# Patient Record
Sex: MALE | Race: BLACK OR AFRICAN AMERICAN | NOT HISPANIC OR LATINO | Employment: FULL TIME | URBAN - METROPOLITAN AREA
[De-identification: names, ages, dates, MRNs, and addresses within clinical notes are randomized per-mention and may not be internally consistent; named-entity substitution may affect disease eponyms.]

---

## 2017-01-12 ENCOUNTER — TELEPHONE (OUTPATIENT)
Dept: NEUROSURGERY | Facility: CLINIC | Age: 57
End: 2017-01-12

## 2017-01-12 ENCOUNTER — TELEPHONE (OUTPATIENT)
Dept: INTERNAL MEDICINE | Facility: CLINIC | Age: 57
End: 2017-01-12

## 2017-01-12 NOTE — TELEPHONE ENCOUNTER
Called and spoke with the patient and he states he was not healing very well,  He has not been able to eat and the flap is touching the metal. Advised he needs to call that office and schedule an appointment for a post-op/ I transferred the call to the   so he can speak with that doctor. Termed the call

## 2017-01-12 NOTE — TELEPHONE ENCOUNTER
----- Message from Cinthia Ratliff sent at 1/12/2017  2:18 PM CST -----  Contact: Patient  Patient called and said he is having a lot of trouble with his swallowing issues. He said he is losing weight and things like that. He does not know what to do, he said it's not getting better.    He is requesting a call today.    Please call him about this at [phone] or [phone]

## 2017-01-12 NOTE — TELEPHONE ENCOUNTER
Spoke with pt. Scheduled appointment with Dr. Wise on 1/23/2017. Appointment slip in the mail. Pt verbalized understanding.

## 2017-01-12 NOTE — TELEPHONE ENCOUNTER
----- Message from Mera Leonard sent at 1/12/2017  1:12 PM CST -----  Contact: pt [phone]  Pt will like a call from the Dr today in regards to the neck infusion surgery he had in  pt said it has not healed well and he is having trouble with eating he is continuing to lose weight and he think he need to have the surgery again,pt will like to know do he need to make an appointment with the Dr to discuss this,please advise pt

## 2017-01-19 ENCOUNTER — OFFICE VISIT (OUTPATIENT)
Dept: NEUROSURGERY | Facility: CLINIC | Age: 57
End: 2017-01-19
Attending: NEUROLOGICAL SURGERY
Payer: COMMERCIAL

## 2017-01-19 VITALS
SYSTOLIC BLOOD PRESSURE: 126 MMHG | HEIGHT: 76 IN | TEMPERATURE: 98 F | BODY MASS INDEX: 23.42 KG/M2 | WEIGHT: 192.31 LBS | HEART RATE: 76 BPM | DIASTOLIC BLOOD PRESSURE: 76 MMHG

## 2017-01-19 DIAGNOSIS — M43.22 CERVICAL VERTEBRAL FUSION: Primary | ICD-10-CM

## 2017-01-19 PROCEDURE — 3074F SYST BP LT 130 MM HG: CPT | Mod: S$GLB,,, | Performed by: NEUROLOGICAL SURGERY

## 2017-01-19 PROCEDURE — 3078F DIAST BP <80 MM HG: CPT | Mod: S$GLB,,, | Performed by: NEUROLOGICAL SURGERY

## 2017-01-19 PROCEDURE — 99213 OFFICE O/P EST LOW 20 MIN: CPT | Mod: S$GLB,,, | Performed by: NEUROLOGICAL SURGERY

## 2017-01-19 PROCEDURE — 1159F MED LIST DOCD IN RCRD: CPT | Mod: S$GLB,,, | Performed by: NEUROLOGICAL SURGERY

## 2017-01-19 PROCEDURE — 99999 PR PBB SHADOW E&M-EST. PATIENT-LVL III: CPT | Mod: PBBFAC,,, | Performed by: NEUROLOGICAL SURGERY

## 2017-01-19 NOTE — PROGRESS NOTES
CHIEF COMPLAINT:  Follow up    I, Ana Paula Regan, am scribing for, and in the presence of, Dr. Wise.    HPI:  Hi Rubio is a 57 y.o.  male with hypertension, carotid artery occlusion who presents today for a follow up. Pt is s/p C3/4-C4/5 ACDF on 12/20/2015. Pt still experiences difficulty swallowing. Pt describes his throat as extremely dry. Pt experiences trouble swallowing solid food as well as liquids. Pt has put back on some weight since he has started drinking Boost shakes.    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 -     Hyperlipidemia     Hypertension     Impaired fasting glucose 10/28/2013    Memory loss     Traumatic iritis - Left Eye 2/27/2013     Past Surgical History   Procedure Laterality Date    Hernia repair      Cervical fusion  12/30/2015     Family History   Problem Relation Age of Onset    Heart disease Mother     Hypertension Mother     Hyperlipidemia Mother     Heart disease Father     Hyperlipidemia Brother     Hypertension Brother     Hypertension Brother     Hyperlipidemia Brother     Benign prostatic hyperplasia Brother     Hypertension Brother     Hypertension Brother     Hepatitis Brother     Amblyopia Neg Hx     Blindness Neg Hx     Cancer Neg Hx     Cataracts Neg Hx     Diabetes Neg Hx     Glaucoma Neg Hx     Macular degeneration Neg Hx     Retinal detachment Neg Hx     Strabismus Neg Hx     Stroke Neg Hx     Thyroid disease Neg Hx      Social History   Substance Use Topics    Smoking status: Former Smoker     Packs/day: 1.00     Years: 30.00     Types: Cigarettes      Quit date: 12/13/2011    Smokeless tobacco: Never Used    Alcohol use 0.0 oz/week     15 - 16 Cans of beer per week      Comment: 2 beers per day        Review of Systems   Constitutional: Negative.    HENT: Positive for trouble swallowing.    Eyes: Negative.    Respiratory: Negative.    Cardiovascular: Negative.    Gastrointestinal: Negative.    Endocrine: Negative.    Genitourinary: Negative.    Musculoskeletal: Negative for back pain, gait problem and neck pain.   Skin: Negative.    Allergic/Immunologic: Negative.    Neurological: Negative for weakness, light-headedness, numbness and headaches.   Hematological: Negative.    Psychiatric/Behavioral: Negative.        OBJECTIVE:   Vital Signs:  Temp: 98.3 °F (36.8 °C) (01/19/17 0844)  Pulse: 76 (01/19/17 0844)  BP: 126/76 (01/19/17 0844)    Physical Exam:    Vital signs: All nursing notes and vital signs reviewed -- afebrile, vital signs stable.  Constitutional: Patient sitting comfortably in chair. Appears well developed and well nourished.  Skin: Exposed areas are intact without abnormal markings, rashes or other lesions.  HEENT: Normocephalic. Normal conjunctivae.  Cardiovascular: Normal rate and regular rhythm.  Respiratory: Chest wall rises and falls symmetrically, without signs of respiratory distress.  Abdomen: Soft and non-tender.  Extremities: Warm and without edema. Calves supple, non-tender.  Psych/Behavior: Normal affect.    Neurological:    Mental status: Alert and oriented. Conversational and appropriate.       Cranial Nerves: Grossly intact.    Motor:    Upper:  Deltoids Triceps Biceps WE WF     R 5/5 5/5 5/5 5/5 5/5 5/5    L 5/5 5/5 5/5 5/5 5/5 5/5      Lower:  HF KE KF DF PF EHL    R 5/5 5/5 5/5 5/5 5/5 5/5    L 5/5 5/5 5/5 5/5 5/5 5/5     Sensory: Intact sensation to light touch in all extremities. Romberg negative.    Reflexes:          DTR: 2+ symmetrically throughout.     Garsia's: Negative.     Babinski's: Negative.     Clonus:  Negative.    Cerebellar: Finger-to-nose and rapid alternating movements normal. Gait stable, fluid.    Spine:    Posture: Head well aligned over pelvis in front and side views.  No focal or global spinal deformity visible on inspection. Shoulders and hips even. No obvious leg length discrepancy. No scapula winging.    Bending: Full ROM with forward, back and lateral bending. No rib prominence with forward bend.    Cervical:      ROM: Full with flexion, extension, lateral rotation and ear-to-shoulder bend.      Midline TTP: Negative.     Spurling's test: Negative.     Lhermitte's: Negative.    Thoracic:     Midline TTP: Negative    Lumbar:     Midline TTP: Negative     Straight Leg Test: Negative     Crossed Straight Leg Test: Negative     Sciatic notch tenderness: Negative.    Other:     SI joint TTP: Negative.     Greater trochanter TTP: Negative.     Tenderness with external/internal hip rotation: Negative.    Diagnostic Results:  All imaging was independently reviewed by me.    No new imaging for my review.    Flex/Ex X-ray C-spine, dated 11/3/2016:  1. Good hardware position and spinal alignment  2. Some evidence of bony fusion C3 to C5  3. No instability    ASSESSMENT/PLAN:     Hi Rubio is 13 months s/p two level ACD. He continues to have significant dysphasia, which is related to physical obstruction from the plate. I recommend a CT C-spine to assess for bony fusion and plate removal if it is fused. If unfused, we will wait for fusion or replace his disc spacer with integrated spacer-plate. He will follow up when the CT is complete.    The patient understands and agrees with the plan of care. All questions were answered.     1. CT C-spine  2. RTC pending #1    I, Dr. Wise, personally performed the services described in this documentation as scribed by Ana Paula Regan in my presence, and it is both accurate and complete.      Hernando Wise M.D.  Department of Neurosurgery  [facility]

## 2017-01-20 ENCOUNTER — OFFICE VISIT (OUTPATIENT)
Dept: CARDIOLOGY | Facility: CLINIC | Age: 57
End: 2017-01-20
Payer: COMMERCIAL

## 2017-01-20 VITALS
HEART RATE: 76 BPM | SYSTOLIC BLOOD PRESSURE: 148 MMHG | DIASTOLIC BLOOD PRESSURE: 88 MMHG | BODY MASS INDEX: 23.31 KG/M2 | WEIGHT: 191.38 LBS | HEIGHT: 76 IN

## 2017-01-20 DIAGNOSIS — F41.9 ANXIETY DISORDER, UNSPECIFIED TYPE: ICD-10-CM

## 2017-01-20 DIAGNOSIS — R00.2 PALPITATIONS: ICD-10-CM

## 2017-01-20 DIAGNOSIS — I25.84 CORONARY ARTERY DISEASE DUE TO CALCIFIED CORONARY LESION: ICD-10-CM

## 2017-01-20 DIAGNOSIS — E78.2 MIXED HYPERLIPIDEMIA: ICD-10-CM

## 2017-01-20 DIAGNOSIS — I25.10 CORONARY ARTERY DISEASE DUE TO CALCIFIED CORONARY LESION: ICD-10-CM

## 2017-01-20 DIAGNOSIS — R07.89 CHEST DISCOMFORT: Primary | ICD-10-CM

## 2017-01-20 DIAGNOSIS — I10 ESSENTIAL HYPERTENSION: ICD-10-CM

## 2017-01-20 PROCEDURE — 1159F MED LIST DOCD IN RCRD: CPT | Mod: S$GLB,,, | Performed by: INTERNAL MEDICINE

## 2017-01-20 PROCEDURE — 3077F SYST BP >= 140 MM HG: CPT | Mod: S$GLB,,, | Performed by: INTERNAL MEDICINE

## 2017-01-20 PROCEDURE — 93000 ELECTROCARDIOGRAM COMPLETE: CPT | Mod: S$GLB,,, | Performed by: INTERNAL MEDICINE

## 2017-01-20 PROCEDURE — 99215 OFFICE O/P EST HI 40 MIN: CPT | Mod: S$GLB,,, | Performed by: INTERNAL MEDICINE

## 2017-01-20 PROCEDURE — 3079F DIAST BP 80-89 MM HG: CPT | Mod: S$GLB,,, | Performed by: INTERNAL MEDICINE

## 2017-01-20 PROCEDURE — 99999 PR PBB SHADOW E&M-EST. PATIENT-LVL III: CPT | Mod: PBBFAC,,, | Performed by: INTERNAL MEDICINE

## 2017-01-20 RX ORDER — CLONIDINE HYDROCHLORIDE 0.1 MG/1
0.1 TABLET ORAL
Qty: 30 TABLET | Refills: 3 | Status: ON HOLD | OUTPATIENT
Start: 2017-01-20 | End: 2017-01-23

## 2017-01-20 NOTE — Clinical Note
Josefina, I saw . Hi Rubio in clinic. I think he should either take an anxiolytic or preferably a SSRI. [place]

## 2017-01-20 NOTE — ASSESSMENT & PLAN NOTE
Clonidine PRN for bouts of elevated BP.  If clonidine does not work well, he can try extra metoprolol PRN. He will require the short acting version.

## 2017-01-20 NOTE — PROGRESS NOTES
Subjective:   Patient ID:  Hi Rubio is a 57 y.o. male who presents for follow-up of Hypertension and family history of CAD      Problem List:  HTN  Hyperlipidemia  PVC  Family h/o CAD  Coronary calcification    HPI:   Hi Rubio reports discomfort in the precordial region. The discomfort is described as pressure. He also had a pressure across the chest x 2 in the past 2 weeks. He was at work both times. One time it started in the morning and gradually increased until lunch time. He became short of breath and felt his heart racing. BP was 180/>100 mm Hg. He also reports palpitations. The palpitations are described as a rapid heart beat and at other times an extra beat.  A CT of the chest revealed calcification within the LAD.   He tells me that he used to suffer from anxiety many years ago. He wonders if they are coming back.    On 80 mg of pravastatin his lipids have improved. HDL/total chol has improved from 15% to >20%.     He has dysphagia since undergoing cervical spine surgery. He will be undergoing surgery again and requires preop clearance.      Review of Systems   Constitution: Positive for malaise/fatigue and weight loss. Negative for weakness and weight gain.   Cardiovascular: Positive for chest pain, dyspnea on exertion, irregular heartbeat and palpitations. Negative for claudication, leg swelling, orthopnea, paroxysmal nocturnal dyspnea and syncope.   Respiratory: Negative for cough, sputum production and wheezing.    Musculoskeletal: Positive for muscle cramps. Negative for falls, joint pain, muscle weakness and myalgias.   Gastrointestinal: Negative for abdominal pain, heartburn and melena.   Genitourinary: Positive for nocturia. Negative for frequency and hematuria.   Neurological: Positive for dizziness and numbness. Negative for light-headedness, loss of balance and paresthesias.   Psychiatric/Behavioral: Negative for depression.       Current Outpatient Prescriptions   Medication  "Sig    amlodipine (NORVASC) 5 MG tablet TAKE ONE TABLET BY MOUTH ONE TIME DAILY    CONTOUR NEXT STRIPS Strp 1 strip by Misc.(Non-Drug; Combo Route) route once daily. Test blood glucose once daily as instructed.    gabapentin (NEURONTIN) 300 MG capsule Take 1 capsule (300 mg total) by mouth 2 (two) times daily. (Patient taking differently: Take 300 mg by mouth as needed. )    hydrochlorothiazide (HYDRODIURIL) 25 MG tablet Take 1 tablet (25 mg total) by mouth once daily.    methocarbamol (ROBAXIN) 750 MG Tab Take 1 tablet (750 mg total) by mouth every 8 (eight) hours. (Patient taking differently: Take 750 mg by mouth every 8 (eight) hours as needed. )    metoprolol succinate (TOPROL-XL) 100 MG 24 hr tablet Take 1 tablet (100 mg total) by mouth once daily.    MICROLET LANCET Misc 1 lancet by Misc.(Non-Drug; Combo Route) route once daily. Test blood glucose once daily as instructed.    multivitamin (ONE DAILY MULTIVITAMIN) per tablet Take 1 tablet by mouth once daily.    omeprazole (PRILOSEC) 20 MG capsule TAKE ONE CAPSULE BY MOUTH ONE TIME DAILY    pravastatin (PRAVACHOL) 80 MG tablet Take 1 tablet (80 mg total) by mouth every evening.    sildenafil (VIAGRA) 50 MG tablet Take 1 tablet (50 mg total) by mouth daily as needed for Erectile Dysfunction.         Social History   Substance Use Topics    Smoking status: Former Smoker     Packs/day: 1.00     Years: 30.00     Types: Cigarettes     Quit date: 12/13/2011    Smokeless tobacco: Never Used    Alcohol use 0.0 oz/week     15 - 16 Cans of beer per week      Comment: 2 beers per day         Objective:     Physical Exam   Constitutional: He is oriented to person, place, and time. He appears well-developed and well-nourished.   BP (!) 148/88  Pulse 76  Ht 6' 4" (1.93 m)  Wt 86.8 kg (191 lb 5.8 oz)  BMI 23.29 kg/m2     HENT:   Head: Normocephalic and atraumatic.   Neck: No JVD present. Carotid bruit is not present.   Cardiovascular: Normal rate, regular " rhythm, S1 normal and S2 normal.  Exam reveals no gallop.    No murmur heard.  Pulses:       Radial pulses are 2+ on the right side, and 2+ on the left side.        Posterior tibial pulses are 2+ on the right side, and 2+ on the left side.   Pulmonary/Chest: Effort normal. He has no wheezes. He has no rales. Chest wall is not dull to percussion.   Abdominal: Soft. There is no splenomegaly or hepatomegaly. There is no tenderness.   Musculoskeletal:        Right lower leg: He exhibits no edema.        Left lower leg: He exhibits no edema.   Neurological: He is alert and oriented to person, place, and time. Gait normal.   Skin: Skin is warm and dry. No bruising noted. No cyanosis. Nails show no clubbing.   Psychiatric: He has a normal mood and affect. His speech is normal and behavior is normal. Judgment and thought content normal. Cognition and memory are normal.           Lab Results   Component Value Date    CHOL 199 06/09/2016    HDL 45 06/09/2016    LDLCALC 109.2 06/09/2016    TRIG 224 (H) 06/09/2016    CHOLHDL 22.6 06/09/2016     Lab Results   Component Value Date     (H) 12/22/2016    CREATININE 1.1 12/22/2016    BUN 12 12/22/2016     12/22/2016    K 3.6 12/22/2016     12/22/2016    CO2 23 12/22/2016     Lab Results   Component Value Date    ALT 26 06/09/2016    AST 22 06/09/2016    ALKPHOS 65 10/13/2015    BILITOT 0.6 10/13/2015       ECG today reviewed by me. It reveals sinus rhythm with no ST or T abnormality.      Assessment:     1. Chest discomfort    2. Essential hypertension    3. Mixed hyperlipidemia    4. Coronary artery disease due to calcified coronary lesion    5. Anxiety disorder, unspecified type    6. Palpitations          Plan:     Essential hypertension  Clonidine PRN for bouts of elevated BP.  If clonidine does not work well, he can try extra metoprolol PRN. He will require the short acting version.    Chest discomfort  He has 2 types of chest discomfort. The one that is  across the chest is more concerning.   Stress echo.    Mixed hyperlipidemia  Continue pravastatin.  Nutritional counseling.    Will clear for surgery after reviewing stress test.  RTC 8/17.

## 2017-01-20 NOTE — PATIENT INSTRUCTIONS
Note your heart rate when you check your BP.   Talk to Dr. Kendall about starting you on a medication for anxiety. Sometimes a low dose of an antidepressant works better.    =============   ================   ==================   =================    LDL - bad type - improves with diet and medications: typically statins; most other medications can lower LDL but have not been proven to prevent heart attacks.             May not improve significantly with exercise alone.             Ideally less than 70     HDL - good type - improves with exercise             Ideally greater than 50    TGs (triglycerides) - also bad - can change very quickly and considerably with food - improves with diet and exercise            In some cases a low carbohydrate diet will lower TGs better than a low fat diet.            Ideal range       Sugar, fat and cholesterol in food:     Current dietary guidelines recommend drastically reducing our intake of sugar. There is less emphasis on fat. And cholesterol in our food is no longer a significant concern. However please do not confuse this with the role of cholesterol in our blood and arteries. It is still cholesterol that clogs up our arteries whether it comes from our food or is manufactured by our bodies.       Most foods that are high in cholesterol are also high in saturated fat. But there is way more saturated fat than cholesterol in these foods. On the other hand there are a handful of foods that are high in cholesterol but do not contain much saturated fat: eggs, shrimp, crab legs and crawfish; these are OK to eat.       Saturated fat is the bad fat - you should limit your intake of this. Deep fried foods, meats and other animal fats are high in saturated fat. Cookies, donuts and most dessert and cakes are usually high in both saturated fat and sugar.       Unsaturated fat is the good fat. It contains the same number of calories as saturated fat but does not get deposited in our  arteries. The Mediterranean style diet encourages the intake of unsaturated fat - olive oil, avocado and unsalted nuts.      You should eat a few servings of vegetables (and fruit as long as you are not diabetic) everyday. Substitute some plant proteins in place of meat: soy, beans, lentils, quinoa and oatmeal.     Do not use stick butter or stick margarine. Butter that comes in a tub is soft butter. It consists of 1/2 butter and 1/2 canola or another type of vegetable oil. It is fine to use that.       Trans fats should also be avoided. Most foods that are labelled as containing 0 gms of trans fat can still contain several hundred milligrams of trans fat: creamer, margarine, refrigerator dough, deep fried foods, ready made frosting, potato, corn and torilla chips, cakes, cookies, pie crusts and crackers containing shortening made with hydrogenated vegetable oil.

## 2017-01-20 NOTE — MR AVS SNAPSHOT
[place] [facility]   [facility]  [place] [address]  Phone: [phone]                  Hi Rubio   2017 8:00 AM   Office Visit    Description:  Male : 1960   Provider:  No Palacios MD   Department:  [place] [facility]           Reason for Visit     Hypertension     family history of CAD           Diagnoses this Visit        Comments    Chest discomfort    -  Primary     Essential hypertension         Mixed hyperlipidemia         Coronary artery disease due to calcified coronary lesion         Anxiety disorder, unspecified type         Palpitations                To Do List           Future Appointments        Provider Department Dept Phone    2017 8:00 AM JENY CARROLLFABIENNEADRYAN [place] [facility] [phone]    2017 3:00 PM [facility] CT OP LIMIT [address] [facility] [phone]    2017 4:00 PM Hernando Wise MD [facility] [phone]      Goals (5 Years of Data)     None      [facility]     [facility] -  Assistance  Registered nurses in the [facility] provide clinical advisement, health education, appointment booking, and other advisory services.  Call for this free service at [phone].             Medications           Message regarding Medications     Verify the changes and/or additions to your medication regime listed below are the same as discussed with your clinician today.  If any of these changes or additions are incorrect, please notify your healthcare provider.             Verify that the below list of medications is an accurate representation of the medications you are currently taking.  If none reported, the list may be blank. If incorrect, please contact your healthcare provider. Carry this list with you in case of emergency.           Current Medications     amlodipine (NORVASC) 5 MG tablet TAKE ONE TABLET BY MOUTH ONE TIME DAILY    CONTOUR NEXT STRIPS Strp  "1 strip by Misc.(Non-Drug; Combo Route) route once daily. Test blood glucose once daily as instructed.    gabapentin (NEURONTIN) 300 MG capsule Take 1 capsule (300 mg total) by mouth 2 (two) times daily.    hydrochlorothiazide (HYDRODIURIL) 25 MG tablet Take 1 tablet (25 mg total) by mouth once daily.    methocarbamol (ROBAXIN) 750 MG Tab Take 1 tablet (750 mg total) by mouth every 8 (eight) hours.    metoprolol succinate (TOPROL-XL) 100 MG 24 hr tablet Take 1 tablet (100 mg total) by mouth once daily.    MICROLET LANCET Misc 1 lancet by Misc.(Non-Drug; Combo Route) route once daily. Test blood glucose once daily as instructed.    multivitamin (ONE DAILY MULTIVITAMIN) per tablet Take 1 tablet by mouth once daily.    omeprazole (PRILOSEC) 20 MG capsule TAKE ONE CAPSULE BY MOUTH ONE TIME DAILY    pravastatin (PRAVACHOL) 80 MG tablet Take 1 tablet (80 mg total) by mouth every evening.    sildenafil (VIAGRA) 50 MG tablet Take 1 tablet (50 mg total) by mouth daily as needed for Erectile Dysfunction.           Clinical Reference Information           Vital Signs - Last Recorded  Most recent update: 1/20/2017  7:55 AM by Jessica Grant LPN    BP Pulse Ht Wt BMI    (!) 148/88 76 6' 4" (1.93 m) 86.8 kg (191 lb 5.8 oz) 23.29 kg/m2      Blood Pressure          Most Recent Value    BP  (!)  148/88      Allergies as of 1/20/2017     Lisinopril    Lipitor [Atorvastatin]      Immunizations Administered on Date of Encounter - 1/20/2017     None      Orders Placed During Today's Visit      Normal Orders This Visit    IN OFFICE EKG 12-LEAD (to [employer])     Future Labs/Procedures Expected by Expires    Exercise stress echo  As directed 2/19/2017    IN OFFICE EKG 12-LEAD (to [facility])  As directed 1/20/2018      Instructions    Note your heart rate when you check your BP.   Talk to Dr. Kendall about starting you on a medication for anxiety. Sometimes a low dose of an antidepressant works better.    =============   ================   " ==================   =================    LDL - bad type - improves with diet and medications: typically statins; most other medications can lower LDL but have not been proven to prevent heart attacks.             May not improve significantly with exercise alone.             Ideally less than 70     HDL - good type - improves with exercise             Ideally greater than 50    TGs (triglycerides) - also bad - can change very quickly and considerably with food - improves with diet and exercise            In some cases a low carbohydrate diet will lower TGs better than a low fat diet.            Ideal range       Sugar, fat and cholesterol in food:     Current dietary guidelines recommend drastically reducing our intake of sugar. There is less emphasis on fat. And cholesterol in our food is no longer a significant concern. However please do not confuse this with the role of cholesterol in our blood and arteries. It is still cholesterol that clogs up our arteries whether it comes from our food or is manufactured by our bodies.       Most foods that are high in cholesterol are also high in saturated fat. But there is way more saturated fat than cholesterol in these foods. On the other hand there are a handful of foods that are high in cholesterol but do not contain much saturated fat: eggs, shrimp, crab legs and crawfish; these are OK to eat.       Saturated fat is the bad fat - you should limit your intake of this. Deep fried foods, meats and other animal fats are high in saturated fat. Cookies, donuts and most dessert and cakes are usually high in both saturated fat and sugar.       Unsaturated fat is the good fat. It contains the same number of calories as saturated fat but does not get deposited in our arteries. The Mediterranean style diet encourages the intake of unsaturated fat - olive oil, avocado and unsalted nuts.      You should eat a few servings of vegetables (and fruit as long as you are not  diabetic) everyday. Substitute some plant proteins in place of meat: soy, beans, lentils, quinoa and oatmeal.     Do not use stick butter or stick margarine. Butter that comes in a tub is soft butter. It consists of 1/2 butter and 1/2 canola or another type of vegetable oil. It is fine to use that.       Trans fats should also be avoided. Most foods that are labelled as containing 0 gms of trans fat can still contain several hundred milligrams of trans fat: creamer, margarine, refrigerator dough, deep fried foods, ready made frosting, potato, corn and torilla chips, cakes, cookies, pie crusts and crackers containing shortening made with hydrogenated vegetable oil.

## 2017-01-20 NOTE — ASSESSMENT & PLAN NOTE
He has 2 types of chest discomfort. The one that is across the chest is more concerning.   Stress echo.

## 2017-01-21 ENCOUNTER — HOSPITAL ENCOUNTER (OUTPATIENT)
Facility: HOSPITAL | Age: 57
LOS: 2 days | Discharge: HOME | End: 2017-01-23
Attending: EMERGENCY MEDICINE | Admitting: INTERNAL MEDICINE
Payer: COMMERCIAL

## 2017-01-21 DIAGNOSIS — I20.0 UNSTABLE ANGINA: Primary | ICD-10-CM

## 2017-01-21 DIAGNOSIS — R07.9 CHEST PAIN, UNSPECIFIED TYPE: ICD-10-CM

## 2017-01-21 LAB
ALBUMIN SERPL BCP-MCNC: 4.2 G/DL
ALP SERPL-CCNC: 69 U/L
ALT SERPL W/O P-5'-P-CCNC: 35 U/L
AMPHET+METHAMPHET UR QL: NEGATIVE
ANION GAP SERPL CALC-SCNC: 11 MMOL/L
AST SERPL-CCNC: 29 U/L
BARBITURATES UR QL SCN>200 NG/ML: NEGATIVE
BASOPHILS # BLD AUTO: 0.04 K/UL
BASOPHILS NFR BLD: 0.5 %
BENZODIAZ UR QL SCN>200 NG/ML: ABNORMAL
BILIRUB SERPL-MCNC: 0.5 MG/DL
BNP SERPL-MCNC: <10 PG/ML
BUN SERPL-MCNC: 17 MG/DL
BZE UR QL SCN: NEGATIVE
CALCIUM SERPL-MCNC: 9.8 MG/DL
CANNABINOIDS UR QL SCN: NEGATIVE
CHLORIDE SERPL-SCNC: 104 MMOL/L
CK SERPL-CCNC: 166 U/L
CO2 SERPL-SCNC: 23 MMOL/L
CREAT SERPL-MCNC: 1 MG/DL
CREAT UR-MCNC: 157 MG/DL
DIFFERENTIAL METHOD: NORMAL
EOSINOPHIL # BLD AUTO: 0.3 K/UL
EOSINOPHIL NFR BLD: 4.5 %
ERYTHROCYTE [DISTWIDTH] IN BLOOD BY AUTOMATED COUNT: 13.5 %
EST. GFR  (AFRICAN AMERICAN): >60 ML/MIN/1.73 M^2
EST. GFR  (NON AFRICAN AMERICAN): >60 ML/MIN/1.73 M^2
ETHANOL UR-MCNC: 10 MG/DL
GLUCOSE SERPL-MCNC: 109 MG/DL
HCT VFR BLD AUTO: 46 %
HGB BLD-MCNC: 16 G/DL
INR PPP: 1.1
LYMPHOCYTES # BLD AUTO: 1.7 K/UL
LYMPHOCYTES NFR BLD: 23 %
MAGNESIUM SERPL-MCNC: 2.4 MG/DL
MCH RBC QN AUTO: 29.9 PG
MCHC RBC AUTO-ENTMCNC: 34.8 %
MCV RBC AUTO: 86 FL
METHADONE UR QL SCN>300 NG/ML: NEGATIVE
MONOCYTES # BLD AUTO: 0.8 K/UL
MONOCYTES NFR BLD: 10.8 %
NEUTROPHILS # BLD AUTO: 4.5 K/UL
NEUTROPHILS NFR BLD: 60.9 %
OPIATES UR QL SCN: NEGATIVE
PCP UR QL SCN>25 NG/ML: NEGATIVE
PLATELET # BLD AUTO: 272 K/UL
PMV BLD AUTO: 10.3 FL
POCT GLUCOSE: 112 MG/DL (ref 70–110)
POTASSIUM SERPL-SCNC: 3.7 MMOL/L
PROT SERPL-MCNC: 7.7 G/DL
PROTHROMBIN TIME: 11.5 SEC
RBC # BLD AUTO: 5.35 M/UL
SODIUM SERPL-SCNC: 138 MMOL/L
TOXICOLOGY INFORMATION: ABNORMAL
TROPONIN I SERPL DL<=0.01 NG/ML-MCNC: <0.006 NG/ML
WBC # BLD AUTO: 7.32 K/UL

## 2017-01-21 PROCEDURE — 85610 PROTHROMBIN TIME: CPT

## 2017-01-21 PROCEDURE — G0378 HOSPITAL OBSERVATION PER HR: HCPCS

## 2017-01-21 PROCEDURE — 93005 ELECTROCARDIOGRAM TRACING: CPT

## 2017-01-21 PROCEDURE — 82962 GLUCOSE BLOOD TEST: CPT

## 2017-01-21 PROCEDURE — 85025 COMPLETE CBC W/AUTO DIFF WBC: CPT

## 2017-01-21 PROCEDURE — 25000003 PHARM REV CODE 250: Performed by: EMERGENCY MEDICINE

## 2017-01-21 PROCEDURE — 84484 ASSAY OF TROPONIN QUANT: CPT

## 2017-01-21 PROCEDURE — 80053 COMPREHEN METABOLIC PANEL: CPT

## 2017-01-21 PROCEDURE — 99285 EMERGENCY DEPT VISIT HI MDM: CPT | Mod: ,,, | Performed by: EMERGENCY MEDICINE

## 2017-01-21 PROCEDURE — 11000001 HC ACUTE MED/SURG PRIVATE ROOM

## 2017-01-21 PROCEDURE — 82550 ASSAY OF CK (CPK): CPT

## 2017-01-21 PROCEDURE — 25000003 PHARM REV CODE 250: Performed by: STUDENT IN AN ORGANIZED HEALTH CARE EDUCATION/TRAINING PROGRAM

## 2017-01-21 PROCEDURE — 25000003 PHARM REV CODE 250: Performed by: INTERNAL MEDICINE

## 2017-01-21 PROCEDURE — 83880 ASSAY OF NATRIURETIC PEPTIDE: CPT

## 2017-01-21 PROCEDURE — 83735 ASSAY OF MAGNESIUM: CPT

## 2017-01-21 PROCEDURE — 82570 ASSAY OF URINE CREATININE: CPT

## 2017-01-21 PROCEDURE — 99223 1ST HOSP IP/OBS HIGH 75: CPT | Mod: 25,,, | Performed by: INTERNAL MEDICINE

## 2017-01-21 PROCEDURE — 12000002 HC ACUTE/MED SURGE SEMI-PRIVATE ROOM

## 2017-01-21 PROCEDURE — 80307 DRUG TEST PRSMV CHEM ANLYZR: CPT

## 2017-01-21 PROCEDURE — 99285 EMERGENCY DEPT VISIT HI MDM: CPT | Mod: 25

## 2017-01-21 PROCEDURE — 93010 ELECTROCARDIOGRAM REPORT: CPT | Mod: ,,, | Performed by: INTERNAL MEDICINE

## 2017-01-21 RX ORDER — INSULIN ASPART 100 [IU]/ML
1-10 INJECTION, SOLUTION INTRAVENOUS; SUBCUTANEOUS
Status: DISCONTINUED | OUTPATIENT
Start: 2017-01-21 | End: 2017-01-23 | Stop reason: HOSPADM

## 2017-01-21 RX ORDER — ASPIRIN 325 MG
325 TABLET ORAL
Status: COMPLETED | OUTPATIENT
Start: 2017-01-21 | End: 2017-01-21

## 2017-01-21 RX ORDER — METOPROLOL SUCCINATE 100 MG/1
100 TABLET, EXTENDED RELEASE ORAL DAILY
Status: DISCONTINUED | OUTPATIENT
Start: 2017-01-22 | End: 2017-01-23 | Stop reason: HOSPADM

## 2017-01-21 RX ORDER — IBUPROFEN 200 MG
16 TABLET ORAL
Status: DISCONTINUED | OUTPATIENT
Start: 2017-01-21 | End: 2017-01-23 | Stop reason: HOSPADM

## 2017-01-21 RX ORDER — NITROGLYCERIN 0.4 MG/1
0.4 TABLET SUBLINGUAL EVERY 5 MIN PRN
Status: COMPLETED | OUTPATIENT
Start: 2017-01-21 | End: 2017-01-22

## 2017-01-21 RX ORDER — GLUCAGON 1 MG
1 KIT INJECTION
Status: DISCONTINUED | OUTPATIENT
Start: 2017-01-21 | End: 2017-01-23 | Stop reason: HOSPADM

## 2017-01-21 RX ORDER — MORPHINE SULFATE 2 MG/ML
1 INJECTION, SOLUTION INTRAMUSCULAR; INTRAVENOUS EVERY 10 MIN PRN
Status: DISCONTINUED | OUTPATIENT
Start: 2017-01-21 | End: 2017-01-23 | Stop reason: HOSPADM

## 2017-01-21 RX ORDER — AMLODIPINE BESYLATE 10 MG/1
10 TABLET ORAL DAILY
Status: DISCONTINUED | OUTPATIENT
Start: 2017-01-22 | End: 2017-01-23 | Stop reason: HOSPADM

## 2017-01-21 RX ORDER — IBUPROFEN 200 MG
24 TABLET ORAL
Status: DISCONTINUED | OUTPATIENT
Start: 2017-01-21 | End: 2017-01-23 | Stop reason: HOSPADM

## 2017-01-21 RX ORDER — PRAVASTATIN SODIUM 40 MG/1
80 TABLET ORAL NIGHTLY
Status: DISCONTINUED | OUTPATIENT
Start: 2017-01-21 | End: 2017-01-23 | Stop reason: HOSPADM

## 2017-01-21 RX ORDER — NITROGLYCERIN 0.4 MG/1
0.4 TABLET SUBLINGUAL
Status: ACTIVE | OUTPATIENT
Start: 2017-01-21 | End: 2017-01-22

## 2017-01-21 RX ORDER — NITROGLYCERIN 0.4 MG/1
0.4 TABLET SUBLINGUAL EVERY 5 MIN PRN
Status: COMPLETED | OUTPATIENT
Start: 2017-01-21 | End: 2017-01-21

## 2017-01-21 RX ORDER — NAPROXEN SODIUM 220 MG/1
81 TABLET, FILM COATED ORAL DAILY
Status: DISCONTINUED | OUTPATIENT
Start: 2017-01-22 | End: 2017-01-23 | Stop reason: HOSPADM

## 2017-01-21 RX ORDER — CLOPIDOGREL 300 MG/1
600 TABLET, FILM COATED ORAL ONCE
Status: COMPLETED | OUTPATIENT
Start: 2017-01-21 | End: 2017-01-21

## 2017-01-21 RX ORDER — PANTOPRAZOLE SODIUM 40 MG/1
40 TABLET, DELAYED RELEASE ORAL DAILY
Status: DISCONTINUED | OUTPATIENT
Start: 2017-01-22 | End: 2017-01-23 | Stop reason: HOSPADM

## 2017-01-21 RX ORDER — CLOPIDOGREL BISULFATE 75 MG/1
75 TABLET ORAL DAILY
Status: DISCONTINUED | OUTPATIENT
Start: 2017-01-22 | End: 2017-01-23 | Stop reason: HOSPADM

## 2017-01-21 RX ADMIN — CLOPIDOGREL BISULFATE 600 MG: 300 TABLET, FILM COATED ORAL at 09:01

## 2017-01-21 RX ADMIN — NITROGLYCERIN 0.4 MG: 0.4 TABLET SUBLINGUAL at 07:01

## 2017-01-21 RX ADMIN — ASPIRIN 325 MG ORAL TABLET 325 MG: 325 PILL ORAL at 05:01

## 2017-01-21 RX ADMIN — NITROGLYCERIN 0.4 MG: 0.4 TABLET SUBLINGUAL at 05:01

## 2017-01-21 RX ADMIN — NITROGLYCERIN 0.4 MG: 0.4 TABLET SUBLINGUAL at 09:01

## 2017-01-21 RX ADMIN — NITROGLYCERIN 1 INCH: 20 OINTMENT TOPICAL at 07:01

## 2017-01-21 RX ADMIN — PRAVASTATIN SODIUM 80 MG: 80 TABLET ORAL at 09:01

## 2017-01-21 NOTE — ED PROVIDER NOTES
Encounter Date: 1/21/2017    SCRIBE #1 NOTE: I, Tammy Hines, am scribing for, and in the presence of,  Dr. Maddox. I have scribed the following portions of the note - the Resident attestation and the EKG reading.       History     Chief Complaint   Patient presents with    Chest Pain     felling weak, feel like heart is racing, denies cardiac hx     Review of patient's allergies indicates:   Allergen Reactions    Lisinopril Anaphylaxis and Nausea And Vomiting     General bad feeling    Lipitor [atorvastatin] Other (See Comments)     Muscle aches     HPI Comments: 58 y/o male w/ hx of HTN, HLD, CAD presents for evaluation of intermittent CP since this morning. Pt is currently having CP since 3PM substernal, w/ radiation to left neck, and associated SOB. Additionally reports leg weakness to the point he can't walk and his son had to carry him. Spouse reports him to be lethargic which is not normal. No N/V. Pt has similar sx in the past. Pt saw his cardiologist yesterday and was told to take nitro.       The history is provided by the spouse and the patient.     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 -     Hyperlipidemia     Hypertension     Impaired fasting glucose 10/28/2013    Memory loss     Traumatic iritis - Left Eye 2/27/2013     Past Medical History Pertinent Negatives   Diagnosis Date Noted    Amblyopia 2/22/2013    Asthma 7/24/2013    Cataract 2/22/2013    CHF (congestive heart failure) 7/24/2013    Diabetes mellitus 2/22/2013    Diabetic retinopathy 2/22/2013    Glaucoma 2/22/2013    Macular degeneration 2/22/2013    Retinal detachment 2/22/2013    Strabismus 2/22/2013    Stroke 7/24/2013    Syncope and collapse 7/24/2013    Thyroid disease 7/24/2013     Past Surgical History   Procedure Laterality Date     Hernia repair      Cervical fusion  12/30/2015     Family History   Problem Relation Age of Onset    Heart disease Mother     Hypertension Mother     Hyperlipidemia Mother     Heart disease Father     Hyperlipidemia Brother     Hypertension Brother     Hypertension Brother     Hyperlipidemia Brother     Benign prostatic hyperplasia Brother     Hypertension Brother     Hypertension Brother     Hepatitis Brother     Amblyopia Neg Hx     Blindness Neg Hx     Cancer Neg Hx     Cataracts Neg Hx     Diabetes Neg Hx     Glaucoma Neg Hx     Macular degeneration Neg Hx     Retinal detachment Neg Hx     Strabismus Neg Hx     Stroke Neg Hx     Thyroid disease Neg Hx      Social History   Substance Use Topics    Smoking status: Former Smoker     Packs/day: 1.00     Years: 30.00     Types: Cigarettes     Quit date: 12/13/2011    Smokeless tobacco: Never Used    Alcohol use 0.0 oz/week     15 - 16 Cans of beer per week      Comment: 2 beers per day     Review of Systems   Constitutional: Negative for chills and fever.   HENT: Positive for postnasal drip. Negative for drooling.    Eyes: Negative for pain and visual disturbance.   Respiratory: Positive for shortness of breath. Negative for cough.    Cardiovascular: Positive for chest pain and palpitations.   Gastrointestinal: Positive for abdominal pain. Negative for nausea and vomiting.   Genitourinary: Negative for dysuria and flank pain.   Musculoskeletal: Positive for gait problem. Negative for back pain.   Skin: Negative for pallor and wound.   Neurological: Positive for weakness. Negative for seizures.       Physical Exam   Initial Vitals   BP Pulse Resp Temp SpO2   01/21/17 1530 01/21/17 1530 01/21/17 1530 01/21/17 1530 01/21/17 1530   161/82 108 18 98.5 °F (36.9 °C) 98 %     Physical Exam    Nursing note and vitals reviewed.  Constitutional: He appears well-developed and well-nourished.   Pt lethargic but responsive.    HENT:   Head: Normocephalic  and atraumatic.   Mouth/Throat: Oropharynx is clear and moist.   Eyes: EOM are normal. Pupils are equal, round, and reactive to light.   Neck: Normal range of motion.   Cardiovascular: Normal rate, regular rhythm, normal heart sounds and intact distal pulses. Exam reveals no gallop and no friction rub.    No murmur heard.  Pulses intact in all four extremities.    Pulmonary/Chest: Breath sounds normal. No respiratory distress. He has no wheezes. He has no rhonchi. He has no rales.   Musculoskeletal: Normal range of motion. He exhibits no edema.   Neurological: He is alert and oriented to person, place, and time.   CN III-XII intact  Sensory grossly intact.   5/5 strength in upper extremities  4/5 strength in lower extremities.            ED Course   Procedures  Labs Reviewed   TOXICOLOGY SCREEN, URINE, RANDOM (COMPLIANCE) - Abnormal; Notable for the following:        Result Value    Alcohol, Urine 10 (*)     All other components within normal limits   POCT GLUCOSE - Abnormal; Notable for the following:     POCT Glucose 112 (*)     All other components within normal limits   CBC W/ AUTO DIFFERENTIAL   COMPREHENSIVE METABOLIC PANEL   PROTIME-INR   B-TYPE NATRIURETIC PEPTIDE   TROPONIN I   CK   MAGNESIUM   CK    Narrative:     ADD ON PER DR TONA MD  TEST ORDER# [id number]   MG/CPK @ 2054   MAGNESIUM    Narrative:     ADD ON PER DR TONA MD  TEST ORDER# [id number]   MG/CPK @ 2054   TYPE & SCREEN     EKG Readings: (Independently Interpreted)   HR 96. NSR with PVCs. No STEMI.           Medical Decision Making:   History:   Old Medical Records: I decided to obtain old medical records.  Clinical Tests:   Lab Tests: Reviewed and Ordered  Radiological Study: Ordered and Reviewed  Medical Tests: Ordered and Reviewed       APC / Resident Notes:   56 y/o male w/ hx of HTN, HLD, CAD presents w/ chest pain starting this morning. Pt reports sub sternal pounding CP radiation to left neck. No exertional  component. No relieving factors. VSS. NAD. L CTAB. Abd soft nontender. Decreased strength of the lower extremities. Tremulous. Ddx: ACS, GERD, pneumonia, alcohol withdrawal, and musckuloskeltel. Evaluate w/ CXR, EKG, troponin, CBC, and BNP. Will treat w/ ASA and nitro.   Tristian Lay MD  U EM PGY1  01/21/2017 7:02 PM    Update Pt's lab have resulted. Normal WBC. Negative BNP. Negative initial troponin. CXR does not demonstrate any acute process. Pt's pain went from 8/10 to 4/10 following nitro administration. Discuss case w/ Medicine who will admit for possible stress test in the morning.   Tristian Lay MD  [facility] EM PGY1  01/21/2017       Scribe Attestation:   Scribe #1: I performed the above scribed service and the documentation accurately describes the services I performed. I attest to the accuracy of the note.    Attending Attestation:   Physician Attestation Statement for Resident:  As the supervising MD   Physician Attestation Statement: I have personally seen and examined this patient.   I agree with the above history. -: This is a 57 year old male with history of HTN, HLD, CAD with common intermittent CP with constant chest pain for the last 3 hours that radiates to his neck. Pt states no exacerbating or alievating components. Usually, pt states his CP lasts for minutes to hours. However, since his chest pain lasted for 3 hours today, pt stated he decided to come to the ED to treat his pain. Furthermore, pt states he has bilaterally leg weakness that prevents him from walking. He described that his son had to help him move from the car. Additionally, pt stated he visited his cardiologist yesterday with a recommendation for a stress test and an Echo test in the future. Patient endorses smoking .5 pack/day for many years but stopped 6 years ago. Additionally, pt endorses drinking 2-3 beers per day. However, his wife stated increased stress in the last year has caused him to drink hard liquor daily.  Patient denies back pain.    As the supervising MD I agree with the above PE.   -: Lungs clear.    As the supervising MD I agree with the above treatment, course, plan, and disposition.   -: Labs appear normal with normal troponin. With nitro, his pain went from a 8 out of 10 to a 4 out of 10. However, based on history and risk factors, which are significant, will call cardiology.           Physician Attestation for Scribe:  Physician Attestation Statement for Scribe #1: I, Dr. Maddox, reviewed documentation, as scribed by Tammy Hines in my presence, and it is both accurate and complete.         Attending ED Notes:   8:02 PM  Pt evaluated for several reasons including chest discomfort that has lasted 4 days with today's pain lasting hours. Pain responded to nitroglycerin. Also had complaints of onset of tremors and weakness of lower extremities today. We at this time do not know cause of this because we feel stress and anxiety could be playing large factor in these symptoms. However, we discussed with cardiology. Will likely admit to [facility] service for further evaluation.           ED Course     Clinical Impression:   Chest pain unspecificed  Disposition:   Disposition: Admitted  Condition: Serious       Tristian Lay MD  Resident  01/26/17 5180       Kian Maddox MD  01/31/17 7802

## 2017-01-21 NOTE — ED NOTES
Pt placed on cardiac monitor, continuous pulse ox, cycling blood pressures. Side rails up x2, call bell in reach, bed in low position with brake engaged. Family at bedside

## 2017-01-21 NOTE — IP AVS SNAPSHOT
[facility]  [address]  [facility] [address]  Phone: [phone]           Patient Discharge Instructions     Our goal is to set you up for success. This packet includes information on your condition, medications, and your home care. It will help you to care for yourself so you don't get sicker and need to go back to the hospital.     Please ask your nurse if you have any questions.        There are many details to remember when preparing to leave the hospital. Here is what you will need to do:    1. Take your medicine. If you are prescribed medications, review your Medication List in the following pages. You may have new medications to  at the pharmacy and others that you'll need to stop taking. Review the instructions for how and when to take your medications. Talk with your doctor or nurses if you are unsure of what to do.     2. Go to your follow-up appointments. Specific follow-up information is listed in the following pages. Your may be contacted by a transition nurse or clinical provider about future appointments. Be sure we have all of the phone numbers to reach you, if needed. Please contact your provider's office if you are unable to make an appointment.     3. Watch for warning signs. Your doctor or nurse will give you detailed warning signs to watch for and when to call for assistance. These instructions may also include educational information about your condition. If you experience any of warning signs to your health, call your doctor.               [facility]  Unless otherwise directed by your provider, please contact [facility], our nurse care line that is available for 24/7 assistance.     [phone] (toll-free)    Registered nurses in the [facility] provide clinical advisement, health education, appointment booking, and other advisory services.                    ** Verify the list of medication(s) below is accurate and up  to date. Carry this with you in case of emergency. If your medications have changed, please notify your healthcare provider.             Medication List      START taking these medications        Additional Info    Begin Date AM Noon PM Bedtime    aspirin 81 MG Chew   Refills:  0   Dose:  81 mg    Last time this was given:  81 mg on 1/23/2017  8:07 AM   Instructions:  Take 1 tablet (81 mg total) by mouth once daily.            01/24/2017                   nitroGLYCERIN 0.3 MG SL tablet   Commonly known as:  NITROSTAT   Quantity:  15 tablet   Refills:  0   Dose:  0.3 mg    Last time this was given:  1/23/2017 12:15 PM   Instructions:  Place 1 tablet (0.3 mg total) under the tongue every 5 (five) minutes as needed for Chest pain.                            pantoprazole 40 MG tablet   Commonly known as:  PROTONIX   Quantity:  30 tablet   Refills:  1   Dose:  40 mg   Replaces:  omeprazole 20 MG capsule    Last time this was given:  40 mg on 1/23/2017  8:07 AM   Instructions:  Take 1 tablet (40 mg total) by mouth once daily.            01/24/2017                     CONTINUE taking these medications        Additional Info    Begin Date AM Noon PM Bedtime    amlodipine 5 MG tablet   Commonly known as:  NORVASC   Quantity:  90 tablet   Refills:  3    Last time this was given:  10 mg on 1/23/2017  8:07 AM   Instructions:  TAKE ONE TABLET BY MOUTH ONE TIME DAILY            01/24/2017                   CONTOUR NEXT STRIPS Strp   Quantity:  25 strip   Refills:  11   Dose:  1 strip   Generic drug:  blood sugar diagnostic    Instructions:  1 strip by Misc.(Non-Drug; Combo Route) route once daily. Test blood glucose once daily as instructed.                            hydrochlorothiazide 25 MG tablet   Commonly known as:  HYDRODIURIL   Quantity:  90 tablet   Refills:  4   Dose:  25 mg    Instructions:  Take 1 tablet (25 mg total) by mouth once daily.            01/24/2017                   KRILL OIL ORAL   Refills:  0     Instructions:  Take by mouth once daily.            01/24/2017                   metoprolol succinate 100 MG 24 hr tablet   Commonly known as:  TOPROL-XL   Quantity:  90 tablet   Refills:  4   Dose:  100 mg    Last time this was given:  100 mg on 1/22/2017  8:41 AM   Instructions:  Take 1 tablet (100 mg total) by mouth once daily.            01/24/2017                   MICROLET LANCET Misc   Quantity:  25 each   Refills:  11   Dose:  1 lancet   Generic drug:  lancets    Instructions:  1 lancet by Misc.(Non-Drug; Combo Route) route once daily. Test blood glucose once daily as instructed.                            ONE DAILY MULTIVITAMIN per tablet   Refills:  0   Dose:  1 tablet   Generic drug:  multivitamin    Instructions:  Take 1 tablet by mouth once daily.                            pravastatin 80 MG tablet   Commonly known as:  PRAVACHOL   Quantity:  90 tablet   Refills:  3   Dose:  80 mg    Last time this was given:  80 mg on 1/22/2017  9:36 PM   Instructions:  Take 1 tablet (80 mg total) by mouth every evening.                        01/23/2017         STOP taking these medications     omeprazole 20 MG capsule   Commonly known as:  PRILOSEC   Replaced by:  pantoprazole 40 MG tablet       sildenafil 50 MG tablet   Commonly known as:  VIAGRA            Where to Get Your Medications      These medications were sent to [facility]/pharmacy #[id number] - [place] - [address]  [address], [place] [address]     Phone:  [phone]     nitroGLYCERIN 0.3 MG SL tablet    pantoprazole 40 MG tablet         You can get these medications from any pharmacy     You don't need a prescription for these medications     aspirin 81 MG Chew                  Please bring to all follow up appointments:    1. A copy of your discharge instructions.  2. All medicines you are currently taking in their original bottles.  3. Identification and insurance card.    Please arrive 15 minutes ahead of scheduled appointment  time.    Please call 24 hours in advance if you must reschedule your appointment and/or time.        Your Scheduled Appointments     Feb 06, 2017 10:40 AM [facility]   Hospital Follow Up with Josefina Kendall MD   [facility]    [address] [facility] [address]   [phone]            Feb 06, 2017  3:00 PM CST   Ct Non Contrast with [facility] CT OP LIMIT 450 LBS   [facility]    [address]  [place] [address]   [phone]            Feb 06, 2017  4:00 PM CST   Back & Spine Established Patient with Hernando Wise MD   [facility] ([facility])    [address]  [address]  [facility] [address]   [phone]              Follow-up Information     Follow up with Josefina Kendall MD On 2/6/2017.    Specialty:  Internal Medicine    Why:  10:40 Hospital follow up appointment    Contact information:    [address] [facility] [address]  [phone]          Schedule an appointment as soon as possible for a visit with No Palacios MD.    Specialty:  Cardiology    Why:  Dr Palacios's office will call you to schedule appointment    Contact information:    [address] [facility]  8th Floor - Cardiology  [facility] [address]  [phone]          Discharge Instructions     Future Orders    Activity as tolerated     Call MD for:  increased confusion or weakness     Call MD for:  persistent dizziness, light-headedness, or visual disturbances     Diet Cardiac         Primary Diagnosis     Your primary diagnosis was:  Unstable Chest Pain Due To Insufficient Blood Supply To Heart      Admission Information     Date & Time Provider Department CSN    1/21/2017  4:56 PM Mojgan Silva MD [facility] [id number]      Care Providers     Provider Role Specialty Primary office phone    Mojgan Silva MD Attending Provider Hospitalist [phone]    Jori Atwood MD Team Attending  Hospitalist  "[phone]    Mojgan Silva MD Team Attending  Hospitalist [phone]      Your Vitals Were     BP Pulse Temp Resp Height Weight    116/83 (BP Location: Right arm, Patient Position: Sitting, BP Method: Automatic) 86 98.1 °F (36.7 °C) (Oral) 17 6' 3" (1.905 m) 86 kg (189 lb 9.6 oz)    SpO2 BMI             96% 23.7 kg/m2         Recent Lab Values        11/5/2012 6/28/2013 2/20/2014 6/16/2014 7/29/2014 9/19/2016 12/22/2016        10:49 AM  7:06 AM  7:45 AM  9:52 AM  7:16 AM  4:20 PM  8:15 AM     A1C 6.1 6.2 6.2 6.2 6.2 6.8 (H) 6.5 (H)     Comment for A1C at  4:20 PM on 9/19/2016:  According to ADA guidelines, hemoglobin A1C <7.0% represents  optimal control in non-pregnant diabetic patients.  Different  metrics may apply to specific populations.   Standards of Medical Care in Diabetes - 2016.  For the purpose of screening for the presence of diabetes:  <5.7%     Consistent with the absence of diabetes  5.7-6.4%  Consistent with increasing risk for diabetes   (prediabetes)  >or=6.5%  Consistent with diabetes  Currently no consensus exists for use of hemoglobin A1C  for diagnosis of diabetes for children.      Comment for A1C at  8:15 AM on 12/22/2016:  According to ADA guidelines, hemoglobin A1C <7.0% represents  optimal control in non-pregnant diabetic patients.  Different  metrics may apply to specific populations.   Standards of Medical Care in Diabetes - 2016.  For the purpose of screening for the presence of diabetes:  <5.7%     Consistent with the absence of diabetes  5.7-6.4%  Consistent with increasing risk for diabetes   (prediabetes)  >or=6.5%  Consistent with diabetes  Currently no consensus exists for use of hemoglobin A1C  for diagnosis of diabetes for children.        Pending Labs     Order Current Status    Type and Screen Collected (01/21/17 1719)      Allergies as of 1/23/2017        Reactions    Lisinopril Anaphylaxis, Nausea And Vomiting    General bad feeling    Lipitor [Atorvastatin] Other " (See Comments)    Muscle aches      Advance Directives     An advance directive is a document which, in the event you are no longer able to make decisions for yourself, tells your healthcare team what kind of treatment you do or do not want to receive, or who you would like to make those decisions for you.  If you do not currently have an advance directive, [facility] encourages you to create one.  For more information call:  [phone]), [phone] ([phone]),  or log on to [email or web address].        Smoking Cessation     If you would like to quit smoking:   You may be eligible for free services if you are a [place] resident and started smoking cigarettes before September 1, 1988.  Call the Smoking Cessation Trust (SCT) toll free at (118) 970-0365 or (995) 714-1558.   Call 6-621-QUIT-NOW if you do not meet the above criteria.            Language Assistance Services     ATTENTION: Language assistance services are available, free of charge. Please call [phone].      ATENCIÓN: Si habla español, tiene a stevens disposición servicios gratuitos de asistencia lingüística. Llame al [phone].     CHÚ Ý: N?u b?n nói Ti?ng Vi?t, có các d?ch v? h? tr? ngôn ng? mi?n phí dành cho b?n. G?i s? [phone].         [facility] complies with applicable Federal civil rights laws and does not discriminate on the basis of race, color, national origin, age, disability, or sex.

## 2017-01-21 NOTE — ED TRIAGE NOTES
C/O chest pain  that radiates to upper back and neck. Saw neurosurgery and cards last week. Denies SOB. Family states pt was diaphoretic and  confused. Pt states he has been having tachycardia all day.

## 2017-01-21 NOTE — ED NOTES
LOC: The patient is awake and alert; oriented x 3 and speaking appropriately.Affect is depressed and anxious.   APPEARANCE: Patient resting comfortably, patient is clean and well groomed, pt very anxious.  SKIN: warm and dry, normal skin turgor & moist mucus membranes, skin intact, no breakdown noted.  MUSCULOSKELETAL: Patient moving all extremities well, no obvious swelling or deformities note.d. Pain in  upper back and base of neck and anterior chest  RESPIRATORY: Airway is open and patent, breath sounds clear throughout all lung fields; respirations are spontaneous, normal effort and rate  CARDIAC: Patient has a normal rate ( 84), no peripheral edema noted, capillary refill < 3 seconds;  complaints of chest pain in left lower chest   ABDOMEN: Soft and non tender to palpation, no distention noted. Bowel sounds present x 4

## 2017-01-22 LAB
ALBUMIN SERPL BCP-MCNC: 3.8 G/DL
ALP SERPL-CCNC: 61 U/L
ALT SERPL W/O P-5'-P-CCNC: 30 U/L
ANION GAP SERPL CALC-SCNC: 11 MMOL/L
AST SERPL-CCNC: 22 U/L
BILIRUB SERPL-MCNC: 0.9 MG/DL
BUN SERPL-MCNC: 17 MG/DL
CALCIUM SERPL-MCNC: 9.1 MG/DL
CHLORIDE SERPL-SCNC: 104 MMOL/L
CHOLEST/HDLC SERPL: 4.5 {RATIO}
CO2 SERPL-SCNC: 24 MMOL/L
CREAT SERPL-MCNC: 0.9 MG/DL
EST. GFR  (AFRICAN AMERICAN): >60 ML/MIN/1.73 M^2
EST. GFR  (NON AFRICAN AMERICAN): >60 ML/MIN/1.73 M^2
GLUCOSE SERPL-MCNC: 116 MG/DL
HDL/CHOLESTEROL RATIO: 22 %
HDLC SERPL-MCNC: 200 MG/DL
HDLC SERPL-MCNC: 44 MG/DL
LDLC SERPL CALC-MCNC: 126.8 MG/DL
MAGNESIUM SERPL-MCNC: 2.5 MG/DL
NONHDLC SERPL-MCNC: 156 MG/DL
POCT GLUCOSE: 121 MG/DL (ref 70–110)
POCT GLUCOSE: 79 MG/DL (ref 70–110)
POCT GLUCOSE: 86 MG/DL (ref 70–110)
POCT GLUCOSE: 92 MG/DL (ref 70–110)
POTASSIUM SERPL-SCNC: 3.7 MMOL/L
PROT SERPL-MCNC: 7 G/DL
SODIUM SERPL-SCNC: 139 MMOL/L
TRIGL SERPL-MCNC: 146 MG/DL
TROPONIN I SERPL DL<=0.01 NG/ML-MCNC: 0.02 NG/ML

## 2017-01-22 PROCEDURE — 93010 ELECTROCARDIOGRAM REPORT: CPT | Mod: ,,, | Performed by: INTERNAL MEDICINE

## 2017-01-22 PROCEDURE — 93005 ELECTROCARDIOGRAM TRACING: CPT

## 2017-01-22 PROCEDURE — 36415 COLL VENOUS BLD VENIPUNCTURE: CPT

## 2017-01-22 PROCEDURE — 83735 ASSAY OF MAGNESIUM: CPT

## 2017-01-22 PROCEDURE — 80061 LIPID PANEL: CPT

## 2017-01-22 PROCEDURE — 80053 COMPREHEN METABOLIC PANEL: CPT

## 2017-01-22 PROCEDURE — 84484 ASSAY OF TROPONIN QUANT: CPT | Mod: 91

## 2017-01-22 PROCEDURE — G0378 HOSPITAL OBSERVATION PER HR: HCPCS

## 2017-01-22 PROCEDURE — 25000003 PHARM REV CODE 250: Performed by: INTERNAL MEDICINE

## 2017-01-22 PROCEDURE — 11000001 HC ACUTE MED/SURG PRIVATE ROOM

## 2017-01-22 RX ORDER — CETIRIZINE HYDROCHLORIDE 10 MG/1
10 TABLET ORAL ONCE
Status: COMPLETED | OUTPATIENT
Start: 2017-01-22 | End: 2017-01-22

## 2017-01-22 RX ORDER — HYDRALAZINE HYDROCHLORIDE 20 MG/ML
15 INJECTION INTRAMUSCULAR; INTRAVENOUS ONCE
Status: DISCONTINUED | OUTPATIENT
Start: 2017-01-22 | End: 2017-01-22

## 2017-01-22 RX ADMIN — PANTOPRAZOLE SODIUM 40 MG: 40 TABLET, DELAYED RELEASE ORAL at 08:01

## 2017-01-22 RX ADMIN — NITROGLYCERIN 0.4 MG: 0.4 TABLET SUBLINGUAL at 04:01

## 2017-01-22 RX ADMIN — NITROGLYCERIN 0.4 MG: 0.4 TABLET SUBLINGUAL at 08:01

## 2017-01-22 RX ADMIN — CETIRIZINE HYDROCHLORIDE 10 MG: 10 TABLET, FILM COATED ORAL at 11:01

## 2017-01-22 RX ADMIN — PRAVASTATIN SODIUM 80 MG: 80 TABLET ORAL at 09:01

## 2017-01-22 RX ADMIN — METOPROLOL SUCCINATE 100 MG: 100 TABLET, FILM COATED, EXTENDED RELEASE ORAL at 08:01

## 2017-01-22 RX ADMIN — ASPIRIN 81 MG CHEWABLE TABLET 81 MG: 81 TABLET CHEWABLE at 08:01

## 2017-01-22 RX ADMIN — THERA TABS 1 TABLET: TAB at 08:01

## 2017-01-22 RX ADMIN — AMLODIPINE BESYLATE 10 MG: 10 TABLET ORAL at 08:01

## 2017-01-22 RX ADMIN — CLOPIDOGREL 75 MG: 75 TABLET, FILM COATED ORAL at 08:01

## 2017-01-22 NOTE — H&P
History & Physical  Cardiology      SUBJECTIVE:     History of Present Illness:  Patient is a 57 y.o. male presents with left sided and precordial chest pressure across his chest.  8/10, pressure, onset at 3 am and persistent x 12-13 hours since then (normally lasts anywhere from 5 mins to 1 hour), responsive to nitro sl and nitropatch to chest, exacerbated by nothing, worsening over the last three weeks of which today was worst, associated with profound weakness and slightly altered mentation which may also be associated with poor sleeping and overall poor po intake, similar episodes worsening over the past 3 weeks, associated with palpitations and profound sweating today.    Of note, he was seen by Dr Palacios in office on 1/20 for pre-op clearance for cervical spinal surgery and reported precordial chest pressure in the setting of uncontrolled htn >180/100 associated with palpitations.  A recent ct of chest showed LAD calcification.  He was ordered ot have an CORBIN prior to his surgery.    PMH is sig for htn, hld (allergic to lipitor), cervical spinal stenosis s/p fusion surgery, calcificantion of LAD without delineation of degree of obstruction, DM2.    AUGIE is 1, CRISTI is 75.  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 -     Hyperlipidemia     Hypertension     Impaired fasting glucose 10/28/2013    Memory loss     Traumatic iritis - Left Eye 2/27/2013     Past Surgical History   Procedure Laterality Date    Hernia repair      Cervical fusion  12/30/2015     Family History   Problem Relation Age of Onset    Heart disease Mother      Hypertension Mother     Hyperlipidemia Mother     Heart disease Father     Hyperlipidemia Brother     Hypertension Brother     Hypertension Brother     Hyperlipidemia Brother     Benign prostatic hyperplasia Brother     Hypertension Brother     Hypertension Brother     Hepatitis Brother     Amblyopia Neg Hx     Blindness Neg Hx     Cancer Neg Hx     Cataracts Neg Hx     Diabetes Neg Hx     Glaucoma Neg Hx     Macular degeneration Neg Hx     Retinal detachment Neg Hx     Strabismus Neg Hx     Stroke Neg Hx     Thyroid disease Neg Hx      Social History   Substance Use Topics    Smoking status: Former Smoker     Packs/day: 1.00     Years: 30.00     Types: Cigarettes     Quit date: 12/13/2011    Smokeless tobacco: Never Used    Alcohol use 0.0 oz/week     15 - 16 Cans of beer per week      Comment: 2 beers per day        Review of Systems:  GENERAL: WNL  HENT: WNL  NEURO: WNL  CARDIAC: per hpi  PULMONARY: per hpi  GI: WNL  : WNL  MSK: WNL  INTEGUMENTARY: WNL  HEMATOLOGIC: WNL    OBJECTIVE:     Vital Signs (Most Recent)  Temp: 98.5 °F (36.9 °C) (01/21/17 1530)  Pulse: 91 (01/21/17 1932)  Resp: (!) 26 (01/21/17 1932)  BP: 111/75 (01/21/17 1932)  SpO2: 97 % (01/21/17 1932)    Vital Signs Range (Last 24H):  Temp:  [98.5 °F (36.9 °C)]   Pulse:  []   Resp:  [10-26]   BP: (111-168)/()   SpO2:  [97 %-98 %]     Physical Exam:  General: resting comfortably, generally weak  HEENT: perrl, eomi  Neck: no jvd  Heart: nl s1/2, no m/h/t, obi, reg rhythm  Lungs: clear to auscultation bilaterally  Abdomen: s/nt/nd, no organomegaly, no fluid wave  Ext: no edema, good cap refill, moves all  Pulses: 2+ pulses b/l ue  Skin: no tears, wounds, bleeding, color changes  Neuro: sensations/motor intact b/l    Laboratory:    Recent Labs  Lab 01/21/17  1734   WBC 7.32   RBC 5.35   HGB 16.0   HCT 46.0      MCV 86   MCH 29.9   MCHC 34.8           Ref Range & Units 5:34 PM   1mo ago      Sodium 136 - 145  mmol/L 138 140    Potassium 3.5 - 5.1 mmol/L 3.7 3.6    Chloride 95 - 110 mmol/L 104 103    CO2 23 - 29 mmol/L 23 23    Glucose 70 - 110 mg/dL 109 139 (H)    BUN, Bld 6 - 20 mg/dL 17 12    Creatinine 0.5 - 1.4 mg/dL 1.0 1.1    Calcium 8.7 - 10.5 mg/dL 9.8 9.6    Total Protein 6.0 - 8.4 g/dL 7.7     Albumin 3.5 - 5.2 g/dL 4.2     Total Bilirubin 0.1 - 1.0 mg/dL 0.5    Comments: For infants and newborns, interpretation of results should be based   on gestational age, weight and in agreement with clinical   observations.   Premature Infant recommended reference ranges:   Up to 24 hours.............<8.0 mg/dL   Up to 48 hours............<12.0 mg/dL   3-5 days..................<15.0 mg/dL   6-29 days.................<15.0 mg/dL       Alkaline Phosphatase 55 - 135 U/L 69     AST 10 - 40 U/L 29     ALT 10 - 44 U/L 35     Anion Gap 8 - 16 mmol/L 11 14    eGFR if African American >60 mL/min/1.73 m^2 >60.0 >60.0    eGFR if non African American >60 mL/min/1.73 m^2 >60.0 >60.0CM       Recent Labs  Lab 01/21/17  1734   TROPONINI <0.006   BNP<10    Diagnostic Results:  Echo 2014  CONCLUSIONS     1 - Upper limit of normal left ventricular enlargement.     2 - Normal left ventricular systolic function (EF 55-60%).     3 - Normal left ventricular diastolic function.     4 - Normal right ventricular systolic function .     5 - Trivial mitral regurgitation.     6 - Trivial to mild tricuspid regurgitation.     7 - Intermediate central venous pressure.     CT neck 7/16: calcification of LAD  ASSESSMENT/PLAN:   57 male with PMH sig for htn/dld, family hx of cad presents with unstable anginal symptoms.  Patient Active Problem List    Diagnosis Date Noted    Chest pain 01/21/2017    Mixed hyperlipidemia 01/20/2017    Coronary artery disease due to calcified coronary lesion 01/20/2017    Chest discomfort 01/20/2017    Anxiety disorder 01/20/2017    Cervical stenosis of spine 12/30/2015    Preoperative clearance 12/21/2015    Pain  09/28/2015    Lateral epicondylitis (tennis elbow) 07/20/2015    Cervical radiculopathy 07/20/2015    Cervical spinal stenosis 07/10/2015    DJD (degenerative joint disease), cervical 07/10/2015    Muscle ache 01/28/2015    Rapid palpitations 11/17/2014    Trigger finger of left hand 09/11/2014    Impaired fasting glucose 10/28/2013    Peripheral vascular disease 10/08/2013    Subclavian steal syndrome 10/08/2013    GERD (gastroesophageal reflux disease) 07/24/2013    Family history of premature CAD 07/24/2013    Memory loss 06/21/2013    Unspecified iridocyclitis - Left Eye 04/17/2013    Chronic anterior uveitis 03/26/2013    Traumatic iritis - Left Eye 02/27/2013    Chest pain 11/01/2012    Essential hypertension 07/16/2012         Plan:   1) Unstable Angina  His presentation is not straightforward  LAD calcification without delineation of degree of obstruction  His rhiannon and jenelle are low risk  Will load asa/plavix and continue daily  Continue toprol/norvasc/pravastatin  Trend trops  Currently on nitropatch and cp free.  Will dc nitropatch and provide Sl nitro prn -- if patient requires nitro drip and upgrade to icu, management approach changes, i will start therapeutic lovenox, call interventional consult  If trops uptrend, i will start therapeutic lovenox and call interventional  If responsive to sl nitro and cp free, remaind trop negative, then would order dobutamine stress echo for am as patient is weak and would be unable to tolerate exercise  Repeat ekg stat    2) HTN, currently controlled on nitropatch  Continue norvasc and toprol, increase norvasc to 10  Follow clinically/vitals    3) DM2  a1c 6.5  iss  fs ac hs  Metformin on dc    4) HLD  Continue prava  Check profile    5) dvt/gi prophy    Kar Quesada MD  [id number]

## 2017-01-22 NOTE — PLAN OF CARE
Problem: Patient Care Overview  Goal: Plan of Care Review  Outcome: Ongoing (interventions implemented as appropriate)  Plan of care discussed with patient. Patient is free of fall/trauma/injury. Complained of chest pain this morning. 1 Nitro given and chest pain has since resolved. Trending troponins and may do stress test and echo. All questions addressed. Will continue to monitor

## 2017-01-22 NOTE — PLAN OF CARE
Problem: Patient Care Overview  Goal: Plan of Care Review  Outcome: Ongoing (interventions implemented as appropriate)  Pt remained stable throughout the night. No acute distress noted. Pt remained free from injury. VSS. Pt understands plan of care. Will continue to monitor.

## 2017-01-22 NOTE — PROGRESS NOTES
04:49 complaining of 4/10 midsternal pressure across chest, non radiating. /93. Nitro x1 given. Stat EKG ordered.  04:55 chest pain now 2/10. /89.   EKG normal.   Notified Dr. Quesada.

## 2017-01-22 NOTE — NURSING
Pt arrived on unit via stretcher, admitted from ED. Pt in no evident distress. Placed pt on tele. VSS. Plan of care initiated. CSU orders implemented. Pt has family at bedside. Bed locked in lowest position, siderails up x2. Call bell in reach. Will continue to monitor pt.

## 2017-01-22 NOTE — ED NOTES
Rounding on the patient has been done. The patient has been updated on the plan of care and current status. Pain was assessed and is currently a 0/10. Comfort positioning and restroom needs were addressed. Necessary items were placed with in reach and was advised when a reassessment would take place. The call bell remains at the bedside for any additional patient needs. The patient is resting comfortably on the stretcher, respirations are even and unlabored, skin warm and dry. Will continue to monitor.Family remains at bedside.

## 2017-01-23 VITALS
SYSTOLIC BLOOD PRESSURE: 132 MMHG | BODY MASS INDEX: 23.58 KG/M2 | TEMPERATURE: 98 F | HEIGHT: 75 IN | RESPIRATION RATE: 26 BRPM | OXYGEN SATURATION: 96 % | WEIGHT: 189.63 LBS | HEART RATE: 89 BPM | DIASTOLIC BLOOD PRESSURE: 87 MMHG

## 2017-01-23 LAB
ALBUMIN SERPL BCP-MCNC: 3.7 G/DL
ALP SERPL-CCNC: 67 U/L
ALT SERPL W/O P-5'-P-CCNC: 26 U/L
ANION GAP SERPL CALC-SCNC: 7 MMOL/L
AST SERPL-CCNC: 19 U/L
BILIRUB SERPL-MCNC: 0.7 MG/DL
BUN SERPL-MCNC: 19 MG/DL
CALCIUM SERPL-MCNC: 9.7 MG/DL
CHLORIDE SERPL-SCNC: 105 MMOL/L
CO2 SERPL-SCNC: 29 MMOL/L
CREAT SERPL-MCNC: 1.1 MG/DL
DIASTOLIC DYSFUNCTION: NO
EST. GFR  (AFRICAN AMERICAN): >60 ML/MIN/1.73 M^2
EST. GFR  (NON AFRICAN AMERICAN): >60 ML/MIN/1.73 M^2
GLUCOSE SERPL-MCNC: 107 MG/DL
MAGNESIUM SERPL-MCNC: 2.4 MG/DL
MITRAL VALVE MOBILITY: NORMAL
POTASSIUM SERPL-SCNC: 3.8 MMOL/L
PROT SERPL-MCNC: 6.9 G/DL
RETIRED EF AND QEF - SEE NOTES: 60 (ref 55–65)
SODIUM SERPL-SCNC: 141 MMOL/L

## 2017-01-23 PROCEDURE — 93010 ELECTROCARDIOGRAM REPORT: CPT | Mod: ,,, | Performed by: INTERNAL MEDICINE

## 2017-01-23 PROCEDURE — 36415 COLL VENOUS BLD VENIPUNCTURE: CPT

## 2017-01-23 PROCEDURE — 25000003 PHARM REV CODE 250: Performed by: INTERNAL MEDICINE

## 2017-01-23 PROCEDURE — 99217 PR OBSERVATION CARE DISCHARGE: CPT | Mod: ,,, | Performed by: HOSPITALIST

## 2017-01-23 PROCEDURE — G0378 HOSPITAL OBSERVATION PER HR: HCPCS

## 2017-01-23 PROCEDURE — 93005 ELECTROCARDIOGRAM TRACING: CPT

## 2017-01-23 PROCEDURE — 83735 ASSAY OF MAGNESIUM: CPT

## 2017-01-23 PROCEDURE — 25000003 PHARM REV CODE 250

## 2017-01-23 PROCEDURE — 80053 COMPREHEN METABOLIC PANEL: CPT

## 2017-01-23 RX ORDER — NITROGLYCERIN 0.4 MG/1
TABLET SUBLINGUAL
Status: COMPLETED
Start: 2017-01-23 | End: 2017-01-23

## 2017-01-23 RX ORDER — NAPROXEN SODIUM 220 MG/1
81 TABLET, FILM COATED ORAL DAILY
Refills: 0 | Status: ON HOLD | COMMUNITY
Start: 2017-01-23 | End: 2017-10-12 | Stop reason: HOSPADM

## 2017-01-23 RX ORDER — NITROGLYCERIN 0.3 MG/1
0.3 TABLET SUBLINGUAL EVERY 5 MIN PRN
Qty: 15 TABLET | Refills: 0 | Status: SHIPPED | OUTPATIENT
Start: 2017-01-23 | End: 2018-03-09 | Stop reason: SDUPTHER

## 2017-01-23 RX ORDER — PANTOPRAZOLE SODIUM 40 MG/1
40 TABLET, DELAYED RELEASE ORAL DAILY
Qty: 30 TABLET | Refills: 1 | Status: SHIPPED | OUTPATIENT
Start: 2017-01-23 | End: 2017-03-28 | Stop reason: SDUPTHER

## 2017-01-23 RX ADMIN — CLOPIDOGREL 75 MG: 75 TABLET, FILM COATED ORAL at 08:01

## 2017-01-23 RX ADMIN — PANTOPRAZOLE SODIUM 40 MG: 40 TABLET, DELAYED RELEASE ORAL at 08:01

## 2017-01-23 RX ADMIN — ASPIRIN 81 MG CHEWABLE TABLET 81 MG: 81 TABLET CHEWABLE at 08:01

## 2017-01-23 RX ADMIN — NITROGLYCERIN: 0.4 TABLET SUBLINGUAL at 12:01

## 2017-01-23 RX ADMIN — THERA TABS 1 TABLET: TAB at 08:01

## 2017-01-23 RX ADMIN — AMLODIPINE BESYLATE 10 MG: 10 TABLET ORAL at 08:01

## 2017-01-23 NOTE — PLAN OF CARE
Problem: Fall Risk (Adult)  Goal: Absence of Falls  Patient will demonstrate the desired outcomes by discharge/transition of care.   Outcome: Ongoing (interventions implemented as appropriate)  Patient has unsteady gait, standby assistance    Problem: Patient Care Overview  Goal: Plan of Care Review  Outcome: Ongoing (interventions implemented as appropriate)  No acute events throughout night, Pt NPO past midnight for Stress Test in AM.  VS and assessment per flow sheet, patient progressing towards goals as tolerated, plan of care reviewed with Hi Rubio and family, all concerns addressed, will continue to monitor.

## 2017-01-23 NOTE — PLAN OF CARE
01/23/17 1153   Discharge Assessment   Assessment Type Discharge Planning Assessment   Confirmed/corrected address and phone number on facesheet? Yes   Assessment information obtained from? Caregiver;Medical Record   Expected Length of Stay (days) 2   Communicated expected length of stay with patient/caregiver yes   Prior to hospitilization cognitive status: Alert/Oriented   Prior to hospitalization functional status: Independent   Current cognitive status: Alert/Oriented   Current Functional Status: Independent   Arrived From home or self-care   Lives With spouse   Able to Return to Prior Arrangements yes   Is patient able to care for self after discharge? Yes   How many people do you have in your home that can help with your care after discharge? 1   Who are your caregiver(s) and their phone number(s)? Wife, Jennifer  [phone]   Patient's perception of discharge disposition home or selfcare   Readmission Within The Last 30 Days no previous admission in last 30 days   Patient currently being followed by outpatient case management? No   Patient currently receives home health services? No   Does the patient currently use HME? No   Patient currently receives private duty nursing? No   Patient currently receives any other outside agency services? No   Equipment Currently Used at Home walker, rolling   Do you have any problems affording any of your prescribed medications? No   Is the patient taking medications as prescribed? yes   Do you have any financial concerns preventing you from receiving the healthcare you need? No   Does the patient have transportation to healthcare appointments? Yes   Transportation Available family or friend will provide   On Dialysis? No   Does the patient receive services at the Coumadin Clinic? No   Are there any open cases? No   Discharge Plan A Home with family   Patient/Family In Agreement With Plan yes   Placed in [facility]. Lives with wife and is independent in his ADLs. Plan is to DC  home. No DC needs identified.

## 2017-01-23 NOTE — PROGRESS NOTES
Pt seen and examined, history confirmed with pt.  One further episode this morning of chest pain relieved by SL Nitro; doing well this afternoon.  S/p ASA, Plavix, statin, beta-blocker.  Trop remained negative today.  Discussed case with Cardiology, will plan for stress test tomorrow to further evaluate; NPO past midnight.    Mojgan Silva MD  Hospital Medicine Staff

## 2017-01-23 NOTE — PLAN OF CARE
01/23/2017      Hi Rubio  [address] [place] [address]          Hospital Medicine Dept.  [facility] [address] [place] [address] [phone] after hours  [phone] fax Hi Rubio has been hospitalized at the [facility] since 1/21/2017.  Please excuse the patient from duties.  Patient may return on 1/27/17.  No restrictions.     Please contact me if you have any questions.                  __________________________  Mojgan Silva MD  01/23/2017

## 2017-01-23 NOTE — PROGRESS NOTES
Pt discharged per MD orders.  Tele and IV discontinued.  Catheter tip intact x1.  Medication list and prescriptions reviewed; prescriptions sent to pt preferred pharmacy and printed prescriptions provided.  Pt verbalizes understanding of all written and verbal discharge instructions.  MIS performed and documented in chart. Pt awaiting escort arrival.  Will continue to monitor.

## 2017-01-23 NOTE — PROGRESS NOTES
MD Ricardo notified pt. Complains of CP 4/10 upon returning from stress echo. /88 P 98. Pt. Complains of SOB and feeling flushed. CP relieved with nitro X1. /83 P 105

## 2017-01-23 NOTE — PROGRESS NOTES
Progress Note  Hospital Medicine    Primary Team: [facility]OS[facility] MED C  Admit Date: 1/21/2017   Length of Stay:  LOS: 2 days   SUBJECTIVE:   Reason for Admission:  Unstable angina    HPI:  Patient is a 57 y.o. AAM with PMH of HTN, HLD who presents with left sided and precordial chest pressure. 8/10, pressure, onset at 3 am and persistent x 12-13 hours since then (normally lasts anywhere from 5 mins to 1 hour), responsive to nitro sl and nitropatch to chest, exacerbated by nothing, worsening over the last three weeks of which today was worst, associated with profound weakness and slightly altered mentation which may also be associated with poor sleeping and overall poor po intake, similar episodes worsening over the past 3 weeks, associated with palpitations and profound sweating today.     Of note, he was seen by Dr Palacios in office on 1/20 for pre-op clearance for cervical spinal surgery and reported precordial chest pressure in the setting of uncontrolled htn >180/100 associated with palpitations. A recent ct of chest showed LAD calcification. He was ordered to have an CORBIN prior to his surgery.    Interval history:    No acute events overnight. Trop remained negative overnight and pt feels stronger today, but very anxious about stress test.      Pt and wife report report fatigue and LE weakness have occurred episodically, with first episode after pt's mother passed months ago.  Other than stress, no obvious inciting events and resolve on their own.  Has not been evaluated for these episodes.  No loss of B/B function.    Review of Systems:  Constitutional: no fever or chills, positive for fatigue  Respiratory: no cough or shortness of breath  Cardiovascular: no chest pain or palpitations  Gastrointestinal: no nausea or vomiting, no abdominal pain or change in bowel habits  Musculoskeletal: no arthralgias or myalgias     OBJECTIVE:     Temp:  [97.5 °F (36.4 °C)-98.2 °F (36.8 °C)]   Pulse:  [68-96]   Resp:  [18-25]   BP:  (121-138)/(80-91)   SpO2:  [95 %-97 %]  Body mass index is 23.7 kg/(m^2).  Intake/[place]:  This Shift:       Net I/O past 24h:     Intake/Output Summary (Last 24 hours) at 01/23/17 1044  Last data filed at 01/23/17 0600   Gross per 24 hour   Intake              747 ml   Output             1550 ml   Net             -803 ml             Physical Exam:  Gen- well-developed, well-nourished, NAD  CVS- S1 and S2 present, RRR, no murmurs  Resp- CTA b/l, no work of breathing  Abd- BS+, soft, NT, ND  Ext- no clubbing, cyanosis or edema.  5/5 strength of LE    Laboratory:  CBC/Anemia Labs: Coags:      Recent Labs  Lab 01/21/17  1734   WBC 7.32   HGB 16.0   HCT 46.0      MCV 86   RDW 13.5      Recent Labs  Lab 01/21/17  1734   INR 1.1        Chemistries:     Recent Labs  Lab 01/21/17  1734 01/22/17  0117 01/23/17  0629    139 141   K 3.7 3.7 3.8    104 105   CO2 23 24 29   BUN 17 17 19   CREATININE 1.0 0.9 1.1   CALCIUM 9.8 9.1 9.7   PROT 7.7 7.0 6.9   BILITOT 0.5 0.9 0.7   ALKPHOS 69 61 67   ALT 35 30 26   AST 29 22 19   MG 2.4 2.5 2.4          Cardiac Enzymes: Ejection Fractions:    Recent Labs      01/21/17   1734  01/22/17   0117  01/22/17   0620   CPK  166   --    --    TROPONINI  <0.006  0.017  0.022  0.022    EF   Date Value Ref Range Status   11/20/2014 58 55 - 65    08/13/2014 60          POCT Glucose: HbA1c:      Recent Labs  Lab 01/21/17  2214 01/22/17  0857 01/22/17  1147 01/22/17  1753 01/22/17  2156   POCTGLUCOSE 112* 92 79 86 121*    Hemoglobin A1C   Date Value Ref Range Status   12/22/2016 6.5 (H) 4.5 - 6.2 % Final     Comment:     According to ADA guidelines, hemoglobin A1C <7.0% represents  optimal control in non-pregnant diabetic patients.  Different  metrics may apply to specific populations.   Standards of Medical Care in Diabetes - 2016.  For the purpose of screening for the presence of diabetes:  <5.7%     Consistent with the absence of diabetes  5.7-6.4%  Consistent with increasing  risk for diabetes   (prediabetes)  >or=6.5%  Consistent with diabetes  Currently no consensus exists for use of hemoglobin A1C  for diagnosis of diabetes for children.     09/19/2016 6.8 (H) 4.5 - 6.2 % Final     Comment:     According to ADA guidelines, hemoglobin A1C <7.0% represents  optimal control in non-pregnant diabetic patients.  Different  metrics may apply to specific populations.   Standards of Medical Care in Diabetes - 2016.  For the purpose of screening for the presence of diabetes:  <5.7%     Consistent with the absence of diabetes  5.7-6.4%  Consistent with increasing risk for diabetes   (prediabetes)  >or=6.5%  Consistent with diabetes  Currently no consensus exists for use of hemoglobin A1C  for diagnosis of diabetes for children.     07/29/2014 6.2 4.5 - 6.2 % Final         Medications:  Scheduled Meds:   amlodipine  10 mg Oral Daily    aspirin  81 mg Oral Daily    clopidogrel  75 mg Oral Daily    metoprolol succinate  100 mg Oral Daily    multivitamin  1 tablet Oral Daily    pantoprazole  40 mg Oral Daily    pravastatin  80 mg Oral QHS                             Continuous Infusions:   PRN Meds:.dextrose 50%, dextrose 50%, glucagon (human recombinant), glucose, glucose, insulin aspart, morphine     ASSESSMENT/PLAN:     Unstable Angina, Chest Pain  -Initial presentation not straightforward, involved episodic chest pain with associated fatigue and LE weakness; most recently symptoms last 12-13h prior to admission.  Relieved with NTG  -Given LAD calcification on prior CT, admitted for trending of Trop and EKG (negative)  -Was given ASA and Plavix load, continued BB, Pravastatin, and bp control  -Stress test today negative for myocardial ischemia  -Counseled pt on results and that symptoms may be related to hypoglycemia, stress, or other etiology that should be evaluated further as an outpatient; he verbalized agreement     HTN  Continue norvasc at 10mg, Toprol XL 100mg daily; resume HCTZ on  discharge  Follow clinically/vitals     DM2  a1c 6.5  Minimal use of SSI while inpatient     HLD  Continue pravastatin (statin which is tolerable by pt)    DVT ppx- MARK  CODE Status- FULL     Dispo- stable for d/c home today with PCP f/u    Mojgan Silva MD  Hospital Medicine Staff

## 2017-01-23 NOTE — PLAN OF CARE
Problem: Patient Care Overview  Goal: Plan of Care Review  Outcome: Ongoing (interventions implemented as appropriate)  Pt free of falls or injuries throughout the shift. Had an episode of chest pain in which BP was elevated; gave 1 NTG SL tablet; CP resolved and BP lowered. Pt went for cardiac stress test today to determine new plan of care. Results pending. Pt tolerating plan of care.

## 2017-01-24 NOTE — DISCHARGE SUMMARY
DISCHARGE SUMMARY  Hospital Medicine    Team: [facility] HOSP MED C    Patient Name: Hi Rubio  YOB: 1960    Admit Date: 1/21/2017    Discharge Date: 1/23/2017    Discharge Attending Physician: Mojgan Silva MD     Principal Diagnoses:  Active Hospital Problems    Diagnosis  POA    *Unstable angina [I20.0]  Yes    Chest pain [R07.9]  Yes    Coronary artery disease due to calcified coronary lesion [I25.10, I25.84]  Yes     Arterial calcification noted in LAD on CT      Peripheral vascular disease [I73.9]  Yes    GERD (gastroesophageal reflux disease) [K21.9]  Yes    Family history of premature CAD [Z82.49]  Not Applicable    Essential hypertension [I10]  Yes      Resolved Hospital Problems    Diagnosis Date Resolved POA   No resolved problems to display.       Discharged Condition: stable    HOSPITAL COURSE:      Initial Presentation:    Patient is a 57 y.o. male presents with left sided and precordial chest pressure across his chest. 8/10, pressure, onset at 3 am and persistent x 12-13 hours since then (normally lasts anywhere from 5 mins to 1 hour), responsive to nitro sl and nitropatch to chest, exacerbated by nothing, worsening over the last three weeks of which today was worst, associated with profound weakness and slightly altered mentation which may also be associated with poor sleeping and overall poor po intake, similar episodes worsening over the past 3 weeks, associated with palpitations and profound sweating today.     Of note, he was seen by Dr Palacios in office on 1/20 for pre-op clearance for cervical spinal surgery and reported precordial chest pressure in the setting of uncontrolled htn >180/100 associated with palpitations. A recent ct of chest showed LAD calcification. He was ordered ot have an CORBIN prior to his surgery.     PMH is sig for htn, hld (allergic to lipitor), cervical spinal stenosis s/p fusion surgery, calcificantion of LAD without delineation of degree of  obstruction, DM2.    Course of Principle Problem for Admission:    Unstable Angina, Chest Pain  -Initial presentation not straightforward, involved episodic chest pain with associated fatigue and LE weakness; most recently symptoms last 12-13h prior to admission. Relieved with NTG  -Given LAD calcification on prior CT, admitted for trending of Trop and EKG (negative)  -Was given ASA and Plavix load, continued BB, Pravastatin, and bp control  -Stress test today negative for myocardial ischemia  -Counseled pt on results and that symptoms may be related to hypoglycemia, stress, or other etiology that should be evaluated further as an outpatient; he verbalized agreement    Other Medical Problems Addressed in the Hospital:    HTN  Continue norvasc at 10mg, Toprol XL 100mg daily; resume HCTZ on discharge  Follow clinically/vitals       HLD  Continue pravastatin (statin which is tolerable by pt)      Consults: None    Last CBC/BMP:    CBC/Anemia Labs: Coags:      Recent Labs  Lab 01/21/17  1734   WBC 7.32   HGB 16.0   HCT 46.0      MCV 86   RDW 13.5      Recent Labs  Lab 01/21/17  1734   INR 1.1        Chemistries:     Recent Labs  Lab 01/21/17  1734 01/22/17  0117 01/23/17  0629    139 141   K 3.7 3.7 3.8    104 105   CO2 23 24 29   BUN 17 17 19   CREATININE 1.0 0.9 1.1   CALCIUM 9.8 9.1 9.7   PROT 7.7 7.0 6.9   BILITOT 0.5 0.9 0.7   ALKPHOS 69 61 67   ALT 35 30 26   AST 29 22 19   MG 2.4 2.5 2.4          Significant Diagnostic Studies:   Dobutamine stress test 1/23/17:  CONCLUSIONS     1 - Normal left ventricular systolic function (EF 60-65%).     2 - Normal left ventricular diastolic function.     3 - Normal right ventricular systolic function .     No evidence of stress induced myocardial ischemia.     Special Treatments/Procedures:   * No surgery found *     Disposition: Home or Self Care      Future Scheduled Appointments:  Future Appointments  Date Time Provider Department Center   2/6/2017 10:40  AM Josefina Kendall MD [facility] IM [place]   2/6/2017 3:00 PM [facility] CT OP LIMIT 450 LBS [facility]   2/6/2017 4:00 PM Hernando Wise [facility]     Discharge Medication List:       Hi Rubio   Home Medication Instructions CONNIE:[id number]    Printed on:01/24/17 3914   Medication Information                      amlodipine (NORVASC) 5 MG tablet  TAKE ONE TABLET BY MOUTH ONE TIME DAILY             aspirin 81 MG Chew  Take 1 tablet (81 mg total) by mouth once daily.             CONTOUR NEXT STRIPS Strp  1 strip by Misc.(Non-Drug; Combo Route) route once daily. Test blood glucose once daily as instructed.             hydrochlorothiazide (HYDRODIURIL) 25 MG tablet  Take 1 tablet (25 mg total) by mouth once daily.             KRILL OIL ORAL  Take by mouth once daily.             metoprolol succinate (TOPROL-XL) 100 MG 24 hr tablet  Take 1 tablet (100 mg total) by mouth once daily.             MICROLET LANCET Misc  1 lancet by Misc.(Non-Drug; Combo Route) route once daily. Test blood glucose once daily as instructed.             multivitamin (ONE DAILY MULTIVITAMIN) per tablet  Take 1 tablet by mouth once daily.             nitroGLYCERIN (NITROSTAT) 0.3 MG SL tablet  Place 1 tablet (0.3 mg total) under the tongue every 5 (five) minutes as needed for Chest pain.             pantoprazole (PROTONIX) 40 MG tablet  Take 1 tablet (40 mg total) by mouth once daily.             pravastatin (PRAVACHOL) 80 MG tablet  Take 1 tablet (80 mg total) by mouth every evening.                 Patient Instructions:    Discharge Procedure Orders  Diet Cardiac     Activity as tolerated     Call MD for:  persistent dizziness, light-headedness, or visual disturbances     Call MD for:  increased confusion or weakness         At the time of discharge patient was told to take all medications as prescribed, to keep all followup appointments, and to call their primary care physician or return to the emergency room  if they have any worsening or concerning symptoms.    Signing Physician:  Mojgan Silva MD

## 2017-01-24 NOTE — PLAN OF CARE
01/24/17 0714   Final Note   Assessment Type Final Discharge Note   Discharge Disposition Home   Hospital Follow Up  Appt(s) scheduled? Yes   PCP FU 02/03/17. Dr Palacios's office to contact pt for FU appt.

## 2017-02-06 ENCOUNTER — HOSPITAL ENCOUNTER (OUTPATIENT)
Dept: RADIOLOGY | Facility: OTHER | Age: 57
Discharge: HOME | End: 2017-02-06
Attending: NEUROLOGICAL SURGERY
Payer: COMMERCIAL

## 2017-02-06 ENCOUNTER — OFFICE VISIT (OUTPATIENT)
Dept: SPINE | Facility: CLINIC | Age: 57
End: 2017-02-06
Attending: NEUROLOGICAL SURGERY
Payer: COMMERCIAL

## 2017-02-06 ENCOUNTER — OFFICE VISIT (OUTPATIENT)
Dept: INTERNAL MEDICINE | Facility: CLINIC | Age: 57
End: 2017-02-06
Payer: COMMERCIAL

## 2017-02-06 VITALS
BODY MASS INDEX: 22.89 KG/M2 | DIASTOLIC BLOOD PRESSURE: 74 MMHG | WEIGHT: 188 LBS | SYSTOLIC BLOOD PRESSURE: 119 MMHG | HEIGHT: 76 IN | HEART RATE: 92 BPM

## 2017-02-06 VITALS
SYSTOLIC BLOOD PRESSURE: 118 MMHG | TEMPERATURE: 98 F | WEIGHT: 188.69 LBS | HEART RATE: 76 BPM | DIASTOLIC BLOOD PRESSURE: 65 MMHG | HEIGHT: 76 IN | BODY MASS INDEX: 22.98 KG/M2 | RESPIRATION RATE: 18 BRPM

## 2017-02-06 DIAGNOSIS — R35.0 URINARY FREQUENCY: ICD-10-CM

## 2017-02-06 DIAGNOSIS — M43.22 CERVICAL VERTEBRAL FUSION: ICD-10-CM

## 2017-02-06 DIAGNOSIS — I10 ESSENTIAL HYPERTENSION: ICD-10-CM

## 2017-02-06 DIAGNOSIS — R13.12 OROPHARYNGEAL DYSPHAGIA: Primary | ICD-10-CM

## 2017-02-06 DIAGNOSIS — R07.9 CHEST PAIN, UNSPECIFIED TYPE: Primary | ICD-10-CM

## 2017-02-06 PROCEDURE — 3078F DIAST BP <80 MM HG: CPT | Mod: S$GLB,,, | Performed by: NEUROLOGICAL SURGERY

## 2017-02-06 PROCEDURE — 3074F SYST BP LT 130 MM HG: CPT | Mod: S$GLB,,, | Performed by: NEUROLOGICAL SURGERY

## 2017-02-06 PROCEDURE — 99999 PR PBB SHADOW E&M-EST. PATIENT-LVL II: CPT | Mod: PBBFAC,,, | Performed by: NEUROLOGICAL SURGERY

## 2017-02-06 PROCEDURE — 3078F DIAST BP <80 MM HG: CPT | Mod: S$GLB,,, | Performed by: INTERNAL MEDICINE

## 2017-02-06 PROCEDURE — 99213 OFFICE O/P EST LOW 20 MIN: CPT | Mod: S$GLB,,, | Performed by: NEUROLOGICAL SURGERY

## 2017-02-06 PROCEDURE — 99214 OFFICE O/P EST MOD 30 MIN: CPT | Mod: S$GLB,,, | Performed by: INTERNAL MEDICINE

## 2017-02-06 PROCEDURE — 99999 PR PBB SHADOW E&M-EST. PATIENT-LVL IV: CPT | Mod: PBBFAC,,, | Performed by: INTERNAL MEDICINE

## 2017-02-06 PROCEDURE — 3074F SYST BP LT 130 MM HG: CPT | Mod: S$GLB,,, | Performed by: INTERNAL MEDICINE

## 2017-02-06 PROCEDURE — 72125 CT NECK SPINE W/O DYE: CPT | Mod: 26,,, | Performed by: RADIOLOGY

## 2017-02-06 PROCEDURE — 72125 CT NECK SPINE W/O DYE: CPT | Mod: TC

## 2017-02-06 RX ORDER — HYDROXYZINE PAMOATE 25 MG/1
25 CAPSULE ORAL NIGHTLY
Qty: 30 CAPSULE | Refills: 6 | Status: SHIPPED | OUTPATIENT
Start: 2017-02-06 | End: 2017-08-26 | Stop reason: SDUPTHER

## 2017-02-06 NOTE — MR AVS SNAPSHOT
[place] - Internal Medicine   [facility]  [place] [address]  Phone: [phone]  Fax: [phone]                  Hi Rubio   2017 10:40 AM   Office Visit    Description:  Male : 1960   Provider:  Josefina Kendall MD   Department:  [place] - Internal Medicine           Reason for Visit     Hospital Follow Up           Diagnoses this Visit        Comments    Chest pain, unspecified type    -  Primary     Essential hypertension         Urinary frequency                To Do List           Future Appointments        Provider Department Dept Phone    2017 11:45 AM LAB, [facility] - [facility] [phone]    2017 3:00 PM [facility] CT OP LIMIT 450 LBS [facility] [phone]    2017 4:00 PM Hernando Wise MD [facility] [phone]      Goals (5 Years of Data)     None      Follow-Up and Disposition     Return in about 3 months (around 2017).       These Medications        Disp Refills Start End    hydrOXYzine pamoate (VISTARIL) 25 MG Cap 30 capsule 6 2017     Take 1 capsule (25 mg total) by mouth every evening. - Oral    Pharmacy: [facility] [id number] [place] [address] Ph #: [phone]         [facility]     [facility] Care [facility] -  Assistance  Registered nurses in the [facility] provide clinical advisement, health education, appointment booking, and other advisory services.  Call for this free service at [phone].             Medications           Message regarding Medications     Verify the changes and/or additions to your medication regime listed below are the same as discussed with your clinician today.  If any of these changes or additions are incorrect, please notify your healthcare provider.        START taking these NEW medications        Refills    hydrOXYzine pamoate (VISTARIL) 25 MG Cap 6    Sig: Take 1 capsule (25 mg total) by mouth  "every evening.    Class: Normal    Route: Oral           Verify that the below list of medications is an accurate representation of the medications you are currently taking.  If none reported, the list may be blank. If incorrect, please contact your healthcare provider. Carry this list with you in case of emergency.           Current Medications     amlodipine (NORVASC) 5 MG tablet TAKE ONE TABLET BY MOUTH ONE TIME DAILY    aspirin 81 MG Chew Take 1 tablet (81 mg total) by mouth once daily.    CONTOUR NEXT STRIPS Strp 1 strip by Misc.(Non-Drug; Combo Route) route once daily. Test blood glucose once daily as instructed.    hydrochlorothiazide (HYDRODIURIL) 25 MG tablet Take 1 tablet (25 mg total) by mouth once daily.    hydrOXYzine pamoate (VISTARIL) 25 MG Cap Take 1 capsule (25 mg total) by mouth every evening.    KRILL OIL ORAL Take by mouth once daily.    metoprolol succinate (TOPROL-XL) 100 MG 24 hr tablet Take 1 tablet (100 mg total) by mouth once daily.    MICROLET LANCET Misc 1 lancet by Misc.(Non-Drug; Combo Route) route once daily. Test blood glucose once daily as instructed.    multivitamin (ONE DAILY MULTIVITAMIN) per tablet Take 1 tablet by mouth once daily.    nitroGLYCERIN (NITROSTAT) 0.3 MG SL tablet Place 1 tablet (0.3 mg total) under the tongue every 5 (five) minutes as needed for Chest pain.    pantoprazole (PROTONIX) 40 MG tablet Take 1 tablet (40 mg total) by mouth once daily.    pravastatin (PRAVACHOL) 80 MG tablet Take 1 tablet (80 mg total) by mouth every evening.           Clinical Reference Information           Your Vitals Were     BP Pulse Temp Resp Height Weight    118/65 76 97.5 °F (36.4 °C) 18 6' 4" (1.93 m) 85.6 kg (188 lb 11.4 oz)    BMI                22.97 kg/m2          Blood Pressure          Most Recent Value    BP  118/65      Allergies as of 2/6/2017     Lisinopril    Lipitor [Atorvastatin]      Immunizations Administered on Date of Encounter - 2/6/2017     None      Orders " Placed During Today's Visit      Normal Orders This Visit    Ambulatory Referral to Urology     Future Labs/Procedures Expected by Expires    PSA, Screening  2/6/2017 2/6/2018      Language Assistance Services     ATTENTION: Language assistance services are available, free of charge. Please call [phone].      ATENCIÓN: Si habla radha, tiene a stevens disposición servicios gratuitos de asistencia lingüística. Llame al [phone].     CHÚ Ý: N?u b?n nói Ti?ng Vi?t, có các d?ch v? h? tr? ngôn ng? mi?n phí dành cho b?n. G?i s? [phone].         [facility] complies with applicable Federal civil rights laws and does not discriminate on the basis of race, color, national origin, age, disability, or sex.

## 2017-02-06 NOTE — PROGRESS NOTES
CHIEF COMPLAINT:  Follow up    I, Ana Paula Regan, am scribing for, and in the presence of, Dr. Wise.    HPI:  Hi Rubio is a 57 y.o.  male with hypertension and carotid artery occlusion, who presents today for a follow up. Pt is s/p C3/4-C4/5 ACDF on 12/20/2015. Pt reports no change in symptoms since his last visit. Pt continues to note difficulty swallowing. Pt has put on 6 pounds since he has started drinking Boost shakes.    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 -     Hyperlipidemia     Hypertension     Impaired fasting glucose 10/28/2013    Memory loss     Traumatic iritis - Left Eye 2/27/2013     Past Surgical History   Procedure Laterality Date    Hernia repair      Cervical fusion  12/30/2015     Family History   Problem Relation Age of Onset    Heart disease Mother     Hypertension Mother     Hyperlipidemia Mother     Heart disease Father     Hyperlipidemia Brother     Hypertension Brother     Hypertension Brother     Hyperlipidemia Brother     Benign prostatic hyperplasia Brother     Hypertension Brother     Hypertension Brother     Hepatitis Brother     Amblyopia Neg Hx     Blindness Neg Hx     Cancer Neg Hx     Cataracts Neg Hx     Diabetes Neg Hx     Glaucoma Neg Hx     Macular degeneration Neg Hx     Retinal detachment Neg Hx     Strabismus Neg Hx     Stroke Neg Hx     Thyroid disease Neg Hx      Social History   Substance Use Topics    Smoking status: Former Smoker     Packs/day: 1.00     Years: 30.00     Types: Cigarettes     Quit date: 12/13/2011    Smokeless tobacco: Never Used     Alcohol use 0.0 oz/week     15 - 16 Cans of beer per week      Comment: 2 beers per day        Review of Systems   Constitutional: Negative.    HENT: Positive for trouble swallowing.    Eyes: Negative.    Respiratory: Negative.    Cardiovascular: Negative.    Gastrointestinal: Negative.    Endocrine: Negative.    Genitourinary: Negative.    Musculoskeletal: Negative for back pain, gait problem and neck pain.   Skin: Negative.    Allergic/Immunologic: Negative.    Neurological: Negative for weakness, light-headedness, numbness and headaches.   Hematological: Negative.    Psychiatric/Behavioral: Negative.        OBJECTIVE:   Vital Signs:  Pulse: 92 (02/06/17 1457)  BP: 119/74 (02/06/17 1457)    Physical Exam:    Vital signs: All nursing notes and vital signs reviewed -- afebrile, vital signs stable.  Constitutional: Patient sitting comfortably in chair. Appears well developed and well nourished.  Skin: Exposed areas are intact without abnormal markings, rashes or other lesions.  HEENT: Normocephalic. Normal conjunctivae.  Cardiovascular: Normal rate and regular rhythm.  Respiratory: Chest wall rises and falls symmetrically, without signs of respiratory distress.  Abdomen: Soft and non-tender.  Extremities: Warm and without edema. Calves supple, non-tender.  Psych/Behavior: Normal affect.    Neurological:    Mental status: Alert and oriented. Conversational and appropriate.       Cranial Nerves: Grossly intact.    Motor:    Upper:  Deltoids Triceps Biceps WE WF     R 5/5 5/5 5/5 5/5 5/5 5/5    L 5/5 5/5 5/5 5/5 5/5 5/5      Lower:  HF KE KF DF PF EHL    R 5/5 5/5 5/5 5/5 5/5 5/5    L 5/5 5/5 5/5 5/5 5/5 5/5     Sensory: Intact sensation to light touch in all extremities. Romberg negative.    Reflexes:          DTR: 2+ symmetrically throughout.     Garsia's: Negative.     Babinski's: Negative.     Clonus: Negative.    Cerebellar: Finger-to-nose and rapid alternating movements normal. Gait stable,  fluid.    Spine:    Posture: Head well aligned over pelvis in front and side views.  No focal or global spinal deformity visible on inspection. Shoulders and hips even. No obvious leg length discrepancy. No scapula winging.    Bending: Full ROM with forward, back and lateral bending. No rib prominence with forward bend.    Cervical:      ROM: Full with flexion, extension, lateral rotation and ear-to-shoulder bend.      Midline TTP: Negative.     Spurling's test: Negative.     Lhermitte's: Negative.    Thoracic:     Midline TTP: Negative    Lumbar:     Midline TTP: Negative     Straight Leg Test: Negative     Crossed Straight Leg Test: Negative     Sciatic notch tenderness: Negative.    Other:     SI joint TTP: Negative.     Greater trochanter TTP: Negative.     Tenderness with external/internal hip rotation: Negative.    Diagnostic Results:  All imaging was independently reviewed by me.    CT C-spine, dated 2/6/2017:  1. Not available for my review    ASSESSMENT/PLAN:     Hi Rubio presented for evaluation of his CT to see if there was bony fusion at his ACD site. Epic was down due to technical reasons which directly affected my patient care. We will plan for surgery to include either plate removal or placement of interbody spacers at C3/4 and C4/5, depending on the results of this CT. I have discussed the R/B/I/M/A of the procedure with the pt at length. No guarantees about the results of the procedure were made. The pt wishes to proceed with surgery.      The patient understands and agrees with the plan of care. All questions were answered.     1. Plate removal or placement of integrated interbody spacers C3/4, C4/5    I, Dr. Wise, personally performed the services described in this documentation as scribed by Ana Paula Regan in my presence, and it is both accurate and complete.      Hernando Wise M.D.  Department of Neurosurgery  [facility]

## 2017-02-06 NOTE — PROGRESS NOTES
Transitional Care Note  Subjective:       Patient ID: Hi Rubio is a 57 y.o. male.  Chief Complaint: Hospital Follow Up    Family and/or Caretaker present at visit?  No.  Diagnostic tests reviewed/disposition: No diagnosic tests pending after this hospitalization.  Disease/illness education: Chest pain  Home health/community services discussion/referrals: Patient does not have home health established from hospital visit.  They do not need home health.  If needed, we will set up home health for the patient.   Establishment or re-establishment of referral orders for community resources: No other necessary community resources.   Discussion with other health care providers: No discussion with other health care providers necessary.   HPI Comments: CC: followup of hospitalization for chest pain  HPI:  The patient is a 57 y.o. year old male who presents to the office for followup of hospitalization for chest pain.  He presented to the hospital on January 21 complaining of left-sided and precordial chest pressure.  The pain was 8 out of 10 and started around 3 AM.  The pain persisted for 12-13 hours, but usually last anywhere from 5 minutes to 1 hour.  He reports positive response to sublingual nitroglycerin, but denies any known exacerbating factors.  His pain has worsened over the previous 3 weeks, and was at its worse upon presentation.  He reported associated weakness and slightly altered mentation.  The patient had not been sleeping well and had poor oral intake.  He also reports palpitations and diaphoresis.  He was admitted and given aspirin and Plavix.  Beta blocker and pravastatin were continued.  Stress test was negative for myocardial ischemia.      PAST MEDICAL HISTORY:  Past Medical History:    Carotid artery occlusion                                      DJD (degenerative joint disease), cervical      7/10/2015     History of iritis                               2013            Comment:hx traumatic  iritis - mary gras beads to the                eye -     Hyperlipidemia                                                Hypertension                                                  Impaired fasting glucose                        10/28/2013    Memory loss                                                   Traumatic iritis - Left Eye                     2/27/2013     SURGICAL HISTORY:  Past Surgical History:    HERNIA REPAIR                                                  CERVICAL FUSION                                  12/30/2015    MEDS:  Medcard reviewed and updated    ALLERGIES: Allergy Card reviewed and updated    SOCIAL HISTORY:   The patient is a nonsmoker.        Review of Systems   Constitutional: Positive for appetite change and fatigue.   HENT: Positive for postnasal drip, rhinorrhea and sinus pressure. Negative for sneezing.    Respiratory: Negative for cough, shortness of breath and wheezing.    Cardiovascular: Positive for chest pain. Negative for palpitations.   Gastrointestinal: Negative for abdominal pain, nausea and vomiting.   Genitourinary: Positive for frequency.   Psychiatric/Behavioral: Positive for sleep disturbance.       Objective:      Physical Exam   Constitutional: He is oriented to person, place, and time. He appears well-developed and well-nourished.   Cardiovascular: Normal rate, regular rhythm and normal heart sounds.    No murmur heard.  Pulmonary/Chest: Effort normal and breath sounds normal.   Abdominal: Soft. Bowel sounds are normal.   Neurological: He is alert and oriented to person, place, and time.   Skin: Skin is warm and dry.   Psychiatric: He has a normal mood and affect. His behavior is normal. Judgment and thought content normal.       Assessment:       1. Chest pain, unspecified type    2. Essential hypertension    3. Urinary frequency        Plan:     Hi was seen today for hospital follow up.    Diagnoses and all orders for this visit:    Chest pain,  unspecified type  -     Stable, patient has not required use of nitroglycerin    Essential hypertension  -     Blood pressure is well controlled    Urinary frequency  -     PSA, Screening; Future  -     Ambulatory Referral to Urology    Other orders  -     hydrOXYzine pamoate (VISTARIL) 25 MG Cap; Take 1 capsule (25 mg total) by mouth every evening.

## 2017-02-07 ENCOUNTER — LAB VISIT (OUTPATIENT)
Dept: LAB | Facility: HOSPITAL | Age: 57
End: 2017-02-07
Attending: INTERNAL MEDICINE
Payer: COMMERCIAL

## 2017-02-07 ENCOUNTER — TELEPHONE (OUTPATIENT)
Dept: NEUROSURGERY | Facility: CLINIC | Age: 57
End: 2017-02-07

## 2017-02-07 ENCOUNTER — TELEPHONE (OUTPATIENT)
Dept: INTERNAL MEDICINE | Facility: CLINIC | Age: 57
End: 2017-02-07

## 2017-02-07 DIAGNOSIS — R13.12 OROPHARYNGEAL DYSPHAGIA: Primary | ICD-10-CM

## 2017-02-07 DIAGNOSIS — R35.89 POLYURIA: ICD-10-CM

## 2017-02-07 DIAGNOSIS — R35.89 POLYURIA: Primary | ICD-10-CM

## 2017-02-07 LAB
BACTERIA #/AREA URNS AUTO: ABNORMAL /HPF
BILIRUB UR QL STRIP: NEGATIVE
CLARITY UR REFRACT.AUTO: CLEAR
COLOR UR AUTO: YELLOW
GLUCOSE UR QL STRIP: ABNORMAL
HGB UR QL STRIP: NEGATIVE
KETONES UR QL STRIP: NEGATIVE
LEUKOCYTE ESTERASE UR QL STRIP: ABNORMAL
MICROSCOPIC COMMENT: ABNORMAL
NITRITE UR QL STRIP: NEGATIVE
PH UR STRIP: 7 [PH] (ref 5–8)
PROT UR QL STRIP: NEGATIVE
RBC #/AREA URNS AUTO: 1 /HPF (ref 0–4)
SP GR UR STRIP: 1.01 (ref 1–1.03)
SQUAMOUS #/AREA URNS AUTO: 1 /HPF
URN SPEC COLLECT METH UR: ABNORMAL
UROBILINOGEN UR STRIP-ACNC: NEGATIVE EU/DL
WBC #/AREA URNS AUTO: 20 /HPF (ref 0–5)

## 2017-02-07 PROCEDURE — 81001 URINALYSIS AUTO W/SCOPE: CPT

## 2017-02-07 PROCEDURE — 87147 CULTURE TYPE IMMUNOLOGIC: CPT

## 2017-02-07 PROCEDURE — 87088 URINE BACTERIA CULTURE: CPT

## 2017-02-07 PROCEDURE — 87086 URINE CULTURE/COLONY COUNT: CPT

## 2017-02-09 ENCOUNTER — PATIENT MESSAGE (OUTPATIENT)
Dept: INTERNAL MEDICINE | Facility: CLINIC | Age: 57
End: 2017-02-09

## 2017-02-09 ENCOUNTER — TELEPHONE (OUTPATIENT)
Dept: INTERNAL MEDICINE | Facility: CLINIC | Age: 57
End: 2017-02-09

## 2017-02-09 LAB — BACTERIA UR CULT: NORMAL

## 2017-02-09 RX ORDER — AMOXICILLIN 875 MG/1
875 TABLET, FILM COATED ORAL 2 TIMES DAILY
Qty: 20 TABLET | Refills: 0 | Status: SHIPPED | OUTPATIENT
Start: 2017-02-09 | End: 2017-02-19

## 2017-02-09 NOTE — TELEPHONE ENCOUNTER
Please inform patient prescription for amoxicillin has been sent to his pharmacy electronically.  Urine culture is positive for group B strep.

## 2017-02-09 NOTE — TELEPHONE ENCOUNTER
----- Message from She Bach sent at 2/9/2017 10:37 AM CST -----  Contact: Pt at [phone]  Patient would like to get test results.  Name of test (lab, mammo, etc.):  urinalysis  Date of test:  2/7/17  Ordering provider: Enoch  Where was the test performed:  [facility]  Comments:

## 2017-02-10 ENCOUNTER — OFFICE VISIT (OUTPATIENT)
Dept: UROLOGY | Facility: CLINIC | Age: 57
End: 2017-02-10
Attending: UROLOGY
Payer: COMMERCIAL

## 2017-02-10 VITALS
DIASTOLIC BLOOD PRESSURE: 89 MMHG | HEART RATE: 73 BPM | WEIGHT: 188 LBS | BODY MASS INDEX: 22.89 KG/M2 | SYSTOLIC BLOOD PRESSURE: 131 MMHG | HEIGHT: 76 IN

## 2017-02-10 DIAGNOSIS — R97.20 ELEVATED PSA: ICD-10-CM

## 2017-02-10 DIAGNOSIS — N40.1 BENIGN NON-NODULAR PROSTATIC HYPERPLASIA WITH LOWER URINARY TRACT SYMPTOMS: Primary | ICD-10-CM

## 2017-02-10 DIAGNOSIS — N30.00 ACUTE CYSTITIS WITHOUT HEMATURIA: ICD-10-CM

## 2017-02-10 PROCEDURE — 99244 OFF/OP CNSLTJ NEW/EST MOD 40: CPT | Mod: 25,S$GLB,, | Performed by: UROLOGY

## 2017-02-10 PROCEDURE — 81002 URINALYSIS NONAUTO W/O SCOPE: CPT | Mod: S$GLB,,, | Performed by: UROLOGY

## 2017-02-10 PROCEDURE — 51798 US URINE CAPACITY MEASURE: CPT | Mod: S$GLB,,, | Performed by: UROLOGY

## 2017-02-10 PROCEDURE — 99999 PR PBB SHADOW E&M-EST. PATIENT-LVL III: CPT | Mod: PBBFAC,,, | Performed by: UROLOGY

## 2017-02-10 RX ORDER — TAMSULOSIN HYDROCHLORIDE 0.4 MG/1
0.4 CAPSULE ORAL DAILY
Qty: 30 CAPSULE | Refills: 11 | Status: SHIPPED | OUTPATIENT
Start: 2017-02-10 | End: 2017-11-16

## 2017-02-10 NOTE — LETTER
February 10, 2017      Josefina Kendall MD  [address] [place] [address]           [place] - Urology  [address] [facility] [address]  Phone: [phone]          Patient: Hi Rubio   MR Number: [id number]   YOB: 1960   Date of Visit: 2/10/2017       Dear Dr. Josefina Kendall:    Thank you for referring Hi Rubio to me for evaluation. Attached you will find relevant portions of my assessment and plan of care.    If you have questions, please do not hesitate to call me. I look forward to following Hi Rubio along with you.    Sincerely,    Yg King MD    Enclosure  CC:  No Recipients    If you would like to receive this communication electronically, please contact [email or web address] or [phone] to request more information on [facility] access.    For providers and/or their staff who would like to refer a patient to [facility], please contact us through our one-stop-shop provider referral line, [facility], at [phone].    If you feel you have received this communication in error or would no longer like to receive these types of communications, please e-mail [email or web address]

## 2017-02-10 NOTE — PROGRESS NOTES
"Subjective:      Hi Rubio is a 57 y.o. male who was referred by Josefina Kendall MD for evaluation of BPH/LUTS.      Benign Prostatic Hypertrophy  Patient complains of lower urinary tract symptoms. He reports frequency and dysuria. He denies weak stream. Patient states symptoms are of moderate severity. Onset of symptoms was 5 days ago and was sudden in onset. His AUA Symptom Score is 7. He has no family history of prostate cancer. He reports a history of no complicating symptoms.     Elevated PSA  Patient is here with an elevated PSA. He has no family history of prostate cancer. His AUA Symptom Score is 7.  Previous PSA values are :  Lab Results   Component Value Date    PSA 21.2 (H) 02/06/2017    PSA 1.2 06/22/2006      He saw PCP on Tuesday and now has positive urine culture for Strep. He started amoxil yesterday and symptoms are already improving.    The following portions of the patient's history were reviewed and updated as appropriate: allergies, current medications, past family history, past medical history, past social history, past surgical history and problem list.    Review of Systems  Constitutional: no fever or chills  ENT: no nasal congestion or sore throat  Respiratory: no cough or shortness of breath  Cardiovascular: no chest pain or palpitations  Gastrointestinal: no nausea or vomiting, tolerating diet  Genitourinary: as per HPI  Hematologic/Lymphatic: no easy bruising or lymphadenopathy  Musculoskeletal: no arthralgias or myalgias  Neurological: no seizures or tremors  Behavioral/Psych: no auditory or visual hallucinations     Objective:   Vitals:   Visit Vitals    /89 (BP Location: Left arm, Patient Position: Sitting, BP Method: Automatic)    Pulse 73    Ht 6' 4" (1.93 m)    Wt 85.3 kg (188 lb)    BMI 22.88 kg/m2       Physical Exam   Physical Exam   Constitutional: He is oriented to person, place, and time. He appears well-developed and well-nourished. No distress.   HENT: "   Head: Normocephalic and atraumatic.   Right Ear: External ear normal.   Left Ear: External ear normal.   Nose: Nose normal.   Mouth/Throat: Oropharynx is clear and moist.   Eyes: Conjunctivae and EOM are normal. Right eye exhibits no discharge. Left eye exhibits no discharge. No scleral icterus.   Neck: Neck supple. No tracheal deviation present. No thyromegaly present.   Cardiovascular: Normal rate and regular rhythm.  PMI is not displaced.    Pulmonary/Chest: Effort normal. No respiratory distress. He exhibits no tenderness.   Abdominal: Soft. He exhibits no distension. There is no tenderness. There is no CVA tenderness. No hernia.   Genitourinary:   Genitourinary Comments: Deferred - will examine next visit due to active infection   Musculoskeletal: He exhibits no edema.   Neurological: He is alert and oriented to person, place, and time.   Skin: Skin is warm and dry. No rash noted. No erythema.   Psychiatric: He has a normal mood and affect. His speech is normal and behavior is normal. Judgment and thought content normal. His mood appears not anxious. Cognition and memory are normal.      Bladder Scan PVR: 4cc      Lab Review   Urinalysis demonstrates negative for all components  Lab Results   Component Value Date    WBC 7.32 01/21/2017    HGB 16.0 01/21/2017    HCT 46.0 01/21/2017    MCV 86 01/21/2017     01/21/2017     Lab Results   Component Value Date    CREATININE 1.1 01/23/2017    BUN 19 01/23/2017     Lab Results   Component Value Date    PSA 21.2 (H) 02/06/2017       Assessment:     1. Benign non-nodular prostatic hyperplasia with lower urinary tract symptoms    2. Acute cystitis without hematuria        Plan:   1. UTI likely 2/2 underlying UTI  2. Recommend starting flomax  3. Finish abx  4. Will recheck PSA in 3-6 months - current elevation likely 2/2 UTI  5. FU 1 month for med check

## 2017-02-10 NOTE — TELEPHONE ENCOUNTER
Called mobile # to advise the patient of the labs results there was no answer lmom for a return call.... Called the home # for the patient and there was no answer lmom for a return call

## 2017-02-21 RX ORDER — HYDROCHLOROTHIAZIDE 25 MG/1
TABLET ORAL
Qty: 90 TABLET | Refills: 1 | Status: SHIPPED | OUTPATIENT
Start: 2017-02-21 | End: 2017-08-16 | Stop reason: SDUPTHER

## 2017-02-28 DIAGNOSIS — Z82.49 FAMILY HISTORY OF PREMATURE CAD: ICD-10-CM

## 2017-02-28 DIAGNOSIS — I73.9 PERIPHERAL VASCULAR DISEASE: ICD-10-CM

## 2017-02-28 DIAGNOSIS — E78.5 HYPERLIPIDEMIA: ICD-10-CM

## 2017-03-01 RX ORDER — PRAVASTATIN SODIUM 80 MG/1
TABLET ORAL
Qty: 90 TABLET | Refills: 1 | Status: SHIPPED | OUTPATIENT
Start: 2017-03-01 | End: 2017-08-25 | Stop reason: ALTCHOICE

## 2017-03-10 ENCOUNTER — OFFICE VISIT (OUTPATIENT)
Dept: UROLOGY | Facility: CLINIC | Age: 57
End: 2017-03-10
Payer: COMMERCIAL

## 2017-03-10 VITALS
BODY MASS INDEX: 23.57 KG/M2 | HEIGHT: 76 IN | SYSTOLIC BLOOD PRESSURE: 123 MMHG | HEART RATE: 73 BPM | WEIGHT: 193.56 LBS | DIASTOLIC BLOOD PRESSURE: 81 MMHG

## 2017-03-10 DIAGNOSIS — R97.20 ELEVATED PSA: ICD-10-CM

## 2017-03-10 DIAGNOSIS — N40.1 BENIGN NON-NODULAR PROSTATIC HYPERPLASIA WITH LOWER URINARY TRACT SYMPTOMS: Primary | ICD-10-CM

## 2017-03-10 PROCEDURE — 99999 PR PBB SHADOW E&M-EST. PATIENT-LVL III: CPT | Mod: PBBFAC,,, | Performed by: UROLOGY

## 2017-03-10 PROCEDURE — 1160F RVW MEDS BY RX/DR IN RCRD: CPT | Mod: S$GLB,,, | Performed by: UROLOGY

## 2017-03-10 PROCEDURE — 3074F SYST BP LT 130 MM HG: CPT | Mod: S$GLB,,, | Performed by: UROLOGY

## 2017-03-10 PROCEDURE — 99214 OFFICE O/P EST MOD 30 MIN: CPT | Mod: S$GLB,,, | Performed by: UROLOGY

## 2017-03-10 PROCEDURE — 3079F DIAST BP 80-89 MM HG: CPT | Mod: S$GLB,,, | Performed by: UROLOGY

## 2017-03-10 NOTE — PROGRESS NOTES
"Subjective:      Hi Rubio is a 57 y.o. male who returns today regarding his BPH and recent UTI.    Started flomax 1 month ago; had UTI at that time that has now been treated.    His AUASS today is 8. He notes significant improvement with decreased intermittency and strong FOS. He denies any SEs.    The following portions of the patient's history were reviewed and updated as appropriate: allergies, current medications, past family history, past medical history, past social history, past surgical history and problem list.    Review of Systems  A comprehensive multipoint review of systems was negative except as otherwise stated in the HPI.     Objective:   Vitals: /81 (BP Location: Left arm, Patient Position: Sitting, BP Method: Automatic)  Pulse 73  Ht 6' 4" (1.93 m)  Wt 87.8 kg (193 lb 9 oz)  BMI 23.56 kg/m2    Physical Exam   General: alert and oriented, no acute distress  Respiratory: Symmetric expansion, non-labored breathing  Neuro: no gross deficits  Psych: normal judgment and insight, normal mood/affect and non-anxious    Lab Review   Urinalysis demonstrates negative for all components  Lab Results   Component Value Date    WBC 7.32 01/21/2017    HGB 16.0 01/21/2017    HCT 46.0 01/21/2017    MCV 86 01/21/2017     01/21/2017     Lab Results   Component Value Date    CREATININE 1.1 01/23/2017    BUN 19 01/23/2017     Lab Results   Component Value Date    PSA 21.2 (H) 02/06/2017       Assessment and Plan:   1. Benign non-nodular prostatic hyperplasia with lower urinary tract symptoms  -- Continue flomax  -- Discussed surgery such as Urolift. Explained we need to r/o PCa w/ FU PSA before considering.    2. Elevated PSA  -- Likely 2/2 recent UTI  -- FU 1 month w/ PSA    "

## 2017-03-17 RX ORDER — CHLORHEXIDINE GLUCONATE 40 MG/ML
SOLUTION TOPICAL DAILY PRN
Qty: 118 ML | Refills: 0 | Status: SHIPPED | OUTPATIENT
Start: 2017-03-17 | End: 2017-04-03

## 2017-03-20 ENCOUNTER — TELEPHONE (OUTPATIENT)
Dept: NEUROSURGERY | Facility: CLINIC | Age: 57
End: 2017-03-20

## 2017-03-20 NOTE — TELEPHONE ENCOUNTER
Spoke with pt. Pt stated that he would like to reschedule his surgery from 3/24/2017 to 4/21/2017. Surgery has been rescheduled. Pt verbalized understanding.

## 2017-03-20 NOTE — TELEPHONE ENCOUNTER
----- Message from Francisco Delacruz sent at 3/17/2017  4:44 PM CDT -----  Contact: Self [phone]  Patient is calling to cancel the 3-24 surgery.

## 2017-03-23 DIAGNOSIS — I49.3 SYMPTOMATIC PVCS: ICD-10-CM

## 2017-03-23 RX ORDER — METOPROLOL SUCCINATE 100 MG/1
TABLET, EXTENDED RELEASE ORAL
Qty: 90 TABLET | Refills: 2 | Status: SHIPPED | OUTPATIENT
Start: 2017-03-23 | End: 2018-02-22 | Stop reason: SDUPTHER

## 2017-03-29 ENCOUNTER — OFFICE VISIT (OUTPATIENT)
Dept: INTERNAL MEDICINE | Facility: CLINIC | Age: 57
End: 2017-03-29
Payer: COMMERCIAL

## 2017-03-29 VITALS
TEMPERATURE: 98 F | WEIGHT: 193.56 LBS | BODY MASS INDEX: 23.57 KG/M2 | HEART RATE: 76 BPM | DIASTOLIC BLOOD PRESSURE: 80 MMHG | SYSTOLIC BLOOD PRESSURE: 120 MMHG | RESPIRATION RATE: 16 BRPM | HEIGHT: 76 IN

## 2017-03-29 DIAGNOSIS — K62.5 RECTAL BLEEDING: Primary | ICD-10-CM

## 2017-03-29 PROCEDURE — 99999 PR PBB SHADOW E&M-EST. PATIENT-LVL IV: CPT | Mod: PBBFAC,,, | Performed by: INTERNAL MEDICINE

## 2017-03-29 PROCEDURE — 99213 OFFICE O/P EST LOW 20 MIN: CPT | Mod: S$GLB,,, | Performed by: INTERNAL MEDICINE

## 2017-03-29 PROCEDURE — 3079F DIAST BP 80-89 MM HG: CPT | Mod: S$GLB,,, | Performed by: INTERNAL MEDICINE

## 2017-03-29 PROCEDURE — 1160F RVW MEDS BY RX/DR IN RCRD: CPT | Mod: S$GLB,,, | Performed by: INTERNAL MEDICINE

## 2017-03-29 PROCEDURE — 3074F SYST BP LT 130 MM HG: CPT | Mod: S$GLB,,, | Performed by: INTERNAL MEDICINE

## 2017-03-29 RX ORDER — PANTOPRAZOLE SODIUM 40 MG/1
TABLET, DELAYED RELEASE ORAL
Qty: 30 TABLET | Refills: 1 | Status: SHIPPED | OUTPATIENT
Start: 2017-03-29 | End: 2017-06-01 | Stop reason: SDUPTHER
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
## 2017-03-29 NOTE — MR AVS SNAPSHOT
[facility]   [facility]  [place] [address]  Phone: [phone]  Fax: [phone]                  Hi Rubio   3/29/2017 9:20 AM   Office Visit    Description:  Male : 1960   Provider:  Maxwell Greenwood MD   Department:  [facility]           Reason for Visit     Rectal Bleeding           Diagnoses this Visit        Comments    Rectal bleeding    -  Primary            To Do List           Future Appointments        Provider Department Dept Phone    4/3/2017 7:45 AM LAB, [facility] [phone]    2017 8:15 AM Yg King MD [facility] [phone]      Your Future Surgeries/Procedures     2017   Surgery with Hernando Wise MD   [facility])    [address] [place] [address] [phone]              Goals (5 Years of Data)     None      Follow-Up and Disposition     Return if symptoms worsen or fail to improve.      [facility] On Call     [facility] On [facility] Nurse Care Line -  Assistance  Registered nurses in the [facility] Center provide clinical advisement, health education, appointment booking, and other advisory services.  Call for this free service at [phone].             Medications           Message regarding Medications     Verify the changes and/or additions to your medication regime listed below are the same as discussed with your clinician today.  If any of these changes or additions are incorrect, please notify your healthcare provider.             Verify that the below list of medications is an accurate representation of the medications you are currently taking.  If none reported, the list may be blank. If incorrect, please contact your healthcare provider. Carry this list with you in case of emergency.           Current Medications     amlodipine (NORVASC) 5 MG tablet TAKE ONE TABLET BY MOUTH ONE TIME DAILY     "aspirin 81 MG Chew Take 1 tablet (81 mg total) by mouth once daily.    chlorhexidine (HIBICLENS) 4 % external liquid Apply topically daily as needed. Wash surgical site with this soap 24 hours before surgery and the morning of surgery    CONTOUR NEXT STRIPS Strp 1 strip by Misc.(Non-Drug; Combo Route) route once daily. Test blood glucose once daily as instructed.    hydrochlorothiazide (HYDRODIURIL) 25 MG tablet TAKE ONE TABLET BY MOUTH ONE TIME DAILY    hydrOXYzine pamoate (VISTARIL) 25 MG Cap Take 1 capsule (25 mg total) by mouth every evening.    KRILL OIL ORAL Take by mouth once daily.    metoprolol succinate (TOPROL-XL) 100 MG 24 hr tablet TAKE ONE TABLET BY MOUTH ONE TIME DAILY    MICROLET LANCET Misc 1 lancet by Misc.(Non-Drug; Combo Route) route once daily. Test blood glucose once daily as instructed.    multivitamin (ONE DAILY MULTIVITAMIN) per tablet Take 1 tablet by mouth once daily.    mupirocin calcium 2% nasl oint (BACTROBAN) 2 % Oint by Nasal route 2 (two) times daily. Apply to inside of both nostrils 2x a day starting 48 hrs before surgery and for 5 days after surgery    nitroGLYCERIN (NITROSTAT) 0.3 MG SL tablet Place 1 tablet (0.3 mg total) under the tongue every 5 (five) minutes as needed for Chest pain.    pantoprazole (PROTONIX) 40 MG tablet Take 1 tablet (40 mg total) by mouth once daily.    pravastatin (PRAVACHOL) 80 MG tablet TAKE ONE TABLET BY MOUTH NIGHTLY AT BEDTIME    tamsulosin (FLOMAX) 0.4 mg Cp24 Take 1 capsule (0.4 mg total) by mouth once daily.           Clinical Reference Information           Your Vitals Were     BP Pulse Temp Resp Height Weight    120/80 (BP Location: Right arm, Patient Position: Sitting, BP Method: Manual) 76 98.2 °F (36.8 °C) (Oral) 16 6' 4" (1.93 m) 87.8 kg (193 lb 9 oz)    BMI                23.56 kg/m2          Blood Pressure          Most Recent Value    BP  120/80      Allergies as of 3/29/2017     Lisinopril    Lipitor [Atorvastatin]      Immunizations " Administered on Date of Encounter - 3/29/2017     None      Orders Placed During Today's Visit      Normal Orders This Visit    Ambulatory consult to Colorectal Surgery       Instructions    1. Referral to Colorectal Department.  2. Continue all regular meds.       Language Assistance Services     ATTENTION: Language assistance services are available, free of charge. Please call [phone].      ATENCIÓN: Si habla radha, tiene a stevens disposición servicios gratuitos de asistencia lingüística. Llame al [phone].     CHÚ Ý: N?u b?n nói Ti?ng Vi?t, có các d?ch v? h? tr? ngôn ng? mi?n phí dành cho b?n. G?i s? [phone].         [facility] complies with applicable Federal civil rights laws and does not discriminate on the basis of race, color, national origin, age, disability, or sex.

## 2017-04-03 ENCOUNTER — LAB VISIT (OUTPATIENT)
Dept: LAB | Facility: HOSPITAL | Age: 57
End: 2017-04-03
Attending: INTERNAL MEDICINE
Payer: COMMERCIAL

## 2017-04-03 ENCOUNTER — TELEPHONE (OUTPATIENT)
Dept: ENDOSCOPY | Facility: HOSPITAL | Age: 57
End: 2017-04-03

## 2017-04-03 ENCOUNTER — OFFICE VISIT (OUTPATIENT)
Dept: SURGERY | Facility: CLINIC | Age: 57
End: 2017-04-03
Payer: COMMERCIAL

## 2017-04-03 VITALS
SYSTOLIC BLOOD PRESSURE: 131 MMHG | HEART RATE: 67 BPM | BODY MASS INDEX: 24.1 KG/M2 | DIASTOLIC BLOOD PRESSURE: 85 MMHG | WEIGHT: 198 LBS

## 2017-04-03 DIAGNOSIS — K62.5 RECTAL BLEEDING: Primary | ICD-10-CM

## 2017-04-03 DIAGNOSIS — Z12.11 SPECIAL SCREENING FOR MALIGNANT NEOPLASMS, COLON: Primary | ICD-10-CM

## 2017-04-03 DIAGNOSIS — N40.1 BENIGN NON-NODULAR PROSTATIC HYPERPLASIA WITH LOWER URINARY TRACT SYMPTOMS: ICD-10-CM

## 2017-04-03 DIAGNOSIS — R97.20 ELEVATED PSA: ICD-10-CM

## 2017-04-03 LAB
PROSTATE SPECIFIC ANTIGEN, TOTAL: 3.4 NG/ML
PSA FREE MFR SERPL: 13.24 %
PSA FREE SERPL-MCNC: 0.45 NG/ML

## 2017-04-03 PROCEDURE — 3075F SYST BP GE 130 - 139MM HG: CPT | Mod: S$GLB,,, | Performed by: COLON & RECTAL SURGERY

## 2017-04-03 PROCEDURE — 3079F DIAST BP 80-89 MM HG: CPT | Mod: S$GLB,,, | Performed by: COLON & RECTAL SURGERY

## 2017-04-03 PROCEDURE — 99999 PR PBB SHADOW E&M-EST. PATIENT-LVL III: CPT | Mod: PBBFAC,,, | Performed by: COLON & RECTAL SURGERY

## 2017-04-03 PROCEDURE — 36415 COLL VENOUS BLD VENIPUNCTURE: CPT

## 2017-04-03 PROCEDURE — 99203 OFFICE O/P NEW LOW 30 MIN: CPT | Mod: S$GLB,,, | Performed by: COLON & RECTAL SURGERY

## 2017-04-03 PROCEDURE — 84153 ASSAY OF PSA TOTAL: CPT

## 2017-04-03 PROCEDURE — 1160F RVW MEDS BY RX/DR IN RCRD: CPT | Mod: S$GLB,,, | Performed by: COLON & RECTAL SURGERY

## 2017-04-03 RX ORDER — SODIUM, POTASSIUM,MAG SULFATES 17.5-3.13G
1 SOLUTION, RECONSTITUTED, ORAL ORAL ONCE
Qty: 1 BOTTLE | Refills: 0 | Status: SHIPPED | OUTPATIENT
Start: 2017-04-03 | End: 2017-04-03

## 2017-04-03 NOTE — LETTER
April 3, 2017        Josefina Kendall MD  [address]  [place] [address]             [facility]  [address]  [facility] [address]  Phone: [phone]   Patient: Hi Rubio   MR Number: [id number]   YOB: 1960   Date of Visit: 4/3/2017       Dear Dr. Kendall:    Thank you for referring Hi Rubio to me for evaluation. Attached you will find relevant portions of my assessment and plan of care.    If you have questions, please do not hesitate to call me. I look forward to following Hi Rubio along with you.    Sincerely,      Handy Frederick MD            CC  No Recipients    Enclosure
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
## 2017-04-03 NOTE — Clinical Note
April 3, 2017      Maxwell Greenwood MD  [address]  [place] [address]           [facility]  [address]  [facility] [address]  Phone: [phone]          Patient: Hi Rubio   MR Number: [id number]   YOB: 1960   Date of Visit: 4/3/2017       Dear Dr. Maxwell Greenwood:    Thank you for referring Hi Rubio to me for evaluation. Attached you will find relevant portions of my assessment and plan of care.    If you have questions, please do not hesitate to call me. I look forward to following Hi Rubio along with you.    Sincerely,    Handy Frederick MD    Enclosure  CC:  No Recipients    If you would like to receive this communication electronically, please contact [email or web address] or [phone] to request more information on [employer] Link access.    For providers and/or their staff who would like to refer a patient to [facility], please contact us through our one-stop-shop provider referral line, [facility], at [phone].    If you feel you have received this communication in error or would no longer like to receive these types of communications, please e-mail [email or web address]

## 2017-04-04 ENCOUNTER — TELEPHONE (OUTPATIENT)
Dept: UROLOGY | Facility: CLINIC | Age: 57
End: 2017-04-04

## 2017-04-04 NOTE — TELEPHONE ENCOUNTER
----- Message from Yani Pratt sent at 4/4/2017  2:08 PM CDT -----  x_  1st Request  _  2nd Request  _  3rd Request        Who: LEIDY MOORE [[id number]]    Why: Pt called he had to reschedule his appointment, but he would like for someone to call him with the results from his labs.     What Number to Call Back:[phone]    When to Expect a call back: (Before the end of the day)   -- if the call is after 12:00, the call back will be tomorrow.

## 2017-04-04 NOTE — TELEPHONE ENCOUNTER
Attempt to call pt no answer. A voice message was left informing pt that his message was received. Pt is asking for his PSA results. Please advise.

## 2017-04-07 ENCOUNTER — ANESTHESIA EVENT (OUTPATIENT)
Dept: ENDOSCOPY | Facility: HOSPITAL | Age: 57
End: 2017-04-07
Payer: COMMERCIAL

## 2017-04-07 ENCOUNTER — SURGERY (OUTPATIENT)
Age: 57
End: 2017-04-07

## 2017-04-07 ENCOUNTER — HOSPITAL ENCOUNTER (OUTPATIENT)
Facility: HOSPITAL | Age: 57
Discharge: HOME | End: 2017-04-07
Attending: COLON & RECTAL SURGERY | Admitting: COLON & RECTAL SURGERY
Payer: COMMERCIAL

## 2017-04-07 ENCOUNTER — ANESTHESIA (OUTPATIENT)
Dept: ENDOSCOPY | Facility: HOSPITAL | Age: 57
End: 2017-04-07
Payer: COMMERCIAL

## 2017-04-07 VITALS
WEIGHT: 193 LBS | HEART RATE: 77 BPM | TEMPERATURE: 98 F | SYSTOLIC BLOOD PRESSURE: 119 MMHG | DIASTOLIC BLOOD PRESSURE: 70 MMHG | RESPIRATION RATE: 33 BRPM | HEIGHT: 76 IN | BODY MASS INDEX: 23.5 KG/M2 | RESPIRATION RATE: 18 BRPM | OXYGEN SATURATION: 96 %

## 2017-04-07 DIAGNOSIS — K62.5 RECTAL BLEEDING: Primary | ICD-10-CM

## 2017-04-07 PROCEDURE — 25000003 PHARM REV CODE 250: Performed by: NURSE ANESTHETIST, CERTIFIED REGISTERED

## 2017-04-07 PROCEDURE — 63600175 PHARM REV CODE 636 W HCPCS: Performed by: NURSE ANESTHETIST, CERTIFIED REGISTERED

## 2017-04-07 PROCEDURE — D9220A PRA ANESTHESIA: Mod: ANES,,, | Performed by: ANESTHESIOLOGY

## 2017-04-07 PROCEDURE — 45385 COLONOSCOPY W/LESION REMOVAL: CPT | Performed by: COLON & RECTAL SURGERY

## 2017-04-07 PROCEDURE — 27201089 HC SNARE, DISP (ANY): Performed by: COLON & RECTAL SURGERY

## 2017-04-07 PROCEDURE — 45385 COLONOSCOPY W/LESION REMOVAL: CPT | Mod: ,,, | Performed by: COLON & RECTAL SURGERY

## 2017-04-07 PROCEDURE — 25000003 PHARM REV CODE 250: Performed by: COLON & RECTAL SURGERY

## 2017-04-07 PROCEDURE — 88305 TISSUE EXAM BY PATHOLOGIST: CPT | Performed by: PATHOLOGY

## 2017-04-07 PROCEDURE — 88305 TISSUE EXAM BY PATHOLOGIST: CPT | Mod: 26,,, | Performed by: PATHOLOGY

## 2017-04-07 PROCEDURE — 37000008 HC ANESTHESIA 1ST 15 MINUTES: Performed by: COLON & RECTAL SURGERY

## 2017-04-07 PROCEDURE — 37000009 HC ANESTHESIA EA ADD 15 MINS: Performed by: COLON & RECTAL SURGERY

## 2017-04-07 PROCEDURE — D9220A PRA ANESTHESIA: Mod: CRNA,,, | Performed by: NURSE ANESTHETIST, CERTIFIED REGISTERED

## 2017-04-07 RX ORDER — SODIUM CHLORIDE 9 MG/ML
INJECTION, SOLUTION INTRAVENOUS CONTINUOUS
Status: DISCONTINUED | OUTPATIENT
Start: 2017-04-07 | End: 2017-04-07 | Stop reason: HOSPADM

## 2017-04-07 RX ORDER — LIDOCAINE HCL/PF 100 MG/5ML
SYRINGE (ML) INTRAVENOUS
Status: DISCONTINUED | OUTPATIENT
Start: 2017-04-07 | End: 2017-04-07

## 2017-04-07 RX ORDER — PROPOFOL 10 MG/ML
VIAL (ML) INTRAVENOUS CONTINUOUS PRN
Status: DISCONTINUED | OUTPATIENT
Start: 2017-04-07 | End: 2017-04-07

## 2017-04-07 RX ORDER — PROPOFOL 10 MG/ML
VIAL (ML) INTRAVENOUS
Status: DISCONTINUED | OUTPATIENT
Start: 2017-04-07 | End: 2017-04-07

## 2017-04-07 RX ADMIN — SODIUM CHLORIDE: 0.9 INJECTION, SOLUTION INTRAVENOUS at 11:04

## 2017-04-07 RX ADMIN — PROPOFOL 150 MCG/KG/MIN: 10 INJECTION, EMULSION INTRAVENOUS at 12:04

## 2017-04-07 RX ADMIN — PROPOFOL 50 MG: 10 INJECTION, EMULSION INTRAVENOUS at 12:04

## 2017-04-07 RX ADMIN — LIDOCAINE HYDROCHLORIDE 50 MG: 20 INJECTION, SOLUTION INTRAVENOUS at 12:04

## 2017-04-07 NOTE — ANESTHESIA PREPROCEDURE EVALUATION
04/07/2017  Hi Rubio is a 57 y.o., male.    [facility] Anesthesia Evaluation    I have reviewed the Patient Summary Reports.    I have reviewed the Nursing Notes.   I have reviewed the Medications.     Review of Systems  Anesthesia Hx:  Hx of difficult intubation (easy with glidescope)       Physical Exam  General:  Well nourished    Airway/Jaw/Neck:  Airway Findings: Mouth Opening: Normal Tongue: Normal  General Airway Assessment: Adult  Mallampati: II  TM Distance: Normal, at least 6 cm       Chest/Lungs:  Chest/Lungs Findings: Clear to auscultation, Normal Respiratory Rate     Heart/Vascular:  Heart Findings: Rate: Normal  Rhythm: Regular Rhythm             Anesthesia Plan  Type of Anesthesia, risks & benefits discussed:  Anesthesia Type:  general, MAC  Patient's Preference:   Intra-op Monitoring Plan:   Intra-op Monitoring Plan Comments:   Post Op Pain Control Plan:   Post Op Pain Control Plan Comments:   Induction:   IV  Beta Blocker:  Patient is not currently on a Beta-Blocker (No further documentation required).       Informed Consent: Patient understands risks and agrees with Anesthesia plan.  Questions answered. Anesthesia consent signed with patient.  ASA Score: 2     Day of Surgery Review of History & Physical:            Ready For Surgery From Anesthesia Perspective.      Patient Active Problem List   Diagnosis    Essential hypertension    Chest pain    Traumatic iritis - Left Eye    Chronic anterior uveitis    Unspecified iridocyclitis - Left Eye    Memory loss    GERD (gastroesophageal reflux disease)    Family history of premature CAD    Peripheral vascular disease    Subclavian steal syndrome    Impaired fasting glucose    Trigger finger of left hand    Rapid palpitations    Muscle ache    Cervical spinal stenosis    DJD (degenerative joint disease), cervical    Lateral  epicondylitis (tennis elbow)    Cervical radiculopathy    Pain    Preoperative clearance    Cervical stenosis of spine    Mixed hyperlipidemia    Coronary artery disease due to calcified coronary lesion    Chest discomfort    Anxiety disorder    Chest pain    Unstable angina

## 2017-04-07 NOTE — IP AVS SNAPSHOT
[facility]  [address]  [facility] [address]  Phone: [phone]           Patient Discharge Instructions   Our goal is to set you up for success. This packet includes information on your condition, medications, and your home care.  It will help you care for yourself to prevent having to return to the hospital.     Please ask your nurse if you have any questions.      There are many details to remember when preparing to leave the hospital. Here is what you will need to do:    1. Take your medicine. If you are prescribed medications, review your Medication List on the following pages. You may have new medications to  at the pharmacy and others that you'll need to stop taking. Review the instructions for how and when to take your medications. Talk with your doctor or nurses if you are unsure of what to do.     2. Go to your follow-up appointments. Specific follow-up information is listed in the following pages. Your may be contacted by a nurse or clinical provider about future appointments. Be sure we have all of the phone numbers to reach you. Please contact your provider's office if you are unable to make an appointment.     3. Watch for warning signs. Your doctor or nurse will give you detailed warning signs to watch for and when to call for assistance. These instructions may also include educational information about your condition. If you experience any of warning signs to your health, call your doctor.           [facility]  Unless otherwise directed by your provider, please   contact [facility], our nurse care line   that is available for 24/7 assistance.     [phone] (toll-free)     Registered nurses in the [facility]   provide: appointment scheduling, clinical advisement, health education, and other advisory services.                  ** Verify the list of medication(s) below is accurate and up to date. Carry this with you in case of  emergency. If your medications have changed, please notify your healthcare provider.             Medication List      CONTINUE taking these medications        Additional Info                      amlodipine 5 MG tablet   Commonly known as:  NORVASC   Quantity:  90 tablet   Refills:  3    Instructions:  TAKE ONE TABLET BY MOUTH ONE TIME DAILY     Begin Date    AM    Noon    PM    Bedtime       aspirin 81 MG Chew   Refills:  0   Dose:  81 mg    Instructions:  Take 1 tablet (81 mg total) by mouth once daily.     Begin Date    AM    Noon    PM    Bedtime       CONTOUR NEXT STRIPS Strp   Quantity:  25 strip   Refills:  11   Dose:  1 strip   Generic drug:  blood sugar diagnostic    Instructions:  1 strip by Misc.(Non-Drug; Combo Route) route once daily. Test blood glucose once daily as instructed.     Begin Date    AM    Noon    PM    Bedtime       hydrochlorothiazide 25 MG tablet   Commonly known as:  HYDRODIURIL   Quantity:  90 tablet   Refills:  1    Instructions:  TAKE ONE TABLET BY MOUTH ONE TIME DAILY     Begin Date    AM    Noon    PM    Bedtime       hydrOXYzine pamoate 25 MG Cap   Commonly known as:  VISTARIL   Quantity:  30 capsule   Refills:  6   Dose:  25 mg    Instructions:  Take 1 capsule (25 mg total) by mouth every evening.     Begin Date    AM    Noon    PM    Bedtime       KRILL OIL ORAL   Refills:  0    Instructions:  Take by mouth once daily.     Begin Date    AM    Noon    PM    Bedtime       metoprolol succinate 100 MG 24 hr tablet   Commonly known as:  TOPROL-XL   Quantity:  90 tablet   Refills:  2    Instructions:  TAKE ONE TABLET BY MOUTH ONE TIME DAILY     Begin Date    AM    Noon    PM    Bedtime       MICROLET LANCET Misc   Quantity:  25 each   Refills:  11   Dose:  1 lancet   Generic drug:  lancets    Instructions:  1 lancet by Misc.(Non-Drug; Combo Route) route once daily. Test blood glucose once daily as instructed.     Begin Date    AM    Noon    PM    Bedtime       mupirocin calcium 2%  nasl oint 2 % Oint   Commonly known as:  BACTROBAN   Quantity:  1 Tube   Refills:  0    Instructions:  by Nasal route 2 (two) times daily. Apply to inside of both nostrils 2x a day starting 48 hrs before surgery and for 5 days after surgery     Begin Date    AM    Noon    PM    Bedtime       nitroGLYCERIN 0.3 MG SL tablet   Commonly known as:  NITROSTAT   Quantity:  15 tablet   Refills:  0   Dose:  0.3 mg    Instructions:  Place 1 tablet (0.3 mg total) under the tongue every 5 (five) minutes as needed for Chest pain.     Begin Date    AM    Noon    PM    Bedtime       ONE DAILY MULTIVITAMIN per tablet   Refills:  0   Dose:  1 tablet   Generic drug:  multivitamin    Instructions:  Take 1 tablet by mouth once daily.     Begin Date    AM    Noon    PM    Bedtime       pantoprazole 40 MG tablet   Commonly known as:  PROTONIX   Quantity:  30 tablet   Refills:  1   Comments:  90 DAY SUPPLY    Instructions:  TAKE 1 TABLET (40 MG TOTAL) BY MOUTH ONCE DAILY.     Begin Date    AM    Noon    PM    Bedtime       pravastatin 80 MG tablet   Commonly known as:  PRAVACHOL   Quantity:  90 tablet   Refills:  1    Instructions:  TAKE ONE TABLET BY MOUTH NIGHTLY AT BEDTIME     Begin Date    AM    Noon    PM    Bedtime       tamsulosin 0.4 mg Cp24   Commonly known as:  FLOMAX   Quantity:  30 capsule   Refills:  11   Dose:  0.4 mg    Instructions:  Take 1 capsule (0.4 mg total) by mouth once daily.     Begin Date    AM    Noon    PM    Bedtime                  Please bring to all follow up appointments:    1. A copy of your discharge instructions.  2. All medicines you are currently taking in their original bottles.  3. Identification and insurance card.    Please arrive 15 minutes ahead of scheduled appointment time.    Please call 24 hours in advance if you must reschedule your appointment and/or time.        Your Scheduled Appointments     Apr 21, 2017  2:15 PM CDT   Established Patient Visit with Yg King MD   [facility]  Urology ([facility] [place])    [address] [facility]  [place] [id number] [phone]              Your Future Surgeries/Procedures     Apr 21, 2017   Surgery with Hernando Wise MD   [facility] ([facility])    [address] [facility] [address] [facility] [address] [phone]                Discharge Instructions     Future Orders    Activity as tolerated     Diet general     Questions:    Total calories:      Fat restriction, if any:      Protein restriction, if any:      Na restriction, if any:      Fluid restriction:      Additional restrictions:          Discharge Instructions         Colonoscopy     A camera attached to a flexible tube with a viewing lens is used to take video pictures.     Colonoscopy is a test to view the inside of your lower digestive tract (colon and rectum). Sometimes it can show the last part of the small intestine (ileum). During the test, small pieces of tissue may be removed for testing. This is called a biopsy. Small growths, such as polyps, may also be removed.   Why is colonoscopy done?  The test is done to help look for colon cancer. And it can help find the source of abdominal pain, bleeding, and changes in bowel habits. It may be needed once a year, depending on factors such as your:  · Age  · Health history  · Family health history  · Symptoms  · Results from any prior colonoscopy  Risks and possible complications  These include:  · Bleeding               · A puncture or tear in the colon   · Risks of anesthesia  · A cancer lesion not being seen  Getting ready   To prepare for the test:  · Talk with your healthcare provider about the risks of the test (see below). Also ask your healthcare provider about alternatives to the test.  · Tell your healthcare provider about any medicines you take. Also tell him or her about any health conditions you may have.  · Make sure your rectum and colon are empty for the test. Follow the diet and  bowel prep instructions exactly. If you dont, the test may need to be rescheduled.  · Plan for a friend or family member to drive you home after the test.     Colonoscopy provides an inside view of the entire colon.     You may discuss the results with your doctor right away or at a future visit.  During the test   The test is usually done in the hospital on an outpatient basis. This means you go home the same day. The procedure takes about 30 minutes. During that time:  · You are given relaxing (sedating) medicine through an IV line. You may be drowsy, or fully asleep.  · The healthcare provider will first give you a physical exam to check for anal and rectal problems.  · Then the anus is lubricated and the scope inserted.  · If you are awake, you may have a feeling similar to needing to have a bowel movement. You may also feel pressure as air is pumped into the colon. Its OK to pass gas during the procedure.  · Biopsy, polyp removal, or other treatments may be done during the test.  After the test   You may have gas right after the test. It can help to try to pass it to help prevent later bloating. Your healthcare provider may discuss the results with you right away. Or you may need to schedule a follow-up visit to talk about the results. After the test, you can go back to your normal eating and other activities. You may be tired from the sedation and need to rest for a few hours.  Date Last Reviewed: 11/1/2016  © 8311-9166 [employer]. [address] [place] [address]. All rights reserved. This information is not intended as a substitute for professional medical care. Always follow your healthcare professional's instructions.            Admission Information     Date & Time Provider Department CSN    4/7/2017 10:05 AM Handy Frederick MD [facility] [id number]      Care Providers     Provider Role Specialty Primary office phone    Handy Frederick MD Attending Provider Colon  "and Rectal Surgery [phone]    Handy Frederick MD Surgeon  Colon and Rectal Surgery [phone]      Your Vitals Were     BP Pulse Temp Resp Height Weight    109/64 (BP Location: Left arm, Patient Position: Lying, BP Method: Automatic) 81 98.1 °F (36.7 °C) (Oral) 16 6' 4" (1.93 m) 87.5 kg (193 lb)    SpO2 BMI             98% 23.49 kg/m2         Recent Lab Values        11/5/2012 6/28/2013 2/20/2014 6/16/2014 7/29/2014 9/19/2016 12/22/2016        10:49 AM  7:06 AM  7:45 AM  9:52 AM  7:16 AM  4:20 PM  8:15 AM     A1C 6.1 6.2 6.2 6.2 6.2 6.8 (H) 6.5 (H)     Comment for A1C at  4:20 PM on 9/19/2016:  According to ADA guidelines, hemoglobin A1C <7.0% represents  optimal control in non-pregnant diabetic patients.  Different  metrics may apply to specific populations.   Standards of Medical Care in Diabetes - 2016.  For the purpose of screening for the presence of diabetes:  <5.7%     Consistent with the absence of diabetes  5.7-6.4%  Consistent with increasing risk for diabetes   (prediabetes)  >or=6.5%  Consistent with diabetes  Currently no consensus exists for use of hemoglobin A1C  for diagnosis of diabetes for children.      Comment for A1C at  8:15 AM on 12/22/2016:  According to ADA guidelines, hemoglobin A1C <7.0% represents  optimal control in non-pregnant diabetic patients.  Different  metrics may apply to specific populations.   Standards of Medical Care in Diabetes - 2016.  For the purpose of screening for the presence of diabetes:  <5.7%     Consistent with the absence of diabetes  5.7-6.4%  Consistent with increasing risk for diabetes   (prediabetes)  >or=6.5%  Consistent with diabetes  Currently no consensus exists for use of hemoglobin A1C  for diagnosis of diabetes for children.        Pending Labs     Order Current Status    Specimen to Pathology - Surgery Collected (04/07/17 1221)      Allergies as of 4/7/2017        Reactions    Lisinopril Anaphylaxis, Nausea And Vomiting    General bad feeling    " Lipitor [Atorvastatin] Other (See Comments)    Muscle aches      Advance Directives     An advance directive is a document which, in the event you are no longer able to make decisions for yourself, tells your healthcare team what kind of treatment you do or do not want to receive, or who you would like to make those decisions for you.  If you do not currently have an advance directive, [facility] encourages you to create one.  For more information call:  [phone]), [phone] ([phone]),  or log on to [email or web address].        Smoking Cessation     If you would like to quit smoking:   You may be eligible for free services if you are a [place] resident and started smoking cigarettes before September 1, 1988.  Call the Smoking Cessation Trust (SCT) toll free at (896) 124-7190 or (559) 789-3296.   Call 9-311-QUIT-NOW if you do not meet the above criteria.   Contact us via email: [email or web address]   View our website for more information: [email or web address]        Language Assistance Services     ATTENTION: Language assistance services are available, free of charge. Please call [phone].      ATENCIÓN: Si eliecerla radha, tiene a stevens disposición servicios gratuitos de asistencia lingüística. Llame al [phone].     CHÚ Ý: N?u b?n nói Ti?ng Vi?t, có các d?ch v? h? tr? ngôn ng? mi?n phí dành cho b?n. G?i s? [phone].         [facility] complies with applicable Federal civil rights laws and does not discriminate on the basis of race, color, national origin, age, disability, or sex.

## 2017-04-07 NOTE — ANESTHESIA RELEASE NOTE
Anesthesia Release from PACU Note    Patient: Hi Rubio    Procedure(s) Performed: Procedure(s) (LRB):  COLONOSCOPY (N/A)    Anesthesia type: general    Post pain: Adequate analgesia    Post assessment: no apparent anesthetic complications and tolerated procedure well    Last Vitals:   Vitals:    04/07/17 1237   BP: 106/60   Pulse: 69   Resp: 18   Temp:          Post vital signs: stable    Level of consciousness: awake and alert     Nausea/Vomiting: no nausea/no vomiting    Complications: none    Airway Patency: patent    Respiratory: unassisted    Cardiovascular: stable and blood pressure at baseline    Hydration: euvolemic

## 2017-04-07 NOTE — PLAN OF CARE
Discharge instructions given to pt and wife. Verbalized understanding. Tolerating po fluids. No c/o of pain or nausea at this time.

## 2017-04-07 NOTE — TRANSFER OF CARE
"Anesthesia Transfer of Care Note    Patient: Hi Rubio    Procedure(s) Performed: Procedure(s) (LRB):  COLONOSCOPY (N/A)    Patient location: PACU    Anesthesia Type: general    Transport from OR: Transported from OR on 2-3 L/min O2 by NC with adequate spontaneous ventilation    Post pain: adequate analgesia    Post assessment: no apparent anesthetic complications    Post vital signs: stable    Level of consciousness: awake, alert and oriented    Nausea/Vomiting: no nausea/vomiting    Complications: none          Last vitals:   Visit Vitals    /64 (BP Location: Left arm, Patient Position: Lying, BP Method: Automatic)    Pulse 81    Temp 36.7 °C (98.1 °F) (Oral)    Resp 16    Ht 6' 4" (1.93 m)    Wt 87.5 kg (193 lb)    SpO2 98%    BMI 23.49 kg/m2     "

## 2017-04-07 NOTE — DISCHARGE INSTRUCTIONS
Colonoscopy     A camera attached to a flexible tube with a viewing lens is used to take video pictures.     Colonoscopy is a test to view the inside of your lower digestive tract (colon and rectum). Sometimes it can show the last part of the small intestine (ileum). During the test, small pieces of tissue may be removed for testing. This is called a biopsy. Small growths, such as polyps, may also be removed.   Why is colonoscopy done?  The test is done to help look for colon cancer. And it can help find the source of abdominal pain, bleeding, and changes in bowel habits. It may be needed once a year, depending on factors such as your:  · Age  · Health history  · Family health history  · Symptoms  · Results from any prior colonoscopy  Risks and possible complications  These include:  · Bleeding               · A puncture or tear in the colon   · Risks of anesthesia  · A cancer lesion not being seen  Getting ready   To prepare for the test:  · Talk with your healthcare provider about the risks of the test (see below). Also ask your healthcare provider about alternatives to the test.  · Tell your healthcare provider about any medicines you take. Also tell him or her about any health conditions you may have.  · Make sure your rectum and colon are empty for the test. Follow the diet and bowel prep instructions exactly. If you dont, the test may need to be rescheduled.  · Plan for a friend or family member to drive you home after the test.     Colonoscopy provides an inside view of the entire colon.     You may discuss the results with your doctor right away or at a future visit.  During the test   The test is usually done in the hospital on an outpatient basis. This means you go home the same day. The procedure takes about 30 minutes. During that time:  · You are given relaxing (sedating) medicine through an IV line. You may be drowsy, or fully asleep.  · The healthcare provider will first give you a physical exam to  check for anal and rectal problems.  · Then the anus is lubricated and the scope inserted.  · If you are awake, you may have a feeling similar to needing to have a bowel movement. You may also feel pressure as air is pumped into the colon. Its OK to pass gas during the procedure.  · Biopsy, polyp removal, or other treatments may be done during the test.  After the test   You may have gas right after the test. It can help to try to pass it to help prevent later bloating. Your healthcare provider may discuss the results with you right away. Or you may need to schedule a follow-up visit to talk about the results. After the test, you can go back to your normal eating and other activities. You may be tired from the sedation and need to rest for a few hours.  Date Last Reviewed: 11/1/2016  © 0729-3359 [employer]. [address] [place] [address], [place] [address]. All rights reserved. This information is not intended as a substitute for professional medical care. Always follow your healthcare professional's instructions.

## 2017-04-07 NOTE — ANESTHESIA POSTPROCEDURE EVALUATION
"Anesthesia Post Evaluation    Patient: Hi Rubio    Procedure(s) Performed: Procedure(s) (LRB):  COLONOSCOPY (N/A)    Final Anesthesia Type: general  Patient location during evaluation: PACU  Patient participation: Yes- Able to Participate  Level of consciousness: awake and alert  Post-procedure vital signs: reviewed and stable  Pain management: adequate  Airway patency: patent  PONV status at discharge: No PONV  Anesthetic complications: no      Cardiovascular status: blood pressure returned to baseline  Respiratory status: unassisted, spontaneous ventilation and room air  Hydration status: euvolemic  Follow-up not needed.        Visit Vitals    /60 (BP Location: Left arm, Patient Position: Lying, BP Method: Automatic)    Pulse 69    Temp 36.7 °C (98.1 °F) (Oral)    Resp 18    Ht 6' 4" (1.93 m)    Wt 87.5 kg (193 lb)    SpO2 99%    BMI 23.49 kg/m2       Pain/Wai Score: Pain Assessment Performed: Yes (4/7/2017 12:29 PM)  Presence of Pain: non-verbal indicators absent (4/7/2017 12:37 PM)      "

## 2017-04-07 NOTE — H&P
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 Authorizing Provider   amlodipine (NORVASC) 5 MG tablet TAKE ONE TABLET BY MOUTH ONE TIME DAILY 11/28/16  Yes Josefina Kendall MD   aspirin 81 MG Chew Take 1 tablet (81 mg total) by mouth once daily. 1/23/17 1/23/18 Yes Mojgan Silva MD   CONTOUR NEXT STRIPS Strp 1 strip by Misc.(Non-Drug; Combo Route) route once daily. Test blood glucose once daily as instructed. 10/3/16  Yes Josefina Kendall MD   hydrochlorothiazide (HYDRODIURIL) 25 MG tablet TAKE ONE TABLET BY MOUTH ONE TIME DAILY 2/21/17  Yes Josefina Kendall MD   hydrOXYzine pamoate (VISTARIL) 25 MG Cap Take 1 capsule (25 mg total) by mouth every evening. 2/6/17  Yes Josefina Kendall MD   KRILL OIL ORAL Take by mouth once daily.   Yes Historical Provider, MD   metoprolol succinate (TOPROL-XL) 100 MG 24 hr tablet TAKE ONE TABLET BY MOUTH ONE TIME DAILY 3/23/17  Yes Josefina Kendall MD   multivitamin (ONE DAILY MULTIVITAMIN) per tablet Take 1 tablet by mouth once daily.   Yes Historical Provider, MD   mupirocin calcium 2% nasl oint (BACTROBAN) 2 % Oint by Nasal route 2 (two) times daily. Apply to inside of both nostrils 2x a day starting 48 hrs before surgery and for 5 days after surgery 3/17/17  Yes Tamir Lyn PA-C   pantoprazole (PROTONIX) 40 MG  tablet TAKE 1 TABLET (40 MG TOTAL) BY MOUTH ONCE DAILY. 3/29/17  Yes Josefina Kendall MD   pravastatin (PRAVACHOL) 80 MG tablet TAKE ONE TABLET BY MOUTH NIGHTLY AT BEDTIME 3/1/17  Yes Josefina Kendall MD   tamsulosin (FLOMAX) 0.4 mg Cp24 Take 1 capsule (0.4 mg total) by mouth once daily. 2/10/17 2/10/18 Yes Yg King MD   MICROLET LANCET Misc 1 lancet by Misc.(Non-Drug; Combo Route) route once daily. Test blood glucose once daily as instructed. 10/3/16   Josefina Kendall MD   nitroGLYCERIN (NITROSTAT) 0.3 MG SL tablet Place 1 tablet (0.3 mg total) under the tongue every 5 (five) minutes as needed for Chest pain. 1/23/17 1/23/18  Mojgan Silva MD       Sedation Problems: NO    Family History   Problem Relation Age of Onset    Heart disease Mother     Hypertension Mother     Hyperlipidemia Mother     Heart disease Father     Hyperlipidemia Brother     Hypertension Brother     Hypertension Brother     Hyperlipidemia Brother     Benign prostatic hyperplasia Brother     Hypertension Brother     Hypertension Brother     Hepatitis Brother     Amblyopia Neg Hx     Blindness Neg Hx     Cancer Neg Hx     Cataracts Neg Hx     Diabetes Neg Hx     Glaucoma Neg Hx     Macular degeneration Neg Hx     Retinal detachment Neg Hx     Strabismus Neg Hx     Stroke Neg Hx     Thyroid disease Neg Hx        Fam Hx of Sedation Problems: NO    Social History     Social History    Marital status:      Spouse name: N/A    Number of children: N/A    Years of education: N/A     Occupational History    Not on file.     Social History Main Topics    Smoking status: Former Smoker     Packs/day: 1.00     Years: 30.00     Types: Cigarettes     Quit date: 12/13/2011    Smokeless tobacco: Never Used    Alcohol use 0.0 oz/week     15 - 16 Cans of beer per week      Comment: 2 beers per day    Drug use: No    Sexual activity: Yes     Partners: Female     Other Topics Concern    Not on file      Social History Narrative       Review of Systems -     Respiratory ROS: no cough, shortness of breath, or wheezing  Cardiovascular ROS: no chest pain or dyspnea on exertion  Gastrointestinal ROS: positive for - blood in stools  Musculoskeletal ROS: negative  Neurological ROS: no TIA or stroke symptoms        Physical Exam:  General: no distress  Head: normocephalic  Airway:  normal oropharynx, airway normal  Neck: supple, symmetrical, trachea midline  Lungs:  normal respiratory effort  Heart: regular rate and rhythm  Abdomen: soft, non-tender non-distented; bowel sounds normal; no masses,  no organomegaly  Extremities: no cyanosis or edema, or clubbing       Deep Sedation: Mallampati Score per anesthesia     SedationPlan :Moderate     ASA : II    Patient is medically cleared for anesthesia.    Anesthesia/Surgery risks, benefits and alternative options discussed and understood by patient/family.

## 2017-04-13 ENCOUNTER — TELEPHONE (OUTPATIENT)
Dept: UROLOGY | Facility: CLINIC | Age: 57
End: 2017-04-13

## 2017-04-13 NOTE — TELEPHONE ENCOUNTER
----- Message from Zoila Garcia sent at 4/13/2017  2:19 PM CDT -----  Contact: pt [phone]  Pt states he is calling to speak with the nurse. Pt states he has a UTI and would like medication called into his pharmacy. Please call pt    [id number]   [address]   [phone] (Phone)  [phone] (Fax)

## 2017-04-13 NOTE — TELEPHONE ENCOUNTER
Attempt to call pt no answer. A voice message was left asking pt to give a call back regarding his symptoms. A call back number was left.

## 2017-04-17 ENCOUNTER — LAB VISIT (OUTPATIENT)
Dept: LAB | Facility: HOSPITAL | Age: 57
End: 2017-04-17
Attending: INTERNAL MEDICINE
Payer: COMMERCIAL

## 2017-04-17 ENCOUNTER — OFFICE VISIT (OUTPATIENT)
Dept: INTERNAL MEDICINE | Facility: CLINIC | Age: 57
End: 2017-04-17
Payer: COMMERCIAL

## 2017-04-17 VITALS
RESPIRATION RATE: 14 BRPM | TEMPERATURE: 98 F | SYSTOLIC BLOOD PRESSURE: 130 MMHG | WEIGHT: 192.44 LBS | DIASTOLIC BLOOD PRESSURE: 78 MMHG | HEART RATE: 76 BPM | BODY MASS INDEX: 23.43 KG/M2 | HEIGHT: 76 IN

## 2017-04-17 DIAGNOSIS — M54.9 BACK PAIN, UNSPECIFIED BACK LOCATION, UNSPECIFIED BACK PAIN LATERALITY, UNSPECIFIED CHRONICITY: Primary | ICD-10-CM

## 2017-04-17 DIAGNOSIS — E11.9 TYPE 2 DIABETES MELLITUS WITHOUT COMPLICATION, WITHOUT LONG-TERM CURRENT USE OF INSULIN: ICD-10-CM

## 2017-04-17 DIAGNOSIS — M54.9 BACK PAIN, UNSPECIFIED BACK LOCATION, UNSPECIFIED BACK PAIN LATERALITY, UNSPECIFIED CHRONICITY: ICD-10-CM

## 2017-04-17 LAB
ANION GAP SERPL CALC-SCNC: 14 MMOL/L
BASOPHILS # BLD AUTO: 0.04 K/UL
BASOPHILS NFR BLD: 0.5 %
BUN SERPL-MCNC: 16 MG/DL
CALCIUM SERPL-MCNC: 9.8 MG/DL
CHLORIDE SERPL-SCNC: 101 MMOL/L
CO2 SERPL-SCNC: 24 MMOL/L
CREAT SERPL-MCNC: 1 MG/DL
DIFFERENTIAL METHOD: ABNORMAL
EOSINOPHIL # BLD AUTO: 0.4 K/UL
EOSINOPHIL NFR BLD: 5 %
ERYTHROCYTE [DISTWIDTH] IN BLOOD BY AUTOMATED COUNT: 13.4 %
EST. GFR  (AFRICAN AMERICAN): >60 ML/MIN/1.73 M^2
EST. GFR  (NON AFRICAN AMERICAN): >60 ML/MIN/1.73 M^2
GLUCOSE SERPL-MCNC: 102 MG/DL
HCT VFR BLD AUTO: 38.8 %
HGB BLD-MCNC: 13.3 G/DL
LYMPHOCYTES # BLD AUTO: 1.7 K/UL
LYMPHOCYTES NFR BLD: 22.6 %
MCH RBC QN AUTO: 28.7 PG
MCHC RBC AUTO-ENTMCNC: 34.3 %
MCV RBC AUTO: 84 FL
MONOCYTES # BLD AUTO: 0.9 K/UL
MONOCYTES NFR BLD: 12.3 %
NEUTROPHILS # BLD AUTO: 4.5 K/UL
NEUTROPHILS NFR BLD: 59.2 %
PLATELET # BLD AUTO: 304 K/UL
PMV BLD AUTO: 9.9 FL
POTASSIUM SERPL-SCNC: 3.5 MMOL/L
RBC # BLD AUTO: 4.64 M/UL
SODIUM SERPL-SCNC: 139 MMOL/L
WBC # BLD AUTO: 7.57 K/UL

## 2017-04-17 PROCEDURE — 1160F RVW MEDS BY RX/DR IN RCRD: CPT | Mod: S$GLB,,, | Performed by: INTERNAL MEDICINE

## 2017-04-17 PROCEDURE — 85025 COMPLETE CBC W/AUTO DIFF WBC: CPT

## 2017-04-17 PROCEDURE — 36415 COLL VENOUS BLD VENIPUNCTURE: CPT | Mod: PO

## 2017-04-17 PROCEDURE — 80048 BASIC METABOLIC PNL TOTAL CA: CPT

## 2017-04-17 PROCEDURE — 99214 OFFICE O/P EST MOD 30 MIN: CPT | Mod: S$GLB,,, | Performed by: INTERNAL MEDICINE

## 2017-04-17 PROCEDURE — 3075F SYST BP GE 130 - 139MM HG: CPT | Mod: S$GLB,,, | Performed by: INTERNAL MEDICINE

## 2017-04-17 PROCEDURE — 3044F HG A1C LEVEL LT 7.0%: CPT | Mod: S$GLB,,, | Performed by: INTERNAL MEDICINE

## 2017-04-17 PROCEDURE — 3078F DIAST BP <80 MM HG: CPT | Mod: S$GLB,,, | Performed by: INTERNAL MEDICINE

## 2017-04-17 PROCEDURE — 99999 PR PBB SHADOW E&M-EST. PATIENT-LVL III: CPT | Mod: PBBFAC,,, | Performed by: INTERNAL MEDICINE

## 2017-04-17 PROCEDURE — 3060F POS MICROALBUMINURIA REV: CPT | Mod: 8P,S$GLB,, | Performed by: INTERNAL MEDICINE

## 2017-04-17 PROCEDURE — 83036 HEMOGLOBIN GLYCOSYLATED A1C: CPT

## 2017-04-17 RX ORDER — OXYCODONE AND ACETAMINOPHEN 5; 325 MG/1; MG/1
1 TABLET ORAL EVERY 6 HOURS PRN
Qty: 40 TABLET | Refills: 0 | Status: SHIPPED | OUTPATIENT
Start: 2017-04-17 | End: 2017-05-01 | Stop reason: ALTCHOICE

## 2017-04-17 RX ORDER — AMOXICILLIN AND CLAVULANATE POTASSIUM 875; 125 MG/1; MG/1
1 TABLET, FILM COATED ORAL EVERY 12 HOURS
Qty: 14 TABLET | Refills: 0 | Status: SHIPPED | OUTPATIENT
Start: 2017-04-17 | End: 2017-04-25

## 2017-04-17 NOTE — MR AVS SNAPSHOT
[facility]   [facility] [place] [address]  Phone: [phone]  Fax: [phone]                  Hi Rubio   2017 11:40 AM   Office Visit    Description:  Male : 1960   Provider:  Josefina Kendall MD   Department:  [facility]           Reason for Visit     Low-back Pain           Diagnoses this Visit        Comments    Back pain, unspecified back location, unspecified back pain laterality, unspecified chronicity    -  Primary     Type 2 diabetes mellitus without complication, without long-term current use of insulin                To Do List           Future Appointments        Provider Department Dept Phone    2017 12:30 PM LAB, [facility] Laboratory [phone]    2017 12[facility]45 [facility] [phone]    2017 2:15 PM Yg King MD [facility] Urology [phone]      Your Future Surgeries/Procedures     2017   Surgery with Hernando Wise MD   [facility])    [address] [address] [phone]              Goals (5 Years of Data)     None      Follow-Up and Disposition     Return in about 3 months (around 2017).       These Medications        Disp Refills Start End    amoxicillin-clavulanate 875-125mg (AUGMENTIN) 875-125 mg per tablet 14 tablet 0 2017     Take 1 tablet by mouth every 12 (twelve) hours. - Oral    Pharmacy: [facility] #[id number] - [place], [place] - [address] Ph #: [phone]       oxycodone-acetaminophen (PERCOCET) 5-325 mg per tablet 40 tablet 0 2017     Take 1 tablet by mouth every 6 (six) hours as needed (pain). - Oral    Pharmacy: [facility] #[id number] [place] - [address] Ph #: [phone]         [facility] -  Assistance  Unless otherwise directed by your provider,  please contact [facility], our nurse care line that is available for 24/7 assistance.     Registered nurses in the [facility] provide: appointment scheduling, clinical advisement, health education, and other advisory services.  Call: [phone] (toll free)               Medications           Message regarding Medications     Verify the changes and/or additions to your medication regime listed below are the same as discussed with your clinician today.  If any of these changes or additions are incorrect, please notify your healthcare provider.        START taking these NEW medications        Refills    amoxicillin-clavulanate 875-125mg (AUGMENTIN) 875-125 mg per tablet 0    Sig: Take 1 tablet by mouth every 12 (twelve) hours.    Class: Normal    Route: Oral      CHANGE how you are taking these medications     Start Taking Instead of    oxycodone-acetaminophen (PERCOCET) 5-325 mg per tablet oxycodone-acetaminophen (PERCOCET) 5-325 mg per tablet  (Previously Discontinued)    Dosage:  Take 1 tablet by mouth every 6 (six) hours as needed (pain). Dosage:  Take 1 tablet by mouth every 4 (four) hours as needed (pain).    Reason for Change:  Patient no longer taking            Verify that the below list of medications is an accurate representation of the medications you are currently taking.  If none reported, the list may be blank. If incorrect, please contact your healthcare provider. Carry this list with you in case of emergency.           Current Medications     amlodipine (NORVASC) 5 MG tablet TAKE ONE TABLET BY MOUTH ONE TIME DAILY    aspirin 81 MG Chew Take 1 tablet (81 mg total) by mouth once daily.    CONTOUR NEXT STRIPS Strp 1 strip by Misc.(Non-Drug; Combo Route) route once daily. Test blood glucose once daily as instructed.    hydrochlorothiazide (HYDRODIURIL) 25 MG tablet TAKE ONE TABLET BY MOUTH ONE TIME DAILY    hydrOXYzine pamoate (VISTARIL) 25 MG Cap Take 1 capsule (25 mg total) by mouth  "every evening.    KRILL OIL ORAL Take by mouth once daily.    metoprolol succinate (TOPROL-XL) 100 MG 24 hr tablet TAKE ONE TABLET BY MOUTH ONE TIME DAILY    MICROLET LANCET Misc 1 lancet by Misc.(Non-Drug; Combo Route) route once daily. Test blood glucose once daily as instructed.    multivitamin (ONE DAILY MULTIVITAMIN) per tablet Take 1 tablet by mouth once daily.    mupirocin calcium 2% nasl oint (BACTROBAN) 2 % Oint by Nasal route 2 (two) times daily. Apply to inside of both nostrils 2x a day starting 48 hrs before surgery and for 5 days after surgery    nitroGLYCERIN (NITROSTAT) 0.3 MG SL tablet Place 1 tablet (0.3 mg total) under the tongue every 5 (five) minutes as needed for Chest pain.    pantoprazole (PROTONIX) 40 MG tablet TAKE 1 TABLET (40 MG TOTAL) BY MOUTH ONCE DAILY.    pravastatin (PRAVACHOL) 80 MG tablet TAKE ONE TABLET BY MOUTH NIGHTLY AT BEDTIME    tamsulosin (FLOMAX) 0.4 mg Cp24 Take 1 capsule (0.4 mg total) by mouth once daily.    amoxicillin-clavulanate 875-125mg (AUGMENTIN) 875-125 mg per tablet Take 1 tablet by mouth every 12 (twelve) hours.    oxycodone-acetaminophen (PERCOCET) 5-325 mg per tablet Take 1 tablet by mouth every 6 (six) hours as needed (pain).           Clinical Reference Information           Your Vitals Were     BP Pulse Temp Resp Height Weight    130/78 (BP Location: Left arm, Patient Position: Sitting, BP Method: Manual) 76 98.3 °F (36.8 °C) (Oral) 14 6' 4" (1.93 m) 87.3 kg (192 lb 7.4 oz)    BMI                23.43 kg/m2          Blood Pressure          Most Recent Value    BP  130/78      Allergies as of 4/17/2017     Lisinopril    Lipitor [Atorvastatin]      Immunizations Administered on Date of Encounter - 4/17/2017     None      Orders Placed During Today's Visit     Future Labs/Procedures Expected by Expires    Basic metabolic panel  4/17/2017 4/17/2018    CBC auto differential  4/17/2017 6/16/2018    Hemoglobin A1c  4/17/2017 4/17/2018    Urinalysis  4/17/2017 " 4/17/2018    Urine culture  4/17/2017 4/17/2018      Language Assistance Services     ATTENTION: Language assistance services are available, free of charge. Please call [phone].      ATENCIÓN: Si harry garcia, tiene a stevens disposición servicios gratuitos de asistencia lingüística. Llame al [phone].     CHÚ Ý: N?u b?n nói Ti?ng Vi?t, có các d?ch v? h? tr? ngôn ng? mi?n phí dành cho b?n. G?i s? [phone].         [facility] complies with applicable Federal civil rights laws and does not discriminate on the basis of race, color, national origin, age, disability, or sex.

## 2017-04-17 NOTE — PROGRESS NOTES
CC: Low back pain  HPI:  The patient is a 57 y.o. year old male who presents to the office for low back pain.  His symptoms started Thursday.  The pain is a constant aching sensation with intermittent throbbing sensation.  He also complains of dysuria and occasional abdominal pain as well.  Recent colonoscopy showed a 13 mm polyp.    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 -     Hyperlipidemia     Hypertension     Impaired fasting glucose 10/28/2013    Memory loss     Traumatic iritis - Left Eye 2/27/2013       SURGICAL HISTORY:  Past Surgical History:   Procedure Laterality Date    CERVICAL FUSION  12/30/2015    COLONOSCOPY N/A 4/7/2017    Procedure: COLONOSCOPY;  Surgeon: Handy Frederick MD;  Location: [facility] ([address] [place] [address]);  Service: Endoscopy;  Laterality: N/A;    HERNIA REPAIR         MEDS:  Medcard reviewed and updated    ALLERGIES: Allergy Card reviewed and updated    SOCIAL HISTORY:   The patient is a nonsmoker.    PE:   APPEARANCE: Well nourished, well developed, in no acute distress.    CHEST: Lungs clear to auscultation with unlabored respirations.  CARDIOVASCULAR: Normal S1, S2. No murmurs. No carotid bruits. No pedal edema.  ABDOMEN: Bowel sounds normal. Not distended. Soft. Mild tenderness to palpation of left lower quadrant.  MUSCULOSKELETAL:  Abnormal gait, no tenderness to palpation to lower back.  PSYCHIATRIC: The patient is oriented to person, place, and time and has a pleasant affect.        ASSESSMENT/PLAN:  Hi was seen today for low-back pain.    Diagnoses and all orders for this visit:    Back pain, unspecified back location, unspecified back pain laterality, unspecified chronicity  -     Urinalysis; Future  -     Urine culture; Future  -     CBC auto differential; Future  -     Prescribe  Augmentin    Type 2 diabetes mellitus without complication, without long-term current use of insulin  -     Basic metabolic panel; Future  -     Hemoglobin A1c; Future    Other orders  -     Cancel: Basic metabolic panel; Future  -     Cancel: Hemoglobin A1c; Future  -     amoxicillin-clavulanate 875-125mg (AUGMENTIN) 875-125 mg per tablet; Take 1 tablet by mouth every 12 (twelve) hours.  -     oxycodone-acetaminophen (PERCOCET) 5-325 mg per tablet; Take 1 tablet by mouth every 6 (six) hours as needed (pain).

## 2017-04-18 LAB
ESTIMATED AVG GLUCOSE: 143 MG/DL
HBA1C MFR BLD HPLC: 6.6 %

## 2017-04-19 ENCOUNTER — TELEPHONE (OUTPATIENT)
Dept: NEUROSURGERY | Facility: CLINIC | Age: 57
End: 2017-04-19

## 2017-04-19 NOTE — TELEPHONE ENCOUNTER
Spoke with pt. Pt stated that he would like to put his surgery on hold for now. He will call back to be reschedule.

## 2017-04-20 ENCOUNTER — TELEPHONE (OUTPATIENT)
Dept: INTERNAL MEDICINE | Facility: CLINIC | Age: 57
End: 2017-04-20

## 2017-04-21 ENCOUNTER — OFFICE VISIT (OUTPATIENT)
Dept: UROLOGY | Facility: CLINIC | Age: 57
End: 2017-04-21
Payer: COMMERCIAL

## 2017-04-21 VITALS
BODY MASS INDEX: 22.01 KG/M2 | WEIGHT: 180.75 LBS | HEART RATE: 76 BPM | DIASTOLIC BLOOD PRESSURE: 85 MMHG | RESPIRATION RATE: 18 BRPM | HEIGHT: 76 IN | SYSTOLIC BLOOD PRESSURE: 138 MMHG

## 2017-04-21 DIAGNOSIS — N30.00 ACUTE CYSTITIS WITHOUT HEMATURIA: Primary | ICD-10-CM

## 2017-04-21 DIAGNOSIS — E11.9 TYPE 2 DIABETES MELLITUS WITHOUT COMPLICATION: ICD-10-CM

## 2017-04-21 LAB
BILIRUB SERPL-MCNC: NORMAL MG/DL
BLOOD URINE, POC: 50
COLOR, POC UA: YELLOW
GLUCOSE UR QL STRIP: 250
KETONES UR QL STRIP: NORMAL
LEUKOCYTE ESTERASE URINE, POC: NORMAL
NITRITE, POC UA: NORMAL
PH, POC UA: 5
PROTEIN, POC: NORMAL
SPECIFIC GRAVITY, POC UA: 1.02
UROBILINOGEN, POC UA: NORMAL

## 2017-04-21 PROCEDURE — 1160F RVW MEDS BY RX/DR IN RCRD: CPT | Mod: S$GLB,,, | Performed by: UROLOGY

## 2017-04-21 PROCEDURE — 99214 OFFICE O/P EST MOD 30 MIN: CPT | Mod: 25,S$GLB,, | Performed by: UROLOGY

## 2017-04-21 PROCEDURE — 87086 URINE CULTURE/COLONY COUNT: CPT

## 2017-04-21 PROCEDURE — 81002 URINALYSIS NONAUTO W/O SCOPE: CPT | Mod: S$GLB,,, | Performed by: UROLOGY

## 2017-04-21 PROCEDURE — 99999 PR PBB SHADOW E&M-EST. PATIENT-LVL III: CPT | Mod: PBBFAC,,, | Performed by: UROLOGY

## 2017-04-21 PROCEDURE — 3079F DIAST BP 80-89 MM HG: CPT | Mod: S$GLB,,, | Performed by: UROLOGY

## 2017-04-21 PROCEDURE — 3075F SYST BP GE 130 - 139MM HG: CPT | Mod: S$GLB,,, | Performed by: UROLOGY

## 2017-04-21 NOTE — PROGRESS NOTES
"Subjective:      Hi Rubio is a 57 y.o. male who returns today regarding his BPH and recent UTI.    Started flomax 2 months ago w/ moderate symptom relief.    Diagnosed w/ UTI per PCP on Monday - culture positive. 2nd recent UTI. Also has FU PSA.     His AUASS today is 13/6.    The following portions of the patient's history were reviewed and updated as appropriate: allergies, current medications, past family history, past medical history, past social history, past surgical history and problem list.    Review of Systems  A comprehensive multipoint review of systems was negative except as otherwise stated in the HPI.     Objective:   Vitals: /85 (BP Location: Left arm, Patient Position: Sitting, BP Method: Automatic)  Pulse 76  Resp 18  Ht 6' 4" (1.93 m)  Wt 82 kg (180 lb 12.4 oz)  BMI 22 kg/m2    Physical Exam   General: alert and oriented, no acute distress  Respiratory: Symmetric expansion, non-labored breathing  Neuro: no gross deficits  Psych: normal judgment and insight, normal mood/affect and non-anxious    Lab Review   Urinalysis demonstrates negative for all components  Lab Results   Component Value Date    WBC 7.57 04/17/2017    HGB 13.3 (L) 04/17/2017    HCT 38.8 (L) 04/17/2017    MCV 84 04/17/2017     04/17/2017     Lab Results   Component Value Date    CREATININE 1.0 04/17/2017    BUN 16 04/17/2017     Component      Latest Ref Rng & Units 4/3/2017   PSA Total      0.00 - 4.00 ng/mL 3.4   PSA, Free      0.01 - 1.50 ng/mL 0.45   PSA, Free Pct      Not established % 13.24       Assessment and Plan:   1. Benign non-nodular prostatic hyperplasia with lower urinary tract symptoms  -- Continue flomax  -- Again discussed surgery such as Urolift. He wishes to proceed w/ cysto for further workup.    2. Elevated PSA  -- Likely 2/2 recent UTI  -- Resolved    3. Recurrent UTI  -- Cysto as per above  -- Renal US  "

## 2017-04-22 LAB — BACTERIA UR CULT: NO GROWTH

## 2017-04-25 ENCOUNTER — PROCEDURE VISIT (OUTPATIENT)
Dept: UROLOGY | Facility: CLINIC | Age: 57
End: 2017-04-25
Attending: UROLOGY
Payer: COMMERCIAL

## 2017-04-25 VITALS
HEART RATE: 72 BPM | SYSTOLIC BLOOD PRESSURE: 122 MMHG | WEIGHT: 180.75 LBS | DIASTOLIC BLOOD PRESSURE: 81 MMHG | BODY MASS INDEX: 22.01 KG/M2 | HEIGHT: 76 IN

## 2017-04-25 DIAGNOSIS — N40.1 BENIGN NON-NODULAR PROSTATIC HYPERPLASIA WITH LOWER URINARY TRACT SYMPTOMS: Primary | ICD-10-CM

## 2017-04-25 DIAGNOSIS — N30.00 ACUTE CYSTITIS WITHOUT HEMATURIA: ICD-10-CM

## 2017-04-25 PROCEDURE — 52000 CYSTOURETHROSCOPY: CPT | Mod: S$GLB,,, | Performed by: UROLOGY

## 2017-04-25 RX ORDER — SULFAMETHOXAZOLE AND TRIMETHOPRIM 800; 160 MG/1; MG/1
1 TABLET ORAL
Status: COMPLETED | OUTPATIENT
Start: 2017-04-25 | End: 2017-04-25

## 2017-04-25 RX ORDER — LIDOCAINE HYDROCHLORIDE 20 MG/ML
JELLY TOPICAL
Status: COMPLETED | OUTPATIENT
Start: 2017-04-25 | End: 2017-04-25

## 2017-04-25 RX ORDER — FINASTERIDE 5 MG/1
5 TABLET, FILM COATED ORAL DAILY
Qty: 30 TABLET | Refills: 11 | Status: ON HOLD | OUTPATIENT
Start: 2017-04-25 | End: 2017-10-12 | Stop reason: HOSPADM

## 2017-04-25 RX ADMIN — LIDOCAINE HYDROCHLORIDE 5 ML: 20 JELLY TOPICAL at 10:04

## 2017-04-25 RX ADMIN — SULFAMETHOXAZOLE AND TRIMETHOPRIM 1 TABLET: 800; 160 TABLET ORAL at 10:04

## 2017-04-25 NOTE — PROCEDURES
Cystoscopy  Date/Time: 4/25/2017 11:12 AM  Performed by: SHAYY HEWITT  Authorized by: SHAYY HEWITT     Consent Done?:  Yes (Written)  Indications: recurrent UTIs and BPH    Position:  Supine  Anesthesia:  Lidocaine jelly  Preparation: Patient was prepped and draped in usual sterile fashion      Scope type:  Flexible cystoscope  External exam normal: Yes    Urethra normal: Yes    Prostate normal: No          Hyperplasia (Intravesical median lobe) (Trilobar)(Length (cm):  4)Bladder neck normal: Bladder neck normal   Bladder normal: No (Mild trabeculations. No tumors, lesions, or stones noted.  Normal bilateral UOs.)      Patient tolerance:  Patient tolerated the procedure well with no immediate complications    Impression:   Recurrent UTIs  SOLORZANO 2/2 BPH w/ intravesical median lobe    Plan:  -- Discussed options - TURP vs addition of finasteride. Reviewed pros/cons of each.  -- Will trial finasteride for now  -- Consider ppx abx if UTI recurs in near term  -- FU 3 mos

## 2017-04-26 ENCOUNTER — HOSPITAL ENCOUNTER (OUTPATIENT)
Dept: RADIOLOGY | Facility: OTHER | Age: 57
Discharge: HOME | End: 2017-04-26
Attending: UROLOGY
Payer: COMMERCIAL

## 2017-04-26 DIAGNOSIS — N30.00 ACUTE CYSTITIS WITHOUT HEMATURIA: ICD-10-CM

## 2017-04-26 PROCEDURE — 76770 US EXAM ABDO BACK WALL COMP: CPT | Mod: TC

## 2017-04-26 PROCEDURE — 76770 US EXAM ABDO BACK WALL COMP: CPT | Mod: 26,,, | Performed by: RADIOLOGY

## 2017-04-27 ENCOUNTER — TELEPHONE (OUTPATIENT)
Dept: INTERNAL MEDICINE | Facility: CLINIC | Age: 57
End: 2017-04-27

## 2017-04-27 NOTE — TELEPHONE ENCOUNTER
----- Message from Emma Santa sent at 4/27/2017  8:05 AM CDT -----  Contact: Patient [phone]  You discussed possible problem of pre-diabetes with pt and said that he should watch and call her if his toenails fall off or if he has elevated blood glucose readings. His blood sugar is running 168. He would like to discuss this with .

## 2017-05-01 ENCOUNTER — OFFICE VISIT (OUTPATIENT)
Dept: INTERNAL MEDICINE | Facility: CLINIC | Age: 57
End: 2017-05-01
Payer: COMMERCIAL

## 2017-05-01 ENCOUNTER — LAB VISIT (OUTPATIENT)
Dept: LAB | Facility: HOSPITAL | Age: 57
End: 2017-05-01
Attending: INTERNAL MEDICINE
Payer: COMMERCIAL

## 2017-05-01 VITALS
TEMPERATURE: 98 F | SYSTOLIC BLOOD PRESSURE: 124 MMHG | HEIGHT: 75 IN | WEIGHT: 187.38 LBS | DIASTOLIC BLOOD PRESSURE: 76 MMHG | HEART RATE: 78 BPM | BODY MASS INDEX: 23.3 KG/M2

## 2017-05-01 DIAGNOSIS — R19.7 DIARRHEA, UNSPECIFIED TYPE: ICD-10-CM

## 2017-05-01 DIAGNOSIS — I10 ESSENTIAL HYPERTENSION: Primary | ICD-10-CM

## 2017-05-01 DIAGNOSIS — I10 ESSENTIAL HYPERTENSION: ICD-10-CM

## 2017-05-01 DIAGNOSIS — E11.9 TYPE 2 DIABETES MELLITUS WITHOUT COMPLICATION, WITHOUT LONG-TERM CURRENT USE OF INSULIN: ICD-10-CM

## 2017-05-01 LAB
BASOPHILS # BLD AUTO: 0.03 K/UL
BASOPHILS NFR BLD: 0.6 %
DIFFERENTIAL METHOD: ABNORMAL
EOSINOPHIL # BLD AUTO: 0.2 K/UL
EOSINOPHIL NFR BLD: 5 %
ERYTHROCYTE [DISTWIDTH] IN BLOOD BY AUTOMATED COUNT: 13.3 %
HCT VFR BLD AUTO: 43.4 %
HGB BLD-MCNC: 14.7 G/DL
LYMPHOCYTES # BLD AUTO: 1.9 K/UL
LYMPHOCYTES NFR BLD: 39.1 %
MCH RBC QN AUTO: 28.9 PG
MCHC RBC AUTO-ENTMCNC: 33.9 %
MCV RBC AUTO: 85 FL
MONOCYTES # BLD AUTO: 0.7 K/UL
MONOCYTES NFR BLD: 13.9 %
NEUTROPHILS # BLD AUTO: 2 K/UL
NEUTROPHILS NFR BLD: 41.4 %
PLATELET # BLD AUTO: 376 K/UL
PMV BLD AUTO: 9.4 FL
RBC # BLD AUTO: 5.08 M/UL
TSH SERPL DL<=0.005 MIU/L-ACNC: 3.15 UIU/ML
WBC # BLD AUTO: 4.83 K/UL

## 2017-05-01 PROCEDURE — 3078F DIAST BP <80 MM HG: CPT | Mod: S$GLB,,, | Performed by: INTERNAL MEDICINE

## 2017-05-01 PROCEDURE — 3060F POS MICROALBUMINURIA REV: CPT | Mod: 8P,S$GLB,, | Performed by: INTERNAL MEDICINE

## 2017-05-01 PROCEDURE — 85025 COMPLETE CBC W/AUTO DIFF WBC: CPT

## 2017-05-01 PROCEDURE — 99214 OFFICE O/P EST MOD 30 MIN: CPT | Mod: S$GLB,,, | Performed by: INTERNAL MEDICINE

## 2017-05-01 PROCEDURE — 1160F RVW MEDS BY RX/DR IN RCRD: CPT | Mod: S$GLB,,, | Performed by: INTERNAL MEDICINE

## 2017-05-01 PROCEDURE — 99999 PR PBB SHADOW E&M-EST. PATIENT-LVL III: CPT | Mod: PBBFAC,,, | Performed by: INTERNAL MEDICINE

## 2017-05-01 PROCEDURE — 36415 COLL VENOUS BLD VENIPUNCTURE: CPT | Mod: PO

## 2017-05-01 PROCEDURE — 3044F HG A1C LEVEL LT 7.0%: CPT | Mod: S$GLB,,, | Performed by: INTERNAL MEDICINE

## 2017-05-01 PROCEDURE — 84443 ASSAY THYROID STIM HORMONE: CPT

## 2017-05-01 PROCEDURE — 3074F SYST BP LT 130 MM HG: CPT | Mod: S$GLB,,, | Performed by: INTERNAL MEDICINE

## 2017-05-01 NOTE — PROGRESS NOTES
CC: followup of hypertension and diabetes  HPI:  The patient is a 57 y.o. year old male who presents to the office for followup of hypertension and diabetes.  The patient denies any chest pain, shortness of breath, headache or blurred vision, but complains of fatigue, nausea and vomiting. After completing last two rounds of antibiotics, he reports diarrhea and abdominal pain, bodyaches and fever.  Review of labs reveals good glycemic control.  He has been having difficulty sleeping at night, dry mouth, and perspiration at night.    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    PE:   APPEARANCE: Well nourished, well developed, in no acute distress.    CHEST: Lungs clear to auscultation with unlabored respirations.  CARDIOVASCULAR: Normal S1, S2. No murmurs. No carotid bruits. No pedal edema.  ABDOMEN: Bowel sounds normal. Not distended. Soft. No tenderness or masses.   PSYCHIATRIC: The patient is oriented to person, place, and time and has a pleasant affect.        ASSESSMENT/PLAN:  Hi was seen today for diabetes.    Diagnoses and all orders for this visit:    Essential hypertension  -     CBC auto differential; Future  -     TSH; Future  -      Blood pressure is  controlled    Type 2 diabetes mellitus without complication, without long-term current use of insulin  -     Well controlled  -     Podiatry referral    Diarrhea, unspecified type  -     Clostridium difficile EIA

## 2017-05-01 NOTE — MR AVS SNAPSHOT
[facility]   [facility]  [place] [address]  Phone: [phone]  Fax: [phone]                  Hi Rubio   2017 9:40 AM   Office Visit    Description:  Male : 1960   Provider:  Josefina Kendall MD   Department:  [facility]           Reason for Visit     Diabetes           Diagnoses this Visit        Comments    Essential hypertension    -  Primary     Type 2 diabetes mellitus without complication, without long-term current use of insulin         Diarrhea, unspecified type                To Do List           Future Appointments        Provider Department Dept Phone    2017 10:45 AM LAB, [facility] [phone]    2017 9:15 AM Yg King MD [facility] [phone]    2017 8:40 AM Josefina Kendall MD [facility] [phone]      Goals (5 Years of Data)     None      Follow-Up and Disposition     Return in about 3 months (around 2017).      [facility]     [facility] -  Assistance  Unless otherwise directed by your provider, please contact [facility], our nurse care line that is available for  assistance.     Registered nurses in the [facility] provide: appointment scheduling, clinical advisement, health education, and other advisory services.  Call: [phone] (toll free)               Medications           Message regarding Medications     Verify the changes and/or additions to your medication regime listed below are the same as discussed with your clinician today.  If any of these changes or additions are incorrect, please notify your healthcare provider.        STOP taking these medications     multivitamin (ONE DAILY MULTIVITAMIN) per tablet Take 1 tablet by mouth once daily.    mupirocin calcium 2% nasl oint (BACTROBAN) 2 % Oint by Nasal route 2 (two) times daily. Apply to inside of both nostrils 2x a  "day starting 48 hrs before surgery and for 5 days after surgery    oxycodone-acetaminophen (PERCOCET) 5-325 mg per tablet Take 1 tablet by mouth every 6 (six) hours as needed (pain).           Verify that the below list of medications is an accurate representation of the medications you are currently taking.  If none reported, the list may be blank. If incorrect, please contact your healthcare provider. Carry this list with you in case of emergency.           Current Medications     amlodipine (NORVASC) 5 MG tablet TAKE ONE TABLET BY MOUTH ONE TIME DAILY    aspirin 81 MG Chew Take 1 tablet (81 mg total) by mouth once daily.    CONTOUR NEXT STRIPS Strp 1 strip by Misc.(Non-Drug; Combo Route) route once daily. Test blood glucose once daily as instructed.    finasteride (PROSCAR) 5 mg tablet Take 1 tablet (5 mg total) by mouth once daily.    hydrochlorothiazide (HYDRODIURIL) 25 MG tablet TAKE ONE TABLET BY MOUTH ONE TIME DAILY    hydrOXYzine pamoate (VISTARIL) 25 MG Cap Take 1 capsule (25 mg total) by mouth every evening.    KRILL OIL ORAL Take by mouth once daily.    metoprolol succinate (TOPROL-XL) 100 MG 24 hr tablet TAKE ONE TABLET BY MOUTH ONE TIME DAILY    MICROLET LANCET Misc 1 lancet by Misc.(Non-Drug; Combo Route) route once daily. Test blood glucose once daily as instructed.    pantoprazole (PROTONIX) 40 MG tablet TAKE 1 TABLET (40 MG TOTAL) BY MOUTH ONCE DAILY.    pravastatin (PRAVACHOL) 80 MG tablet TAKE ONE TABLET BY MOUTH NIGHTLY AT BEDTIME    tamsulosin (FLOMAX) 0.4 mg Cp24 Take 1 capsule (0.4 mg total) by mouth once daily.    nitroGLYCERIN (NITROSTAT) 0.3 MG SL tablet Place 1 tablet (0.3 mg total) under the tongue every 5 (five) minutes as needed for Chest pain.           Clinical Reference Information           Your Vitals Were     BP Pulse Temp Height Weight BMI    124/76 78 98 °F (36.7 °C) 6' 3" (1.905 m) 85 kg (187 lb 6.3 oz) 23.42 kg/m2      Blood Pressure          Most Recent Value    BP  124/76 "      Allergies as of 5/1/2017     Lisinopril    Lipitor [Atorvastatin]      Immunizations Administered on Date of Encounter - 5/1/2017     None      Orders Placed During Today's Visit      Normal Orders This Visit    Ambulatory Referral to Podiatry     Clostridium difficile EIA     Future Labs/Procedures Expected by Expires    CBC auto differential  5/1/2017 6/30/2018    TSH  5/1/2017 6/30/2018      Language Assistance Services     ATTENTION: Language assistance services are available, free of charge. Please call [phone].      ATENCIÓN: Si harry garcia, tiene a stevens disposición servicios gratuitos de asistencia lingüística. Llame al [phone].     SAMEER Ý: N?u b?n nói Ti?ng Vi?t, có các d?ch v? h? tr? ngôn ng? mi?n phí dành cho b?n. G?i s? [phone].         [facility] complies with applicable Federal civil rights laws and does not discriminate on the basis of race, color, national origin, age, disability, or sex.

## 2017-05-02 ENCOUNTER — TELEPHONE (OUTPATIENT)
Dept: INTERNAL MEDICINE | Facility: CLINIC | Age: 57
End: 2017-05-02

## 2017-05-02 DIAGNOSIS — R19.7 DIARRHEA, UNSPECIFIED TYPE: ICD-10-CM

## 2017-05-02 DIAGNOSIS — R19.7 DIARRHEA, UNSPECIFIED TYPE: Primary | ICD-10-CM

## 2017-05-02 DIAGNOSIS — N39.0 URINARY TRACT INFECTION WITHOUT HEMATURIA, SITE UNSPECIFIED: Primary | ICD-10-CM

## 2017-05-02 NOTE — TELEPHONE ENCOUNTER
----- Message from Roshan Garcia sent at 5/2/2017  1:31 PM CDT -----  Contact: Self [phone]  The above pt is requesting a Rx for a bladder Infection he's having for the third time, please send to the [facility] on file, advice    Thanks

## 2017-05-02 NOTE — TELEPHONE ENCOUNTER
Please advised patient that I do not recommend starting another antibiotic before confirming if he has a UTI or not.  Please schedule urinalysis and urine culture.

## 2017-05-03 NOTE — TELEPHONE ENCOUNTER
Please reschedule C. difficile.  Inform patient that test can only be performed on diarrhea.  It cannot be performed on formed stool.

## 2017-05-03 NOTE — TELEPHONE ENCOUNTER
----- Message from Aakash Cihang sent at 5/3/2017 11:07 AM CDT -----  Contact: Lab Client Services  Hi,           my name is Aakash we received a specimen for Clostridium Difficile test. The test was unable to be performed due to the stool sample was formed stool. If you have any questions regarding this please contact client services at [phone]. Thank You.

## 2017-05-04 ENCOUNTER — TELEPHONE (OUTPATIENT)
Dept: INTERNAL MEDICINE | Facility: CLINIC | Age: 57
End: 2017-05-04

## 2017-05-04 NOTE — TELEPHONE ENCOUNTER
----- Message from Laura Esposito sent at 5/2/2017  2:16 PM CDT -----  Contact: jessy [phone]  Jessy is calling from microbiology lab and needs a call back regarding canceling an appointment for pt

## 2017-05-04 NOTE — TELEPHONE ENCOUNTER
Called ernesto and she was gone for the day. Spoke w/ another nurse and she advised there where no notes from Ernesto as to why she call the office. Besides the patient Cx the appoint

## 2017-05-05 ENCOUNTER — TELEPHONE (OUTPATIENT)
Dept: INTERNAL MEDICINE | Facility: CLINIC | Age: 57
End: 2017-05-05

## 2017-05-05 NOTE — TELEPHONE ENCOUNTER
----- Message from Emma Santa sent at 5/4/2017  3:44 PM CDT -----  Contact: patient [phone]  Patient would like to get test results.  Name of test  Stool culture  Date of test:  05/01/17  Ordering provider:   Where was the test performed:  [place]  Comments:  Pt is aware that  is not in clinic today.

## 2017-05-12 ENCOUNTER — OFFICE VISIT (OUTPATIENT)
Dept: PODIATRY | Facility: CLINIC | Age: 57
End: 2017-05-12
Payer: COMMERCIAL

## 2017-05-12 ENCOUNTER — HOSPITAL ENCOUNTER (OUTPATIENT)
Dept: RADIOLOGY | Facility: HOSPITAL | Age: 57
Discharge: HOME | End: 2017-05-12
Attending: PODIATRIST
Payer: COMMERCIAL

## 2017-05-12 VITALS — HEART RATE: 71 BPM | SYSTOLIC BLOOD PRESSURE: 121 MMHG | HEIGHT: 76 IN | DIASTOLIC BLOOD PRESSURE: 77 MMHG

## 2017-05-12 DIAGNOSIS — M79.672 FOOT PAIN, BILATERAL: ICD-10-CM

## 2017-05-12 DIAGNOSIS — E11.9 COMPREHENSIVE DIABETIC FOOT EXAMINATION, TYPE 2 DM, ENCOUNTER FOR: ICD-10-CM

## 2017-05-12 DIAGNOSIS — M79.671 FOOT PAIN, BILATERAL: ICD-10-CM

## 2017-05-12 DIAGNOSIS — E11.9 TYPE 2 DIABETES MELLITUS WITHOUT COMPLICATION, UNSPECIFIED LONG TERM INSULIN USE STATUS: Primary | ICD-10-CM

## 2017-05-12 DIAGNOSIS — E11.9 TYPE 2 DIABETES MELLITUS WITHOUT COMPLICATION: ICD-10-CM

## 2017-05-12 PROCEDURE — 73630 X-RAY EXAM OF FOOT: CPT | Mod: 50,TC,PO

## 2017-05-12 PROCEDURE — 1160F RVW MEDS BY RX/DR IN RCRD: CPT | Mod: S$GLB,,, | Performed by: PODIATRIST

## 2017-05-12 PROCEDURE — 99203 OFFICE O/P NEW LOW 30 MIN: CPT | Mod: S$GLB,,, | Performed by: PODIATRIST

## 2017-05-12 PROCEDURE — 73630 X-RAY EXAM OF FOOT: CPT | Mod: 26,50,, | Performed by: RADIOLOGY

## 2017-05-12 PROCEDURE — 99999 PR PBB SHADOW E&M-EST. PATIENT-LVL III: CPT | Mod: PBBFAC,,, | Performed by: PODIATRIST

## 2017-05-12 PROCEDURE — 3078F DIAST BP <80 MM HG: CPT | Mod: S$GLB,,, | Performed by: PODIATRIST

## 2017-05-12 PROCEDURE — 3074F SYST BP LT 130 MM HG: CPT | Mod: S$GLB,,, | Performed by: PODIATRIST

## 2017-05-12 PROCEDURE — 3044F HG A1C LEVEL LT 7.0%: CPT | Mod: S$GLB,,, | Performed by: PODIATRIST

## 2017-05-12 PROCEDURE — 3060F POS MICROALBUMINURIA REV: CPT | Mod: 8P,S$GLB,, | Performed by: PODIATRIST

## 2017-05-12 NOTE — MR AVS SNAPSHOT
[place] [facility]   [facility]  [place] [address]  Phone: [phone]                  Hi Rubio   2017 8:30 AM   Office Visit    Description:  Male : 1960   Provider:  Jonas Conway Jr., DPM   Department:  [facility] [facility]           Reason for Visit     Diabetic Foot Exam           Diagnoses this Visit        Comments    Type 2 diabetes mellitus without complication, unspecified long term insulin use status    -  Primary     Comprehensive diabetic foot examination, type 2 DM, encounter for         Foot pain, bilateral                To Do List           Future Appointments        Provider Department Dept Phone    2017 8:15 AM Jonas Conway Jr., DPM [facility] [phone]    2017 9:15 AM Yg King MD [facility] [phone]    2017 8:40 AM Josefina Kendall MD [facility] [phone]      Goals (5 Years of Data)     None      Follow-Up and Disposition     Return in about 6 weeks (around 2017) for heel pain L>R.      [facility]     [facility] -  Assistance  Unless otherwise directed by your provider, please contact [facility], our nurse care line that is available for  assistance.     Registered nurses in the [facility] provide: appointment scheduling, clinical advisement, health education, and other advisory services.  Call: [phone] (toll free)               Medications           Message regarding Medications     Verify the changes and/or additions to your medication regime listed below are the same as discussed with your clinician today.  If any of these changes or additions are incorrect, please notify your healthcare provider.             Verify that the below list of medications is an accurate representation of the medications you are currently taking.  If none reported, the list may be blank. If incorrect, please contact your  "healthcare provider. Carry this list with you in case of emergency.           Current Medications     amlodipine (NORVASC) 5 MG tablet TAKE ONE TABLET BY MOUTH ONE TIME DAILY    aspirin 81 MG Chew Take 1 tablet (81 mg total) by mouth once daily.    CONTOUR NEXT STRIPS Strp 1 strip by Misc.(Non-Drug; Combo Route) route once daily. Test blood glucose once daily as instructed.    finasteride (PROSCAR) 5 mg tablet Take 1 tablet (5 mg total) by mouth once daily.    hydrochlorothiazide (HYDRODIURIL) 25 MG tablet TAKE ONE TABLET BY MOUTH ONE TIME DAILY    hydrOXYzine pamoate (VISTARIL) 25 MG Cap Take 1 capsule (25 mg total) by mouth every evening.    KRILL OIL ORAL Take by mouth once daily.    metoprolol succinate (TOPROL-XL) 100 MG 24 hr tablet TAKE ONE TABLET BY MOUTH ONE TIME DAILY    MICROLET LANCET Misc 1 lancet by Misc.(Non-Drug; Combo Route) route once daily. Test blood glucose once daily as instructed.    nitroGLYCERIN (NITROSTAT) 0.3 MG SL tablet Place 1 tablet (0.3 mg total) under the tongue every 5 (five) minutes as needed for Chest pain.    pantoprazole (PROTONIX) 40 MG tablet TAKE 1 TABLET (40 MG TOTAL) BY MOUTH ONCE DAILY.    pravastatin (PRAVACHOL) 80 MG tablet TAKE ONE TABLET BY MOUTH NIGHTLY AT BEDTIME    tamsulosin (FLOMAX) 0.4 mg Cp24 Take 1 capsule (0.4 mg total) by mouth once daily.           Clinical Reference Information           Your Vitals Were     BP Pulse Height             121/77 (BP Location: Left arm, Patient Position: Sitting) 71 6' 4" (1.93 m)         Blood Pressure          Most Recent Value    BP  121/77      Allergies as of 5/12/2017     Lisinopril    Lipitor [Atorvastatin]      Immunizations Administered on Date of Encounter - 5/12/2017     None      Orders Placed During Today's Visit     Future Labs/Procedures Expected by Expires    X-Ray Foot Complete Bilateral  5/12/2017 5/12/2018      Instructions      Your Diabetes Foot Care Program    Every day you depend on your feet to keep you " moving. But when you have diabetes, your feet need special care. Even a small foot problem can become very serious. So dont take your feet for granted. By working with your diabetes healthcare team, you can learn how to protect your feet and keep them healthy.  Evaluating your feet  An evaluation helps your healthcare provider check the condition of your feet. The evaluation includes a review of your diabetes history and overall health. It may also include a foot exam, X-rays, or other tests. These can help show problems beneath the skin that you cant see or feel.  Medical history  You will be asked about your overall health and any history of foot problems. Youll also discuss your diabetes history, such as whether your blood sugar level has changed over time. It also includes questions about sensations of pain, tingling, pins and needles, or numbness. Your healthcare provider will also want to know if you have high blood pressure and heart disease, or if you smoke. Be sure to mention any medicines (including over-the-counter), supplements, or herbal remedies you take.  Foot exam  A foot exam checks the condition of different parts of your foot. First, your skin and nails are examined for any signs of infection. Blood flow is checked by feeling for the pulses in each foot. You may also have tests to study the nerves in the foot. These include using a small filament (wire) to see how sensitive your feet are. In certain cases, you will be asked to walk a short distance to check for bone, joint, and muscle problems.  Diagnostic tests  If needed, your healthcare provider will suggest certain tests to learn more about your feet. These include:  · Doppler tests to measure blood flow in the feet and lower leg.  · X-rays, which can show bone or joint problems.  · Other imaging tests, such as an MRI (magnetic resonance imaging), bone scan, and CT (computed tomography) scan. These can help show bone infections.  · Other  tests, such as vascular tests, which study the blood flow in your feet and legs. You may also have nerve studies to learn how sensitive your feet are.  Creating a foot care program  Based on the evaluation, your healthcare provider will create a foot care program for you. Your program may be as simple as starting a daily self-care routine and changing the types of shoes your wear. It may also involve treating minor foot problems, such as a corn or blister. In some cases, surgery will be needed to treat an infection or mechanical problems, such as hammer toes.  Preventing problems  When you have diabetes, its easier to prevent problems than to treat them later on. So see your healthcare team for regular checkups and foot care. Your healthcare team can also help you learn more about caring for your feet at home. For example, you may be told to avoid walking barefoot. Or you may be told that special footwear is needed to protect your feet.  Have regular checkups  Foot problems can develop quickly. So be sure to follow your healthcare teams schedule for regular checkups. During office visits, take off your shoes and socks as soon as you get in the exam room. Ask your healthcare provider to examine your feet for problems. This will make it easier to find and treat small skin irritations before they get worse. Regular checkups can also help keep track of the blood flow and feeling in your feet. If you have neuropathy (lack of feeling in your feet), you will need to have checkups more often.  Learn about self-care  The more you know about diabetes and your feet, the easier it will be to prevent problems. Members of your healthcare team can teach you how to inspect your feet and teach you to look for warning signs. They can also give you other foot care tips. During office visits, be sure to ask any questions you have.  Date Last Reviewed: 7/1/2016  © 2059-6486 [employer]. [address] [place] [address], [place] [address]. All rights reserved. This information is not intended as a substitute for professional medical care. Always follow your healthcare professional's instructions.      Diabetes: Inspecting Your Feet    Diabetes increases your chances of developing foot problems. So inspect your feet every day. This helps you find small skin irritations before they become serious ulcers or infections. If you have trouble seeing the bottoms of your feet, use a mirror or ask a family member or friend to help.  How to check your feet  Below are tips to help you look for foot problems. Try to check your feet at the same time each day, such as when you get out of bed in the morning:  · Check the top of each foot. The tops of toes, back of the heel, and outer edge of the foot can get a lot of rubbing from poor-fitting shoes.  · Check the bottom of each foot. Daily wear and tear often leads to problems at pressure spots.  · Check the toes and nails. Fungal infections often occur between toes. Toenail problems can also be a sign of fungal infections or lead to breaks in the skin.  · Check your shoes, too. Loose objects inside a shoe can injure the foot. Use your hand to feel inside your shoes for things like milady, loose stitching, or rough areas that could irritate your skin.  Warning signs  Look for any color changes in the foot. Redness with streaks can signal a severe infection, which needs immediate medical attention. Tell your healthcare provider right away if you have any of these problems:  · Swelling, sometimes with color changes, may be a sign of poor blood flow or infection. Symptoms include tenderness and an increase in the size of your foot.  · Warm or hot areas on your feet may be signs of infection. A foot that is cold may not be getting enough blood.  · Sensations such as burning, tingling, or pins and needles can be signs of a problem. Also check for areas that may be numb.  · Hot spots are caused by friction or  pressure. Look for hot spots in areas that get a lot of rubbing. Hot spots can turn into blisters, calluses, or sores.  · Cracks and sores are caused by dry or irritated skin. They are a sign that the skin is breaking down, which can lead to infection.  · Toenail problems to watch for include nails growing into the skin (ingrown toenail) and causing redness or pain. Thick, yellow, or discolored nails can signal a fungal infection.  · Drainage and odor can develop from untreated sores and ulcers. Call your healthcare provider right away if you notice white or yellow drainage, bleeding, or unpleasant odor.   Date Last Reviewed: 6/1/2016  © 2938-6954 [employer]. [address] [place] [address], [place] [address]. All rights reserved. This information is not intended as a substitute for professional medical care. Always follow your healthcare professional's instructions.        Long-Term Complications of Diabetes    Diabetes can cause health problems over time. These are called complications. They are more likely to happen if your blood sugar is often too high. Over time, high blood sugar can damage blood vessels in your body. It is important to keep your blood sugar in your target range. This can help prevent or delay complications from diabetes.  Possible complications  Complications of diabetes include:  · Eye problems, including damage to the blood vessels in the eyes (retinopathy), pressure in the eye (glaucoma), and clouding of the eyes lens (a cataract). Eye problems can eventually lead to irreversible blindness.   · Tooth and gum problems (periodontal disease), causing loss of teeth and bone  · Blood vessel (vascular) disease leading to circulation problems, heart attack or stroke, or a need for amputation of a limb   · Problems with sexual function leading to erectile dysfunction in men and sexual discomfort in women   · Kidney disease (nephropathy) can eventually lead to kidney failure, which may  require dialysis or kidney transplant   · Nerve problems (neuropathy), causing pain or loss of feeling in your feet and other parts of your body, potentially leading to an amputation of a limb   · High blood pressure (hypertension), putting strain on your heart and blood vessels  · Serious infections, possibly leading to loss of toes, feet, or limbs  How to avoid complications  The serious consequences of these complications may be avoidable for most people with diabetes by managing your blood glucose, blood pressure, and cholesterol levels. This can help you feel better and stay healthy. You can manage diabetes by tracking your blood sugar. You can also eat healthy and exercise to avoid gaining weight. And you should take medicine if directed by your healthcare provider.  Date Last Reviewed: 5/1/2016 © 2000-2016 [employer]. [address] [place] [address], [place] [address]. All rights reserved. This information is not intended as a substitute for professional medical care. Always follow your healthcare professional's instructions.             Language Assistance Services     ATTENTION: Language assistance services are available, free of charge. Please call [phone].      ATENCIÓN: Si harry garcia, tiene a stevens disposición servicios gratuitos de asistencia lingüística. Llame al [phone].     SAMEER Ý: N?u b?n nói Ti?ng Vi?t, có các d?ch v? h? tr? ngôn ng? mi?n phí dành cho b?n. G?i s? [phone].         [facility] complies with applicable Federal civil rights laws and does not discriminate on the basis of race, color, national origin, age, disability, or sex.

## 2017-05-12 NOTE — PATIENT INSTRUCTIONS
Your Diabetes Foot Care Program    Every day you depend on your feet to keep you moving. But when you have diabetes, your feet need special care. Even a small foot problem can become very serious. So dont take your feet for granted. By working with your diabetes healthcare team, you can learn how to protect your feet and keep them healthy.  Evaluating your feet  An evaluation helps your healthcare provider check the condition of your feet. The evaluation includes a review of your diabetes history and overall health. It may also include a foot exam, X-rays, or other tests. These can help show problems beneath the skin that you cant see or feel.  Medical history  You will be asked about your overall health and any history of foot problems. Youll also discuss your diabetes history, such as whether your blood sugar level has changed over time. It also includes questions about sensations of pain, tingling, pins and needles, or numbness. Your healthcare provider will also want to know if you have high blood pressure and heart disease, or if you smoke. Be sure to mention any medicines (including over-the-counter), supplements, or herbal remedies you take.  Foot exam  A foot exam checks the condition of different parts of your foot. First, your skin and nails are examined for any signs of infection. Blood flow is checked by feeling for the pulses in each foot. You may also have tests to study the nerves in the foot. These include using a small filament (wire) to see how sensitive your feet are. In certain cases, you will be asked to walk a short distance to check for bone, joint, and muscle problems.  Diagnostic tests  If needed, your healthcare provider will suggest certain tests to learn more about your feet. These include:  · Doppler tests to measure blood flow in the feet and lower leg.  · X-rays, which can show bone or joint problems.  · Other imaging tests, such as an MRI (magnetic resonance imaging), bone scan,  and CT (computed tomography) scan. These can help show bone infections.  · Other tests, such as vascular tests, which study the blood flow in your feet and legs. You may also have nerve studies to learn how sensitive your feet are.  Creating a foot care program  Based on the evaluation, your healthcare provider will create a foot care program for you. Your program may be as simple as starting a daily self-care routine and changing the types of shoes your wear. It may also involve treating minor foot problems, such as a corn or blister. In some cases, surgery will be needed to treat an infection or mechanical problems, such as hammer toes.  Preventing problems  When you have diabetes, its easier to prevent problems than to treat them later on. So see your healthcare team for regular checkups and foot care. Your healthcare team can also help you learn more about caring for your feet at home. For example, you may be told to avoid walking barefoot. Or you may be told that special footwear is needed to protect your feet.  Have regular checkups  Foot problems can develop quickly. So be sure to follow your healthcare teams schedule for regular checkups. During office visits, take off your shoes and socks as soon as you get in the exam room. Ask your healthcare provider to examine your feet for problems. This will make it easier to find and treat small skin irritations before they get worse. Regular checkups can also help keep track of the blood flow and feeling in your feet. If you have neuropathy (lack of feeling in your feet), you will need to have checkups more often.  Learn about self-care  The more you know about diabetes and your feet, the easier it will be to prevent problems. Members of your healthcare team can teach you how to inspect your feet and teach you to look for warning signs. They can also give you other foot care tips. During office visits, be sure to ask any questions you have.  Date Last Reviewed:  7/1/2016 © 2000-2016 [employer]. [address] [place] [address], [place] [address]. All rights reserved. This information is not intended as a substitute for professional medical care. Always follow your healthcare professional's instructions.      Diabetes: Inspecting Your Feet    Diabetes increases your chances of developing foot problems. So inspect your feet every day. This helps you find small skin irritations before they become serious ulcers or infections. If you have trouble seeing the bottoms of your feet, use a mirror or ask a family member or friend to help.  How to check your feet  Below are tips to help you look for foot problems. Try to check your feet at the same time each day, such as when you get out of bed in the morning:  · Check the top of each foot. The tops of toes, back of the heel, and outer edge of the foot can get a lot of rubbing from poor-fitting shoes.  · Check the bottom of each foot. Daily wear and tear often leads to problems at pressure spots.  · Check the toes and nails. Fungal infections often occur between toes. Toenail problems can also be a sign of fungal infections or lead to breaks in the skin.  · Check your shoes, too. Loose objects inside a shoe can injure the foot. Use your hand to feel inside your shoes for things like milady, loose stitching, or rough areas that could irritate your skin.  Warning signs  Look for any color changes in the foot. Redness with streaks can signal a severe infection, which needs immediate medical attention. Tell your healthcare provider right away if you have any of these problems:  · Swelling, sometimes with color changes, may be a sign of poor blood flow or infection. Symptoms include tenderness and an increase in the size of your foot.  · Warm or hot areas on your feet may be signs of infection. A foot that is cold may not be getting enough blood.  · Sensations such as burning, tingling, or pins and needles can be signs of a  problem. Also check for areas that may be numb.  · Hot spots are caused by friction or pressure. Look for hot spots in areas that get a lot of rubbing. Hot spots can turn into blisters, calluses, or sores.  · Cracks and sores are caused by dry or irritated skin. They are a sign that the skin is breaking down, which can lead to infection.  · Toenail problems to watch for include nails growing into the skin (ingrown toenail) and causing redness or pain. Thick, yellow, or discolored nails can signal a fungal infection.  · Drainage and odor can develop from untreated sores and ulcers. Call your healthcare provider right away if you notice white or yellow drainage, bleeding, or unpleasant odor.   Date Last Reviewed: 6/1/2016 © 2000-2016 [employer]. [address] [place] [address]. All rights reserved. This information is not intended as a substitute for professional medical care. Always follow your healthcare professional's instructions.        Long-Term Complications of Diabetes    Diabetes can cause health problems over time. These are called complications. They are more likely to happen if your blood sugar is often too high. Over time, high blood sugar can damage blood vessels in your body. It is important to keep your blood sugar in your target range. This can help prevent or delay complications from diabetes.  Possible complications  Complications of diabetes include:  · Eye problems, including damage to the blood vessels in the eyes (retinopathy), pressure in the eye (glaucoma), and clouding of the eyes lens (a cataract). Eye problems can eventually lead to irreversible blindness.   · Tooth and gum problems (periodontal disease), causing loss of teeth and bone  · Blood vessel (vascular) disease leading to circulation problems, heart attack or stroke, or a need for amputation of a limb   · Problems with sexual function leading to erectile dysfunction in men and sexual discomfort in  women   · Kidney disease (nephropathy) can eventually lead to kidney failure, which may require dialysis or kidney transplant   · Nerve problems (neuropathy), causing pain or loss of feeling in your feet and other parts of your body, potentially leading to an amputation of a limb   · High blood pressure (hypertension), putting strain on your heart and blood vessels  · Serious infections, possibly leading to loss of toes, feet, or limbs  How to avoid complications  The serious consequences of these complications may be avoidable for most people with diabetes by managing your blood glucose, blood pressure, and cholesterol levels. This can help you feel better and stay healthy. You can manage diabetes by tracking your blood sugar. You can also eat healthy and exercise to avoid gaining weight. And you should take medicine if directed by your healthcare provider.  Date Last Reviewed: 5/1/2016 © 2000-2016 [employer]. [address] [place] [address], [place] [address]. All rights reserved. This information is not intended as a substitute for professional medical care. Always follow your healthcare professional's instructions.

## 2017-05-12 NOTE — LETTER
May 12, 2017      Josefina Kendall MD  [address] [facility] [address]           [place] - Podiatry  [address] [facility] [address]  Phone: [phone]          Patient: Hi Rubio   MR Number: [id number]   YOB: 1960   Date of Visit: 5/12/2017       Dear Dr. Josefina Kendall:    Thank you for referring Hi Rubio to me for evaluation. Attached you will find relevant portions of my assessment and plan of care.    If you have questions, please do not hesitate to call me. I look forward to following Hi Rubio along with you.    Sincerely,    Jonas Conway Jr., DPM    Enclosure  CC:  No Recipients    If you would like to receive this communication electronically, please contact [email or web address] or [phone] to request more information on [employer] access.    For providers and/or their staff who would like to refer a patient to [facility], please contact us through our one-stop-shop provider referral line, [facility], at [phone].    If you feel you have received this communication in error or would no longer like to receive these types of communications, please e-mail [email or web address]

## 2017-05-12 NOTE — PROGRESS NOTES
Subjective:    Patient ID: Hi Rubio is a 57 y.o. male.    Chief Complaint: Diabetic Foot Exam      HPI:   RACHEL Do is a 57 y.o. male who presents to the clinic upon referral from Dr. Kendall  for evaluation and treatment of diabetic feet. Hi has a past medical history of Carotid artery occlusion; Diabetes mellitus, type 2; DJD (degenerative joint disease), cervical (7/10/2015); History of iritis (2013); Hyperlipidemia; Hypertension; Memory loss; and Traumatic iritis - Left Eye (2/27/2013). Patient relates no major problem with feet. Only complaints today consist of needing a DM foot exam.    PCP: Josefina Kendall MD    Date Last Seen by PCP: moriah    Current shoe gear: Tennis shoes    Last Podiatry Enc: Visit date not found  Last Enc w/ Me: Visit date not found  Hemoglobin A1C   Date Value Ref Range Status   04/17/2017 6.6 (H) 4.5 - 6.2 % Final     Comment:     According to ADA guidelines, hemoglobin A1C <7.0% represents  optimal control in non-pregnant diabetic patients.  Different  metrics may apply to specific populations.   Standards of Medical Care in Diabetes - 2016.  For the purpose of screening for the presence of diabetes:  <5.7%     Consistent with the absence of diabetes  5.7-6.4%  Consistent with increasing risk for diabetes   (prediabetes)  >or=6.5%  Consistent with diabetes  Currently no consensus exists for use of hemoglobin A1C  for diagnosis of diabetes for children.     12/22/2016 6.5 (H) 4.5 - 6.2 % Final     Comment:     According to ADA guidelines, hemoglobin A1C <7.0% represents  optimal control in non-pregnant diabetic patients.  Different  metrics may apply to specific populations.   Standards of Medical Care in Diabetes - 2016.  For the purpose of screening for the presence of diabetes:  <5.7%     Consistent with the absence of diabetes  5.7-6.4%  Consistent with increasing risk for diabetes   (prediabetes)  >or=6.5%  Consistent with diabetes  Currently no consensus exists  for use of hemoglobin A1C  for diagnosis of diabetes for children.     09/19/2016 6.8 (H) 4.5 - 6.2 % Final     Comment:     According to ADA guidelines, hemoglobin A1C <7.0% represents  optimal control in non-pregnant diabetic patients.  Different  metrics may apply to specific populations.   Standards of Medical Care in Diabetes - 2016.  For the purpose of screening for the presence of diabetes:  <5.7%     Consistent with the absence of diabetes  5.7-6.4%  Consistent with increasing risk for diabetes   (prediabetes)  >or=6.5%  Consistent with diabetes  Currently no consensus exists for use of hemoglobin A1C  for diagnosis of diabetes for children.           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     Social History Main Topics    Smoking status: Former Smoker     Packs/day: 1.00     Years: 30.00     Types: Cigarettes     Quit date: 12/13/2011    Smokeless tobacco: Never Used    Alcohol use 0.0 oz/week     15 - 16 Cans of beer per week      Comment: 2 beers per day    Drug use: No    Sexual activity: Yes     Partners: Female     Other Topics Concern    Not on file     Social History Narrative         Current Outpatient Prescriptions:     amlodipine (NORVASC) 5 MG  tablet, TAKE ONE TABLET BY MOUTH ONE TIME DAILY, Disp: 90 tablet, Rfl: 3    aspirin 81 MG Chew, Take 1 tablet (81 mg total) by mouth once daily., Disp: , Rfl: 0    CONTOUR NEXT STRIPS Strp, 1 strip by Misc.(Non-Drug; Combo Route) route once daily. Test blood glucose once daily as instructed., Disp: 25 strip, Rfl: 11    finasteride (PROSCAR) 5 mg tablet, Take 1 tablet (5 mg total) by mouth once daily., Disp: 30 tablet, Rfl: 11    hydrochlorothiazide (HYDRODIURIL) 25 MG tablet, TAKE ONE TABLET BY MOUTH ONE TIME DAILY, Disp: 90 tablet, Rfl: 1    hydrOXYzine pamoate (VISTARIL) 25 MG Cap, Take 1 capsule (25 mg total) by mouth every evening., Disp: 30 capsule, Rfl: 6    KRILL OIL ORAL, Take by mouth once daily., Disp: , Rfl:     metoprolol succinate (TOPROL-XL) 100 MG 24 hr tablet, TAKE ONE TABLET BY MOUTH ONE TIME DAILY, Disp: 90 tablet, Rfl: 2    MICROLET LANCET Misc, 1 lancet by Misc.(Non-Drug; Combo Route) route once daily. Test blood glucose once daily as instructed., Disp: 25 each, Rfl: 11    nitroGLYCERIN (NITROSTAT) 0.3 MG SL tablet, Place 1 tablet (0.3 mg total) under the tongue every 5 (five) minutes as needed for Chest pain., Disp: 15 tablet, Rfl: 0    pantoprazole (PROTONIX) 40 MG tablet, TAKE 1 TABLET (40 MG TOTAL) BY MOUTH ONCE DAILY., Disp: 30 tablet, Rfl: 1    pravastatin (PRAVACHOL) 80 MG tablet, TAKE ONE TABLET BY MOUTH NIGHTLY AT BEDTIME, Disp: 90 tablet, Rfl: 1    tamsulosin (FLOMAX) 0.4 mg Cp24, Take 1 capsule (0.4 mg total) by mouth once daily., Disp: 30 capsule, Rfl: 11  Review of patient's allergies indicates:   Allergen Reactions    Lisinopril Anaphylaxis and Nausea And Vomiting     General bad feeling    Lipitor [atorvastatin] Other (See Comments)     Muscle aches       ROS  ROS Constitutional:  General Appearance: well nourished  Vascular: negative for cramps, edema and bruising  Musculoskeletal: negative for joint paint and joint edema  Skin: negative for rashes and  "lesions  Neurological: negative for burning, tingling and numbness  Gastrointestinal: negative for stomach pain, nausea and vomiting        Objective:        /77 (BP Location: Left arm, Patient Position: Sitting)  Pulse 71  Ht 6' 4" (1.93 m)    General    Nursing note and vitals reviewed.  Constitutional: He is oriented to person, place, and time. He appears well-developed.   Neurological: He is alert and oriented to person, place, and time. He has normal reflexes.   Psychiatric: He has a normal mood and affect. His behavior is normal.         Right Ankle/Foot Exam     Inspection   Deformity: absent    Left Ankle/Foot Exam     Inspection  Deformity: absent      Vascular Exam     Right Pulses  Dorsalis Pedis:      2+  Posterior Tibial:      2+        Left Pulses  Dorsalis Pedis:      2+  Posterior Tibial:      2+          Physical Exam   Constitutional: He is oriented to person, place, and time. He appears well-developed.   Cardiovascular:   Pulses:       Dorsalis pedis pulses are 2+ on the right side, and 2+ on the left side.        Posterior tibial pulses are 2+ on the right side, and 2+ on the left side.   Musculoskeletal:        Right foot: There is normal range of motion and no deformity.        Left foot: There is normal range of motion and no deformity.   Feet:   Right Foot:   Protective Sensation: 10 sites tested. 10 sites sensed.   Skin Integrity: Negative for callus.   Left Foot:   Protective Sensation: 10 sites tested. 10 sites sensed.   Skin Integrity: Negative for callus.   Neurological: He is alert and oriented to person, place, and time. He has normal reflexes.   Psychiatric: He has a normal mood and affect. His behavior is normal.   Nursing note and vitals reviewed.    LE exam con't:  V: DP 2/4, PT 2/4, CRT< 3s to all digits tested.     N: SILT in SP/DP/T/Katie/Saph distributions.  [place] Neuropathy Screening:   Left Right   Normal Appearance Yes-0 Yes-0   Ulceration no-0 no-0   Achilles " Reflex normal-0 normal-0   Vibratory Sensation normal-0 normal-0   10 Gram MF normal-0 normal-0   Total 0/5 0/5     0/10     Derm: Skin intact. No erythema, edema or ecchymosis. nails dystrophic, mycotic x b/l hallux    Ortho:  +Motor EHL/FHL/TA/GA   +TTP b/l medial calc tubercle   Compartments soft/compressible. No pain on passive stretch of big toe. No calf  pain.        Assessment:     Imaging / Labs:          1. Type 2 diabetes mellitus without complication, unspecified long term insulin use status    2. Comprehensive diabetic foot examination, type 2 DM, encounter for    3. Foot pain, bilateral          Plan:       Orders Placed This Encounter    X-Ray Foot Complete Bilateral     Procedures  .   Hi was seen today for diabetic foot exam.    Diagnoses and all orders for this visit:    Type 2 diabetes mellitus without complication, unspecified long term insulin use status    Comprehensive diabetic foot examination, type 2 DM, encounter for    Foot pain, bilateral  -     X-Ray Foot Complete Bilateral; Future        Hi Rubio is a 57 y.o. male presenting w/   1. Type 2 diabetes mellitus without complication, unspecified long term insulin use status    2. Comprehensive diabetic foot examination, type 2 DM, encounter for    3. Foot pain, bilateral      ADA Risk Classification: No LOPS,No PAD, No deformity - 0: rtc 12 months    -pt seen, evaluated, and managed  -dx discussed in detail. All questions/concerns addressed  -all tx options discussed. All alternatives, risks, benefits of all txs discussed  -The patient was educated regarding the above diagnosis. We discussed conservative care options regarding shoe wear and/or padding.  -rxs dispensed: none  -XR on way out--> will review at nxt visit  -rec'd shoegear changes and OTC orthotics    Return in about 6 weeks (around 6/23/2017) for heel pain L>R.

## 2017-05-15 ENCOUNTER — OFFICE VISIT (OUTPATIENT)
Dept: INTERNAL MEDICINE | Facility: CLINIC | Age: 57
End: 2017-05-15
Payer: COMMERCIAL

## 2017-05-15 VITALS
TEMPERATURE: 99 F | HEIGHT: 76 IN | SYSTOLIC BLOOD PRESSURE: 118 MMHG | HEART RATE: 79 BPM | WEIGHT: 184.94 LBS | BODY MASS INDEX: 22.52 KG/M2 | RESPIRATION RATE: 16 BRPM | DIASTOLIC BLOOD PRESSURE: 76 MMHG

## 2017-05-15 DIAGNOSIS — N39.0 URINARY TRACT INFECTION WITHOUT HEMATURIA, SITE UNSPECIFIED: Primary | ICD-10-CM

## 2017-05-15 LAB
BACTERIA #/AREA URNS AUTO: ABNORMAL /HPF
BILIRUB SERPL-MCNC: NORMAL MG/DL
BILIRUB UR QL STRIP: NEGATIVE
BLOOD URINE, POC: NORMAL
CLARITY UR REFRACT.AUTO: ABNORMAL
COLOR UR AUTO: YELLOW
COLOR, POC UA: NORMAL
GLUCOSE UR QL STRIP: 100
GLUCOSE UR QL STRIP: NEGATIVE
HGB UR QL STRIP: ABNORMAL
HYALINE CASTS UR QL AUTO: 0 /LPF
KETONES UR QL STRIP: NEGATIVE
KETONES UR QL STRIP: NORMAL
LEUKOCYTE ESTERASE UR QL STRIP: ABNORMAL
LEUKOCYTE ESTERASE URINE, POC: NORMAL
MICROSCOPIC COMMENT: ABNORMAL
NITRITE UR QL STRIP: NEGATIVE
NITRITE, POC UA: NORMAL
PH UR STRIP: 5 [PH] (ref 5–8)
PH, POC UA: 5
PROT UR QL STRIP: ABNORMAL
PROTEIN, POC: 30
RBC #/AREA URNS AUTO: 46 /HPF (ref 0–4)
SP GR UR STRIP: 1.01 (ref 1–1.03)
SPECIFIC GRAVITY, POC UA: 1.01
URN SPEC COLLECT METH UR: ABNORMAL
UROBILINOGEN UR STRIP-ACNC: NEGATIVE EU/DL
UROBILINOGEN, POC UA: NORMAL
WBC #/AREA URNS AUTO: >100 /HPF (ref 0–5)
WBC CLUMPS UR QL AUTO: ABNORMAL

## 2017-05-15 PROCEDURE — 87186 SC STD MICRODIL/AGAR DIL: CPT

## 2017-05-15 PROCEDURE — 1160F RVW MEDS BY RX/DR IN RCRD: CPT | Mod: S$GLB,,, | Performed by: INTERNAL MEDICINE

## 2017-05-15 PROCEDURE — 87088 URINE BACTERIA CULTURE: CPT

## 2017-05-15 PROCEDURE — 87086 URINE CULTURE/COLONY COUNT: CPT

## 2017-05-15 PROCEDURE — 99999 PR PBB SHADOW E&M-EST. PATIENT-LVL V: CPT | Mod: PBBFAC,,, | Performed by: INTERNAL MEDICINE

## 2017-05-15 PROCEDURE — 3074F SYST BP LT 130 MM HG: CPT | Mod: S$GLB,,, | Performed by: INTERNAL MEDICINE

## 2017-05-15 PROCEDURE — 87077 CULTURE AEROBIC IDENTIFY: CPT

## 2017-05-15 PROCEDURE — 99214 OFFICE O/P EST MOD 30 MIN: CPT | Mod: 25,S$GLB,, | Performed by: INTERNAL MEDICINE

## 2017-05-15 PROCEDURE — 3078F DIAST BP <80 MM HG: CPT | Mod: S$GLB,,, | Performed by: INTERNAL MEDICINE

## 2017-05-15 PROCEDURE — 81001 URINALYSIS AUTO W/SCOPE: CPT

## 2017-05-15 PROCEDURE — 81002 URINALYSIS NONAUTO W/O SCOPE: CPT | Mod: S$GLB,,, | Performed by: INTERNAL MEDICINE

## 2017-05-15 RX ORDER — CIPROFLOXACIN 500 MG/1
500 TABLET ORAL EVERY 12 HOURS
Qty: 20 TABLET | Refills: 0 | Status: SHIPPED | OUTPATIENT
Start: 2017-05-15 | End: 2017-05-25

## 2017-05-15 RX ORDER — HYDROCODONE BITARTRATE AND ACETAMINOPHEN 5; 325 MG/1; MG/1
1 TABLET ORAL EVERY 6 HOURS PRN
Qty: 20 TABLET | Refills: 0 | Status: SHIPPED | OUTPATIENT
Start: 2017-05-15 | End: 2017-09-28

## 2017-05-15 NOTE — PROGRESS NOTES
Patient seen and examined with resident and agree with assessment and plan.    1.  UTI - UA and culture.  POCT urine.  Cipro 500 mg bid x 10 days.

## 2017-05-15 NOTE — PROGRESS NOTES
Subjective:       Patient ID: Hi Rubio is a 57 y.o. male.    Chief Complaint: Flank Pain (left side x 2 days ) and Groin Pain (left side 2 days )    HPI     Mr. Rubio is a 56 yo male with history of BPH, recurrent UTIs, bilateral inguinal hernias with mesh repair in 2013 who presents to clinic today 2/2 left sided flank, groin and testicular pain. He states the pain started yesterday afternoon in his left flank and began to radiate to his groin and testicle overnight. He states pain in 10/10 and constant. Nothing relieves the pain. He states he has burning with urination yesterday with dark urine. Also reports some chills overnight but denies any fevers. He denies nausea, vomiting or diarrhea. He states he has never had pain like this with his past UTIs.     He has a history of BPH and has had three UTIs over the last 3 months. Last UTI was 4 weeks ago and urine culture grow E. Coli pan sensitive. He was treated with course of Augmentin for 7 days. He was seen by Dr. King with Urology and had cystoscopy done on 4/21 which showed BPH with bladder outlet obstruction. Dr. King discussed to option of TURP to improve symptoms however patient wished to continue medical management with Proscar for now secondary to side effects from surgery.     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  Negative for fever and unexpected weight change.   HENT: Negative for ear discharge and sinus pressure.    Eyes: Negative for visual disturbance.   Respiratory: Negative for cough and shortness of breath.    Cardiovascular: Negative for chest pain, palpitations and leg swelling.   Gastrointestinal: Positive for abdominal pain. Negative for abdominal distention, constipation, diarrhea, nausea and vomiting.   Endocrine: Negative for polydipsia and polyuria.   Genitourinary: Positive for dysuria, flank pain and testicular pain.   Musculoskeletal: Positive for back pain.   Skin: Negative for pallor and rash.   Allergic/Immunologic: Negative for immunocompromised state.   Neurological: Negative for dizziness, weakness and headaches.   Hematological: Negative for adenopathy.       Objective:      Physical Exam   Constitutional: He is oriented to person, place, and time. He appears well-developed and well-nourished.   HENT:   Head: Normocephalic and atraumatic.   Eyes: EOM are normal. Pupils are equal, round, and reactive to light.   Neck: Normal range of motion. No JVD present. No thyromegaly present.   Cardiovascular: Normal rate and regular rhythm.    No murmur heard.  Pulmonary/Chest: Effort normal and breath sounds normal. He has no wheezes.   Abdominal: Soft. There is tenderness in the suprapubic area and left lower quadrant. There is CVA tenderness. There is no rebound and no guarding. No hernia.   He is tender to palpation in left lower quadrant.    Genitourinary: Left testis shows tenderness. Left testis shows no mass and no swelling.   Genitourinary Comments: Tenderness in left groin.    Musculoskeletal: Normal range of motion. He exhibits no edema.   Neurological: He is alert and oriented to person, place, and time.   Skin: Skin is warm and dry. No erythema.   Psychiatric: He has a normal mood and affect.       Assessment:       1. Urinary tract infection without hematuria, site unspecified        Plan:          Complicated UTI:   -Urine dipstick in clinic is positive for nitrites, leukocytes and WBCs.   -Will order Cipro 500 mg BID for 10 days for recurrent complicated UTI.   -Norco 5-325 mg for pain.   -Follow up with Urology in July.     Discussed with Dr. Okeefe.     Tete Carlos MD PGY-2

## 2017-05-15 NOTE — MR AVS SNAPSHOT
[facility]   [facility] [address]  Phone: [phone]  Fax: [phone]                  Hi Rubio   5/15/2017 9:40 AM   Office Visit    Description:  Male : 1960   Provider:  Sim Okeefe MD   Department:  [facility]           Reason for Visit     Flank Pain     Groin Pain           Diagnoses this Visit        Comments    Urinary tract infection without hematuria, site unspecified    -  Primary            To Do List           Future Appointments        Provider Department Dept Phone    2017 8:15 AM Jonas Conway Jr., AJAY [facility] Podiatry [phone]    2017 9:15 AM Yg King MD [facility] Urology [phone]    2017 8:40 AM Josefina Kendall MD [facility] Internal Medicine [phone]      Goals (5 Years of Data)     None       These Medications        Disp Refills Start End    ciprofloxacin HCl (CIPRO) 500 MG tablet 20 tablet 0 5/15/2017 2017    Take 1 tablet (500 mg total) by mouth every 12 (twelve) hours. - Oral    Ph[id number] [address] Ph #: [phone]       hydrocodone-acetaminophen 5-325mg (NORCO) 5-325 mg per tablet 20 tablet 0 5/15/2017     Take 1 tablet by mouth every 6 (six) hours as needed for Pain. - Oral    Pharmacy: [id number] [address] Ph #: [phone]         [facility]     [facility] -  Assistance  Unless otherwise directed by your provider, please contact [facility], our nurse care line that is available for  assistance.     Registered nurses in the [facility] provide: appointment scheduling, clinical advisement, health education, and other advisory services.  Call: [phone] (toll free)               Medications           Message regarding Medications     Verify the changes and/or additions to your medication regime listed  below are the same as discussed with your clinician today.  If any of these changes or additions are incorrect, please notify your healthcare provider.        START taking these NEW medications        Refills    ciprofloxacin HCl (CIPRO) 500 MG tablet 0    Sig: Take 1 tablet (500 mg total) by mouth every 12 (twelve) hours.    Class: Normal    Route: Oral    hydrocodone-acetaminophen 5-325mg (NORCO) 5-325 mg per tablet 0    Sig: Take 1 tablet by mouth every 6 (six) hours as needed for Pain.    Class: Print    Route: Oral           Verify that the below list of medications is an accurate representation of the medications you are currently taking.  If none reported, the list may be blank. If incorrect, please contact your healthcare provider. Carry this list with you in case of emergency.           Current Medications     amlodipine (NORVASC) 5 MG tablet TAKE ONE TABLET BY MOUTH ONE TIME DAILY    aspirin 81 MG Chew Take 1 tablet (81 mg total) by mouth once daily.    CONTOUR NEXT STRIPS Strp 1 strip by Misc.(Non-Drug; Combo Route) route once daily. Test blood glucose once daily as instructed.    finasteride (PROSCAR) 5 mg tablet Take 1 tablet (5 mg total) by mouth once daily.    hydrochlorothiazide (HYDRODIURIL) 25 MG tablet TAKE ONE TABLET BY MOUTH ONE TIME DAILY    hydrOXYzine pamoate (VISTARIL) 25 MG Cap Take 1 capsule (25 mg total) by mouth every evening.    KRILL OIL ORAL Take by mouth once daily.    metoprolol succinate (TOPROL-XL) 100 MG 24 hr tablet TAKE ONE TABLET BY MOUTH ONE TIME DAILY    MICROLET LANCET Misc 1 lancet by Misc.(Non-Drug; Combo Route) route once daily. Test blood glucose once daily as instructed.    nitroGLYCERIN (NITROSTAT) 0.3 MG SL tablet Place 1 tablet (0.3 mg total) under the tongue every 5 (five) minutes as needed for Chest pain.    pantoprazole (PROTONIX) 40 MG tablet TAKE 1 TABLET (40 MG TOTAL) BY MOUTH ONCE DAILY.    pravastatin (PRAVACHOL) 80 MG tablet TAKE ONE TABLET BY MOUTH NIGHTLY  "AT BEDTIME    tamsulosin (FLOMAX) 0.4 mg Cp24 Take 1 capsule (0.4 mg total) by mouth once daily.    ciprofloxacin HCl (CIPRO) 500 MG tablet Take 1 tablet (500 mg total) by mouth every 12 (twelve) hours.    hydrocodone-acetaminophen 5-325mg (NORCO) 5-325 mg per tablet Take 1 tablet by mouth every 6 (six) hours as needed for Pain.           Clinical Reference Information           Your Vitals Were     BP Pulse Temp Resp Height Weight    118/76 (BP Location: Left arm, Patient Position: Sitting, BP Method: Manual) 79 99 °F (37.2 °C) (Oral) 16 6' 4" (1.93 m) 83.9 kg (184 lb 15.5 oz)    BMI                22.51 kg/m2          Blood Pressure          Most Recent Value    BP  118/76      Allergies as of 5/15/2017     Lisinopril    Lipitor [Atorvastatin]      Immunizations Administered on Date of Encounter - 5/15/2017     None      Orders Placed During Today's Visit      Normal Orders This Visit    POCT URINE DIPSTICK WITHOUT MICROSCOPE     URINALYSIS     Urine culture       Language Assistance Services     ATTENTION: Language assistance services are available, free of charge. Please call [phone].      ATENCIÓN: Si habla español, tiene a stevens disposición servicios gratuitos de asistencia lingüística. Llame al [phone].     SAMEER Ý: N?u b?n nói Ti?ng Vi?t, có các d?ch v? h? tr? ngôn ng? mi?n phí dành cho b?n. G?i s? [phone].         [facility] complies with applicable Federal civil rights laws and does not discriminate on the basis of race, color, national origin, age, disability, or sex.        "

## 2017-05-17 LAB — BACTERIA UR CULT: NORMAL

## 2017-06-01 RX ORDER — PANTOPRAZOLE SODIUM 40 MG/1
TABLET, DELAYED RELEASE ORAL
Qty: 30 TABLET | Refills: 1 | Status: SHIPPED | OUTPATIENT
Start: 2017-06-01 | End: 2017-07-27 | Stop reason: SDUPTHER

## 2017-06-23 ENCOUNTER — OFFICE VISIT (OUTPATIENT)
Dept: SURGERY | Facility: CLINIC | Age: 57
End: 2017-06-23
Payer: COMMERCIAL

## 2017-06-23 VITALS
DIASTOLIC BLOOD PRESSURE: 89 MMHG | WEIGHT: 193 LBS | HEIGHT: 76 IN | HEART RATE: 74 BPM | TEMPERATURE: 99 F | SYSTOLIC BLOOD PRESSURE: 136 MMHG | BODY MASS INDEX: 23.5 KG/M2

## 2017-06-23 DIAGNOSIS — R10.30 GROIN PAIN, UNSPECIFIED LATERALITY: Primary | ICD-10-CM

## 2017-06-23 DIAGNOSIS — N49.2 SCROTAL NODULE: ICD-10-CM

## 2017-06-23 PROCEDURE — 99203 OFFICE O/P NEW LOW 30 MIN: CPT | Mod: S$GLB,,, | Performed by: SURGERY

## 2017-06-23 PROCEDURE — 99999 PR PBB SHADOW E&M-EST. PATIENT-LVL III: CPT | Mod: PBBFAC,,, | Performed by: SURGERY

## 2017-06-23 NOTE — MEDICAL/APP STUDENT
Subjective:       Patient ID: Hi Rubio is a 57 y.o. male.    Chief Complaint: Consult ([facility])    HPI Mr. Rubio is S/P BIH repair 11/7/13 with Dr. Elena. Right indirect and direct repair and left direct repair with mesh. Patient says he is experiencing the same pain as before intermittently with the same onset as in 2013 that has been going on for about a month. Denies any strenuous activity prior to onset of the pain. Pain is worse when standing and walking. Denies bulge. Pain is worse on the left side. Denies diarrhea or constipation. Patient recently had 3 urinary tract infections that were treated by Dr. King. These infections exacerbated the problem. He states the left side of his scrotum was very inflamed and tender to touch and still is this way. He experienced some nausea and vomiting associated with this problem. Denies decrease in appetite.     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 -     Hyperlipidemia     Hypertension     Memory loss     Traumatic iritis - Left Eye 2/27/2013     Past Surgical History:   Procedure Laterality Date    CERVICAL FUSION  12/30/2015    COLONOSCOPY N/A 4/7/2017    Procedure: COLONOSCOPY;  Surgeon: Handy Frederick MD;  Location: [facility] ([address] [place] [address]);  Service: Endoscopy;  Laterality: N/A;    HERNIA REPAIR       Family History   Problem Relation Age of Onset    Heart disease Mother     Hypertension Mother     Hyperlipidemia Mother     Heart disease Father     Hyperlipidemia Brother      Hypertension Brother     Hypertension Brother     Hyperlipidemia Brother     Benign prostatic hyperplasia Brother     Hypertension Brother     Hypertension Brother     Hepatitis Brother     Amblyopia Neg Hx     Blindness Neg Hx     Cancer Neg Hx     Cataracts Neg Hx     Diabetes Neg Hx     Glaucoma Neg Hx     Macular degeneration Neg Hx     Retinal detachment Neg Hx     Strabismus Neg Hx     Stroke Neg Hx     Thyroid disease Neg Hx      Social History     Social History    Marital status:      Spouse name: N/A    Number of children: N/A    Years of education: N/A     Occupational History    Not on file.     Social History Main Topics    Smoking status: Former Smoker     Packs/day: 1.00     Years: 30.00     Types: Cigarettes     Quit date: 12/13/2011    Smokeless tobacco: Never Used    Alcohol use 0.0 oz/week     15 - 16 Cans of beer per week      Comment: 2 beers per day    Drug use: No    Sexual activity: Yes     Partners: Female     Other Topics Concern    Not on file     Social History Narrative    No narrative on file       Current Outpatient Prescriptions on File Prior to Visit   Medication Sig Dispense Refill    amlodipine (NORVASC) 5 MG tablet TAKE ONE TABLET BY MOUTH ONE TIME DAILY 90 tablet 3    aspirin 81 MG Chew Take 1 tablet (81 mg total) by mouth once daily.  0    CONTOUR NEXT STRIPS Strp 1 strip by Misc.(Non-Drug; Combo Route) route once daily. Test blood glucose once daily as instructed. 25 strip 11    finasteride (PROSCAR) 5 mg tablet Take 1 tablet (5 mg total) by mouth once daily. 30 tablet 11    hydrochlorothiazide (HYDRODIURIL) 25 MG tablet TAKE ONE TABLET BY MOUTH ONE TIME DAILY 90 tablet 1    hydrocodone-acetaminophen 5-325mg (NORCO) 5-325 mg per tablet Take 1 tablet by mouth every 6 (six) hours as needed for Pain. 20 tablet 0    hydrOXYzine pamoate (VISTARIL) 25 MG Cap Take 1 capsule (25 mg total) by mouth every evening. 30 capsule 6    KRILL OIL ORAL  Take by mouth once daily.      metoprolol succinate (TOPROL-XL) 100 MG 24 hr tablet TAKE ONE TABLET BY MOUTH ONE TIME DAILY 90 tablet 2    MICROLET LANCET Misc 1 lancet by Misc.(Non-Drug; Combo Route) route once daily. Test blood glucose once daily as instructed. 25 each 11    nitroGLYCERIN (NITROSTAT) 0.3 MG SL tablet Place 1 tablet (0.3 mg total) under the tongue every 5 (five) minutes as needed for Chest pain. 15 tablet 0    pantoprazole (PROTONIX) 40 MG tablet TAKE 1 TABLET (40 MG TOTAL) BY MOUTH ONCE DAILY. 30 tablet 1    pravastatin (PRAVACHOL) 80 MG tablet TAKE ONE TABLET BY MOUTH NIGHTLY AT BEDTIME 90 tablet 1    tamsulosin (FLOMAX) 0.4 mg Cp24 Take 1 capsule (0.4 mg total) by mouth once daily. 30 capsule 11     No current facility-administered medications on file prior to visit.        Review of Systems   Constitutional: Positive for appetite change and unexpected weight change. Negative for activity change and fever.        Lost 15 lbs over the past couple of months, but is gaining it back. This is attributed to difficulty swallowing and disc surgery. Appetite is increasing.    HENT: Positive for congestion.    Respiratory: Negative for cough and shortness of breath.    Cardiovascular: Negative for chest pain.   Gastrointestinal: Positive for nausea and vomiting. Negative for abdominal pain, constipation and diarrhea.   Genitourinary: Negative for difficulty urinating, dysuria and frequency.   Neurological: Negative for seizures and headaches.   Hematological: Does not bruise/bleed easily.       Objective:      Physical Exam   Constitutional: He is oriented to person, place, and time. He appears well-developed and well-nourished.   Cardiovascular: Normal rate, regular rhythm and normal heart sounds.    Pulmonary/Chest: Effort normal and breath sounds normal.   Abdominal: Soft. Bowel sounds are normal.   Genitourinary: Left testis shows mass and tenderness.   Genitourinary Comments: Tender at the  external ring bilaterally.    Neurological: He is alert and oriented to person, place, and time.   Skin: Skin is warm and dry.   Nursing note and vitals reviewed.      Assessment:       Bilateral inguinal pain. Tender at the external ring.      Plan:   Bilateral provacative ultrasound and scrotal and testicular ultrasound. Will call with results.

## 2017-06-23 NOTE — PROGRESS NOTES
Subjective:       Patient ID: Hi Rubio is a 57 y.o. male.     Chief Complaint: Consult ([facility])     HPI Mr. Rubio is S/P BIH repair 11/7/13 with Dr. Elena. Right indirect and direct repair and left direct repair with mesh. Patient says he is experiencing the same pain as before intermittently with the same onset as in 2013 that has been going on for about a month. Denies any strenuous activity prior to onset of the pain. Pain is worse when standing and walking. Denies bulge. Pain is worse on the left side. Denies diarrhea or constipation. Patient recently had 3 urinary tract infections that were treated by Dr. King. These infections exacerbated the problem. He states the left side of his scrotum was very inflamed and tender to touch and still is this way. He experienced some nausea and vomiting associated with this problem. Denies decrease in appetite.           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 -     Hyperlipidemia      Hypertension      Memory loss      Traumatic iritis - Left Eye 2/27/2013            Past Surgical History:   Procedure Laterality Date    CERVICAL FUSION   12/30/2015    COLONOSCOPY N/A 4/7/2017     Procedure: COLONOSCOPY;  Surgeon: Handy Frederick MD;  Location: [address]);  Service: Endoscopy;  Laterality: N/A;    HERNIA REPAIR                Family History   Problem Relation Age of Onset    Heart disease Mother      Hypertension Mother      Hyperlipidemia Mother       Heart disease Father      Hyperlipidemia Brother      Hypertension Brother      Hypertension Brother      Hyperlipidemia Brother      Benign prostatic hyperplasia Brother      Hypertension Brother      Hypertension Brother      Hepatitis Brother      Amblyopia Neg Hx      Blindness Neg Hx      Cancer Neg Hx      Cataracts Neg Hx      Diabetes Neg Hx      Glaucoma Neg Hx      Macular degeneration Neg Hx      Retinal detachment Neg Hx      Strabismus Neg Hx      Stroke Neg Hx      Thyroid disease Neg Hx         Social History    Social History            Social History    Marital status:        Spouse name: N/A    Number of children: N/A    Years of education: N/A          Occupational History    Not on file.             Social History Main Topics    Smoking status: Former Smoker       Packs/day: 1.00       Years: 30.00       Types: Cigarettes       Quit date: 12/13/2011    Smokeless tobacco: Never Used    Alcohol use 0.0 oz/week       15 - 16 Cans of beer per week         Comment: 2 beers per day    Drug use: No    Sexual activity: Yes       Partners: Female           Other Topics Concern    Not on file      Social History Narrative    No narrative on file                   Current Outpatient Prescriptions on File Prior to Visit   Medication Sig Dispense Refill    amlodipine (NORVASC) 5 MG tablet TAKE ONE TABLET BY MOUTH ONE TIME DAILY 90 tablet 3    aspirin 81 MG Chew Take 1 tablet (81 mg total) by mouth once daily.   0    CONTOUR NEXT STRIPS Strp 1 strip by Misc.(Non-Drug; Combo Route) route once daily. Test blood glucose once daily as instructed. 25 strip 11    finasteride (PROSCAR) 5 mg tablet Take 1 tablet (5 mg total) by mouth once daily. 30 tablet 11    hydrochlorothiazide (HYDRODIURIL) 25 MG tablet TAKE ONE TABLET BY MOUTH ONE TIME DAILY 90 tablet 1    hydrocodone-acetaminophen 5-325mg (NORCO) 5-325 mg per tablet Take 1 tablet by mouth every 6 (six) hours as needed  for Pain. 20 tablet 0    hydrOXYzine pamoate (VISTARIL) 25 MG Cap Take 1 capsule (25 mg total) by mouth every evening. 30 capsule 6    KRILL OIL ORAL Take by mouth once daily.        metoprolol succinate (TOPROL-XL) 100 MG 24 hr tablet TAKE ONE TABLET BY MOUTH ONE TIME DAILY 90 tablet 2    MICROLET LANCET Misc 1 lancet by Misc.(Non-Drug; Combo Route) route once daily. Test blood glucose once daily as instructed. 25 each 11    nitroGLYCERIN (NITROSTAT) 0.3 MG SL tablet Place 1 tablet (0.3 mg total) under the tongue every 5 (five) minutes as needed for Chest pain. 15 tablet 0    pantoprazole (PROTONIX) 40 MG tablet TAKE 1 TABLET (40 MG TOTAL) BY MOUTH ONCE DAILY. 30 tablet 1    pravastatin (PRAVACHOL) 80 MG tablet TAKE ONE TABLET BY MOUTH NIGHTLY AT BEDTIME 90 tablet 1    tamsulosin (FLOMAX) 0.4 mg Cp24 Take 1 capsule (0.4 mg total) by mouth once daily. 30 capsule 11      No current facility-administered medications on file prior to visit.          Review of Systems   Constitutional: Positive for appetite change and unexpected weight change. Negative for activity change and fever.        Lost 15 lbs over the past couple of months, but is gaining it back. This is attributed to difficulty swallowing and disc surgery. Appetite is increasing.    HENT: Positive for congestion.    Respiratory: Negative for cough and shortness of breath.    Cardiovascular: Negative for chest pain.   Gastrointestinal: Positive for nausea and vomiting. Negative for abdominal pain, constipation and diarrhea.   Genitourinary: Negative for difficulty urinating, dysuria and frequency.   Neurological: Negative for seizures and headaches.   Hematological: Does not bruise/bleed easily.       Objective:      Physical Exam   Constitutional: He is oriented to person, place, and time. He appears well-developed and well-nourished.   Cardiovascular: Normal rate, regular rhythm and normal heart sounds.    Pulmonary/Chest: Effort normal and breath  sounds normal.   Abdominal: Soft. Bowel sounds are normal.   Genitourinary: Left testis shows mass and tenderness.   Genitourinary Comments: Tender at the external ring bilaterally.    Neurological: He is alert and oriented to person, place, and time.   Skin: Skin is warm and dry.   Nursing note and vitals reviewed.      Assessment:       Bilateral inguinal pain. Tender at the external ring.       Plan:   Bilateral provacative ultrasound and scrotal and testicular ultrasound. Will call with results.

## 2017-06-26 ENCOUNTER — HOSPITAL ENCOUNTER (OUTPATIENT)
Dept: RADIOLOGY | Facility: HOSPITAL | Age: 57
Discharge: HOME | End: 2017-06-26
Attending: SURGERY
Payer: COMMERCIAL

## 2017-06-26 DIAGNOSIS — N49.2 SCROTAL NODULE: ICD-10-CM

## 2017-06-26 DIAGNOSIS — R10.30 GROIN PAIN, UNSPECIFIED LATERALITY: ICD-10-CM

## 2017-06-26 PROCEDURE — 76870 US EXAM SCROTUM: CPT | Mod: 26,,, | Performed by: RADIOLOGY

## 2017-06-26 PROCEDURE — 76705 ECHO EXAM OF ABDOMEN: CPT | Mod: TC

## 2017-06-26 PROCEDURE — 76705 ECHO EXAM OF ABDOMEN: CPT | Mod: 26,,, | Performed by: RADIOLOGY

## 2017-06-26 PROCEDURE — 76870 US EXAM SCROTUM: CPT | Mod: TC

## 2017-06-29 ENCOUNTER — TELEPHONE (OUTPATIENT)
Dept: SURGERY | Facility: CLINIC | Age: 57
End: 2017-06-29

## 2017-07-05 ENCOUNTER — OFFICE VISIT (OUTPATIENT)
Dept: SURGERY | Facility: CLINIC | Age: 57
End: 2017-07-05
Payer: COMMERCIAL

## 2017-07-05 VITALS
HEIGHT: 76 IN | DIASTOLIC BLOOD PRESSURE: 84 MMHG | HEART RATE: 72 BPM | TEMPERATURE: 99 F | WEIGHT: 193 LBS | SYSTOLIC BLOOD PRESSURE: 161 MMHG | BODY MASS INDEX: 23.5 KG/M2

## 2017-07-05 DIAGNOSIS — Z01.818 PREOP EXAMINATION: ICD-10-CM

## 2017-07-05 DIAGNOSIS — K40.90 LEFT INGUINAL HERNIA: Primary | ICD-10-CM

## 2017-07-05 PROCEDURE — 99213 OFFICE O/P EST LOW 20 MIN: CPT | Mod: S$GLB,,, | Performed by: SURGERY

## 2017-07-05 PROCEDURE — 99999 PR PBB SHADOW E&M-EST. PATIENT-LVL III: CPT | Mod: PBBFAC,,, | Performed by: SURGERY

## 2017-07-05 RX ORDER — LIDOCAINE HYDROCHLORIDE 10 MG/ML
1 INJECTION, SOLUTION EPIDURAL; INFILTRATION; INTRACAUDAL; PERINEURAL ONCE
Status: CANCELLED | OUTPATIENT
Start: 2017-07-05 | End: 2017-07-05

## 2017-07-05 NOTE — PROGRESS NOTES
Subjective:       Patient ID: Hi Rubio is a 57 y.o. male.     Chief Complaint: Consult ([facility])     HPI Mr. Rubio is S/P BIH repair 11/7/13 with Dr. Elena. Right indirect and direct repair and left direct repair with mesh. Patient says he is experiencing the same pain as before intermittently with the same onset as in 2013 that has been going on for about a month. Denies any strenuous activity prior to onset of the pain. Pain is worse when standing and walking. Denies bulge. Pain is worse on the left side. Denies diarrhea or constipation. Patient recently had 3 urinary tract infections that were treated by Dr. King. These infections exacerbated the problem. He states the left side of his scrotum was very inflamed and tender to touch and still is this way. He experienced some nausea and vomiting associated with this problem. Denies decrease in appetite.           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 -     Hyperlipidemia      Hypertension      Memory loss      Traumatic iritis - Left Eye 2/27/2013            Past Surgical History:   Procedure Laterality Date    CERVICAL FUSION   12/30/2015    COLONOSCOPY N/A 4/7/2017     Procedure: COLONOSCOPY;  Surgeon: Handy Frederick MD;  Location: [address]);  Service: Endoscopy;  Laterality: N/A;    HERNIA REPAIR                Family History   Problem Relation Age of Onset    Heart disease Mother      Hypertension Mother      Hyperlipidemia Mother       Heart disease Father      Hyperlipidemia Brother      Hypertension Brother      Hypertension Brother      Hyperlipidemia Brother      Benign prostatic hyperplasia Brother      Hypertension Brother      Hypertension Brother      Hepatitis Brother      Amblyopia Neg Hx      Blindness Neg Hx      Cancer Neg Hx      Cataracts Neg Hx      Diabetes Neg Hx      Glaucoma Neg Hx      Macular degeneration Neg Hx      Retinal detachment Neg Hx      Strabismus Neg Hx      Stroke Neg Hx      Thyroid disease Neg Hx         Social History    Social History            Social History    Marital status:        Spouse name: N/A    Number of children: N/A    Years of education: N/A          Occupational History    Not on file.             Social History Main Topics    Smoking status: Former Smoker       Packs/day: 1.00       Years: 30.00       Types: Cigarettes       Quit date: 12/13/2011    Smokeless tobacco: Never Used    Alcohol use 0.0 oz/week       15 - 16 Cans of beer per week         Comment: 2 beers per day    Drug use: No    Sexual activity: Yes       Partners: Female           Other Topics Concern    Not on file          Social History Narrative    No narrative on file                   Current Outpatient Prescriptions on File Prior to Visit   Medication Sig Dispense Refill    amlodipine (NORVASC) 5 MG tablet TAKE ONE TABLET BY MOUTH ONE TIME DAILY 90 tablet 3    aspirin 81 MG Chew Take 1 tablet (81 mg total) by mouth once daily.   0    CONTOUR NEXT STRIPS Strp 1 strip by Misc.(Non-Drug; Combo Route) route once daily. Test blood glucose once daily as instructed. 25 strip 11    finasteride (PROSCAR) 5 mg tablet Take 1 tablet (5 mg total) by mouth once daily. 30 tablet 11    hydrochlorothiazide (HYDRODIURIL) 25 MG tablet TAKE ONE TABLET BY MOUTH ONE TIME DAILY 90 tablet 1    hydrocodone-acetaminophen 5-325mg (NORCO) 5-325 mg per tablet Take 1 tablet by mouth every 6 (six) hours as  needed for Pain. 20 tablet 0    hydrOXYzine pamoate (VISTARIL) 25 MG Cap Take 1 capsule (25 mg total) by mouth every evening. 30 capsule 6    KRILL OIL ORAL Take by mouth once daily.        metoprolol succinate (TOPROL-XL) 100 MG 24 hr tablet TAKE ONE TABLET BY MOUTH ONE TIME DAILY 90 tablet 2    MICROLET LANCET Misc 1 lancet by Misc.(Non-Drug; Combo Route) route once daily. Test blood glucose once daily as instructed. 25 each 11    nitroGLYCERIN (NITROSTAT) 0.3 MG SL tablet Place 1 tablet (0.3 mg total) under the tongue every 5 (five) minutes as needed for Chest pain. 15 tablet 0    pantoprazole (PROTONIX) 40 MG tablet TAKE 1 TABLET (40 MG TOTAL) BY MOUTH ONCE DAILY. 30 tablet 1    pravastatin (PRAVACHOL) 80 MG tablet TAKE ONE TABLET BY MOUTH NIGHTLY AT BEDTIME 90 tablet 1    tamsulosin (FLOMAX) 0.4 mg Cp24 Take 1 capsule (0.4 mg total) by mouth once daily. 30 capsule 11      No current facility-administered medications on file prior to visit.          Review of Systems   Constitutional: Positive for appetite change and unexpected weight change. Negative for activity change and fever.        Lost 15 lbs over the past couple of months, but is gaining it back. This is attributed to difficulty swallowing and disc surgery. Appetite is increasing.    HENT: Positive for congestion.    Respiratory: Negative for cough and shortness of breath.    Cardiovascular: Negative for chest pain.   Gastrointestinal: Positive for nausea and vomiting. Negative for abdominal pain, constipation and diarrhea.   Genitourinary: Negative for difficulty urinating, dysuria and frequency.   Neurological: Negative for seizures and headaches.   Hematological: Does not bruise/bleed easily.       Objective:      Physical Exam   Constitutional: He is oriented to person, place, and time. He appears well-developed and well-nourished.   Cardiovascular: Normal rate, regular rhythm and normal heart sounds.    Pulmonary/Chest: Effort normal and  breath sounds normal.   Abdominal: Soft. Bowel sounds are normal.   Genitourinary: Left testis shows mass and tenderness.   Genitourinary Comments: Tender at the external ring bilaterally.    Neurological: He is alert and oriented to person, place, and time.   Skin: Skin is warm and dry.   Nursing note and vitals reviewed.      Assessment:       Left inguinal pain. Tender at the external ring.       Plan:   Provacative ultrasound did reveal small left sided inguinal hernia    Offered left inguinal hernia repair, open  He is interested in having this done before the pain gets significantly worse.   If it improves he will call and cancel surgery.    I have personally taken the history and examined this patient and agree with the resident's note as stated above.        Hernando Elena MD

## 2017-07-10 ENCOUNTER — LAB VISIT (OUTPATIENT)
Dept: LAB | Facility: HOSPITAL | Age: 57
End: 2017-07-10
Attending: INTERNAL MEDICINE
Payer: COMMERCIAL

## 2017-07-10 DIAGNOSIS — N39.0 URINARY TRACT INFECTION WITHOUT HEMATURIA, SITE UNSPECIFIED: ICD-10-CM

## 2017-07-10 LAB
BACTERIA #/AREA URNS AUTO: NORMAL /HPF
BILIRUB UR QL STRIP: NEGATIVE
CLARITY UR REFRACT.AUTO: CLEAR
COLOR UR AUTO: YELLOW
GLUCOSE UR QL STRIP: ABNORMAL
HGB UR QL STRIP: ABNORMAL
KETONES UR QL STRIP: NEGATIVE
LEUKOCYTE ESTERASE UR QL STRIP: ABNORMAL
MICROSCOPIC COMMENT: NORMAL
NITRITE UR QL STRIP: NEGATIVE
PH UR STRIP: 6 [PH] (ref 5–8)
PROT UR QL STRIP: NEGATIVE
RBC #/AREA URNS AUTO: 0 /HPF (ref 0–4)
SP GR UR STRIP: 1.01 (ref 1–1.03)
URN SPEC COLLECT METH UR: ABNORMAL
UROBILINOGEN UR STRIP-ACNC: NEGATIVE EU/DL
WBC #/AREA URNS AUTO: 0 /HPF (ref 0–5)

## 2017-07-10 PROCEDURE — 81001 URINALYSIS AUTO W/SCOPE: CPT

## 2017-07-25 ENCOUNTER — TELEPHONE (OUTPATIENT)
Dept: SURGERY | Facility: CLINIC | Age: 57
End: 2017-07-25

## 2017-07-25 NOTE — TELEPHONE ENCOUNTER
----- Message from Evelina Kim sent at 7/25/2017 12:46 PM CDT -----  Contact: self  Pt states he needs to speak with nurse in ref to canceling surgery.Please call Hi rodriguez @ [phone].Thank you.

## 2017-07-27 RX ORDER — PANTOPRAZOLE SODIUM 40 MG/1
TABLET, DELAYED RELEASE ORAL
Qty: 30 TABLET | Refills: 3 | Status: SHIPPED | OUTPATIENT
Start: 2017-07-27 | End: 2017-11-14 | Stop reason: SDUPTHER

## 2017-08-01 ENCOUNTER — OFFICE VISIT (OUTPATIENT)
Dept: INTERNAL MEDICINE | Facility: CLINIC | Age: 57
End: 2017-08-01
Payer: COMMERCIAL

## 2017-08-01 VITALS
HEIGHT: 76 IN | SYSTOLIC BLOOD PRESSURE: 140 MMHG | DIASTOLIC BLOOD PRESSURE: 80 MMHG | TEMPERATURE: 98 F | BODY MASS INDEX: 23.97 KG/M2 | RESPIRATION RATE: 16 BRPM | HEART RATE: 62 BPM | WEIGHT: 196.88 LBS

## 2017-08-01 DIAGNOSIS — I10 ESSENTIAL HYPERTENSION: ICD-10-CM

## 2017-08-01 DIAGNOSIS — E11.9 TYPE 2 DIABETES MELLITUS WITHOUT COMPLICATION, WITHOUT LONG-TERM CURRENT USE OF INSULIN: Primary | ICD-10-CM

## 2017-08-01 PROCEDURE — 3008F BODY MASS INDEX DOCD: CPT | Mod: S$GLB,,, | Performed by: INTERNAL MEDICINE

## 2017-08-01 PROCEDURE — 3044F HG A1C LEVEL LT 7.0%: CPT | Mod: S$GLB,,, | Performed by: INTERNAL MEDICINE

## 2017-08-01 PROCEDURE — 3079F DIAST BP 80-89 MM HG: CPT | Mod: S$GLB,,, | Performed by: INTERNAL MEDICINE

## 2017-08-01 PROCEDURE — 99213 OFFICE O/P EST LOW 20 MIN: CPT | Mod: S$GLB,,, | Performed by: INTERNAL MEDICINE

## 2017-08-01 PROCEDURE — 3077F SYST BP >= 140 MM HG: CPT | Mod: S$GLB,,, | Performed by: INTERNAL MEDICINE

## 2017-08-01 PROCEDURE — 99999 PR PBB SHADOW E&M-EST. PATIENT-LVL IV: CPT | Mod: PBBFAC,,, | Performed by: INTERNAL MEDICINE

## 2017-08-01 NOTE — PROGRESS NOTES
CC: followup of hypertension  HPI:  The patient is a 57 y.o. year old male who presents to the office for followup of hypertension.  The patient denies any chest pain, shortness of breath, blurred vision, excessive fatigue, nausea or vomiting, but complains of headaches.  He reports feeling fatigued in the middle of the day.  Ambulatory blood pressures and blood sugars have been okay.    PAST MEDICAL HISTORY:  Past Medical History:   Diagnosis Date    Carotid artery occlusion     Diabetes mellitus, type 2     DJD (degenerative joint disease), cervical 7/10/2015    History of iritis 2013    hx traumatic iritis - mayr gras beads to the eye -     Hyperlipidemia     Hypertension     Memory loss     Traumatic iritis - Left Eye 2/27/2013       SURGICAL HISTORY:  Past Surgical History:   Procedure Laterality Date    CERVICAL FUSION  12/30/2015    COLONOSCOPY N/A 4/7/2017    Procedure: COLONOSCOPY;  Surgeon: Handy Frederick MD;  Location: [facility] ([address] [place] [address]);  Service: Endoscopy;  Laterality: N/A;    HERNIA REPAIR         MEDS:  Medcard reviewed and updated    ALLERGIES: Allergy Card reviewed and updated    SOCIAL HISTORY:   The patient is a nonsmoker.    PE:   APPEARANCE: Well nourished, well developed, in no acute distress.    CHEST: Lungs clear to auscultation with unlabored respirations.  CARDIOVASCULAR: Normal S1, S2. No murmurs. No carotid bruits. No pedal edema.  ABDOMEN: Bowel sounds normal. Not distended. Soft. No tenderness or masses.   PSYCHIATRIC: The patient is oriented to person, place, and time and has a pleasant affect.        ASSESSMENT/PLAN:  Hi was seen today for follow-up.    Diagnoses and all orders for this visit:    Type 2 diabetes mellitus without complication, without long-term current use of insulin  -     Hemoglobin A1c; Future    Essential hypertension  -     Blood pressure is mildly elevated  -     Continue current medication and monitor  -     Keep upcoming appointment  with cardioology    Answers for HPI/ROS submitted by the patient on 7/31/2017   activity change: Yes  eye discharge: Yes  wheezing: No  chest pain: No  palpitations: No  constipation: No  vomiting: No  diarrhea: No  difficulty urinating: No  hematuria: No  headaches: Yes  dysphoric mood: No

## 2017-08-07 ENCOUNTER — NURSE TRIAGE (OUTPATIENT)
Dept: ADMINISTRATIVE | Facility: CLINIC | Age: 57
End: 2017-08-07

## 2017-08-07 NOTE — TELEPHONE ENCOUNTER
"Pt reports that he feels like his heart is racing. Has not checked his pulse. He states that he just recently changed his medicine and feels it may be related to that. Denies difficulty breathing or chest pain. "I feel like my heart is beating hard and steady".     Please contact patient to advise    Reason for Disposition   Nursing judgment    Protocols used: ST NO PROTOCOL CALL: INFORMATION ONLY-A-[place]      "

## 2017-08-08 ENCOUNTER — TELEPHONE (OUTPATIENT)
Dept: INTERNAL MEDICINE | Facility: CLINIC | Age: 57
End: 2017-08-08

## 2017-08-08 ENCOUNTER — PATIENT MESSAGE (OUTPATIENT)
Dept: INTERNAL MEDICINE | Facility: CLINIC | Age: 57
End: 2017-08-08

## 2017-08-08 NOTE — TELEPHONE ENCOUNTER
----- Message from Ines Villagomez sent at 8/7/2017 10:47 AM CDT -----  Contact: Pt [phone]  Pt spoke to the on call nurse this morning about his racing heart and they told him when he got his pulse to call back and speak with the nurse or Dr Kendall. HE states the last time he took his pulse it was 101 and blood pressure 170/101. Pt would like a call back to know if he needs to change his medication or where to go from here.

## 2017-08-08 NOTE — TELEPHONE ENCOUNTER
Called the patient again to discuss his concerns and there was no answer. The information has been forwarded to PCP for review  LMOM

## 2017-08-16 RX ORDER — HYDROCHLOROTHIAZIDE 25 MG/1
TABLET ORAL
Qty: 90 TABLET | Refills: 1 | Status: SHIPPED | OUTPATIENT
Start: 2017-08-16 | End: 2018-02-06 | Stop reason: SDUPTHER

## 2017-08-18 ENCOUNTER — LAB VISIT (OUTPATIENT)
Dept: LAB | Facility: HOSPITAL | Age: 57
End: 2017-08-18
Attending: INTERNAL MEDICINE
Payer: COMMERCIAL

## 2017-08-18 DIAGNOSIS — I10 ESSENTIAL HYPERTENSION: ICD-10-CM

## 2017-08-18 DIAGNOSIS — E11.9 TYPE 2 DIABETES MELLITUS WITHOUT COMPLICATION, WITHOUT LONG-TERM CURRENT USE OF INSULIN: ICD-10-CM

## 2017-08-18 DIAGNOSIS — E78.2 MIXED HYPERLIPIDEMIA: ICD-10-CM

## 2017-08-18 LAB
ALT SERPL W/O P-5'-P-CCNC: 24 U/L
ANION GAP SERPL CALC-SCNC: 9 MMOL/L
AST SERPL-CCNC: 20 U/L
BUN SERPL-MCNC: 12 MG/DL
CALCIUM SERPL-MCNC: 9.8 MG/DL
CHLORIDE SERPL-SCNC: 104 MMOL/L
CHOLEST/HDLC SERPL: 4.7 {RATIO}
CO2 SERPL-SCNC: 27 MMOL/L
CREAT SERPL-MCNC: 1 MG/DL
EST. GFR  (AFRICAN AMERICAN): >60 ML/MIN/1.73 M^2
EST. GFR  (NON AFRICAN AMERICAN): >60 ML/MIN/1.73 M^2
ESTIMATED AVG GLUCOSE: 128 MG/DL
GLUCOSE SERPL-MCNC: 129 MG/DL
HBA1C MFR BLD HPLC: 6.1 %
HDL/CHOLESTEROL RATIO: 21.1 %
HDLC SERPL-MCNC: 199 MG/DL
HDLC SERPL-MCNC: 42 MG/DL
LDLC SERPL CALC-MCNC: 113 MG/DL
NONHDLC SERPL-MCNC: 157 MG/DL
POTASSIUM SERPL-SCNC: 3.8 MMOL/L
SODIUM SERPL-SCNC: 140 MMOL/L
TRIGL SERPL-MCNC: 220 MG/DL

## 2017-08-18 PROCEDURE — 80061 LIPID PANEL: CPT

## 2017-08-18 PROCEDURE — 83036 HEMOGLOBIN GLYCOSYLATED A1C: CPT

## 2017-08-18 PROCEDURE — 80048 BASIC METABOLIC PNL TOTAL CA: CPT

## 2017-08-18 PROCEDURE — 84460 ALANINE AMINO (ALT) (SGPT): CPT

## 2017-08-18 PROCEDURE — 84450 TRANSFERASE (AST) (SGOT): CPT

## 2017-08-18 PROCEDURE — 36415 COLL VENOUS BLD VENIPUNCTURE: CPT | Mod: PO

## 2017-08-24 NOTE — PROGRESS NOTES
"Mr. Rubio is a patient of Dr. Palacios and was last seen in [facility] 1/20/2017.      Subjective:   Patient ID:  Hi Rubio is a 57 y.o. male who presents for follow-up of Hypertension  .   Problem List:  HTN  Hyperlipidemia  PVC  Family h/o CAD  Coronary calcification    HPI:     Mr. Rubio is a 58yo male with HTN, HLD, PVCs, and coronary calcium within the LAD (per chest CT), possible subclavian steal syndrome here for follow up.  He deferred cervical spinal fusion surgery for now. He says that he has been experiencing daily headaches for the past month and some muscular pain around his neck. He is fatigued. He also says that his BP has been labile throughout the day, increasing as high as the 180s systolic. He has not had chest pain since January. He says that he will occasionally experience an extra beat. He has not experienced "heart racing" since January. Mr. Ruibo denies SOB, HE, dizziness, syncope, leg edema, claudication, PND, or orthopnea. He was lifting weights and running 4 miles a day on the treadmill but stopped on 8/1/2017 once his headaches started. He has one [id number]. He is currently taking amlodipine 5mg, ASA 81mg, HCTZ 25mg, metoprolol 100mg, and pravastatin 80mg.  on 8/18/2017. Creatinine is 1. Hepatic transaminases are within normal limits.     Recent Cardiac Tests:    DSE (1/23/2017):  CONCLUSIONS     1 - Normal left ventricular systolic function (EF 60-65%).     2 - Normal left ventricular diastolic function.     3 - Normal right ventricular systolic function .   No evidence of stress induced myocardial ischemia.     Current Outpatient Prescriptions   Medication Sig    amlodipine (NORVASC) 5 MG tablet TAKE ONE TABLET BY MOUTH ONE TIME DAILY    aspirin 81 MG Chew Take 1 tablet (81 mg total) by mouth once daily.    CONTOUR NEXT STRIPS Strp 1 strip by Misc.(Non-Drug; Combo Route) route once daily. Test blood glucose once daily as instructed.    finasteride " (PROSCAR) 5 mg tablet Take 1 tablet (5 mg total) by mouth once daily.    hydrochlorothiazide (HYDRODIURIL) 25 MG tablet TAKE ONE TABLET BY MOUTH ONE TIME DAILY    hydrocodone-acetaminophen 5-325mg (NORCO) 5-325 mg per tablet Take 1 tablet by mouth every 6 (six) hours as needed for Pain.    hydrOXYzine pamoate (VISTARIL) 25 MG Cap Take 1 capsule (25 mg total) by mouth every evening.    KRILL OIL ORAL Take by mouth once daily.    metoprolol succinate (TOPROL-XL) 100 MG 24 hr tablet TAKE ONE TABLET BY MOUTH ONE TIME DAILY    MICROLET LANCET Misc 1 lancet by Misc.(Non-Drug; Combo Route) route once daily. Test blood glucose once daily as instructed.    nitroGLYCERIN (NITROSTAT) 0.3 MG SL tablet Place 1 tablet (0.3 mg total) under the tongue every 5 (five) minutes as needed for Chest pain.    pantoprazole (PROTONIX) 40 MG tablet TAKE 1 TABLET (40 MG TOTAL) BY MOUTH ONCE DAILY.    pravastatin (PRAVACHOL) 80 MG tablet TAKE ONE TABLET BY MOUTH NIGHTLY AT BEDTIME    tamsulosin (FLOMAX) 0.4 mg Cp24 Take 1 capsule (0.4 mg total) by mouth once daily.     No current facility-administered medications for this visit.        Review of Systems   Constitution: Positive for malaise/fatigue.   HENT: Positive for headaches.    Eyes: Negative for blurred vision.   Cardiovascular: Positive for irregular heartbeat and palpitations. Negative for chest pain, claudication, dyspnea on exertion, leg swelling, orthopnea, paroxysmal nocturnal dyspnea and syncope.   Respiratory: Negative for shortness of breath.    Hematologic/Lymphatic: Negative for bleeding problem.   Skin: Negative for rash.   Musculoskeletal: Positive for neck pain. Negative for myalgias.   Gastrointestinal: Negative for abdominal pain, constipation, diarrhea and nausea.   Genitourinary: Positive for frequency and hematuria.   Neurological: Negative for loss of balance and numbness.   Psychiatric/Behavioral: Negative for altered mental status.   Allergic/Immunologic:  "Negative for persistent infections.     Objective:          /72   Pulse 76   Ht 6' 3" (1.905 m)   Wt 90.6 kg (199 lb 13.6 oz)   BMI 24.98 kg/m²     Physical Exam   Constitutional: He is oriented to person, place, and time. He appears well-developed and well-nourished.   HENT:   Head: Normocephalic.   Nose: Nose normal.   Eyes: Pupils are equal, round, and reactive to light.   Neck: No JVD present. Carotid bruit is not present.   Cardiovascular: Normal rate, regular rhythm, S1 normal and S2 normal.  Exam reveals no gallop.    No murmur heard.  Pulses:       Carotid pulses are 2+ on the right side, and 2+ on the left side.       Radial pulses are 2+ on the right side, and 2+ on the left side.        Dorsalis pedis pulses are 2+ on the right side, and 2+ on the left side.   Pulmonary/Chest: Breath sounds normal. No respiratory distress.   Abdominal: Soft. Bowel sounds are normal. He exhibits no distension. There is no tenderness.   Musculoskeletal: Normal range of motion. He exhibits no edema.   Neurological: He is alert and oriented to person, place, and time.   Skin: Skin is warm and dry. No erythema.   Psychiatric: He has a normal mood and affect. His speech is normal and behavior is normal.   Nursing note and vitals reviewed.    Lab Results   Component Value Date     08/18/2017    K 3.8 08/18/2017    MG 2.4 01/23/2017     08/18/2017    CO2 27 08/18/2017    BUN 12 08/18/2017    CREATININE 1.0 08/18/2017     (H) 08/18/2017    HGBA1C 6.1 (H) 08/18/2017    AST 20 08/18/2017    ALT 24 08/18/2017    ALBUMIN 3.7 01/23/2017    PROT 6.9 01/23/2017    BILITOT 0.7 01/23/2017    WBC 4.83 05/01/2017    HGB 14.7 05/01/2017    HCT 43.4 05/01/2017    MCV 85 05/01/2017     (H) 05/01/2017    TSH 3.152 05/01/2017    CHOL 199 08/18/2017    HDL 42 08/18/2017    LDLCALC 113.0 08/18/2017    TRIG 220 (H) 08/18/2017         Recent Labs  Lab 12/22/15  1521 12/30/15  0546 01/21/17  1734   INR 1.0 1.0 1.1 "        Test(s) Reviewed  I have reviewed the following in detail:  [] Stress test   [] Angiography   [] Echocardiogram   [x] Labs   [x] Other:  EKG     Assessment:         1. Coronary artery disease due to calcified coronary lesion. On ASA and statin. Patient has no angina, SOB or other signs of ischemia. No unstable arrhythmia. EKG shows no significant changes. No symptoms of heart failure. Discussed importance of regular exercise and heart healthy diet.      2. Essential hypertension. Labile per reports of home measures. Will increase amlodipine to 10mg daily and add PRN clonidine to regimen.     3. Mixed hyperlipidemia.  on pravastatin 80. Will change to rosuvastatin 20mg for more optimal LDL control. Patient had muscle aches on atorvastatin.      4. Peripheral vascular disease. On ASA and statin. Denies claudication.     Plan:     Hi was seen today for hypertension.    Diagnoses and all orders for this visit:    Coronary artery disease due to calcified coronary lesion  -     rosuvastatin (CRESTOR) 20 MG tablet; Take 1 tablet (20 mg total) by mouth once daily.  -     Lipid panel; Future; Expected date: 11/23/2017  -     AST (SGOT); Future; Expected date: 11/25/2017  -     ALT (SGPT); Future; Expected date: 11/25/2017    Essential hypertension  -     EKG 12-lead  -     amlodipine (NORVASC) 10 MG tablet; Take 1 tablet (10 mg total) by mouth once daily.  -     cloNIDine (CATAPRES) 0.1 MG tablet; Take 1 tablet (0.1 mg total) by mouth once as needed (For blood pressures above 170 systolic - call clinic if pressure not resolved after 30 minutes).    Mixed hyperlipidemia  -     rosuvastatin (CRESTOR) 20 MG tablet; Take 1 tablet (20 mg total) by mouth once daily.  -     Lipid panel; Future; Expected date: 11/23/2017  -     AST (SGOT); Future; Expected date: 11/25/2017  -     ALT (SGPT); Future; Expected date: 11/25/2017    Peripheral vascular disease      Increase amlodipine from 5mg to 10mg daily.  Can take  one clonidine 0.1mg tablet for blood pressure above 170 - if blood pressure still high after 30 minutes then call clinic.  Stop pravastatin.  Start rosuvastatin (crestor) 20mg daily.  Blood test in 3 months.  Return to clinic in one year.    Return in about 1 year (around 8/25/2018).    ------------------------------------------------------------------    VIOLA Perera, NP-C  Consult Cardiology

## 2017-08-25 ENCOUNTER — OFFICE VISIT (OUTPATIENT)
Dept: CARDIOLOGY | Facility: CLINIC | Age: 57
End: 2017-08-25
Payer: COMMERCIAL

## 2017-08-25 VITALS
DIASTOLIC BLOOD PRESSURE: 72 MMHG | WEIGHT: 199.88 LBS | HEART RATE: 76 BPM | HEIGHT: 75 IN | BODY MASS INDEX: 24.85 KG/M2 | SYSTOLIC BLOOD PRESSURE: 130 MMHG

## 2017-08-25 DIAGNOSIS — I25.84 CORONARY ARTERY DISEASE DUE TO CALCIFIED CORONARY LESION: Primary | ICD-10-CM

## 2017-08-25 DIAGNOSIS — I10 ESSENTIAL HYPERTENSION: ICD-10-CM

## 2017-08-25 DIAGNOSIS — I73.9 PERIPHERAL VASCULAR DISEASE: ICD-10-CM

## 2017-08-25 DIAGNOSIS — E78.2 MIXED HYPERLIPIDEMIA: ICD-10-CM

## 2017-08-25 DIAGNOSIS — E78.5 HYPERLIPIDEMIA: ICD-10-CM

## 2017-08-25 DIAGNOSIS — I25.10 CORONARY ARTERY DISEASE DUE TO CALCIFIED CORONARY LESION: Primary | ICD-10-CM

## 2017-08-25 DIAGNOSIS — Z82.49 FAMILY HISTORY OF PREMATURE CAD: ICD-10-CM

## 2017-08-25 PROCEDURE — 3075F SYST BP GE 130 - 139MM HG: CPT | Mod: S$GLB,,, | Performed by: NURSE PRACTITIONER

## 2017-08-25 PROCEDURE — 3008F BODY MASS INDEX DOCD: CPT | Mod: S$GLB,,, | Performed by: NURSE PRACTITIONER

## 2017-08-25 PROCEDURE — 99214 OFFICE O/P EST MOD 30 MIN: CPT | Mod: S$GLB,,, | Performed by: NURSE PRACTITIONER

## 2017-08-25 PROCEDURE — 93000 ELECTROCARDIOGRAM COMPLETE: CPT | Mod: S$GLB,,, | Performed by: INTERNAL MEDICINE

## 2017-08-25 PROCEDURE — 3078F DIAST BP <80 MM HG: CPT | Mod: S$GLB,,, | Performed by: NURSE PRACTITIONER

## 2017-08-25 PROCEDURE — 99999 PR PBB SHADOW E&M-EST. PATIENT-LVL III: CPT | Mod: PBBFAC,,, | Performed by: NURSE PRACTITIONER

## 2017-08-25 RX ORDER — ROSUVASTATIN CALCIUM 20 MG/1
20 TABLET, COATED ORAL DAILY
Qty: 90 TABLET | Refills: 3 | Status: SHIPPED | OUTPATIENT
Start: 2017-08-25 | End: 2017-09-05

## 2017-08-25 RX ORDER — CLONIDINE HYDROCHLORIDE 0.1 MG/1
0.1 TABLET ORAL ONCE AS NEEDED
Qty: 60 TABLET | Refills: 11 | Status: SHIPPED | OUTPATIENT
Start: 2017-08-25 | End: 2018-03-16

## 2017-08-25 RX ORDER — AMLODIPINE BESYLATE 10 MG/1
10 TABLET ORAL DAILY
Qty: 30 TABLET | Refills: 11 | Status: SHIPPED | OUTPATIENT
Start: 2017-08-25 | End: 2017-09-05

## 2017-08-25 RX ORDER — PRAVASTATIN SODIUM 80 MG/1
TABLET ORAL
Qty: 90 TABLET | Refills: 1 | Status: SHIPPED | OUTPATIENT
Start: 2017-08-25 | End: 2018-06-08 | Stop reason: SDUPTHER

## 2017-08-25 NOTE — PATIENT INSTRUCTIONS
Increase amlodipine from 5mg to 10mg daily.  Can take one clonidine 0.1mg tablet for blood pressure above 170 - if blood pressure still high after 30 minutes then call clinic.  Stop pravastatin.  Start rosuvastatin (crestor) 20mg daily.  Blood test in 3 months.  Return to clinic in one year.

## 2017-08-25 NOTE — LETTER
August 25, 2017      No Palacios MD  [address] [facility]  [address]th Floor - Cardiology  [place] [address]           [place] - Cardiology  [address] [facility]  [place] [address]  Phone: [phone]          Patient: Hi Rubio   MR Number: [id number]   YOB: 1960   Date of Visit: 8/25/2017       Dear Dr. No Palacios:    Thank you for referring Hi Rubio to me for evaluation. Attached you will find relevant portions of my assessment and plan of care.    If you have questions, please do not hesitate to call me. I look forward to following Hi Rubio along with you.    Sincerely,    Isha Azar, NP    Enclosure  CC:  No Recipients    If you would like to receive this communication electronically, please contact [email or web address] or [phone] to request more information on [employer] Link access.    For providers and/or their staff who would like to refer a patient to [facility], please contact us through our one-stop-shop provider referral line, [facility], at [phone].    If you feel you have received this communication in error or would no longer like to receive these types of communications, please e-mail [email or web address]

## 2017-08-28 RX ORDER — HYDROXYZINE PAMOATE 25 MG/1
25 CAPSULE ORAL NIGHTLY
Qty: 30 CAPSULE | Refills: 6 | Status: SHIPPED | OUTPATIENT
Start: 2017-08-28 | End: 2018-01-02

## 2017-09-05 ENCOUNTER — TELEPHONE (OUTPATIENT)
Dept: CARDIOLOGY | Facility: CLINIC | Age: 57
End: 2017-09-05

## 2017-09-05 ENCOUNTER — DOCUMENTATION ONLY (OUTPATIENT)
Dept: CARDIOLOGY | Facility: CLINIC | Age: 57
End: 2017-09-05

## 2017-09-05 DIAGNOSIS — R22.0 LIP SWELLING: ICD-10-CM

## 2017-09-05 NOTE — TELEPHONE ENCOUNTER
Tell him to stop both meds. Refer to Allergy. Patient can see the doctor or NP at [facility] or at [facility]. Take a pic and bring it. Also make a list of foods that he ate the day before and the day of the event.

## 2017-09-05 NOTE — TELEPHONE ENCOUNTER
Pt called stating that his top lip swelled on Saturday after taking Amlodipine and Crestor. Pt stated that he stopped taking the medications.  Pt stated that the swelling is a little better today and is painful. Pt stated he took baenadaryl this morning and he thinks it is helping.  Pt would like to know what he should do. Please advise.

## 2017-09-20 ENCOUNTER — PATIENT MESSAGE (OUTPATIENT)
Dept: INTERNAL MEDICINE | Facility: CLINIC | Age: 57
End: 2017-09-20

## 2017-09-20 RX ORDER — ALPRAZOLAM 0.25 MG/1
0.25 TABLET ORAL 3 TIMES DAILY
Qty: 90 TABLET | Refills: 0 | Status: SHIPPED | OUTPATIENT
Start: 2017-09-20 | End: 2017-12-18 | Stop reason: SDUPTHER

## 2017-09-20 NOTE — TELEPHONE ENCOUNTER
Please inform patient that urine culture is positive for UTI and is sensitive to the Augmentin prescribed.   Vaccine Information Sheet (VIS) provided-VIS date: 8/07/15

## 2017-09-28 ENCOUNTER — OFFICE VISIT (OUTPATIENT)
Dept: UROLOGY | Facility: CLINIC | Age: 57
End: 2017-09-28
Payer: COMMERCIAL

## 2017-09-28 VITALS
SYSTOLIC BLOOD PRESSURE: 150 MMHG | HEIGHT: 75 IN | HEART RATE: 69 BPM | BODY MASS INDEX: 24.74 KG/M2 | DIASTOLIC BLOOD PRESSURE: 83 MMHG | WEIGHT: 199 LBS

## 2017-09-28 DIAGNOSIS — N13.8 BPH WITH OBSTRUCTION/LOWER URINARY TRACT SYMPTOMS: ICD-10-CM

## 2017-09-28 DIAGNOSIS — N40.1 BPH WITH OBSTRUCTION/LOWER URINARY TRACT SYMPTOMS: ICD-10-CM

## 2017-09-28 DIAGNOSIS — N39.0 RECURRENT UTI: ICD-10-CM

## 2017-09-28 LAB — POC RESIDUAL URINE VOLUME: 26 ML (ref 0–100)

## 2017-09-28 PROCEDURE — 87088 URINE BACTERIA CULTURE: CPT

## 2017-09-28 PROCEDURE — 87086 URINE CULTURE/COLONY COUNT: CPT

## 2017-09-28 PROCEDURE — 3079F DIAST BP 80-89 MM HG: CPT | Mod: S$GLB,,, | Performed by: NURSE PRACTITIONER

## 2017-09-28 PROCEDURE — 3077F SYST BP >= 140 MM HG: CPT | Mod: S$GLB,,, | Performed by: NURSE PRACTITIONER

## 2017-09-28 PROCEDURE — 51798 US URINE CAPACITY MEASURE: CPT | Mod: S$GLB,,, | Performed by: NURSE PRACTITIONER

## 2017-09-28 PROCEDURE — 87147 CULTURE TYPE IMMUNOLOGIC: CPT

## 2017-09-28 PROCEDURE — 99213 OFFICE O/P EST LOW 20 MIN: CPT | Mod: 25,S$GLB,, | Performed by: NURSE PRACTITIONER

## 2017-09-28 PROCEDURE — 3008F BODY MASS INDEX DOCD: CPT | Mod: S$GLB,,, | Performed by: NURSE PRACTITIONER

## 2017-09-28 RX ORDER — NITROFURANTOIN MACROCRYSTALS 50 MG/1
50 CAPSULE ORAL NIGHTLY
Qty: 30 CAPSULE | Refills: 5 | Status: SHIPPED | OUTPATIENT
Start: 2017-09-28 | End: 2017-10-20 | Stop reason: ALTCHOICE

## 2017-09-28 RX ORDER — CIPROFLOXACIN 500 MG/1
500 TABLET ORAL EVERY 12 HOURS
Qty: 14 TABLET | Refills: 0 | Status: SHIPPED | OUTPATIENT
Start: 2017-09-28 | End: 2017-10-02 | Stop reason: ALTCHOICE

## 2017-09-28 RX ORDER — AMLODIPINE BESYLATE 5 MG/1
5 TABLET ORAL DAILY
COMMUNITY
End: 2017-11-01 | Stop reason: SDUPTHER

## 2017-09-28 NOTE — PROGRESS NOTES
Subjective:      Hi Rubio is a 57 y.o. male who returns today regarding his BPH and recurrent UTIs. The patient is an established patient of Dr. King and is me to me today.     The patient has been on Flomax since April for his obstructive urinary symptoms. He discontinued Proscar after approximately 1 week due to adverse SE. Cystoscopy revealed a large intra vesicular median lobe. Dr. King discussed treatment options including TURP and prophylactic antibiotics if infections persisted.      The patient reports that yesterday he experienced a slow urinary stream and inability to void. He reports that he was able to void after taking Azo.  Additionally reports suprapubic pressure, cramping, and cloudy urine. The patient has not missed any doses of Flomax and denies taking cold/flu medications. Denies constipation. Denies fever, hematuria, chills, and flank pain.      The following portions of the patient's history were reviewed and updated as appropriate: allergies, current medications, past family history, past medical history, past social history, past surgical history and problem list.    Review of Systems  A comprehensive multipoint review of systems was negative except as otherwise stated in the HPI.     Objective:   Vitals:   Vitals:    09/28/17 1311   BP: (!) 150/83   Pulse: 69     Physical Exam   General: alert and oriented, no acute distress  Respiratory: Symmetric expansion, non-labored breathing  Neuro: no gross deficits  Psych: normal judgment and insight, normal mood/affect and non-anxious  No CVA tenderness    Lab Review   Urinalysis demonstrates : unable to examine sample due to AZO  PVR: 26 ml  Lab Results   Component Value Date    WBC 4.83 05/01/2017    HGB 14.7 05/01/2017    HCT 43.4 05/01/2017    MCV 85 05/01/2017     (H) 05/01/2017     Lab Results   Component Value Date    CREATININE 1.0 08/18/2017    BUN 12 08/18/2017     Component Urine Culture, Routine   Latest Ref Rng &  Units    5/15/2017 ESCHERICHIA COLI . . .   4/21/2017 No growth   4/17/2017 ESCHERICHIA COLI . . .   2/7/2017 STREPTOCOCCUS AGALACTIAE (GROUP B) . . .       Assessment and Plan:   Hi was seen today for other.    Diagnoses and all orders for this visit:    Recurrent UTI  -     POCT URINE DIPSTICK WITHOUT MICROSCOPE  -     POCT Bladder Scan  -     ciprofloxacin HCl (CIPRO) 500 MG tablet; Take 1 tablet (500 mg total) by mouth every 12 (twelve) hours.  -     nitrofurantoin (MACRODANTIN) 50 MG capsule; Take 1 capsule (50 mg total) by mouth every evening.  -     Urine culture    BPH with obstruction/lower urinary tract symptoms    Plan:  --Continue flomax  --Urine culture today  --Start cipro today, may adjust based on the culture  --Again discussed TURP; however the patient was resistant due to retrograde ejaculation  --Prophylactic antibiotics x 6 months  --Instructed the patient to notify the clinic or report to the ED if unable to void   --Follow up with Dr. King in 3 months to discuss TURP

## 2017-09-29 ENCOUNTER — OFFICE VISIT (OUTPATIENT)
Dept: UROLOGY | Facility: CLINIC | Age: 57
End: 2017-09-29
Payer: COMMERCIAL

## 2017-09-29 VITALS
WEIGHT: 199.06 LBS | HEART RATE: 70 BPM | DIASTOLIC BLOOD PRESSURE: 80 MMHG | BODY MASS INDEX: 24.75 KG/M2 | HEIGHT: 75 IN | SYSTOLIC BLOOD PRESSURE: 131 MMHG

## 2017-09-29 DIAGNOSIS — N13.8 BPH WITH OBSTRUCTION/LOWER URINARY TRACT SYMPTOMS: Primary | ICD-10-CM

## 2017-09-29 DIAGNOSIS — N39.0 RECURRENT UTI: ICD-10-CM

## 2017-09-29 DIAGNOSIS — N40.1 BPH WITH OBSTRUCTION/LOWER URINARY TRACT SYMPTOMS: Primary | ICD-10-CM

## 2017-09-29 PROCEDURE — 99214 OFFICE O/P EST MOD 30 MIN: CPT | Mod: S$GLB,,, | Performed by: UROLOGY

## 2017-09-29 PROCEDURE — 3075F SYST BP GE 130 - 139MM HG: CPT | Mod: S$GLB,,, | Performed by: UROLOGY

## 2017-09-29 PROCEDURE — 99999 PR PBB SHADOW E&M-EST. PATIENT-LVL III: CPT | Mod: PBBFAC,,, | Performed by: UROLOGY

## 2017-09-29 PROCEDURE — 3079F DIAST BP 80-89 MM HG: CPT | Mod: S$GLB,,, | Performed by: UROLOGY

## 2017-09-29 PROCEDURE — 3008F BODY MASS INDEX DOCD: CPT | Mod: S$GLB,,, | Performed by: UROLOGY

## 2017-09-29 NOTE — PROGRESS NOTES
"Subjective:      Hi Rubio is a 57 y.o. male who returns today regarding his BPH and recurrent UTIs.    He saw NP Gladis yesterday again with UTI symptoms; culture pending.    He has had 2 prior culture proven UTIs this year.     He is still taking flomax. He stopped finasteride d/t sexual side effects.    He had cysto earlier this year with large intravesical median lobe.    The following portions of the patient's history were reviewed and updated as appropriate: allergies, current medications, past family history, past medical history, past social history, past surgical history and problem list.    Review of Systems  A comprehensive multipoint review of systems was negative except as otherwise stated in the HPI.     Objective:   Vitals: /80 (BP Location: Left arm, Patient Position: Sitting, BP Method: X-Large (Automatic))   Pulse 70   Ht 6' 3" (1.905 m)   Wt 90.3 kg (199 lb 1.2 oz)   BMI 24.88 kg/m²     Physical Exam   General: alert and oriented, no acute distress  Respiratory: Symmetric expansion, non-labored breathing  Neuro: no gross deficits  Psych: normal judgment and insight, normal mood/affect and non-anxious    Lab Review   Urinalysis demonstrates negative for all components  Lab Results   Component Value Date    WBC 4.83 05/01/2017    HGB 14.7 05/01/2017    HCT 43.4 05/01/2017    MCV 85 05/01/2017     (H) 05/01/2017     Lab Results   Component Value Date    CREATININE 1.0 08/18/2017    BUN 12 08/18/2017     Component      Latest Ref Rng & Units 4/3/2017   PSA Total      0.00 - 4.00 ng/mL 3.4   PSA, Free      0.01 - 1.50 ng/mL 0.45   PSA, Free Pct      Not established % 13.24       Assessment and Plan:   1. Benign non-nodular prostatic hyperplasia with lower urinary tract symptoms  2. Recurrent UTIs  We again had a long discussion regarding his options with recommendation for TURP based on cysto results. We discussed the details of the procedure, typical recovery, and expected " outcomes. He has now decided he does wish to proceed; he will call to schedule after he discusses w/ his wife.    I spent over 25 minutes with the patient. Over 50% of the visit was spent in counseling and coordination of care.

## 2017-10-01 LAB — BACTERIA UR CULT: NORMAL

## 2017-10-02 ENCOUNTER — TELEPHONE (OUTPATIENT)
Dept: UROLOGY | Facility: CLINIC | Age: 57
End: 2017-10-02

## 2017-10-02 DIAGNOSIS — N13.8 BPH WITH OBSTRUCTION/LOWER URINARY TRACT SYMPTOMS: Primary | ICD-10-CM

## 2017-10-02 DIAGNOSIS — N40.1 BPH WITH OBSTRUCTION/LOWER URINARY TRACT SYMPTOMS: Primary | ICD-10-CM

## 2017-10-02 RX ORDER — CIPROFLOXACIN 2 MG/ML
400 INJECTION, SOLUTION INTRAVENOUS
Status: CANCELLED | OUTPATIENT
Start: 2017-10-02

## 2017-10-02 RX ORDER — CEPHALEXIN 500 MG/1
500 CAPSULE ORAL EVERY 12 HOURS
Qty: 14 CAPSULE | Refills: 0 | Status: SHIPPED | OUTPATIENT
Start: 2017-10-02 | End: 2017-10-09

## 2017-10-02 NOTE — TELEPHONE ENCOUNTER
Spoke to patient,.  Patient accepted 10/12 procedure sate.  Preop appt made for patient.  Reviewed surgery prep and mail prep paper to patient.  Unsigned consents scanned into patient chart

## 2017-10-02 NOTE — TELEPHONE ENCOUNTER
----- Message from Brenda Griffith NP sent at 10/2/2017  8:16 AM CDT -----  Good morning!  Please call  Ramiro to schedule his TURP. He would like a sooner date than previously chosen.   Thanks,   Brenda

## 2017-10-02 NOTE — TELEPHONE ENCOUNTER
----- Message from Marilin Parks RN sent at 10/2/2017 11:27 AM CDT -----  Contact: Self      ----- Message -----  From: Tereza Bower  Sent: 10/2/2017  11:11 AM  To: [facility] Staff    X   1st Request  _  2nd Request  _  3rd Request        Who: LEIDY MOORE [[id number]]    Why: Requesting a call back in regards to pt's surgery details. Pt's wife has some questions.    Please return the call at earliest convenience. Thanks!    What Number to Call Back: [phone]    When to Expect a call back: (Within 24 hours)

## 2017-10-02 NOTE — TELEPHONE ENCOUNTER
----- Message from Enio Edwards sent at 10/2/2017 10:50 AM CDT -----  Contact: Hi Rubio  _X  1st Request  _  2nd Request  _  3rd Request        Who: Hi Rubio    Why: Patient returning phone call to schedule      Please return the call at earliest convenience. Thanks!    What Number to Call Back: [phone]     When to Expect a call back: (Within 24 hours)

## 2017-10-04 ENCOUNTER — HOSPITAL ENCOUNTER (OUTPATIENT)
Dept: PREADMISSION TESTING | Facility: OTHER | Age: 57
Discharge: HOME | End: 2017-10-04
Attending: UROLOGY
Payer: COMMERCIAL

## 2017-10-04 ENCOUNTER — ANESTHESIA EVENT (OUTPATIENT)
Dept: SURGERY | Facility: OTHER | Age: 57
End: 2017-10-04
Payer: COMMERCIAL

## 2017-10-04 VITALS
DIASTOLIC BLOOD PRESSURE: 69 MMHG | OXYGEN SATURATION: 97 % | SYSTOLIC BLOOD PRESSURE: 130 MMHG | TEMPERATURE: 98 F | HEIGHT: 73 IN | HEART RATE: 60 BPM | BODY MASS INDEX: 25.71 KG/M2 | WEIGHT: 194 LBS

## 2017-10-04 RX ORDER — LIDOCAINE HYDROCHLORIDE 10 MG/ML
1 INJECTION, SOLUTION EPIDURAL; INFILTRATION; INTRACAUDAL; PERINEURAL ONCE
Status: CANCELLED | OUTPATIENT
Start: 2017-10-04 | End: 2017-10-04

## 2017-10-04 RX ORDER — MIDAZOLAM HYDROCHLORIDE 1 MG/ML
5 INJECTION INTRAMUSCULAR; INTRAVENOUS
Status: DISPENSED | OUTPATIENT
Start: 2017-10-04 | End: 2017-10-04

## 2017-10-04 RX ORDER — SODIUM CHLORIDE, SODIUM LACTATE, POTASSIUM CHLORIDE, CALCIUM CHLORIDE 600; 310; 30; 20 MG/100ML; MG/100ML; MG/100ML; MG/100ML
INJECTION, SOLUTION INTRAVENOUS CONTINUOUS
Status: CANCELLED | OUTPATIENT
Start: 2017-10-04

## 2017-10-04 NOTE — DISCHARGE INSTRUCTIONS
PRE-ADMIT TESTING -  [phone]    [address]  [facility]          Your surgery has been scheduled at [facility]. We are pleased to have the opportunity to serve you. For Further Information please call [phone].    On the day of surgery please report to the Information Desk on the 1st floor.    · CONTACT YOUR PHYSICIAN'S OFFICE THE DAY PRIOR TO YOUR SURGERY TO OBTAIN YOUR ARRIVAL TIME.     · The evening before surgery do not eat anything after 9 p.m. ( this includes hard candy, chewing gum and mints).  You may only have GATORADE, POWERADE AND WATER  from 9 p.m. until you leave your home.   DO NOT DRINK ANY LIQUIDS ON THE WAY TO THE HOSPITAL.      SPECIAL MEDICATION INSTRUCTIONS: TAKE medications checked off by the Anesthesiologist on your Medication List.    Angiogram Patients: Take medications as instructed by your physician, including aspirin.     Surgery Patients:    If you take ASPIRIN - Your PHYSICIAN/SURGEON will need to inform you IF/OR when you need to stop taking aspirin prior to your surgery.     Do Not take any medications containing IBUPROFEN.  Do Not Wear any make-up or dark nail polish   (especially eye make-up) to surgery. If you come to surgery with makeup on you will be required to remove the makeup or nail polish.    Do not shave your surgical area at least 5 days prior to your surgery. The surgical prep will be performed at the hospital according to Infection Control regulations.    Leave all valuables at home.   Do Not wear any jewelry or watches, including any metal in body piercings.  Contact Lens must be removed before surgery. Either do not wear the contact lens or bring a case and solution for storage.  Please bring a container for eyeglasses or dentures as required.  Bring any paperwork your physician has provided, such as consent forms,  history and physicals, doctor's orders, etc.   Bring comfortable clothes that are loose fitting to wear upon  discharge. Take into consideration the type of surgery being performed.  Maintain your diet as advised per your physician the day prior to surgery.      Adequate rest the night before surgery is advised.   Park in the Parking lot behind the hospital or in the [place] [facility] across the street from the parking lot. Parking is complimentary.  If you will be discharged the same day as your procedure, please arrange for a responsible adult to drive you home or to accompany you if traveling by taxi.   YOU WILL NOT BE PERMITTED TO DRIVE OR TO LEAVE THE HOSPITAL ALONE AFTER SURGERY.   It is strongly recommended that you arrange for someone to remain with you for the first 24 hrs following your surgery.       Thank you for your cooperation.  The Staff of [facility].        Bathing Instructions                                                                 Please shower the evening before and morning of your procedure with    ANTIBACTERIAL SOAP. ( DIAL, etc )  Concentrate on the surgical area   for at least 3 minutes and rinse completely. Dry off as usual.   Do not use any deodorant, powder, body lotions, perfume, after shave or    cologne.

## 2017-10-12 ENCOUNTER — ANESTHESIA (OUTPATIENT)
Dept: SURGERY | Facility: OTHER | Age: 57
End: 2017-10-12
Payer: COMMERCIAL

## 2017-10-12 ENCOUNTER — SURGERY (OUTPATIENT)
Age: 57
End: 2017-10-12

## 2017-10-12 ENCOUNTER — HOSPITAL ENCOUNTER (OUTPATIENT)
Facility: OTHER | Age: 57
Discharge: HOME | End: 2017-10-12
Attending: UROLOGY | Admitting: UROLOGY
Payer: COMMERCIAL

## 2017-10-12 VITALS
DIASTOLIC BLOOD PRESSURE: 80 MMHG | TEMPERATURE: 98 F | RESPIRATION RATE: 16 BRPM | BODY MASS INDEX: 25.71 KG/M2 | WEIGHT: 194 LBS | HEART RATE: 68 BPM | HEIGHT: 73 IN | OXYGEN SATURATION: 97 % | SYSTOLIC BLOOD PRESSURE: 138 MMHG

## 2017-10-12 DIAGNOSIS — N40.1 BPH WITH OBSTRUCTION/LOWER URINARY TRACT SYMPTOMS: ICD-10-CM

## 2017-10-12 DIAGNOSIS — N13.8 BPH WITH OBSTRUCTION/LOWER URINARY TRACT SYMPTOMS: ICD-10-CM

## 2017-10-12 LAB — POCT GLUCOSE: 92 MG/DL (ref 70–110)

## 2017-10-12 PROCEDURE — 52601 PROSTATECTOMY (TURP): CPT | Mod: ,,, | Performed by: UROLOGY

## 2017-10-12 PROCEDURE — 82962 GLUCOSE BLOOD TEST: CPT | Performed by: UROLOGY

## 2017-10-12 PROCEDURE — 63600175 PHARM REV CODE 636 W HCPCS: Performed by: ANESTHESIOLOGY

## 2017-10-12 PROCEDURE — 25000003 PHARM REV CODE 250: Performed by: ANESTHESIOLOGY

## 2017-10-12 PROCEDURE — 71000015 HC POSTOP RECOV 1ST HR: Performed by: UROLOGY

## 2017-10-12 PROCEDURE — 25000003 PHARM REV CODE 250: Performed by: UROLOGY

## 2017-10-12 PROCEDURE — 27201423 OPTIME MED/SURG SUP & DEVICES STERILE SUPPLY: Performed by: UROLOGY

## 2017-10-12 PROCEDURE — 37000009 HC ANESTHESIA EA ADD 15 MINS: Performed by: UROLOGY

## 2017-10-12 PROCEDURE — 25000003 PHARM REV CODE 250: Performed by: NURSE ANESTHETIST, CERTIFIED REGISTERED

## 2017-10-12 PROCEDURE — 63600175 PHARM REV CODE 636 W HCPCS: Performed by: UROLOGY

## 2017-10-12 PROCEDURE — 36000706: Performed by: UROLOGY

## 2017-10-12 PROCEDURE — 37000008 HC ANESTHESIA 1ST 15 MINUTES: Performed by: UROLOGY

## 2017-10-12 PROCEDURE — 63600175 PHARM REV CODE 636 W HCPCS: Performed by: NURSE ANESTHETIST, CERTIFIED REGISTERED

## 2017-10-12 PROCEDURE — 71000033 HC RECOVERY, INTIAL HOUR: Performed by: UROLOGY

## 2017-10-12 PROCEDURE — 71000039 HC RECOVERY, EACH ADD'L HOUR: Performed by: UROLOGY

## 2017-10-12 PROCEDURE — 36000707: Performed by: UROLOGY

## 2017-10-12 PROCEDURE — 71000016 HC POSTOP RECOV ADDL HR: Performed by: UROLOGY

## 2017-10-12 RX ORDER — CIPROFLOXACIN 2 MG/ML
400 INJECTION, SOLUTION INTRAVENOUS
Status: COMPLETED | OUTPATIENT
Start: 2017-10-12 | End: 2017-10-12

## 2017-10-12 RX ORDER — FENTANYL CITRATE 50 UG/ML
25 INJECTION, SOLUTION INTRAMUSCULAR; INTRAVENOUS EVERY 5 MIN PRN
Status: COMPLETED | OUTPATIENT
Start: 2017-10-12 | End: 2017-10-12

## 2017-10-12 RX ORDER — SODIUM CHLORIDE, SODIUM LACTATE, POTASSIUM CHLORIDE, CALCIUM CHLORIDE 600; 310; 30; 20 MG/100ML; MG/100ML; MG/100ML; MG/100ML
INJECTION, SOLUTION INTRAVENOUS CONTINUOUS
Status: DISCONTINUED | OUTPATIENT
Start: 2017-10-12 | End: 2017-10-12 | Stop reason: HOSPADM

## 2017-10-12 RX ORDER — LIDOCAINE HCL/PF 100 MG/5ML
SYRINGE (ML) INTRAVENOUS
Status: DISCONTINUED | OUTPATIENT
Start: 2017-10-12 | End: 2017-10-12

## 2017-10-12 RX ORDER — GLYCOPYRROLATE 0.2 MG/ML
INJECTION INTRAMUSCULAR; INTRAVENOUS
Status: DISCONTINUED | OUTPATIENT
Start: 2017-10-12 | End: 2017-10-12

## 2017-10-12 RX ORDER — HYDROMORPHONE HYDROCHLORIDE 2 MG/ML
1 INJECTION, SOLUTION INTRAMUSCULAR; INTRAVENOUS; SUBCUTANEOUS EVERY 4 HOURS PRN
Status: DISCONTINUED | OUTPATIENT
Start: 2017-10-12 | End: 2017-10-12 | Stop reason: HOSPADM

## 2017-10-12 RX ORDER — ONDANSETRON 2 MG/ML
4 INJECTION INTRAMUSCULAR; INTRAVENOUS EVERY 12 HOURS PRN
Status: DISCONTINUED | OUTPATIENT
Start: 2017-10-12 | End: 2017-10-12 | Stop reason: HOSPADM

## 2017-10-12 RX ORDER — HYDROCODONE BITARTRATE AND ACETAMINOPHEN 5; 325 MG/1; MG/1
1 TABLET ORAL EVERY 4 HOURS PRN
Status: DISCONTINUED | OUTPATIENT
Start: 2017-10-12 | End: 2017-10-12 | Stop reason: HOSPADM

## 2017-10-12 RX ORDER — HYDROMORPHONE HYDROCHLORIDE 2 MG/ML
0.4 INJECTION, SOLUTION INTRAMUSCULAR; INTRAVENOUS; SUBCUTANEOUS EVERY 5 MIN PRN
Status: DISCONTINUED | OUTPATIENT
Start: 2017-10-12 | End: 2017-10-12 | Stop reason: HOSPADM

## 2017-10-12 RX ORDER — ACETAMINOPHEN 10 MG/ML
INJECTION, SOLUTION INTRAVENOUS
Status: DISCONTINUED | OUTPATIENT
Start: 2017-10-12 | End: 2017-10-12

## 2017-10-12 RX ORDER — PHENYLEPHRINE HYDROCHLORIDE 10 MG/ML
INJECTION INTRAVENOUS
Status: DISCONTINUED | OUTPATIENT
Start: 2017-10-12 | End: 2017-10-12

## 2017-10-12 RX ORDER — PROPOFOL 10 MG/ML
VIAL (ML) INTRAVENOUS
Status: DISCONTINUED | OUTPATIENT
Start: 2017-10-12 | End: 2017-10-12

## 2017-10-12 RX ORDER — NEOSTIGMINE METHYLSULFATE 1 MG/ML
INJECTION, SOLUTION INTRAVENOUS
Status: DISCONTINUED | OUTPATIENT
Start: 2017-10-12 | End: 2017-10-12

## 2017-10-12 RX ORDER — OXYCODONE HYDROCHLORIDE 5 MG/1
5 TABLET ORAL
Status: DISCONTINUED | OUTPATIENT
Start: 2017-10-12 | End: 2017-10-12 | Stop reason: HOSPADM

## 2017-10-12 RX ORDER — SODIUM CHLORIDE 0.9 % (FLUSH) 0.9 %
3 SYRINGE (ML) INJECTION
Status: DISCONTINUED | OUTPATIENT
Start: 2017-10-12 | End: 2017-10-12 | Stop reason: HOSPADM

## 2017-10-12 RX ORDER — HYDROCODONE BITARTRATE AND ACETAMINOPHEN 5; 325 MG/1; MG/1
1 TABLET ORAL EVERY 6 HOURS PRN
Qty: 20 TABLET | Refills: 0 | Status: SHIPPED | OUTPATIENT
Start: 2017-10-12 | End: 2017-11-16

## 2017-10-12 RX ORDER — MIDAZOLAM HYDROCHLORIDE 1 MG/ML
INJECTION INTRAMUSCULAR; INTRAVENOUS
Status: DISCONTINUED | OUTPATIENT
Start: 2017-10-12 | End: 2017-10-12

## 2017-10-12 RX ORDER — MEPERIDINE HYDROCHLORIDE 50 MG/ML
12.5 INJECTION INTRAMUSCULAR; INTRAVENOUS; SUBCUTANEOUS ONCE AS NEEDED
Status: DISCONTINUED | OUTPATIENT
Start: 2017-10-12 | End: 2017-10-12 | Stop reason: HOSPADM

## 2017-10-12 RX ORDER — ONDANSETRON 2 MG/ML
INJECTION INTRAMUSCULAR; INTRAVENOUS
Status: DISCONTINUED | OUTPATIENT
Start: 2017-10-12 | End: 2017-10-12

## 2017-10-12 RX ORDER — SUCCINYLCHOLINE CHLORIDE 20 MG/ML
INJECTION INTRAMUSCULAR; INTRAVENOUS
Status: DISCONTINUED | OUTPATIENT
Start: 2017-10-12 | End: 2017-10-12

## 2017-10-12 RX ORDER — ONDANSETRON 2 MG/ML
4 INJECTION INTRAMUSCULAR; INTRAVENOUS DAILY PRN
Status: DISCONTINUED | OUTPATIENT
Start: 2017-10-12 | End: 2017-10-12 | Stop reason: SDUPTHER

## 2017-10-12 RX ORDER — LIDOCAINE HYDROCHLORIDE 10 MG/ML
1 INJECTION, SOLUTION EPIDURAL; INFILTRATION; INTRACAUDAL; PERINEURAL ONCE
Status: DISCONTINUED | OUTPATIENT
Start: 2017-10-12 | End: 2017-10-12 | Stop reason: HOSPADM

## 2017-10-12 RX ORDER — FENTANYL CITRATE 50 UG/ML
INJECTION, SOLUTION INTRAMUSCULAR; INTRAVENOUS
Status: DISCONTINUED | OUTPATIENT
Start: 2017-10-12 | End: 2017-10-12

## 2017-10-12 RX ORDER — DEXAMETHASONE SODIUM PHOSPHATE 4 MG/ML
INJECTION, SOLUTION INTRA-ARTICULAR; INTRALESIONAL; INTRAMUSCULAR; INTRAVENOUS; SOFT TISSUE
Status: DISCONTINUED | OUTPATIENT
Start: 2017-10-12 | End: 2017-10-12

## 2017-10-12 RX ORDER — ATROPA BELLADONNA AND OPIUM 16.2; 6 MG/1; MG/1
60 SUPPOSITORY RECTAL ONCE AS NEEDED
Status: COMPLETED | OUTPATIENT
Start: 2017-10-12 | End: 2017-10-12

## 2017-10-12 RX ORDER — ROCURONIUM BROMIDE 10 MG/ML
INJECTION, SOLUTION INTRAVENOUS
Status: DISCONTINUED | OUTPATIENT
Start: 2017-10-12 | End: 2017-10-12

## 2017-10-12 RX ORDER — CIPROFLOXACIN 500 MG/1
500 TABLET ORAL 2 TIMES DAILY
Qty: 6 TABLET | Refills: 0 | Status: SHIPPED | OUTPATIENT
Start: 2017-10-12 | End: 2017-10-15

## 2017-10-12 RX ADMIN — FENTANYL CITRATE 25 MCG: 50 INJECTION INTRAMUSCULAR; INTRAVENOUS at 02:10

## 2017-10-12 RX ADMIN — FENTANYL CITRATE 100 MCG: 50 INJECTION, SOLUTION INTRAMUSCULAR; INTRAVENOUS at 01:10

## 2017-10-12 RX ADMIN — SODIUM CHLORIDE, SODIUM LACTATE, POTASSIUM CHLORIDE, AND CALCIUM CHLORIDE: 600; 310; 30; 20 INJECTION, SOLUTION INTRAVENOUS at 12:10

## 2017-10-12 RX ADMIN — CIPROFLOXACIN 400 MG: 2 INJECTION, SOLUTION INTRAVENOUS at 01:10

## 2017-10-12 RX ADMIN — ACETAMINOPHEN 1000 MG: 10 INJECTION, SOLUTION INTRAVENOUS at 01:10

## 2017-10-12 RX ADMIN — PROMETHAZINE HYDROCHLORIDE 6.25 MG: 25 INJECTION INTRAMUSCULAR; INTRAVENOUS at 03:10

## 2017-10-12 RX ADMIN — DEXAMETHASONE SODIUM PHOSPHATE 4 MG: 4 INJECTION, SOLUTION INTRAMUSCULAR; INTRAVENOUS at 01:10

## 2017-10-12 RX ADMIN — ONDANSETRON HYDROCHLORIDE 4 MG: 2 INJECTION, SOLUTION INTRAMUSCULAR; INTRAVENOUS at 03:10

## 2017-10-12 RX ADMIN — CARBOXYMETHYLCELLULOSE SODIUM 2 DROP: 2.5 SOLUTION/ DROPS OPHTHALMIC at 01:10

## 2017-10-12 RX ADMIN — ROCURONIUM BROMIDE 10 MG: 10 INJECTION, SOLUTION INTRAVENOUS at 01:10

## 2017-10-12 RX ADMIN — HYDROMORPHONE HYDROCHLORIDE 0.4 MG: 2 INJECTION INTRAMUSCULAR; INTRAVENOUS; SUBCUTANEOUS at 03:10

## 2017-10-12 RX ADMIN — NEOSTIGMINE METHYLSULFATE 4 MG: 1 INJECTION INTRAVENOUS at 02:10

## 2017-10-12 RX ADMIN — ATROPA BELLADONNA AND OPIUM 60 MG: 16.2; 6 SUPPOSITORY RECTAL at 03:10

## 2017-10-12 RX ADMIN — PROPOFOL 150 MG: 10 INJECTION, EMULSION INTRAVENOUS at 01:10

## 2017-10-12 RX ADMIN — ROCURONIUM BROMIDE 5 MG: 10 INJECTION, SOLUTION INTRAVENOUS at 01:10

## 2017-10-12 RX ADMIN — MIDAZOLAM HYDROCHLORIDE 2 MG: 1 INJECTION, SOLUTION INTRAMUSCULAR; INTRAVENOUS at 12:10

## 2017-10-12 RX ADMIN — LIDOCAINE HYDROCHLORIDE 75 MG: 20 INJECTION, SOLUTION INTRAVENOUS at 01:10

## 2017-10-12 RX ADMIN — SUCCINYLCHOLINE CHLORIDE 140 MG: 20 INJECTION, SOLUTION INTRAMUSCULAR; INTRAVENOUS at 01:10

## 2017-10-12 RX ADMIN — GLYCOPYRROLATE 0.8 MG: 0.2 INJECTION, SOLUTION INTRAMUSCULAR; INTRAVENOUS at 02:10

## 2017-10-12 RX ADMIN — ONDANSETRON 4 MG: 2 INJECTION INTRAMUSCULAR; INTRAVENOUS at 01:10

## 2017-10-12 RX ADMIN — OXYCODONE HYDROCHLORIDE 5 MG: 5 TABLET ORAL at 02:10

## 2017-10-12 RX ADMIN — SODIUM CHLORIDE, SODIUM LACTATE, POTASSIUM CHLORIDE, AND CALCIUM CHLORIDE: 600; 310; 30; 20 INJECTION, SOLUTION INTRAVENOUS at 02:10

## 2017-10-12 RX ADMIN — PHENYLEPHRINE HYDROCHLORIDE 100 MCG: 10 INJECTION INTRAVENOUS at 01:10

## 2017-10-12 NOTE — ANESTHESIA POSTPROCEDURE EVALUATION
"Anesthesia Post Evaluation    Patient: Hi Rubio    Procedure(s) Performed: Procedure(s) (LRB):  PROSTATECTOMY-TRANSURETHRAL (N/A)    Final Anesthesia Type: general  Patient location during evaluation: PACU  Patient participation: Yes- Able to Participate  Level of consciousness: awake and alert and oriented  Post-procedure vital signs: reviewed and stable  Pain management: adequate  Airway patency: patent  PONV status at discharge: No PONV  Anesthetic complications: no      Cardiovascular status: blood pressure returned to baseline and hemodynamically stable  Respiratory status: unassisted, spontaneous ventilation and room air  Hydration status: euvolemic  Follow-up not needed.        Visit Vitals  BP (!) 147/93   Pulse 67   Temp 36.4 °C (97.6 °F) (Oral)   Resp 20   Ht 6' 1" (1.854 m)   Wt 88 kg (194 lb)   SpO2 100%   BMI 25.60 kg/m²       Pain/Wai Score: Pain Assessment Performed: Yes (10/12/2017  2:36 PM)  Presence of Pain: complains of pain/discomfort (10/12/2017  3:05 PM)  Pain Rating Prior to Med Admin: 9 (10/12/2017  3:25 PM)  Wai Score: 9 (10/12/2017  3:05 PM)      "

## 2017-10-12 NOTE — PROGRESS NOTES
Subjective:      Hi Rubio is a 57 y.o. male who returns today regarding his iqbal catheter.      The patient is POD #1 s/p TURP for his BPH with SOLORZANO and recurrent UTIs. Denies fever, chills, and flank pain overnight. Pain is well controlled with PO meds. He presents today for a voiding trial.     The following portions of the patient's history were reviewed and updated as appropriate: allergies, current medications, past family history, past medical history, past social history, past surgical history and problem list.    Review of Systems  A comprehensive multipoint review of systems was negative except as otherwise stated in the HPI.     Objective:   Vitals: There were no vitals taken for this visit.    Physical Exam   General: alert and oriented, no acute distress  Respiratory: Symmetric expansion, non-labored breathing  Cardiovascular: no peripheral edema  Abdomen: soft, non distended  Skin: normal coloration and turgor, no rashes, no suspicious skin lesions noted  Neuro: no gross deficits  Psych: normal judgment and insight, normal mood/affect and non-anxious  Iqbal to gravity with clear, pink urine, no clots     Lab Review   Urinalysis demonstrates : no sample   Lab Results   Component Value Date    WBC 4.83 05/01/2017    HGB 14.7 05/01/2017    HCT 43.4 05/01/2017    MCV 85 05/01/2017     (H) 05/01/2017     Lab Results   Component Value Date    CREATININE 1.0 08/18/2017    BUN 12 08/18/2017       Imaging   None    Voiding Trial (Fill/Pull/Void): 200cc of sterile saline was instilled into the bladder through the previously placed iqbal catheter.  The iqbal balloon was then deflated and the iqbal removed.  The patient subsequently voided 200cc, consistent with successful voiding trial.  The iqbal was not replaced.    Assessment and Plan:   Hi was seen today for follow-up.    Diagnoses and all orders for this visit:    BPH with obstruction/lower urinary tract symptoms    Dysuria  -      phenazopyridine (PYRIDIUM) 100 MG tablet; Take 1 tablet (100 mg total) by mouth 3 (three) times daily as needed for Pain.      Plan:  --Successful voiding trial today  --Pyridium PRN   --Encouraged the patient to increase his fluid intake while hematuria persists  --Follow up with Dr. King in 4 weeks with PVR

## 2017-10-12 NOTE — TRANSFER OF CARE
"Anesthesia Transfer of Care Note    Patient: Hi Rubio    Procedure(s) Performed: Procedure(s) (LRB):  PROSTATECTOMY-TRANSURETHRAL (N/A)    Patient location: PACU    Anesthesia Type: general    Transport from OR: Transported from OR on 2-3 L/min O2 by NC with adequate spontaneous ventilation    Post pain: adequate analgesia    Post assessment: no apparent anesthetic complications and tolerated procedure well    Post vital signs: stable    Level of consciousness: awake, alert and oriented    Nausea/Vomiting: no nausea/vomiting    Complications: none    Transfer of care protocol was followed      Last vitals:   Visit Vitals  /75   Pulse 67   Temp 36.4 °C (97.6 °F)   Resp 18   Ht 6' 1" (1.854 m)   Wt 88 kg (194 lb)   SpO2 97%   BMI 25.60 kg/m²     "

## 2017-10-12 NOTE — PLAN OF CARE
Hi MITCHELL Rubio has met all discharge criteria from Phase II. Vital Signs are stable, ambulating  without difficulty. Discharge instructions given, patient verbalized understanding. Discharged from facility via wheelchair in stable condition.

## 2017-10-12 NOTE — DISCHARGE INSTRUCTIONS
Transurethral Resection of the Prostate (TURP): Home Recovery  Take it easy for the first month or so while you heal after transurethral resection of the prostate. During the first few weeks, you may feel burning when you pass urine. You may also feel like you have to urinate often. These sensations will go away. If your urine becomes bright red, it means that the treated area is bleeding. This may happen on and off for a month or so after a TURP. If this occurs, rest and drink plenty of fluids until the bleeding stops.    While you are healing  To help prevent problems during the first month after your surgery, follow these tips:    · Drink plenty of fluids.  · Avoid strenuous exercise.  · Dont lift anything over 10 pounds or as directed by MD.  · Avoid sexual activity and strenuous exercise.  · Avoid straining at stool. If you are constipated, take stool softeners or laxatives for a few days.  · Talk to your doctor about when you can return to work.  · Ask your doctor when you can start driving again.  · Dont sit for more than 60 minutes without getting up.  · Check with your doctor before taking over-the-counter pain relievers. These include aspirin, ibuprofen, and naproxen.    Follow-up care  You will visit your doctor to make sure you are healing without problems. If tests were done on your prostate tissue your doctor will discuss the results with you.     When to call your healthcare provider  Call your healthcare provider right away if any of these occur:    · Youre not able to urinate, or notice a decrease in urine flow  · You have a fever of 100.4°F (38°C) or higher, or as directed by your doctor  · You have severe pain that is not relieved by prescription pain medicine  · You have bleeding that doesnt stop within 12 hours  · You have bleeding with clots, or blood plugs up the catheter. (A little blood in the urine is normal.)  · The catheter falls out       Getting back to sex  BPH and its treatments  rarely cause problems with sex. Even if you have retrograde ejaculation, your erection or orgasm shouldnt feel any different than it used to. Retrograde ejaculation can result in infertility, as the semen will not come out of the penis. If you notice any problems with sex, talk to your doctor. Help may be available.    Trouble controlling your urine  Some men will have trouble controlling their urine (urinary incontinence) after this surgery. This may last for a few days, weeks, or months, but it will improve with time. You may also pass your urine more often (urinary frequency), like you did before the surgery. This will also improve as you start to heal.        Discharge Instructions: Caring for Your Leg Bag  You are going home with a urinary catheter and collection device (drainage bag) in place. One type of collection device is called a leg bag. This is a smaller drainage bag that you can wear on your leg to collect urine during the day. The bag can fit under your clothing. You can move around with greater ease when using a leg bag instead of a larger collection bag.  You were shown how to care for your catheter in the hospital. This sheet will help you remember those steps when you are at home.    Home care  · Wash your hands thoroughly before and after you care for your catheter or collection device.  · Gather your supplies:  ¨ Alcohol wipes  ¨ Soap and water  ¨ Towel and washcloth  ¨ Leg strap and leg bag  · Use soap and water to wash the area where your catheter enters your body. Rinse well.  · Secure the bag justice to your leg:  ¨ Position the leg band high on your thigh with the product label pointing away from your leg.  ¨ Stretch the leg band in place and fasten.  ¨ Place the catheter tubing over the bag and secure it. You may secure it with a Velcro tab or other method, depending on the product you use. Be sure to leave enough loop in the catheter above the leg band to avoid pulling on the  tube.  ¨ Every 4 to 6 hours, reposition the band to prevent pressure from the elastic on your leg. You can do this by changing the bag to the other leg or by raising or lowering the leg band.  ¨ Wash the band as frequently as needed. You can hand wash and dry the leg band.  · Place the bag in the bag justice.  · Clean the urine bag end of the catheter and your catheter port with an alcohol wipe.  · Place a towel under the bag and port to keep urine from dripping onto your leg.  · Before connecting the outlet valve at the bottom of the bag to the catheter, make sure that it is firmly closed. Flip the valve upward toward the bag; it needs to snap firmly in place. Be sure not to tug on the tubing. Be gentle.  · Attach the urine bag to the end of the catheter; insert the connector snugly into the catheter port. You can avoid dribbling urine by bending the catheter tubing just below the tip and holding it while you disconnect it from the catheter. Be careful to keep the tip clean while connecting the leg bag tubing to the catheter--this keeps germs from getting into the system.  · Drain the bag when it is full. To drain the bag, flip the clamp downward. Direct the flexible outlet tube to control the flow of urine. You dont have to disconnect the leg bag from the catheter to empty it. Raise your leg up to the edge of the toilet to reach the leg bag. Then you can empty the bag directly into the toilet. This way, you wont need to bend over, which may be uncomfortable.  · Keep the leg bag clean. Rinse daily with equal parts water and vinegar to reduce odor and keep the bag free of germs.  · Remember to keep the drainage bag below the level of your bladder for proper drainage.    Follow-up  Make a follow-up appointment as directed by your healthcare provider.    When to call your healthcare provider  Call your healthcare provider right away if you have any of the following:    · Redness, swelling, or warmth around the  catheter entry site  · Pus draining from your catheter entry site or into the catheter tubing and bag  · Blood, clots, or floating debris in the urine  · Nausea and vomiting  · Shaking chills  · Fever above 100.4°F (38°C)  · Pain that is not relieved by medicine   · Catheter that falls out or is dislodged         Discharge Instructions: After Your Surgery  Youve just had surgery. During surgery, you were given medicine called anesthesia to keep you relaxed and free of pain. After surgery, you may have some pain or nausea. This is common. Here are some tips for feeling better and getting well after surgery.     Stay on schedule with your medicine.     Going home  Your healthcare provider will show you how to take care of yourself when you go home. He or she will also answer your questions. Have an adult family member or friend drive you home. For the first 24 hours after your surgery:    · Do not drive or use heavy equipment.  · Do not make important decisions or sign legal papers.  · Do not drink alcohol.  · Have someone stay with you, if needed. He or she can watch for problems and help keep you safe.    Be sure to go to all follow-up visits with your healthcare provider. And rest after your surgery for as long as your healthcare provider tells you to.    Coping with pain  If you have pain after surgery, pain medicine will help you feel better. Take it as told, before pain becomes severe. Also, ask your healthcare provider or pharmacist about other ways to control pain. This might be with heat, ice, or relaxation. And follow any other instructions your surgeon or nurse gives you.    Tips for taking pain medicine  To get the best relief possible, remember these points:    · Pain medicines can upset your stomach. Taking them with a little food may help.  · Most pain relievers taken by mouth need at least 20 to 30 minutes to start to work.  · Taking medicine on a schedule can help you remember to take it. Try to time  your medicine so that you can take it before starting an activity. This might be before you get dressed, go for a walk, or sit down for dinner.  · Constipation is a common side effect of pain medicines. Call your healthcare provider before taking any medicines such as laxatives or stool softeners to help ease constipation. Also ask if you should skip any foods. Drinking lots of fluids and eating foods such as fruits and vegetables that are high in fiber can also help. Remember, do not take laxatives unless your surgeon has prescribed them.  · Drinking alcohol and taking pain medicine can cause dizziness and slow your breathing. It can even be deadly. Do not drink alcohol while taking pain medicine.  · Pain medicine can make you react more slowly to things. Do not drive or run machinery while taking pain medicine.    Your healthcare provider may tell you to take acetaminophen to help ease your pain. Ask him or her how much you are supposed to take each day. Acetaminophen or other pain relievers may interact with your prescription medicines or other over-the-counter (OTC) medicines. Some prescription medicines have acetaminophen and other ingredients. Using both prescription and OTC acetaminophen for pain can cause you to overdose. Read the labels on your OTC medicines with care. This will help you to clearly know the list of ingredients, how much to take, and any warnings. It may also help you not take too much acetaminophen. If you have questions or do not understand the information, ask your pharmacist or healthcare provider to explain it to you before you take the OTC medicine.    Managing nausea  Some people have an upset stomach after surgery. This is often because of anesthesia, pain, or pain medicine, or the stress of surgery. These tips will help you handle nausea and eat healthy foods as you get better. If you were on a special food plan before surgery, ask your healthcare provider if you should follow it  while you get better. These tips may help:    · Do not push yourself to eat. Your body will tell you when to eat and how much.  · Start off with clear liquids and soup. They are easier to digest.  · Next try semi-solid foods, such as mashed potatoes, applesauce, and gelatin, as you feel ready.  · Slowly move to solid foods. Dont eat fatty, rich, or spicy foods at first.  · Do not force yourself to have 3 large meals a day. Instead eat smaller amounts more often.  · Take pain medicines with a small amount of solid food, such as crackers or toast, to avoid nausea.     Call your surgeon if  · You still have pain an hour after taking medicine. The medicine may not be strong enough.  · You feel too sleepy, dizzy, or groggy. The medicine may be too strong.  · You have side effects like nausea, vomiting, or skin changes, such as rash, itching, or hives.       If you have obstructive sleep apnea  You were given anesthesia medicine during surgery to keep you comfortable and free of pain. After surgery, you may have more apnea spells because of this medicine and other medicines you were given. The spells may last longer than usual.   At home:    · Keep using the continuous positive airway pressure (CPAP) device when you sleep. Unless your healthcare provider tells you not to, use it when you sleep, day or night. CPAP is a common device used to treat obstructive sleep apnea.  · Talk with your provider before taking any pain medicine, muscle relaxants, or sedatives. Your provider will tell you about the possible dangers of taking these medicines.    © 1392-2360 [employer]. [address] [place] [address], [place] [address]. All rights reserved. This information is not intended as a substitute for professional medical care. Always follow your healthcare professional's instructions.    PLEASE FOLLOW ANY OTHER INSTRUCTIONS PROVIDED TO YOU BY DR. HEWITT!

## 2017-10-12 NOTE — OR NURSING
C/o severe burning pain upon arrival to pacu.  Rates pain a 10.  Moaning and holding his breath.  Explained that it is better for him to breathe slowly and try to relax rather than hold his breath.very anxious continues to apologize for complaing and states he does not want to be a burden.  Explained that he is not a burden, and it is OK for him to complain because he had surgery and pain is expected.  Also, explained that it is my job to help him and he is far from being a burden.

## 2017-10-12 NOTE — OP NOTE
Operative Note       Surgery Date: 10/12/2017     Surgeon: Jameel King MD    Assistant Surgeon: None    Pre-op Diagnosis:  BPH with obstruction/lower urinary tract symptoms [N40.1, N13.8]    Post-op Diagnosis: Post-Op Diagnosis Codes:     * BPH with obstruction/lower urinary tract symptoms [N40.1, N13.8]    Procedures:  Procedure(s) (LRB):  PROSTATECTOMY-TRANSURETHRAL (N/A)    Anesthesia: Choice    Indications for Procedure:  The patient is a 57 y.o. year old male with BPH and SOLORZANO as well as recurrent UTIs despite medical therapy.  He presents today for surgical treatment with TURP.  The full risks and benefits of the procedure have been explained and detail and he wishes to proceed.    Procedure Description:  The patient was brought to the operative suite and placed under Choice anesthesia. He was placed in lithotomy position and prepped and draped in usual sterile fashion.  Time out was performed prior to starting the procedure.    Rigid cystoscopy was performed using the 27-Pashto resectoscope. The prostatic urethra again demonstrated trilobar hypertrophy with prominent intravesical median lobe. The bladder was noted for moderate trabeculations. The ureteral orifices were identified and noted to be normal distance from the bladder neck bilaterally. Care was taken throughout the procedure to ensure that these were not involved in the resection. Using the bipolar button electrode, the median lobe was resected down to the level of the prostatic capsule. The lateral lobes were then resected in similar fashion. Care was taken to ensure that the resection did not extend distally beyond the verumontanum.Once the resection had been completed, there was a patent prostatic urethra from the verumontanum to the bladder neck. Electrocautery was used to achieve adequate hemostasis.  Once full hemostasis had been achieved, the resectoscope was removed and a 20-Pashto 3-way Forbes catheter was placed with 30 mL sterile water  instilled into the balloon. This was connected to continuous bladder irrigation with clear output. The patient was then awoken and transferred to the PACU in stable condition.     Complications: No    Estimated Blood Loss: 0cc         Specimens Removed:   Specimen (12h ago through future)    None          Implants: * No implants in log *           Disposition: PACU - hemodynamically stable.           Condition: Good

## 2017-10-12 NOTE — BRIEF OP NOTE
[facility]  Brief Operative Note     SUMMARY     Surgery Date: 10/12/2017     Surgeon(s) and Role:     * Yg King MD - Primary    Assisting Surgeon: None    Pre-op Diagnosis:  BPH with obstruction/lower urinary tract symptoms [N40.1, N13.8]    Post-op Diagnosis:  Post-Op Diagnosis Codes:     * BPH with obstruction/lower urinary tract symptoms [N40.1, N13.8]    Procedure(s) (LRB):  PROSTATECTOMY-TRANSURETHRAL (N/A)    Anesthesia: Choice    Description of the findings of the procedure: Bipolar TURP with button electrode. Excellent hemostasis. 20Fr 3-way iqbal placed.    Estimated Blood Loss: 0cc         Specimens:   Specimen (12h ago through future)    None          Discharge Note    SUMMARY     Admit Date: 10/12/2017    Discharge Date and Time: 10/12/2017    Hospital Course: Patient was admitted for elective outpatient procedure, which was uncomplicated and well tolerated.      Final Diagnosis: Post-Op Diagnosis Codes:     * BPH with obstruction/lower urinary tract symptoms [N40.1, N13.8]    Disposition: Home or Self Care    Follow Up/Patient Instructions:     Medications:  Reconciled Home Medications: Current Discharge Medication List      START taking these medications    Details   ciprofloxacin HCl (CIPRO) 500 MG tablet Take 1 tablet (500 mg total) by mouth 2 (two) times daily.  Qty: 6 tablet, Refills: 0      hydrocodone-acetaminophen 5-325mg (NORCO) 5-325 mg per tablet Take 1 tablet by mouth every 6 (six) hours as needed for Pain.  Qty: 20 tablet, Refills: 0         CONTINUE these medications which have NOT CHANGED    Details   alprazolam (XANAX) 0.25 MG tablet Take 1 tablet (0.25 mg total) by mouth 3 (three) times daily.  Qty: 90 tablet, Refills: 0      amlodipine (NORVASC) 5 MG tablet Take 5 mg by mouth once daily.      CONTOUR NEXT STRIPS Strp 1 strip by Misc.(Non-Drug; Combo Route) route once daily. Test blood glucose once daily as instructed.  Qty: 25 strip, Refills: 11    Associated  Diagnoses: Newly diagnosed diabetes      hydrochlorothiazide (HYDRODIURIL) 25 MG tablet TAKE ONE TABLET BY MOUTH ONE TIME DAILY  Qty: 90 tablet, Refills: 1      hydrOXYzine pamoate (VISTARIL) 25 MG Cap TAKE 1 CAPSULE (25 MG TOTAL) BY MOUTH EVERY EVENING.  Qty: 30 capsule, Refills: 6      KRILL OIL ORAL Take by mouth once daily. Pt unsure of dosage.      metoprolol succinate (TOPROL-XL) 100 MG 24 hr tablet TAKE ONE TABLET BY MOUTH ONE TIME DAILY  Qty: 90 tablet, Refills: 2    Associated Diagnoses: Symptomatic PVCs      MICROLET LANCET Misc 1 lancet by Misc.(Non-Drug; Combo Route) route once daily. Test blood glucose once daily as instructed.  Qty: 25 each, Refills: 11    Associated Diagnoses: Newly diagnosed diabetes      nitrofurantoin (MACRODANTIN) 50 MG capsule Take 1 capsule (50 mg total) by mouth every evening.  Qty: 30 capsule, Refills: 5    Associated Diagnoses: Recurrent UTI      pantoprazole (PROTONIX) 40 MG tablet TAKE 1 TABLET (40 MG TOTAL) BY MOUTH ONCE DAILY.  Qty: 30 tablet, Refills: 3      pravastatin (PRAVACHOL) 80 MG tablet TAKE ONE TABLET BY MOUTH NIGHTLY AT BEDTIME  Qty: 90 tablet, Refills: 1    Associated Diagnoses: Family history of premature CAD; Peripheral vascular disease; Hyperlipidemia      tamsulosin (FLOMAX) 0.4 mg Cp24 Take 1 capsule (0.4 mg total) by mouth once daily.  Qty: 30 capsule, Refills: 11    Associated Diagnoses: Benign non-nodular prostatic hyperplasia with lower urinary tract symptoms      cloNIDine (CATAPRES) 0.1 MG tablet Take 1 tablet (0.1 mg total) by mouth once as needed (For blood pressures above 170 systolic - call clinic if pressure not resolved after 30 minutes).  Qty: 60 tablet, Refills: 11    Associated Diagnoses: Essential hypertension      nitroGLYCERIN (NITROSTAT) 0.3 MG SL tablet Place 1 tablet (0.3 mg total) under the tongue every 5 (five) minutes as needed for Chest pain.  Qty: 15 tablet, Refills: 0         STOP taking these medications       aspirin 81 MG  Khang Comments:   Reason for Stopping:         finasteride (PROSCAR) 5 mg tablet Comments:   Reason for Stopping:               Discharge Procedure Orders  Diet general     Activity as tolerated       Follow-up Information     Yg King MD. Schedule an appointment as soon as possible for a visit in 4 weeks.    Specialty:  Urology  Why:  For post-operative follow-up  Contact information:  [address] [facility] [address] [id number] [phone]             Brenda L Gladis, NP. Go on 10/13/2017.    Specialty:  Urology  Why:  For iqbal removal - at 9 am  Contact information:  [address] [facility] [address] [phone]

## 2017-10-12 NOTE — INTERVAL H&P NOTE
The patient has been examined and the H&P has been reviewed:    I concur with the findings and no changes have occurred since H&P was written.    Anesthesia/Surgery risks, benefits and alternative options discussed and understood by patient/family.          Active Hospital Problems    Diagnosis  POA    BPH with obstruction/lower urinary tract symptoms [N40.1, N13.8]  Yes      Resolved Hospital Problems    Diagnosis Date Resolved POA   No resolved problems to display.

## 2017-10-13 ENCOUNTER — OFFICE VISIT (OUTPATIENT)
Dept: UROLOGY | Facility: CLINIC | Age: 57
End: 2017-10-13
Payer: COMMERCIAL

## 2017-10-13 VITALS
HEART RATE: 64 BPM | BODY MASS INDEX: 25.71 KG/M2 | WEIGHT: 194 LBS | SYSTOLIC BLOOD PRESSURE: 131 MMHG | HEIGHT: 73 IN | DIASTOLIC BLOOD PRESSURE: 76 MMHG

## 2017-10-13 DIAGNOSIS — N13.8 BPH WITH OBSTRUCTION/LOWER URINARY TRACT SYMPTOMS: Primary | ICD-10-CM

## 2017-10-13 DIAGNOSIS — R30.0 DYSURIA: ICD-10-CM

## 2017-10-13 DIAGNOSIS — N40.1 BPH WITH OBSTRUCTION/LOWER URINARY TRACT SYMPTOMS: Primary | ICD-10-CM

## 2017-10-13 PROCEDURE — 51700 IRRIGATION OF BLADDER: CPT | Mod: 58,S$GLB,, | Performed by: NURSE PRACTITIONER

## 2017-10-13 PROCEDURE — 99024 POSTOP FOLLOW-UP VISIT: CPT | Mod: S$GLB,,, | Performed by: NURSE PRACTITIONER

## 2017-10-13 RX ORDER — PHENAZOPYRIDINE HYDROCHLORIDE 100 MG/1
100 TABLET, FILM COATED ORAL 3 TIMES DAILY PRN
Qty: 30 TABLET | Refills: 1 | Status: SHIPPED | OUTPATIENT
Start: 2017-10-13 | End: 2017-10-23

## 2017-10-13 NOTE — OR NURSING
Assisted up to dress for discharge, tolerated well, pt and wife instructed on catheter care, changing to leg bag as needed and postop instructions reviewed.   Ready for discharge, iv discontinued

## 2017-10-19 ENCOUNTER — TELEPHONE (OUTPATIENT)
Dept: UROLOGY | Facility: CLINIC | Age: 57
End: 2017-10-19

## 2017-10-19 NOTE — TELEPHONE ENCOUNTER
----- Message from Brenna Mahamed sent at 10/19/2017  1:33 PM CDT -----  x 1st Request  _ 2nd Request  _ 3rd Request    Who: pt     Why: pt states he is returning a call from Marilin. Please call and advise.     What Number to Call Back: [phone]     When to Expect a call back: (Before the end of the day)  -- if call after 3:00 call back will be tomorrow.

## 2017-10-19 NOTE — TELEPHONE ENCOUNTER
Attempt to contact pt no answer. A voice message was left informing pt that his call was being return. A call back number was left.

## 2017-10-20 ENCOUNTER — PATIENT MESSAGE (OUTPATIENT)
Dept: INTERNAL MEDICINE | Facility: CLINIC | Age: 57
End: 2017-10-20

## 2017-10-20 ENCOUNTER — TELEPHONE (OUTPATIENT)
Dept: INTERNAL MEDICINE | Facility: CLINIC | Age: 57
End: 2017-10-20

## 2017-10-20 ENCOUNTER — OFFICE VISIT (OUTPATIENT)
Dept: UROLOGY | Facility: CLINIC | Age: 57
End: 2017-10-20
Payer: COMMERCIAL

## 2017-10-20 VITALS
BODY MASS INDEX: 25.19 KG/M2 | DIASTOLIC BLOOD PRESSURE: 81 MMHG | WEIGHT: 190.94 LBS | SYSTOLIC BLOOD PRESSURE: 133 MMHG | HEART RATE: 69 BPM

## 2017-10-20 DIAGNOSIS — N40.1 BPH WITH OBSTRUCTION/LOWER URINARY TRACT SYMPTOMS: ICD-10-CM

## 2017-10-20 DIAGNOSIS — N13.8 BPH WITH OBSTRUCTION/LOWER URINARY TRACT SYMPTOMS: ICD-10-CM

## 2017-10-20 DIAGNOSIS — E11.9 TYPE 2 DIABETES MELLITUS WITHOUT COMPLICATION, WITHOUT LONG-TERM CURRENT USE OF INSULIN: Primary | ICD-10-CM

## 2017-10-20 DIAGNOSIS — N32.89 BLADDER SPASMS: Primary | ICD-10-CM

## 2017-10-20 DIAGNOSIS — R35.0 URINARY FREQUENCY: ICD-10-CM

## 2017-10-20 PROCEDURE — 99024 POSTOP FOLLOW-UP VISIT: CPT | Mod: S$GLB,,, | Performed by: NURSE PRACTITIONER

## 2017-10-20 PROCEDURE — 87086 URINE CULTURE/COLONY COUNT: CPT

## 2017-10-20 RX ORDER — ONDANSETRON 4 MG/1
4 TABLET, ORALLY DISINTEGRATING ORAL EVERY 6 HOURS PRN
Qty: 30 TABLET | Refills: 0 | Status: SHIPPED | OUTPATIENT
Start: 2017-10-20 | End: 2017-11-16

## 2017-10-20 RX ORDER — CIPROFLOXACIN 500 MG/1
500 TABLET ORAL 2 TIMES DAILY
Qty: 6 TABLET | Refills: 0 | Status: SHIPPED | OUTPATIENT
Start: 2017-10-20 | End: 2017-10-23

## 2017-10-20 RX ORDER — HYOSCYAMINE SULFATE 0.125 MG
125 TABLET ORAL EVERY 4 HOURS PRN
Qty: 60 TABLET | Refills: 2 | Status: SHIPPED | OUTPATIENT
Start: 2017-10-20 | End: 2017-11-16

## 2017-10-20 NOTE — PROGRESS NOTES
Subjective:      Hi Rubio is a 57 y.o. male who returns today regarding his abdominal pain and dysuria.    The patient is 1 week s/p TURP for his BPH with SOLORZANO and recurrent UTIs. Today he reports severe abdominal cramping and discomfort as well as dysuria, frequency, slow urinary stream, and the sensation of incomplete bladder emptying. Reports constipation since the surgery. Continues to have gross hematuria at the end of the urinary stream. Denies fever, chills and flank pain.     The following portions of the patient's history were reviewed and updated as appropriate: allergies, current medications, past family history, past medical history, past social history, past surgical history and problem list.    Review of Systems  A comprehensive multipoint review of systems was negative except as otherwise stated in the HPI.     Objective:   Vitals:   Vitals:    10/20/17 0735   BP: 133/81   Pulse: 69       Physical Exam   General: alert and oriented, no acute distress  Respiratory: Symmetric expansion, non-labored breathing  Cardiovascular: regular rate and rhythm, no peripheral edema  Abdomen: soft, LLQ and suprapubic TTP; normal BS  Genitourinary: not done  not indicated  Rectal:not examined   Skin: normal coloration and turgor, no rashes, no suspicious skin lesions noted  Neuro: no gross deficits  Psych: normal judgment and insight, normal mood/affect and non-anxious    Lab Review   Urinalysis demonstrates: pyridium   PVR: 23 ml   Lab Results   Component Value Date    WBC 4.83 05/01/2017    HGB 14.7 05/01/2017    HCT 43.4 05/01/2017    MCV 85 05/01/2017     (H) 05/01/2017     Lab Results   Component Value Date    CREATININE 1.0 08/18/2017    BUN 12 08/18/2017     Lab Results   Component Value Date    PSA 21.2 (H) 02/06/2017       Imaging   None    Assessment and Plan:   Diagnoses and all orders for this visit:    Bladder spasms  -     hyoscyamine (ANASPAZ,LEVSIN) 0.125 mg Tab; Take 1 tablet (125 mcg  total) by mouth every 4 (four) hours as needed.  -     ondansetron (ZOFRAN-ODT) 4 MG TbDL; Take 1 tablet (4 mg total) by mouth every 6 (six) hours as needed.    Urinary frequency  -     Urine culture  -     ciprofloxacin HCl (CIPRO) 500 MG tablet; Take 1 tablet (500 mg total) by mouth 2 (two) times daily.    BPH with obstruction/lower urinary tract symptoms      Plan:  --Urine culture today, will start cipro   --Pain likely a combination of bladder spasms and constipation   --Levsin for bladder spasms  --Continue flomax for now  --Pyridium PRN  --Try dulcolax suppository for constipation   --Follow up if symptoms worsen or fail to improve

## 2017-10-21 LAB — BACTERIA UR CULT: NO GROWTH

## 2017-10-25 ENCOUNTER — LAB VISIT (OUTPATIENT)
Dept: LAB | Facility: HOSPITAL | Age: 57
End: 2017-10-25
Attending: INTERNAL MEDICINE
Payer: COMMERCIAL

## 2017-10-25 DIAGNOSIS — E11.9 TYPE 2 DIABETES MELLITUS WITHOUT COMPLICATION, WITHOUT LONG-TERM CURRENT USE OF INSULIN: ICD-10-CM

## 2017-10-25 LAB
ANION GAP SERPL CALC-SCNC: 8 MMOL/L
BUN SERPL-MCNC: 13 MG/DL
CALCIUM SERPL-MCNC: 9.8 MG/DL
CHLORIDE SERPL-SCNC: 102 MMOL/L
CO2 SERPL-SCNC: 29 MMOL/L
CREAT SERPL-MCNC: 1.1 MG/DL
EST. GFR  (AFRICAN AMERICAN): >60 ML/MIN/1.73 M^2
EST. GFR  (NON AFRICAN AMERICAN): >60 ML/MIN/1.73 M^2
GLUCOSE SERPL-MCNC: 116 MG/DL
POTASSIUM SERPL-SCNC: 3.6 MMOL/L
SODIUM SERPL-SCNC: 139 MMOL/L

## 2017-10-25 PROCEDURE — 36415 COLL VENOUS BLD VENIPUNCTURE: CPT | Mod: PO

## 2017-10-25 PROCEDURE — 80048 BASIC METABOLIC PNL TOTAL CA: CPT

## 2017-10-26 ENCOUNTER — TELEPHONE (OUTPATIENT)
Dept: INTERNAL MEDICINE | Facility: CLINIC | Age: 57
End: 2017-10-26

## 2017-10-27 NOTE — TELEPHONE ENCOUNTER
Please inform patient that electrolytes are okay.  Is he still experiencing elevated glucose readings?

## 2017-10-30 ENCOUNTER — PATIENT MESSAGE (OUTPATIENT)
Dept: INTERNAL MEDICINE | Facility: CLINIC | Age: 57
End: 2017-10-30

## 2017-11-01 ENCOUNTER — OFFICE VISIT (OUTPATIENT)
Dept: INTERNAL MEDICINE | Facility: CLINIC | Age: 57
End: 2017-11-01
Payer: COMMERCIAL

## 2017-11-01 ENCOUNTER — LAB VISIT (OUTPATIENT)
Dept: LAB | Facility: HOSPITAL | Age: 57
End: 2017-11-01
Attending: INTERNAL MEDICINE
Payer: COMMERCIAL

## 2017-11-01 VITALS
SYSTOLIC BLOOD PRESSURE: 104 MMHG | DIASTOLIC BLOOD PRESSURE: 72 MMHG | BODY MASS INDEX: 23.28 KG/M2 | HEIGHT: 76 IN | WEIGHT: 191.13 LBS | HEART RATE: 76 BPM | TEMPERATURE: 98 F

## 2017-11-01 DIAGNOSIS — E11.9 TYPE 2 DIABETES MELLITUS WITHOUT COMPLICATION, WITHOUT LONG-TERM CURRENT USE OF INSULIN: ICD-10-CM

## 2017-11-01 DIAGNOSIS — J30.9 ALLERGIC RHINITIS, UNSPECIFIED CHRONICITY, UNSPECIFIED SEASONALITY, UNSPECIFIED TRIGGER: ICD-10-CM

## 2017-11-01 DIAGNOSIS — R07.9 CHEST PAIN, UNSPECIFIED TYPE: ICD-10-CM

## 2017-11-01 DIAGNOSIS — I10 ESSENTIAL HYPERTENSION: Primary | ICD-10-CM

## 2017-11-01 LAB
ANION GAP SERPL CALC-SCNC: 10 MMOL/L
BUN SERPL-MCNC: 11 MG/DL
CALCIUM SERPL-MCNC: 10 MG/DL
CHLORIDE SERPL-SCNC: 103 MMOL/L
CO2 SERPL-SCNC: 27 MMOL/L
CREAT SERPL-MCNC: 1.1 MG/DL
EST. GFR  (AFRICAN AMERICAN): >60 ML/MIN/1.73 M^2
EST. GFR  (NON AFRICAN AMERICAN): >60 ML/MIN/1.73 M^2
ESTIMATED AVG GLUCOSE: 134 MG/DL
GLUCOSE SERPL-MCNC: 114 MG/DL
HBA1C MFR BLD HPLC: 6.3 %
POTASSIUM SERPL-SCNC: 3.9 MMOL/L
SODIUM SERPL-SCNC: 140 MMOL/L

## 2017-11-01 PROCEDURE — 83036 HEMOGLOBIN GLYCOSYLATED A1C: CPT

## 2017-11-01 PROCEDURE — 99999 PR PBB SHADOW E&M-EST. PATIENT-LVL III: CPT | Mod: PBBFAC,,, | Performed by: INTERNAL MEDICINE

## 2017-11-01 PROCEDURE — 93010 ELECTROCARDIOGRAM REPORT: CPT | Mod: S$GLB,,, | Performed by: INTERNAL MEDICINE

## 2017-11-01 PROCEDURE — 36415 COLL VENOUS BLD VENIPUNCTURE: CPT | Mod: PO

## 2017-11-01 PROCEDURE — 99214 OFFICE O/P EST MOD 30 MIN: CPT | Mod: S$GLB,,, | Performed by: INTERNAL MEDICINE

## 2017-11-01 PROCEDURE — 80048 BASIC METABOLIC PNL TOTAL CA: CPT

## 2017-11-01 PROCEDURE — 93005 ELECTROCARDIOGRAM TRACING: CPT | Mod: S$GLB,,, | Performed by: INTERNAL MEDICINE

## 2017-11-01 RX ORDER — AMLODIPINE BESYLATE 5 MG/1
5 TABLET ORAL DAILY
Qty: 90 TABLET | Refills: 3 | Status: SHIPPED | OUTPATIENT
Start: 2017-11-01 | End: 2018-03-08

## 2017-11-01 RX ORDER — MONTELUKAST SODIUM 10 MG/1
10 TABLET ORAL NIGHTLY
Qty: 30 TABLET | Refills: 1 | Status: SHIPPED | OUTPATIENT
Start: 2017-11-01 | End: 2017-12-14 | Stop reason: SDUPTHER

## 2017-11-01 NOTE — PROGRESS NOTES
CC: followup of hypertension  HPI:  The patient is a 57 y.o. year old male who presents to the office for followup of hypertension.  The patient denies any shortness of breath, headache, blurred vision, excessive fatigue, nausea or vomiting, but reports episode of chest tightness across his chest while recumbent.  Symptoms lasted a couple of minutes.  He reports intermittent chest pain for a couple of weeks.  He complains of nasal congestion as well.  The patient has elected to defer hernia repair and cervical repair.    PAST MEDICAL HISTORY:  Past Medical History:   Diagnosis Date    Carotid artery occlusion     Diabetes mellitus, type 2     diet controlled    Difficult intubation     DJD (degenerative joint disease), cervical 7/10/2015    History of iritis 2013    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 -     Hyperlipidemia     Hypertension     Memory loss     Traumatic iritis - Left Eye 2/27/2013       SURGICAL HISTORY:  Past Surgical History:   Procedure Laterality Date    CERVICAL FUSION  12/30/2015    COLONOSCOPY N/A 4/7/2017    Procedure: COLONOSCOPY;  Surgeon: Handy Frederick MD;  Location: [address]);  Service: Endoscopy;  Laterality: N/A;    HERNIA REPAIR      PROSTATECTOMY         MEDS:  Medcard reviewed and updated    ALLERGIES: Allergy Card reviewed and updated    SOCIAL HISTORY:   The patient is a nonsmoker.    PE:   APPEARANCE: Well nourished, well developed, in no acute distress.    CHEST: Lungs clear to auscultation with unlabored respirations.  CARDIOVASCULAR: Normal S1, S2. No murmurs. No carotid bruits. No pedal edema.  ABDOMEN: Bowel sounds normal. Not distended. Soft. No tenderness or masses.   PSYCHIATRIC: The patient is oriented to person, place, and time and has a pleasant affect.        ASSESSMENT/PLAN:  Hi was seen today for follow-up.    Diagnoses and all orders for this visit:    Essential hypertension  -     Blood pressure is  controlled    Type 2 diabetes mellitus without complication, without long-term current use of insulin  -     Basic metabolic panel; Future  -     Hemoglobin A1c; Future    Chest pain, unspecified type  -     EKG 12-lead    Allergic rhinitis, unspecified chronicity, unspecified seasonality, unspecified trigger  -     Start singulair    Other orders  -     amLODIPine (NORVASC) 5 MG tablet; Take 1 tablet (5 mg total) by mouth once daily.  -     montelukast (SINGULAIR) 10 mg tablet; Take 1 tablet (10 mg total) by mouth every evening.

## 2017-11-08 ENCOUNTER — TELEPHONE (OUTPATIENT)
Dept: INTERNAL MEDICINE | Facility: CLINIC | Age: 57
End: 2017-11-08

## 2017-11-08 ENCOUNTER — PATIENT MESSAGE (OUTPATIENT)
Dept: INTERNAL MEDICINE | Facility: CLINIC | Age: 57
End: 2017-11-08

## 2017-11-14 RX ORDER — PANTOPRAZOLE SODIUM 40 MG/1
TABLET, DELAYED RELEASE ORAL
Qty: 30 TABLET | Refills: 3 | Status: SHIPPED | OUTPATIENT
Start: 2017-11-14 | End: 2018-03-12 | Stop reason: ALTCHOICE

## 2017-11-16 ENCOUNTER — OFFICE VISIT (OUTPATIENT)
Dept: UROLOGY | Facility: CLINIC | Age: 57
End: 2017-11-16
Attending: UROLOGY
Payer: COMMERCIAL

## 2017-11-16 VITALS
BODY MASS INDEX: 23.26 KG/M2 | SYSTOLIC BLOOD PRESSURE: 121 MMHG | WEIGHT: 191 LBS | HEIGHT: 76 IN | DIASTOLIC BLOOD PRESSURE: 81 MMHG | HEART RATE: 81 BPM

## 2017-11-16 DIAGNOSIS — N52.01 ERECTILE DYSFUNCTION DUE TO ARTERIAL INSUFFICIENCY: ICD-10-CM

## 2017-11-16 DIAGNOSIS — N40.1 BPH WITH OBSTRUCTION/LOWER URINARY TRACT SYMPTOMS: ICD-10-CM

## 2017-11-16 DIAGNOSIS — N13.8 BPH WITH OBSTRUCTION/LOWER URINARY TRACT SYMPTOMS: ICD-10-CM

## 2017-11-16 DIAGNOSIS — R68.82 DECREASED LIBIDO: Primary | ICD-10-CM

## 2017-11-16 LAB
BILIRUB SERPL-MCNC: ABNORMAL MG/DL
BLOOD URINE, POC: 50
COLOR, POC UA: ABNORMAL
GLUCOSE UR QL STRIP: 50
KETONES UR QL STRIP: ABNORMAL
LEUKOCYTE ESTERASE URINE, POC: ABNORMAL
NITRITE, POC UA: ABNORMAL
PH, POC UA: 5
POC RESIDUAL URINE VOLUME: 17 ML (ref 0–100)
PROTEIN, POC: ABNORMAL
SPECIFIC GRAVITY, POC UA: 1.01
UROBILINOGEN, POC UA: NORMAL

## 2017-11-16 PROCEDURE — 99024 POSTOP FOLLOW-UP VISIT: CPT | Mod: S$GLB,,, | Performed by: UROLOGY

## 2017-11-16 PROCEDURE — 51798 US URINE CAPACITY MEASURE: CPT | Mod: S$GLB,,, | Performed by: UROLOGY

## 2017-11-16 PROCEDURE — 81002 URINALYSIS NONAUTO W/O SCOPE: CPT | Mod: S$GLB,,, | Performed by: UROLOGY

## 2017-11-16 RX ORDER — SILDENAFIL CITRATE 20 MG/1
20 TABLET ORAL SEE ADMIN INSTRUCTIONS
Qty: 100 TABLET | Refills: 11 | Status: ON HOLD | OUTPATIENT
Start: 2017-11-16 | End: 2018-03-13 | Stop reason: HOSPADM

## 2017-11-16 NOTE — PROGRESS NOTES
"Subjective:       Hi Rubio is a 57 y.o. male status post TURP on 10/12/17 here for post-op follow-up. He reports much improved voiding.    His AUASS is 7.    He has no hematuria or dysuria.    Objective:   Vitals: /81 (BP Location: Right arm, Patient Position: Sitting, BP Method: Large (Automatic))   Pulse 81   Ht 6' 4" (1.93 m)   Wt 86.6 kg (191 lb)   BMI 23.25 kg/m²      Physical Exam   General:  alert, appears stated age and cooperative     Lab Review   Urinalysis demonstrates positive for leukocytes, red blood cells    Pathology  None    Assessment and Plan:   1 month s/p TURP, doing well.    -- Trial viagra for ED  -- FU 2 mos        "

## 2017-11-25 ENCOUNTER — LAB VISIT (OUTPATIENT)
Dept: LAB | Facility: HOSPITAL | Age: 57
End: 2017-11-25
Attending: INTERNAL MEDICINE
Payer: COMMERCIAL

## 2017-11-25 DIAGNOSIS — I25.84 CORONARY ARTERY DISEASE DUE TO CALCIFIED CORONARY LESION: ICD-10-CM

## 2017-11-25 DIAGNOSIS — E78.2 MIXED HYPERLIPIDEMIA: ICD-10-CM

## 2017-11-25 DIAGNOSIS — I25.10 CORONARY ARTERY DISEASE DUE TO CALCIFIED CORONARY LESION: ICD-10-CM

## 2017-11-25 LAB
ALT SERPL W/O P-5'-P-CCNC: 22 U/L
AST SERPL-CCNC: 22 U/L
CHOLEST SERPL-MCNC: 231 MG/DL
CHOLEST/HDLC SERPL: 5.1 {RATIO}
HDLC SERPL-MCNC: 45 MG/DL
HDLC SERPL: 19.5 %
LDLC SERPL CALC-MCNC: 122.4 MG/DL
NONHDLC SERPL-MCNC: 186 MG/DL
TRIGL SERPL-MCNC: 318 MG/DL

## 2017-11-25 PROCEDURE — 80061 LIPID PANEL: CPT

## 2017-11-25 PROCEDURE — 36415 COLL VENOUS BLD VENIPUNCTURE: CPT | Mod: PO

## 2017-11-25 PROCEDURE — 84460 ALANINE AMINO (ALT) (SGPT): CPT

## 2017-11-25 PROCEDURE — 84450 TRANSFERASE (AST) (SGOT): CPT

## 2017-11-27 DIAGNOSIS — E11.9 NEWLY DIAGNOSED DIABETES: ICD-10-CM

## 2017-11-27 RX ORDER — BLOOD SUGAR DIAGNOSTIC
STRIP MISCELLANEOUS
Qty: 25 STRIP | Refills: 4 | Status: SHIPPED | OUTPATIENT
Start: 2017-11-27 | End: 2018-07-27 | Stop reason: SDUPTHER

## 2017-12-04 ENCOUNTER — TELEPHONE (OUTPATIENT)
Dept: CARDIOLOGY | Facility: CLINIC | Age: 57
End: 2017-12-04

## 2017-12-04 NOTE — TELEPHONE ENCOUNTER
----- Message from No Palacios MD sent at 12/2/2017  6:14 PM CST -----  Cholesterol and triglycerides are worse. Ask him if he is taking Crestor (rosuvastatin) every day? Crestor is stronger than pravastatin. If he is taking Crestor daily, I will increase the dose.

## 2017-12-04 NOTE — TELEPHONE ENCOUNTER
Pt notified, verbalized understanding. Pt stated that he can't take Crestor because it caused lip swelling. Pt stated that he is still taking Pravastatin. Please advise.

## 2017-12-06 ENCOUNTER — PATIENT MESSAGE (OUTPATIENT)
Dept: CARDIOLOGY | Facility: CLINIC | Age: 57
End: 2017-12-06

## 2017-12-06 RX ORDER — EZETIMIBE 10 MG/1
10 TABLET ORAL DAILY
Qty: 30 TABLET | Refills: 11 | Status: SHIPPED | OUTPATIENT
Start: 2017-12-06 | End: 2022-02-23 | Stop reason: SDUPTHER

## 2017-12-06 RX ORDER — EZETIMIBE 10 MG/1
10 TABLET ORAL DAILY
COMMUNITY
End: 2017-12-06 | Stop reason: SDUPTHER

## 2017-12-06 NOTE — TELEPHONE ENCOUNTER
Pl tell pt, that last year he was able to lower his total chol to 200 and triglycerides to ~220 on the same dose of pravastatin. Now his levels are much higher and my guess is it is due to changes in his diet and perhaps exercise routine.  I'll add Zetia, that will help with the bad chol but not the triglycerides. He should make some changes to his diet and exercise regularly to lower his triglycerides.     Triglycerides are made worse by the following:  Foods containing fat and sugars: ice cream, desserts, cakes, chocolate  Beer, fruit juices  Starches: rice, potatoes, bread, pasta  Lack of exercise  Uncontrolled diabetes    Pl send me a refill request for Zetia 10 mg daily.

## 2017-12-14 RX ORDER — MONTELUKAST SODIUM 10 MG/1
10 TABLET ORAL NIGHTLY
Qty: 30 TABLET | Refills: 1 | Status: SHIPPED | OUTPATIENT
Start: 2017-12-14 | End: 2018-02-15 | Stop reason: SDUPTHER

## 2017-12-18 RX ORDER — ALPRAZOLAM 0.25 MG/1
0.25 TABLET ORAL 3 TIMES DAILY
Qty: 90 TABLET | Refills: 0 | Status: SHIPPED | OUTPATIENT
Start: 2017-12-18 | End: 2018-03-14 | Stop reason: SDUPTHER

## 2017-12-20 ENCOUNTER — TELEPHONE (OUTPATIENT)
Dept: INTERNAL MEDICINE | Facility: CLINIC | Age: 57
End: 2017-12-20

## 2018-01-02 ENCOUNTER — OFFICE VISIT (OUTPATIENT)
Dept: UROLOGY | Facility: CLINIC | Age: 58
End: 2018-01-02
Attending: UROLOGY
Payer: COMMERCIAL

## 2018-01-02 VITALS
HEIGHT: 76 IN | BODY MASS INDEX: 23.5 KG/M2 | HEART RATE: 66 BPM | WEIGHT: 193 LBS | DIASTOLIC BLOOD PRESSURE: 81 MMHG | SYSTOLIC BLOOD PRESSURE: 167 MMHG

## 2018-01-02 DIAGNOSIS — N52.01 ERECTILE DYSFUNCTION DUE TO ARTERIAL INSUFFICIENCY: ICD-10-CM

## 2018-01-02 DIAGNOSIS — N40.1 BENIGN NON-NODULAR PROSTATIC HYPERPLASIA WITH LOWER URINARY TRACT SYMPTOMS: Primary | ICD-10-CM

## 2018-01-02 PROCEDURE — 99024 POSTOP FOLLOW-UP VISIT: CPT | Mod: S$GLB,,, | Performed by: UROLOGY

## 2018-01-02 NOTE — PROGRESS NOTES
"Subjective:       Hi Rubio is a 57 y.o. male status post TURP on 10/12/17 here for post-op follow-up. He reports much improved voiding.    His AUASS is now 3. He is very pleased w/ results.    He has no hematuria or dysuria.    Objective:   Vitals: BP (!) 167/81   Pulse 66   Ht 6' 4" (1.93 m)   Wt 87.5 kg (193 lb)   BMI 23.49 kg/m²      Physical Exam   General:  alert, appears stated age and cooperative     Bladder Scan PVR: 24cc      Lab Review   Urinalysis demonstrates positive for leukocytes, red blood cells    Pathology  None    Assessment and Plan:   3 months s/p TURP, doing well.    -- Continue viagra for ED  -- FU 12 mos      "

## 2018-01-23 ENCOUNTER — TELEPHONE (OUTPATIENT)
Dept: UROLOGY | Facility: CLINIC | Age: 58
End: 2018-01-23

## 2018-01-23 NOTE — TELEPHONE ENCOUNTER
----- Message from Brenna Irby sent at 1/23/2018  2:34 PM CST -----  Contact: pt   x 1st Request  _ 2nd Request  _ 3rd Request    Who:pt     Why: pt is calling to get clarification in regards to schedule appt on 1/25/18 with Dr. King. States he was just seen not sure why he has to return so soon.     What Number to Call Back: [phone]     When to Expect a call back: (Before the end of the day)  -- if call after 3:00 call back will be tomorrow.

## 2018-01-31 ENCOUNTER — PATIENT MESSAGE (OUTPATIENT)
Dept: INTERNAL MEDICINE | Facility: CLINIC | Age: 58
End: 2018-01-31

## 2018-01-31 ENCOUNTER — TELEPHONE (OUTPATIENT)
Dept: INTERNAL MEDICINE | Facility: CLINIC | Age: 58
End: 2018-01-31

## 2018-02-06 RX ORDER — HYDROCHLOROTHIAZIDE 25 MG/1
TABLET ORAL
Qty: 90 TABLET | Refills: 1 | Status: SHIPPED | OUTPATIENT
Start: 2018-02-06 | End: 2018-08-03 | Stop reason: SDUPTHER

## 2018-02-15 RX ORDER — MONTELUKAST SODIUM 10 MG/1
10 TABLET ORAL NIGHTLY
Qty: 30 TABLET | Refills: 1 | Status: SHIPPED | OUTPATIENT
Start: 2018-02-15 | End: 2018-04-13 | Stop reason: SDUPTHER

## 2018-02-22 DIAGNOSIS — I49.3 SYMPTOMATIC PVCS: ICD-10-CM

## 2018-02-22 RX ORDER — METOPROLOL SUCCINATE 100 MG/1
TABLET, EXTENDED RELEASE ORAL
Qty: 90 TABLET | Refills: 2 | Status: SHIPPED | OUTPATIENT
Start: 2018-02-22 | End: 2018-11-07 | Stop reason: SDUPTHER

## 2018-03-08 ENCOUNTER — HOSPITAL ENCOUNTER (EMERGENCY)
Facility: HOSPITAL | Age: 58
Discharge: HOME | End: 2018-03-08
Attending: EMERGENCY MEDICINE
Payer: COMMERCIAL

## 2018-03-08 ENCOUNTER — NURSE TRIAGE (OUTPATIENT)
Dept: ADMINISTRATIVE | Facility: CLINIC | Age: 58
End: 2018-03-08

## 2018-03-08 VITALS
DIASTOLIC BLOOD PRESSURE: 75 MMHG | OXYGEN SATURATION: 100 % | HEIGHT: 76 IN | HEART RATE: 70 BPM | SYSTOLIC BLOOD PRESSURE: 128 MMHG | TEMPERATURE: 99 F | WEIGHT: 190 LBS | BODY MASS INDEX: 23.14 KG/M2 | RESPIRATION RATE: 20 BRPM

## 2018-03-08 DIAGNOSIS — I20.89 STABLE ANGINA: Primary | ICD-10-CM

## 2018-03-08 DIAGNOSIS — R07.9 CHEST PAIN: ICD-10-CM

## 2018-03-08 LAB
ALBUMIN SERPL BCP-MCNC: 4.3 G/DL
ALP SERPL-CCNC: 82 U/L
ALT SERPL W/O P-5'-P-CCNC: 34 U/L
ANION GAP SERPL CALC-SCNC: 11 MMOL/L
AST SERPL-CCNC: 25 U/L
BASOPHILS # BLD AUTO: 0.06 K/UL
BASOPHILS NFR BLD: 1.1 %
BILIRUB SERPL-MCNC: 0.4 MG/DL
BNP SERPL-MCNC: <10 PG/ML
BUN SERPL-MCNC: 12 MG/DL
CALCIUM SERPL-MCNC: 9.9 MG/DL
CHLORIDE SERPL-SCNC: 106 MMOL/L
CO2 SERPL-SCNC: 24 MMOL/L
CREAT SERPL-MCNC: 1 MG/DL
DIFFERENTIAL METHOD: NORMAL
EOSINOPHIL # BLD AUTO: 0.3 K/UL
EOSINOPHIL NFR BLD: 5.3 %
ERYTHROCYTE [DISTWIDTH] IN BLOOD BY AUTOMATED COUNT: 12.9 %
EST. GFR  (AFRICAN AMERICAN): >60 ML/MIN/1.73 M^2
EST. GFR  (NON AFRICAN AMERICAN): >60 ML/MIN/1.73 M^2
GLUCOSE SERPL-MCNC: 125 MG/DL
HCT VFR BLD AUTO: 46.2 %
HGB BLD-MCNC: 16.3 G/DL
IMM GRANULOCYTES # BLD AUTO: 0.01 K/UL
IMM GRANULOCYTES NFR BLD AUTO: 0.2 %
LYMPHOCYTES # BLD AUTO: 1.5 K/UL
LYMPHOCYTES NFR BLD: 26.4 %
MCH RBC QN AUTO: 29.9 PG
MCHC RBC AUTO-ENTMCNC: 35.3 G/DL
MCV RBC AUTO: 85 FL
MONOCYTES # BLD AUTO: 0.4 K/UL
MONOCYTES NFR BLD: 7.6 %
NEUTROPHILS # BLD AUTO: 3.4 K/UL
NEUTROPHILS NFR BLD: 59.4 %
NRBC BLD-RTO: 0 /100 WBC
PLATELET # BLD AUTO: 279 K/UL
PMV BLD AUTO: 9.9 FL
POTASSIUM SERPL-SCNC: 3.7 MMOL/L
PROT SERPL-MCNC: 8 G/DL
RBC # BLD AUTO: 5.45 M/UL
SODIUM SERPL-SCNC: 141 MMOL/L
TROPONIN I SERPL DL<=0.01 NG/ML-MCNC: <0.006 NG/ML
WBC # BLD AUTO: 5.65 K/UL

## 2018-03-08 PROCEDURE — 99284 EMERGENCY DEPT VISIT MOD MDM: CPT | Mod: 25

## 2018-03-08 PROCEDURE — 80053 COMPREHEN METABOLIC PANEL: CPT

## 2018-03-08 PROCEDURE — 93005 ELECTROCARDIOGRAM TRACING: CPT

## 2018-03-08 PROCEDURE — 83880 ASSAY OF NATRIURETIC PEPTIDE: CPT

## 2018-03-08 PROCEDURE — 84484 ASSAY OF TROPONIN QUANT: CPT

## 2018-03-08 PROCEDURE — 85025 COMPLETE CBC W/AUTO DIFF WBC: CPT

## 2018-03-08 PROCEDURE — 99283 EMERGENCY DEPT VISIT LOW MDM: CPT | Mod: ,,, | Performed by: PHYSICIAN ASSISTANT

## 2018-03-08 PROCEDURE — 99282 EMERGENCY DEPT VISIT SF MDM: CPT | Mod: ,,, | Performed by: INTERNAL MEDICINE

## 2018-03-08 PROCEDURE — 93010 ELECTROCARDIOGRAM REPORT: CPT | Mod: ,,, | Performed by: INTERNAL MEDICINE

## 2018-03-08 RX ORDER — AMLODIPINE BESYLATE 5 MG/1
10 TABLET ORAL DAILY
Qty: 20 TABLET | Refills: 0 | Status: SHIPPED | OUTPATIENT
Start: 2018-03-08 | End: 2018-03-09 | Stop reason: SDUPTHER

## 2018-03-08 NOTE — ED NOTES
"Pt arrives for evaluation of intermittent chest pressure over past 2 weeks - states he had an episode yesterday for an hour with some radiation to left neck and nausea - denies diaphoresis or emesis - describes pressure as tight and "warm" - episode this morning was "tightness"x 15-20 minutes without radiation or associated symptoms - patient was showering at the time - also states more HE in past few weeks    "

## 2018-03-08 NOTE — HPI
Mr. Rubio is a 56yo male with HTN, HLD, PVCs, and coronary calcium within the LAD (per chest CT), possible subclavian steal syndrome who presented to the ED for angina. Patient reports angina upon moderate exertion over the past 2 weeks. He denies any changes in symptoms (progression or worsening).  Mr. Rubio denies SOB, HE, dizziness, syncope, leg edema, claudication, PND, or orthopnea. He was lifting weights and running 4 miles a day on the treadmill but stopped on 8/1/2017 once his headaches started. He has one [id number]. He is currently taking amlodipine 5mg, ASA 81mg, HCTZ 25mg, metoprolol 100mg, and pravastatin 80mg. Dr Palacios who is his current cardiologist, recently started him on Ezetia for elevated cholesterol and triglycerides. Of note, he had a normal DSE in January 2017.   In the ED, EKG showed NSR and no ST-T waves abnormalities. Troponin and BNP were normal. He was chest pain free by the time he was assessed by cardiology.

## 2018-03-08 NOTE — ASSESSMENT & PLAN NOTE
-This does not constitute ACS  -Chest pain might be related to uncontrolled hypertension  -Would increase amlodipine to 10 mg po daily  -OK from cardiac stand point to be discharged home  -Early follow up with Dr Palacios in clinic with outpatient stress test

## 2018-03-08 NOTE — TELEPHONE ENCOUNTER
Reason for Disposition   Chest pain lasting longer than 5 minutes and ANY of the following:* Over 50 years old* Over 30 years old and at least one cardiac risk factor (i.e., high blood pressure, diabetes, high cholesterol, obesity, smoker or strong family history of heart disease)* Pain is crushing, pressure-like, or heavy * Took nitroglycerin and chest pain was not relieved* History of heart disease (i.e., angina, heart attack, bypass surgery, angioplasty, CHF)    Protocols used: ST CHEST PAIN-A-OH    Chest tightness for a couples of days then a warm sensation with nausea. /111 this AM. Took extra 100 mg metoprolol yesterday because of chest tightness and elevated BP. Care advice given. Pt states he will call his wife to take him to ED.

## 2018-03-08 NOTE — SUBJECTIVE & OBJECTIVE
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 -     Hyperlipidemia     Hypertension     Memory loss     Traumatic iritis - Left Eye 2/27/2013       Past Surgical History:   Procedure Laterality Date    CERVICAL FUSION  12/30/2015    COLONOSCOPY N/A 4/7/2017    Procedure: COLONOSCOPY;  Surgeon: Handy Frederick MD;  Location: [facility] ([address] [place] [address]);  Service: Endoscopy;  Laterality: N/A;    HERNIA REPAIR      PROSTATECTOMY         Review of patient's allergies indicates:   Allergen Reactions    Lisinopril Anaphylaxis and Nausea And Vomiting     General bad feeling    Crestor [rosuvastatin] Swelling     Lip swelling.     Lipitor [atorvastatin] Other (See Comments)     Muscle aches       No current facility-administered medications on file prior to encounter.      Current Outpatient Prescriptions on File Prior to Encounter   Medication Sig    ALPRAZolam (XANAX) 0.25 MG tablet Take 1 tablet (0.25 mg total) by mouth 3 (three) times daily.    amLODIPine (NORVASC) 5 MG tablet Take 1 tablet (5 mg total) by mouth once daily. (Patient taking differently: Take 5 mg by mouth once daily. At night)    hydroCHLOROthiazide (HYDRODIURIL) 25 MG tablet TAKE ONE TABLET BY MOUTH ONE TIME DAILY    metoprolol succinate (TOPROL-XL) 100 MG 24 hr tablet TAKE ONE TABLET BY MOUTH ONE TIME DAILY    montelukast (SINGULAIR) 10 mg tablet TAKE 1 TABLET (10 MG TOTAL) BY MOUTH EVERY EVENING.    pantoprazole (PROTONIX) 40 MG tablet TAKE 1 TABLET (40 MG TOTAL) BY MOUTH ONCE DAILY.    pravastatin (PRAVACHOL) 80 MG tablet TAKE ONE TABLET BY MOUTH NIGHTLY AT BEDTIME    cloNIDine (CATAPRES) 0.1 MG tablet Take 1 tablet (0.1 mg total) by mouth once as needed (For blood  pressures above 170 systolic - call clinic if pressure not resolved after 30 minutes).    CONTOUR NEXT STRIPS Strp USE TO TEST BLOOD SUGAR ONCE DAILY    ezetimibe (ZETIA) 10 mg tablet Take 1 tablet (10 mg total) by mouth once daily.    KRILL OIL ORAL Take by mouth once daily. Pt unsure of dosage.    MICROLET LANCET Misc 1 lancet by Misc.(Non-Drug; Combo Route) route once daily. Test blood glucose once daily as instructed.    nitroGLYCERIN (NITROSTAT) 0.3 MG SL tablet Place 1 tablet (0.3 mg total) under the tongue every 5 (five) minutes as needed for Chest pain.    sildenafil (REVATIO) 20 mg Tab Take 1 tablet (20 mg total) by mouth As instructed. Take 2-5 tablets as needed.     Family History     Problem Relation (Age of Onset)    Benign prostatic hyperplasia Brother    Heart disease Mother, Father    Hepatitis Brother    Hyperlipidemia Mother, Brother, Brother    Hypertension Mother, Brother, Brother, Brother, Brother        Social History Main Topics    Smoking status: Former Smoker     Packs/day: 1.00     Years: 30.00     Types: Cigarettes     Quit date: 12/13/2011    Smokeless tobacco: Never Used    Alcohol use Yes      Comment: occas - nonalcoholic beer or beer    Drug use: No    Sexual activity: Yes     Partners: Female     Review of Systems   Constitution: Negative.   HENT: Negative.    Eyes: Negative.    Cardiovascular: Positive for chest pain and dyspnea on exertion. Negative for claudication, cyanosis, irregular heartbeat, leg swelling, near-syncope, orthopnea, palpitations, paroxysmal nocturnal dyspnea and syncope.   Respiratory: Positive for shortness of breath. Negative for cough, hemoptysis, sleep disturbances due to breathing, snoring, sputum production and wheezing.    Endocrine: Negative.    Hematologic/Lymphatic: Negative.    Genitourinary: Negative.      Objective:     Vital Signs (Most Recent):  Temp: 98.5 °F (36.9 °C) (03/08/18 1116)  Pulse: 66 (03/08/18 1116)  Resp: 18 (03/08/18  1116)  BP: 130/82 (03/08/18 1116)  SpO2: 97 % (03/08/18 1116) Vital Signs (24h Range):  Temp:  [98.3 °F (36.8 °C)-98.5 °F (36.9 °C)] 98.5 °F (36.9 °C)  Pulse:  [66-73] 66  Resp:  [18] 18  SpO2:  [97 %-99 %] 97 %  BP: (130-171)/(82) 130/82     Weight: 86.2 kg (190 lb)  Body mass index is 23.13 kg/m².    SpO2: 97 %  O2 Device (Oxygen Therapy): room air    No intake or output data in the 24 hours ending 03/08/18 1228    Lines/Drains/Airways     Drain                 Urethral Catheter 10/12/17 1421 Triple-lumen 20 Fr. 146 days          Peripheral Intravenous Line                 Peripheral IV - Single Lumen 03/08/18 0933 Left Hand less than 1 day                Physical Exam   Constitutional: He is oriented to person, place, and time. He appears well-developed and well-nourished.   HENT:   Head: Atraumatic.   Eyes: Conjunctivae are normal.   Neck: Neck supple. No JVD present. No thyromegaly present.   Cardiovascular: Normal rate, regular rhythm, normal heart sounds and intact distal pulses.  Exam reveals no gallop and no friction rub.    No murmur heard.  Pulmonary/Chest: Effort normal and breath sounds normal. No respiratory distress. He has no wheezes. He has no rales. He exhibits no tenderness.   Abdominal: Soft. Bowel sounds are normal. He exhibits no distension. There is no tenderness. There is no rebound and no guarding.   Neurological: He is oriented to person, place, and time.   Nursing note and vitals reviewed.      Significant Labs:   ABG: No results for input(s): PH, PCO2, HCO3, POCSATURATED, BE in the last 48 hours., Blood Culture: No results for input(s): LABBLOO in the last 48 hours., BMP:   Recent Labs  Lab 03/08/18  1007   *      K 3.7      CO2 24   BUN 12   CREATININE 1.0   CALCIUM 9.9   , CMP   Recent Labs  Lab 03/08/18  1007      K 3.7      CO2 24   *   BUN 12   CREATININE 1.0   CALCIUM 9.9   PROT 8.0   ALBUMIN 4.3   BILITOT 0.4   ALKPHOS 82   AST 25   ALT 34    ANIONGAP 11   ESTGFRAFRICA >60.0   EGFRNONAA >60.0    and Lipid Panel No results for input(s): CHOL, HDL, LDLCALC, TRIG, CHOLHDL in the last 48 hours.    Significant Imaging: CT scan: CT ABDOMEN PELVIS WITH CONTRAST: No results found for this visit on 03/08/18. and CT ABDOMEN PELVIS WITHOUT CONTRAST: No results found for this visit on 03/08/18., Echocardiogram:   2D echo with color flow doppler:   Results for orders placed or performed in visit on 11/20/14   2D Echo w/ Color Flow Doppler   Result Value Ref Range    EF 58 55 - 65    Mitral Valve Regurgitation TRIVIAL     Diastolic Dysfunction No     Est. PA Systolic Pressure 34     Tricuspid Valve Regurgitation TRIVIAL TO MILD     and X-Ray: CXR: X-Ray Chest 1 View (CXR): No results found for this visit on 03/08/18. and X-Ray Chest PA and Lateral (CXR):   Results for orders placed or performed during the hospital encounter of 03/08/18   X-Ray Chest PA And Lateral    Narrative    2 views    Heart size normal.  Lungs are clear.  No pleural effusion    Impression     See above      Electronically signed by: Maxwell Kelsey MD  Date:     03/08/18  Time:    10:36     and KUB: X-Ray Abdomen AP 1 View (KUB): No results found for this visit on 03/08/18.

## 2018-03-08 NOTE — CONSULTS
[facility]  Cardiology  Consult Note    Patient Name: Hi Rubio  MRN: [id number]  Admission Date: 3/8/2018  Hospital Length of Stay: 0 days  Code Status: Prior   Attending Provider: Radha Dumont MD   Consulting Provider: Maximiliano Bess MD  Primary Care Physician: Josefina Kendall MD  Principal Problem:<principal problem not specified>    Patient information was obtained from patient and ER records.     Inpatient consult to Cardiology  Consult performed by: MAXIMILIANO BESS  Consult ordered by: JEREMY JUAREZ  Reason for consult: Angina  Assessment/Recommendations: Discharge home   Increase metoprolol XL to 200 mg po daily        Subjective:     Chief Complaint: Angina     HPI:   Mr. Rubio is a 58yo male with HTN, HLD, PVCs, and coronary calcium within the LAD (per chest CT), possible subclavian steal syndrome who presented to the ED for angina. Patient reports angina upon moderate exertion over the past 2 weeks. He denies any changes in symptoms (progression or worsening).  Mr. Rubio denies SOB, HE, dizziness, syncope, leg edema, claudication, PND, or orthopnea. He was lifting weights and running 4 miles a day on the treadmill but stopped on 8/1/2017 once his headaches started. He has one [id number]. He is currently taking amlodipine 5mg, ASA 81mg, HCTZ 25mg, metoprolol 100mg, and pravastatin 80mg. Dr Palacios who is his current cardiologist, recently started him on Ezetia for elevated cholesterol and triglycerides. Of note, he had a normal DSE in January 2017.   In the ED, EKG showed NSR and no ST-T waves abnormalities. Troponin and BNP were normal. He was chest pain free by the time he was assessed by cardiology.    Past Medical History:   Diagnosis Date    Carotid artery occlusion     Coronary artery disease     Diabetes mellitus, type 2     diet controlled    Difficult intubation     DJD (degenerative joint disease), cervical 7/10/2015    History of iritis  2013    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 -     Hyperlipidemia     Hypertension     Memory loss     Traumatic iritis - Left Eye 2/27/2013       Past Surgical History:   Procedure Laterality Date    CERVICAL FUSION  12/30/2015    COLONOSCOPY N/A 4/7/2017    Procedure: COLONOSCOPY;  Surgeon: Handy Frederick MD;  [facility] ([address] [place] [address]);  Service: Endoscopy;  Laterality: N/A;    HERNIA REPAIR      PROSTATECTOMY         Review of patient's allergies indicates:   Allergen Reactions    Lisinopril Anaphylaxis and Nausea And Vomiting     General bad feeling    Crestor [rosuvastatin] Swelling     Lip swelling.     Lipitor [atorvastatin] Other (See Comments)     Muscle aches       No current facility-administered medications on file prior to encounter.      Current Outpatient Prescriptions on File Prior to Encounter   Medication Sig    ALPRAZolam (XANAX) 0.25 MG tablet Take 1 tablet (0.25 mg total) by mouth 3 (three) times daily.    amLODIPine (NORVASC) 5 MG tablet Take 1 tablet (5 mg total) by mouth once daily. (Patient taking differently: Take 5 mg by mouth once daily. At night)    hydroCHLOROthiazide (HYDRODIURIL) 25 MG tablet TAKE ONE TABLET BY MOUTH ONE TIME DAILY    metoprolol succinate (TOPROL-XL) 100 MG 24 hr tablet TAKE ONE TABLET BY MOUTH ONE TIME DAILY    montelukast (SINGULAIR) 10 mg tablet TAKE 1 TABLET (10 MG TOTAL) BY MOUTH EVERY EVENING.    pantoprazole (PROTONIX) 40 MG tablet TAKE 1 TABLET (40 MG TOTAL) BY MOUTH ONCE DAILY.    pravastatin (PRAVACHOL) 80 MG tablet TAKE ONE TABLET BY MOUTH NIGHTLY AT BEDTIME    cloNIDine (CATAPRES) 0.1 MG tablet Take 1 tablet (0.1 mg total) by mouth once as needed (For blood pressures above 170 systolic - call clinic if pressure not resolved after 30 minutes).    CONTOUR NEXT STRIPS Strp USE TO TEST BLOOD SUGAR ONCE DAILY    ezetimibe (ZETIA) 10 mg tablet Take 1 tablet (10 mg total) by mouth once daily.    KRILL OIL ORAL Take  by mouth once daily. Pt unsure of dosage.    MICROLET LANCET Misc 1 lancet by Misc.(Non-Drug; Combo Route) route once daily. Test blood glucose once daily as instructed.    nitroGLYCERIN (NITROSTAT) 0.3 MG SL tablet Place 1 tablet (0.3 mg total) under the tongue every 5 (five) minutes as needed for Chest pain.    sildenafil (REVATIO) 20 mg Tab Take 1 tablet (20 mg total) by mouth As instructed. Take 2-5 tablets as needed.     Family History     Problem Relation (Age of Onset)    Benign prostatic hyperplasia Brother    Heart disease Mother, Father    Hepatitis Brother    Hyperlipidemia Mother, Brother, Brother    Hypertension Mother, Brother, Brother, Brother, Brother        Social History Main Topics    Smoking status: Former Smoker     Packs/day: 1.00     Years: 30.00     Types: Cigarettes     Quit date: 12/13/2011    Smokeless tobacco: Never Used    Alcohol use Yes      Comment: occas - nonalcoholic beer or beer    Drug use: No    Sexual activity: Yes     Partners: Female     Review of Systems   Constitution: Negative.   HENT: Negative.    Eyes: Negative.    Cardiovascular: Positive for chest pain and dyspnea on exertion. Negative for claudication, cyanosis, irregular heartbeat, leg swelling, near-syncope, orthopnea, palpitations, paroxysmal nocturnal dyspnea and syncope.   Respiratory: Positive for shortness of breath. Negative for cough, hemoptysis, sleep disturbances due to breathing, snoring, sputum production and wheezing.    Endocrine: Negative.    Hematologic/Lymphatic: Negative.    Genitourinary: Negative.      Objective:     Vital Signs (Most Recent):  Temp: 98.5 °F (36.9 °C) (03/08/18 1116)  Pulse: 66 (03/08/18 1116)  Resp: 18 (03/08/18 1116)  BP: 130/82 (03/08/18 1116)  SpO2: 97 % (03/08/18 1116) Vital Signs (24h Range):  Temp:  [98.3 °F (36.8 °C)-98.5 °F (36.9 °C)] 98.5 °F (36.9 °C)  Pulse:  [66-73] 66  Resp:  [18] 18  SpO2:  [97 %-99 %] 97 %  BP: (130-171)/(82) 130/82     Weight: 86.2 kg (190  lb)  Body mass index is 23.13 kg/m².    SpO2: 97 %  O2 Device (Oxygen Therapy): room air    No intake or output data in the 24 hours ending 03/08/18 1228    Lines/Drains/Airways     Drain                 Urethral Catheter 10/12/17 1421 Triple-lumen 20 Fr. 146 days          Peripheral Intravenous Line                 Peripheral IV - Single Lumen 03/08/18 0933 Left Hand less than 1 day                Physical Exam   Constitutional: He is oriented to person, place, and time. He appears well-developed and well-nourished.   HENT:   Head: Atraumatic.   Eyes: Conjunctivae are normal.   Neck: Neck supple. No JVD present. No thyromegaly present.   Cardiovascular: Normal rate, regular rhythm, normal heart sounds and intact distal pulses.  Exam reveals no gallop and no friction rub.    No murmur heard.  Pulmonary/Chest: Effort normal and breath sounds normal. No respiratory distress. He has no wheezes. He has no rales. He exhibits no tenderness.   Abdominal: Soft. Bowel sounds are normal. He exhibits no distension. There is no tenderness. There is no rebound and no guarding.   Neurological: He is oriented to person, place, and time.   Nursing note and vitals reviewed.      Significant Labs:   ABG: No results for input(s): PH, PCO2, HCO3, POCSATURATED, BE in the last 48 hours., Blood Culture: No results for input(s): LABBLOO in the last 48 hours., BMP:   Recent Labs  Lab 03/08/18  1007   *      K 3.7      CO2 24   BUN 12   CREATININE 1.0   CALCIUM 9.9   , CMP   Recent Labs  Lab 03/08/18  1007      K 3.7      CO2 24   *   BUN 12   CREATININE 1.0   CALCIUM 9.9   PROT 8.0   ALBUMIN 4.3   BILITOT 0.4   ALKPHOS 82   AST 25   ALT 34   ANIONGAP 11   ESTGFRAFRICA >60.0   EGFRNONAA >60.0    and Lipid Panel No results for input(s): CHOL, HDL, LDLCALC, TRIG, CHOLHDL in the last 48 hours.    Significant Imaging: CT scan: CT ABDOMEN PELVIS WITH CONTRAST: No results found for this visit on 03/08/18. and  CT ABDOMEN PELVIS WITHOUT CONTRAST: No results found for this visit on 03/08/18., Echocardiogram:   2D echo with color flow doppler:   Results for orders placed or performed in visit on 11/20/14   2D Echo w/ Color Flow Doppler   Result Value Ref Range    EF 58 55 - 65    Mitral Valve Regurgitation TRIVIAL     Diastolic Dysfunction No     Est. PA Systolic Pressure 34     Tricuspid Valve Regurgitation TRIVIAL TO MILD     and X-Ray: CXR: X-Ray Chest 1 View (CXR): No results found for this visit on 03/08/18. and X-Ray Chest PA and Lateral (CXR):   Results for orders placed or performed during the hospital encounter of 03/08/18   X-Ray Chest PA And Lateral    Narrative    2 views    Heart size normal.  Lungs are clear.  No pleural effusion    Impression     See above      Electronically signed by: Maxwell Kelsey MD  Date:     03/08/18  Time:    10:36     and KUB: X-Ray Abdomen AP 1 View (KUB): No results found for this visit on 03/08/18.    Assessment and Plan:     No notes have been filed under this hospital service.  Service: Cardiology      VTE Risk Mitigation     None          Thank you for your consult. I will sign off. Please contact us if you have any additional questions.    Maximiliano Bess MD  Cardiology   [facility]

## 2018-03-08 NOTE — DISCHARGE INSTRUCTIONS
Please follow-up with Dr. Palacios regarding scheduling an outpatient cardiac stress test to further evaluate your chest pain. Please return to the emergency room if your chest pain worsens for re-evaluation. Please start taking the newly prescribed Norvasc 10mg every day.

## 2018-03-08 NOTE — ED PROVIDER NOTES
Encounter Date: 3/8/2018    SCRIBE #1 NOTE: I, Yahir Means, am scribing for, and in the presence of,  Dr. Dumont. I have scribed the following portions of the note - the EKG reading and the APC attestation. Other sections scribed: XRs.       History     Chief Complaint   Patient presents with    Chest Pain     chest pain on and off for past week, + cardiac hx unstable angina', no pain at present     58 year old male with medical history of HTN, HLD, DMII, CAD presenting to the ED with the chief complaint of chest pain. Patient reports having 1-2 weeks of intermittent chest pain. Patient describes the chest pain episodes as tight, warm sensations in his sternal area with associated SOB that last between 15 minutes to 1 hour at a time. Patient reports history of unstable angina and his chest pain is similar to his typical pain, but the warm sensation is new. He reports the frequency of the episodes have been increasing. He reports experiencing chest pain on exertion. Patient [place] and experiencing chest pain that he would not normally have. Patient denies having chest pain at this time, but notes having a brief episode this morning around 7:45 am. He reports having elevated blood pressure (180/11) this morning after taking his medications. He reports having associated nausea. Patient reports having 2-3 episodes of loose diarrhea per day for the past month. He denies fever, jaw pain, extremity pain, numbness, paresthesias, vomiting, abdominal pain, dysuria, hematuria.          Review of patient's allergies indicates:   Allergen Reactions    Lisinopril Anaphylaxis and Nausea And Vomiting     General bad feeling    Crestor [rosuvastatin] Swelling     Lip swelling.     Lipitor [atorvastatin] Other (See Comments)     Muscle aches     Past Medical History:   Diagnosis Date    Carotid artery occlusion     Coronary artery disease     Diabetes mellitus, type 2     diet  controlled    Difficult intubation     DJD (degenerative joint disease), cervical 7/10/2015    History of iritis 2013    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 -     Hyperlipidemia     Hypertension     Memory loss     Traumatic iritis - Left Eye 2/27/2013     Past Surgical History:   Procedure Laterality Date    CERVICAL FUSION  12/30/2015    COLONOSCOPY N/A 4/7/2017    Procedure: COLONOSCOPY;  Surgeon: Handy Frederick MD;  Location: [facility] ([address] [place] [address]);  Service: Endoscopy;  Laterality: N/A;    HERNIA REPAIR      PROSTATECTOMY       Family History   Problem Relation Age of Onset    Heart disease Mother     Hypertension Mother     Hyperlipidemia Mother     Heart disease Father     Hyperlipidemia Brother     Hypertension Brother     Hypertension Brother     Hyperlipidemia Brother     Benign prostatic hyperplasia Brother     Hypertension Brother     Hypertension Brother     Hepatitis Brother     Amblyopia Neg Hx     Blindness Neg Hx     Cancer Neg Hx     Cataracts Neg Hx     Diabetes Neg Hx     Glaucoma Neg Hx     Macular degeneration Neg Hx     Retinal detachment Neg Hx     Strabismus Neg Hx     Stroke Neg Hx     Thyroid disease Neg Hx      Social History   Substance Use Topics    Smoking status: Former Smoker     Packs/day: 1.00     Years: 30.00     Types: Cigarettes     Quit date: 12/13/2011    Smokeless tobacco: Never Used    Alcohol use Yes      Comment: occas - nonalcoholic beer or beer     Review of Systems   Constitutional: Negative for chills and fever.   HENT: Negative for congestion, sore throat and trouble swallowing.    Eyes: Negative for pain and redness.   Respiratory: Positive for shortness of breath. Negative for cough.    Cardiovascular: Positive for chest pain.   Gastrointestinal: Positive for diarrhea and nausea. Negative for abdominal pain, constipation and vomiting.   Genitourinary: Negative for dysuria and hematuria.   Musculoskeletal:  Negative for back pain, neck pain and neck stiffness.   Skin: Negative for rash and wound.   Neurological: Negative for light-headedness, numbness and headaches.       Physical Exam     Initial Vitals [03/08/18 0902]   BP Pulse Resp Temp SpO2   (!) 171/82 73 18 98.3 °F (36.8 °C) 99 %      MAP       111.67         Physical Exam    Constitutional: He appears well-developed and well-nourished. He is not diaphoretic. No distress.   HENT:   Head: Normocephalic and atraumatic.   Mouth/Throat: Oropharynx is clear and moist. No oropharyngeal exudate.   Eyes: EOM are normal. Pupils are equal, round, and reactive to light.   Neck: Normal range of motion. Neck supple.   Cardiovascular: Normal rate, regular rhythm and intact distal pulses.   Pulmonary/Chest: Breath sounds normal. No respiratory distress. He has no wheezes. He exhibits no tenderness.   Abdominal: Soft. Bowel sounds are normal. He exhibits no distension. There is no tenderness.   Musculoskeletal: Normal range of motion. He exhibits no edema or tenderness.   Neurological: He is alert and oriented to person, place, and time. He has normal strength. No cranial nerve deficit.   Skin: Skin is warm and dry. Capillary refill takes less than 2 seconds. No erythema.       Imaging Results          X-Ray Chest PA And Lateral (Final result)  Result time 03/08/18 10:36:04    Final result by Maxwell Kelsey MD (03/08/18 10:36:04)                 Impression:     See above      Electronically signed by: Maxwell Kelsey MD  Date:     03/08/18  Time:    10:36              Narrative:    2 views    Heart size normal.  Lungs are clear.  No pleural effusion                                ED Course   Procedures  Labs Reviewed   COMPREHENSIVE METABOLIC PANEL - Abnormal; Notable for the following:        Result Value    Glucose 125 (*)     All other components within normal limits   CBC W/ AUTO DIFFERENTIAL   TROPONIN I   B-TYPE NATRIURETIC PEPTIDE     EKG Readings: (Independently  Interpreted)   NSR at 76 with no ST or T-wave changes.        X-Rays:   Independently Interpreted Readings:   Chest X-Ray: Lung fields clear. Normal cardiac silhouette.           APC / Resident Notes:   58 year old male with medical history of HTN, HLD, DMII, CAD presenting to the ED c/o intermittent chest pain episodes for 2 weeks. DDx includes but not limited to ACS, chest wall injury, pneumonia, pneumothorax, pulmonary embolism, GERD, costochondritis, cardiac arrhythmia, musculoskeletal injury, aortic dissection, pericarditis, endocarditis. Will proceed with CP work-up.    Work-up significant for stable electrolytes, troponin <0.006, <10 BNP, CXR shows no acute intrathoracic processes. ECG at 76 bpm without ST changes or significant changes from previous.     Discuss patient with cardiology regarding unstable angina and they evaluated patient at bedside. They believe presentation is more consistent with stable angina and suitable for outpatient follow-up. Patient stable for close outpatient follow-up with Dr. Palacios for outpatient stress test for further evaluation. Will increase Norvasc to 10mg QD. Discussed plan with patient and he is agreeable. I have advised the patient to follow-up with their PCP. Return to ED precautions given. All of the patient's questions were answered. I have discussed the care of this patient with my supervising physician.          Scribe Attestation:   Scribe #1: I performed the above scribed service and the documentation accurately describes the services I performed. I attest to the accuracy of the note.    Attending Attestation:     Physician Attestation Statement for NP/PA:   I discussed this assessment and plan of this patient with the NP/PA, but I did not personally examine the patient. The face to face encounter was performed by the NP/PA.    Other NP/PA Attestation Additions:    History of Present Illness: 59 y/o male complaints of exertional chest pain. Hx of angina. He had  an episode this morning, that was slightly different from previous episodes.     Medical Decision Making: Initially, our concern was pt's difference and  change in frequency of chest pain. We consulted cardiology. However, they feel this is more of his normal pattern of angina. Apparently, he had stopped exercising since early August. Recently started on exercising, therefore started having exertional CP again. He has close f/u with cardiology with Dr. Palacios.                    Clinical Impression:   The primary encounter diagnosis was Stable angina. A diagnosis of Chest pain was also pertinent to this visit.    Disposition:   Disposition: Discharged  Condition: Stable                        Handy Maldonado PA-C  03/08/18 1862

## 2018-03-08 NOTE — ED NOTES
Patient identifiers verified and correct for MR Rubio  C/C: Chest pain   APPEARANCE: awake and alert in NAD.  SKIN: warm, dry and intact. No breakdown or bruising.  MUSCULOSKELETAL: Patient moving all extremities spontaneously, no obvious swelling or deformities noted. Ambulates independently.  RESPIRATORY: Intermittent shortness of breath.Respirations unlabored. Denies cough, denies fever.   CARDIAC: Left CP, non radiating, CP, 2+ distal pulses; no peripheral edema  ABDOMEN: S/ND/NT, Denies nausea  : voids spontaneously, denies difficulty  Neurologic: AAO x 4; follows commands equal strength in all extremities; denies numbness/tingling. Denies dizziness Positive weakness, positive sore throat

## 2018-03-09 ENCOUNTER — TELEPHONE (OUTPATIENT)
Dept: INTERNAL MEDICINE | Facility: CLINIC | Age: 58
End: 2018-03-09

## 2018-03-09 ENCOUNTER — LAB VISIT (OUTPATIENT)
Dept: LAB | Facility: HOSPITAL | Age: 58
End: 2018-03-09
Attending: INTERNAL MEDICINE
Payer: COMMERCIAL

## 2018-03-09 ENCOUNTER — OFFICE VISIT (OUTPATIENT)
Dept: INTERNAL MEDICINE | Facility: CLINIC | Age: 58
End: 2018-03-09
Payer: COMMERCIAL

## 2018-03-09 VITALS
WEIGHT: 193.31 LBS | TEMPERATURE: 98 F | HEART RATE: 68 BPM | SYSTOLIC BLOOD PRESSURE: 120 MMHG | BODY MASS INDEX: 23.54 KG/M2 | HEIGHT: 76 IN | OXYGEN SATURATION: 98 % | DIASTOLIC BLOOD PRESSURE: 82 MMHG

## 2018-03-09 DIAGNOSIS — R07.89 CHEST TIGHTNESS: Primary | ICD-10-CM

## 2018-03-09 DIAGNOSIS — I20.89 ANGINA AT REST: ICD-10-CM

## 2018-03-09 DIAGNOSIS — K21.9 GASTROESOPHAGEAL REFLUX DISEASE, ESOPHAGITIS PRESENCE NOT SPECIFIED: ICD-10-CM

## 2018-03-09 DIAGNOSIS — R10.13 EPIGASTRIC PAIN: ICD-10-CM

## 2018-03-09 DIAGNOSIS — I25.84 CORONARY ARTERY DISEASE DUE TO CALCIFIED CORONARY LESION: ICD-10-CM

## 2018-03-09 DIAGNOSIS — I25.10 CORONARY ARTERY DISEASE DUE TO CALCIFIED CORONARY LESION: ICD-10-CM

## 2018-03-09 LAB
ALBUMIN SERPL BCP-MCNC: 4.2 G/DL
ALP SERPL-CCNC: 72 U/L
ALT SERPL W/O P-5'-P-CCNC: 28 U/L
ANION GAP SERPL CALC-SCNC: 11 MMOL/L
AST SERPL-CCNC: 22 U/L
BASOPHILS # BLD AUTO: 0.05 K/UL
BASOPHILS NFR BLD: 0.8 %
BILIRUB SERPL-MCNC: 0.6 MG/DL
BUN SERPL-MCNC: 15 MG/DL
CALCIUM SERPL-MCNC: 10.2 MG/DL
CHLORIDE SERPL-SCNC: 101 MMOL/L
CO2 SERPL-SCNC: 26 MMOL/L
CREAT SERPL-MCNC: 1.1 MG/DL
DIFFERENTIAL METHOD: NORMAL
EOSINOPHIL # BLD AUTO: 0.3 K/UL
EOSINOPHIL NFR BLD: 4.9 %
ERYTHROCYTE [DISTWIDTH] IN BLOOD BY AUTOMATED COUNT: 12.8 %
EST. GFR  (AFRICAN AMERICAN): >60 ML/MIN/1.73 M^2
EST. GFR  (NON AFRICAN AMERICAN): >60 ML/MIN/1.73 M^2
GLUCOSE SERPL-MCNC: 101 MG/DL
HCT VFR BLD AUTO: 46.2 %
HGB BLD-MCNC: 15.4 G/DL
IMM GRANULOCYTES # BLD AUTO: 0.01 K/UL
IMM GRANULOCYTES NFR BLD AUTO: 0.2 %
LIPASE SERPL-CCNC: 18 U/L
LYMPHOCYTES # BLD AUTO: 1.8 K/UL
LYMPHOCYTES NFR BLD: 29.7 %
MCH RBC QN AUTO: 28.4 PG
MCHC RBC AUTO-ENTMCNC: 33.3 G/DL
MCV RBC AUTO: 85 FL
MONOCYTES # BLD AUTO: 0.6 K/UL
MONOCYTES NFR BLD: 9.4 %
NEUTROPHILS # BLD AUTO: 3.4 K/UL
NEUTROPHILS NFR BLD: 55 %
NRBC BLD-RTO: 0 /100 WBC
PLATELET # BLD AUTO: 282 K/UL
PMV BLD AUTO: 9.8 FL
POTASSIUM SERPL-SCNC: 3.7 MMOL/L
PROT SERPL-MCNC: 7.8 G/DL
RBC # BLD AUTO: 5.42 M/UL
SODIUM SERPL-SCNC: 138 MMOL/L
TROPONIN I SERPL DL<=0.01 NG/ML-MCNC: <0.006 NG/ML
WBC # BLD AUTO: 6.16 K/UL

## 2018-03-09 PROCEDURE — 80053 COMPREHEN METABOLIC PANEL: CPT

## 2018-03-09 PROCEDURE — 84484 ASSAY OF TROPONIN QUANT: CPT

## 2018-03-09 PROCEDURE — 3079F DIAST BP 80-89 MM HG: CPT | Mod: S$GLB,,, | Performed by: INTERNAL MEDICINE

## 2018-03-09 PROCEDURE — 36415 COLL VENOUS BLD VENIPUNCTURE: CPT | Mod: PO

## 2018-03-09 PROCEDURE — 99999 PR PBB SHADOW E&M-EST. PATIENT-LVL IV: CPT | Mod: PBBFAC,,, | Performed by: INTERNAL MEDICINE

## 2018-03-09 PROCEDURE — 99214 OFFICE O/P EST MOD 30 MIN: CPT | Mod: S$GLB,,, | Performed by: INTERNAL MEDICINE

## 2018-03-09 PROCEDURE — 83690 ASSAY OF LIPASE: CPT

## 2018-03-09 PROCEDURE — 3074F SYST BP LT 130 MM HG: CPT | Mod: S$GLB,,, | Performed by: INTERNAL MEDICINE

## 2018-03-09 PROCEDURE — 85025 COMPLETE CBC W/AUTO DIFF WBC: CPT

## 2018-03-09 RX ORDER — NITROGLYCERIN 0.3 MG/1
0.3 TABLET SUBLINGUAL EVERY 5 MIN PRN
Qty: 25 TABLET | Refills: 0 | Status: SHIPPED | OUTPATIENT
Start: 2018-03-09 | End: 2022-02-23 | Stop reason: SDUPTHER

## 2018-03-09 RX ORDER — NITROGLYCERIN 0.3 MG/1
0.3 TABLET SUBLINGUAL EVERY 5 MIN PRN
Qty: 15 TABLET | Refills: 0 | Status: SHIPPED | OUTPATIENT
Start: 2018-03-09 | End: 2018-03-09 | Stop reason: SDUPTHER

## 2018-03-09 RX ORDER — AMLODIPINE BESYLATE 10 MG/1
10 TABLET ORAL DAILY
Qty: 30 TABLET | Refills: 2 | Status: SHIPPED | OUTPATIENT
Start: 2018-03-09 | End: 2018-06-03 | Stop reason: SDUPTHER

## 2018-03-09 RX ORDER — AMLODIPINE BESYLATE 10 MG/1
10 TABLET ORAL DAILY
Qty: 30 TABLET | Refills: 2 | Status: SHIPPED | OUTPATIENT
Start: 2018-03-09 | End: 2018-03-09 | Stop reason: SDUPTHER

## 2018-03-09 NOTE — PROGRESS NOTES
Subjective:       Patient ID: Hi Rubio is a 58 y.o. male who presents for Follow-up (ED) and Chest Pain  Patient is accompanied by his wife.    HPI     3/8/18 chest pain with radiation to left neck and HE, initially spoke with triage nurse, note says that patient took NTG but patient did not take a dose. /111 in ER. Has a history of CAD and typically sees Dr. Palacios. No EKG changes, troponin and BMP normal in ER. Patient was evaluated by Cardiology who initially planned to admit and continue workup but the cardiology team decided to discharge patient. The chest pain returned earlier this morning and patient concerned about ER workup and continuing pain.     He admits to reflux that is pretty severe. He takes protonix daily and still notices reflux at times when eating certain foods. Did have nausea and indigestion in the days prior to ER visit. He avoided eating at that time and resolved.     Patient avoids use of NTG because of associated headaches.       Review of Systems   Constitutional: Negative for chills, fatigue and fever.   HENT: Negative for congestion and sinus pressure.    Eyes: Negative for visual disturbance.   Respiratory: Positive for chest tightness. Negative for cough and shortness of breath.    Cardiovascular: Positive for chest pain (reports that pain times radiates to his back). Negative for palpitations and leg swelling.   Gastrointestinal: Positive for nausea. Negative for abdominal pain, blood in stool, constipation, diarrhea and vomiting.   Musculoskeletal: Negative for arthralgias and myalgias.   Skin: Negative for rash.   Neurological: Negative for dizziness, weakness, light-headedness and headaches.   Hematological: Negative for adenopathy.       Objective:      Physical Exam   Constitutional: He is oriented to person, place, and time. Vital signs are normal. He appears well-developed and well-nourished. No distress.   HENT:   Head: Normocephalic and atraumatic.   Right  Ear: Hearing and external ear normal.   Left Ear: Hearing and external ear normal.   Nose: Nose normal.   Mouth/Throat: Uvula is midline.   Eyes: Lids are normal. No scleral icterus.   Neck: Full passive range of motion without pain.   Cardiovascular: Normal rate, regular rhythm, normal heart sounds, intact distal pulses and normal pulses.    No murmur heard.  Pulmonary/Chest: Effort normal and breath sounds normal. He has no wheezes.   Abdominal: Soft. Bowel sounds are normal. He exhibits no distension. There is tenderness in the epigastric area. There is no rigidity, no rebound and no guarding.   Musculoskeletal: Normal range of motion. He exhibits no edema.   Neurological: He is alert and oriented to person, place, and time. He displays normal reflexes.   Skin: Skin is warm, dry and intact. No rash noted.   Psychiatric: He has a normal mood and affect.   Vitals reviewed.      Assessment:       1. Chest tightness    2. Coronary artery disease due to calcified coronary lesion    3. Gastroesophageal reflux disease, esophagitis presence not specified    4. Epigastric pain        Plan:       1. Chest tightness/ ER Follow up  - intermittent, continued today, use NTG when returns  - ER if no relief of symptoms with NTG or with Maalox    2. Coronary artery disease due to calcified coronary lesion  - symptoms previously stable, will arrange follow up appointment with Dr. Palacios   - advised trying NTG to determine if pains do not resolve    3. Gastroesophageal reflux disease, esophagitis presence not specified  - trial of Maalox if discomfort returns, continue protonix  - Ambulatory consult to Gastroenterology    4. Epigastric pain  - Comprehensive metabolic panel; Future  - CBC auto differential; Future  - Lipase; Future    Kelly Franco MD

## 2018-03-09 NOTE — TELEPHONE ENCOUNTER
----- Message from Emma Santa sent at 3/9/2018 12:25 PM CST -----  Contact: [phone]  The rx for nitrostat you sent only comes in a bottle of 100 and can't be broken. You can order nitroglycerin 0.4 mg in bottles of 25 . Please call pharmacy to clarify what needs to be dispensed.

## 2018-03-12 ENCOUNTER — OFFICE VISIT (OUTPATIENT)
Dept: GASTROENTEROLOGY | Facility: CLINIC | Age: 58
End: 2018-03-12
Payer: COMMERCIAL

## 2018-03-12 ENCOUNTER — TELEPHONE (OUTPATIENT)
Dept: ENDOSCOPY | Facility: HOSPITAL | Age: 58
End: 2018-03-12

## 2018-03-12 ENCOUNTER — TELEPHONE (OUTPATIENT)
Dept: INTERNAL MEDICINE | Facility: CLINIC | Age: 58
End: 2018-03-12

## 2018-03-12 VITALS
BODY MASS INDEX: 23.84 KG/M2 | HEART RATE: 74 BPM | HEIGHT: 76 IN | DIASTOLIC BLOOD PRESSURE: 83 MMHG | SYSTOLIC BLOOD PRESSURE: 134 MMHG | WEIGHT: 195.75 LBS

## 2018-03-12 DIAGNOSIS — R19.5 LOOSE STOOLS: ICD-10-CM

## 2018-03-12 DIAGNOSIS — Z86.010 HISTORY OF COLON POLYPS: ICD-10-CM

## 2018-03-12 DIAGNOSIS — R13.10 DYSPHAGIA, UNSPECIFIED TYPE: ICD-10-CM

## 2018-03-12 DIAGNOSIS — R07.9 CHEST PAIN, UNSPECIFIED TYPE: Primary | ICD-10-CM

## 2018-03-12 DIAGNOSIS — K21.9 GASTROESOPHAGEAL REFLUX DISEASE, ESOPHAGITIS PRESENCE NOT SPECIFIED: ICD-10-CM

## 2018-03-12 PROCEDURE — 99204 OFFICE O/P NEW MOD 45 MIN: CPT | Mod: S$GLB,,, | Performed by: NURSE PRACTITIONER

## 2018-03-12 PROCEDURE — 3079F DIAST BP 80-89 MM HG: CPT | Mod: CPTII,S$GLB,, | Performed by: NURSE PRACTITIONER

## 2018-03-12 PROCEDURE — 3075F SYST BP GE 130 - 139MM HG: CPT | Mod: CPTII,S$GLB,, | Performed by: NURSE PRACTITIONER

## 2018-03-12 PROCEDURE — 99999 PR PBB SHADOW E&M-EST. PATIENT-LVL III: CPT | Mod: PBBFAC,,, | Performed by: NURSE PRACTITIONER

## 2018-03-12 RX ORDER — PANTOPRAZOLE SODIUM 40 MG/1
40 TABLET, DELAYED RELEASE ORAL 2 TIMES DAILY
Qty: 60 TABLET | Refills: 3 | Status: SHIPPED | OUTPATIENT
Start: 2018-03-12 | End: 2018-05-10 | Stop reason: SDUPTHER

## 2018-03-12 NOTE — PROGRESS NOTES
"      [facility] Note    Reason for Visit:  The primary encounter diagnosis was Chest pain, unspecified type. Diagnoses of Gastroesophageal reflux disease, esophagitis presence not specified, Dysphagia, unspecified type, Loose stools, and History of colon polyps were also pertinent to this visit.    PCP:   Josefina Kendall   [address] [facility] [address]    Referring MD:  Kelly Franco Md  [address] [facility] [place] [address]    HPI:  This is a 58 y.o. male here for evaluation of chest pain. Mr. Rubio is new to the clinic.     Will have intermittent chest pain x years. Now occurring more frequently and longer, now will last 15 minutes to couple hours use to last 1-2 minutes. "Unsure" of how often occurring at this time. Will get chest tightness, warmness in chest, and hypertension. Went To ER 3/8/18 with chest pain and increased indigestion 3/8/18. Hx of taking Metoprolol and relief this episode no relief. Cardiology dx with stable angina discharged follow up with PCP. No hx of EGD.     Hx of Gerd x years. Taking Protonix 40 mg daily. Reflux symptoms occurring occasionally. NSAID usage- Ibuprofen for headaches will take a couple times per month. Hx of herniated disc surgery in 2015 and pt reports since will have episodes where solids get caught in cervical esophagus. Has been worked up with Neurosurgery and reports the "plate" is too high and needs surgery again to remove plate. Pt reports does not want surgery at this time. If chews well food will go down. Denies choking on food, difficulty swallowing liquids, and painful swallowing.     Began Zetia in January and began having intermittent loose stools. Now loose stools occurring daily 1-2 episodes per day. Hx of taking Lipitor and having diarrhea. Will have normal stool every 3rd BM. Denies blood in stool and melena.     ROS:  Constitutional: No fevers, no chills, No unintentional weight loss, no fatigue   ENT: No " "allergies  CV: no perif. edema  Pulm: No shortness of breath, no wheezes, no sputum  Ophtho: No vision changes  GI: see HPI  Derm: No rash  Heme: No lymphadenopathy, No bruising  MSK: no muscle weakness  : No dysuria, No hematuria  Endo: No hot or cold intolerance  Neuro: No syncope, No seizure     Medical History:  has a past medical history of Carotid artery occlusion; Coronary artery disease; Diabetes mellitus, type 2; Difficult intubation; DJD (degenerative joint disease), cervical (7/10/2015); GERD (gastroesophageal reflux disease); History of iritis (2013); Hyperlipidemia; Hypertension; Memory loss; and Traumatic iritis - Left Eye (2/27/2013).    Surgical History:  has a past surgical history that includes Hernia repair; Cervical fusion (12/30/2015); Colonoscopy (N/A, 4/7/2017); and Prostatectomy.    Family History: family history includes Benign prostatic hyperplasia in his brother; Cancer in his brother and sister; Heart disease in his father and mother; Hepatitis in his brother; Hyperlipidemia in his brother, brother, and mother; Hypertension in his brother, brother, brother, brother, and mother..     Social History:  reports that he quit smoking about 6 years ago. His smoking use included Cigarettes. He has a 30.00 pack-year smoking history. He has never used smokeless tobacco. He reports that he drinks alcohol. He reports that he does not use drugs.    Review of patient's allergies indicates:   Allergen Reactions    Lisinopril Anaphylaxis and Nausea And Vomiting     General bad feeling    Crestor [rosuvastatin] Swelling     Lip swelling.     Lipitor [atorvastatin] Other (See Comments)     Muscle aches     No current facility-administered medications for this visit.      No current outpatient prescriptions on file.     Facility-Administered Medications Ordered in Other Visits   Medication    0.9%  NaCl infusion     Objective Findings:    Vital Signs:  /83   Pulse 74   Ht 6' 4" (1.93 m)   Wt " 88.8 kg (195 lb 12.3 oz)   BMI 23.83 kg/m²   Body mass index is 23.83 kg/m².    Physical Exam:  General Appearance: Well appearing in no acute distress  Head: Normocephalic, without obvious abnormality  Eyes: No scleral icterus, EOMI  ENT: Neck supple, Lips, mucosa, and tongue normal; teeth and gums normal  Lungs: CTA bilaterally in anterior and posterior fields, no wheezes, no crackles.  Heart: Regular rate and rhythm, no murmurs heard  Abdomen: Soft, non tender, non distended with positive bowel sounds in all four quadrants.  Extremities: No clubbing, cyanosis or edema  Skin: No rash to exposed areas  Neurologic: AAOx4    Labs:  Lab Results   Component Value Date    WBC 6.16 03/09/2018    HGB 15.4 03/09/2018    HCT 46.2 03/09/2018     03/09/2018    CHOL 231 (H) 11/25/2017    TRIG 318 (H) 11/25/2017    HDL 45 11/25/2017    ALT 28 03/09/2018    AST 22 03/09/2018     03/09/2018    K 3.7 03/09/2018     03/09/2018    CREATININE 1.1 03/09/2018    BUN 15 03/09/2018    CO2 26 03/09/2018    TSH 3.152 05/01/2017    PSA 21.2 (H) 02/06/2017    INR 1.1 01/21/2017    HGBA1C 6.3 (H) 11/01/2017     Endoscopy:    Colonoscopy- 4/2017- 13 mm polyp removed recto-sigmoid colon. Repeat in 3 years.     Assessment:  1. Chest pain, unspecified type    2. Gastroesophageal reflux disease, esophagitis presence not specified    3. Dysphagia, unspecified type    4. Loose stools    5. History of colon polyps      Recommendations:  1., 2., & 3. Schedule EGD to rule out esophagitis and ulcer. Begin taking Protonix 40 mg BID to see if relief of CP.   4. Ordered stools studies to confirm no infection- O&Px3, Giardiax3, and stool cx. If negative recommending notifying PCP of loose stools since changing cholesterol medication. Pt understood.   5. Repeat colonoscopy in 4/2020    Follow up in 4 weeks.     Order summary:  Orders Placed This Encounter    Stool culture    Stool Exam-Ova,Cysts,Parasites    Stool  Exam-Ova,Cysts,Parasites    Stool Exam-Ova,Cysts,Parasites    Giardia / Cryptosporidum, EIA    Giardia / Cryptosporidum, EIA    Giardia / Cryptosporidum, EIA    pantoprazole (PROTONIX) 40 MG tablet    Case request GI: ESOPHAGOGASTRODUODENOSCOPY (EGD)     Thank you so much for allowing me to participate in the care of Hifritz Valenzuela, VIOLA, FNP-C

## 2018-03-12 NOTE — LETTER
March 13, 2018      Kelly Franco MD  [address] [place] [address] [place] [address]           [facility] Gastroenter[facility]  [address]  [place] [address]  Phone: [phone]  Fax: [phone]          Patient: Hi Rubio   MR Number: [id number]   YOB: 1960   Date of Visit: 3/12/2018       Dear Dr. Kelly Franco:    Thank you for referring Hi Rubio to me for evaluation. Attached you will find relevant portions of my assessment and plan of care.    If you have questions, please do not hesitate to call me. I look forward to following Hi Rubio along with you.    Sincerely,    Ines Valenzuela, KATHERIN    Enclosure  CC:  No Recipients    If you would like to receive this communication electronically, please contact [email or web address] or [phone] to request more information on [employer] access.    For providers and/or their staff who would like to refer a patient to [facility], please contact us through our one-stop-shop provider referral line, [facility], at [phone].    If you feel you have received this communication in error or would no longer like to receive these types of communications, please e-mail [email or web address]

## 2018-03-13 ENCOUNTER — TELEPHONE (OUTPATIENT)
Dept: INTERNAL MEDICINE | Facility: CLINIC | Age: 58
End: 2018-03-13

## 2018-03-13 ENCOUNTER — ANESTHESIA (OUTPATIENT)
Dept: ENDOSCOPY | Facility: HOSPITAL | Age: 58
End: 2018-03-13
Payer: COMMERCIAL

## 2018-03-13 ENCOUNTER — ANESTHESIA EVENT (OUTPATIENT)
Dept: ENDOSCOPY | Facility: HOSPITAL | Age: 58
End: 2018-03-13
Payer: COMMERCIAL

## 2018-03-13 ENCOUNTER — SURGERY (OUTPATIENT)
Age: 58
End: 2018-03-13

## 2018-03-13 ENCOUNTER — HOSPITAL ENCOUNTER (OUTPATIENT)
Facility: HOSPITAL | Age: 58
Discharge: HOME | End: 2018-03-13
Attending: INTERNAL MEDICINE | Admitting: INTERNAL MEDICINE
Payer: COMMERCIAL

## 2018-03-13 VITALS
HEART RATE: 72 BPM | SYSTOLIC BLOOD PRESSURE: 125 MMHG | RESPIRATION RATE: 18 BRPM | HEIGHT: 76 IN | OXYGEN SATURATION: 97 % | TEMPERATURE: 98 F | WEIGHT: 193 LBS | DIASTOLIC BLOOD PRESSURE: 74 MMHG | BODY MASS INDEX: 23.5 KG/M2

## 2018-03-13 DIAGNOSIS — R13.10 DYSPHAGIA: ICD-10-CM

## 2018-03-13 DIAGNOSIS — R07.9 CHEST PAIN, UNSPECIFIED TYPE: ICD-10-CM

## 2018-03-13 DIAGNOSIS — R13.10 DYSPHAGIA, UNSPECIFIED TYPE: Primary | ICD-10-CM

## 2018-03-13 LAB
GLUCOSE SERPL-MCNC: 145 MG/DL (ref 70–110)
POCT GLUCOSE: 109 MG/DL (ref 70–110)
POCT GLUCOSE: 145 MG/DL (ref 70–110)

## 2018-03-13 PROCEDURE — 27201012 HC FORCEPS, HOT/COLD, DISP: Performed by: INTERNAL MEDICINE

## 2018-03-13 PROCEDURE — 82962 GLUCOSE BLOOD TEST: CPT | Performed by: INTERNAL MEDICINE

## 2018-03-13 PROCEDURE — 63600175 PHARM REV CODE 636 W HCPCS: Performed by: NURSE ANESTHETIST, CERTIFIED REGISTERED

## 2018-03-13 PROCEDURE — 88305 TISSUE EXAM BY PATHOLOGIST: CPT | Mod: 26,,, | Performed by: PATHOLOGY

## 2018-03-13 PROCEDURE — 37000008 HC ANESTHESIA 1ST 15 MINUTES: Performed by: INTERNAL MEDICINE

## 2018-03-13 PROCEDURE — 43239 EGD BIOPSY SINGLE/MULTIPLE: CPT | Mod: ,,, | Performed by: INTERNAL MEDICINE

## 2018-03-13 PROCEDURE — 43239 EGD BIOPSY SINGLE/MULTIPLE: CPT | Performed by: INTERNAL MEDICINE

## 2018-03-13 PROCEDURE — 88305 TISSUE EXAM BY PATHOLOGIST: CPT | Performed by: PATHOLOGY

## 2018-03-13 PROCEDURE — D9220A PRA ANESTHESIA: Mod: CRNA,,, | Performed by: NURSE ANESTHETIST, CERTIFIED REGISTERED

## 2018-03-13 PROCEDURE — 25000003 PHARM REV CODE 250: Performed by: INTERNAL MEDICINE

## 2018-03-13 PROCEDURE — D9220A PRA ANESTHESIA: Mod: ANES,,, | Performed by: ANESTHESIOLOGY

## 2018-03-13 PROCEDURE — 37000009 HC ANESTHESIA EA ADD 15 MINS: Performed by: INTERNAL MEDICINE

## 2018-03-13 RX ORDER — SODIUM CHLORIDE 9 MG/ML
INJECTION, SOLUTION INTRAVENOUS CONTINUOUS
Status: DISCONTINUED | OUTPATIENT
Start: 2018-03-13 | End: 2018-03-13 | Stop reason: HOSPADM

## 2018-03-13 RX ORDER — LIDOCAINE HCL/PF 100 MG/5ML
SYRINGE (ML) INTRAVENOUS
Status: DISCONTINUED | OUTPATIENT
Start: 2018-03-13 | End: 2018-03-13

## 2018-03-13 RX ORDER — SODIUM CHLORIDE 0.9 % (FLUSH) 0.9 %
3 SYRINGE (ML) INJECTION
Status: DISCONTINUED | OUTPATIENT
Start: 2018-03-13 | End: 2018-03-13 | Stop reason: HOSPADM

## 2018-03-13 RX ORDER — PROPOFOL 10 MG/ML
VIAL (ML) INTRAVENOUS
Status: DISCONTINUED | OUTPATIENT
Start: 2018-03-13 | End: 2018-03-13

## 2018-03-13 RX ORDER — PROPOFOL 10 MG/ML
VIAL (ML) INTRAVENOUS CONTINUOUS PRN
Status: DISCONTINUED | OUTPATIENT
Start: 2018-03-13 | End: 2018-03-13

## 2018-03-13 RX ADMIN — LIDOCAINE HYDROCHLORIDE 50 MG: 20 INJECTION, SOLUTION INTRAVENOUS at 08:03

## 2018-03-13 RX ADMIN — PROPOFOL 100 MG: 10 INJECTION, EMULSION INTRAVENOUS at 08:03

## 2018-03-13 RX ADMIN — SODIUM CHLORIDE: 0.9 INJECTION, SOLUTION INTRAVENOUS at 07:03

## 2018-03-13 RX ADMIN — PROPOFOL 150 MCG/KG/MIN: 10 INJECTION, EMULSION INTRAVENOUS at 08:03

## 2018-03-13 NOTE — DISCHARGE INSTRUCTIONS
Upper GI Endoscopy     During endoscopy, a long, flexible tube is used to view the inside of your upper GI tract.      Upper GI endoscopy allows your healthcare provider to look directly into the beginning of your gastrointestinal (GI) tract. The esophagus, stomach, and duodenum (the first part of the small intestine) make up the upper GI tract.   Before the exam  Follow these and any other instructions you are given before your endoscopy. If you dont follow the healthcare providers instructions carefully, the test may need to be canceled or done over:  · Don't eat or drink anything after midnight the night before your exam. If your exam is in the afternoon, drink only clear liquids in the morning. Don't eat or drink anything for 8 hours before the exam. In some cases, you may be able to take medicines with sips of water until 2 hours before the procedure. Speak with your healthcare provider about this.   · Bring your X-rays and any other test results you have.  · Because you will be sedated, arrange for an adult to drive you home after the exam.  · Tell your healthcare provider before the exam if you are taking any medicines or have any medical problems.  The procedure  Here is what to expect:  · You will lie on the endoscopy table. Usually patients lie on the left side.  · You will be monitored and given oxygen.  · Your throat may be numbed with a spray or gargle. You are given medicine through an intravenous (IV) line that will help you relax and remain comfortable. You may be awake or asleep during the procedure.  · The healthcare provider will put the endoscope in your mouth and down your esophagus. It is thinner than most pieces of food that you swallow. It will not affect your breathing. The medicine helps keep you from gagging.  · Air is put into your GI tract to expand it. It can make you burp.  · During the procedure, the healthcare provider can take biopsies (tissue samples), remove abnormalities,  such as polyps, or treat abnormalities through a variety of devices placed through the endoscope. You will not feel this.   · The endoscope carries images of your upper GI tract to a video screen. If you are awake, you may be able to look at the images.  · After the procedure is done, you will rest for a time. An adult must drive you home.  When to call your healthcare provider  Contact your healthcare provider if you have:  · Black or tarry stools, or blood in your stool  · Fever  · Pain in your belly that does not go away  · Nausea and vomiting, or vomiting blood   Date Last Reviewed: 7/1/2016  © 6996-3036 [employer]. [address] [place] [address], [place] [address]. All rights reserved. This information is not intended as a substitute for professional medical care. Always follow your healthcare professional's instructions.      Recovery After Procedural Sedation (Adult)  You have been given medicine by vein to make you sleep during your surgery. This may have included both a pain medicine and sleeping medicine. Most of the effects have worn off. But you may still have some drowsiness for the next 6 to 8 hours.  Home care  Follow these guidelines when you get home:  · For the next 8 hours, you should be watched by a responsible adult. This person should make sure your condition is not getting worse.  · Don't drink any alcohol for the next 24 hours.  · Don't drive, operate dangerous machinery, or make important business or personal decisions during the next 24 hours.  Note: Your healthcare provider may tell you not to take any medicine by mouth for pain or sleep in the next 4 hours. These medicines may react with the medicines you were given in the hospital. This could cause a much stronger response than usual.  Follow-up care  Follow up with your healthcare provider if you are not alert and back to your usual level of activity within 12 hours.  When to seek medical advice  Call your healthcare provider  right away if any of these occur:  · Drowsiness gets worse  · Weakness or dizziness gets worse  · Repeated vomiting  · You can't be awakened   Date Last Reviewed: 10/18/2016  © 2144-8760 [employer]. [address] [place] [address], [place] [address]. All rights reserved. This information is not intended as a substitute for professional medical care. Always follow your healthcare professional's instructions.

## 2018-03-13 NOTE — PROVATION PATIENT INSTRUCTIONS
Discharge Summary/Instructions after an Endoscopic Procedure  Patient Name: Hi Rubio  Patient MRN: [id number]  Patient YOB: 1960 Tuesday, March 13, 2018  Rubin Barrios MD  RESTRICTIONS:  During your procedure today, you received medications for sedation.  These   medications may affect your judgment, balance and coordination.  Therefore,   for 24 hours, you have the following restrictions:   - DO NOT drive a car, operate machinery, make legal/financial decisions,   sign important papers or drink alcohol.    ACTIVITY:  The following day: return to full activity including work, except no heavy   lifting, straining or running for 3 days if polyps were removed.  DIET:  Eat and drink normally unless instructed otherwise.     TREATMENT FOR COMMON SIDE EFFECTS:  - Mild abdominal pain, nausea, belching, bloating or excessive gas:  rest,   eat lightly and use a heating pad.  - Sore Throat: treat with throat lozenges and/or gargle with warm salt   water.  - Because air was used during the procedure, expelling large amounts of air   from your rectum or belching is normal.  - If a bowel prep was taken, you may not have a bowel movement for 1-3 days.    This is normal.  SYMPTOMS TO WATCH FOR AND REPORT TO YOUR PHYSICIAN:  1. Abdominal pain or bloating, other than gas cramps.  2. Chest pain.  3. Back pain.  4. Signs of infection such as: chills or fever occurring within 24 hours   after the procedure.  5. Rectal bleeding, which would show as bright red, maroon, or black stools.   (A tablespoon of blood from the rectum is not serious, especially if   hemorrhoids are present.)  6. Vomiting.  7. Weakness or dizziness.  GO DIRECTLY TO THE NEAREST EMERGENCY ROOM IF YOU HAVE ANY OF THE FOLLOWING:      Difficulty breathing  Chills and/or fever over 101 F   Persistent vomiting and/or vomiting blood   Severe abdominal pain   Severe chest pain   Black, tarry stools   Bleeding- more than one tablespoon   Any other  symptom or condition that you feel may need urgent attention  Your doctor recommends these additional instructions:  If any biopsies were taken, your doctors clinic will contact you in 1 to 2   weeks with any results.  You are being discharged to home.   You have a contact number available for emergencies.  The signs and symptoms   of potential delayed complications were discussed with you.  You may return   to normal activities tomorrow.  Written discharge instructions were   provided to you.   Resume your previous diet.   Continue your present medications.   We are waiting for your pathology results.   Telephone your GI clinic for pathology results in one week.  For questions, problems or results please call your physician - Rubin Barrios MD at Work:  [phone].  [facility], EMERGENCY ROOM PHONE NUMBER: [phone]  IF A COMPLICATION OR EMERGENCY SITUATION ARISES AND YOU ARE UNABLE TO REACH   YOUR PHYSICIAN - GO DIRECTLY TO THE EMERGENCY ROOM.  Rubin Barrios MD  3/13/2018 9:05:32 AM  This report has been verified and signed electronically.

## 2018-03-13 NOTE — ANESTHESIA PREPROCEDURE EVALUATION
03/13/2018  Hi Rubio is a 58 y.o., male.    Patient with h/o chest discomfort.  Has had negative dobutamine stress test within the last year.  Troponins negative during recent episode of CP.  Now here for EGD to r/o GI cause of CP    Active Ambulatory Problems     Diagnosis Date Noted    Essential hypertension 07/16/2012    Other chest pain 11/01/2012    Traumatic iritis - Left Eye 02/27/2013    Chronic anterior uveitis 03/26/2013    Unspecified iridocyclitis - Left Eye 04/17/2013    Memory loss 06/21/2013    GERD (gastroesophageal reflux disease) 07/24/2013    Family history of premature CAD 07/24/2013    Peripheral vascular disease 10/08/2013    Subclavian steal syndrome 10/08/2013    Impaired fasting glucose 10/28/2013    Trigger finger of left hand 09/11/2014    Rapid palpitations 11/17/2014    Muscle ache 01/28/2015    Cervical spinal stenosis 07/10/2015    DJD (degenerative joint disease), cervical 07/10/2015    Lateral epicondylitis (tennis elbow) 07/20/2015    Cervical radiculopathy 07/20/2015    Pain 09/28/2015    Preoperative clearance 12/21/2015    Cervical stenosis of spine 12/30/2015    Mixed hyperlipidemia 01/20/2017    Coronary artery disease due to calcified coronary lesion 01/20/2017    Chest discomfort 01/20/2017    Anxiety disorder 01/20/2017    Chest pain 01/21/2017    Unstable angina 01/21/2017    Rectal bleeding 04/07/2017    Lip swelling 09/05/2017    Recurrent UTI 09/28/2017    BPH with obstruction/lower urinary tract symptoms 09/28/2017     Resolved Ambulatory Problems     Diagnosis Date Noted    Bilateral inguinal hernia 11/07/2013     Past Medical History:   Diagnosis Date    Carotid artery occlusion     Coronary artery disease     Diabetes mellitus, type 2     Difficult intubation     DJD (degenerative joint disease), cervical 7/10/2015     GERD (gastroesophageal reflux disease)     History of iritis 2013    Hyperlipidemia     Hypertension     Memory loss     Traumatic iritis - Left Eye 2/27/2013         Anesthesia Evaluation    I have reviewed the Patient Summary Reports.    I have reviewed the Nursing Notes.      Review of Systems  Anesthesia Hx:  Hx of Anesthetic complications (Known difficult intubation 2/2 short neck with poor extension and retrognathia.  Has been easy to mask ventilate)    Social:  Former Smoker    Cardiovascular:   Exercise tolerance: good Hypertension, poorly controlled CAD (calciun in LAD per CT scan)   Angina            Pulmonary:   Denies COPD.  Denies Asthma.  Denies Shortness of breath.  Denies Sleep Apnea.    Renal/:   Denies Chronic Renal Disease.     Hepatic/GI:   GERD Denies Liver Disease.    Neurological:   Denies CVA. Headaches Denies Seizures.    Endocrine:   Diabetes, type 2 Denies Hypothyroidism.    Psych:   anxiety          Physical Exam  General:  Well nourished    Airway/Jaw/Neck:  Airway Findings: Mallampati: III Improves to II with phonation.  TM Distance: 4 - 6 cm  Jaw/Neck Findings:  Micrognathia  Neck ROM: Extension Decreased, Mod.       Chest/Lungs:  Chest/Lungs Clear    Heart/Vascular:  Heart Findings: Normal       Mental Status:  Mental Status Findings:  Cooperative, Alert and Oriented         Anesthesia Plan  Type of Anesthesia, risks & benefits discussed:  Anesthesia Type:  general  Patient's Preference: GA  Intra-op Monitoring Plan: standard ASA monitors  Intra-op Monitoring Plan Comments:   Post Op Pain Control Plan:   Post Op Pain Control Plan Comments: Opioids PRN  Induction:    Beta Blocker:  Patient is not currently on a Beta-Blocker (No further documentation required).       Informed Consent: Patient understands risks and agrees with Anesthesia plan.  Questions answered. Anesthesia consent signed with patient.  ASA Score: 3     Day of Surgery Review of History & Physical:         Anesthesia Plan Notes: I personally saw the patient and a history and physical was performed.    Plan for GA - TIVA        Ready For Surgery From Anesthesia Perspective.

## 2018-03-13 NOTE — TRANSFER OF CARE
"Anesthesia Transfer of Care Note    Patient: Hi Rubio    Procedure(s) Performed: Procedure(s) (LRB):  ESOPHAGOGASTRODUODENOSCOPY (EGD) (N/A)    Patient location: [facility]    Anesthesia Type: general    Transport from OR: Transported from OR on 6-10 L/min O2 by face mask with adequate spontaneous ventilation    Post pain: adequate analgesia    Post assessment: no apparent anesthetic complications and tolerated procedure well    Post vital signs: stable    Level of consciousness: awake and alert    Nausea/Vomiting: no nausea/vomiting    Complications: none    Transfer of care protocol was followed      Last vitals:   Visit Vitals  /79   Pulse 66   Temp 36.7 °C (98.1 °F)   Resp 17   Ht 6' 4" (1.93 m)   Wt 87.5 kg (193 lb)   SpO2 98%   BMI 23.49 kg/m²     "

## 2018-03-13 NOTE — ANESTHESIA POSTPROCEDURE EVALUATION
"Anesthesia Post Evaluation    Patient: Hi Rubio    Procedure(s) Performed: Procedure(s) (LRB):  ESOPHAGOGASTRODUODENOSCOPY (EGD) (N/A)    Final Anesthesia Type: general  Patient location during evaluation: PACU  Patient participation: Yes- Able to Participate  Level of consciousness: awake and alert  Post-procedure vital signs: reviewed and stable  Pain management: adequate  Airway patency: patent  PONV status at discharge: No PONV  Anesthetic complications: no      Cardiovascular status: blood pressure returned to baseline  Respiratory status: unassisted and spontaneous ventilation  Hydration status: euvolemic  Follow-up not needed.        Visit Vitals  /74   Pulse 72   Temp 36.8 °C (98.2 °F) (Skin)   Resp 18   Ht 6' 4" (1.93 m)   Wt 87.5 kg (193 lb)   SpO2 97%   BMI 23.49 kg/m²       Pain/Wai Score: Pain Assessment Performed: Yes (3/13/2018  9:45 AM)  Presence of Pain: denies (3/13/2018  9:45 AM)  Wai Score: 10 (3/13/2018  9:30 AM)      "

## 2018-03-13 NOTE — INTERVAL H&P NOTE
Pre-Procedure H and P Addendum    Patient seen and examined.  History and exam unchanged from prior history and physical.      Procedure: EGD  Indication: Chest pain and GERD  ASA Class: per anesthesiology  Airway: normal  Neck Mobility: full range of motion  Mallampatti score: per anesthesia  History of anesthesia problems: no  Family history of anesthesia problems: no  Anesthesia Plan: MAC    Anesthesia/Surgery risks, benefits and alternative options discussed and understood by patient/family.          Active Hospital Problems    Diagnosis  POA    Dysphagia [R13.10]  Yes      Resolved Hospital Problems    Diagnosis Date Resolved POA   No resolved problems to display.

## 2018-03-14 ENCOUNTER — OFFICE VISIT (OUTPATIENT)
Dept: CARDIOLOGY | Facility: CLINIC | Age: 58
End: 2018-03-14
Payer: COMMERCIAL

## 2018-03-14 VITALS
BODY MASS INDEX: 23.69 KG/M2 | HEART RATE: 68 BPM | WEIGHT: 194.56 LBS | HEIGHT: 76 IN | DIASTOLIC BLOOD PRESSURE: 76 MMHG | SYSTOLIC BLOOD PRESSURE: 130 MMHG

## 2018-03-14 DIAGNOSIS — I25.84 CORONARY ARTERY DISEASE DUE TO CALCIFIED CORONARY LESION: ICD-10-CM

## 2018-03-14 DIAGNOSIS — R06.02 SHORT OF BREATH ON EXERTION: ICD-10-CM

## 2018-03-14 DIAGNOSIS — I25.10 CORONARY ARTERY DISEASE DUE TO CALCIFIED CORONARY LESION: ICD-10-CM

## 2018-03-14 DIAGNOSIS — E78.2 MIXED HYPERLIPIDEMIA: ICD-10-CM

## 2018-03-14 DIAGNOSIS — R07.9 CHEST PAIN, UNSPECIFIED TYPE: Primary | ICD-10-CM

## 2018-03-14 PROCEDURE — 93000 ELECTROCARDIOGRAM COMPLETE: CPT | Mod: S$GLB,,, | Performed by: INTERNAL MEDICINE

## 2018-03-14 PROCEDURE — 3075F SYST BP GE 130 - 139MM HG: CPT | Mod: CPTII,S$GLB,, | Performed by: INTERNAL MEDICINE

## 2018-03-14 PROCEDURE — 99999 PR PBB SHADOW E&M-EST. PATIENT-LVL III: CPT | Mod: PBBFAC,,, | Performed by: INTERNAL MEDICINE

## 2018-03-14 PROCEDURE — 99214 OFFICE O/P EST MOD 30 MIN: CPT | Mod: S$GLB,,, | Performed by: INTERNAL MEDICINE

## 2018-03-14 PROCEDURE — 3078F DIAST BP <80 MM HG: CPT | Mod: CPTII,S$GLB,, | Performed by: INTERNAL MEDICINE

## 2018-03-14 RX ORDER — ALPRAZOLAM 0.25 MG/1
TABLET ORAL
Qty: 90 TABLET | Refills: 0 | Status: SHIPPED | OUTPATIENT
Start: 2018-03-14 | End: 2018-05-01 | Stop reason: SDUPTHER

## 2018-03-14 NOTE — PROGRESS NOTES
Subjective:   Patient ID:  Hi Rubio is a 58 y.o. male who presents for follow-up of Chest Pain      Problem List:  HTN  Hyperlipidemia  PVC  Family h/o CAD  Coronary calcification  Elevated blood sugars    HPI:   Hi Rubio went to the ER for chest discomfort. Hehas  been having increasing chest discomfort for the past week.  The episodes are described as tightness and pain in the mid sternal region.  The episodes initially lasted 10-15 minutes then increased to about 1 hour.  He went to the emergency room because his blood pressure was elevated at 180/111 mm Hg.  Awakened from sleep this morning at 2 AM with chest discomfort.  Resolved after 15 minutes.  He has not taken either nitroglycerin glycerin or an antacid. On a PPI. He feels that this discomfort is not due to acid reflux.  He also reports increased shortness of breath with exertion. He does not report chest discomfort during exertion.   DSE 1/17: normal LV systolic function at rest. No evidence of ischemia.   He has a mixed hyperlipidemia.  Triglycerides were elevated at 318 in 11/17. At that time non-HDL cholesterol was 186 mg/dl.  Remains on pravastatin. Unable to take Lipitor due to muscle aches and Crestor due to lip swelling. No ezetimibe for the past 2-3 days due to diarrhea.  States blood sugars are elevated in the morning but better later in the day.  HbA1c was 6.3% last year.      Review of Systems   Constitution: Positive for weight loss. Negative for weakness, malaise/fatigue and weight gain.   HENT: Negative for hearing loss and nosebleeds.    Eyes: Negative for visual disturbance.   Cardiovascular: Positive for chest pain and dyspnea on exertion. Negative for claudication, irregular heartbeat, leg swelling, orthopnea, palpitations, paroxysmal nocturnal dyspnea and syncope.   Respiratory: Negative for cough, hemoptysis, sputum production and wheezing.    Hematologic/Lymphatic: Does not bruise/bleed easily.   Musculoskeletal:  Positive for muscle cramps. Negative for arthritis, back pain, falls, joint pain, muscle weakness and myalgias.   Gastrointestinal: Positive for diarrhea. Negative for abdominal pain, heartburn and melena.   Genitourinary: Negative for frequency, hematuria and nocturia.   Neurological: Positive for headaches. Negative for dizziness, light-headedness, loss of balance, numbness and paresthesias.   Psychiatric/Behavioral: Negative for depression. The patient is nervous/anxious.        Current Outpatient Prescriptions   Medication Sig    ALPRAZolam (XANAX) 0.25 MG tablet Take 1 tablet (0.25 mg total) by mouth 3 (three) times daily. (Patient taking differently: Take 0.25 mg by mouth 3 (three) times daily as needed. )    amLODIPine (NORVASC) 10 MG tablet Take 1 tablet (10 mg total) by mouth once daily.    cloNIDine (CATAPRES) 0.1 MG tablet Take 1 tablet (0.1 mg total) by mouth once as needed (For blood pressures above 170 systolic - call clinic if pressure not resolved after 30 minutes).    CONTOUR NEXT STRIPS Strp USE TO TEST BLOOD SUGAR ONCE DAILY    hydroCHLOROthiazide (HYDRODIURIL) 25 MG tablet TAKE ONE TABLET BY MOUTH ONE TIME DAILY    metoprolol succinate (TOPROL-XL) 100 MG 24 hr tablet TAKE ONE TABLET BY MOUTH ONE TIME DAILY    MICROLET LANCET Misc 1 lancet by Misc.(Non-Drug; Combo Route) route once daily. Test blood glucose once daily as instructed.    montelukast (SINGULAIR) 10 mg tablet TAKE 1 TABLET (10 MG TOTAL) BY MOUTH EVERY EVENING.    nitroGLYCERIN (NITROSTAT) 0.3 MG SL tablet Place 1 tablet (0.3 mg total) under the tongue every 5 (five) minutes as needed for Chest pain.    pantoprazole (PROTONIX) 40 MG tablet Take 1 tablet (40 mg total) by mouth 2 (two) times daily.    pravastatin (PRAVACHOL) 80 MG tablet TAKE ONE TABLET BY MOUTH NIGHTLY AT BEDTIME    ezetimibe (ZETIA) 10 mg tablet Take 1 tablet (10 mg total) by mouth once daily.     No current facility-administered medications for this visit.  "       Social History   Substance Use Topics    Smoking status: Former Smoker     Packs/day: 1.00     Years: 30.00     Types: Cigarettes     Quit date: 12/13/2011    Smokeless tobacco: Never Used    Alcohol use Yes      Comment: occas - nonalcoholic beer or beer         Objective:     Physical Exam   Constitutional: He is oriented to person, place, and time. He appears well-developed and well-nourished.   /76   Pulse 68   Ht 6' 4" (1.93 m)   Wt 88.3 kg (194 lb 8.9 oz)   BMI 23.68 kg/m²      HENT:   Head: Normocephalic and atraumatic.   Neck: No JVD present. Carotid bruit is not present.   Cardiovascular: Normal rate, regular rhythm, S1 normal and S2 normal.  Exam reveals no gallop.    No murmur heard.  Pulses:       Radial pulses are 2+ on the right side, and 2+ on the left side.        Posterior tibial pulses are 2+ on the right side, and 2+ on the left side.   Pulmonary/Chest: Effort normal. He has no wheezes. He has no rales. Chest wall is not dull to percussion.   Abdominal: Soft. There is no splenomegaly or hepatomegaly. There is no tenderness.   Musculoskeletal:        Right lower leg: He exhibits no edema.        Left lower leg: He exhibits no edema.   Neurological: He is alert and oriented to person, place, and time. Gait normal.   Skin: Skin is warm and dry. No bruising noted. No cyanosis. Nails show no clubbing.   Psychiatric: He has a normal mood and affect. His speech is normal and behavior is normal. Judgment and thought content normal. Cognition and memory are normal.           Lab Results   Component Value Date    CHOL 231 (H) 11/25/2017    HDL 45 11/25/2017    LDLCALC 122.4 11/25/2017    TRIG 318 (H) 11/25/2017    CHOLHDL 19.5 (L) 11/25/2017     Lab Results   Component Value Date     (H) 03/16/2018    CREATININE 1.1 03/16/2018    BUN 15 03/16/2018     03/16/2018    K 3.8 03/16/2018    CL 99 03/16/2018    CO2 30 (H) 03/16/2018     Lab Results   Component Value Date    ALT 28 " 03/09/2018    AST 22 03/09/2018    ALKPHOS 72 03/09/2018    BILITOT 0.6 03/09/2018       ECG today reviewed by me. It reveals sinus rhythm with no ST or T abnormality.     Assessment and Plan:     Chest pain, unspecified type  -     He is quite frustrated with the chest discomfort. A dobutamine stress echo last year revealed no evidence of ischemia. Will refer for coronary angiography.  Recommend that he try an antacid.  IN OFFICE EKG 12-LEAD (to [place]); Future    Coronary artery disease due to calcified coronary lesion    Mixed hyperlipidemia   Continue pravastatin. Resume ezetimibe 10 mg when diarrhea improves.   Discussed use of PCSK-9 inhibitors, including method of administration, efficacy, cost and lack of outcomes data.     Shortness of breath   No signs of CHF on examination.   Echo.    Follow-up in about 6 months (around 9/14/2018).

## 2018-03-15 RX ORDER — PANTOPRAZOLE SODIUM 40 MG/1
TABLET, DELAYED RELEASE ORAL
Qty: 30 TABLET | Refills: 3 | Status: SHIPPED | OUTPATIENT
Start: 2018-03-15 | End: 2018-07-31 | Stop reason: SDUPTHER

## 2018-03-16 ENCOUNTER — INITIAL CONSULT (OUTPATIENT)
Dept: CARDIOLOGY | Facility: CLINIC | Age: 58
End: 2018-03-16
Payer: COMMERCIAL

## 2018-03-16 ENCOUNTER — LAB VISIT (OUTPATIENT)
Dept: LAB | Facility: HOSPITAL | Age: 58
End: 2018-03-16
Payer: COMMERCIAL

## 2018-03-16 ENCOUNTER — EDUCATION (OUTPATIENT)
Dept: CARDIOLOGY | Facility: CLINIC | Age: 58
End: 2018-03-16

## 2018-03-16 VITALS
OXYGEN SATURATION: 99 % | HEART RATE: 66 BPM | HEIGHT: 76 IN | BODY MASS INDEX: 23.38 KG/M2 | WEIGHT: 192 LBS | SYSTOLIC BLOOD PRESSURE: 116 MMHG | DIASTOLIC BLOOD PRESSURE: 82 MMHG

## 2018-03-16 DIAGNOSIS — E78.2 MIXED HYPERLIPIDEMIA: ICD-10-CM

## 2018-03-16 DIAGNOSIS — I10 ESSENTIAL HYPERTENSION: ICD-10-CM

## 2018-03-16 DIAGNOSIS — I25.10 CORONARY ARTERY DISEASE DUE TO CALCIFIED CORONARY LESION: Primary | ICD-10-CM

## 2018-03-16 DIAGNOSIS — R94.39 ABNORMAL CARDIOVASCULAR STRESS TEST: Primary | ICD-10-CM

## 2018-03-16 DIAGNOSIS — I25.84 CORONARY ARTERY DISEASE DUE TO CALCIFIED CORONARY LESION: Primary | ICD-10-CM

## 2018-03-16 DIAGNOSIS — R13.10 DYSPHAGIA, UNSPECIFIED TYPE: ICD-10-CM

## 2018-03-16 DIAGNOSIS — R94.39 ABNORMAL CARDIOVASCULAR STRESS TEST: ICD-10-CM

## 2018-03-16 LAB
ANION GAP SERPL CALC-SCNC: 8 MMOL/L
APTT BLDCRRT: 23.4 SEC
BUN SERPL-MCNC: 15 MG/DL
CALCIUM SERPL-MCNC: 10.4 MG/DL
CHLORIDE SERPL-SCNC: 99 MMOL/L
CO2 SERPL-SCNC: 30 MMOL/L
CREAT SERPL-MCNC: 1.1 MG/DL
ERYTHROCYTE [DISTWIDTH] IN BLOOD BY AUTOMATED COUNT: 12.9 %
EST. GFR  (AFRICAN AMERICAN): >60 ML/MIN/1.73 M^2
EST. GFR  (NON AFRICAN AMERICAN): >60 ML/MIN/1.73 M^2
GLUCOSE SERPL-MCNC: 126 MG/DL
HCT VFR BLD AUTO: 47.2 %
HGB BLD-MCNC: 16 G/DL
INR PPP: 1
MCH RBC QN AUTO: 29.7 PG
MCHC RBC AUTO-ENTMCNC: 33.9 G/DL
MCV RBC AUTO: 88 FL
PLATELET # BLD AUTO: 280 K/UL
PMV BLD AUTO: 9.6 FL
POTASSIUM SERPL-SCNC: 3.8 MMOL/L
PROTHROMBIN TIME: 10.3 SEC
RBC # BLD AUTO: 5.39 M/UL
SODIUM SERPL-SCNC: 137 MMOL/L
WBC # BLD AUTO: 5.92 K/UL

## 2018-03-16 PROCEDURE — 99999 PR PBB SHADOW E&M-EST. PATIENT-LVL III: CPT | Mod: PBBFAC,,, | Performed by: INTERNAL MEDICINE

## 2018-03-16 PROCEDURE — 99205 OFFICE O/P NEW HI 60 MIN: CPT | Mod: S$GLB,,, | Performed by: INTERNAL MEDICINE

## 2018-03-16 PROCEDURE — 3079F DIAST BP 80-89 MM HG: CPT | Mod: CPTII,S$GLB,, | Performed by: INTERNAL MEDICINE

## 2018-03-16 PROCEDURE — 85730 THROMBOPLASTIN TIME PARTIAL: CPT

## 2018-03-16 PROCEDURE — 85610 PROTHROMBIN TIME: CPT

## 2018-03-16 PROCEDURE — 85027 COMPLETE CBC AUTOMATED: CPT

## 2018-03-16 PROCEDURE — 36415 COLL VENOUS BLD VENIPUNCTURE: CPT

## 2018-03-16 PROCEDURE — 3074F SYST BP LT 130 MM HG: CPT | Mod: CPTII,S$GLB,, | Performed by: INTERNAL MEDICINE

## 2018-03-16 PROCEDURE — 80048 BASIC METABOLIC PNL TOTAL CA: CPT

## 2018-03-16 RX ORDER — ASPIRIN 81 MG/1
81 TABLET ORAL DAILY
Refills: 0 | COMMUNITY
Start: 2018-03-16 | End: 2022-10-27

## 2018-03-16 RX ORDER — CLOPIDOGREL BISULFATE 75 MG/1
75 TABLET ORAL DAILY
Qty: 30 TABLET | Refills: 11 | Status: ON HOLD | OUTPATIENT
Start: 2018-03-16 | End: 2018-03-21 | Stop reason: HOSPADM

## 2018-03-16 RX ORDER — SODIUM CHLORIDE 9 MG/ML
INJECTION, SOLUTION INTRAVENOUS ONCE
Status: CANCELLED | OUTPATIENT
Start: 2018-03-16 | End: 2018-03-16

## 2018-03-16 RX ORDER — DIPHENHYDRAMINE HCL 25 MG
50 CAPSULE ORAL
Status: CANCELLED | OUTPATIENT
Start: 2018-03-16

## 2018-03-16 NOTE — PROGRESS NOTES
Subjective:   Patient ID:  Hi Rubio is a 58 y.o. male who presents for evalulation of Chest Pain      HPI:   Hi Rubio wis a 59 yo gentleman referred by Dr. Palacios for evaluation of chest pain. He has a PMHx of family hx of CAD on maternal and paternal sides (MI), HTN, HLD, PVCs on Holter in 2015. He been having increasing chest discomfort for the past two weeks. The episodes are described as tightness and pain in the mid sternal region and last 5-15 minutes but have on occasion lasted about 1 hour. He does not taken either nitroglycerin glycerin or an antacid when he experiences the chest pain. He reports associated SOB and occasional PVCs. He went to the emergency room 3/8 because his blood pressure was elevated at 180/111 mm Hg. Troponins were negative. EGD Tuesday, 3/13/2018, was normal. The most recent episode was a few nights ago when the chest pain woke him from sleep. The CP occurs both at rest, during sleep, and with exertion. Last  stress test on 1/23/2017 showed no evidence of stress induced myocardial ischemia. He reports he has had a coronary angiogram in the past (~20yrs ago at ) without stents placed. He has a ~30pk/yr hx of smoking quit in 2011. He denies light headedness, syncope, orthopnea.       Review of Systems   Constitution: Negative for chills, diaphoresis, fever, weakness, weight gain and weight loss.   HENT: Negative for sore throat.    Eyes: Negative for blurred vision, vision loss in left eye, vision loss in right eye and visual disturbance.   Cardiovascular: Positive for chest pain. Negative for claudication, dyspnea on exertion, leg swelling, near-syncope, orthopnea, palpitations, paroxysmal nocturnal dyspnea and syncope.   Respiratory: Positive for shortness of breath. Negative for cough, hemoptysis, sputum production and wheezing.    Endocrine: Negative for cold intolerance and heat intolerance.   Hematologic/Lymphatic: Negative for adenopathy. Does not  bruise/bleed easily.   Skin: Negative for rash.   Musculoskeletal: Negative for falls, muscle weakness and myalgias.   Gastrointestinal: Negative for abdominal pain, change in bowel habit, constipation, diarrhea, melena and nausea.   Genitourinary: Negative for bladder incontinence.   Neurological: Negative for dizziness, focal weakness, headaches, light-headedness and numbness.   Psychiatric/Behavioral: Negative for altered mental status.       Current Outpatient Prescriptions   Medication Sig    ALPRAZolam (XANAX) 0.25 MG tablet Take 1 tablet (0.25 mg total) by mouth 3 (three) times daily. (Patient taking differently: Take 0.25 mg by mouth 3 (three) times daily as needed. )    amLODIPine (NORVASC) 10 MG tablet Take 1 tablet (10 mg total) by mouth once daily.    cloNIDine (CATAPRES) 0.1 MG tablet Take 1 tablet (0.1 mg total) by mouth once as needed (For blood pressures above 170 systolic - call clinic if pressure not resolved after 30 minutes).    CONTOUR NEXT STRIPS Strp USE TO TEST BLOOD SUGAR ONCE DAILY    hydroCHLOROthiazide (HYDRODIURIL) 25 MG tablet TAKE ONE TABLET BY MOUTH ONE TIME DAILY    metoprolol succinate (TOPROL-XL) 100 MG 24 hr tablet TAKE ONE TABLET BY MOUTH ONE TIME DAILY    MICROLET LANCET Misc 1 lancet by Misc.(Non-Drug; Combo Route) route once daily. Test blood glucose once daily as instructed.    montelukast (SINGULAIR) 10 mg tablet TAKE 1 TABLET (10 MG TOTAL) BY MOUTH EVERY EVENING.    nitroGLYCERIN (NITROSTAT) 0.3 MG SL tablet Place 1 tablet (0.3 mg total) under the tongue every 5 (five) minutes as needed for Chest pain.    pantoprazole (PROTONIX) 40 MG tablet Take 1 tablet (40 mg total) by mouth 2 (two) times daily.    pravastatin (PRAVACHOL) 80 MG tablet TAKE ONE TABLET BY MOUTH NIGHTLY AT BEDTIME    ezetimibe (ZETIA) 10 mg tablet Take 1 tablet (10 mg total) by mouth once daily.     No current facility-administered medications for this visit.        Social History   Substance  "Use Topics    Smoking status: Former Smoker     Packs/day: 1.00     Years: 30.00     Types: Cigarettes     Quit date: 12/13/2011    Smokeless tobacco: Never Used    Alcohol use Yes      Comment: occas - nonalcoholic beer or beer         Objective:     Physical Exam   Constitutional: He is oriented to person, place, and time. He appears well-developed and well-nourished.   /76   Pulse 68   Ht 6' 4" (1.93 m)   Wt 88.3 kg (194 lb 8.9 oz)   BMI 23.68 kg/m²      HENT:   Head: Normocephalic and atraumatic.   Eyes: EOM are normal. Pupils are equal, round, and reactive to light.   Neck: Neck supple. No JVD present. Carotid bruit is not present. No tracheal deviation present. No thyromegaly present.   Cardiovascular: Normal rate, regular rhythm, S1 normal, S2 normal, intact distal pulses and normal pulses.  PMI is not displaced.  Exam reveals no gallop and no friction rub.    No murmur heard.  Pulses:       Radial pulses are 2+ on the right side, and 2+ on the left side.        Posterior tibial pulses are 2+ on the right side, and 2+ on the left side.   Pulmonary/Chest: Effort normal and breath sounds normal. No respiratory distress. He has no wheezes. He has no rales. Chest wall is not dull to percussion. He exhibits no tenderness.   Abdominal: Soft. Bowel sounds are normal. He exhibits no distension and no mass. There is no hepatosplenomegaly, splenomegaly or hepatomegaly. There is no tenderness.   Musculoskeletal: Normal range of motion. He exhibits no edema or tenderness.        Right lower leg: He exhibits no edema.        Left lower leg: He exhibits no edema.   Neurological: He is alert and oriented to person, place, and time. Gait normal.   Skin: Skin is warm and dry. No bruising and no rash noted. No cyanosis. Nails show no clubbing.   Psychiatric: He has a normal mood and affect. His speech is normal and behavior is normal. Judgment and thought content normal. Cognition and memory are normal. "           Lab Results   Component Value Date    CHOL 231 (H) 11/25/2017    HDL 45 11/25/2017    LDLCALC 122.4 11/25/2017    TRIG 318 (H) 11/25/2017    CHOLHDL 19.5 (L) 11/25/2017     Lab Results   Component Value Date     03/09/2018    CREATININE 1.1 03/09/2018    BUN 15 03/09/2018     03/09/2018    K 3.7 03/09/2018     03/09/2018    CO2 26 03/09/2018     Lab Results   Component Value Date    ALT 28 03/09/2018    AST 22 03/09/2018    ALKPHOS 72 03/09/2018    BILITOT 0.6 03/09/2018           Assessment:     Mixed hyperlipidemia  Continue pravastatin.      Essential hypertension  Controlled on amlodipine, HCTZ, Toprol XL     Coronary artery disease due to calcified coronary lesion  Arterial calcification noted in LAD on CT  Start ASA, on statin and BB    Dysphagia  EGD normal       Plan:  Coronary Angiogram +/- PCI with Dr. Espinosa as scheduled for 3/21/2018  Risks, Benefits, and alternatives of the procedure were discussed in detail with the patient. Questions were answered and patient has voiced understanding. He is agreeable to proceed. Informed consent obtained.   R radial access  Start ASA 81mg today, load Plavix the night before  Check PRU  Upon admission     Fareed Espinosa MD  Interventional Cardiology  Structural/Valvular heart disease  [phone]

## 2018-03-16 NOTE — PROGRESS NOTES
OUTPATIENT CATHETERIZATION INSTRUCTIONS    You have been scheduled for a procedure in the catheterization lab on Wednesday, March 21, 2018.     Please report to the Cardiology Waiting Area on the Third floor of the hospital and check in at 6 AM.   You will then be taken to the SSCU (Short Stay Cardiac Unit) and prepared for your procedure. Please be aware that this is not the time of your procedure but the time you are to arrive. The procedures are scheduled on an hourly basis; however, emergency cases take precedence over all other cases.       You may not have anything to eat or drink after midnight the night before your test. You may take your regular morning medications with water. If there are any medications that you should not take you will be instructed to hold them that morning. If you are diabetic and on Metformin (Glucophage) do not take it the day before, the day of, and for 2 days after your procedure.      The procedure will take 1-2 hours to perform. After the procedure, you will return to SSCU on the third floor of the hospital. You will need to lie still (or keep your arm still) for the next 4 to 6 hours to minimize bleeding from the puncture site. Your family may remain in the room with you during this time.       You may be able to be discharged home that same afternoon if there is someone to drive you home and there were no complications. If you have one of the balloon, stent, or device procedures you may spend the night in the hospital. Your doctor will determine, based on your progress, the date and time of your discharge. The results of your procedure will be discussed with you before you are discharged. Any further testing or procedures will be scheduled for you either before you leave or you will be called with these appointments.       If you should have any questions, concerns, or need to change the date of your procedure, please call DONALD Perez @ [phone]    Special  Instructions:  Begin Aspirin today  Plavix:  4 tablets night before and 1 tablet morning of        THE ABOVE INSTRUCTIONS WERE GIVEN TO THE PATIENT VERBALLY AND THEY VERBALIZED UNDERSTANDING.  THEY DO NOT REQUIRE ANY SPECIAL NEEDS AND DO NOT HAVE ANY LEARNING BARRIERS.          Directions for Reporting to Cardiology Waiting Area in the Hospital  If you park in the Parking Garage:  Take elevators to the1st floor of the parking garage.  Continue past the gift shop, coffee shop, and piano.  Take a right and go to the gold elevators. (Elevator B)  Take the elevator to the 3rd floor.  Follow the arrow on the sign on the wall that says Cath Lab Registration/EP Lab Registration.  Follow the long hallway all the way around until you come to a big open area.  This is the registration area.  Check in at Reception Desk.    OR    If family is dropping you off:  Have them drop you off at the front of the Hospital under the green overhang.  Enter through the doors and take a right.  Take the E elevators to the 3rd floor Cardiology Waiting Area.  Check in at the Reception Desk in the waiting room.

## 2018-03-16 NOTE — LETTER
March 16, 2018      No Palacios MD  [address]  8th Floor - Cardiology  [place] [address]           [facility]  [address]  [facility] [address]  Phone: [phone]          Patient: Hi Rubio   MR Number: [id number]   YOB: 1960   Date of Visit: 3/16/2018       Dear Dr. No Palacios:    Thank you for referring Hi Rubio to me for evaluation. Attached you will find relevant portions of my assessment and plan of care.    If you have questions, please do not hesitate to call me. I look forward to following Hi Rubio along with you.    Sincerely,    Fareed Porter MD    Enclosure  CC:  No Recipients    If you would like to receive this communication electronically, please contact [email or web address] or [phone] to request more information on [employer] access.    For providers and/or their staff who would like to refer a patient to [facility], please contact us through our one-stop-shop provider referral line, [facility], at [phone].    If you feel you have received this communication in error or would no longer like to receive these types of communications, please e-mail [email or web address]

## 2018-03-16 NOTE — TELEPHONE ENCOUNTER
Xanax #90 called to pharmacy recorder at  [id number] [place] - [address]: [phone]     As dr duvall approved.

## 2018-03-20 ENCOUNTER — TELEPHONE (OUTPATIENT)
Dept: INTERNAL MEDICINE | Facility: CLINIC | Age: 58
End: 2018-03-20

## 2018-03-20 NOTE — TELEPHONE ENCOUNTER
Called and spoke with the patient and he advised he received a missed call. Advise there was no call from PCP

## 2018-03-21 ENCOUNTER — SURGERY (OUTPATIENT)
Age: 58
End: 2018-03-21

## 2018-03-21 ENCOUNTER — HOSPITAL ENCOUNTER (OUTPATIENT)
Facility: HOSPITAL | Age: 58
Discharge: HOME | End: 2018-03-21
Attending: INTERNAL MEDICINE | Admitting: INTERNAL MEDICINE
Payer: COMMERCIAL

## 2018-03-21 VITALS
HEART RATE: 64 BPM | DIASTOLIC BLOOD PRESSURE: 74 MMHG | SYSTOLIC BLOOD PRESSURE: 131 MMHG | HEIGHT: 76 IN | WEIGHT: 191 LBS | TEMPERATURE: 97 F | OXYGEN SATURATION: 97 % | RESPIRATION RATE: 18 BRPM | BODY MASS INDEX: 23.26 KG/M2

## 2018-03-21 DIAGNOSIS — I25.84 CORONARY ARTERY DISEASE DUE TO CALCIFIED CORONARY LESION: ICD-10-CM

## 2018-03-21 DIAGNOSIS — I25.10 CORONARY ARTERY DISEASE DUE TO CALCIFIED CORONARY LESION: ICD-10-CM

## 2018-03-21 DIAGNOSIS — R07.9 CHEST PAIN, UNSPECIFIED TYPE: Primary | ICD-10-CM

## 2018-03-21 DIAGNOSIS — I10 ESSENTIAL (PRIMARY) HYPERTENSION: ICD-10-CM

## 2018-03-21 LAB
ABO + RH BLD: NORMAL
ANION GAP SERPL CALC-SCNC: 10 MMOL/L
APTT BLDCRRT: 22.5 SEC
BASOPHILS # BLD AUTO: 0.06 K/UL
BASOPHILS NFR BLD: 1.3 %
BLD GP AB SCN CELLS X3 SERPL QL: NORMAL
BUN SERPL-MCNC: 13 MG/DL
CALCIUM SERPL-MCNC: 9.9 MG/DL
CHLORIDE SERPL-SCNC: 105 MMOL/L
CO2 SERPL-SCNC: 26 MMOL/L
CREAT SERPL-MCNC: 1 MG/DL
DIFFERENTIAL METHOD: NORMAL
EOSINOPHIL # BLD AUTO: 0.3 K/UL
EOSINOPHIL NFR BLD: 6.4 %
ERYTHROCYTE [DISTWIDTH] IN BLOOD BY AUTOMATED COUNT: 12.9 %
EST. GFR  (AFRICAN AMERICAN): >60 ML/MIN/1.73 M^2
EST. GFR  (NON AFRICAN AMERICAN): >60 ML/MIN/1.73 M^2
GLUCOSE SERPL-MCNC: 145 MG/DL
HCT VFR BLD AUTO: 48.7 %
HGB BLD-MCNC: 16.2 G/DL
IMM GRANULOCYTES # BLD AUTO: 0.02 K/UL
IMM GRANULOCYTES NFR BLD AUTO: 0.4 %
INR PPP: 0.9
LYMPHOCYTES # BLD AUTO: 1.3 K/UL
LYMPHOCYTES NFR BLD: 28 %
MCH RBC QN AUTO: 28.9 PG
MCHC RBC AUTO-ENTMCNC: 33.3 G/DL
MCV RBC AUTO: 87 FL
MONOCYTES # BLD AUTO: 0.4 K/UL
MONOCYTES NFR BLD: 9 %
NEUTROPHILS # BLD AUTO: 2.5 K/UL
NEUTROPHILS NFR BLD: 54.9 %
NRBC BLD-RTO: 0 /100 WBC
PLATELET # BLD AUTO: 298 K/UL
PLATELET RESPONSE PLAVIX: 157 PRU
PMV BLD AUTO: 9.8 FL
POCT GLUCOSE: 142 MG/DL (ref 70–110)
POTASSIUM SERPL-SCNC: 3.6 MMOL/L
PROTHROMBIN TIME: 10 SEC
RBC # BLD AUTO: 5.6 M/UL
SODIUM SERPL-SCNC: 141 MMOL/L
WBC # BLD AUTO: 4.54 K/UL

## 2018-03-21 PROCEDURE — 93458 L HRT ARTERY/VENTRICLE ANGIO: CPT | Mod: 26,,, | Performed by: INTERNAL MEDICINE

## 2018-03-21 PROCEDURE — 85730 THROMBOPLASTIN TIME PARTIAL: CPT

## 2018-03-21 PROCEDURE — 86901 BLOOD TYPING SEROLOGIC RH(D): CPT

## 2018-03-21 PROCEDURE — 82962 GLUCOSE BLOOD TEST: CPT

## 2018-03-21 PROCEDURE — 85576 BLOOD PLATELET AGGREGATION: CPT

## 2018-03-21 PROCEDURE — 93458 L HRT ARTERY/VENTRICLE ANGIO: CPT

## 2018-03-21 PROCEDURE — 85025 COMPLETE CBC W/AUTO DIFF WBC: CPT

## 2018-03-21 PROCEDURE — 80048 BASIC METABOLIC PNL TOTAL CA: CPT

## 2018-03-21 PROCEDURE — 99152 MOD SED SAME PHYS/QHP 5/>YRS: CPT | Mod: ,,, | Performed by: INTERNAL MEDICINE

## 2018-03-21 PROCEDURE — 85610 PROTHROMBIN TIME: CPT

## 2018-03-21 PROCEDURE — 93005 ELECTROCARDIOGRAM TRACING: CPT | Mod: 59

## 2018-03-21 PROCEDURE — 25000003 PHARM REV CODE 250

## 2018-03-21 PROCEDURE — 93010 ELECTROCARDIOGRAM REPORT: CPT | Mod: ,,, | Performed by: INTERNAL MEDICINE

## 2018-03-21 PROCEDURE — 25000003 PHARM REV CODE 250: Performed by: NURSE PRACTITIONER

## 2018-03-21 PROCEDURE — 99152 MOD SED SAME PHYS/QHP 5/>YRS: CPT

## 2018-03-21 PROCEDURE — 63600175 PHARM REV CODE 636 W HCPCS

## 2018-03-21 RX ORDER — ONDANSETRON 8 MG/1
8 TABLET, ORALLY DISINTEGRATING ORAL EVERY 8 HOURS PRN
Status: DISCONTINUED | OUTPATIENT
Start: 2018-03-21 | End: 2018-03-21 | Stop reason: HOSPADM

## 2018-03-21 RX ORDER — DIPHENHYDRAMINE HCL 50 MG
50 CAPSULE ORAL
Status: DISCONTINUED | OUTPATIENT
Start: 2018-03-21 | End: 2018-03-21 | Stop reason: HOSPADM

## 2018-03-21 RX ORDER — SODIUM CHLORIDE 9 MG/ML
INJECTION, SOLUTION INTRAVENOUS ONCE
Status: COMPLETED | OUTPATIENT
Start: 2018-03-21 | End: 2018-03-21

## 2018-03-21 RX ORDER — ACETAMINOPHEN 325 MG/1
650 TABLET ORAL EVERY 4 HOURS PRN
Status: DISCONTINUED | OUTPATIENT
Start: 2018-03-21 | End: 2018-03-21 | Stop reason: HOSPADM

## 2018-03-21 RX ADMIN — DIPHENHYDRAMINE HYDROCHLORIDE 50 MG: 50 CAPSULE ORAL at 06:03

## 2018-03-21 RX ADMIN — SODIUM CHLORIDE: 0.9 INJECTION, SOLUTION INTRAVENOUS at 06:03

## 2018-03-21 NOTE — H&P (VIEW-ONLY)
Subjective:   Patient ID:  Hi Rubio is a 58 y.o. male who presents for evalulation of Chest Pain      HPI:   Hi Rubio wis a 57 yo gentleman referred by Dr. Palacios for evaluation of chest pain. He has a PMHx of family hx of CAD on maternal and paternal sides (MI), HTN, HLD, PVCs on Holter in 2015. He been having increasing chest discomfort for the past two weeks. The episodes are described as tightness and pain in the mid sternal region and last 5-15 minutes but have on occasion lasted about 1 hour. He does not taken either nitroglycerin glycerin or an antacid when he experiences the chest pain. He reports associated SOB and occasional PVCs. He went to the emergency room 3/8 because his blood pressure was elevated at 180/111 mm Hg. Troponins were negative. EGD Tuesday, 3/13/2018, was normal. The most recent episode was a few nights ago when the chest pain woke him from sleep. The CP occurs both at rest, during sleep, and with exertion. Last  stress test on 1/23/2017 showed no evidence of stress induced myocardial ischemia. He reports he has had a coronary angiogram in the past (~20yrs ago at ) without stents placed. He has a ~30pk/yr hx of smoking quit in 2011. He denies light headedness, syncope, orthopnea.       Review of Systems   Constitution: Negative for chills, diaphoresis, fever, weakness, weight gain and weight loss.   HENT: Negative for sore throat.    Eyes: Negative for blurred vision, vision loss in left eye, vision loss in right eye and visual disturbance.   Cardiovascular: Positive for chest pain. Negative for claudication, dyspnea on exertion, leg swelling, near-syncope, orthopnea, palpitations, paroxysmal nocturnal dyspnea and syncope.   Respiratory: Positive for shortness of breath. Negative for cough, hemoptysis, sputum production and wheezing.    Endocrine: Negative for cold intolerance and heat intolerance.   Hematologic/Lymphatic: Negative for adenopathy. Does not  bruise/bleed easily.   Skin: Negative for rash.   Musculoskeletal: Negative for falls, muscle weakness and myalgias.   Gastrointestinal: Negative for abdominal pain, change in bowel habit, constipation, diarrhea, melena and nausea.   Genitourinary: Negative for bladder incontinence.   Neurological: Negative for dizziness, focal weakness, headaches, light-headedness and numbness.   Psychiatric/Behavioral: Negative for altered mental status.       Current Outpatient Prescriptions   Medication Sig    ALPRAZolam (XANAX) 0.25 MG tablet Take 1 tablet (0.25 mg total) by mouth 3 (three) times daily. (Patient taking differently: Take 0.25 mg by mouth 3 (three) times daily as needed. )    amLODIPine (NORVASC) 10 MG tablet Take 1 tablet (10 mg total) by mouth once daily.    cloNIDine (CATAPRES) 0.1 MG tablet Take 1 tablet (0.1 mg total) by mouth once as needed (For blood pressures above 170 systolic - call clinic if pressure not resolved after 30 minutes).    CONTOUR NEXT STRIPS Strp USE TO TEST BLOOD SUGAR ONCE DAILY    hydroCHLOROthiazide (HYDRODIURIL) 25 MG tablet TAKE ONE TABLET BY MOUTH ONE TIME DAILY    metoprolol succinate (TOPROL-XL) 100 MG 24 hr tablet TAKE ONE TABLET BY MOUTH ONE TIME DAILY    MICROLET LANCET Misc 1 lancet by Misc.(Non-Drug; Combo Route) route once daily. Test blood glucose once daily as instructed.    montelukast (SINGULAIR) 10 mg tablet TAKE 1 TABLET (10 MG TOTAL) BY MOUTH EVERY EVENING.    nitroGLYCERIN (NITROSTAT) 0.3 MG SL tablet Place 1 tablet (0.3 mg total) under the tongue every 5 (five) minutes as needed for Chest pain.    pantoprazole (PROTONIX) 40 MG tablet Take 1 tablet (40 mg total) by mouth 2 (two) times daily.    pravastatin (PRAVACHOL) 80 MG tablet TAKE ONE TABLET BY MOUTH NIGHTLY AT BEDTIME    ezetimibe (ZETIA) 10 mg tablet Take 1 tablet (10 mg total) by mouth once daily.     No current facility-administered medications for this visit.        Social History   Substance  "Use Topics    Smoking status: Former Smoker     Packs/day: 1.00     Years: 30.00     Types: Cigarettes     Quit date: 12/13/2011    Smokeless tobacco: Never Used    Alcohol use Yes      Comment: occas - nonalcoholic beer or beer         Objective:     Physical Exam   Constitutional: He is oriented to person, place, and time. He appears well-developed and well-nourished.   /76   Pulse 68   Ht 6' 4" (1.93 m)   Wt 88.3 kg (194 lb 8.9 oz)   BMI 23.68 kg/m²      HENT:   Head: Normocephalic and atraumatic.   Eyes: EOM are normal. Pupils are equal, round, and reactive to light.   Neck: Neck supple. No JVD present. Carotid bruit is not present. No tracheal deviation present. No thyromegaly present.   Cardiovascular: Normal rate, regular rhythm, S1 normal, S2 normal, intact distal pulses and normal pulses.  PMI is not displaced.  Exam reveals no gallop and no friction rub.    No murmur heard.  Pulses:       Radial pulses are 2+ on the right side, and 2+ on the left side.        Posterior tibial pulses are 2+ on the right side, and 2+ on the left side.   Pulmonary/Chest: Effort normal and breath sounds normal. No respiratory distress. He has no wheezes. He has no rales. Chest wall is not dull to percussion. He exhibits no tenderness.   Abdominal: Soft. Bowel sounds are normal. He exhibits no distension and no mass. There is no hepatosplenomegaly, splenomegaly or hepatomegaly. There is no tenderness.   Musculoskeletal: Normal range of motion. He exhibits no edema or tenderness.        Right lower leg: He exhibits no edema.        Left lower leg: He exhibits no edema.   Neurological: He is alert and oriented to person, place, and time. Gait normal.   Skin: Skin is warm and dry. No bruising and no rash noted. No cyanosis. Nails show no clubbing.   Psychiatric: He has a normal mood and affect. His speech is normal and behavior is normal. Judgment and thought content normal. Cognition and memory are normal. "           Lab Results   Component Value Date    CHOL 231 (H) 11/25/2017    HDL 45 11/25/2017    LDLCALC 122.4 11/25/2017    TRIG 318 (H) 11/25/2017    CHOLHDL 19.5 (L) 11/25/2017     Lab Results   Component Value Date     03/09/2018    CREATININE 1.1 03/09/2018    BUN 15 03/09/2018     03/09/2018    K 3.7 03/09/2018     03/09/2018    CO2 26 03/09/2018     Lab Results   Component Value Date    ALT 28 03/09/2018    AST 22 03/09/2018    ALKPHOS 72 03/09/2018    BILITOT 0.6 03/09/2018           Assessment:     Mixed hyperlipidemia  Continue pravastatin.      Essential hypertension  Controlled on amlodipine, HCTZ, Toprol XL     Coronary artery disease due to calcified coronary lesion  Arterial calcification noted in LAD on CT  Start ASA, on statin and BB    Dysphagia  EGD normal       Plan:  Coronary Angiogram +/- PCI with Dr. Espinosa as scheduled for 3/21/2018  Risks, Benefits, and alternatives of the procedure were discussed in detail with the patient. Questions were answered and patient has voiced understanding. He is agreeable to proceed. Informed consent obtained.   R radial access  Start ASA 81mg today, load Plavix the night before  Check PRU  Upon admission     Fareed Espinosa MD  Interventional Cardiology  Structural/Valvular heart disease  [phone]

## 2018-03-21 NOTE — BRIEF OP NOTE
Procedure Note:  Patient is s/p [facility]  No appreciable coronary disease  Left dominant system  LVEDP 12  Minimal blood loss <10cc  No complications  R radial access  Frank diagnostic cath and pigtail used  Radial armband placed post-cath  5cc/kg/hr x 4 hours fluids post-cath  May dc today post-fluids and follow up with primary cardiologist as scheduled and with interventional PRN    Kar Quesada MD  PGY4 Cardiology Fellow  [phone]

## 2018-03-21 NOTE — NURSING
Discharge instructions and medlist given.  Patient and wife verbalized understanding.  Off unit via wheelchair with wife pushing.

## 2018-03-21 NOTE — DISCHARGE SUMMARY
[facility]  Interventional Cardiology  Discharge Summary      Patient Name: Hi Rubio  MRN: [id number]  Admission Date: 3/21/2018  Hospital Length of Stay: 0 days  Discharge Date and Time:  03/21/2018 8:40 AM  Attending Physician: Fareed Porter MD  Discharging Provider: Kar Quesada MD  Primary Care Physician: Josefina Kendall MD    HPI:  Hi Rubio wis a 57 yo gentleman referred by Dr. Palacios for evaluation of chest pain. He has a PMHx of family hx of CAD on maternal and paternal sides (MI), HTN, HLD, PVCs on Holter in 2015. He been having increasing chest discomfort for the past two weeks. The episodes are described as tightness and pain in the mid sternal region and last 5-15 minutes but have on occasion lasted about 1 hour. He does not taken either nitroglycerin glycerin or an antacid when he experiences the chest pain. He reports associated SOB and occasional PVCs. He went to the emergency room 3/8 because his blood pressure was elevated at 180/111 mm Hg. Troponins were negative. EGD Tuesday, 3/13/2018, was normal. The most recent episode was a few nights ago when the chest pain woke him from sleep. The CP occurs both at rest, during sleep, and with exertion. Last  stress test on 1/23/2017 showed no evidence of stress induced myocardial ischemia. He reports he has had a coronary angiogram in the past (~20yrs ago at ) without stents placed. He has a ~30pk/yr hx of smoking quit in 2011. He denies light headedness, syncope, orthopnea.  S/p LHC today with no appreciable coronary disease on cath in left dominant system.    Procedure(s) (LRB):  HEART CATH-LEFT (Left)     Indwelling Lines/Drains at time of discharge:  Lines/Drains/Airways          No matching active lines, drains, or airways          Hospital Course:  No notes on file        Significant Diagnostic Studies: Labs:   CMP   Recent Labs  Lab 03/21/18  0613      K 3.6      CO2 26   *   BUN  13   CREATININE 1.0   CALCIUM 9.9   ANIONGAP 10   ESTGFRAFRICA >60.0   EGFRNONAA >60.0   , CBC   Recent Labs  Lab 03/21/18  0613   WBC 4.54   HGB 16.2   HCT 48.7      , INR   Lab Results   Component Value Date    INR 0.9 03/21/2018    INR 1.0 03/16/2018    INR 1.1 01/21/2017    and Lipid Panel   Lab Results   Component Value Date    CHOL 231 (H) 11/25/2017    HDL 45 11/25/2017    LDLCALC 122.4 11/25/2017    TRIG 318 (H) 11/25/2017    CHOLHDL 19.5 (L) 11/25/2017     Angiography: as per A/P    Pending Diagnostic Studies:     None        A/P:  Patient is s/p LH  No appreciable coronary disease  Left dominant system  LVEDP 12  Minimal blood loss <10cc  No complications  R radial access  Frank diagnostic cath and pigtail used  Radial armband placed post-cath  5cc/kg/hr x 4 hours fluids post-cath  May dc today post-fluids and follow up with primary cardiologist as scheduled and with interventional PRN    Discharged Condition: stable     DIET: low sodium, 2gm Na    Follow Up: Dr Palacios as scheduled     Patient Instructions:   No discharge procedures on file.  Medications:  Reconciled Home Medications:   Current Discharge Medication List      CONTINUE these medications which have NOT CHANGED    Details   ALPRAZolam (XANAX) 0.25 MG tablet TAKE 1 TABLET BY MOUTH 3 TIMES A DAY  Qty: 90 tablet, Refills: 0    Comments: Not to exceed 5 additional fills before 06/16/2018.      amLODIPine (NORVASC) 10 MG tablet Take 1 tablet (10 mg total) by mouth once daily.  Qty: 30 tablet, Refills: 2      aspirin (ECOTRIN) 81 MG EC tablet Take 1 tablet (81 mg total) by mouth once daily.  Refills: 0    Associated Diagnoses: Coronary artery disease due to calcified coronary lesion      hydroCHLOROthiazide (HYDRODIURIL) 25 MG tablet TAKE ONE TABLET BY MOUTH ONE TIME DAILY  Qty: 90 tablet, Refills: 1      metoprolol succinate (TOPROL-XL) 100 MG 24 hr tablet TAKE ONE TABLET BY MOUTH ONE TIME DAILY  Qty: 90 tablet, Refills: 2    Associated  Diagnoses: Symptomatic PVCs      !! pantoprazole (PROTONIX) 40 MG tablet Take 1 tablet (40 mg total) by mouth 2 (two) times daily.  Qty: 60 tablet, Refills: 3    Associated Diagnoses: Gastroesophageal reflux disease, esophagitis presence not specified; Chest pain, unspecified type      pravastatin (PRAVACHOL) 80 MG tablet TAKE ONE TABLET BY MOUTH NIGHTLY AT BEDTIME  Qty: 90 tablet, Refills: 1    Associated Diagnoses: Family history of premature CAD; Peripheral vascular disease; Hyperlipidemia      CONTOUR NEXT STRIPS Strp USE TO TEST BLOOD SUGAR ONCE DAILY  Qty: 25 strip, Refills: 4    Associated Diagnoses: Newly diagnosed diabetes      ezetimibe (ZETIA) 10 mg tablet Take 1 tablet (10 mg total) by mouth once daily.  Qty: 30 tablet, Refills: 11      MICROLET LANCET Misc 1 lancet by Misc.(Non-Drug; Combo Route) route once daily. Test blood glucose once daily as instructed.  Qty: 25 each, Refills: 11    Associated Diagnoses: Newly diagnosed diabetes      nitroGLYCERIN (NITROSTAT) 0.3 MG SL tablet Place 1 tablet (0.3 mg total) under the tongue every 5 (five) minutes as needed for Chest pain.  Qty: 25 tablet, Refills: 0      !! pantoprazole (PROTONIX) 40 MG tablet TAKE 1 TABLET (40 MG TOTAL) BY MOUTH ONCE DAILY.  Qty: 30 tablet, Refills: 3       !! - Potential duplicate medications found. Please discuss with provider.      STOP taking these medications       clopidogrel (PLAVIX) 75 mg tablet Comments:   Reason for Stopping:               Time spent on the discharge of patient: 55 minutes    Kar Quesada MD  Interventional Cardiology  [facility]

## 2018-03-21 NOTE — NURSING TRANSFER
Nursing Transfer Note      3/21/2018     Transfer From: cath lab    Transfer via stretcher    Transfer with cardiac monitoring    Transported by RN    Medicines sent: ivf    Chart send with patient: Yes    Notified: spouse    Patient reassessed at: 0848  3/21/2018  Upon arrival to floor: cardiac monitor applied, patient oriented to room, call bell in reach and bed in lowest position

## 2018-03-27 ENCOUNTER — CLINICAL SUPPORT (OUTPATIENT)
Dept: CARDIOLOGY | Facility: CLINIC | Age: 58
End: 2018-03-27
Attending: INTERNAL MEDICINE
Payer: COMMERCIAL

## 2018-03-27 DIAGNOSIS — R07.9 CHEST PAIN, UNSPECIFIED TYPE: ICD-10-CM

## 2018-03-27 LAB
DIASTOLIC DYSFUNCTION: NO
ESTIMATED PA SYSTOLIC PRESSURE: 26.62
RETIRED EF AND QEF - SEE NOTES: 60 (ref 55–65)
TRICUSPID VALVE REGURGITATION: NORMAL

## 2018-03-27 PROCEDURE — 93306 TTE W/DOPPLER COMPLETE: CPT | Mod: S$GLB,,, | Performed by: INTERNAL MEDICINE

## 2018-04-11 ENCOUNTER — TELEPHONE (OUTPATIENT)
Dept: GASTROENTEROLOGY | Facility: CLINIC | Age: 58
End: 2018-04-11

## 2018-04-11 NOTE — TELEPHONE ENCOUNTER
Results given to patient per Dr. Barrios. Patient verbalized understanding. GI follow up appointment scheduled and mailed to address on file.

## 2018-04-11 NOTE — TELEPHONE ENCOUNTER
----- Message from Rubin Barrios MD sent at 4/11/2018  8:44 AM CDT -----  Biopsies of the esophagus returned without any significant findings - just a very small amount of inflammation that is not uncommon to see and doesn't explain symptoms.  Follow-up with ELISSA in clinic, next available.    MA to call patient with results.

## 2018-04-13 RX ORDER — MONTELUKAST SODIUM 10 MG/1
10 TABLET ORAL NIGHTLY
Qty: 30 TABLET | Refills: 1 | Status: SHIPPED | OUTPATIENT
Start: 2018-04-13 | End: 2018-05-22 | Stop reason: SDUPTHER

## 2018-05-01 ENCOUNTER — OFFICE VISIT (OUTPATIENT)
Dept: INTERNAL MEDICINE | Facility: CLINIC | Age: 58
End: 2018-05-01
Payer: COMMERCIAL

## 2018-05-01 ENCOUNTER — TELEPHONE (OUTPATIENT)
Dept: INTERNAL MEDICINE | Facility: CLINIC | Age: 58
End: 2018-05-01

## 2018-05-01 VITALS
WEIGHT: 201.75 LBS | SYSTOLIC BLOOD PRESSURE: 134 MMHG | HEIGHT: 75 IN | DIASTOLIC BLOOD PRESSURE: 72 MMHG | BODY MASS INDEX: 25.08 KG/M2 | HEART RATE: 64 BPM | RESPIRATION RATE: 16 BRPM | TEMPERATURE: 99 F

## 2018-05-01 DIAGNOSIS — M79.89 SWELLING OF BOTH LOWER EXTREMITIES: Primary | ICD-10-CM

## 2018-05-01 DIAGNOSIS — I10 ESSENTIAL HYPERTENSION: ICD-10-CM

## 2018-05-01 PROCEDURE — 99213 OFFICE O/P EST LOW 20 MIN: CPT | Mod: S$GLB,,, | Performed by: INTERNAL MEDICINE

## 2018-05-01 PROCEDURE — 3075F SYST BP GE 130 - 139MM HG: CPT | Mod: CPTII,S$GLB,, | Performed by: INTERNAL MEDICINE

## 2018-05-01 PROCEDURE — 99999 PR PBB SHADOW E&M-EST. PATIENT-LVL IV: CPT | Mod: PBBFAC,,,

## 2018-05-01 PROCEDURE — 3078F DIAST BP <80 MM HG: CPT | Mod: CPTII,S$GLB,, | Performed by: INTERNAL MEDICINE

## 2018-05-01 RX ORDER — SILDENAFIL CITRATE 20 MG/1
TABLET ORAL
Refills: 11 | COMMUNITY
Start: 2018-04-13 | End: 2019-03-18 | Stop reason: SDUPTHER

## 2018-05-01 NOTE — TELEPHONE ENCOUNTER
----- Message from Abigail Mo sent at 5/1/2018  8:34 AM CDT -----  Contact: Pt. [phone]  Patient would like to get medical advice.  Symptoms (please be specific):  Ankle swelling  How long has patient had these symptoms:  2 days   Pharmacy name and phone #:    [id number] - [place] - [address] [phone] (Phone)  [phone] (Fax)     Any drug allergies:  Lisinopril, Crestor, Lipitor  Comments: Pt states his ankle is swelling and the swelling is moving to his leg. Pt would like a call back with medical advice.

## 2018-05-01 NOTE — PROGRESS NOTES
"Subjective:       Patient ID: Hi Rubio is a 58 y.o. male.    Chief Complaint: Ankle Pain (left) and Joint Swelling (left ankle)    HPI   Hi Rubio is a 58 y.o. male who presents w/ leg swelling. He noticed the swelling started Sunday w/ pain in the left leg. Felt a "hawa" in the left ankle that was sore. It was sore to the touch. Denies having this happen before. Since Sunday, he has noticed some swelling in the right leg as well w/ occasional cramps in the calves. Over the weekend, he worked 2 days at [employer], where he stood throughout. Denied any trauma to either ankle. Denies any increase salt intake. Did not eat any food at [employer]. Denies any CP or SOB. Norvasc was recently increased from 5 mg daily to 10 mg.     Review of Systems   Constitutional: Negative for activity change, appetite change, fatigue, fever and unexpected weight change.   HENT: Negative for congestion.    Eyes: Negative for visual disturbance.   Respiratory: Negative for shortness of breath.    Cardiovascular: Negative for chest pain and leg swelling.   Gastrointestinal: Negative for abdominal pain, diarrhea and nausea.   Endocrine: Negative for cold intolerance and heat intolerance.   Genitourinary: Negative for dysuria and hematuria.   Musculoskeletal: Negative for arthralgias and myalgias.   Skin: Negative for pallor and rash.   Neurological: Negative for headaches.   Psychiatric/Behavioral: Negative for dysphoric mood and sleep disturbance.       Objective:      Physical Exam   Constitutional: He is oriented to person, place, and time. He appears well-developed and well-nourished. No distress.   HENT:   Head: Normocephalic and atraumatic.   Eyes: Conjunctivae and EOM are normal. Pupils are equal, round, and reactive to light.   Neck: Normal range of motion. Neck supple.   Cardiovascular: Normal rate, regular rhythm, normal heart sounds and intact distal pulses.    Pulmonary/Chest: Effort normal and breath sounds " normal.   Abdominal: Soft. Bowel sounds are normal.   Musculoskeletal: Normal range of motion. He exhibits edema and tenderness.   Neurological: He is alert and oriented to person, place, and time.   Skin: Skin is warm and dry. Capillary refill takes less than 2 seconds.   Psychiatric: He has a normal mood and affect. His behavior is normal. Judgment and thought content normal.       Assessment:       1. Swelling of both lower extremities    2. Essential hypertension        Plan:   1&2) Given the recent increase in Norvasc from 5 mg to 10 mg in the setting of prolonged standing, likely multifactorial. Recommended to take an extra HCTZ in the afternoon for 4 days and to elevate legs at home. If no improvement in swelling, will consider further work-up.     Te Zavala, DO PGY-III  [facility]  Medicine Resident   Pager [phone]     I have interviewed and examined the patient w/ the resident,   I agree w/ the impression and plan as outlined above by him.  Shalini Wang MD- Staff

## 2018-05-02 RX ORDER — ALPRAZOLAM 0.25 MG/1
TABLET ORAL
Qty: 90 TABLET | Refills: 1 | Status: SHIPPED | OUTPATIENT
Start: 2018-05-02 | End: 2018-07-19

## 2018-05-09 ENCOUNTER — OFFICE VISIT (OUTPATIENT)
Dept: URGENT CARE | Facility: CLINIC | Age: 58
End: 2018-05-09
Payer: COMMERCIAL

## 2018-05-09 VITALS
RESPIRATION RATE: 18 BRPM | HEIGHT: 75 IN | HEART RATE: 86 BPM | BODY MASS INDEX: 24.99 KG/M2 | SYSTOLIC BLOOD PRESSURE: 138 MMHG | TEMPERATURE: 99 F | WEIGHT: 201 LBS | DIASTOLIC BLOOD PRESSURE: 92 MMHG | OXYGEN SATURATION: 98 %

## 2018-05-09 DIAGNOSIS — E86.0 DEHYDRATION: ICD-10-CM

## 2018-05-09 DIAGNOSIS — N39.0 URINARY TRACT INFECTION WITHOUT HEMATURIA, SITE UNSPECIFIED: Primary | ICD-10-CM

## 2018-05-09 DIAGNOSIS — R11.0 NAUSEA: ICD-10-CM

## 2018-05-09 DIAGNOSIS — R30.0 DYSURIA: ICD-10-CM

## 2018-05-09 LAB
BILIRUB UR QL STRIP: POSITIVE
GLUCOSE SERPL-MCNC: 171 MG/DL (ref 70–110)
GLUCOSE SERPL-MCNC: ABNORMAL MG/DL (ref 70–110)
GLUCOSE UR QL STRIP: POSITIVE
KETONES UR QL STRIP: NEGATIVE
LEUKOCYTE ESTERASE UR QL STRIP: POSITIVE
PH, POC UA: 5.5 (ref 5–8)
POC ANION GAP: 21 MMOL/L (ref 10–20)
POC BLOOD, URINE: POSITIVE
POC BUN: 17 MMOL/L (ref 8–26)
POC CHLORIDE: 97 MMOL/L (ref 98–109)
POC CREATININE: 1.1 MG/DL (ref 0.6–1.3)
POC HEMATOCRIT: 50 %PCV (ref 42–52)
POC HEMOGLOBIN: 17 G/DL (ref 13.5–18)
POC ICA: 1.25 MMOL/L (ref 1.12–1.32)
POC NITRATES, URINE: NEGATIVE
POC POTASSIUM: 3.3 MMOL/L (ref 3.5–4.9)
POC SODIUM: 138 MMOL/L (ref 138–146)
POC TCO2: 25 MMOL/L (ref 24–29)
PROT UR QL STRIP: POSITIVE
SP GR UR STRIP: 1.02 (ref 1–1.03)
UROBILINOGEN UR STRIP-ACNC: NORMAL (ref 0.3–2.2)

## 2018-05-09 PROCEDURE — 99214 OFFICE O/P EST MOD 30 MIN: CPT | Mod: 25,S$GLB,, | Performed by: NURSE PRACTITIONER

## 2018-05-09 PROCEDURE — 87186 SC STD MICRODIL/AGAR DIL: CPT

## 2018-05-09 PROCEDURE — 80047 BASIC METABLC PNL IONIZED CA: CPT | Mod: QW,S$GLB,, | Performed by: NURSE PRACTITIONER

## 2018-05-09 PROCEDURE — 81003 URINALYSIS AUTO W/O SCOPE: CPT | Mod: QW,S$GLB,, | Performed by: NURSE PRACTITIONER

## 2018-05-09 PROCEDURE — 87077 CULTURE AEROBIC IDENTIFY: CPT

## 2018-05-09 PROCEDURE — 87088 URINE BACTERIA CULTURE: CPT

## 2018-05-09 PROCEDURE — 82948 REAGENT STRIP/BLOOD GLUCOSE: CPT | Mod: S$GLB,,, | Performed by: NURSE PRACTITIONER

## 2018-05-09 PROCEDURE — 3078F DIAST BP <80 MM HG: CPT | Mod: CPTII,S$GLB,, | Performed by: NURSE PRACTITIONER

## 2018-05-09 PROCEDURE — 3008F BODY MASS INDEX DOCD: CPT | Mod: CPTII,S$GLB,, | Performed by: NURSE PRACTITIONER

## 2018-05-09 PROCEDURE — 96372 THER/PROPH/DIAG INJ SC/IM: CPT | Mod: S$GLB,,, | Performed by: EMERGENCY MEDICINE

## 2018-05-09 PROCEDURE — 87086 URINE CULTURE/COLONY COUNT: CPT

## 2018-05-09 PROCEDURE — 3075F SYST BP GE 130 - 139MM HG: CPT | Mod: CPTII,S$GLB,, | Performed by: NURSE PRACTITIONER

## 2018-05-09 PROCEDURE — S0119 ONDANSETRON 4 MG: HCPCS | Mod: S$GLB,,, | Performed by: EMERGENCY MEDICINE

## 2018-05-09 RX ORDER — ONDANSETRON 4 MG/1
4 TABLET, ORALLY DISINTEGRATING ORAL
Status: COMPLETED | OUTPATIENT
Start: 2018-05-09 | End: 2018-05-09

## 2018-05-09 RX ORDER — CIPROFLOXACIN 500 MG/1
500 TABLET ORAL 2 TIMES DAILY
Qty: 14 TABLET | Refills: 0 | Status: SHIPPED | OUTPATIENT
Start: 2018-05-09 | End: 2018-05-15 | Stop reason: SDUPTHER

## 2018-05-09 RX ORDER — ONDANSETRON 4 MG/1
TABLET, ORALLY DISINTEGRATING ORAL
Qty: 10 TABLET | Refills: 0 | Status: SHIPPED | OUTPATIENT
Start: 2018-05-09 | End: 2018-07-19

## 2018-05-09 RX ORDER — CEFTRIAXONE 1 G/1
1 INJECTION, POWDER, FOR SOLUTION INTRAMUSCULAR; INTRAVENOUS ONCE
Status: COMPLETED | OUTPATIENT
Start: 2018-05-09 | End: 2018-05-09

## 2018-05-09 RX ORDER — SODIUM CHLORIDE 9 MG/ML
INJECTION, SOLUTION INTRAVENOUS
Status: COMPLETED | OUTPATIENT
Start: 2018-05-09 | End: 2018-05-09

## 2018-05-09 RX ADMIN — CEFTRIAXONE 1 G: 1 INJECTION, POWDER, FOR SOLUTION INTRAMUSCULAR; INTRAVENOUS at 11:05

## 2018-05-09 RX ADMIN — ONDANSETRON 4 MG: 4 TABLET, ORALLY DISINTEGRATING ORAL at 08:05

## 2018-05-09 RX ADMIN — SODIUM CHLORIDE: 9 INJECTION, SOLUTION INTRAVENOUS at 11:05

## 2018-05-09 NOTE — PROGRESS NOTES
"Subjective:       Patient ID: Hi Rubio is a 58 y.o. male.    Vitals:  height is 6' 3" (1.905 m) and weight is 91.2 kg (201 lb). His oral temperature is 98.5 °F (36.9 °C). His blood pressure is 138/92 (abnormal) and his pulse is 86. His respiration is 18 and oxygen saturation is 98%.     Chief Complaint: Dizziness    Patient presents with c/o fever as high as 103F reported yesterday at home with decreased appetite, fatigue, nausea, and frequent urination.  States, "it's a really strange feeling when I urinate." Also reports a "sweet" smell to urine.  He was seen by his PCP last week for atraumatic left sided leg swelling after working two days at [employer], this resolved.  He was advised at that time to make changes to his blood pressure medications, he forgot and did NOT do this, he is taking his medications as normally directed and never doubled up.  Today he states he feels weak and dizzy when he moves positions with dysuria and not being able to eat in 24 hours.        Dizziness:   Chronicity:  New  Onset:  Yesterday  Progression since onset:  Gradually worsening  Frequency:  Every few minutes  Severity:  Moderate  Duration:  Off/on all day  Dizziness characteristics:  Lightheaded/impending faint  Frequency of Spells:  Hourly   Associated symptoms: a fever, nausea and light-headedness.no ear pain, no headaches, no vomiting, no palpitations and no chest pain.  Aggravated by:  Position changes and getting up   enlarged prostate  Dysuria    This is a new problem. The current episode started acute onset. The problem has been unchanged. The pain is at a severity of 2/10. The pain is mild. The maximum temperature recorded prior to his arrival was 103 - 104 F. The fever has been present for 1 - 2 days. He is not sexually active. There is no history of pyelonephritis. Associated symptoms include frequency and nausea. Pertinent negatives include no behavior changes, chills, discharge, flank pain, hematuria, " urgency, vomiting or rash. He has tried nothing for the symptoms. enlarged prostate     Review of Systems   Constitution: Positive for decreased appetite, fever and malaise/fatigue. Negative for chills.   HENT: Negative for congestion, ear pain and sore throat.    Eyes: Negative for blurred vision and visual disturbance.   Cardiovascular: Negative for chest pain, dyspnea on exertion, irregular heartbeat, leg swelling, orthopnea and palpitations.   Respiratory: Negative for cough and shortness of breath.    Skin: Negative for rash.   Musculoskeletal: Negative for back pain and joint pain.   Gastrointestinal: Positive for nausea. Negative for abdominal pain, diarrhea and vomiting.   Genitourinary: Positive for dysuria and frequency. Negative for bladder incontinence, flank pain, hematuria, pelvic pain and urgency.        Sweet smell to his urine   Neurological: Positive for dizziness and light-headedness. Negative for headaches.   Psychiatric/Behavioral: The patient is not nervous/anxious.        Objective:      Physical Exam   Constitutional: He is oriented to person, place, and time. He appears well-developed and well-nourished. He is cooperative.  Non-toxic appearance. He does not appear ill. No distress.   HENT:   Head: Normocephalic and atraumatic.   Right Ear: Hearing, tympanic membrane, external ear and ear canal normal.   Left Ear: Hearing, tympanic membrane, external ear and ear canal normal.   Nose: Nose normal. No mucosal edema, rhinorrhea or nasal deformity. No epistaxis. Right sinus exhibits no maxillary sinus tenderness and no frontal sinus tenderness. Left sinus exhibits no maxillary sinus tenderness and no frontal sinus tenderness.   Mouth/Throat: Uvula is midline, oropharynx is clear and moist and mucous membranes are normal. No trismus in the jaw. Normal dentition. No uvula swelling. No oropharyngeal exudate, posterior oropharyngeal edema or posterior oropharyngeal erythema.   No temporal TTP   Eyes:  "Conjunctivae, EOM and lids are normal. Pupils are equal, round, and reactive to light. No scleral icterus.   Sclera clear bilat   Neck: Trachea normal, normal range of motion, full passive range of motion without pain and phonation normal. Neck supple. No neck rigidity.   Cardiovascular: Normal rate, regular rhythm, normal heart sounds, intact distal pulses and normal pulses.   Occasional extrasystoles are present.   Pulmonary/Chest: Effort normal and breath sounds normal. No respiratory distress.   Abdominal: Soft. Normal appearance and bowel sounds are normal. He exhibits no distension. There is no tenderness. There is no rigidity, no rebound, no guarding and no CVA tenderness.   Musculoskeletal: Normal range of motion. He exhibits no edema or deformity.   Neurological: He is alert and oriented to person, place, and time. No cranial nerve deficit. He exhibits normal muscle tone. Coordination normal.   Skin: Skin is warm, dry and intact. He is not diaphoretic. No pallor.   Psychiatric: He has a normal mood and affect. His speech is normal and behavior is normal. Judgment and thought content normal. Cognition and memory are normal.   Nursing note and vitals reviewed.      1st set of orthostatic VS:   5/9/18 8:22 AM  5/9/18 8:23 AM  5/9/18 8:24 AM      BP  127/83   139/82   118/79     BP Location  Right arm       Patient Position  Lying   Sitting   Standing     Pulse  96   96   104     Resp  18       Temp  98.5 F (36.9 C)       Temp src  Oral       SpO2  98%       Weight   91.2 kg (201 lb)       Height  6' 3" (1.905 m)         Results for orders placed or performed in visit on 05/09/18   POCT Chemistry Panel   Result Value Ref Range    POC Sodium 138 138 - 146 MMOL/L    POC Potassium 3.3 (A) 3.5 - 4.9 MMOL/L    POC Chloride 97 (A) 98 - 109 MMOL/L    POC BUN 17 8 - 26 MMOL/L    POC Glucose  70 - 110 mg/dL    POC Creatinine 1.1 0.6 - 1.3 mg/dL    POC iCA 1.25 1.12 - 1.32 MMOL/L    POC TCO2 25 24 - 29 MMOL/L    POC " Hematocrit 50 42 - 52 %PCV    POC Hemoglobin 17 13.5 - 18 g/dL    POC Anion Gap 21 (A) 10.0 - 20 MMOL/L   POCT Urinalysis, Dipstick, Automated, W/O Scope   Result Value Ref Range    POC Blood, Urine Positive (A) Negative    POC Bilirubin, Urine Positive (A) Negative    POC Urobilinogen, Urine Normal 0.3 - 2.2    POC Ketones, Urine Negative Negative    POC Protein, Urine Positive (A) Negative    POC Nitrates, Urine Negative Negative    POC Glucose, Urine Positive (A) Negative    pH, UA 5.5 5 - 8    POC Specific Gravity, Urine 1.025 1.003 - 1.029    POC Leukocytes, Urine Positive (A) Negative   POCT glucose (Manual)   Result Value Ref Range    POC Glucose 171 (A) 70 - 110 mg/dL     EKG: normal EKG, normal sinus rhythm with HR of 85, no st elevation or depression, occasional PVCs noted.    0930: 18G R AC started by myself.  Site clean and intact.  INT lock placed for IV fluids.  Patient moved to procedure room c steady gait noted in clinic.  Wife here in clinic.  Will monitor.    1030: IV fluids continue to infuse to site.  Rocephin mixed with 10mL NS pushed over 5 minutes by myself to 18G R AC, no complications.  Will continue to monitor.    1130: IV fluids complete.  Orthostatics rechecked and improved.  IV discontinued, catheter intact.  Patient states that he's feeling a little better.  Discussed at length to go to the hospital for worsening symptoms.  He and wife agree to this.    Assessment:       1. Urinary tract infection without hematuria, site unspecified    2. Dehydration    3. Nausea    4. Dysuria        Plan:         Urinary tract infection without hematuria, site unspecified  -     cefTRIAXone injection 1 g; Inject 1 g into the vein once.  -     Urine culture  -     Ambulatory referral to Internal Medicine  -     ciprofloxacin HCl (CIPRO) 500 MG tablet; Take 1 tablet (500 mg total) by mouth 2 (two) times daily.  Dispense: 14 tablet; Refill: 0    Dehydration  -     POCT EKG 12-LEAD (NOT FOR [facility] USE);  Future; Expected date: 05/09/2018  -     POCT Chemistry Panel  -     ondansetron disintegrating tablet 4 mg; Take 1 tablet (4 mg total) by mouth one time.  -     POCT Urinalysis, Dipstick, Automated, W/O Scope  -     POCT glucose (Manual)  -     0.9%  NaCl infusion; Inject into the vein one time.  -     IN OFFICE EKG 12-LEAD (to [place])  -     Ambulatory referral to Internal Medicine    Nausea  -     ondansetron disintegrating tablet 4 mg; Take 1 tablet (4 mg total) by mouth one time.  -     Ambulatory referral to Internal Medicine  -     ondansetron (ZOFRAN-ODT) 4 MG TbDL; One tablet sublingual tid prn nausea  Dispense: 10 tablet; Refill: 0    Dysuria  -     POCT Urinalysis, Dipstick, Automated, W/O Scope  -     Ambulatory referral to Internal Medicine      Patient Instructions   A referral was placed for you to follow up with Dr. Kendall.  Please call their office to confirm appointment.  Zofran as needed for nausea and vomiting.  Take antibiotic Cipro until completion.  Tylenol as directed, as needed for fever.  Rest, continue to increase fluids.  Go to the ER for any worsening symptoms.      Urinary Tract Infections in Men    Urinary tract infections (UTIs) are most often caused by bacteria that invade the urinary tract. The bacteria may come from outside the body. Or they may travel from the skin outside of rectum into the urethra. Pain in or around the urinary tract is a common symptom for most UTIs. But the only way to know for sure if you have a UTI is to have a urinalysis and urine culture.   Types of UTIs  · Cystitis: This is a bladder infection and is often linked to a blockage from an enlarged prostate. You may have an urgent or frequent need to urinate, and bloody urine. Treatment includes antibiotics and medicine to relax or shrink the prostate. Sometimes, surgery is needed.  · Urethritis: This is an infection of the urethra. You may have a discharge from the urethra or burning when you urinate. You may  also have pain in the urethra or penis. It is treated with antibiotics.  · Prostatitis: This is an inflammation or infection of the prostate. You may have an urgent or frequent need to urinate, fever, or burning when you urinate. Or you may have a tender prostate, or a vague feeling of pressure. Prostatitis is treated with a range of medicines, depending on the cause.  · Pyelonephritis: This is a kidney infection. If not treated, it can be serious and damage your kidneys. In severe cases you may be hospitalized. You may have a fever and upper back pain.  Treating a UTI  · Medicine: Most UTIs are treated with antibiotics. These kill the bacteria. The length of time you need to take them depends on the type of infection. Take antibiotics exactly as directed until all of the medicine is gone. If you don't, the infection may not go away and may become harder to treat. For certain types of UTIs, you may be given other medicine to help treat your symptoms.  · Lifestyle changes: The lifestyle changes below will help get rid of your current infection. They may also help prevent future UTIs.  ¨ Drink plenty of fluids such as water, juice, or other caffeine-free drinks. This helps flush bacteria out of your system.  ¨ Empty your bladder when you feel the urge to urinate and before going to sleep. Urine that stays in your bladder promotes infection.  ¨ Use condoms during sex. These help prevent UTIs caused by sexually transmitted bacteria.  ¨ Keep follow-up appointments with your healthcare provider. He or she can may do tests to make sure the infection has cleared. If needed, more treatment can be started.  · Other treatment: Most UTIs respond to medicine. But sometimes a procedure or surgery is needed. This can treat an enlarged prostate, or remove a kidney stone or other blockage. Surgery may also treat problems caused by scarring or long-term infections.  Date Last Reviewed: 1/1/2017  © 4938-2875 [employer]  [address] [place] [address]. All rights reserved. This information is not intended as a substitute for professional medical care. Always follow your healthcare professional's instructions.        Dehydration (Adult)  Dehydration occurs when your body loses too much fluid. This may be the result of prolonged vomiting or diarrhea, excessive sweating, or a high fever. It may also happen if you dont drink enough fluid when youre sick or out in the heat. Misuse of diuretics (water pills) can also be a cause.  Symptoms include thirst and decreased urine output. You may also feel dizzy, weak, fatigued, or very drowsy. The diet described below is usually enough to treat dehydration. In some cases, you may need medicine.  Home care  · Drink at least 12 8-ounce glasses of fluid every day to resolve the dehydration. Fluid may include water; orange juice; lemonade; apple, grape, or cranberry juice; clear fruit drinks; electrolyte replacement and sports drinks; and teas and coffee without caffeine. If you have been diagnosed with a kidney disease, ask your doctor how much and what types of fluids you should drink to prevent dehydration. If you have kidney disease, fluid can build up in the body. This can be dangerous to your health.  · If you have a fever, muscle aches, or a headache as a result of a cold or flu, you may take acetaminophen or ibuprofen, unless another medicine was prescribed. If you have chronic liver or kidney disease, or have ever had a stomach ulcer or gastrointestinal bleeding, talk with your health care provider before using these medicines. Don't take aspirin if you are younger than 18 and have a fever. Aspirin raises the chance for severe liver injury.  Follow-up care  Follow up with your health care provider, or as advised.  When to seek medical advice  Call your health care provider right away if any of these occur:  · Continued vomiting  · Frequent diarrhea (more than 5 times a day);  blood (red or black color) or mucus in diarrhea  · Blood in vomit or stool  · Swollen abdomen or increasing abdominal pain  · Weakness, dizziness, or fainting  · Unusual drowsiness or confusion  · Reduced urine output or extreme thirst  · Fever of 100.4°F (34°C) or higher  Date Last Reviewed: 5/31/2015  © 4507-1039 [employer]. [address] [place] [address]. All rights reserved. This information is not intended as a substitute for professional medical care. Always follow your healthcare professional's instructions.

## 2018-05-09 NOTE — PATIENT INSTRUCTIONS
A referral was placed for you to follow up with Dr. Kendall.  Please call their office to confirm appointment.  Zofran as needed for nausea and vomiting.  Take antibiotic Cipro until completion.  Tylenol as directed, as needed for fever.  Rest, continue to increase fluids.  Go to the ER for any worsening symptoms.      Urinary Tract Infections in Men    Urinary tract infections (UTIs) are most often caused by bacteria that invade the urinary tract. The bacteria may come from outside the body. Or they may travel from the skin outside of rectum into the urethra. Pain in or around the urinary tract is a common symptom for most UTIs. But the only way to know for sure if you have a UTI is to have a urinalysis and urine culture.   Types of UTIs  · Cystitis: This is a bladder infection and is often linked to a blockage from an enlarged prostate. You may have an urgent or frequent need to urinate, and bloody urine. Treatment includes antibiotics and medicine to relax or shrink the prostate. Sometimes, surgery is needed.  · Urethritis: This is an infection of the urethra. You may have a discharge from the urethra or burning when you urinate. You may also have pain in the urethra or penis. It is treated with antibiotics.  · Prostatitis: This is an inflammation or infection of the prostate. You may have an urgent or frequent need to urinate, fever, or burning when you urinate. Or you may have a tender prostate, or a vague feeling of pressure. Prostatitis is treated with a range of medicines, depending on the cause.  · Pyelonephritis: This is a kidney infection. If not treated, it can be serious and damage your kidneys. In severe cases you may be hospitalized. You may have a fever and upper back pain.  Treating a UTI  · Medicine: Most UTIs are treated with antibiotics. These kill the bacteria. The length of time you need to take them depends on the type of infection. Take antibiotics exactly as directed until all of the  medicine is gone. If you don't, the infection may not go away and may become harder to treat. For certain types of UTIs, you may be given other medicine to help treat your symptoms.  · Lifestyle changes: The lifestyle changes below will help get rid of your current infection. They may also help prevent future UTIs.  ¨ Drink plenty of fluids such as water, juice, or other caffeine-free drinks. This helps flush bacteria out of your system.  ¨ Empty your bladder when you feel the urge to urinate and before going to sleep. Urine that stays in your bladder promotes infection.  ¨ Use condoms during sex. These help prevent UTIs caused by sexually transmitted bacteria.  ¨ Keep follow-up appointments with your healthcare provider. He or she can may do tests to make sure the infection has cleared. If needed, more treatment can be started.  · Other treatment: Most UTIs respond to medicine. But sometimes a procedure or surgery is needed. This can treat an enlarged prostate, or remove a kidney stone or other blockage. Surgery may also treat problems caused by scarring or long-term infections.  Date Last Reviewed: 1/1/2017 © 2000-2017 [employer]. [address] [place] [address]. All rights reserved. This information is not intended as a substitute for professional medical care. Always follow your healthcare professional's instructions.        Dehydration (Adult)  Dehydration occurs when your body loses too much fluid. This may be the result of prolonged vomiting or diarrhea, excessive sweating, or a high fever. It may also happen if you dont drink enough fluid when youre sick or out in the heat. Misuse of diuretics (water pills) can also be a cause.  Symptoms include thirst and decreased urine output. You may also feel dizzy, weak, fatigued, or very drowsy. The diet described below is usually enough to treat dehydration. In some cases, you may need medicine.  Home care  · Drink at least 12 8-ounce  glasses of fluid every day to resolve the dehydration. Fluid may include water; orange juice; lemonade; apple, grape, or cranberry juice; clear fruit drinks; electrolyte replacement and sports drinks; and teas and coffee without caffeine. If you have been diagnosed with a kidney disease, ask your doctor how much and what types of fluids you should drink to prevent dehydration. If you have kidney disease, fluid can build up in the body. This can be dangerous to your health.  · If you have a fever, muscle aches, or a headache as a result of a cold or flu, you may take acetaminophen or ibuprofen, unless another medicine was prescribed. If you have chronic liver or kidney disease, or have ever had a stomach ulcer or gastrointestinal bleeding, talk with your health care provider before using these medicines. Don't take aspirin if you are younger than 18 and have a fever. Aspirin raises the chance for severe liver injury.  Follow-up care  Follow up with your health care provider, or as advised.  When to seek medical advice  Call your health care provider right away if any of these occur:  · Continued vomiting  · Frequent diarrhea (more than 5 times a day); blood (red or black color) or mucus in diarrhea  · Blood in vomit or stool  · Swollen abdomen or increasing abdominal pain  · Weakness, dizziness, or fainting  · Unusual drowsiness or confusion  · Reduced urine output or extreme thirst  · Fever of 100.4°F (34°C) or higher  Date Last Reviewed: 5/31/2015  © 0910-2076 [employer]. [address] [place] [address], [place] [address]. All rights reserved. This information is not intended as a substitute for professional medical care. Always follow your healthcare professional's instructions.

## 2018-05-10 ENCOUNTER — TELEPHONE (OUTPATIENT)
Dept: UROLOGY | Facility: CLINIC | Age: 58
End: 2018-05-10

## 2018-05-10 ENCOUNTER — HOSPITAL ENCOUNTER (OUTPATIENT)
Dept: RADIOLOGY | Facility: HOSPITAL | Age: 58
Discharge: HOME | End: 2018-05-10
Attending: INTERNAL MEDICINE
Payer: COMMERCIAL

## 2018-05-10 ENCOUNTER — OFFICE VISIT (OUTPATIENT)
Dept: INTERNAL MEDICINE | Facility: CLINIC | Age: 58
End: 2018-05-10
Payer: COMMERCIAL

## 2018-05-10 VITALS
SYSTOLIC BLOOD PRESSURE: 120 MMHG | HEART RATE: 78 BPM | TEMPERATURE: 98 F | HEIGHT: 75 IN | DIASTOLIC BLOOD PRESSURE: 70 MMHG | BODY MASS INDEX: 24.01 KG/M2 | WEIGHT: 193.13 LBS

## 2018-05-10 DIAGNOSIS — E11.9 TYPE 2 DIABETES MELLITUS WITHOUT COMPLICATION, WITHOUT LONG-TERM CURRENT USE OF INSULIN: ICD-10-CM

## 2018-05-10 DIAGNOSIS — R11.2 NAUSEA AND VOMITING, INTRACTABILITY OF VOMITING NOT SPECIFIED, UNSPECIFIED VOMITING TYPE: ICD-10-CM

## 2018-05-10 DIAGNOSIS — N39.0 URINARY TRACT INFECTION WITHOUT HEMATURIA, SITE UNSPECIFIED: Primary | ICD-10-CM

## 2018-05-10 DIAGNOSIS — R10.9 LEFT FLANK PAIN: ICD-10-CM

## 2018-05-10 DIAGNOSIS — R50.9 FEVER, UNSPECIFIED FEVER CAUSE: ICD-10-CM

## 2018-05-10 DIAGNOSIS — N39.0 URINARY TRACT INFECTION WITHOUT HEMATURIA, SITE UNSPECIFIED: ICD-10-CM

## 2018-05-10 DIAGNOSIS — K59.00 CONSTIPATION, UNSPECIFIED CONSTIPATION TYPE: ICD-10-CM

## 2018-05-10 PROCEDURE — 3008F BODY MASS INDEX DOCD: CPT | Mod: CPTII,S$GLB,, | Performed by: INTERNAL MEDICINE

## 2018-05-10 PROCEDURE — 99999 PR PBB SHADOW E&M-EST. PATIENT-LVL IV: CPT | Mod: PBBFAC,,, | Performed by: INTERNAL MEDICINE

## 2018-05-10 PROCEDURE — 3044F HG A1C LEVEL LT 7.0%: CPT | Mod: CPTII,S$GLB,, | Performed by: INTERNAL MEDICINE

## 2018-05-10 PROCEDURE — 76700 US EXAM ABDOM COMPLETE: CPT | Mod: TC

## 2018-05-10 PROCEDURE — 74018 RADEX ABDOMEN 1 VIEW: CPT | Mod: TC,PO

## 2018-05-10 PROCEDURE — 76700 US EXAM ABDOM COMPLETE: CPT | Mod: 26,,, | Performed by: RADIOLOGY

## 2018-05-10 PROCEDURE — 99214 OFFICE O/P EST MOD 30 MIN: CPT | Mod: S$GLB,,, | Performed by: INTERNAL MEDICINE

## 2018-05-10 PROCEDURE — 74018 RADEX ABDOMEN 1 VIEW: CPT | Mod: 26,,, | Performed by: RADIOLOGY

## 2018-05-10 PROCEDURE — 3078F DIAST BP <80 MM HG: CPT | Mod: CPTII,S$GLB,, | Performed by: INTERNAL MEDICINE

## 2018-05-10 PROCEDURE — 3074F SYST BP LT 130 MM HG: CPT | Mod: CPTII,S$GLB,, | Performed by: INTERNAL MEDICINE

## 2018-05-10 NOTE — PROGRESS NOTES
Subjective:       Patient ID: Hi Rubio is a 58 y.o. male who presents for Follow-up; Fever; Urinary Tract Infection; and Diabetes      Flank Pain   This is a new problem. The current episode started yesterday. The problem occurs intermittently. The problem has been waxing and waning since onset. The pain is present in the costovertebral angle. The quality of the pain is described as aching. The pain does not radiate. The pain is moderate. Associated symptoms include abdominal pain. Pertinent negatives include no chest pain, dysuria, fever, headaches, paresthesias, pelvic pain, tingling or weakness. He has tried nothing for the symptoms. The treatment provided mild relief.   Diabetes   He presents for his follow-up diabetic visit. He has type 2 diabetes mellitus. His disease course has been worsening. Hypoglycemia symptoms include dizziness (has improved). Pertinent negatives for hypoglycemia include no headaches. Pertinent negatives for diabetes include no chest pain, no fatigue, no polydipsia, no polyphagia, no polyuria and no weakness. There are no diabetic complications. Risk factors for coronary artery disease include diabetes mellitus and hypertension. Current diabetic treatment includes diet. His weight is stable. He is following a generally unhealthy diet.      57yo M with HTN and DM2 who presented to clinic after ~1 week of fever, nausea and cloudy urine. He tends to avoid seeking medical care when he has a problem but went to urgent care on 5/9/18. He was found to be dehydrated and was given IVF and Rocephin in clinic. He reports new onset today of left lower abdominal and left flank pain. Nausea continues intermittently and he is compliant with Cipro.      Review of Systems   Constitutional: Negative for chills, fatigue and fever.   HENT: Negative for congestion, rhinorrhea and sinus pressure.         + dry mouth   Eyes: Negative for redness and visual disturbance.   Respiratory: Negative for  cough, chest tightness and shortness of breath.    Cardiovascular: Negative for chest pain, palpitations and leg swelling.   Gastrointestinal: Positive for abdominal pain, constipation (no BM in 3 days), nausea and vomiting. Negative for blood in stool and diarrhea.   Endocrine: Negative for polydipsia, polyphagia and polyuria.   Genitourinary: Positive for flank pain (left, 7-8/10). Negative for dysuria, frequency, hematuria and pelvic pain.   Musculoskeletal: Negative for arthralgias and myalgias.   Skin: Negative for rash.   Neurological: Positive for dizziness (has improved). Negative for tingling, weakness, light-headedness, headaches and paresthesias.   Hematological: Negative for adenopathy.       Objective:      Physical Exam   Constitutional: He is oriented to person, place, and time. Vital signs are normal. He appears well-developed and well-nourished. No distress.   HENT:   Head: Normocephalic and atraumatic.   Right Ear: Hearing and external ear normal.   Left Ear: Hearing and external ear normal.   Nose: Nose normal.   Mouth/Throat: Uvula is midline and oropharynx is clear and moist. No oropharyngeal exudate or posterior oropharyngeal erythema.   Eyes: Lids are normal. No scleral icterus.   Neck: Full passive range of motion without pain.   Cardiovascular: Normal rate, regular rhythm, normal heart sounds, intact distal pulses and normal pulses.    No murmur heard.  Pulmonary/Chest: Effort normal and breath sounds normal. He has no wheezes.   Abdominal: Soft. Bowel sounds are normal. He exhibits no distension. There is tenderness in the left lower quadrant. There is CVA tenderness (left). There is no rigidity, no rebound and no guarding.   Musculoskeletal: Normal range of motion. He exhibits no edema.   Neurological: He is alert and oriented to person, place, and time. He displays normal reflexes.   Skin: Skin is warm, dry and intact. No rash noted.   Psychiatric: He has a normal mood and affect.    Vitals reviewed.      Assessment:       1. Urinary tract infection without hematuria, site unspecified    2. Nausea and vomiting, intractability of vomiting not specified, unspecified vomiting type    3. Left flank pain    4. Fever, unspecified fever cause    5. Type 2 diabetes mellitus without complication, without long-term current use of insulin        Plan:         1. Urinary tract infection without hematuria, site unspecified  - continue Cipro as prescribed  - CBC auto differential; Future  - Comprehensive metabolic panel; Future  - Urinalysis; Future  - Urine culture; Future  - US Abdomen Complete; Future  - patient had symptoms about 1 week before seeking care, discussed all of the possible complications of untreated infections and encouraged patient to see care as soon as it is needed    2. Nausea and vomiting, intractability of vomiting not specified, unspecified vomiting type  - X-Ray Abdomen AP 1 View; Future  - zofran previously ordered    3. Left flank pain  - cipro to cover for pyelo  - US Abdomen Complete; Future    4. Fever, unspecified fever cause  - may use tylenol three times daily as needed    5. Type 2 diabetes mellitus without complication, without long-term current use of insulin  - check HbA1c  - patient has discussed controlling diabetes with diet but has been unable to and now seems willing to begin treatment, patient to discuss with pcp    Kelly Franco MD

## 2018-05-10 NOTE — TELEPHONE ENCOUNTER
I spoke to pt.  His abdominal US is scheduled for 130 today.  I made appt for him to see Brenda Gladis tomorrow at 8AM.  Thanks.

## 2018-05-10 NOTE — TELEPHONE ENCOUNTER
----- Message from Micheline Hobbs sent at 5/10/2018 10:27 AM CDT -----  Great thanks so much.  I'll let Dr Franco know.  ----- Message -----  From: Marilin Parks, RN  Sent: 5/10/2018   9:40 AM  To: Micheline Boucher Negin.  I made an appt to see our NP, Ana Paula Griffith at 8AM tomorrow.  Thanks.    ----- Message -----  From: Micheline Hobbs  Sent: 5/10/2018   9:32 AM  To: [facility] Staff    Dr Kelly Franco has entered a referral for patient to be seen by Dr King.  Dr Franco wants patient seen ASAP.  I scheduled first available at [facility] on May 17th.  Patient is having an abdominal US today done STAT because Dr Franco is wanting to rule out Sepsis.  Please call patient for a sooner appointment or message me back to advise Dr Franco.    Urinary tract infection without hematuria, site unspecified [N39.0]  Left flank pain [R10.9]  Constipation, unspecified constipation type [K59.00]    Thanks, Negin  7th Fl Referrals  [facility]

## 2018-05-10 NOTE — TELEPHONE ENCOUNTER
----- Message from Micheline Hobbs sent at 5/10/2018  9:32 AM CDT -----  Dr Kelly Franco has entered a referral for patient to be seen by Dr King.  Dr Franco wants patient seen ASAP.  I scheduled first available at [facility] on May 17th.  Patient is having an abdominal US today done STAT because Dr Franco is wanting to rule out Sepsis.  Please call patient for a sooner appointment or message me back to advise Dr Franco.    Urinary tract infection without hematuria, site unspecified [N39.0]  Left flank pain [R10.9]  Constipation, unspecified constipation type [K59.00]    Thanks, Negin  7th Fl Referrals  [facility]

## 2018-05-10 NOTE — LETTER
May 13, 2018      Rowena Ross, NP  [address] [address]           [place] [facility]  [address] [address]  Phone: [phone]  Fax: [phone]          Patient: Hi Rubio   MR Number: [id number]   YOB: 1960   Date of Visit: 5/10/2018       Dear Rowena Ross:    Thank you for referring Hi Rubio to me for evaluation. Attached you will find relevant portions of my assessment and plan of care.    If you have questions, please do not hesitate to call me. I look forward to following Hi Rubio along with you.    Sincerely,    Kelly Franco MD    Enclosure  CC:  No Recipients    If you would like to receive this communication electronically, please contact [email or web address] or [phone] to request more information on [facility] Link access.    For providers and/or their staff who would like to refer a patient to [facility], please contact us through our one-stop-shop provider referral line, [facility], at [phone].    If you feel you have received this communication in error or would no longer like to receive these types of communications, please e-mail [email or web address]

## 2018-05-11 ENCOUNTER — OFFICE VISIT (OUTPATIENT)
Dept: UROLOGY | Facility: CLINIC | Age: 58
End: 2018-05-11
Payer: COMMERCIAL

## 2018-05-11 ENCOUNTER — PATIENT MESSAGE (OUTPATIENT)
Dept: URGENT CARE | Facility: CLINIC | Age: 58
End: 2018-05-11

## 2018-05-11 VITALS
HEIGHT: 75 IN | HEART RATE: 71 BPM | DIASTOLIC BLOOD PRESSURE: 76 MMHG | SYSTOLIC BLOOD PRESSURE: 115 MMHG | WEIGHT: 193 LBS | BODY MASS INDEX: 24 KG/M2

## 2018-05-11 DIAGNOSIS — R30.0 DYSURIA: Primary | ICD-10-CM

## 2018-05-11 DIAGNOSIS — N12 PYELONEPHRITIS: ICD-10-CM

## 2018-05-11 PROBLEM — E11.9 TYPE 2 DIABETES MELLITUS WITHOUT COMPLICATION, WITHOUT LONG-TERM CURRENT USE OF INSULIN: Status: ACTIVE | Noted: 2018-05-11

## 2018-05-11 LAB
BACTERIA UR CULT: NORMAL
BILIRUB SERPL-MCNC: ABNORMAL MG/DL
BLOOD URINE, POC: ABNORMAL
COLOR, POC UA: YELLOW
GLUCOSE UR QL STRIP: ABNORMAL
KETONES UR QL STRIP: ABNORMAL
LEUKOCYTE ESTERASE URINE, POC: ABNORMAL
NITRITE, POC UA: ABNORMAL
PH, POC UA: 5
PROTEIN, POC: ABNORMAL
SPECIFIC GRAVITY, POC UA: 1.01
UROBILINOGEN, POC UA: ABNORMAL

## 2018-05-11 PROCEDURE — 3074F SYST BP LT 130 MM HG: CPT | Mod: CPTII,S$GLB,, | Performed by: NURSE PRACTITIONER

## 2018-05-11 PROCEDURE — 3078F DIAST BP <80 MM HG: CPT | Mod: CPTII,S$GLB,, | Performed by: NURSE PRACTITIONER

## 2018-05-11 PROCEDURE — 87086 URINE CULTURE/COLONY COUNT: CPT

## 2018-05-11 PROCEDURE — 99214 OFFICE O/P EST MOD 30 MIN: CPT | Mod: 25,S$GLB,, | Performed by: NURSE PRACTITIONER

## 2018-05-11 PROCEDURE — 81002 URINALYSIS NONAUTO W/O SCOPE: CPT | Mod: S$GLB,,, | Performed by: NURSE PRACTITIONER

## 2018-05-11 PROCEDURE — 3008F BODY MASS INDEX DOCD: CPT | Mod: CPTII,S$GLB,, | Performed by: NURSE PRACTITIONER

## 2018-05-11 NOTE — PROGRESS NOTES
Hey!     I was reviewing our urgent care results and I see that you saw Mr. Rubio today in clinic.  His culture report wasn't fully back at the time of y'alls visit.  I just wanted to reroute this to you, as you may continue the antibiotic per your note.

## 2018-05-11 NOTE — PROGRESS NOTES
Okay, sounds good.  We had given him fluids in clinic the other day and I advised him of the same at that time.

## 2018-05-13 LAB — BACTERIA UR CULT: NO GROWTH

## 2018-05-15 ENCOUNTER — TELEPHONE (OUTPATIENT)
Dept: UROLOGY | Facility: CLINIC | Age: 58
End: 2018-05-15

## 2018-05-15 ENCOUNTER — PATIENT MESSAGE (OUTPATIENT)
Dept: UROLOGY | Facility: CLINIC | Age: 58
End: 2018-05-15

## 2018-05-15 DIAGNOSIS — N39.0 URINARY TRACT INFECTION WITHOUT HEMATURIA, SITE UNSPECIFIED: ICD-10-CM

## 2018-05-15 RX ORDER — CIPROFLOXACIN 500 MG/1
500 TABLET ORAL 2 TIMES DAILY
Qty: 14 TABLET | Refills: 0 | Status: SHIPPED | OUTPATIENT
Start: 2018-05-15 | End: 2018-05-22

## 2018-05-15 NOTE — TELEPHONE ENCOUNTER
VM left for the pt informing him that I was sending cipro x 7 days to the pharmacy. If symptoms don't improve after completing the antibiotics, he needs to schedule a follow up.

## 2018-05-15 NOTE — TELEPHONE ENCOUNTER
----- Message from Jaydon Kendrick sent at 5/15/2018 12:18 PM CDT -----            Name of Who is Calling: LEIDY MOORE [[id number]]      What is the request in detail: Pt states he has a kidney infection and is on medication, but the pain won't go away. He was told to contact the office if the pain persists. Please call to discuss.      Can the clinic reply by [facility]: yes      What Number to Call Back if not in [facility]: [phone]

## 2018-05-22 RX ORDER — MONTELUKAST SODIUM 10 MG/1
10 TABLET ORAL NIGHTLY
Qty: 30 TABLET | Refills: 3 | Status: SHIPPED | OUTPATIENT
Start: 2018-05-22 | End: 2018-06-21

## 2018-05-30 ENCOUNTER — PATIENT MESSAGE (OUTPATIENT)
Dept: INTERNAL MEDICINE | Facility: CLINIC | Age: 58
End: 2018-05-30

## 2018-06-03 RX ORDER — AMLODIPINE BESYLATE 10 MG/1
10 TABLET ORAL DAILY
Qty: 30 TABLET | Refills: 2 | Status: SHIPPED | OUTPATIENT
Start: 2018-06-03 | End: 2018-08-29 | Stop reason: SDUPTHER

## 2018-06-04 ENCOUNTER — LAB VISIT (OUTPATIENT)
Dept: LAB | Facility: HOSPITAL | Age: 58
End: 2018-06-04
Attending: INTERNAL MEDICINE
Payer: COMMERCIAL

## 2018-06-04 DIAGNOSIS — N39.0 URINARY TRACT INFECTION WITHOUT HEMATURIA, SITE UNSPECIFIED: ICD-10-CM

## 2018-06-04 DIAGNOSIS — E11.9 TYPE 2 DIABETES MELLITUS WITHOUT COMPLICATION, WITHOUT LONG-TERM CURRENT USE OF INSULIN: ICD-10-CM

## 2018-06-04 DIAGNOSIS — R11.2 NAUSEA AND VOMITING, INTRACTABILITY OF VOMITING NOT SPECIFIED, UNSPECIFIED VOMITING TYPE: ICD-10-CM

## 2018-06-04 LAB
ALBUMIN SERPL BCP-MCNC: 4.1 G/DL
ALP SERPL-CCNC: 67 U/L
ALT SERPL W/O P-5'-P-CCNC: 21 U/L
ANION GAP SERPL CALC-SCNC: 12 MMOL/L
AST SERPL-CCNC: 22 U/L
BASOPHILS # BLD AUTO: 0.07 K/UL
BASOPHILS NFR BLD: 1.5 %
BILIRUB SERPL-MCNC: 0.7 MG/DL
BUN SERPL-MCNC: 13 MG/DL
CALCIUM SERPL-MCNC: 9.6 MG/DL
CHLORIDE SERPL-SCNC: 103 MMOL/L
CO2 SERPL-SCNC: 25 MMOL/L
CREAT SERPL-MCNC: 1.1 MG/DL
DIFFERENTIAL METHOD: ABNORMAL
EOSINOPHIL # BLD AUTO: 0.4 K/UL
EOSINOPHIL NFR BLD: 8.4 %
ERYTHROCYTE [DISTWIDTH] IN BLOOD BY AUTOMATED COUNT: 13.1 %
EST. GFR  (AFRICAN AMERICAN): >60 ML/MIN/1.73 M^2
EST. GFR  (NON AFRICAN AMERICAN): >60 ML/MIN/1.73 M^2
ESTIMATED AVG GLUCOSE: 140 MG/DL
GLUCOSE SERPL-MCNC: 148 MG/DL
HBA1C MFR BLD HPLC: 6.5 %
HCT VFR BLD AUTO: 46.4 %
HGB BLD-MCNC: 15.7 G/DL
IMM GRANULOCYTES # BLD AUTO: 0.01 K/UL
IMM GRANULOCYTES NFR BLD AUTO: 0.2 %
LYMPHOCYTES # BLD AUTO: 1.2 K/UL
LYMPHOCYTES NFR BLD: 26.1 %
MCH RBC QN AUTO: 29.6 PG
MCHC RBC AUTO-ENTMCNC: 33.8 G/DL
MCV RBC AUTO: 87 FL
MONOCYTES # BLD AUTO: 0.5 K/UL
MONOCYTES NFR BLD: 9.5 %
NEUTROPHILS # BLD AUTO: 2.6 K/UL
NEUTROPHILS NFR BLD: 54.3 %
NRBC BLD-RTO: 0 /100 WBC
PLATELET # BLD AUTO: 278 K/UL
PMV BLD AUTO: 10.9 FL
POTASSIUM SERPL-SCNC: 3.7 MMOL/L
PROT SERPL-MCNC: 7.4 G/DL
RBC # BLD AUTO: 5.31 M/UL
SODIUM SERPL-SCNC: 140 MMOL/L
WBC # BLD AUTO: 4.76 K/UL

## 2018-06-04 PROCEDURE — 36415 COLL VENOUS BLD VENIPUNCTURE: CPT | Mod: PO

## 2018-06-04 PROCEDURE — 83036 HEMOGLOBIN GLYCOSYLATED A1C: CPT

## 2018-06-04 PROCEDURE — 80053 COMPREHEN METABOLIC PANEL: CPT

## 2018-06-04 PROCEDURE — 85025 COMPLETE CBC W/AUTO DIFF WBC: CPT

## 2018-06-08 DIAGNOSIS — Z82.49 FAMILY HISTORY OF PREMATURE CAD: ICD-10-CM

## 2018-06-08 DIAGNOSIS — E78.5 HYPERLIPIDEMIA: ICD-10-CM

## 2018-06-08 DIAGNOSIS — I73.9 PERIPHERAL VASCULAR DISEASE: ICD-10-CM

## 2018-06-08 RX ORDER — PRAVASTATIN SODIUM 80 MG/1
TABLET ORAL
Qty: 90 TABLET | Refills: 1 | Status: SHIPPED | OUTPATIENT
Start: 2018-06-08 | End: 2018-07-19 | Stop reason: SDUPTHER

## 2018-06-27 DIAGNOSIS — E78.5 HYPERLIPIDEMIA: ICD-10-CM

## 2018-06-27 DIAGNOSIS — I73.9 PERIPHERAL VASCULAR DISEASE: ICD-10-CM

## 2018-06-27 DIAGNOSIS — Z82.49 FAMILY HISTORY OF PREMATURE CAD: ICD-10-CM

## 2018-06-27 RX ORDER — PRAVASTATIN SODIUM 80 MG/1
TABLET ORAL
Qty: 90 TABLET | Refills: 1 | Status: SHIPPED | OUTPATIENT
Start: 2018-06-27 | End: 2019-06-01 | Stop reason: SDUPTHER

## 2018-07-13 ENCOUNTER — TELEPHONE (OUTPATIENT)
Dept: INTERNAL MEDICINE | Facility: CLINIC | Age: 58
End: 2018-07-13

## 2018-07-13 NOTE — TELEPHONE ENCOUNTER
----- Message from Ines Villagomez sent at 7/13/2018  7:32 AM CDT -----  Contact: Pt Wife Jennifer [phone]  Pt wife would like a call back from the nurse regarding the patients current mental state. She would like to talk to Dr Kendall over the phone.

## 2018-07-13 NOTE — TELEPHONE ENCOUNTER
Called and spoke with the Wife and she states the patient is depressed and he is having some anxiety issues she states he is not hisself and she is very worried about the concerns. She wants him to be seen as soon as possible because she has major concerns. Appt was scheduled

## 2018-07-19 ENCOUNTER — OFFICE VISIT (OUTPATIENT)
Dept: INTERNAL MEDICINE | Facility: CLINIC | Age: 58
End: 2018-07-19
Payer: COMMERCIAL

## 2018-07-19 VITALS
SYSTOLIC BLOOD PRESSURE: 128 MMHG | RESPIRATION RATE: 18 BRPM | TEMPERATURE: 98 F | HEIGHT: 76 IN | WEIGHT: 198.88 LBS | HEART RATE: 70 BPM | BODY MASS INDEX: 24.22 KG/M2 | DIASTOLIC BLOOD PRESSURE: 80 MMHG

## 2018-07-19 DIAGNOSIS — F41.9 ANXIETY: Primary | ICD-10-CM

## 2018-07-19 PROCEDURE — 99213 OFFICE O/P EST LOW 20 MIN: CPT | Mod: S$GLB,,, | Performed by: INTERNAL MEDICINE

## 2018-07-19 PROCEDURE — 3008F BODY MASS INDEX DOCD: CPT | Mod: CPTII,S$GLB,, | Performed by: INTERNAL MEDICINE

## 2018-07-19 PROCEDURE — 3079F DIAST BP 80-89 MM HG: CPT | Mod: CPTII,S$GLB,, | Performed by: INTERNAL MEDICINE

## 2018-07-19 PROCEDURE — 99999 PR PBB SHADOW E&M-EST. PATIENT-LVL III: CPT | Mod: PBBFAC,,, | Performed by: INTERNAL MEDICINE

## 2018-07-19 PROCEDURE — 3074F SYST BP LT 130 MM HG: CPT | Mod: CPTII,S$GLB,, | Performed by: INTERNAL MEDICINE

## 2018-07-19 RX ORDER — ALPRAZOLAM 0.5 MG/1
0.5 TABLET ORAL 3 TIMES DAILY PRN
Qty: 90 TABLET | Refills: 1 | Status: SHIPPED | OUTPATIENT
Start: 2018-07-19 | End: 2018-10-26 | Stop reason: SDUPTHER

## 2018-07-19 RX ORDER — SERTRALINE HYDROCHLORIDE 25 MG/1
25 TABLET, FILM COATED ORAL DAILY
Qty: 30 TABLET | Refills: 6 | Status: SHIPPED | OUTPATIENT
Start: 2018-07-19 | End: 2019-02-02 | Stop reason: SDUPTHER

## 2018-07-27 DIAGNOSIS — E11.9 NEWLY DIAGNOSED DIABETES: ICD-10-CM

## 2018-07-27 RX ORDER — BLOOD SUGAR DIAGNOSTIC
STRIP MISCELLANEOUS
Qty: 25 STRIP | Refills: 2 | Status: SHIPPED | OUTPATIENT
Start: 2018-07-27 | End: 2018-09-19 | Stop reason: SDUPTHER

## 2018-07-31 RX ORDER — PANTOPRAZOLE SODIUM 40 MG/1
TABLET, DELAYED RELEASE ORAL
Qty: 30 TABLET | Refills: 0 | Status: SHIPPED | OUTPATIENT
Start: 2018-07-31 | End: 2018-08-30 | Stop reason: SDUPTHER

## 2018-08-03 RX ORDER — HYDROCHLOROTHIAZIDE 25 MG/1
TABLET ORAL
Qty: 90 TABLET | Refills: 0 | Status: SHIPPED | OUTPATIENT
Start: 2018-08-03 | End: 2018-10-29 | Stop reason: SDUPTHER

## 2018-08-30 RX ORDER — AMLODIPINE BESYLATE 10 MG/1
TABLET ORAL
Qty: 30 TABLET | Refills: 2 | Status: SHIPPED | OUTPATIENT
Start: 2018-08-30 | End: 2018-11-22 | Stop reason: SDUPTHER

## 2018-08-30 RX ORDER — PANTOPRAZOLE SODIUM 40 MG/1
TABLET, DELAYED RELEASE ORAL
Qty: 30 TABLET | Refills: 0 | Status: CANCELLED | OUTPATIENT
Start: 2018-08-30

## 2018-08-30 RX ORDER — PANTOPRAZOLE SODIUM 40 MG/1
40 TABLET, DELAYED RELEASE ORAL DAILY
Qty: 30 TABLET | Refills: 3 | Status: SHIPPED | OUTPATIENT
Start: 2018-08-30 | End: 2018-12-10 | Stop reason: SDUPTHER

## 2018-09-10 ENCOUNTER — PATIENT MESSAGE (OUTPATIENT)
Dept: CARDIOLOGY | Facility: CLINIC | Age: 58
End: 2018-09-10

## 2018-09-10 NOTE — TELEPHONE ENCOUNTER
Pt states he is experiencing a burning sensation on the left side of his chest. Pt states discomfort last for approximately 30 seconds. Pt states burning sensation comes and goes. Pt states discomfort is 3-4 on a  pain scale. Pt states he has not taken any NTG. Please advise.

## 2018-09-10 NOTE — TELEPHONE ENCOUNTER
Spoke w pt. Normal coronary angiogram a few months ago. Subclinical atherosclerosis noted on CT. Chest discomfort started this am.  Advised antacids and topical lidocaine.

## 2018-09-19 DIAGNOSIS — E11.9 NEWLY DIAGNOSED DIABETES: ICD-10-CM

## 2018-09-19 RX ORDER — BLOOD SUGAR DIAGNOSTIC
STRIP MISCELLANEOUS
Qty: 25 STRIP | Refills: 0 | Status: SHIPPED | OUTPATIENT
Start: 2018-09-19 | End: 2018-10-19 | Stop reason: SDUPTHER

## 2018-10-19 DIAGNOSIS — E11.9 NEWLY DIAGNOSED DIABETES: ICD-10-CM

## 2018-10-19 RX ORDER — BLOOD SUGAR DIAGNOSTIC
STRIP MISCELLANEOUS
Qty: 25 STRIP | Refills: 6 | Status: SHIPPED | OUTPATIENT
Start: 2018-10-19 | End: 2020-02-13

## 2018-10-29 RX ORDER — HYDROCHLOROTHIAZIDE 25 MG/1
TABLET ORAL
Qty: 90 TABLET | Refills: 0 | Status: SHIPPED | OUTPATIENT
Start: 2018-10-29 | End: 2019-01-26 | Stop reason: SDUPTHER

## 2018-10-29 RX ORDER — ALPRAZOLAM 0.5 MG/1
TABLET ORAL
Qty: 90 TABLET | Refills: 1 | Status: SHIPPED | OUTPATIENT
Start: 2018-10-29 | End: 2019-01-28 | Stop reason: SDUPTHER

## 2018-11-07 DIAGNOSIS — I49.3 SYMPTOMATIC PVCS: ICD-10-CM

## 2018-11-07 RX ORDER — METOPROLOL SUCCINATE 100 MG/1
TABLET, EXTENDED RELEASE ORAL
Qty: 90 TABLET | Refills: 2 | Status: SHIPPED | OUTPATIENT
Start: 2018-11-07 | End: 2019-07-14 | Stop reason: SDUPTHER

## 2018-11-23 RX ORDER — AMLODIPINE BESYLATE 10 MG/1
TABLET ORAL
Qty: 30 TABLET | Refills: 2 | Status: SHIPPED | OUTPATIENT
Start: 2018-11-23 | End: 2019-12-12 | Stop reason: SDUPTHER

## 2018-12-10 RX ORDER — PANTOPRAZOLE SODIUM 40 MG/1
TABLET, DELAYED RELEASE ORAL
Qty: 30 TABLET | Refills: 3 | Status: SHIPPED | OUTPATIENT
Start: 2018-12-10 | End: 2019-03-18

## 2018-12-17 RX ORDER — SILDENAFIL CITRATE 20 MG/1
TABLET ORAL
Qty: 100 TABLET | Refills: 11 | Status: ON HOLD | OUTPATIENT
Start: 2018-12-17 | End: 2020-12-18 | Stop reason: SDUPTHER

## 2018-12-21 DIAGNOSIS — E11.9 TYPE 2 DIABETES MELLITUS WITHOUT COMPLICATION: ICD-10-CM

## 2019-01-28 RX ORDER — HYDROCHLOROTHIAZIDE 25 MG/1
TABLET ORAL
Qty: 90 TABLET | Refills: 0 | Status: SHIPPED | OUTPATIENT
Start: 2019-01-28 | End: 2019-04-27 | Stop reason: SDUPTHER

## 2019-01-29 RX ORDER — ALPRAZOLAM 0.5 MG/1
TABLET ORAL
Qty: 90 TABLET | Refills: 1 | Status: SHIPPED | OUTPATIENT
Start: 2019-01-29 | End: 2019-05-18 | Stop reason: SDUPTHER

## 2019-02-04 RX ORDER — SERTRALINE HYDROCHLORIDE 25 MG/1
TABLET, FILM COATED ORAL
Qty: 30 TABLET | Refills: 6 | Status: SHIPPED | OUTPATIENT
Start: 2019-02-04 | End: 2022-02-23

## 2019-03-18 ENCOUNTER — OFFICE VISIT (OUTPATIENT)
Dept: INTERNAL MEDICINE | Facility: CLINIC | Age: 59
End: 2019-03-18
Payer: COMMERCIAL

## 2019-03-18 ENCOUNTER — LAB VISIT (OUTPATIENT)
Dept: LAB | Facility: HOSPITAL | Age: 59
End: 2019-03-18
Attending: INTERNAL MEDICINE
Payer: COMMERCIAL

## 2019-03-18 VITALS
HEIGHT: 76 IN | BODY MASS INDEX: 24.67 KG/M2 | RESPIRATION RATE: 18 BRPM | TEMPERATURE: 98 F | WEIGHT: 202.63 LBS | OXYGEN SATURATION: 96 % | SYSTOLIC BLOOD PRESSURE: 130 MMHG | DIASTOLIC BLOOD PRESSURE: 76 MMHG | HEART RATE: 71 BPM

## 2019-03-18 DIAGNOSIS — E11.9 TYPE 2 DIABETES MELLITUS WITHOUT COMPLICATION, WITHOUT LONG-TERM CURRENT USE OF INSULIN: ICD-10-CM

## 2019-03-18 DIAGNOSIS — R11.0 NAUSEA: ICD-10-CM

## 2019-03-18 DIAGNOSIS — I10 ESSENTIAL HYPERTENSION: Primary | ICD-10-CM

## 2019-03-18 DIAGNOSIS — I10 ESSENTIAL HYPERTENSION: ICD-10-CM

## 2019-03-18 LAB
ALBUMIN SERPL BCP-MCNC: 4.2 G/DL
ALP SERPL-CCNC: 88 U/L
ALT SERPL W/O P-5'-P-CCNC: 44 U/L
ANION GAP SERPL CALC-SCNC: 8 MMOL/L
AST SERPL-CCNC: 35 U/L
BASOPHILS # BLD AUTO: 0.06 K/UL
BASOPHILS NFR BLD: 1 %
BILIRUB SERPL-MCNC: 0.5 MG/DL
BUN SERPL-MCNC: 15 MG/DL
CALCIUM SERPL-MCNC: 9.7 MG/DL
CHLORIDE SERPL-SCNC: 101 MMOL/L
CO2 SERPL-SCNC: 31 MMOL/L
CREAT SERPL-MCNC: 1 MG/DL
DIFFERENTIAL METHOD: NORMAL
EOSINOPHIL # BLD AUTO: 0.4 K/UL
EOSINOPHIL NFR BLD: 7.2 %
ERYTHROCYTE [DISTWIDTH] IN BLOOD BY AUTOMATED COUNT: 13.3 %
EST. GFR  (AFRICAN AMERICAN): >60 ML/MIN/1.73 M^2
EST. GFR  (NON AFRICAN AMERICAN): >60 ML/MIN/1.73 M^2
GLUCOSE SERPL-MCNC: 98 MG/DL
HCT VFR BLD AUTO: 46.9 %
HGB BLD-MCNC: 15.9 G/DL
IMM GRANULOCYTES # BLD AUTO: 0.02 K/UL
IMM GRANULOCYTES NFR BLD AUTO: 0.3 %
LIPASE SERPL-CCNC: 17 U/L
LYMPHOCYTES # BLD AUTO: 1.9 K/UL
LYMPHOCYTES NFR BLD: 32.3 %
MCH RBC QN AUTO: 29.7 PG
MCHC RBC AUTO-ENTMCNC: 33.9 G/DL
MCV RBC AUTO: 88 FL
MONOCYTES # BLD AUTO: 0.7 K/UL
MONOCYTES NFR BLD: 11.3 %
NEUTROPHILS # BLD AUTO: 2.8 K/UL
NEUTROPHILS NFR BLD: 47.9 %
NRBC BLD-RTO: 0 /100 WBC
PLATELET # BLD AUTO: 241 K/UL
PMV BLD AUTO: 10.4 FL
POTASSIUM SERPL-SCNC: 3.5 MMOL/L
PROT SERPL-MCNC: 7.5 G/DL
RBC # BLD AUTO: 5.35 M/UL
SODIUM SERPL-SCNC: 140 MMOL/L
WBC # BLD AUTO: 5.86 K/UL

## 2019-03-18 PROCEDURE — 36415 COLL VENOUS BLD VENIPUNCTURE: CPT | Mod: PO

## 2019-03-18 PROCEDURE — 3078F PR MOST RECENT DIASTOLIC BLOOD PRESSURE < 80 MM HG: ICD-10-PCS | Mod: CPTII,S$GLB,, | Performed by: INTERNAL MEDICINE

## 2019-03-18 PROCEDURE — 99999 PR PBB SHADOW E&M-EST. PATIENT-LVL IV: CPT | Mod: PBBFAC,,, | Performed by: INTERNAL MEDICINE

## 2019-03-18 PROCEDURE — 3044F PR MOST RECENT HEMOGLOBIN A1C LEVEL <7.0%: ICD-10-PCS | Mod: CPTII,S$GLB,, | Performed by: INTERNAL MEDICINE

## 2019-03-18 PROCEDURE — 80053 COMPREHEN METABOLIC PANEL: CPT

## 2019-03-18 PROCEDURE — 3044F HG A1C LEVEL LT 7.0%: CPT | Mod: CPTII,S$GLB,, | Performed by: INTERNAL MEDICINE

## 2019-03-18 PROCEDURE — 3075F SYST BP GE 130 - 139MM HG: CPT | Mod: CPTII,S$GLB,, | Performed by: INTERNAL MEDICINE

## 2019-03-18 PROCEDURE — 83690 ASSAY OF LIPASE: CPT

## 2019-03-18 PROCEDURE — 3078F DIAST BP <80 MM HG: CPT | Mod: CPTII,S$GLB,, | Performed by: INTERNAL MEDICINE

## 2019-03-18 PROCEDURE — 99214 PR OFFICE/OUTPT VISIT, EST, LEVL IV, 30-39 MIN: ICD-10-PCS | Mod: S$GLB,,, | Performed by: INTERNAL MEDICINE

## 2019-03-18 PROCEDURE — 85025 COMPLETE CBC W/AUTO DIFF WBC: CPT

## 2019-03-18 PROCEDURE — 99214 OFFICE O/P EST MOD 30 MIN: CPT | Mod: S$GLB,,, | Performed by: INTERNAL MEDICINE

## 2019-03-18 PROCEDURE — 83036 HEMOGLOBIN GLYCOSYLATED A1C: CPT

## 2019-03-18 PROCEDURE — 99999 PR PBB SHADOW E&M-EST. PATIENT-LVL IV: ICD-10-PCS | Mod: PBBFAC,,, | Performed by: INTERNAL MEDICINE

## 2019-03-18 PROCEDURE — 3075F PR MOST RECENT SYSTOLIC BLOOD PRESS GE 130-139MM HG: ICD-10-PCS | Mod: CPTII,S$GLB,, | Performed by: INTERNAL MEDICINE

## 2019-03-18 PROCEDURE — 3008F BODY MASS INDEX DOCD: CPT | Mod: CPTII,S$GLB,, | Performed by: INTERNAL MEDICINE

## 2019-03-18 PROCEDURE — 3008F PR BODY MASS INDEX (BMI) DOCUMENTED: ICD-10-PCS | Mod: CPTII,S$GLB,, | Performed by: INTERNAL MEDICINE

## 2019-03-18 RX ORDER — PANTOPRAZOLE SODIUM 40 MG/1
40 TABLET, DELAYED RELEASE ORAL 2 TIMES DAILY
Qty: 60 TABLET | Refills: 3 | Status: SHIPPED | OUTPATIENT
Start: 2019-03-18 | End: 2019-06-21 | Stop reason: SDUPTHER

## 2019-03-18 NOTE — PROGRESS NOTES
CC: followup of hypertension  HPI:  The patient is a 59 y.o. year old male who presents to the office for followup of hypertension.  The patient denies any chest pain, shortness of breath,  blurred vision or excessive fatigue, but complains of headache and nausea or vomiting.  He states his blood pressure was 180/101 this morning and his blood sugar was elevated at 193 this morning.  He reports nausea x 2 weeks.  He reports dizziness as well as lightheadedness.  He states his blood pressures have been labile.  Nausea is associated with eating, but denies any kee abdominal pain.    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 ENDO (4TH FLR)    DISKECTOMY AND FUSION-ANTERIOR CERVICAL (ACDF) C3-4, 4-5 N/A 12/30/2015    Performed by Hernando Wise MD at [facility] OR 2ND FLR    ESOPHAGOGASTRODUODENOSCOPY (EGD) N/A 3/13/2018    Performed by Rubin Barrios MD at [facility] ENDO (2ND FLR)    HEART CATH-LEFT Left 3/21/2018    Performed by Fareed Porter MD at [facility] CATH LAB    HERNIA REPAIR      INJECTION-STEROID-EPIDURAL-CERVICAL N/A 9/28/2015    Performed by Colby Gan [facility] at [facility] PAIN MGT    PROSTATECTOMY      PROSTATECTOMY-TRANSURETHRAL N/A 10/12/2017    Performed by Yg King MD at [facility] OR    REPAIR, HERNIA, INGUINAL, BILATERAL, LAPAROSCOPIC Bilateral 11/7/2013    Performed by Hernando MELTON  Kassy Junior MD at [facility] OR [address] [place] [address]       MEDS:  Medcard reviewed and updated    ALLERGIES: Allergy Card reviewed and updated    SOCIAL HISTORY:   The patient is a nonsmoker.    PE:   APPEARANCE: Well nourished, well developed, in no acute distress.    CHEST: Lungs clear to auscultation with unlabored respirations.  CARDIOVASCULAR: Normal S1, S2. No murmurs. No carotid bruits. No pedal edema.  ABDOMEN: Bowel sounds normal. Not distended. Soft. No tenderness or masses. PSYCHIATRIC: The patient is oriented to person, place, and time and has a pleasant affect.        ASSESSMENT/PLAN:   Hi was seen today for dizziness, nausea, blood pressure check and blood sugar problem.    Diagnoses and all orders for this visit:    Essential hypertension  -     Comprehensive metabolic panel; Future  -     CBC auto differential; Future  -     blood pressure is controlled    Type 2 diabetes mellitus without complication, without long-term current use of insulin  -     Hemoglobin A1c; Future    Nausea  -     Lipase; Future

## 2019-03-19 LAB
ESTIMATED AVG GLUCOSE: 148 MG/DL
HBA1C MFR BLD HPLC: 6.8 %

## 2019-04-09 RX ORDER — PANTOPRAZOLE SODIUM 40 MG/1
TABLET, DELAYED RELEASE ORAL
Qty: 30 TABLET | Refills: 3 | Status: ON HOLD | OUTPATIENT
Start: 2019-04-09 | End: 2019-08-24 | Stop reason: HOSPADM

## 2019-04-29 RX ORDER — HYDROCHLOROTHIAZIDE 25 MG/1
TABLET ORAL
Qty: 90 TABLET | Refills: 0 | Status: SHIPPED | OUTPATIENT
Start: 2019-04-29 | End: 2019-07-14 | Stop reason: SDUPTHER

## 2019-05-20 RX ORDER — ALPRAZOLAM 0.5 MG/1
TABLET ORAL
Qty: 90 TABLET | Refills: 1 | Status: SHIPPED | OUTPATIENT
Start: 2019-05-20 | End: 2019-08-26 | Stop reason: SDUPTHER

## 2019-06-01 DIAGNOSIS — I73.9 PERIPHERAL VASCULAR DISEASE: ICD-10-CM

## 2019-06-01 DIAGNOSIS — Z82.49 FAMILY HISTORY OF PREMATURE CAD: ICD-10-CM

## 2019-06-01 DIAGNOSIS — E78.5 HYPERLIPIDEMIA: ICD-10-CM

## 2019-06-03 RX ORDER — PRAVASTATIN SODIUM 80 MG/1
TABLET ORAL
Qty: 90 TABLET | Refills: 1 | Status: SHIPPED | OUTPATIENT
Start: 2019-06-03 | End: 2019-12-12 | Stop reason: SDUPTHER

## 2019-06-12 DIAGNOSIS — Z01.00 DIABETIC EYE EXAM: Primary | ICD-10-CM

## 2019-06-12 DIAGNOSIS — E11.9 DIABETIC EYE EXAM: Primary | ICD-10-CM

## 2019-06-18 ENCOUNTER — PATIENT OUTREACH (OUTPATIENT)
Dept: ADMINISTRATIVE | Facility: HOSPITAL | Age: 59
End: 2019-06-18

## 2019-06-21 RX ORDER — PANTOPRAZOLE SODIUM 40 MG/1
TABLET, DELAYED RELEASE ORAL
Qty: 60 TABLET | Refills: 2 | Status: SHIPPED | OUTPATIENT
Start: 2019-06-21 | End: 2019-09-18 | Stop reason: SDUPTHER

## 2019-07-02 ENCOUNTER — OFFICE VISIT (OUTPATIENT)
Dept: OPTOMETRY | Facility: CLINIC | Age: 59
End: 2019-07-02
Payer: COMMERCIAL

## 2019-07-02 DIAGNOSIS — H52.4 PRESBYOPIA: ICD-10-CM

## 2019-07-02 DIAGNOSIS — H25.13 NUCLEAR SCLEROSIS, BILATERAL: ICD-10-CM

## 2019-07-02 DIAGNOSIS — H11.002 PTERYGIUM, LEFT: ICD-10-CM

## 2019-07-02 DIAGNOSIS — E11.9 TYPE 2 DIABETES MELLITUS WITHOUT RETINOPATHY: Primary | ICD-10-CM

## 2019-07-02 PROCEDURE — 92015 PR REFRACTION: ICD-10-PCS | Mod: S$GLB,,, | Performed by: OPTOMETRIST

## 2019-07-02 PROCEDURE — 92015 DETERMINE REFRACTIVE STATE: CPT | Mod: S$GLB,,, | Performed by: OPTOMETRIST

## 2019-07-02 PROCEDURE — 99999 PR PBB SHADOW E&M-EST. PATIENT-LVL II: ICD-10-PCS | Mod: PBBFAC,,, | Performed by: OPTOMETRIST

## 2019-07-02 PROCEDURE — 92004 COMPRE OPH EXAM NEW PT 1/>: CPT | Mod: S$GLB,,, | Performed by: OPTOMETRIST

## 2019-07-02 PROCEDURE — 92004 PR EYE EXAM, NEW PATIENT,COMPREHESV: ICD-10-PCS | Mod: S$GLB,,, | Performed by: OPTOMETRIST

## 2019-07-02 PROCEDURE — 99999 PR PBB SHADOW E&M-EST. PATIENT-LVL II: CPT | Mod: PBBFAC,,, | Performed by: OPTOMETRIST

## 2019-07-02 NOTE — PROGRESS NOTES
HPI     DLS: 4/17/2013  Pt states he has mono VA states he hasn't worn glasses or his contacts in   about 5 years. Occ blurry VA but states it does not happen to often.  occ floaters, no flashes  No gtts   No sx  Diet controlled DM-  LBS: didn't check it today   Hemoglobin A1C       Date                     Value               Ref Range             Status                03/18/2019               6.8 (H)             4.0 - 5.6 %           Final              Comment:    ADA Screening Guidelines:  5.7-6.4%  Consistent with   prediabetes  >or=6.5%  Consistent with diabetes  High levels of fetal   hemoglobin interfere with the HbA1C  assay. Heterozygous hemoglobin   variants (HbS, HgC, etc)do  not significantly interfere with this assay.     However, presence of multiple variants may affect accuracy.         06/04/2018               6.5 (H)             4.0 - 5.6 %           Final                ----------       Last edited by Handy Farooq, OD on 7/2/2019  7:45 AM. (History)        ROS     Positive for: Endocrine (DM), Eyes (uveitis hx)    Negative for: Constitutional, Gastrointestinal, Neurological, Skin,   Genitourinary, Musculoskeletal, HENT, Cardiovascular, Respiratory,   Psychiatric, Allergic/Imm, Heme/Lymph    Last edited by Handy Farooq, OD on 7/2/2019  9:00 AM. (History)        Assessment /Plan     For exam results, see Encounter Report.    Type 2 diabetes mellitus without retinopathy    Nuclear sclerosis, bilateral    Pterygium, left    Presbyopia      1. Small cats OU--pt happy w otc readers in past  2. Pterygium OS--discussed excision, but pt wishes to wait.  Wore monovision Cls in past, but gave up because they irritated pterygium.  Discussed w pt OK to stay in readers and not wear CLs  3. DM- WITHOUT RETINOPATHY.  Advised yearly DFE    PLAN:    rtc 1 yr

## 2019-07-14 DIAGNOSIS — I49.3 SYMPTOMATIC PVCS: ICD-10-CM

## 2019-07-15 RX ORDER — METOPROLOL SUCCINATE 100 MG/1
TABLET, EXTENDED RELEASE ORAL
Qty: 90 TABLET | Refills: 2 | Status: SHIPPED | OUTPATIENT
Start: 2019-07-15 | End: 2020-03-02

## 2019-07-15 RX ORDER — HYDROCHLOROTHIAZIDE 25 MG/1
TABLET ORAL
Qty: 90 TABLET | Refills: 0 | Status: SHIPPED | OUTPATIENT
Start: 2019-07-15 | End: 2019-10-06 | Stop reason: SDUPTHER

## 2019-08-02 DIAGNOSIS — E11.9 TYPE 2 DIABETES MELLITUS WITHOUT COMPLICATION: ICD-10-CM

## 2019-08-22 ENCOUNTER — HOSPITAL ENCOUNTER (INPATIENT)
Facility: HOSPITAL | Age: 59
LOS: 2 days | Discharge: HOME | DRG: 092 | End: 2019-08-24
Attending: EMERGENCY MEDICINE | Admitting: HOSPITALIST
Payer: COMMERCIAL

## 2019-08-22 DIAGNOSIS — R20.2 PARESTHESIA: ICD-10-CM

## 2019-08-22 DIAGNOSIS — R53.1 LEFT-SIDED WEAKNESS: Primary | ICD-10-CM

## 2019-08-22 DIAGNOSIS — R44.9 SENSORY DEFICIT, LEFT: ICD-10-CM

## 2019-08-22 DIAGNOSIS — M48.02 CERVICAL SPINAL STENOSIS: ICD-10-CM

## 2019-08-22 DIAGNOSIS — M54.50 LOWER BACK PAIN: ICD-10-CM

## 2019-08-22 DIAGNOSIS — R27.0 ATAXIA: ICD-10-CM

## 2019-08-22 PROBLEM — E04.1 THYROID NODULE: Status: ACTIVE | Noted: 2019-08-22

## 2019-08-22 PROBLEM — H81.8X2 LEFT-SIDED VESTIBULAR WEAKNESS: Status: ACTIVE | Noted: 2019-08-22

## 2019-08-22 LAB
ALBUMIN SERPL BCP-MCNC: 4.2 G/DL (ref 3.5–5.2)
ALP SERPL-CCNC: 97 U/L (ref 55–135)
ALT SERPL W/O P-5'-P-CCNC: 46 U/L (ref 10–44)
ANION GAP SERPL CALC-SCNC: 10 MMOL/L (ref 8–16)
AST SERPL-CCNC: 31 U/L (ref 10–40)
BASOPHILS # BLD AUTO: 0.07 K/UL (ref 0–0.2)
BASOPHILS NFR BLD: 1.4 % (ref 0–1.9)
BILIRUB SERPL-MCNC: 0.3 MG/DL (ref 0.1–1)
BUN SERPL-MCNC: 12 MG/DL (ref 8–23)
CALCIUM SERPL-MCNC: 9.6 MG/DL (ref 8.7–10.5)
CHLORIDE SERPL-SCNC: 108 MMOL/L (ref 95–110)
CHOLEST SERPL-MCNC: 222 MG/DL (ref 120–199)
CHOLEST/HDLC SERPL: 5.2 {RATIO} (ref 2–5)
CO2 SERPL-SCNC: 24 MMOL/L (ref 23–29)
CREAT SERPL-MCNC: 1 MG/DL (ref 0.5–1.4)
CREAT SERPL-MCNC: 1.1 MG/DL (ref 0.5–1.4)
DIFFERENTIAL METHOD: NORMAL
EOSINOPHIL # BLD AUTO: 0.4 K/UL (ref 0–0.5)
EOSINOPHIL NFR BLD: 7.5 % (ref 0–8)
ERYTHROCYTE [DISTWIDTH] IN BLOOD BY AUTOMATED COUNT: 13.3 % (ref 11.5–14.5)
EST. GFR  (AFRICAN AMERICAN): >60 ML/MIN/1.73 M^2
EST. GFR  (NON AFRICAN AMERICAN): >60 ML/MIN/1.73 M^2
GLUCOSE SERPL-MCNC: 142 MG/DL (ref 70–110)
HCT VFR BLD AUTO: 47.7 % (ref 40–54)
HDLC SERPL-MCNC: 43 MG/DL (ref 40–75)
HDLC SERPL: 19.4 % (ref 20–50)
HGB BLD-MCNC: 15.7 G/DL (ref 14–18)
IMM GRANULOCYTES # BLD AUTO: 0.02 K/UL (ref 0–0.04)
IMM GRANULOCYTES NFR BLD AUTO: 0.4 % (ref 0–0.5)
INR PPP: 1 (ref 0.8–1.2)
LDLC SERPL CALC-MCNC: ABNORMAL MG/DL (ref 63–159)
LYMPHOCYTES # BLD AUTO: 1.6 K/UL (ref 1–4.8)
LYMPHOCYTES NFR BLD: 31.8 % (ref 18–48)
MCH RBC QN AUTO: 30 PG (ref 27–31)
MCHC RBC AUTO-ENTMCNC: 32.9 G/DL (ref 32–36)
MCV RBC AUTO: 91 FL (ref 82–98)
MONOCYTES # BLD AUTO: 0.5 K/UL (ref 0.3–1)
MONOCYTES NFR BLD: 11 % (ref 4–15)
NEUTROPHILS # BLD AUTO: 2.4 K/UL (ref 1.8–7.7)
NEUTROPHILS NFR BLD: 47.9 % (ref 38–73)
NONHDLC SERPL-MCNC: 179 MG/DL
NRBC BLD-RTO: 0 /100 WBC
PLATELET # BLD AUTO: 263 K/UL (ref 150–350)
PMV BLD AUTO: 10.1 FL (ref 9.2–12.9)
POC PTINR: 0.9 (ref 0.9–1.2)
POC PTWBT: 11.2 SEC (ref 9.7–14.3)
POCT GLUCOSE: 109 MG/DL (ref 70–110)
POCT GLUCOSE: 117 MG/DL (ref 70–110)
POCT GLUCOSE: 122 MG/DL (ref 70–110)
POTASSIUM SERPL-SCNC: 3.8 MMOL/L (ref 3.5–5.1)
PROT SERPL-MCNC: 7.5 G/DL (ref 6–8.4)
PROTHROMBIN TIME: 10 SEC (ref 9–12.5)
RBC # BLD AUTO: 5.24 M/UL (ref 4.6–6.2)
SAMPLE: NORMAL
SAMPLE: NORMAL
SODIUM SERPL-SCNC: 142 MMOL/L (ref 136–145)
TRIGL SERPL-MCNC: 458 MG/DL (ref 30–150)
TROPONIN I SERPL DL<=0.01 NG/ML-MCNC: 0.03 NG/ML (ref 0–0.03)
TSH SERPL DL<=0.005 MIU/L-ACNC: 3.23 UIU/ML (ref 0.4–4)
WBC # BLD AUTO: 4.93 K/UL (ref 3.9–12.7)

## 2019-08-22 PROCEDURE — 84484 ASSAY OF TROPONIN QUANT: CPT

## 2019-08-22 PROCEDURE — 25000003 PHARM REV CODE 250: Performed by: HOSPITALIST

## 2019-08-22 PROCEDURE — 85025 COMPLETE CBC W/AUTO DIFF WBC: CPT

## 2019-08-22 PROCEDURE — 99223 1ST HOSP IP/OBS HIGH 75: CPT | Mod: ,,, | Performed by: NEUROLOGICAL SURGERY

## 2019-08-22 PROCEDURE — 80061 LIPID PANEL: CPT

## 2019-08-22 PROCEDURE — 84443 ASSAY THYROID STIM HORMONE: CPT

## 2019-08-22 PROCEDURE — 25500020 PHARM REV CODE 255: Performed by: EMERGENCY MEDICINE

## 2019-08-22 PROCEDURE — 80053 COMPREHEN METABOLIC PANEL: CPT

## 2019-08-22 PROCEDURE — 99223 PR INITIAL HOSPITAL CARE,LEVL III: ICD-10-PCS | Mod: ,,, | Performed by: PSYCHIATRY & NEUROLOGY

## 2019-08-22 PROCEDURE — 85610 PROTHROMBIN TIME: CPT

## 2019-08-22 PROCEDURE — 82565 ASSAY OF CREATININE: CPT

## 2019-08-22 PROCEDURE — 97535 SELF CARE MNGMENT TRAINING: CPT

## 2019-08-22 PROCEDURE — 99223 1ST HOSP IP/OBS HIGH 75: CPT | Mod: ,,, | Performed by: HOSPITALIST

## 2019-08-22 PROCEDURE — 82962 GLUCOSE BLOOD TEST: CPT

## 2019-08-22 PROCEDURE — 99223 1ST HOSP IP/OBS HIGH 75: CPT | Mod: ,,, | Performed by: PSYCHIATRY & NEUROLOGY

## 2019-08-22 PROCEDURE — 99291 CRITICAL CARE FIRST HOUR: CPT | Mod: ,,, | Performed by: EMERGENCY MEDICINE

## 2019-08-22 PROCEDURE — 11000001 HC ACUTE MED/SURG PRIVATE ROOM

## 2019-08-22 PROCEDURE — 93010 EKG 12-LEAD: ICD-10-PCS | Mod: ,,, | Performed by: INTERNAL MEDICINE

## 2019-08-22 PROCEDURE — 93005 ELECTROCARDIOGRAM TRACING: CPT

## 2019-08-22 PROCEDURE — 93010 ELECTROCARDIOGRAM REPORT: CPT | Mod: ,,, | Performed by: INTERNAL MEDICINE

## 2019-08-22 PROCEDURE — 99291 PR CRITICAL CARE, E/M 30-74 MINUTES: ICD-10-PCS | Mod: ,,, | Performed by: EMERGENCY MEDICINE

## 2019-08-22 PROCEDURE — 99223 PR INITIAL HOSPITAL CARE,LEVL III: ICD-10-PCS | Mod: ,,, | Performed by: NEUROLOGICAL SURGERY

## 2019-08-22 PROCEDURE — 99291 CRITICAL CARE FIRST HOUR: CPT | Mod: 25

## 2019-08-22 PROCEDURE — 99223 PR INITIAL HOSPITAL CARE,LEVL III: ICD-10-PCS | Mod: ,,, | Performed by: HOSPITALIST

## 2019-08-22 PROCEDURE — 92610 EVALUATE SWALLOWING FUNCTION: CPT

## 2019-08-22 RX ORDER — EZETIMIBE 10 MG/1
10 TABLET ORAL DAILY
Status: DISCONTINUED | OUTPATIENT
Start: 2019-08-22 | End: 2019-08-24 | Stop reason: HOSPADM

## 2019-08-22 RX ORDER — NITROGLYCERIN 0.3 MG/1
0.3 TABLET SUBLINGUAL EVERY 5 MIN PRN
Status: DISCONTINUED | OUTPATIENT
Start: 2019-08-22 | End: 2019-08-24 | Stop reason: HOSPADM

## 2019-08-22 RX ORDER — ACETAMINOPHEN 325 MG/1
650 TABLET ORAL EVERY 6 HOURS PRN
Status: DISCONTINUED | OUTPATIENT
Start: 2019-08-22 | End: 2019-08-24 | Stop reason: HOSPADM

## 2019-08-22 RX ORDER — AMLODIPINE BESYLATE 10 MG/1
10 TABLET ORAL DAILY
Status: DISCONTINUED | OUTPATIENT
Start: 2019-08-22 | End: 2019-08-23

## 2019-08-22 RX ORDER — IBUPROFEN 200 MG
16 TABLET ORAL
Status: DISCONTINUED | OUTPATIENT
Start: 2019-08-22 | End: 2019-08-24 | Stop reason: HOSPADM

## 2019-08-22 RX ORDER — PRAVASTATIN SODIUM 40 MG/1
80 TABLET ORAL DAILY
Status: DISCONTINUED | OUTPATIENT
Start: 2019-08-23 | End: 2019-08-23

## 2019-08-22 RX ORDER — INSULIN ASPART 100 [IU]/ML
0-5 INJECTION, SOLUTION INTRAVENOUS; SUBCUTANEOUS
Status: DISCONTINUED | OUTPATIENT
Start: 2019-08-22 | End: 2019-08-24 | Stop reason: HOSPADM

## 2019-08-22 RX ORDER — IBUPROFEN 200 MG
24 TABLET ORAL
Status: DISCONTINUED | OUTPATIENT
Start: 2019-08-22 | End: 2019-08-24 | Stop reason: HOSPADM

## 2019-08-22 RX ORDER — GLUCAGON 1 MG
1 KIT INJECTION
Status: DISCONTINUED | OUTPATIENT
Start: 2019-08-22 | End: 2019-08-24 | Stop reason: HOSPADM

## 2019-08-22 RX ORDER — METOPROLOL SUCCINATE 100 MG/1
100 TABLET, EXTENDED RELEASE ORAL DAILY
Status: DISCONTINUED | OUTPATIENT
Start: 2019-08-22 | End: 2019-08-24 | Stop reason: HOSPADM

## 2019-08-22 RX ORDER — PRAVASTATIN SODIUM 80 MG/1
80 TABLET ORAL DAILY
Status: DISCONTINUED | OUTPATIENT
Start: 2019-08-22 | End: 2019-08-22

## 2019-08-22 RX ORDER — ASPIRIN 81 MG/1
81 TABLET ORAL DAILY
Status: DISCONTINUED | OUTPATIENT
Start: 2019-08-22 | End: 2019-08-24 | Stop reason: HOSPADM

## 2019-08-22 RX ORDER — ALPRAZOLAM 0.5 MG/1
0.5 TABLET ORAL DAILY PRN
Status: DISCONTINUED | OUTPATIENT
Start: 2019-08-22 | End: 2019-08-24 | Stop reason: HOSPADM

## 2019-08-22 RX ORDER — HYDROCHLOROTHIAZIDE 25 MG/1
25 TABLET ORAL DAILY
Status: DISCONTINUED | OUTPATIENT
Start: 2019-08-22 | End: 2019-08-24 | Stop reason: HOSPADM

## 2019-08-22 RX ORDER — ONDANSETRON 2 MG/ML
4 INJECTION INTRAMUSCULAR; INTRAVENOUS EVERY 8 HOURS PRN
Status: DISCONTINUED | OUTPATIENT
Start: 2019-08-22 | End: 2019-08-24 | Stop reason: HOSPADM

## 2019-08-22 RX ORDER — PANTOPRAZOLE SODIUM 40 MG/1
40 TABLET, DELAYED RELEASE ORAL DAILY
Status: DISCONTINUED | OUTPATIENT
Start: 2019-08-22 | End: 2019-08-24 | Stop reason: HOSPADM

## 2019-08-22 RX ORDER — SODIUM CHLORIDE 0.9 % (FLUSH) 0.9 %
10 SYRINGE (ML) INJECTION
Status: DISCONTINUED | OUTPATIENT
Start: 2019-08-22 | End: 2019-08-24 | Stop reason: HOSPADM

## 2019-08-22 RX ADMIN — METOPROLOL SUCCINATE 100 MG: 100 TABLET, EXTENDED RELEASE ORAL at 04:08

## 2019-08-22 RX ADMIN — HYDROCHLOROTHIAZIDE 25 MG: 25 TABLET ORAL at 04:08

## 2019-08-22 RX ADMIN — IOHEXOL 100 ML: 350 INJECTION, SOLUTION INTRAVENOUS at 08:08

## 2019-08-22 RX ADMIN — PANTOPRAZOLE SODIUM 40 MG: 40 TABLET, DELAYED RELEASE ORAL at 04:08

## 2019-08-22 RX ADMIN — ASPIRIN 81 MG: 81 TABLET, COATED ORAL at 04:08

## 2019-08-22 RX ADMIN — AMLODIPINE BESYLATE 10 MG: 10 TABLET ORAL at 04:08

## 2019-08-22 NOTE — ED TRIAGE NOTES
"Hi Rubio, a 59 y.o. male presents to the ED w/ complaint of left upper extremity and left lower extremity weakness and decreased sensation to left side that began when he woke up this morning at 0530.  Patient reports feeling normal when he went to sleep at 1130 PM last night.  Patient reports L sided neck pain when placed on scanner. No other neurological deficits    Triage note:  Chief Complaint   Patient presents with    Extremity Weakness     LKN last night going to bed at 11:30pm, woke up 0530 with left arm weakness and left leg weakness. "can't stand on left leg". states left leg with "buckle" to stand. only other complaint is neck pain. no vision changes, no neglect but "seems like tongue is caught at the top of his mouth"     Review of patient's allergies indicates:   Allergen Reactions    Lisinopril Anaphylaxis and Nausea And Vomiting     General bad feeling    Adhesive     Crestor [rosuvastatin] Swelling     Lip swelling.    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 The patient is awake, alert and aware of environment with an appropriate affect, the patient is oriented x 3 and speaking appropriately.   APPEARANCE: Patient resting comfortably, patient is clean and well groomed, patient's clothing is properly fastened.  SKIN: The skin is warm and dry, color consistent with ethnicity, patient has normal skin turgor and moist mucus membranes, skin intact, no breakdown " or bruising noted.  MUSCULOSKELETAL: no obvious swelling or deformities noted. Unable to ambulate, extreme weakness to L side.   RESPIRATORY: Respirations are spontaneous, patient has a normal effort and rate, no accessory muscle use noted.  CARDIAC: Patient has a normal rate and rhythm, no periphreal edema noted, capillary refill < 3 seconds.  ABDOMEN: Soft and non tender to palpation, no distention noted. Bowel sounds present in all four quadrants.  NEUROLOGIC: Eyes open spontaneously, behavior appropriate to situation, follows commands, facial expression symmetrical,hand grasp unequal and uneven weakness noted to left hand grasp, purposeful motor response noted, decreased sensation to L upper and lower extremities when touched with a finger.

## 2019-08-22 NOTE — ASSESSMENT & PLAN NOTE
59 M hx of CAD, DM, HLD, HTN with sudden onset left sided weakness and facial numbness this morning at 5:30am. MRI, MRA, CTA obtained. Stroke team consulted and reported no stroke.    Neurologically stable  No neurosurgical intervention at this time  Imaging reviewed, no obvious cervical spine pathology that could explain all of his symptoms noted on the MRA neck.  This, along with his facial symptoms, and the improvement of his symptoms points away from a spinal pathology.  His symptoms would be better explained by a TIA vs seizure with the intracranial vascular stenosis noted on imaging favoring TIA.  No operable pathology  Recommend further workup per ED and consulting services  Neurosurgery will sign off, no clinic follow up needed    -Discussed with Dr. Badillo

## 2019-08-22 NOTE — ED NOTES
Telemetry Verification   Patient placed on Telemetry Box  Verified by  Box # [id number]   Monitor Tech  Ever   Rate  65   Rhythm  NS

## 2019-08-22 NOTE — HPI
59 M hx of CAD, DM, HLD, HTN with sudden onset left sided weakness and facial numbness this morning at 5:30am. MRI, MRA, CTA obtained. Stroke team consulted and reported no stroke.

## 2019-08-22 NOTE — SUBJECTIVE & OBJECTIVE
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 -     Hyperlipidemia     Hypertension     Memory loss     Traumatic iritis - Left Eye 2/27/2013     Past Surgical History:   Procedure Laterality Date    CERVICAL FUSION  12/30/2015    COLONOSCOPY N/A 4/7/2017    Performed by Handy Frederick MD at [facility] ENDO (4TH FLR)    DISKECTOMY AND FUSION-ANTERIOR CERVICAL (ACDF) C3-4, 4-5 N/A 12/30/2015    Performed by Hernando Wise MD at [facility] OR 2ND FLR    ESOPHAGOGASTRODUODENOSCOPY (EGD) N/A 3/13/2018    Performed by Rubin Barrios MD at [facility] ENDO (2ND FLR)    HEART CATH-LEFT Left 3/21/2018    Performed by Fareed Porter MD at [facility] CATH LAB    HERNIA REPAIR      INJECTION-STEROID-EPIDURAL-CERVICAL N/A 9/28/2015    Performed by Colby Gan MD at [facility] PAIN MGT    PROSTATECTOMY      PROSTATECTOMY-TRANSURETHRAL N/A 10/12/2017    Performed by Yg King [facility] at [facility] OR    REPAIR, HERNIA, INGUINAL, BILATERAL, LAPAROSCOPIC Bilateral 11/7/2013    Performed by Hernando Elena Jr., MD at [facility] OR 2ND FLR     Family History   Problem Relation Age of Onset    Heart disease Mother     Hypertension Mother     Hyperlipidemia Mother     Heart disease Father     Hyperlipidemia Brother     Hypertension Brother     Hypertension Brother     Hyperlipidemia Brother     Benign prostatic hyperplasia Brother     Hypertension Brother     Hypertension Brother     Hepatitis Brother     Cancer Sister     Cancer Brother         throat CA    Diabetes Paternal Uncle     Cataracts Maternal Grandmother     Amblyopia Neg Hx     Blindness Neg Hx     Glaucoma Neg Hx     Macular degeneration Neg Hx      Retinal detachment Neg Hx     Strabismus Neg Hx     Stroke Neg Hx     Thyroid disease Neg Hx     Colon cancer Neg Hx     Colon polyps Neg Hx      Social History     Tobacco Use    Smoking status: Former Smoker     Packs/day: 1.00     Years: 30.00     Pack years: 30.00     Types: Cigarettes     Last attempt to quit: 2011     Years since quittin.6    Smokeless tobacco: Never Used   Substance Use Topics    Alcohol use: Yes     Comment: occas - nonalcoholic beer or beer    Drug use: No     Review of patient's allergies indicates:   Allergen Reactions    Lisinopril Anaphylaxis and Nausea And Vomiting     General bad feeling    Adhesive     Crestor [rosuvastatin] Swelling     Lip swelling.     Lipitor [atorvastatin] Other (See Comments)     Muscle aches       Medications: I have reviewed the current medication administration record.      (Not in a hospital admission)    Review of Systems   Constitutional: Negative for appetite change, fatigue and fever.   HENT: Negative for sinus pressure, sinus pain and sore throat.    Eyes: Negative for photophobia and visual disturbance.   Respiratory: Negative for chest tightness and shortness of breath.    Cardiovascular: Negative for chest pain, palpitations and leg swelling.   Gastrointestinal: Negative for abdominal pain, diarrhea, nausea and vomiting.   Genitourinary: Negative for difficulty urinating and dysuria.   Musculoskeletal: Positive for gait problem and neck pain. Negative for arthralgias and neck stiffness.   Neurological: Positive for dizziness, tremors, weakness and numbness. Negative for seizures, syncope, facial asymmetry, speech difficulty, light-headedness and headaches.   Psychiatric/Behavioral: Negative for agitation and confusion.     Objective:     Vital Signs (Most Recent):  Temp: 98.1 °F (36.7 °C) (19 0759)  Pulse: 71 (19 0845)  Resp: 20 (19 0845)  BP: (!) 149/80 (19 0845)  SpO2: (!) 94 % (19  0845)    Vital Signs Range (Last 24H):  Temp:  [98.1 °F (36.7 °C)]   Pulse:  [71-85]   Resp:  [18-21]   BP: (149-195)/(80-94)   SpO2:  [94 %-98 %]     Physical Exam   Constitutional: He is oriented to person, place, and time. He appears well-developed and well-nourished.   HENT:   Head: Normocephalic and atraumatic.   Eyes: Pupils are equal, round, and reactive to light.   Neck: Normal range of motion.   Cardiovascular: Normal rate, regular rhythm, normal heart sounds and intact distal pulses.   Pulmonary/Chest: Effort normal and breath sounds normal. No respiratory distress.   Abdominal: Soft. He exhibits no distension. There is no tenderness.   Musculoskeletal: He exhibits no edema or tenderness.   Neurological: He is alert and oriented to person, place, and time. He displays normal reflexes. A cranial nerve deficit and sensory deficit is present. He exhibits abnormal muscle tone.   Skin: Skin is warm and dry. No rash noted. No erythema.   Nursing note and vitals reviewed.      Neurological Exam:   LOC: alert  Attention Span: Good   Language: No aphasia  Articulation: No dysarthria  Orientation: Person, Place, Time   Visual Fields: Full  EOM (CN III, IV, VI): Full/intact  Pupils (CN II, III): PERRL  Facial Sensation (CN V): Facial sensory loss  Facial Movement (CN VII): Symmetric facial expression    Reflexes: 2+ throughout  Motor: Arm left  Normal 5/5  Leg left  Paresis: 3/5  Arm right  Normal 5/5  Leg right Normal 5/5  Cebellar: Upper Extremity Appendicular Ataxia (Finger Nose Finger)  Left  Sensation: Hans-hypoesthesia left  Tone: Spasticity  LUE  and LLE      Laboratory:  CMP: No results for input(s): GLUCOSE, CALCIUM, ALBUMIN, PROT, NA, K, CO2, CL, BUN, CREATININE, ALKPHOS, ALT, AST, BILITOT in the last 24 hours.  CBC:   Recent Labs   Lab 08/22/19  0820   WBC 4.93   RBC 5.24   HGB 15.7   HCT 47.7      MCV 91   MCH 30.0   MCHC 32.9     Lipid Panel: No results for input(s): CHOL, LDLCALC, HDL, TRIG in  the last 168 hours.  Coagulation: No results for input(s): PT, INR, APTT in the last 168 hours.  Hgb A1C: No results for input(s): HGBA1C in the last 168 hours.  TSH: No results for input(s): TSH in the last 168 hours.    Diagnostic Results:    Brain imaging:    CT Head 8/22/19 0837   No evidence for acute intracranial hemorrhage or sulcal effacement    Vessel Imaging:    CTA Stroke Multiphase 8/22/19 0837  High-grade stenosis or segment occlusion in the right M3 segment of the MCA in the sylvian fissure..    Globular outpouching distal A3/A4 segment JOANNE measuring approximate 4-5 mm concerning for aneurysm.    Atherosclerotic plaquing of the carotid bifurcations and proximal ICAs with less than 50% proximal ICA stenosis by NASCET criteria.    Cardiac Evaluation:     EKG: Sinus rhythm with Premature supraventricular complexes

## 2019-08-22 NOTE — H&P
"[facility] Medicine  History & Physical    Patient Name: Hi Rubio  MRN: [id number]  Admission Date: 8/22/2019  Attending Physician: Neno Edge MD   Primary Care Provider: Josefina Kendall MD    [facility] Medicine Team: [facility] B Neno Edge MD     Patient information was obtained from patient, past medical records and ER records.     Subjective:     Principal Problem:Left-sided weakness    Chief Complaint:   Chief Complaint   Patient presents with    Extremity Weakness     LKN last night going to bed at 11:30pm, woke up 0530 with left arm weakness and left leg weakness. "can't stand on left leg". states left leg with "buckle" to stand. only other complaint is neck pain. no vision changes, no neglect but "seems like tongue is caught at the top of his mouth"        HPI: Hi Rubio is a 59 y.o. male with a PMH of for  DM2, HLD, HTN and cervical spondylosis with myelopathy s/p ACDF C3/C4 C4/C5 in 2015 , BPH s/p TURP  who presented to the ED on 8/22/2019 diagnosed with  Extremity Weakness (LKN last night going to bed at 11:30pm, woke up 0530 with left arm weakness and left leg weakness. "can't stand on left leg". states left leg with "buckle" to stand. only other complaint is neck pain. no vision changes, no neglect but "seems like tongue is caught at the top of his mouth")   Oriented x3 accompanied by wife states that was feeling normal when he went to bed last night around 1130PM.  Mentions  that this morning he awoke at his normal time and noted left sided neck pain and motor weakness in his LUE and LLE.  Denies deny any dysarthria, word finding difficulty or facial droop.  states that he is fully functional at baseline and able to ambulate without assistance.  Patient sustained a fall a week back, he fell from a later 6-8 feet high and landed on his buttocks.  This than he has on and off neck pain and tailbone pain on sitting.  prior to presentation but " denies any head trauma or LOC. 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      On initial examination, pt has motor weakness on left (LLE>LUE).  Was evaluated by vascular Neurology, acute CVA ruled out. MRI Brain showed no evidence of recent infarction or other acute intracranial pathology.       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 -     Hyperlipidemia     Hypertension     Memory loss     Thyroid nodule 8/22/2019    Traumatic iritis - Left Eye 2/27/2013       Past Surgical History:   Procedure Laterality Date    CERVICAL FUSION  12/30/2015    COLONOSCOPY N/A 4/7/2017    Performed by Handy Frederick MD at [facility] ENDO (4TH FLR)    DISKECTOMY AND FUSION-ANTERIOR CERVICAL (ACDF) C3-4, 4-5 N/A 12/30/2015    Performed by Hernando Wise MD at [facility] OR 2ND FLR    ESOPHAGOGASTRODUODENOSCOPY (EGD) N/A 3/13/2018    Performed by Rubin Barrios MD at [facility] ENDO (2ND FLR)    HEART CATH-LEFT Left 3/21/2018    Performed by Fareed Porter MD at [facility] CATH LAB    HERNIA REPAIR      INJECTION-STEROID-EPIDURAL-CERVICAL N/A 9/28/2015    Performed by Colby Gan MD at [facility] PAIN MGT    PROSTATECTOMY      PROSTATECTOMY-TRANSURETHRAL N/A 10/12/2017    Performed by Yg King MD at [facility] OR    REPAIR, HERNIA, INGUINAL, BILATERAL, LAPAROSCOPIC Bilateral 11/7/2013    Performed by Hernando Elena Jr., MD at [facility] OR 2ND FLR       Review of patient's allergies indicates:   Allergen Reactions    Lisinopril Anaphylaxis and Nausea And Vomiting     General bad feeling    Adhesive     Crestor [rosuvastatin] Swelling     Lip swelling.     Lipitor [atorvastatin] Other (See  Comments)     Muscle aches       No current facility-administered medications on file prior to encounter.      Current Outpatient Medications on File Prior to Encounter   Medication Sig    ALPRAZolam (XANAX) 0.5 MG tablet TAKE 1 TABLET BY MOUTH 3 TIMES A DAY AS NEEDED FOR ANXIETY    amLODIPine (NORVASC) 10 MG tablet TAKE 1 TABLET BY MOUTH EVERY DAY    CONTOUR TEST STRIPS Strp USE TO TEST BLOOD SUGAR ONCE DAILY    ezetimibe (ZETIA) 10 mg tablet Take 1 tablet (10 mg total) by mouth once daily.    hydroCHLOROthiazide (HYDRODIURIL) 25 MG tablet TAKE 1 TABLET BY MOUTH EVERY DAY    metoprolol succinate (TOPROL-XL) 100 MG 24 hr tablet TAKE 1 TABLET BY MOUTH EVERY DAY    MICROLET LANCET Misc 1 lancet by Misc.(Non-Drug; Combo Route) route once daily. Test blood glucose once daily as instructed.    nitroGLYCERIN (NITROSTAT) 0.3 MG SL tablet Place 1 tablet (0.3 mg total) under the tongue every 5 (five) minutes as needed for Chest pain.    pantoprazole (PROTONIX) 40 MG tablet TAKE 1 TABLET BY MOUTH EVERY DAY    pantoprazole (PROTONIX) 40 MG tablet TAKE 1 TABLET BY MOUTH TWICE A DAY    pravastatin (PRAVACHOL) 80 MG tablet TAKE 1 TABLET BY MOUTH EVERYDAY AT BEDTIME    sertraline (ZOLOFT) 25 MG tablet TAKE 1 TABLET BY MOUTH EVERY DAY    sildenafil (REVATIO) 20 mg Tab TAKE 2-5 TABLETS BY MOUTH AS NEEDED    aspirin (ECOTRIN) 81 MG EC tablet Take 1 tablet (81 mg total) by mouth once daily.     Family History     Problem Relation (Age of Onset)    Benign prostatic hyperplasia Brother    Cancer Sister, Brother    Cataracts Maternal Grandmother    Diabetes Paternal Uncle    Heart disease Mother, Father    Hepatitis Brother    Hyperlipidemia Mother, Brother, Brother    Hypertension Mother, Brother, Brother, Brother, Brother        Tobacco Use    Smoking status: Former Smoker     Packs/day: 1.00     Years: 30.00     Pack years: 30.00     Types: Cigarettes     Last attempt to quit: 2011     Years since quittin.6     Smokeless tobacco: Never Used   Substance and Sexual Activity    Alcohol use: Yes     Comment: occas - nonalcoholic beer or beer    Drug use: No    Sexual activity: Yes     Partners: Female     Review of Systems   Constitutional: Positive for activity change and fatigue. Negative for appetite change, chills, fever and unexpected weight change.   HENT: Positive for hearing loss and tinnitus (Chronic). Negative for congestion, ear discharge, ear pain and facial swelling.    Eyes: Negative for pain, discharge, redness and itching.   Respiratory: Positive for chest tightness ( on and off with palpitations). Negative for cough and shortness of breath.    Cardiovascular: Negative for chest pain and leg swelling.   Gastrointestinal: Positive for nausea and vomiting. Negative for abdominal pain, anal bleeding, blood in stool, constipation and diarrhea.   Genitourinary: Negative for dysuria, flank pain, frequency, hematuria and urgency.        Incomplete emptying of bladder   Musculoskeletal: Positive for arthralgias, gait problem, neck pain ( left-sided neck pain radiating to left upper extremity and left lower extremity) and neck stiffness ( difficulty with range of motion of the neck).   Skin: Negative for color change, pallor and rash.   Allergic/Immunologic: Negative for environmental allergies.   Neurological: Positive for dizziness, weakness, light-headedness ( resolved) and headaches. Negative for syncope, facial asymmetry and speech difficulty.   Hematological: Negative for adenopathy.   Psychiatric/Behavioral: Negative for agitation and confusion.     Objective:     Vital Signs (Most Recent):  Temp: 98.1 °F (36.7 °C) (08/22/19 0759)  Pulse: 63 (08/22/19 1115)  Resp: 12 (08/22/19 1115)  BP: (Abnormal) 145/82 (08/22/19 1115)  SpO2: 97 % (08/22/19 1115) Vital Signs (24h Range):  Temp:  [98.1 °F (36.7 °C)] 98.1 °F (36.7 °C)  Pulse:  [63-85] 63  Resp:  [10-21] 12  SpO2:  [94 %-99 %] 97 %  BP: (141-195)/(79-94)  145/82     Weight: 90.3 kg (199 lb)  Body mass index is 24.22 kg/m².    Physical Exam   Constitutional: He is oriented to person, place, and time. He appears well-developed and well-nourished.   HENT:   Head: Normocephalic and atraumatic.   Mouth/Throat: Oropharynx is clear and moist. No oropharyngeal exudate.   Eyes: Pupils are equal, round, and reactive to light. Conjunctivae and EOM are normal. Right eye exhibits no discharge. Left eye exhibits no discharge. No scleral icterus.   Neck: Neck supple. No JVD present. No tracheal deviation present. No thyromegaly present.   Painful range of motion of the neck   Cardiovascular: Normal rate, regular rhythm and normal heart sounds. Exam reveals no gallop and no friction rub.   No murmur heard.  Pulmonary/Chest: Effort normal and breath sounds normal. No stridor. No respiratory distress. He has no wheezes. He has no rales.   Abdominal: Soft. Bowel sounds are normal. He exhibits no distension and no mass. There is no tenderness. There is no guarding.   Musculoskeletal: He exhibits no edema, tenderness or deformity.   Diminished range of motion of the neck   Lymphadenopathy:     He has no cervical adenopathy.   Neurological: He is oriented to person, place, and time. No cranial nerve deficit.   Diminished left-sided hand , left upper extremity power 4/5, left lower extremity power 3 to 4/5  Right upper and lower extremity power 5/5   Skin: Skin is warm and dry.   Psychiatric: He has a normal mood and affect.   Nursing note and vitals reviewed.        CRANIAL NERVES     CN III, IV, VI   Pupils are equal, round, and reactive to light.  Extraocular motions are normal.       Significant Labs:   A1C:   Recent Labs   Lab 03/18/19  1637   HGBA1C 6.8*     ABGs: No results for input(s): PH, PCO2, HCO3, POCSATURATED, BE, TOTALHB, COHB, METHB, O2HB, POCFIO2 in the last 48 hours.  Bilirubin:   Recent Labs   Lab 08/22/19  0820   BILITOT 0.3     Blood Culture: No results for  input(s): LABBLOO in the last 48 hours.  BMP:   Recent Labs   Lab 08/22/19  0820   *      K 3.8      CO2 24   BUN 12   CREATININE 1.1   CALCIUM 9.6     CBC:   Recent Labs   Lab 08/22/19  0820   WBC 4.93   HGB 15.7   HCT 47.7        CMP:   Recent Labs   Lab 08/22/19  0820      K 3.8      CO2 24   *   BUN 12   CREATININE 1.1   CALCIUM 9.6   PROT 7.5   ALBUMIN 4.2   BILITOT 0.3   ALKPHOS 97   AST 31   ALT 46*   ANIONGAP 10   EGFRNONAA >60.0     Cardiac Markers: No results for input(s): CKMB, MYOGLOBIN, BNP, TROPISTAT in the last 48 hours.  Coagulation:   Recent Labs   Lab 08/22/19  0820   INR 1.0     Lactic Acid: No results for input(s): LACTATE in the last 48 hours.  Lipase: No results for input(s): LIPASE in the last 48 hours.  Lipid Panel:   Recent Labs   Lab 08/22/19 0820   CHOL 222*   HDL 43   LDLCALC Invalid, Trig>400.0   TRIG 458*   CHOLHDL 19.4*     Magnesium: No results for input(s): MG in the last 48 hours.  POCT Glucose:   Recent Labs   Lab 08/22/19  0759   POCTGLUCOSE 122*     Prealbumin: No results for input(s): PREALBUMIN in the last 48 hours.  Respiratory Culture: No results for input(s): GSRESP, RESPIRATORYC in the last 48 hours.  Troponin: No results for input(s): TROPONINI in the last 48 hours.  TSH:   Recent Labs   Lab 08/22/19  0820   TSH 3.234     Urine Culture: No results for input(s): LABURIN in the last 48 hours.  Urine Studies: No results for input(s): COLORU, APPEARANCEUA, PHUR, SPECGRAV, PROTEINUA, GLUCUA, KETONESU, BILIRUBINUA, OCCULTUA, NITRITE, UROBILINOGEN, LEUKOCYTESUR, RBCUA, WBCUA, BACTERIA, SQUAMEPITHEL, HYALINECASTS in the last 48 hours.    Invalid input(s): WRIGHTSUR  Recent Lab Results       08/22/19  0841   08/22/19  0841   08/22/19  0820   08/22/19  0759        Albumin     4.2       Alkaline Phosphatase     97       ALT     46       Anion Gap     10       AST     31       Baso #     0.07       Basophil%     1.4       BILIRUBIN TOTAL      0.3  Comment:  For infants and newborns, interpretation of results should be based  on gestational age, weight and in agreement with clinical  observations.  Premature Infant recommended reference ranges:  Up to 24 hours.............<8.0 mg/dL  Up to 48 hours............<12.0 mg/dL  3-5 days..................<15.0 mg/dL  6-29 days.................<15.0 mg/dL         BUN, Bld     12       Calcium     9.6       Chloride     108       Cholesterol     222  Comment:  The National Cholesterol Education Program (NCEP) has set the  following guidelines (reference ranges) for Cholesterol:  Optimal.....................<200 mg/dL  Borderline High.............200-239 mg/dL  High........................> or = 240 mg/dL         CO2     24       Creatinine     1.1       Differential Method     Automated       eGFR if      >60.0       eGFR if non      >60.0  Comment:  Calculation used to obtain the estimated glomerular filtration  rate (eGFR) is the CKD-EPI equation.          Eos #     0.4       Eosinophil%     7.5       Glucose     142       Gran # (ANC)     2.4       Gran%     47.9       HDL     43  Comment:  The National Cholesterol Education Program (NCEP) has set the  following guidelines (reference values) for HDL Cholesterol:  Low...............<40 mg/dL  Optimal...........>60 mg/dL         Hdl/Cholesterol Ratio     19.4       Hematocrit     47.7       Hemoglobin     15.7       Immature Grans (Abs)     0.02  Comment:  Mild elevation in immature granulocytes is non specific and   can be seen in a variety of conditions including stress response,   acute inflammation, trauma and pregnancy. Correlation with other   laboratory and clinical findings is essential.         Immature Granulocytes     0.4       Coumadin Monitoring INR     1.0  Comment:  Coumadin Therapy:  2.0 - 3.0 for INR for all indicators except mechanical heart valves  and antiphospholipid syndromes which should use 2.5 - 3.5.          LDL Cholesterol External     Invalid, Trig>400.0  Comment:  The National Cholesterol Education Program (NCEP) has set the  following guidelines (reference values) for LDL Cholesterol:  Optimal.......................<130 mg/dL  Borderline High...............130-159 mg/dL  High..........................160-189 mg/dL  Very High.....................>190 mg/dL         Lymph #     1.6       Lymph%     31.8       MCH     30.0       MCHC     32.9       MCV     91       Mono #     0.5       Mono%     11.0       MPV     10.1       Non-HDL Cholesterol     179  Comment:  Risk category and Non-HDL cholesterol goals:  Coronary heart disease (CHD)or equivalent (10-year risk of CHD >20%):  Non-HDL cholesterol goal     <130 mg/dL  Two or more CHD risk factors and 10-year risk of CHD <= 20%:  Non-HDL cholesterol goal     <160 mg/dL  0 to 1 CHD risk factor:  Non-HDL cholesterol goal     <190 mg/dL         nRBC     0       Platelets     263       POC Creatinine 1.0           POC PTINR   0.9         POC PTWBT   11.2         POCT Glucose       122     Potassium     3.8       PROTEIN TOTAL     7.5       Protime     10.0       RBC     5.24       RDW     13.3       Sample unknown unknown         Sodium     142       Total Cholesterol/HDL Ratio     5.2       Triglycerides     458  Comment:  The National Cholesterol Education Program (NCEP) has set the  following guidelines (reference values) for triglycerides:  Normal......................<150 mg/dL  Borderline High.............150-199 mg/dL  High........................200-499 mg/dL         TSH     3.234       WBC     4.93           All pertinent labs within the past 24 hours have been reviewed.    Significant Imaging:   Imaging Results          X-Ray Sacrum And Coccyx (In process)                X-Ray Lumbar Spine Ap And Lateral (In process)                MRA Neck without contrast (Final result)  Result time 08/22/19 10:46:03    Final result by Juan Rosales DO (08/22/19 10:46:03)              Impression:      MRA head: Questioned high-grade stenosis or short segment occlusion right M3 segmental branch of the MCA seen on CTA is not seen and was likely artifactual.  No evidence for proximal high-grade stenosis [id number].    Partial inclusion of fusiform dilatation of the distal A3/A4 segments of the JOANNE as seen on CTA concerning for possible unusual aneurysm.  Cannot exclude mycotic aneurysm.  Clinical correlation and further evaluation with conventional angiography is advised.    MRA neck: Unremarkable motion limited MRA of the neck as detailed above without evidence for significant focal stenosis or occlusion.      Electronically signed by: Juan Rosales DO  Date:    08/22/2019  Time:    10:46           Narrative:    EXAMINATION:  MRA BRAIN WITHOUT CONTRAST; MRA NECK WITHOUT CONTRAST    CLINICAL HISTORY:  vertebral dissection rule out;; vert dissection rule out;    TECHNIQUE:  noncontrast 3-D time of flight MRA head and noncontrast 2-D and 3D  time-of-flight MRA neck.    COMPARISON:  None    FINDINGS:  MRA head:    Anterior circulation:  The bilateral distal cervical, Sharmila, cavernous and supraclinoid segments of the ICA's and the visualized bilateral anterior and middle cerebral arteries are patent without evidence for significant focal stenosis.  Questioned narrowing in the right M3 segment of the MCA is not seen.    Please note there is a fusiform region of enlargement of the pericallosal A3/A4 segment of the JOANNE as seen on CT concerning for possible aneurysm.  Segment of the JOANNE there is a prominent left posterior communicating artery.    Posterior circulation: The distal right vertebral artery is dominant.  Diminutive caliber distal left vertebral artery.  The distal vertebral arteries, basilar artery and posterior cerebral arteries are patent without evidence for significant focal stenosis or aneurysm.  There is severe hypoplasia or absence of the left P1 segment of the PCA with  essentially persistent fetal circulation left PCA via left posterior communicating artery.    MRA neck: Study limited by patient motion.  Allowing for limitation the origins of the right brachycephalic,  left common carotid and left subclavian arteries from the arch are within normal limits. The origins of the vertebral arteries from the subclavian arteries are within normal limits.  Left vertebral artery diminutive throughout its course allowing for motion limitation vertebral arteries are patent without high-grade focal stenosis or occlusion..    The bilateral common carotid arteries, carotid bifurcations and extracranial portions of the internal carotid arteries are patent without evidence for significant focal stenosis allowing for motion.  There is tortuosity of the distal cervical ICAs bilaterally    Less than 50% proximal ICA stenosis by NASCET criteria.                             MRA Brain without contrast (Final result)  Result time 08/22/19 10:46:03    Final result by Juan Rosales DO (08/22/19 10:46:03)             Impression:      MRA head: Questioned high-grade stenosis or short segment occlusion right M3 segmental branch of the MCA seen on CTA is not seen and was likely artifactual.  No evidence for proximal high-grade stenosis [id number].    Partial inclusion of fusiform dilatation of the distal A3/A4 segments of the JOANNE as seen on CTA concerning for possible unusual aneurysm.  Cannot exclude mycotic aneurysm.  Clinical correlation and further evaluation with conventional angiography is advised.    MRA neck: Unremarkable motion limited MRA of the neck as detailed above without evidence for significant focal stenosis or occlusion.      Electronically signed by: Juan Rosales DO  Date:    08/22/2019  Time:    10:46           Narrative:    EXAMINATION:  MRA BRAIN WITHOUT CONTRAST; MRA NECK WITHOUT CONTRAST    CLINICAL HISTORY:  vertebral dissection rule out;; vert dissection rule  out;    TECHNIQUE:  noncontrast 3-D time of flight MRA head and noncontrast 2-D and 3D  time-of-flight MRA neck.    COMPARISON:  None    FINDINGS:  MRA head:    Anterior circulation:  The bilateral distal cervical, Sharmila, cavernous and supraclinoid segments of the ICA's and the visualized bilateral anterior and middle cerebral arteries are patent without evidence for significant focal stenosis.  Questioned narrowing in the right M3 segment of the MCA is not seen.    Please note there is a fusiform region of enlargement of the pericallosal A3/A4 segment of the JOANNE as seen on CT concerning for possible aneurysm.  Segment of the JOANNE there is a prominent left posterior communicating artery.    Posterior circulation: The distal right vertebral artery is dominant.  Diminutive caliber distal left vertebral artery.  The distal vertebral arteries, basilar artery and posterior cerebral arteries are patent without evidence for significant focal stenosis or aneurysm.  There is severe hypoplasia or absence of the left P1 segment of the PCA with essentially persistent fetal circulation left PCA via left posterior communicating artery.    MRA neck: Study limited by patient motion.  Allowing for limitation the origins of the right brachycephalic,  left common carotid and left subclavian arteries from the arch are within normal limits. The origins of the vertebral arteries from the subclavian arteries are within normal limits.  Left vertebral artery diminutive throughout its course allowing for motion limitation vertebral arteries are patent without high-grade focal stenosis or occlusion..    The bilateral common carotid arteries, carotid bifurcations and extracranial portions of the internal carotid arteries are patent without evidence for significant focal stenosis allowing for motion.  There is tortuosity of the distal cervical ICAs bilaterally    Less than 50% proximal ICA stenosis by NASCET criteria.                              MRI Brain Without Contrast (Final result)  Result time 08/22/19 10:04:22    Final result by Orlando Herrera MD (08/22/19 10:04:22)             Impression:      No evidence of recent infarction or other acute intracranial pathology      Electronically signed by: Orlando Herrera MD  Date:    08/22/2019  Time:    10:04           Narrative:    EXAMINATION:  MRI BRAIN WITHOUT CONTRAST    CLINICAL HISTORY:  Focal neuro deficit, new, fixed or worsening, <6 hours;    TECHNIQUE:  Multiplanar multisequence MR imaging of the brain was performed without intravenous contrast.    COMPARISON:  CTA 08/22/2019    FINDINGS:  Intracranial Compartment:    Ventricles are normal in size for age without evidence of hydrocephalus.    The brain parenchyma appears within normal limits.  No restricted diffusion to suggest the presence of an acute infarction on today's examination.  No significant chronic ischemic change or remote major vascular distribution infarct.  No mass or hemorrhage.    No extra-axial blood or fluid collections.    Normal vascular flow voids are preserved.    Skull/Extracranial Contents (limited evaluation):    Bone marrow signal intensity is normal.  Partially visualized prior anterior cervical discectomy and fusion procedure.    Patchy mucosal thickening in the paranasal sinuses.                             X-Ray Chest AP Portable (Final result)  Result time 08/22/19 08:49:32    Final result by Verito Chavez MD (08/22/19 08:49:32)             Impression:      No acute disease identified.      Electronically signed by: Verito Chavez MD  Date:    08/22/2019  Time:    08:49           Narrative:    EXAMINATION:  XR CHEST AP PORTABLE    CLINICAL HISTORY:  Stroke;    TECHNIQUE:  Single frontal view of the chest was performed.    COMPARISON:  03/08/2018, 10:28 hours.  01/21/2017, 18:09 hours.    FINDINGS:  Mediastinal structures are midline. Cardiac silhouette and pulmonary vascular distribution are normal.    Lung  volumes are normal and symmetric. I detect no pulmonary disease, pleural fluid, lymph node enlargement, cardiac decompensation, pneumothorax, pneumomediastinum, pneumoperitoneum or significant osseous abnormality.                              CTA STROKE MULTI-PHASE (Final result)  Result time 08/22/19 08:58:43    Final result by Juan Rosales DO (08/22/19 08:58:43)             Impression:      CTA head: High-grade stenosis or segment occlusion in the right M3 segment of the MCA in the sylvian fissure..    Globular outpouching distal A3/A4 segment JOANNE measuring approximate 4-5 mm concerning for aneurysm.    CTA neck: Atherosclerotic plaquing of the carotid bifurcations and proximal ICAs with less than 50% proximal ICA stenosis by NASCET criteria.    Incidental 1 cm heterogeneous nodule left lobe of the thyroid overall indeterminate clinical correlation and follow-up dedicated thyroid imaging recommended.    CT head: No evidence for acute intracranial hemorrhage or sulcal effacement    Please see above for additional details.      Electronically signed by: Juan Rosales DO  Date:    08/22/2019  Time:    08:58           Narrative:    EXAMINATION:  CTA STROKE MULTI-PHASE    CLINICAL HISTORY:  Stroke;    TECHNIQUE:  5 mm axial images of the head pre and post contrast with 0.625 mm axial CTA images of the head neck postcontrast.  Coronal and sagittal MPR and MIP imaging was performed 100 ml of Omnipaque 350 contrast was injected intravenously    COMPARISON:  None    FINDINGS:  CT head with and  without contrast: There is no evidence for acute intracranial hemorrhage or sulcal effacement.  The ventricles are normal in size and configuration without evidence for hydrocephalus.  There is no midline shift or mass effect.  There is a high density focus within the left ethmoid air cells suggestive for osteoma.  Few patchy ethmoid air cell opacities.    CTA head:    Anterior circulation: The bilateral distal cervical,  petrous, cavernous, and supraclinoid segments of the ICAs are patent without significant focal stenosis or aneurysm.    The anterior cerebral arteries are patent.  There is a globular outpouching of the distal A 3/4 segment JOANNE concerning for aneurysm measuring approximately 4-5 mm.    The left middle cerebral artery is patent without significant stenosis.    There is a focal region of high-grade narrowing or short segment occlusion within the right M3 segmental branch of the MCA within the sylvian fissure best seen on axial image 1697 series 3.    Posterior circulation: The distal vertebral arteries, basilar artery and posterior cerebral arteries are patent without focal stenosis or aneurysm.    CTA neck: Common origin the right brachiocephalic and left common carotid artery and origin of the left subclavian artery from the arch are within normal limits.    The origin of the vertebral arteries from the respective subclavian arteries are within normal limits.  The vertebral arteries are patent throughout their course without focal stenosis or occlusion.    Right carotid: The right common carotid artery, carotid bifurcation and extracranial portions of the internal carotid artery are patent without significant focal stenosis.    Left carotid: The left common carotid artery, carotid bifurcation and extracranial portions of the internal carotid arteries are patent without significant focal stenosis.    Atherosclerotic plaquing of the carotid bifurcations and proximal ICAs with less than 50% proximal ICA stenosis by NASCET criteria.    .    Pharynx/larynx: Postoperative change cervical spine from C3 through C5 anterior spinal fusion.  Pharynx larynx limited by artifact from dental metal and spinal metal artifact.  Within limits of the study the nasopharynx, oropharynx hypopharynx larynx and proximal trachea within normal limits.  Scattered emphysematous change in the visualized lungs.    Oral cavity and the buccal space       limited by dental metal.    Glands: Bilateral parotid and submandibular glands are within normal limits. There is approximately 1 cm heterogeneous nodule in the left lobe of the thyroid inferiorly.  Clinical correlation follow-up dedicated thyroid imaging advised.    No evidence for adenopathy throughout the neck by size criteria.    There is no evidence for acute fracture of the cervical spine.  No definite hardware failure.    Case discussed with Dr. Lazar on 08/22/2019 at 8:40 am This report was flagged in Epic as abnormal.                              EKG- Sinus rhythm with Premature supraventricular complexes  Otherwise normal ECG    Assessment/Plan:     Active Diagnoses:    Diagnosis Date Noted POA    PRINCIPAL PROBLEM:  Left-sided weakness [R53.1]    Was evaluated by vascular Neurology, acute CVA ruled out. MRI Brain showed no evidence of recent infarction or other acute intracranial pathology.      sustained a fall a week back, he fell from a later 6-8 feet high and landed on his buttocks.  This than he has on and off neck pain and tailbone pain on sitting.  prior to presentation but denies any head trauma or LOC.  Obtain MRI of the cervical spine without contrast.  Consult Neurosurgery evaluation    Low backache- status post fall x-ray of the sacral lumbar spine unremarkable.  Obtain x-ray pelvis 08/22/2019 Unknown    Sensory deficit, left [R41.89] tingling and numbness is improving 08/22/2019 Unknown    Thyroid nodule [E04.1]Incidental 1 cm heterogeneous nodule left lobe of the thyroid overall indeterminate clinical correlation and follow-up dedicated thyroid imaging recommended.  TSH within normal.  Outpatient Endocrine follow 08/22/2019 Yes    Type 2 diabetes mellitus without complication, without long-term current use of insulin [E11.9] currently does not take any medications HB A1c of 6.8 .  Low-dose insulin sliding scale for now 05/11/2018 Yes    BPH with obstruction/lower urinary tract  symptoms [N40.1, N13.8] incomplete emptying status post TURP 09/28/2017 Yes    Mixed hyperlipidemia [E78.2] on ezetimibe.  The patient has hypertriglyceridemia greater than 400s.  Continue pravastatin 01/20/2017 Yes    Cervical stenosis of spine [M48.02] status post cervical fusion as above 12/30/2015 Yes    Cervical radiculopathy [M54.12] as above 07/20/2015 Yes    GERD (gastroesophageal reflux disease) [K21.9] currently taking Protonix 40 mg twice a day 07/24/2013 Yes    Essential hypertension [I10] blood pressure controlled on amlodipine, hydrochlorothiazide and metoprolol  Extra beats/PVCs followed up with cardiology Dr. AZAM Means recommended metoprolol succinate every day, Xanax as needed for anxiety 07/16/2012 Yes      Problems Resolved During this Admission:     VTE Risk Mitigation (From admission, onward)        Ordered     Place sequential compression device  Until discontinued      08/22/19 1222     IP VTE LOW RISK PATIENT  Once      08/22/19 1222            Neno Edge MD  Department of Hospital Medicine   [facility]

## 2019-08-22 NOTE — ED NOTES
MRI called and stated they will be sending for pt as soon as they are finished with anesthesia. MRI made aware pt is on telemetry.

## 2019-08-22 NOTE — PT/OT/SLP EVAL
Speech Language Pathology Evaluation  Bedside Swallow    Patient Name:  Hi Rubio   MRN:  [id number]  Admitting Diagnosis: Left-sided weakness    Recommendations:                 General Recommendations:  Speech language evaluation and Cognitive-linguistic evaluation  Diet recommendations:  Regular, Thin   Aspiration Precautions: Standard aspiration precautions   General Precautions: Standard,    Communication strategies:  none    History:     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 -     Hyperlipidemia     Hypertension     Memory loss     Thyroid nodule 8/22/2019    Traumatic iritis - Left Eye 2/27/2013       Past Surgical History:   Procedure Laterality Date    CERVICAL FUSION  12/30/2015    COLONOSCOPY N/A 4/7/2017    Performed by Handy Frederick MD at [facility] ENDO (4TH FLR)    DISKECTOMY AND FUSION-ANTERIOR CERVICAL (ACDF) C3-4, 4-5 N/A 12/30/2015    Performed by Hernando Wise MD at [facility] OR 2ND FLR    ESOPHAGOGASTRODUODENOSCOPY (EGD) N/A 3/13/2018    Performed by Rubin Barrios MD at [facility] ENDO (2ND FLR)    HEART CATH-LEFT Left 3/21/2018    Performed by Fareed Porter MD at [facility] CATH LAB    HERNIA REPAIR      INJECTION-STEROID-EPIDURAL-CERVICAL N/A 9/28/2015    [facility] at [facility] PAIN MGT    PROSTATECTOMY      PROSTATECTOMY-TRANSURETHRAL N/A 10/12/2017    Performed by Yg King [facility] at [facility] OR    REPAIR, HERNIA, INGUINAL, BILATERAL, LAPAROSCOPIC Bilateral 11/7/2013    Performed by Hernando Elena Jr., MD at [facility] OR 2ND FLR     Prior Intubation HX:  None this admission    Modified Barium Swallow: None on file    Chest X-Rays 8/22/19: Lung  "volumes are normal and symmetric. I detect no pulmonary disease, pleural fluid, lymph node enlargement, cardiac decompensation, pneumothorax, pneumomediastinum, pneumoperitoneum or significant osseous abnormality.    Prior diet: regular/thin    Subjective     Patient awake and alert during session  "Every now and then something will get caught in my throat when I'm swallowing." Patient referring to occasional globus sensation s/p ACDF in 2015  Communicated with nurse prior to session    Pain/Comfort:  · Pain Rating 1: 0/10  · Pain Rating Post-Intervention 1: 0/10    Objective:     Oral Musculature Evaluation  · Oral Musculature: WFL  · Dentition: present and adequate  · Secretion Management: adequate  · Mucosal Quality: good  · Mandibular Strength and Mobility: WFL  · Oral Labial Strength and Mobility: WFL  · Lingual Strength and Mobility: WFL  · Buccal Strength and Mobility: WFL  · Volitional Cough: strong  · Volitional Swallow: timely; good laryngeal elevation  · Voice Prior to PO Intake: clear; good intensity    Bedside Swallow Eval:   Consistencies Assessed:  · Thin liquids x6 (via cup edge)  · Solids x2 (saltines)     Oral Phase:   · WFL    Pharyngeal Phase:   · no overt clinical signs/symptoms of aspiration  · no overt clinical signs/symptoms of pharyngeal dysphagia    Compensatory Strategies  · None    Treatment: Patient seen for a bedside swallow eval. He was sitting up in bed in the hallway of the ED. Patient reported no changes from a ST perspective since admission. He tolerated all consistencies with no overt signs of aspiration. SLP educated patient regarding POC and discussed patient's prior experience with ST. He verbalized understanding and had no further questions. Diet recs communicated to treatment team.    Assessment:     Hi Rubio is a 59 y.o. male with a safe oropharyngeal swallow.    Goals:   Multidisciplinary Problems     SLP Goals        Problem: SLP Goal    Goal Priority Disciplines " Outcome   SLP Goal     SLP    Description:  Speech-Language Pathology Goals  Goals to be met by 8/29/19  1. Patient will tolerate a REGULAR DIET/THIN LIQUIDS with no s/s of aspiration.  2. Patient will participate in a speech/language/cognitive eval.                    Plan:     · Patient to be seen:  4 x/week   · Plan of Care expires:  09/21/19  · Plan of Care reviewed with:  patient   · SLP Follow-Up:  Yes       Discharge recommendations:  (pending)   Barriers to Discharge:  None    Time Tracking:     SLP Treatment Date:   08/22/19  Speech Start Time:  1337  Speech Stop Time:  1400     Speech Total Time (min):  23 min    Billable Minutes: Eval Swallow and Oral Function 8 and Self Care/Home Management Training 15    Ronaldo Briggs CCC-SLP  Speech-Language Pathology  Pager: [phone]   08/22/2019

## 2019-08-22 NOTE — CONSULTS
[facility]  Neurosurgery  Consult Note    Consults  Subjective:     Chief Complaint/Reason for Admission: transient weakness    History of Present Illness:  59 M hx of CAD, DM, HLD, HTN with sudden onset left sided weakness and facial numbness this morning at 5:30am. MRI, MRA, CTA obtained. Stroke team consulted and reported no stroke.    Medications Prior to Admission   Medication Sig Dispense Refill Last Dose    ALPRAZolam (XANAX) 0.5 MG tablet TAKE 1 TABLET BY MOUTH 3 TIMES A DAY AS NEEDED FOR ANXIETY 90 tablet 1 8/21/2019    amLODIPine (NORVASC) 10 MG tablet TAKE 1 TABLET BY MOUTH EVERY DAY 30 tablet 2 8/21/2019    CONTOUR TEST STRIPS Strp USE TO TEST BLOOD SUGAR ONCE DAILY 25 strip 6 8/21/2019    ezetimibe (ZETIA) 10 mg tablet Take 1 tablet (10 mg total) by mouth once daily. 30 tablet 11 8/21/2019    hydroCHLOROthiazide (HYDRODIURIL) 25 MG tablet TAKE 1 TABLET BY MOUTH EVERY DAY 90 tablet 0 8/21/2019    metoprolol succinate (TOPROL-XL) 100 MG 24 hr tablet TAKE 1 TABLET BY MOUTH EVERY DAY 90 tablet 2 8/21/2019    MICROLET LANCET Misc 1 lancet by Misc.(Non-Drug; Combo Route) route once daily. Test blood glucose once daily as instructed. 25 each 11 8/21/2019    nitroGLYCERIN (NITROSTAT) 0.3 MG SL tablet Place 1 tablet (0.3 mg total) under the tongue every 5 (five) minutes as needed for Chest pain. 25 tablet 0 8/21/2019    pantoprazole (PROTONIX) 40 MG tablet TAKE 1 TABLET BY MOUTH EVERY DAY 30 tablet 3 8/21/2019    pantoprazole (PROTONIX) 40 MG tablet TAKE 1 TABLET BY MOUTH TWICE A DAY 60 tablet 2 8/21/2019    pravastatin (PRAVACHOL) 80 MG tablet TAKE 1 TABLET BY MOUTH EVERYDAY AT BEDTIME 90 tablet 1 8/21/2019    sertraline (ZOLOFT) 25 MG tablet TAKE 1 TABLET BY MOUTH EVERY DAY 30 tablet 6 8/21/2019    sildenafil (REVATIO) 20 mg Tab TAKE 2-5 TABLETS BY MOUTH AS NEEDED 100 tablet 11 8/21/2019    aspirin (ECOTRIN) 81 MG EC tablet Take 1 tablet (81 mg total) by mouth once daily.  0  Taking       Review of patient's allergies indicates:   Allergen Reactions    Lisinopril Anaphylaxis and Nausea And Vomiting     General bad feeling    Adhesive     Crestor [rosuvastatin] Swelling     Lip swelling.    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 -     Hyperlipidemia     Hypertension     Memory loss     Thyroid nodule 8/22/2019    Traumatic iritis - Left Eye 2/27/2013     Past Surgical History:   Procedure Laterality Date    CERVICAL FUSION  12/30/2015    COLONOSCOPY N/A 4/7/2017    Performed by Handy Frederick MD at [facility] ENDO (4TH FLR)    DISKECTOMY AND FUSION-ANTERIOR CERVICAL (ACDF) C3-4, 4-5 N/A 12/30/2015    Performed by Hernando Wise MD at [facility] OR 2ND FLR    ESOPHAGOGASTRODUODENOSCOPY (EGD) N/A 3/13/2018    Performed by Rubin Barrios MD at [facility] ENDO (2ND FLR)    HEART CATH-LEFT Left 3/21/2018    Performed by Fareed Porter MD at [facility] CATH LAB    HERNIA REPAIR      INJECTION-STEROID-EPIDURAL-CERVICAL N/A 9/28/2015    Performed by [facility] Gan [facility] at [facility] PAIN MGT    PROSTATECTOMY      PROSTATECTOMY-TRANSURETHRAL N/A 10/12/2017    Performed by Yg King MD at [facility] OR    REPAIR, HERNIA, INGUINAL, BILATERAL, LAPAROSCOPIC Bilateral 11/7/2013    Performed by Hernando Elena Jr., MD at [facility] OR 2ND FLR     Family History     Problem Relation (Age of Onset)    Benign prostatic hyperplasia Brother    Cancer Sister, Brother    Cataracts Maternal Grandmother    Diabetes Paternal Uncle    Heart disease Mother, Father    Hepatitis Brother    Hyperlipidemia Mother, Brother, Brother     Hypertension Mother, Brother, Brother, Brother, Brother        Tobacco Use    Smoking status: Former Smoker     Packs/day: 1.00     Years: 30.00     Pack years: 30.00     Types: Cigarettes     Last attempt to quit: 2011     Years since quittin.6    Smokeless tobacco: Never Used   Substance and Sexual Activity    Alcohol use: Yes     Comment: occas - nonalcoholic beer or beer    Drug use: No    Sexual activity: Yes     Partners: Female     Review of Systems  Objective:     Weight: 90.3 kg (199 lb)  Body mass index is 24.22 kg/m².  Vital Signs (Most Recent):  Temp: 97.5 °F (36.4 °C) (19 1640)  Pulse: 67 (19 1545)  Resp: 16 (19 1640)  BP: (!) 159/83 (19 1640)  SpO2: 96 % (19 1640) Vital Signs (24h Range):  Temp:  [97.5 °F (36.4 °C)-98.1 °F (36.7 °C)] 97.5 °F (36.4 °C)  Pulse:  [63-85] 67  Resp:  [10-21] 16  SpO2:  [94 %-99 %] 96 %  BP: (138-195)/(79-94) 159/83                          Neurosurgery Physical Exam  GCS15 Aox3  CN2-12 intact  RUE: 5/5 Delt 5/5 Bi 5/5 Tri 5/5 IO 5/5  LUE: 5/5 Delt 4/5 Bi 4/5 Tri 5/5 IO 5/5   RLE: 5/5 HF 5/5 QD 5/5 DF 5/5 EHL 5/5 PF LLE: 5/5 HF 4/5 QD 5/5 DF 5/5 EHL 5/5 PF  SILT    Significant Labs:  Recent Labs   Lab 19  0820   *      K 3.8      CO2 24   BUN 12   CREATININE 1.1   CALCIUM 9.6     Recent Labs   Lab 19  0820   WBC 4.93   HGB 15.7   HCT 47.7        Recent Labs   Lab 19  0820   INR 1.0     Microbiology Results (last 7 days)     ** No results found for the last 168 hours. **        All pertinent labs from the last 24 hours have been reviewed.    Significant Diagnostics:  I have reviewed all pertinent imaging results/findings within the past 24 hours.    Assessment/Plan:     * Left-sided weakness  59 M hx of CAD, DM, HLD, HTN with sudden onset left sided weakness and facial numbness this morning at 5:30am. MRI, MRA, CTA obtained. Stroke team consulted and reported no  stroke.    Neurologically stable  No neurosurgical intervention at this time  Imaging reviewed, no obvious cervical spine pathology that could explain all of his symptoms noted on the MRA neck.  This, along with his facial symptoms, and the improvement of his symptoms points away from a spinal pathology.  His symptoms would be better explained by a TIA vs seizure with the intracranial vascular stenosis noted on imaging favoring TIA.  No operable pathology  Recommend further workup per ED and consulting services  Neurosurgery will sign off, no clinic follow up needed    -Discussed with Dr. Badillo          Thank you for your consult. I will sign off. Please contact us if you have any additional questions.    Abraham Rice MD  Neurosurgery  [facility]

## 2019-08-22 NOTE — ED PROVIDER NOTES
"Encounter Date: 8/22/2019       History     Chief Complaint   Patient presents with    Extremity Weakness     LKN last night going to bed at 11:30pm, woke up 0530 with left arm weakness and left leg weakness. "can't stand on left leg". states left leg with "buckle" to stand. only other complaint is neck pain. no vision changes, no neglect but "seems like tongue is caught at the top of his mouth"     Mr. Rubio is a 59 M hx of CAD, DM, HLD, HTN here with sudden onset left sided weakness with associated neck pain.  Pt states he went to bed well around 11:30 PM, woke with at 5:30 with symptoms.  Denies facial droop, dysarthria, confusion.  Patient states he is able ambulate but feels his life is going to give out.  Denies previous episodes or symptoms in the past.  Patient does have a history disc herniation in the neck, has required surgery several years ago.  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  She does report him drinking 2-3 drinks per day.          Review of patient's allergies indicates:   Allergen Reactions    Lisinopril Anaphylaxis and Nausea And Vomiting     General bad feeling    Adhesive     Crestor [rosuvastatin] Swelling     Lip swelling.     Lipitor [atorvastatin] Other (See Comments)     Muscle aches     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 -     Hyperlipidemia     Hypertension     Memory loss     Traumatic iritis - Left Eye 2/27/2013     Past Surgical History:   Procedure Laterality Date    CERVICAL FUSION  12/30/2015    COLONOSCOPY N/A 4/7/2017    Performed by [facility]y Frederick [facility] at [facility] " ENDO (4TH FLR)    DISKECTOMY AND FUSION-ANTERIOR CERVICAL (ACDF) C3-4, 4-5 N/A 2015    Performed by Hernando Wise MD at [facility] OR 2ND FLR    ESOPHAGOGASTRODUODENOSCOPY (EGD) N/A 3/13/2018    Performed by Rubin Barrios MD at [facility] ENDO (2ND FLR)    HEART CATH-LEFT Left 3/21/2018    Performed by Fareed Porter MD at [facility] CATH LAB    HERNIA REPAIR      INJECTION-STEROID-EPIDURAL-CERVICAL N/A 2015    Performed by Colby Gan MD at [facility] PAIN MGT    PROSTATECTOMY      PROSTATECTOMY-TRANSURETHRAL N/A 10/12/2017    Performed by Yg King MD at [facility] OR    REPAIR, HERNIA, INGUINAL, BILATERAL, LAPAROSCOPIC Bilateral 2013    Performed by Hernando Elena Jr., MD at [facility] OR 2ND FLR     Family History   Problem Relation Age of Onset    Heart disease Mother     Hypertension Mother     Hyperlipidemia Mother     Heart disease Father     Hyperlipidemia Brother     Hypertension Brother     Hypertension Brother     Hyperlipidemia Brother     Benign prostatic hyperplasia Brother     Hypertension Brother     Hypertension Brother     Hepatitis Brother     Cancer Sister     Cancer Brother         throat CA    Diabetes Paternal Uncle     Cataracts Maternal Grandmother     Amblyopia Neg Hx     Blindness Neg Hx     Glaucoma Neg Hx     Macular degeneration Neg Hx     Retinal detachment Neg Hx     Strabismus Neg Hx     Stroke Neg Hx     Thyroid disease Neg Hx     Colon cancer Neg Hx     Colon polyps Neg Hx      Social History     Tobacco Use    Smoking status: Former Smoker     Packs/day: 1.00     Years: 30.00     Pack years: 30.00     Types: Cigarettes     Last attempt to quit: 2011     Years since quittin.6    Smokeless tobacco: Never Used   Substance Use Topics    Alcohol use: Yes     Comment: occas - nonalcoholic beer or beer    Drug use: No     Review of Systems   Constitutional: Negative for chills and fever.   HENT: Negative for congestion, tinnitus and  trouble swallowing.    Eyes: Negative for photophobia, pain and visual disturbance.   Respiratory: Negative for chest tightness and shortness of breath.    Gastrointestinal: Negative for abdominal pain, nausea and vomiting.   Genitourinary: Negative for dysuria.   Musculoskeletal: Positive for back pain and neck pain.   Skin: Negative for pallor.   Neurological: Positive for weakness (left arm/left leg). Negative for dizziness, syncope, facial asymmetry, speech difficulty, numbness and headaches.   Psychiatric/Behavioral: Negative for agitation.       Physical Exam     Initial Vitals [08/22/19 0759]   BP Pulse Resp Temp SpO2   (!) 162/83 83 18 98.1 °F (36.7 °C) 98 %      MAP       --         Physical Exam    Nursing note and vitals reviewed.  Constitutional: He appears well-developed and well-nourished. No distress.   HENT:   Mouth/Throat: Oropharynx is clear and moist.   Eyes: Conjunctivae are normal. Pupils are equal, round, and reactive to light. Right eye exhibits no discharge. Left eye exhibits no discharge. No scleral icterus.   Lateral gaze palsy of left eye   Neck: Normal range of motion. Neck supple. No JVD present.   Cardiovascular: Normal rate and regular rhythm.   Pulmonary/Chest: Breath sounds normal. He has no wheezes. He has no rales.   Abdominal: Soft. Bowel sounds are normal. There is no tenderness.   Musculoskeletal: He exhibits no edema.   Neurological: He is alert and oriented to person, place, and time. A sensory deficit (left side face with reduced sensation) is present.   Decreased hand  on left, (+) drift LLE  No dysarthria, no facial droop.    Skin: Skin is warm. Capillary refill takes less than 2 seconds. No rash noted.         ED Course   Procedures  Labs Reviewed   COMPREHENSIVE METABOLIC PANEL - Abnormal; Notable for the following components:       Result Value    Glucose 142 (*)     ALT 46 (*)     All other components within normal limits   LIPID PANEL - Abnormal; Notable for the  following components:    Cholesterol 222 (*)     Triglycerides 458 (*)     Hdl/Cholesterol Ratio 19.4 (*)     Total Cholesterol/HDL Ratio 5.2 (*)     All other components within normal limits   POCT GLUCOSE - Abnormal; Notable for the following components:    POCT Glucose 122 (*)     All other components within normal limits   CBC W/ AUTO DIFFERENTIAL   PROTIME-INR   TSH   VITAMIN B12 DEFICIENCY PANEL   FOLATE   POCT GLUCOSE, HAND-HELD DEVICE   ISTAT PROCEDURE   ISTAT CREATININE     EKG Readings: (Independently Interpreted)   Initial Reading: No STEMI.   EKG: SR at 84 with PVC       Imaging Results          MRI Brain Without Contrast (In process)                X-Ray Chest AP Portable (Final result)  Result time 08/22/19 08:49:32    Final result by Verito Chavez MD (08/22/19 08:49:32)                 Impression:      No acute disease identified.      Electronically signed by: Verito Chavez MD  Date:    08/22/2019  Time:    08:49             Narrative:    EXAMINATION:  XR CHEST AP PORTABLE    CLINICAL HISTORY:  Stroke;    TECHNIQUE:  Single frontal view of the chest was performed.    COMPARISON:  03/08/2018, 10:28 hours.  01/21/2017, 18:09 hours.    FINDINGS:  Mediastinal structures are midline. Cardiac silhouette and pulmonary vascular distribution are normal.    Lung volumes are normal and symmetric. I detect no pulmonary disease, pleural fluid, lymph node enlargement, cardiac decompensation, pneumothorax, pneumomediastinum, pneumoperitoneum or significant osseous abnormality.                                CTA STROKE MULTI-PHASE (Final result)  Result time 08/22/19 08:58:43    Final result by Juan Rosales DO (08/22/19 08:58:43)                 Impression:      CTA head: High-grade stenosis or segment occlusion in the right M3 segment of the MCA in the sylvian fissure..    Globular outpouching distal A3/A4 segment JOANNE measuring approximate 4-5 mm concerning for aneurysm.    CTA neck: Atherosclerotic  plaquing of the carotid bifurcations and proximal ICAs with less than 50% proximal ICA stenosis by NASCET criteria.    Incidental 1 cm heterogeneous nodule left lobe of the thyroid overall indeterminate clinical correlation and follow-up dedicated thyroid imaging recommended.    CT head: No evidence for acute intracranial hemorrhage or sulcal effacement    Please see above for additional details.      Electronically signed by: Juan Rosales DO  Date:    08/22/2019  Time:    08:58             Narrative:    EXAMINATION:  CTA STROKE MULTI-PHASE    CLINICAL HISTORY:  Stroke;    TECHNIQUE:  5 mm axial images of the head pre and post contrast with 0.625 mm axial CTA images of the head neck postcontrast.  Coronal and sagittal MPR and MIP imaging was performed 100 ml of Omnipaque 350 contrast was injected intravenously    COMPARISON:  None    FINDINGS:  CT head with and  without contrast: There is no evidence for acute intracranial hemorrhage or sulcal effacement.  The ventricles are normal in size and configuration without evidence for hydrocephalus.  There is no midline shift or mass effect.  There is a high density focus within the left ethmoid air cells suggestive for osteoma.  Few patchy ethmoid air cell opacities.    CTA head:    Anterior circulation: The bilateral distal cervical, petrous, cavernous, and supraclinoid segments of the ICAs are patent without significant focal stenosis or aneurysm.    The anterior cerebral arteries are patent.  There is a globular outpouching of the distal A 3/4 segment JOANNE concerning for aneurysm measuring approximately 4-5 mm.    The left middle cerebral artery is patent without significant stenosis.    There is a focal region of high-grade narrowing or short segment occlusion within the right M3 segmental branch of the MCA within the sylvian fissure best seen on axial image 1697 series 3.    Posterior circulation: The distal vertebral arteries, basilar artery and posterior cerebral  arteries are patent without focal stenosis or aneurysm.    CTA neck: Common origin the right brachiocephalic and left common carotid artery and origin of the left subclavian artery from the arch are within normal limits.    The origin of the vertebral arteries from the respective subclavian arteries are within normal limits.  The vertebral arteries are patent throughout their course without focal stenosis or occlusion.    Right carotid: The right common carotid artery, carotid bifurcation and extracranial portions of the internal carotid artery are patent without significant focal stenosis.    Left carotid: The left common carotid artery, carotid bifurcation and extracranial portions of the internal carotid arteries are patent without significant focal stenosis.    Atherosclerotic plaquing of the carotid bifurcations and proximal ICAs with less than 50% proximal ICA stenosis by NASCET criteria.    .    Pharynx/larynx: Postoperative change cervical spine from C3 through C5 anterior spinal fusion.  Pharynx larynx limited by artifact from dental metal and spinal metal artifact.  Within limits of the study the nasopharynx, oropharynx hypopharynx larynx and proximal trachea within normal limits.  Scattered emphysematous change in the visualized lungs.    Oral cavity and the buccal space      limited by dental metal.    Glands: Bilateral parotid and submandibular glands are within normal limits. There is approximately 1 cm heterogeneous nodule in the left lobe of the thyroid inferiorly.  Clinical correlation follow-up dedicated thyroid imaging advised.    No evidence for adenopathy throughout the neck by size criteria.    There is no evidence for acute fracture of the cervical spine.  No definite hardware failure.    Case discussed with Dr. Lazar on 08/22/2019 at 8:40 am This report was flagged in Epic as abnormal.                                 Medical Decision Making:   History:   I obtained history from: someone other  than patient.       <> Summary of History: Wife at bedside  Old Medical Records: I decided to obtain old medical records.  Initial Assessment:   Emergent evaluation 59-year-old male history of hypertension, hyperlipidemia, CAD here today with sudden onset left-sided weakness that began at 5:30 a.m. when he woke up.  Patient was last seen normal 11:30 p.m..  Stroke code was activated on arrival, Stroke team consulted.    8:08: stroke team at bedside.  Recommending CTA, ordered placed. Not TPA candidate due to sx onset.       Differential Diagnosis:   CVA, TIA, cervical radiculopathy, disc herniation, cord compression, peripheral neuropathy  Clinical Tests:   Lab Tests: Ordered and Reviewed  Radiological Study: Ordered and Reviewed  Medical Tests: Reviewed and Ordered  ED Management:  - CT head  - CTA multiphase  - labs  - EKG  - cxr  -stroke consult    CTA reviewed- High-grade stenosis or segment occlusion in the right M3 segment of the MCA in the sylvian fissure.  Not interventional candidate.  MRI/MRA ordered.    MRI reviewed which shows no evidence of occlusion or recent infarct.  Vascular neurology signed off on case.    11:30 AM  Patient re-evaluated, reports improvement of left arm symptoms, does report persistent sensory deficit in the face and weakness in the left leg.  Patient attempted to ambulate with persistent weakness in the left leg and ataxia.  Additional information:  Patient states that 1 week ago he fell from a ladder approximately 6-8 feet.  Has had persistent sacral and buttock pain since, x-rays ordered.    Case discussed with , meets inpatient for new neurologic change.  Vitamin B12 and folate ordered.    Patient be admitted to Hospital Medicine.    Other:   I have discussed this case with another health care provider.       <> Summary of the Discussion: Hospital medicine, vascular Neurology              Attending Attestation:         Attending Critical Care:   Critical Care Times:    ==============================================================  · Total Critical Care Time - exclusive of procedural time: 45 minutes.  ==============================================================  Critical care was necessary to treat or prevent imminent or life-threatening deterioration of the following conditions: stroke.   Critical care was time spent personally by me on the following activities: examination of patient, review of x-rays / CT sent with the patient, review of old charts, ordering lab, x-rays, and/or EKG, ordering and performing treatments and interventions, discussion with consultants and re-evaluation of patient's conition.   Critical Care Condition: potentially life-threatening     Physician Attestation for Scribe:      Comments: I, Dr. Ira Garcia, personally performed the services described in this documentation. All medical record entries made by the scribe were at my direction and in my presence.  I have reviewed the chart and agree that the record reflects my personal performance and is accurate and complete. Ira Garcia MD.                 Clinical Impression:       ICD-10-CM ICD-9-CM   1. Left-sided weakness R53.1 728.87   2. Lower back pain M54.5 724.2   3. Paresthesia R20.2 782.0   4. Ataxia R27.0 781.3         Disposition:   Disposition: Admitted  Condition: Fair                        Ira Garcia MD  08/22/19 1209       Ira Garcia MD  08/22/19 1221

## 2019-08-22 NOTE — SUBJECTIVE & OBJECTIVE
Medications Prior to Admission   Medication Sig Dispense Refill Last Dose    ALPRAZolam (XANAX) 0.5 MG tablet TAKE 1 TABLET BY MOUTH 3 TIMES A DAY AS NEEDED FOR ANXIETY 90 tablet 1 8/21/2019    amLODIPine (NORVASC) 10 MG tablet TAKE 1 TABLET BY MOUTH EVERY DAY 30 tablet 2 8/21/2019    CONTOUR TEST STRIPS Strp USE TO TEST BLOOD SUGAR ONCE DAILY 25 strip 6 8/21/2019    ezetimibe (ZETIA) 10 mg tablet Take 1 tablet (10 mg total) by mouth once daily. 30 tablet 11 8/21/2019    hydroCHLOROthiazide (HYDRODIURIL) 25 MG tablet TAKE 1 TABLET BY MOUTH EVERY DAY 90 tablet 0 8/21/2019    metoprolol succinate (TOPROL-XL) 100 MG 24 hr tablet TAKE 1 TABLET BY MOUTH EVERY DAY 90 tablet 2 8/21/2019    MICROLET LANCET Misc 1 lancet by Misc.(Non-Drug; Combo Route) route once daily. Test blood glucose once daily as instructed. 25 each 11 8/21/2019    nitroGLYCERIN (NITROSTAT) 0.3 MG SL tablet Place 1 tablet (0.3 mg total) under the tongue every 5 (five) minutes as needed for Chest pain. 25 tablet 0 8/21/2019    pantoprazole (PROTONIX) 40 MG tablet TAKE 1 TABLET BY MOUTH EVERY DAY 30 tablet 3 8/21/2019    pantoprazole (PROTONIX) 40 MG tablet TAKE 1 TABLET BY MOUTH TWICE A DAY 60 tablet 2 8/21/2019    pravastatin (PRAVACHOL) 80 MG tablet TAKE 1 TABLET BY MOUTH EVERYDAY AT BEDTIME 90 tablet 1 8/21/2019    sertraline (ZOLOFT) 25 MG tablet TAKE 1 TABLET BY MOUTH EVERY DAY 30 tablet 6 8/21/2019    sildenafil (REVATIO) 20 mg Tab TAKE 2-5 TABLETS BY MOUTH AS NEEDED 100 tablet 11 8/21/2019    aspirin (ECOTRIN) 81 MG EC tablet Take 1 tablet (81 mg total) by mouth once daily.  0 Taking       Review of patient's allergies indicates:   Allergen Reactions    Lisinopril Anaphylaxis and Nausea And Vomiting     General bad feeling    Adhesive     Crestor [rosuvastatin] Swelling     Lip swelling.     Lipitor [atorvastatin] Other (See Comments)     Muscle aches       Past Medical History:   Diagnosis Date    Carotid artery occlusion      Coronary artery disease     Diabetes mellitus, type 2     diet controlled    Difficult intubation     DJD (degenerative joint disease), cervical 7/10/2015    GERD (gastroesophageal reflux disease)     History of iritis     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 -     Hyperlipidemia     Hypertension     Memory loss     Thyroid nodule 2019    Traumatic iritis - Left Eye 2013     Past Surgical History:   Procedure Laterality Date    CERVICAL FUSION  2015    COLONOSCOPY N/A 2017    Performed by Handy Frederick MD at [facility] ENDO (4TH FLR)    DISKECTOMY AND FUSION-ANTERIOR CERVICAL (ACDF) C3-4, 4-5 N/A 2015    Performed by Hernando Wise MD at [facility] OR 2ND FLR    ESOPHAGOGASTRODUODENOSCOPY (EGD) N/A 3/13/2018    Performed by Rubin Barrios MD at [facility] ENDO (2ND FLR)    HEART CATH-LEFT Left 3/21/2018    Performed by Fareed Porter MD at [facility] CATH LAB    HERNIA REPAIR      INJECTION-STEROID-EPIDURAL-CERVICAL N/A 2015    Performed by [facility] Gan MD at [facility] PAIN MGT    PROSTATECTOMY      PROSTATECTOMY-TRANSURETHRAL N/A 10/12/2017    Performed by Yg King MD at [facility] OR    REPAIR, HERNIA, INGUINAL, BILATERAL, LAPAROSCOPIC Bilateral 2013    Performed by Hernando Elena Jr., MD at [facility] OR 2ND FLR     Family History     Problem Relation (Age of Onset)    Benign prostatic hyperplasia Brother    Cancer Sister, Brother    Cataracts Maternal Grandmother    Diabetes Paternal Uncle    Heart disease Mother, Father    Hepatitis Brother    Hyperlipidemia Mother, Brother, Brother    Hypertension Mother, Brother, Brother, Brother, Brother        Tobacco Use    Smoking status: Former Smoker     Packs/day: 1.00     Years: 30.00     Pack years: 30.00     Types: Cigarettes     Last attempt to quit: 2011     Years since quittin.6    Smokeless tobacco: Never Used   Substance and Sexual Activity    Alcohol use: Yes     Comment: occas -  nonalcoholic beer or beer    Drug use: No    Sexual activity: Yes     Partners: Female     Review of Systems  Objective:     Weight: 90.3 kg (199 lb)  Body mass index is 24.22 kg/m².  Vital Signs (Most Recent):  Temp: 97.5 °F (36.4 °C) (08/22/19 1640)  Pulse: 67 (08/22/19 1545)  Resp: 16 (08/22/19 1640)  BP: (!) 159/83 (08/22/19 1640)  SpO2: 96 % (08/22/19 1640) Vital Signs (24h Range):  Temp:  [97.5 °F (36.4 °C)-98.1 °F (36.7 °C)] 97.5 °F (36.4 °C)  Pulse:  [63-85] 67  Resp:  [10-21] 16  SpO2:  [94 %-99 %] 96 %  BP: (138-195)/(79-94) 159/83                          Neurosurgery Physical Exam  GCS15 Aox3  CN2-12 intact  RUE: 5/5 Delt 5/5 Bi 5/5 Tri 5/5 IO 5/5  LUE: 5/5 Delt 4/5 Bi 4/5 Tri 5/5 IO 5/5   RLE: 5/5 HF 5/5 QD 5/5 DF 5/5 EHL 5/5 PF LLE: 5/5 HF 4/5 QD 5/5 DF 5/5 EHL 5/5 PF  SILT    Significant Labs:  Recent Labs   Lab 08/22/19  0820   *      K 3.8      CO2 24   BUN 12   CREATININE 1.1   CALCIUM 9.6     Recent Labs   Lab 08/22/19  0820   WBC 4.93   HGB 15.7   HCT 47.7        Recent Labs   Lab 08/22/19  0820   INR 1.0     Microbiology Results (last 7 days)     ** No results found for the last 168 hours. **        All pertinent labs from the last 24 hours have been reviewed.    Significant Diagnostics:  I have reviewed all pertinent imaging results/findings within the past 24 hours.

## 2019-08-22 NOTE — CONSULTS
[facility]  Vascular Neurology  Comprehensive Stroke Center  Consult Note    Inpatient consult to Vascular (Stroke) Neurology  Consult performed by: Travis Caldwell MD  Consult ordered by: Ira Garcia MD        Assessment/Plan:     Patient is a 59 y.o. year old male with:    * Left-sided weakness  Hi Rubio is a 59 year old male with a medical history significant for DM2, HTN, HLD, afib who presented to [facility] [place] with complaints of L sided weakness and L sided sensory changes to face, LUE and LLE.     - CT - no acute intracranial abnormality   - CTA - no LVO  - MRI Brain - no acute stroke  - MRA Brain/Neck - no evidence for proximal high-grade stenosis [id number], no evidence for significant focal stenosis or occlusion in the neck  - EKG - NSR  - Triglycerides 458, cholesterol 222    Recommendations   - No acute stroke noted on imaging  - Medical management of hypertriglyceridemia and hypercholesterolemia   - Risk factor modification (HTN, DM2)  - Continue ASA 81  - Vascular Neurology will sign off, please contact with any questions or concerns      Sensory deficit, left  - See Left-sided weakness  - Decreased sensation in L face, LUE and LLE        STROKE DOCUMENTATION     Acute Stroke Times   Last Known Normal Date: 08/21/19  Last Known Normal Time: 2330  Symptom Onset Date: 08/22/19  Symptom Onset Time: 0530  Stroke Team Called Date: 08/22/19  Stroke Team Called Time: 0802  Stroke Team Arrival Date: 08/22/19  Stroke Team Arrival Time: 0810  CT Interpretation Time: 0830    NIH Scale:  1a. Level of Consciousness: 0-->Alert, keenly responsive  1b. LOC Questions: 0-->Answers both questions correctly  1c. LOC Commands: 0-->Performs both tasks correctly  2. Best Gaze: 0-->Normal  3. Visual: 0-->No visual loss  4. Facial Palsy: 0-->Normal symmetrical movements  5a. Motor Arm, Left: 1-->Drift, limb holds 90 (or 45) degrees, but drifts down before full 10 seconds, does not hit bed or  other support  5b. Motor Arm, Right: 0-->No drift, limb holds 90 (or 45) degrees for full 10 secs  6a. Motor Leg, Left: 2-->Some effort against gravity, leg falls to bed by 5 secs, but has some effort against gravity  6b. Motor Leg, Right: 0-->No drift, leg holds 30 degree position for full 5 secs  7. Limb Ataxia: 1-->Present in one limb  8. Sensory: 1-->Mild-to-moderate sensory loss, patient feels pinprick is less sharp or is dull on the affected side, or there is a loss of superficial pain with pinprick, but patient is aware of being touched  9. Best Language: 0-->No aphasia, normal  10. Dysarthria: 0-->Normal  11. Extinction and Inattention (formerly Neglect): 0-->No abnormality  Total (NIH Stroke Scale): 5    Modified Colfax Score: 1  Nigel Coma Scale:15   ABCD2 Score:    [id number] Score:2  HAS -BLED Score:3  ICH Score:   Hunt & Bowden Classification:       Thrombolysis Candidate? No, Out of window     Delays to Thrombolysis?  No    Interventional Revascularization Candidate?   Is the patient eligible for mechanical endovascular reperfusion (CASANDRA)?  No; No large vessel occlusion      Hemorrhagic change of an Ischemic Stroke: Does this patient have an ischemic stroke with hemorrhagic changes? No     Subjective:     History of Present Illness:  Hi Rubio is a 59 year old male with a medical history significant for afib, CAD, DM2, HLD, HTN and cervical spondylosis with myelopathy s/p ACDF C3/C4 C4/C5 in 2015 who [place] [facility] [place] of left sided weakness and neck pain. Pt states that was feeling normal when he went to bed last night around 1130PM. Pt states that this morning he awoke at his normal time and noted left sided neck pain and motor weakness in his RUE and RLE. Pt and wife at bedside deny any dysarthria, word finding difficulty or facial droop. Pt states that he is fully functional at baseline and able to ambulate without assistance. Pt states that he feel about 8 feet from a ladder  about a week prior to presentation but denies any head trauma or LOC. 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     On initial examination, pt has motor weakness on left (LLE>LUE). Pt endorses sensation changes on left with decreased sensation to face, LUE, LLE. Pt has difficulty fully looking to his left although visual fields are intact. Pt displayed ataxia in LUE. Spasticity in LUE, LLE is also present. NIH of 5. CT Head was unremarkable. CTA Head and Neck showed high grade stenosis in the right M3 segment. MRI Brain showed no evidence of recent infarction or other acute intracranial pathology.        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 -     Hyperlipidemia     Hypertension     Memory loss     Traumatic iritis - Left Eye 2/27/2013     Past Surgical History:   Procedure Laterality Date    CERVICAL FUSION  12/30/2015    COLONOSCOPY N/A 4/7/2017    Performed by Handy Frederick MD at [facility] ENDO (4TH FLR)    DISKECTOMY AND FUSION-ANTERIOR CERVICAL (ACDF) C3-4, 4-5 N/A 12/30/2015    Performed by Hernando Wise MD at [facility] OR 2ND FLR    ESOPHAGOGASTRODUODENOSCOPY (EGD) N/A 3/13/2018    Performed by Rubin Barrios MD at [facility] ENDO (2ND FLR)    HEART CATH-LEFT Left 3/21/2018    Performed by Fareed Porter MD at [facility] CATH LAB    HERNIA REPAIR      INJECTION-STEROID-EPIDURAL-CERVICAL N/A 9/28/2015    Performed by Colby Gan MD at [facility] [place] by [facility] PAIN MGT    PROSTATECTOMY      PROSTATECTOMY-TRANSURETHRAL N/A 10/12/2017    Performed by Yg iKng MD at [facility] [place] OR    REPAIR, HERNIA, INGUINAL, BILATERAL, LAPAROSCOPIC Bilateral 11/7/2013    Performed by Hernando Elena  MD [place] [facility] OR [address] [place] [address]     Family History   Problem Relation Age of Onset    Heart disease Mother     Hypertension Mother     Hyperlipidemia Mother     Heart disease Father     Hyperlipidemia Brother     Hypertension Brother     Hypertension Brother     Hyperlipidemia Brother     Benign prostatic hyperplasia Brother     Hypertension Brother     Hypertension Brother     Hepatitis Brother     Cancer Sister     Cancer Brother         throat CA    Diabetes Paternal Uncle     Cataracts Maternal Grandmother     Amblyopia Neg Hx     Blindness Neg Hx     Glaucoma Neg Hx     Macular degeneration Neg Hx     Retinal detachment Neg Hx     Strabismus Neg Hx     Stroke Neg Hx     Thyroid disease Neg Hx     Colon cancer Neg Hx     Colon polyps Neg Hx      Social History     Tobacco Use    Smoking status: Former Smoker     Packs/day: 1.00     Years: 30.00     Pack years: 30.00     Types: Cigarettes     Last attempt to quit: 2011     Years since quittin.6    Smokeless tobacco: Never Used   Substance Use Topics    Alcohol use: Yes     Comment: occas - nonalcoholic beer or beer    Drug use: No     Review of patient's allergies indicates:   Allergen Reactions    Lisinopril Anaphylaxis and Nausea And Vomiting     General bad feeling    Adhesive     Crestor [rosuvastatin] Swelling     Lip swelling.     Lipitor [atorvastatin] Other (See Comments)     Muscle aches       Medications: I have reviewed the current medication administration record.      (Not in a hospital admission)    Review of Systems   Constitutional: Negative for appetite change, fatigue and fever.   HENT: Negative for sinus pressure, sinus pain and sore throat.    Eyes: Negative for photophobia and visual disturbance.   Respiratory: Negative for chest tightness and shortness of breath.    Cardiovascular: Negative for chest pain, palpitations and leg swelling.   Gastrointestinal: Negative for abdominal pain, diarrhea, nausea  and vomiting.   Genitourinary: Negative for difficulty urinating and dysuria.   Musculoskeletal: Positive for gait problem and neck pain. Negative for arthralgias and neck stiffness.   Neurological: Positive for dizziness, tremors, weakness and numbness. Negative for seizures, syncope, facial asymmetry, speech difficulty, light-headedness and headaches.   Psychiatric/Behavioral: Negative for agitation and confusion.     Objective:     Vital Signs (Most Recent):  Temp: 98.1 °F (36.7 °C) (08/22/19 0759)  Pulse: 71 (08/22/19 0845)  Resp: 20 (08/22/19 0845)  BP: (!) 149/80 (08/22/19 0845)  SpO2: (!) 94 % (08/22/19 0845)    Vital Signs Range (Last 24H):  Temp:  [98.1 °F (36.7 °C)]   Pulse:  [71-85]   Resp:  [18-21]   BP: (149-195)/(80-94)   SpO2:  [94 %-98 %]     Physical Exam   Constitutional: He is oriented to person, place, and time. He appears well-developed and well-nourished.   HENT:   Head: Normocephalic and atraumatic.   Eyes: Pupils are equal, round, and reactive to light.   Neck: Normal range of motion.   Cardiovascular: Normal rate, regular rhythm, normal heart sounds and intact distal pulses.   Pulmonary/Chest: Effort normal and breath sounds normal. No respiratory distress.   Abdominal: Soft. He exhibits no distension. There is no tenderness.   Musculoskeletal: He exhibits no edema or tenderness.   Neurological: He is alert and oriented to person, place, and time. He displays normal reflexes. A cranial nerve deficit and sensory deficit is present. He exhibits abnormal muscle tone.   Skin: Skin is warm and dry. No rash noted. No erythema.   Nursing note and vitals reviewed.      Neurological Exam:   LOC: alert  Attention Span: Good   Language: No aphasia  Articulation: No dysarthria  Orientation: Person, Place, Time   Visual Fields: Full  EOM (CN III, IV, VI): Full/intact  Pupils (CN II, III): PERRL  Facial Sensation (CN V): Facial sensory loss  Facial Movement (CN VII): Symmetric facial expression     Reflexes: 2+ throughout  Motor: Arm left  Normal 5/5  Leg left  Paresis: 3/5  Arm right  Normal 5/5  Leg right Normal 5/5  Cebellar: Upper Extremity Appendicular Ataxia (Finger Nose Finger)  Left  Sensation: Hans-hypoesthesia left  Tone: Spasticity  LUE  and LLE      Laboratory:  CMP: No results for input(s): GLUCOSE, CALCIUM, ALBUMIN, PROT, NA, K, CO2, CL, BUN, CREATININE, ALKPHOS, ALT, AST, BILITOT in the last 24 hours.  CBC:   Recent Labs   Lab 08/22/19  0820   WBC 4.93   RBC 5.24   HGB 15.7   HCT 47.7      MCV 91   MCH 30.0   MCHC 32.9     Lipid Panel: No results for input(s): CHOL, LDLCALC, HDL, TRIG in the last 168 hours.  Coagulation: No results for input(s): PT, INR, APTT in the last 168 hours.  Hgb A1C: No results for input(s): HGBA1C in the last 168 hours.  TSH: No results for input(s): TSH in the last 168 hours.    Diagnostic Results:    Brain imaging:    CT Head 8/22/19 0837   No evidence for acute intracranial hemorrhage or sulcal effacement    Vessel Imaging:    CTA Stroke Multiphase 8/22/19 0837  High-grade stenosis or segment occlusion in the right M3 segment of the MCA in the sylvian fissure..    Globular outpouching distal A3/A4 segment JOANNE measuring approximate 4-5 mm concerning for aneurysm.    Atherosclerotic plaquing of the carotid bifurcations and proximal ICAs with less than 50% proximal ICA stenosis by NASCET criteria.    Cardiac Evaluation:     EKG: Sinus rhythm with Premature supraventricular complexes      Travis Caldwell MD  [facility] Stroke Center  Department of Vascular Neurology   [facility]

## 2019-08-22 NOTE — ASSESSMENT & PLAN NOTE
Hi Rubio is a 59 year old male with a medical history significant for DM2, HTN, HLD, afib who presented to [facility] with complaints of L sided weakness and L sided sensory changes to face, LUE and LLE.     - CT - no acute intracranial abnormality   - CTA - no LVO  - MRI Brain - no acute stroke  - MRA Brain/Neck - no evidence for proximal high-grade stenosis [id number], no evidence for significant focal stenosis or occlusion in the neck  - EKG - NSR  - Triglycerides 458, cholesterol 222    Recommendations   - No acute stroke noted on imaging  - Medical management of hypertriglyceridemia and hypercholesterolemia   - Risk factor modification (HTN, DM2)  - Continue ASA 81  - Vascular Neurology will sign off, please contact with any questions or concerns

## 2019-08-22 NOTE — HPI
Hi Rubio is a 59 year old male with a medical history significant for afib, CAD, DM2, HLD, HTN and cervical spondylosis with myelopathy s/p ACDF C3/C4 C4/C5 in 2015 who [place] [facility] [place] left sided weakness and neck pain. Pt states that was feeling normal when he went to bed last night around 1130PM. Pt states that this morning he awoke at his normal time and noted left sided neck pain and motor weakness in his RUE and RLE. Pt and wife at bedside deny any dysarthria, word finding difficulty or facial droop. Pt states that he is fully functional at baseline and able to ambulate without assistance. Pt states that he feel about 8 feet from a ladder about a week prior to presentation but denies any head trauma or LOC. 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     On initial examination, pt has motor weakness on left (LLE>LUE). Pt endorses sensation changes on left with decreased sensation to face, LUE, LLE. Pt has difficulty fully looking to his left although visual fields are intact. Pt displayed ataxia in LUE. Spasticity in LUE, LLE is also present. NIH of 5. CT Head was unremarkable. CTA Head and Neck showed high grade stenosis in the right M3 segment. MRI Brain showed no evidence of recent infarction or other acute intracranial pathology.

## 2019-08-22 NOTE — PLAN OF CARE
Problem: SLP Goal  Goal: SLP Goal  Speech-Language Pathology Goals  Goals to be met by 8/29/19  1. Patient will tolerate a REGULAR DIET/THIN LIQUIDS with no s/s of aspiration.  2. Patient will participate in a speech/language/cognitive eval.  Patient seen for a bedside swallow eval. SLP recommending a REGULAR DIET/THIN LIQUIDS at this time.     Ronaldo Briggs CCC-SLP  Speech-Language Pathology  Pager: [phone]

## 2019-08-23 LAB
ALBUMIN SERPL BCP-MCNC: 3.9 G/DL (ref 3.5–5.2)
ALP SERPL-CCNC: 79 U/L (ref 55–135)
ALT SERPL W/O P-5'-P-CCNC: 44 U/L (ref 10–44)
ANION GAP SERPL CALC-SCNC: 12 MMOL/L (ref 8–16)
AST SERPL-CCNC: 28 U/L (ref 10–40)
BASOPHILS # BLD AUTO: 0.04 K/UL (ref 0–0.2)
BASOPHILS NFR BLD: 0.7 % (ref 0–1.9)
BILIRUB SERPL-MCNC: 0.6 MG/DL (ref 0.1–1)
BUN SERPL-MCNC: 13 MG/DL (ref 8–23)
CALCIUM SERPL-MCNC: 9.6 MG/DL (ref 8.7–10.5)
CHLORIDE SERPL-SCNC: 104 MMOL/L (ref 95–110)
CO2 SERPL-SCNC: 25 MMOL/L (ref 23–29)
CREAT SERPL-MCNC: 1 MG/DL (ref 0.5–1.4)
DIFFERENTIAL METHOD: NORMAL
EOSINOPHIL # BLD AUTO: 0.5 K/UL (ref 0–0.5)
EOSINOPHIL NFR BLD: 8 % (ref 0–8)
ERYTHROCYTE [DISTWIDTH] IN BLOOD BY AUTOMATED COUNT: 13.3 % (ref 11.5–14.5)
EST. GFR  (AFRICAN AMERICAN): >60 ML/MIN/1.73 M^2
EST. GFR  (NON AFRICAN AMERICAN): >60 ML/MIN/1.73 M^2
GLUCOSE SERPL-MCNC: 112 MG/DL (ref 70–110)
HCT VFR BLD AUTO: 48.5 % (ref 40–54)
HGB BLD-MCNC: 16 G/DL (ref 14–18)
IMM GRANULOCYTES # BLD AUTO: 0.02 K/UL (ref 0–0.04)
IMM GRANULOCYTES NFR BLD AUTO: 0.3 % (ref 0–0.5)
LYMPHOCYTES # BLD AUTO: 1.9 K/UL (ref 1–4.8)
LYMPHOCYTES NFR BLD: 33.2 % (ref 18–48)
MAGNESIUM SERPL-MCNC: 2 MG/DL (ref 1.6–2.6)
MCH RBC QN AUTO: 29.7 PG (ref 27–31)
MCHC RBC AUTO-ENTMCNC: 33 G/DL (ref 32–36)
MCV RBC AUTO: 90 FL (ref 82–98)
MONOCYTES # BLD AUTO: 0.5 K/UL (ref 0.3–1)
MONOCYTES NFR BLD: 9.2 % (ref 4–15)
NEUTROPHILS # BLD AUTO: 2.8 K/UL (ref 1.8–7.7)
NEUTROPHILS NFR BLD: 48.6 % (ref 38–73)
NRBC BLD-RTO: 0 /100 WBC
PHOSPHATE SERPL-MCNC: 3.7 MG/DL (ref 2.7–4.5)
PLATELET # BLD AUTO: 259 K/UL (ref 150–350)
PMV BLD AUTO: 10.4 FL (ref 9.2–12.9)
POCT GLUCOSE: 113 MG/DL (ref 70–110)
POCT GLUCOSE: 124 MG/DL (ref 70–110)
POCT GLUCOSE: 156 MG/DL (ref 70–110)
POCT GLUCOSE: 204 MG/DL (ref 70–110)
POTASSIUM SERPL-SCNC: 3.4 MMOL/L (ref 3.5–5.1)
PROT SERPL-MCNC: 6.9 G/DL (ref 6–8.4)
RBC # BLD AUTO: 5.38 M/UL (ref 4.6–6.2)
SODIUM SERPL-SCNC: 141 MMOL/L (ref 136–145)
WBC # BLD AUTO: 5.84 K/UL (ref 3.9–12.7)

## 2019-08-23 PROCEDURE — 99223 1ST HOSP IP/OBS HIGH 75: CPT | Mod: ,,, | Performed by: PSYCHIATRY & NEUROLOGY

## 2019-08-23 PROCEDURE — 80053 COMPREHEN METABOLIC PANEL: CPT

## 2019-08-23 PROCEDURE — 25000003 PHARM REV CODE 250: Performed by: HOSPITALIST

## 2019-08-23 PROCEDURE — 99233 SBSQ HOSP IP/OBS HIGH 50: CPT | Mod: ,,, | Performed by: HOSPITALIST

## 2019-08-23 PROCEDURE — 84100 ASSAY OF PHOSPHORUS: CPT

## 2019-08-23 PROCEDURE — 99233 PR SUBSEQUENT HOSPITAL CARE,LEVL III: ICD-10-PCS | Mod: ,,, | Performed by: HOSPITALIST

## 2019-08-23 PROCEDURE — 36415 COLL VENOUS BLD VENIPUNCTURE: CPT

## 2019-08-23 PROCEDURE — 83735 ASSAY OF MAGNESIUM: CPT

## 2019-08-23 PROCEDURE — 92523 SPEECH SOUND LANG COMPREHEN: CPT

## 2019-08-23 PROCEDURE — 85025 COMPLETE CBC W/AUTO DIFF WBC: CPT

## 2019-08-23 PROCEDURE — 11000001 HC ACUTE MED/SURG PRIVATE ROOM

## 2019-08-23 PROCEDURE — 97535 SELF CARE MNGMENT TRAINING: CPT

## 2019-08-23 PROCEDURE — 97165 OT EVAL LOW COMPLEX 30 MIN: CPT

## 2019-08-23 PROCEDURE — 99223 PR INITIAL HOSPITAL CARE,LEVL III: ICD-10-PCS | Mod: ,,, | Performed by: PSYCHIATRY & NEUROLOGY

## 2019-08-23 PROCEDURE — 92526 ORAL FUNCTION THERAPY: CPT

## 2019-08-23 RX ORDER — AMLODIPINE BESYLATE 10 MG/1
10 TABLET ORAL NIGHTLY
Status: DISCONTINUED | OUTPATIENT
Start: 2019-08-24 | End: 2019-08-24 | Stop reason: HOSPADM

## 2019-08-23 RX ORDER — LIDOCAINE 50 MG/G
1 PATCH TOPICAL
Status: DISCONTINUED | OUTPATIENT
Start: 2019-08-23 | End: 2019-08-24 | Stop reason: HOSPADM

## 2019-08-23 RX ORDER — PRAVASTATIN SODIUM 40 MG/1
80 TABLET ORAL NIGHTLY
Status: DISCONTINUED | OUTPATIENT
Start: 2019-08-24 | End: 2019-08-24 | Stop reason: HOSPADM

## 2019-08-23 RX ORDER — BUTALBITAL, ACETAMINOPHEN AND CAFFEINE 50; 325; 40 MG/1; MG/1; MG/1
1 TABLET ORAL ONCE
Status: COMPLETED | OUTPATIENT
Start: 2019-08-23 | End: 2019-08-23

## 2019-08-23 RX ORDER — POTASSIUM CHLORIDE 20 MEQ/1
40 TABLET, EXTENDED RELEASE ORAL ONCE
Status: COMPLETED | OUTPATIENT
Start: 2019-08-23 | End: 2019-08-23

## 2019-08-23 RX ORDER — OMEGA-3/DHA/EPA/FISH OIL 300-1000MG
1 CAPSULE,DELAYED RELEASE (ENTERIC COATED) ORAL DAILY
Status: DISCONTINUED | OUTPATIENT
Start: 2019-08-23 | End: 2019-08-24 | Stop reason: HOSPADM

## 2019-08-23 RX ORDER — DICLOFENAC SODIUM 10 MG/G
2 GEL TOPICAL 4 TIMES DAILY
Status: DISCONTINUED | OUTPATIENT
Start: 2019-08-23 | End: 2019-08-24 | Stop reason: HOSPADM

## 2019-08-23 RX ADMIN — LIDOCAINE 1 PATCH: 50 PATCH TOPICAL at 04:08

## 2019-08-23 RX ADMIN — OMEGA-3 FATTY ACIDS CAP DELAYED RELEASE 1000 MG 1 CAPSULE: 1000 CAPSULE DELAYED RELEASE at 04:08

## 2019-08-23 RX ADMIN — ASPIRIN 81 MG: 81 TABLET, COATED ORAL at 09:08

## 2019-08-23 RX ADMIN — DICLOFENAC 2 G: 10 GEL TOPICAL at 10:08

## 2019-08-23 RX ADMIN — BUTALBITAL, ACETAMINOPHEN AND CAFFEINE 1 TABLET: 50; 325; 40 TABLET ORAL at 01:08

## 2019-08-23 RX ADMIN — METOPROLOL SUCCINATE 100 MG: 100 TABLET, EXTENDED RELEASE ORAL at 10:08

## 2019-08-23 RX ADMIN — PANTOPRAZOLE SODIUM 40 MG: 40 TABLET, DELAYED RELEASE ORAL at 10:08

## 2019-08-23 RX ADMIN — DICLOFENAC 2 G: 10 GEL TOPICAL at 04:08

## 2019-08-23 RX ADMIN — AMLODIPINE BESYLATE 10 MG: 10 TABLET ORAL at 10:08

## 2019-08-23 RX ADMIN — POTASSIUM CHLORIDE 40 MEQ: 20 TABLET, EXTENDED RELEASE ORAL at 10:08

## 2019-08-23 RX ADMIN — HYDROCHLOROTHIAZIDE 25 MG: 25 TABLET ORAL at 10:08

## 2019-08-23 RX ADMIN — PRAVASTATIN SODIUM 80 MG: 40 TABLET ORAL at 10:08

## 2019-08-23 NOTE — CONSULTS
[facility]  Neurology  Consult Note    Patient Name: Hi Rubio  MRN: [id number]  Admission Date: 8/22/2019  Hospital Length of Stay: 1 days  Code Status: Prior   Attending Provider: Neno Edge MD   Consulting Provider: Inder Pak MD  Primary Care Physician: Josefina Kendall MD  Principal Problem:Left-sided weakness    Inpatient consult to Neurology  Consult performed by: Inder Pak MD  Consult ordered by: Neno Edge MD         Subjective:     Chief Complaint:  LSW     HPI:   Hi Rubio is a 59 yr old male with medical history of DM2, HLD, HTN, migraine and cervical spondylosis with myelopathy s/p ACDF C3/C4 C4/C5 in 2015 , BPH s/p TURP  admitted with concerns of acute onset LSW / sensory deficits. Persisting symptoms despite negative MRI for stroke, hence general neurology has been consulted.     Symptoms were acute onset LSW / sensory deficits - left hemifacial sensory loss / ataxia with associated dissection etiology concerns on admission. CTA / MRA dissection protocol were negative with no acute infarct on MRI. Persisting LE weakness symptoms however with improvement in UE weakness and sensory deficits during the hospital stay. Negative concerns for any new neurological deficits. Denied any concerns for seizures. Reported of headaches over the last month, likely occipital neuralgia type at the background of cervical spondylosis, with negative concerns for complex migraine presentation.      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 -     Hyperlipidemia     Hypertension     Memory loss     Thyroid nodule 8/22/2019     Traumatic iritis - Left Eye 2/27/2013       Past Surgical History:   Procedure Laterality Date    CERVICAL FUSION  12/30/2015    COLONOSCOPY N/A 4/7/2017    Performed by Handy Frederick MD at [facility] ENDO (4TH FLR)    DISKECTOMY AND FUSION-ANTERIOR CERVICAL (ACDF) C3-4, 4-5 N/A 12/30/2015    Performed by Hernando Wise MD at [facility] OR 2ND FLR    ESOPHAGOGASTRODUODENOSCOPY (EGD) N/A 3/13/2018    Performed by Rubin Barrios MD at [facility] ENDO (2ND FLR)    HEART CATH-LEFT Left 3/21/2018    Performed by Fareed Porter MD at [facility] CATH LAB    HERNIA REPAIR      INJECTION-STEROID-EPIDURAL-CERVICAL N/A 9/28/2015    Performed by Col[facility] at [facility] PAIN MGT    PROSTATECTOMY      PROSTATECTOMY-TRANSURETHRAL N/A 10/12/2017    Performed by Yg King MD at [facility] OR    REPAIR, HERNIA, INGUINAL, BILATERAL, LAPAROSCOPIC Bilateral 11/7/2013    Performed by Hernando Elena Jr., MD at [facility] OR 2ND FLR       Review of patient's allergies indicates:   Allergen Reactions    Lisinopril Anaphylaxis and Nausea And Vomiting     General bad feeling    Adhesive     Crestor [rosuvastatin] Swelling     Lip swelling.     Lipitor [atorvastatin] Other (See Comments)     Muscle aches       Current Neurological Medications:     No current facility-administered medications on file prior to encounter.      Current Outpatient Medications on File Prior to Encounter   Medication Sig    ALPRAZolam (XANAX) 0.5 MG tablet TAKE 1 TABLET BY MOUTH 3 TIMES A DAY AS NEEDED FOR ANXIETY    amLODIPine (NORVASC) 10 MG tablet TAKE 1 TABLET BY MOUTH EVERY DAY    CONTOUR TEST STRIPS Strp USE TO TEST BLOOD SUGAR ONCE DAILY    ezetimibe (ZETIA) 10 mg tablet Take 1 tablet (10 mg total) by mouth once daily.    hydroCHLOROthiazide (HYDRODIURIL) 25 MG tablet TAKE 1 TABLET BY MOUTH EVERY DAY    metoprolol succinate (TOPROL-XL) 100 MG 24 hr tablet TAKE 1 TABLET BY MOUTH EVERY DAY    MICROLET LANCET Misc 1 lancet by Misc.(Non-Drug; Combo Route)  route once daily. Test blood glucose once daily as instructed.    nitroGLYCERIN (NITROSTAT) 0.3 MG SL tablet Place 1 tablet (0.3 mg total) under the tongue every 5 (five) minutes as needed for Chest pain.    pantoprazole (PROTONIX) 40 MG tablet TAKE 1 TABLET BY MOUTH EVERY DAY    pantoprazole (PROTONIX) 40 MG tablet TAKE 1 TABLET BY MOUTH TWICE A DAY    pravastatin (PRAVACHOL) 80 MG tablet TAKE 1 TABLET BY MOUTH EVERYDAY AT BEDTIME    sertraline (ZOLOFT) 25 MG tablet TAKE 1 TABLET BY MOUTH EVERY DAY    sildenafil (REVATIO) 20 mg Tab TAKE 2-5 TABLETS BY MOUTH AS NEEDED    aspirin (ECOTRIN) 81 MG EC tablet Take 1 tablet (81 mg total) by mouth once daily.     Family History     Problem Relation (Age of Onset)    Benign prostatic hyperplasia Brother    Cancer Sister, Brother    Cataracts Maternal Grandmother    Diabetes Paternal Uncle    Heart disease Mother, Father    Hepatitis Brother    Hyperlipidemia Mother, Brother, Brother    Hypertension Mother, Brother, Brother, Brother, Brother        Tobacco Use    Smoking status: Former Smoker     Packs/day: 1.00     Years: 30.00     Pack years: 30.00     Types: Cigarettes     Last attempt to quit: 2011     Years since quittin.6    Smokeless tobacco: Never Used   Substance and Sexual Activity    Alcohol use: Yes     Comment: occas - nonalcoholic beer or beer    Drug use: No    Sexual activity: Yes     Partners: Female     Review of Systems   Constitutional: Negative for fever.   HENT: Negative for trouble swallowing and voice change.    Eyes: Negative for visual disturbance.   Respiratory: Negative for shortness of breath.    Genitourinary: Negative for dysuria.   Musculoskeletal: Negative for back pain.   Neurological: Positive for weakness, numbness and headaches. Negative for dizziness, tremors, seizures, syncope, facial asymmetry, speech difficulty and light-headedness.   Psychiatric/Behavioral: Negative for agitation.     Objective:     Vital Signs  (Most Recent):  Temp: 97.7 °F (36.5 °C) (08/23/19 1156)  Pulse: 70 (08/23/19 1520)  Resp: 18 (08/23/19 1156)  BP: (!) 140/86 (08/23/19 1156)  SpO2: (!) 94 % (08/23/19 1156) Vital Signs (24h Range):  Temp:  [97.5 °F (36.4 °C)-98.4 °F (36.9 °C)] 97.7 °F (36.5 °C)  Pulse:  [59-75] 70  Resp:  [14-20] 18  SpO2:  [94 %-98 %] 94 %  BP: (127-159)/(75-87) 140/86     Weight: 88.4 kg (194 lb 14.2 oz)  Body mass index is 23.72 kg/m².    Physical Exam   Constitutional: He is oriented to person, place, and time. No distress.   HENT:   Head: Normocephalic and atraumatic.   Eyes: Pupils are equal, round, and reactive to light. EOM are normal.   Neck: Neck supple.   Cardiovascular:   No murmur heard.  Pulmonary/Chest: No respiratory distress.   Abdominal: Soft.   Musculoskeletal: He exhibits no deformity.   Neurological: He is oriented to person, place, and time. He has a normal Finger-Nose-Finger Test.   Reflex Scores:       Bicep reflexes are 2+ on the right side and 2+ on the left side.       Brachioradialis reflexes are 2+ on the right side and 2+ on the left side.       Patellar reflexes are 2+ on the right side and 2+ on the left side.       Achilles reflexes are 2+ on the right side and 2+ on the left side.  Psychiatric: His speech is normal.       NEUROLOGICAL EXAMINATION:     MENTAL STATUS   Oriented to person, place, and time.   Attention: normal. Concentration: normal.   Speech: speech is normal   Level of consciousness: alert    CRANIAL NERVES     CN II   Visual fields full to confrontation.     CN III, IV, VI   Pupils are equal, round, and reactive to light.  Extraocular motions are normal.   Ophthalmoparesis: none    CN V   Facial sensation intact.     CN VII   Facial expression full, symmetric.     CN VIII   CN VIII normal.     CN IX, X   CN IX normal.   CN X normal.     CN XI   CN XI normal.     CN XII   CN XII normal.     MOTOR EXAM   Muscle bulk: normal  Overall muscle tone: normal       LLE 4/5 - subtle / all  muscle groups involved   LUE 5/5 improved      REFLEXES     Reflexes   Right brachioradialis: 2+  Left brachioradialis: 2+  Right biceps: 2+  Left biceps: 2+  Right patellar: 2+  Left patellar: 2+  Right achilles: 2+  Left achilles: 2+  Right plantar: normal  Left plantar: normal    SENSORY EXAM        Left facial subtle sensory affect       GAIT AND COORDINATION      Coordination   Finger to nose coordination: normal    Tremor   Resting tremor: absent  Intention tremor: absent  Action tremor: absent      Significant Labs:   Hemoglobin A1c: No results for input(s): HGBA1C in the last 720 hours.  BMP:   Recent Labs   Lab 08/22/19  0820 08/23/19  0321   * 112*    141   K 3.8 3.4*    104   CO2 24 25   BUN 12 13   CREATININE 1.1 1.0   CALCIUM 9.6 9.6   MG  --  2.0     CBC:   Recent Labs   Lab 08/22/19  0820 08/23/19  0321   WBC 4.93 5.84   HGB 15.7 16.0   HCT 47.7 48.5    259     CMP:   Recent Labs   Lab 08/22/19  0820 08/23/19  0321   * 112*    141   K 3.8 3.4*    104   CO2 24 25   BUN 12 13   CREATININE 1.1 1.0   CALCIUM 9.6 9.6   MG  --  2.0   PROT 7.5 6.9   ALBUMIN 4.2 3.9   BILITOT 0.3 0.6   ALKPHOS 97 79   AST 31 28   ALT 46* 44   ANIONGAP 10 12   EGFRNONAA >60.0 >60.0     Urine Studies: No results for input(s): COLORU, APPEARANCEUA, PHUR, SPECGRAV, PROTEINUA, GLUCUA, KETONESU, BILIRUBINUA, OCCULTUA, NITRITE, UROBILINOGEN, LEUKOCYTESUR, RBCUA, WBCUA, BACTERIA, SQUAMEPITHEL, HYALINECASTS in the last 48 hours.    Invalid input(s): WRIGHTSUR  All pertinent lab results from the past 24 hours have been reviewed.    Significant Imaging: I have reviewed and interpreted all pertinent imaging results/findings within the past 24 hours.    Assessment and Plan:     * Left-sided weakness  - 59 yr old male with medical history of DM2, HLD, HTN, migraine and cervical spondylosis with myelopathy s/p ACDF C3/C4 C4/C5 in 2015 , BPH s/p TURP admitted with concerns of acute onset LSW /  sensory deficits. Persisting symptoms despite negative MRI for stroke, negative CTA / MRA dissection    - Persisting LE weakness however with improvement in UE weakness and sensory deficits during the hospital stay.    - Unclear etiology; DDx - ? Brainstem small vessel occlusive stroke missed on MRI given the stroke risk factors or cervical localizing pathology given occipital neuralgia. Less concerns for complex hemiplegic migraine.     Plans:  - Repeat MRI brain wo contrast - thin cuts through brainstem   - MRI cervical spine wo contrast   - Headache management as per primary - tylenol as needed, diclofenac gel, op occipital neurl management, no preventive migraine therapy at the moment   - continue secondary stroke prevention   - cervical PT/OT referral as op     Sensory deficit, left  - see section above     Type 2 diabetes mellitus without complication, without long-term current use of insulin  - stroke risk  - control     Anxiety disorder  - DDx for functional / possibility     Mixed hyperlipidemia  - stroke risk  - high potency statins     Cervical radiculopathy  - DDx  - MRI cervical wo contrast     Essential hypertension  - stroke risk  - SBP < 140         VTE Risk Mitigation (From admission, onward)        Ordered     Place sequential compression device  Until discontinued      08/22/19 1222     IP VTE LOW RISK PATIENT  Once      08/22/19 1222          Thank you for your consult. I will follow-up with patient. Please contact us if you have any additional questions.    Inder Pak MD  Neurology  [facility]

## 2019-08-23 NOTE — SUBJECTIVE & OBJECTIVE
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 -     Hyperlipidemia     Hypertension     Memory loss     Thyroid nodule 8/22/2019    Traumatic iritis - Left Eye 2/27/2013       Past Surgical History:   Procedure Laterality Date    CERVICAL FUSION  12/30/2015    COLONOSCOPY N/A 4/7/2017    Performed by Handy Frederick MD at [facility] ENDO (4TH FLR)    DISKECTOMY AND FUSION-ANTERIOR CERVICAL (ACDF) C3-4, 4-5 N/A 12/30/2015    Performed by Hernando Wise MD at [facility] OR 2ND FLR    ESOPHAGOGASTRODUODENOSCOPY (EGD) N/A 3/13/2018    Performed by Rubin Barrios MD at [facility] ENDO (2ND FLR)    HEART CATH-LEFT Left 3/21/2018    Performed by Fareed Porter MD at [facility] CATH LAB    HERNIA REPAIR      INJECTION-STEROID-EPIDURAL-CERVICAL N/A 9/28/2015    Performed by Col[facility] Gan MD at [facility] PAIN MGT    PROSTATECTOMY      PROSTATECTOMY-TRANSURETHRAL N/A 10/12/2017    Performed by Yg King MD at [facility] OR    REPAIR, HERNIA, INGUINAL, BILATERAL, LAPAROSCOPIC Bilateral 11/7/2013    Performed by Hernando Elena Jr., MD at [facility] OR 2ND FLR       Review of patient's allergies indicates:   Allergen Reactions    Lisinopril Anaphylaxis and Nausea And Vomiting     General bad feeling    Adhesive     Crestor [rosuvastatin] Swelling     Lip swelling.     Lipitor [atorvastatin] Other (See Comments)     Muscle aches       Current Neurological Medications:     No current facility-administered medications on file prior to encounter.      Current Outpatient Medications on File Prior to Encounter   Medication Sig    ALPRAZolam (XANAX) 0.5 MG tablet TAKE 1 TABLET BY MOUTH 3 TIMES A DAY AS NEEDED FOR ANXIETY     amLODIPine (NORVASC) 10 MG tablet TAKE 1 TABLET BY MOUTH EVERY DAY    CONTOUR TEST STRIPS Strp USE TO TEST BLOOD SUGAR ONCE DAILY    ezetimibe (ZETIA) 10 mg tablet Take 1 tablet (10 mg total) by mouth once daily.    hydroCHLOROthiazide (HYDRODIURIL) 25 MG tablet TAKE 1 TABLET BY MOUTH EVERY DAY    metoprolol succinate (TOPROL-XL) 100 MG 24 hr tablet TAKE 1 TABLET BY MOUTH EVERY DAY    MICROLET LANCET Misc 1 lancet by Misc.(Non-Drug; Combo Route) route once daily. Test blood glucose once daily as instructed.    nitroGLYCERIN (NITROSTAT) 0.3 MG SL tablet Place 1 tablet (0.3 mg total) under the tongue every 5 (five) minutes as needed for Chest pain.    pantoprazole (PROTONIX) 40 MG tablet TAKE 1 TABLET BY MOUTH EVERY DAY    pantoprazole (PROTONIX) 40 MG tablet TAKE 1 TABLET BY MOUTH TWICE A DAY    pravastatin (PRAVACHOL) 80 MG tablet TAKE 1 TABLET BY MOUTH EVERYDAY AT BEDTIME    sertraline (ZOLOFT) 25 MG tablet TAKE 1 TABLET BY MOUTH EVERY DAY    sildenafil (REVATIO) 20 mg Tab TAKE 2-5 TABLETS BY MOUTH AS NEEDED    aspirin (ECOTRIN) 81 MG EC tablet Take 1 tablet (81 mg total) by mouth once daily.     Family History     Problem Relation (Age of Onset)    Benign prostatic hyperplasia Brother    Cancer Sister, Brother    Cataracts Maternal Grandmother    Diabetes Paternal Uncle    Heart disease Mother, Father    Hepatitis Brother    Hyperlipidemia Mother, Brother, Brother    Hypertension Mother, Brother, Brother, Brother, Brother        Tobacco Use    Smoking status: Former Smoker     Packs/day: 1.00     Years: 30.00     Pack years: 30.00     Types: Cigarettes     Last attempt to quit: 2011     Years since quittin.6    Smokeless tobacco: Never Used   Substance and Sexual Activity    Alcohol use: Yes     Comment: occas - nonalcoholic beer or beer    Drug use: No    Sexual activity: Yes     Partners: Female     Review of Systems   Constitutional: Negative for fever.   HENT: Negative for trouble  swallowing and voice change.    Eyes: Negative for visual disturbance.   Respiratory: Negative for shortness of breath.    Genitourinary: Negative for dysuria.   Musculoskeletal: Negative for back pain.   Neurological: Positive for weakness, numbness and headaches. Negative for dizziness, tremors, seizures, syncope, facial asymmetry, speech difficulty and light-headedness.   Psychiatric/Behavioral: Negative for agitation.     Objective:     Vital Signs (Most Recent):  Temp: 97.7 °F (36.5 °C) (08/23/19 1156)  Pulse: 70 (08/23/19 1520)  Resp: 18 (08/23/19 1156)  BP: (!) 140/86 (08/23/19 1156)  SpO2: (!) 94 % (08/23/19 1156) Vital Signs (24h Range):  Temp:  [97.5 °F (36.4 °C)-98.4 °F (36.9 °C)] 97.7 °F (36.5 °C)  Pulse:  [59-75] 70  Resp:  [14-20] 18  SpO2:  [94 %-98 %] 94 %  BP: (127-159)/(75-87) 140/86     Weight: 88.4 kg (194 lb 14.2 oz)  Body mass index is 23.72 kg/m².    Physical Exam   Constitutional: He is oriented to person, place, and time. No distress.   HENT:   Head: Normocephalic and atraumatic.   Eyes: Pupils are equal, round, and reactive to light. EOM are normal.   Neck: Neck supple.   Cardiovascular:   No murmur heard.  Pulmonary/Chest: No respiratory distress.   Abdominal: Soft.   Musculoskeletal: He exhibits no deformity.   Neurological: He is oriented to person, place, and time. He has a normal Finger-Nose-Finger Test.   Reflex Scores:       Bicep reflexes are 2+ on the right side and 2+ on the left side.       Brachioradialis reflexes are 2+ on the right side and 2+ on the left side.       Patellar reflexes are 2+ on the right side and 2+ on the left side.       Achilles reflexes are 2+ on the right side and 2+ on the left side.  Psychiatric: His speech is normal.       NEUROLOGICAL EXAMINATION:     MENTAL STATUS   Oriented to person, place, and time.   Attention: normal. Concentration: normal.   Speech: speech is normal   Level of consciousness: alert    CRANIAL NERVES     CN II   Visual fields  full to confrontation.     CN III, IV, VI   Pupils are equal, round, and reactive to light.  Extraocular motions are normal.   Ophthalmoparesis: none    CN V   Facial sensation intact.     CN VII   Facial expression full, symmetric.     CN VIII   CN VIII normal.     CN IX, X   CN IX normal.   CN X normal.     CN XI   CN XI normal.     CN XII   CN XII normal.     MOTOR EXAM   Muscle bulk: normal  Overall muscle tone: normal       LLE 4/5 - subtle / all muscle groups involved   LUE 5/5 improved      REFLEXES     Reflexes   Right brachioradialis: 2+  Left brachioradialis: 2+  Right biceps: 2+  Left biceps: 2+  Right patellar: 2+  Left patellar: 2+  Right achilles: 2+  Left achilles: 2+  Right plantar: normal  Left plantar: normal    SENSORY EXAM        Left facial subtle sensory affect       GAIT AND COORDINATION      Coordination   Finger to nose coordination: normal    Tremor   Resting tremor: absent  Intention tremor: absent  Action tremor: absent      Significant Labs:   Hemoglobin A1c: No results for input(s): HGBA1C in the last 720 hours.  BMP:   Recent Labs   Lab 08/22/19  0820 08/23/19  0321   * 112*    141   K 3.8 3.4*    104   CO2 24 25   BUN 12 13   CREATININE 1.1 1.0   CALCIUM 9.6 9.6   MG  --  2.0     CBC:   Recent Labs   Lab 08/22/19  0820 08/23/19  0321   WBC 4.93 5.84   HGB 15.7 16.0   HCT 47.7 48.5    259     CMP:   Recent Labs   Lab 08/22/19  0820 08/23/19  0321   * 112*    141   K 3.8 3.4*    104   CO2 24 25   BUN 12 13   CREATININE 1.1 1.0   CALCIUM 9.6 9.6   MG  --  2.0   PROT 7.5 6.9   ALBUMIN 4.2 3.9   BILITOT 0.3 0.6   ALKPHOS 97 79   AST 31 28   ALT 46* 44   ANIONGAP 10 12   EGFRNONAA >60.0 >60.0     Urine Studies: No results for input(s): COLORU, APPEARANCEUA, PHUR, SPECGRAV, PROTEINUA, GLUCUA, KETONESU, BILIRUBINUA, OCCULTUA, NITRITE, UROBILINOGEN, LEUKOCYTESUR, RBCUA, WBCUA, BACTERIA, SQUAMEPITHEL, HYALINECASTS in the last 48 hours.    Invalid  input(s): CHAYITO  All pertinent lab results from the past 24 hours have been reviewed.    Significant Imaging: I have reviewed and interpreted all pertinent imaging results/findings within the past 24 hours.

## 2019-08-23 NOTE — PLAN OF CARE
Problem: Adult Inpatient Plan of Care  Goal: Plan of Care Review  Outcome: Ongoing (interventions implemented as appropriate)       08/23/19 0608   Plan of Care Review   Plan of Care Reviewed With patient;spouse   Pt remains free from falls and injuries during shift. Awake, alert, and oriented x 4. Vital signs stable. Able to voice needs,denied pain. Up with assist. No signs of acute distress noted at this time. Bed in lowest position, wheels locked, and call light in reach. Will continue to monitor, safety maintained.

## 2019-08-23 NOTE — PROGRESS NOTES
"Progress Note  Hospital Medicine    Admit Date: 8/22/2019  Length of Stay:  LOS: 1 day     SUBJECTIVE:         Follow-up For:  Left-sided weakness    HPI/Interval history (See H&P for complete P,F,SHx) :     Over view  Hi Rubio is a 59 y.o. male with a PMH of for  DM2, HLD, HTN and cervical spondylosis with myelopathy s/p ACDF C3/C4 C4/C5 in 2015 , BPH s/p TURP  who presented to the ED on 8/22/2019 diagnosed with  Extremity Weakness (LKN last night going to bed at 11:30pm, woke up 0530 with left arm weakness and left leg weakness. "can't stand on left leg". states left leg with "buckle" to stand. only other complaint is neck pain. no vision changes, no neglect but "seems like tongue is caught at the top of his mouth")   Oriented x3 accompanied by wife states that was feeling normal when he went to bed last night around 1130PM.  Mentions  that this morning he awoke at his normal time and noted left sided neck pain and motor weakness in his LUE and LLE.  Denies deny any dysarthria, word finding difficulty or facial droop.  states that he is fully functional at baseline and able to ambulate without assistance.  Patient sustained a fall a week back, he fell from a later 6-8 feet high and landed on his buttocks. since then,  he has on and off neck pain and tailbone pain on sitting.  prior to presentation but denies any head trauma or LOC. 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      On initial examination, pt has motor weakness on left (LLE>LUE).  Was evaluated by vascular Neurology, acute CVA ruled out. MRI Brain showed no evidence of recent infarction or other acute intracranial pathology.       Interval history  Reports remote history of migraine.  Complains of headache for the last 1 month with intermittent flashes in the eyes.  Has been taking Excedrin migraine twice a day for the last 1 month at home.  Started on diclofenac gel and lidocaine patch for occipital neurolgia.  General " Neurology consulted.  Repeat MRI brain without contrast( thin cuts through the brainstem) and cervical spine without contrast.  Mentions he has sensation of  mild dysphagia since cervical fusion surgery but no issues with swallowing- has been taking regular diet        Review of Systems: List if applicable  Pain scale:  Pain 3/10  Constitutional: Positive for activity change and fatigue. Negative for appetite change, chills, fever and unexpected weight change.   HENT: Positive for hearing loss and tinnitus (Chronic). Negative for congestion, ear discharge, ear pain and facial swelling.    Eyes: Negative for pain, discharge, redness and itching.   Respiratory: Positive for chest tightness ( on and off with palpitations). Negative for cough and shortness of breath.    Cardiovascular: Negative for chest pain and leg swelling.   Gastrointestinal: Positive for nausea and vomiting. Negative for abdominal pain, anal bleeding, blood in stool, constipation and diarrhea.   Genitourinary: Negative for dysuria, flank pain, frequency, hematuria and urgency.        Incomplete emptying of bladder   Musculoskeletal: Positive for arthralgias, gait problem, neck pain ( left-sided neck pain radiating to left upper extremity and left lower extremity) and neck stiffness ( difficulty with range of motion of the neck).   Skin: Negative for color change, pallor and rash.   Allergic/Immunologic: Negative for environmental allergies.   Neurological: Positive for dizziness, weakness, light-headedness ( resolved) and headaches. Negative for syncope, facial asymmetry and speech difficulty.   Hematological: Negative for adenopathy.   Psychiatric/Behavioral: Negative for agitation and confusion    OBJECTIVE:     Vital Signs Range (Last 24H):  Temp:  [97.5 °F (36.4 °C)-98.4 °F (36.9 °C)]   Pulse:  [59-75]   Resp:  [14-20]   BP: (127-159)/(75-87)   SpO2:  [94 %-98 %]     Physical Exam:  Constitutional: He is oriented to person, place, and time. He  appears well-developed and well-nourished.   HENT:   Head: Normocephalic and atraumatic.   Mouth/Throat: Oropharynx is clear and moist. No oropharyngeal exudate.   Eyes: Pupils are equal, round, and reactive to light. Conjunctivae and EOM are normal. Right eye exhibits no discharge. Left eye exhibits no discharge. No scleral icterus.   Neck: Neck supple. No JVD present. No tracheal deviation present. No thyromegaly present.   Painful range of motion of the neck   Cardiovascular: Normal rate, regular rhythm and normal heart sounds. Exam reveals no gallop and no friction rub.   No murmur heard.  Pulmonary/Chest: Effort normal and breath sounds normal. No stridor. No respiratory distress. He has no wheezes. He has no rales.   Abdominal: Soft. Bowel sounds are normal. He exhibits no distension and no mass. There is no tenderness. There is no guarding.   Musculoskeletal: He exhibits no edema, tenderness or deformity.   Diminished range of motion of the neck   Lymphadenopathy:     He has no cervical adenopathy.   Neurological: He is oriented to person, place, and time. No cranial nerve deficit.   Diminished left-sided hand , left upper extremity power 4/5, left lower extremity power 3 to 4/5 (improved from admission)  Right upper and lower extremity power 5/5   Skin: Skin is warm and dry.   Psychiatric: He has a normal mood and affect.   Nursing note and vitals reviewed.        Medications:  Medication list was reviewed and changes noted under Assessment/Plan.      Current Facility-Administered Medications:     acetaminophen tablet 650 mg, 650 mg, Oral, Q6H PRN, Neno Edge MD    ALPRAZolam tablet 0.5 mg, 0.5 mg, Oral, Daily PRN, Neno Edge MD    [START ON 8/24/2019] amLODIPine tablet 10 mg, 10 mg, Oral, QHS, Neno Edge MD    aspirin EC tablet 81 mg, 81 mg, Oral, Daily, Neno Edge MD, 81 mg at 08/23/19 0959    dextrose 10% (D10W) Bolus, 12.5 g, Intravenous, PRN, Neno Edge,  MD    dextrose 10% (D10W) Bolus, 25 g, Intravenous, PRN, Neno Edge MD    diclofenac sodium 1 % gel 2 g, 2 g, Topical (Top), QID, Neno Edge MD    ezetimibe tablet 10 mg, 10 mg, Oral, Daily, Neno Edge MD    glucagon (human recombinant) injection 1 mg, 1 mg, Intramuscular, PRN, Neno Edge MD    glucagon (human recombinant) injection 1 mg, 1 mg, Intramuscular, PRN, Neno Edge MD    glucose chewable tablet 16 g, 16 g, Oral, PRN, Neno Edge MD    glucose chewable tablet 16 g, 16 g, Oral, PRN, Neno Edge MD    glucose chewable tablet 24 g, 24 g, Oral, PRN, Neno Edge MD    glucose chewable tablet 24 g, 24 g, Oral, PRN, Neno Edge MD    hydroCHLOROthiazide tablet 25 mg, 25 mg, Oral, Daily, Neno Edge MD, 25 mg at 08/23/19 1001    insulin aspart U-100 pen 0-5 Units, 0-5 Units, Subcutaneous, QID (AC + HS) PRN, Neno Edge MD    lidocaine 5 % patch 1 patch, 1 patch, Transdermal, Q24H, Neno Edge MD    metoprolol succinate (TOPROL-XL) 24 hr tablet 100 mg, 100 mg, Oral, Daily, Neno Edge MD, 100 mg at 08/23/19 1000    nitroGLYCERIN SL tablet 0.3 mg, 0.3 mg, Sublingual, Q5 Min PRN, Neno Edge MD    ondansetron injection 4 mg, 4 mg, Intravenous, Q8H PRN, Neno Edge MD    pantoprazole EC tablet 40 mg, 40 mg, Oral, Daily, Neno Edge MD, 40 mg at 08/23/19 1000    [START ON 8/24/2019] pravastatin tablet 80 mg, 80 mg, Oral, QHS, Neno Edge MD    sodium chloride 0.9% flush 10 mL, 10 mL, Intravenous, PRN, Neno Edge MD    acetaminophen, ALPRAZolam, Dextrose 10% Bolus, Dextrose 10% Bolus, glucagon (human recombinant), glucagon (human recombinant), glucose, glucose, glucose, glucose, insulin aspart U-100, nitroGLYCERIN, ondansetron, sodium chloride 0.9%    Laboratory/Diagnostic Data:  Reviewed and noted in plan where applicable- Please see chart for full lab data.    Recent Labs    Lab 08/22/19  0820 08/23/19  0321   WBC 4.93 5.84   HGB 15.7 16.0   HCT 47.7 48.5    259       Recent Labs   Lab 08/22/19  0820 08/23/19  0321    141   K 3.8 3.4*    104   CO2 24 25   BUN 12 13   CREATININE 1.1 1.0   * 112*   CALCIUM 9.6 9.6   MG  --  2.0   PHOS  --  3.7       Recent Labs   Lab 08/22/19  0820 08/23/19  0321   ALKPHOS 97 79   ALT 46* 44   AST 31 28   ALBUMIN 4.2 3.9   PROT 7.5 6.9   BILITOT 0.3 0.6   INR 1.0  --         Microbiology labs for the last week  Microbiology Results (last 7 days)     ** No results found for the last 168 hours. **           Imaging Results          X-Ray Hips Bilateral 2 View Inc AP Pelvis (Final result)  Result time 08/22/19 14:55:43    Final result by Aakash Lujan MD (08/22/19 14:55:43)             Impression:      1. No acute displaced fracture or dislocation of the pelvis or hips.      Electronically signed by: Aakash Lujan MD  Date:    08/22/2019  Time:    14:55           Narrative:    EXAMINATION:  XR HIPS BILATERAL 2 VIEW INCL AP PELVIS    CLINICAL HISTORY:  fall;    TECHNIQUE:  AP view of the pelvis and frogleg lateral views of both hips were performed.    COMPARISON:  08/22/2019    FINDINGS:  Three views.    There is excreted contrast within the urinary bladder.  The bilateral sacroiliac joints are grossly intact.  The bilateral femoroacetabular joints are intact noting degenerative changes.  The pubic symphysis is intact.  No convincing acute displaced fracture or dislocation of the pelvis or hips.  There may be bone island within the inferior left pubic ramus.                             X-Ray Sacrum And Coccyx (Final result)  Result time 08/22/19 12:41:40    Final result by Orestes Nixon III, MD (08/22/19 12:41:40)             Impression:      Mild DJD.      Electronically signed by: Orestes Nixon MD  Date:    08/22/2019  Time:    12:41           Narrative:    EXAMINATION:  XR SACRUM AND COCCYX    CLINICAL HISTORY:  Low  back pain    FINDINGS:  There is contrast in the bladder.  There is mild DJD.  No fracture dislocation bone destruction seen.                             X-Ray Lumbar Spine Ap And Lateral (Final result)  Result time 08/22/19 12:41:18    Final result by Orestes Nixon III, MD (08/22/19 12:41:18)             Impression:      No acute process seen.      Electronically signed by: Orestes Nixon MD  Date:    08/22/2019  Time:    12:41           Narrative:    EXAMINATION:  XR LUMBAR SPINE AP AND LATERAL    CLINICAL HISTORY:  Low back pain, minor trauma;    FINDINGS:  There is mild DJD.  Alignment is normal.  No fracture dislocation bone destruction seen.                             MRA Neck without contrast (Final result)  Result time 08/22/19 10:46:03    Final result by Juan Rosales DO (08/22/19 10:46:03)             Impression:      MRA head: Questioned high-grade stenosis or short segment occlusion right M3 segmental branch of the MCA seen on CTA is not seen and was likely artifactual.  No evidence for proximal high-grade stenosis [id number].    Partial inclusion of fusiform dilatation of the distal A3/A4 segments of the JOANNE as seen on CTA concerning for possible unusual aneurysm.  Cannot exclude mycotic aneurysm.  Clinical correlation and further evaluation with conventional angiography is advised.    MRA neck: Unremarkable motion limited MRA of the neck as detailed above without evidence for significant focal stenosis or occlusion.      Electronically signed by: Juan Rosales DO  Date:    08/22/2019  Time:    10:46           Narrative:    EXAMINATION:  MRA BRAIN WITHOUT CONTRAST; MRA NECK WITHOUT CONTRAST    CLINICAL HISTORY:  vertebral dissection rule out;; vert dissection rule out;    TECHNIQUE:  noncontrast 3-D time of flight MRA head and noncontrast 2-D and 3D  time-of-flight MRA neck.    COMPARISON:  None    FINDINGS:  MRA head:    Anterior circulation:  The bilateral distal cervical, Sharmila,  cavernous and supraclinoid segments of the ICA's and the visualized bilateral anterior and middle cerebral arteries are patent without evidence for significant focal stenosis.  Questioned narrowing in the right M3 segment of the MCA is not seen.    Please note there is a fusiform region of enlargement of the pericallosal A3/A4 segment of the JOANNE as seen on CT concerning for possible aneurysm.  Segment of the JOANNE there is a prominent left posterior communicating artery.    Posterior circulation: The distal right vertebral artery is dominant.  Diminutive caliber distal left vertebral artery.  The distal vertebral arteries, basilar artery and posterior cerebral arteries are patent without evidence for significant focal stenosis or aneurysm.  There is severe hypoplasia or absence of the left P1 segment of the PCA with essentially persistent fetal circulation left PCA via left posterior communicating artery.    MRA neck: Study limited by patient motion.  Allowing for limitation the origins of the right brachycephalic,  left common carotid and left subclavian arteries from the arch are within normal limits. The origins of the vertebral arteries from the subclavian arteries are within normal limits.  Left vertebral artery diminutive throughout its course allowing for motion limitation vertebral arteries are patent without high-grade focal stenosis or occlusion..    The bilateral common carotid arteries, carotid bifurcations and extracranial portions of the internal carotid arteries are patent without evidence for significant focal stenosis allowing for motion.  There is tortuosity of the distal cervical ICAs bilaterally    Less than 50% proximal ICA stenosis by NASCET criteria.                             MRA Brain without contrast (Final result)  Result time 08/22/19 10:46:03    Final result by Juan Rosales DO (08/22/19 10:46:03)             Impression:      MRA head: Questioned high-grade stenosis or short segment  occlusion right M3 segmental branch of the MCA seen on CTA is not seen and was likely artifactual.  No evidence for proximal high-grade stenosis [id number].    Partial inclusion of fusiform dilatation of the distal A3/A4 segments of the JOANNE as seen on CTA concerning for possible unusual aneurysm.  Cannot exclude mycotic aneurysm.  Clinical correlation and further evaluation with conventional angiography is advised.    MRA neck: Unremarkable motion limited MRA of the neck as detailed above without evidence for significant focal stenosis or occlusion.      Electronically signed by: Juan Rosales DO  Date:    08/22/2019  Time:    10:46           Narrative:    EXAMINATION:  MRA BRAIN WITHOUT CONTRAST; MRA NECK WITHOUT CONTRAST    CLINICAL HISTORY:  vertebral dissection rule out;; vert dissection rule out;    TECHNIQUE:  noncontrast 3-D time of flight MRA head and noncontrast 2-D and 3D  time-of-flight MRA neck.    COMPARISON:  None    FINDINGS:  MRA head:    Anterior circulation:  The bilateral distal cervical, Sharmila, cavernous and supraclinoid segments of the ICA's and the visualized bilateral anterior and middle cerebral arteries are patent without evidence for significant focal stenosis.  Questioned narrowing in the right M3 segment of the MCA is not seen.    Please note there is a fusiform region of enlargement of the pericallosal A3/A4 segment of the JOANNE as seen on CT concerning for possible aneurysm.  Segment of the JOANNE there is a prominent left posterior communicating artery.    Posterior circulation: The distal right vertebral artery is dominant.  Diminutive caliber distal left vertebral artery.  The distal vertebral arteries, basilar artery and posterior cerebral arteries are patent without evidence for significant focal stenosis or aneurysm.  There is severe hypoplasia or absence of the left P1 segment of the PCA with essentially persistent fetal circulation left PCA via left posterior communicating  artery.    MRA neck: Study limited by patient motion.  Allowing for limitation the origins of the right brachycephalic,  left common carotid and left subclavian arteries from the arch are within normal limits. The origins of the vertebral arteries from the subclavian arteries are within normal limits.  Left vertebral artery diminutive throughout its course allowing for motion limitation vertebral arteries are patent without high-grade focal stenosis or occlusion..    The bilateral common carotid arteries, carotid bifurcations and extracranial portions of the internal carotid arteries are patent without evidence for significant focal stenosis allowing for motion.  There is tortuosity of the distal cervical ICAs bilaterally    Less than 50% proximal ICA stenosis by NASCET criteria.                             MRI Brain Without Contrast (Final result)  Result time 08/22/19 10:04:22    Final result by Orlando Herrera MD (08/22/19 10:04:22)             Impression:      No evidence of recent infarction or other acute intracranial pathology      Electronically signed by: Orlando Herrera MD  Date:    08/22/2019  Time:    10:04           Narrative:    EXAMINATION:  MRI BRAIN WITHOUT CONTRAST    CLINICAL HISTORY:  Focal neuro deficit, new, fixed or worsening, <6 hours;    TECHNIQUE:  Multiplanar multisequence MR imaging of the brain was performed without intravenous contrast.    COMPARISON:  CTA 08/22/2019    FINDINGS:  Intracranial Compartment:    Ventricles are normal in size for age without evidence of hydrocephalus.    The brain parenchyma appears within normal limits.  No restricted diffusion to suggest the presence of an acute infarction on today's examination.  No significant chronic ischemic change or remote major vascular distribution infarct.  No mass or hemorrhage.    No extra-axial blood or fluid collections.    Normal vascular flow voids are preserved.    Skull/Extracranial Contents (limited evaluation):    Bone  marrow signal intensity is normal.  Partially visualized prior anterior cervical discectomy and fusion procedure.    Patchy mucosal thickening in the paranasal sinuses.                             X-Ray Chest AP Portable (Final result)  Result time 08/22/19 08:49:32    Final result by Verito Chavez MD (08/22/19 08:49:32)             Impression:      No acute disease identified.      Electronically signed by: Verito Chavez MD  Date:    08/22/2019  Time:    08:49           Narrative:    EXAMINATION:  XR CHEST AP PORTABLE    CLINICAL HISTORY:  Stroke;    TECHNIQUE:  Single frontal view of the chest was performed.    COMPARISON:  03/08/2018, 10:28 hours.  01/21/2017, 18:09 hours.    FINDINGS:  Mediastinal structures are midline. Cardiac silhouette and pulmonary vascular distribution are normal.    Lung volumes are normal and symmetric. I detect no pulmonary disease, pleural fluid, lymph node enlargement, cardiac decompensation, pneumothorax, pneumomediastinum, pneumoperitoneum or significant osseous abnormality.                              CTA STROKE MULTI-PHASE (Final result)  Result time 08/22/19 08:58:43    Final result by Juan Rosales DO (08/22/19 08:58:43)             Impression:      CTA head: High-grade stenosis or segment occlusion in the right M3 segment of the MCA in the sylvian fissure..    Globular outpouching distal A3/A4 segment JOANNE measuring approximate 4-5 mm concerning for aneurysm.    CTA neck: Atherosclerotic plaquing of the carotid bifurcations and proximal ICAs with less than 50% proximal ICA stenosis by NASCET criteria.    Incidental 1 cm heterogeneous nodule left lobe of the thyroid overall indeterminate clinical correlation and follow-up dedicated thyroid imaging recommended.    CT head: No evidence for acute intracranial hemorrhage or sulcal effacement    Please see above for additional details.      Electronically signed by: Juan Rosales DO  Date:    08/22/2019  Time:    08:58            Narrative:    EXAMINATION:  CTA STROKE MULTI-PHASE    CLINICAL HISTORY:  Stroke;    TECHNIQUE:  5 mm axial images of the head pre and post contrast with 0.625 mm axial CTA images of the head neck postcontrast.  Coronal and sagittal MPR and MIP imaging was performed 100 ml of Omnipaque 350 contrast was injected intravenously    COMPARISON:  None    FINDINGS:  CT head with and  without contrast: There is no evidence for acute intracranial hemorrhage or sulcal effacement.  The ventricles are normal in size and configuration without evidence for hydrocephalus.  There is no midline shift or mass effect.  There is a high density focus within the left ethmoid air cells suggestive for osteoma.  Few patchy ethmoid air cell opacities.    CTA head:    Anterior circulation: The bilateral distal cervical, petrous, cavernous, and supraclinoid segments of the ICAs are patent without significant focal stenosis or aneurysm.    The anterior cerebral arteries are patent.  There is a globular outpouching of the distal A 3/4 segment JOANNE concerning for aneurysm measuring approximately 4-5 mm.    The left middle cerebral artery is patent without significant stenosis.    There is a focal region of high-grade narrowing or short segment occlusion within the right M3 segmental branch of the MCA within the sylvian fissure best seen on axial image 1697 series 3.    Posterior circulation: The distal vertebral arteries, basilar artery and posterior cerebral arteries are patent without focal stenosis or aneurysm.    CTA neck: Common origin the right brachiocephalic and left common carotid artery and origin of the left subclavian artery from the arch are within normal limits.    The origin of the vertebral arteries from the respective subclavian arteries are within normal limits.  The vertebral arteries are patent throughout their course without focal stenosis or occlusion.    Right carotid: The right common carotid artery, carotid  "bifurcation and extracranial portions of the internal carotid artery are patent without significant focal stenosis.    Left carotid: The left common carotid artery, carotid bifurcation and extracranial portions of the internal carotid arteries are patent without significant focal stenosis.    Atherosclerotic plaquing of the carotid bifurcations and proximal ICAs with less than 50% proximal ICA stenosis by NASCET criteria.    .    Pharynx/larynx: Postoperative change cervical spine from C3 through C5 anterior spinal fusion.  Pharynx larynx limited by artifact from dental metal and spinal metal artifact.  Within limits of the study the nasopharynx, oropharynx hypopharynx larynx and proximal trachea within normal limits.  Scattered emphysematous change in the visualized lungs.    Oral cavity and the buccal space      limited by dental metal.    Glands: Bilateral parotid and submandibular glands are within normal limits. There is approximately 1 cm heterogeneous nodule in the left lobe of the thyroid inferiorly.  Clinical correlation follow-up dedicated thyroid imaging advised.    No evidence for adenopathy throughout the neck by size criteria.    There is no evidence for acute fracture of the cervical spine.  No definite hardware failure.    Case discussed with Dr. Lazar on 08/22/2019 at 8:40 am This report was flagged in [employer] as abnormal.                              Estimated body mass index is 23.72 kg/m² as calculated from the following:    Height as of this encounter: 6' 4" (1.93 m).    Weight as of this encounter: 88.4 kg (194 lb 14.2 oz).    I & O (Last 24H):    Intake/Output Summary (Last 24 hours) at 8/23/2019 1442  Last data filed at 8/22/2019 2249  Gross per 24 hour   Intake 240 ml   Output no documentation   Net 240 ml       Estimated Creatinine Clearance: 95.2 mL/min (based on SCr of 1 mg/dL).    ASSESSMENT/PLAN:     Active Problems:           Active Diagnoses:     Diagnosis Date Noted POA    PRINCIPAL " PROBLEM:  Left-sided weakness [R53.1]     Was evaluated by vascular Neurology, acute CVA ruled out. MRI Brain showed no evidence of recent infarction or other acute intracranial pathology.        sustained a fall a week back, he fell from a later 6-8 feet high and landed on his buttocks.  since then he has on and off neck pain and tailbone pain on sitting.  prior to presentation but denies any head trauma or LOC.   status post neurosurgery evaluation    08/23/2019  Improved strength from admission-   General Neurology consulted.  Repeat MRI brain without contrast( thin cuts through the brainstem) and cervical spine without contrast    Migraine? - Reports remote history of migraine.  Complains of headache for the last 1 month with intermittent flashes in the eyes.  Has been taking Excedrin migraine twice a day for the last 1 month at home.  Started on diclofenac gel and lidocaine patch for occipital neurolgia. Mentions he has sensation of  mild dysphagia since cervical fusion surgery but no issues with swallowing- has been taking regular diet       Low backache- status post fall x-ray of the sacral lumbar spine unremarkable.  X-ray lumbar, sacral spine and hips and pelvis unremarkable 08/22/2019 Unknown    Sensory deficit, left [R41.89] tingling and numbness is improving 08/22/2019 Unknown    Thyroid nodule [E04.1]Incidental 1 cm heterogeneous nodule left lobe of the thyroid overall indeterminate clinical correlation and follow-up dedicated thyroid imaging recommended.  TSH within normal.  Outpatient Endocrine follow 08/22/2019 Yes    Type 2 diabetes mellitus without complication, without long-term current use of insulin [E11.9] currently does not take any medications HB A1c of 6.8 .  Low-dose insulin sliding scale for now 05/11/2018 Yes    BPH with obstruction/lower urinary tract symptoms [N40.1, N13.8] incomplete emptying status post TURP 09/28/2017 Yes    Mixed hyperlipidemia [E78.2] on ezetimibe.  The  patient has hypertriglyceridemia greater than 400s.  Continue pravastatin.  Add fish oil 01/20/2017 Yes    Cervical stenosis of spine [M48.02] status post cervical fusion as above 12/30/2015 Yes    Cervical radiculopathy [M54.12] as above 07/20/2015 Yes    GERD (gastroesophageal reflux disease) [K21.9] currently taking Protonix 40 mg twice a day 07/24/2013 Yes    Essential hypertension [I10] blood pressure controlled on amlodipine, hydrochlorothiazide and metoprolol  Extra beats/PVCs followed up with cardiology Dr. AZAM Means recommended metoprolol succinate every day, Xanax as needed for anxiety 07/16/2012 Yes           Disposition- PT/OT evaluation- home with home health    DVT prophylaxis addressed with:  Bilateral SCDs            Neno Edge MD  Attending Staff Physician  [facility] Medicine  pager- [phone]

## 2019-08-23 NOTE — PT/OT/SLP EVAL
Speech Language Pathology Evaluation & Discharge Summary  Cognitive Communication    Patient Name:  Hi Ruboi   MRN:  [id number]  Admitting Diagnosis: Left-sided weakness    Recommendations:     Recommendations:                General Recommendations:  Follow-up not indicated  Diet recommendations:  Regular, Thin   Aspiration Precautions: Standard aspiration precautions   General Precautions: Standard, fall, diabetic  Communication strategies:  none    History:     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 -     Hyperlipidemia     Hypertension     Memory loss     Thyroid nodule 8/22/2019    Traumatic iritis - Left Eye 2/27/2013       Past Surgical History:   Procedure Laterality Date    CERVICAL FUSION  12/30/2015    COLONOSCOPY N/A 4/7/2017    Performed by Handy Frederick MD at [facility] ENDO (4TH FLR)    DISKECTOMY AND FUSION-ANTERIOR CERVICAL (ACDF) C3-4, 4-5 N/A 12/30/2015    Performed by Hernando Wise MD at [facility] OR 2ND FLR    ESOPHAGOGASTRODUODENOSCOPY (EGD) N/A 3/13/2018    Performed by Rubin Barrios MD at [facility] ENDO (2ND FLR)    HEART CATH-LEFT Left 3/21/2018    Performed by Fareed Porter MD at [facility] CATH LAB    HERNIA REPAIR      INJECTION-STEROID-EPIDURAL-CERVICAL N/A 9/28/2015    Per[facility] at [facility]    PROSTATECTOMY      PROSTATECTOMY-TRANSURETHRAL N/A 10/12/2017    Performed by Yg King MD at [facility] OR    REPAIR, HERNIA, INGUINAL, BILATERAL, LAPAROSCOPIC Bilateral 11/7/2013    Performed by Hernando Elena Jr., MD at [facility] OR 2ND FLR       Social History: Patient lives with spouse in [place].    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    Subjective     Patient awake and alert during session  Communicated with nurse prior to session     Pain/Comfort:  · Pain Rating 1: 0/10  · Pain Rating Post-Intervention 1: 0/10    Objective:   Cognitive Status:    Arousal/Alertness Appropriate response to stimuli  Attention No obvious deficits observed throughout session  Orientation Oriented x4  Memory Immediate Recall WFL, Delayed Recall WFL and recall general information WFL  Problem Solving Conclusions WFL, Categories WFL and Sequencing WFL  Managing finances WFL  Reasoning Deductive reasoning WFL      Receptive Language:   Comprehension:      Questions Open ended questions WFL  Commands  multistep basic commands WFL  complex/abstract commands WFL  Conversation WFL    Pragmatics:    WFL    Expressive Language:  Verbal:    Naming Confrontation WFL and Divergent responsive WFL  Conversational speech WFL      Motor Speech:  WFL    Voice:   WFL    Visual-Spatial:  WFL    Reading:   WFL     Written Expression:   WFL    Treatment: Patient seen for a speech/language/cognitive eval and ongoing swallow assessment. He was sitting up in bed during session. Patient reported no difficulties from a ST perspective. He tolerated thin liquids x4 (via straw) with no overt signs of aspiration. Patient reported occasional swallowing difficulties from an ACDF surgery in 2015. SLP educated him regarding safe swallow precautions and options for further ST if needed. He demonstrated a good understanding of speech therapy and was grateful for the information. No further questions. Whiteboard updated. Patient left in room with escobar light within reach.    Assessment:   Hi Rubio is a 59 y.o. male who is at his baseline from a ST perspective.     Goals:   Multidisciplinary Problems     SLP Goals        Problem: SLP Goal    Goal Priority Disciplines Outcome   SLP Goal     SLP    Description:  Speech-Language  Pathology Goals  Goals to be met by 8/29/19  1. Patient will tolerate a REGULAR DIET/THIN LIQUIDS with no s/s of aspiration.  2. Patient will participate in a speech/language/cognitive eval.                    Plan:   · Patient to be seen:  4 x/week   · Plan of Care expires:  09/21/19  · Plan of Care reviewed with:  patient   · SLP Follow-Up:  No       Discharge recommendations:  Discharge Facility/Level of Care Needs: (no further ST recommended)   Barriers to Discharge:  None    Time Tracking:   SLP Treatment Date:   08/23/19  Speech Start Time:  1050  Speech Stop Time:  1130     Speech Total Time (min):  40 min    Billable Minutes: Eval 15 , Treatment Swallowing Dysfunction 10 and Self Care/Home Management Training 15    Ronaldo Briggs CCC-SLP  Speech-Language Pathology  Pager: [phone]   08/23/2019

## 2019-08-23 NOTE — ASSESSMENT & PLAN NOTE
- 59 yr old male with medical history of DM2, HLD, HTN, migraine and cervical spondylosis with myelopathy s/p ACDF C3/C4 C4/C5 in 2015 , BPH s/p TURP admitted with concerns of acute onset LSW / sensory deficits. Persisting symptoms despite negative MRI for stroke, negative CTA / MRA dissection    - Persisting LE weakness however with improvement in UE weakness and sensory deficits during the hospital stay.    - Unclear etiology; DDx - ? Brainstem small vessel occlusive stroke missed on MRI given the stroke risk factors or cervical localizing pathology given occipital neuralgia. Less concerns for complex hemiplegic migraine.     Plans:  - Repeat MRI brain wo contrast - thin cuts through brainstem   - MRI cervical spine wo contrast   - Headache management as per primary - tylenol as needed, diclofenac gel, op occipital neurl management, no preventive migraine therapy at the moment   - continue secondary stroke prevention   - cervical PT/OT referral as op

## 2019-08-23 NOTE — PLAN OF CARE
met patient to obtain discharge planning assessment. Spouse at the bedside. Information obtained from patient and his spouse.  Patient's transportation home will be provided by his spouse.   name and number written on the board at the bedside.    Josefina Kendall MD       [id number] - [place], [place] - [address]  [address]  [facility] [address]  Phone: [phone] Fax: [phone]       Payor: BLUE CROSS BLUE SHIELD / Plan: [place] PPO / Product Type: PPO /       08/23/19 0845   Discharge Assessment   Assessment Type Discharge Planning Assessment   Confirmed/corrected address and phone number on facesheet? Yes   Assessment information obtained from? Patient;Caregiver   Expected Length of Stay (days) 3   Communicated expected length of stay with patient/caregiver yes   Prior to hospitilization cognitive status: Alert/Oriented   Prior to hospitalization functional status: Independent   Current cognitive status: Alert/Oriented   Current Functional Status: Independent   Lives With spouse   Able to Return to Prior Arrangements yes   Is patient able to care for self after discharge? Yes   Who are your caregiver(s) and their phone number(s)? Jennifer Rubio-Spouse        [phone]   Patient's perception of discharge disposition home or selfcare   Readmission Within the Last 30 Days no previous admission in last 30 days   Patient currently being followed by outpatient case management? No   Patient currently receives any other outside agency services? No   Equipment Currently Used at Home none   Do you have any problems affording any of your prescribed medications? No   Is the patient taking medications as prescribed? yes   Does the patient have transportation home? Yes   Transportation Anticipated family or friend will provide   Dialysis Name and Scheduled days N/A   Does the patient receive services at the [facility] Clinic? No   Discharge Plan A Home with family    Discharge Plan B Home with family;Home Health   DME Needed Upon Discharge    (TBD)   Patient/Family in Agreement with Plan yes

## 2019-08-23 NOTE — PLAN OF CARE
Problem: Occupational Therapy Goal  Goal: Occupational Therapy Goal  Goals to be met by: 09-06-19     Patient will increase functional independence with ADLs by performing:    LE Dressing with Supervision.  Grooming while standing with Supervision.  Toileting from toilet with Supervision for hygiene and clothing management.   Stand pivot transfers with Supervision.  Toilet transfer to toilet with Supervision.    Pt. Tolerated evaluation well

## 2019-08-23 NOTE — HPI
Hi Rubio is a 59 yr old male with medical history of DM2, HLD, HTN, migraine and cervical spondylosis with myelopathy s/p ACDF C3/C4 C4/C5 in 2015 , BPH s/p TURP admitted with concerns of acute onset LSW / sensory deficits. Persisting symptoms despite negative MRI for stroke, hence general neurology has been consulted.     Symptoms were acute onset LSW / sensory deficits - left hemifacial sensory loss / ataxia with associated dissection etiology concerns on admission. CTA / MRA dissection protocol were negative with no acute infarct on MRI. Persisting LE weakness symptoms however with improvement in UE weakness and sensory deficits during the hospital stay. Negative concerns for any new neurological deficits. Denied any concerns for seizures. Reported of headaches over the last month, likely occipital neuralgia type at the background of cervical spondylosis, with negative concerns for complex migraine presentation.

## 2019-08-23 NOTE — PT/OT/SLP EVAL
Occupational Therapy   Evaluation    Name: Hi Rubio  MRN: [id number]  Admitting Diagnosis:  Left-sided weakness   (per chart work-up negative for stroke )    Recommendations:     Discharge Recommendations: home with home health  Discharge Equipment Recommendations:  (TBD as pt. progresses)  Barriers to discharge:  Inaccessible home environment    Assessment:     Hi Rubio is a 59 y.o. male with a medical diagnosis of Left-sided weakness.  He presents with deficits with sensation on left side, decreased strength in LLE impacting functional mobility as well as standing tasks.  Pt is a fall risk at this time. . Performance deficits affecting function: impaired endurance, impaired sensation, impaired self care skills, impaired functional mobilty, weakness, decreased lower extremity function.      Rehab Prognosis: Good; patient would benefit from acute skilled OT services to address these deficits and reach maximum level of function.       Plan:     Patient to be seen 4 x/week to address the above listed problems via self-care/home management, therapeutic activities, therapeutic exercises  · Plan of Care Expires: 09/22/19  · Plan of Care Reviewed with: patient    Subjective     Chief Complaint: LLE still weak and some decreased sensation on left side  Patient/Family Comments/goals: To get all of his normal functioning back; He reported his left UE is back to normal today other than some deficits with sensation    Occupational Profile:  Living Environment: Lives with spouse in 2 story house with 1 step to enter. Bedrooms are on second floor with HR on left side.  Pt. Has a half bath downstairs.   Previous level of function: Completely independent with ADL/IADL tasks; works full -time  Roles and Routines: ; spouse, father and grandfather; caretaker of self; enjoys doing wood work ; fell off ladder a few weeks ago when working on covering for porch  Equipment Used at Home:  none  Assistance  upon Discharge: wife works part-time    Pain/Comfort:  Pain Rating 1: 0/10  Pain Rating Post-Intervention 1: 0/10    Patients cultural, spiritual, Temple conflicts given the current situation: no    Objective:     Communicated with: nurse prior to session.  Patient found  with telemetry(supine in bed) upon OT entry to room.    General Precautions: Standard, aspiration, fall, diabetic   Orthopedic Precautions:N/A   Braces: N/A     Occupational Performance:    Bed Mobility:    · Patient completed Supine to Sit with modified independence  · Patient completed Sit to Supine with modified independence    Functional Mobility/Transfers:  · Patient completed Sit <> Stand Transfer with contact guard assistance  with  no assistive device   · Functional Mobility: Pt. Performed side step along EOB with Min/Mod A and left knee blocking x 2 steps (LLE continues to be weak )    Activities of Daily Living:  · Lower Body Dressing: stand by assistance to don and doff socks seated EOB    Cognitive/Visual Perceptual:  Wears glasses    Physical Exam:  Balance:  Static sit and dynamic sit good; static and dynamic stand fair/Min A  Postural examination/scapula alignment:    -       No postural abnormalities identified  Edema:  None noted  Sensation: reports diminished on left side  Dominant hand:   Right  Upper Extremity Range of Motion:     -       Right Upper Extremity: WNL  -       Left Upper Extremity: WNL  Upper Extremity Strength:    -       Right Upper Extremity: WNL  -       Left Upper Extremity: WNL   Strength:    -       Right Upper Extremity: WNL  -       Left Upper Extremity: WNL  Fine Motor Coordination:    -       Intact  Gross motor coordination:   WFL in BUE some impairment noted in LLE  Neurological: intact    AMPAC 6 Click ADL:  AMPAC Total Score: 21    Treatment & Education:  Pt. Educated on role of oT and pOC   pt. Educated on need for assist for all mobility 2/2 fall risk  Education:    Patient left supine with  call button in reach and nurse notified that telemetry lead came off     GOALS:   Multidisciplinary Problems     Occupational Therapy Goals        Problem: Occupational Therapy Goal    Goal Priority Disciplines Outcome Interventions   Occupational Therapy Goal     OT, PT/OT     Description:  Goals to be met by: 09-06-19     Patient will increase functional independence with ADLs by performing:    LE Dressing with Supervision.  Grooming while standing with Supervision.  Toileting from toilet with Supervision for hygiene and clothing management.   Stand pivot transfers with Supervision.  Toilet transfer to toilet with Supervision.                      History:     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 -     Hyperlipidemia     Hypertension     Memory loss     Thyroid nodule 8/22/2019    Traumatic iritis - Left Eye 2/27/2013         Past Surgical History:   Procedure Laterality Date    CERVICAL FUSION  12/30/2015    COLONOSCOPY N/A 4/7/2017    Performed by [facility] Frederick [facility] at [facility] ENDO (4TH FLR)    DISKECTOMY AND FUSION-ANTERIOR CERVICAL (ACDF) C3-4, 4-5 N/A 12/30/2015    Performed by Hernanod Wise MD at [facility] OR 2ND FLR    ESOPHAGOGASTRODUODENOSCOPY (EGD) N/A 3/13/2018    Performed by Rubin Barrios MD at [facility] ENDO (2ND FLR)    HEART CATH-LEFT Left 3/21/2018    Performed by Fareed Porter MD at [facility] CATH LAB    HERNIA REPAIR      INJECTION-STEROID-EPIDURAL-CERVICAL N/A 9/28/2015    Per[facility] by [facility] at [facility] PAIN MGT    PROSTATECTOMY      PROSTATECTOMY-TRANSURETHRAL N/A 10/12/2017    Performed by Yg King [facility] at [facility] OR    REPAIR, HERNIA, INGUINAL,  BILATERAL, LAPAROSCOPIC Bilateral 11/7/2013    Performed by Hernando Elena Jr., MD at [facility] OR [address] [place] [address]       Time Tracking:     OT Date of Treatment: 08/23/19  OT Start Time: 1012  OT Stop Time: 1031  OT Total Time (min): 19 min    Billable Minutes:Evaluation 19    JOSE F Casper  8/23/2019

## 2019-08-23 NOTE — PLAN OF CARE
Problem: SLP Goal  Goal: SLP Goal  Speech-Language Pathology Goals  Goals to be met by 8/29/19  1. Patient will tolerate a REGULAR DIET/THIN LIQUIDS with no s/s of aspiration.  2. Patient will participate in a speech/language/cognitive eval.   Patient seen for a speech/language/cognitive eval and ongoing swallow assessment. Patient at baseline from a  perspective and appropriate for discharge from  at this time.     Ronaldo Briggs CCC-SLP  Speech-Language Pathology  Pager: [phone]

## 2019-08-24 VITALS
HEIGHT: 76 IN | OXYGEN SATURATION: 97 % | SYSTOLIC BLOOD PRESSURE: 120 MMHG | BODY MASS INDEX: 23.65 KG/M2 | DIASTOLIC BLOOD PRESSURE: 76 MMHG | WEIGHT: 194.25 LBS | RESPIRATION RATE: 14 BRPM | TEMPERATURE: 98 F | HEART RATE: 70 BPM

## 2019-08-24 PROBLEM — R53.1 LEFT-SIDED WEAKNESS: Status: RESOLVED | Noted: 2019-08-22 | Resolved: 2019-08-24

## 2019-08-24 PROBLEM — R44.9 SENSORY DEFICIT, LEFT: Status: RESOLVED | Noted: 2019-08-22 | Resolved: 2019-08-24

## 2019-08-24 LAB
ALBUMIN SERPL BCP-MCNC: 3.9 G/DL (ref 3.5–5.2)
ALP SERPL-CCNC: 82 U/L (ref 55–135)
ALT SERPL W/O P-5'-P-CCNC: 37 U/L (ref 10–44)
ANION GAP SERPL CALC-SCNC: 14 MMOL/L (ref 8–16)
AST SERPL-CCNC: 22 U/L (ref 10–40)
BASOPHILS # BLD AUTO: 0.06 K/UL (ref 0–0.2)
BASOPHILS NFR BLD: 1.2 % (ref 0–1.9)
BILIRUB SERPL-MCNC: 0.7 MG/DL (ref 0.1–1)
BUN SERPL-MCNC: 16 MG/DL (ref 8–23)
CALCIUM SERPL-MCNC: 9.5 MG/DL (ref 8.7–10.5)
CHLORIDE SERPL-SCNC: 104 MMOL/L (ref 95–110)
CO2 SERPL-SCNC: 23 MMOL/L (ref 23–29)
CREAT SERPL-MCNC: 1.1 MG/DL (ref 0.5–1.4)
DIFFERENTIAL METHOD: NORMAL
EOSINOPHIL # BLD AUTO: 0.3 K/UL (ref 0–0.5)
EOSINOPHIL NFR BLD: 6.5 % (ref 0–8)
ERYTHROCYTE [DISTWIDTH] IN BLOOD BY AUTOMATED COUNT: 13.2 % (ref 11.5–14.5)
EST. GFR  (AFRICAN AMERICAN): >60 ML/MIN/1.73 M^2
EST. GFR  (NON AFRICAN AMERICAN): >60 ML/MIN/1.73 M^2
GLUCOSE SERPL-MCNC: 137 MG/DL (ref 70–110)
HCT VFR BLD AUTO: 49.9 % (ref 40–54)
HGB BLD-MCNC: 16.7 G/DL (ref 14–18)
IMM GRANULOCYTES # BLD AUTO: 0.02 K/UL (ref 0–0.04)
IMM GRANULOCYTES NFR BLD AUTO: 0.4 % (ref 0–0.5)
LYMPHOCYTES # BLD AUTO: 1.4 K/UL (ref 1–4.8)
LYMPHOCYTES NFR BLD: 27.8 % (ref 18–48)
MAGNESIUM SERPL-MCNC: 2.2 MG/DL (ref 1.6–2.6)
MCH RBC QN AUTO: 30 PG (ref 27–31)
MCHC RBC AUTO-ENTMCNC: 33.5 G/DL (ref 32–36)
MCV RBC AUTO: 90 FL (ref 82–98)
MONOCYTES # BLD AUTO: 0.5 K/UL (ref 0.3–1)
MONOCYTES NFR BLD: 9.6 % (ref 4–15)
NEUTROPHILS # BLD AUTO: 2.8 K/UL (ref 1.8–7.7)
NEUTROPHILS NFR BLD: 54.5 % (ref 38–73)
NRBC BLD-RTO: 0 /100 WBC
PHOSPHATE SERPL-MCNC: 3.8 MG/DL (ref 2.7–4.5)
PLATELET # BLD AUTO: 263 K/UL (ref 150–350)
PMV BLD AUTO: 9.9 FL (ref 9.2–12.9)
POCT GLUCOSE: 157 MG/DL (ref 70–110)
POTASSIUM SERPL-SCNC: 3.8 MMOL/L (ref 3.5–5.1)
PROT SERPL-MCNC: 7 G/DL (ref 6–8.4)
RBC # BLD AUTO: 5.57 M/UL (ref 4.6–6.2)
SODIUM SERPL-SCNC: 141 MMOL/L (ref 136–145)
WBC # BLD AUTO: 5.1 K/UL (ref 3.9–12.7)

## 2019-08-24 PROCEDURE — 99223 PR INITIAL HOSPITAL CARE,LEVL III: ICD-10-PCS | Mod: ,,, | Performed by: NEUROLOGICAL SURGERY

## 2019-08-24 PROCEDURE — 99239 HOSP IP/OBS DSCHRG MGMT >30: CPT | Mod: ,,, | Performed by: HOSPITALIST

## 2019-08-24 PROCEDURE — 99239 PR HOSPITAL DISCHARGE DAY,>30 MIN: ICD-10-PCS | Mod: ,,, | Performed by: HOSPITALIST

## 2019-08-24 PROCEDURE — 99232 SBSQ HOSP IP/OBS MODERATE 35: CPT | Mod: ,,, | Performed by: PSYCHIATRY & NEUROLOGY

## 2019-08-24 PROCEDURE — 97161 PT EVAL LOW COMPLEX 20 MIN: CPT

## 2019-08-24 PROCEDURE — 99232 PR SUBSEQUENT HOSPITAL CARE,LEVL II: ICD-10-PCS | Mod: ,,, | Performed by: PSYCHIATRY & NEUROLOGY

## 2019-08-24 PROCEDURE — 25000003 PHARM REV CODE 250: Performed by: HOSPITALIST

## 2019-08-24 PROCEDURE — 36415 COLL VENOUS BLD VENIPUNCTURE: CPT

## 2019-08-24 PROCEDURE — 85025 COMPLETE CBC W/AUTO DIFF WBC: CPT

## 2019-08-24 PROCEDURE — 80053 COMPREHEN METABOLIC PANEL: CPT

## 2019-08-24 PROCEDURE — 99223 1ST HOSP IP/OBS HIGH 75: CPT | Mod: ,,, | Performed by: NEUROLOGICAL SURGERY

## 2019-08-24 PROCEDURE — 83735 ASSAY OF MAGNESIUM: CPT

## 2019-08-24 PROCEDURE — 97116 GAIT TRAINING THERAPY: CPT

## 2019-08-24 PROCEDURE — 84100 ASSAY OF PHOSPHORUS: CPT

## 2019-08-24 RX ORDER — SODIUM CHLORIDE 0.9 % (FLUSH) 0.9 %
10 SYRINGE (ML) INJECTION
Status: DISCONTINUED | OUTPATIENT
Start: 2019-08-24 | End: 2019-08-24 | Stop reason: HOSPADM

## 2019-08-24 RX ORDER — LIDOCAINE 50 MG/G
1 PATCH TOPICAL DAILY
Qty: 90 PATCH | Refills: 1 | Status: SHIPPED | OUTPATIENT
Start: 2019-08-24 | End: 2021-02-21

## 2019-08-24 RX ORDER — DICLOFENAC SODIUM 10 MG/G
4 GEL TOPICAL 3 TIMES DAILY PRN
Qty: 100 TUBE | Refills: 1 | Status: SHIPPED | OUTPATIENT
Start: 2019-08-24 | End: 2020-02-05

## 2019-08-24 RX ADMIN — DICLOFENAC 2 G: 10 GEL TOPICAL at 01:08

## 2019-08-24 RX ADMIN — LIDOCAINE 1 PATCH: 50 PATCH TOPICAL at 01:08

## 2019-08-24 RX ADMIN — PANTOPRAZOLE SODIUM 40 MG: 40 TABLET, DELAYED RELEASE ORAL at 07:08

## 2019-08-24 RX ADMIN — ASPIRIN 81 MG: 81 TABLET, COATED ORAL at 10:08

## 2019-08-24 RX ADMIN — METOPROLOL SUCCINATE 100 MG: 100 TABLET, EXTENDED RELEASE ORAL at 10:08

## 2019-08-24 RX ADMIN — HYDROCHLOROTHIAZIDE 25 MG: 25 TABLET ORAL at 10:08

## 2019-08-24 RX ADMIN — OMEGA-3 FATTY ACIDS CAP DELAYED RELEASE 1000 MG 1 CAPSULE: 1000 CAPSULE DELAYED RELEASE at 10:08

## 2019-08-24 RX ADMIN — DICLOFENAC 2 G: 10 GEL TOPICAL at 10:08

## 2019-08-24 NOTE — PLAN OF CARE
Problem: Physical Therapy Goal  Goal: Physical Therapy Goal  Goals to be met by: 2019    Patient will increase functional independence with mobility by performin. Sit to stand transfer with Modified Eclectic  2. Gait  x 75 feet with Modified Eclectic using LRAD  3. Lower extremity exercise program x15 reps per handout, with independence   Outcome: Ongoing (interventions implemented as appropriate)  Eval completed and POC established     Sim Parker, PT, DPT  2019

## 2019-08-24 NOTE — PT/OT/SLP EVAL
Physical Therapy Evaluation    Patient Name:  Hi Rubio   MRN:  [id number]    Recommendations:     Discharge Recommendations:  outpatient PT   Discharge Equipment Recommendations: walker, rolling   Barriers to discharge: None    Assessment:     Hi Rubio is a 59 y.o. male admitted with a medical diagnosis of Left-sided weakness.  He presents with the following impairments/functional limitations:  weakness, impaired functional mobilty, impaired balance, impaired endurance, impaired self care skills, gait instability. Pt presenting at EOB with therapy arrival stating he successfully transferred to bathroom completing a shower with assist from wife. Pt very pleasant and motivated to return to OF. Pt demonstrates LLE weakness but no pain present throughout this eval. Pt and spouse report L sided strength has progressively increased during this admission. Pt has now has good UE strength and educated on using RW during transfers and amb to decrease fall risk as pt's LLE demonstrates buckling during weight bearing. Pt would benefit from continued skilled acute PT 3x/wk to improve functional mobility.  Recommending pt receive PT services in OPPT setting following discharge from hospital once medically cleared. pt safe to mobilize with nursing utilizing RW ordered to room.     Rehab Prognosis: Good; patient would benefit from acute skilled PT services to address these deficits and reach maximum level of function.    Recent Surgery: * No surgery found *      Plan:     During this hospitalization, patient to be seen 3 x/week to address the identified rehab impairments via gait training, neuromuscular re-education, therapeutic activities, therapeutic exercises and progress toward the following goals:    · Plan of Care Expires:  09/24/19    Subjective     Chief Complaint: LLE weakness   Patient/Family Comments/goals: Pt pleasant and willing to participate with therapy on this date.    Pain/Comfort:  · Pain Rating  1: 0/10    Patients cultural, spiritual, Temple conflicts given the current situation: no    Living Environment:  Pt lives with wife in 2 story home with bed room located on second level. Pt has 2 exterior EDER with no handrail and 14 interior EDER second level with L handrail.   Prior to admission, patients level of function was Ind.  Equipment used at home:  .  DME owned (not currently used): none.  Upon discharge, patient will have assistance from wife.    Objective:     Communicated with RN prior to session.  Patient found sitting EOB with    upon PT entry to room.    General Precautions: Standard, fall   Orthopedic Precautions:N/A   Braces: N/A     Exams:  · Cognitive Exam:  Patient is oriented to Person, Place, Time and Situation  · Gross Motor Coordination:  WFL  · RLE ROM: WNL  · RLE Strength: WNL  · LLE ROM: WFL  · LLE Strength: 4/5    Functional Mobility:  · Transfers:     · Sit to Stand:  stand by assistance with rolling walker  · Gait: 15ft with RW CGA. pt initially performing with reciprocal steps but 2/2 increasing LLE buckling pt educated to use step to pattern. pt demonstrates good safety awareness but limited by fatigue.   · Balance: Sitting: Ind.       Standing: CGA      Therapeutic Activities and Exercises:   - Pt educated on:   -PT roles, expectations, and POC    -Safety with mobility   -Benefits of OOB activities to increase strength and functional mobility    -Performing ther ex for increasing LE ROM and strength   -Discharge recommendations     AM-PAC 6 CLICK MOBILITY  Total Score:20     Patient left up on  with call button in reach.    GOALS:   Multidisciplinary Problems     Physical Therapy Goals        Problem: Physical Therapy Goal    Goal Priority Disciplines Outcome Goal Variances Interventions   Physical Therapy Goal     PT, PT/OT Ongoing (interventions implemented as appropriate)     Description:  Goals to be met by: 9/24/2019    Patient will increase functional independence  with mobility by performin. Sit to stand transfer with Modified [place]  2. Gait  x 75 feet with Modified [place] using LRAD  3. Lower extremity exercise program x15 reps per handout, with independence                     History:     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 -     Hyperlipidemia     Hypertension     Memory loss     Thyroid nodule 2019    Traumatic iritis - Left Eye 2013       Past Surgical History:   Procedure Laterality Date    CERVICAL FUSION  2015    COLONOSCOPY N/A 2017    Performed by Handy Frederick MD at [facility] ENDO (4TH FLR)    DISKECTOMY AND FUSION-ANTERIOR CERVICAL (ACDF) C3-4, 4-5 N/A 2015    Performed by Hernando Wise MD at [facility] OR 2ND FLR    ESOPHAGOGASTRODUODENOSCOPY (EGD) N/A 3/13/2018    Performed by Rubin Barrios MD at [facility] ENDO (2ND FLR)    HEART CATH-LEFT Left 3/21/2018    Performed by Fareed Porter MD at [facility] CATH LAB    HERNIA REPAIR      INJECTION-STEROID-EPIDURAL-CERVICAL N/A 2015    Performed by Colby Gan MD at [facility] [place] PAIN MGT    PROSTATECTOMY      PROSTATECTOMY-TRANSURETHRAL N/A 10/12/2017    Performed by Yg King MD at [facility] [place] OR    REPAIR, HERNIA, INGUINAL, BILATERAL, LAPAROSCOPIC Bilateral 2013    Performed by Hernando Elena Jr., MD at [facility] OR 2ND FLR       Time Tracking:     PT Received On: 19  PT Start Time: 952     PT Stop Time: 1020  PT Total Time (min): 28 min     Billable Minutes: Evaluation 10 and Gait Training 18      Cleveland Parker, PT  2019

## 2019-08-24 NOTE — PLAN OF CARE
Problem: Adult Inpatient Plan of Care  Goal: Plan of Care Review  Outcome: Ongoing (interventions implemented as appropriate)  AAO X 4; able to express needs.  VSS; afebrile, NSR on tele.  Pt education on cardiac medications and effects of cardiac diseases on health.  Accu checks AC/HS; WDL.  Voids via urinal. Denies pain/discomfort. POC reviewed with pt and spouse; expressed understanding.

## 2019-08-24 NOTE — ASSESSMENT & PLAN NOTE
- 59 yr old male with medical history of DM2, HLD, HTN, migraine and cervical spondylosis with myelopathy s/p ACDF C3/C4 C4/C5 in 2015 , BPH s/p TURP admitted with concerns of acute onset LSW / sensory deficits. Persisting symptoms despite negative MRI for stroke, negative CTA / MRA dissection    - Hospital course: Improvement in LLE weakness, with baseline return of LUE strenght. Focussed exam: Persisting left facial and right UE, LE temp loss, however with normal pinprick examination.   - Repeat MRI brain for ? Brainstem small vessel occlusive stroke missed on MRI given the stroke risk factors deferred, however MRI cervical spine stable findings, with non-correlating findings. Less concerns for complex hemiplegic migraine.     Plans:  - No further investigations / interventions from neuro standpoint. Unclear etiology for presenation at the moment, with possible functional disorder.   - Headache management as per primary - tylenol as needed, diclofenac gel, op occipital neurl management, no preventive migraine therapy at the moment   - continue secondary stroke prevention   - cervical PT/OT referral as op     F/u with Dr. Garcia on op basis

## 2019-08-24 NOTE — PLAN OF CARE
Date: 08/24/2019      Patient Name: Hi Rubio  YOB: 1960  [address] [place] [address]          [facility] Medicine Dept.  [facility] [address] [facility] [address] [phone] after hours  [phone] fax Hi Rubio has been hospitalized at the [facility] since 8/22/2019.  Please excuse the patient from duties.  Patient may return on 8/28/19.  No restrictions.     Please contact me if you have any questions.      Neno Edge MD  08/24/2019

## 2019-08-24 NOTE — PROGRESS NOTES
[facility]  Neurology  Progress Note    Patient Name: Hi Rubio  MRN: [id number]  Admission Date: 8/22/2019  Hospital Length of Stay: 2 days  Code Status: Prior   Attending Provider: No att. providers found  Primary Care Physician: Josefina Kendall MD   Principal Problem:Left-sided weakness      Subjective:     Interval History:     Improvement in LLE weakness, with baseline return of LUE strenght. Persisting left facial and right UE, LE temp loss, however with normal pinprick examination.     MRI brain deferred, however MRI cervical spine stable findings, with non-correlating findings.     Current Neurological Medications:     No current facility-administered medications for this encounter.      Current Outpatient Medications   Medication Sig Dispense Refill    ALPRAZolam (XANAX) 0.5 MG tablet TAKE 1 TABLET BY MOUTH 3 TIMES A DAY AS NEEDED FOR ANXIETY 90 tablet 1    amLODIPine (NORVASC) 10 MG tablet TAKE 1 TABLET BY MOUTH EVERY DAY 30 tablet 2    CONTOUR TEST STRIPS Strp USE TO TEST BLOOD SUGAR ONCE DAILY 25 strip 6    ezetimibe (ZETIA) 10 mg tablet Take 1 tablet (10 mg total) by mouth once daily. 30 tablet 11    hydroCHLOROthiazide (HYDRODIURIL) 25 MG tablet TAKE 1 TABLET BY MOUTH EVERY DAY 90 tablet 0    metoprolol succinate (TOPROL-XL) 100 MG 24 hr tablet TAKE 1 TABLET BY MOUTH EVERY DAY 90 tablet 2    MICROLET LANCET Misc 1 lancet by Misc.(Non-Drug; Combo Route) route once daily. Test blood glucose once daily as instructed. 25 each 11    nitroGLYCERIN (NITROSTAT) 0.3 MG SL tablet Place 1 tablet (0.3 mg total) under the tongue every 5 (five) minutes as needed for Chest pain. 25 tablet 0    pantoprazole (PROTONIX) 40 MG tablet TAKE 1 TABLET BY MOUTH TWICE A DAY 60 tablet 2    pravastatin (PRAVACHOL) 80 MG tablet TAKE 1 TABLET BY MOUTH EVERYDAY AT BEDTIME 90 tablet 1    sertraline (ZOLOFT) 25 MG tablet TAKE 1 TABLET BY MOUTH EVERY DAY 30 tablet 6    sildenafil (REVATIO) 20 mg  Tab TAKE 2-5 TABLETS BY MOUTH AS NEEDED 100 tablet 11    aspirin (ECOTRIN) 81 MG EC tablet Take 1 tablet (81 mg total) by mouth once daily.  0    diclofenac sodium (VOLTAREN) 1 % Gel Apply 4 g topically 3 (three) times daily as needed. 100 Tube 1    lidocaine (LIDODERM) 5 % Place 1 patch onto the skin once daily. Remove & Discard patch within 12 hours or as directed by MD 90 patch 1       Review of Systems   Constitutional: Negative for fever.   HENT: Negative for trouble swallowing and voice change.    Eyes: Negative for visual disturbance.   Respiratory: Negative for shortness of breath.    Genitourinary: Negative for dysuria.   Musculoskeletal: Negative for back pain.   Neurological: Positive for weakness, numbness and headaches. Negative for dizziness, tremors, seizures, syncope, facial asymmetry, speech difficulty and light-headedness.   Psychiatric/Behavioral: Negative for agitation.     Objective:     Vital Signs (Most Recent):  Temp: 98.3 °F (36.8 °C) (08/24/19 1130)  Pulse: 70 (08/24/19 1130)  Resp: 14 (08/24/19 1130)  BP: 120/76 (08/24/19 1130)  SpO2: 97 % (08/24/19 1130) Vital Signs (24h Range):  Temp:  [97.6 °F (36.4 °C)-99.2 °F (37.3 °C)] 98.3 °F (36.8 °C)  Pulse:  [61-80] 70  Resp:  [14-18] 14  SpO2:  [93 %-97 %] 97 %  BP: (101-138)/(64-82) 120/76     Weight: 88.1 kg (194 lb 3.6 oz)  Body mass index is 23.64 kg/m².    Physical Exam   Constitutional: He is oriented to person, place, and time. No distress.   HENT:   Head: Normocephalic and atraumatic.   Eyes: Pupils are equal, round, and reactive to light. EOM are normal.   Neck: Neck supple.   Cardiovascular:   No murmur heard.  Pulmonary/Chest: No respiratory distress.   Abdominal: Soft.   Musculoskeletal: He exhibits no deformity.   Neurological: He is oriented to person, place, and time. He has a normal Finger-Nose-Finger Test.   Reflex Scores:       Bicep reflexes are 2+ on the right side and 2+ on the left side.       Brachioradialis reflexes  are 2+ on the right side and 2+ on the left side.       Patellar reflexes are 2+ on the right side and 2+ on the left side.       Achilles reflexes are 2+ on the right side and 2+ on the left side.  Psychiatric: His speech is normal.       NEUROLOGICAL EXAMINATION:     MENTAL STATUS   Oriented to person, place, and time.   Speech: speech is normal     CRANIAL NERVES     CN II   Visual fields full to confrontation.     CN III, IV, VI   Pupils are equal, round, and reactive to light.  Extraocular motions are normal.   Ophthalmoparesis: none    CN V   Right facial sensation deficit: Left hemifacial sensory loss - temp     CN VII   Facial expression full, symmetric.     CN VIII   CN VIII normal.     CN XI   CN XI normal.     CN XII   CN XII normal.     MOTOR EXAM   Muscle bulk: normal  Overall muscle tone: normal       LUE 4/5 to 5/5 improved  LLE 4/5 to 4+/5 (improvement - not baseline)     REFLEXES     Reflexes   Right brachioradialis: 2+  Left brachioradialis: 2+  Right biceps: 2+  Left biceps: 2+  Right patellar: 2+  Left patellar: 2+  Right achilles: 2+  Left achilles: 2+    SENSORY EXAM   Pinprick normal.        RUE / RLE temp loss      GAIT AND COORDINATION     Gait  Gait: (NT)     Coordination   Finger to nose coordination: normal    Tremor   Resting tremor: absent  Intention tremor: absent  Action tremor: absent      Significant Labs: All pertinent lab results from the past 24 hours have been reviewed.    Significant Imaging: I have reviewed and interpreted all pertinent imaging results/findings within the past 24 hours.    Assessment and Plan:     Type 2 diabetes mellitus without complication, without long-term current use of insulin  - stroke risk  - control     Anxiety disorder  - DDx for functional / possibility     Mixed hyperlipidemia  - stroke risk  - high potency statins     Cervical radiculopathy  - DDx  - MRI cervical wo contrast - non-correlating to clinical presentation     Essential hypertension  -  stroke risk  - SBP < 140         VTE Risk Mitigation (From admission, onward)        Ordered     IP VTE LOW RISK PATIENT  Once      08/24/19 0944          Inder Pak MD  Neurology  [facility]

## 2019-08-24 NOTE — PLAN OF CARE
Date: 08/24/2019      Patient Name: Hi Rubio  YOB: 1960  [address] [place] [address]          [facility] Medicine Dept.  [facility] [address] [place] [address] [phone] after hours  [phone] fax Hi Rubio has been hospitalized at the [facility] since 8/22/2019.  Please excuse the patient from duties until  9/4/19     Please contact me if you have any questions.            Neno Edge MD  08/24/2019

## 2019-08-24 NOTE — DISCHARGE SUMMARY
"[facility] Medicine  Discharge Summary      Patient Name: Hi Rubio  MRN: [id number]  Admission Date: 8/22/2019  Hospital Length of Stay: 2 days  Discharge Date and Time:  08/24/2019 12:13 PM  Attending Physician: Neno Edge MD   Discharging Provider: Neno Edge MD  Primary Care Provider: Josefina Kendall MD    [facility] Medicine Team: [facility]OSP MED B Neno Edge MD    HPI: Hi Rubio is a 59 y.o. male with a PMH of for  DM2, HLD, HTN and cervical spondylosis with myelopathy s/p ACDF C3/C4 C4/C5 in 2015 , BPH s/p TURP  who presented to the ED on 8/22/2019 diagnosed with  Extremity Weakness (LKN last night going to bed at 11:30pm, woke up 0530 with left arm weakness and left leg weakness. "can't stand on left leg". states left leg with "buckle" to stand. only other complaint is neck pain. no vision changes, no neglect but "seems like tongue is caught at the top of his mouth")   Oriented x3 accompanied by wife states that was feeling normal when he went to bed last night around 1130PM.  Mentions  that this morning he awoke at his normal time and noted left sided neck pain and motor weakness in his LUE and LLE.  Denies deny any dysarthria, word finding difficulty or facial droop.  states that he is fully functional at baseline and able to ambulate without assistance.  Patient sustained a fall a week back, he fell from a later 6-8 feet high and landed on his buttocks. since then,  he has on and off neck pain and tailbone pain on sitting.  prior to presentation but denies any head trauma or LOC. 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      On initial examination, pt has motor weakness on left (LLE>LUE).  Was evaluated by vascular Neurology, acute CVA ruled out. MRI Brain showed no evidence of recent infarction or other acute intracranial pathology.        Interval history  Reports remote history of migraine.  Complains of headache for the " last 1 month with intermittent flashes in the eyes.  Has been taking Excedrin migraine twice a day for the last 1 month at home.  Started on diclofenac gel and lidocaine patch for occipital neurolgia.  General Neurology consulted.  Repeat MRI brain without contrast( thin cuts through the brainstem) and cervical spine without contrast.  Mentions he has sensation of  mild dysphagia since cervical fusion surgery but no issues with swallowing- has been taking regular diet    * No surgery found *      Hospital Course:       Active Problems:               Active Diagnoses:     Diagnosis Date Noted POA    PRINCIPAL PROBLEM:  Left-sided weakness [R53.1]     Was evaluated by vascular Neurology, acute CVA ruled out. MRI Brain showed no evidence of recent infarction or other acute intracranial pathology.        sustained a fall a week back, he fell from a later 6-8 feet high and landed on his buttocks.  since then he has on and off neck pain and tailbone pain on sitting.  prior to presentation but denies any head trauma or LOC.   status post neurosurgery evaluation     08/23/2019  Improved strength from admission-   General Neurology consulted.  cervical spine without contrast    Focal T2 hyperintensity within the cervical cord at level C4-C5 suggestive of myelomalacia.  No change from prior imaging    08/24/2019  Left-sided weakness improved further.  In being discharged with outpatient PT OT evaluation      Migraine? - Reports remote history of migraine.  Complains of headache for the last 1 month with intermittent flashes in the eyes.  Has been taking Excedrin migraine twice a day for the last 1 month at home.  Started on diclofenac gel and lidocaine patch for occipital neurolgia. Mentions he has sensation of  mild dysphagia since cervical fusion surgery but no issues with swallowing- has been taking regular diet        Low backache- status post fall x-ray of the sacral lumbar spine unremarkable.  X-ray lumbar, sacral  spine and hips and pelvis unremarkable 08/22/2019 Unknown    Sensory deficit, left [R41.89] tingling and numbness is improving.  Neurology suggesting possible functional component.  Ambulatory referral to Psychiatry 08/22/2019 Unknown    Thyroid nodule [E04.1]Incidental 1 cm heterogeneous nodule left lobe of the thyroid overall indeterminate clinical correlation and follow-up dedicated thyroid imaging recommended.  TSH within normal.  Outpatient Endocrine follow 08/22/2019 Yes    Type 2 diabetes mellitus without complication, without long-term current use of insulin [E11.9] currently does not take any medications HB A1c of 6.8 .  Low-dose insulin sliding scale for now.  Advised outpatient primary care follow-up 05/11/2018 Yes    BPH with obstruction/lower urinary tract symptoms [N40.1, N13.8] incomplete emptying status post TURP 09/28/2017 Yes    Mixed hyperlipidemia [E78.2] on ezetimibe.  The patient has hypertriglyceridemia greater than 400s.  Continue pravastatin.  Add fish oil 01/20/2017 Yes    Cervical stenosis of spine [M48.02] status post cervical fusion as above 12/30/2015 Yes    Cervical radiculopathy [M54.12] as above 07/20/2015 Yes    GERD (gastroesophageal reflux disease) [K21.9] currently taking Protonix 40 mg twice a day 07/24/2013 Yes    Essential hypertension [I10] blood pressure controlled on amlodipine, hydrochlorothiazide and metoprolol  Extra beats/PVCs followed up with cardiology Dr. AZAM Means recommended metoprolol succinate every day, Xanax as needed for anxiety 07/16/2012 Yes         Patient being discharged with Neurology, Endocrine, and outpatient PT and OT referral    Consults:   Consults (From admission, onward)        Status Ordering Provider     Inpatient consult to Neurology  Once     Provider:  (Not yet assigned)    Completed RONEL DURAN     Inpatient consult to Vascular (Stroke) Neurology  Once     Provider:  (Not yet assigned)    Completed LUZ MCCORMICK  Active Diagnoses:    Diagnosis Date Noted POA    Thyroid nodule [E04.1] 08/22/2019 Yes    Type 2 diabetes mellitus without complication, without long-term current use of insulin [E11.9] 05/11/2018 Yes    BPH with obstruction/lower urinary tract symptoms [N40.1, N13.8] 09/28/2017 Yes    Mixed hyperlipidemia [E78.2] 01/20/2017 Yes    Anxiety disorder [F41.9] 01/20/2017 Yes    Cervical stenosis of spine [M48.02] 12/30/2015 Yes    Cervical radiculopathy [M54.12] 07/20/2015 Yes    GERD (gastroesophageal reflux disease) [K21.9] 07/24/2013 Yes    Essential hypertension [I10] 07/16/2012 Yes      Problems Resolved During this Admission:    Diagnosis Date Noted Date Resolved POA    PRINCIPAL PROBLEM:  Left-sided weakness [R53.1] 08/22/2019 08/24/2019 Unknown    Sensory deficit, left [R41.89] 08/22/2019 08/24/2019 Unknown      Discharged Condition: fair    Disposition: Home or Self Care    Follow Up:    Patient Instructions:      Ambulatory consult to Diabetic Education   Referral Priority: Routine Referral Type: Consultation   Referral Reason: Specialty Services Required   Requested Specialty: Diabetes   Number of Visits Requested: 1 Expiration Date: 08/24/20     Ambulatory Referral to Endocrinology   Referral Priority: Routine Referral Type: Consultation   Requested Specialty: Endocrinology   Number of Visits Requested: 1     Ambulatory Referral to Physical/Occupational Therapy   Referral Priority: Routine Referral Type: Physical Medicine   Referral Reason: Specialty Services Required   Number of Visits Requested: 1     Ambulatory Referral to Psychiatry   Referral Priority: Routine Referral Type: Psychiatric   Referral Reason: Specialty Services Required   Requested Specialty: Psychiatry   Number of Visits Requested: 1     Ambulatory Referral to Neurology   Referral Priority: Routine Referral Type: Consultation   Referral Reason: Specialty Services Required   Requested Specialty: Neurology   Number of Visits  Requested: 1     Medications:  Reconciled Home Medications:      Medication List      Start taking these medications    diclofenac sodium 1 % Gel  Commonly known as:  VOLTAREN  Apply 4 g topically 3 (three) times daily as needed.     lidocaine 5 %  Commonly known as:  LIDODERM  Place 1 patch onto the skin once daily. Remove & Discard patch within 12 hours or as directed by MD        Change how you take these medications    pantoprazole 40 MG tablet  Commonly known as:  PROTONIX  TAKE 1 TABLET BY MOUTH TWICE A DAY  What changed:  Another medication with the same name was removed. Continue taking this medication, and follow the directions you see here.        Continue taking these medications    ALPRAZolam 0.5 MG tablet  Commonly known as:  XANAX  TAKE 1 TABLET BY MOUTH 3 TIMES A DAY AS NEEDED FOR ANXIETY     amLODIPine 10 MG tablet  Commonly known as:  NORVASC  TAKE 1 TABLET BY MOUTH EVERY DAY     aspirin 81 MG EC tablet  Commonly known as:  ECOTRIN  Take 1 tablet (81 mg total) by mouth once daily.     CONTOUR TEST STRIPS Strp  Generic drug:  blood sugar diagnostic  USE TO TEST BLOOD SUGAR ONCE DAILY     ezetimibe 10 mg tablet  Commonly known as:  ZETIA  Take 1 tablet (10 mg total) by mouth once daily.     hydroCHLOROthiazide 25 MG tablet  Commonly known as:  HYDRODIURIL  TAKE 1 TABLET BY MOUTH EVERY DAY     metoprolol succinate 100 MG 24 hr tablet  Commonly known as:  TOPROL-XL  TAKE 1 TABLET BY MOUTH EVERY DAY     MICROLET LANCET Misc  Generic drug:  lancets  1 lancet by Misc.(Non-Drug; Combo Route) route once daily. Test blood glucose once daily as instructed.     nitroGLYCERIN 0.3 MG SL tablet  Commonly known as:  NITROSTAT  Place 1 tablet (0.3 mg total) under the tongue every 5 (five) minutes as needed for Chest pain.     pravastatin 80 MG tablet  Commonly known as:  PRAVACHOL  TAKE 1 TABLET BY MOUTH EVERYDAY AT BEDTIME     sertraline 25 MG tablet  Commonly known as:  ZOLOFT  TAKE 1 TABLET BY MOUTH EVERY DAY      sildenafil 20 mg Tab  Commonly known as:  REVATIO  TAKE 2-5 TABLETS BY MOUTH AS NEEDED            Significant Diagnostic Studies: Labs:   BMP:   Recent Labs   Lab 08/23/19  0321 08/24/19  0534   * 137*    141   K 3.4* 3.8    104   CO2 25 23   BUN 13 16   CREATININE 1.0 1.1   CALCIUM 9.6 9.5   MG 2.0 2.2   , CMP   Recent Labs   Lab 08/23/19  0321 08/24/19  0534    141   K 3.4* 3.8    104   CO2 25 23   * 137*   BUN 13 16   CREATININE 1.0 1.1   CALCIUM 9.6 9.5   PROT 6.9 7.0   ALBUMIN 3.9 3.9   BILITOT 0.6 0.7   ALKPHOS 79 82   AST 28 22   ALT 44 37   ANIONGAP 12 14   ESTGFRAFRICA >60.0 >60.0   EGFRNONAA >60.0 >60.0   , CBC   Recent Labs   Lab 08/23/19  0321 08/24/19  0534   WBC 5.84 5.10   HGB 16.0 16.7   HCT 48.5 49.9    263   , INR   Lab Results   Component Value Date    INR 1.0 08/22/2019    INR 0.9 03/21/2018    INR 1.0 03/16/2018   , Lipid Panel   Lab Results   Component Value Date    CHOL 222 (H) 08/22/2019    HDL 43 08/22/2019    LDLCALC Invalid, Trig>400.0 08/22/2019    TRIG 458 (H) 08/22/2019    CHOLHDL 19.4 (L) 08/22/2019   , Troponin   Recent Labs   Lab 08/22/19  0820   TROPONINI 0.033*   , A1C:   Recent Labs   Lab 03/18/19  1637   HGBA1C 6.8*    and All labs within the past 24 hours have been reviewed  Microbiology:   Blood Culture No results found for: LABBLOO, Sputum Culture No results found for: GSRESP, RESPIRATORYC and Urine Culture    Lab Results   Component Value Date    LABURIN No growth 05/11/2018     Radiology:  Imaging Results          X-Ray Hips Bilateral 2 View Inc AP Pelvis (Final result)  Result time 08/22/19 14:55:43    Final result by Aakash Lujan MD (08/22/19 14:55:43)             Impression:      1. No acute displaced fracture or dislocation of the pelvis or hips.      Electronically signed by: Aakash Lujan MD  Date:    08/22/2019  Time:    14:55           Narrative:    EXAMINATION:  XR HIPS BILATERAL 2 VIEW INCL AP  PELVIS    CLINICAL HISTORY:  fall;    TECHNIQUE:  AP view of the pelvis and frogleg lateral views of both hips were performed.    COMPARISON:  08/22/2019    FINDINGS:  Three views.    There is excreted contrast within the urinary bladder.  The bilateral sacroiliac joints are grossly intact.  The bilateral femoroacetabular joints are intact noting degenerative changes.  The pubic symphysis is intact.  No convincing acute displaced fracture or dislocation of the pelvis or hips.  There may be bone island within the inferior left pubic ramus.                             X-Ray Sacrum And Coccyx (Final result)  Result time 08/22/19 12:41:40    Final result by Orestes Nixon III, MD (08/22/19 12:41:40)             Impression:      Mild DJD.      Electronically signed by: Orestes Nixon MD  Date:    08/22/2019  Time:    12:41           Narrative:    EXAMINATION:  XR SACRUM AND COCCYX    CLINICAL HISTORY:  Low back pain    FINDINGS:  There is contrast in the bladder.  There is mild DJD.  No fracture dislocation bone destruction seen.                             X-Ray Lumbar Spine Ap And Lateral (Final result)  Result time 08/22/19 12:41:18    Final result by Orestes Nixon III, MD (08/22/19 12:41:18)             Impression:      No acute process seen.      Electronically signed by: Orestes Nixon MD  Date:    08/22/2019  Time:    12:41           Narrative:    EXAMINATION:  XR LUMBAR SPINE AP AND LATERAL    CLINICAL HISTORY:  Low back pain, minor trauma;    FINDINGS:  There is mild DJD.  Alignment is normal.  No fracture dislocation bone destruction seen.                             MRA Neck without contrast (Final result)  Result time 08/22/19 10:46:03    Final result by Juan Rosales DO (08/22/19 10:46:03)             Impression:      MRA head: Questioned high-grade stenosis or short segment occlusion right M3 segmental branch of the MCA seen on CTA is not seen and was likely artifactual.  No evidence for proximal  high-grade stenosis [id number].    Partial inclusion of fusiform dilatation of the distal A3/A4 segments of the JOANNE as seen on CTA concerning for possible unusual aneurysm.  Cannot exclude mycotic aneurysm.  Clinical correlation and further evaluation with conventional angiography is advised.    MRA neck: Unremarkable motion limited MRA of the neck as detailed above without evidence for significant focal stenosis or occlusion.      Electronically signed by: Juan Rosales DO  Date:    08/22/2019  Time:    10:46           Narrative:    EXAMINATION:  MRA BRAIN WITHOUT CONTRAST; MRA NECK WITHOUT CONTRAST    CLINICAL HISTORY:  vertebral dissection rule out;; vert dissection rule out;    TECHNIQUE:  noncontrast 3-D time of flight MRA head and noncontrast 2-D and 3D  time-of-flight MRA neck.    COMPARISON:  None    FINDINGS:  MRA head:    Anterior circulation:  The bilateral distal cervical, Sharmila, cavernous and supraclinoid segments of the ICA's and the visualized bilateral anterior and middle cerebral arteries are patent without evidence for significant focal stenosis.  Questioned narrowing in the right M3 segment of the MCA is not seen.    Please note there is a fusiform region of enlargement of the pericallosal A3/A4 segment of the JOANNE as seen on CT concerning for possible aneurysm.  Segment of the JOANNE there is a prominent left posterior communicating artery.    Posterior circulation: The distal right vertebral artery is dominant.  Diminutive caliber distal left vertebral artery.  The distal vertebral arteries, basilar artery and posterior cerebral arteries are patent without evidence for significant focal stenosis or aneurysm.  There is severe hypoplasia or absence of the left P1 segment of the PCA with essentially persistent fetal circulation left PCA via left posterior communicating artery.    MRA neck: Study limited by patient motion.  Allowing for limitation the origins of the right brachycephalic,  left  common carotid and left subclavian arteries from the arch are within normal limits. The origins of the vertebral arteries from the subclavian arteries are within normal limits.  Left vertebral artery diminutive throughout its course allowing for motion limitation vertebral arteries are patent without high-grade focal stenosis or occlusion..    The bilateral common carotid arteries, carotid bifurcations and extracranial portions of the internal carotid arteries are patent without evidence for significant focal stenosis allowing for motion.  There is tortuosity of the distal cervical ICAs bilaterally    Less than 50% proximal ICA stenosis by NASCET criteria.                             MRA Brain without contrast (Final result)  Result time 08/22/19 10:46:03    Final result by Juan Rosales DO (08/22/19 10:46:03)             Impression:      MRA head: Questioned high-grade stenosis or short segment occlusion right M3 segmental branch of the MCA seen on CTA is not seen and was likely artifactual.  No evidence for proximal high-grade stenosis [id number].    Partial inclusion of fusiform dilatation of the distal A3/A4 segments of the JOANNE as seen on CTA concerning for possible unusual aneurysm.  Cannot exclude mycotic aneurysm.  Clinical correlation and further evaluation with conventional angiography is advised.    MRA neck: Unremarkable motion limited MRA of the neck as detailed above without evidence for significant focal stenosis or occlusion.      Electronically signed by: Juan Rosales DO  Date:    08/22/2019  Time:    10:46           Narrative:    EXAMINATION:  MRA BRAIN WITHOUT CONTRAST; MRA NECK WITHOUT CONTRAST    CLINICAL HISTORY:  vertebral dissection rule out;; vert dissection rule out;    TECHNIQUE:  noncontrast 3-D time of flight MRA head and noncontrast 2-D and 3D  time-of-flight MRA neck.    COMPARISON:  None    FINDINGS:  MRA head:    Anterior circulation:  The bilateral distal cervical, Sharmila,  cavernous and supraclinoid segments of the ICA's and the visualized bilateral anterior and middle cerebral arteries are patent without evidence for significant focal stenosis.  Questioned narrowing in the right M3 segment of the MCA is not seen.    Please note there is a fusiform region of enlargement of the pericallosal A3/A4 segment of the JOANNE as seen on CT concerning for possible aneurysm.  Segment of the JOANNE there is a prominent left posterior communicating artery.    Posterior circulation: The distal right vertebral artery is dominant.  Diminutive caliber distal left vertebral artery.  The distal vertebral arteries, basilar artery and posterior cerebral arteries are patent without evidence for significant focal stenosis or aneurysm.  There is severe hypoplasia or absence of the left P1 segment of the PCA with essentially persistent fetal circulation left PCA via left posterior communicating artery.    MRA neck: Study limited by patient motion.  Allowing for limitation the origins of the right brachycephalic,  left common carotid and left subclavian arteries from the arch are within normal limits. The origins of the vertebral arteries from the subclavian arteries are within normal limits.  Left vertebral artery diminutive throughout its course allowing for motion limitation vertebral arteries are patent without high-grade focal stenosis or occlusion..    The bilateral common carotid arteries, carotid bifurcations and extracranial portions of the internal carotid arteries are patent without evidence for significant focal stenosis allowing for motion.  There is tortuosity of the distal cervical ICAs bilaterally    Less than 50% proximal ICA stenosis by NASCET criteria.                             MRI Brain Without Contrast (Final result)  Result time 08/22/19 10:04:22    Final result by Orlando Herrera MD (08/22/19 10:04:22)             Impression:      No evidence of recent infarction or other acute  intracranial pathology      Electronically signed by: Orlando Herrera MD  Date:    08/22/2019  Time:    10:04           Narrative:    EXAMINATION:  MRI BRAIN WITHOUT CONTRAST    CLINICAL HISTORY:  Focal neuro deficit, new, fixed or worsening, <6 hours;    TECHNIQUE:  Multiplanar multisequence MR imaging of the brain was performed without intravenous contrast.    COMPARISON:  CTA 08/22/2019    FINDINGS:  Intracranial Compartment:    Ventricles are normal in size for age without evidence of hydrocephalus.    The brain parenchyma appears within normal limits.  No restricted diffusion to suggest the presence of an acute infarction on today's examination.  No significant chronic ischemic change or remote major vascular distribution infarct.  No mass or hemorrhage.    No extra-axial blood or fluid collections.    Normal vascular flow voids are preserved.    Skull/Extracranial Contents (limited evaluation):    Bone marrow signal intensity is normal.  Partially visualized prior anterior cervical discectomy and fusion procedure.    Patchy mucosal thickening in the paranasal sinuses.                             X-Ray Chest AP Portable (Final result)  Result time 08/22/19 08:49:32    Final result by Verito Chavez MD (08/22/19 08:49:32)             Impression:      No acute disease identified.      Electronically signed by: Verito Chavez MD  Date:    08/22/2019  Time:    08:49           Narrative:    EXAMINATION:  XR CHEST AP PORTABLE    CLINICAL HISTORY:  Stroke;    TECHNIQUE:  Single frontal view of the chest was performed.    COMPARISON:  03/08/2018, 10:28 hours.  01/21/2017, 18:09 hours.    FINDINGS:  Mediastinal structures are midline. Cardiac silhouette and pulmonary vascular distribution are normal.    Lung volumes are normal and symmetric. I detect no pulmonary disease, pleural fluid, lymph node enlargement, cardiac decompensation, pneumothorax, pneumomediastinum, pneumoperitoneum or significant osseous  abnormality.                              CTA STROKE MULTI-PHASE (Final result)  Result time 08/22/19 08:58:43    Final result by Juan Rosales DO (08/22/19 08:58:43)             Impression:      CTA head: High-grade stenosis or segment occlusion in the right M3 segment of the MCA in the sylvian fissure..    Globular outpouching distal A3/A4 segment JOANNE measuring approximate 4-5 mm concerning for aneurysm.    CTA neck: Atherosclerotic plaquing of the carotid bifurcations and proximal ICAs with less than 50% proximal ICA stenosis by NASCET criteria.    Incidental 1 cm heterogeneous nodule left lobe of the thyroid overall indeterminate clinical correlation and follow-up dedicated thyroid imaging recommended.    CT head: No evidence for acute intracranial hemorrhage or sulcal effacement    Please see above for additional details.      Electronically signed by: Juan Rosales DO  Date:    08/22/2019  Time:    08:58           Narrative:    EXAMINATION:  CTA STROKE MULTI-PHASE    CLINICAL HISTORY:  Stroke;    TECHNIQUE:  5 mm axial images of the head pre and post contrast with 0.625 mm axial CTA images of the head neck postcontrast.  Coronal and sagittal MPR and MIP imaging was performed 100 ml of Omnipaque 350 contrast was injected intravenously    COMPARISON:  None    FINDINGS:  CT head with and  without contrast: There is no evidence for acute intracranial hemorrhage or sulcal effacement.  The ventricles are normal in size and configuration without evidence for hydrocephalus.  There is no midline shift or mass effect.  There is a high density focus within the left ethmoid air cells suggestive for osteoma.  Few patchy ethmoid air cell opacities.    CTA head:    Anterior circulation: The bilateral distal cervical, petrous, cavernous, and supraclinoid segments of the ICAs are patent without significant focal stenosis or aneurysm.    The anterior cerebral arteries are patent.  There is a globular outpouching of the  distal A 3/4 segment JOANNE concerning for aneurysm measuring approximately 4-5 mm.    The left middle cerebral artery is patent without significant stenosis.    There is a focal region of high-grade narrowing or short segment occlusion within the right M3 segmental branch of the MCA within the sylvian fissure best seen on axial image 1697 series 3.    Posterior circulation: The distal vertebral arteries, basilar artery and posterior cerebral arteries are patent without focal stenosis or aneurysm.    CTA neck: Common origin the right brachiocephalic and left common carotid artery and origin of the left subclavian artery from the arch are within normal limits.    The origin of the vertebral arteries from the respective subclavian arteries are within normal limits.  The vertebral arteries are patent throughout their course without focal stenosis or occlusion.    Right carotid: The right common carotid artery, carotid bifurcation and extracranial portions of the internal carotid artery are patent without significant focal stenosis.    Left carotid: The left common carotid artery, carotid bifurcation and extracranial portions of the internal carotid arteries are patent without significant focal stenosis.    Atherosclerotic plaquing of the carotid bifurcations and proximal ICAs with less than 50% proximal ICA stenosis by NASCET criteria.    .    Pharynx/larynx: Postoperative change cervical spine from C3 through C5 anterior spinal fusion.  Pharynx larynx limited by artifact from dental metal and spinal metal artifact.  Within limits of the study the nasopharynx, oropharynx hypopharynx larynx and proximal trachea within normal limits.  Scattered emphysematous change in the visualized lungs.    Oral cavity and the buccal space      limited by dental metal.    Glands: Bilateral parotid and submandibular glands are within normal limits. There is approximately 1 cm heterogeneous nodule in the left lobe of the thyroid inferiorly.   Clinical correlation follow-up dedicated thyroid imaging advised.    No evidence for adenopathy throughout the neck by size criteria.    There is no evidence for acute fracture of the cervical spine.  No definite hardware failure.    Case discussed with Dr. Lazar on 08/22/2019 at 8:40 am This report was flagged in Epic as abnormal.                            Cardiac Graphics: EKG- Sinus rhythm with Premature supraventricular complexes    Pending Diagnostic Studies:     None        Indwelling Lines/Drains at time of discharge:   Lines/Drains/Airways          None            Neno Edge MD  Department of Hospital Medicine  [facility]

## 2019-08-24 NOTE — SUBJECTIVE & OBJECTIVE
Subjective:     Interval History:     Improvement in LLE weakness, with baseline return of LUE strenght. Persisting left facial and right UE, LE temp loss, however with normal pinprick examination.     MRI brain deferred, however MRI cervical spine stable findings, with non-correlating findings.     Current Neurological Medications:     No current facility-administered medications for this encounter.      Current Outpatient Medications   Medication Sig Dispense Refill    ALPRAZolam (XANAX) 0.5 MG tablet TAKE 1 TABLET BY MOUTH 3 TIMES A DAY AS NEEDED FOR ANXIETY 90 tablet 1    amLODIPine (NORVASC) 10 MG tablet TAKE 1 TABLET BY MOUTH EVERY DAY 30 tablet 2    CONTOUR TEST STRIPS Strp USE TO TEST BLOOD SUGAR ONCE DAILY 25 strip 6    ezetimibe (ZETIA) 10 mg tablet Take 1 tablet (10 mg total) by mouth once daily. 30 tablet 11    hydroCHLOROthiazide (HYDRODIURIL) 25 MG tablet TAKE 1 TABLET BY MOUTH EVERY DAY 90 tablet 0    metoprolol succinate (TOPROL-XL) 100 MG 24 hr tablet TAKE 1 TABLET BY MOUTH EVERY DAY 90 tablet 2    MICROLET LANCET Misc 1 lancet by Misc.(Non-Drug; Combo Route) route once daily. Test blood glucose once daily as instructed. 25 each 11    nitroGLYCERIN (NITROSTAT) 0.3 MG SL tablet Place 1 tablet (0.3 mg total) under the tongue every 5 (five) minutes as needed for Chest pain. 25 tablet 0    pantoprazole (PROTONIX) 40 MG tablet TAKE 1 TABLET BY MOUTH TWICE A DAY 60 tablet 2    pravastatin (PRAVACHOL) 80 MG tablet TAKE 1 TABLET BY MOUTH EVERYDAY AT BEDTIME 90 tablet 1    sertraline (ZOLOFT) 25 MG tablet TAKE 1 TABLET BY MOUTH EVERY DAY 30 tablet 6    sildenafil (REVATIO) 20 mg Tab TAKE 2-5 TABLETS BY MOUTH AS NEEDED 100 tablet 11    aspirin (ECOTRIN) 81 MG EC tablet Take 1 tablet (81 mg total) by mouth once daily.  0    diclofenac sodium (VOLTAREN) 1 % Gel Apply 4 g topically 3 (three) times daily as needed. 100 Tube 1    lidocaine (LIDODERM) 5 % Place 1 patch onto the skin once daily.  Remove & Discard patch within 12 hours or as directed by MD Simental patch 1       Review of Systems   Constitutional: Negative for fever.   HENT: Negative for trouble swallowing and voice change.    Eyes: Negative for visual disturbance.   Respiratory: Negative for shortness of breath.    Genitourinary: Negative for dysuria.   Musculoskeletal: Negative for back pain.   Neurological: Positive for weakness, numbness and headaches. Negative for dizziness, tremors, seizures, syncope, facial asymmetry, speech difficulty and light-headedness.   Psychiatric/Behavioral: Negative for agitation.     Objective:     Vital Signs (Most Recent):  Temp: 98.3 °F (36.8 °C) (08/24/19 1130)  Pulse: 70 (08/24/19 1130)  Resp: 14 (08/24/19 1130)  BP: 120/76 (08/24/19 1130)  SpO2: 97 % (08/24/19 1130) Vital Signs (24h Range):  Temp:  [97.6 °F (36.4 °C)-99.2 °F (37.3 °C)] 98.3 °F (36.8 °C)  Pulse:  [61-80] 70  Resp:  [14-18] 14  SpO2:  [93 %-97 %] 97 %  BP: (101-138)/(64-82) 120/76     Weight: 88.1 kg (194 lb 3.6 oz)  Body mass index is 23.64 kg/m².    Physical Exam   Constitutional: He is oriented to person, place, and time. No distress.   HENT:   Head: Normocephalic and atraumatic.   Eyes: Pupils are equal, round, and reactive to light. EOM are normal.   Neck: Neck supple.   Cardiovascular:   No murmur heard.  Pulmonary/Chest: No respiratory distress.   Abdominal: Soft.   Musculoskeletal: He exhibits no deformity.   Neurological: He is oriented to person, place, and time. He has a normal Finger-Nose-Finger Test.   Reflex Scores:       Bicep reflexes are 2+ on the right side and 2+ on the left side.       Brachioradialis reflexes are 2+ on the right side and 2+ on the left side.       Patellar reflexes are 2+ on the right side and 2+ on the left side.       Achilles reflexes are 2+ on the right side and 2+ on the left side.  Psychiatric: His speech is normal.       NEUROLOGICAL EXAMINATION:     MENTAL STATUS   Oriented to person, place, and  time.   Speech: speech is normal     CRANIAL NERVES     CN II   Visual fields full to confrontation.     CN III, IV, VI   Pupils are equal, round, and reactive to light.  Extraocular motions are normal.   Ophthalmoparesis: none    CN V   Right facial sensation deficit: Left hemifacial sensory loss - temp     CN VII   Facial expression full, symmetric.     CN VIII   CN VIII normal.     CN XI   CN XI normal.     CN XII   CN XII normal.     MOTOR EXAM   Muscle bulk: normal  Overall muscle tone: normal       LUE 4/5 to 5/5 improved  LLE 4/5 to 4+/5 (improvement - not baseline)     REFLEXES     Reflexes   Right brachioradialis: 2+  Left brachioradialis: 2+  Right biceps: 2+  Left biceps: 2+  Right patellar: 2+  Left patellar: 2+  Right achilles: 2+  Left achilles: 2+    SENSORY EXAM   Pinprick normal.        RUE / RLE temp loss      GAIT AND COORDINATION     Gait  Gait: (NT)     Coordination   Finger to nose coordination: normal    Tremor   Resting tremor: absent  Intention tremor: absent  Action tremor: absent      Significant Labs: All pertinent lab results from the past 24 hours have been reviewed.    Significant Imaging: I have reviewed and interpreted all pertinent imaging results/findings within the past 24 hours.

## 2019-08-26 ENCOUNTER — OFFICE VISIT (OUTPATIENT)
Dept: INTERNAL MEDICINE | Facility: CLINIC | Age: 59
End: 2019-08-26
Payer: COMMERCIAL

## 2019-08-26 VITALS
BODY MASS INDEX: 23.92 KG/M2 | RESPIRATION RATE: 17 BRPM | WEIGHT: 196.44 LBS | SYSTOLIC BLOOD PRESSURE: 114 MMHG | HEIGHT: 76 IN | TEMPERATURE: 98 F | HEART RATE: 68 BPM | DIASTOLIC BLOOD PRESSURE: 68 MMHG

## 2019-08-26 DIAGNOSIS — R29.898 WEAKNESS OF LEFT LEG: ICD-10-CM

## 2019-08-26 DIAGNOSIS — R51.9 NONINTRACTABLE HEADACHE, UNSPECIFIED CHRONICITY PATTERN, UNSPECIFIED HEADACHE TYPE: ICD-10-CM

## 2019-08-26 DIAGNOSIS — I10 ESSENTIAL HYPERTENSION: Primary | ICD-10-CM

## 2019-08-26 PROCEDURE — 99214 PR OFFICE/OUTPT VISIT, EST, LEVL IV, 30-39 MIN: ICD-10-PCS | Mod: S$GLB,,, | Performed by: INTERNAL MEDICINE

## 2019-08-26 PROCEDURE — 3074F SYST BP LT 130 MM HG: CPT | Mod: CPTII,S$GLB,, | Performed by: INTERNAL MEDICINE

## 2019-08-26 PROCEDURE — 3078F DIAST BP <80 MM HG: CPT | Mod: CPTII,S$GLB,, | Performed by: INTERNAL MEDICINE

## 2019-08-26 PROCEDURE — 3008F PR BODY MASS INDEX (BMI) DOCUMENTED: ICD-10-PCS | Mod: CPTII,S$GLB,, | Performed by: INTERNAL MEDICINE

## 2019-08-26 PROCEDURE — 99999 PR PBB SHADOW E&M-EST. PATIENT-LVL IV: CPT | Mod: PBBFAC,,, | Performed by: INTERNAL MEDICINE

## 2019-08-26 PROCEDURE — 3078F PR MOST RECENT DIASTOLIC BLOOD PRESSURE < 80 MM HG: ICD-10-PCS | Mod: CPTII,S$GLB,, | Performed by: INTERNAL MEDICINE

## 2019-08-26 PROCEDURE — 3074F PR MOST RECENT SYSTOLIC BLOOD PRESSURE < 130 MM HG: ICD-10-PCS | Mod: CPTII,S$GLB,, | Performed by: INTERNAL MEDICINE

## 2019-08-26 PROCEDURE — 99999 PR PBB SHADOW E&M-EST. PATIENT-LVL IV: ICD-10-PCS | Mod: PBBFAC,,, | Performed by: INTERNAL MEDICINE

## 2019-08-26 PROCEDURE — 3008F BODY MASS INDEX DOCD: CPT | Mod: CPTII,S$GLB,, | Performed by: INTERNAL MEDICINE

## 2019-08-26 PROCEDURE — 99214 OFFICE O/P EST MOD 30 MIN: CPT | Mod: S$GLB,,, | Performed by: INTERNAL MEDICINE

## 2019-08-26 NOTE — PROGRESS NOTES
CC: followup of hospitalization for left sided weakness  HPI:  The patient is a 59 y.o. year old male who presents to the office for followup of hospitalization for left sided weakness.  His symptoms started Thursday.  Left arm and facial weakness has resolved, but left leg weakness has not resolved completely.  He is right hand dominant. The patient denies any chest pain, shortness of breath,  blurred vision, excessive fatigue.  He fell off a ladder about 2 weeks ago onto his back.  He then laid on the ground for about 20 minutes because he could not move.  He fell on his left side.  He complains of one month history of headache, primarily on the left side.  Headache is an aching sensation.    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 -     Hyperlipidemia     Hypertension     Memory loss     Thyroid nodule 8/22/2019    Traumatic iritis - Left Eye 2/27/2013       SURGICAL HISTORY:  Past Surgical History:   Procedure Laterality Date    CERVICAL FUSION  12/30/2015    COLONOSCOPY N/A 4/7/2017    Performed by Handy Frederick MD at [facility] ENDO (4TH FLR)    DISKECTOMY AND FUSION-ANTERIOR CERVICAL (ACDF) C3-4, 4-5 N/A 12/30/2015    Performed by Hernando Wise MD at [facility] OR 2ND FLR    ESOPHAGOGASTRODUODENOSCOPY (EGD) N/A 3/13/2018    Performed by Rubin Barrios MD at [facility] ENDO (2ND FLR)    HEART CATH-LEFT Left 3/21/2018    Performed by Fareed Porter MD at [facility] CATH LAB    HERNIA REPAIR      INJECTION-STEROID-EPIDURAL-CERVICAL N/A 9/28/2015    Performed by Colby Gan [facility] at [facility] MGT    PROSTATECTOMY      PROSTATECTOMY-TRANSURETHRAL N/A 10/12/2017    Performed  by Yg King MD at [facility] OR    REPAIR, HERNIA, INGUINAL, BILATERAL, LAPAROSCOPIC Bilateral 11/7/2013    Performed by Hernando Elena Jr., MD at [facility] OR [address] [place] [address]       MEDS:  Medcard reviewed and updated    ALLERGIES: Allergy Card reviewed and updated    SOCIAL HISTORY:   The patient is a nonsmoker.    PE:   APPEARANCE: Well nourished, well developed, in no acute distress.    EYES: Sclerae anicteric. PERRL. EOMI.      EARS: TM's intact. No retraction or perforation.    NOSE: Mucosa pink. Airway clear.  MOUTH & THROAT: No tonsillar enlargement. No pharyngeal erythema or exudate. No stridor.  CHEST: Lungs clear to auscultation with unlabored respirations.  CARDIOVASCULAR: Normal S1, S2. No murmurs. No carotid bruits. No pedal edema.  ABDOMEN: Bowel sounds normal. Not distended. Soft. No tenderness or masses.   MUSCULOSKELETAL:  Abnormal gait.  NEUROLOGIC: Cranial Nerves: Intact.  PSYCHIATRIC: The patient is oriented to person, place, and time and has a pleasant affect.        ASSESSMENT/PLAN:   Hi was seen today for extremity weakness.    Diagnoses and all orders for this visit:    Essential hypertension  -     blood pressure is controlled    Weakness of left leg  -     Ambulatory Referral to Neurology    Nonintractable headache, unspecified chronicity pattern, unspecified headache type  -     Ambulatory Referral to Neurology          Answers for HPI/ROS submitted by the patient on 8/26/2019   activity change: Yes  unexpected weight change: Yes  neck pain: Yes  hearing loss: No  rhinorrhea: No  trouble swallowing: No  eye discharge: No  visual disturbance: No  chest tightness: No  wheezing: No  chest pain: No  palpitations: No  blood in stool: No  constipation: No  vomiting: No  diarrhea: No  polydipsia: No  polyuria: No  difficulty urinating: No  urgency: No  hematuria: No  joint swelling: No  arthralgias: No  headaches: No  weakness: Yes  confusion: No  dysphoric mood: No

## 2019-08-26 NOTE — PLAN OF CARE
Patient discharged home with no needs.  Patient's transportation home provided by his wife via personal vehicle.    Future Appointments   Date Time Provider Department Center   8/26/2019  2:20 PM Josefina Kendall MD [facility]        08/26/19 1338   Final Note   Assessment Type Final Discharge Note   Anticipated Discharge Disposition Home   What phone number can be called within the next 1-3 days to see how you are doing after discharge? [phone]   Hospital Follow Up  Appt(s) scheduled? Yes   Discharge plans and expectations educations in teach back method with documentation complete? Yes   Right Care Referral Info   Post Acute Recommendation No Care

## 2019-08-26 NOTE — LETTER
August 26, 2019      [facility] - Internal Medicine                                                                                                                                                       [address] [facility].  [place] [address]  Phone: [phone]  Fax: [phone]       Patient: Hi Rubio   YOB: 1960  Date of Visit: 08/26/2019    To Whom It May Concern:    Chris Rubio  was at [facility] on 08/26/2019. He may return to work/school on 09/04/2019 with no restrictions. If you have any questions or concerns, or if I can be of further assistance, please do not hesitate to contact me.    Sincerely,    Josefina Kendall MD

## 2019-08-27 RX ORDER — ALPRAZOLAM 0.5 MG/1
TABLET ORAL
Qty: 90 TABLET | Refills: 1 | Status: SHIPPED | OUTPATIENT
Start: 2019-08-27 | End: 2019-11-18 | Stop reason: SDUPTHER

## 2019-08-30 ENCOUNTER — PATIENT MESSAGE (OUTPATIENT)
Dept: INTERNAL MEDICINE | Facility: CLINIC | Age: 59
End: 2019-08-30

## 2019-08-30 ENCOUNTER — TELEPHONE (OUTPATIENT)
Dept: INTERNAL MEDICINE | Facility: CLINIC | Age: 59
End: 2019-08-30

## 2019-09-03 ENCOUNTER — TELEPHONE (OUTPATIENT)
Dept: INTERNAL MEDICINE | Facility: CLINIC | Age: 59
End: 2019-09-03

## 2019-09-04 ENCOUNTER — TELEPHONE (OUTPATIENT)
Dept: INTERNAL MEDICINE | Facility: CLINIC | Age: 59
End: 2019-09-04

## 2019-09-04 ENCOUNTER — PATIENT MESSAGE (OUTPATIENT)
Dept: INTERNAL MEDICINE | Facility: CLINIC | Age: 59
End: 2019-09-04

## 2019-09-04 DIAGNOSIS — R47.81 SLURRED SPEECH: Primary | ICD-10-CM

## 2019-09-04 NOTE — TELEPHONE ENCOUNTER
Neurology referral has been ordered.  Please advised patient if he is experiencing worsening symptoms, should go back to the emergency department.

## 2019-09-18 RX ORDER — PANTOPRAZOLE SODIUM 40 MG/1
TABLET, DELAYED RELEASE ORAL
Qty: 180 TABLET | Refills: 0 | Status: SHIPPED | OUTPATIENT
Start: 2019-09-18 | End: 2019-12-12 | Stop reason: SDUPTHER

## 2019-10-07 ENCOUNTER — PATIENT OUTREACH (OUTPATIENT)
Dept: ADMINISTRATIVE | Facility: OTHER | Age: 59
End: 2019-10-07

## 2019-10-07 RX ORDER — HYDROCHLOROTHIAZIDE 25 MG/1
TABLET ORAL
Qty: 90 TABLET | Refills: 0 | Status: SHIPPED | OUTPATIENT
Start: 2019-10-07 | End: 2019-12-18 | Stop reason: SDUPTHER

## 2019-10-09 ENCOUNTER — IMMUNIZATION (OUTPATIENT)
Dept: PHARMACY | Facility: CLINIC | Age: 59
End: 2019-10-09

## 2019-10-09 ENCOUNTER — IMMUNIZATION (OUTPATIENT)
Dept: PHARMACY | Facility: CLINIC | Age: 59
End: 2019-10-09
Payer: COMMERCIAL

## 2019-10-09 ENCOUNTER — LAB VISIT (OUTPATIENT)
Dept: LAB | Facility: HOSPITAL | Age: 59
End: 2019-10-09
Attending: INTERNAL MEDICINE
Payer: COMMERCIAL

## 2019-10-09 ENCOUNTER — OFFICE VISIT (OUTPATIENT)
Dept: NEUROLOGY | Facility: CLINIC | Age: 59
End: 2019-10-09
Payer: COMMERCIAL

## 2019-10-09 VITALS
DIASTOLIC BLOOD PRESSURE: 82 MMHG | SYSTOLIC BLOOD PRESSURE: 166 MMHG | BODY MASS INDEX: 23.87 KG/M2 | HEIGHT: 76 IN | WEIGHT: 196 LBS | HEART RATE: 62 BPM

## 2019-10-09 DIAGNOSIS — E11.9 TYPE 2 DIABETES MELLITUS WITHOUT COMPLICATION: ICD-10-CM

## 2019-10-09 DIAGNOSIS — I67.1 BRAIN ANEURYSM: Primary | ICD-10-CM

## 2019-10-09 LAB
ESTIMATED AVG GLUCOSE: 137 MG/DL (ref 68–131)
HBA1C MFR BLD HPLC: 6.4 % (ref 4–5.6)

## 2019-10-09 PROCEDURE — 83036 HEMOGLOBIN GLYCOSYLATED A1C: CPT

## 2019-10-09 PROCEDURE — 99999 PR PBB SHADOW E&M-EST. PATIENT-LVL IV: CPT | Mod: PBBFAC,,, | Performed by: PSYCHIATRY & NEUROLOGY

## 2019-10-09 PROCEDURE — 3077F SYST BP >= 140 MM HG: CPT | Mod: CPTII,S$GLB,, | Performed by: PSYCHIATRY & NEUROLOGY

## 2019-10-09 PROCEDURE — 99999 PR PBB SHADOW E&M-EST. PATIENT-LVL IV: ICD-10-PCS | Mod: PBBFAC,,, | Performed by: PSYCHIATRY & NEUROLOGY

## 2019-10-09 PROCEDURE — 3008F PR BODY MASS INDEX (BMI) DOCUMENTED: ICD-10-PCS | Mod: CPTII,S$GLB,, | Performed by: PSYCHIATRY & NEUROLOGY

## 2019-10-09 PROCEDURE — 3079F DIAST BP 80-89 MM HG: CPT | Mod: CPTII,S$GLB,, | Performed by: PSYCHIATRY & NEUROLOGY

## 2019-10-09 PROCEDURE — 99214 OFFICE O/P EST MOD 30 MIN: CPT | Mod: S$GLB,,, | Performed by: PSYCHIATRY & NEUROLOGY

## 2019-10-09 PROCEDURE — 3079F PR MOST RECENT DIASTOLIC BLOOD PRESSURE 80-89 MM HG: ICD-10-PCS | Mod: CPTII,S$GLB,, | Performed by: PSYCHIATRY & NEUROLOGY

## 2019-10-09 PROCEDURE — 36415 COLL VENOUS BLD VENIPUNCTURE: CPT

## 2019-10-09 PROCEDURE — 3008F BODY MASS INDEX DOCD: CPT | Mod: CPTII,S$GLB,, | Performed by: PSYCHIATRY & NEUROLOGY

## 2019-10-09 PROCEDURE — 99214 PR OFFICE/OUTPT VISIT, EST, LEVL IV, 30-39 MIN: ICD-10-PCS | Mod: S$GLB,,, | Performed by: PSYCHIATRY & NEUROLOGY

## 2019-10-09 PROCEDURE — 3077F PR MOST RECENT SYSTOLIC BLOOD PRESSURE >= 140 MM HG: ICD-10-PCS | Mod: CPTII,S$GLB,, | Performed by: PSYCHIATRY & NEUROLOGY

## 2019-10-09 NOTE — LETTER
October 10, 2019      Josefina Kendall MD  [address] [facility] [place] [address]           [facility]  [facility]  [place] [address]  Phone: [phone]          Patient: Hi Rubio   MR Number: [id number]   YOB: 1960   Date of Visit: 10/9/2019       Dear Dr. Josefina Kendall:    Thank you for referring Hi Rubio to me for evaluation. Attached you will find relevant portions of my assessment and plan of care.    If you have questions, please do not hesitate to call me. I look forward to following Hi Rubio along with you.    Sincerely,    Christiano Diane MD    Enclosure  CC:  No Recipients    If you would like to receive this communication electronically, please contact [email or web address] or [phone] to request more information on [employer] Link access.    For providers and/or their staff who would like to refer a patient to [facility], please contact us through our one-stop-shop provider referral line, [facility] , at [phone].    If you feel you have received this communication in error or would no longer like to receive these types of communications, please e-mail [email or web address]

## 2019-10-10 NOTE — PROGRESS NOTES
"  Hi Rubio is a 59 y.o. year old male that  presents with complains of HA.  Chief Complaint   Patient presents with    Headache    Migraine    Neck Pain    Numbness   .     HPI:  59 yr old male with DM2, HLD, HTN, migraine, and cervical spondylosis with myelopathy s/p ACDF C3/C4 C4/C5 in 2015 , BPH s/p TURP, and recent admission to [facility] with acute onset LSW / sensory deficits but with negative MRI brain.  I reviewed the available electronic medical records and at that time he was seen by our inpatient general neurology team who documented " persisting LE weakness however with improvement in UE weakness and sensory deficits during the hospital stay. Unclear etiology; DDx - ? Brainstem small vessel occlusive stroke missed on MRI given the stroke risk factors or cervical localizing pathology given occipital neuralgia. Less concerns for complex hemiplegic migraine".   Initial CTA head: High-grade stenosis or segment occlusion in the right M3 segment of the MCA in the sylvian fissure. CTA neck without dissection or hemodynamically significant carotid disease.  However, MRA brain was read as " partial inclusion of fusiform dilatation of the distal A3/A4 segments of the JOANNE as seen on CTA concerning for possible unusual aneurysm.  Cannot exclude mycotic aneurysm.  Clinical correlation and further evaluation with conventional angiography is advised".  Subsequent MRI cervical spine showed no cord involvement or other lesion that could explain his symptoms and MRA neck without evidence for significant focal stenosis or occlusion   Mr Rubio indicated that his L sided symptoms eventually went away and tells me that his main concern now is a persistent, daily, at times severe sharp/pulsatile HA that is located top middle part and side of the head and can last the entire day if left untreated and is not trigger or worsened by anything that he can pinpoint. The HA started last July " is completely " "different to the migraine HA that I used to have many years ago", and also comes with neck pain. Said that he takes 5 or 6 Excedrin daily trying to get rid of the HA.  Stated that the HA is not associated with vomiting, vertigo, focal weakness, slurred speech, imbalance, language or visual impairment.  Denies fever, weight loss, joint pain or swelling, skin rash.  Importantly, he stressed the fact that his brother  two years ago from a rupture brainstem aneurysm.           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 -     Hyperlipidemia     Hypertension     Memory loss     Thyroid nodule 2019    Traumatic iritis - Left Eye 2013     Social History     Socioeconomic History    Marital status:      Spouse name: Not on file    Number of children: Not on file    Years of education: Not on file    Highest education level: Not on file   Occupational History    Not on file   Social Needs    Financial resource strain: Not on file    Food insecurity:     Worry: Not on file     Inability: Not on file    Transportation needs:     Medical: Not on file     Non-medical: Not on file   Tobacco Use    Smoking status: Former Smoker     Packs/day: 1.00     Years: 30.00     Pack years: 30.00     Types: Cigarettes     Last attempt to quit: 2011     Years since quittin.8    Smokeless tobacco: Never Used   Substance and Sexual Activity    Alcohol use: Yes     Comment: occas - nonalcoholic beer or beer    Drug use: No    Sexual activity: Yes     Partners: Female   Lifestyle    Physical activity:     Days per week: Not on file     Minutes per session: Not on file    Stress: " Not on file   Relationships    Social connections:     Talks on phone: Not on file     Gets together: Not on file     Attends Tenriism service: Not on file     Active member of club or organization: Yes     Attends meetings of clubs or organizations: Not on file     Relationship status: Not on file   Other Topics Concern    Not on file   Social History Narrative    Not on file     Past Surgical History:   Procedure Laterality Date    CERVICAL FUSION  12/30/2015    COLONOSCOPY N/A 4/7/2017    Procedure: COLONOSCOPY;  Surgeon: Handy Frederick MD;  Location: [address];  Service: Endoscopy;  Laterality: N/A;    HERNIA REPAIR      PROSTATECTOMY       Family History   Problem Relation Age of Onset    Heart disease Mother     Hypertension Mother     Hyperlipidemia Mother     Heart disease Father     Hyperlipidemia Brother     Hypertension Brother     Hypertension Brother     Hyperlipidemia Brother     Benign prostatic hyperplasia Brother     Hypertension Brother     Hypertension Brother     Hepatitis Brother     Cancer Sister     Cancer Brother         throat CA    Diabetes Paternal Uncle     Cataracts Maternal Grandmother     Amblyopia Neg Hx     Blindness Neg Hx     Glaucoma Neg Hx     Macular degeneration Neg Hx     Retinal detachment Neg Hx     Strabismus Neg Hx     Stroke Neg Hx     Thyroid disease Neg Hx     Colon cancer Neg Hx     Colon polyps Neg Hx            Review of Systems  General ROS: negative for chills, fever or weight loss  Psychological ROS: negative for hallucination, depression or suicidal ideation  Ophthalmic ROS: negative for blurry vision, photophobia or eye pain  ENT ROS: negative for epistaxis, sore throat or rhinorrhea  Respiratory ROS: no cough, shortness of breath, or wheezing  Cardiovascular ROS: no chest pain or dyspnea on exertion  Gastrointestinal ROS: no abdominal pain, change in bowel habits, or black/ bloody stools  Genito-Urinary ROS: no  "dysuria, trouble voiding, or hematuria  Musculoskeletal ROS: negative for gait disturbance or muscular weakness  Neurological ROS: no syncope or seizures; no ataxia  Dermatological ROS: negative for pruritis, rash and jaundice      Physical Exam:  BP (!) 166/82   Pulse 62   Ht 6' 4" (1.93 m)   Wt 88.9 kg (196 lb)   BMI 23.86 kg/m²   General appearance: alert, cooperative, no distress  Constitutional:Oriented to person, place, and time.appears well-developed and well-nourished.   HEENT: Normocephalic, atraumatic, neck symmetrical, no nasal discharge   Eyes: conjunctivae/corneas clear, PERRL, EOM's intact  Lungs: clear to auscultation bilaterally, no dullness to percussion bilaterally  Heart: regular rate and rhythm without rub; no displacement of the PMI   Abdomen: soft, non-tender; bowel sounds normoactive; no organomegaly  Extremities: extremities symmetric; no clubbing, cyanosis, or edema  Integument: Skin color, texture, turgor normal; no rashes; hair distrubution normal  Neurologic:   Mental status: alert and awake, oriented x 4, comprehension, naming, and repetition intact. No right to left confusion. Performs serial 7's without difficulty .No dysarthria.  CN 2-12: pupils 4 mm bilaterally, reactive to light. Fundi without papilledema. Visual fields full to confrontation. EOM full without nystagmus. Face sensation normal in all distributions. Face symmetric. Hearing grossly intact. Palate elevates well. Tongue midline without atrophy or fasciculations.  Motor: 5/5 all over  Sensory: intact in all modalities.  DTR's: 2+ all over.  Plantars: no tested.  Coordination: finger to nose and heel-knee-shin intact.  Gait: no ataxia or bradykinesia    LABS:    Complete Blood Count  Lab Results   Component Value Date    RBC 5.57 08/24/2019    HGB 16.7 08/24/2019    HCT 49.9 08/24/2019    MCV 90 08/24/2019    MCH 30.0 08/24/2019    MCHC 33.5 08/24/2019    RDW 13.2 08/24/2019     08/24/2019    MPV 9.9 08/24/2019 " "   GRAN 2.8 08/24/2019    GRAN 54.5 08/24/2019    LYMPH 1.4 08/24/2019    LYMPH 27.8 08/24/2019    MONO 0.5 08/24/2019    MONO 9.6 08/24/2019    EOS 0.3 08/24/2019    BASO 0.06 08/24/2019    EOSINOPHIL 6.5 08/24/2019    BASOPHIL 1.2 08/24/2019    DIFFMETHOD Automated 08/24/2019       Comprehensive Metabolic Panel  Lab Results   Component Value Date     (H) 08/24/2019    BUN 16 08/24/2019    CREATININE 1.1 08/24/2019     08/24/2019    K 3.8 08/24/2019     08/24/2019    PROT 7.0 08/24/2019    ALBUMIN 3.9 08/24/2019    BILITOT 0.7 08/24/2019    AST 22 08/24/2019    ALKPHOS 82 08/24/2019    CO2 23 08/24/2019    ALT 37 08/24/2019    ANIONGAP 14 08/24/2019    EGFRNONAA >60.0 08/24/2019    ESTGFRAFRICA >60.0 08/24/2019       TSH  Lab Results   Component Value Date    TSH 3.234 08/22/2019         Assessment: 59 yr old male with DM2, HLD, HTN, CAD, migraine, and cervical spondylosis with myelopathy s/p ACDF C3/C4 C4/C5 in 2015 , BPH s/p TURP, recent admission to [facility] [place] with acute onset LSW / sensory deficits, presents with complains of daily HA since last July.  Neuro work up so far includes MRI brain and neck that were unremarkable but MRA brain that was read as " partial inclusion of fusiform dilatation of the distal A3/A4 segments of the JOANNE as seen on CTA concerning for possible unusual aneurysm.  Cannot exclude mycotic aneurysm.  Clinical correlation and further evaluation with conventional angiography is advised".  Patient has a normal neuro-exam and his HA phenotype is consistent with a long lasting HA that could include chronic TTHA with a component of medication overuse HA or new daily persistent HA.  Nonetheless, he reports a family history of brain aneurysm and his MRA brain raised concern for " possible unusual aneurysm or mycotic aneurysm" and cerebral angio was recommended (interestingly enough, this MRA finding was not noted on CTA head).  The truth of the matter is that his overall " presentation is rather perplexing but considering his history and unclear MRA brain findings, it will be worthwhile obtaining cerebral angio to better define such findings.            ICD-10-CM ICD-9-CM    1. Brain aneurysm I67.1 437.3 IR Angiogram Carotid Cerebral Bilateral inc Arch     The encounter diagnosis was Brain aneurysm.      Plan:  1) HA: as above, getting cerebral angiogram.  2) HLD  3) HTN  4) DM2  5) CAD  6) S/p cervical fusion  7) BPH s/p TURF        Orders Placed This Encounter   Procedures    IR Angiogram Carotid Cerebral Bilateral inc Arch           Christiano Diane MD

## 2019-10-11 ENCOUNTER — PATIENT MESSAGE (OUTPATIENT)
Dept: ADMINISTRATIVE | Facility: OTHER | Age: 59
End: 2019-10-11

## 2019-10-11 DIAGNOSIS — E11.9 TYPE 2 DIABETES MELLITUS: ICD-10-CM

## 2019-10-14 ENCOUNTER — HOSPITAL ENCOUNTER (OUTPATIENT)
Dept: RADIOLOGY | Facility: HOSPITAL | Age: 59
Discharge: HOME | End: 2019-10-14
Attending: INTERNAL MEDICINE
Payer: COMMERCIAL

## 2019-10-14 ENCOUNTER — OFFICE VISIT (OUTPATIENT)
Dept: INTERNAL MEDICINE | Facility: CLINIC | Age: 59
End: 2019-10-14
Payer: COMMERCIAL

## 2019-10-14 VITALS
WEIGHT: 193.81 LBS | HEIGHT: 76 IN | DIASTOLIC BLOOD PRESSURE: 66 MMHG | TEMPERATURE: 98 F | RESPIRATION RATE: 18 BRPM | BODY MASS INDEX: 23.6 KG/M2 | HEART RATE: 72 BPM | SYSTOLIC BLOOD PRESSURE: 132 MMHG

## 2019-10-14 DIAGNOSIS — S09.90XA TRAUMATIC INJURY OF HEAD, INITIAL ENCOUNTER: Primary | ICD-10-CM

## 2019-10-14 DIAGNOSIS — E11.9 TYPE 2 DIABETES MELLITUS: ICD-10-CM

## 2019-10-14 DIAGNOSIS — M26.622 ARTHRALGIA OF LEFT TEMPOROMANDIBULAR JOINT: ICD-10-CM

## 2019-10-14 DIAGNOSIS — S09.90XA TRAUMATIC INJURY OF HEAD, INITIAL ENCOUNTER: ICD-10-CM

## 2019-10-14 PROCEDURE — 99214 OFFICE O/P EST MOD 30 MIN: CPT | Mod: S$GLB,,, | Performed by: INTERNAL MEDICINE

## 2019-10-14 PROCEDURE — 3075F SYST BP GE 130 - 139MM HG: CPT | Mod: CPTII,S$GLB,, | Performed by: INTERNAL MEDICINE

## 2019-10-14 PROCEDURE — 3008F PR BODY MASS INDEX (BMI) DOCUMENTED: ICD-10-PCS | Mod: CPTII,S$GLB,, | Performed by: INTERNAL MEDICINE

## 2019-10-14 PROCEDURE — 99214 PR OFFICE/OUTPT VISIT, EST, LEVL IV, 30-39 MIN: ICD-10-PCS | Mod: S$GLB,,, | Performed by: INTERNAL MEDICINE

## 2019-10-14 PROCEDURE — 99999 PR PBB SHADOW E&M-EST. PATIENT-LVL IV: CPT | Mod: PBBFAC,,, | Performed by: INTERNAL MEDICINE

## 2019-10-14 PROCEDURE — 70260 XR SKULL COMPLETE MIN 4 VIEWS: ICD-10-PCS | Mod: 26,,, | Performed by: RADIOLOGY

## 2019-10-14 PROCEDURE — 99999 PR PBB SHADOW E&M-EST. PATIENT-LVL IV: ICD-10-PCS | Mod: PBBFAC,,, | Performed by: INTERNAL MEDICINE

## 2019-10-14 PROCEDURE — 3008F BODY MASS INDEX DOCD: CPT | Mod: CPTII,S$GLB,, | Performed by: INTERNAL MEDICINE

## 2019-10-14 PROCEDURE — 70260 X-RAY EXAM OF SKULL: CPT | Mod: 26,,, | Performed by: RADIOLOGY

## 2019-10-14 PROCEDURE — 70260 X-RAY EXAM OF SKULL: CPT | Mod: TC,PO

## 2019-10-14 PROCEDURE — 3078F PR MOST RECENT DIASTOLIC BLOOD PRESSURE < 80 MM HG: ICD-10-PCS | Mod: CPTII,S$GLB,, | Performed by: INTERNAL MEDICINE

## 2019-10-14 PROCEDURE — 3075F PR MOST RECENT SYSTOLIC BLOOD PRESS GE 130-139MM HG: ICD-10-PCS | Mod: CPTII,S$GLB,, | Performed by: INTERNAL MEDICINE

## 2019-10-14 PROCEDURE — 3078F DIAST BP <80 MM HG: CPT | Mod: CPTII,S$GLB,, | Performed by: INTERNAL MEDICINE

## 2019-10-14 NOTE — PROGRESS NOTES
Subjective:       Patient ID: Hi Rubio is a 59 y.o. male.    Chief Complaint: Head Injury (2x6 fell on patient's head, left side of head/face swollen)    Patient new to me presents for evaluation of traumatic injury to the top of his skull.  He was working on his patio yesterday and a 2X6 fell and hit him squarely on the top of his head.  No LOC.  No visual disturbances.  No faints/syncope.  Not really a headache.  Top of skull and left TMJ area just very sore and the TMJ area is swollen.  Having problems opening his jaw but he is able to get food and liquids in to his mouth.    No dizzyness, no falls, no balance issues since the accident.    He is currently being evaluated in Neurology after a neurologic event which was fully evaluated in the ER and currently being seen in Neurology by Dr. Diane regarding a possible brain aneurysm.  Waiting to have an angiogram.    He put cold presses on his head and jaw yesterday and used aleve which did help.    Review of Systems   Constitutional: Negative for chills and fever.   HENT: Negative for ear discharge and ear pain.    Eyes: Negative for pain and visual disturbance.   Respiratory: Negative.    Cardiovascular: Negative.    Gastrointestinal: Negative for abdominal distention, abdominal pain, nausea and vomiting.   Neurological: Negative for dizziness, seizures, syncope, speech difficulty, light-headedness, numbness and headaches.       Objective:      Physical Exam   Constitutional: He is oriented to person, place, and time.   HENT:   Right Ear: External ear normal.   Left Ear: External ear normal.   Canal/TMs clear bilaterally.   Eyes: Pupils are equal, round, and reactive to light. Conjunctivae and EOM are normal.   Neck: Neck supple.   Cardiovascular: Normal rate, regular rhythm and normal heart sounds.   Pulmonary/Chest: Effort normal and breath sounds normal. No respiratory distress.   Lymphadenopathy:     He has no cervical adenopathy.   Neurological: He  is alert and oriented to person, place, and time. He displays normal reflexes. No cranial nerve deficit. Coordination normal.   Skin: Skin is warm.   Psychiatric: He has a normal mood and affect. His behavior is normal. Judgment and thought content normal.       Assessment:       1. Traumatic injury of head, initial encounter    2. Arthralgia of left temporomandibular joint        Plan:   Routine skull xrays done and reviewed in the office with patient.  TMJ xray ordered but radiology will not do TMJ xryas; recommend a CT instead.  Discussed with patient.  He will defer on this for now.    Will continue cold compresses;  Aleve, 2 tablets, every 12 hours for the pain in the skull and the left TMJ area.    Continue all regular meds.  Followup with Neurology and I will alert Dr. Zavaleta of today's visit.

## 2019-11-18 ENCOUNTER — PATIENT MESSAGE (OUTPATIENT)
Dept: ADMINISTRATIVE | Facility: OTHER | Age: 59
End: 2019-11-18

## 2019-11-20 RX ORDER — ALPRAZOLAM 0.5 MG/1
TABLET ORAL
Qty: 90 TABLET | Refills: 1 | Status: SHIPPED | OUTPATIENT
Start: 2019-11-20 | End: 2020-02-04

## 2019-11-22 ENCOUNTER — LAB VISIT (OUTPATIENT)
Dept: LAB | Facility: HOSPITAL | Age: 59
End: 2019-11-22
Attending: INTERNAL MEDICINE
Payer: COMMERCIAL

## 2019-11-22 DIAGNOSIS — E11.9 TYPE 2 DIABETES MELLITUS WITHOUT COMPLICATION: ICD-10-CM

## 2019-11-22 LAB
ALBUMIN/CREAT UR: 6 UG/MG (ref 0–30)
CREAT UR-MCNC: 83 MG/DL (ref 23–375)
MICROALBUMIN UR DL<=1MG/L-MCNC: 5 UG/ML

## 2019-11-22 PROCEDURE — 82043 UR ALBUMIN QUANTITATIVE: CPT

## 2019-11-25 ENCOUNTER — LAB VISIT (OUTPATIENT)
Dept: LAB | Facility: HOSPITAL | Age: 59
End: 2019-11-25
Attending: INTERNAL MEDICINE
Payer: COMMERCIAL

## 2019-11-25 DIAGNOSIS — E78.2 MIXED HYPERLIPIDEMIA: ICD-10-CM

## 2019-11-25 DIAGNOSIS — I25.84 CORONARY ARTERY DISEASE DUE TO CALCIFIED CORONARY LESION: ICD-10-CM

## 2019-11-25 DIAGNOSIS — I25.10 CORONARY ARTERY DISEASE DUE TO CALCIFIED CORONARY LESION: ICD-10-CM

## 2019-11-25 LAB
ALT SERPL W/O P-5'-P-CCNC: 25 U/L (ref 10–44)
ANION GAP SERPL CALC-SCNC: 7 MMOL/L (ref 8–16)
AST SERPL-CCNC: 21 U/L (ref 10–40)
BUN SERPL-MCNC: 14 MG/DL (ref 8–23)
CALCIUM SERPL-MCNC: 9.4 MG/DL (ref 8.7–10.5)
CHLORIDE SERPL-SCNC: 105 MMOL/L (ref 95–110)
CHOLEST SERPL-MCNC: 183 MG/DL (ref 120–199)
CHOLEST/HDLC SERPL: 4.1 {RATIO} (ref 2–5)
CO2 SERPL-SCNC: 27 MMOL/L (ref 23–29)
CREAT SERPL-MCNC: 1.1 MG/DL (ref 0.5–1.4)
EST. GFR  (AFRICAN AMERICAN): >60 ML/MIN/1.73 M^2
EST. GFR  (NON AFRICAN AMERICAN): >60 ML/MIN/1.73 M^2
GLUCOSE SERPL-MCNC: 152 MG/DL (ref 70–110)
HDLC SERPL-MCNC: 45 MG/DL (ref 40–75)
HDLC SERPL: 24.6 % (ref 20–50)
LDLC SERPL CALC-MCNC: 105 MG/DL (ref 63–159)
NONHDLC SERPL-MCNC: 138 MG/DL
POTASSIUM SERPL-SCNC: 4 MMOL/L (ref 3.5–5.1)
SODIUM SERPL-SCNC: 139 MMOL/L (ref 136–145)
TRIGL SERPL-MCNC: 165 MG/DL (ref 30–150)

## 2019-11-25 PROCEDURE — 36415 COLL VENOUS BLD VENIPUNCTURE: CPT | Mod: PO

## 2019-11-25 PROCEDURE — 80048 BASIC METABOLIC PNL TOTAL CA: CPT

## 2019-11-25 PROCEDURE — 80061 LIPID PANEL: CPT

## 2019-11-25 PROCEDURE — 84450 TRANSFERASE (AST) (SGOT): CPT

## 2019-11-25 PROCEDURE — 84460 ALANINE AMINO (ALT) (SGPT): CPT

## 2019-12-03 ENCOUNTER — PATIENT OUTREACH (OUTPATIENT)
Dept: OTHER | Facility: OTHER | Age: 59
End: 2019-12-03

## 2019-12-03 ENCOUNTER — OFFICE VISIT (OUTPATIENT)
Dept: INTERNAL MEDICINE | Facility: CLINIC | Age: 59
End: 2019-12-03
Payer: COMMERCIAL

## 2019-12-03 VITALS
SYSTOLIC BLOOD PRESSURE: 130 MMHG | DIASTOLIC BLOOD PRESSURE: 76 MMHG | BODY MASS INDEX: 22.31 KG/M2 | RESPIRATION RATE: 14 BRPM | HEART RATE: 76 BPM | WEIGHT: 183.19 LBS | HEIGHT: 76 IN | TEMPERATURE: 98 F

## 2019-12-03 DIAGNOSIS — I10 ESSENTIAL HYPERTENSION: Primary | ICD-10-CM

## 2019-12-03 DIAGNOSIS — E11.9 TYPE 2 DIABETES MELLITUS WITHOUT COMPLICATION, WITHOUT LONG-TERM CURRENT USE OF INSULIN: ICD-10-CM

## 2019-12-03 PROCEDURE — 3075F PR MOST RECENT SYSTOLIC BLOOD PRESS GE 130-139MM HG: ICD-10-PCS | Mod: CPTII,S$GLB,, | Performed by: INTERNAL MEDICINE

## 2019-12-03 PROCEDURE — 99213 PR OFFICE/OUTPT VISIT, EST, LEVL III, 20-29 MIN: ICD-10-PCS | Mod: S$GLB,,, | Performed by: INTERNAL MEDICINE

## 2019-12-03 PROCEDURE — 3075F SYST BP GE 130 - 139MM HG: CPT | Mod: CPTII,S$GLB,, | Performed by: INTERNAL MEDICINE

## 2019-12-03 PROCEDURE — 99213 OFFICE O/P EST LOW 20 MIN: CPT | Mod: S$GLB,,, | Performed by: INTERNAL MEDICINE

## 2019-12-03 PROCEDURE — 3078F PR MOST RECENT DIASTOLIC BLOOD PRESSURE < 80 MM HG: ICD-10-PCS | Mod: CPTII,S$GLB,, | Performed by: INTERNAL MEDICINE

## 2019-12-03 PROCEDURE — 99999 PR PBB SHADOW E&M-EST. PATIENT-LVL III: ICD-10-PCS | Mod: PBBFAC,,, | Performed by: INTERNAL MEDICINE

## 2019-12-03 PROCEDURE — 99999 PR PBB SHADOW E&M-EST. PATIENT-LVL III: CPT | Mod: PBBFAC,,, | Performed by: INTERNAL MEDICINE

## 2019-12-03 PROCEDURE — 3044F HG A1C LEVEL LT 7.0%: CPT | Mod: CPTII,S$GLB,, | Performed by: INTERNAL MEDICINE

## 2019-12-03 PROCEDURE — 3078F DIAST BP <80 MM HG: CPT | Mod: CPTII,S$GLB,, | Performed by: INTERNAL MEDICINE

## 2019-12-03 PROCEDURE — 3044F PR MOST RECENT HEMOGLOBIN A1C LEVEL <7.0%: ICD-10-PCS | Mod: CPTII,S$GLB,, | Performed by: INTERNAL MEDICINE

## 2019-12-03 PROCEDURE — 3008F PR BODY MASS INDEX (BMI) DOCUMENTED: ICD-10-PCS | Mod: CPTII,S$GLB,, | Performed by: INTERNAL MEDICINE

## 2019-12-03 PROCEDURE — 3008F BODY MASS INDEX DOCD: CPT | Mod: CPTII,S$GLB,, | Performed by: INTERNAL MEDICINE

## 2019-12-03 RX ORDER — METFORMIN HYDROCHLORIDE 500 MG/1
500 TABLET, EXTENDED RELEASE ORAL
Qty: 30 TABLET | Refills: 3 | Status: SHIPPED | OUTPATIENT
Start: 2019-12-03 | End: 2020-01-02

## 2019-12-03 NOTE — LETTER
December 4, 2019     Hi Rubio  [address] [place] [address]       Dear Hi,    Welcome to [facility]! Our goal is to make care effective, proactive and convenient by using data you send us from home to better treat your chronic conditions.            My name is , and I am your dedicated Digital Medicine clinician. As an expert in medication management, I will help ensure that the medications you are taking continue to provide the intended benefits and help you reach your goals. You can reach me directly at  or by sending me a message directly through your [facility] account.      I am Pricila Jaeger and I will be your health . My job is to help you identify lifestyle changes to improve your disease control. We will talk about nutrition, exercise, and other ways you may be able to adjust your current habits to better your health. Additionally, we will help ensure you are completing the tests and screenings that are necessary to help manage your conditions. You can reach me directly at [phone] or by sending me a message directly through your [facility] account.    Most importantly, YOU are at the center of this team. Together, we will work to improve your overall health and encourage you to meet your goals for a healthier lifestyle.     What we expect from YOU:  · Please take frequent home blood sugar measurements according to the frequency your physician and Digital Medicine care team specify. It is important that your team see both fasting and after meal readings.      Be available to receive phone calls or [facility] messages, when appropriate, from your care team. Please let us know if there are any specific days or times that work best for us to reach you via phone.     Complete routine tests and screenings. Dont worry, we will help keep you on track!           What you should expect from your Digital Medicine Care Team:   We will work with you to  create a personalized plan of care and provide you with encouragement and education, including regarding lifestyle changes, that could help you manage your disease states.     We will adjust your current medications, if needed, and continue to monitor your long-term progress.     We will provide you and your physician with monthly progress reports after you have been in the program for more than 30 days.     We will send you reminders through [employer] and text messages to help ensure you do not miss any testing deadlines to help manage your disease states.    You will be able to reach us by phone or through your [facility] account by clicking our names under Care Team on the right side of the home screen.    I look forward to working with you to achieve your blood pressure goals!    We look forward to working with you to help manage your health,    Sincerely,    Your Digital Medicine Team    Please visit our websites to learn more:   · Diabetes: [email or web address]      Remember, we are not available for emergencies. If you have an emergency, please contact your doctors office directly or call [facility] on-call ([phone] or [phone]) or 911.    Diabetes: We want help you get important tests and screenings done regularly to assure that your health needs are met. We have put a new system in place, called CareTouch that will help us improve how we monitor and reach out to you about the following lab tests that you will need to help manage your diabetes.  · Hemoglobin A1c testing (Frequency: Every 3 to 6 months, dependent on A1c goal)  · Nephropathy Assessment, generally urine micro albumin testing (Frequency: Yearly)  · Eye exam through a quick 30-minute Eye Photo Exam (Frequency: 1-2 Years, depending on result)    When necessary you can come in to one of the labs on the attached page any weekday between 10:30 am and 4:00 pm to have your tests done prior to their due date. Tell the   you received a CareTouch letter, or just look for the CareTouch sign.

## 2019-12-03 NOTE — LETTER
December 4, 2019     Hi Rubio  [address] [place] [address]       Dear Hi,    Welcome to [facility] [employer]! Our goal is to make care effective, proactive and convenient by using data you send us from home to better treat your chronic conditions.              My name is Neftali Moseley, and I am your dedicated Digital Medicine clinician. As an expert in medication management, I will help ensure that the medications you are taking continue to provide the intended benefits and help you reach your goals. You can reach me directly at [phone] or by sending me a message directly through your [facility] account.      I am Pricila Jaeger and I will be your health . My job is to help you identify lifestyle changes to improve your disease control. We will talk about nutrition, exercise, and other ways you may be able to adjust your current habits to better your health. Additionally, we will help ensure you are completing the tests and screenings that are necessary to help manage your conditions. You can reach me directly at [phone] or by sending me a message directly through your [facility] account.    Most importantly, YOU are at the center of this team. Together, we will work to improve your overall health and encourage you to meet your goals for a healthier lifestyle.     What we expect from YOU:  · Please take frequent home blood sugar measurements according to the frequency your physician and Digital Medicine care team specify. It is important that your team see both fasting and after meal readings.      Be available to receive phone calls or [facility] messages, when appropriate, from your care team. Please let us know if there are any specific days or times that work best for us to reach you via phone.     Complete routine tests and screenings. Dont worry, we will help keep you on track!           What you should expect from your Digital Medicine Care Team:   We  will work with you to create a personalized plan of care and provide you with encouragement and education, including regarding lifestyle changes, that could help you manage your disease states.     We will adjust your current medications, if needed, and continue to monitor your long-term progress.     We will provide you and your physician with monthly progress reports after you have been in the program for more than 30 days.     We will send you reminders through [employer] and text messages to help ensure you do not miss any testing deadlines to help manage your disease states.    You will be able to reach us by phone or through your [facility] account by clicking our names under Care Team on the right side of the home screen.    I look forward to working with you to achieve your blood pressure goals!    We look forward to working with you to help manage your health,    Sincerely,    Your Digital Medicine Team    Please visit our websites to learn more:   · Diabetes: [email or web address]      Remember, we are not available for emergencies. If you have an emergency, please contact your doctors office directly or call [facility] on-call ([phone] or [phone]) or 911.    Diabetes: We want help you get important tests and screenings done regularly to assure that your health needs are met. We have put a new system in place, called CareTouch that will help us improve how we monitor and reach out to you about the following lab tests that you will need to help manage your diabetes.  · Hemoglobin A1c testing (Frequency: Every 3 to 6 months, dependent on A1c goal)  · Nephropathy Assessment, generally urine micro albumin testing (Frequency: Yearly)  · Eye exam through a quick 30-minute Eye Photo Exam (Frequency: 1-2 Years, depending on result)    When necessary you can come in to one of the labs on the attached page any weekday between 10:30 am and 4:00 pm to have your tests done prior to their  due date. Tell the  you received a CareTouch letter, or just look for the CareTouch sign.

## 2019-12-03 NOTE — PROGRESS NOTES
CC: followup of hypertension  HPI:  The patient is a 59 y.o. year old male who presents to the office for followup of hypertension.  The patient denies any chest pain, shortness of breath, blurred vision or excessive fatigue, but reports occasional nausea, vomiting and headache.  His ambulatory blood sugars have averaged 150s.  The patient reports he does not feel well when his blood sugars are elevated.    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    PE:   APPEARANCE: Well nourished, well developed, in no acute distress.    CHEST: Lungs clear to auscultation with unlabored respirations.  CARDIOVASCULAR: Normal S1, S2. No murmurs. No carotid bruits. No pedal edema.  ABDOMEN: Bowel sounds normal. Not distended. Soft. No tenderness or masses.   PSYCHIATRIC: The patient is oriented to person, place, and time and has a pleasant affect.        ASSESSMENT/PLAN:  Hi was seen today for follow-up.    Diagnoses and all orders for this visit:    Essential hypertension  -     Comprehensive metabolic panel; Future    Type 2  diabetes mellitus without complication, without long-term current use of insulin  -     Comprehensive metabolic panel; Future  -     Hemoglobin A1c; Future  -     Start metformin, take with food    Other orders  -     metFORMIN (GLUCOPHAGE-XR) 500 MG 24 hr tablet; Take 1 tablet (500 mg total) by mouth daily with breakfast.        Answers for HPI/ROS submitted by the patient on 12/2/2019   activity change: Yes  unexpected weight change: No  neck pain: Yes  hearing loss: No  rhinorrhea: No  trouble swallowing: Yes  eye discharge: No  visual disturbance: Yes  chest tightness: No  wheezing: No  chest pain: No  palpitations: Yes  blood in stool: No  constipation: No  vomiting: No  diarrhea: Yes  polydipsia: No  polyuria: No  difficulty urinating: No  urgency: No  hematuria: No  joint swelling: No  arthralgias: Yes  headaches: Yes  weakness: No  confusion: No  dysphoric mood: Yes

## 2019-12-04 NOTE — PROGRESS NOTES
Digital Medicine: Health  Introduction    Introduced Hi Rubio to Digital Medicine. Discussed health  role and recommended lifestyle modifications.    Patient is a 59-year-old male newly enrolled in the program. Patient reported that he was recently RX metformin to help control his diabetes. Patient shared he only likes to start medications on the weekend as he often has adverse side effects. Patient plans to start the new medication on 12/7/19. Patient shared that when his blood sugars are over 200 he experiences symptoms of dizziness, seeing spots and blinking lights. Patient retold he is being treated for a brain aneurysm.     The history is provided by the patient.     DIABETES    Our goal is to decrease A1c within patient-specific target levels and make the process convenient so patient can avoid extra trips to the office. Reducing A1C by merely 1% results in a decreased risk of complications of at least 10%. For example, an A1C reduced from 8.5% to 7.5% results in almost 40% lower risk of kidney, eye, and nerve disease      Reviewed that the Digital Medicine care team - consisting of a clinician and a health  - will follow the most current evidence-based national guidelines for treating your condition.  The health  will focus on lifestyle modifications and motivation while the clinician will focus on medication therapy.  The care team will review all data on a regular basis and reach out as needed.      Explained that one of the key parts of the program is communication with the care team.  Asked patient to respond to outreach attempts and complete questionnaires.  Stressed importance of medication adherence.      Instructed patient not to allow anyone else to use phone and monitoring device.  Explained that we expect patient to test their blood sugar as prescribed by their physician or Digital Medicine Clinician.      Reviewed general Self-Monitoring of Blood Glucose (SMBG)  goals:  · FPG: <  mg/dL  · 1h PPG: < 180 mg/dL  · 2h PPG: < 160 mg/dL  · Bedtime: < 150 mg/dL    Explained to patient that the digital medicine team is not available for emergencies.  Patient will call [facility] on-call ([phone] or [phone]) or [phone] if needed.      Reviewed signs and symptoms of hypoglycemia (weakness, dizziness, hunger, shakiness, nausea, headache, heart palpitations, sweating, fatigue, anxiety, etc.).  Reviewed treatment of hypoglycemia (15/15 rule).            Last 6 Patient Entered Readings                                          Most Recent A1c:      Recent Readings 12/4/2019 12/3/2019 12/2/2019 12/1/2019 11/30/2019    Blood Glucose (mg/dL) 186 150 141 168 158          INTERVENTION(S)  encouragement/support    PLAN  patient verbalizes understanding      There are no preventive care reminders to display for this patient.    Reviewed the importance of self-monitoring, medication adherence, and that the health  can be used as a resource for lifestyle modifications to help reduce or maintain a healthy lifestyle.    Sent link to [facility] webpages and my contact information via [email or web address] for future questions. Follow up scheduled.             Physical Activity Screening   When asked if exercising, patient responded: yes    Patient participates in the following activities: elliptical    He identified the following barriers to physical activity: lack of time    Patient reported that in the past he would run 6-7 miles a day in the morning before work. He stated that he has not worked out in the past few weeks due to fatigue and lack of time.     Assigning the following patient goal(s): 150 minutes of exercise per week and participate in exercise weekly    Medication Adherence Screening       Patient reported that he will be starting metformin on 12/7/19 as he is fearful of side effects. He shared that he often has negative side effects from taking medication in the  past.       SDOH-deferred

## 2019-12-12 DIAGNOSIS — Z82.49 FAMILY HISTORY OF PREMATURE CAD: ICD-10-CM

## 2019-12-12 DIAGNOSIS — I73.9 PERIPHERAL VASCULAR DISEASE: ICD-10-CM

## 2019-12-12 DIAGNOSIS — E78.5 HYPERLIPIDEMIA: ICD-10-CM

## 2019-12-12 RX ORDER — AMLODIPINE BESYLATE 10 MG/1
TABLET ORAL
Qty: 30 TABLET | Refills: 2 | Status: SHIPPED | OUTPATIENT
Start: 2019-12-12 | End: 2020-02-24

## 2019-12-13 RX ORDER — PRAVASTATIN SODIUM 80 MG/1
TABLET ORAL
Qty: 90 TABLET | Refills: 1 | Status: SHIPPED | OUTPATIENT
Start: 2019-12-13 | End: 2020-06-03

## 2019-12-13 RX ORDER — PANTOPRAZOLE SODIUM 40 MG/1
TABLET, DELAYED RELEASE ORAL
Qty: 180 TABLET | Refills: 0 | Status: SHIPPED | OUTPATIENT
Start: 2019-12-13 | End: 2020-03-02

## 2019-12-18 ENCOUNTER — PATIENT OUTREACH (OUTPATIENT)
Dept: OTHER | Facility: OTHER | Age: 59
End: 2019-12-18

## 2019-12-18 ENCOUNTER — PATIENT MESSAGE (OUTPATIENT)
Dept: ADMINISTRATIVE | Facility: OTHER | Age: 59
End: 2019-12-18

## 2019-12-18 ENCOUNTER — TELEPHONE (OUTPATIENT)
Dept: INTERNAL MEDICINE | Facility: CLINIC | Age: 59
End: 2019-12-18

## 2019-12-18 ENCOUNTER — PATIENT MESSAGE (OUTPATIENT)
Dept: INTERNAL MEDICINE | Facility: CLINIC | Age: 59
End: 2019-12-18

## 2019-12-18 NOTE — PROGRESS NOTES
"Digital Medicine: Health  Follow-Up    Patient reported side effects of suppressed appetite since starting Metformin on 12/9/19. Encourage the patient to inform his provided of side effect and informed the pharmacist about side effect.     The history is provided by the patient.     Follow Up  Follow-up reason(s): reading review and goal follow-up      Routine Education Topics: eating patterns and physical activity        Intervention/Plan    There are no preventive care reminders to display for this patient.      Last 6 Patient Entered Readings                                          Most Recent A1c: 6.4% on 10/9/2019  (Goal: 7%)     Recent Readings 12/18/2019 12/17/2019 12/16/2019 12/15/2019 12/14/2019    Blood Glucose (mg/dL) 119 123 145 141 125                    Diet Screening   Breakfast is typically between. Eggs, grits and breakfast meat.  He has the following dietary restrictions: low carbHe skips meals regularly.  no appetite      Patient shared that he has a suppressed appetite since starting Metformin. Often does not eat lunch or dinner. Reported that before medication change, he would eat three meals a day. "I love to eat beans." Patient typically would eat grilled chicken salad for lunch before suppressed appetite. Patient rarely eats sweets. Patient reported he does not drink a lot of water in the day. Encouraged him to increase water intake. Patient reported that he has lost 10 lbs in the last month. He shared that before medication change he has been trying to lose weight by watching his diet.     Physical Activity Screening   When asked if exercising, patient responded: yes    Patient participates in the following activities: yard/housework    Patient reported that does not do traditional exercising but is remodeling his home. Patient stated that he has experienced soreness in his stomach. Patient is not sure pain is from starting new medications or from general movement. " I have been going " "up and down stairs, putting up jasmin decorations and painting. I am not 28 years old anymore and that could be leading to my soreness."     Medication Adherence Screening   He misses doses: never      Patient reported that he started to take Metformin on 12/7/19 and has expressed suppressed appetite. Patient reported that before starting new medication he ate three meals a day. Since starting medication he only eats in the morning, usually eggs, grits or breakfast meat (mcrae or sausage). Patient reported he often skips lunch and dinner. If he does eat dinner, he will complete the meal. Patient has concerns of how not eating will effect his blood sugar. Advised the patient to reach out to his provider about his concerns and informed pharmacist about side effect.     Patient did report that when his blood sugars are elevated over 300 he experiences symptoms "Not being in control of his body" Patient reported that this symptoms passes when he sits down and drinks water. Last time this happened was before he started to take medication. Reviewed reading on 12/4/19 of 354 and patient reported that just "felt a bit off" for a few minutes and drank water. Shared he felt fine after relaxing and having water. Patient could not remember what he ate and this reading was before he started to take Metformin.       SDOH  "

## 2019-12-19 RX ORDER — SILDENAFIL 100 MG/1
100 TABLET, FILM COATED ORAL DAILY PRN
Qty: 30 TABLET | Refills: 11 | Status: SHIPPED | OUTPATIENT
Start: 2019-12-19 | End: 2021-06-03

## 2019-12-19 RX ORDER — SILDENAFIL CITRATE 20 MG/1
TABLET ORAL
Qty: 100 TABLET | Refills: 11 | OUTPATIENT
Start: 2019-12-19

## 2019-12-19 RX ORDER — HYDROCHLOROTHIAZIDE 25 MG/1
TABLET ORAL
Qty: 90 TABLET | Refills: 0 | Status: SHIPPED | OUTPATIENT
Start: 2019-12-19 | End: 2020-02-07 | Stop reason: ALTCHOICE

## 2020-01-02 ENCOUNTER — PATIENT OUTREACH (OUTPATIENT)
Dept: OTHER | Facility: OTHER | Age: 60
End: 2020-01-02

## 2020-01-02 DIAGNOSIS — E11.9 TYPE 2 DIABETES MELLITUS WITHOUT COMPLICATION, WITHOUT LONG-TERM CURRENT USE OF INSULIN: Primary | ICD-10-CM

## 2020-01-02 NOTE — PROGRESS NOTES
Digital Medicine: Clinician Introduction    Hi Rubio is a 59 y.o. male who is newly enrolled in the Digital Medicine Clinic.    The following information was reviewed and updated:  Preferred pharmacy   [id number] [address]  [address]  [place] [address]  Phone: [phone] Fax: [phone]    [facility] [place] [address]  [address]  [place] [address]  Phone: [phone] Fax: [phone]        Review of patient's allergies indicates:   Allergen Reactions    Lisinopril Anaphylaxis and Nausea And Vomiting     General bad feeling    Adhesive     Crestor [rosuvastatin] Swelling     Lip swelling.     Lipitor [atorvastatin] Other (See Comments)     Muscle aches       12/3/19 - PCP started metformin  XR 500mg daily.   12/18- PCP discontinued due to intolerability of even low dose metformin XR with food. Will add to intolerance list today.    Complete resolution of abdominal discomfort  2-3 days after stopping metformin.     FBG average - 157   A1C has always been at control with diet, but patient reports readings have been elevated on his old meters as well.       The history is provided by the patient.     DIABETES  Instructed patient not to allow anyone else to use phone and monitoring device.  Explained that we expect patient to test their blood sugar as prescribed by their physician or Digital Medicine Clinician.      Reviewed general Self-Monitoring of Blood Glucose (SMBG) goals:  · FPG: <  mg/dL  · 1h PPG: < 180 mg/dL  · 2h PPG: < 160 mg/dL  · Bedtime: < 150 mg/dL    Expected SMBG schedule: Weekly  2-3 times weekly    Patient has history of hypoglycemia.    Rarely has hypoglycemia.     Reviewed signs and symptoms of hypoglycemia (weakness, dizziness, hunger, shakiness, nausea, headache, heart palpitations, sweating, fatigue, anxiety, etc.).  Reviewed treatment of hypoglycemia (15/15  rule).      Patient is not on ace inhibitor or angiotensin II receptor blocker because Anaphylaxis with lisinopril,. microalbumin appropriate.    Patient is on statin.     Patient's A1C goals is less than or equal to 7. Patient's most recent A1C result is at or below goal. Lab Results     Component                Value               Date                     HGBA1C                   6.4 (H)             10/09/2019             Med Review complete.    Allergies reviewed.          Last 6 Patient Entered Readings                                          Most Recent A1c: 6.4% on 10/9/2019  (Goal: 7%)     Recent Readings 1/2/2020 12/30/2019 12/27/2019 12/26/2019 12/25/2019    Blood Glucose (mg/dL) 149 150 144 168 173          INTERVENTION(S)  reviewed monitoring technique, recommended med change and encouragement/support    PLAN  patient verbalizes understanding, patient amenable to changes and additional monitoring needed    Blood glucoses elevated despite controlled A1C.   - Patient notes controlled A1C with elevated fasting blood glucoses even on old glucometer.   - Controlled with diet, but has noticed elevations over past few years.   - Discussed deit and lifestyle but patient requested medication today if appropriate.     Will START empagliflozin 10mg daily. Patient to alert PharmD if not covered by insurance.     Reviewed with patient no history foot ulcers or amputations. Does have diagnosis of PVD but no peripheral neuropathy. Discussed black box warning. At next outreach, if tolerating medication, will review proper foot care; recommended daily foot care.    Will schedule next outreach in 2 weeks.       There are no preventive care reminders to display for this patient.    Current Medication Regimen:    Diabetes Medications             metFORMIN (GLUCOPHAGE-XR) 500 MG 24 hr tablet Take 1 tablet (500 mg total) by mouth daily with breakfast.          Reviewed the importance of self-monitoring, medication adherence,  and that the health  can be used as a resource for lifestyle modifications to help reduce or maintain a healthy lifestyle.    Sent link to [facility] [employer] [facility] webpages and my contact information via [employer] for future questions. Follow up scheduled.         Screenings

## 2020-01-03 DIAGNOSIS — I67.1 BRAIN ANEURYSM: Primary | ICD-10-CM

## 2020-01-06 ENCOUNTER — HOSPITAL ENCOUNTER (OUTPATIENT)
Facility: HOSPITAL | Age: 60
Discharge: HOME | End: 2020-01-06
Attending: PSYCHIATRY & NEUROLOGY | Admitting: PSYCHIATRY & NEUROLOGY
Payer: COMMERCIAL

## 2020-01-06 VITALS
SYSTOLIC BLOOD PRESSURE: 143 MMHG | HEART RATE: 67 BPM | RESPIRATION RATE: 16 BRPM | WEIGHT: 194 LBS | HEIGHT: 76 IN | TEMPERATURE: 98 F | BODY MASS INDEX: 23.62 KG/M2 | OXYGEN SATURATION: 97 % | DIASTOLIC BLOOD PRESSURE: 78 MMHG

## 2020-01-06 DIAGNOSIS — R93.89 ABNORMAL MAGNETIC RESONANCE ANGIOGRAPHY (MRA): ICD-10-CM

## 2020-01-06 DIAGNOSIS — I67.1 BRAIN ANEURYSM: ICD-10-CM

## 2020-01-06 LAB — POCT GLUCOSE: 137 MG/DL (ref 70–110)

## 2020-01-06 PROCEDURE — 63600175 PHARM REV CODE 636 W HCPCS: Performed by: PSYCHIATRY & NEUROLOGY

## 2020-01-06 PROCEDURE — 82962 GLUCOSE BLOOD TEST: CPT

## 2020-01-06 RX ORDER — SODIUM CHLORIDE 0.9 % (FLUSH) 0.9 %
10 SYRINGE (ML) INJECTION
Status: DISCONTINUED | OUTPATIENT
Start: 2020-01-06 | End: 2020-01-06 | Stop reason: HOSPADM

## 2020-01-06 RX ORDER — SODIUM CHLORIDE 9 MG/ML
INJECTION, SOLUTION INTRAVENOUS CONTINUOUS
Status: DISCONTINUED | OUTPATIENT
Start: 2020-01-06 | End: 2020-01-06 | Stop reason: HOSPADM

## 2020-01-06 RX ORDER — MIDAZOLAM HYDROCHLORIDE 1 MG/ML
INJECTION INTRAMUSCULAR; INTRAVENOUS CODE/TRAUMA/SEDATION MEDICATION
Status: COMPLETED | OUTPATIENT
Start: 2020-01-06 | End: 2020-01-06

## 2020-01-06 RX ORDER — FENTANYL CITRATE 50 UG/ML
INJECTION, SOLUTION INTRAMUSCULAR; INTRAVENOUS CODE/TRAUMA/SEDATION MEDICATION
Status: COMPLETED | OUTPATIENT
Start: 2020-01-06 | End: 2020-01-06

## 2020-01-06 RX ADMIN — FENTANYL CITRATE 50 MCG: 50 INJECTION, SOLUTION INTRAMUSCULAR; INTRAVENOUS at 11:01

## 2020-01-06 RX ADMIN — MIDAZOLAM HYDROCHLORIDE 1 MG: 1 INJECTION, SOLUTION INTRAMUSCULAR; INTRAVENOUS at 11:01

## 2020-01-06 RX ADMIN — FENTANYL CITRATE 50 MCG: 50 INJECTION, SOLUTION INTRAMUSCULAR; INTRAVENOUS at 10:01

## 2020-01-06 RX ADMIN — MIDAZOLAM HYDROCHLORIDE 1 MG: 1 INJECTION, SOLUTION INTRAMUSCULAR; INTRAVENOUS at 10:01

## 2020-01-06 NOTE — PLAN OF CARE
Dr Lazar at bedside consulting with pt and pt's spouse. Pt given discharge instructions. IV dc'd with catheter  Intact. Pt to be transported via WC with wife to vehicle.

## 2020-01-06 NOTE — DISCHARGE SUMMARY
Radiology Discharge Summary      Hospital Course: No complications    Admit Date: 1/6/2020  Discharge Date: 01/06/2020     Instructions Given to Patient: Yes  Diet: Resume prior diet  Activity: activity as tolerated    Description of Condition on Discharge: Stable  Vital Signs (Most Recent): Temp: 98 °F (36.7 °C) (01/06/20 0904)  Pulse: 74 (01/06/20 1115)  Resp: 12 (01/06/20 1115)  BP: (!) 158/88 (01/06/20 1115)  SpO2: 98 % (01/06/20 1115)    Discharge Disposition: Home    Discharge Diagnosis: R JOANNE aneurysm (pericallosal)     Follow-up: Clinic w Dr. Lazar in 2-4 weeks.        Paul Lazar MD  Vascular and Interventional Neurology Staff  Director of [facility] St[facility] Center  [facility]

## 2020-01-06 NOTE — PLAN OF CARE
Pt arrived to [facility] bay 5 s/p diagnostice cerebral.  NAD noted.  Report received from Musa Giles.  DSG to right groin CDI. Pt to lie flat for 2 hours until 1315. Will continue to monitor.

## 2020-01-06 NOTE — PLAN OF CARE
Cerebral angiogram completed. Pt tolerated well. NAD noted or reported. Pt site CDI, bandaged. 6 fr angioseal deployed at this time. Pt to remain flat UNTIL 1315. Report to [facility] given at bedside.

## 2020-01-06 NOTE — NURSING
Pt arrives to  via stretcher. Pt is AAOX4, able to state reason he is here. Orders, hx, labs, consent reviewed.

## 2020-01-06 NOTE — PROCEDURES
Radiology Post-Procedure Note    Pre Op Diagnosis: R JOANNE aneurysm    Post Op Diagnosis: same    Procedure: Cerebral angiogram    Procedure performed by: Paul Lazar MD    Written Informed Consent Obtained: Yes    Specimen Removed: NO    Estimated Blood Loss: Minimal    Procedure report:     A 6F sheath was placed into the right femoral artery and a 5F Tyler catheter was advanced into the aortic arch.  The bilateral common and internal carotid arteries and bilateral vertebral arteries were subselected and angiography of the brain was performed after injection into each of these vessels.    Preliminary interpretation: R pericallosal artery aneurysm.  Please see Imaging report for full details.    A right femoral artery angiogram was performed, the sheath removed and hemostasis achieved using angioseal.  No hematoma was present at the time of hemostasis.    The patient tolerated the procedure well.     Paul Lazar MD  Vascular and Interventional Neurology Staff  Director of [facility]  [phone]

## 2020-01-06 NOTE — H&P
Radiology History & Physical      SUBJECTIVE:     Chief Complaint: Aneurysm     History of Present Illness:  Hi Rubio is a 59 y.o. male who was found to have questionable fusiform dilatation of distal A3/A4 segments of the JONANE on CTA and MRA. He presents to IR for cerebral angiogram.     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 Authorizing Provider   ALPRAZolam (XANAX) 0.5 MG tablet TAKE 1 TABLET BY MOUTH 3 TIMES A DAY AS NEEDED FOR ANXIETY 11/20/19   Josefina Kendall MD   amLODIPine (NORVASC) 10 MG tablet TAKE 1 TABLET BY MOUTH EVERY DAY 12/12/19   Josefina Kendall MD   aspirin (ECOTRIN) 81 MG EC tablet Take 1 tablet (81 mg total) by mouth once daily. 3/16/18 10/9/19  Brandy Patel NP   CONTOUR TEST STRIPS Strp USE TO TEST BLOOD SUGAR ONCE DAILY 10/19/18   Josefina Kendall MD   diclofenac sodium (VOLTAREN) 1 % Gel Apply 4 g topically 3 (three) times daily as needed. 8/24/19   Neno Edge MD   empagliflozin (JARDIANCE) 10 mg Tab Take 1 tablet by mouth once daily. 1/2/20   Josefina Kendall MD   ezetimibe (ZETIA) 10 mg tablet Take 1 tablet (10 mg total) by mouth once daily.  12/6/17   No Palacios MD   flu vacc [id number] 6mos up,PF, 60 mcg (15 mcg x 4)/0.5 mL Syrg Inject into the muscle. 10/9/19   Minerva Peguero, Ashley   hydroCHLOROthiazide (HYDRODIURIL) 25 MG tablet TAKE 1 TABLET BY MOUTH EVERY DAY 12/19/19   Josefina Kendall MD   lidocaine (LIDODERM) 5 % Place 1 patch onto the skin once daily. Remove & Discard patch within 12 hours or as directed by MD 8/24/19   Neno Edge MD   metoprolol succinate (TOPROL-XL) 100 MG 24 hr tablet TAKE 1 TABLET BY MOUTH EVERY DAY 7/15/19   Josefina Kendall MD   MICROLET LANCET Misc 1 lancet by Misc.(Non-Drug; Combo Route) route once daily. Test blood glucose once daily as instructed. 10/3/16   Josefina Kendall MD   nitroGLYCERIN (NITROSTAT) 0.3 MG SL tablet Place 1 tablet (0.3 mg total) under the tongue every 5 (five) minutes as needed for Chest pain. 3/9/18   Kelly Franco MD   pantoprazole (PROTONIX) 40 MG tablet TAKE 1 TABLET BY MOUTH TWICE A DAY 12/13/19   Josefina Kendall MD   pravastatin (PRAVACHOL) 80 MG tablet TAKE 1 TABLET BY MOUTH EVERYDAY AT BEDTIME 12/13/19   Josefina Kendall MD   sertraline (ZOLOFT) 25 MG tablet TAKE 1 TABLET BY MOUTH EVERY DAY 2/4/19   Josefina Kendall MD   sildenafil (REVATIO) 20 mg Tab TAKE 2-5 TABLETS BY MOUTH AS NEEDED 12/17/18   Yg King MD   sildenafil (VIAGRA) 100 MG tablet Take 1 tablet (100 mg total) by mouth daily as needed for Erectile Dysfunction. 12/19/19   gY King MD     Anticoagulants/Antiplatelets: no anticoagulation, last took ASA 7 days ago    Allergies:   Review of patient's allergies indicates:   Allergen Reactions    Lisinopril Anaphylaxis and Nausea And Vomiting     General bad feeling    Adhesive     Crestor [rosuvastatin] Swelling     Lip swelling.     Lipitor [atorvastatin] Other (See Comments)     Muscle aches    Metformin      Used XR and experienced flatulance and abdominal pain.      Sedation History:  no adverse reactions    Review  of Systems:   Hematological: no known coagulopathies  Respiratory: no shortness of breath  Cardiovascular: no chest pain  Gastrointestinal: no abdominal pain  Genito-Urinary: no dysuria  Musculoskeletal: negative  Neurological: no TIA or stroke symptoms         OBJECTIVE:     Vital Signs (Most Recent)       Physical Exam:  ASA: 2  Mallampati: 2    General: no acute distress  Mental Status: alert and oriented to person, place and time  HEENT: normocephalic, atraumatic  Chest: unlabored breathing  Heart: regular heart rate  Abdomen: nondistended  Extremity: moves all extremities    Laboratory  Lab Results   Component Value Date    INR 1.0 08/22/2019       Lab Results   Component Value Date    WBC 5.10 08/24/2019    HGB 16.7 08/24/2019    HCT 49.9 08/24/2019    MCV 90 08/24/2019     08/24/2019      Lab Results   Component Value Date     (H) 11/25/2019     11/25/2019    K 4.0 11/25/2019     11/25/2019    CO2 27 11/25/2019    BUN 14 11/25/2019    CREATININE 1.1 11/25/2019    CALCIUM 9.4 11/25/2019    MG 2.2 08/24/2019    ALT 25 11/25/2019    AST 21 11/25/2019    ALBUMIN 3.9 08/24/2019    BILITOT 0.7 08/24/2019       ASSESSMENT/PLAN:     Sedation Plan: moderate  Patient will undergo cerebral angiogram.    Nya Lyn MD  Department of Radiology, PGY-2  Pager: [phone]

## 2020-01-10 NOTE — PROGRESS NOTES
Received voicemail asking for call back.     Called patient he noted that his blood glucoses has been running high today. His most recent blood glucoses has started to trend down to 267 from 320.     Patient reports that he has been nauseated today. Endorses blurred vision and fatigue.    Reports that he experienced this in the past and has found that drinking water helps.     Of note, recently attempted to start medication for patient. He has not yet checked with his insurance for the formulary option. Encouraged him to do this as soon as possible.     Reviewed 24 hour dietary recall.     Reading in 208 before dinner last night.   Dinner Last night: BBQ Ribs, grits, Snap beans  Reading at noon before food 200.  1st meal: mcrae croissant  Lunch: Chinese rice (but notes he only ate a few bites)    Denies beverages aside from water. Endorses 3-4 bottles of water today.     Reviewed symptoms of high blood glucose. Reviewed effects of the carbohydrates in his diet and the stress of being sick on his blood glucoses.     Instructed patient to continue hydration, moderate his carbohydrate (specifically fast acting carbs), and continue to monitor his blood glucoses. Informed patient that if his readings begin to climb again and he experiences confusion, shortness of breath, rapid breathing, or pain in his abdomen to go to the ER or call [facility] on-call.

## 2020-01-13 ENCOUNTER — PATIENT OUTREACH (OUTPATIENT)
Dept: OTHER | Facility: OTHER | Age: 60
End: 2020-01-13

## 2020-01-13 ENCOUNTER — PATIENT MESSAGE (OUTPATIENT)
Dept: ADMINISTRATIVE | Facility: OTHER | Age: 60
End: 2020-01-13

## 2020-01-13 NOTE — PROGRESS NOTES
Reviewed BG readings from this weekend. Patient's blood glucoses have regained prior control.     Will continue to monitor.

## 2020-01-13 NOTE — PROGRESS NOTES
Digital Medicine: Health  Follow-Up    Followed up with patient from his voicemail on 1/10/20. Patient was able to speak with pharmacist regardinghis higher numbers and symptoms of blurred vision, dizziness and fatigue on 1/10/20. Patient followed recommendation of drinking water and symptoms subsided. See note on 1/10/20    Patient denies any current symptoms and reported he was feeling well. Encouraged the patient to stay hydrated and eating regular meals. Patient shared he still has limited appetite and trying to eat meals through out the day.     Patient reported medication affordability. Patient checked with his insurance regarding medication recommendation provided by pharmacist. Patient reported that all medication would cost him $400-500 a month. Patient shared that Glipizide would be cover. Patient is a  and refuses to use [facility] to support or care. Pharmacist was informed of medication affordability and would follow up with the patient.    The history is provided by the patient.     Follow Up  Follow-up reason(s): reading review      Readings are trending up due to lifestyle change.        INTERVENTION(S)  encouragement/support    PLAN  patient verbalizes understanding and Clinician follow-up      There are no preventive care reminders to display for this patient.      Last 6 Patient Entered Readings                                          Most Recent A1c: 6.4% on 10/9/2019  (Goal: 7%)     Recent Readings 1/12/2020 1/11/2020 1/11/2020 1/10/2020 1/10/2020    Blood Glucose (mg/dL) 154 153 146 176 208                    Physical Activity Screening   No change to exercise routine.        Medication Adherence Screening       Patient reported medication affordability issues. Patient checked with his insurance regarding medication recommendation provided by pharmacist. Patient reported that all medication would cost him $400-500 a month. Patient shared that Glipizide would be cover. Patient is a   and refuses to use [facility] care to obtain medication.       SDOH

## 2020-01-14 ENCOUNTER — PATIENT MESSAGE (OUTPATIENT)
Dept: ADMINISTRATIVE | Facility: OTHER | Age: 60
End: 2020-01-14

## 2020-01-14 RX ORDER — PIOGLITAZONEHYDROCHLORIDE 15 MG/1
15 TABLET ORAL DAILY
Qty: 30 TABLET | Refills: 3 | Status: SHIPPED | OUTPATIENT
Start: 2020-01-14 | End: 2020-02-13

## 2020-01-14 NOTE — PROGRESS NOTES
Received secure chat from health  noting patient called this morning and left a message that he has been experiencing nausea. PharmD called to discuss with patient.     Patient has been experiencing nausea for the past few weeks. Initially presumed to be related to metformin intolerance. Patient notes continuation despite metformin discontinuation.     PharmD encouraged patient to see Dr. Kendall. Patient has appointment Thursday but received a message asking if he could come in today due to another patient cancelling an appointment. He is going to reach out.     Patient reports that all SGLT2i ($400-500) and DPP-IV inhibitors ($200-300/month) are not on his formulary. Only available options are glipizide and pioglitazone. Due to current A1C and generally well controlled blood glucoses with mildly elevated fasting readings and significant excursions at times, would like to avoid VILLEDA due to hypoglycemia risks.     Will start pioglitazone 15mg daily with food.     Encouraged patient to maintain hydration. Health  task placed yesterday for detailed diabetic diet education.

## 2020-01-16 ENCOUNTER — OFFICE VISIT (OUTPATIENT)
Dept: INTERNAL MEDICINE | Facility: CLINIC | Age: 60
End: 2020-01-16
Payer: COMMERCIAL

## 2020-01-16 VITALS
BODY MASS INDEX: 23.81 KG/M2 | HEART RATE: 68 BPM | TEMPERATURE: 99 F | RESPIRATION RATE: 18 BRPM | SYSTOLIC BLOOD PRESSURE: 124 MMHG | WEIGHT: 195.56 LBS | HEIGHT: 76 IN | DIASTOLIC BLOOD PRESSURE: 82 MMHG

## 2020-01-16 DIAGNOSIS — I67.1 CEREBRAL ANEURYSM: ICD-10-CM

## 2020-01-16 DIAGNOSIS — R51.9 NONINTRACTABLE HEADACHE, UNSPECIFIED CHRONICITY PATTERN, UNSPECIFIED HEADACHE TYPE: ICD-10-CM

## 2020-01-16 DIAGNOSIS — I10 ESSENTIAL HYPERTENSION: Primary | ICD-10-CM

## 2020-01-16 DIAGNOSIS — E11.9 TYPE 2 DIABETES MELLITUS WITHOUT COMPLICATION, WITHOUT LONG-TERM CURRENT USE OF INSULIN: ICD-10-CM

## 2020-01-16 PROCEDURE — 3079F DIAST BP 80-89 MM HG: CPT | Mod: CPTII,S$GLB,, | Performed by: INTERNAL MEDICINE

## 2020-01-16 PROCEDURE — 3044F HG A1C LEVEL LT 7.0%: CPT | Mod: CPTII,S$GLB,, | Performed by: INTERNAL MEDICINE

## 2020-01-16 PROCEDURE — 3008F PR BODY MASS INDEX (BMI) DOCUMENTED: ICD-10-PCS | Mod: CPTII,S$GLB,, | Performed by: INTERNAL MEDICINE

## 2020-01-16 PROCEDURE — 3074F PR MOST RECENT SYSTOLIC BLOOD PRESSURE < 130 MM HG: ICD-10-PCS | Mod: CPTII,S$GLB,, | Performed by: INTERNAL MEDICINE

## 2020-01-16 PROCEDURE — 99999 PR PBB SHADOW E&M-EST. PATIENT-LVL IV: CPT | Mod: PBBFAC,,, | Performed by: INTERNAL MEDICINE

## 2020-01-16 PROCEDURE — 3044F PR MOST RECENT HEMOGLOBIN A1C LEVEL <7.0%: ICD-10-PCS | Mod: CPTII,S$GLB,, | Performed by: INTERNAL MEDICINE

## 2020-01-16 PROCEDURE — 3008F BODY MASS INDEX DOCD: CPT | Mod: CPTII,S$GLB,, | Performed by: INTERNAL MEDICINE

## 2020-01-16 PROCEDURE — 99213 OFFICE O/P EST LOW 20 MIN: CPT | Mod: S$GLB,,, | Performed by: INTERNAL MEDICINE

## 2020-01-16 PROCEDURE — 99213 PR OFFICE/OUTPT VISIT, EST, LEVL III, 20-29 MIN: ICD-10-PCS | Mod: S$GLB,,, | Performed by: INTERNAL MEDICINE

## 2020-01-16 PROCEDURE — 3074F SYST BP LT 130 MM HG: CPT | Mod: CPTII,S$GLB,, | Performed by: INTERNAL MEDICINE

## 2020-01-16 PROCEDURE — 3079F PR MOST RECENT DIASTOLIC BLOOD PRESSURE 80-89 MM HG: ICD-10-PCS | Mod: CPTII,S$GLB,, | Performed by: INTERNAL MEDICINE

## 2020-01-16 PROCEDURE — 99999 PR PBB SHADOW E&M-EST. PATIENT-LVL IV: ICD-10-PCS | Mod: PBBFAC,,, | Performed by: INTERNAL MEDICINE

## 2020-01-16 RX ORDER — METHOCARBAMOL 500 MG/1
TABLET, FILM COATED ORAL
COMMUNITY
Start: 2019-12-18 | End: 2020-02-05

## 2020-01-16 NOTE — PROGRESS NOTES
Transitional Care Note  Subjective:       Patient ID: Hi Rubio is a 60 y.o. male.  Chief Complaint: Follow-up (Hospital; aneurysm)    Family and/or Caretaker present at visit?  No.  Diagnostic tests reviewed/disposition: No diagnosic tests pending after this hospitalization.  Disease/illness education: Cerebral aneurysm  Home health/community services discussion/referrals: Patient does not have home health established from hospital visit.  They do not need home health.  If needed, we will set up home health for the patient.   Establishment or re-establishment of referral orders for community resources: No other necessary community resources.   Discussion with other health care providers: No discussion with other health care providers necessary.   CC: followup of hospitalization for cerebral aneurysm  HPI:  The patient is a 60 y.o. year old male who presents to the office for followup of hospitalization for cerebral aneurysm.  He recently underwent an angiography to confirm presence of aneurysm.  He complains of headaches.      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 -                  No date: Hyperlipidemia  No date: Hypertension  No date: Memory loss  8/22/2019: Thyroid nodule  2/27/2013: Traumatic iritis - Left Eye    SURGICAL HISTORY:  Past Surgical History:  1/6/2020: CEREBRAL ANGIOGRAM; N/A      Comment:  Procedure: ANGIOGRAM-CEREBRAL;  [facility]               Provider;  Location: [facility] OR [address] [place] [address];  Service:                Radiology;  Laterality: N/A;  /Nagi  12/30/2015: CERVICAL FUSION  4/7/2017: COLONOSCOPY;  N/A      Comment:  Procedure: COLONOSCOPY;  Surgeon: Handy Frederick MD;                 Location: [facility] ([address] [place] [address]);  Service: Endoscopy;                 Laterality: N/A;  No date: HERNIA REPAIR  No date: PROSTATECTOMY    MEDS:  Medcard reviewed and updated    ALLERGIES: Allergy Card reviewed and updated    SOCIAL HISTORY:   The patient is a nonsmoker.        Review of Systems   Constitutional: Positive for fatigue.   Eyes: Positive for visual disturbance.   Respiratory: Negative for cough, shortness of breath and wheezing.    Cardiovascular: Positive for palpitations. Negative for chest pain.   Gastrointestinal: Positive for abdominal pain and nausea.   Neurological: Positive for dizziness, light-headedness and headaches.       Objective:      Physical Exam   Constitutional: He is oriented to person, place, and time. He appears well-developed and well-nourished.   Cardiovascular: Normal rate, regular rhythm and normal heart sounds.   No murmur heard.  Pulmonary/Chest: Effort normal and breath sounds normal. No respiratory distress.   Abdominal: Soft. Bowel sounds are normal. He exhibits no distension.   Neurological: He is alert and oriented to person, place, and time.   Skin: Skin is warm and dry.   Psychiatric: He has a normal mood and affect. His behavior is normal. Judgment and thought content normal.       Assessment:       1. Essential hypertension    2. Type 2 diabetes mellitus without complication, without long-term current use of insulin    3. Nonintractable headache, unspecified chronicity pattern, unspecified headache type    4. Cerebral aneurysm        Plan:     Hi was seen today for follow-up.    Diagnoses and all orders for this visit:    Essential hypertension  -     blood pressure is controlled    Type 2 diabetes mellitus without complication, without long-term current use of insulin  -     good glycemic control    Nonintractable headache, unspecified chronicity pattern, unspecified headache  type  -     blood pressure control    Cerebral aneurysm  -     follow-up by neurosurgery

## 2020-01-20 ENCOUNTER — OFFICE VISIT (OUTPATIENT)
Dept: NEUROSURGERY | Facility: CLINIC | Age: 60
End: 2020-01-20
Payer: COMMERCIAL

## 2020-01-20 VITALS
TEMPERATURE: 98 F | HEIGHT: 76 IN | HEART RATE: 74 BPM | DIASTOLIC BLOOD PRESSURE: 59 MMHG | BODY MASS INDEX: 24.44 KG/M2 | SYSTOLIC BLOOD PRESSURE: 126 MMHG | WEIGHT: 200.69 LBS

## 2020-01-20 DIAGNOSIS — E11.9 TYPE 2 DIABETES MELLITUS WITHOUT COMPLICATION, WITHOUT LONG-TERM CURRENT USE OF INSULIN: ICD-10-CM

## 2020-01-20 DIAGNOSIS — G44.52 NEW DAILY PERSISTENT HEADACHE: ICD-10-CM

## 2020-01-20 DIAGNOSIS — E78.2 MIXED HYPERLIPIDEMIA: ICD-10-CM

## 2020-01-20 DIAGNOSIS — R41.0 INTERMITTENT CONFUSION: ICD-10-CM

## 2020-01-20 DIAGNOSIS — I67.1 ANTERIOR CEREBRAL ARTERY ANEURYSM: ICD-10-CM

## 2020-01-20 DIAGNOSIS — I10 ESSENTIAL HYPERTENSION: ICD-10-CM

## 2020-01-20 PROBLEM — R07.89 CHEST DISCOMFORT: Status: RESOLVED | Noted: 2017-01-20 | Resolved: 2020-01-20

## 2020-01-20 PROBLEM — R07.9 CHEST PAIN: Status: RESOLVED | Noted: 2017-01-21 | Resolved: 2020-01-20

## 2020-01-20 PROCEDURE — 3044F HG A1C LEVEL LT 7.0%: CPT | Mod: CPTII,S$GLB,, | Performed by: RADIOLOGY

## 2020-01-20 PROCEDURE — 99999 PR PBB SHADOW E&M-EST. PATIENT-LVL IV: ICD-10-PCS | Mod: PBBFAC,,,

## 2020-01-20 PROCEDURE — 3008F PR BODY MASS INDEX (BMI) DOCUMENTED: ICD-10-PCS | Mod: CPTII,S$GLB,, | Performed by: RADIOLOGY

## 2020-01-20 PROCEDURE — 3078F PR MOST RECENT DIASTOLIC BLOOD PRESSURE < 80 MM HG: ICD-10-PCS | Mod: CPTII,S$GLB,, | Performed by: RADIOLOGY

## 2020-01-20 PROCEDURE — 99999 PR PBB SHADOW E&M-EST. PATIENT-LVL IV: CPT | Mod: PBBFAC,,,

## 2020-01-20 PROCEDURE — 3074F PR MOST RECENT SYSTOLIC BLOOD PRESSURE < 130 MM HG: ICD-10-PCS | Mod: CPTII,S$GLB,, | Performed by: RADIOLOGY

## 2020-01-20 PROCEDURE — 3078F DIAST BP <80 MM HG: CPT | Mod: CPTII,S$GLB,, | Performed by: RADIOLOGY

## 2020-01-20 PROCEDURE — 3074F SYST BP LT 130 MM HG: CPT | Mod: CPTII,S$GLB,, | Performed by: RADIOLOGY

## 2020-01-20 PROCEDURE — 99214 OFFICE O/P EST MOD 30 MIN: CPT | Mod: S$GLB,,, | Performed by: RADIOLOGY

## 2020-01-20 PROCEDURE — 99214 PR OFFICE/OUTPT VISIT, EST, LEVL IV, 30-39 MIN: ICD-10-PCS | Mod: S$GLB,,, | Performed by: RADIOLOGY

## 2020-01-20 PROCEDURE — 3044F PR MOST RECENT HEMOGLOBIN A1C LEVEL <7.0%: ICD-10-PCS | Mod: CPTII,S$GLB,, | Performed by: RADIOLOGY

## 2020-01-20 PROCEDURE — 3008F BODY MASS INDEX DOCD: CPT | Mod: CPTII,S$GLB,, | Performed by: RADIOLOGY

## 2020-01-20 NOTE — ASSESSMENT & PLAN NOTE
Pt reports of intermittent confusion 3 times in last 1 year   - rec follow up with General Neurology

## 2020-01-20 NOTE — PROGRESS NOTES
Interventional Neurosurgery  Clinic Note    ___________________  ASSESSMENT & PLAN    Problem List Items Addressed This Visit        Neuro    Anterior cerebral artery aneurysm    Current Assessment & Plan     Unruptured JOANNE pericollosal  Artery aneurysm     - recommend to manage conservatively with serial MRA brain wo annually for 3 years   - risk of treatment outweighs the benefit for the fusiform aneurysm   - risk factor management with SBP < 130 mmHg and Aic < 7   - rec exercise and balanced diet          Intermittent confusion    Current Assessment & Plan     Pt reports of intermittent confusion 3 times in last 1 year   - rec follow up with General Neurology          New daily persistent headache    Current Assessment & Plan     Chronic daily headache   - rec to alternate Excedrin with aleeve   - will set up appt with Gen Neurology             Cardiac/Vascular    Essential hypertension    Current Assessment & Plan     - risk factor for aneurysm rupture  - rec SBP < 130 mmHg           Mixed hyperlipidemia    Current Assessment & Plan     - risk factor for aneurysm rupture   - rec LDL < 70             Endocrine    Type 2 diabetes mellitus without complication, without long-term current use of insulin    Current Assessment & Plan     - risk factor for aneurysm rupture   - rec A1c < 7                Reason For Visit (Chief Complaint): headache    HPI: 60 y.o. AAM presented to the clinic for evaluation and treatment of JOANNE aneurysm     Pt had left arm and leg weakness and numbness in Aug, 2019. MRA incidentally found JOANNE aneurysm thus he got follow up angiogram on 1/6/20 showing JOANNE pericollosal aneurysm. Pt also complains of daily headache for which he take excedrin. He has had confusion about 3 times in last 1 year where he forgets where he is and fears driving.          Vessel Imaging:         Pericollosal incidental aneurysm    Other:     Relevant Labwork:  Recent Labs   Lab 10/09/19  1700 11/25/19  0740    Hemoglobin A1C 6.4 H  --    LDL Cholesterol  --  105.0   HDL  --  45   Triglycerides  --  165 H   Cholesterol  --  183       I independently viewed the above imaging studies in addition to reviewing the report.  I reviewed the above labwork.    Review of Systems   Constitutional: Negative for chills and fever.   HENT: Negative for hearing loss and tinnitus.    Eyes: Negative for blurred vision and double vision.   Respiratory: Negative for cough and hemoptysis.    Cardiovascular: Negative for chest pain and palpitations.   Gastrointestinal: Negative for heartburn and nausea.   Genitourinary: Negative for dysuria and urgency.   Musculoskeletal: Positive for back pain and joint pain. Negative for myalgias and neck pain.   Skin: Negative for itching and rash.   Neurological: Negative for dizziness and positive for headaches.   Endo/Heme/Allergies: Negative for environmental allergies. Does not bruise/bleed easily.   Psychiatric/Behavioral: Negative for depression and suicidal ideas, positive for intermittent confusion.     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  "   CONTOUR TEST STRIPS STRP    USE TO TEST BLOOD SUGAR ONCE DAILY    DICLOFENAC SODIUM (VOLTAREN) 1 % GEL    Apply 4 g topically 3 (three) times daily as needed.    EZETIMIBE (ZETIA) 10 MG TABLET    Take 1 tablet (10 mg total) by mouth once daily.    FLU VACC [id number] 6MOS UP,PF, 60 MCG (15 MCG X 4)/0.5 ML SYRG    Inject into the muscle.    HYDROCHLOROTHIAZIDE (HYDRODIURIL) 25 MG TABLET    TAKE 1 TABLET BY MOUTH EVERY DAY    LIDOCAINE (LIDODERM) 5 %    Place 1 patch onto the skin once daily. Remove & Discard patch within 12 hours or as directed by MD    METHOCARBAMOL (ROBAXIN) 500 MG TAB    TAKE 1 TABLET BY MOUTH EVERY 6 HOURS AS NEEDED FOR MUSCLE SPASM    METOPROLOL SUCCINATE (TOPROL-XL) 100 MG 24 HR TABLET    TAKE 1 TABLET BY MOUTH EVERY DAY    MICROLET LANCET MISC    1 lancet by Misc.(Non-Drug; Combo Route) route once daily. Test blood glucose once daily as instructed.    NITROGLYCERIN (NITROSTAT) 0.3 MG SL TABLET    Place 1 tablet (0.3 mg total) under the tongue every 5 (five) minutes as needed for Chest pain.    PANTOPRAZOLE (PROTONIX) 40 MG TABLET    TAKE 1 TABLET BY MOUTH TWICE A DAY    PIOGLITAZONE (ACTOS) 15 MG TABLET    Take 1 tablet (15 mg total) by mouth once daily.    PRAVASTATIN (PRAVACHOL) 80 MG TABLET    TAKE 1 TABLET BY MOUTH EVERYDAY AT BEDTIME    SERTRALINE (ZOLOFT) 25 MG TABLET    TAKE 1 TABLET BY MOUTH EVERY DAY    SILDENAFIL (REVATIO) 20 MG TAB    TAKE 2-5 TABLETS BY MOUTH AS NEEDED    SILDENAFIL (VIAGRA) 100 MG TABLET    Take 1 tablet (100 mg total) by mouth daily as needed for Erectile Dysfunction.       EXAM  Vital Signs:Blood pressure (!) 126/59, pulse 74, temperature 98.2 °F (36.8 °C), temperature source Oral, height 6' 4" (1.93 m), weight 91 kg (200 lb 11.2 oz).  General: well appearing without discomfort   Mental Status:alert, oriented to person - place - age - month   Language: able to name, repeat, comprehend commands   Cranial Nerves: EOMI, PERRL, no facial asymmetry, tongue to " midline, palate midline  Motor: 5/5 power in all extremities, normal tone  Sensory: intact light touch bilaterally  Coordination: no ataxia   Gait & Stance: normal        Bhanu Palmer MD  NeuroIR fellow\

## 2020-01-20 NOTE — ASSESSMENT & PLAN NOTE
Unruptured JOANNE pericollosal  Artery aneurysm     - recommend to manage conservatively with serial MRA brain wo annually for 3 years   - risk of treatment outweighs the benefit for the fusiform aneurysm   - risk factor management with SBP < 130 mmHg and Aic < 7   - rec exercise and balanced diet

## 2020-01-20 NOTE — ASSESSMENT & PLAN NOTE
Chronic daily headache   - rec to alternate Excedrin with aleeve   - will set up appt with Gen Neurology

## 2020-01-27 ENCOUNTER — PATIENT OUTREACH (OUTPATIENT)
Dept: OTHER | Facility: OTHER | Age: 60
End: 2020-01-27

## 2020-01-27 NOTE — PROGRESS NOTES
Digital Medicine: Health  Follow-Up    The history is provided by the patient.       Intervention/Plan    There are no preventive care reminders to display for this patient.      Last 6 Patient Entered Readings                                          Most Recent A1c: 6.4% on 10/9/2019  (Goal: 7%)     Recent Readings 1/27/2020 1/24/2020 1/23/2020 1/22/2020 1/21/2020    Blood Glucose (mg/dL) 170 189 181 194 163                Screenings    SDOH

## 2020-01-27 NOTE — PROGRESS NOTES
Digital Medicine: Health  Follow-Up    Patient shared he has not started pioglitazone as his PCP wants him to be on JARDIANCE.      Patient was provided with low carb diabetes education and encouraged to increase his snacking and have regularly scheduled meals. Patient was provided with low carb resources. Patient was encouraged to attend diabetes education to receive face to face education to help better manage is diabetes.         The history is provided by the patient.     Follow Up  Follow-up reason(s): reading review and goal follow-up      Routine Education Topics: meal planning and physical activity        INTERVENTION(S)  recommended diet modifications, encouragement/support and low carb    PLAN  patient verbalizes understanding      There are no preventive care reminders to display for this patient.      Last 6 Patient Entered Readings                                          Most Recent A1c: 6.4% on 10/9/2019  (Goal: 7%)     Recent Readings 1/27/2020 1/24/2020 1/23/2020 1/22/2020 1/21/2020    Blood Glucose (mg/dL) 170 189 181 194 163                    Diet Screening   He has the following dietary restrictions: low sodium diet and low carb    Patient stated that he often does not have an appetite and missing meals. Patient reported he feels nauseous 2-3 times a week and if he eats he will vomit. Patient shared he often experiences headaches and attributes these to his aneurysm and manages them with OTC pain medication. Patient was encouraged to address nausea and headache with is provider. Pharmacist was informed.     Patient was provided with low carb diabetes education and encouraged to increase his snacking and have regularly scheduled meals. Patient was provided with low carb resources      Intervention(s): meal planning    Physical Activity Screening   No change to exercise routine.        Medication Adherence Screening       Patient shared he has not started pioglitazone as his PCP wants him to  be on JARDIANCE. Patient stated that PCP was concerned about the strength of pioglitazone and affects it could have on BP and aneurysm. PCP is working on pharmacy assistance to help the patient afford the Jardiance.       ALIA
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
## 2020-02-04 RX ORDER — ALPRAZOLAM 0.5 MG/1
TABLET ORAL
Qty: 90 TABLET | Refills: 1 | Status: SHIPPED | OUTPATIENT
Start: 2020-02-04 | End: 2020-04-21

## 2020-02-05 ENCOUNTER — OFFICE VISIT (OUTPATIENT)
Dept: NEUROLOGY | Facility: CLINIC | Age: 60
End: 2020-02-05
Payer: COMMERCIAL

## 2020-02-05 VITALS
HEART RATE: 64 BPM | SYSTOLIC BLOOD PRESSURE: 140 MMHG | BODY MASS INDEX: 23.62 KG/M2 | DIASTOLIC BLOOD PRESSURE: 84 MMHG | HEIGHT: 76 IN | WEIGHT: 194 LBS

## 2020-02-05 DIAGNOSIS — G44.40 MEDICATION OVERUSE HEADACHE: ICD-10-CM

## 2020-02-05 DIAGNOSIS — I10 ESSENTIAL HYPERTENSION: ICD-10-CM

## 2020-02-05 DIAGNOSIS — R29.818 SUSPECTED SLEEP APNEA: ICD-10-CM

## 2020-02-05 DIAGNOSIS — G44.86 CERVICOGENIC HEADACHE: ICD-10-CM

## 2020-02-05 DIAGNOSIS — E11.9 TYPE 2 DIABETES MELLITUS WITHOUT COMPLICATION, WITHOUT LONG-TERM CURRENT USE OF INSULIN: ICD-10-CM

## 2020-02-05 DIAGNOSIS — G43.109 MIGRAINE WITH AURA AND WITHOUT STATUS MIGRAINOSUS, NOT INTRACTABLE: Primary | ICD-10-CM

## 2020-02-05 PROCEDURE — 99354 PR PROLONGED SVC, OUPT, 1ST HR: ICD-10-PCS | Mod: S$GLB,,, | Performed by: PHYSICIAN ASSISTANT

## 2020-02-05 PROCEDURE — 3044F HG A1C LEVEL LT 7.0%: CPT | Mod: CPTII,S$GLB,, | Performed by: PHYSICIAN ASSISTANT

## 2020-02-05 PROCEDURE — 99999 PR PBB SHADOW E&M-EST. PATIENT-LVL IV: ICD-10-PCS | Mod: PBBFAC,,, | Performed by: PHYSICIAN ASSISTANT

## 2020-02-05 PROCEDURE — 3044F PR MOST RECENT HEMOGLOBIN A1C LEVEL <7.0%: ICD-10-PCS | Mod: CPTII,S$GLB,, | Performed by: PHYSICIAN ASSISTANT

## 2020-02-05 PROCEDURE — 3008F BODY MASS INDEX DOCD: CPT | Mod: CPTII,S$GLB,, | Performed by: PHYSICIAN ASSISTANT

## 2020-02-05 PROCEDURE — 3077F PR MOST RECENT SYSTOLIC BLOOD PRESSURE >= 140 MM HG: ICD-10-PCS | Mod: CPTII,S$GLB,, | Performed by: PHYSICIAN ASSISTANT

## 2020-02-05 PROCEDURE — 3008F PR BODY MASS INDEX (BMI) DOCUMENTED: ICD-10-PCS | Mod: CPTII,S$GLB,, | Performed by: PHYSICIAN ASSISTANT

## 2020-02-05 PROCEDURE — 99215 PR OFFICE/OUTPT VISIT, EST, LEVL V, 40-54 MIN: ICD-10-PCS | Mod: S$GLB,,, | Performed by: PHYSICIAN ASSISTANT

## 2020-02-05 PROCEDURE — 99999 PR PBB SHADOW E&M-EST. PATIENT-LVL IV: CPT | Mod: PBBFAC,,, | Performed by: PHYSICIAN ASSISTANT

## 2020-02-05 PROCEDURE — 99215 OFFICE O/P EST HI 40 MIN: CPT | Mod: S$GLB,,, | Performed by: PHYSICIAN ASSISTANT

## 2020-02-05 PROCEDURE — 99354 PR PROLONGED SVC, OUPT, 1ST HR: CPT | Mod: S$GLB,,, | Performed by: PHYSICIAN ASSISTANT

## 2020-02-05 PROCEDURE — 3077F SYST BP >= 140 MM HG: CPT | Mod: CPTII,S$GLB,, | Performed by: PHYSICIAN ASSISTANT

## 2020-02-05 PROCEDURE — 3079F PR MOST RECENT DIASTOLIC BLOOD PRESSURE 80-89 MM HG: ICD-10-PCS | Mod: CPTII,S$GLB,, | Performed by: PHYSICIAN ASSISTANT

## 2020-02-05 PROCEDURE — 3079F DIAST BP 80-89 MM HG: CPT | Mod: CPTII,S$GLB,, | Performed by: PHYSICIAN ASSISTANT

## 2020-02-05 RX ORDER — BACLOFEN 10 MG/1
10 TABLET ORAL NIGHTLY
Qty: 30 TABLET | Refills: 0 | Status: SHIPPED | OUTPATIENT
Start: 2020-02-05 | End: 2020-02-05

## 2020-02-05 RX ORDER — DICLOFENAC SODIUM 10 MG/G
2 GEL TOPICAL DAILY
Qty: 1 TUBE | Refills: 6 | Status: SHIPPED | OUTPATIENT
Start: 2020-02-05 | End: 2021-02-21

## 2020-02-05 NOTE — PATIENT INSTRUCTIONS
Plan:  For your muscle tension: finish your robaxin prescription, begin PT.   For your medication overuse headache: stop excedrin use.   For a possible untreated sleep apnea: see sleep medicine.   For your migraines: you will get back in touch with me on whether you would like to start cymbalta, gabapentin, or a CGRP inhibitor  (aimovig vs emgality).   When you  Have a headache: use diclofenac gel.   Please monitor your blood pressure and glucose  Please monitor your alcohol use

## 2020-02-06 ENCOUNTER — TELEPHONE (OUTPATIENT)
Dept: ADMINISTRATIVE | Facility: OTHER | Age: 60
End: 2020-02-06

## 2020-02-06 ENCOUNTER — PATIENT OUTREACH (OUTPATIENT)
Dept: OTHER | Facility: OTHER | Age: 60
End: 2020-02-06

## 2020-02-06 DIAGNOSIS — E11.9 TYPE 2 DIABETES MELLITUS WITHOUT COMPLICATION, WITHOUT LONG-TERM CURRENT USE OF INSULIN: Primary | ICD-10-CM

## 2020-02-06 NOTE — TELEPHONE ENCOUNTER
Left voice message for patient to return call to schedule appointment from referral to Sleep Medicine department.  Sandy BUCKLEY [phone]

## 2020-02-06 NOTE — PROGRESS NOTES
Health  notes patient has not started pioglitazone due to alterative medication plan by PCP.     No new medication started at this time. Reached out to receive update regarding plan for therapy.

## 2020-02-07 ENCOUNTER — OFFICE VISIT (OUTPATIENT)
Dept: INTERNAL MEDICINE | Facility: CLINIC | Age: 60
End: 2020-02-07
Payer: COMMERCIAL

## 2020-02-07 VITALS
HEART RATE: 76 BPM | BODY MASS INDEX: 24.33 KG/M2 | TEMPERATURE: 99 F | WEIGHT: 199.75 LBS | HEIGHT: 76 IN | DIASTOLIC BLOOD PRESSURE: 76 MMHG | SYSTOLIC BLOOD PRESSURE: 130 MMHG | RESPIRATION RATE: 18 BRPM

## 2020-02-07 DIAGNOSIS — I10 ESSENTIAL HYPERTENSION: ICD-10-CM

## 2020-02-07 DIAGNOSIS — E11.9 TYPE 2 DIABETES MELLITUS WITHOUT COMPLICATION, WITHOUT LONG-TERM CURRENT USE OF INSULIN: Primary | ICD-10-CM

## 2020-02-07 PROCEDURE — 3044F PR MOST RECENT HEMOGLOBIN A1C LEVEL <7.0%: ICD-10-PCS | Mod: CPTII,S$GLB,, | Performed by: INTERNAL MEDICINE

## 2020-02-07 PROCEDURE — 99999 PR PBB SHADOW E&M-EST. PATIENT-LVL IV: CPT | Mod: PBBFAC,,, | Performed by: INTERNAL MEDICINE

## 2020-02-07 PROCEDURE — 3078F PR MOST RECENT DIASTOLIC BLOOD PRESSURE < 80 MM HG: ICD-10-PCS | Mod: CPTII,S$GLB,, | Performed by: INTERNAL MEDICINE

## 2020-02-07 PROCEDURE — 3044F HG A1C LEVEL LT 7.0%: CPT | Mod: CPTII,S$GLB,, | Performed by: INTERNAL MEDICINE

## 2020-02-07 PROCEDURE — 99999 PR PBB SHADOW E&M-EST. PATIENT-LVL IV: ICD-10-PCS | Mod: PBBFAC,,, | Performed by: INTERNAL MEDICINE

## 2020-02-07 PROCEDURE — 3075F PR MOST RECENT SYSTOLIC BLOOD PRESS GE 130-139MM HG: ICD-10-PCS | Mod: CPTII,S$GLB,, | Performed by: INTERNAL MEDICINE

## 2020-02-07 PROCEDURE — 3075F SYST BP GE 130 - 139MM HG: CPT | Mod: CPTII,S$GLB,, | Performed by: INTERNAL MEDICINE

## 2020-02-07 PROCEDURE — 3078F DIAST BP <80 MM HG: CPT | Mod: CPTII,S$GLB,, | Performed by: INTERNAL MEDICINE

## 2020-02-07 PROCEDURE — 3008F PR BODY MASS INDEX (BMI) DOCUMENTED: ICD-10-PCS | Mod: CPTII,S$GLB,, | Performed by: INTERNAL MEDICINE

## 2020-02-07 PROCEDURE — 3008F BODY MASS INDEX DOCD: CPT | Mod: CPTII,S$GLB,, | Performed by: INTERNAL MEDICINE

## 2020-02-07 PROCEDURE — 99213 OFFICE O/P EST LOW 20 MIN: CPT | Mod: S$GLB,,, | Performed by: INTERNAL MEDICINE

## 2020-02-07 PROCEDURE — 99213 PR OFFICE/OUTPT VISIT, EST, LEVL III, 20-29 MIN: ICD-10-PCS | Mod: S$GLB,,, | Performed by: INTERNAL MEDICINE

## 2020-02-07 RX ORDER — CHLORTHALIDONE 25 MG/1
25 TABLET ORAL DAILY
Qty: 90 TABLET | Refills: 3 | Status: ON HOLD | OUTPATIENT
Start: 2020-02-07 | End: 2020-12-19 | Stop reason: HOSPADM

## 2020-02-07 NOTE — PROGRESS NOTES
CC: followup of hypertension  HPI:  The patient is a 60 y.o. year old male who presents to the office for followup of hypertension.  The patient denies any chest pain, shortness of breath,  blurred vision, excessive fatigue, nausea or vomiting, but complains of daily mild headaches.  He reports headaches bad headaches about twice a week.  Headaches occur primarily over the left eye, pressure sensation.  He reports decreased appetite.  He complains of blood tinged phlegm in the morning over the last week.  He reports nasal congestion, but denies postnasal drip or rhinorrhea.    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 -     Hyperlipidemia     Hypertension     Memory loss     Thyroid nodule 8/22/2019    Traumatic iritis - Left Eye 2/27/2013       SURGICAL HISTORY:  Past Surgical History:   Procedure Laterality Date    CEREBRAL ANGIOGRAM N/A 1/6/2020    Procedure: ANGIOGRAM-CEREBRAL;  Surgeon: [facility] Diagnostic Provider;  Location: [facility] OR [address] [place] [address];  Service: Radiology;  Laterality: N/A;  /Nagi    CERVICAL FUSION  12/30/2015    COLONOSCOPY N/A 4/7/2017    Procedure: COLONOSCOPY;  Surgeon: Handy Frederick MD;  [facility]: [facility] ([facility]);  Service: Endoscopy;  Laterality: N/A;    HERNIA REPAIR      PROSTATECTOMY         MEDS:  Medcard reviewed and updated    ALLERGIES: Allergy Card reviewed and updated    SOCIAL HISTORY:   The patient is a nonsmoker.    PE:   APPEARANCE: Well nourished, well developed, in no acute distress.    CHEST: Lungs clear to auscultation with unlabored respirations.  CARDIOVASCULAR: Normal S1, S2. No murmurs. No carotid bruits. No pedal  edema.  ABDOMEN: Bowel sounds normal. Not distended. Soft. No tenderness or masses.   PSYCHIATRIC: The patient is oriented to person, place, and time and has a pleasant affect.        ASSESSMENT/PLAN:  Hi was seen today for follow-up.    Diagnoses and all orders for this visit:    Type 2 diabetes mellitus without complication, without long-term current use of insulin  -     Ambulatory referral/consult to Pharmacy Assistance; Future  -     empagliflozin (JARDIANCE) 10 mg Tab; Take 10 mg by mouth once daily.    Essential hypertension  -     blood pressure is controlled  -     change hydrochlorothiazide to chlorthalidone    Other orders  -     chlorthalidone (HYGROTEN) 25 MG Tab; Take 1 tablet (25 mg total) by mouth once daily.        Answers for HPI/ROS submitted by the patient on 2/3/2020   activity change: Yes  unexpected weight change: Yes  neck pain: Yes  hearing loss: Yes  rhinorrhea: No  trouble swallowing: Yes  eye discharge: No  visual disturbance: Yes  chest tightness: No  wheezing: No  chest pain: No  palpitations: Yes  blood in stool: No  constipation: No  vomiting: Yes  diarrhea: No  polydipsia: No  polyuria: No  difficulty urinating: No  urgency: No  hematuria: No  joint swelling: No  arthralgias: No  headaches: Yes  confusion: Yes  dysphoric mood: Yes

## 2020-02-12 ENCOUNTER — PATIENT OUTREACH (OUTPATIENT)
Dept: OTHER | Facility: OTHER | Age: 60
End: 2020-02-12

## 2020-02-12 NOTE — PROGRESS NOTES
Digital Medicine: Health  Follow-Up    Patient reported he started JARDIANCE on 2/08/20. Denies any side effects. Patient has not been able to test BG as he is out of strips. He called [facility] last week and still waiting for his delivery. Patient called [facility] today to follow and LVM. Patient reported he would follow up with team once he received strips.     Patient shared still has limited appetite and trying to eat smaller meals or snacks to improve his blood sugars. Diet recall for the day was completed.     The history is provided by the patient.       INTERVENTION(S)  encouragement/support    PLAN  patient verbalizes understanding      There are no preventive care reminders to display for this patient.      Last 6 Patient Entered Readings                                          Most Recent A1c: 6.4% on 10/9/2019  (Goal: 7%)     Recent Readings 2/8/2020 2/7/2020 2/6/2020 2/5/2020 2/4/2020    Blood Glucose (mg/dL) 200 184 162 182 192                    Diet Screening   Breakfast is typically between. Eggs and turkey sausage .  Lunch is typically between. Baked chicken with vegetables .    Dinner is typically between. Lean meat with vegetables .        Patient shared he still has limited appetite. Patient shared he is trying to eat a bit more during the day to improve his BG. He stated that his wife often tires to encourage him to eat at home.        Physical Activity Screening   No change to exercise routine.        Medication Adherence Screening       Patient started taking Jardiance on 2/8/20. He has no concerns or side effects since starting new medication.       SDOH- deferred

## 2020-02-13 NOTE — PROGRESS NOTES
Reviewed chart after health  call.     Emile started 002/08/20. Will defer outreach 1 week to follow-up on tolerability.     Placed DME order for new test strips.

## 2020-02-14 ENCOUNTER — PATIENT MESSAGE (OUTPATIENT)
Dept: INTERNAL MEDICINE | Facility: CLINIC | Age: 60
End: 2020-02-14

## 2020-02-20 NOTE — PROGRESS NOTES
2-week medication change follow-up.     At recent outreach, patient started Jardiance 10mg.     Last 5 Patient Entered Readings                                      Current 30 Day Average:      There is no flowsheet data to display.        Readings trending down significantly. FBG average down from 176 to 138 since starting therapy.     Would like to monitor for tolerability and discuss dose optimization to 25mg daily.

## 2020-02-24 ENCOUNTER — PATIENT MESSAGE (OUTPATIENT)
Dept: INTERNAL MEDICINE | Facility: CLINIC | Age: 60
End: 2020-02-24

## 2020-02-24 ENCOUNTER — TELEPHONE (OUTPATIENT)
Dept: INTERNAL MEDICINE | Facility: CLINIC | Age: 60
End: 2020-02-24

## 2020-02-24 RX ORDER — NIFEDIPINE 60 MG/1
60 TABLET, EXTENDED RELEASE ORAL DAILY
Qty: 30 TABLET | Refills: 3 | Status: SHIPPED | OUTPATIENT
Start: 2020-02-24 | End: 2020-05-18

## 2020-02-24 NOTE — TELEPHONE ENCOUNTER
Here are the current readings for the patient as he is concerned about his BP  2/24    167/97   02/23  158/102   02/22. 135/89   02/20. 138/77   02/18. 125/88   02/17. 144/109   02/16. 132/103   02/15. 145/112   02/14. 134/116   This is what my blood

## 2020-02-24 NOTE — PROGRESS NOTES
Digital Medicine: Clinician Follow-Up    2-week medication change.    PCP recently started Jardiance. Tolerating. No issues noted. BGs continue improve.     Patient mentions blood pressure is running high. Has contacted Dr. Kendall already.     The history is provided by the patient.     Follow Up  Follow-up reason(s): reading review and medication change follow-up      Readings are trending down due to medication adherence.    Patient started new medication.    Is patient tolerating med change?:  Yes      INTERVENTION(S)  encouragement/support    PLAN  additional monitoring needed    Blood glucoses are improving since initiation of Jardiance.   - Tolerating well with no issues at this time.   - Advised patient on HTN alarm symptoms and when to seek emergent care.     Will continue monitoring and review for dose increase at next outreach.     Will schedule next outreach in 4-6 weeks.       There are no preventive care reminders to display for this patient.      Last 6 Patient Entered Readings                                          Most Recent A1c: 6.4% on 10/9/2019  (Goal: 7%)     Recent Readings 2/24/2020 2/23/2020 2/22/2020 2/21/2020 2/20/2020    Blood Glucose (mg/dL) 167 112 125 138 137             Diabetes Medications             empagliflozin (JARDIANCE) 10 mg Tab Take 1 tablet (10 mg) by mouth once daily.               Screenings

## 2020-02-24 NOTE — TELEPHONE ENCOUNTER
Please ask patient to discontinue amlodipine and start Procardia, which has been sent to the pharmacy electronically.  Advised patient to continue ambulatory blood pressure monitoring, and call the office on Thursday with new blood pressure readings.

## 2020-02-26 RX ORDER — METFORMIN HYDROCHLORIDE 500 MG/1
TABLET, EXTENDED RELEASE ORAL
Qty: 90 TABLET | Refills: 1 | Status: SHIPPED | OUTPATIENT
Start: 2020-02-26 | End: 2020-04-16

## 2020-02-27 ENCOUNTER — LAB VISIT (OUTPATIENT)
Dept: LAB | Facility: HOSPITAL | Age: 60
End: 2020-02-27
Attending: INTERNAL MEDICINE
Payer: COMMERCIAL

## 2020-02-27 ENCOUNTER — OFFICE VISIT (OUTPATIENT)
Dept: INTERNAL MEDICINE | Facility: CLINIC | Age: 60
End: 2020-02-27
Payer: COMMERCIAL

## 2020-02-27 ENCOUNTER — TELEPHONE (OUTPATIENT)
Dept: INTERNAL MEDICINE | Facility: CLINIC | Age: 60
End: 2020-02-27

## 2020-02-27 ENCOUNTER — PATIENT MESSAGE (OUTPATIENT)
Dept: INTERNAL MEDICINE | Facility: CLINIC | Age: 60
End: 2020-02-27

## 2020-02-27 VITALS
SYSTOLIC BLOOD PRESSURE: 136 MMHG | DIASTOLIC BLOOD PRESSURE: 78 MMHG | BODY MASS INDEX: 23.68 KG/M2 | HEART RATE: 77 BPM | WEIGHT: 194.44 LBS | TEMPERATURE: 98 F | HEIGHT: 76 IN | RESPIRATION RATE: 18 BRPM

## 2020-02-27 DIAGNOSIS — I10 ESSENTIAL HYPERTENSION: Primary | ICD-10-CM

## 2020-02-27 DIAGNOSIS — I10 ESSENTIAL HYPERTENSION: ICD-10-CM

## 2020-02-27 DIAGNOSIS — E11.9 TYPE 2 DIABETES MELLITUS WITHOUT COMPLICATION, WITHOUT LONG-TERM CURRENT USE OF INSULIN: ICD-10-CM

## 2020-02-27 LAB
BASOPHILS # BLD AUTO: 0.08 K/UL (ref 0–0.2)
BASOPHILS NFR BLD: 1.4 % (ref 0–1.9)
DIFFERENTIAL METHOD: NORMAL
EOSINOPHIL # BLD AUTO: 0.4 K/UL (ref 0–0.5)
EOSINOPHIL NFR BLD: 6.3 % (ref 0–8)
ERYTHROCYTE [DISTWIDTH] IN BLOOD BY AUTOMATED COUNT: 13 % (ref 11.5–14.5)
HCT VFR BLD AUTO: 47.6 % (ref 40–54)
HGB BLD-MCNC: 15.9 G/DL (ref 14–18)
IMM GRANULOCYTES # BLD AUTO: 0.01 K/UL (ref 0–0.04)
IMM GRANULOCYTES NFR BLD AUTO: 0.2 % (ref 0–0.5)
LYMPHOCYTES # BLD AUTO: 1.9 K/UL (ref 1–4.8)
LYMPHOCYTES NFR BLD: 31.6 % (ref 18–48)
MCH RBC QN AUTO: 29.9 PG (ref 27–31)
MCHC RBC AUTO-ENTMCNC: 33.4 G/DL (ref 32–36)
MCV RBC AUTO: 90 FL (ref 82–98)
MONOCYTES # BLD AUTO: 0.6 K/UL (ref 0.3–1)
MONOCYTES NFR BLD: 9.5 % (ref 4–15)
NEUTROPHILS # BLD AUTO: 3 K/UL (ref 1.8–7.7)
NEUTROPHILS NFR BLD: 51 % (ref 38–73)
NRBC BLD-RTO: 0 /100 WBC
PLATELET # BLD AUTO: 289 K/UL (ref 150–350)
PMV BLD AUTO: 10.3 FL (ref 9.2–12.9)
RBC # BLD AUTO: 5.32 M/UL (ref 4.6–6.2)
WBC # BLD AUTO: 5.92 K/UL (ref 3.9–12.7)

## 2020-02-27 PROCEDURE — 93010 ELECTROCARDIOGRAM REPORT: CPT | Mod: S$GLB,,, | Performed by: INTERNAL MEDICINE

## 2020-02-27 PROCEDURE — 93005 ELECTROCARDIOGRAM TRACING: CPT | Mod: S$GLB,,, | Performed by: INTERNAL MEDICINE

## 2020-02-27 PROCEDURE — 3008F PR BODY MASS INDEX (BMI) DOCUMENTED: ICD-10-PCS | Mod: CPTII,S$GLB,, | Performed by: INTERNAL MEDICINE

## 2020-02-27 PROCEDURE — 36415 COLL VENOUS BLD VENIPUNCTURE: CPT | Mod: PO

## 2020-02-27 PROCEDURE — 93005 EKG 12-LEAD: ICD-10-PCS | Mod: S$GLB,,, | Performed by: INTERNAL MEDICINE

## 2020-02-27 PROCEDURE — 3008F BODY MASS INDEX DOCD: CPT | Mod: CPTII,S$GLB,, | Performed by: INTERNAL MEDICINE

## 2020-02-27 PROCEDURE — 93010 EKG 12-LEAD: ICD-10-PCS | Mod: S$GLB,,, | Performed by: INTERNAL MEDICINE

## 2020-02-27 PROCEDURE — 3078F PR MOST RECENT DIASTOLIC BLOOD PRESSURE < 80 MM HG: ICD-10-PCS | Mod: CPTII,S$GLB,, | Performed by: INTERNAL MEDICINE

## 2020-02-27 PROCEDURE — 3075F SYST BP GE 130 - 139MM HG: CPT | Mod: CPTII,S$GLB,, | Performed by: INTERNAL MEDICINE

## 2020-02-27 PROCEDURE — 3078F DIAST BP <80 MM HG: CPT | Mod: CPTII,S$GLB,, | Performed by: INTERNAL MEDICINE

## 2020-02-27 PROCEDURE — 99213 OFFICE O/P EST LOW 20 MIN: CPT | Mod: S$GLB,,, | Performed by: INTERNAL MEDICINE

## 2020-02-27 PROCEDURE — 85025 COMPLETE CBC W/AUTO DIFF WBC: CPT

## 2020-02-27 PROCEDURE — 80053 COMPREHEN METABOLIC PANEL: CPT

## 2020-02-27 PROCEDURE — 99999 PR PBB SHADOW E&M-EST. PATIENT-LVL IV: CPT | Mod: PBBFAC,,, | Performed by: INTERNAL MEDICINE

## 2020-02-27 PROCEDURE — 83036 HEMOGLOBIN GLYCOSYLATED A1C: CPT

## 2020-02-27 PROCEDURE — 99213 PR OFFICE/OUTPT VISIT, EST, LEVL III, 20-29 MIN: ICD-10-PCS | Mod: S$GLB,,, | Performed by: INTERNAL MEDICINE

## 2020-02-27 PROCEDURE — 84443 ASSAY THYROID STIM HORMONE: CPT

## 2020-02-27 PROCEDURE — 99999 PR PBB SHADOW E&M-EST. PATIENT-LVL IV: ICD-10-PCS | Mod: PBBFAC,,, | Performed by: INTERNAL MEDICINE

## 2020-02-27 PROCEDURE — 3075F PR MOST RECENT SYSTOLIC BLOOD PRESS GE 130-139MM HG: ICD-10-PCS | Mod: CPTII,S$GLB,, | Performed by: INTERNAL MEDICINE

## 2020-02-27 NOTE — TELEPHONE ENCOUNTER
Blood pressure is much better, but heart rate is concerning.  Please schedule labs, EKG and appointment to see me.

## 2020-02-27 NOTE — PROGRESS NOTES
CC: followup of hypertension  HPI:  The patient is a 60 y.o. year old male who presents to the office for followup of hypertension.  The patient denies any chest pain, shortness of breath or blurred vision, but complains of headache, fatigue and nausea.  Ambulatory blood pressures have been somewhat labile, but improving overall.  He complains of irregular heart beat.    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 -     Hyperlipidemia     Hypertension     Memory loss     Thyroid nodule 8/22/2019    Traumatic iritis - Left Eye 2/27/2013       SURGICAL HISTORY:  Past Surgical History:   Procedure Laterality Date    CEREBRAL ANGIOGRAM N/A 1/6/2020    Procedure: ANGIOGRAM-CEREBRAL;  Surgeon: [facility];  Location: [facility] OR [address] [place] [address];  Service: Radiology;  Laterality: N/A;  /Nagi    CERVICAL FUSION  12/30/2015    COLONOSCOPY N/A 4/7/2017    Procedure: COLONOSCOPY;  Surgeon: Handy Frederick MD;  [facility]: [facility] ([facility]);  Service: Endoscopy;  Laterality: N/A;    HERNIA REPAIR      PROSTATECTOMY         MEDS:  Medcard reviewed and updated    ALLERGIES: Allergy Card reviewed and updated    SOCIAL HISTORY:   The patient is a nonsmoker.    PE:   APPEARANCE: Well nourished, well developed, in no acute distress.    CHEST: Lungs clear to auscultation with unlabored respirations.  CARDIOVASCULAR: Normal S1, S2. No murmurs. No carotid bruits. No pedal edema.  ABDOMEN: Bowel sounds normal. Not distended. Soft. No tenderness or masses.   PSYCHIATRIC: The patient is oriented to person, place, and time and has a pleasant affect.        ASSESSMENT/PLAN:  Hi was  seen today for hypertension.    Diagnoses and all orders for this visit:    Essential hypertension  -     TSH; Future  -     blood pressure is okay, continue current medication        Answers for HPI/ROS submitted by the patient on 2/27/2020   Hypertension  Chronicity: recurrent  Onset: more than 1 year ago  Progression since onset: waxing and waning  Condition status: resistant  anxiety: Yes  blurred vision: No  chest pain: Yes  headaches: Yes  malaise/fatigue: No  neck pain: Yes  orthopnea: No  palpitations: No  peripheral edema: No  PND: No  shortness of breath: No  sweats: Yes  CAD risks: diabetes mellitus, dyslipidemia, family history, stress  Compliance problems: no compliance problems  Improvement on treatment: mild

## 2020-02-28 LAB
ALBUMIN SERPL BCP-MCNC: 4.5 G/DL (ref 3.5–5.2)
ALP SERPL-CCNC: 70 U/L (ref 55–135)
ALT SERPL W/O P-5'-P-CCNC: 33 U/L (ref 10–44)
ANION GAP SERPL CALC-SCNC: 11 MMOL/L (ref 8–16)
AST SERPL-CCNC: 31 U/L (ref 10–40)
BILIRUB SERPL-MCNC: 0.4 MG/DL (ref 0.1–1)
BUN SERPL-MCNC: 15 MG/DL (ref 8–23)
CALCIUM SERPL-MCNC: 9.7 MG/DL (ref 8.7–10.5)
CHLORIDE SERPL-SCNC: 100 MMOL/L (ref 95–110)
CO2 SERPL-SCNC: 28 MMOL/L (ref 23–29)
CREAT SERPL-MCNC: 1.2 MG/DL (ref 0.5–1.4)
EST. GFR  (AFRICAN AMERICAN): >60 ML/MIN/1.73 M^2
EST. GFR  (NON AFRICAN AMERICAN): >60 ML/MIN/1.73 M^2
ESTIMATED AVG GLUCOSE: 143 MG/DL (ref 68–131)
GLUCOSE SERPL-MCNC: 94 MG/DL (ref 70–110)
HBA1C MFR BLD HPLC: 6.6 % (ref 4–5.6)
POTASSIUM SERPL-SCNC: 3.3 MMOL/L (ref 3.5–5.1)
PROT SERPL-MCNC: 7.9 G/DL (ref 6–8.4)
SODIUM SERPL-SCNC: 139 MMOL/L (ref 136–145)
TSH SERPL DL<=0.005 MIU/L-ACNC: 2.29 UIU/ML (ref 0.4–4)

## 2020-02-29 DIAGNOSIS — I49.3 SYMPTOMATIC PVCS: ICD-10-CM

## 2020-02-29 DIAGNOSIS — G43.109 MIGRAINE WITH AURA AND WITHOUT STATUS MIGRAINOSUS, NOT INTRACTABLE: ICD-10-CM

## 2020-03-02 RX ORDER — METOPROLOL SUCCINATE 100 MG/1
TABLET, EXTENDED RELEASE ORAL
Qty: 90 TABLET | Refills: 2 | Status: SHIPPED | OUTPATIENT
Start: 2020-03-02 | End: 2020-12-22

## 2020-03-02 RX ORDER — PANTOPRAZOLE SODIUM 40 MG/1
TABLET, DELAYED RELEASE ORAL
Qty: 180 TABLET | Refills: 0 | Status: SHIPPED | OUTPATIENT
Start: 2020-03-02 | End: 2020-05-25

## 2020-03-02 RX ORDER — BACLOFEN 10 MG/1
TABLET ORAL
Qty: 30 TABLET | Refills: 0 | OUTPATIENT
Start: 2020-03-02

## 2020-03-03 ENCOUNTER — HOSPITAL ENCOUNTER (OUTPATIENT)
Dept: RADIOLOGY | Facility: HOSPITAL | Age: 60
Discharge: HOME | End: 2020-03-03
Attending: INTERNAL MEDICINE
Payer: COMMERCIAL

## 2020-03-03 ENCOUNTER — TELEPHONE (OUTPATIENT)
Dept: INTERNAL MEDICINE | Facility: CLINIC | Age: 60
End: 2020-03-03

## 2020-03-03 ENCOUNTER — OFFICE VISIT (OUTPATIENT)
Dept: INTERNAL MEDICINE | Facility: CLINIC | Age: 60
End: 2020-03-03
Payer: COMMERCIAL

## 2020-03-03 VITALS
WEIGHT: 194.69 LBS | HEIGHT: 76 IN | TEMPERATURE: 99 F | SYSTOLIC BLOOD PRESSURE: 120 MMHG | DIASTOLIC BLOOD PRESSURE: 70 MMHG | BODY MASS INDEX: 23.71 KG/M2 | RESPIRATION RATE: 18 BRPM | HEART RATE: 91 BPM

## 2020-03-03 DIAGNOSIS — R50.9 FEVER, UNSPECIFIED FEVER CAUSE: ICD-10-CM

## 2020-03-03 DIAGNOSIS — R50.9 FEVER, UNSPECIFIED FEVER CAUSE: Primary | ICD-10-CM

## 2020-03-03 LAB
INFLUENZA A, MOLECULAR: NEGATIVE
INFLUENZA B, MOLECULAR: NEGATIVE
SPECIMEN SOURCE: NORMAL

## 2020-03-03 PROCEDURE — 3074F SYST BP LT 130 MM HG: CPT | Mod: CPTII,S$GLB,, | Performed by: INTERNAL MEDICINE

## 2020-03-03 PROCEDURE — 99213 PR OFFICE/OUTPT VISIT, EST, LEVL III, 20-29 MIN: ICD-10-PCS | Mod: S$GLB,,, | Performed by: INTERNAL MEDICINE

## 2020-03-03 PROCEDURE — 87502 INFLUENZA DNA AMP PROBE: CPT | Mod: PO

## 2020-03-03 PROCEDURE — 3074F PR MOST RECENT SYSTOLIC BLOOD PRESSURE < 130 MM HG: ICD-10-PCS | Mod: CPTII,S$GLB,, | Performed by: INTERNAL MEDICINE

## 2020-03-03 PROCEDURE — 3078F DIAST BP <80 MM HG: CPT | Mod: CPTII,S$GLB,, | Performed by: INTERNAL MEDICINE

## 2020-03-03 PROCEDURE — 71046 XR CHEST PA AND LATERAL: ICD-10-PCS | Mod: 26,,, | Performed by: RADIOLOGY

## 2020-03-03 PROCEDURE — 99999 PR PBB SHADOW E&M-EST. PATIENT-LVL IV: CPT | Mod: PBBFAC,,, | Performed by: INTERNAL MEDICINE

## 2020-03-03 PROCEDURE — 99213 OFFICE O/P EST LOW 20 MIN: CPT | Mod: S$GLB,,, | Performed by: INTERNAL MEDICINE

## 2020-03-03 PROCEDURE — 3008F BODY MASS INDEX DOCD: CPT | Mod: CPTII,S$GLB,, | Performed by: INTERNAL MEDICINE

## 2020-03-03 PROCEDURE — 3008F PR BODY MASS INDEX (BMI) DOCUMENTED: ICD-10-PCS | Mod: CPTII,S$GLB,, | Performed by: INTERNAL MEDICINE

## 2020-03-03 PROCEDURE — 99999 PR PBB SHADOW E&M-EST. PATIENT-LVL IV: ICD-10-PCS | Mod: PBBFAC,,, | Performed by: INTERNAL MEDICINE

## 2020-03-03 PROCEDURE — 71046 X-RAY EXAM CHEST 2 VIEWS: CPT | Mod: 26,,, | Performed by: RADIOLOGY

## 2020-03-03 PROCEDURE — 71046 X-RAY EXAM CHEST 2 VIEWS: CPT | Mod: TC,PO

## 2020-03-03 PROCEDURE — 3078F PR MOST RECENT DIASTOLIC BLOOD PRESSURE < 80 MM HG: ICD-10-PCS | Mod: CPTII,S$GLB,, | Performed by: INTERNAL MEDICINE

## 2020-03-03 RX ORDER — AMOXICILLIN AND CLAVULANATE POTASSIUM 875; 125 MG/1; MG/1
1 TABLET, FILM COATED ORAL EVERY 12 HOURS
Qty: 14 TABLET | Refills: 0 | Status: SHIPPED | OUTPATIENT
Start: 2020-03-03 | End: 2020-03-30

## 2020-03-03 RX ORDER — POTASSIUM CHLORIDE 750 MG/1
20 TABLET, EXTENDED RELEASE ORAL DAILY
Qty: 6 TABLET | Refills: 0 | Status: SHIPPED | OUTPATIENT
Start: 2020-03-03 | End: 2020-10-13 | Stop reason: SDUPTHER

## 2020-03-03 NOTE — TELEPHONE ENCOUNTER
Please inform patient that chest x-ray shows clear lungs and nasal swab is negative for the flu.  A prescription for Augmentin has been sent to the pharmacy electronically.

## 2020-03-03 NOTE — TELEPHONE ENCOUNTER
I tried calling pt, no answer on cell. Left voicemail.  I called pt's home and spoke to his wife Jennifer, she was notified that chest x-ray shows clear lungs and nasal swab is negative for the flu.   A prescription for Augmentin has been sent to his  Pharmacy.    Jennifer verbalized understanding and she will notify the pt.

## 2020-03-03 NOTE — PROGRESS NOTES
CC:  Fever, back and shoulder pain  HPI:  The patient is a 60 y.o. year old male who presents to the office for fever, back and shoulder pain.  His symptoms started Friday with aching sensation in chest.  He also reports nonproductive cough.  Fever started last night, 102.2.  The patient complains of bodyaches and reflux.  He reports pain behind his shoulder blades bilaterally that started yesterday.  The also complains of cloudy urine.  Recent labs reveals a mildly depressed potassium.    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 -     Hyperlipidemia     Hypertension     Memory loss     Thyroid nodule 8/22/2019    Traumatic iritis - Left Eye 2/27/2013       SURGICAL HISTORY:  Past Surgical History:   Procedure Laterality Date    CEREBRAL ANGIOGRAM N/A 1/6/2020    Procedure: ANGIOGRAM-CEREBRAL;  Surgeon: [facility] Provider;  Location: [facility] OR [address] [place] [address];  Service: Radiology;  Laterality: N/A;  /Nagi    CERVICAL FUSION  12/30/2015    COLONOSCOPY N/A 4/7/2017    Procedure: COLONOSCOPY;  Surgeon: Handy Frederick MD;  [facility]  Service: Endoscopy;  Laterality: N/A;    HERNIA REPAIR      PROSTATECTOMY         MEDS:  Medcard reviewed and updated    ALLERGIES: Allergy Card reviewed and updated    SOCIAL HISTORY:   The patient is a nonsmoker.    PE:   APPEARANCE: Well nourished, well developed, in no acute distress.    EARS: TM's intact. No retraction or perforation.    NOSE: Mucosa pink. Airway clear.  MOUTH & THROAT: No tonsillar enlargement. No pharyngeal erythema or exudate. No stridor.  CHEST: Lungs clear to auscultation with unlabored  respirations.  CARDIOVASCULAR: Normal S1, S2. No murmurs. No carotid bruits. No pedal edema.  ABDOMEN: Bowel sounds normal. Not distended. Soft. No tenderness or masses.   PSYCHIATRIC: The patient is oriented to person, place, and time and has a pleasant affect.        ASSESSMENT/PLAN:  Hi was seen today for uri and chest pain.    Diagnoses and all orders for this visit:    Fever, unspecified fever cause  -     X-Ray Chest PA And Lateral; Future  -     Influenza A & B by Molecular  -     Urinalysis; Future  -     Urine culture; Future    Other orders  -     potassium chloride SA (K-DUR,KLOR-CON) 10 MEQ tablet; Take 2 tablets (20 mEq total) by mouth once daily.        Answers for HPI/ROS submitted by the patient on 3/1/2020   activity change: Yes  unexpected weight change: No  neck pain: Yes  hearing loss: No  rhinorrhea: No  trouble swallowing: No  eye discharge: No  visual disturbance: No  chest tightness: No  wheezing: No  chest pain: No  palpitations: Yes  blood in stool: No  constipation: No  vomiting: No  diarrhea: No  polydipsia: No  polyuria: No  difficulty urinating: No  urgency: No  hematuria: No  joint swelling: No  arthralgias: No  headaches: Yes  weakness: No  confusion: No  dysphoric mood: No

## 2020-03-06 ENCOUNTER — TELEPHONE (OUTPATIENT)
Dept: INTERNAL MEDICINE | Facility: CLINIC | Age: 60
End: 2020-03-06

## 2020-03-06 ENCOUNTER — PATIENT MESSAGE (OUTPATIENT)
Dept: INTERNAL MEDICINE | Facility: CLINIC | Age: 60
End: 2020-03-06

## 2020-03-06 NOTE — PROGRESS NOTES
Note recent labs. A1C mildly increased but remains controlled.     Note UTI results with recent initiation of SGLT2-inhibitor by PCP. Messaged provider to discuss consideration and options for continuation or alternative therapy.     Will monitor for response.

## 2020-03-09 RX ORDER — AMLODIPINE BESYLATE 10 MG/1
TABLET ORAL
Qty: 90 TABLET | Refills: 0 | OUTPATIENT
Start: 2020-03-09

## 2020-03-12 ENCOUNTER — PATIENT OUTREACH (OUTPATIENT)
Dept: OTHER | Facility: OTHER | Age: 60
End: 2020-03-12

## 2020-03-12 NOTE — PROGRESS NOTES
"Digital Medicine: Health  Follow-Up    Patient reported he has been recovery from being ill.     Patient is hoping to start working out now that he is feeling better. He plans to start exercising slowly.    Patient shared increase in thirst for the last 2 weeks. "I am just craving water and waking up to drink water."     Patient reported he goes through sweating spell ,can not recall when it occurred. "My co-workers joke I am going through menopause." Patient reported increase in sweating spell and happens 2-3 times a week.  Patient reported he had another lightheaded symptom, got dizzy when he turned to quickly. He fell to one knee. He had a co-worker bring him a chair. Experienced first lightheaded symptom a month ago. Patient had ate breakfast and was drinking water. Patient checked his BG and was in a good range.     Encouraged patient to inform PCP about symptoms. Informed pharmacist about symptoms.     The history is provided by the patient.     Follow Up  Follow-up reason(s): reading review and goal follow-up      Routine Education Topics: eating patterns and physical activity        INTERVENTION(S)  encouragement/support    PLAN  patient verbalizes understanding and Clinician follow-up    Plan to follow up 4-6 weeks to follow up to review readings and progress towards goals.       There are no preventive care reminders to display for this patient.      Last 6 Patient Entered Readings                                          Most Recent A1c: 6.6% on 2/27/2020  (Goal: 7%)     Recent Readings 3/12/2020 3/11/2020 3/10/2020 3/10/2020 3/9/2020    Blood Glucose (mg/dL) 137 147 156 158 168                    Diet Screening   Breakfast is typically between. Meat, eggs and small portion grits with medication.  Lunch is typically between. Salad.    Dinner is typically between. Grilled meats and vegetables .    Patient reports eating or drinking the following: fruit and fresh vegetables    The patient drinks 128 " "ounces of water per day.    He has the following dietary restrictions: low sodium diet and low carb    The patient states that he typically eats a non-home cooked meal (ex: dining out, take out, frozen meals) 1-2 times per week.  He cooks for self and has meals prepared by family.    Patient does the shopping for groceries and lets family grocery shop.      Patient reported his appetite is starting to return. Shared that he has lost about 20 lbs and trying to gain some of his weight back. Patient stated he plans to speak with provider about RX shakes to help gain some weight back. "I am too tall to be 185 lbs." Patient shared he is forcing himself to eat a bit more during the day.     Patient shared he has been eating late in the evening around 9 pm due to work schedule. Patient shared he usually eats proteins and small portions of starches. Patient usually has a salad for lunch. Patient often eats at home such as grilled vegetable and meats. Patient shared increase in seafood and crawfish.     Patient shared increase in thirst since starting Jardiance. "I am just craving water and waking up to drink water." Patient shared he often has a dry mouth. He reduced his consumption of coffee. Patient reported he drink around 6-8 16 oz bottle of water and 2-3 bottles of gatorade zero. Patient shared he rarely eats sweets, maybe once a week, he has a fun size erlin cup.        Assigning the following patient goals: maintain low sodium diet and reduce carbs    Physical Activity Screening   When asked if exercising, patient responded: no    Patient shared he does not workout due to fears of aneurysm. Encouraged patient to speak with his provider about options for exercise. Patient is hoping to start working out now that he is feeling better. He plans to start exercising slowly.    Assigning the following patient goal(s): 150 minutes of exercise per week    Medication Adherence Screening   He did not miss a dose this " month.    Patient has no concerns with medication    Tobacco and Alcohol Screening   Is patient considering quitting smoking? no    Patient is not eligible for referral to smoking cessation.        SDOH-deferred

## 2020-03-13 ENCOUNTER — PATIENT MESSAGE (OUTPATIENT)
Dept: UROLOGY | Facility: CLINIC | Age: 60
End: 2020-03-13

## 2020-03-13 ENCOUNTER — OFFICE VISIT (OUTPATIENT)
Dept: UROLOGY | Facility: CLINIC | Age: 60
End: 2020-03-13
Payer: COMMERCIAL

## 2020-03-13 ENCOUNTER — PATIENT MESSAGE (OUTPATIENT)
Dept: INTERNAL MEDICINE | Facility: CLINIC | Age: 60
End: 2020-03-13

## 2020-03-13 VITALS
HEIGHT: 76 IN | DIASTOLIC BLOOD PRESSURE: 80 MMHG | SYSTOLIC BLOOD PRESSURE: 108 MMHG | BODY MASS INDEX: 23.62 KG/M2 | WEIGHT: 194 LBS

## 2020-03-13 DIAGNOSIS — R31.0 GROSS HEMATURIA: Primary | ICD-10-CM

## 2020-03-13 LAB
BILIRUB SERPL-MCNC: NEGATIVE MG/DL
BLOOD URINE, POC: 250
COLOR, POC UA: YELLOW
GLUCOSE UR QL STRIP: 1000
KETONES UR QL STRIP: NEGATIVE
LEUKOCYTE ESTERASE URINE, POC: ABNORMAL
NITRITE, POC UA: NEGATIVE
PH, POC UA: 5
PROTEIN, POC: NEGATIVE
SPECIFIC GRAVITY, POC UA: 1
UROBILINOGEN, POC UA: NORMAL

## 2020-03-13 PROCEDURE — 3008F BODY MASS INDEX DOCD: CPT | Mod: CPTII,S$GLB,, | Performed by: UROLOGY

## 2020-03-13 PROCEDURE — 99214 PR OFFICE/OUTPT VISIT, EST, LEVL IV, 30-39 MIN: ICD-10-PCS | Mod: 25,S$GLB,, | Performed by: UROLOGY

## 2020-03-13 PROCEDURE — 3074F SYST BP LT 130 MM HG: CPT | Mod: CPTII,S$GLB,, | Performed by: UROLOGY

## 2020-03-13 PROCEDURE — 87186 SC STD MICRODIL/AGAR DIL: CPT

## 2020-03-13 PROCEDURE — 99999 PR PBB SHADOW E&M-EST. PATIENT-LVL III: CPT | Mod: PBBFAC,,, | Performed by: UROLOGY

## 2020-03-13 PROCEDURE — 3079F PR MOST RECENT DIASTOLIC BLOOD PRESSURE 80-89 MM HG: ICD-10-PCS | Mod: CPTII,S$GLB,, | Performed by: UROLOGY

## 2020-03-13 PROCEDURE — 81002 POCT URINE DIPSTICK WITHOUT MICROSCOPE: ICD-10-PCS | Mod: S$GLB,,, | Performed by: UROLOGY

## 2020-03-13 PROCEDURE — 3008F PR BODY MASS INDEX (BMI) DOCUMENTED: ICD-10-PCS | Mod: CPTII,S$GLB,, | Performed by: UROLOGY

## 2020-03-13 PROCEDURE — 87086 URINE CULTURE/COLONY COUNT: CPT

## 2020-03-13 PROCEDURE — 99999 PR PBB SHADOW E&M-EST. PATIENT-LVL III: ICD-10-PCS | Mod: PBBFAC,,, | Performed by: UROLOGY

## 2020-03-13 PROCEDURE — 87077 CULTURE AEROBIC IDENTIFY: CPT

## 2020-03-13 PROCEDURE — 3074F PR MOST RECENT SYSTOLIC BLOOD PRESSURE < 130 MM HG: ICD-10-PCS | Mod: CPTII,S$GLB,, | Performed by: UROLOGY

## 2020-03-13 PROCEDURE — 81002 URINALYSIS NONAUTO W/O SCOPE: CPT | Mod: S$GLB,,, | Performed by: UROLOGY

## 2020-03-13 PROCEDURE — 87088 URINE BACTERIA CULTURE: CPT

## 2020-03-13 PROCEDURE — 99214 OFFICE O/P EST MOD 30 MIN: CPT | Mod: 25,S$GLB,, | Performed by: UROLOGY

## 2020-03-13 PROCEDURE — 3079F DIAST BP 80-89 MM HG: CPT | Mod: CPTII,S$GLB,, | Performed by: UROLOGY

## 2020-03-13 RX ORDER — SULFAMETHOXAZOLE AND TRIMETHOPRIM 800; 160 MG/1; MG/1
1 TABLET ORAL 2 TIMES DAILY
Qty: 14 TABLET | Refills: 0 | Status: SHIPPED | OUTPATIENT
Start: 2020-03-13 | End: 2020-03-20

## 2020-03-13 NOTE — PROGRESS NOTES
"Subjective:      Hi Rubio is a 60 y.o. male who returns today regarding his hematuria.    Previously seen for BPH. S/p TURP in Oct 2017. He has history of UTIs prior to TURP.    He was diagnosed with UTI 2 weeks ago and completed antibiotics; culture proven. He again has dysuria and also gross hematuria, which is new.  He has good voiding at baseline.    The following portions of the patient's history were reviewed and updated as appropriate: allergies, current medications, past family history, past medical history, past social history, past surgical history and problem list.    Review of Systems  A comprehensive multipoint review of systems was negative except as otherwise stated in the HPI.     Objective:   Vitals: /80 (BP Location: Left arm, Patient Position: Sitting, BP Method: Large (Automatic))   Ht 6' 4" (1.93 m)   Wt 88 kg (194 lb)   BMI 23.61 kg/m²     Physical Exam   General: alert and oriented, no acute distress  Respiratory: Symmetric expansion, non-labored breathing  Neuro: no gross deficits  Psych: normal judgment and insight, normal mood/affect and non-anxious    Lab Review   Urinalysis demonstrates positive for leukocytes, red blood cells  Lab Results   Component Value Date    WBC 5.92 02/27/2020    HGB 15.9 02/27/2020    HCT 47.6 02/27/2020    MCV 90 02/27/2020     02/27/2020     Lab Results   Component Value Date    CREATININE 1.2 02/27/2020    BUN 15 02/27/2020         Assessment and Plan:   1. Gross hematuria  -- Urine culture  -- Bactrim  -- Workup if culture negative  "

## 2020-03-16 LAB — BACTERIA UR CULT: ABNORMAL

## 2020-03-17 ENCOUNTER — PATIENT OUTREACH (OUTPATIENT)
Dept: OTHER | Facility: OTHER | Age: 60
End: 2020-03-17

## 2020-03-19 ENCOUNTER — PATIENT OUTREACH (OUTPATIENT)
Dept: OTHER | Facility: OTHER | Age: 60
End: 2020-03-19

## 2020-03-20 ENCOUNTER — PATIENT MESSAGE (OUTPATIENT)
Dept: INTERNAL MEDICINE | Facility: CLINIC | Age: 60
End: 2020-03-20

## 2020-03-20 NOTE — PROGRESS NOTES
Digital Medicine: Clinician Follow-Up    Patient returned call from yesterday.     Discussed recommendation regarding holding Jardiance therapy. Reviewed increased risk for UTI/GI infections with SGLT2-I use. Discussed holding therapy can assist in recovery from the UTI by decrease glucose in urinary tract. Discussed the situation in regards to initial therapy not successfully resolving UTI which led to the recommendation.     Discussed with pattient UTI risk with Jardiance. Advised on mitigation strategies specifically encouraging hydration and frequent urination.     Discussed ability to continue but possible need for discontinuation if UTIs reoccur. Patient agreeable and notes improvement in blood glucoses and blood pressure since being on therapy and agreeable to continued trial.     Will continue current regimen.     Will schedule next outreach in 4-6 weeks.     The history is provided by the patient.     Follow Up  Follow-up reason(s): reading review      Readings are trending down due to medication adherence.        INTERVENTION(S)  encouragement/support    PLAN  additional monitoring needed      There are no preventive care reminders to display for this patient.      Last 6 Patient Entered Readings                                          Most Recent A1c: 6.6% on 2/27/2020  (Goal: 7%)     Recent Readings 3/20/2020 3/19/2020 3/18/2020 3/18/2020 3/18/2020    Blood Glucose (mg/dL) 158 167 125 103 156             Diabetes Medications             empagliflozin (JARDIANCE) 10 mg tablet Take 1 tablet (10 mg) by mouth once daily.    metFORMIN (GLUCOPHAGE-XR) 500 MG XR 24hr tablet TAKE 1 TABLET BY MOUTH EVERY DAY WITH BREAKFAST               Screenings

## 2020-03-23 ENCOUNTER — PATIENT MESSAGE (OUTPATIENT)
Dept: INTERNAL MEDICINE | Facility: CLINIC | Age: 60
End: 2020-03-23

## 2020-03-29 ENCOUNTER — TELEPHONE (OUTPATIENT)
Dept: INTERNAL MEDICINE | Facility: CLINIC | Age: 60
End: 2020-03-29

## 2020-03-29 ENCOUNTER — PATIENT MESSAGE (OUTPATIENT)
Dept: INTERNAL MEDICINE | Facility: CLINIC | Age: 60
End: 2020-03-29

## 2020-03-29 NOTE — TELEPHONE ENCOUNTER
"Message received from pt's wife's portal:  "This is for my  Hi Braxton  1960. Long story short. Hes having another uti theres blood in his urine again and it hurts when he urinates. Also we went to the drive thru this morning to get tested for COVID 19. He had fever Friday, his chest was hurting and he was feeling aching on Friday as well. Ive been giving home Tylenol around the clock and his fever is down and hes feeling better except now he has a uti again. Can you please call in a prescription for this. Thanks -[facility] [address]"    Advised pt's wife to email under pt's account     Please advise if you want a urine prior to medication.   "

## 2020-03-30 ENCOUNTER — PATIENT MESSAGE (OUTPATIENT)
Dept: UROLOGY | Facility: CLINIC | Age: 60
End: 2020-03-30

## 2020-03-30 DIAGNOSIS — N30.01 ACUTE CYSTITIS WITH HEMATURIA: Primary | ICD-10-CM

## 2020-03-30 RX ORDER — CEPHALEXIN 500 MG/1
500 CAPSULE ORAL EVERY 12 HOURS
Qty: 14 CAPSULE | Refills: 0 | Status: SHIPPED | OUTPATIENT
Start: 2020-03-30 | End: 2020-04-06

## 2020-03-30 NOTE — TELEPHONE ENCOUNTER
Called patient and he advised he had testing for COVID at [facility] and he was still having some blood in  His urine advised he can call Dr Pizarro he agreed and termed the call

## 2020-04-08 ENCOUNTER — PATIENT MESSAGE (OUTPATIENT)
Dept: INTERNAL MEDICINE | Facility: CLINIC | Age: 60
End: 2020-04-08

## 2020-04-08 ENCOUNTER — TELEPHONE (OUTPATIENT)
Dept: INTERNAL MEDICINE | Facility: CLINIC | Age: 60
End: 2020-04-08

## 2020-04-15 ENCOUNTER — PATIENT OUTREACH (OUTPATIENT)
Dept: OTHER | Facility: OTHER | Age: 60
End: 2020-04-15

## 2020-04-15 NOTE — PROGRESS NOTES
Digital Medicine: Health  Follow-Up    Patient reported he stopped taking Jardiance two weeks ago due to UTI side effects. Informed pharmacist as patient would like to discuss other medication options.     The history is provided by the patient.     Follow Up  Follow-up reason(s): reading review      Readings are trending up due to medication adherence.    Adherence Barriers: adverse drug reaction        INTERVENTION(S)  encouragement/support    PLAN  patient verbalizes understanding and Clinician follow-up    Plan to follow up in 4-6 weeks to review readings and progress towards goals.       There are no preventive care reminders to display for this patient.      Last 6 Patient Entered Readings                                          Most Recent A1c: 6.6% on 2/27/2020  (Goal: 7%)     Recent Readings 4/14/2020 4/13/2020 4/13/2020 4/12/2020 4/11/2020    Blood Glucose (mg/dL) 177 194 223 161 149                    Medication Adherence Screening   He missed a dose more than once this week.    Patient identified the following reasons for non-compliance: Adverse effects and Fear of side effects    Patient reported he stopped taking Jardiance two weeks ago due to UTI. He reported he did not inform his provider and stop on his own after last UTI.  Patient reported he is fearful of taking medication again due to side effects. Informed pharmacist as patient would like to discuss medication options.       SDOH- deferred

## 2020-04-16 ENCOUNTER — PATIENT OUTREACH (OUTPATIENT)
Dept: OTHER | Facility: OTHER | Age: 60
End: 2020-04-16

## 2020-04-16 ENCOUNTER — TELEPHONE (OUTPATIENT)
Dept: INTERNAL MEDICINE | Facility: CLINIC | Age: 60
End: 2020-04-16

## 2020-04-16 NOTE — TELEPHONE ENCOUNTER
----- Message from Neftali Moseley PharmD sent at 4/16/2020  2:24 PM CDT -----  Good Afternoon -     I spoke to Mr. Rubio today and he reports that he stopped his Jardiance almost 2 weeks ago due to recurrent UTIs since starting despite hydration.     I will continue to follow Mr. Hayes for Diabetes. We are obtaining a new glucometer to start given the inconsistency between his readings and his A1C.     Please let me know, if you have any questions or recommendations.     Thank you,  Neftali Moseley PharmD  Clinical Pharmacist  [employer]  [phone]  ----- Message -----  From: Neftali Moseley PharmD  Sent: 3/12/2020  10:43 AM CDT  To: Enoch Schmid        ----- Message -----  From: Neftali Moseley PharmD  Sent: 3/6/2020  11:35 AM CDT  To: Josefina Kendall MD    Good Afternoon Dr. Kendall -     I follow Mr. Rubio for his diabetes through the [employer] program. I noted his recent UTI results, and wanted to get your opinion on the use of an SGLT2 inhibitor on him with this occurrence. It looks like it he might have a history of recurrent UTIs possibly related to his BPH.     Thank you for your review.     Sincerely,  Neftali Moseley PharmD   Clinical Pharmacist  [facility]  [phone]

## 2020-04-16 NOTE — PROGRESS NOTES
Digital Medicine: Clinician Follow-Up    Outreach for follow-up after report of recurrent UTI.     Patient reports that he stopped the Jardiance after have symptoms of possible UTI for a 3rd time since starting the therapy. Patient reports that he has been treated with three different antibiotics given continued symptoms but not having cultures due to risk of COVID exposure.     Encouraged patient to keep his care team up to date on his symptoms so they can continue to offer him treatment as indicated. Encouraged patient to reach out to PCP and urologist regarding these matters. Additionally discussed that although Jardiance may not be the cause of the UTIs it may be increasing his risk. In agreement to remain off of SGLT2 therapy.    The history is provided by the patient.     Follow Up  Follow-up reason(s): reading review      Readings are trending up   Medication Change: stop therapy        INTERVENTION(S)  recommended med change and encouragement/support    PLAN  additional monitoring needed    SMBG continues not to be consistent with stable and controlled A1C results in cluding most recent A1C from 02/27/20.   - Patient agreeable to exchange glucometer - will enter for request of another glucometer be mailed out.   - Patient encouraged to remain hydrated and keep his PCP and urologist informed regarding any urinary symptoms.     Will stop Jardiance. Patient has not been taking metformin.     Will continue monitoring while waiting for new glucometer to review SMBG to determine need for therapy.       There are no preventive care reminders to display for this patient.      Last 6 Patient Entered Readings                                          Most Recent A1c: 6.6% on 2/27/2020  (Goal: 7%)     Recent Readings 4/16/2020 4/15/2020 4/15/2020 4/14/2020 4/13/2020    Blood Glucose (mg/dL) 158 137 182 177 194             Diabetes Medications                Screenings

## 2020-04-21 RX ORDER — ALPRAZOLAM 0.5 MG/1
TABLET ORAL
Qty: 90 TABLET | Refills: 1 | Status: SHIPPED | OUTPATIENT
Start: 2020-04-21 | End: 2020-06-24

## 2020-04-25 ENCOUNTER — PATIENT MESSAGE (OUTPATIENT)
Dept: ADMINISTRATIVE | Facility: OTHER | Age: 60
End: 2020-04-25

## 2020-04-27 ENCOUNTER — HOSPITAL ENCOUNTER (OUTPATIENT)
Dept: RADIOLOGY | Facility: HOSPITAL | Age: 60
Discharge: HOME | End: 2020-04-27
Attending: INTERNAL MEDICINE
Payer: COMMERCIAL

## 2020-04-27 ENCOUNTER — TELEPHONE (OUTPATIENT)
Dept: INTERNAL MEDICINE | Facility: CLINIC | Age: 60
End: 2020-04-27

## 2020-04-27 ENCOUNTER — OFFICE VISIT (OUTPATIENT)
Dept: INTERNAL MEDICINE | Facility: CLINIC | Age: 60
End: 2020-04-27
Payer: COMMERCIAL

## 2020-04-27 VITALS
SYSTOLIC BLOOD PRESSURE: 142 MMHG | BODY MASS INDEX: 23.03 KG/M2 | HEART RATE: 66 BPM | WEIGHT: 189.13 LBS | RESPIRATION RATE: 15 BRPM | DIASTOLIC BLOOD PRESSURE: 70 MMHG | OXYGEN SATURATION: 98 % | HEIGHT: 76 IN | TEMPERATURE: 98 F

## 2020-04-27 DIAGNOSIS — M54.50 LEFT LOW BACK PAIN, UNSPECIFIED CHRONICITY, UNSPECIFIED WHETHER SCIATICA PRESENT: ICD-10-CM

## 2020-04-27 DIAGNOSIS — R10.9 FLANK PAIN: ICD-10-CM

## 2020-04-27 DIAGNOSIS — R10.9 FLANK PAIN: Primary | ICD-10-CM

## 2020-04-27 DIAGNOSIS — R30.0 DYSURIA: ICD-10-CM

## 2020-04-27 DIAGNOSIS — R31.9 HEMATURIA, UNSPECIFIED TYPE: ICD-10-CM

## 2020-04-27 LAB
BACTERIA #/AREA URNS AUTO: ABNORMAL /HPF
BILIRUB SERPL-MCNC: NEGATIVE MG/DL
BILIRUB UR QL STRIP: NEGATIVE
BLOOD URINE, POC: NEGATIVE
CLARITY UR REFRACT.AUTO: ABNORMAL
COLOR UR AUTO: YELLOW
COLOR, POC UA: ABNORMAL
GLUCOSE UR QL STRIP: 500
GLUCOSE UR QL STRIP: ABNORMAL
HGB UR QL STRIP: ABNORMAL
KETONES UR QL STRIP: ABNORMAL
KETONES UR QL STRIP: NEGATIVE
LEUKOCYTE ESTERASE UR QL STRIP: ABNORMAL
LEUKOCYTE ESTERASE URINE, POC: POSITIVE
MICROSCOPIC COMMENT: ABNORMAL
NITRITE UR QL STRIP: NEGATIVE
NITRITE, POC UA: NEGATIVE
NON-SQ EPI CELLS #/AREA URNS AUTO: 1 /HPF
PH UR STRIP: 5 [PH] (ref 5–8)
PH, POC UA: 5
PROT UR QL STRIP: NEGATIVE
PROTEIN, POC: ABNORMAL
RBC #/AREA URNS AUTO: 5 /HPF (ref 0–4)
SP GR UR STRIP: 1.02 (ref 1–1.03)
SPECIFIC GRAVITY, POC UA: 1.02
SQUAMOUS #/AREA URNS AUTO: 0 /HPF
URN SPEC COLLECT METH UR: ABNORMAL
UROBILINOGEN, POC UA: NORMAL
WBC #/AREA URNS AUTO: 13 /HPF (ref 0–5)

## 2020-04-27 PROCEDURE — 74176 CT ABD & PELVIS W/O CONTRAST: CPT | Mod: 26,,, | Performed by: RADIOLOGY

## 2020-04-27 PROCEDURE — 3078F PR MOST RECENT DIASTOLIC BLOOD PRESSURE < 80 MM HG: ICD-10-PCS | Mod: CPTII,S$GLB,, | Performed by: INTERNAL MEDICINE

## 2020-04-27 PROCEDURE — 72100 X-RAY EXAM L-S SPINE 2/3 VWS: CPT | Mod: TC,PO

## 2020-04-27 PROCEDURE — 3077F PR MOST RECENT SYSTOLIC BLOOD PRESSURE >= 140 MM HG: ICD-10-PCS | Mod: CPTII,S$GLB,, | Performed by: INTERNAL MEDICINE

## 2020-04-27 PROCEDURE — 99213 PR OFFICE/OUTPT VISIT, EST, LEVL III, 20-29 MIN: ICD-10-PCS | Mod: 25,S$GLB,, | Performed by: INTERNAL MEDICINE

## 2020-04-27 PROCEDURE — 81001 URINALYSIS AUTO W/SCOPE: CPT

## 2020-04-27 PROCEDURE — 3008F BODY MASS INDEX DOCD: CPT | Mod: CPTII,S$GLB,, | Performed by: INTERNAL MEDICINE

## 2020-04-27 PROCEDURE — 74176 CT RENAL STONE STUDY ABD PELVIS WO: ICD-10-PCS | Mod: 26,,, | Performed by: RADIOLOGY

## 2020-04-27 PROCEDURE — 72100 X-RAY EXAM L-S SPINE 2/3 VWS: CPT | Mod: 26,,, | Performed by: RADIOLOGY

## 2020-04-27 PROCEDURE — 99999 PR PBB SHADOW E&M-EST. PATIENT-LVL IV: ICD-10-PCS | Mod: PBBFAC,,, | Performed by: INTERNAL MEDICINE

## 2020-04-27 PROCEDURE — 74176 CT ABD & PELVIS W/O CONTRAST: CPT | Mod: TC

## 2020-04-27 PROCEDURE — 99213 OFFICE O/P EST LOW 20 MIN: CPT | Mod: 25,S$GLB,, | Performed by: INTERNAL MEDICINE

## 2020-04-27 PROCEDURE — 3008F PR BODY MASS INDEX (BMI) DOCUMENTED: ICD-10-PCS | Mod: CPTII,S$GLB,, | Performed by: INTERNAL MEDICINE

## 2020-04-27 PROCEDURE — 3077F SYST BP >= 140 MM HG: CPT | Mod: CPTII,S$GLB,, | Performed by: INTERNAL MEDICINE

## 2020-04-27 PROCEDURE — 87086 URINE CULTURE/COLONY COUNT: CPT

## 2020-04-27 PROCEDURE — 72100 XR LUMBAR SPINE AP AND LATERAL: ICD-10-PCS | Mod: 26,,, | Performed by: RADIOLOGY

## 2020-04-27 PROCEDURE — 81001 POCT URINALYSIS, DIPSTICK OR TABLET REAGENT, AUTOMATED, WITH MICROSCOP: ICD-10-PCS | Mod: S$GLB,,, | Performed by: INTERNAL MEDICINE

## 2020-04-27 PROCEDURE — 3078F DIAST BP <80 MM HG: CPT | Mod: CPTII,S$GLB,, | Performed by: INTERNAL MEDICINE

## 2020-04-27 PROCEDURE — 81001 URINALYSIS AUTO W/SCOPE: CPT | Mod: S$GLB,,, | Performed by: INTERNAL MEDICINE

## 2020-04-27 PROCEDURE — 99999 PR PBB SHADOW E&M-EST. PATIENT-LVL IV: CPT | Mod: PBBFAC,,, | Performed by: INTERNAL MEDICINE

## 2020-04-27 RX ORDER — CYCLOBENZAPRINE HCL 5 MG
5 TABLET ORAL 2 TIMES DAILY PRN
Qty: 30 TABLET | Refills: 1 | Status: SHIPPED | OUTPATIENT
Start: 2020-04-27 | End: 2020-05-07

## 2020-04-27 RX ORDER — BACLOFEN 10 MG/1
10 TABLET ORAL NIGHTLY PRN
COMMUNITY
Start: 2020-02-05 | End: 2022-02-23

## 2020-04-27 RX ORDER — IBUPROFEN 600 MG/1
600 TABLET ORAL EVERY 6 HOURS PRN
Qty: 60 TABLET | Refills: 0 | Status: SHIPPED | OUTPATIENT
Start: 2020-04-27 | End: 2020-05-07

## 2020-04-27 NOTE — TELEPHONE ENCOUNTER
----- Message from Daxa Santana sent at 4/27/2020  7:52 AM CDT -----  Contact: [phone]  Patient ask that Shelby to call him in ref to his Left side pain and radiating down his leg.

## 2020-04-27 NOTE — PROGRESS NOTES
Subjective:       Patient ID: Hi Rubio is a 60 y.o. male.    Chief Complaint: Flank Pain (left side)    HPI   The patient presents with left posterior flank and lumbar pain.  Patient has had recurrent symptoms over the past several weeks.  Symptoms have progressed over the past several days.  Symptoms may be exacerbated with certain activities.  He states the pain  does not appear to radiate.  He is not experiencing any lower extremity weakness or numbness.  There is no bowel or bladder sphincter dysfunction.  He has had a personal history of recurrent urinary tract infections x 3 recently.  Intermittent dysuria and mild hematuria have been noted.    He denies having any diarrhea, nausea, or vomiting.  Active medical conditions anterior cerebral artery aneurysm, anxiety, BPH with lower urinary tract symptoms cervical stenosis, hypertension, GERD hyperlipidemia, frequent headaches.    Review of Systems   Constitutional: Negative for activity change, chills, diaphoresis, fever and unexpected weight change.   Respiratory: Negative for shortness of breath.    Cardiovascular: Negative for chest pain.   Gastrointestinal: Negative for abdominal distention, abdominal pain, anal bleeding, diarrhea, nausea and vomiting.   Genitourinary: Positive for dysuria, flank pain and hematuria. Negative for frequency and testicular pain.   Musculoskeletal: Positive for back pain. Negative for joint swelling.   Skin: Negative for rash.   Neurological: Negative for dizziness, weakness, numbness and headaches.       Objective:      Physical Exam   Constitutional: He is oriented to person, place, and time. He appears well-developed and well-nourished. No distress.   HENT:   Head: Normocephalic and atraumatic.   Cardiovascular: Normal rate, regular rhythm and normal heart sounds.   Pulmonary/Chest: Effort normal and breath sounds normal.   Abdominal: Soft. Bowel sounds are normal. He exhibits no distension. There is tenderness  (Left-sided tenderness is present on palpation.).   Musculoskeletal:   Left flank tenderness is present on palpation.  No vertebral percussion tenderness.  There is lumbar paraspinous muscle spasm present.  No SI joint tenderness.  Negative straight leg raising test bilaterally.  Decreased anterior flexion is noted.  Decreased extension is also present.  A slight limp on the left side is noted with ambulation.   Neurological: He is alert and oriented to person, place, and time. Coordination normal.   Skin: Skin is warm and dry. No rash noted.   Psychiatric: He has a normal mood and affect. His behavior is normal.   Nursing note and vitals reviewed.      Results for orders placed or performed in visit on 04/27/20   Urinalysis   Result Value Ref Range    Specimen UA Urine, Clean Catch     Color, UA Yellow Yellow, Straw, Philippe    Appearance, UA Hazy (A) Clear    pH, UA 5.0 5.0 - 8.0    Specific Gravity, UA 1.020 1.005 - 1.030    Protein, UA Negative Negative    Glucose, UA 2+ (A) Negative    Ketones, UA Negative Negative    Bilirubin (UA) Negative Negative    Occult Blood UA 1+ (A) Negative    Nitrite, UA Negative Negative    Leukocytes, UA 1+ (A) Negative   Urinalysis Microscopic   Result Value Ref Range    RBC, UA 5 (H) 0 - 4 /hpf    WBC, UA 13 (H) 0 - 5 /hpf    Bacteria Rare None-Occ /hpf    Squam Epithel, UA 0 /hpf    Non-Squam Epith 1 (A) <1/hpf /hpf    Microscopic Comment SEE COMMENT    POCT urinalysis, dipstick or tablet reag   Result Value Ref Range    Color, UA hazy philippe     Spec Grav UA 1.020     pH, UA 5     WBC, UA positive     Nitrite, UA negative     Protein 1+     Glucose,      Ketones, UA megative     Urobilinogen, UA normal     Bilirubin negative     Blood, UA negative        Assessment:       1. Flank pain    2. Dysuria    3. Left low back pain, unspecified chronicity, unspecified whether sciatica present    4. Hematuria, unspecified type        Plan:     Hi was seen today for flank pain.   The patient declines taking any opioid medication.  Ibuprofen and Flexeril will be prescribed.  X-rays of the lumbar spine will be obtained.  A renal CT stone study also be obtained.  Blood and urine tests will be obtained today.    Diagnoses and all orders for this visit:    Flank pain  -     Cancel: Urinalysis; Future  -     Cancel: Urine culture; Future  -     CT Renal Stone Study ABD Pelvis WO; Future  -     CBC auto differential; Future  -     Comprehensive metabolic panel; Future  -     Sedimentation rate; Future  -     Urinalysis  -     Urine culture    Dysuria  -     Cancel: Urinalysis; Future  -     Cancel: Urine culture; Future  -     CBC auto differential; Future  -     Comprehensive metabolic panel; Future  -     Sedimentation rate; Future  -     POCT urinalysis, dipstick or tablet reag  -     Urinalysis  -     Urine culture    Left low back pain, unspecified chronicity, unspecified whether sciatica present  -     X-Ray Lumbar Spine Ap And Lateral; Future  -     Urinalysis  -     Urine culture    Hematuria, unspecified type  -     CBC auto differential; Future  -     Comprehensive metabolic panel; Future  -     Sedimentation rate; Future  -     Urinalysis  -     Urine culture    Other orders  -     ibuprofen (ADVIL,MOTRIN) 600 MG tablet; Take 1 tablet (600 mg total) by mouth every 6 (six) hours as needed for Pain.  -     cyclobenzaprine (FLEXERIL) 5 MG tablet; Take 1 tablet (5 mg total) by mouth 2 (two) times daily as needed for Muscle spasms.  -     Urinalysis Microscopic

## 2020-04-27 NOTE — TELEPHONE ENCOUNTER
ALEX    Spoke to pt.  Scheduled appt with Dr Martin this morning.  Offered virtual appt, pt preferred to come in.    Pain is mostly in his left side, right above hip and shooting down the back of his leg.    So bad yesterday, knocked him to his knees.

## 2020-04-28 ENCOUNTER — PATIENT MESSAGE (OUTPATIENT)
Dept: INTERNAL MEDICINE | Facility: CLINIC | Age: 60
End: 2020-04-28

## 2020-04-28 LAB — BACTERIA UR CULT: NO GROWTH

## 2020-04-29 ENCOUNTER — PATIENT MESSAGE (OUTPATIENT)
Dept: INTERNAL MEDICINE | Facility: CLINIC | Age: 60
End: 2020-04-29

## 2020-05-03 ENCOUNTER — PATIENT MESSAGE (OUTPATIENT)
Dept: ADMINISTRATIVE | Facility: OTHER | Age: 60
End: 2020-05-03

## 2020-05-04 ENCOUNTER — TELEPHONE (OUTPATIENT)
Dept: INTERNAL MEDICINE | Facility: CLINIC | Age: 60
End: 2020-05-04

## 2020-05-04 DIAGNOSIS — R10.9 ABDOMINAL PAIN, UNSPECIFIED ABDOMINAL LOCATION: Primary | ICD-10-CM

## 2020-05-04 NOTE — TELEPHONE ENCOUNTER
Returned patient's call who reports persistent severe left flank pain.  Pain is debilitating and forced him to fall to his knees.  He reports intermittent relief with ibuprofen.  The pain has been present for over a month.  He also reports decreased appetite and weight loss.  Advised patient to schedule an appointment with urology due to flank pain.  Recent urine culture showed no growth, and renal stone study was negative for kidney stones.  Advised patient to schedule follow-up appointment with urology.  Also, recommend mesenteric ultrasound to evaluate for possible vascular cause of pain.

## 2020-05-04 NOTE — TELEPHONE ENCOUNTER
----- Message from Emma Cortes sent at 5/4/2020  7:47 AM CDT -----  Contact: [phone]  Pt called in regards to having bad left side pain for 3 weeks to a month and fever. He wanted to know what should he do. He would like a call back ASAP.     Please advise

## 2020-05-04 NOTE — TELEPHONE ENCOUNTER
Spoke to patient and his current symptoms are continuous flank pain on his left side ,fever (100-103) ,loss of appetite ,and headache. He was test for covid about three week ago and it was negative.  When he went to work today he had fever his job is requiring he be tested

## 2020-05-04 NOTE — TELEPHONE ENCOUNTER
----- Message from Emma Cortes sent at 5/4/2020  7:47 AM CDT -----  Contact: [phone]  Pt called in regards to having bad left side pain for 3 weeks to a month and fever. He wanted to know what should he do. He would like a call back ASAP.     Please advise

## 2020-05-05 ENCOUNTER — PATIENT MESSAGE (OUTPATIENT)
Dept: INTERNAL MEDICINE | Facility: CLINIC | Age: 60
End: 2020-05-05

## 2020-05-05 ENCOUNTER — HOSPITAL ENCOUNTER (OUTPATIENT)
Facility: HOSPITAL | Age: 60
Discharge: HOME | End: 2020-05-07
Attending: EMERGENCY MEDICINE | Admitting: INTERNAL MEDICINE
Payer: COMMERCIAL

## 2020-05-05 ENCOUNTER — TELEPHONE (OUTPATIENT)
Dept: INTERNAL MEDICINE | Facility: CLINIC | Age: 60
End: 2020-05-05

## 2020-05-05 DIAGNOSIS — N12 PYELONEPHRITIS: Primary | ICD-10-CM

## 2020-05-05 DIAGNOSIS — N39.0 RECURRENT UTI: ICD-10-CM

## 2020-05-05 DIAGNOSIS — R00.0 TACHYCARDIA: ICD-10-CM

## 2020-05-05 DIAGNOSIS — R07.9 CHEST PAIN: ICD-10-CM

## 2020-05-05 DIAGNOSIS — E87.6 HYPOKALEMIA: ICD-10-CM

## 2020-05-05 DIAGNOSIS — N40.1 BPH WITH OBSTRUCTION/LOWER URINARY TRACT SYMPTOMS: ICD-10-CM

## 2020-05-05 DIAGNOSIS — N13.8 BPH WITH OBSTRUCTION/LOWER URINARY TRACT SYMPTOMS: ICD-10-CM

## 2020-05-05 PROBLEM — A41.9 SEPSIS: Status: ACTIVE | Noted: 2020-05-05

## 2020-05-05 LAB
ALBUMIN SERPL BCP-MCNC: 3.6 G/DL (ref 3.5–5.2)
ALP SERPL-CCNC: 74 U/L (ref 55–135)
ALT SERPL W/O P-5'-P-CCNC: 20 U/L (ref 10–44)
ANION GAP SERPL CALC-SCNC: 12 MMOL/L (ref 8–16)
AST SERPL-CCNC: 17 U/L (ref 10–40)
BACTERIA #/AREA URNS AUTO: ABNORMAL /HPF
BASOPHILS # BLD AUTO: 0.04 K/UL (ref 0–0.2)
BASOPHILS NFR BLD: 0.4 % (ref 0–1.9)
BILIRUB SERPL-MCNC: 0.6 MG/DL (ref 0.1–1)
BILIRUB UR QL STRIP: NEGATIVE
BUN SERPL-MCNC: 11 MG/DL (ref 8–23)
CALCIUM SERPL-MCNC: 9.7 MG/DL (ref 8.7–10.5)
CHLORIDE SERPL-SCNC: 95 MMOL/L (ref 95–110)
CLARITY UR REFRACT.AUTO: ABNORMAL
CO2 SERPL-SCNC: 27 MMOL/L (ref 23–29)
COLOR UR AUTO: YELLOW
CREAT SERPL-MCNC: 1.2 MG/DL (ref 0.5–1.4)
DIFFERENTIAL METHOD: ABNORMAL
EOSINOPHIL # BLD AUTO: 0 K/UL (ref 0–0.5)
EOSINOPHIL NFR BLD: 0.2 % (ref 0–8)
ERYTHROCYTE [DISTWIDTH] IN BLOOD BY AUTOMATED COUNT: 12.5 % (ref 11.5–14.5)
EST. GFR  (AFRICAN AMERICAN): >60 ML/MIN/1.73 M^2
EST. GFR  (NON AFRICAN AMERICAN): >60 ML/MIN/1.73 M^2
GLUCOSE SERPL-MCNC: 168 MG/DL (ref 70–110)
GLUCOSE UR QL STRIP: ABNORMAL
GRAM STN SPEC: NORMAL
GRAM STN SPEC: NORMAL
HCT VFR BLD AUTO: 44.9 % (ref 40–54)
HGB BLD-MCNC: 15.2 G/DL (ref 14–18)
HGB UR QL STRIP: ABNORMAL
HYALINE CASTS UR QL AUTO: 0 /LPF
IMM GRANULOCYTES # BLD AUTO: 0.04 K/UL (ref 0–0.04)
IMM GRANULOCYTES NFR BLD AUTO: 0.4 % (ref 0–0.5)
KETONES UR QL STRIP: NEGATIVE
LACTATE SERPL-SCNC: 0.9 MMOL/L (ref 0.5–2.2)
LACTATE SERPL-SCNC: 1.2 MMOL/L (ref 0.5–2.2)
LEUKOCYTE ESTERASE UR QL STRIP: ABNORMAL
LYMPHOCYTES # BLD AUTO: 0.8 K/UL (ref 1–4.8)
LYMPHOCYTES NFR BLD: 8.2 % (ref 18–48)
MAGNESIUM SERPL-MCNC: 1.8 MG/DL (ref 1.6–2.6)
MCH RBC QN AUTO: 29.8 PG (ref 27–31)
MCHC RBC AUTO-ENTMCNC: 33.9 G/DL (ref 32–36)
MCV RBC AUTO: 88 FL (ref 82–98)
MICROSCOPIC COMMENT: ABNORMAL
MONOCYTES # BLD AUTO: 1 K/UL (ref 0.3–1)
MONOCYTES NFR BLD: 10.4 % (ref 4–15)
NEUTROPHILS # BLD AUTO: 7.8 K/UL (ref 1.8–7.7)
NEUTROPHILS NFR BLD: 80.4 % (ref 38–73)
NITRITE UR QL STRIP: POSITIVE
NRBC BLD-RTO: 0 /100 WBC
PH UR STRIP: 7 [PH] (ref 5–8)
PLATELET # BLD AUTO: 229 K/UL (ref 150–350)
PMV BLD AUTO: 9.9 FL (ref 9.2–12.9)
POCT GLUCOSE: 163 MG/DL (ref 70–110)
POTASSIUM SERPL-SCNC: 2.8 MMOL/L (ref 3.5–5.1)
PROT SERPL-MCNC: 7.9 G/DL (ref 6–8.4)
PROT UR QL STRIP: ABNORMAL
RBC # BLD AUTO: 5.1 M/UL (ref 4.6–6.2)
RBC #/AREA URNS AUTO: 2 /HPF (ref 0–4)
SARS-COV-2 RDRP RESP QL NAA+PROBE: NEGATIVE
SODIUM SERPL-SCNC: 134 MMOL/L (ref 136–145)
SP GR UR STRIP: 1.01 (ref 1–1.03)
TSH SERPL DL<=0.005 MIU/L-ACNC: 2.55 UIU/ML (ref 0.4–4)
URN SPEC COLLECT METH UR: ABNORMAL
WBC # BLD AUTO: 9.66 K/UL (ref 3.9–12.7)
WBC #/AREA URNS AUTO: 37 /HPF (ref 0–5)

## 2020-05-05 PROCEDURE — 25000003 PHARM REV CODE 250: Performed by: PHYSICIAN ASSISTANT

## 2020-05-05 PROCEDURE — 96365 THER/PROPH/DIAG IV INF INIT: CPT | Mod: 59

## 2020-05-05 PROCEDURE — 83605 ASSAY OF LACTIC ACID: CPT

## 2020-05-05 PROCEDURE — 84443 ASSAY THYROID STIM HORMONE: CPT

## 2020-05-05 PROCEDURE — 96376 TX/PRO/DX INJ SAME DRUG ADON: CPT

## 2020-05-05 PROCEDURE — U0002 COVID-19 LAB TEST NON-CDC: HCPCS

## 2020-05-05 PROCEDURE — G0378 HOSPITAL OBSERVATION PER HR: HCPCS

## 2020-05-05 PROCEDURE — 87491 CHLMYD TRACH DNA AMP PROBE: CPT

## 2020-05-05 PROCEDURE — 25000003 PHARM REV CODE 250: Performed by: EMERGENCY MEDICINE

## 2020-05-05 PROCEDURE — 25500020 PHARM REV CODE 255: Performed by: EMERGENCY MEDICINE

## 2020-05-05 PROCEDURE — 99285 PR EMERGENCY DEPT VISIT,LEVEL V: ICD-10-PCS | Mod: ,,, | Performed by: PHYSICIAN ASSISTANT

## 2020-05-05 PROCEDURE — 87040 BLOOD CULTURE FOR BACTERIA: CPT

## 2020-05-05 PROCEDURE — 99226 PR SUBSEQUENT OBSERVATION CARE,LEVEL III: ICD-10-PCS | Mod: ,,, | Performed by: PHYSICIAN ASSISTANT

## 2020-05-05 PROCEDURE — 99285 EMERGENCY DEPT VISIT HI MDM: CPT | Mod: ,,, | Performed by: PHYSICIAN ASSISTANT

## 2020-05-05 PROCEDURE — 87077 CULTURE AEROBIC IDENTIFY: CPT

## 2020-05-05 PROCEDURE — 93005 ELECTROCARDIOGRAM TRACING: CPT

## 2020-05-05 PROCEDURE — 87088 URINE BACTERIA CULTURE: CPT

## 2020-05-05 PROCEDURE — 80053 COMPREHEN METABOLIC PANEL: CPT

## 2020-05-05 PROCEDURE — 93010 ELECTROCARDIOGRAM REPORT: CPT | Mod: ,,, | Performed by: INTERNAL MEDICINE

## 2020-05-05 PROCEDURE — 96375 TX/PRO/DX INJ NEW DRUG ADDON: CPT

## 2020-05-05 PROCEDURE — 83735 ASSAY OF MAGNESIUM: CPT

## 2020-05-05 PROCEDURE — 87205 SMEAR GRAM STAIN: CPT

## 2020-05-05 PROCEDURE — 63600175 PHARM REV CODE 636 W HCPCS: Performed by: PHYSICIAN ASSISTANT

## 2020-05-05 PROCEDURE — 93010 EKG 12-LEAD: ICD-10-PCS | Mod: ,,, | Performed by: INTERNAL MEDICINE

## 2020-05-05 PROCEDURE — 87040 BLOOD CULTURE FOR BACTERIA: CPT | Mod: 59

## 2020-05-05 PROCEDURE — 85025 COMPLETE CBC W/AUTO DIFF WBC: CPT

## 2020-05-05 PROCEDURE — 63600175 PHARM REV CODE 636 W HCPCS: Performed by: EMERGENCY MEDICINE

## 2020-05-05 PROCEDURE — 87186 SC STD MICRODIL/AGAR DIL: CPT

## 2020-05-05 PROCEDURE — 81001 URINALYSIS AUTO W/SCOPE: CPT

## 2020-05-05 PROCEDURE — 99285 EMERGENCY DEPT VISIT HI MDM: CPT | Mod: 25

## 2020-05-05 PROCEDURE — 99226 PR SUBSEQUENT OBSERVATION CARE,LEVEL III: CPT | Mod: ,,, | Performed by: PHYSICIAN ASSISTANT

## 2020-05-05 PROCEDURE — 96366 THER/PROPH/DIAG IV INF ADDON: CPT

## 2020-05-05 PROCEDURE — 87086 URINE CULTURE/COLONY COUNT: CPT

## 2020-05-05 PROCEDURE — 96361 HYDRATE IV INFUSION ADD-ON: CPT

## 2020-05-05 RX ORDER — ASPIRIN 81 MG/1
81 TABLET ORAL DAILY
Status: DISCONTINUED | OUTPATIENT
Start: 2020-05-06 | End: 2020-05-07 | Stop reason: HOSPADM

## 2020-05-05 RX ORDER — IPRATROPIUM BROMIDE AND ALBUTEROL SULFATE 2.5; .5 MG/3ML; MG/3ML
3 SOLUTION RESPIRATORY (INHALATION) EVERY 4 HOURS PRN
Status: DISCONTINUED | OUTPATIENT
Start: 2020-05-05 | End: 2020-05-07 | Stop reason: HOSPADM

## 2020-05-05 RX ORDER — PANTOPRAZOLE SODIUM 40 MG/1
40 TABLET, DELAYED RELEASE ORAL 2 TIMES DAILY
Status: DISCONTINUED | OUTPATIENT
Start: 2020-05-05 | End: 2020-05-07 | Stop reason: HOSPADM

## 2020-05-05 RX ORDER — SODIUM CHLORIDE 0.9 % (FLUSH) 0.9 %
10 SYRINGE (ML) INJECTION
Status: DISCONTINUED | OUTPATIENT
Start: 2020-05-05 | End: 2020-05-07 | Stop reason: HOSPADM

## 2020-05-05 RX ORDER — GLUCAGON 1 MG
1 KIT INJECTION
Status: DISCONTINUED | OUTPATIENT
Start: 2020-05-05 | End: 2020-05-07 | Stop reason: HOSPADM

## 2020-05-05 RX ORDER — CEFTRIAXONE 1 G/1
1 INJECTION, POWDER, FOR SOLUTION INTRAMUSCULAR; INTRAVENOUS
Status: COMPLETED | OUTPATIENT
Start: 2020-05-05 | End: 2020-05-05

## 2020-05-05 RX ORDER — POTASSIUM CHLORIDE 7.45 MG/ML
10 INJECTION INTRAVENOUS
Status: COMPLETED | OUTPATIENT
Start: 2020-05-05 | End: 2020-05-05

## 2020-05-05 RX ORDER — TALC
6 POWDER (GRAM) TOPICAL NIGHTLY PRN
Status: DISCONTINUED | OUTPATIENT
Start: 2020-05-05 | End: 2020-05-07 | Stop reason: HOSPADM

## 2020-05-05 RX ORDER — ACETAMINOPHEN 500 MG
1000 TABLET ORAL
Status: COMPLETED | OUTPATIENT
Start: 2020-05-05 | End: 2020-05-05

## 2020-05-05 RX ORDER — KETOROLAC TROMETHAMINE 30 MG/ML
10 INJECTION, SOLUTION INTRAMUSCULAR; INTRAVENOUS
Status: COMPLETED | OUTPATIENT
Start: 2020-05-05 | End: 2020-05-05

## 2020-05-05 RX ORDER — IBUPROFEN 200 MG
16 TABLET ORAL
Status: DISCONTINUED | OUTPATIENT
Start: 2020-05-05 | End: 2020-05-07 | Stop reason: HOSPADM

## 2020-05-05 RX ORDER — POTASSIUM CHLORIDE 20 MEQ/1
40 TABLET, EXTENDED RELEASE ORAL
Status: COMPLETED | OUTPATIENT
Start: 2020-05-05 | End: 2020-05-05

## 2020-05-05 RX ORDER — ONDANSETRON 4 MG/1
4 TABLET, ORALLY DISINTEGRATING ORAL EVERY 8 HOURS PRN
Qty: 12 TABLET | Refills: 0 | Status: SHIPPED | OUTPATIENT
Start: 2020-05-05 | End: 2022-02-23 | Stop reason: SDUPTHER

## 2020-05-05 RX ORDER — CIPROFLOXACIN 500 MG/1
500 TABLET ORAL 2 TIMES DAILY
Qty: 14 TABLET | Refills: 0 | Status: SHIPPED | OUTPATIENT
Start: 2020-05-05 | End: 2020-05-07 | Stop reason: HOSPADM

## 2020-05-05 RX ORDER — ONDANSETRON 8 MG/1
8 TABLET, ORALLY DISINTEGRATING ORAL EVERY 8 HOURS PRN
Status: DISCONTINUED | OUTPATIENT
Start: 2020-05-05 | End: 2020-05-07 | Stop reason: HOSPADM

## 2020-05-05 RX ORDER — ONDANSETRON 2 MG/ML
4 INJECTION INTRAMUSCULAR; INTRAVENOUS EVERY 8 HOURS PRN
Status: DISCONTINUED | OUTPATIENT
Start: 2020-05-05 | End: 2020-05-07 | Stop reason: HOSPADM

## 2020-05-05 RX ORDER — METOCLOPRAMIDE HYDROCHLORIDE 5 MG/ML
10 INJECTION INTRAMUSCULAR; INTRAVENOUS
Status: COMPLETED | OUTPATIENT
Start: 2020-05-05 | End: 2020-05-05

## 2020-05-05 RX ORDER — ACETAMINOPHEN 325 MG/1
650 TABLET ORAL EVERY 8 HOURS PRN
Status: DISCONTINUED | OUTPATIENT
Start: 2020-05-05 | End: 2020-05-07 | Stop reason: HOSPADM

## 2020-05-05 RX ORDER — POLYETHYLENE GLYCOL 3350 17 G/17G
17 POWDER, FOR SOLUTION ORAL DAILY
Status: DISCONTINUED | OUTPATIENT
Start: 2020-05-06 | End: 2020-05-07 | Stop reason: HOSPADM

## 2020-05-05 RX ORDER — IBUPROFEN 200 MG
24 TABLET ORAL
Status: DISCONTINUED | OUTPATIENT
Start: 2020-05-05 | End: 2020-05-07 | Stop reason: HOSPADM

## 2020-05-05 RX ORDER — ACETAMINOPHEN 325 MG/1
650 TABLET ORAL
Status: COMPLETED | OUTPATIENT
Start: 2020-05-05 | End: 2020-05-05

## 2020-05-05 RX ADMIN — SODIUM CHLORIDE 500 ML: 0.9 INJECTION, SOLUTION INTRAVENOUS at 08:05

## 2020-05-05 RX ADMIN — POTASSIUM BICARBONATE 50 MEQ: 978 TABLET, EFFERVESCENT ORAL at 11:05

## 2020-05-05 RX ADMIN — POTASSIUM CHLORIDE 10 MEQ: 7.46 INJECTION, SOLUTION INTRAVENOUS at 11:05

## 2020-05-05 RX ADMIN — SODIUM CHLORIDE 500 ML: 0.9 INJECTION, SOLUTION INTRAVENOUS at 09:05

## 2020-05-05 RX ADMIN — METOCLOPRAMIDE 10 MG: 5 INJECTION, SOLUTION INTRAMUSCULAR; INTRAVENOUS at 03:05

## 2020-05-05 RX ADMIN — POTASSIUM CHLORIDE 40 MEQ: 1500 TABLET, EXTENDED RELEASE ORAL at 11:05

## 2020-05-05 RX ADMIN — KETOROLAC TROMETHAMINE 10 MG: 30 INJECTION, SOLUTION INTRAMUSCULAR at 09:05

## 2020-05-05 RX ADMIN — ACETAMINOPHEN 650 MG: 325 TABLET ORAL at 08:05

## 2020-05-05 RX ADMIN — ACETAMINOPHEN 1000 MG: 500 TABLET ORAL at 09:05

## 2020-05-05 RX ADMIN — SODIUM CHLORIDE 500 ML: 0.9 INJECTION, SOLUTION INTRAVENOUS at 03:05

## 2020-05-05 RX ADMIN — ACETAMINOPHEN 650 MG: 325 TABLET ORAL at 02:05

## 2020-05-05 RX ADMIN — PANTOPRAZOLE SODIUM 40 MG: 40 TABLET, DELAYED RELEASE ORAL at 08:05

## 2020-05-05 RX ADMIN — IOHEXOL 100 ML: 350 INJECTION, SOLUTION INTRAVENOUS at 11:05

## 2020-05-05 RX ADMIN — KETOROLAC TROMETHAMINE 10 MG: 30 INJECTION, SOLUTION INTRAMUSCULAR at 02:05

## 2020-05-05 RX ADMIN — CEFTRIAXONE 1 G: 1 INJECTION, POWDER, FOR SOLUTION INTRAMUSCULAR; INTRAVENOUS at 12:05

## 2020-05-05 NOTE — ED TRIAGE NOTES
To the ED with c/o left side pain for 3 weeks.  States pain is constant  and is described as stabbing. Fever and 20 lb weight loss reported.

## 2020-05-05 NOTE — ED PROVIDER NOTES
Encounter Date: 5/5/2020       History     Chief Complaint   Patient presents with    Flank Pain     seen last week for same thing, L flank lower back pain    Fever     60-year-old male with medical comorbidities significant for CAD, DM, GERD, HTN, HLD presents to the ED with a chief complaint of flank pain.  Patient reports pain in the left flank the past month.  Describes the pain as sharp.  He states that the pain is improved with ibuprofen, but would be constant if he did not take any analgesics.  He states that he also occasionally has pain in his left leg.  Pain also occasionally radiates into the left lower abdomen.  States that his doctors have not been able to figure out what is causing the pain.  He reports associated dysuria, generalized fatigue and weakness.  Reports nausea associated with eating only.  Denies vomiting or diarrhea.  Patient reports he began have fevers 3 days ago.  T-max 103.8°.  Denies cough or shortness of breath.  He does report intermittent headache for the past several days as well.  He spoke with his PCP yesterday who advised that he present to the ER.        Review of patient's allergies indicates:   Allergen Reactions    Lisinopril Anaphylaxis and Nausea And Vomiting     General bad feeling    Adhesive     Crestor [rosuvastatin] Swelling     Lip swelling.     Lipitor [atorvastatin] Other (See Comments)     Muscle aches    Metformin      Used XR and experienced flatulance and abdominal pain.      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 -     Hyperlipidemia     Hypertension     Memory loss     Thyroid nodule 8/22/2019    Traumatic iritis - Left Eye  2013     Past Surgical History:   Procedure Laterality Date    CEREBRAL ANGIOGRAM N/A 2020    Procedure: ANGIOGRAM-CEREBRAL;  Surgeon: [facility] Diagnostic Provider;  Location: [facility] OR 2ND FLR;  Service: Radiology;  Laterality: N/A;  /Nagi    CERVICAL FUSION  2015    COLONOSCOPY N/A 2017    Procedure: COLONOSCOPY;  Surgeon: Handy Frederick MD;  Location: [facility] ENDO (4TH FLR);  Service: Endoscopy;  Laterality: N/A;    HERNIA REPAIR      PROSTATECTOMY       Family History   Problem Relation Age of Onset    Heart disease Mother     Hypertension Mother     Hyperlipidemia Mother     Heart disease Father     Hyperlipidemia Brother     Hypertension Brother     Hypertension Brother     Hyperlipidemia Brother     Benign prostatic hyperplasia Brother     Hypertension Brother     Hypertension Brother     Hepatitis Brother     Cancer Sister     Cancer Brother         throat CA    Diabetes Paternal Uncle     Cataracts Maternal Grandmother     Amblyopia Neg Hx     Blindness Neg Hx     Glaucoma Neg Hx     Macular degeneration Neg Hx     Retinal detachment Neg Hx     Strabismus Neg Hx     Stroke Neg Hx     Thyroid disease Neg Hx     Colon cancer Neg Hx     Colon polyps Neg Hx      Social History     Tobacco Use    Smoking status: Former Smoker     Packs/day: 1.00     Years: 30.00     Pack years: 30.00     Types: Cigarettes     Last attempt to quit: 2011     Years since quittin.4    Smokeless tobacco: Never Used   Substance Use Topics    Alcohol use: Yes     Frequency: 2-3 times a week     Drinks per session: 1 or 2     Binge frequency: Never     Comment: occas - nonalcoholic beer or beer    Drug use: No     Review of Systems   Constitutional: Positive for fatigue and fever.   HENT: Negative for sore throat.    Respiratory: Negative for cough and shortness of breath.    Cardiovascular: Negative for chest pain.   Gastrointestinal: Positive for abdominal pain and nausea.  Negative for diarrhea and vomiting.   Genitourinary: Positive for flank pain. Negative for dysuria.   Musculoskeletal: Negative for back pain.   Skin: Negative for rash.   Neurological: Positive for weakness and headaches.   Hematological: Does not bruise/bleed easily.       Physical Exam     Initial Vitals [05/05/20 0840]   BP Pulse Resp Temp SpO2   114/73 (!) 120 18 (!) 101.2 °F (38.4 °C) 97 %      MAP       --         Physical Exam    Nursing note and vitals reviewed.  Constitutional: He appears well-developed and well-nourished.  Non-toxic appearance. He does not appear ill. No distress.   HENT:   Head: Normocephalic and atraumatic.   Neck: Normal range of motion. Neck supple.   Cardiovascular: Regular rhythm. Tachycardia present.  Exam reveals no gallop, no distant heart sounds and no friction rub.    No murmur heard.  Pulmonary/Chest: Effort normal and breath sounds normal. No accessory muscle usage. No tachypnea. No respiratory distress. He has no decreased breath sounds. He has no wheezes. He has no rhonchi. He has no rales.   Abdominal: Soft. He exhibits no distension and no mass. There is no tenderness. There is CVA tenderness. There is no guarding.   Neurological: He is alert.   Skin: No rash noted.         ED Course   Procedures  Labs Reviewed   CBC W/ AUTO DIFFERENTIAL - Abnormal; Notable for the following components:       Result Value    Gran # (ANC) 7.8 (*)     Lymph # 0.8 (*)     Gran% 80.4 (*)     Lymph% 8.2 (*)     All other components within normal limits   COMPREHENSIVE METABOLIC PANEL - Abnormal; Notable for the following components:    Sodium 134 (*)     Potassium 2.8 (*)     Glucose 168 (*)     All other components within normal limits   URINALYSIS, REFLEX TO URINE CULTURE - Abnormal; Notable for the following components:    Appearance, UA Hazy (*)     Protein, UA 1+ (*)     Glucose, UA 1+ (*)     Occult Blood UA 2+ (*)     Nitrite, UA Positive (*)     Leukocytes, UA 2+ (*)     All other  components within normal limits    Narrative:     Preferred Collection Type->Urine, Clean Catch   URINALYSIS MICROSCOPIC - Abnormal; Notable for the following components:    WBC, UA 37 (*)     Bacteria Many (*)     All other components within normal limits    Narrative:     Preferred Collection Type->Urine, Clean Catch   CULTURE, BLOOD   CULTURE, BLOOD   C. TRACHOMATIS/N. GONORRHOEAE BY AMP DNA   CULTURE, URINE   LACTIC ACID, PLASMA   SARS-COV-2 RNA AMPLIFICATION, QUAL    Narrative:     What symptom criteria does the patient meet?->Fever   LACTIC ACID, PLASMA   MAGNESIUM   MAGNESIUM    Narrative:     add on Mg order #[id number] per Dr. Maricruz Boyce @ 12:40  05/05/2020         ECG Results          EKG 12-lead (Final result)  Result time 05/05/20 09:20:38    Final result by Interface, Lab In [facility] (05/05/20 09:20:38)                 Narrative:    Test Reason : R00.0,    Vent. Rate : 105 BPM     Atrial Rate : 105 BPM     P-R Int : 146 ms          QRS Dur : 094 ms      QT Int : 326 ms       P-R-T Axes : 074 061 062 degrees     QTc Int : 430 ms    Sinus tachycardia with Premature ventricular complexes  Nonspecific ST and/or T wave abnormalities  When compared with ECG of 27-FEB-2020 15:42,  Vent. rate has increased BY  38 BPM  Nonspecific T wave abnormality now evident in Anterior-lateral leads  Confirmed by ROLO GUTIERREZ MD ([id number]) on 5/5/2020 9:20:27 AM    Referred By: System System           Confirmed By:ROLO GUTIERREZ MD                            Imaging Results           CT Abdomen Pelvis With Contrast (Final result)  Result time 05/05/20 13:42:45    Final result by Maximiliano Lua Jr., MD (05/05/20 13:42:45)                 Impression:      There is subtle heterogeneous enhancement of the left kidney and perhaps some perinephric stranding.  No obstructive uropathy.  Pyelonephritis not excluded.    1.6 cm heterogeneous hypodensity posteromedial right kidney.  Neoplasm not excluded.    Significant atherosclerosis in the  distal abdominal aorta and iliac vessels.    This report was flagged in Epic as abnormal.      Electronically signed by: Maximiliano Lua MD  Date:    05/05/2020  Time:    13:42             Narrative:    EXAMINATION:  CT ABDOMEN PELVIS WITH CONTRAST    CLINICAL HISTORY:  Infection, abdomen-pelvis;    TECHNIQUE:  Low dose axial images, sagittal and coronal reformations were obtained from the lung bases to the pubic symphysis following the IV administration of 100 mL of Omnipaque 350 .  Oral contrast was not given.    COMPARISON:  CT renal stone study April 27, 2020.    FINDINGS:  In the chest no significant pleural or pericardial fluid.  Some atelectasis at the lung bases posteriorly.    In the abdomen decreased attenuation of the liver suggests fatty infiltration.  No focal mass lesions seen.  Gallbladder unremarkable.  Portal vein is patent.  Pancreas and spleen are normal.  No adrenal masses.  Hypodensities in the right kidney noted.  There is a 1.6 cm somewhat heterogeneous hypodensity with soft tissue attenuation posteromedial right lower pole.  There is some heterogeneous enhancement of the left kidney somewhat striated appearance.  Mild left perinephric stranding.  Ureters visualized to the bladder no obstruction.    Calcified plaque in the aorta.  No aneurysm.  Significant plaque at the aortic bifurcation extending into the common iliacs.  Significant plaque in the external iliac left greater than right.  No para-aortic nor pelvic adenopathy.    In the pelvis prostate and bladder are unremarkable for age.  Evaluation of the bowel demonstrates no focal dilatation.  Stomach is not distended.  This likely accounts for the apparent wall thickening.  Appendix appears normal.    Evaluation of the bones demonstrate no lytic or blastic lesion.  Sclerotic bone island left ischial tuberosity.                               X-Ray Chest AP Portable (Final result)  Result time 05/05/20 10:10:12    Final result by Tai PINK  MD Edgar (05/05/20 10:10:12)                 Impression:      No acute abnormality.      Electronically signed by: Tai Hernández MD  Date:    05/05/2020  Time:    10:10             Narrative:    EXAMINATION:  XR CHEST AP PORTABLE    CLINICAL HISTORY:  Sepsis;.    TECHNIQUE:  Single frontal portable view of the chest was performed.    COMPARISON:  04/27/2020    FINDINGS:  Support devices: None    The lungs are clear, with normal appearance of pulmonary vasculature and no pleural effusion or pneumothorax.  There is apical pleural thickening.    The cardiac silhouette is normal in size. The hilar and mediastinal contours are unremarkable.    Bones are intact.                                 Medical Decision Making:   History:   Old Medical Records: I decided to obtain old medical records.  Differential Diagnosis:   My differential diagnosis includes but is not limited to:  Pyelonephritis, sepsis, UTI, psoas abscess  Clinical Tests:   Lab Tests: Ordered  Radiological Study: Ordered  Medical Tests: Ordered  Other:   I have discussed this case with another health care provider.       <> Summary of the Discussion: Hospital Medicine       APC / Resident Notes:   60-year-old male presents with 1 month of left-sided flank pain with fever over the past few days.  He is febrile at 101.2° and tachycardic at 120.  Vitals otherwise within normal limits.  Patient is nontoxic appearing.    UA is consistent with infection:  Nitrite positive, 37 WBCs, many bacteria.  CBC without leukocytosis or significant anemia.  Chemistries remarkable for hypokalemia at 2.8.  This was repleted with oral and IV potassium. Pt also given 1 g of IV Rocephin.    CT A/P with IV contrast reveals subtle heterogenous enhancement and perinephric stranding of the left kidney.  Also incidental finding noted of 1.6 cm heterogenous hypodensity to the posterior medial right kidney.  These findings were discussed with the patient.  He was advised to relay these  findings to his primary care provider.  He may need further evaluation of the be in small mass to the right kidney as neoplasm cannot be excluded.    Pt does not feel comfortable going home. Now has rigors.  Also complaining of a headache.  Patient will be placed in observation for further treatment and management of pyelonephritis.      I have reviewed the patient's records and discussed this case with my supervising physician. Please be advised that this text was dictated with Imperator software and may contain dictation errors.                                   Clinical Impression:       ICD-10-CM ICD-9-CM   1. Pyelonephritis N12 590.80   2. Tachycardia R00.0 785.0   3. Hypokalemia E87.6 276.8         Disposition:   Disposition: Discharged  Condition: Stable     ED Disposition Condition    Observation                           Betzaida Ortega PA-C  05/05/20 0159

## 2020-05-05 NOTE — TELEPHONE ENCOUNTER
----- Message from Daxa Santana sent at 5/5/2020  8:48 AM CDT -----  Contact: Jennifer (spouse) [phone]  Patient just went  to the Er and she so upset that she couldn't be with him and do not know how to reach him. Please call her.

## 2020-05-05 NOTE — DISCHARGE INSTRUCTIONS
Your CT shows some inflammation around your left kidney.  This is consistent with a kidney infection.  There was also an incidental finding of a small mass on the right kidney measuring 1.6 cm.  You should discussed these findings with your primary care doctor for continued monitoring or further testing.    Your potassium level was low.  Please read the attached information for foods high in potassium.    Complete your antibiotics as directed.  Your primary care provider if your symptoms continue after 3 or 4 days of antibiotics.    Return to the ER immediately for any new or significantly worsening symptoms such as persistent high fevers were fever greater than 104, uncontrolled vomiting, or any other worrisome symptoms.

## 2020-05-05 NOTE — ED NOTES
LOC: The patient is awake, alert, and oriented to place, time, situation. Affect is appropriate.  Speech is appropriate and clear.     APPEARANCE: Patient resting uncomfortably, reporting left side constant stabbing pain, headache, and fever.  Patient is clean and well groomed.    SKIN: The skin is warm and dry; color consistent with ethnicity.  Patient has normal skin turgor and dry mucus membranes.  Skin intact; no breakdown or bruising noted.     MUSCULOSKELETAL: Patient moving upper and lower extremities without difficulty.  Reporting weakness.     RESPIRATORY: Airway is open and patent. Respirations spontaneous, even, easy, and non-labored.  Patient has a normal effort and rate.  No accessory muscle use noted. Denies cough.     CARDIAC:  ST.   No peripheral edema noted. No complaints of chest pain.      ABDOMEN: Soft and non tender to palpation.  No distention noted.  Left side stabbing pain, tenderness to left lower back.     NEUROLOGIC: Eyes open spontaneously. Headache,   Behavior appropriate to situation.  Follows commands; facial expression symmetrical.  Purposeful motor response noted; normal sensation in all extremities.

## 2020-05-06 ENCOUNTER — TELEPHONE (OUTPATIENT)
Dept: INTERNAL MEDICINE | Facility: CLINIC | Age: 60
End: 2020-05-06

## 2020-05-06 LAB
ALBUMIN SERPL BCP-MCNC: 3 G/DL (ref 3.5–5.2)
ALP SERPL-CCNC: 74 U/L (ref 55–135)
ALT SERPL W/O P-5'-P-CCNC: 18 U/L (ref 10–44)
ANION GAP SERPL CALC-SCNC: 12 MMOL/L (ref 8–16)
AST SERPL-CCNC: 16 U/L (ref 10–40)
BASOPHILS # BLD AUTO: 0.03 K/UL (ref 0–0.2)
BASOPHILS NFR BLD: 0.3 % (ref 0–1.9)
BILIRUB SERPL-MCNC: 0.7 MG/DL (ref 0.1–1)
BUN SERPL-MCNC: 15 MG/DL (ref 8–23)
CALCIUM SERPL-MCNC: 9 MG/DL (ref 8.7–10.5)
CHLORIDE SERPL-SCNC: 100 MMOL/L (ref 95–110)
CO2 SERPL-SCNC: 25 MMOL/L (ref 23–29)
CREAT SERPL-MCNC: 1.1 MG/DL (ref 0.5–1.4)
DIFFERENTIAL METHOD: ABNORMAL
EOSINOPHIL # BLD AUTO: 0.1 K/UL (ref 0–0.5)
EOSINOPHIL NFR BLD: 1.5 % (ref 0–8)
ERYTHROCYTE [DISTWIDTH] IN BLOOD BY AUTOMATED COUNT: 12.8 % (ref 11.5–14.5)
EST. GFR  (AFRICAN AMERICAN): >60 ML/MIN/1.73 M^2
EST. GFR  (NON AFRICAN AMERICAN): >60 ML/MIN/1.73 M^2
GLUCOSE SERPL-MCNC: 129 MG/DL (ref 70–110)
HCT VFR BLD AUTO: 41 % (ref 40–54)
HGB BLD-MCNC: 13.9 G/DL (ref 14–18)
IMM GRANULOCYTES # BLD AUTO: 0.04 K/UL (ref 0–0.04)
IMM GRANULOCYTES NFR BLD AUTO: 0.4 % (ref 0–0.5)
LYMPHOCYTES # BLD AUTO: 1 K/UL (ref 1–4.8)
LYMPHOCYTES NFR BLD: 10.5 % (ref 18–48)
MAGNESIUM SERPL-MCNC: 1.8 MG/DL (ref 1.6–2.6)
MCH RBC QN AUTO: 30.2 PG (ref 27–31)
MCHC RBC AUTO-ENTMCNC: 33.9 G/DL (ref 32–36)
MCV RBC AUTO: 89 FL (ref 82–98)
MONOCYTES # BLD AUTO: 1.2 K/UL (ref 0.3–1)
MONOCYTES NFR BLD: 12.4 % (ref 4–15)
NEUTROPHILS # BLD AUTO: 7 K/UL (ref 1.8–7.7)
NEUTROPHILS NFR BLD: 74.9 % (ref 38–73)
NRBC BLD-RTO: 0 /100 WBC
PHOSPHATE SERPL-MCNC: 2 MG/DL (ref 2.7–4.5)
PLATELET # BLD AUTO: 230 K/UL (ref 150–350)
PMV BLD AUTO: 10.2 FL (ref 9.2–12.9)
POCT GLUCOSE: 119 MG/DL (ref 70–110)
POCT GLUCOSE: 128 MG/DL (ref 70–110)
POCT GLUCOSE: 153 MG/DL (ref 70–110)
POTASSIUM SERPL-SCNC: 3 MMOL/L (ref 3.5–5.1)
PROT SERPL-MCNC: 6.7 G/DL (ref 6–8.4)
RBC # BLD AUTO: 4.6 M/UL (ref 4.6–6.2)
SODIUM SERPL-SCNC: 137 MMOL/L (ref 136–145)
WBC # BLD AUTO: 9.36 K/UL (ref 3.9–12.7)

## 2020-05-06 PROCEDURE — 96374 THER/PROPH/DIAG INJ IV PUSH: CPT

## 2020-05-06 PROCEDURE — 99225 PR SUBSEQUENT OBSERVATION CARE,LEVEL II: ICD-10-PCS | Mod: ,,, | Performed by: PHYSICIAN ASSISTANT

## 2020-05-06 PROCEDURE — 99225 PR SUBSEQUENT OBSERVATION CARE,LEVEL II: CPT | Mod: ,,, | Performed by: PHYSICIAN ASSISTANT

## 2020-05-06 PROCEDURE — 25000003 PHARM REV CODE 250: Performed by: PHYSICIAN ASSISTANT

## 2020-05-06 PROCEDURE — 36415 COLL VENOUS BLD VENIPUNCTURE: CPT

## 2020-05-06 PROCEDURE — 63600175 PHARM REV CODE 636 W HCPCS: Performed by: PHYSICIAN ASSISTANT

## 2020-05-06 PROCEDURE — 83735 ASSAY OF MAGNESIUM: CPT

## 2020-05-06 PROCEDURE — 84100 ASSAY OF PHOSPHORUS: CPT

## 2020-05-06 PROCEDURE — 96376 TX/PRO/DX INJ SAME DRUG ADON: CPT

## 2020-05-06 PROCEDURE — 80053 COMPREHEN METABOLIC PANEL: CPT

## 2020-05-06 PROCEDURE — 85025 COMPLETE CBC W/AUTO DIFF WBC: CPT

## 2020-05-06 PROCEDURE — G0378 HOSPITAL OBSERVATION PER HR: HCPCS

## 2020-05-06 RX ORDER — OXYCODONE HYDROCHLORIDE 5 MG/1
5 TABLET ORAL EVERY 4 HOURS PRN
Status: DISCONTINUED | OUTPATIENT
Start: 2020-05-06 | End: 2020-05-07 | Stop reason: HOSPADM

## 2020-05-06 RX ORDER — KETOROLAC TROMETHAMINE 30 MG/ML
15 INJECTION, SOLUTION INTRAMUSCULAR; INTRAVENOUS ONCE
Status: COMPLETED | OUTPATIENT
Start: 2020-05-06 | End: 2020-05-06

## 2020-05-06 RX ORDER — KETOROLAC TROMETHAMINE 30 MG/ML
15 INJECTION, SOLUTION INTRAMUSCULAR; INTRAVENOUS EVERY 6 HOURS PRN
Status: DISCONTINUED | OUTPATIENT
Start: 2020-05-06 | End: 2020-05-07 | Stop reason: HOSPADM

## 2020-05-06 RX ORDER — KETOROLAC TROMETHAMINE 30 MG/ML
15 INJECTION, SOLUTION INTRAMUSCULAR; INTRAVENOUS ONCE
Status: DISCONTINUED | OUTPATIENT
Start: 2020-05-06 | End: 2020-05-06

## 2020-05-06 RX ORDER — POTASSIUM CHLORIDE 20 MEQ/1
40 TABLET, EXTENDED RELEASE ORAL EVERY 4 HOURS
Status: COMPLETED | OUTPATIENT
Start: 2020-05-06 | End: 2020-05-07

## 2020-05-06 RX ORDER — OXYCODONE HYDROCHLORIDE 10 MG/1
10 TABLET ORAL EVERY 4 HOURS PRN
Status: DISCONTINUED | OUTPATIENT
Start: 2020-05-06 | End: 2020-05-07 | Stop reason: HOSPADM

## 2020-05-06 RX ORDER — HYDROMORPHONE HYDROCHLORIDE 1 MG/ML
0.5 INJECTION, SOLUTION INTRAMUSCULAR; INTRAVENOUS; SUBCUTANEOUS ONCE
Status: COMPLETED | OUTPATIENT
Start: 2020-05-06 | End: 2020-05-06

## 2020-05-06 RX ADMIN — PANTOPRAZOLE SODIUM 40 MG: 40 TABLET, DELAYED RELEASE ORAL at 09:05

## 2020-05-06 RX ADMIN — ACETAMINOPHEN 650 MG: 325 TABLET ORAL at 03:05

## 2020-05-06 RX ADMIN — POLYETHYLENE GLYCOL 3350 17 G: 17 POWDER, FOR SOLUTION ORAL at 10:05

## 2020-05-06 RX ADMIN — Medication 6 MG: at 09:05

## 2020-05-06 RX ADMIN — ASPIRIN 81 MG: 81 TABLET, COATED ORAL at 10:05

## 2020-05-06 RX ADMIN — KETOROLAC TROMETHAMINE 15 MG: 30 INJECTION, SOLUTION INTRAMUSCULAR at 09:05

## 2020-05-06 RX ADMIN — KETOROLAC TROMETHAMINE 15 MG: 30 INJECTION, SOLUTION INTRAMUSCULAR at 02:05

## 2020-05-06 RX ADMIN — HYDROMORPHONE HYDROCHLORIDE 0.5 MG: 1 INJECTION, SOLUTION INTRAMUSCULAR; INTRAVENOUS; SUBCUTANEOUS at 03:05

## 2020-05-06 RX ADMIN — OXYCODONE HYDROCHLORIDE 10 MG: 10 TABLET ORAL at 08:05

## 2020-05-06 RX ADMIN — PANTOPRAZOLE SODIUM 40 MG: 40 TABLET, DELAYED RELEASE ORAL at 10:05

## 2020-05-06 RX ADMIN — POTASSIUM CHLORIDE 40 MEQ: 1500 TABLET, EXTENDED RELEASE ORAL at 09:05

## 2020-05-06 RX ADMIN — OXYCODONE HYDROCHLORIDE 10 MG: 10 TABLET ORAL at 02:05

## 2020-05-06 RX ADMIN — KETOROLAC TROMETHAMINE 15 MG: 30 INJECTION, SOLUTION INTRAMUSCULAR at 05:05

## 2020-05-06 NOTE — TELEPHONE ENCOUNTER
"Jennifer called and advised that the pt has some compliants as he is in pain and he called someone and they didn't respond and when they responded they sounded "disgruntuled" per pt I gave Jennifer the number to patient relations and she can call and discuss the concerns as he is admitted. She understood and agreed and will call back if she needs anything termed the call   "

## 2020-05-06 NOTE — PLAN OF CARE
Reviewed Plan of Care with patient.  Patient verbalized understanding.  Patient was given pain meds x3 this shift. Pain under control.  VSS, Afebrile.  Safety measures in place.  No c/o falls or injuries this shift.  Will continue to monitor patient's care.

## 2020-05-06 NOTE — H&P
[facility] Hospital Medicine  History & Physical    Patient Name: Hi Rubio  MRN: [id number]  Admission Date: 5/5/2020  Attending Physician: Jeovanny Persaud MD   Primary Care Provider: Josefina eKndall MD    [facility] Medicine Team: [facility] HOSP MED E Sukhwinder Botello PA-C     Patient information was obtained from patient and ER records.     Subjective:     Principal Problem:Pyelonephritis    Chief Complaint:   Chief Complaint   Patient presents with    Flank Pain     seen last week for same thing, L flank lower back pain    Fever        HPI: Hi Rubio is a 60 y.o. male who with CAD, DM, GERD, HTN, HLD presents with complaints of flank pain for the past 6 weeks. Pain is sharp and persistent. He reports he has been treated at least three times for UTI s however they always return. He reports associated dysuria, hematuria, generalized fatigue and weakness. He has nausea exacerbated with eating and reports a 16 lb weight loss in the past 6 weeks. Denies vomiting or diarrhea.  He reports a T max of 103.8°.  Denies cough or shortness of breath.  ED: K 2.8. UA infectious. There is subtle heterogeneous enhancement of the left kidney and perhaps some perinephric stranding.      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 -     Hyperlipidemia     Hypertension     Memory loss     Thyroid nodule 8/22/2019    Traumatic iritis - Left Eye 2/27/2013       Past Surgical History:   Procedure Laterality Date    CEREBRAL ANGIOGRAM N/A 1/6/2020    Procedure: ANGIOGRAM-CEREBRAL;  [facility];  Location: [facility] OR [address] [place] [address];  Service: Radiology;  Laterality: N/A;   /Nagi    CERVICAL FUSION  12/30/2015    COLONOSCOPY N/A 4/7/2017    Procedure: COLONOSCOPY;  Surgeon: Handy Frederick MD;  Location: [facility] ([address] [place] [address]);  Service: Endoscopy;  Laterality: N/A;    HERNIA REPAIR      PROSTATECTOMY         Review of patient's allergies indicates:   Allergen Reactions    Lisinopril Anaphylaxis and Nausea And Vomiting     General bad feeling    Adhesive     Crestor [rosuvastatin] Swelling     Lip swelling.     Lipitor [atorvastatin] Other (See Comments)     Muscle aches    Metformin      Used XR and experienced flatulance and abdominal pain.        No current facility-administered medications on file prior to encounter.      Current Outpatient Medications on File Prior to Encounter   Medication Sig    ALPRAZolam (XANAX) 0.5 MG tablet TAKE 1 TABLET BY MOUTH THREE TIMES A DAY AS NEEDED FOR ANXIETY    aspirin (ECOTRIN) 81 MG EC tablet Take 1 tablet (81 mg total) by mouth once daily.    chlorthalidone (HYGROTEN) 25 MG Tab Take 1 tablet (25 mg total) by mouth once daily.    ezetimibe (ZETIA) 10 mg tablet Take 1 tablet (10 mg total) by mouth once daily.    ibuprofen (ADVIL,MOTRIN) 600 MG tablet Take 1 tablet (600 mg total) by mouth every 6 (six) hours as needed for Pain.    metoprolol succinate (TOPROL-XL) 100 MG 24 hr tablet TAKE 1 TABLET BY MOUTH EVERY DAY    NIFEdipine (PROCARDIA-XL) 60 MG (OSM) 24 hr tablet Take 1 tablet (60 mg total) by mouth once daily.    pravastatin (PRAVACHOL) 80 MG tablet TAKE 1 TABLET BY MOUTH EVERYDAY AT BEDTIME    sertraline (ZOLOFT) 25 MG tablet TAKE 1 TABLET BY MOUTH EVERY DAY    baclofen (LIORESAL) 10 MG tablet Take 10 mg by mouth every evening.    CONTOUR NEXT TEST STRIPS Strp USE TO TEST BLOOD SUGAR ONCE DAILY    cyclobenzaprine (FLEXERIL) 5 MG tablet Take 1 tablet (5 mg total) by mouth 2 (two) times daily as needed for Muscle spasms.    diclofenac sodium (VOLTAREN) 1 % Gel Apply 2 g topically once daily.    flu vacc [id number]  6mos up,PF, 60 mcg (15 mcg x 4)/0.5 mL Syrg Inject into the muscle.    lidocaine (LIDODERM) 5 % Place 1 patch onto the skin once daily. Remove & Discard patch within 12 hours or as directed by MD    MICROLET LANCET Misc 1 lancet by Misc.(Non-Drug; Combo Route) route once daily. Test blood glucose once daily as instructed.    nitroGLYCERIN (NITROSTAT) 0.3 MG SL tablet Place 1 tablet (0.3 mg total) under the tongue every 5 (five) minutes as needed for Chest pain.    pantoprazole (PROTONIX) 40 MG tablet TAKE 1 TABLET BY MOUTH TWICE A DAY    potassium chloride SA (K-DUR,KLOR-CON) 10 MEQ tablet Take 2 tablets (20 mEq total) by mouth once daily.    sildenafil (REVATIO) 20 mg Tab TAKE 2-5 TABLETS BY MOUTH AS NEEDED    sildenafil (VIAGRA) 100 MG tablet Take 1 tablet (100 mg total) by mouth daily as needed for Erectile Dysfunction.     Family History     Problem Relation (Age of Onset)    Benign prostatic hyperplasia Brother    Cancer Sister, Brother    Cataracts Maternal Grandmother    Diabetes Paternal Uncle    Heart disease Mother, Father    Hepatitis Brother    Hyperlipidemia Mother, Brother, Brother    Hypertension Mother, Brother, Brother, Brother, Brother        Tobacco Use    Smoking status: Former Smoker     Packs/day: 1.00     Years: 30.00     Pack years: 30.00     Types: Cigarettes     Last attempt to quit: 2011     Years since quittin.4    Smokeless tobacco: Never Used   Substance and Sexual Activity    Alcohol use: Yes     Frequency: 2-3 times a week     Drinks per session: 1 or 2     Binge frequency: Never     Comment: occas - nonalcoholic beer or beer    Drug use: No    Sexual activity: Yes     Partners: Female     Review of Systems   Constitutional: Positive for appetite change, chills and fever.   HENT: Negative for congestion, dental problem and sore throat.    Eyes: Negative for visual disturbance.   Respiratory: Negative for cough, shortness of breath and wheezing.    Cardiovascular:  Negative for chest pain and palpitations.   Gastrointestinal: Positive for nausea. Negative for abdominal distention, abdominal pain, constipation, diarrhea and vomiting.   Genitourinary: Positive for difficulty urinating, dysuria, flank pain (Left sided), frequency and hematuria. Negative for decreased urine volume.   Musculoskeletal: Negative for arthralgias and back pain.   Skin: Negative for color change.   Allergic/Immunologic: Negative for immunocompromised state.   Neurological: Negative for dizziness, weakness and headaches.   Psychiatric/Behavioral: Negative for agitation, behavioral problems and confusion.     Objective:     Vital Signs (Most Recent):  Temp: 97.5 °F (36.4 °C) (05/05/20 1911)  Pulse: 78 (05/05/20 1932)  Resp: 16 (05/05/20 1911)  BP: (!) 88/56 (05/05/20 1922)  SpO2: (!) 93 % (05/05/20 1911) Vital Signs (24h Range):  Temp:  [97.5 °F (36.4 °C)-101.6 °F (38.7 °C)] 97.5 °F (36.4 °C)  Pulse:  [] 78  Resp:  [14-27] 16  SpO2:  [93 %-97 %] 93 %  BP: ()/(56-86) 88/56     Weight: 84 kg (185 lb 3 oz)  Body mass index is 22.54 kg/m².    Physical Exam   Constitutional: He is oriented to person, place, and time. He appears well-nourished. No distress.   HENT:   Head: Normocephalic and atraumatic.   Eyes: Pupils are equal, round, and reactive to light. EOM are normal.   Neck: Normal range of motion.   Cardiovascular: Normal rate and regular rhythm.   No murmur heard.  Pulmonary/Chest: Effort normal and breath sounds normal. No stridor. No respiratory distress.   Abdominal: Soft. He exhibits no distension. There is no tenderness.   Musculoskeletal: Normal range of motion. He exhibits no edema.   Neurological: He is alert and oriented to person, place, and time. No cranial nerve deficit. Coordination normal.   Skin: Skin is warm and dry. Capillary refill takes less than 2 seconds.   Psychiatric: He has a normal mood and affect. His behavior is normal.   Nursing note and vitals  reviewed.        CRANIAL NERVES     CN III, IV, VI   Pupils are equal, round, and reactive to light.  Extraocular motions are normal.        Significant Labs: All pertinent labs within the past 24 hours have been reviewed.    Significant Imaging: I have reviewed all pertinent imaging results/findings within the past 24 hours.    Assessment/Plan:     * Pyelonephritis  Sepsis   60 y.o. who present with complaints of dysuria, frequency, urgency, flank pain, hematuria. Febrile without leukocytosis.    - febrile 101.6 w/o leukocytosis   - UA: 2+ leuks, + nitrite, 37 WBCs, 1+ protein  - 3/4 SIRS for tachypnea, fever and tachycardia   - empiric ceftriaxone 1g daily   - urine culture pending  - previous UTI in 3/2020 with pan-sensitive E. Coli  - urine gram stain ordered   - blood cultures ordered    Hypokalemia  2.8 on admission   - supplementation ordered in ED  - follow daily     Coronary artery disease due to calcified coronary lesion  - continue home medications     Mixed hyperlipidemia  - continue home meds    Essential hypertension  - hold home meds in setting of hypotension     VTE Risk Mitigation (From admission, onward)         Ordered     IP VTE LOW RISK PATIENT  Once      05/05/20 5009                   Sukhiwnder Botello PA-C  Department of Hospital Medicine   [facility]

## 2020-05-06 NOTE — ASSESSMENT & PLAN NOTE
Sepsis   60 y.o. who present with complaints of dysuria, frequency, urgency, flank pain, hematuria. Febrile without leukocytosis.    - febrile 101.6 w/o leukocytosis   - UA: 2+ leuks, + nitrite, 37 WBCs, 1+ protein  - 3/4 SIRS for tachypnea, fever and tachycardia   - empiric ceftriaxone 1g daily   - urine culture pending  - previous UTI in 3/2020 with pan-sensitive E. Coli  - urine gram stain ordered   - blood cultures ordered

## 2020-05-06 NOTE — PLAN OF CARE
CM spoke with patient about discharge planning assessment. Pt lives with spouse and has transportation home at discharge. Pt verified PCP and Pharmacy. CM will continue to follow for discharge needs.     CM reached out to provider to address pt and families concern about pain management.        05/06/20 6497   Discharge Assessment   Assessment Type Discharge Planning Assessment   Confirmed/corrected address and phone number on facesheet? Yes   Assessment information obtained from? Patient   Expected Length of Stay (days) 2   Communicated expected length of stay with patient/caregiver yes   Prior to hospitilization cognitive status: Alert/Oriented   Prior to hospitalization functional status: Independent   Current cognitive status: Alert/Oriented   Current Functional Status: Independent   Lives With spouse   Able to Return to Prior Arrangements yes   Is patient able to care for self after discharge? Yes   Patient's perception of discharge disposition home or selfcare   Readmission Within the Last 30 Days no previous admission in last 30 days   Patient currently being followed by outpatient case management? No   Patient currently receives any other outside agency services? No   Equipment Currently Used at Home glucometer   Do you have any problems affording any of your prescribed medications? No   Is the patient taking medications as prescribed? yes   Does the patient have transportation home? Yes   Transportation Anticipated family or friend will provide   Does the patient receive services at the [facility]? No   Discharge Plan A Home with family   Discharge Plan B Home Health   DME Needed Upon Discharge  none   Patient/Family in Agreement with Plan yes

## 2020-05-06 NOTE — HPI
Hi Rubio is a 60 y.o. male who with CAD, DM, GERD, HTN, HLD presents with complaints of flank pain for the past 6 weeks. Pain is sharp and persistent. He reports he has been treated at least three times for UTI s however they always return. He reports associated dysuria, hematuria, generalized fatigue and weakness. He has nausea exacerbated with eating and reports a 16 lb weight loss in the past 6 weeks. Denies vomiting or diarrhea. He reports a Tmax of 103.8°.  Denies cough or shortness of breath.      ED: K 2.8. UA infectious. There is subtle heterogeneous enhancement of the left kidney and perhaps some perinephric stranding.

## 2020-05-06 NOTE — NURSING
Received report from Francisco BENNETT in ER. Pt arrived to OBS via stretcher at 1846. Pt accompanied by transport associate. Pt reports pain to left flank 2/10. C/o lightheadedness when up. Pt oriented to room. Bed at lowest level, wheels locked. Call light within pt reach.

## 2020-05-06 NOTE — SUBJECTIVE & OBJECTIVE
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 -     Hyperlipidemia     Hypertension     Memory loss     Thyroid nodule 8/22/2019    Traumatic iritis - Left Eye 2/27/2013       Past Surgical History:   Procedure Laterality Date    CEREBRAL ANGIOGRAM N/A 1/6/2020    Procedure: ANGIOGRAM-CEREBRAL;  Surgeon: [facility] Diagnostic Provider;  [facility]: [facility] OR 2ND FLR;  Service: Radiology;  Laterality: N/A;  /Nagi    CERVICAL FUSION  12/30/2015    COLONOSCOPY N/A 4/7/2017    Procedure: COLONOSCOPY;  Surgeon: Handy Frederick MD;  [facility] (4[facility]  Service: Endoscopy;  Laterality: N/A;    HERNIA REPAIR      PROSTATECTOMY         Review of patient's allergies indicates:   Allergen Reactions    Lisinopril Anaphylaxis and Nausea And Vomiting     General bad feeling    Adhesive     Crestor [rosuvastatin] Swelling     Lip swelling.     Lipitor [atorvastatin] Other (See Comments)     Muscle aches    Metformin      Used XR and experienced flatulance and abdominal pain.        No current facility-administered medications on file prior to encounter.      Current Outpatient Medications on File Prior to Encounter   Medication Sig    ALPRAZolam (XANAX) 0.5 MG tablet TAKE 1 TABLET BY MOUTH THREE TIMES A DAY AS NEEDED FOR ANXIETY    aspirin (ECOTRIN) 81 MG EC tablet Take 1 tablet (81 mg total) by mouth once daily.    chlorthalidone (HYGROTEN) 25 MG Tab Take 1 tablet (25 mg total) by mouth once daily.    ezetimibe (ZETIA) 10 mg tablet Take 1 tablet (10 mg total) by mouth once daily.    ibuprofen (ADVIL,MOTRIN) 600 MG tablet Take 1 tablet (600 mg total) by mouth every 6 (six) hours as needed  for Pain.    metoprolol succinate (TOPROL-XL) 100 MG 24 hr tablet TAKE 1 TABLET BY MOUTH EVERY DAY    NIFEdipine (PROCARDIA-XL) 60 MG (OSM) 24 hr tablet Take 1 tablet (60 mg total) by mouth once daily.    pravastatin (PRAVACHOL) 80 MG tablet TAKE 1 TABLET BY MOUTH EVERYDAY AT BEDTIME    sertraline (ZOLOFT) 25 MG tablet TAKE 1 TABLET BY MOUTH EVERY DAY    baclofen (LIORESAL) 10 MG tablet Take 10 mg by mouth every evening.    CONTOUR NEXT TEST STRIPS Strp USE TO TEST BLOOD SUGAR ONCE DAILY    cyclobenzaprine (FLEXERIL) 5 MG tablet Take 1 tablet (5 mg total) by mouth 2 (two) times daily as needed for Muscle spasms.    diclofenac sodium (VOLTAREN) 1 % Gel Apply 2 g topically once daily.    flu vacc [id number] 6mos up,PF, 60 mcg (15 mcg x 4)/0.5 mL Syrg Inject into the muscle.    lidocaine (LIDODERM) 5 % Place 1 patch onto the skin once daily. Remove & Discard patch within 12 hours or as directed by MD    MICROLET LANCET Misc 1 lancet by Misc.(Non-Drug; Combo Route) route once daily. Test blood glucose once daily as instructed.    nitroGLYCERIN (NITROSTAT) 0.3 MG SL tablet Place 1 tablet (0.3 mg total) under the tongue every 5 (five) minutes as needed for Chest pain.    pantoprazole (PROTONIX) 40 MG tablet TAKE 1 TABLET BY MOUTH TWICE A DAY    potassium chloride SA (K-DUR,KLOR-CON) 10 MEQ tablet Take 2 tablets (20 mEq total) by mouth once daily.    sildenafil (REVATIO) 20 mg Tab TAKE 2-5 TABLETS BY MOUTH AS NEEDED    sildenafil (VIAGRA) 100 MG tablet Take 1 tablet (100 mg total) by mouth daily as needed for Erectile Dysfunction.     Family History     Problem Relation (Age of Onset)    Benign prostatic hyperplasia Brother    Cancer Sister, Brother    Cataracts Maternal Grandmother    Diabetes Paternal Uncle    Heart disease Mother, Father    Hepatitis Brother    Hyperlipidemia Mother, Brother, Brother    Hypertension Mother, Brother, Brother, Brother, Brother        Tobacco Use    Smoking status: Former  Smoker     Packs/day: 1.00     Years: 30.00     Pack years: 30.00     Types: Cigarettes     Last attempt to quit: 2011     Years since quittin.4    Smokeless tobacco: Never Used   Substance and Sexual Activity    Alcohol use: Yes     Frequency: 2-3 times a week     Drinks per session: 1 or 2     Binge frequency: Never     Comment: occas - nonalcoholic beer or beer    Drug use: No    Sexual activity: Yes     Partners: Female     Review of Systems   Constitutional: Positive for appetite change, chills and fever.   HENT: Negative for congestion, dental problem and sore throat.    Eyes: Negative for visual disturbance.   Respiratory: Negative for cough, shortness of breath and wheezing.    Cardiovascular: Negative for chest pain and palpitations.   Gastrointestinal: Positive for nausea. Negative for abdominal distention, abdominal pain, constipation, diarrhea and vomiting.   Genitourinary: Positive for difficulty urinating, dysuria, flank pain (Left sided), frequency and hematuria. Negative for decreased urine volume.   Musculoskeletal: Negative for arthralgias and back pain.   Skin: Negative for color change.   Allergic/Immunologic: Negative for immunocompromised state.   Neurological: Negative for dizziness, weakness and headaches.   Psychiatric/Behavioral: Negative for agitation, behavioral problems and confusion.     Objective:     Vital Signs (Most Recent):  Temp: 97.5 °F (36.4 °C) (20)  Pulse: 78 (20)  Resp: 16 (20)  BP: (!) 88/56 (20)  SpO2: (!) 93 % (20) Vital Signs (24h Range):  Temp:  [97.5 °F (36.4 °C)-101.6 °F (38.7 °C)] 97.5 °F (36.4 °C)  Pulse:  [] 78  Resp:  [14-27] 16  SpO2:  [93 %-97 %] 93 %  BP: ()/(56-86) 88/56     Weight: 84 kg (185 lb 3 oz)  Body mass index is 22.54 kg/m².    Physical Exam   Constitutional: He is oriented to person, place, and time. He appears well-nourished. No distress.   HENT:   Head: Normocephalic  and atraumatic.   Eyes: Pupils are equal, round, and reactive to light. EOM are normal.   Neck: Normal range of motion.   Cardiovascular: Normal rate and regular rhythm.   No murmur heard.  Pulmonary/Chest: Effort normal and breath sounds normal. No stridor. No respiratory distress.   Abdominal: Soft. He exhibits no distension. There is no tenderness.   Musculoskeletal: Normal range of motion. He exhibits no edema.   Neurological: He is alert and oriented to person, place, and time. No cranial nerve deficit. Coordination normal.   Skin: Skin is warm and dry. Capillary refill takes less than 2 seconds.   Psychiatric: He has a normal mood and affect. His behavior is normal.   Nursing note and vitals reviewed.        CRANIAL NERVES     CN III, IV, VI   Pupils are equal, round, and reactive to light.  Extraocular motions are normal.        Significant Labs: All pertinent labs within the past 24 hours have been reviewed.    Significant Imaging: I have reviewed all pertinent imaging results/findings within the past 24 hours.

## 2020-05-07 ENCOUNTER — PATIENT MESSAGE (OUTPATIENT)
Dept: INTERNAL MEDICINE | Facility: CLINIC | Age: 60
End: 2020-05-07

## 2020-05-07 ENCOUNTER — TELEPHONE (OUTPATIENT)
Dept: UROLOGY | Facility: CLINIC | Age: 60
End: 2020-05-07

## 2020-05-07 VITALS
WEIGHT: 185.19 LBS | BODY MASS INDEX: 22.55 KG/M2 | HEART RATE: 74 BPM | HEIGHT: 76 IN | OXYGEN SATURATION: 98 % | DIASTOLIC BLOOD PRESSURE: 78 MMHG | RESPIRATION RATE: 18 BRPM | TEMPERATURE: 96 F | SYSTOLIC BLOOD PRESSURE: 144 MMHG

## 2020-05-07 DIAGNOSIS — R10.9 FLANK PAIN: Primary | ICD-10-CM

## 2020-05-07 LAB
ALBUMIN SERPL BCP-MCNC: 3 G/DL (ref 3.5–5.2)
ALP SERPL-CCNC: 69 U/L (ref 55–135)
ALT SERPL W/O P-5'-P-CCNC: 19 U/L (ref 10–44)
ANION GAP SERPL CALC-SCNC: 11 MMOL/L (ref 8–16)
AST SERPL-CCNC: 18 U/L (ref 10–40)
BACTERIA UR CULT: ABNORMAL
BASOPHILS # BLD AUTO: 0.03 K/UL (ref 0–0.2)
BASOPHILS NFR BLD: 0.5 % (ref 0–1.9)
BILIRUB SERPL-MCNC: 0.4 MG/DL (ref 0.1–1)
BUN SERPL-MCNC: 16 MG/DL (ref 8–23)
C TRACH DNA SPEC QL NAA+PROBE: NOT DETECTED
CALCIUM SERPL-MCNC: 9.3 MG/DL (ref 8.7–10.5)
CHLORIDE SERPL-SCNC: 101 MMOL/L (ref 95–110)
CO2 SERPL-SCNC: 26 MMOL/L (ref 23–29)
CREAT SERPL-MCNC: 1.1 MG/DL (ref 0.5–1.4)
DIFFERENTIAL METHOD: ABNORMAL
EOSINOPHIL # BLD AUTO: 0.3 K/UL (ref 0–0.5)
EOSINOPHIL NFR BLD: 4.5 % (ref 0–8)
ERYTHROCYTE [DISTWIDTH] IN BLOOD BY AUTOMATED COUNT: 12.8 % (ref 11.5–14.5)
EST. GFR  (AFRICAN AMERICAN): >60 ML/MIN/1.73 M^2
EST. GFR  (NON AFRICAN AMERICAN): >60 ML/MIN/1.73 M^2
GLUCOSE SERPL-MCNC: 122 MG/DL (ref 70–110)
HCT VFR BLD AUTO: 40.8 % (ref 40–54)
HGB BLD-MCNC: 13.2 G/DL (ref 14–18)
IMM GRANULOCYTES # BLD AUTO: 0.01 K/UL (ref 0–0.04)
IMM GRANULOCYTES NFR BLD AUTO: 0.2 % (ref 0–0.5)
LYMPHOCYTES # BLD AUTO: 1.7 K/UL (ref 1–4.8)
LYMPHOCYTES NFR BLD: 26.1 % (ref 18–48)
MAGNESIUM SERPL-MCNC: 2 MG/DL (ref 1.6–2.6)
MCH RBC QN AUTO: 29 PG (ref 27–31)
MCHC RBC AUTO-ENTMCNC: 32.4 G/DL (ref 32–36)
MCV RBC AUTO: 90 FL (ref 82–98)
MONOCYTES # BLD AUTO: 1 K/UL (ref 0.3–1)
MONOCYTES NFR BLD: 15.3 % (ref 4–15)
N GONORRHOEA DNA SPEC QL NAA+PROBE: NOT DETECTED
NEUTROPHILS # BLD AUTO: 3.4 K/UL (ref 1.8–7.7)
NEUTROPHILS NFR BLD: 53.4 % (ref 38–73)
NRBC BLD-RTO: 0 /100 WBC
PHOSPHATE SERPL-MCNC: 3.6 MG/DL (ref 2.7–4.5)
PLATELET # BLD AUTO: 251 K/UL (ref 150–350)
PMV BLD AUTO: 10 FL (ref 9.2–12.9)
POCT GLUCOSE: 167 MG/DL (ref 70–110)
POTASSIUM SERPL-SCNC: 3.7 MMOL/L (ref 3.5–5.1)
PROT SERPL-MCNC: 6.9 G/DL (ref 6–8.4)
RBC # BLD AUTO: 4.55 M/UL (ref 4.6–6.2)
SODIUM SERPL-SCNC: 138 MMOL/L (ref 136–145)
WBC # BLD AUTO: 6.4 K/UL (ref 3.9–12.7)

## 2020-05-07 PROCEDURE — 36415 COLL VENOUS BLD VENIPUNCTURE: CPT

## 2020-05-07 PROCEDURE — 63600175 PHARM REV CODE 636 W HCPCS: Performed by: PHYSICIAN ASSISTANT

## 2020-05-07 PROCEDURE — 80053 COMPREHEN METABOLIC PANEL: CPT

## 2020-05-07 PROCEDURE — 25000003 PHARM REV CODE 250: Performed by: PHYSICIAN ASSISTANT

## 2020-05-07 PROCEDURE — 99217 PR OBSERVATION CARE DISCHARGE: ICD-10-PCS | Mod: ,,, | Performed by: PHYSICIAN ASSISTANT

## 2020-05-07 PROCEDURE — G0378 HOSPITAL OBSERVATION PER HR: HCPCS

## 2020-05-07 PROCEDURE — 84100 ASSAY OF PHOSPHORUS: CPT

## 2020-05-07 PROCEDURE — 99217 PR OBSERVATION CARE DISCHARGE: CPT | Mod: ,,, | Performed by: PHYSICIAN ASSISTANT

## 2020-05-07 PROCEDURE — 94761 N-INVAS EAR/PLS OXIMETRY MLT: CPT

## 2020-05-07 PROCEDURE — 83735 ASSAY OF MAGNESIUM: CPT

## 2020-05-07 PROCEDURE — 85025 COMPLETE CBC W/AUTO DIFF WBC: CPT

## 2020-05-07 PROCEDURE — 96376 TX/PRO/DX INJ SAME DRUG ADON: CPT

## 2020-05-07 RX ORDER — AMOXICILLIN AND CLAVULANATE POTASSIUM 875; 125 MG/1; MG/1
1 TABLET, FILM COATED ORAL EVERY 12 HOURS
Qty: 24 TABLET | Refills: 0 | Status: SHIPPED | OUTPATIENT
Start: 2020-05-08 | End: 2020-05-20

## 2020-05-07 RX ORDER — CEFTRIAXONE 1 G/1
1 INJECTION, POWDER, FOR SOLUTION INTRAMUSCULAR; INTRAVENOUS
Status: DISCONTINUED | OUTPATIENT
Start: 2020-05-07 | End: 2020-05-07 | Stop reason: HOSPADM

## 2020-05-07 RX ADMIN — OXYCODONE HYDROCHLORIDE 10 MG: 10 TABLET ORAL at 01:05

## 2020-05-07 RX ADMIN — CEFTRIAXONE SODIUM 1 G: 1 INJECTION, POWDER, FOR SOLUTION INTRAMUSCULAR; INTRAVENOUS at 10:05

## 2020-05-07 RX ADMIN — ASPIRIN 81 MG: 81 TABLET, COATED ORAL at 10:05

## 2020-05-07 RX ADMIN — KETOROLAC TROMETHAMINE 15 MG: 30 INJECTION, SOLUTION INTRAMUSCULAR at 04:05

## 2020-05-07 RX ADMIN — KETOROLAC TROMETHAMINE 15 MG: 30 INJECTION, SOLUTION INTRAMUSCULAR at 11:05

## 2020-05-07 RX ADMIN — POTASSIUM CHLORIDE 40 MEQ: 1500 TABLET, EXTENDED RELEASE ORAL at 01:05

## 2020-05-07 RX ADMIN — PANTOPRAZOLE SODIUM 40 MG: 40 TABLET, DELAYED RELEASE ORAL at 10:05

## 2020-05-07 RX ADMIN — OXYCODONE HYDROCHLORIDE 10 MG: 10 TABLET ORAL at 05:05

## 2020-05-07 NOTE — ASSESSMENT & PLAN NOTE
Sepsis   60 y.o. who present with complaints of dysuria, frequency, urgency, flank pain, hematuria. Febrile without leukocytosis.    - febrile 101.6 w/o leukocytosis on admission    - UA: 2+ leuks, + nitrite, 37 WBCs, 1+ protein  - 3/4 SIRS for tachypnea, fever and tachycardia   - empiric ceftriaxone 1g daily   - urine culture pending  - previous UTI in 3/2020 with pan-sensitive E. Coli  - urine gram stain ordered   - blood cultures ordered

## 2020-05-07 NOTE — NURSING
Patient awake, alert and responsive, oriented x 4.  Vitals stable.  No distress noted.   Complained of mild flank pain, administered prn pain medication as ordered.   [facility] Medicine Med E spoke to patient this morning.  Reviewed plan of care with patient, verbalized understanding.  Patient to be discharged.

## 2020-05-07 NOTE — PROGRESS NOTES
[facility] Hospital Medicine  Progress Note    Patient Name: Hi Rubio  MRN: [id number]  Patient Class: OP- Observation   Admission Date: 5/5/2020  Length of Stay: 0 days  Attending Physician: Jeovanny Persaud MD  Primary Care Provider: Josefina Kendall MD    [facility] Medicine Team: [facility] MED E Sukhwinder APRYL CATHRYN Botello    Subjective:     Principal Problem:Pyelonephritis        HPI:  Hi Rubio is a 60 y.o. male who with CAD, DM, GERD, HTN, HLD presents with complaints of flank pain for the past 6 weeks. Pain is sharp and persistent. He reports he has been treated at least three times for UTI s however they always return. He reports associated dysuria, hematuria, generalized fatigue and weakness. He has nausea exacerbated with eating and reports a 16 lb weight loss in the past 6 weeks. Denies vomiting or diarrhea.  He reports a T max of 103.8°.  Denies cough or shortness of breath.      ED: K 2.8. UA infectious. There is subtle heterogeneous enhancement of the left kidney and perhaps some perinephric stranding.    Overview/Hospital Course:  60 y.o. who was admitted to hospital medicine with concern for pyelonephritis.     Interval History: Patient reports pain throughout the day, mostly in lower back. Awaiting final cultures, Discussed mass on kidney that was noted on CT. Will need close follow-up and outpatient biopsy.     Review of Systems   Constitutional: Positive for appetite change. Negative for chills and fever.   HENT: Negative for congestion, dental problem and sore throat.    Eyes: Negative for visual disturbance.   Respiratory: Negative for cough, shortness of breath and wheezing.    Cardiovascular: Negative for chest pain and palpitations.   Gastrointestinal: Positive for nausea. Negative for abdominal distention, abdominal pain, constipation, diarrhea and vomiting.   Genitourinary: Positive for difficulty urinating, dysuria, flank pain (Left sided), frequency and  hematuria. Negative for decreased urine volume.   Musculoskeletal: Positive for back pain. Negative for arthralgias.   Skin: Negative for color change.   Allergic/Immunologic: Negative for immunocompromised state.   Neurological: Negative for dizziness, weakness and headaches.   Psychiatric/Behavioral: Negative for agitation, behavioral problems and confusion.     Objective:     Vital Signs (Most Recent):  Temp: 99.6 °F (37.6 °C) (05/06/20 1920)  Pulse: 88 (05/06/20 1920)  Resp: 18 (05/06/20 1920)  BP: 123/75 (05/06/20 1920)  SpO2: (!) 94 % (05/06/20 1920) Vital Signs (24h Range):  Temp:  [97.3 °F (36.3 °C)-99.6 °F (37.6 °C)] 99.6 °F (37.6 °C)  Pulse:  [] 88  Resp:  [18-20] 18  SpO2:  [92 %-97 %] 94 %  BP: ()/(62-76) 123/75     Weight: 84 kg (185 lb 3 oz)  Body mass index is 22.54 kg/m².  No intake or output data in the 24 hours ending 05/06/20 1941   Physical Exam   Constitutional: He is oriented to person, place, and time. He appears well-nourished. No distress.   HENT:   Head: Normocephalic and atraumatic.   Eyes: Pupils are equal, round, and reactive to light. EOM are normal.   Neck: Normal range of motion.   Cardiovascular: Normal rate and regular rhythm.   No murmur heard.  Pulmonary/Chest: Effort normal and breath sounds normal. No stridor. No respiratory distress.   Abdominal: Soft. He exhibits no distension. There is no tenderness.   +CVA tenderness L>R   Musculoskeletal: Normal range of motion. He exhibits no edema.   Neurological: He is alert and oriented to person, place, and time. No cranial nerve deficit. Coordination normal.   Skin: Skin is warm and dry. Capillary refill takes less than 2 seconds.   Psychiatric: He has a normal mood and affect. His behavior is normal.   Nursing note and vitals reviewed.      Significant Labs: All pertinent labs within the past 24 hours have been reviewed.    Significant Imaging: I have reviewed all pertinent imaging results/findings within the past 24  hours.      Assessment/Plan:      * Pyelonephritis  Sepsis   60 y.o. who present with complaints of dysuria, frequency, urgency, flank pain, hematuria. Febrile without leukocytosis.    - febrile 101.6 w/o leukocytosis on admission    - UA: 2+ leuks, + nitrite, 37 WBCs, 1+ protein  - 3/4 SIRS for tachypnea, fever and tachycardia   - empiric ceftriaxone 1g daily   - urine culture pending  - previous UTI in 3/2020 with pan-sensitive E. Coli  - urine gram stain ordered   - blood cultures ordered    Hypokalemia  3.0 today, will order supplementation   - 2.8 on admission   - supplementation ordered in ED  - follow daily     Coronary artery disease due to calcified coronary lesion  - continue home medications     Mixed hyperlipidemia  - continue home meds    Essential hypertension  - hold home meds in setting of hypotension     VTE Risk Mitigation (From admission, onward)         Ordered     IP VTE LOW RISK PATIENT  Once      05/05/20 3338                      Sukhwinder Botello PA-C  Department of Hospital Medicine   [facility]

## 2020-05-07 NOTE — HOSPITAL COURSE
60 y.o. who was admitted to hospital medicine with concern for pyelonephritis. CT abd pelvis consistent with pyelo, also a 1.6cm heterogeneous hypodensity in R kidney noted. UA infectious, UCx shows pan-sensitive E. Coli. Discharge with Augmentin to complete 14d course. Urology follow up for further investigation of hypodensity in R kidney noted on CT. Pt stable for dc with urology f/u, return precautions discussed, no further questions at d/c.

## 2020-05-07 NOTE — ASSESSMENT & PLAN NOTE
3.0 today, will order supplementation   - 2.8 on admission   - supplementation ordered in ED  - follow daily

## 2020-05-07 NOTE — PLAN OF CARE
05/07/20 1145   Final Note   Assessment Type Final Discharge Note   Anticipated Discharge Disposition Home   What phone number can be called within the next 1-3 days to see how you are doing after discharge? [phone]   Discharge plans and expectations educations in teach back method with documentation complete? Yes   Right Care Referral Info   Post Acute Recommendation No Care     Future Appointments   Date Time Provider Department Center   5/27/2020  1:40 PM Josefina Kendall MD [facility]

## 2020-05-07 NOTE — SUBJECTIVE & OBJECTIVE
Interval History: Patient reports pain throughout the day, mostly in lower back. Awaiting final cultures, Discussed mass on kidney that was noted on CT. Will need close follow-up and outpatient biopsy.     Review of Systems   Constitutional: Positive for appetite change. Negative for chills and fever.   HENT: Negative for congestion, dental problem and sore throat.    Eyes: Negative for visual disturbance.   Respiratory: Negative for cough, shortness of breath and wheezing.    Cardiovascular: Negative for chest pain and palpitations.   Gastrointestinal: Positive for nausea. Negative for abdominal distention, abdominal pain, constipation, diarrhea and vomiting.   Genitourinary: Positive for difficulty urinating, dysuria, flank pain (Left sided), frequency and hematuria. Negative for decreased urine volume.   Musculoskeletal: Positive for back pain. Negative for arthralgias.   Skin: Negative for color change.   Allergic/Immunologic: Negative for immunocompromised state.   Neurological: Negative for dizziness, weakness and headaches.   Psychiatric/Behavioral: Negative for agitation, behavioral problems and confusion.     Objective:     Vital Signs (Most Recent):  Temp: 99.6 °F (37.6 °C) (05/06/20 1920)  Pulse: 88 (05/06/20 1920)  Resp: 18 (05/06/20 1920)  BP: 123/75 (05/06/20 1920)  SpO2: (!) 94 % (05/06/20 1920) Vital Signs (24h Range):  Temp:  [97.3 °F (36.3 °C)-99.6 °F (37.6 °C)] 99.6 °F (37.6 °C)  Pulse:  [] 88  Resp:  [18-20] 18  SpO2:  [92 %-97 %] 94 %  BP: ()/(62-76) 123/75     Weight: 84 kg (185 lb 3 oz)  Body mass index is 22.54 kg/m².  No intake or output data in the 24 hours ending 05/06/20 1941   Physical Exam   Constitutional: He is oriented to person, place, and time. He appears well-nourished. No distress.   HENT:   Head: Normocephalic and atraumatic.   Eyes: Pupils are equal, round, and reactive to light. EOM are normal.   Neck: Normal range of motion.   Cardiovascular: Normal rate and  regular rhythm.   No murmur heard.  Pulmonary/Chest: Effort normal and breath sounds normal. No stridor. No respiratory distress.   Abdominal: Soft. He exhibits no distension. There is no tenderness.   +CVA tenderness L>R   Musculoskeletal: Normal range of motion. He exhibits no edema.   Neurological: He is alert and oriented to person, place, and time. No cranial nerve deficit. Coordination normal.   Skin: Skin is warm and dry. Capillary refill takes less than 2 seconds.   Psychiatric: He has a normal mood and affect. His behavior is normal.   Nursing note and vitals reviewed.      Significant Labs: All pertinent labs within the past 24 hours have been reviewed.    Significant Imaging: I have reviewed all pertinent imaging results/findings within the past 24 hours.

## 2020-05-07 NOTE — PLAN OF CARE
Pt resting in bed in NAD. VSS. Pt ambulates to bathroom w/o assist. Pain has been well controlled this shift. No safety issues this shift

## 2020-05-07 NOTE — DISCHARGE SUMMARY
[facility] Hospital Medicine  Discharge Summary      Patient Name: Hi Rubio  MRN: [id number]  Admission Date: 5/5/2020  Hospital Length of Stay: 0 days  Discharge Date and Time: 5/7/2020  2:37 PM  Attending Physician: No att. providers found   Discharging Provider: Pricila Calvo PA-C  Primary Care Provider: Josefina Kendall MD  [facility] Medicine Team: [facility] HOSP MED E Pricila Calvo PA-C    HPI:   Hi Rubio is a 60 y.o. male who with CAD, DM, GERD, HTN, HLD presents with complaints of flank pain for the past 6 weeks. Pain is sharp and persistent. He reports he has been treated at least three times for UTI s however they always return. He reports associated dysuria, hematuria, generalized fatigue and weakness. He has nausea exacerbated with eating and reports a 16 lb weight loss in the past 6 weeks. Denies vomiting or diarrhea.  He reports a T max of 103.8°.  Denies cough or shortness of breath.      ED: K 2.8. UA infectious. There is subtle heterogeneous enhancement of the left kidney and perhaps some perinephric stranding.    * No surgery found *      Hospital Course:   60 y.o. who was admitted to hospital medicine with concern for pyelonephritis. CT abd pelvis consistent with pyelo, also a 1.6cm heterogeneous hypodensity in R kidney noted. UA infectious, UCx shows pan-sensitive E. Coli. Discharge with Augmentin to complete 14d course. Urology follow up for further investigation of hypodensity in R kidney noted on CT. Pt stable for dc with urology f/u, return precautions discussed, no further questions at d/c.      Consults:     * Pyelonephritis  Sepsis   60 y.o. who present with complaints of dysuria, frequency, urgency, flank pain, hematuria. Febrile without leukocytosis.    - febrile 101.6 w/o leukocytosis on admission    - UA: 2+ leuks, + nitrite, 37 WBCs, 1+ protein  - previous UTI in 3/2020 with pan-sensitive E. Coli  - 3/4 SIRS for tachypnea, fever and tachycardia   -  empiric ceftriaxone 1g daily   - urine culture shows pan-sensitive E Coli  - blood cultures ordered: NGTD  - stable for dc with Augmentin to complete 14d course (last dose 5/19)    Hypokalemia  3.0 today, will order supplementation   - 2.8 on admission   - supplementation ordered in ED  - follow daily; WNL at dc    Coronary artery disease due to calcified coronary lesion  - continue home medications     Mixed hyperlipidemia  - continue home meds    Essential hypertension  - hold home meds in setting of hypotension   Ok to resume at d/c, pressures improved      Final Active Diagnoses:    Diagnosis Date Noted POA    PRINCIPAL PROBLEM:  Pyelonephritis [N12] 05/11/2018 Yes    Hypokalemia [E87.6] 05/05/2020 Yes    Coronary artery disease due to calcified coronary lesion [I25.10, I25.84] 01/20/2017 Yes    Mixed hyperlipidemia [E78.2] 01/20/2017 Yes    Essential hypertension [I10] 07/16/2012 Yes    Sepsis [A41.9] 05/05/2020 Yes      Problems Resolved During this Admission:       Discharged Condition: stable    Disposition: Home or Self Care    Follow Up:  Follow-up Information     Call  Josefina Kendall MD.    Specialty:  Internal Medicine  Contact information:  [address] [facility] [address]  [phone]                 Patient Instructions:      Ambulatory referral/consult to Urology   Standing Status: Future   Referral Priority: Routine Referral Type: Consultation   Referral Reason: Specialty Services Required   Requested Specialty: Urology   Number of Visits Requested: 1     Notify your health care provider if you experience any of the following:  temperature >100.4     Notify your health care provider if you experience any of the following:  severe uncontrolled pain     Notify your health care provider if you experience any of the following:  redness, tenderness, or signs of infection (pain, swelling, redness, odor or green/yellow discharge around incision site)     Notify your health care  "provider if you experience any of the following:  worsening rash     Activity as tolerated       Significant Diagnostic Studies: Microbiology:   Blood Culture   Lab Results   Component Value Date    LABBLOO No Growth to date 05/05/2020    LABBLOO No Growth to date 05/05/2020    and Urine Culture    Lab Results   Component Value Date    LABURIN ESCHERICHIA COLI  >100,000 cfu/ml   (A) 05/05/2020     Radiology: CT scan: CT AP with contrast" There is subtle heterogeneous enhancement of the left kidney and perhaps some perinephric stranding.  No obstructive uropathy.  Pyelonephritis not excluded.    1.6 cm heterogeneous hypodensity posteromedial right kidney.  Neoplasm not excluded."    Pending Diagnostic Studies:     None         Medications:  Reconciled Home Medications:      Medication List      START taking these medications    amoxicillin-clavulanate 875-125mg 875-125 mg per tablet  Commonly known as:  AUGMENTIN  Take 1 tablet by mouth every 12 (twelve) hours. for 12 days  Start taking on:  May 8, 2020     ondansetron 4 MG Tbdl  Commonly known as:  ZOFRAN-ODT  Take 1 tablet (4 mg total) by mouth every 8 (eight) hours as needed.        CONTINUE taking these medications    ALPRAZolam 0.5 MG tablet  Commonly known as:  XANAX  TAKE 1 TABLET BY MOUTH THREE TIMES A DAY AS NEEDED FOR ANXIETY     aspirin 81 MG EC tablet  Commonly known as:  ECOTRIN  Take 1 tablet (81 mg total) by mouth once daily.     baclofen 10 MG tablet  Commonly known as:  LIORESAL  Take 10 mg by mouth every evening.     chlorthalidone 25 MG Tab  Commonly known as:  HYGROTEN  Take 1 tablet (25 mg total) by mouth once daily.     CONTOUR NEXT TEST STRIPS Strp  Generic drug:  blood sugar diagnostic  USE TO TEST BLOOD SUGAR ONCE DAILY     cyclobenzaprine 5 MG tablet  Commonly known as:  FLEXERIL  Take 1 tablet (5 mg total) by mouth 2 (two) times daily as needed for Muscle spasms.     diclofenac sodium 1 % Gel  Commonly known as:  VOLTAREN  Apply 2 g " topically once daily.     ezetimibe 10 mg tablet  Commonly known as:  ZETIA  Take 1 tablet (10 mg total) by mouth once daily.     FLUARIX QUAD 0351-9764 (PF) 60 mcg (15 mcg x 4)/0.5 mL Syrg  Generic drug:  flu vacc [id number] 6mos up(PF)  Inject into the muscle.     ibuprofen 600 MG tablet  Commonly known as:  ADVIL,MOTRIN  Take 1 tablet (600 mg total) by mouth every 6 (six) hours as needed for Pain.     lidocaine 5 %  Commonly known as:  LIDODERM  Place 1 patch onto the skin once daily. Remove & Discard patch within 12 hours or as directed by MD     metoprolol succinate 100 MG 24 hr tablet  Commonly known as:  TOPROL-XL  TAKE 1 TABLET BY MOUTH EVERY DAY     MICROLET LANCET Misc  Generic drug:  lancets  1 lancet by Misc.(Non-Drug; Combo Route) route once daily. Test blood glucose once daily as instructed.     NIFEdipine 60 MG (OSM) 24 hr tablet  Commonly known as:  PROCARDIA-XL  Take 1 tablet (60 mg total) by mouth once daily.     nitroGLYCERIN 0.3 MG SL tablet  Commonly known as:  NITROSTAT  Place 1 tablet (0.3 mg total) under the tongue every 5 (five) minutes as needed for Chest pain.     pantoprazole 40 MG tablet  Commonly known as:  PROTONIX  TAKE 1 TABLET BY MOUTH TWICE A DAY     potassium chloride SA 10 MEQ tablet  Commonly known as:  K-DUR,KLOR-CON  Take 2 tablets (20 mEq total) by mouth once daily.     pravastatin 80 MG tablet  Commonly known as:  PRAVACHOL  TAKE 1 TABLET BY MOUTH EVERYDAY AT BEDTIME     sertraline 25 MG tablet  Commonly known as:  ZOLOFT  TAKE 1 TABLET BY MOUTH EVERY DAY     * sildenafil 20 mg Tab  Commonly known as:  REVATIO  TAKE 2-5 TABLETS BY MOUTH AS NEEDED     * sildenafiL 100 MG tablet  Commonly known as:  VIAGRA  Take 1 tablet (100 mg total) by mouth daily as needed for Erectile Dysfunction.         * This list has 2 medication(s) that are the same as other medications prescribed for you. Read the directions carefully, and ask your doctor or other care provider to review them with  you.                Indwelling Lines/Drains at time of discharge:   Lines/Drains/Airways     None                 Time spent on the discharge of patient: >35 minutes  Patient was seen and examined on the date of discharge and determined to be suitable for discharge.       Discussed with staff  Pricila Calvo PA-C  Department of Hospital Medicine  [facility]

## 2020-05-07 NOTE — ASSESSMENT & PLAN NOTE
3.0 today, will order supplementation   - 2.8 on admission   - supplementation ordered in ED  - follow daily; WNL at dc

## 2020-05-07 NOTE — TELEPHONE ENCOUNTER
Appt scheduled w/Georgina (SAADIA).  ----- Message from Ashely Renteria LPN sent at 5/7/2020 12:44 PM CDT -----  Contact: pt at [phone]  This is 's patient, please schedule as soon as possible thanks.  ----- Message -----  From: Deb Dick  Sent: 5/7/2020  10:17 AM CDT  To: [facility] Clinical Staff    Pt being disch arged from [facility] today a nd will need a NP appt in uro for polynephritis.  Randy paul called from [facility]/Georgina [phone]

## 2020-05-07 NOTE — PLAN OF CARE
Call placed to Urology Clinic ([phone]) to schedule Mr. Rubio a hospital f/u appointment. This CM informed by  that she is not able to schedule an appointment at this as she needs to see which Urologists are currently seeing patients for hospital follow ups. Once this information is obtained she will contact patient with time and date of available appointment. Ambulatory referral sent to Urology Clinic per MD.    Georgina Beard RN  Ext [phone]

## 2020-05-08 NOTE — CARE UPDATE
Notified by micro lab this evening that an anaerobic bottle in patient's initial BCx from admission grew GPC resembling staph. Suspect contaminant as BCx have been NGTD x 48 hrs. Attempted to call patient to inform him of results (cell  [phone] x2 and home [phone], woman answered and denied Mr. Rubio resided there)  Will attempt to call patient again tomorrow  Will follow BCx

## 2020-05-09 LAB
BACTERIA BLD CULT: NORMAL

## 2020-05-09 RX ORDER — HYDROCODONE BITARTRATE AND ACETAMINOPHEN 5; 325 MG/1; MG/1
1 TABLET ORAL EVERY 6 HOURS PRN
Qty: 30 TABLET | Refills: 0 | Status: SHIPPED | OUTPATIENT
Start: 2020-05-09 | End: 2022-02-23 | Stop reason: ALTCHOICE

## 2020-05-12 LAB
BACTERIA BLD CULT: ABNORMAL

## 2020-05-13 ENCOUNTER — OFFICE VISIT (OUTPATIENT)
Dept: UROLOGY | Facility: CLINIC | Age: 60
End: 2020-05-13
Attending: UROLOGY
Payer: COMMERCIAL

## 2020-05-13 ENCOUNTER — TELEPHONE (OUTPATIENT)
Dept: UROLOGY | Facility: CLINIC | Age: 60
End: 2020-05-13

## 2020-05-13 DIAGNOSIS — N28.89 RENAL MASS: Primary | ICD-10-CM

## 2020-05-13 PROCEDURE — 99214 OFFICE O/P EST MOD 30 MIN: CPT | Mod: 95,,, | Performed by: UROLOGY

## 2020-05-13 PROCEDURE — 99214 PR OFFICE/OUTPT VISIT, EST, LEVL IV, 30-39 MIN: ICD-10-PCS | Mod: 95,,, | Performed by: UROLOGY

## 2020-05-13 NOTE — LETTER
May 13, 2020    Hi Rubio  [address] [place] [address]         [facility] [place] [id number] [facility], [address]  [facility] [address]  Phone: [phone]  Fax: [phone] May 13, 2020     Patient: Hi Rbuio   YOB: 1960   Date of Visit: 5/13/2020       To Whom It May Concern:    It is my medical opinion that Hi Rubio may return to full duty immediately with no restrictions. He had recent fever associated with urinary tract infection. He has not had fever in over 1 week. He had negative COVID-19 testing on 5/5/2020.    If you have any questions or concerns, please don't hesitate to call.    Sincerely,        Yg King MD

## 2020-05-13 NOTE — PROGRESS NOTES
Subjective:      Hi Rubio is a 60 y.o. male who returns today regarding his UTIs and renal mass.    The patient location is: Home  The chief complaint leading to consultation is: UTI, renal mass  Visit type: audiovisual  Total time spent with patient: 20 minutes  Each patient to whom he or she provides medical services by telemedicine is:  (1) informed of the relationship between the physician and patient and the respective role of any other health care provider with respect to management of the patient; and (2) notified that he or she may decline to receive medical services by telemedicine and may withdraw from such care at any time.    Notes:     He was admitted to hospital last week with pyelonephritis. He had positive urine culture and fevers (negative COVID). Overall feeling much better now. This is his 2nd UTI in 2 months. He stopped taking Jardiance 2 weeks ago, which he thinks was contributing to UTIs. He is s/p TURP in Oct 2017 with normal PVRs since. He ha history of UTIs prior to TURP.    While in ER he had a CT scan with new finding of possible small right renal mass. He reports recent 16lb unintentional weight loss in past few months.    The following portions of the patient's history were reviewed and updated as appropriate: allergies, current medications, past family history, past medical history, past social history, past surgical history and problem list.    Review of Systems  A comprehensive multipoint review of systems was negative except as otherwise stated in the HPI.     Objective:   Vitals: There were no vitals taken for this visit.    Physical Exam   General: alert and oriented, no acute distress  Respiratory: Symmetric expansion, non-labored breathing  Neuro: no gross deficits  Psych: normal judgment and insight, normal mood/affect and non-anxious    Lab Review     Lab Results   Component Value Date    WBC 6.40 05/07/2020    HGB 13.2 (L) 05/07/2020    HCT 40.8 05/07/2020    MCV 90  05/07/2020     05/07/2020     Lab Results   Component Value Date    CREATININE 1.1 05/07/2020    BUN 16 05/07/2020     Lab Review   CT A/P: Images reviewed. 1.6 cm exophytic mass on right posterior kidney with minimal enhancement on contrast phase (non non-contrast images).      Assessment and Plan:   1. Renal Mass  -- Schedule CT renal mass protocol  -- FU after CT to review    2. Recurrent UTIs  -- No evidence of retention on prior visits  -- PVR at next visit  -- Hopefully will improve after stopping Jardiance

## 2020-05-13 NOTE — TELEPHONE ENCOUNTER
----- Message from Yg King MD sent at 5/13/2020 10:13 AM CDT -----  Regarding: Virtual Visit FU  Pt had a virtual visit today.    He needs a CT scan next available and FU with me in the office after that.

## 2020-05-13 NOTE — TELEPHONE ENCOUNTER
----- Message from Nevin Harris sent at 5/13/2020  4:00 PM CDT -----  Contact: Patient  cell [phone] or  [phone]  Type:  Patient Returning Call    Who Called: Patient    Who Left Message for Patient: Nancy    Does the patient know what this is regarding?: Ultrasound, appointment    Would the patient rather a call back or a response via [facility]? Call back    Best Call Back Number:  cell [phone] or  [phone]

## 2020-05-14 ENCOUNTER — TELEPHONE (OUTPATIENT)
Dept: INTERNAL MEDICINE | Facility: CLINIC | Age: 60
End: 2020-05-14

## 2020-05-14 NOTE — TELEPHONE ENCOUNTER
Pt called and states he needed a letter sent to his employer as he was out of work due to a kidney infection. He also advised that he wants to see another urologist as he was not completely satisfied with the services from Dr King. Pt was scheduled and letter was faxed to [phone]

## 2020-05-14 NOTE — PROGRESS NOTES
Patient recently discharged from hospital after pyelonephritis.     Patient is monitoring BGs at home.     Last 6 Patient Entered Readings                                          Most Recent A1c: 6.6% on 2/27/2020  (Goal: 7%)     Recent Readings 5/14/2020 5/13/2020 5/12/2020 5/11/2020 5/10/2020    Blood Glucose (mg/dL) 156 137 121 174 178        Will defer outreach 2-4 weeks to monitor for readings. Readings are trending down. Likely elevated in response to recent infection.

## 2020-05-15 ENCOUNTER — OFFICE VISIT (OUTPATIENT)
Dept: INTERNAL MEDICINE | Facility: CLINIC | Age: 60
End: 2020-05-15
Payer: COMMERCIAL

## 2020-05-15 ENCOUNTER — LAB VISIT (OUTPATIENT)
Dept: LAB | Facility: HOSPITAL | Age: 60
End: 2020-05-15
Attending: INTERNAL MEDICINE
Payer: COMMERCIAL

## 2020-05-15 VITALS
OXYGEN SATURATION: 97 % | HEIGHT: 76 IN | WEIGHT: 189.13 LBS | TEMPERATURE: 100 F | HEART RATE: 76 BPM | RESPIRATION RATE: 17 BRPM | SYSTOLIC BLOOD PRESSURE: 120 MMHG | DIASTOLIC BLOOD PRESSURE: 70 MMHG | BODY MASS INDEX: 23.03 KG/M2

## 2020-05-15 DIAGNOSIS — N39.0 URINARY TRACT INFECTION WITHOUT HEMATURIA, SITE UNSPECIFIED: Primary | ICD-10-CM

## 2020-05-15 DIAGNOSIS — N39.0 URINARY TRACT INFECTION WITHOUT HEMATURIA, SITE UNSPECIFIED: ICD-10-CM

## 2020-05-15 LAB
BILIRUB UR QL STRIP: NEGATIVE
CLARITY UR REFRACT.AUTO: CLEAR
COLOR UR AUTO: ABNORMAL
GLUCOSE UR QL STRIP: ABNORMAL
HGB UR QL STRIP: NEGATIVE
KETONES UR QL STRIP: NEGATIVE
LEUKOCYTE ESTERASE UR QL STRIP: NEGATIVE
NITRITE UR QL STRIP: NEGATIVE
PH UR STRIP: 6 [PH] (ref 5–8)
PROT UR QL STRIP: NEGATIVE
SP GR UR STRIP: 1.01 (ref 1–1.03)
URN SPEC COLLECT METH UR: ABNORMAL

## 2020-05-15 PROCEDURE — 99213 PR OFFICE/OUTPT VISIT, EST, LEVL III, 20-29 MIN: ICD-10-PCS | Mod: S$GLB,,, | Performed by: INTERNAL MEDICINE

## 2020-05-15 PROCEDURE — 81003 URINALYSIS AUTO W/O SCOPE: CPT

## 2020-05-15 PROCEDURE — 3074F SYST BP LT 130 MM HG: CPT | Mod: CPTII,S$GLB,, | Performed by: INTERNAL MEDICINE

## 2020-05-15 PROCEDURE — 99999 PR PBB SHADOW E&M-EST. PATIENT-LVL IV: ICD-10-PCS | Mod: PBBFAC,,, | Performed by: INTERNAL MEDICINE

## 2020-05-15 PROCEDURE — 3078F DIAST BP <80 MM HG: CPT | Mod: CPTII,S$GLB,, | Performed by: INTERNAL MEDICINE

## 2020-05-15 PROCEDURE — 3008F BODY MASS INDEX DOCD: CPT | Mod: CPTII,S$GLB,, | Performed by: INTERNAL MEDICINE

## 2020-05-15 PROCEDURE — 3008F PR BODY MASS INDEX (BMI) DOCUMENTED: ICD-10-PCS | Mod: CPTII,S$GLB,, | Performed by: INTERNAL MEDICINE

## 2020-05-15 PROCEDURE — 99999 PR PBB SHADOW E&M-EST. PATIENT-LVL IV: CPT | Mod: PBBFAC,,, | Performed by: INTERNAL MEDICINE

## 2020-05-15 PROCEDURE — 3078F PR MOST RECENT DIASTOLIC BLOOD PRESSURE < 80 MM HG: ICD-10-PCS | Mod: CPTII,S$GLB,, | Performed by: INTERNAL MEDICINE

## 2020-05-15 PROCEDURE — 3074F PR MOST RECENT SYSTOLIC BLOOD PRESSURE < 130 MM HG: ICD-10-PCS | Mod: CPTII,S$GLB,, | Performed by: INTERNAL MEDICINE

## 2020-05-15 PROCEDURE — 99213 OFFICE O/P EST LOW 20 MIN: CPT | Mod: S$GLB,,, | Performed by: INTERNAL MEDICINE

## 2020-05-15 PROCEDURE — 87086 URINE CULTURE/COLONY COUNT: CPT

## 2020-05-15 NOTE — PROGRESS NOTES
CC: followup of hospitalization for UTI  HPI:  The patient is a 60 y.o. year old male who presents to the office for followup of hospitalization for UTI.  He reports flank pain has improved.  He is currently taking augmentin. He reports pain of right flank recently.    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 -     Hyperlipidemia     Hypertension     Memory loss     Thyroid nodule 8/22/2019    Traumatic iritis - Left Eye 2/27/2013       SURGICAL HISTORY:  Past Surgical History:   Procedure Laterality Date    CEREBRAL ANGIOGRAM N/A 1/6/2020    Procedure: ANGIOGRAM-CEREBRAL;  Surgeon: [facility] Diagnostic Provider;  Location: [facility] OR [address] [place] [address];  Service: Radiology;  Laterality: N/A;  /Nagi    CERVICAL FUSION  12/30/2015    COLONOSCOPY N/A 4/7/2017    Procedure: COLONOSCOPY;  Surgeon: Handy Frederick MD;  Location: [facility] ([facility]  Service: Endoscopy;  Laterality: N/A;    HERNIA REPAIR      PROSTATECTOMY         MEDS:  Medcard reviewed and updated    ALLERGIES: Allergy Card reviewed and updated    SOCIAL HISTORY:   The patient is a nonsmoker.    PE:   APPEARANCE: Well nourished, well developed, in no acute distress.  CHEST: Lungs clear to auscultation with unlabored respirations.  CARDIOVASCULAR: Normal S1, S2. No murmurs. No carotid bruits. No pedal edema.  ABDOMEN: Bowel sounds normal. Not distended. Soft. No tenderness or masses.   PSYCHIATRIC: The patient is oriented to person, place, and time and has a pleasant affect.        ASSESSMENT/PLAN:  Hi was seen today for follow-up.    Diagnoses and all orders for this visit:    Urinary tract infection without  hematuria, site unspecified  -     Urinalysis; Future  -     Urine culture; Future        Answers for HPI/ROS submitted by the patient on 5/14/2020   activity change: Yes  hearing loss: No  rhinorrhea: No  trouble swallowing: No  eye discharge: No  visual disturbance: No  chest tightness: No  wheezing: No  chest pain: No  palpitations: No  blood in stool: No  constipation: No  vomiting: No  diarrhea: No  polydipsia: No  difficulty urinating: No  hematuria: No  headaches: Yes  dysphoric mood: No

## 2020-05-17 LAB — BACTERIA UR CULT: NO GROWTH

## 2020-05-18 RX ORDER — NIFEDIPINE 60 MG/1
TABLET, EXTENDED RELEASE ORAL
Qty: 90 TABLET | Refills: 1 | Status: SHIPPED | OUTPATIENT
Start: 2020-05-18 | End: 2020-11-02

## 2020-05-19 ENCOUNTER — HOSPITAL ENCOUNTER (OUTPATIENT)
Dept: RADIOLOGY | Facility: OTHER | Age: 60
Discharge: HOME | End: 2020-05-19
Attending: UROLOGY
Payer: COMMERCIAL

## 2020-05-19 DIAGNOSIS — N28.89 RENAL MASS: ICD-10-CM

## 2020-05-19 PROCEDURE — 25500020 PHARM REV CODE 255: Performed by: UROLOGY

## 2020-05-19 PROCEDURE — 74178 CT ABDOMEN PELVIS W WO CONTRAST: ICD-10-PCS | Mod: 26,,, | Performed by: RADIOLOGY

## 2020-05-19 PROCEDURE — 74178 CT ABD&PLV WO CNTR FLWD CNTR: CPT | Mod: 26,,, | Performed by: RADIOLOGY

## 2020-05-19 PROCEDURE — 74178 CT ABD&PLV WO CNTR FLWD CNTR: CPT | Mod: TC

## 2020-05-19 RX ADMIN — IOHEXOL 100 ML: 350 INJECTION, SOLUTION INTRAVENOUS at 11:05

## 2020-05-22 ENCOUNTER — OFFICE VISIT (OUTPATIENT)
Dept: UROLOGY | Facility: CLINIC | Age: 60
End: 2020-05-22
Payer: COMMERCIAL

## 2020-05-22 ENCOUNTER — TELEPHONE (OUTPATIENT)
Dept: UROLOGY | Facility: CLINIC | Age: 60
End: 2020-05-22

## 2020-05-22 ENCOUNTER — PATIENT OUTREACH (OUTPATIENT)
Dept: OTHER | Facility: OTHER | Age: 60
End: 2020-05-22

## 2020-05-22 VITALS
WEIGHT: 190.5 LBS | DIASTOLIC BLOOD PRESSURE: 76 MMHG | HEIGHT: 76 IN | SYSTOLIC BLOOD PRESSURE: 110 MMHG | HEART RATE: 74 BPM | BODY MASS INDEX: 23.2 KG/M2

## 2020-05-22 DIAGNOSIS — N30.01 ACUTE CYSTITIS WITH HEMATURIA: Primary | ICD-10-CM

## 2020-05-22 DIAGNOSIS — N28.89 RENAL MASS: ICD-10-CM

## 2020-05-22 PROCEDURE — 3008F BODY MASS INDEX DOCD: CPT | Mod: CPTII,S$GLB,, | Performed by: UROLOGY

## 2020-05-22 PROCEDURE — 99214 OFFICE O/P EST MOD 30 MIN: CPT | Mod: S$GLB,,, | Performed by: UROLOGY

## 2020-05-22 PROCEDURE — 3078F DIAST BP <80 MM HG: CPT | Mod: CPTII,S$GLB,, | Performed by: UROLOGY

## 2020-05-22 PROCEDURE — 3008F PR BODY MASS INDEX (BMI) DOCUMENTED: ICD-10-PCS | Mod: CPTII,S$GLB,, | Performed by: UROLOGY

## 2020-05-22 PROCEDURE — 99999 PR PBB SHADOW E&M-EST. PATIENT-LVL III: CPT | Mod: PBBFAC,,, | Performed by: UROLOGY

## 2020-05-22 PROCEDURE — 3078F PR MOST RECENT DIASTOLIC BLOOD PRESSURE < 80 MM HG: ICD-10-PCS | Mod: CPTII,S$GLB,, | Performed by: UROLOGY

## 2020-05-22 PROCEDURE — 99999 PR PBB SHADOW E&M-EST. PATIENT-LVL III: ICD-10-PCS | Mod: PBBFAC,,, | Performed by: UROLOGY

## 2020-05-22 PROCEDURE — 99214 PR OFFICE/OUTPT VISIT, EST, LEVL IV, 30-39 MIN: ICD-10-PCS | Mod: S$GLB,,, | Performed by: UROLOGY

## 2020-05-22 PROCEDURE — 3074F PR MOST RECENT SYSTOLIC BLOOD PRESSURE < 130 MM HG: ICD-10-PCS | Mod: CPTII,S$GLB,, | Performed by: UROLOGY

## 2020-05-22 PROCEDURE — 3074F SYST BP LT 130 MM HG: CPT | Mod: CPTII,S$GLB,, | Performed by: UROLOGY

## 2020-05-22 NOTE — PROGRESS NOTES
"Subjective:      Hi Rubio is a 60 y.o. male who returns today regarding his renal mass.    He was admitted to hospital earlier this month with pyelonephritis. He had positive urine culture and fevers (negative COVID). Overall feeling much better now. This is his 2nd UTI in 2 months. He stopped taking Jardiance 2 weeks ago, which he thinks was contributing to UTIs. He is s/p TURP in Oct 2017 with normal PVRs since. He ha history of UTIs prior to TURP.    During recent hospitalization he had CT with possible small renal mass. He also reports recent 16lb unintentional weight loss in past few months. He is here today to review repeat CT scan.    The following portions of the patient's history were reviewed and updated as appropriate: allergies, current medications, past family history, past medical history, past social history, past surgical history and problem list.    Review of Systems  A comprehensive multipoint review of systems was negative except as otherwise stated in the HPI.     Objective:   Vitals: /76 (BP Location: Left arm)   Pulse 74   Ht 6' 4" (1.93 m)   Wt 86.4 kg (190 lb 7.6 oz)   BMI 23.19 kg/m²     Physical Exam   General: alert and oriented, no acute distress  Respiratory: Symmetric expansion, non-labored breathing  Neuro: no gross deficits  Psych: normal judgment and insight, normal mood/affect and non-anxious    Lab Review     Lab Results   Component Value Date    WBC 6.40 05/07/2020    HGB 13.2 (L) 05/07/2020    HCT 40.8 05/07/2020    MCV 90 05/07/2020     05/07/2020     Lab Results   Component Value Date    CREATININE 1.1 05/07/2020    BUN 16 05/07/2020     Lab Review   CT A/P with and w/o contrast: Images reviewed. 1.6 cm exophytic mass again noted on right posterior kidney with minimal enhancement - 27 HU.     Assessment and Plan:   1. Renal Mass  -- Still difficult to rule out malignancy given borderline enhancement on CT.  -- Will obtain MRI for further evaluation and " schedule FU with Dr. Garcia after to review and discuss possible surgery

## 2020-05-22 NOTE — PROGRESS NOTES
Digital Medicine: Health  Follow-Up    Patient reported he recently got out of the hospital on 5/14/20 due to kidney infection. Patient shared spend 4 days in the hospital. While the in the hospital, a mass was found on his kidney. Patient reported he still has pain on his right side. He shared he has a follow up with his provider to explore next steps.     Patient stated he received two new glucose monitoring device. Patient shared he has been manually entering his BG into his chart. Patient reported he would go to the [facility] to return one of the devices and get assistance with setting up his device.         The history is provided by the patient.   Follow Up  Follow-up reason(s): reading review          INTERVENTION(S)  encouragement/support    PLAN  patient verbalizes understanding    Plan to follow up in 3-4 weeks to review readings and progress towards his goal, sooner if needed.       There are no preventive care reminders to display for this patient.      Last 6 Patient Entered Readings                                          Most Recent A1c: 6.6% on 2/27/2020  (Goal: 7%)     Recent Readings 5/20/2020 5/18/2020 5/17/2020 5/16/2020 5/15/2020    Blood Glucose (mg/dL) 164 163 147 148 159                Screenings    SDOH- deferred

## 2020-05-25 ENCOUNTER — TELEPHONE (OUTPATIENT)
Dept: UROLOGY | Facility: CLINIC | Age: 60
End: 2020-05-25

## 2020-05-25 RX ORDER — PANTOPRAZOLE SODIUM 40 MG/1
TABLET, DELAYED RELEASE ORAL
Qty: 180 TABLET | Refills: 0 | Status: SHIPPED | OUTPATIENT
Start: 2020-05-25 | End: 2020-08-28

## 2020-05-25 NOTE — TELEPHONE ENCOUNTER
Spoke with patient regarding concerns of upcoming appointment with Dr. Garcia for Tuesday 6/2 at 11:30am patient verbalized understanding     --Barbara Cody

## 2020-05-28 ENCOUNTER — PATIENT OUTREACH (OUTPATIENT)
Dept: OTHER | Facility: OTHER | Age: 60
End: 2020-05-28

## 2020-05-28 ENCOUNTER — PATIENT MESSAGE (OUTPATIENT)
Dept: INTERNAL MEDICINE | Facility: CLINIC | Age: 60
End: 2020-05-28

## 2020-05-28 DIAGNOSIS — E11.9 TYPE 2 DIABETES MELLITUS WITHOUT COMPLICATION, WITHOUT LONG-TERM CURRENT USE OF INSULIN: Primary | ICD-10-CM

## 2020-05-28 NOTE — PROGRESS NOTES
Digital Medicine: Clinician Follow-Up    Follow-up.     Patient recently inpatient for UTI. Found to have renal mass. Undergoing work-up for renal mass at this time.     Prior A1C controlled - SMBG does not correlate. Patient recently got new meter. Patient understandably focused on larger medical issues at this time.     Will defer PharmD outreach for consideration of repeat A1C, glucometer education, or med change. Patient encouraged to reach out with questions or concerns.     The history is provided by the patient.     Follow Up  Follow-up reason(s): reading review          INTERVENTION(S)  encouragement/support    PLAN  await resolution of current disease process and continue monitoring      There are no preventive care reminders to display for this patient.      Last 6 Patient Entered Readings                                          Most Recent A1c: 6.6% on 2/27/2020  (Goal: 7%)     Recent Readings 5/28/2020 5/27/2020 5/26/2020 5/20/2020 5/18/2020    Blood Glucose (mg/dL) 187 166 174 164 163                      Screenings

## 2020-06-01 ENCOUNTER — HOSPITAL ENCOUNTER (OUTPATIENT)
Dept: RADIOLOGY | Facility: OTHER | Age: 60
Discharge: HOME | End: 2020-06-01
Attending: UROLOGY
Payer: COMMERCIAL

## 2020-06-01 DIAGNOSIS — N28.89 RENAL MASS: ICD-10-CM

## 2020-06-01 PROCEDURE — 74183 MRI ABD W/O CNTR FLWD CNTR: CPT | Mod: TC

## 2020-06-01 PROCEDURE — 74183 MRI ABDOMEN W WO CONTRAST: ICD-10-PCS | Mod: 26,,, | Performed by: RADIOLOGY

## 2020-06-01 PROCEDURE — 25500020 PHARM REV CODE 255: Performed by: UROLOGY

## 2020-06-01 PROCEDURE — A9585 GADOBUTROL INJECTION: HCPCS | Performed by: UROLOGY

## 2020-06-01 PROCEDURE — 74183 MRI ABD W/O CNTR FLWD CNTR: CPT | Mod: 26,,, | Performed by: RADIOLOGY

## 2020-06-01 RX ORDER — GADOBUTROL 604.72 MG/ML
8.5 INJECTION INTRAVENOUS
Status: COMPLETED | OUTPATIENT
Start: 2020-06-01 | End: 2020-06-01

## 2020-06-01 RX ADMIN — GADOBUTROL 8.5 ML: 604.72 INJECTION INTRAVENOUS at 12:06

## 2020-06-02 ENCOUNTER — OFFICE VISIT (OUTPATIENT)
Dept: UROLOGY | Facility: CLINIC | Age: 60
End: 2020-06-02
Payer: COMMERCIAL

## 2020-06-02 VITALS
WEIGHT: 190.5 LBS | HEART RATE: 78 BPM | DIASTOLIC BLOOD PRESSURE: 72 MMHG | SYSTOLIC BLOOD PRESSURE: 109 MMHG | HEIGHT: 76 IN | BODY MASS INDEX: 23.2 KG/M2

## 2020-06-02 DIAGNOSIS — N28.1 RENAL CYST: Primary | ICD-10-CM

## 2020-06-02 PROCEDURE — 3078F DIAST BP <80 MM HG: CPT | Mod: CPTII,S$GLB,, | Performed by: UROLOGY

## 2020-06-02 PROCEDURE — 3074F SYST BP LT 130 MM HG: CPT | Mod: CPTII,S$GLB,, | Performed by: UROLOGY

## 2020-06-02 PROCEDURE — 3008F BODY MASS INDEX DOCD: CPT | Mod: CPTII,S$GLB,, | Performed by: UROLOGY

## 2020-06-02 PROCEDURE — 99213 PR OFFICE/OUTPT VISIT, EST, LEVL III, 20-29 MIN: ICD-10-PCS | Mod: S$GLB,,, | Performed by: UROLOGY

## 2020-06-02 PROCEDURE — 99213 OFFICE O/P EST LOW 20 MIN: CPT | Mod: S$GLB,,, | Performed by: UROLOGY

## 2020-06-02 PROCEDURE — 3074F PR MOST RECENT SYSTOLIC BLOOD PRESSURE < 130 MM HG: ICD-10-PCS | Mod: CPTII,S$GLB,, | Performed by: UROLOGY

## 2020-06-02 PROCEDURE — 3078F PR MOST RECENT DIASTOLIC BLOOD PRESSURE < 80 MM HG: ICD-10-PCS | Mod: CPTII,S$GLB,, | Performed by: UROLOGY

## 2020-06-02 PROCEDURE — 3008F PR BODY MASS INDEX (BMI) DOCUMENTED: ICD-10-PCS | Mod: CPTII,S$GLB,, | Performed by: UROLOGY

## 2020-06-02 NOTE — PROGRESS NOTES
Subjective:      Hi Rubio is a 60 y.o. male who returns today regarding his     Here to review MRI results.  MRI confirmed that this is a renal cyst with only thin septations and no enhancement.    The following portions of the patient's history were reviewed and updated as appropriate: allergies, current medications, past family history, past medical history, past social history, past surgical history and problem list.    Review of Systems  Pertinent items are noted in HPI.  A comprehensive multipoint review of systems was negative except as otherwise stated in the HPI.     Objective:   Vitals: There were no vitals taken for this visit.    Physical Exam   General: alert and oriented, no acute distress  Respiratory: Symmetric expansion, non-labored breathing  Cardiovascular: normal to inspection  Abdomen: non distended   Skin: normal coloration and turgor, no rashes, no suspicious skin lesions noted  Neuro: no gross deficits  Psych: normal judgment and insight, normal mood/affect and non-anxious    Physical Exam    Lab Review   Urinalysis demonstrates no specimen    Lab Results   Component Value Date    WBC 6.40 05/07/2020    HGB 13.2 (L) 05/07/2020    HCT 40.8 05/07/2020    MCV 90 05/07/2020     05/07/2020     Lab Results   Component Value Date    CREATININE 1.1 05/07/2020    BUN 16 05/07/2020       Imaging  TECHNIQUE:  Multiplanar, multisequence MR imaging of the abdomen was performed.    COMPARISON:  None    FINDINGS:  There is a the the 1.0 cm T2 hyperintense, T1 hypointense lesion seen within the inferior aspect of the right kidney posteriorly within internal septations.  This correlates with the lesion identified on recent CT.  This does not show evidence of enhancement.    There is a a 1.2 cm cyst seen within the midpole of the right kidney.    No enhancing lesions are seen.    The liver, spleen, adrenal glands, pancreas are unremarkable.  There is no evidence of abdominal lymphadenopathy.       Impression       There is a 1.2 cm cyst with thin internal septation seen within the inferior pole of the right kidney correlating with the lesion seen on recent CT.      Electronically signed by: Tiffanie Tavares MD  Date: 06/01/2020  Time: 13:41       Assessment and Plan:   Renal cyst Bosniak II    Repeat renal cyst ultrasound in 1 year and follow-up with Dr. King; if there is no significant change at that time, we can stop monitoring

## 2020-06-15 ENCOUNTER — OFFICE VISIT (OUTPATIENT)
Dept: UROLOGY | Facility: CLINIC | Age: 60
End: 2020-06-15
Payer: COMMERCIAL

## 2020-06-15 ENCOUNTER — PATIENT OUTREACH (OUTPATIENT)
Dept: ADMINISTRATIVE | Facility: OTHER | Age: 60
End: 2020-06-15

## 2020-06-15 ENCOUNTER — LAB VISIT (OUTPATIENT)
Dept: LAB | Facility: HOSPITAL | Age: 60
End: 2020-06-15
Attending: UROLOGY
Payer: COMMERCIAL

## 2020-06-15 VITALS
HEART RATE: 82 BPM | DIASTOLIC BLOOD PRESSURE: 75 MMHG | BODY MASS INDEX: 23.84 KG/M2 | SYSTOLIC BLOOD PRESSURE: 117 MMHG | HEIGHT: 76 IN | WEIGHT: 195.75 LBS

## 2020-06-15 DIAGNOSIS — N39.0 RECURRENT UTI: ICD-10-CM

## 2020-06-15 DIAGNOSIS — N28.1 RENAL CYST, RIGHT: ICD-10-CM

## 2020-06-15 DIAGNOSIS — R73.03 PREDIABETES: ICD-10-CM

## 2020-06-15 DIAGNOSIS — N12 PYELONEPHRITIS: Primary | ICD-10-CM

## 2020-06-15 LAB
ESTIMATED AVG GLUCOSE: 143 MG/DL (ref 68–131)
HBA1C MFR BLD HPLC: 6.6 % (ref 4–5.6)

## 2020-06-15 PROCEDURE — 36415 COLL VENOUS BLD VENIPUNCTURE: CPT | Mod: PO

## 2020-06-15 PROCEDURE — 99214 PR OFFICE/OUTPT VISIT, EST, LEVL IV, 30-39 MIN: ICD-10-PCS | Mod: S$GLB,,, | Performed by: UROLOGY

## 2020-06-15 PROCEDURE — 99999 PR PBB SHADOW E&M-EST. PATIENT-LVL IV: CPT | Mod: PBBFAC,,, | Performed by: UROLOGY

## 2020-06-15 PROCEDURE — 83036 HEMOGLOBIN GLYCOSYLATED A1C: CPT

## 2020-06-15 PROCEDURE — 99214 OFFICE O/P EST MOD 30 MIN: CPT | Mod: S$GLB,,, | Performed by: UROLOGY

## 2020-06-15 PROCEDURE — 99999 PR PBB SHADOW E&M-EST. PATIENT-LVL IV: ICD-10-PCS | Mod: PBBFAC,,, | Performed by: UROLOGY

## 2020-06-15 RX ORDER — DOXYCYCLINE HYCLATE 100 MG
100 TABLET ORAL ONCE
Status: CANCELLED | OUTPATIENT
Start: 2020-06-15 | End: 2020-06-15

## 2020-06-15 RX ORDER — LIDOCAINE HYDROCHLORIDE 20 MG/ML
JELLY TOPICAL ONCE
Status: CANCELLED | OUTPATIENT
Start: 2020-06-15 | End: 2020-06-15

## 2020-06-15 NOTE — PROGRESS NOTES
"Subjective:      Hi Rubio is a 60 y.o. male who is here for 3rd opinion regarding his right renal mass.  He was hospitalized for febrile UTI urosepsis.  During his hospitalization, CT showed right renal mass.  He ended up getting an MRI to further evaluate renal mass, vs. Complex cysto.  MRI confirmed that this is a renal cyst with only thin septations and no enhancement.    The following portions of the patient's history were reviewed and updated as appropriate: allergies, current medications, past family history, past medical history, past social history, past surgical history and problem list.    Surgery Date: 10/12/2017   Pre-op Diagnosis:  BPH with obstruction/lower urinary tract symptoms [N40.1, N13.8]  Post-op Diagnosis: Post-Op Diagnosis Codes:     * BPH with obstruction/lower urinary tract symptoms [N40.1, N13.8]  Procedures:  Procedure(s) (LRB):  PROSTATECTOMY-TRANSURETHRAL (N/A)    He works as a .    Review of Systems  Pertinent items are noted in HPI.  A comprehensive multipoint review of systems was negative except as otherwise stated in the HPI.     Objective:   Vitals: /75 (BP Location: Left arm)   Pulse 82   Ht 6' 4" (1.93 m)   Wt 88.8 kg (195 lb 12.3 oz)   BMI 23.83 kg/m²     Physical Exam   General: alert and oriented, no acute distress  Respiratory: Symmetric expansion, non-labored breathing  Cardiovascular: normal to inspection  Abdomen: non distended   Skin: normal coloration and turgor, no rashes, no suspicious skin lesions noted  Neuro: no gross deficits  Psych: normal judgment and insight, normal mood/affect and non-anxious    Physical Exam   Constitutional: He is oriented to person, place, and time. He appears well-developed. No distress.   HENT:   Head: Normocephalic and atraumatic.   Right Ear: External ear normal.   Left Ear: External ear normal.   Nose: Nose normal.   Eyes: Conjunctivae are normal. Pupils are equal, round, and reactive to light.   Neck: " Normal range of motion. Neck supple. No JVD present. No tracheal deviation present. No thyromegaly present.   Cardiovascular: Normal rate, regular rhythm and normal heart sounds.  Exam reveals no gallop and no friction rub.    No murmur heard.  Pulmonary/Chest: Effort normal and breath sounds normal. No respiratory distress. He has no wheezes. He exhibits no tenderness.   Abdominal: Soft. Bowel sounds are normal. He exhibits no distension and no mass. There is no abdominal tenderness. There is no rebound and no guarding.   Genitourinary:    Prostate, penis and rectum normal.   No penile tenderness.   Musculoskeletal: Normal range of motion. No tenderness or deformity.   Lymphadenopathy:     He has no cervical adenopathy.   Neurological: He is alert and oriented to person, place, and time.   Skin: Skin is warm and dry. He is not diaphoretic.     Psychiatric: His behavior is normal. Thought content normal.       Lab Review   Urinalysis demonstrates no specimen    Lab Results   Component Value Date    WBC 6.40 05/07/2020    HGB 13.2 (L) 05/07/2020    HCT 40.8 05/07/2020    MCV 90 05/07/2020     05/07/2020     Lab Results   Component Value Date    CREATININE 1.1 05/07/2020    BUN 16 05/07/2020       Imaging  MRI 6/1/20  There is a the the 1.0 cm T2 hyperintense, T1 hypointense lesion seen within the inferior aspect of the right kidney posteriorly within internal septations.  This correlates with the lesion identified on recent CT.  This does not show evidence of enhancement.    Assessment and Plan:   Pyelonephritis  -     Simple Urodynamics w/ Cysto; Future    Renal cyst, right    Recurrent UTI  -     Urine Culture High Risk; Standing  -     Simple Urodynamics w/ Cysto; Future    Prediabetes  -     Hemoglobin A1C; Future; Expected date: 06/15/2020    reassurance given for right renal cysto. No enhancement noted on MRI.  No need to further evaluate him.    For regarding recurrent UTI, will recommend daily  Probiotics, D-mannose, and cranberry pills.  Drink plenty of water.    For his LUTS with s/p TURP, will further evaluate him with SUDS cysto.    All questions answered.      Follow up for Urodynamic Study (SUDS) cysto.

## 2020-06-15 NOTE — PROGRESS NOTES
Care Everywhere: n/a  Immunization: updated  Health Maintenance: updated  Media Review: n/a  Legacy Review: n/a  Order placed: n/a  Upcoming appts:optometry 7.17.2020

## 2020-06-16 ENCOUNTER — PATIENT OUTREACH (OUTPATIENT)
Dept: OTHER | Facility: OTHER | Age: 60
End: 2020-06-16

## 2020-06-16 NOTE — PROGRESS NOTES
"Digital Medicine: Health  Follow-Up    Patient shared he was feeling better since last outreach.     Patient shared renal mass is not cancerous and patient shared "I am very happy with that news." Patient shared he still has some pain in his side and is working with his provider to explore cause. Patient had several questions regarding Hypokalemia diagnosis. Encouraged him to speak with his PCP.     Patient shared A1c increased to 6.6%. Inquired about potential cause of increase. Patient attributes to pain and currently not taking diabetes medication. Patient shared he has not been on diabetes medication since leaving the hospital a month ago. Patient shared providers are waiting to explore cause of pain before starting medication again.    Patient reported he is still manually entering his BG. Patient plans on going to the [facility] once he has more time to devote to getting his device fixed.      The history is provided by the patient.       INTERVENTION(S)  encouragement/support    PLAN  patient verbalizes understanding and Clinician follow-up    Plan to follow up in 4-6 weeks to review readings and progress towards his goals.       There are no preventive care reminders to display for this patient.      Last 6 Patient Entered Readings                                          Most Recent A1c: 6.6% on 6/15/2020  (Goal: 7%)     Recent Readings 6/16/2020 6/15/2020 6/11/2020 6/8/2020 6/7/2020    Blood Glucose (mg/dL) 192 175 163 166 145                    Diet Screening   No change to diet.      Physical Activity Screening   No change to exercise routine.        Medication Adherence Screening   He missed a dose once this month.    Patient identified the following reasons for non-compliance: Provider-instructed    Patient shared he has not been on diabetes medication for over a month. Providers are exploring cause of his UTI and pain before starting him on medication again. Patient shared he does not like needles and " does not feel comfortable using them to treat his diabetes.     Routed chart to pharmacist.       SDOH- deferred

## 2020-06-17 ENCOUNTER — HOSPITAL ENCOUNTER (EMERGENCY)
Facility: HOSPITAL | Age: 60
Discharge: HOME | End: 2020-06-17
Attending: EMERGENCY MEDICINE
Payer: COMMERCIAL

## 2020-06-17 ENCOUNTER — TELEPHONE (OUTPATIENT)
Dept: INTERNAL MEDICINE | Facility: CLINIC | Age: 60
End: 2020-06-17

## 2020-06-17 VITALS
DIASTOLIC BLOOD PRESSURE: 77 MMHG | OXYGEN SATURATION: 96 % | SYSTOLIC BLOOD PRESSURE: 127 MMHG | TEMPERATURE: 99 F | RESPIRATION RATE: 17 BRPM | HEART RATE: 80 BPM
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
## 2020-06-17 DIAGNOSIS — S16.1XXA CERVICAL STRAIN, ACUTE, INITIAL ENCOUNTER: Primary | ICD-10-CM

## 2020-06-17 PROCEDURE — 99284 EMERGENCY DEPT VISIT MOD MDM: CPT | Mod: ,,, | Performed by: EMERGENCY MEDICINE

## 2020-06-17 PROCEDURE — 25000003 PHARM REV CODE 250: Performed by: EMERGENCY MEDICINE

## 2020-06-17 PROCEDURE — 99284 PR EMERGENCY DEPT VISIT,LEVEL IV: ICD-10-PCS | Mod: ,,, | Performed by: EMERGENCY MEDICINE

## 2020-06-17 PROCEDURE — 99284 EMERGENCY DEPT VISIT MOD MDM: CPT | Mod: 25

## 2020-06-17 RX ORDER — ACETAMINOPHEN 500 MG
1000 TABLET ORAL
Status: COMPLETED | OUTPATIENT
Start: 2020-06-17 | End: 2020-06-17

## 2020-06-17 RX ADMIN — ACETAMINOPHEN 1000 MG: 500 TABLET ORAL at 12:06
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
## 2020-06-17 NOTE — DISCHARGE INSTRUCTIONS
Acetaminophen, naproxen or ibuprofen over the counter as needed for pain    Avoid heating pads or long showers for 48 hours
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
## 2020-06-17 NOTE — ED NOTES
Patient given discharge instructions. IV removed and bandage applied. Telemetry monitor removed and returned to the nursing station. Patient leaving facility via personal vehicle.

## 2020-06-23 ENCOUNTER — PROCEDURE VISIT (OUTPATIENT)
Dept: UROLOGY | Facility: CLINIC | Age: 60
End: 2020-06-23
Payer: COMMERCIAL

## 2020-06-23 VITALS
BODY MASS INDEX: 23.14 KG/M2 | DIASTOLIC BLOOD PRESSURE: 82 MMHG | HEART RATE: 61 BPM | HEIGHT: 76 IN | TEMPERATURE: 98 F | WEIGHT: 190 LBS | SYSTOLIC BLOOD PRESSURE: 140 MMHG

## 2020-06-23 DIAGNOSIS — R33.9 INCOMPLETE BLADDER EMPTYING: ICD-10-CM

## 2020-06-23 DIAGNOSIS — N39.0 RECURRENT UTI: ICD-10-CM

## 2020-06-23 DIAGNOSIS — E11.9 TYPE 2 DIABETES MELLITUS WITHOUT COMPLICATION, WITHOUT LONG-TERM CURRENT USE OF INSULIN: Primary | ICD-10-CM

## 2020-06-23 DIAGNOSIS — N12 PYELONEPHRITIS: ICD-10-CM

## 2020-06-23 DIAGNOSIS — N32.89 DISTENDED BLADDER: ICD-10-CM

## 2020-06-23 LAB
BILIRUB SERPL-MCNC: NORMAL MG/DL
BLOOD URINE, POC: NORMAL
COLOR, POC UA: YELLOW
GLUCOSE UR QL STRIP: NORMAL
KETONES UR QL STRIP: NORMAL
LEUKOCYTE ESTERASE URINE, POC: NORMAL
NITRITE, POC UA: NORMAL
PH, POC UA: 7
PROTEIN, POC: NORMAL
SPECIFIC GRAVITY, POC UA: 1
UROBILINOGEN, POC UA: NORMAL

## 2020-06-23 PROCEDURE — 51784 PR ANAL/URINARY MUSCLE STUDY: ICD-10-PCS | Mod: 26,51,S$GLB, | Performed by: UROLOGY

## 2020-06-23 PROCEDURE — 51728 PR COMPLEX CYSTOMETROGRAM VOIDING PRESSURE STUDIES: ICD-10-PCS | Mod: 26,S$GLB,, | Performed by: UROLOGY

## 2020-06-23 PROCEDURE — 81001 URINALYSIS AUTO W/SCOPE: CPT | Mod: S$GLB,,, | Performed by: UROLOGY

## 2020-06-23 PROCEDURE — 81001 POCT URINALYSIS, DIPSTICK OR TABLET REAGENT, AUTOMATED, WITH MICROSCOP: ICD-10-PCS | Mod: S$GLB,,, | Performed by: UROLOGY

## 2020-06-23 PROCEDURE — 52000 CYSTOURETHROSCOPY: CPT | Mod: 59,S$GLB,, | Performed by: UROLOGY

## 2020-06-23 PROCEDURE — 51741 ELECTRO-UROFLOWMETRY FIRST: CPT | Mod: 26,51,S$GLB, | Performed by: UROLOGY

## 2020-06-23 PROCEDURE — 51784 ANAL/URINARY MUSCLE STUDY: CPT | Mod: 26,51,S$GLB, | Performed by: UROLOGY

## 2020-06-23 PROCEDURE — 52000 PR CYSTOURETHROSCOPY: ICD-10-PCS | Mod: 59,S$GLB,, | Performed by: UROLOGY

## 2020-06-23 PROCEDURE — 51728 CYSTOMETROGRAM W/VP: CPT | Mod: 26,S$GLB,, | Performed by: UROLOGY

## 2020-06-23 PROCEDURE — 51741 PR UROFLOWMETRY, COMPLEX: ICD-10-PCS | Mod: 26,51,S$GLB, | Performed by: UROLOGY

## 2020-06-23 RX ORDER — BETHANECHOL CHLORIDE 25 MG/1
50 TABLET ORAL 3 TIMES DAILY
Qty: 180 TABLET | Refills: 5 | Status: SHIPPED | OUTPATIENT
Start: 2020-06-23 | End: 2020-06-23 | Stop reason: SDUPTHER

## 2020-06-23 RX ORDER — LIDOCAINE HYDROCHLORIDE 20 MG/ML
JELLY TOPICAL ONCE
Status: COMPLETED | OUTPATIENT
Start: 2020-06-23 | End: 2020-06-23

## 2020-06-23 RX ORDER — BETHANECHOL CHLORIDE 25 MG/1
50 TABLET ORAL 3 TIMES DAILY
Qty: 180 TABLET | Refills: 5 | Status: SHIPPED | OUTPATIENT
Start: 2020-06-23 | End: 2020-09-04 | Stop reason: SDUPTHER

## 2020-06-23 RX ORDER — DOXYCYCLINE HYCLATE 100 MG
100 TABLET ORAL ONCE
Status: COMPLETED | OUTPATIENT
Start: 2020-06-23 | End: 2020-06-23

## 2020-06-23 RX ADMIN — LIDOCAINE HYDROCHLORIDE: 20 JELLY TOPICAL at 09:06

## 2020-06-23 RX ADMIN — Medication 100 MG: at 09:06

## 2020-06-23 NOTE — PROCEDURES
Simple Urodynamics w/ Cysto    Date/Time: 6/23/2020 8:00 AM  Performed by: Ryder Seay MD  Authorized by: Ryder Seay MD   Comments: Procedure Date:  06/23/2020    Procedure:   Diagnostic Cystourethroscopy   Complex Cystometrogram   Voiding / Pressure Study with Intrarectal Balloon   Complex Uroflow   Electromyogram of Anal Sphincter.     Pre-OP Diagnosis:   Incomplete bladder emptying, hx of urosepsis with pyelonephritis, s/p TURP   Post-OP Diagnosis:   Same, enlarged bladder   Anesthesia:   Anesthesia Administered:   Intraurethral instillation of 10 mL 2% lidocaine (Xylocaine) jelly.   Findings:   --- Bladder ---   CYSTOMETROGRAM ( Filling Phase ):   Cystometric Numeric Data:   - First Desire (Sensation): 81 mL at 1cm of water.   - Normal Desire: 346mL at 2 cm of water.   - Strong Desire: 539 mL at 3 cm of water.   - Urgency (Imminent Void) : 686 mL at 4 cm of water.   - Maximum Cystometric Capacity: 687 mL.   Compliance:   - high.   Leak Point Pressure:   - Valsalva ( Abdominal ) Leak Point Pressure: none.   UROFLOW:   - Voided Volume: 696 mL.   - Residual Urine: 250 mL.   - Maximum Flow Rate: 20 mL/sec.   - Flow Pattern: broad peak  VOIDING PRESSURE STUDY ( Voiding Phase ):   Detrusor Pressure:   - Maximum Detrusor Pressure: 42cm of water.   - Detrusor Pressure at Maximum Flow: 38 cm of water.   - Detrusor Contraction Characteristics: Sustained contraction(s).   ELECTROMYOGRAM:   - normal.     ---Diagnostic Cystourethroscopy ---   Normal urethra.   Prostate: s/p TURP open prostate fossa, some residual adenoma anteriorly but not obstructive  Width of Bladder Neck Opening: Approximately 18 Fr.   Normal bladder.   Normal ureteral orifices bilaterally.       Description of Procedure:   Informed Consent:   - Risks, benefits and alternatives of procedure discussed with   patient and informed consent obtained.   Patient Position:   - Supine.   --- Bladder ---   Prep and Drape:   - Patient prepped and draped in  usual sterile fashion using povidone   iodine (Betadine).   --- Diagnostic Cystourethroscopy ---   Instruments:   - 16 Fr flexible cystoscope with 0 degree lens.   Procedure Details:   - Cystoscope passed under vision into bladder.   - Bladder and urethra examined in their entirety with findings as   above.   --- Urodynamic Studies ---   Procedure Details:   Cystometrogram:   - Catheter(s) passed into the bladder.   - Rectal balloon inserted.   - Catheter(s) connected to infusion medium and to pressure recording   device.   - Infusion Rate: 30 mL / min.   Electromyogram:   - Perineal electromyogram pad placed and connected to electromyogram   recording device.   Equipment:   - Catheters: Double lumen catheter.   - Medium: Liquid.   - Pressure Recording Device: Calibrated electronic equipment.   Complications:   No immediate complications.    CONCLUSIONS:   1. Incomplete bladder emptying  2. High bladder capacity 900 ml  3. Weak bladder contraction    Start him on urecholine 25 mg TID, titrated up to 50 mg TID  Will check PVR in 3 months.  If not improving, consider InterStim Therapy.    Post-OP Plan:   Patient was discharged home in a stable condition.  Medications: doxy  Follow up:  3 months.     Type 2 diabetes mellitus without complication, without long-term current use of insulin     Pyelonephritis  Simple Urodynamics w/ Cysto     Recurrent UTI  Simple Urodynamics w/ Cysto  POCT Urinalysis; Standing     Distended bladder     Incomplete bladder emptying  bethanechol (URECHOLINE) 25 MG Tab; Take 2 tablets (50 mg total) by mouth 3 (three) times daily. Start with 1 tablet (25 mg) three times a day for 1 wk.  Then increase to 2 tablets ( 50 mg) three times a day as tolerates  Dispense: 180 tablet; Refill: 5     Other orders  doxycycline tablet 100 mg  lidocaine HCl 2% urojet

## 2020-06-23 NOTE — PATIENT INSTRUCTIONS
SIMPLE URODYNAMIC STUDY (SUDS) & CYSTOSCOPY  UROLOGY CLINIC DISCHARGE INSTRUCTIONS    You have had a procedure that will require time to properly heal. Follow the instructions you have been given on how to care for yourself once you are home. Below is additional information to help in your recovery.    ACTIVITY  · There are no restrictions in activity. Start doing again the things you did before the procedure.  · You may experience a slight burning sensation. You may notice a small amount of blood in your urine. This will clear up within a day. Call the clinic if this continues beyond 48 hours.    DIET  · Continue your normal diet. You may eat the same foods you ate before your procedure.  · Drink plenty of fluids during the first 24-48 hours following your procedure.    MEDICATIONS  · Resume all other previous medications from your prescribing physician.  · Continue any pre=procedure antibiotics until they are all gone.    SIGNS AND SYMPTOMS TO REPORT TO THE DOCTOR  · Chills or fever greater than 101° F within 24 hours of procedure.  · Changes in urination, such as increased bleeding, foul smell, cloudy urine, or painful urination.  · Call your doctor with any questions or concerns.    For any emergency situation, call 221 immediately or go to your nearest emergency room.    [facility]  [phone]    _                                                                                                                                                                                             If any problems after hours or weekends, you may call [phone] and ask for the urology resident on call.  
21-Jul-2019 02:37

## 2020-06-26 NOTE — PROGRESS NOTES
Updated A1C remains stable and controlled. Will continue with lifestyle modifications for diabetes management at this time.     Will continue to monitor and reach out as needed.

## 2020-06-30 ENCOUNTER — TELEPHONE (OUTPATIENT)
Dept: INTERNAL MEDICINE | Facility: CLINIC | Age: 60
End: 2020-06-30
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
## 2020-06-30 NOTE — PATIENT INSTRUCTIONS
Potassium-Rich Foods  The normal adult diet usually contains 2,000 mg to 4,000 mg of potassium per day. More potassium is needed when you lose too much potassium from your body. This can happen if you have diarrhea or vomiting. It can also happen if you take a medicine to make you urinate more (diuretic). To increase the amount of potassium in your diet, include these high-potassium foods.     [The (*) indicates foods highest in potassium.]  Vegetables  Artichokes. Cooked 1/2 cup, 200 mg to 300 mg*  Asparagus. Cooked 1/2 cup, 200 mg to 300 mg  Beans. White, red, gomes cooked 1/2 cup, 300 mg to 500 mg*  Beets. Cooked 1/2 cup, 200 mg to 300 mg  Broccoli. Cooked or raw 1 cup, 200 mg to 500 mg*  [place]heloe sprouts. Cooked 1/2 cup, 200 mg to 300 mg  Cabbage. Raw 1 cup, 100 mg to 200 mg  Carrots. Raw or cooked 1/2 cup, 100 mg to 200 mg  Celery. Raw 1 cup, 200 mg to 300 mg  Lima beans. Fresh or frozen 1/2 cup, 300 mg to 500 mg*   Mushrooms. Raw or cooked 1/2 cup, 100 mg to 300 mg  Peas. Cooked 1/2 cup, 150 mg to 250 mg   Potatoes. Baked 1 medium, 500 mg to 900 mg*   Spinach. Cooked 1 cup, 800 mg to 900 mg*   Spinach. Raw 2 cups, 300 mg to 400 mg *  Squash, winter. Fresh, frozen, or cooked 1/2 cup, 200 mg to 400 mg   Tomato. Fresh 1 medium, 200 mg to 300 mg   Tomato juice. Canned 1/2 cup, 200 mg to 300 mg   Fruits  Apple juice. Unsweetened 1 cup, 200 mg to 300 mg   Apricots. Canned 1/2 cup, 200 mg to 300 mg   Apricots. Dried 4 pieces, 100 mg to 200 mg   Avocado. Raw 1/2 cup, 300 mg to 400 mg*  Banana. Fresh 1 small, 300 mg to 400 mg*   Cantaloupe. Fresh 1 cup diced, 300 mg to 400 mg*   Grape juice. Unsweetened 1 cup, 200 mg to 300 mg   Honeydew melon. Fresh 1 cup diced, 300 mg to 400 mg*   Orange. Fresh 1 medium, 200 mg to 300 mg    Orange juice. Unsweetened, fresh or frozen 1/2 cup, 200 mg to 300 mg  Pineapple juice. Unsweetened 1 cup, 300 mg to 400 mg   Prune juice. Unsweetened 1/2 cup, 300 mg to 400 mg*   Prunes. Dried 5  pieces, 300 mg to 400 mg*   Strawberries. Fresh or frozen 1 cup, 200 mg to 300 mg  Meat  Red meat. Cooked 3 ounces, 100 mg to 300 mg   Seafood  Cod, flounder, halibut. Cooked 3 ounces, 100 mg to 300 mg*  Cataldo. Cooked, 3 ounces 300 mg to 400 mg*   Scallops. Cooked 3 ounces, 200 mg to 300 mg*  Shrimp. Cooked 3/4 cup, 100 mg to 200 mg   Tuna. Fresh or canned 3/4 cup, 200 mg to 500 mg   Date Last Reviewed: 10/1/2016  © 2540-8165 [employer]. [address] [place] [address], [place] [address]. All rights reserved. This information is not intended as a substitute for professional medical care. Always follow your healthcare professional's instructions.

## 2020-07-27 ENCOUNTER — PATIENT OUTREACH (OUTPATIENT)
Dept: OTHER | Facility: OTHER | Age: 60
End: 2020-07-27

## 2020-07-28 NOTE — PROGRESS NOTES
Digital Medicine: Health  Follow-Up    The history is provided by the patient.             Reason for review: Blood glucose not at goal        Topics Covered on Call: physical activity and Diet    Additional Follow-up details:   Patient's current BP is 171. Patient reported he was doing well and no symptoms to report.   Patient is not taking any medication at this time to treat his diabetes. Patient expressed concerned regarding high BG in the morning. Reviewed diet.         Diet-Change  24 hour dietary recall  Breakfast is typically between. Hashborwns with eggs and sausage .  Lunch is typically between. Bowl of jumbo.   Dinner is typically between. 1/2 ava cheese steak with some fries .   Patient reports eating or drinking the following: Patient reported he goes long periods of time without eating and reported decrease in appetite again.Patient shared he last ate today at 8 am. Encouraged patient to eat regular scheduled meals with snacks in between. Patient stated he has snacks in his office he could eat such as fruit and cheese. Patient reported his wife has also encouraged him to increase his food intake during the day. Discussed increasing water intake during the day.     Dietary Improvements:    Dietary Indiscretions:Patient goes long periods of time without eating.         Intervention(s): regularly scheduling meals and increasing snacking (diabetic)    Additional diet details:    Physical Activity-Change      He exercises for 60 minutes per day 2 day(s) a week.     He added ellipical to His physical activity routine.        Additional physical activity details: Patient reported he gets on ellipical for 60 minutes 2-3 days a week.       Medication Adherence-Medication adherence was assessed.      Substance, Sleep, Stress-Not assessed    Intervention/Plan      Topic    Eye Exam          Last 6 Patient Entered Readings                                          Most Recent A1c: 6.6% on 6/15/2020  (Goal:  7%)     Recent Readings 7/27/2020 7/26/2020 7/25/2020 7/24/2020 7/22/2020    Blood Glucose (mg/dL) 176 132 169 148 182

## 2020-08-06 ENCOUNTER — PATIENT OUTREACH (OUTPATIENT)
Dept: ADMINISTRATIVE | Facility: OTHER | Age: 60
End: 2020-08-06

## 2020-08-06 ENCOUNTER — OFFICE VISIT (OUTPATIENT)
Dept: UROLOGY | Facility: CLINIC | Age: 60
End: 2020-08-06
Payer: COMMERCIAL

## 2020-08-06 VITALS
HEART RATE: 86 BPM | DIASTOLIC BLOOD PRESSURE: 71 MMHG | SYSTOLIC BLOOD PRESSURE: 119 MMHG | WEIGHT: 190 LBS | BODY MASS INDEX: 23.14 KG/M2 | HEIGHT: 76 IN

## 2020-08-06 DIAGNOSIS — N28.1 RENAL CYST: ICD-10-CM

## 2020-08-06 DIAGNOSIS — R10.9 FLANK PAIN: Primary | ICD-10-CM

## 2020-08-06 DIAGNOSIS — R35.0 URINARY FREQUENCY: ICD-10-CM

## 2020-08-06 PROCEDURE — 99213 OFFICE O/P EST LOW 20 MIN: CPT | Mod: S$GLB,,, | Performed by: NURSE PRACTITIONER

## 2020-08-06 PROCEDURE — 3008F PR BODY MASS INDEX (BMI) DOCUMENTED: ICD-10-PCS | Mod: CPTII,S$GLB,, | Performed by: NURSE PRACTITIONER

## 2020-08-06 PROCEDURE — 3008F BODY MASS INDEX DOCD: CPT | Mod: CPTII,S$GLB,, | Performed by: NURSE PRACTITIONER

## 2020-08-06 PROCEDURE — 3074F SYST BP LT 130 MM HG: CPT | Mod: CPTII,S$GLB,, | Performed by: NURSE PRACTITIONER

## 2020-08-06 PROCEDURE — 87086 URINE CULTURE/COLONY COUNT: CPT

## 2020-08-06 PROCEDURE — 3074F PR MOST RECENT SYSTOLIC BLOOD PRESSURE < 130 MM HG: ICD-10-PCS | Mod: CPTII,S$GLB,, | Performed by: NURSE PRACTITIONER

## 2020-08-06 PROCEDURE — 99213 PR OFFICE/OUTPT VISIT, EST, LEVL III, 20-29 MIN: ICD-10-PCS | Mod: S$GLB,,, | Performed by: NURSE PRACTITIONER

## 2020-08-06 PROCEDURE — 3078F DIAST BP <80 MM HG: CPT | Mod: CPTII,S$GLB,, | Performed by: NURSE PRACTITIONER

## 2020-08-06 PROCEDURE — 3078F PR MOST RECENT DIASTOLIC BLOOD PRESSURE < 80 MM HG: ICD-10-PCS | Mod: CPTII,S$GLB,, | Performed by: NURSE PRACTITIONER

## 2020-08-06 NOTE — PROGRESS NOTES
"Subjective:      iH Rubio is a 60 y.o. male who returns today regarding his urinary symptoms.    S/p TURP in Oct 2017. He has history of UTIs prior to TURP.    Admitted in May with left pyelonephritis. This was his 2nd UTI within 2 months. During his hospitalization, CT revealed a right renal mass. MRI confirmed that this is a renal cyst within only think septations and no enhancement.    SUDS was completed in June by Dr. Seay which showed "JUANITA, high bladder capacity 900 mL, and weak bladder contraction." He was prescribed urecholine 25 mg TID, titrated up to 50 mg TID. Scheduled to follow up with Dr. Seay in August with PVR to discuss possible Interstim if not improving.     Today he reports bilateral flank discomfort and mild urinary frequency that developed over this week. Denies fever/chills, vomiting and gross hematuria. States that he never received the prescription from Dr. Seay.     The following portions of the patient's history were reviewed and updated as appropriate: allergies, current medications, past family history, past medical history, past social history, past surgical history and problem list.    Review of Systems  Constitutional: no fever or chills  ENT: no nasal congestion or sore throat  Respiratory: no cough or shortness of breath  Cardiovascular: no chest pain or palpitations  Gastrointestinal: no nausea or vomiting, tolerating diet  Genitourinary: as per HPI  Hematologic/Lymphatic: no easy bruising or lymphadenopathy  Musculoskeletal: no arthralgias or myalgias  Neurological: no seizures or tremors  Behavioral/Psych: no auditory or visual hallucinations     Objective:   Vitals:   Vitals:    08/06/20 1400   BP: 119/71   Pulse: 86     Physical Exam   General: alert and oriented, no acute distress  Head: normocephalic, atraumatic  Neck: supple, no lymphadenopathy, normal ROM, no masses  Respiratory: Symmetric expansion, non-labored breathing  Cardiovascular: regular rate and rhythm, nomal " pulses, no peripheral edema  Abdomen: soft, non tender, non distended, no palpable masses, no hernias, no hepatomegaly or splenomegaly  Genitourinary: deferred  Lymphatic: no inguinal nodes  Skin: normal coloration and turgor, no rashes, no suspicious skin lesions noted  Neuro: alert and oriented x3, no gross deficits  Psych: normal judgment and insight, normal mood/affect and non-anxious  No CVA tenderness    Lab Review   Urinalysis demonstrates positive for glucose (250 mg/dL)  Lab Results   Component Value Date    WBC 6.40 05/07/2020    HGB 13.2 (L) 05/07/2020    HCT 40.8 05/07/2020    MCV 90 05/07/2020     05/07/2020     Lab Results   Component Value Date    CREATININE 1.1 05/07/2020    BUN 16 05/07/2020     Lab Results   Component Value Date    PSA 21.2 (H) 02/06/2017     Imaging   None    Assessment:   Flank pain  Renal cyst Bosniak II  Urinary frequency    Plan:   Hi was seen today for follow-up, dysuria and bladder pain.    Diagnoses and all orders for this visit:    Flank pain  -     POCT URINE DIPSTICK WITHOUT MICROSCOPE  -     Urine culture    Renal cyst Bosniak II    Urinary frequency    Plan:  --Urine culture today. UA appears negative. Hold on antibiotics   --Follow up with Dr. Seay regarding the urecholine prescription and further workup for interstim

## 2020-08-06 NOTE — PROGRESS NOTES
Chart reviewed.   Immunizations: Triggered Imm Registry     Orders placed: n/a  Upcoming appts to satisfy JOSE LUIS topics: n/a

## 2020-08-07 LAB — BACTERIA UR CULT: NO GROWTH

## 2020-08-11 NOTE — PROGRESS NOTES
Received response from Endocrine MD associated with the program. Will increase monitoring to BID to monitor prandially.     May need to consider basal insulin for control.

## 2020-08-12 NOTE — PROGRESS NOTES
Patient called to discuss recent My Chart message requesting additional readings.     Patient reports he is happy to take additional readings. Discussed conversation with Endocrinologist regarding his care. Discussed different disregulations that can occur to impact varying blood sugar readings.     Started conversation for possible basal insulin. Patient is agreeable and reports he felt this would be coming soon.     Does endorse increased thirst and increased urination and increased irritability.     Of note, review of prior post-prandial readings show proper prandial control with significant fasting elevations. Likely basal insulin alone will control.     Will start communications with PCP for in clinic insulin technique education.

## 2020-08-14 ENCOUNTER — TELEPHONE (OUTPATIENT)
Dept: INTERNAL MEDICINE | Facility: CLINIC | Age: 60
End: 2020-08-14

## 2020-08-20 ENCOUNTER — PATIENT OUTREACH (OUTPATIENT)
Dept: OTHER | Facility: OTHER | Age: 60
End: 2020-08-20

## 2020-08-20 DIAGNOSIS — E11.9 TYPE 2 DIABETES MELLITUS WITHOUT COMPLICATION, WITHOUT LONG-TERM CURRENT USE OF INSULIN: Primary | ICD-10-CM

## 2020-08-20 RX ORDER — INSULIN GLARGINE 100 [IU]/ML
10 INJECTION, SOLUTION SUBCUTANEOUS NIGHTLY
Qty: 1 BOX | Refills: 0 | Status: SHIPPED | OUTPATIENT
Start: 2020-08-20 | End: 2020-09-03

## 2020-08-20 RX ORDER — PEN NEEDLE, DIABETIC 32GX 5/32"
NEEDLE, DISPOSABLE MISCELLANEOUS
Qty: 100 EACH | Refills: 1 | Status: SHIPPED | OUTPATIENT
Start: 2020-08-20 | End: 2021-01-25

## 2020-08-20 NOTE — PROGRESS NOTES
Digital Medicine: Clinician Follow-Up    At last outreach, discussed starting basal insulin. Patient agreeable.     Monitored prandial prior to starting insulin. Appropriate for current plan.       The history is provided by the patient.   Follow-up reason(s): routine follow up.     Diabetes    Patient readings are stable         Last 6 Patient Entered Readings                                          Most Recent A1c: 6.6% on 6/15/2020  (Goal: 7%)     Recent Readings 8/20/2020 8/19/2020 8/18/2020 8/18/2020 8/17/2020    Blood Glucose (mg/dL) 181 170 180 182 182           Screenings    ASSESSMENT(S)  Patient's A1C goal is less than or equal to 7 per 2020 ADA guidelines. Patient's most recent A1C result is above goal. Lab Results    Component                Value               Date                     HGBA1C                   6.6 (H)             06/15/2020          .          Diabetes Plan  Medication change. Started Lantus 10 units QHS.   Continue current diet/physical activity routine.  Provided patient education.  Entered order and provided information regarding storage and not to start until seeing CDE for education on injection technique.   - Patient referred to CDE with permission of PCP for injection technique education.   - Patient provide education on storage and utilization. Injection tech education planned.     Will follow-up in 2 weeks.      Addressed any questions or concerns and patient has my contact information if needed prior to next outreach. Patient verbalizes understanding.                Topic    Eye Exam

## 2020-08-21 ENCOUNTER — CLINICAL SUPPORT (OUTPATIENT)
Dept: DIABETES | Facility: CLINIC | Age: 60
End: 2020-08-21
Payer: COMMERCIAL

## 2020-08-21 DIAGNOSIS — E11.9 TYPE 2 DIABETES MELLITUS WITHOUT COMPLICATION, WITHOUT LONG-TERM CURRENT USE OF INSULIN: ICD-10-CM

## 2020-08-21 PROCEDURE — 99999 PR PBB SHADOW E&M-EST. PATIENT-LVL I: ICD-10-PCS | Mod: PBBFAC,,,

## 2020-08-21 PROCEDURE — 99999 PR PBB SHADOW E&M-EST. PATIENT-LVL I: CPT | Mod: PBBFAC,,,

## 2020-08-21 PROCEDURE — G0108 DIAB MANAGE TRN  PER INDIV: HCPCS | Mod: S$GLB,,, | Performed by: INTERNAL MEDICINE

## 2020-08-21 PROCEDURE — G0108 PR DIAB MANAGE TRN  PER INDIV: ICD-10-PCS | Mod: S$GLB,,, | Performed by: INTERNAL MEDICINE

## 2020-08-21 NOTE — PROGRESS NOTES
Diabetes Education  Author: Cathie Sierra RN  Date: 8/21/2020    Diabetes Care Management Summary  Diabetes Education Record Assessment/Progress: Initial(Pt presents for education regarding Insluin pen usage.)  Current Diabetes Risk Level: Low         Diabetes Type  Diabetes Type : Type II    Diabetes History  Diabetes Diagnosis: 1-3 years  Current Treatment: Insulin(new start to insulin)  Reviewed Problem List with Patient: Yes    Health Maintenance was reviewed today with patient. Discussed with patient importance of routine eye exams, foot exams/foot care, blood work (i.e.: A1c, microalbumin, and lipid), dental visits, yearly flu vaccine, and pneumonia vaccine as indicated by PCP. Patient verbalized understanding.     Health Maintenance Topics with due status: Not Due       Topic Last Completion Date    Colorectal Cancer Screening 04/07/2017    TETANUS VACCINE 10/09/2019    Influenza Vaccine 10/09/2019    Urine Microalbumin 11/22/2019    Lipid Panel 11/25/2019    Hemoglobin A1c 06/15/2020     Health Maintenance Due   Topic Date Due    HIV Screening  01/11/1973    Aspirin/Antiplatelet Therapy  01/11/1976    Shingles Vaccine (1 of 2) 01/11/2008    LDCT Lung Screen  07/09/2016    Foot Exam  05/12/2018    Eye Exam  07/02/2020                      Current Diabetes Treatment   Current Treatment: Insulin(new start to insulin)    Social History  Preferred Learning Method: Face to Face  Primary Support: Self, Family, Friends  Smoking Status: Ex Smoker                                Barriers to Change  Barriers to Change: None  Learning Challenges : None    Readiness to Learn   Readiness to Learn : Acceptance    Cultural Influences  Cultural Influences: No    Diabetes Education Assessment/Progress  Medications (states correct name, dose, onset, peak, duration, side effects & timing of meds): Discussion, Demonstration, Demonstrates Understanding/Competency(verbalizes/demonstrates), Individual Session, Written  Materials Provided, Instructed, Comprehends Key Points  Monitoring (monitoring blood glucose/other parameters & using results): Discussion, Individual Session, Written Materials Provided, Instructed, Comprehends Key Points  Acute Complications (preventing, detecting, and treating acute complications): Discussion, Individual Session, Written Materials Provided, Instructed, Comprehends Key Points    Goals  Patient has selected/evaluated goals during today's session: No                   Today's Self-Management Care Plan was developed with the patient's input and is based on barriers identified during today's assessment.    The long and short-term goals in the care plan were written with the patient/caregiver's input. The patient has agreed to work toward these goals to improve his overall diabetes control.      The patient received a copy of today's self-management plan and verbalized understanding of the care plan, goals, and all of today's instructions.      The patient was encouraged to communicate with his physician and care team regarding his condition(s) and treatment.  I provided the patient with my contact information today and encouraged him to contact me via phone or patient portal as needed.     Education Units of Time   Time Spent: 30 min  Diabetes Education  Author: Cathie Sierra RN  Date: 8/21/2020    Diabetes Care Management Summary  Diabetes Education Record Assessment/Progress: Initial(Pt presents for education regarding Insluin pen usage.)  Current Diabetes Risk Level: Low         Diabetes Type  Diabetes Type : Type II    Diabetes History  Diabetes Diagnosis: 1-3 years  Current Treatment: Insulin(new start to insulin)  Reviewed Problem List with Patient: Yes    Health Maintenance was reviewed today with patient. Discussed with patient importance of routine eye exams, foot exams/foot care, blood work (i.e.: A1c, microalbumin, and lipid), dental visits, yearly flu vaccine, and pneumonia vaccine as  indicated by PCP. Patient verbalized understanding.     Health Maintenance Topics with due status: Not Due       Topic Last Completion Date    Colorectal Cancer Screening 04/07/2017    TETANUS VACCINE 10/09/2019    Influenza Vaccine 10/09/2019    Urine Microalbumin 11/22/2019    Lipid Panel 11/25/2019    Hemoglobin A1c 06/15/2020     Health Maintenance Due   Topic Date Due    HIV Screening  01/11/1973    Aspirin/Antiplatelet Therapy  01/11/1976    Shingles Vaccine (1 of 2) 01/11/2008    LDCT Lung Screen  07/09/2016    Foot Exam  05/12/2018    Eye Exam  07/02/2020       Current Diabetes Treatment   Current Treatment: Insulin(new start to insulin)    Social History  Preferred Learning Method: Face to Face  Primary Support: Self, Family, Friends  Smoking Status: Ex Smoker        Barriers to Change  Barriers to Change: None  Learning Challenges : None    Readiness to Learn   Readiness to Learn : Acceptance    Cultural Influences  Cultural Influences: No    Diabetes Education Assessment/Progress      Medications (states correct name, dose, onset, peak, duration, side effects & timing of meds): Discussion, Demonstration, Demonstrates Understanding/Competency(verbalizes/demonstrates), Individual Session, Written Materials Provided, Instructed, Comprehends Key Points      INSULIN TEACHING:  The patient was instructed on supplies needed, storage of insulin, precautions related to hyper/hypoglycemia. Patient was given a starter kit containing pen needles.  Injection sites were discussed and patient was instructed on importance of rotating sites.  Discussed timing and mechanism of action of long acting insulin. Reviewed need for rotation of injection sites, appropriate insulin storage, timing of insulin, safe disposal of used sharps and insulin pen preparation and use, including performing a prime shot of 2 units prior to injection and keeping pen in skin for a count of 10 before removing. Discussed possible side effects  and how to avoid these. Reviewed hypoglycemia and treatment with Rule of 15. Discussed general vs severe hyperglycemia and risk of DKA. Emphasized need to take lantus at same time daily.Patient verbalizes understanding of received information/education.     Monitoring (monitoring blood glucose/other parameters & using results): Discussion, Individual Session, Written Materials Provided, Instructed, Comprehends Key Points  Discussed goal BGs for different times of day and in relation to meals. Instructed pt to test BG once per day: fasting and 2-hours after breakfast, lunch and dinner. Reviewed need for updated BG logs for all endo, PCP, and education appts. Patient verbalizes understanding of received information/education.     Acute Complications (preventing, detecting, and treating acute complications): Discussion, Individual Session, Written Materials Provided, Instructed, Comprehends Key Points  Discussed hypoglycemia  symptoms and discussed appropriate treatments Rule of 15. Reviewed that pt is at  risk of hypo with current medication regimen.       Goals  Patient has selected/evaluated goals during today's session: No    The patient was encouraged to communicate with his physician and care team regarding his condition(s) and treatment.  I provided the patient with my contact information today and encouraged him to contact me via phone or patient portal as needed.     Education Units of Time   Time Spent: 30 min

## 2020-08-27 ENCOUNTER — PATIENT OUTREACH (OUTPATIENT)
Dept: OTHER | Facility: OTHER | Age: 60
End: 2020-08-27

## 2020-08-27 NOTE — PROGRESS NOTES
"Digital Medicine: Health  Follow-Up    The history is provided by the patient.             Reason for review: Blood glucose not at goal      Additional Follow-up details: Patient reported he was doing well with no concerns or symptoms. Patient's current BG average  is 181. At last outreach BP was 171 on 7/28/20.    Patient shared he started taking insulin on 7/23/20. Patient reported he has noticed an improvement not only in his numbers in mood. "My wife shared how much calmer and not on edge anymore since starting medication." Patient shared how happy he is to be on the mediation and has less anxiety since starting medication. "I am not jitter and feel more relax."                  Diet-Change      Additional diet details: Patient reported he continues to monitor is food intake. He stated he ate rice twice this week and noticed it skipped his BG.     Physical Activity-Change      He exercises for 60 minutes per day 5 day(s) a week.     He added elliptical to His physical activity routine.        Additional physical activity details: Patient shared he is hopeful that with improved mood and energy he can get back using his ellipitcal. He hopes to get 5 miles on his ellipical for 5 days a week. Patient shared he is monitors his breathing and heart beat to makes sure he is not exerting himself.       Medication Adherence-Medication adherence was asssessed.  Patient continue taking medication as prescribed.          Patient shared he recently completed diabetes education on how to use insulin. Patient shared he comfortable using needle.   Substance, Sleep, Stress-Not assessed      Continue current diet/physical activity routine.       Patient verbalizes understanding.      Explained the importance of self-monitoring and medication adherence. Encouraged the patient to communicate with their health  for lifestyle modifications to help improve or maintain a healthy lifestyle.                Topic    Eye Exam  "         Last 6 Patient Entered Readings                                          Most Recent A1c: 6.6% on 6/15/2020  (Goal: 7%)     Recent Readings 8/27/2020 8/26/2020 8/26/2020 8/25/2020 8/24/2020    Blood Glucose (mg/dL) 143 248 158 167 367

## 2020-09-03 ENCOUNTER — PATIENT OUTREACH (OUTPATIENT)
Dept: OTHER | Facility: OTHER | Age: 60
End: 2020-09-03

## 2020-09-03 DIAGNOSIS — E11.9 TYPE 2 DIABETES MELLITUS WITHOUT COMPLICATION, WITHOUT LONG-TERM CURRENT USE OF INSULIN: ICD-10-CM

## 2020-09-03 RX ORDER — INSULIN GLARGINE 100 [IU]/ML
12 INJECTION, SOLUTION SUBCUTANEOUS NIGHTLY
Qty: 1 BOX | Refills: 0
Start: 2020-09-03 | End: 2020-11-02

## 2020-09-03 NOTE — PROGRESS NOTES
Digital Medicine: Clinician Follow-Up    At last outreach, started Lantus 10 units. Patient confirms change and tolerability. Denies hypoglycemia.     No questions on injection technique. Reviewed to ensure appropriate.     The history is provided by the patient.   Follow-up reason(s): medication change follow-up.     Diabetes    Readings are trending down due to medication adherence.     Patient is not experiencing signs/symptoms of hypoglycemia.      Patient did make medication change.    Is patient tolerating med change? yes            Last 6 Patient Entered Readings                                          Most Recent A1c: 6.6% on 6/15/2020  (Goal: 7%)     Recent Readings 9/2/2020 9/2/2020 9/1/2020 8/31/2020 8/30/2020    Blood Glucose (mg/dL) 273 162 137 178 206           Screenings    ASSESSMENT(S)  Patient's A1C goal is less than or equal to 7 per 2020 ADA guidelines. Patient's most recent A1C result is above goal. Lab Results    Component                Value               Date                     HGBA1C                   6.6 (H)             06/15/2020          .        A1C is controlled but SMBG remains above goal. FBG average has decreased from 176 to 159 since starting Lantus. No lows. Will titrate.       Diabetes Plan  Medication change. Increase to Lantus 12 units.   Continue current therapy.  Continue current diet/physical activity routine.  Will follow-up in 2 weeks.      Addressed any questions or concerns and patient has my contact information if needed prior to next outreach. Patient verbalizes understanding.                Topic    Eye Exam            Diabetes Medications             insulin (LANTUS SOLOSTAR U-100 INSULIN) glargine 100 units/mL (3mL) SubQ pen Inject 10 Units into the skin every evening.

## 2020-09-03 NOTE — PROGRESS NOTES
Med change f/u. No answer - LVM and call back number.     At last outreach, started Lantus 10 units.     Last 6 Patient Entered Readings                                          Most Recent A1c: 6.6% on 6/15/2020  (Goal: 7%)     Recent Readings 9/2/2020 9/2/2020 9/1/2020 8/31/2020 8/30/2020    Blood Glucose (mg/dL) 273 162 137 178 206        Average FBG has reduced from 176 to 159. Recent HC note indicates patient is feeling better and has noted improvement in energy and mood.     Will continue to monitor and reach out. If no hypoglycemia symptoms, will discuss titration.

## 2020-09-04 ENCOUNTER — OFFICE VISIT (OUTPATIENT)
Dept: UROLOGY | Facility: CLINIC | Age: 60
End: 2020-09-04
Payer: COMMERCIAL

## 2020-09-04 VITALS
HEART RATE: 75 BPM | HEIGHT: 76 IN | BODY MASS INDEX: 24.96 KG/M2 | WEIGHT: 205 LBS | SYSTOLIC BLOOD PRESSURE: 132 MMHG | DIASTOLIC BLOOD PRESSURE: 79 MMHG

## 2020-09-04 DIAGNOSIS — R33.9 INCOMPLETE BLADDER EMPTYING: ICD-10-CM

## 2020-09-04 DIAGNOSIS — R31.0 GROSS HEMATURIA: ICD-10-CM

## 2020-09-04 DIAGNOSIS — N30.00 ACUTE CYSTITIS WITHOUT HEMATURIA: Primary | ICD-10-CM

## 2020-09-04 LAB
BILIRUB SERPL-MCNC: NEGATIVE MG/DL
BLOOD URINE, POC: ABNORMAL
CLARITY, POC UA: CLEAR
COLOR, POC UA: YELLOW
GLUCOSE UR QL STRIP: 50
KETONES UR QL STRIP: NEGATIVE
LEUKOCYTE ESTERASE URINE, POC: ABNORMAL
NITRITE, POC UA: NEGATIVE
PH, POC UA: 5
PROTEIN, POC: NEGATIVE
SPECIFIC GRAVITY, POC UA: 1.01
UROBILINOGEN, POC UA: NORMAL

## 2020-09-04 PROCEDURE — 3078F DIAST BP <80 MM HG: CPT | Mod: CPTII,S$GLB,, | Performed by: UROLOGY

## 2020-09-04 PROCEDURE — 99214 PR OFFICE/OUTPT VISIT, EST, LEVL IV, 30-39 MIN: ICD-10-PCS | Mod: 25,S$GLB,, | Performed by: UROLOGY

## 2020-09-04 PROCEDURE — 3075F SYST BP GE 130 - 139MM HG: CPT | Mod: CPTII,S$GLB,, | Performed by: UROLOGY

## 2020-09-04 PROCEDURE — 3075F PR MOST RECENT SYSTOLIC BLOOD PRESS GE 130-139MM HG: ICD-10-PCS | Mod: CPTII,S$GLB,, | Performed by: UROLOGY

## 2020-09-04 PROCEDURE — 99999 PR PBB SHADOW E&M-EST. PATIENT-LVL IV: ICD-10-PCS | Mod: PBBFAC,,, | Performed by: UROLOGY

## 2020-09-04 PROCEDURE — 3008F PR BODY MASS INDEX (BMI) DOCUMENTED: ICD-10-PCS | Mod: CPTII,S$GLB,, | Performed by: UROLOGY

## 2020-09-04 PROCEDURE — 3078F PR MOST RECENT DIASTOLIC BLOOD PRESSURE < 80 MM HG: ICD-10-PCS | Mod: CPTII,S$GLB,, | Performed by: UROLOGY

## 2020-09-04 PROCEDURE — 81002 URINALYSIS NONAUTO W/O SCOPE: CPT | Mod: S$GLB,,, | Performed by: UROLOGY

## 2020-09-04 PROCEDURE — 3008F BODY MASS INDEX DOCD: CPT | Mod: CPTII,S$GLB,, | Performed by: UROLOGY

## 2020-09-04 PROCEDURE — 99999 PR PBB SHADOW E&M-EST. PATIENT-LVL IV: CPT | Mod: PBBFAC,,, | Performed by: UROLOGY

## 2020-09-04 PROCEDURE — 99214 OFFICE O/P EST MOD 30 MIN: CPT | Mod: 25,S$GLB,, | Performed by: UROLOGY

## 2020-09-04 PROCEDURE — 81002 POCT URINE DIPSTICK WITHOUT MICROSCOPE: ICD-10-PCS | Mod: S$GLB,,, | Performed by: UROLOGY

## 2020-09-04 RX ORDER — SULFAMETHOXAZOLE AND TRIMETHOPRIM 800; 160 MG/1; MG/1
1 TABLET ORAL 2 TIMES DAILY
Qty: 14 TABLET | Refills: 0 | Status: SHIPPED | OUTPATIENT
Start: 2020-09-04 | End: 2020-09-11

## 2020-09-04 RX ORDER — BETHANECHOL CHLORIDE 25 MG/1
25 TABLET ORAL 3 TIMES DAILY
Qty: 180 TABLET | Refills: 5 | Status: SHIPPED | OUTPATIENT
Start: 2020-09-04 | End: 2021-02-26

## 2020-09-04 NOTE — PROGRESS NOTES
"Subjective:      Hi Rubio is a 60 y.o. male who returns today regarding his recurrent UTIs.    He had acute onset of gross hematuria with dysuria overnight. He took a cipro this AM and hematuria has resolved.    He has known h/o recurrent UTIs. He was admitted to hospital earlier this year with pyelonephritis. He had positive urine culture and fevers. He stopped taking Jardiance earlier this samantha, which he thinks was contributing to UTIs. He is s/p TURP in Oct 2017.     He saw Dr. Seay earlier this year as well who did UDS and cysto. UDS demonstrated decreased contractility with elevated PVR (200cc). He recommended Urecholine but pt never received the prescription.    He also has recent workup for possible renal mass that was confirmed as Bosniak II cyst on MRI.    The following portions of the patient's history were reviewed and updated as appropriate: allergies, current medications, past family history, past medical history, past social history, past surgical history and problem list.    Review of Systems  A comprehensive multipoint review of systems was negative except as otherwise stated in the HPI.     Objective:   Vitals: /79 (BP Location: Left arm, Patient Position: Sitting, BP Method: Large (Automatic))   Pulse 75   Ht 6' 4" (1.93 m)   Wt 93 kg (205 lb 0.4 oz)   BMI 24.96 kg/m²     Physical Exam   General: alert and oriented, no acute distress  Respiratory: Symmetric expansion, non-labored breathing  Neuro: no gross deficits  Psych: normal judgment and insight, normal mood/affect and non-anxious    Lab Review   Urinalysis demonstrates positive for leukocytes, red blood cells   Lab Results   Component Value Date    WBC 6.40 05/07/2020    HGB 13.2 (L) 05/07/2020    HCT 40.8 05/07/2020    MCV 90 05/07/2020     05/07/2020     Lab Results   Component Value Date    CREATININE 1.1 05/07/2020    BUN 16 05/07/2020       Assessment and Plan:   1. Gross hematuria  2. Recurrent UTI  -- He has had " CT and cysto this year, so no further workup for hematuria  -- Urine culture  -- Bactrim x 7 days  -- Start bethanechol  -- FU 1 mos

## 2020-09-17 ENCOUNTER — PATIENT OUTREACH (OUTPATIENT)
Dept: OTHER | Facility: OTHER | Age: 60
End: 2020-09-17

## 2020-09-17 NOTE — PROGRESS NOTES
"Digital Medicine: Clinician Follow-Up    Patient called back.     At last outreach, increased Lantus to 12 units daily. Patient confirms change and tolerability.     Reports feeling "wonderful" and feeling "like a different person."     Note she is eating more non-starchy vegetables and avoiding carbohydrates and starchy vegetables.     Does note getting cramps at night in his legs which he feels is unrelated to DM care.     The history is provided by the patient.   Follow-up reason(s): medication change follow-up.     Diabetes    Readings are trending down Patient is not experiencing signs/symptoms of hypoglycemia.  Patient is not experiencing signs/symptoms of hyperglycemia.    Patient did make medication change.    Is patient tolerating med change? yes            Last 6 Patient Entered Readings                                          Most Recent A1c: 6.6% on 6/15/2020  (Goal: 7%)     Recent Readings 9/17/2020 9/15/2020 9/15/2020 9/13/2020 9/12/2020    Blood Glucose (mg/dL) 143 127 157 138 157             Screenings    ASSESSMENT(S)  Patient's A1C goal is less than or equal to 7 per 2020 ADA guidelines. Patient's most recent A1C result is at goal. Lab Results    Component                Value               Date                     HGBA1C                   6.6 (H)             06/15/2020          .       A1C controlled with improving SMBG. Could benefit from further insulin titration but given rate of improvement will delay further change 1 month to prevent psuedohypoglycemia.       Diabetes Plan  Continue current therapy.  Continue current diet/physical activity routine.  - Patient advised to discuss cramping with PCP. Encouraged hydration. Electrolytes appropriate.   - Discussed how basal insulin works and peakless nature.     Will follow-up in 1 month.      Addressed patient questions and patient has my contact information if needed prior to next outreach. Patient verbalizes understanding.                Topic "    Eye Exam      There are no preventive care reminders to display for this patient.      Diabetes Medications             insulin (LANTUS SOLOSTAR U-100 INSULIN) glargine 100 units/mL (3mL) SubQ pen Inject 12 Units into the skin every evening.

## 2020-09-17 NOTE — PROGRESS NOTES
Med change f/u outreach. No answer -LVM and call back number.     At last outreach, increased Lantus to 12 units daily.     Last 6 Patient Entered Readings                                          Most Recent A1c: 6.6% on 6/15/2020  (Goal: 7%)     Recent Readings 9/17/2020 9/15/2020 9/15/2020 9/13/2020 9/12/2020    Blood Glucose (mg/dL) 143 127 157 138 157        Readings are significantly improved and near goal. Would like to review for tolerability and consider further, small titration to achieve goal FBGs.     Will continue to monitor and reach out.

## 2020-10-05 ENCOUNTER — PATIENT OUTREACH (OUTPATIENT)
Dept: OTHER | Facility: OTHER | Age: 60
End: 2020-10-05
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
## 2020-10-05 NOTE — PROGRESS NOTES
"Digital Medicine: Health  Follow-Up    The history is provided by the patient.             Reason for review: Blood glucose not at goal        Topics Covered on Call: physical activity, Diet and sleep and stress     Additional Follow-up details: Patient shared he is watching is food intake to improve his BP. Patient shared he noticed his BG are creeping back up. "They were looking good and now are creeping back up."               Diet-Change      Dietary Improvements:Patient shared he has cut back on his carbs and portion size. Patient share is not eating fried food and using his an air fryer. Patient shared he is not eating passed 8 pm. Patient shared he uses [employer] to chema his coffee. Patient shared he eats small portions through out the day to help stabilizing his BG.         Barriers to a Healthy Diet: Patient shared he has cut back on his water intake. Increased patient to increase water intake      Physical Activity-Change  He removed treadmill from His physical activity.    He identified the following barriers to physical activity: schedule         Additional physical activity details: Patient shared he needs to find a better time to exercise. Patient he was getting up too early to exercise around 4 am to exercise. Patient shared early exercising leads to long days and feeling exhausted. Patient stated he is hopes to find time in the evening to exercise.      Medication Adherence-Medication adherence was assessed.        Substance, Sleep, Stress-No change  stress-assessed  Details:Patient shared he is managing stress well.   Intervention(s):    Sleep-assessed  Details:Patient shared no changes in his sleep quality.   Intervention(s):    Alcohol -  Details:  Intervention(s):    Tobacco-  Details:  Intervention(s):    Additional details:Patient shared increase in stress due death of his family member and his daughter having surgery..         Continue current diet/physical activity routine.  Provided " patient education.  Reviewed Device Techniques.     Patient verbalizes understanding.      Explained the importance of self-monitoring and medication adherence. Encouraged the patient to communicate with their health  for lifestyle modifications to help improve or maintain a healthy lifestyle.                Topic    Eye Exam     Urine Protein Check          Last 6 Patient Entered Readings                                          Most Recent A1c: 6.6% on 6/15/2020  (Goal: 7%)     Recent Readings 10/5/2020 10/4/2020 10/3/2020 10/3/2020 10/2/2020    Blood Glucose (mg/dL) 177 183 182 164 179

## 2020-10-07 ENCOUNTER — PATIENT MESSAGE (OUTPATIENT)
Dept: UROLOGY | Facility: CLINIC | Age: 60
End: 2020-10-07

## 2020-10-09 ENCOUNTER — PATIENT MESSAGE (OUTPATIENT)
Dept: INTERNAL MEDICINE | Facility: CLINIC | Age: 60
End: 2020-10-09

## 2020-10-09 ENCOUNTER — TELEPHONE (OUTPATIENT)
Dept: INTERNAL MEDICINE | Facility: CLINIC | Age: 60
End: 2020-10-09

## 2020-10-12 ENCOUNTER — OFFICE VISIT (OUTPATIENT)
Dept: INTERNAL MEDICINE | Facility: CLINIC | Age: 60
End: 2020-10-12
Payer: COMMERCIAL

## 2020-10-12 ENCOUNTER — LAB VISIT (OUTPATIENT)
Dept: LAB | Facility: HOSPITAL | Age: 60
End: 2020-10-12
Attending: INTERNAL MEDICINE
Payer: COMMERCIAL

## 2020-10-12 VITALS
SYSTOLIC BLOOD PRESSURE: 130 MMHG | DIASTOLIC BLOOD PRESSURE: 76 MMHG | HEIGHT: 76 IN | BODY MASS INDEX: 24.84 KG/M2 | WEIGHT: 203.94 LBS | TEMPERATURE: 98 F | HEART RATE: 68 BPM

## 2020-10-12 DIAGNOSIS — I10 ESSENTIAL HYPERTENSION: Primary | ICD-10-CM

## 2020-10-12 DIAGNOSIS — R35.1 NOCTURIA: ICD-10-CM

## 2020-10-12 DIAGNOSIS — R25.2 LEG CRAMPS: ICD-10-CM

## 2020-10-12 DIAGNOSIS — E11.9 TYPE 2 DIABETES MELLITUS WITHOUT COMPLICATION, WITHOUT LONG-TERM CURRENT USE OF INSULIN: ICD-10-CM

## 2020-10-12 DIAGNOSIS — I10 ESSENTIAL HYPERTENSION: ICD-10-CM

## 2020-10-12 PROCEDURE — 83036 HEMOGLOBIN GLYCOSYLATED A1C: CPT

## 2020-10-12 PROCEDURE — 3044F PR MOST RECENT HEMOGLOBIN A1C LEVEL <7.0%: ICD-10-PCS | Mod: CPTII,S$GLB,, | Performed by: INTERNAL MEDICINE

## 2020-10-12 PROCEDURE — 3075F PR MOST RECENT SYSTOLIC BLOOD PRESS GE 130-139MM HG: ICD-10-PCS | Mod: CPTII,S$GLB,, | Performed by: INTERNAL MEDICINE

## 2020-10-12 PROCEDURE — 80048 BASIC METABOLIC PNL TOTAL CA: CPT

## 2020-10-12 PROCEDURE — 3008F BODY MASS INDEX DOCD: CPT | Mod: CPTII,S$GLB,, | Performed by: INTERNAL MEDICINE

## 2020-10-12 PROCEDURE — 90471 IMMUNIZATION ADMIN: CPT | Mod: S$GLB,,, | Performed by: INTERNAL MEDICINE

## 2020-10-12 PROCEDURE — 99213 OFFICE O/P EST LOW 20 MIN: CPT | Mod: 25,S$GLB,, | Performed by: INTERNAL MEDICINE

## 2020-10-12 PROCEDURE — 83735 ASSAY OF MAGNESIUM: CPT

## 2020-10-12 PROCEDURE — 90471 FLU VACCINE (QUAD) GREATER THAN OR EQUAL TO 3YO PRESERVATIVE FREE IM: ICD-10-PCS | Mod: S$GLB,,, | Performed by: INTERNAL MEDICINE

## 2020-10-12 PROCEDURE — 90686 FLU VACCINE (QUAD) GREATER THAN OR EQUAL TO 3YO PRESERVATIVE FREE IM: ICD-10-PCS | Mod: S$GLB,,, | Performed by: INTERNAL MEDICINE

## 2020-10-12 PROCEDURE — 3075F SYST BP GE 130 - 139MM HG: CPT | Mod: CPTII,S$GLB,, | Performed by: INTERNAL MEDICINE

## 2020-10-12 PROCEDURE — 99999 PR PBB SHADOW E&M-EST. PATIENT-LVL V: CPT | Mod: PBBFAC,,, | Performed by: INTERNAL MEDICINE

## 2020-10-12 PROCEDURE — 3044F HG A1C LEVEL LT 7.0%: CPT | Mod: CPTII,S$GLB,, | Performed by: INTERNAL MEDICINE

## 2020-10-12 PROCEDURE — 90686 IIV4 VACC NO PRSV 0.5 ML IM: CPT | Mod: S$GLB,,, | Performed by: INTERNAL MEDICINE

## 2020-10-12 PROCEDURE — 99999 PR PBB SHADOW E&M-EST. PATIENT-LVL V: ICD-10-PCS | Mod: PBBFAC,,, | Performed by: INTERNAL MEDICINE

## 2020-10-12 PROCEDURE — 99213 PR OFFICE/OUTPT VISIT, EST, LEVL III, 20-29 MIN: ICD-10-PCS | Mod: 25,S$GLB,, | Performed by: INTERNAL MEDICINE

## 2020-10-12 PROCEDURE — 3078F DIAST BP <80 MM HG: CPT | Mod: CPTII,S$GLB,, | Performed by: INTERNAL MEDICINE

## 2020-10-12 PROCEDURE — 36415 COLL VENOUS BLD VENIPUNCTURE: CPT | Mod: PO

## 2020-10-12 PROCEDURE — 82550 ASSAY OF CK (CPK): CPT

## 2020-10-12 PROCEDURE — 3008F PR BODY MASS INDEX (BMI) DOCUMENTED: ICD-10-PCS | Mod: CPTII,S$GLB,, | Performed by: INTERNAL MEDICINE

## 2020-10-12 PROCEDURE — 3078F PR MOST RECENT DIASTOLIC BLOOD PRESSURE < 80 MM HG: ICD-10-PCS | Mod: CPTII,S$GLB,, | Performed by: INTERNAL MEDICINE

## 2020-10-12 RX ORDER — PEN NEEDLE, DIABETIC 31 GX5/16"
NEEDLE, DISPOSABLE MISCELLANEOUS
COMMUNITY
Start: 2020-08-20 | End: 2021-07-14

## 2020-10-12 NOTE — PROGRESS NOTES
CC:  Leg cramps  HPI:  The patient is a 60 y.o. year old male who presents to the office for leg cramps.  His symptoms started a couple of months ago.  He reports symptoms occur primarily at night.  The patient also reports one month history of sweet smelling urine and nocturia.  He denies any chest pain, shortness of breath, headache, blurred vision, nausea or vomiting, but complains of fatigue.      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 -     Hyperlipidemia     Hypertension     Memory loss     Thyroid nodule 8/22/2019    Traumatic iritis - Left Eye 2/27/2013       SURGICAL HISTORY:  Past Surgical History:   Procedure Laterality Date    CEREBRAL ANGIOGRAM N/A 1/6/2020    Procedure: ANGIOGRAM-CEREBRAL;  [facility];  [facility] OR [address] [place] [address];  Service: Radiology;  Laterality: N/A;  /Nagi    CERVICAL FUSION  12/30/2015    COLONOSCOPY N/A 4/7/2017    Procedure: COLONOSCOPY;  Surgeon: Handy Frederick MD;  Location: [facility] ([facility] [place]);  Service: Endoscopy;  Laterality: N/A;    HERNIA REPAIR      PROSTATECTOMY         MEDS:  Medcard reviewed and updated    ALLERGIES: Allergy Card reviewed and updated    SOCIAL HISTORY:   The patient is a nonsmoker.    PE:   APPEARANCE: Well nourished, well developed, in no acute distress.    CHEST: Lungs clear to auscultation with unlabored respirations.  CARDIOVASCULAR: Normal S1, S2. No murmurs. No carotid bruits. No pedal edema.  ABDOMEN: Bowel sounds normal. Not distended. Soft. No tenderness or masses.   PSYCHIATRIC: The patient is oriented to person, place, and time and has a pleasant affect.         ASSESSMENT/PLAN:  Hi was seen today for cramping.    Diagnoses and all orders for this visit:    Essential hypertension  -     Basic Metabolic Panel; Future  -     blood pressure is controlled    Type 2 diabetes mellitus without complication, without long-term current use of insulin  -     Basic Metabolic Panel; Future  -     Hemoglobin A1C; Future    Leg cramps  -     Basic Metabolic Panel; Future  -     Magnesium; Future  -     CK; Future    Nocturia  -     Urinalysis; Future  -     Urine culture; Future

## 2020-10-13 ENCOUNTER — TELEPHONE (OUTPATIENT)
Dept: INTERNAL MEDICINE | Facility: CLINIC | Age: 60
End: 2020-10-13

## 2020-10-13 ENCOUNTER — PATIENT MESSAGE (OUTPATIENT)
Dept: INTERNAL MEDICINE | Facility: CLINIC | Age: 60
End: 2020-10-13

## 2020-10-13 DIAGNOSIS — R25.2 LEG CRAMPS: Primary | ICD-10-CM

## 2020-10-13 LAB
ANION GAP SERPL CALC-SCNC: 11 MMOL/L (ref 8–16)
BUN SERPL-MCNC: 15 MG/DL (ref 8–23)
CALCIUM SERPL-MCNC: 9.7 MG/DL (ref 8.7–10.5)
CHLORIDE SERPL-SCNC: 100 MMOL/L (ref 95–110)
CK SERPL-CCNC: 379 U/L (ref 20–200)
CO2 SERPL-SCNC: 28 MMOL/L (ref 23–29)
CREAT SERPL-MCNC: 1.3 MG/DL (ref 0.5–1.4)
EST. GFR  (AFRICAN AMERICAN): >60 ML/MIN/1.73 M^2
EST. GFR  (NON AFRICAN AMERICAN): 58.5 ML/MIN/1.73 M^2
ESTIMATED AVG GLUCOSE: 146 MG/DL (ref 68–131)
GLUCOSE SERPL-MCNC: 148 MG/DL (ref 70–110)
HBA1C MFR BLD HPLC: 6.7 % (ref 4–5.6)
MAGNESIUM SERPL-MCNC: 2 MG/DL (ref 1.6–2.6)
POTASSIUM SERPL-SCNC: 3.3 MMOL/L (ref 3.5–5.1)
SODIUM SERPL-SCNC: 139 MMOL/L (ref 136–145)

## 2020-10-13 RX ORDER — POTASSIUM CHLORIDE 750 MG/1
20 TABLET, EXTENDED RELEASE ORAL DAILY
Qty: 6 TABLET | Refills: 0 | Status: SHIPPED | OUTPATIENT
Start: 2020-10-13 | End: 2020-10-21

## 2020-10-13 NOTE — TELEPHONE ENCOUNTER
Please inform patient that labs reveal a mildly depressed potassium.  A prescription for potassium supplementation has been sent to the pharmacy electronically.  Also, CPK, a measure of muscle breakdown is mildly elevated.  It is possible that this is due to cholesterol medication.  However, I recommend adequate hydration with repeat labs in 2 weeks.  Hemoglobin A1c is stable at 6.7.

## 2020-10-14 ENCOUNTER — PATIENT MESSAGE (OUTPATIENT)
Dept: INTERNAL MEDICINE | Facility: CLINIC | Age: 60
End: 2020-10-14

## 2020-10-14 ENCOUNTER — TELEPHONE (OUTPATIENT)
Dept: INTERNAL MEDICINE | Facility: CLINIC | Age: 60
End: 2020-10-14

## 2020-10-15 ENCOUNTER — PATIENT OUTREACH (OUTPATIENT)
Dept: OTHER | Facility: OTHER | Age: 60
End: 2020-10-15

## 2020-10-15 ENCOUNTER — TELEPHONE (OUTPATIENT)
Dept: INTERNAL MEDICINE | Facility: CLINIC | Age: 60
End: 2020-10-15

## 2020-10-15 ENCOUNTER — HOSPITAL ENCOUNTER (EMERGENCY)
Facility: HOSPITAL | Age: 60
Discharge: HOME | End: 2020-10-15
Attending: EMERGENCY MEDICINE
Payer: COMMERCIAL

## 2020-10-15 ENCOUNTER — PATIENT MESSAGE (OUTPATIENT)
Dept: INTERNAL MEDICINE | Facility: CLINIC | Age: 60
End: 2020-10-15

## 2020-10-15 VITALS
WEIGHT: 200 LBS | SYSTOLIC BLOOD PRESSURE: 160 MMHG | HEIGHT: 76 IN | OXYGEN SATURATION: 95 % | RESPIRATION RATE: 16 BRPM | HEART RATE: 68 BPM | BODY MASS INDEX: 24.36 KG/M2 | DIASTOLIC BLOOD PRESSURE: 88 MMHG | TEMPERATURE: 98 F

## 2020-10-15 DIAGNOSIS — E87.6 HYPOKALEMIA: Primary | ICD-10-CM

## 2020-10-15 DIAGNOSIS — R07.9 CHEST PAIN: ICD-10-CM

## 2020-10-15 DIAGNOSIS — N30.00 ACUTE CYSTITIS WITHOUT HEMATURIA: ICD-10-CM

## 2020-10-15 LAB
ALBUMIN SERPL BCP-MCNC: 4.4 G/DL (ref 3.5–5.2)
ALP SERPL-CCNC: 73 U/L (ref 55–135)
ALT SERPL W/O P-5'-P-CCNC: 38 U/L (ref 10–44)
ANION GAP SERPL CALC-SCNC: 9 MMOL/L (ref 8–16)
AST SERPL-CCNC: 28 U/L (ref 10–40)
BASOPHILS # BLD AUTO: 0.05 K/UL (ref 0–0.2)
BASOPHILS NFR BLD: 0.8 % (ref 0–1.9)
BILIRUB SERPL-MCNC: 0.5 MG/DL (ref 0.1–1)
BNP SERPL-MCNC: <10 PG/ML (ref 0–99)
BUN SERPL-MCNC: 16 MG/DL (ref 8–23)
CALCIUM SERPL-MCNC: 10 MG/DL (ref 8.7–10.5)
CHLORIDE SERPL-SCNC: 101 MMOL/L (ref 95–110)
CO2 SERPL-SCNC: 29 MMOL/L (ref 23–29)
CREAT SERPL-MCNC: 1.1 MG/DL (ref 0.5–1.4)
D DIMER PPP IA.FEU-MCNC: <0.19 MG/L FEU
DIFFERENTIAL METHOD: ABNORMAL
EOSINOPHIL # BLD AUTO: 0.5 K/UL (ref 0–0.5)
EOSINOPHIL NFR BLD: 8.4 % (ref 0–8)
ERYTHROCYTE [DISTWIDTH] IN BLOOD BY AUTOMATED COUNT: 12.7 % (ref 11.5–14.5)
EST. GFR  (AFRICAN AMERICAN): >60 ML/MIN/1.73 M^2
EST. GFR  (NON AFRICAN AMERICAN): >60 ML/MIN/1.73 M^2
GLUCOSE SERPL-MCNC: 124 MG/DL (ref 70–110)
HCT VFR BLD AUTO: 44.9 % (ref 40–54)
HGB BLD-MCNC: 15.3 G/DL (ref 14–18)
IMM GRANULOCYTES # BLD AUTO: 0.02 K/UL (ref 0–0.04)
IMM GRANULOCYTES NFR BLD AUTO: 0.3 % (ref 0–0.5)
LYMPHOCYTES # BLD AUTO: 1.4 K/UL (ref 1–4.8)
LYMPHOCYTES NFR BLD: 22.7 % (ref 18–48)
MCH RBC QN AUTO: 29.5 PG (ref 27–31)
MCHC RBC AUTO-ENTMCNC: 34.1 G/DL (ref 32–36)
MCV RBC AUTO: 87 FL (ref 82–98)
MONOCYTES # BLD AUTO: 0.6 K/UL (ref 0.3–1)
MONOCYTES NFR BLD: 8.9 % (ref 4–15)
NEUTROPHILS # BLD AUTO: 3.6 K/UL (ref 1.8–7.7)
NEUTROPHILS NFR BLD: 58.9 % (ref 38–73)
NRBC BLD-RTO: 0 /100 WBC
PLATELET # BLD AUTO: 243 K/UL (ref 150–350)
PMV BLD AUTO: 9.8 FL (ref 9.2–12.9)
POTASSIUM SERPL-SCNC: 3.1 MMOL/L (ref 3.5–5.1)
PROT SERPL-MCNC: 7.7 G/DL (ref 6–8.4)
RBC # BLD AUTO: 5.18 M/UL (ref 4.6–6.2)
SODIUM SERPL-SCNC: 139 MMOL/L (ref 136–145)
TROPONIN I SERPL DL<=0.01 NG/ML-MCNC: 0.01 NG/ML (ref 0–0.03)
TROPONIN I SERPL DL<=0.01 NG/ML-MCNC: <0.006 NG/ML (ref 0–0.03)
TROPONIN I SERPL DL<=0.01 NG/ML-MCNC: <0.006 NG/ML (ref 0–0.03)
WBC # BLD AUTO: 6.18 K/UL (ref 3.9–12.7)

## 2020-10-15 PROCEDURE — 99285 EMERGENCY DEPT VISIT HI MDM: CPT | Mod: 25

## 2020-10-15 PROCEDURE — 84484 ASSAY OF TROPONIN QUANT: CPT

## 2020-10-15 PROCEDURE — 63600175 PHARM REV CODE 636 W HCPCS: Performed by: EMERGENCY MEDICINE

## 2020-10-15 PROCEDURE — 93010 EKG 12-LEAD: ICD-10-PCS | Mod: ,,, | Performed by: INTERNAL MEDICINE

## 2020-10-15 PROCEDURE — 25000003 PHARM REV CODE 250: Performed by: EMERGENCY MEDICINE

## 2020-10-15 PROCEDURE — 99284 PR EMERGENCY DEPT VISIT,LEVEL IV: ICD-10-PCS | Mod: ,,, | Performed by: EMERGENCY MEDICINE

## 2020-10-15 PROCEDURE — 93005 ELECTROCARDIOGRAM TRACING: CPT

## 2020-10-15 PROCEDURE — 80053 COMPREHEN METABOLIC PANEL: CPT

## 2020-10-15 PROCEDURE — 96375 TX/PRO/DX INJ NEW DRUG ADDON: CPT

## 2020-10-15 PROCEDURE — 85379 FIBRIN DEGRADATION QUANT: CPT

## 2020-10-15 PROCEDURE — 99284 EMERGENCY DEPT VISIT MOD MDM: CPT | Mod: ,,, | Performed by: EMERGENCY MEDICINE

## 2020-10-15 PROCEDURE — 93010 ELECTROCARDIOGRAM REPORT: CPT | Mod: ,,, | Performed by: INTERNAL MEDICINE

## 2020-10-15 PROCEDURE — 83880 ASSAY OF NATRIURETIC PEPTIDE: CPT

## 2020-10-15 PROCEDURE — 85025 COMPLETE CBC W/AUTO DIFF WBC: CPT

## 2020-10-15 PROCEDURE — 96365 THER/PROPH/DIAG IV INF INIT: CPT

## 2020-10-15 RX ORDER — KETOROLAC TROMETHAMINE 30 MG/ML
10 INJECTION, SOLUTION INTRAMUSCULAR; INTRAVENOUS
Status: COMPLETED | OUTPATIENT
Start: 2020-10-15 | End: 2020-10-15

## 2020-10-15 RX ORDER — NITROFURANTOIN 25; 75 MG/1; MG/1
100 CAPSULE ORAL 2 TIMES DAILY
Qty: 14 CAPSULE | Refills: 0 | Status: SHIPPED | OUTPATIENT
Start: 2020-10-15 | End: 2020-10-22

## 2020-10-15 RX ORDER — ASPIRIN 325 MG
325 TABLET ORAL
Status: COMPLETED | OUTPATIENT
Start: 2020-10-15 | End: 2020-10-15

## 2020-10-15 RX ORDER — POTASSIUM CHLORIDE 20 MEQ/1
40 TABLET, EXTENDED RELEASE ORAL
Status: COMPLETED | OUTPATIENT
Start: 2020-10-15 | End: 2020-10-15

## 2020-10-15 RX ORDER — POTASSIUM CHLORIDE 7.45 MG/ML
10 INJECTION INTRAVENOUS
Status: COMPLETED | OUTPATIENT
Start: 2020-10-15 | End: 2020-10-15

## 2020-10-15 RX ORDER — NITROFURANTOIN 25; 75 MG/1; MG/1
100 CAPSULE ORAL ONCE
Status: COMPLETED | OUTPATIENT
Start: 2020-10-15 | End: 2020-10-15

## 2020-10-15 RX ADMIN — KETOROLAC TROMETHAMINE 10 MG: 30 INJECTION, SOLUTION INTRAMUSCULAR at 05:10

## 2020-10-15 RX ADMIN — NITROFURANTOIN (MONOHYDRATE/MACROCRYSTALS) 100 MG: 75; 25 CAPSULE ORAL at 03:10

## 2020-10-15 RX ADMIN — SODIUM CHLORIDE 250 ML: 0.9 INJECTION, SOLUTION INTRAVENOUS at 03:10

## 2020-10-15 RX ADMIN — POTASSIUM CHLORIDE 10 MEQ: 7.46 INJECTION, SOLUTION INTRAVENOUS at 03:10

## 2020-10-15 RX ADMIN — ASPIRIN 325 MG ORAL TABLET 325 MG: 325 PILL ORAL at 01:10

## 2020-10-15 RX ADMIN — POTASSIUM CHLORIDE 40 MEQ: 1500 TABLET, EXTENDED RELEASE ORAL at 03:10

## 2020-10-15 NOTE — PROGRESS NOTES
Digital Medicine: Clinician Follow-Up    Patient reports doing ok. Patient is frustrated at this time as he found he is likely to have another UTI and found out his potassium is low again. Patient feels that is always seems to be something and he doesn't know how to break the cycle.     Patient is disheartened by the recent increase in his blood glucoses as well.     The history is provided by the patient.   Follow-up reason(s): routine follow up.     Diabetes    Readings are trending up due to possible UTI. .            Last 6 Patient Entered Readings                                          Most Recent A1c: 6.7% on 10/12/2020  (Goal: 7%)     Recent Readings 10/14/2020 10/13/2020 10/12/2020 10/11/2020 10/10/2020    Blood Glucose (mg/dL) 170 208 165 176 172               Depression Screening  Did not address depression screening.    Sleep Apnea Screening    Did not address sleep apnea screening.     Medication Affordability Screening  Did not address medication affordability screening.     Medication Adherence-Medication adherence was assessed.          ASSESSMENT(S)  Patient's A1C goal is less than or equal to 7. Patient's most recent A1C result is at goal. Lab Results    Component                Value               Date                     HGBA1C                   6.7 (H)             10/12/2020          .       A1C remains controlled. Continue to consider A1C inaccurate for patient at this time. SMBG shows elevated FBGs that are trending up. Suspect trend is related to current UTI but likely patient will need further basal insulin increase.       Diabetes Plan  Continue current therapy.  Await resolution of current disease process.  - Encouraged continued efforts and acknowledged improvement in BGs overall and importance to continue to follow recommendations of care team.   - Re-iterated encouragement to seek emergent care given reports of chest tightness. Patient verbalizes understanding.   - Will delay dose  changes at this time given other mitigating factors. Will monitor for need for dose change after resolution of UTI.        Addressed patient questions and patient has my contact information if needed prior to next outreach. Patient verbalizes understanding.                 Topic    Eye Exam     Urine Protein Check      There are no preventive care reminders to display for this patient.        Diabetes Medications             insulin (LANTUS SOLOSTAR U-100 INSULIN) glargine 100 units/mL (3mL) SubQ pen Inject 12 Units into the skin every evening.

## 2020-10-15 NOTE — TELEPHONE ENCOUNTER
Pt called and states he was not in pain like he was before he called and advised he was just sore and the pain was like a muscle spasm and he is aware of his heart conditions and he will go to the ED if needed with out hesitation,

## 2020-10-15 NOTE — ED NOTES
Pt identifiers Hi Rubio were checked and are correct  LOC: The patient is awake, alert, aware of environment with an appropriate affect. Oriented x4, speaking appropriately  APPEARANCE: Pt rates left lower chest pain a 3/10, in no acute distress, pt is clean and well groomed, clothing properly fastened  SKIN: Skin warm, dry and intact, normal skin turgor, moist mucus membranes  RESPIRATORY: Airway is open and patent, respirations are spontaneous, even and unlabored, normal effort and rate Breath sounds clear to all lung fields on auscultation  CARDIAC: Normal rate and rhythm, no peripheral edema noted, capillary refill < 3 seconds, bilateral radial pulses 2+  ABDOMEN: Soft, nontender, nondistended. Bowel sounds present to all four quad of abd on auscultation  NEUROLOGIC: PERRL, facial expression is symmetrical, patient moving all extremities spontaneously, normal sensation in all extremities when touched with a finger.  Follows all commands appropriately  MUSCULOSKELETAL: No obvious deformities.

## 2020-10-15 NOTE — ED PROVIDER NOTES
"Encounter Date: 10/15/2020       History     Chief Complaint   Patient presents with    Chest Pain     +cad on chart     Hi Rubio is a 60 M hx of coronary artery disease, diabetes, GERD, hypertension, frequent UTI presenting today for evaluation of chest pain.  Patient states symptoms started yesterday afternoon, has been intermittent with a baseline " soreness" rate 2/10.  He reports pain intensifies, sometimes with deep breath and last typically 1-2 seconds and feels like spasms.  It radiates to the left clavicle but is not associated with shortness of breath, diaphoresis or lightheadedness.  He does report baseline nausea, typically has symptoms 2-3 days per week.  He also reports mild productive cough for the past couple days but denies fever or chills.  He did see his PCP, Dr. Kendall, on 10/12 for back pain.  Was concerned because he has frequent UTIs.  Labs at that time did show a UTI, hypokalemia, and elevated CPK.  He was referred to the ER for further evaluation.        Review of patient's allergies indicates:   Allergen Reactions    Lisinopril Anaphylaxis and Nausea And Vomiting     General bad feeling    Adhesive     Crestor [rosuvastatin] Swelling     Lip swelling.     Jardiance [empagliflozin] Other (See Comments)     Possible related to recent UTI's     Lipitor [atorvastatin] Other (See Comments)     Muscle aches    Metformin      Used XR and experienced flatulance and abdominal pain.      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 -     Hyperlipidemia     Hypertension     Memory loss     Thyroid nodule 8/22/2019    Traumatic iritis - Left Eye 2/27/2013     Past " Surgical History:   Procedure Laterality Date    CEREBRAL ANGIOGRAM N/A 2020    Procedure: ANGIOGRAM-CEREBRAL;  Surgeon: [facility] Provider;  [facility];  Service: Radiology;  Laterality: N/A;  /Nagi    CERVICAL FUSION  2015    COLONOSCOPY N/A 2017    Procedure: COLONOSCOPY;  Surgeon: Handy Frederick MD;  Location: [facility] ENDO (4TH FLR);  Service: Endoscopy;  Laterality: N/A;    HERNIA REPAIR      PROSTATECTOMY       Family History   Problem Relation Age of Onset    Heart disease Mother     Hypertension Mother     Hyperlipidemia Mother     Heart disease Father     Hyperlipidemia Brother     Hypertension Brother     Hypertension Brother     Hyperlipidemia Brother     Benign prostatic hyperplasia Brother     Hypertension Brother     Hypertension Brother     Hepatitis Brother     Cancer Sister     Cancer Brother         throat CA    Diabetes Paternal Uncle     Cataracts Maternal Grandmother     Amblyopia Neg Hx     Blindness Neg Hx     Glaucoma Neg Hx     Macular degeneration Neg Hx     Retinal detachment Neg Hx     Strabismus Neg Hx     Stroke Neg Hx     Thyroid disease Neg Hx     Colon cancer Neg Hx     Colon polyps Neg Hx      Social History     Tobacco Use    Smoking status: Former Smoker     Packs/day: 1.00     Years: 30.00     Pack years: 30.00     Types: Cigarettes     Quit date: 2011     Years since quittin.8    Smokeless tobacco: Never Used   Substance Use Topics    Alcohol use: Yes     Frequency: 2-3 times a week     Drinks per session: 1 or 2     Binge frequency: Never     Comment: occas - nonalcoholic beer or beer    Drug use: No     Review of Systems   Constitutional: Negative for chills and fever.   HENT: Negative for congestion and sore throat.    Respiratory: Positive for cough. Negative for shortness of breath and wheezing.    Cardiovascular: Positive for chest pain. Negative for palpitations and leg swelling.    Gastrointestinal: Positive for nausea. Negative for abdominal distention, abdominal pain, diarrhea and vomiting.   Genitourinary: Negative for dysuria and urgency.   Musculoskeletal: Positive for back pain. Negative for myalgias.   Skin: Negative for wound.   Neurological: Negative for dizziness, syncope, weakness and light-headedness.   Psychiatric/Behavioral: Negative for confusion.       Physical Exam     Initial Vitals [10/15/20 1212]   BP Pulse Resp Temp SpO2   136/81 88 18 98.6 °F (37 °C) 97 %      MAP       --         Physical Exam    Nursing note and vitals reviewed.  Constitutional: He appears well-developed and well-nourished. No distress.   HENT:   Mouth/Throat: Oropharynx is clear and moist.   Eyes: Conjunctivae are normal.   Neck: Neck supple. No JVD present.   Cardiovascular: Normal rate, regular rhythm and intact distal pulses.   No murmur heard.  Pulmonary/Chest: He has no wheezes. He has no rales. He exhibits tenderness (point tenderness of left anterior rib 5/6).   Abdominal: Soft. Bowel sounds are normal. There is no abdominal tenderness. There is no rebound and no guarding.   Musculoskeletal: No edema.   Neurological: He is alert and oriented to person, place, and time.   Skin: Skin is warm. Capillary refill takes less than 2 seconds. No rash noted.         ED Course   Procedures  Labs Reviewed   CBC W/ AUTO DIFFERENTIAL - Abnormal; Notable for the following components:       Result Value    Eosinophil% 8.4 (*)     All other components within normal limits   COMPREHENSIVE METABOLIC PANEL - Abnormal; Notable for the following components:    Potassium 3.1 (*)     Glucose 124 (*)     All other components within normal limits   TROPONIN I   TROPONIN I   B-TYPE NATRIURETIC PEPTIDE   D DIMER, QUANTITATIVE   TROPONIN I        ECG Results          EKG 12-lead (Final result)  Result time 10/15/20 16:04:51    Final result by Interface, Lab In [facility] (10/15/20 16:04:51)                 Narrative:    Test  Reason : R07.9,    Vent. Rate : 081 BPM     Atrial Rate : 081 BPM     P-R Int : 150 ms          QRS Dur : 096 ms      QT Int : 374 ms       P-R-T Axes : 066 057 014 degrees     QTc Int : 434 ms    Normal sinus rhythm  Minimal voltage criteria for LVH, may be normal variant  Nonspecific T wave abnormality  Abnormal ECG  When compared with ECG of 05-MAY-2020 08:55,  Premature ventricular complexes are no longer Present  Nonspecific T wave abnormality, improved in Lateral leads  Confirmed by Jose ELMORE, Travis SAUCEDO ([id number]) on 10/15/2020 4:04:43 PM    Referred By: AAAREFROMÁN   SELF           Confirmed By:Travis Garcia MD                            Imaging Results          X-Ray Chest AP Portable (Final result)  Result time 10/15/20 13:27:11    Final result by Verito Chavez MD (10/15/20 13:27:11)                 Impression:      No source for chest pain identified.      Electronically signed by: Verito Chavez MD  Date:    10/15/2020  Time:    13:27             Narrative:    EXAMINATION:  XR CHEST AP PORTABLE    CLINICAL HISTORY:  Chest pain, unspecified    TECHNIQUE:  Two frontal views of the chest were performed.    COMPARISON:  05/05/2020, 09:37 hours.    FINDINGS:  Mediastinal structures are midline. Cardiac silhouette and pulmonary vascular distribution are normal.    Lung volumes are normal and symmetric. I detect no pulmonary disease, pleural fluid, lymph node enlargement, cardiac decompensation, pneumothorax, pneumomediastinum, pneumoperitoneum or significant osseous abnormality.                                 Medical Decision Making:   History:   Old Medical Records: I decided to obtain old medical records.  Initial Assessment:   Emergent evaluation 60-year-old male presenting with chest pain that is been admitted for the past 2 days.  Also noted to have UTI on UA from 3 days ago.    Vital signs are stable, patient is well-appearing, no acute distress.  EKG reviewed with no ischemic changes.  Differential  Diagnosis:   ACS, MI, costochondritis, musculoskeletal pain, spasm, PE, UTI  Independently Interpreted Test(s):   I have ordered and independently interpreted X-rays - see prior notes.  I have ordered and independently interpreted EKG Reading(s) - see prior notes  Clinical Tests:   Lab Tests: Reviewed and Ordered  Radiological Study: Ordered and Reviewed  Medical Tests: Ordered and Reviewed  ED Management:  - labs  - EKG  - chest x-ray  - aspirin                   ED Course as of Oct 15 2206   Thu Oct 15, 2020   1355 Chest x-ray:  No acute process.    [GM]   1446 D-Dimer: <0.19 [GM]   1446 WBC: 6.18 [GM]   1446 Hemoglobin: 15.3 [GM]   1446 Hematocrit: 44.9 [GM]   1505 Troponin I: <0.006 [GM]   1505 BNP: <10 [GM]   1505 D-Dimer: <0.19 [GM]   1505 Potassium(!): 3.1 [GM]   1709 Troponin I: 0.006 [GM]      ED Course User Index  [GM] Ira Garcia MD     Repeat troponin negative.  Patient remains well appearing, vital signs stable.  Potassium repleted.  Recommend Tylenol/ibuprofen as needed for pain.  Follow-up with PCP.  Will discharge with Macrobid for UTI.       Clinical Impression:       ICD-10-CM ICD-9-CM   1. Hypokalemia  E87.6 276.8   2. Chest pain  R07.9 786.50   3. Acute cystitis without hematuria  N30.00 595.0                          ED Disposition Condition    Discharge Stable        ED Prescriptions     Medication Sig Dispense Start Date End Date Auth. Provider    nitrofurantoin, macrocrystal-monohydrate, (MACROBID) 100 MG capsule Take 1 capsule (100 mg total) by mouth 2 (two) times daily. for 7 days 14 capsule 10/15/2020 10/22/2020 Ira Garcia MD        Follow-up Information     Follow up With Specialties Details Why Contact Info    Josefina Kendall MD Internal Medicine Schedule an appointment as soon as possible for a visit   [address] [facility] [address]  [phone]                                         Ira Garcia MD  10/15/20 2207

## 2020-10-16 ENCOUNTER — PATIENT MESSAGE (OUTPATIENT)
Dept: INTERNAL MEDICINE | Facility: CLINIC | Age: 60
End: 2020-10-16

## 2020-10-20 ENCOUNTER — TELEPHONE (OUTPATIENT)
Dept: INTERNAL MEDICINE | Facility: CLINIC | Age: 60
End: 2020-10-20

## 2020-10-21 ENCOUNTER — OFFICE VISIT (OUTPATIENT)
Dept: INTERNAL MEDICINE | Facility: CLINIC | Age: 60
End: 2020-10-21
Payer: COMMERCIAL

## 2020-10-21 ENCOUNTER — LAB VISIT (OUTPATIENT)
Dept: LAB | Facility: HOSPITAL | Age: 60
End: 2020-10-21
Attending: INTERNAL MEDICINE
Payer: COMMERCIAL

## 2020-10-21 VITALS
HEIGHT: 76 IN | DIASTOLIC BLOOD PRESSURE: 82 MMHG | RESPIRATION RATE: 15 BRPM | WEIGHT: 199.75 LBS | HEART RATE: 72 BPM | OXYGEN SATURATION: 97 % | BODY MASS INDEX: 24.33 KG/M2 | SYSTOLIC BLOOD PRESSURE: 122 MMHG | TEMPERATURE: 97 F

## 2020-10-21 DIAGNOSIS — I10 ESSENTIAL HYPERTENSION: ICD-10-CM

## 2020-10-21 DIAGNOSIS — R10.30 INGUINAL PAIN, UNSPECIFIED LATERALITY: ICD-10-CM

## 2020-10-21 DIAGNOSIS — E87.6 HYPOKALEMIA: Primary | ICD-10-CM

## 2020-10-21 DIAGNOSIS — E87.6 HYPOKALEMIA: ICD-10-CM

## 2020-10-21 LAB
ANION GAP SERPL CALC-SCNC: 10 MMOL/L (ref 8–16)
BUN SERPL-MCNC: 14 MG/DL (ref 8–23)
CALCIUM SERPL-MCNC: 9.8 MG/DL (ref 8.7–10.5)
CHLORIDE SERPL-SCNC: 100 MMOL/L (ref 95–110)
CO2 SERPL-SCNC: 31 MMOL/L (ref 23–29)
CREAT SERPL-MCNC: 1.1 MG/DL (ref 0.5–1.4)
EST. GFR  (AFRICAN AMERICAN): >60 ML/MIN/1.73 M^2
EST. GFR  (NON AFRICAN AMERICAN): >60 ML/MIN/1.73 M^2
GLUCOSE SERPL-MCNC: 136 MG/DL (ref 70–110)
POTASSIUM SERPL-SCNC: 3.4 MMOL/L (ref 3.5–5.1)
SODIUM SERPL-SCNC: 141 MMOL/L (ref 136–145)

## 2020-10-21 PROCEDURE — 3079F PR MOST RECENT DIASTOLIC BLOOD PRESSURE 80-89 MM HG: ICD-10-PCS | Mod: CPTII,S$GLB,, | Performed by: INTERNAL MEDICINE

## 2020-10-21 PROCEDURE — 99999 PR PBB SHADOW E&M-EST. PATIENT-LVL V: ICD-10-PCS | Mod: PBBFAC,,, | Performed by: INTERNAL MEDICINE

## 2020-10-21 PROCEDURE — 80048 BASIC METABOLIC PNL TOTAL CA: CPT

## 2020-10-21 PROCEDURE — 3008F PR BODY MASS INDEX (BMI) DOCUMENTED: ICD-10-PCS | Mod: CPTII,S$GLB,, | Performed by: INTERNAL MEDICINE

## 2020-10-21 PROCEDURE — 36415 COLL VENOUS BLD VENIPUNCTURE: CPT | Mod: PO

## 2020-10-21 PROCEDURE — 99213 OFFICE O/P EST LOW 20 MIN: CPT | Mod: S$GLB,,, | Performed by: INTERNAL MEDICINE

## 2020-10-21 PROCEDURE — 3074F PR MOST RECENT SYSTOLIC BLOOD PRESSURE < 130 MM HG: ICD-10-PCS | Mod: CPTII,S$GLB,, | Performed by: INTERNAL MEDICINE

## 2020-10-21 PROCEDURE — 99213 PR OFFICE/OUTPT VISIT, EST, LEVL III, 20-29 MIN: ICD-10-PCS | Mod: S$GLB,,, | Performed by: INTERNAL MEDICINE

## 2020-10-21 PROCEDURE — 3074F SYST BP LT 130 MM HG: CPT | Mod: CPTII,S$GLB,, | Performed by: INTERNAL MEDICINE

## 2020-10-21 PROCEDURE — 3008F BODY MASS INDEX DOCD: CPT | Mod: CPTII,S$GLB,, | Performed by: INTERNAL MEDICINE

## 2020-10-21 PROCEDURE — 3079F DIAST BP 80-89 MM HG: CPT | Mod: CPTII,S$GLB,, | Performed by: INTERNAL MEDICINE

## 2020-10-21 PROCEDURE — 99999 PR PBB SHADOW E&M-EST. PATIENT-LVL V: CPT | Mod: PBBFAC,,, | Performed by: INTERNAL MEDICINE

## 2020-10-21 RX ORDER — ALPRAZOLAM 0.5 MG/1
TABLET ORAL
Qty: 90 TABLET | Refills: 1 | Status: SHIPPED | OUTPATIENT
Start: 2020-10-21 | End: 2021-03-09

## 2020-10-21 RX ORDER — POTASSIUM CHLORIDE 750 MG/1
30 TABLET, EXTENDED RELEASE ORAL DAILY
Qty: 90 TABLET | Refills: 1 | Status: SHIPPED | OUTPATIENT
Start: 2020-10-21 | End: 2020-11-04

## 2020-10-21 NOTE — PROGRESS NOTES
CC: followup of ED visit for chest pain  HPI:  The patient is a 60 y.o. year old male who presents to the office for followup of ED visit for chest pain.  He was found to have low potassium and a UTI.     shortness of breath, headache or excessive fatigue, but complains of occasional blurred vision and nausea.  He complains of leg cramps and inguinal pain.  Patient reports history of inguinal hernia.    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 -     Hyperlipidemia     Hypertension     Memory loss     Thyroid nodule 8/22/2019    Traumatic iritis - Left Eye 2/27/2013       SURGICAL HISTORY:  Past Surgical History:   Procedure Laterality Date    CEREBRAL ANGIOGRAM N/A 1/6/2020    Procedure: ANGIOGRAM-CEREBRAL;  [facility];  Location: [facility] OR [address] [place] [address];  Service: Radiology;  Laterality: N/A;  /Nagi    CERVICAL FUSION  12/30/2015    COLONOSCOPY N/A 4/7/2017    Procedure: COLONOSCOPY;  Surgeon: Handy Frederick MD;  [facility]: [facility] (4TH [facility]);  Service: Endoscopy;  Laterality: N/A;    HERNIA REPAIR      PROSTATECTOMY         MEDS:  Medcard reviewed and updated    ALLERGIES: Allergy Card reviewed and updated    SOCIAL HISTORY:   The patient is a nonsmoker.    PE:   APPEARANCE: Well nourished, well developed, in no acute distress.    CHEST: Lungs clear to auscultation with unlabored respirations.  CARDIOVASCULAR: Normal S1, S2. No murmurs. No carotid bruits. No pedal edema.  ABDOMEN: Bowel sounds normal. Not distended. Soft. No tenderness or masses.   PSYCHIATRIC: The patient is oriented to person, place, and time and has a pleasant affect.         ASSESSMENT/PLAN:  Hi was seen today for hospital follow up.    Diagnoses and all orders for this visit:    Hypokalemia  -     Basic Metabolic Panel; Future    Essential hypertension  -     blood pressure is controlled    Inguinal pain, unspecified laterality  -     Ambulatory referral/consult to General Surgery; Future    Other orders  -     ALPRAZolam (XANAX) 0.5 MG tablet; TAKE 1 TABLET BY MOUTH THREE TIMES A DAY AS NEEDED FOR ANXIETY  -     Discontinue: potassium chloride SA (K-DUR,KLOR-CON) 10 MEQ tablet; Take 3 tablets (30 mEq total) by mouth once daily.

## 2020-10-22 ENCOUNTER — PATIENT MESSAGE (OUTPATIENT)
Dept: INTERNAL MEDICINE | Facility: CLINIC | Age: 60
End: 2020-10-22

## 2020-10-22 ENCOUNTER — TELEPHONE (OUTPATIENT)
Dept: INTERNAL MEDICINE | Facility: CLINIC | Age: 60
End: 2020-10-22

## 2020-10-22 DIAGNOSIS — R25.2 LEG CRAMPS: Primary | ICD-10-CM

## 2020-10-22 DIAGNOSIS — E87.6 HYPOKALEMIA: ICD-10-CM

## 2020-10-22 NOTE — TELEPHONE ENCOUNTER
Please inform patient that potassium remains depressed at 3.4.  Take 30 mEq of potassium daily as discussed at visit.  CPK remains mildly elevated.  This is a measure of muscle breakdown.  Continue adequate hydration with water.  CRISTHIAN is pending.  Please schedule repeat labs in 2 weeks.

## 2020-10-27 ENCOUNTER — PATIENT OUTREACH (OUTPATIENT)
Dept: ADMINISTRATIVE | Facility: OTHER | Age: 60
End: 2020-10-27

## 2020-10-28 ENCOUNTER — OFFICE VISIT (OUTPATIENT)
Dept: SURGERY | Facility: CLINIC | Age: 60
End: 2020-10-28
Payer: COMMERCIAL

## 2020-10-28 VITALS
WEIGHT: 204.56 LBS | BODY MASS INDEX: 24.91 KG/M2 | HEIGHT: 76 IN | DIASTOLIC BLOOD PRESSURE: 70 MMHG | SYSTOLIC BLOOD PRESSURE: 134 MMHG

## 2020-10-28 DIAGNOSIS — R10.30 INGUINAL PAIN, UNSPECIFIED LATERALITY: ICD-10-CM

## 2020-10-28 PROCEDURE — 99999 PR PBB SHADOW E&M-EST. PATIENT-LVL IV: ICD-10-PCS | Mod: PBBFAC,,, | Performed by: STUDENT IN AN ORGANIZED HEALTH CARE EDUCATION/TRAINING PROGRAM

## 2020-10-28 PROCEDURE — 3008F BODY MASS INDEX DOCD: CPT | Mod: CPTII,S$GLB,, | Performed by: STUDENT IN AN ORGANIZED HEALTH CARE EDUCATION/TRAINING PROGRAM

## 2020-10-28 PROCEDURE — 3078F PR MOST RECENT DIASTOLIC BLOOD PRESSURE < 80 MM HG: ICD-10-PCS | Mod: CPTII,S$GLB,, | Performed by: STUDENT IN AN ORGANIZED HEALTH CARE EDUCATION/TRAINING PROGRAM

## 2020-10-28 PROCEDURE — 99999 PR PBB SHADOW E&M-EST. PATIENT-LVL IV: CPT | Mod: PBBFAC,,, | Performed by: STUDENT IN AN ORGANIZED HEALTH CARE EDUCATION/TRAINING PROGRAM

## 2020-10-28 PROCEDURE — 3075F SYST BP GE 130 - 139MM HG: CPT | Mod: CPTII,S$GLB,, | Performed by: STUDENT IN AN ORGANIZED HEALTH CARE EDUCATION/TRAINING PROGRAM

## 2020-10-28 PROCEDURE — 99202 OFFICE O/P NEW SF 15 MIN: CPT | Mod: S$GLB,,, | Performed by: STUDENT IN AN ORGANIZED HEALTH CARE EDUCATION/TRAINING PROGRAM

## 2020-10-28 PROCEDURE — 99202 PR OFFICE/OUTPT VISIT, NEW, LEVL II, 15-29 MIN: ICD-10-PCS | Mod: S$GLB,,, | Performed by: STUDENT IN AN ORGANIZED HEALTH CARE EDUCATION/TRAINING PROGRAM

## 2020-10-28 PROCEDURE — 3075F PR MOST RECENT SYSTOLIC BLOOD PRESS GE 130-139MM HG: ICD-10-PCS | Mod: CPTII,S$GLB,, | Performed by: STUDENT IN AN ORGANIZED HEALTH CARE EDUCATION/TRAINING PROGRAM

## 2020-10-28 PROCEDURE — 3078F DIAST BP <80 MM HG: CPT | Mod: CPTII,S$GLB,, | Performed by: STUDENT IN AN ORGANIZED HEALTH CARE EDUCATION/TRAINING PROGRAM

## 2020-10-28 PROCEDURE — 3008F PR BODY MASS INDEX (BMI) DOCUMENTED: ICD-10-PCS | Mod: CPTII,S$GLB,, | Performed by: STUDENT IN AN ORGANIZED HEALTH CARE EDUCATION/TRAINING PROGRAM

## 2020-10-28 NOTE — PROGRESS NOTES
"Patient ID: Hi Rubio is a 60 y.o. male.    Chief Complaint: No chief complaint on file.      HPI:  60M with intermittent bilateral groin pain for a few months. Hx of Ammon TEP with 3d max mesh by dr valencia in 2013. Had no pain after that until recently. Pain seems random, unrelated to activity. Pain is sharp in the groin. Sometimes right groin, sometimes on left. OVerall R>L. No bulge. FEels similar to symptoms before he had surgery. Pain is usually mild but can be severe. Typically resolves on its own after a few days.    Review of Systems   Constitutional: Negative for chills, diaphoresis and fever.   HENT: Negative for trouble swallowing.    Respiratory: Negative for cough, shortness of breath, wheezing and stridor.    Cardiovascular: Negative for chest pain and palpitations.   Gastrointestinal: Negative for abdominal distention, abdominal pain, blood in stool, diarrhea, nausea and vomiting.   Endocrine: Negative for cold intolerance and heat intolerance.   Genitourinary: Negative for difficulty urinating.   Musculoskeletal: Negative for back pain.   Skin: Negative for rash.   Allergic/Immunologic: Negative for immunocompromised state.   Neurological: Negative for dizziness, syncope and numbness.   Hematological: Negative for adenopathy.   Psychiatric/Behavioral: Negative for agitation.       Current Outpatient Medications   Medication Sig Dispense Refill    ALPRAZolam (XANAX) 0.5 MG tablet TAKE 1 TABLET BY MOUTH THREE TIMES A DAY AS NEEDED FOR ANXIETY 90 tablet 1    baclofen (LIORESAL) 10 MG tablet Take 10 mg by mouth every evening.      BD ULTRA-FINE CHET PEN NEEDLE 32 gauge x 5/32" Ndle USE PEN NEEDLE AS INSTRUCTION WITH LANTUS INSULIN PRE FILLED PEN.      bethanechol (URECHOLINE) 25 MG Tab Take 1 tablet (25 mg total) by mouth 3 (three) times daily. Start with 1 tablet (25mg) 3 times a day for 1 wk. Then increase to 2 tablets (50mg) 3 times a day as tolerates. 180 tablet 5    chlorthalidone " "(HYGROTEN) 25 MG Tab Take 1 tablet (25 mg total) by mouth once daily. 90 tablet 3    CONTOUR NEXT TEST STRIPS Strp USE TO TEST BLOOD SUGAR ONCE DAILY 25 strip 6    diclofenac sodium (VOLTAREN) 1 % Gel Apply 2 g topically once daily. 1 Tube 6    ezetimibe (ZETIA) 10 mg tablet Take 1 tablet (10 mg total) by mouth once daily. 30 tablet 11    flu vacc [id number] 6mos up,PF, 60 mcg (15 mcg x 4)/0.5 mL Syrg Inject into the muscle. 0.5 mL 0    HYDROcodone-acetaminophen (NORCO) 5-325 mg per tablet Take 1 tablet by mouth every 6 (six) hours as needed for Pain. 30 tablet 0    insulin (LANTUS SOLOSTAR U-100 INSULIN) glargine 100 units/mL (3mL) SubQ pen Inject 12 Units into the skin every evening. 1 Box 0    lidocaine (LIDODERM) 5 % Place 1 patch onto the skin once daily. Remove & Discard patch within 12 hours or as directed by MD 90 patch 1    metoprolol succinate (TOPROL-XL) 100 MG 24 hr tablet TAKE 1 TABLET BY MOUTH EVERY DAY 90 tablet 2    MICROLET LANCET Misc 1 lancet by Misc.(Non-Drug; Combo Route) route once daily. Test blood glucose once daily as instructed. 25 each 11    NIFEdipine (PROCARDIA-XL) 60 MG (OSM) 24 hr tablet TAKE 1 TABLET BY MOUTH ONCE DAILY 90 tablet 1    nitroGLYCERIN (NITROSTAT) 0.3 MG SL tablet Place 1 tablet (0.3 mg total) under the tongue every 5 (five) minutes as needed for Chest pain. 25 tablet 0    ondansetron (ZOFRAN-ODT) 4 MG TbDL Take 1 tablet (4 mg total) by mouth every 8 (eight) hours as needed. 12 tablet 0    pantoprazole (PROTONIX) 40 MG tablet TAKE 1 TABLET BY MOUTH TWICE A  tablet 0    pen needle, diabetic 32 gauge x 5/16" Ndle Use pen needle as instruction with Lantus insulin pre-filled pen. 100 each 1    potassium chloride SA (K-DUR,KLOR-CON) 10 MEQ tablet Take 3 tablets (30 mEq total) by mouth once daily. 90 tablet 1    pravastatin (PRAVACHOL) 80 MG tablet TAKE 1 TABLET BY MOUTH EVERYDAY AT BEDTIME 90 tablet 1    sertraline (ZOLOFT) 25 MG tablet TAKE 1 TABLET BY " MOUTH EVERY DAY 30 tablet 6    sildenafil (REVATIO) 20 mg Tab TAKE 2-5 TABLETS BY MOUTH AS NEEDED 100 tablet 11    sildenafil (VIAGRA) 100 MG tablet Take 1 tablet (100 mg total) by mouth daily as needed for Erectile Dysfunction. 30 tablet 11    aspirin (ECOTRIN) 81 MG EC tablet Take 1 tablet (81 mg total) by mouth once daily.  0     No current facility-administered medications for this visit.        Review of patient's allergies indicates:   Allergen Reactions    Lisinopril Anaphylaxis and Nausea And Vomiting     General bad feeling    Adhesive     Crestor [rosuvastatin] Swelling     Lip swelling.     Jardiance [empagliflozin] Other (See Comments)     Possible related to recent UTI's     Lipitor [atorvastatin] Other (See Comments)     Muscle aches    Metformin      Used XR and experienced flatulance and abdominal pain.        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 -     Hyperlipidemia     Hypertension     Memory loss     Thyroid nodule 8/22/2019    Traumatic iritis - Left Eye 2/27/2013       Past Surgical History:   Procedure Laterality Date    CEREBRAL ANGIOGRAM N/A 1/6/2020    Procedure: ANGIOGRAM-CEREBRAL;  [facility];  [facility];  Service: Radiology;  Laterality: N/A;  /Nagi    CERVICAL FUSION  12/30/2015    COLONOSCOPY N/A 4/7/2017    Procedure: COLONOSCOPY;  Surgeon: Handy Frederick MD;  Location: [facility] (4TH FLR);  Service: Endoscopy;  Laterality: N/A;    HERNIA REPAIR      PROSTATECTOMY         Family History   Problem Relation Age of Onset    Heart disease Mother     Hypertension Mother     Hyperlipidemia Mother      Heart disease Father     Hyperlipidemia Brother     Hypertension Brother     Hypertension Brother     Hyperlipidemia Brother     Benign prostatic hyperplasia Brother     Hypertension Brother     Hypertension Brother     Hepatitis Brother     Cancer Sister     Cancer Brother         throat CA    Diabetes Paternal Uncle     Cataracts Maternal Grandmother     Amblyopia Neg Hx     Blindness Neg Hx     Glaucoma Neg Hx     Macular degeneration Neg Hx     Retinal detachment Neg Hx     Strabismus Neg Hx     Stroke Neg Hx     Thyroid disease Neg Hx     Colon cancer Neg Hx     Colon polyps Neg Hx        Social History     Socioeconomic History    Marital status:      Spouse name: Not on file    Number of children: Not on file    Years of education: Not on file    Highest education level: Not on file   Occupational History    Not on file   Social Needs    Financial resource strain: Hard    Food insecurity     Worry: Never true     Inability: Never true    Transportation needs     Medical: No     Non-medical: No   Tobacco Use    Smoking status: Former Smoker     Packs/day: 1.00     Years: 30.00     Pack years: 30.00     Types: Cigarettes     Quit date: 2011     Years since quittin.8    Smokeless tobacco: Never Used   Substance and Sexual Activity    Alcohol use: Yes     Frequency: 2-3 times a week     Drinks per session: 1 or 2     Binge frequency: Never     Comment: occas - nonalcoholic beer or beer    Drug use: No    Sexual activity: Yes     Partners: Female   Lifestyle    Physical activity     Days per week: 0 days     Minutes per session: 0 min    Stress: Not on file   Relationships    Social connections     Talks on phone: More than three times a week     Gets together: Once a week     Attends [employer] orth[employer] service: More than 4 times per year     Active member of club or organization: Yes     Attends meetings of clubs or organizations: Never     Relationship status:     Other Topics Concern    Not on file   Social History Narrative    Not on file       Vitals:    10/28/20 0823   BP: 134/70       Physical Exam  Constitutional:       General: He is not in acute distress.  HENT:      Head: Normocephalic and atraumatic.   Eyes:      General: No scleral icterus.  Cardiovascular:      Rate and Rhythm: Normal rate.   Pulmonary:      Effort: Pulmonary effort is normal. No respiratory distress.      Breath sounds: No stridor.   Abdominal:      Palpations: Abdomen is soft.      Tenderness: There is no abdominal tenderness.      Comments: No obvious hernia on exam, nontender   Lymphadenopathy:      Cervical: No cervical adenopathy.   Skin:     General: Skin is warm.      Findings: No erythema.   Neurological:      Mental Status: He is alert and oriented to person, place, and time.   Psychiatric:         Behavior: Behavior normal.       cscope in 2017 small benign polyp    Assessment & Plan:   60M with recurrent bilateral groin pain  Was supposed to see dr valencia but referred here by mistake.  Discussed possible causes of pain, mesh pain, recurrent hernia. Overall prefers to avoid surgery for now. Has to see urology soon for possible procedure. Discussed possible risks with surgery vs observation. Follow up with dr valencia as needed. Will let us know if anything changes.

## 2020-10-28 NOTE — LETTER
October 28, 2020      Josefina Kendall MD  [address] [facility]  [place] [address]           [facility] 7th Fl  [address] [facility]  [place] [address]  Phone: [phone]          Patient: Hi Rubio   MR Number: [id number]   YOB: 1960   Date of Visit: 10/28/2020       Dear Dr. Josefina Kendall:    Thank you for referring Hi Rubio to me for evaluation. Attached you will find relevant portions of my assessment and plan of care.    If you have questions, please do not hesitate to call me. I look forward to following Hi Rubio along with you.    Sincerely,    Reynaldo Arce MD    Enclosure  CC:  No Recipients    If you would like to receive this communication electronically, please contact [email or web address] or [phone] to request more information on [employer] Link access.    For providers and/or their staff who would like to refer a patient to [facility], please contact us through our one-stop-shop provider referral line, [facility], at [phone].    If you feel you have received this communication in error or would no longer like to receive these types of communications, please e-mail [email or web address]

## 2020-11-02 ENCOUNTER — PATIENT MESSAGE (OUTPATIENT)
Dept: INTERNAL MEDICINE | Facility: CLINIC | Age: 60
End: 2020-11-02

## 2020-11-03 ENCOUNTER — LAB VISIT (OUTPATIENT)
Dept: LAB | Facility: HOSPITAL | Age: 60
End: 2020-11-03
Attending: INTERNAL MEDICINE
Payer: COMMERCIAL

## 2020-11-03 ENCOUNTER — PATIENT OUTREACH (OUTPATIENT)
Dept: OTHER | Facility: OTHER | Age: 60
End: 2020-11-03

## 2020-11-03 DIAGNOSIS — E87.6 HYPOKALEMIA: ICD-10-CM

## 2020-11-03 DIAGNOSIS — E11.9 TYPE 2 DIABETES MELLITUS WITHOUT COMPLICATION, WITHOUT LONG-TERM CURRENT USE OF INSULIN: ICD-10-CM

## 2020-11-03 DIAGNOSIS — R25.2 LEG CRAMPS: ICD-10-CM

## 2020-11-03 LAB
ANION GAP SERPL CALC-SCNC: 10 MMOL/L (ref 8–16)
BUN SERPL-MCNC: 17 MG/DL (ref 8–23)
CALCIUM SERPL-MCNC: 9.5 MG/DL (ref 8.7–10.5)
CHLORIDE SERPL-SCNC: 100 MMOL/L (ref 95–110)
CK SERPL-CCNC: 371 U/L (ref 20–200)
CO2 SERPL-SCNC: 28 MMOL/L (ref 23–29)
CREAT SERPL-MCNC: 1.1 MG/DL (ref 0.5–1.4)
EST. GFR  (AFRICAN AMERICAN): >60 ML/MIN/1.73 M^2
EST. GFR  (NON AFRICAN AMERICAN): >60 ML/MIN/1.73 M^2
GLUCOSE SERPL-MCNC: 164 MG/DL (ref 70–110)
POTASSIUM SERPL-SCNC: 3.3 MMOL/L (ref 3.5–5.1)
SODIUM SERPL-SCNC: 138 MMOL/L (ref 136–145)

## 2020-11-03 PROCEDURE — 82550 ASSAY OF CK (CPK): CPT

## 2020-11-03 PROCEDURE — 36415 COLL VENOUS BLD VENIPUNCTURE: CPT | Mod: PN

## 2020-11-03 PROCEDURE — 80048 BASIC METABOLIC PNL TOTAL CA: CPT

## 2020-11-03 RX ORDER — INSULIN GLARGINE 100 [IU]/ML
14 INJECTION, SOLUTION SUBCUTANEOUS NIGHTLY
Qty: 5 SYRINGE | Refills: 1 | Status: SHIPPED | OUTPATIENT
Start: 2020-11-03 | End: 2020-11-19

## 2020-11-03 NOTE — PROGRESS NOTES
Digital Medicine: Clinician Follow-Up    Patient reports doing well.     Still feeling frustrated by potassium issues as well as UTI issues, but feels they are closer to having these issues resolved.     Patient reports he is working to main a lower carbohydrate diet. Patient is also eating smaller portions.     The history is provided by the patient.   Follow-up reason(s): routine follow up.     Diabetes    Readings are trending down due to medication adherence.     Patient is experiencing signs/symptoms of hyperglycemia. Polydipsia    Additional Follow-up details: Discussed BG of 381. Patient reports no changes in diet the prior day. Uncertain of cause of elevation - felt it was likely inaccurate.             Last 6 Patient Entered Readings                                          Most Recent A1c: 6.7% on 10/12/2020  (Goal: 7%)     Recent Readings 10/31/2020 10/30/2020 10/29/2020 10/28/2020 10/27/2020    Blood Glucose (mg/dL) 146 181 183 136 171               Depression Screening  Did not address depression screening.    Sleep Apnea Screening    Did not address sleep apnea screening.     Medication Affordability Screening  Did not address medication affordability screening.     Medication Adherence-Medication adherence was assessed.          ASSESSMENT(S)  Patient's A1C goal is less than or equal to 7. Patient's most recent A1C result is above goal. Lab Results    Component                Value               Date                     HGBA1C                   6.7 (H)             10/12/2020          .        A1C remains controlled. Continue to consider A1C inaccurate for patient at this time. SMBG shows elevated FBGs that are trending down/stable.     Discussed improving blood glucoses to help reduce symptoms polydipsia and reduce incidence of UTIs.       Diabetes Plan  Medication change. INCREASE Lantus to 14 units daily (from 12 units).  Continue current diet/physical activity routine. Encouraged continued efforts  in diet.   Will continue to monitor and reach out in 2 weeks to monitor benefit after titration.      Addressed patient questions and patient has my contact information if needed prior to next outreach. Patient verbalizes understanding.                 Topic    Eye Exam     Urine Protein Check      There are no preventive care reminders to display for this patient.        Diabetes Medications             LANTUS SOLOSTAR U-100 INSULIN glargine 100 units/mL (3mL) SubQ pen INJECT 10 UNITS INTO THE SKIN EVERY EVENING.

## 2020-11-04 ENCOUNTER — PATIENT MESSAGE (OUTPATIENT)
Dept: INTERNAL MEDICINE | Facility: CLINIC | Age: 60
End: 2020-11-04

## 2020-11-04 ENCOUNTER — TELEPHONE (OUTPATIENT)
Dept: INTERNAL MEDICINE | Facility: CLINIC | Age: 60
End: 2020-11-04

## 2020-11-04 DIAGNOSIS — E87.6 HYPOKALEMIA: Primary | ICD-10-CM

## 2020-11-04 RX ORDER — POTASSIUM CHLORIDE 750 MG/1
40 TABLET, EXTENDED RELEASE ORAL DAILY
Qty: 120 TABLET | Refills: 0
Start: 2020-11-04 | End: 2020-11-04 | Stop reason: SDUPTHER

## 2020-11-04 RX ORDER — POTASSIUM CHLORIDE 750 MG/1
40 TABLET, EXTENDED RELEASE ORAL DAILY
Qty: 120 TABLET | Refills: 0 | Status: SHIPPED | OUTPATIENT
Start: 2020-11-04 | End: 2020-11-13

## 2020-11-04 NOTE — TELEPHONE ENCOUNTER
Inform patient that labs are significant for an elevated CPK, which appears to be improving.  Please advise if patient is taking pravastatin consistently.  Also, potassium remains low.  Recommend increasing potassium to 40 mEq daily.  Repeat electrolytes in 2 weeks.  Please advise if patient needs a new potassium prescription.

## 2020-11-05 ENCOUNTER — TELEPHONE (OUTPATIENT)
Dept: INTERNAL MEDICINE | Facility: CLINIC | Age: 60
End: 2020-11-05

## 2020-11-05 ENCOUNTER — PATIENT MESSAGE (OUTPATIENT)
Dept: INTERNAL MEDICINE | Facility: CLINIC | Age: 60
End: 2020-11-05

## 2020-11-05 DIAGNOSIS — R53.83 FATIGUE, UNSPECIFIED TYPE: Primary | ICD-10-CM

## 2020-11-05 NOTE — TELEPHONE ENCOUNTER
Pt states he is extremely fatigue and would like to know what he can do as he has no energy. Please advise

## 2020-11-06 ENCOUNTER — PATIENT MESSAGE (OUTPATIENT)
Dept: INTERNAL MEDICINE | Facility: CLINIC | Age: 60
End: 2020-11-06

## 2020-11-06 NOTE — TELEPHONE ENCOUNTER
Please advise if patient is having any difficulty with sleep, which could be contributing to fatigue.  We can check a repeat CBC, as well as TSH and vitamin B12 with next scheduled lab draw.

## 2020-11-18 ENCOUNTER — PATIENT OUTREACH (OUTPATIENT)
Dept: OTHER | Facility: OTHER | Age: 60
End: 2020-11-18

## 2020-11-18 ENCOUNTER — LAB VISIT (OUTPATIENT)
Dept: LAB | Facility: HOSPITAL | Age: 60
End: 2020-11-18
Attending: INTERNAL MEDICINE
Payer: COMMERCIAL

## 2020-11-18 DIAGNOSIS — E87.6 HYPOKALEMIA: ICD-10-CM

## 2020-11-18 DIAGNOSIS — R53.83 FATIGUE, UNSPECIFIED TYPE: ICD-10-CM

## 2020-11-18 LAB
ANION GAP SERPL CALC-SCNC: 12 MMOL/L (ref 8–16)
BASOPHILS # BLD AUTO: 0.06 K/UL (ref 0–0.2)
BASOPHILS NFR BLD: 1.1 % (ref 0–1.9)
BUN SERPL-MCNC: 18 MG/DL (ref 8–23)
CALCIUM SERPL-MCNC: 9.4 MG/DL (ref 8.7–10.5)
CHLORIDE SERPL-SCNC: 101 MMOL/L (ref 95–110)
CO2 SERPL-SCNC: 25 MMOL/L (ref 23–29)
CREAT SERPL-MCNC: 1.2 MG/DL (ref 0.5–1.4)
DIFFERENTIAL METHOD: NORMAL
EOSINOPHIL # BLD AUTO: 0.5 K/UL (ref 0–0.5)
EOSINOPHIL NFR BLD: 7.9 % (ref 0–8)
ERYTHROCYTE [DISTWIDTH] IN BLOOD BY AUTOMATED COUNT: 12.9 % (ref 11.5–14.5)
EST. GFR  (AFRICAN AMERICAN): >60 ML/MIN/1.73 M^2
EST. GFR  (NON AFRICAN AMERICAN): >60 ML/MIN/1.73 M^2
GLUCOSE SERPL-MCNC: 172 MG/DL (ref 70–110)
HCT VFR BLD AUTO: 48.7 % (ref 40–54)
HGB BLD-MCNC: 16 G/DL (ref 14–18)
IMM GRANULOCYTES # BLD AUTO: 0.03 K/UL (ref 0–0.04)
IMM GRANULOCYTES NFR BLD AUTO: 0.5 % (ref 0–0.5)
LYMPHOCYTES # BLD AUTO: 1.5 K/UL (ref 1–4.8)
LYMPHOCYTES NFR BLD: 25.7 % (ref 18–48)
MCH RBC QN AUTO: 29.7 PG (ref 27–31)
MCHC RBC AUTO-ENTMCNC: 32.9 G/DL (ref 32–36)
MCV RBC AUTO: 91 FL (ref 82–98)
MONOCYTES # BLD AUTO: 0.5 K/UL (ref 0.3–1)
MONOCYTES NFR BLD: 9.3 % (ref 4–15)
NEUTROPHILS # BLD AUTO: 3.2 K/UL (ref 1.8–7.7)
NEUTROPHILS NFR BLD: 55.5 % (ref 38–73)
NRBC BLD-RTO: 0 /100 WBC
PLATELET # BLD AUTO: 247 K/UL (ref 150–350)
PMV BLD AUTO: 10.8 FL (ref 9.2–12.9)
POTASSIUM SERPL-SCNC: 3.3 MMOL/L (ref 3.5–5.1)
RBC # BLD AUTO: 5.38 M/UL (ref 4.6–6.2)
SODIUM SERPL-SCNC: 138 MMOL/L (ref 136–145)
T4 FREE SERPL-MCNC: 0.85 NG/DL (ref 0.71–1.51)
TSH SERPL DL<=0.005 MIU/L-ACNC: 4.01 UIU/ML (ref 0.4–4)
VIT B12 SERPL-MCNC: 320 PG/ML (ref 210–950)
WBC # BLD AUTO: 5.69 K/UL (ref 3.9–12.7)

## 2020-11-18 PROCEDURE — 84439 ASSAY OF FREE THYROXINE: CPT

## 2020-11-18 PROCEDURE — 85025 COMPLETE CBC W/AUTO DIFF WBC: CPT

## 2020-11-18 PROCEDURE — 36415 COLL VENOUS BLD VENIPUNCTURE: CPT | Mod: PN

## 2020-11-18 PROCEDURE — 84443 ASSAY THYROID STIM HORMONE: CPT

## 2020-11-18 PROCEDURE — 82607 VITAMIN B-12: CPT

## 2020-11-18 PROCEDURE — 80048 BASIC METABOLIC PNL TOTAL CA: CPT

## 2020-11-18 NOTE — PROGRESS NOTES
Digital Medicine: Health  Follow-Up    The history is provided by the patient.             Reason for review: Blood glucose not at goal        Topics Covered on Call: physical activity, Diet and stress and sleep     Additional Follow-up details: Patient reported he was doing well with no concerns or symptoms. Patient stated he is pleased to see his readings coming down.     Patient expressed frustrations over his readings and potassium levels.  Patient share he has not had UTI in over a month.                Diet-Change      Dietary Improvements:Patient shared he is working on improving his diet by monitoring his food intake  Patient shared he is following resources sent to him. Patient shared he is eating 5 small meals a day. Patient shared he is eating leafy greens at each meal.             Intervention(s): portion control and carb reduction      Physical Activity-Change  He removed walking from His physical activity.    He identified the following barriers to physical activity: low potassium         Additional physical activity details: Patient shared he is not exercising until his potassium is controlled.       Medication Adherence-Medication adherence was assessed.        Substance, Sleep, Stress-No change  stress-assessed  Details:Patient shared he manages his stress  Intervention(s):    Sleep-assessed  Details:Patient shared he continues to get up several times in the night.   Intervention(s):    Alcohol -not assessed  Details:  Intervention(s):    Tobacco-Not Assessed  Details:  Intervention(s):          Continue current diet/physical activity routine.       Patient verbalizes understanding.      Explained the importance of self-monitoring and medication adherence. Encouraged the patient to communicate with their health  for lifestyle modifications to help improve or maintain a healthy lifestyle.                   Topic    Eye Exam     Urine Protein Check            Last 6 Patient Entered  Readings                                          Most Recent A1c: 6.7% on 10/12/2020  (Goal: 7%)     Recent Readings 11/16/2020 11/15/2020 11/14/2020 11/13/2020 11/12/2020    Blood Glucose (mg/dL) 147 139 158 151 174

## 2020-11-19 ENCOUNTER — PATIENT MESSAGE (OUTPATIENT)
Dept: INTERNAL MEDICINE | Facility: CLINIC | Age: 60
End: 2020-11-19

## 2020-11-19 ENCOUNTER — PATIENT OUTREACH (OUTPATIENT)
Dept: OTHER | Facility: OTHER | Age: 60
End: 2020-11-19

## 2020-11-19 DIAGNOSIS — E11.9 TYPE 2 DIABETES MELLITUS WITHOUT COMPLICATION, WITHOUT LONG-TERM CURRENT USE OF INSULIN: ICD-10-CM

## 2020-11-19 RX ORDER — INSULIN GLARGINE 100 [IU]/ML
17 INJECTION, SOLUTION SUBCUTANEOUS NIGHTLY
Qty: 5 SYRINGE | Refills: 1
Start: 2020-11-19 | End: 2021-02-09

## 2020-11-19 NOTE — PROGRESS NOTES
Digital Medicine: Clinician Follow-Up    At last outreach, increased Lantus to 14 units from 12 units. Patient confirms compliance and tolerability.     Patient continues to work on diet. Patient has reduced carbohydrates and increased non-starchy vegetables.     The history is provided by the patient.   Follow-up reason(s): medication change follow-up.     Hypertension    Readings are trending down due to medication adherence.     Patient is not experiencing signs/symptoms of hypoglycemia.      Patient did make medication change.    Is patient tolerating med change? yes              Last 6 Patient Entered Readings                                          Most Recent A1c: 6.7% on 10/12/2020  (Goal: 7%)     Recent Readings 11/19/2020 11/18/2020 11/16/2020 11/15/2020 11/14/2020    Blood Glucose (mg/dL) 195 179 147 139 158               Depression Screening  Did not address depression screening.    Sleep Apnea Screening    Did not address sleep apnea screening.     Medication Affordability Screening  Did not address medication affordability screening.     Medication Adherence-Medication adherence was assessed.          ASSESSMENT(S)  Patient's A1C goal is less than or equal to 7. Patient's most recent A1C result is at goal. Lab Results    Component                Value               Date                     HGBA1C                   6.7 (H)             10/12/2020          .       A1C is controlled but SMBG continues to show elevated FBGs. Hgb appropriate. Will increase basal insulin given FBGs and no hypoglycemia. Will monitor prandial as well to ascertain additional information regarding control.       Diabetes Plan  Medication change. INCREASE Lantus 17 units at night.   Prescribed SMBG testing frequency. Increased monitoring with checking pre-prandial lunch and dinner and before bed a few times per week. Do not attempt to monitor 4 times daily but spot check.   Continue current diet/physical activity routine.  Encouraged continued efforts with diet and education with HC.  Will continue to monitor and reach out in 2 weeks after dose change.      Addressed patient questions and patient has my contact information if needed prior to next outreach. Patient verbalizes understanding.                 Topic    Eye Exam     Urine Protein Check      There are no preventive care reminders to display for this patient.        Diabetes Medications             insulin (LANTUS SOLOSTAR U-100 INSULIN) glargine 100 units/mL (3mL) SubQ pen Inject 14 Units into the skin every evening.

## 2020-11-24 ENCOUNTER — PATIENT MESSAGE (OUTPATIENT)
Dept: INTERNAL MEDICINE | Facility: CLINIC | Age: 60
End: 2020-11-24

## 2020-11-24 DIAGNOSIS — E87.6 HYPOKALEMIA: Primary | ICD-10-CM

## 2020-11-24 NOTE — PATIENT INSTRUCTIONS
Potassium-Rich Foods  The normal adult diet usually contains 2,000 mg to 4,000 mg of potassium per day. More potassium is needed when you lose too much potassium from your body. This can happen if you have diarrhea or vomiting. It can also happen if you take a medicine to make you urinate more (diuretic). To increase the amount of potassium in your diet, include these high-potassium foods.     [The (*) indicates foods highest in potassium.]  Vegetables  Artichokes. Cooked 1/2 cup, 200 mg to 300 mg*  Asparagus. Cooked 1/2 cup, 200 mg to 300 mg  Beans. White, red, gomes cooked 1/2 cup, 300 mg to 500 mg*  Beets. Cooked 1/2 cup, 200 mg to 300 mg  Broccoli. Cooked or raw 1 cup, 200 mg to 500 mg*  [place] sprouts. Cooked 1/2 cup, 200 mg to 300 mg  Cabbage. Raw 1 cup, 100 mg to 200 mg  Carrots. Raw or cooked 1/2 cup, 100 mg to 200 mg  Celery. Raw 1 cup, 200 mg to 300 mg  Lima beans. Fresh or frozen 1/2 cup, 300 mg to 500 mg*   Mushrooms. Raw or cooked 1/2 cup, 100 mg to 300 mg  Peas. Cooked 1/2 cup, 150 mg to 250 mg   Potatoes. Baked 1 medium, 500 mg to 900 mg*   Spinach. Cooked 1 cup, 800 mg to 900 mg*   Spinach. Raw 2 cups, 300 mg to 400 mg *  Squash, winter. Fresh, frozen, or cooked 1/2 cup, 200 mg to 400 mg   Tomato. Fresh 1 medium, 200 mg to 300 mg   Tomato juice. Canned 1/2 cup, 200 mg to 300 mg   Fruits  Apple juice. Unsweetened 1 cup, 200 mg to 300 mg   Apricots. Canned 1/2 cup, 200 mg to 300 mg   Apricots. Dried 4 pieces, 100 mg to 200 mg   Avocado. Raw 1/2 cup, 300 mg to 400 mg*  Banana. Fresh 1 small, 300 mg to 400 mg*   Cantaloupe. Fresh 1 cup diced, 300 mg to 400 mg*   Grape juice. Unsweetened 1 cup, 200 mg to 300 mg   Honeydew melon. Fresh 1 cup diced, 300 mg to 400 mg*   Orange. Fresh 1 medium, 200 mg to 300 mg    Orange juice. Unsweetened, fresh or frozen 1/2 cup, 200 mg to 300 mg  Pineapple juice. Unsweetened 1 cup, 300 mg to 400 mg   Prune juice. Unsweetened 1/2 cup, 300 mg to 400 mg*   Prunes. Dried 5  pieces, 300 mg to 400 mg*   Strawberries. Fresh or frozen 1 cup, 200 mg to 300 mg  Meat  Red meat. Cooked 3 ounces, 100 mg to 300 mg   Seafood  Cod, flounder, halibut. Cooked 3 ounces, 100 mg to 300 mg*  [place]. Cooked, 3 ounces 300 mg to 400 mg*   Scallops. Cooked 3 ounces, 200 mg to 300 mg*  Shrimp. Cooked 3/4 cup, 100 mg to 200 mg   Tuna. Fresh or canned 3/4 cup, 200 mg to 500 mg   Date Last Reviewed: 10/1/2016  © 1384-1204 [employer]. [address] [place] [address], [place] [address]. All rights reserved. This information is not intended as a substitute for professional medical care. Always follow your healthcare professional's instructions.

## 2020-11-24 NOTE — TELEPHONE ENCOUNTER
Please inform patient that I recommend he take low-dose magnesium daily over-the-counter.  Continue current dose of potassium.  Supplement diet with potassium rich foods, which diet have sent in an email.  Please advise if patient is able to view the diet.  Please schedule repeat BMP in 2 weeks.

## 2020-12-01 ENCOUNTER — PATIENT MESSAGE (OUTPATIENT)
Dept: INTERNAL MEDICINE | Facility: CLINIC | Age: 60
End: 2020-12-01

## 2020-12-01 ENCOUNTER — LAB VISIT (OUTPATIENT)
Dept: LAB | Facility: HOSPITAL | Age: 60
End: 2020-12-01
Attending: INTERNAL MEDICINE
Payer: COMMERCIAL

## 2020-12-01 DIAGNOSIS — E87.6 HYPOKALEMIA: ICD-10-CM

## 2020-12-01 LAB
ANION GAP SERPL CALC-SCNC: 11 MMOL/L (ref 8–16)
BUN SERPL-MCNC: 16 MG/DL (ref 8–23)
CALCIUM SERPL-MCNC: 9.6 MG/DL (ref 8.7–10.5)
CHLORIDE SERPL-SCNC: 100 MMOL/L (ref 95–110)
CO2 SERPL-SCNC: 26 MMOL/L (ref 23–29)
CREAT SERPL-MCNC: 1.1 MG/DL (ref 0.5–1.4)
EST. GFR  (AFRICAN AMERICAN): >60 ML/MIN/1.73 M^2
EST. GFR  (NON AFRICAN AMERICAN): >60 ML/MIN/1.73 M^2
GLUCOSE SERPL-MCNC: 178 MG/DL (ref 70–110)
POTASSIUM SERPL-SCNC: 3.5 MMOL/L (ref 3.5–5.1)
SODIUM SERPL-SCNC: 137 MMOL/L (ref 136–145)

## 2020-12-01 PROCEDURE — 80048 BASIC METABOLIC PNL TOTAL CA: CPT

## 2020-12-01 PROCEDURE — 36415 COLL VENOUS BLD VENIPUNCTURE: CPT | Mod: PN

## 2020-12-03 ENCOUNTER — PATIENT OUTREACH (OUTPATIENT)
Dept: OTHER | Facility: OTHER | Age: 60
End: 2020-12-03

## 2020-12-03 ENCOUNTER — TELEPHONE (OUTPATIENT)
Dept: INTERNAL MEDICINE | Facility: CLINIC | Age: 60
End: 2020-12-03

## 2020-12-03 DIAGNOSIS — E11.9 TYPE 2 DIABETES MELLITUS WITHOUT COMPLICATION, WITHOUT LONG-TERM CURRENT USE OF INSULIN: Primary | ICD-10-CM

## 2020-12-03 NOTE — PROGRESS NOTES
Digital Medicine: Clinician Follow-Up    At last outreach, increase Lantus to 17 units. Patient confirms change and tolerability.     SMBG shows readings have not improved despite recent titrations.     The history is provided by the patient.   Follow-up reason(s): medication change follow-up.     Diabetes    Patient's blood glucose is stable. Patient is not experiencing signs/symptoms of hypoglycemia.  Patient is not experiencing signs/symptoms of hyperglycemia.    Additional Follow-up details: Reviewed lack of improvement in SMBG with patient. Discuss possible other contributing factors.     Patient feels that he is eating very little but eating very often to allow for frequent potassium dosing with food.     24hr food recall:  B: eggs and grits (occasionally hashbrowns)  L: 1/2 burger with bun or half fried porkchop  D: 1/2 sandwich (wheat bread)  Snacks: Denies snacks  Desserts: denies desserts except once ever week or 2 he might have a few pieces of candy  Terrie: mostly water and some gatorade zero    Patient did make medication change.    Is patient tolerating med change? yes              Last 6 Patient Entered Readings                                          Most Recent A1c: 6.7% on 10/12/2020  (Goal: 7%)     Recent Readings 12/3/2020 12/2/2020 12/1/2020 11/30/2020 11/28/2020    Blood Glucose (mg/dL) 273 173 197 198 178               Depression Screening  Did not address depression screening.    Sleep Apnea Screening    Did not address sleep apnea screening.     Medication Affordability Screening  Did not address medication affordability screening.     Medication Adherence-Medication adherence was assessed.          ASSESSMENT(S)  Patient's A1C goal is less than or equal to 7. Patient's most recent A1C result is above goal. Lab Results    Component                Value               Date                     HGBA1C                   6.7 (H)             10/12/2020          .        A1C is controlled but SMBG  shows significant elevations.     Suspect diet as a contributor based on variability and continued trend up despite insulin titrations.       Diabetes Plan  Additional monitoring needed. Encouraged patient to monitor pre-prandial as well. Patient did not start this after last outreach. Advised again.   Continue current therapy.  Await md intervention. Would like to refer to nutrition - will message PCP for approval per protocol.  Provided patient education.  - Educated on basic principles of diabetic diet including carbohydrate types (simple/complex), portion sizes, starchy versus nonstarchy vegetables, importance of protein, importance of adequate intake.   - Reviewed patient diet and discussed contributing factors such as breads and grits.   - Patient expressed concern regarding challenges of finding what to eat with diabetes, hypertension, and lipid guidelines. Discussed the goals of each and overlap of options.   - Patient agreeable to see nutritionist.     Will contiue to monitor and reach out after additional readings including prandial.      Addressed patient questions and patient has my contact information if needed prior to next outreach. Patient verbalizes understanding.                 Topic    Eye Exam     Lipid (Cholesterol) Test     Urine Protein Check      There are no preventive care reminders to display for this patient.        Diabetes Medications             insulin (LANTUS SOLOSTAR U-100 INSULIN) glargine 100 units/mL (3mL) SubQ pen Inject 17 Units into the skin every evening.

## 2020-12-03 NOTE — TELEPHONE ENCOUNTER
----- Message from Neftali Moseley, PharmD sent at 12/3/2020 11:03 AM CST -----  Regarding: Diabetes Digital Medicine  Alem Kendall -     I reviewed a healthy diabetic diet today with Mr. Rubio. There is room for improvement in his diet to increase nutritional value, but he is finding it challenging to find which foods fit into a healthy diabetic and heart smart diet with his cholesterol and hypertension.     He is agreeable to see a nutritionist to develop a meal plan and increase education. May I place this consult under your name?     Thank you,  Neftali Veras, PharmD  Clinical Pharmacist  Digital Medicine  [phone]

## 2020-12-07 ENCOUNTER — PATIENT MESSAGE (OUTPATIENT)
Dept: INTERNAL MEDICINE | Facility: CLINIC | Age: 60
End: 2020-12-07

## 2020-12-18 ENCOUNTER — HOSPITAL ENCOUNTER (OUTPATIENT)
Facility: OTHER | Age: 60
Discharge: HOME | End: 2020-12-19
Attending: EMERGENCY MEDICINE | Admitting: EMERGENCY MEDICINE
Payer: COMMERCIAL

## 2020-12-18 DIAGNOSIS — E78.2 MIXED HYPERLIPIDEMIA: ICD-10-CM

## 2020-12-18 DIAGNOSIS — F41.1 GENERALIZED ANXIETY DISORDER: ICD-10-CM

## 2020-12-18 DIAGNOSIS — R07.9 CHEST PAIN: ICD-10-CM

## 2020-12-18 DIAGNOSIS — I10 ESSENTIAL HYPERTENSION: ICD-10-CM

## 2020-12-18 DIAGNOSIS — R25.2 BILATERAL LEG CRAMPS: ICD-10-CM

## 2020-12-18 DIAGNOSIS — R29.898 LEG WEAKNESS, BILATERAL: ICD-10-CM

## 2020-12-18 DIAGNOSIS — I25.84 CORONARY ARTERY DISEASE DUE TO CALCIFIED CORONARY LESION: ICD-10-CM

## 2020-12-18 DIAGNOSIS — I25.10 CORONARY ARTERY DISEASE DUE TO CALCIFIED CORONARY LESION: ICD-10-CM

## 2020-12-18 DIAGNOSIS — E87.6 HYPOKALEMIA: Primary | ICD-10-CM

## 2020-12-18 DIAGNOSIS — K21.9 GASTROESOPHAGEAL REFLUX DISEASE, UNSPECIFIED WHETHER ESOPHAGITIS PRESENT: ICD-10-CM

## 2020-12-18 DIAGNOSIS — E87.8 POTASSIUM DISORDER: ICD-10-CM

## 2020-12-18 DIAGNOSIS — I67.1 ANTERIOR CEREBRAL ARTERY ANEURYSM: ICD-10-CM

## 2020-12-18 LAB
ALBUMIN SERPL BCP-MCNC: 4.8 G/DL (ref 3.5–5.2)
ALP SERPL-CCNC: 80 U/L (ref 55–135)
ALT SERPL W/O P-5'-P-CCNC: 38 U/L (ref 10–44)
ANION GAP SERPL CALC-SCNC: 13 MMOL/L (ref 8–16)
ANION GAP SERPL CALC-SCNC: 9 MMOL/L (ref 8–16)
AST SERPL-CCNC: 35 U/L (ref 10–40)
BASOPHILS # BLD AUTO: 0.08 K/UL (ref 0–0.2)
BASOPHILS NFR BLD: 1.4 % (ref 0–1.9)
BILIRUB SERPL-MCNC: 0.4 MG/DL (ref 0.1–1)
BUN SERPL-MCNC: 13 MG/DL (ref 8–23)
BUN SERPL-MCNC: 15 MG/DL (ref 8–23)
CALCIUM SERPL-MCNC: 9.5 MG/DL (ref 8.7–10.5)
CALCIUM SERPL-MCNC: 9.8 MG/DL (ref 8.7–10.5)
CHLORIDE SERPL-SCNC: 100 MMOL/L (ref 95–110)
CHLORIDE SERPL-SCNC: 99 MMOL/L (ref 95–110)
CK SERPL-CCNC: 412 U/L (ref 20–200)
CO2 SERPL-SCNC: 27 MMOL/L (ref 23–29)
CO2 SERPL-SCNC: 31 MMOL/L (ref 23–29)
CREAT SERPL-MCNC: 1 MG/DL (ref 0.5–1.4)
CREAT SERPL-MCNC: 1.1 MG/DL (ref 0.5–1.4)
CTP QC/QA: YES
DIFFERENTIAL METHOD: NORMAL
EOSINOPHIL # BLD AUTO: 0.5 K/UL (ref 0–0.5)
EOSINOPHIL NFR BLD: 7.8 % (ref 0–8)
ERYTHROCYTE [DISTWIDTH] IN BLOOD BY AUTOMATED COUNT: 12.5 % (ref 11.5–14.5)
EST. GFR  (AFRICAN AMERICAN): >60 ML/MIN/1.73 M^2
EST. GFR  (AFRICAN AMERICAN): >60 ML/MIN/1.73 M^2
EST. GFR  (NON AFRICAN AMERICAN): >60 ML/MIN/1.73 M^2
EST. GFR  (NON AFRICAN AMERICAN): >60 ML/MIN/1.73 M^2
GLUCOSE SERPL-MCNC: 102 MG/DL (ref 70–110)
GLUCOSE SERPL-MCNC: 159 MG/DL (ref 70–110)
HCT VFR BLD AUTO: 47.8 % (ref 40–54)
HGB BLD-MCNC: 16.5 G/DL (ref 14–18)
IMM GRANULOCYTES # BLD AUTO: 0.03 K/UL (ref 0–0.04)
IMM GRANULOCYTES NFR BLD AUTO: 0.5 % (ref 0–0.5)
LYMPHOCYTES # BLD AUTO: 1.4 K/UL (ref 1–4.8)
LYMPHOCYTES NFR BLD: 23.1 % (ref 18–48)
MAGNESIUM SERPL-MCNC: 2.1 MG/DL (ref 1.6–2.6)
MCH RBC QN AUTO: 29.3 PG (ref 27–31)
MCHC RBC AUTO-ENTMCNC: 34.5 G/DL (ref 32–36)
MCV RBC AUTO: 85 FL (ref 82–98)
MONOCYTES # BLD AUTO: 0.5 K/UL (ref 0.3–1)
MONOCYTES NFR BLD: 7.9 % (ref 4–15)
NEUTROPHILS # BLD AUTO: 3.5 K/UL (ref 1.8–7.7)
NEUTROPHILS NFR BLD: 59.3 % (ref 38–73)
NRBC BLD-RTO: 0 /100 WBC
PLATELET # BLD AUTO: 260 K/UL (ref 150–350)
PMV BLD AUTO: 9.9 FL (ref 9.2–12.9)
POCT GLUCOSE: 147 MG/DL (ref 70–110)
POCT GLUCOSE: 174 MG/DL (ref 70–110)
POTASSIUM SERPL-SCNC: 3 MMOL/L (ref 3.5–5.1)
POTASSIUM SERPL-SCNC: 3.3 MMOL/L (ref 3.5–5.1)
PROT SERPL-MCNC: 8.3 G/DL (ref 6–8.4)
RBC # BLD AUTO: 5.64 M/UL (ref 4.6–6.2)
SARS-COV-2 RDRP RESP QL NAA+PROBE: NEGATIVE
SODIUM SERPL-SCNC: 139 MMOL/L (ref 136–145)
SODIUM SERPL-SCNC: 140 MMOL/L (ref 136–145)
WBC # BLD AUTO: 5.92 K/UL (ref 3.9–12.7)

## 2020-12-18 PROCEDURE — 99285 EMERGENCY DEPT VISIT HI MDM: CPT | Mod: 25

## 2020-12-18 PROCEDURE — G0378 HOSPITAL OBSERVATION PER HR: HCPCS

## 2020-12-18 PROCEDURE — 63600175 PHARM REV CODE 636 W HCPCS: Performed by: EMERGENCY MEDICINE

## 2020-12-18 PROCEDURE — 25500020 PHARM REV CODE 255: Performed by: EMERGENCY MEDICINE

## 2020-12-18 PROCEDURE — 25000003 PHARM REV CODE 250: Performed by: NURSE PRACTITIONER

## 2020-12-18 PROCEDURE — 85025 COMPLETE CBC W/AUTO DIFF WBC: CPT

## 2020-12-18 PROCEDURE — 80053 COMPREHEN METABOLIC PANEL: CPT

## 2020-12-18 PROCEDURE — 93010 EKG 12-LEAD: ICD-10-PCS | Mod: ,,, | Performed by: INTERNAL MEDICINE

## 2020-12-18 PROCEDURE — 96372 THER/PROPH/DIAG INJ SC/IM: CPT | Mod: 59

## 2020-12-18 PROCEDURE — 93005 ELECTROCARDIOGRAM TRACING: CPT

## 2020-12-18 PROCEDURE — 80048 BASIC METABOLIC PNL TOTAL CA: CPT

## 2020-12-18 PROCEDURE — 82550 ASSAY OF CK (CPK): CPT

## 2020-12-18 PROCEDURE — 96366 THER/PROPH/DIAG IV INF ADDON: CPT

## 2020-12-18 PROCEDURE — 25000003 PHARM REV CODE 250: Performed by: EMERGENCY MEDICINE

## 2020-12-18 PROCEDURE — U0002 COVID-19 LAB TEST NON-CDC: HCPCS | Performed by: EMERGENCY MEDICINE

## 2020-12-18 PROCEDURE — 82962 GLUCOSE BLOOD TEST: CPT

## 2020-12-18 PROCEDURE — C9399 UNCLASSIFIED DRUGS OR BIOLOG: HCPCS | Performed by: NURSE PRACTITIONER

## 2020-12-18 PROCEDURE — 94761 N-INVAS EAR/PLS OXIMETRY MLT: CPT

## 2020-12-18 PROCEDURE — 96361 HYDRATE IV INFUSION ADD-ON: CPT

## 2020-12-18 PROCEDURE — 83735 ASSAY OF MAGNESIUM: CPT

## 2020-12-18 PROCEDURE — 96365 THER/PROPH/DIAG IV INF INIT: CPT | Mod: 59

## 2020-12-18 PROCEDURE — 93010 ELECTROCARDIOGRAM REPORT: CPT | Mod: ,,, | Performed by: INTERNAL MEDICINE

## 2020-12-18 RX ORDER — METOPROLOL SUCCINATE 50 MG/1
100 TABLET, EXTENDED RELEASE ORAL DAILY
Status: DISCONTINUED | OUTPATIENT
Start: 2020-12-19 | End: 2020-12-19 | Stop reason: HOSPADM

## 2020-12-18 RX ORDER — TALC
6 POWDER (GRAM) TOPICAL NIGHTLY PRN
Status: DISCONTINUED | OUTPATIENT
Start: 2020-12-18 | End: 2020-12-19 | Stop reason: HOSPADM

## 2020-12-18 RX ORDER — SODIUM CHLORIDE 9 MG/ML
INJECTION, SOLUTION INTRAVENOUS CONTINUOUS
Status: DISCONTINUED | OUTPATIENT
Start: 2020-12-18 | End: 2020-12-19

## 2020-12-18 RX ORDER — IBUPROFEN 200 MG
16 TABLET ORAL
Status: DISCONTINUED | OUTPATIENT
Start: 2020-12-18 | End: 2020-12-19 | Stop reason: HOSPADM

## 2020-12-18 RX ORDER — ACETAMINOPHEN 325 MG/1
650 TABLET ORAL EVERY 4 HOURS PRN
Status: DISCONTINUED | OUTPATIENT
Start: 2020-12-18 | End: 2020-12-19 | Stop reason: HOSPADM

## 2020-12-18 RX ORDER — ONDANSETRON 8 MG/1
8 TABLET, ORALLY DISINTEGRATING ORAL EVERY 8 HOURS PRN
Status: DISCONTINUED | OUTPATIENT
Start: 2020-12-18 | End: 2020-12-19 | Stop reason: HOSPADM

## 2020-12-18 RX ORDER — INSULIN ASPART 100 [IU]/ML
0-5 INJECTION, SOLUTION INTRAVENOUS; SUBCUTANEOUS
Status: DISCONTINUED | OUTPATIENT
Start: 2020-12-18 | End: 2020-12-19 | Stop reason: HOSPADM

## 2020-12-18 RX ORDER — HYDRALAZINE HYDROCHLORIDE 25 MG/1
50 TABLET, FILM COATED ORAL EVERY 6 HOURS PRN
Status: DISCONTINUED | OUTPATIENT
Start: 2020-12-18 | End: 2020-12-19 | Stop reason: HOSPADM

## 2020-12-18 RX ORDER — GLUCAGON 1 MG
1 KIT INJECTION
Status: DISCONTINUED | OUTPATIENT
Start: 2020-12-18 | End: 2020-12-19 | Stop reason: HOSPADM

## 2020-12-18 RX ORDER — POTASSIUM CHLORIDE 7.45 MG/ML
10 INJECTION INTRAVENOUS
Status: COMPLETED | OUTPATIENT
Start: 2020-12-18 | End: 2020-12-18

## 2020-12-18 RX ORDER — SODIUM CHLORIDE 0.9 % (FLUSH) 0.9 %
5 SYRINGE (ML) INJECTION
Status: DISCONTINUED | OUTPATIENT
Start: 2020-12-18 | End: 2020-12-18

## 2020-12-18 RX ORDER — IBUPROFEN 200 MG
24 TABLET ORAL
Status: DISCONTINUED | OUTPATIENT
Start: 2020-12-18 | End: 2020-12-19 | Stop reason: HOSPADM

## 2020-12-18 RX ORDER — SODIUM CHLORIDE 0.9 % (FLUSH) 0.9 %
10 SYRINGE (ML) INJECTION
Status: DISCONTINUED | OUTPATIENT
Start: 2020-12-18 | End: 2020-12-19 | Stop reason: HOSPADM

## 2020-12-18 RX ORDER — NIFEDIPINE 30 MG/1
60 TABLET, EXTENDED RELEASE ORAL DAILY
Status: DISCONTINUED | OUTPATIENT
Start: 2020-12-19 | End: 2020-12-19 | Stop reason: HOSPADM

## 2020-12-18 RX ORDER — ASPIRIN 81 MG/1
81 TABLET ORAL DAILY
Status: DISCONTINUED | OUTPATIENT
Start: 2020-12-18 | End: 2020-12-19 | Stop reason: HOSPADM

## 2020-12-18 RX ORDER — ALPRAZOLAM 0.5 MG/1
0.5 TABLET ORAL NIGHTLY PRN
Status: DISCONTINUED | OUTPATIENT
Start: 2020-12-18 | End: 2020-12-19 | Stop reason: HOSPADM

## 2020-12-18 RX ADMIN — INSULIN DETEMIR 13 UNITS: 100 INJECTION, SOLUTION SUBCUTANEOUS at 10:12

## 2020-12-18 RX ADMIN — POTASSIUM BICARBONATE 50 MEQ: 978 TABLET, EFFERVESCENT ORAL at 09:12

## 2020-12-18 RX ADMIN — SODIUM CHLORIDE 125 ML/HR: 0.9 INJECTION, SOLUTION INTRAVENOUS at 05:12

## 2020-12-18 RX ADMIN — POTASSIUM CHLORIDE 10 MEQ: 7.46 INJECTION, SOLUTION INTRAVENOUS at 09:12

## 2020-12-18 RX ADMIN — IOHEXOL 100 ML: 350 INJECTION, SOLUTION INTRAVENOUS at 11:12

## 2020-12-18 RX ADMIN — ALPRAZOLAM 0.5 MG: 0.5 TABLET ORAL at 10:12

## 2020-12-18 NOTE — ED TRIAGE NOTES
Pt presents to the ED for c/o BLE pain described as tightness. Pt states it somewhat feels like when their potassium is low. Pt states they are on a diuretic, but the PCP does not want to switch them over to a potassium sparing one just yet. Pt also describing polydipsia. NADN. AAOx4

## 2020-12-18 NOTE — HPI
Hi Rubio is a 60 year old man with medical history of coronary artery disease, hypertension, cereberal aneurysm, and type 2 diabetes mellitus who presented to the [facility] ED with complaint of lower leg cramping.  The patient states that he has had similar sensation in the past that was related to low potassium. He states he takes a diuretic daily and oral potassium supplements.  He became concerned and decided to come to the ED for evaluation.  On arrival to the ED, the patient's potassium was found to be 2.7.  Electrocardiogram was without T wave changes.  His potassium was replaced.  He was admitted for observation and will trend potassium and replace as needed.

## 2020-12-18 NOTE — ED NOTES
Pt back from cat scan. Talking on phone to family. Reconnected to cardiac monitor, pulse ox, and cycled BP. VSS. Call light in reach.

## 2020-12-18 NOTE — ED NOTES
Pt resting on stretcher with eyes closed. Attached to cardiac monitor, pulse ox, and cycled BP. VSS. Call light in reach.

## 2020-12-18 NOTE — ED NOTES
Pt in room resting with eyes closed. Connected to cardiac monitor, pulse ox, and cycled BP. Call light in reach.

## 2020-12-18 NOTE — ED PROVIDER NOTES
"Encounter Date: 12/18/2020    SCRIBE #1 NOTE: I, Brenda Akbar, am scribing for, and in the presence of, Dr. Iqbal.       History     Chief Complaint   Patient presents with    Leg Pain     Bilateral leg pain that he states has been going on for a while but worse today.  States that he felt like they were really weak.  Patient states he has a hx of low potassium and takes potassium pills but that he has had to be admitted in the past because of low potassium.     Time seen by provider: 8:10 AM    This is a 60 y.o. male on potassium supplementation with a history of HTN, DM, and PAD who presents with complaint of bilateral leg cramping, stiffness, and weakness for over a month, worsening this morning. He felt at baseline yesterday evening. He had to massage his legs before getting out of bed today. He thought this pain was due to regular "aniket horses," but feels like it's more serious. He reports one similar previous episode during which he was admitted. He denies any associated numbness or bowel incontinence. He denies any recent medication changes. His blood sugar was 194 this morning. He does not smoke cigarettes, drink alcohol, or use illicit drugs. He denies additional complaints at this time.    The history is provided by the patient.     Review of patient's allergies indicates:   Allergen Reactions    Lisinopril Anaphylaxis and Nausea And Vomiting     General bad feeling    Adhesive     Crestor [rosuvastatin] Swelling     Lip swelling.     Jardiance [empagliflozin] Other (See Comments)     Possible related to recent UTI's     Lipitor [atorvastatin] Other (See Comments)     Muscle aches    Metformin      Used XR and experienced flatulance and abdominal pain.      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 -     Hyperlipidemia     Hypertension     Memory loss     Thyroid nodule 2019    Traumatic iritis - Left Eye 2013     Past Surgical History:   Procedure Laterality Date    CEREBRAL ANGIOGRAM N/A 2020    Procedure: ANGIOGRAM-CEREBRAL;  Surgeon: [facility] Provider;  [facility];  Service: Radiology;  Laterality: N/A;  /Nagi    CERVICAL FUSION  2015    COLONOSCOPY N/A 2017    Procedure: COLONOSCOPY;  Surgeon: Handy Frederick MD;  Location: [facility] (4TH FLR);  Service: Endoscopy;  Laterality: N/A;    HERNIA REPAIR      PROSTATECTOMY       Family History   Problem Relation Age of Onset    Heart disease Mother     Hypertension Mother     Hyperlipidemia Mother     Heart disease Father     Hyperlipidemia Brother     Hypertension Brother     Hypertension Brother     Hyperlipidemia Brother     Benign prostatic hyperplasia Brother     Hypertension Brother     Hypertension Brother     Hepatitis Brother     Cancer Sister     Cancer Brother         throat CA    Diabetes Paternal Uncle     Cataracts Maternal Grandmother     Amblyopia Neg Hx     Blindness Neg Hx     Glaucoma Neg Hx     Macular degeneration Neg Hx     Retinal detachment Neg Hx     Strabismus Neg Hx     Stroke Neg Hx     Thyroid disease Neg Hx     Colon cancer Neg Hx     Colon polyps Neg Hx      Social History     Tobacco Use    Smoking status: Former Smoker     Packs/day: 1.00     Years: 30.00     Pack years: 30.00     Types: Cigarettes     Quit date: 2011     Years since quittin.0    Smokeless tobacco: Never Used   Substance Use Topics    Alcohol use: Yes     Frequency: 2-3 times a week     Drinks per session: 1 or 2     Binge frequency: Never     Comment: occas - nonalcoholic beer or beer    Drug use: No     Review of Systems    Constitutional: Negative for chills and fever.   HENT: Negative for congestion and sore throat.    Eyes: Negative for photophobia and redness.   Respiratory: Negative for cough and shortness of breath.    Cardiovascular: Negative for chest pain.   Gastrointestinal: Negative for abdominal pain, nausea and vomiting.        Negative for bowel incontinence.   Genitourinary: Negative for dysuria.   Musculoskeletal: Negative for back pain.        Positive for bilateral leg cramping and stiffness.   Skin: Negative for rash.   Neurological: Positive for weakness (BLE). Negative for light-headedness, numbness and headaches.   Psychiatric/Behavioral: Negative for confusion.       Physical Exam     Initial Vitals [12/18/20 0741]   BP Pulse Resp Temp SpO2   (!) 140/77 88 18 98.1 °F (36.7 °C) 98 %      MAP       --         Physical Exam    Nursing note and vitals reviewed.  Constitutional: He appears well-developed and well-nourished. He is not diaphoretic. No distress.   HENT:   Head: Normocephalic and atraumatic.   Mouth/Throat: Oropharynx is clear and moist.   Moist mucus membranes.   Eyes: Conjunctivae and EOM are normal. Pupils are equal, round, and reactive to light.   Conjunctivae pink, clear, and intact.    Neck: Normal range of motion. Neck supple.   No cervical lymphadenopathy.    Cardiovascular: Normal rate, regular rhythm, S1 normal, S2 normal and normal heart sounds. Exam reveals no gallop and no friction rub.    No murmur heard.  Thready DP/PT pulses.   Pulmonary/Chest: Breath sounds normal. No respiratory distress. He has no wheezes. He has no rhonchi. He has no rales.   Lungs clear to auscultation bilaterally.    Abdominal: Soft. Bowel sounds are normal. There is no abdominal tenderness. There is no rebound and no guarding.   No audible bruits.    Musculoskeletal: Normal range of motion. No tenderness or edema.      Comments: No lower extremity edema.    Lymphadenopathy:     He has no cervical adenopathy.    Neurological: He is alert and oriented to person, place, and time.   Cranial nerves II-XII intact. Strength 5/5 throughout. Reflexes 2+ throughout. Good finger to nose task ability. Negative pronator drift. No meningeal signs. No papilledema. PERRLA, EOMI.   Skin: Skin is warm and dry. No rash noted. No pallor.   Warm and dry. No skin tenting, rashes, or lesions.     Psychiatric: He has a normal mood and affect. His behavior is normal. Judgment and thought content normal.         ED Course   Procedures  Labs Reviewed   COMPREHENSIVE METABOLIC PANEL - Abnormal; Notable for the following components:       Result Value    Potassium 3.0 (*)     Glucose 159 (*)     All other components within normal limits   POCT GLUCOSE - Abnormal; Notable for the following components:    POCT Glucose 147 (*)     All other components within normal limits   CBC W/ AUTO DIFFERENTIAL   SARS-COV-2 RDRP GENE   POCT GLUCOSE MONITORING CONTINUOUS     EKG Readings: (Independently Interpreted)   Sinus rhythm. No acute ST or T wave changes. No STEMI. Ventricular rate of 87.     ECG Results          EKG 12-lead (In process)  Result time 12/18/20 08:57:42    In process by Interface, Lab In [facility] (12/18/20 08:57:42)                 Narrative:    Test Reason : E87.8,    Vent. Rate : 087 BPM     Atrial Rate : 087 BPM     P-R Int : 170 ms          QRS Dur : 094 ms      QT Int : 378 ms       P-R-T Axes : 078 064 060 degrees     QTc Int : 454 ms    Normal sinus rhythm  Septal infarct ,age undetermined  Abnormal ECG      Referred By: System System           Confirmed By:                             Imaging Results          CTA Runoff ABD PEL Bilat Lower Ext (Final result)  Result time 12/18/20 12:00:45    Final result by Grant Leonard DO (12/18/20 12:00:45)                 Impression:      1. Moderate calcified atherosclerosis of the abdominal aorta and bilateral iliac arteries without hemodynamically significant stenosis or occlusion.  2. No  significant atherosclerosis of the calf arteries.  There is variant anatomy of the right lower extremity arterial system, with low branching of the tibioperoneal trunk.  Three-vessel runoff on the left.      Electronically signed by: Grant Leonard  Date:    12/18/2020  Time:    12:00             Narrative:    EXAMINATION:  CTA RUNOFF ABD PEL BILAT LOWER EXT    CLINICAL HISTORY:  Claudication.    TECHNIQUE:  CT angiogram of the pelvis with lower extremity runoff after the intravenous administration of 100 mL of Omnipaque 350. Multiplanar reconstructions performed.  The study was performed with contrast in the arterial phase.    COMPARISON:  CT abdomen and pelvis from 05/19/2020 and additional priors.    FINDINGS:  CTA pelvis with bilateral lower extremity runoff was actually performed.  Abdominal CTA not performed.    Pelvic vasculature:    No aortic dissection, aneurysm, or stenosis.  There is moderate calcified atherosclerosis of the visualized abdominal aorta.  There is moderate calcified atherosclerosis of the bilateral common iliac arteries, internal iliac arteries, and external iliac arteries without hemodynamically significant stenosis or occlusion.    Right lower extremity:    - Common femoral: Mild atherosclerosis without hemodynamically significant stenosis or occlusion.    - SFA: Minimal atherosclerosis.    - Profunda: Patent without atherosclerosis.    - Popliteal: Minimal atherosclerosis.    - Runoff: There is a 2 vessel runoff.  There is variant anatomy of the right lower extremity arterial system, with low branching of the tibial peroneal trunk (series 5, image 1670).    - Bones: Normal    - Soft Tissues: Normal    Left lower extremity:    - Common femoral: Mild atherosclerosis without hemodynamically significant stenosis or occlusion.    - SFA: Minimal atherosclerosis.    - Profunda: Mild atherosclerosis without hemodynamically significant stenosis or occlusion.    - Popliteal: Normal.    - Runoff:  There is a 3 vessel runoff.    - Bones: Normal    - Soft Tissues: Normal    Bowel/Mesentery: The visualized bowel loops are unremarkable without evidence of bowel obstruction or inflammation.  The appendix is normal.    Pelvis:  No pelvic masses, adenopathy, or free fluid.    Body wall: Normal.    Bones: Again seen is a bone island within the left inferior pubic ramus, unchanged.  There is no aggressive osseous lesion or evidence of an acute fracture                                 Medical Decision Making:   History:   Old Medical Records: I decided to obtain old medical records.  Independently Interpreted Test(s):   I have ordered and independently interpreted EKG Reading(s) - see prior notes  Clinical Tests:   Lab Tests: Ordered and Reviewed  Radiological Study: Ordered and Reviewed  Medical Tests: Ordered and Reviewed            Scribe Attestation:   Scribe #1: I performed the above scribed service and the documentation accurately describes the services I performed. I attest to the accuracy of the note.    Attending Attestation:           Physician Attestation for Scribe:  Physician Attestation Statement for Scribe #1: I, Dr. Iqbal, reviewed documentation, as scribed by Brenda Webber in my presence, and it is both accurate and complete.         Attending ED Notes:   Emergent evaluation a 60-year-old male with complaint of bilateral lower extremity weakness and cramping.  Patient's past medical history of hypokalemia and peripheral arterial disease.  Patient is afebrile, nontoxic-appearing stable vital signs except for elevation of blood pressure.  Patient is neurovascularly intact without focal neurologic deficits.  Patient has no elevation white blood cell count.  H&H within normal limits.  No acute findings on CMP except for potassium of 3.0.  CTA runoff abdomen pelvis bilateral lower extremity reveals moderate calcified atherosclerosis abdominal aorta and bilateral iliac arteries without hemodynamically  significant stenosis or occlusion.  The patient is extensively counseled on his diagnosis and treatment including all diagnostic laboratory and physical exam findings.  The patient is admitted in stable condition.                    Clinical Impression:     1. Hypokalemia    2. Potassium disorder    3. Leg weakness, bilateral    4. Bilateral leg cramps            ED Disposition Condition    Observation                             Rubin James MD  12/18/20 1111

## 2020-12-18 NOTE — PROGRESS NOTES
Deferring outreach as patient is currently in ER for hypokalemia.     Patient to see nutrition. Pre-prandial readings are not concerning at this time. Will continue to titrate basal insulin and improve diet at next outreach.

## 2020-12-19 VITALS
HEART RATE: 91 BPM | BODY MASS INDEX: 24.16 KG/M2 | TEMPERATURE: 99 F | RESPIRATION RATE: 18 BRPM | WEIGHT: 198.44 LBS | SYSTOLIC BLOOD PRESSURE: 141 MMHG | HEIGHT: 76 IN | DIASTOLIC BLOOD PRESSURE: 76 MMHG | OXYGEN SATURATION: 95 %

## 2020-12-19 LAB
ANION GAP SERPL CALC-SCNC: 11 MMOL/L (ref 8–16)
ANION GAP SERPL CALC-SCNC: 11 MMOL/L (ref 8–16)
BUN SERPL-MCNC: 14 MG/DL (ref 8–23)
BUN SERPL-MCNC: 15 MG/DL (ref 8–23)
CALCIUM SERPL-MCNC: 9.1 MG/DL (ref 8.7–10.5)
CALCIUM SERPL-MCNC: 9.9 MG/DL (ref 8.7–10.5)
CHLORIDE SERPL-SCNC: 101 MMOL/L (ref 95–110)
CHLORIDE SERPL-SCNC: 104 MMOL/L (ref 95–110)
CK SERPL-CCNC: 293 U/L (ref 20–200)
CO2 SERPL-SCNC: 24 MMOL/L (ref 23–29)
CO2 SERPL-SCNC: 27 MMOL/L (ref 23–29)
CREAT SERPL-MCNC: 1 MG/DL (ref 0.5–1.4)
CREAT SERPL-MCNC: 1 MG/DL (ref 0.5–1.4)
EST. GFR  (AFRICAN AMERICAN): >60 ML/MIN/1.73 M^2
EST. GFR  (AFRICAN AMERICAN): >60 ML/MIN/1.73 M^2
EST. GFR  (NON AFRICAN AMERICAN): >60 ML/MIN/1.73 M^2
EST. GFR  (NON AFRICAN AMERICAN): >60 ML/MIN/1.73 M^2
GLUCOSE SERPL-MCNC: 118 MG/DL (ref 70–110)
GLUCOSE SERPL-MCNC: 131 MG/DL (ref 70–110)
POCT GLUCOSE: 149 MG/DL (ref 70–110)
POCT GLUCOSE: 150 MG/DL (ref 70–110)
POCT GLUCOSE: 152 MG/DL (ref 70–110)
POTASSIUM SERPL-SCNC: 3.2 MMOL/L (ref 3.5–5.1)
POTASSIUM SERPL-SCNC: 3.5 MMOL/L (ref 3.5–5.1)
SODIUM SERPL-SCNC: 139 MMOL/L (ref 136–145)
SODIUM SERPL-SCNC: 139 MMOL/L (ref 136–145)

## 2020-12-19 PROCEDURE — 36415 COLL VENOUS BLD VENIPUNCTURE: CPT

## 2020-12-19 PROCEDURE — 99236 HOSP IP/OBS SAME DATE HI 85: CPT | Mod: ,,, | Performed by: NURSE PRACTITIONER

## 2020-12-19 PROCEDURE — 25000003 PHARM REV CODE 250: Performed by: NURSE PRACTITIONER

## 2020-12-19 PROCEDURE — 63600175 PHARM REV CODE 636 W HCPCS: Performed by: NURSE PRACTITIONER

## 2020-12-19 PROCEDURE — 94761 N-INVAS EAR/PLS OXIMETRY MLT: CPT

## 2020-12-19 PROCEDURE — 80048 BASIC METABOLIC PNL TOTAL CA: CPT | Mod: 91

## 2020-12-19 PROCEDURE — 82550 ASSAY OF CK (CPK): CPT

## 2020-12-19 PROCEDURE — G0378 HOSPITAL OBSERVATION PER HR: HCPCS

## 2020-12-19 PROCEDURE — 99236 PR OBSERV/HOSP SAME DATE,LEVL V: ICD-10-PCS | Mod: ,,, | Performed by: NURSE PRACTITIONER

## 2020-12-19 PROCEDURE — 96361 HYDRATE IV INFUSION ADD-ON: CPT

## 2020-12-19 PROCEDURE — 96366 THER/PROPH/DIAG IV INF ADDON: CPT

## 2020-12-19 PROCEDURE — 80048 BASIC METABOLIC PNL TOTAL CA: CPT

## 2020-12-19 RX ORDER — LOSARTAN POTASSIUM 25 MG/1
12.5 TABLET ORAL DAILY
Qty: 15 TABLET | Refills: 0 | Status: SHIPPED | OUTPATIENT
Start: 2020-12-19 | End: 2021-01-13 | Stop reason: SDUPTHER

## 2020-12-19 RX ORDER — POTASSIUM CHLORIDE 750 MG/1
10 TABLET, EXTENDED RELEASE ORAL DAILY
Qty: 14 TABLET | Refills: 0 | Status: SHIPPED | OUTPATIENT
Start: 2020-12-19 | End: 2020-12-28

## 2020-12-19 RX ORDER — PANTOPRAZOLE SODIUM 40 MG/1
40 TABLET, DELAYED RELEASE ORAL 2 TIMES DAILY
Status: DISCONTINUED | OUTPATIENT
Start: 2020-12-19 | End: 2020-12-19 | Stop reason: HOSPADM

## 2020-12-19 RX ORDER — POTASSIUM CHLORIDE 7.45 MG/ML
10 INJECTION INTRAVENOUS
Status: COMPLETED | OUTPATIENT
Start: 2020-12-19 | End: 2020-12-19

## 2020-12-19 RX ADMIN — POTASSIUM CHLORIDE 10 MEQ: 7.46 INJECTION, SOLUTION INTRAVENOUS at 09:12

## 2020-12-19 RX ADMIN — ASPIRIN 81 MG: 81 TABLET, FILM COATED ORAL at 09:12

## 2020-12-19 RX ADMIN — POTASSIUM CHLORIDE 10 MEQ: 7.46 INJECTION, SOLUTION INTRAVENOUS at 10:12

## 2020-12-19 RX ADMIN — LOSARTAN POTASSIUM 12.5 MG: 25 TABLET, FILM COATED ORAL at 05:12

## 2020-12-19 RX ADMIN — NIFEDIPINE 60 MG: 30 TABLET, FILM COATED, EXTENDED RELEASE ORAL at 09:12

## 2020-12-19 RX ADMIN — PANTOPRAZOLE SODIUM 40 MG: 40 TABLET, DELAYED RELEASE ORAL at 09:12

## 2020-12-19 RX ADMIN — ONDANSETRON 8 MG: 8 TABLET, ORALLY DISINTEGRATING ORAL at 09:12

## 2020-12-19 RX ADMIN — METOPROLOL SUCCINATE 100 MG: 50 TABLET, EXTENDED RELEASE ORAL at 09:12

## 2020-12-19 NOTE — PLAN OF CARE
Patient is alert and oriented with no communication barriers.    Prior to admission patient was independent with ADLs.   Denies having LW or MPOA.  Patient denies the use of HH or DME   Patients PCP is correct on the face sheet      Patient choice pharmacy is  [facility]   [address]  [facility] [address]  Phone: [phone] Fax: [phone]       Patients family will transport home at discharge.   No CM needs identified at this time.    CM team will continue to follow.        12/19/20 1529   Discharge Assessment   Assessment Type Discharge Planning Assessment   Confirmed/corrected address and phone number on facesheet? Yes   Assessment information obtained from? Patient   Communicated expected length of stay with patient/caregiver yes   Prior to hospitilization cognitive status: Alert/Oriented   Prior to hospitalization functional status: Independent   Current cognitive status: Alert/Oriented   Current Functional Status: Independent   Lives With spouse   Able to Return to Prior Arrangements yes   Is patient able to care for self after discharge? Yes   Readmission Within the Last 30 Days no previous admission in last 30 days   Patient currently being followed by outpatient case management? No   Patient currently receives any other outside agency services? No   Equipment Currently Used at Home none   Does the patient receive services at the [facility]? No   Discharge Plan A Home   Discharge Plan B Home with family   DME Needed Upon Discharge  none   Patient/Family in Agreement with Plan yes

## 2020-12-19 NOTE — H&P
[facility] Medicine  History & Physical    Patient Name: iH Rubio  MRN: [id number]  Patient Class: OP- Observation  Admission Date: 12/18/2020  Attending Physician: Prisca Portillo MD   Primary Care Provider: Josefina Kendall MD         Patient information was obtained from patient, spouse/SO, past medical records and ER records.     Subjective:     Principal Problem:Hypokalemia    Chief Complaint:   Chief Complaint   Patient presents with    Leg Pain     Bilateral leg pain that he states has been going on for a while but worse today.  States that he felt like they were really weak.  Patient states he has a hx of low potassium and takes potassium pills but that he has had to be admitted in the past because of low potassium.        HPI: Hi Rubio is a 60 year old man with medical history of coronary artery disease, hypertension, cereberal aneurysm, and type 2 diabetes mellitus who presented to the [facility] ED with complaint of lower leg cramping.  The patient states that he has had similar sensation in the past that was related to low potassium. He states he takes a diuretic daily and oral potassium supplements.  He became concerned and decided to come to the ED for evaluation.  On arrival to the ED, the patient's potassium was found to be 2.7.  Electrocardiogram was without T wave changes.  His potassium was replaced.  He was admitted for observation and will trend potassium and replace as needed.     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 -     Hyperlipidemia     Hypertension     Memory loss     Thyroid nodule 8/22/2019    Traumatic iritis  "- Left Eye 2/27/2013       Past Surgical History:   Procedure Laterality Date    CEREBRAL ANGIOGRAM N/A 1/6/2020    Procedure: ANGIOGRAM-CEREBRAL;  Surgeon: [place] [facility] Provider;  Location: [facility] OR 2ND FLR;  Service: Radiology;  Laterality: N/A;  /Nagi    CERVICAL FUSION  12/30/2015    COLONOSCOPY N/A 4/7/2017    Procedure: COLONOSCOPY;  Surgeon: Handy Frederick MD;  Location: [facility] (4TH FLR);  Service: Endoscopy;  Laterality: N/A;    HERNIA REPAIR      PROSTATECTOMY         Review of patient's allergies indicates:   Allergen Reactions    Lisinopril Anaphylaxis and Nausea And Vomiting     General bad feeling    Lipitor [atorvastatin] Other (See Comments)     Muscle aches    Adhesive     Crestor [rosuvastatin] Swelling     Lip swelling.     Jardiance [empagliflozin] Other (See Comments)     Possible related to recent UTI's     Metformin      Used XR and experienced flatulance and abdominal pain.        No current facility-administered medications on file prior to encounter.      Current Outpatient Medications on File Prior to Encounter   Medication Sig    aspirin (ECOTRIN) 81 MG EC tablet Take 1 tablet (81 mg total) by mouth once daily.    BD ULTRA-FINE CHET PEN NEEDLE 32 gauge x 5/32" Ndle USE PEN NEEDLE AS INSTRUCTION WITH LANTUS INSULIN PRE FILLED PEN.    bethanechol (URECHOLINE) 25 MG Tab Take 1 tablet (25 mg total) by mouth 3 (three) times daily. Start with 1 tablet (25mg) 3 times a day for 1 wk. Then increase to 2 tablets (50mg) 3 times a day as tolerates.    chlorthalidone (HYGROTEN) 25 MG Tab Take 1 tablet (25 mg total) by mouth once daily.    CONTOUR NEXT TEST STRIPS Strp USE TO TEST BLOOD SUGAR ONCE DAILY    ezetimibe (ZETIA) 10 mg tablet Take 1 tablet (10 mg total) by mouth once daily.    insulin (LANTUS SOLOSTAR U-100 INSULIN) glargine 100 units/mL (3mL) SubQ pen Inject 17 Units into the skin every evening.    metoprolol succinate (TOPROL-XL) 100 MG 24 hr tablet TAKE 1 " "TABLET BY MOUTH EVERY DAY    MICROLET LANCET Misc 1 lancet by Misc.(Non-Drug; Combo Route) route once daily. Test blood glucose once daily as instructed.    NIFEdipine (PROCARDIA-XL) 60 MG (OSM) 24 hr tablet TAKE 1 TABLET BY MOUTH EVERY DAY    pen needle, diabetic 32 gauge x 5/16" Ndle Use pen needle as instruction with Lantus insulin pre-filled pen.    potassium chloride SA (K-DUR,KLOR-CON) 10 MEQ tablet TAKE 4 TABLETS (40 MEQ TOTAL) BY MOUTH ONCE DAILY.    sertraline (ZOLOFT) 25 MG tablet TAKE 1 TABLET BY MOUTH EVERY DAY    ALPRAZolam (XANAX) 0.5 MG tablet TAKE 1 TABLET BY MOUTH THREE TIMES A DAY AS NEEDED FOR ANXIETY    baclofen (LIORESAL) 10 MG tablet Take 10 mg by mouth every evening.    diclofenac sodium (VOLTAREN) 1 % Gel Apply 2 g topically once daily.    flu vacc [id number] 6mos up,PF, 60 mcg (15 mcg x 4)/0.5 mL Syrg Inject into the muscle.    HYDROcodone-acetaminophen (NORCO) 5-325 mg per tablet Take 1 tablet by mouth every 6 (six) hours as needed for Pain.    lidocaine (LIDODERM) 5 % Place 1 patch onto the skin once daily. Remove & Discard patch within 12 hours or as directed by MD    nitroGLYCERIN (NITROSTAT) 0.3 MG SL tablet Place 1 tablet (0.3 mg total) under the tongue every 5 (five) minutes as needed for Chest pain.    ondansetron (ZOFRAN-ODT) 4 MG TbDL Take 1 tablet (4 mg total) by mouth every 8 (eight) hours as needed.    pantoprazole (PROTONIX) 40 MG tablet TAKE 1 TABLET BY MOUTH TWICE A DAY    pravastatin (PRAVACHOL) 80 MG tablet TAKE 1 TABLET BY MOUTH EVERYDAY AT BEDTIME    sildenafil (VIAGRA) 100 MG tablet Take 1 tablet (100 mg total) by mouth daily as needed for Erectile Dysfunction.    [DISCONTINUED] sildenafil (REVATIO) 20 mg Tab TAKE 2-5 TABLETS BY MOUTH AS NEEDED     Family History     Problem Relation (Age of Onset)    Benign prostatic hyperplasia Brother    Cancer Sister, Brother    Cataracts Maternal Grandmother    Diabetes Paternal Uncle    Heart disease Mother, Father "    Hepatitis Brother    Hyperlipidemia Mother, Brother, Brother    Hypertension Mother, Brother, Brother, Brother, Brother        Tobacco Use    Smoking status: Former Smoker     Packs/day: 1.00     Years: 30.00     Pack years: 30.00     Types: Cigarettes     Quit date: 2011     Years since quittin.0    Smokeless tobacco: Never Used   Substance and Sexual Activity    Alcohol use: Yes     Frequency: 2-3 times a week     Drinks per session: 1 or 2     Binge frequency: Never     Comment: occas - nonalcoholic beer or beer    Drug use: No    Sexual activity: Yes     Partners: Female     Review of Systems   Constitutional: Negative for chills and fever.   HENT: Negative for congestion, dental problem and trouble swallowing.    Eyes: Negative for photophobia and visual disturbance.   Respiratory: Negative for cough and shortness of breath.    Cardiovascular: Negative for chest pain and palpitations.   Gastrointestinal: Negative for abdominal pain, diarrhea, nausea and vomiting.   Genitourinary: Negative for decreased urine volume and dysuria.   Musculoskeletal: Positive for myalgias. Negative for arthralgias.   Skin: Negative for pallor and rash.   Allergic/Immunologic: Negative for immunocompromised state.   Neurological: Negative for weakness and headaches.   Hematological: Does not bruise/bleed easily.   Psychiatric/Behavioral: Negative for confusion. The patient is not nervous/anxious.      Objective:     Vital Signs (Most Recent):  Temp: 97.8 °F (36.6 °C) (20 1616)  Pulse: 75 (20 1900)  Resp: 18 (20 1616)  BP: 116/78 (20 1616)  SpO2: (Abnormal) 93 % (20 1616) Vital Signs (24h Range):  Temp:  [97.8 °F (36.6 °C)-98.1 °F (36.7 °C)] 97.8 °F (36.6 °C)  Pulse:  [68-92] 75  Resp:  [8-19] 18  SpO2:  [93 %-99 %] 93 %  BP: (116-160)/(71-93) 116/78     Weight: 90 kg (198 lb 6.6 oz)  Body mass index is 24.15 kg/m².    Physical Exam  Vitals signs and nursing note reviewed.    Constitutional:       General: He is not in acute distress.     Appearance: He is normal weight.   HENT:      Head: Normocephalic and atraumatic.      Nose: Nose normal. No congestion.      Mouth/Throat:      Mouth: Mucous membranes are moist.      Pharynx: Oropharynx is clear.   Eyes:      Extraocular Movements: EOM normal.      Pupils: Pupils are equal, round, and reactive to light.   Neck:      Musculoskeletal: Normal range of motion.   Cardiovascular:      Rate and Rhythm: Normal rate and regular rhythm.      Pulses: Normal pulses.      Heart sounds: Normal heart sounds.   Pulmonary:      Effort: Pulmonary effort is normal.      Breath sounds: Normal breath sounds.   Abdominal:      General: Abdomen is flat. Bowel sounds are normal.      Palpations: Abdomen is soft.      Tenderness: There is no abdominal tenderness.   Musculoskeletal: Normal range of motion.         General: No tenderness.   Skin:     General: Skin is warm and dry.      Capillary Refill: Capillary refill takes less than 2 seconds.   Neurological:      Mental Status: He is alert and oriented to person, place, and time. Mental status is at baseline.   Psychiatric:         Mood and Affect: Mood normal.         Behavior: Behavior normal.           CRANIAL NERVES     CN III, IV, VI   Pupils are equal, round, and reactive to light.  Extraocular motions are normal.        Significant Labs:   CBC:   Recent Labs   Lab 12/18/20  0804   WBC 5.92   HGB 16.5   HCT 47.8        CMP:   Recent Labs   Lab 12/18/20  0804 12/18/20  1327    140   K 3.0* 3.3*   CL 99 100   CO2 27 31*   * 102   BUN 15 13   CREATININE 1.1 1.0   CALCIUM 9.8 9.5   PROT 8.3  --    ALBUMIN 4.8  --    BILITOT 0.4  --    ALKPHOS 80  --    AST 35  --    ALT 38  --    ANIONGAP 13 9   EGFRNONAA >60 >60       All pertinent labs within the past 24 hours have been reviewed.    Significant Imaging: I have reviewed all pertinent imaging results/findings within the past 24  hours.    Assessment/Plan:     * Hypokalemia  - Chronic and likely related to use of diuretic  - Presented with symptomatic hypokalemia with muscle cramps CPK mildly elevated  - No T wave abnormalities on EKG  - Continue IVF  - Monitor and replace      Anterior cerebral artery aneurysm  Unruptured JOANNE pericollosal  Artery aneurysm     - Follows Neurosurgery who recommens to conservative management due to risk of treatment outweighs the benefit for the fusiform aneurysm   - Aggressive risk factor management with SBP < 130 mmHg and AIc < 7   - Hold chlorthalidone due to hypokalemia  - Continue home nifedipine and metoprolol succinate with goal SBP < 130  - Hydralazine for SBP < 150      Type 2 diabetes mellitus without complication, without long-term current use of insulin  - Most recent A1c 6.7 in October 2020    - Continue  cardiac and diabetic diet  - Continue basal insulin 13 units nightly and low dose give sliding scale  - Glucose goal during hospitalization between 140 -180, monitor and adjust insulin as needed      Anxiety disorder  - Appears stable   - Continue home dose ativan as needed nightly      Coronary artery disease due to calcified coronary lesion  Arterial calcification noted in LAD on CT  Continue ASA,  statin and BB      Mixed hyperlipidemia  - Patient with know cerebral aneurysm  - Recommend LDL < 70  - Most recent lipid panel triglycerides 165      GERD (gastroesophageal reflux disease)  Appears stable        Essential hypertension  - Continue nifedipine 60 mg daily, Toprol  mg daily  - Hold dose of chlorthalidone for now due to hypokalemia  - Hydralazine for SBP > 150  - Goal SBP < 140 due to risk factor for cerebral aneurysm        VTE Risk Mitigation (From admission, onward)         Ordered     IP VTE LOW RISK PATIENT  Once      12/18/20 1443     Place sequential compression device  Until discontinued      12/18/20 1322                   Kelly Sams NP  Department of Hospital  Medicine   [facility], [place], operated by [facility]

## 2020-12-19 NOTE — ASSESSMENT & PLAN NOTE
- Chronic and likely related to use of diuretic  - Presented with symptomatic hypokalemia with muscle cramps CPK mildly elevated  - No T wave abnormalities on EKG  - Continue IVF  - Monitor and replace

## 2020-12-19 NOTE — ASSESSMENT & PLAN NOTE
- Most recent A1c 6.7 in October 2020    - Continue  cardiac and diabetic diet  - Continue basal insulin 13 units nightly and low dose give sliding scale  - Glucose goal during hospitalization between 140 -180, monitor and adjust insulin as needed

## 2020-12-19 NOTE — PLAN OF CARE
Pt alert throughout evening.  Pt took PRN xanax for anxiety in PM.  No complaints of leg cramps.    Pt SR on telemetry.    NS infusing per MAR, stopped at 0600 per order.    Pt voiding per urinal.

## 2020-12-19 NOTE — ASSESSMENT & PLAN NOTE
- Patient with know cerebral aneurysm  - Recommend LDL < 70  - Most recent lipid panel triglycerides 165

## 2020-12-19 NOTE — ASSESSMENT & PLAN NOTE
- Continue nifedipine 60 mg daily, Toprol  mg daily  - Hold dose of chlorthalidone for now due to hypokalemia  - Hydralazine for SBP > 150  - Goal SBP < 140 due to risk factor for cerebral aneurysm

## 2020-12-19 NOTE — ASSESSMENT & PLAN NOTE
Unruptured JOANNE pericollosal  Artery aneurysm     - Follows Neurosurgery who recommens to conservative management due to risk of treatment outweighs the benefit for the fusiform aneurysm   - Aggressive risk factor management with SBP < 130 mmHg and AIc < 7   - Hold chlorthalidone due to hypokalemia  - Continue home nifedipine and metoprolol succinate with goal SBP < 130  - Hydralazine for SBP < 150

## 2020-12-21 ENCOUNTER — LAB VISIT (OUTPATIENT)
Dept: LAB | Facility: HOSPITAL | Age: 60
End: 2020-12-21
Attending: NURSE PRACTITIONER
Payer: COMMERCIAL

## 2020-12-21 ENCOUNTER — PATIENT MESSAGE (OUTPATIENT)
Dept: INTERNAL MEDICINE | Facility: CLINIC | Age: 60
End: 2020-12-21

## 2020-12-21 DIAGNOSIS — E87.6 HYPOKALEMIA: ICD-10-CM

## 2020-12-21 LAB
ANION GAP SERPL CALC-SCNC: 11 MMOL/L (ref 8–16)
BUN SERPL-MCNC: 15 MG/DL (ref 8–23)
CALCIUM SERPL-MCNC: 9.2 MG/DL (ref 8.7–10.5)
CHLORIDE SERPL-SCNC: 102 MMOL/L (ref 95–110)
CO2 SERPL-SCNC: 27 MMOL/L (ref 23–29)
CREAT SERPL-MCNC: 1.2 MG/DL (ref 0.5–1.4)
EST. GFR  (AFRICAN AMERICAN): >60 ML/MIN/1.73 M^2
EST. GFR  (NON AFRICAN AMERICAN): >60 ML/MIN/1.73 M^2
GLUCOSE SERPL-MCNC: 167 MG/DL (ref 70–110)
MAGNESIUM SERPL-MCNC: 1.9 MG/DL (ref 1.6–2.6)
POTASSIUM SERPL-SCNC: 3.3 MMOL/L (ref 3.5–5.1)
SODIUM SERPL-SCNC: 140 MMOL/L (ref 136–145)

## 2020-12-21 PROCEDURE — 80048 BASIC METABOLIC PNL TOTAL CA: CPT

## 2020-12-21 PROCEDURE — 83735 ASSAY OF MAGNESIUM: CPT

## 2020-12-21 PROCEDURE — 36415 COLL VENOUS BLD VENIPUNCTURE: CPT | Mod: PN

## 2020-12-22 ENCOUNTER — PATIENT MESSAGE (OUTPATIENT)
Dept: INTERNAL MEDICINE | Facility: CLINIC | Age: 60
End: 2020-12-22

## 2020-12-27 NOTE — DISCHARGE SUMMARY
[facility] Medicine  Discharge Summary      Patient Name: Hi Rubio  MRN: [id number]  Patient Class: OP- Observation  Admission Date: 12/18/2020  Hospital Length of Stay: 0 days  Discharge Date and Time: 12/19/2020  6:26 PM  Attending Physician: Prisca Portillo MD   Discharging Provider: Kelly Sams NP  Primary Care Provider: Josefina Kendall MD      HPI:   Hi Rubio is a 60 year old man with medical history of coronary artery disease, hypertension, cereberal aneurysm, and type 2 diabetes mellitus who presented to the [facility] ED with complaint of lower leg cramping.  The patient states that he has had similar sensation in the past that was related to low potassium. He states he takes a diuretic daily and oral potassium supplements.  He became concerned and decided to come to the ED for evaluation.  On arrival to the ED, the patient's potassium was found to be 2.7.  Electrocardiogram was without T wave changes.  His potassium was replaced.  He was admitted for observation and will trend potassium and replace as needed.       Hospital Course:   Hi Rubio was admitted for management of symptomatic hypokalemia with muscle cramps and mildly elevated . Electrocardiogram showed no evidence of T wave abnormalities. The patient with chronic hypokalemia and has been on chlorthalidone for management of blood pressure.  Of note, the patient has known unruptured JOANNE aneurysm.  The patient follows Neurosurgery who recommends  conservative management with aggressive risk factor management including SBP < 130 mmHg and AIc < 7.  The patient's chlorthalidone was discontinued due to most likely source of hypokalemia.  Potassium was replaced and home doses of nifedipine and metoprolol succinate were resumed with goal SBP < 130.  Renal function and diabetes stable during observation stay.  After further discussion with the patient, he has trialed several  anti-hypertensive medications and reports could not tolerate clonidine or amlodipine.  He had previously been on losartan in the past and will start low dose losartan 12.5 mg for now.  I have advised the patient follow his primary care provider closely for further adjustments and management.  Also, I have recommended he have labs drawn prior to visit to monitor potassium and renal function.  Prior to discharge, the patient reported complete resolution of bilateral lower leg weakness and no muscle cramping. Diagnosis, plan of care and management were discussed with the patient who agreed with plan and was eager for discharge.      Service: Hospital Medicine    Final Active Diagnoses:    Diagnosis Date Noted POA    PRINCIPAL PROBLEM:  Hypokalemia [E87.6] 05/05/2020 Yes    Bilateral leg cramps [R25.2]  Yes    Anterior cerebral artery aneurysm [I67.1] 01/20/2020 Yes    Type 2 diabetes mellitus without complication, without long-term current use of insulin [E11.9] 05/11/2018 Yes    Coronary artery disease due to calcified coronary lesion [I25.10, I25.84] 01/20/2017 Yes    Mixed hyperlipidemia [E78.2] 01/20/2017 Yes    Anxiety disorder [F41.9] 01/20/2017 Yes    GERD (gastroesophageal reflux disease) [K21.9] 07/24/2013 Yes    Essential hypertension [I10] 07/16/2012 Yes      Problems Resolved During this Admission:       Discharged Condition: good    Disposition: Home or Self Care    Follow Up:  Follow-up Information     Josefina Kendall MD. Schedule an appointment as soon as possible for a visit in 3 days.    Specialty: Internal Medicine  Why: Follow up after hospital discharge; please have ordered labs completed prior to PCP visit  Contact information:  [address] [facility] [address]  [phone]                 Patient Instructions:      Basic metabolic panel   Standing Status: Future Number of Occurrences: 1 Standing Exp. Date: 02/17/22     Magnesium   Standing Status: Future Number of  Occurrences: 1 Standing Exp. Date: 02/17/22     Diet diabetic     Notify your health care provider if you experience any of the following:  severe uncontrolled pain     Notify your health care provider if you experience any of the following:  severe persistent headache     Notify your health care provider if you experience any of the following:  increased confusion or weakness     Activity as tolerated       Significant Diagnostic Studies: Labs: All labs within the past 24 hours have been reviewed       Medications:  Reconciled Home Medications:      Medication List      Start taking these medications    losartan 25 MG tablet  Commonly known as: COZAAR  Take 0.5 tablets (12.5 mg total) by mouth once daily.        Change how you take these medications    potassium chloride SA 10 MEQ tablet  Commonly known as: K-DUR,KLOR-CON  Take 1 tablet (10 mEq total) by mouth once daily. for 14 days  What changed: See the new instructions.        Continue taking these medications    ALPRAZolam 0.5 MG tablet  Commonly known as: XANAX  TAKE 1 TABLET BY MOUTH THREE TIMES A DAY AS NEEDED FOR ANXIETY     aspirin 81 MG EC tablet  Commonly known as: ECOTRIN  Take 1 tablet (81 mg total) by mouth once daily.     baclofen 10 MG tablet  Commonly known as: LIORESAL  Take 10 mg by mouth every evening.     bethanechol 25 MG Tab  Commonly known as: URECHOLINE  Take 1 tablet (25 mg total) by mouth 3 (three) times daily. Start with 1 tablet (25mg) 3 times a day for 1 wk. Then increase to 2 tablets (50mg) 3 times a day as tolerates.     CONTOUR NEXT TEST STRIPS Strp  Generic drug: blood sugar diagnostic  USE TO TEST BLOOD SUGAR ONCE DAILY     diclofenac sodium 1 % Gel  Commonly known as: VOLTAREN  Apply 2 g topically once daily.     HYDROcodone-acetaminophen 5-325 mg per tablet  Commonly known as: NORCO  Take 1 tablet by mouth every 6 (six) hours as needed for Pain.     LANTUS SOLOSTAR U-100 INSULIN glargine 100 units/mL (3mL) SubQ pen  Generic  "drug: insulin  Inject 17 Units into the skin every evening.     lidocaine 5 %  Commonly known as: LIDODERM  Place 1 patch onto the skin once daily. Remove & Discard patch within 12 hours or as directed by MD     MICROLET LANCET Misc  Generic drug: lancets  1 lancet by Misc.(Non-Drug; Combo Route) route once daily. Test blood glucose once daily as instructed.     NIFEdipine 60 MG (OSM) 24 hr tablet  Commonly known as: PROCARDIA-XL  TAKE 1 TABLET BY MOUTH EVERY DAY     nitroGLYCERIN 0.3 MG SL tablet  Commonly known as: NITROSTAT  Place 1 tablet (0.3 mg total) under the tongue every 5 (five) minutes as needed for Chest pain.     ondansetron 4 MG Tbdl  Commonly known as: ZOFRAN-ODT  Take 1 tablet (4 mg total) by mouth every 8 (eight) hours as needed.     * pen needle, diabetic 32 gauge x 5/16" Ndle  Use pen needle as instruction with Lantus insulin pre-filled pen.     * BD ULTRA-FINE CHET PEN NEEDLE 32 gauge x 5/32" Ndle  Generic drug: pen needle, diabetic  USE PEN NEEDLE AS INSTRUCTION WITH LANTUS INSULIN PRE FILLED PEN.     sertraline 25 MG tablet  Commonly known as: ZOLOFT  TAKE 1 TABLET BY MOUTH EVERY DAY     sildenafiL 100 MG tablet  Commonly known as: VIAGRA  Take 1 tablet (100 mg total) by mouth daily as needed for Erectile Dysfunction.          Stop taking these medications    chlorthalidone 25 MG Tab  Commonly known as: HYGROTEN     FLUARIX QUAD 8911-1003 (PF) 60 mcg (15 mcg x 4)/0.5 mL Syrg  Generic drug: flu vacc [id number] 6mos up(PF)     metoprolol succinate 100 MG 24 hr tablet  Commonly known as: TOPROL-XL     pantoprazole 40 MG tablet  Commonly known as: PROTONIX     pravastatin 80 MG tablet  Commonly known as: PRAVACHOL        Ask your doctor about these medications    ezetimibe 10 mg tablet  Commonly known as: ZETIA  Take 1 tablet (10 mg total) by mouth once daily.          Time spent on the discharge of patient: <30 minutes  Patient was seen and examined on the date of discharge and determined to be " suitable for discharge.         Kelly Sams NP  Department of Hospital Medicine  [facility]

## 2020-12-27 NOTE — HOSPITAL COURSE
Hi Rubio was admitted for management of symptomatic hypokalemia with muscle cramps and mildly elevated . Electrocardiogram showed no evidence of T wave abnormalities. The patient with chronic hypokalemia and has been on chlorthalidone for management of blood pressure.  Of note, the patient has known unruptured JOANNE aneurysm.  The patient follows Neurosurgery who recommends  conservative management with aggressive risk factor management including SBP < 130 mmHg and AIc < 7.  The patient's chlorthalidone was discontinued due to most likely source of hypokalemia.  Potassium was replaced and home doses of nifedipine and metoprolol succinate were resumed with goal SBP < 130.  Renal function and diabetes stable during observation stay.  After further discussion with the patient, he has trialed several anti-hypertensive medications and reports could not tolerate clonidine or amlodipine.  He had previously been on losartan in the past and will start low dose losartan 12.5 mg for now.  I have advised the patient follow his primary care provider closely for further adjustments and management.  Also, I have recommended he have labs drawn prior to visit to monitor potassium and renal function.  Prior to discharge, the patient reported complete resolution of bilateral lower leg weakness and no muscle cramping. Diagnosis, plan of care and management were discussed with the patient who agreed with plan and was eager for discharge.

## 2020-12-28 RX ORDER — POTASSIUM CHLORIDE 750 MG/1
TABLET, EXTENDED RELEASE ORAL
Qty: 120 TABLET | Refills: 0 | Status: SHIPPED | OUTPATIENT
Start: 2020-12-28 | End: 2021-01-25

## 2020-12-29 ENCOUNTER — OFFICE VISIT (OUTPATIENT)
Dept: INTERNAL MEDICINE | Facility: CLINIC | Age: 60
End: 2020-12-29
Payer: COMMERCIAL

## 2020-12-29 VITALS
BODY MASS INDEX: 25.59 KG/M2 | HEART RATE: 74 BPM | OXYGEN SATURATION: 96 % | SYSTOLIC BLOOD PRESSURE: 140 MMHG | WEIGHT: 210.13 LBS | DIASTOLIC BLOOD PRESSURE: 84 MMHG | HEIGHT: 76 IN | RESPIRATION RATE: 18 BRPM | TEMPERATURE: 98 F

## 2020-12-29 DIAGNOSIS — I10 ESSENTIAL HYPERTENSION: ICD-10-CM

## 2020-12-29 DIAGNOSIS — E87.6 HYPOKALEMIA: Primary | ICD-10-CM

## 2020-12-29 PROCEDURE — 99213 OFFICE O/P EST LOW 20 MIN: CPT | Mod: S$GLB,,, | Performed by: INTERNAL MEDICINE

## 2020-12-29 PROCEDURE — 3008F BODY MASS INDEX DOCD: CPT | Mod: CPTII,S$GLB,, | Performed by: INTERNAL MEDICINE

## 2020-12-29 PROCEDURE — 3077F SYST BP >= 140 MM HG: CPT | Mod: CPTII,S$GLB,, | Performed by: INTERNAL MEDICINE

## 2020-12-29 PROCEDURE — 3072F LOW RISK FOR RETINOPATHY: CPT | Mod: S$GLB,,, | Performed by: INTERNAL MEDICINE

## 2020-12-29 PROCEDURE — 99999 PR PBB SHADOW E&M-EST. PATIENT-LVL V: CPT | Mod: PBBFAC,,, | Performed by: INTERNAL MEDICINE

## 2020-12-29 PROCEDURE — 3079F DIAST BP 80-89 MM HG: CPT | Mod: CPTII,S$GLB,, | Performed by: INTERNAL MEDICINE

## 2020-12-29 PROCEDURE — 3077F PR MOST RECENT SYSTOLIC BLOOD PRESSURE >= 140 MM HG: ICD-10-PCS | Mod: CPTII,S$GLB,, | Performed by: INTERNAL MEDICINE

## 2020-12-29 PROCEDURE — 3008F PR BODY MASS INDEX (BMI) DOCUMENTED: ICD-10-PCS | Mod: CPTII,S$GLB,, | Performed by: INTERNAL MEDICINE

## 2020-12-29 PROCEDURE — 1126F PR PAIN SEVERITY QUANTIFIED, NO PAIN PRESENT: ICD-10-PCS | Mod: S$GLB,,, | Performed by: INTERNAL MEDICINE

## 2020-12-29 PROCEDURE — 99213 PR OFFICE/OUTPT VISIT, EST, LEVL III, 20-29 MIN: ICD-10-PCS | Mod: S$GLB,,, | Performed by: INTERNAL MEDICINE

## 2020-12-29 PROCEDURE — 3072F PR LOW RISK FOR RETINOPATHY: ICD-10-PCS | Mod: S$GLB,,, | Performed by: INTERNAL MEDICINE

## 2020-12-29 PROCEDURE — 1126F AMNT PAIN NOTED NONE PRSNT: CPT | Mod: S$GLB,,, | Performed by: INTERNAL MEDICINE

## 2020-12-29 PROCEDURE — 3079F PR MOST RECENT DIASTOLIC BLOOD PRESSURE 80-89 MM HG: ICD-10-PCS | Mod: CPTII,S$GLB,, | Performed by: INTERNAL MEDICINE

## 2020-12-29 PROCEDURE — 99999 PR PBB SHADOW E&M-EST. PATIENT-LVL V: ICD-10-PCS | Mod: PBBFAC,,, | Performed by: INTERNAL MEDICINE

## 2020-12-29 NOTE — PROGRESS NOTES
CC: followup of hypertension  HPI:  The patient is a 60 y.o. year old male who presents to the office for followup of hypertension.  The patient report chest discomfort that he experiences when potassium is low and nausea, but denies shortness of breath, headache, blurred vision or excessive fatigue.  He complains of leg cramping.  He states he feels like his potassium is low.  His blood pressure has been labile since chlorthalidone was discontinued.  He complains of ankle swelling.    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 -     Hyperlipidemia     Hypertension     Memory loss     Thyroid nodule 8/22/2019    Traumatic iritis - Left Eye 2/27/2013       SURGICAL HISTORY:  Past Surgical History:   Procedure Laterality Date    CEREBRAL ANGIOGRAM N/A 1/6/2020    Procedure: ANGIOGRAM-CEREBRAL;  Surgeon: [facility] Provider;  Location: [facility] OR [address] [place] [address];  Service: Radiology;  Laterality: N/A;  /Nagi    CERVICAL FUSION  12/30/2015    COLONOSCOPY N/A 4/7/2017    Procedure: COLONOSCOPY;  Surgeon: Handy Frederick MD;  Location: [facility] ([facility]);  Service: Endoscopy;  Laterality: N/A;    HERNIA REPAIR      PROSTATECTOMY         MEDS:  Medcard reviewed and updated    ALLERGIES: Allergy Card reviewed and updated    SOCIAL HISTORY:   The patient is a nonsmoker.    PE:   APPEARANCE: Well nourished, well developed, in no acute distress.    CHEST: Lungs clear to auscultation with unlabored respirations.  CARDIOVASCULAR: Normal S1, S2. No murmurs. No carotid bruits. No pedal edema.  ABDOMEN: Bowel sounds normal. Not distended. Soft. No tenderness or masses.    PSYCHIATRIC: The patient is oriented to person, place, and time and has a pleasant affect.        ASSESSMENT/PLAN:  Hi was seen today for follow-up.    Diagnoses and all orders for this visit:    Hypokalemia  -     Basic Metabolic Panel; Future  -     Ambulatory referral/consult to Nephrology; Future  -     take 30 mEq of potassium daily for 2 days and repeat level in 2 days    Essential hypertension  -     Basic Metabolic Panel; Future  -     TSH; Future   -     blood pressure is labile, repeat potassium

## 2020-12-30 NOTE — TELEPHONE ENCOUNTER
----- Message from Jennifer Jorgensen sent at 12/30/2020  9:07 AM CST -----  Contact: self [phone]  Patient is returning a phone call.  Who left a message for the patient: No  Does patient know what this is regarding:  unsure  Comments:

## 2020-12-31 ENCOUNTER — LAB VISIT (OUTPATIENT)
Dept: LAB | Facility: HOSPITAL | Age: 60
End: 2020-12-31
Attending: INTERNAL MEDICINE
Payer: COMMERCIAL

## 2020-12-31 ENCOUNTER — PATIENT OUTREACH (OUTPATIENT)
Dept: ADMINISTRATIVE | Facility: OTHER | Age: 60
End: 2020-12-31

## 2020-12-31 DIAGNOSIS — I10 ESSENTIAL HYPERTENSION: ICD-10-CM

## 2020-12-31 DIAGNOSIS — E87.6 HYPOKALEMIA: ICD-10-CM

## 2020-12-31 LAB
ANION GAP SERPL CALC-SCNC: 10 MMOL/L (ref 8–16)
BUN SERPL-MCNC: 12 MG/DL (ref 8–23)
CALCIUM SERPL-MCNC: 9.1 MG/DL (ref 8.7–10.5)
CHLORIDE SERPL-SCNC: 104 MMOL/L (ref 95–110)
CO2 SERPL-SCNC: 25 MMOL/L (ref 23–29)
CREAT SERPL-MCNC: 1.1 MG/DL (ref 0.5–1.4)
EST. GFR  (AFRICAN AMERICAN): >60 ML/MIN/1.73 M^2
EST. GFR  (NON AFRICAN AMERICAN): >60 ML/MIN/1.73 M^2
GLUCOSE SERPL-MCNC: 144 MG/DL (ref 70–110)
POTASSIUM SERPL-SCNC: 3.5 MMOL/L (ref 3.5–5.1)
SODIUM SERPL-SCNC: 139 MMOL/L (ref 136–145)
T4 FREE SERPL-MCNC: 0.86 NG/DL (ref 0.71–1.51)
TSH SERPL DL<=0.005 MIU/L-ACNC: 4.47 UIU/ML (ref 0.4–4)

## 2020-12-31 PROCEDURE — 84443 ASSAY THYROID STIM HORMONE: CPT

## 2020-12-31 PROCEDURE — 80048 BASIC METABOLIC PNL TOTAL CA: CPT

## 2020-12-31 PROCEDURE — 36415 COLL VENOUS BLD VENIPUNCTURE: CPT | Mod: PN

## 2020-12-31 PROCEDURE — 84439 ASSAY OF FREE THYROXINE: CPT

## 2020-12-31 NOTE — PROGRESS NOTES
Care Everywhere: updated  Immunization: updated  Health Maintenance: updated  Media Review: review for outside eye exam report   Legacy Review:   Order placed:   Upcoming appts:

## 2021-01-04 ENCOUNTER — PATIENT MESSAGE (OUTPATIENT)
Dept: INTERNAL MEDICINE | Facility: CLINIC | Age: 61
End: 2021-01-04

## 2021-01-04 ENCOUNTER — LAB VISIT (OUTPATIENT)
Dept: PRIMARY CARE CLINIC | Facility: OTHER | Age: 61
End: 2021-01-04
Attending: INTERNAL MEDICINE
Payer: COMMERCIAL

## 2021-01-04 ENCOUNTER — TELEPHONE (OUTPATIENT)
Dept: INTERNAL MEDICINE | Facility: CLINIC | Age: 61
End: 2021-01-04

## 2021-01-04 DIAGNOSIS — Z03.818 ENCOUNTER FOR OBSERVATION FOR SUSPECTED EXPOSURE TO OTHER BIOLOGICAL AGENTS RULED OUT: ICD-10-CM

## 2021-01-04 PROCEDURE — U0003 INFECTIOUS AGENT DETECTION BY NUCLEIC ACID (DNA OR RNA); SEVERE ACUTE RESPIRATORY SYNDROME CORONAVIRUS 2 (SARS-COV-2) (CORONAVIRUS DISEASE [COVID-19]), AMPLIFIED PROBE TECHNIQUE, MAKING USE OF HIGH THROUGHPUT TECHNOLOGIES AS DESCRIBED BY CMS-2020-01-R: HCPCS

## 2021-01-05 ENCOUNTER — PATIENT MESSAGE (OUTPATIENT)
Dept: INTERNAL MEDICINE | Facility: CLINIC | Age: 61
End: 2021-01-05

## 2021-01-05 LAB — SARS-COV-2 RNA RESP QL NAA+PROBE: NOT DETECTED

## 2021-01-13 ENCOUNTER — LAB VISIT (OUTPATIENT)
Dept: LAB | Facility: HOSPITAL | Age: 61
End: 2021-01-13
Attending: INTERNAL MEDICINE
Payer: COMMERCIAL

## 2021-01-13 ENCOUNTER — OFFICE VISIT (OUTPATIENT)
Dept: NEPHROLOGY | Facility: CLINIC | Age: 61
End: 2021-01-13
Payer: COMMERCIAL

## 2021-01-13 ENCOUNTER — PATIENT MESSAGE (OUTPATIENT)
Dept: ADMINISTRATIVE | Facility: OTHER | Age: 61
End: 2021-01-13

## 2021-01-13 VITALS
SYSTOLIC BLOOD PRESSURE: 120 MMHG | DIASTOLIC BLOOD PRESSURE: 80 MMHG | OXYGEN SATURATION: 96 % | HEIGHT: 76 IN | WEIGHT: 209.88 LBS | HEART RATE: 73 BPM | BODY MASS INDEX: 25.56 KG/M2

## 2021-01-13 DIAGNOSIS — N18.2 KIDNEY DISEASE, CHRONIC, STAGE II (MILD, EGFR 60+ ML/MIN): ICD-10-CM

## 2021-01-13 DIAGNOSIS — E87.6 HYPOKALEMIA: ICD-10-CM

## 2021-01-13 DIAGNOSIS — I10 HYPERTENSION, UNSPECIFIED TYPE: Primary | ICD-10-CM

## 2021-01-13 DIAGNOSIS — I10 HYPERTENSION, UNSPECIFIED TYPE: ICD-10-CM

## 2021-01-13 LAB
25(OH)D3+25(OH)D2 SERPL-MCNC: 26 NG/ML (ref 30–96)
ALBUMIN SERPL BCP-MCNC: 4.3 G/DL (ref 3.5–5.2)
ANION GAP SERPL CALC-SCNC: 12 MMOL/L (ref 8–16)
BUN SERPL-MCNC: 11 MG/DL (ref 8–23)
CALCIUM SERPL-MCNC: 9.3 MG/DL (ref 8.7–10.5)
CHLORIDE SERPL-SCNC: 104 MMOL/L (ref 95–110)
CO2 SERPL-SCNC: 26 MMOL/L (ref 23–29)
CREAT SERPL-MCNC: 1 MG/DL (ref 0.5–1.4)
EST. GFR  (AFRICAN AMERICAN): >60 ML/MIN/1.73 M^2
EST. GFR  (NON AFRICAN AMERICAN): >60 ML/MIN/1.73 M^2
GLUCOSE SERPL-MCNC: 130 MG/DL (ref 70–110)
MAGNESIUM SERPL-MCNC: 2 MG/DL (ref 1.6–2.6)
PHOSPHATE SERPL-MCNC: 2.8 MG/DL (ref 2.7–4.5)
POTASSIUM SERPL-SCNC: 3.8 MMOL/L (ref 3.5–5.1)
PTH-INTACT SERPL-MCNC: 65 PG/ML (ref 9–77)
SODIUM SERPL-SCNC: 142 MMOL/L (ref 136–145)

## 2021-01-13 PROCEDURE — 83735 ASSAY OF MAGNESIUM: CPT

## 2021-01-13 PROCEDURE — 82088 ASSAY OF ALDOSTERONE: CPT

## 2021-01-13 PROCEDURE — 99999 PR PBB SHADOW E&M-EST. PATIENT-LVL V: ICD-10-PCS | Mod: PBBFAC,,, | Performed by: INTERNAL MEDICINE

## 2021-01-13 PROCEDURE — 1126F AMNT PAIN NOTED NONE PRSNT: CPT | Mod: S$GLB,,, | Performed by: INTERNAL MEDICINE

## 2021-01-13 PROCEDURE — 80069 RENAL FUNCTION PANEL: CPT

## 2021-01-13 PROCEDURE — 3074F PR MOST RECENT SYSTOLIC BLOOD PRESSURE < 130 MM HG: ICD-10-PCS | Mod: CPTII,S$GLB,, | Performed by: INTERNAL MEDICINE

## 2021-01-13 PROCEDURE — 3008F BODY MASS INDEX DOCD: CPT | Mod: CPTII,S$GLB,, | Performed by: INTERNAL MEDICINE

## 2021-01-13 PROCEDURE — 3008F PR BODY MASS INDEX (BMI) DOCUMENTED: ICD-10-PCS | Mod: CPTII,S$GLB,, | Performed by: INTERNAL MEDICINE

## 2021-01-13 PROCEDURE — 83970 ASSAY OF PARATHORMONE: CPT

## 2021-01-13 PROCEDURE — 3079F PR MOST RECENT DIASTOLIC BLOOD PRESSURE 80-89 MM HG: ICD-10-PCS | Mod: CPTII,S$GLB,, | Performed by: INTERNAL MEDICINE

## 2021-01-13 PROCEDURE — 99204 OFFICE O/P NEW MOD 45 MIN: CPT | Mod: S$GLB,,, | Performed by: INTERNAL MEDICINE

## 2021-01-13 PROCEDURE — 3074F SYST BP LT 130 MM HG: CPT | Mod: CPTII,S$GLB,, | Performed by: INTERNAL MEDICINE

## 2021-01-13 PROCEDURE — 99204 PR OFFICE/OUTPT VISIT, NEW, LEVL IV, 45-59 MIN: ICD-10-PCS | Mod: S$GLB,,, | Performed by: INTERNAL MEDICINE

## 2021-01-13 PROCEDURE — 36415 COLL VENOUS BLD VENIPUNCTURE: CPT

## 2021-01-13 PROCEDURE — 1126F PR PAIN SEVERITY QUANTIFIED, NO PAIN PRESENT: ICD-10-PCS | Mod: S$GLB,,, | Performed by: INTERNAL MEDICINE

## 2021-01-13 PROCEDURE — 3079F DIAST BP 80-89 MM HG: CPT | Mod: CPTII,S$GLB,, | Performed by: INTERNAL MEDICINE

## 2021-01-13 PROCEDURE — 82384 ASSAY THREE CATECHOLAMINES: CPT

## 2021-01-13 PROCEDURE — 99999 PR PBB SHADOW E&M-EST. PATIENT-LVL V: CPT | Mod: PBBFAC,,, | Performed by: INTERNAL MEDICINE

## 2021-01-13 PROCEDURE — 82306 VITAMIN D 25 HYDROXY: CPT

## 2021-01-14 ENCOUNTER — TELEPHONE (OUTPATIENT)
Dept: NEPHROLOGY | Facility: CLINIC | Age: 61
End: 2021-01-14

## 2021-01-14 ENCOUNTER — PATIENT MESSAGE (OUTPATIENT)
Dept: NEPHROLOGY | Facility: CLINIC | Age: 61
End: 2021-01-14

## 2021-01-14 RX ORDER — LOSARTAN POTASSIUM 25 MG/1
12.5 TABLET ORAL DAILY
Qty: 15 TABLET | Refills: 0 | Status: SHIPPED | OUTPATIENT
Start: 2021-01-14 | End: 2021-02-04 | Stop reason: SDUPTHER

## 2021-01-19 DIAGNOSIS — E87.6 HYPOKALEMIA: Primary | ICD-10-CM

## 2021-01-21 ENCOUNTER — PATIENT MESSAGE (OUTPATIENT)
Dept: NEPHROLOGY | Facility: CLINIC | Age: 61
End: 2021-01-21

## 2021-01-21 LAB
ALDOST SERPL-MCNC: 5.2 NG/DL
ALDOST/RENIN PLAS-RTO: 7.4 RATIO
RENIN PLAS-CCNC: 0.7 NG/ML/HR

## 2021-01-22 ENCOUNTER — PATIENT MESSAGE (OUTPATIENT)
Dept: INTERNAL MEDICINE | Facility: CLINIC | Age: 61
End: 2021-01-22

## 2021-01-23 LAB
CATECHOLS PLAS-MCNC: 734 PG/ML
DOPAMINE SERPL-MCNC: 19 PG/ML
EPINEPH PLAS-MCNC: 33 PG/ML
NOREPINEPH PLAS-MCNC: 682 PG/ML

## 2021-01-25 DIAGNOSIS — E11.9 TYPE 2 DIABETES MELLITUS WITHOUT COMPLICATION, WITHOUT LONG-TERM CURRENT USE OF INSULIN: ICD-10-CM

## 2021-01-25 RX ORDER — PEN NEEDLE, DIABETIC 30 GX3/16"
NEEDLE, DISPOSABLE MISCELLANEOUS
Qty: 100 EACH | Refills: 1 | Status: SHIPPED | OUTPATIENT
Start: 2021-01-25 | End: 2021-07-14

## 2021-01-26 ENCOUNTER — LAB VISIT (OUTPATIENT)
Dept: LAB | Facility: HOSPITAL | Age: 61
End: 2021-01-26
Attending: INTERNAL MEDICINE
Payer: COMMERCIAL

## 2021-01-26 DIAGNOSIS — E87.6 HYPOKALEMIA: ICD-10-CM

## 2021-01-26 LAB
ANION GAP SERPL CALC-SCNC: 10 MMOL/L (ref 8–16)
BUN SERPL-MCNC: 12 MG/DL (ref 8–23)
CALCIUM SERPL-MCNC: 9.2 MG/DL (ref 8.7–10.5)
CHLORIDE SERPL-SCNC: 105 MMOL/L (ref 95–110)
CO2 SERPL-SCNC: 24 MMOL/L (ref 23–29)
CREAT SERPL-MCNC: 1 MG/DL (ref 0.5–1.4)
EST. GFR  (AFRICAN AMERICAN): >60 ML/MIN/1.73 M^2
EST. GFR  (NON AFRICAN AMERICAN): >60 ML/MIN/1.73 M^2
GLUCOSE SERPL-MCNC: 151 MG/DL (ref 70–110)
POTASSIUM SERPL-SCNC: 3.4 MMOL/L (ref 3.5–5.1)
SODIUM SERPL-SCNC: 139 MMOL/L (ref 136–145)

## 2021-01-26 PROCEDURE — 36415 COLL VENOUS BLD VENIPUNCTURE: CPT | Mod: PN

## 2021-01-26 PROCEDURE — 80048 BASIC METABOLIC PNL TOTAL CA: CPT

## 2021-01-28 ENCOUNTER — PATIENT MESSAGE (OUTPATIENT)
Dept: INTERNAL MEDICINE | Facility: CLINIC | Age: 61
End: 2021-01-28

## 2021-01-28 ENCOUNTER — PATIENT MESSAGE (OUTPATIENT)
Dept: CRITICAL CARE MEDICINE | Facility: HOSPITAL | Age: 61
End: 2021-01-28

## 2021-01-28 ENCOUNTER — TELEPHONE (OUTPATIENT)
Dept: NEPHROLOGY | Facility: CLINIC | Age: 61
End: 2021-01-28

## 2021-01-28 RX ORDER — POTASSIUM CHLORIDE 750 MG/1
20 TABLET, EXTENDED RELEASE ORAL DAILY
Qty: 360 TABLET | Refills: 1
Start: 2021-01-28 | End: 2022-02-23

## 2021-02-02 ENCOUNTER — TELEPHONE (OUTPATIENT)
Dept: INTERNAL MEDICINE | Facility: CLINIC | Age: 61
End: 2021-02-02

## 2021-02-02 ENCOUNTER — OFFICE VISIT (OUTPATIENT)
Dept: INTERNAL MEDICINE | Facility: CLINIC | Age: 61
End: 2021-02-02
Payer: COMMERCIAL

## 2021-02-02 VITALS
BODY MASS INDEX: 25.35 KG/M2 | HEART RATE: 81 BPM | WEIGHT: 208.13 LBS | OXYGEN SATURATION: 98 % | SYSTOLIC BLOOD PRESSURE: 110 MMHG | TEMPERATURE: 97 F | DIASTOLIC BLOOD PRESSURE: 62 MMHG | HEIGHT: 76 IN

## 2021-02-02 DIAGNOSIS — R60.9 EDEMA, UNSPECIFIED TYPE: ICD-10-CM

## 2021-02-02 DIAGNOSIS — I10 ESSENTIAL HYPERTENSION: Primary | ICD-10-CM

## 2021-02-02 DIAGNOSIS — E11.9 TYPE 2 DIABETES MELLITUS WITHOUT COMPLICATION, WITHOUT LONG-TERM CURRENT USE OF INSULIN: ICD-10-CM

## 2021-02-02 DIAGNOSIS — E87.6 HYPOKALEMIA: ICD-10-CM

## 2021-02-02 DIAGNOSIS — E11.9 TYPE 2 DIABETES MELLITUS WITHOUT COMPLICATION, WITHOUT LONG-TERM CURRENT USE OF INSULIN: Primary | ICD-10-CM

## 2021-02-02 PROCEDURE — 3008F BODY MASS INDEX DOCD: CPT | Mod: CPTII,S$GLB,, | Performed by: INTERNAL MEDICINE

## 2021-02-02 PROCEDURE — 1126F AMNT PAIN NOTED NONE PRSNT: CPT | Mod: S$GLB,,, | Performed by: INTERNAL MEDICINE

## 2021-02-02 PROCEDURE — 99999 PR PBB SHADOW E&M-EST. PATIENT-LVL V: CPT | Mod: PBBFAC,,, | Performed by: INTERNAL MEDICINE

## 2021-02-02 PROCEDURE — 1126F PR PAIN SEVERITY QUANTIFIED, NO PAIN PRESENT: ICD-10-PCS | Mod: S$GLB,,, | Performed by: INTERNAL MEDICINE

## 2021-02-02 PROCEDURE — 3044F PR MOST RECENT HEMOGLOBIN A1C LEVEL <7.0%: ICD-10-PCS | Mod: CPTII,S$GLB,, | Performed by: INTERNAL MEDICINE

## 2021-02-02 PROCEDURE — 3074F SYST BP LT 130 MM HG: CPT | Mod: CPTII,S$GLB,, | Performed by: INTERNAL MEDICINE

## 2021-02-02 PROCEDURE — 3044F HG A1C LEVEL LT 7.0%: CPT | Mod: CPTII,S$GLB,, | Performed by: INTERNAL MEDICINE

## 2021-02-02 PROCEDURE — 99213 PR OFFICE/OUTPT VISIT, EST, LEVL III, 20-29 MIN: ICD-10-PCS | Mod: S$GLB,,, | Performed by: INTERNAL MEDICINE

## 2021-02-02 PROCEDURE — 3008F PR BODY MASS INDEX (BMI) DOCUMENTED: ICD-10-PCS | Mod: CPTII,S$GLB,, | Performed by: INTERNAL MEDICINE

## 2021-02-02 PROCEDURE — 3078F DIAST BP <80 MM HG: CPT | Mod: CPTII,S$GLB,, | Performed by: INTERNAL MEDICINE

## 2021-02-02 PROCEDURE — 99999 PR PBB SHADOW E&M-EST. PATIENT-LVL V: ICD-10-PCS | Mod: PBBFAC,,, | Performed by: INTERNAL MEDICINE

## 2021-02-02 PROCEDURE — 3078F PR MOST RECENT DIASTOLIC BLOOD PRESSURE < 80 MM HG: ICD-10-PCS | Mod: CPTII,S$GLB,, | Performed by: INTERNAL MEDICINE

## 2021-02-02 PROCEDURE — 3074F PR MOST RECENT SYSTOLIC BLOOD PRESSURE < 130 MM HG: ICD-10-PCS | Mod: CPTII,S$GLB,, | Performed by: INTERNAL MEDICINE

## 2021-02-02 PROCEDURE — 99213 OFFICE O/P EST LOW 20 MIN: CPT | Mod: S$GLB,,, | Performed by: INTERNAL MEDICINE

## 2021-02-03 ENCOUNTER — LAB VISIT (OUTPATIENT)
Dept: LAB | Facility: HOSPITAL | Age: 61
End: 2021-02-03
Attending: INTERNAL MEDICINE
Payer: COMMERCIAL

## 2021-02-03 DIAGNOSIS — E11.9 TYPE 2 DIABETES MELLITUS WITHOUT COMPLICATION, WITHOUT LONG-TERM CURRENT USE OF INSULIN: ICD-10-CM

## 2021-02-03 PROCEDURE — 83036 HEMOGLOBIN GLYCOSYLATED A1C: CPT

## 2021-02-03 PROCEDURE — 36415 COLL VENOUS BLD VENIPUNCTURE: CPT | Mod: PN

## 2021-02-03 PROCEDURE — 80048 BASIC METABOLIC PNL TOTAL CA: CPT

## 2021-02-04 LAB
ANION GAP SERPL CALC-SCNC: 11 MMOL/L (ref 8–16)
BUN SERPL-MCNC: 17 MG/DL (ref 8–23)
CALCIUM SERPL-MCNC: 9.2 MG/DL (ref 8.7–10.5)
CHLORIDE SERPL-SCNC: 106 MMOL/L (ref 95–110)
CO2 SERPL-SCNC: 23 MMOL/L (ref 23–29)
CREAT SERPL-MCNC: 1.1 MG/DL (ref 0.5–1.4)
EST. GFR  (AFRICAN AMERICAN): >60 ML/MIN/1.73 M^2
EST. GFR  (NON AFRICAN AMERICAN): >60 ML/MIN/1.73 M^2
ESTIMATED AVG GLUCOSE: 143 MG/DL (ref 68–131)
GLUCOSE SERPL-MCNC: 174 MG/DL (ref 70–110)
HBA1C MFR BLD: 6.6 % (ref 4–5.6)
POTASSIUM SERPL-SCNC: 3.8 MMOL/L (ref 3.5–5.1)
SODIUM SERPL-SCNC: 140 MMOL/L (ref 136–145)

## 2021-02-04 RX ORDER — LOSARTAN POTASSIUM 25 MG/1
25 TABLET ORAL 2 TIMES DAILY
Qty: 60 TABLET | Refills: 6 | Status: SHIPPED | OUTPATIENT
Start: 2021-02-04 | End: 2021-02-09

## 2021-02-07 ENCOUNTER — PATIENT OUTREACH (OUTPATIENT)
Dept: ADMINISTRATIVE | Facility: OTHER | Age: 61
End: 2021-02-07

## 2021-02-08 ENCOUNTER — TELEPHONE (OUTPATIENT)
Dept: PHARMACY | Facility: CLINIC | Age: 61
End: 2021-02-08

## 2021-02-08 ENCOUNTER — OFFICE VISIT (OUTPATIENT)
Dept: NEPHROLOGY | Facility: CLINIC | Age: 61
End: 2021-02-08
Payer: COMMERCIAL

## 2021-02-08 VITALS
HEIGHT: 76 IN | BODY MASS INDEX: 24.96 KG/M2 | HEART RATE: 81 BPM | SYSTOLIC BLOOD PRESSURE: 126 MMHG | WEIGHT: 205 LBS | DIASTOLIC BLOOD PRESSURE: 68 MMHG | OXYGEN SATURATION: 95 %

## 2021-02-08 DIAGNOSIS — E87.6 HYPOKALEMIA: Primary | ICD-10-CM

## 2021-02-08 PROCEDURE — 99999 PR PBB SHADOW E&M-EST. PATIENT-LVL V: ICD-10-PCS | Mod: PBBFAC,,, | Performed by: INTERNAL MEDICINE

## 2021-02-08 PROCEDURE — 3078F PR MOST RECENT DIASTOLIC BLOOD PRESSURE < 80 MM HG: ICD-10-PCS | Mod: CPTII,S$GLB,, | Performed by: INTERNAL MEDICINE

## 2021-02-08 PROCEDURE — 3074F SYST BP LT 130 MM HG: CPT | Mod: CPTII,S$GLB,, | Performed by: INTERNAL MEDICINE

## 2021-02-08 PROCEDURE — 3078F DIAST BP <80 MM HG: CPT | Mod: CPTII,S$GLB,, | Performed by: INTERNAL MEDICINE

## 2021-02-08 PROCEDURE — 3074F PR MOST RECENT SYSTOLIC BLOOD PRESSURE < 130 MM HG: ICD-10-PCS | Mod: CPTII,S$GLB,, | Performed by: INTERNAL MEDICINE

## 2021-02-08 PROCEDURE — 3008F BODY MASS INDEX DOCD: CPT | Mod: CPTII,S$GLB,, | Performed by: INTERNAL MEDICINE

## 2021-02-08 PROCEDURE — 99999 PR PBB SHADOW E&M-EST. PATIENT-LVL V: CPT | Mod: PBBFAC,,, | Performed by: INTERNAL MEDICINE

## 2021-02-08 PROCEDURE — 99215 PR OFFICE/OUTPT VISIT, EST, LEVL V, 40-54 MIN: ICD-10-PCS | Mod: S$GLB,,, | Performed by: INTERNAL MEDICINE

## 2021-02-08 PROCEDURE — 1125F PR PAIN SEVERITY QUANTIFIED, PAIN PRESENT: ICD-10-PCS | Mod: S$GLB,,, | Performed by: INTERNAL MEDICINE

## 2021-02-08 PROCEDURE — 3008F PR BODY MASS INDEX (BMI) DOCUMENTED: ICD-10-PCS | Mod: CPTII,S$GLB,, | Performed by: INTERNAL MEDICINE

## 2021-02-08 PROCEDURE — 1125F AMNT PAIN NOTED PAIN PRSNT: CPT | Mod: S$GLB,,, | Performed by: INTERNAL MEDICINE

## 2021-02-08 PROCEDURE — 99215 OFFICE O/P EST HI 40 MIN: CPT | Mod: S$GLB,,, | Performed by: INTERNAL MEDICINE

## 2021-02-08 RX ORDER — SPIRONOLACTONE 25 MG/1
25 TABLET ORAL DAILY
Qty: 30 TABLET | Refills: 0 | Status: SHIPPED | OUTPATIENT
Start: 2021-02-08 | End: 2021-02-19 | Stop reason: SDUPTHER

## 2021-02-12 ENCOUNTER — LAB VISIT (OUTPATIENT)
Dept: LAB | Facility: HOSPITAL | Age: 61
End: 2021-02-12
Attending: INTERNAL MEDICINE
Payer: COMMERCIAL

## 2021-02-12 DIAGNOSIS — E87.6 HYPOKALEMIA: ICD-10-CM

## 2021-02-12 LAB
ANION GAP SERPL CALC-SCNC: 10 MMOL/L (ref 8–16)
BUN SERPL-MCNC: 13 MG/DL (ref 8–23)
CALCIUM SERPL-MCNC: 9.4 MG/DL (ref 8.7–10.5)
CHLORIDE SERPL-SCNC: 105 MMOL/L (ref 95–110)
CO2 SERPL-SCNC: 27 MMOL/L (ref 23–29)
CREAT SERPL-MCNC: 1.1 MG/DL (ref 0.5–1.4)
EST. GFR  (AFRICAN AMERICAN): >60 ML/MIN/1.73 M^2
EST. GFR  (NON AFRICAN AMERICAN): >60 ML/MIN/1.73 M^2
GLUCOSE SERPL-MCNC: 128 MG/DL (ref 70–110)
POTASSIUM SERPL-SCNC: 3.6 MMOL/L (ref 3.5–5.1)
SODIUM SERPL-SCNC: 142 MMOL/L (ref 136–145)

## 2021-02-12 PROCEDURE — 80048 BASIC METABOLIC PNL TOTAL CA: CPT

## 2021-02-12 PROCEDURE — 36415 COLL VENOUS BLD VENIPUNCTURE: CPT | Mod: PN

## 2021-02-19 ENCOUNTER — PATIENT MESSAGE (OUTPATIENT)
Dept: NEPHROLOGY | Facility: CLINIC | Age: 61
End: 2021-02-19

## 2021-02-19 ENCOUNTER — LAB VISIT (OUTPATIENT)
Dept: LAB | Facility: HOSPITAL | Age: 61
End: 2021-02-19
Attending: INTERNAL MEDICINE
Payer: COMMERCIAL

## 2021-02-19 DIAGNOSIS — E87.6 HYPOKALEMIA: ICD-10-CM

## 2021-02-19 DIAGNOSIS — N28.1 COMPLEX RENAL CYST: Primary | ICD-10-CM

## 2021-02-19 LAB
ANION GAP SERPL CALC-SCNC: 11 MMOL/L (ref 8–16)
BUN SERPL-MCNC: 11 MG/DL (ref 8–23)
CALCIUM SERPL-MCNC: 9.3 MG/DL (ref 8.7–10.5)
CHLORIDE SERPL-SCNC: 104 MMOL/L (ref 95–110)
CO2 SERPL-SCNC: 25 MMOL/L (ref 23–29)
CREAT SERPL-MCNC: 1.1 MG/DL (ref 0.5–1.4)
EST. GFR  (AFRICAN AMERICAN): >60 ML/MIN/1.73 M^2
EST. GFR  (NON AFRICAN AMERICAN): >60 ML/MIN/1.73 M^2
GLUCOSE SERPL-MCNC: 157 MG/DL (ref 70–110)
POTASSIUM SERPL-SCNC: 3.4 MMOL/L (ref 3.5–5.1)
SODIUM SERPL-SCNC: 140 MMOL/L (ref 136–145)

## 2021-02-19 PROCEDURE — 36415 COLL VENOUS BLD VENIPUNCTURE: CPT | Mod: PN

## 2021-02-19 PROCEDURE — 80048 BASIC METABOLIC PNL TOTAL CA: CPT

## 2021-02-19 RX ORDER — SPIRONOLACTONE 25 MG/1
50 TABLET ORAL DAILY
Qty: 180 TABLET | Refills: 0 | Status: SHIPPED | OUTPATIENT
Start: 2021-02-19 | End: 2021-03-07

## 2021-02-21 ENCOUNTER — HOSPITAL ENCOUNTER (EMERGENCY)
Facility: OTHER | Age: 61
Discharge: HOME | End: 2021-02-21
Attending: EMERGENCY MEDICINE
Payer: COMMERCIAL

## 2021-02-21 VITALS
SYSTOLIC BLOOD PRESSURE: 145 MMHG | BODY MASS INDEX: 24.22 KG/M2 | OXYGEN SATURATION: 97 % | HEART RATE: 67 BPM | RESPIRATION RATE: 15 BRPM | DIASTOLIC BLOOD PRESSURE: 75 MMHG | WEIGHT: 199 LBS | TEMPERATURE: 99 F

## 2021-02-21 DIAGNOSIS — R31.29 MICROSCOPIC HEMATURIA: ICD-10-CM

## 2021-02-21 DIAGNOSIS — R10.9 ACUTE LEFT FLANK PAIN: Primary | ICD-10-CM

## 2021-02-21 LAB
ALBUMIN SERPL BCP-MCNC: 4.2 G/DL (ref 3.5–5.2)
ALP SERPL-CCNC: 69 U/L (ref 55–135)
ALT SERPL W/O P-5'-P-CCNC: 51 U/L (ref 10–44)
ANION GAP SERPL CALC-SCNC: 14 MMOL/L (ref 8–16)
AST SERPL-CCNC: 54 U/L (ref 10–40)
BACTERIA #/AREA URNS HPF: ABNORMAL /HPF
BASOPHILS # BLD AUTO: 0.02 K/UL (ref 0–0.2)
BASOPHILS NFR BLD: 0.5 % (ref 0–1.9)
BILIRUB SERPL-MCNC: 0.6 MG/DL (ref 0.1–1)
BILIRUB UR QL STRIP: ABNORMAL
BUN SERPL-MCNC: 17 MG/DL (ref 8–23)
CALCIUM SERPL-MCNC: 9.6 MG/DL (ref 8.7–10.5)
CHLORIDE SERPL-SCNC: 106 MMOL/L (ref 95–110)
CK SERPL-CCNC: 130 U/L (ref 20–200)
CLARITY UR: CLEAR
CO2 SERPL-SCNC: 19 MMOL/L (ref 23–29)
COLOR UR: YELLOW
CREAT SERPL-MCNC: 1.1 MG/DL (ref 0.5–1.4)
CTP QC/QA: YES
DIFFERENTIAL METHOD: ABNORMAL
EOSINOPHIL # BLD AUTO: 0.3 K/UL (ref 0–0.5)
EOSINOPHIL NFR BLD: 7.9 % (ref 0–8)
ERYTHROCYTE [DISTWIDTH] IN BLOOD BY AUTOMATED COUNT: 13.2 % (ref 11.5–14.5)
EST. GFR  (AFRICAN AMERICAN): >60 ML/MIN/1.73 M^2
EST. GFR  (NON AFRICAN AMERICAN): >60 ML/MIN/1.73 M^2
GLUCOSE SERPL-MCNC: 138 MG/DL (ref 70–110)
GLUCOSE UR QL STRIP: NEGATIVE
HCT VFR BLD AUTO: 48.9 % (ref 40–54)
HCT VFR BLD CALC: 49 %PCV (ref 36–54)
HGB BLD-MCNC: 16.6 G/DL (ref 14–18)
HGB BLD-MCNC: 17 G/DL
HGB UR QL STRIP: ABNORMAL
HYALINE CASTS #/AREA URNS LPF: 6 /LPF
IMM GRANULOCYTES # BLD AUTO: 0.01 K/UL (ref 0–0.04)
IMM GRANULOCYTES NFR BLD AUTO: 0.3 % (ref 0–0.5)
KETONES UR QL STRIP: ABNORMAL
LACTATE SERPL-SCNC: 1.1 MMOL/L (ref 0.5–2.2)
LEUKOCYTE ESTERASE UR QL STRIP: NEGATIVE
LYMPHOCYTES # BLD AUTO: 0.8 K/UL (ref 1–4.8)
LYMPHOCYTES NFR BLD: 22.1 % (ref 18–48)
MCH RBC QN AUTO: 29.7 PG (ref 27–31)
MCHC RBC AUTO-ENTMCNC: 33.9 G/DL (ref 32–36)
MCV RBC AUTO: 88 FL (ref 82–98)
MICROSCOPIC COMMENT: ABNORMAL
MONOCYTES # BLD AUTO: 0.5 K/UL (ref 0.3–1)
MONOCYTES NFR BLD: 13.6 % (ref 4–15)
NEUTROPHILS # BLD AUTO: 2 K/UL (ref 1.8–7.7)
NEUTROPHILS NFR BLD: 55.6 % (ref 38–73)
NITRITE UR QL STRIP: NEGATIVE
NRBC BLD-RTO: 0 /100 WBC
PH UR STRIP: 6 [PH] (ref 5–8)
PLATELET # BLD AUTO: 199 K/UL (ref 150–350)
PMV BLD AUTO: 9.7 FL (ref 9.2–12.9)
POC IONIZED CALCIUM: 1.22 MMOL/L (ref 1.06–1.42)
POTASSIUM BLD-SCNC: 3.5 MMOL/L (ref 3.5–5.1)
POTASSIUM SERPL-SCNC: 4.3 MMOL/L (ref 3.5–5.1)
PROT SERPL-MCNC: 8.2 G/DL (ref 6–8.4)
PROT UR QL STRIP: ABNORMAL
RBC # BLD AUTO: 5.59 M/UL (ref 4.6–6.2)
RBC #/AREA URNS HPF: 6 /HPF (ref 0–4)
SAMPLE: NORMAL
SARS-COV-2 RDRP RESP QL NAA+PROBE: NEGATIVE
SODIUM BLD-SCNC: 141 MMOL/L (ref 136–145)
SODIUM SERPL-SCNC: 139 MMOL/L (ref 136–145)
SP GR UR STRIP: >=1.03 (ref 1–1.03)
TROPONIN I SERPL DL<=0.01 NG/ML-MCNC: <0.006 NG/ML (ref 0–0.03)
URN SPEC COLLECT METH UR: ABNORMAL
UROBILINOGEN UR STRIP-ACNC: NEGATIVE EU/DL
WBC # BLD AUTO: 3.67 K/UL (ref 3.9–12.7)
WBC #/AREA URNS HPF: 4 /HPF (ref 0–5)

## 2021-02-21 PROCEDURE — 84484 ASSAY OF TROPONIN QUANT: CPT

## 2021-02-21 PROCEDURE — U0002 COVID-19 LAB TEST NON-CDC: HCPCS | Performed by: EMERGENCY MEDICINE

## 2021-02-21 PROCEDURE — 96361 HYDRATE IV INFUSION ADD-ON: CPT

## 2021-02-21 PROCEDURE — 96375 TX/PRO/DX INJ NEW DRUG ADDON: CPT

## 2021-02-21 PROCEDURE — 87040 BLOOD CULTURE FOR BACTERIA: CPT

## 2021-02-21 PROCEDURE — 99285 EMERGENCY DEPT VISIT HI MDM: CPT | Mod: 25

## 2021-02-21 PROCEDURE — 82550 ASSAY OF CK (CPK): CPT

## 2021-02-21 PROCEDURE — 25000003 PHARM REV CODE 250: Performed by: EMERGENCY MEDICINE

## 2021-02-21 PROCEDURE — 96374 THER/PROPH/DIAG INJ IV PUSH: CPT

## 2021-02-21 PROCEDURE — 81000 URINALYSIS NONAUTO W/SCOPE: CPT

## 2021-02-21 PROCEDURE — 80053 COMPREHEN METABOLIC PANEL: CPT

## 2021-02-21 PROCEDURE — 83605 ASSAY OF LACTIC ACID: CPT

## 2021-02-21 PROCEDURE — 85025 COMPLETE CBC W/AUTO DIFF WBC: CPT

## 2021-02-21 PROCEDURE — 63600175 PHARM REV CODE 636 W HCPCS: Performed by: EMERGENCY MEDICINE

## 2021-02-21 RX ORDER — DICLOFENAC SODIUM 10 MG/G
2 GEL TOPICAL 3 TIMES DAILY PRN
Qty: 100 G | Refills: 0 | Status: SHIPPED | OUTPATIENT
Start: 2021-02-21 | End: 2022-02-23

## 2021-02-21 RX ORDER — METHOCARBAMOL 750 MG/1
1500 TABLET, FILM COATED ORAL 3 TIMES DAILY PRN
Qty: 30 TABLET | Refills: 0 | Status: SHIPPED | OUTPATIENT
Start: 2021-02-21 | End: 2021-02-26

## 2021-02-21 RX ORDER — KETOROLAC TROMETHAMINE 30 MG/ML
10 INJECTION, SOLUTION INTRAMUSCULAR; INTRAVENOUS
Status: COMPLETED | OUTPATIENT
Start: 2021-02-21 | End: 2021-02-21

## 2021-02-21 RX ORDER — METOCLOPRAMIDE HYDROCHLORIDE 5 MG/ML
10 INJECTION INTRAMUSCULAR; INTRAVENOUS
Status: COMPLETED | OUTPATIENT
Start: 2021-02-21 | End: 2021-02-21

## 2021-02-21 RX ORDER — METHOCARBAMOL 750 MG/1
1500 TABLET, FILM COATED ORAL
Status: COMPLETED | OUTPATIENT
Start: 2021-02-21 | End: 2021-02-21

## 2021-02-21 RX ORDER — LIDOCAINE 50 MG/G
PATCH TOPICAL
Qty: 30 PATCH | Refills: 0 | Status: SHIPPED | OUTPATIENT
Start: 2021-02-21 | End: 2022-02-23

## 2021-02-21 RX ORDER — NAPROXEN 500 MG/1
500 TABLET ORAL 2 TIMES DAILY PRN
Qty: 60 TABLET | Refills: 0 | Status: SHIPPED | OUTPATIENT
Start: 2021-02-21 | End: 2022-02-23

## 2021-02-21 RX ADMIN — METOCLOPRAMIDE 10 MG: 5 INJECTION, SOLUTION INTRAMUSCULAR; INTRAVENOUS at 10:02

## 2021-02-21 RX ADMIN — SODIUM CHLORIDE 1000 ML: 0.9 INJECTION, SOLUTION INTRAVENOUS at 09:02

## 2021-02-21 RX ADMIN — KETOROLAC TROMETHAMINE 10 MG: 30 INJECTION, SOLUTION INTRAMUSCULAR at 10:02

## 2021-02-21 RX ADMIN — METHOCARBAMOL TABLETS 1500 MG: 750 TABLET, COATED ORAL at 12:02

## 2021-02-23 ENCOUNTER — OFFICE VISIT (OUTPATIENT)
Dept: UROLOGY | Facility: CLINIC | Age: 61
End: 2021-02-23
Payer: COMMERCIAL

## 2021-02-23 VITALS
BODY MASS INDEX: 24.23 KG/M2 | SYSTOLIC BLOOD PRESSURE: 120 MMHG | HEART RATE: 65 BPM | DIASTOLIC BLOOD PRESSURE: 76 MMHG | WEIGHT: 199 LBS | HEIGHT: 76 IN

## 2021-02-23 DIAGNOSIS — N28.1 COMPLEX RENAL CYST: Primary | ICD-10-CM

## 2021-02-23 DIAGNOSIS — R10.9 RIGHT FLANK PAIN: ICD-10-CM

## 2021-02-23 PROCEDURE — 99214 OFFICE O/P EST MOD 30 MIN: CPT | Mod: S$GLB,,, | Performed by: UROLOGY

## 2021-02-23 PROCEDURE — 1125F PR PAIN SEVERITY QUANTIFIED, PAIN PRESENT: ICD-10-PCS | Mod: S$GLB,,, | Performed by: UROLOGY

## 2021-02-23 PROCEDURE — 99214 PR OFFICE/OUTPT VISIT, EST, LEVL IV, 30-39 MIN: ICD-10-PCS | Mod: S$GLB,,, | Performed by: UROLOGY

## 2021-02-23 PROCEDURE — 3078F DIAST BP <80 MM HG: CPT | Mod: CPTII,S$GLB,, | Performed by: UROLOGY

## 2021-02-23 PROCEDURE — 3078F PR MOST RECENT DIASTOLIC BLOOD PRESSURE < 80 MM HG: ICD-10-PCS | Mod: CPTII,S$GLB,, | Performed by: UROLOGY

## 2021-02-23 PROCEDURE — 3008F PR BODY MASS INDEX (BMI) DOCUMENTED: ICD-10-PCS | Mod: CPTII,S$GLB,, | Performed by: UROLOGY

## 2021-02-23 PROCEDURE — 3008F BODY MASS INDEX DOCD: CPT | Mod: CPTII,S$GLB,, | Performed by: UROLOGY

## 2021-02-23 PROCEDURE — 1125F AMNT PAIN NOTED PAIN PRSNT: CPT | Mod: S$GLB,,, | Performed by: UROLOGY

## 2021-02-23 PROCEDURE — 3074F SYST BP LT 130 MM HG: CPT | Mod: CPTII,S$GLB,, | Performed by: UROLOGY

## 2021-02-23 PROCEDURE — 3074F PR MOST RECENT SYSTOLIC BLOOD PRESSURE < 130 MM HG: ICD-10-PCS | Mod: CPTII,S$GLB,, | Performed by: UROLOGY

## 2021-02-23 RX ORDER — SYRINGE,SAFETY WITH NEEDLE,1ML 25GX1"
SYRINGE (EA) MISCELLANEOUS
COMMUNITY
Start: 2021-02-09 | End: 2021-07-14

## 2021-02-27 ENCOUNTER — IMMUNIZATION (OUTPATIENT)
Dept: PHARMACY | Facility: CLINIC | Age: 61
End: 2021-02-27
Payer: COMMERCIAL

## 2021-02-27 ENCOUNTER — LAB VISIT (OUTPATIENT)
Dept: LAB | Facility: HOSPITAL | Age: 61
End: 2021-02-27
Attending: INTERNAL MEDICINE
Payer: COMMERCIAL

## 2021-02-27 DIAGNOSIS — Z23 NEED FOR VACCINATION: Primary | ICD-10-CM

## 2021-02-27 DIAGNOSIS — E87.6 HYPOKALEMIA: ICD-10-CM

## 2021-02-27 LAB
ANION GAP SERPL CALC-SCNC: 7 MMOL/L (ref 8–16)
BACTERIA BLD CULT: NORMAL
BACTERIA BLD CULT: NORMAL
BUN SERPL-MCNC: 13 MG/DL (ref 8–23)
CALCIUM SERPL-MCNC: 8.9 MG/DL (ref 8.7–10.5)
CHLORIDE SERPL-SCNC: 106 MMOL/L (ref 95–110)
CO2 SERPL-SCNC: 29 MMOL/L (ref 23–29)
CREAT SERPL-MCNC: 1 MG/DL (ref 0.5–1.4)
EST. GFR  (AFRICAN AMERICAN): >60 ML/MIN/1.73 M^2
EST. GFR  (NON AFRICAN AMERICAN): >60 ML/MIN/1.73 M^2
GLUCOSE SERPL-MCNC: 174 MG/DL (ref 70–110)
POTASSIUM SERPL-SCNC: 3.6 MMOL/L (ref 3.5–5.1)
SODIUM SERPL-SCNC: 142 MMOL/L (ref 136–145)

## 2021-02-27 PROCEDURE — 36415 COLL VENOUS BLD VENIPUNCTURE: CPT

## 2021-02-27 PROCEDURE — 80048 BASIC METABOLIC PNL TOTAL CA: CPT

## 2021-03-01 ENCOUNTER — PATIENT MESSAGE (OUTPATIENT)
Dept: NEPHROLOGY | Facility: CLINIC | Age: 61
End: 2021-03-01

## 2021-03-12 ENCOUNTER — LAB VISIT (OUTPATIENT)
Dept: LAB | Facility: HOSPITAL | Age: 61
End: 2021-03-12
Attending: INTERNAL MEDICINE
Payer: COMMERCIAL

## 2021-03-12 DIAGNOSIS — E78.2 MIXED HYPERLIPIDEMIA: ICD-10-CM

## 2021-03-12 LAB
CHOLEST SERPL-MCNC: 177 MG/DL (ref 120–199)
CHOLEST/HDLC SERPL: 4.3 {RATIO} (ref 2–5)
HDLC SERPL-MCNC: 41 MG/DL (ref 40–75)
HDLC SERPL: 23.2 % (ref 20–50)
LDLC SERPL CALC-MCNC: 100.8 MG/DL (ref 63–159)
NONHDLC SERPL-MCNC: 136 MG/DL
TRIGL SERPL-MCNC: 176 MG/DL (ref 30–150)

## 2021-03-12 PROCEDURE — 36415 COLL VENOUS BLD VENIPUNCTURE: CPT | Mod: PN | Performed by: INTERNAL MEDICINE

## 2021-03-12 PROCEDURE — 80061 LIPID PANEL: CPT | Performed by: INTERNAL MEDICINE

## 2021-03-18 ENCOUNTER — PATIENT OUTREACH (OUTPATIENT)
Dept: ADMINISTRATIVE | Facility: OTHER | Age: 61
End: 2021-03-18

## 2021-03-19 ENCOUNTER — CLINICAL SUPPORT (OUTPATIENT)
Dept: OPTOMETRY | Facility: CLINIC | Age: 61
End: 2021-03-19
Attending: INTERNAL MEDICINE
Payer: COMMERCIAL

## 2021-03-19 ENCOUNTER — LAB VISIT (OUTPATIENT)
Dept: LAB | Facility: HOSPITAL | Age: 61
End: 2021-03-19
Attending: INTERNAL MEDICINE
Payer: COMMERCIAL

## 2021-03-19 ENCOUNTER — OFFICE VISIT (OUTPATIENT)
Dept: NEPHROLOGY | Facility: CLINIC | Age: 61
End: 2021-03-19
Payer: COMMERCIAL

## 2021-03-19 VITALS
WEIGHT: 200.63 LBS | HEART RATE: 68 BPM | OXYGEN SATURATION: 97 % | DIASTOLIC BLOOD PRESSURE: 72 MMHG | SYSTOLIC BLOOD PRESSURE: 112 MMHG | BODY MASS INDEX: 24.43 KG/M2 | HEIGHT: 76 IN

## 2021-03-19 DIAGNOSIS — N18.2 KIDNEY DISEASE, CHRONIC, STAGE II (MILD, EGFR 60+ ML/MIN): ICD-10-CM

## 2021-03-19 DIAGNOSIS — I25.10 CORONARY ARTERY DISEASE DUE TO CALCIFIED CORONARY LESION: ICD-10-CM

## 2021-03-19 DIAGNOSIS — E87.6 HYPOKALEMIA: ICD-10-CM

## 2021-03-19 DIAGNOSIS — E87.6 HYPOKALEMIA: Primary | ICD-10-CM

## 2021-03-19 DIAGNOSIS — I25.84 CORONARY ARTERY DISEASE DUE TO CALCIFIED CORONARY LESION: ICD-10-CM

## 2021-03-19 DIAGNOSIS — E11.9 TYPE 2 DIABETES MELLITUS WITHOUT COMPLICATION, WITHOUT LONG-TERM CURRENT USE OF INSULIN: Primary | ICD-10-CM

## 2021-03-19 DIAGNOSIS — H11.002 PTERYGIUM EYE, LEFT: ICD-10-CM

## 2021-03-19 LAB
ANION GAP SERPL CALC-SCNC: 8 MMOL/L (ref 8–16)
BUN SERPL-MCNC: 13 MG/DL (ref 8–23)
CALCIUM SERPL-MCNC: 9.6 MG/DL (ref 8.7–10.5)
CHLORIDE SERPL-SCNC: 104 MMOL/L (ref 95–110)
CO2 SERPL-SCNC: 29 MMOL/L (ref 23–29)
CREAT SERPL-MCNC: 1.1 MG/DL (ref 0.5–1.4)
EST. GFR  (AFRICAN AMERICAN): >60 ML/MIN/1.73 M^2
EST. GFR  (NON AFRICAN AMERICAN): >60 ML/MIN/1.73 M^2
GLUCOSE SERPL-MCNC: 126 MG/DL (ref 70–110)
MAGNESIUM SERPL-MCNC: 2 MG/DL (ref 1.6–2.6)
POTASSIUM SERPL-SCNC: 4.5 MMOL/L (ref 3.5–5.1)
SODIUM SERPL-SCNC: 141 MMOL/L (ref 136–145)

## 2021-03-19 PROCEDURE — 92228 IMG RTA DETC/MNTR DS PHY/QHP: CPT | Mod: TC,S$GLB,, | Performed by: INTERNAL MEDICINE

## 2021-03-19 PROCEDURE — 99214 PR OFFICE/OUTPT VISIT, EST, LEVL IV, 30-39 MIN: ICD-10-PCS | Mod: S$GLB,,, | Performed by: INTERNAL MEDICINE

## 2021-03-19 PROCEDURE — 92228 IMG RTA DETC/MNTR DS PHY/QHP: CPT | Mod: 26,S$GLB,, | Performed by: OPHTHALMOLOGY

## 2021-03-19 PROCEDURE — 1126F PR PAIN SEVERITY QUANTIFIED, NO PAIN PRESENT: ICD-10-PCS | Mod: S$GLB,,, | Performed by: INTERNAL MEDICINE

## 2021-03-19 PROCEDURE — 3074F PR MOST RECENT SYSTOLIC BLOOD PRESSURE < 130 MM HG: ICD-10-PCS | Mod: CPTII,S$GLB,, | Performed by: INTERNAL MEDICINE

## 2021-03-19 PROCEDURE — 3078F PR MOST RECENT DIASTOLIC BLOOD PRESSURE < 80 MM HG: ICD-10-PCS | Mod: CPTII,S$GLB,, | Performed by: INTERNAL MEDICINE

## 2021-03-19 PROCEDURE — 80048 BASIC METABOLIC PNL TOTAL CA: CPT | Performed by: INTERNAL MEDICINE

## 2021-03-19 PROCEDURE — 3008F PR BODY MASS INDEX (BMI) DOCUMENTED: ICD-10-PCS | Mod: CPTII,S$GLB,, | Performed by: INTERNAL MEDICINE

## 2021-03-19 PROCEDURE — 99999 PR PBB SHADOW E&M-EST. PATIENT-LVL V: ICD-10-PCS | Mod: PBBFAC,,, | Performed by: INTERNAL MEDICINE

## 2021-03-19 PROCEDURE — 92228 DIABETIC EYE SCREENING PHOTO: ICD-10-PCS | Mod: 26,S$GLB,, | Performed by: OPHTHALMOLOGY

## 2021-03-19 PROCEDURE — 99214 OFFICE O/P EST MOD 30 MIN: CPT | Mod: S$GLB,,, | Performed by: INTERNAL MEDICINE

## 2021-03-19 PROCEDURE — 3008F BODY MASS INDEX DOCD: CPT | Mod: CPTII,S$GLB,, | Performed by: INTERNAL MEDICINE

## 2021-03-19 PROCEDURE — 3078F DIAST BP <80 MM HG: CPT | Mod: CPTII,S$GLB,, | Performed by: INTERNAL MEDICINE

## 2021-03-19 PROCEDURE — 3074F SYST BP LT 130 MM HG: CPT | Mod: CPTII,S$GLB,, | Performed by: INTERNAL MEDICINE

## 2021-03-19 PROCEDURE — 1126F AMNT PAIN NOTED NONE PRSNT: CPT | Mod: S$GLB,,, | Performed by: INTERNAL MEDICINE

## 2021-03-19 PROCEDURE — 83735 ASSAY OF MAGNESIUM: CPT | Performed by: INTERNAL MEDICINE

## 2021-03-19 PROCEDURE — 99999 PR PBB SHADOW E&M-EST. PATIENT-LVL V: CPT | Mod: PBBFAC,,, | Performed by: INTERNAL MEDICINE

## 2021-03-19 PROCEDURE — 92228 DIABETIC EYE SCREENING PHOTO: ICD-10-PCS | Mod: TC,S$GLB,, | Performed by: INTERNAL MEDICINE

## 2021-03-19 PROCEDURE — 36415 COLL VENOUS BLD VENIPUNCTURE: CPT | Performed by: INTERNAL MEDICINE

## 2021-03-20 PROBLEM — H11.002 PTERYGIUM EYE, LEFT: Status: ACTIVE | Noted: 2021-03-20

## 2021-03-22 ENCOUNTER — LAB VISIT (OUTPATIENT)
Dept: LAB | Facility: HOSPITAL | Age: 61
End: 2021-03-22
Attending: INTERNAL MEDICINE
Payer: COMMERCIAL

## 2021-03-22 DIAGNOSIS — E87.6 HYPOKALEMIA: ICD-10-CM

## 2021-03-22 LAB
CREAT 24H UR-MRATE: 82.5 MG/HR (ref 40–75)
CREAT UR-MCNC: 132 MG/DL (ref 23–375)
CREATININE, URINE (MG/SPEC): 1980 MG/SPEC
POTASSIUM 24H UR-SRATE: 2.7 MMOL/HR (ref 1–5)
POTASSIUM UR-SCNC: 43 MMOL/L (ref 15–95)
POTASSIUM URINE (MMOL/SPEC): 64.5 MMOL/SPEC
PROT 24H UR-MRATE: 150 MG/SPEC (ref 0–100)
PROT UR-MCNC: 10 MG/DL (ref 0–15)
SODIUM 24H UR-SRATE: 5.8 MMOL/HR (ref 2–9)
SODIUM UR-SCNC: 92 MMOL/L (ref 20–250)
SODIUM URINE (MMOL/SPEC): 138 MMOL/SPEC
URINE COLLECTION DURATION: 24 HR
URINE VOLUME: 1500 ML

## 2021-03-22 PROCEDURE — 84133 ASSAY OF URINE POTASSIUM: CPT | Performed by: INTERNAL MEDICINE

## 2021-03-22 PROCEDURE — 83735 ASSAY OF MAGNESIUM: CPT | Performed by: INTERNAL MEDICINE

## 2021-03-22 PROCEDURE — 84300 ASSAY OF URINE SODIUM: CPT | Performed by: INTERNAL MEDICINE

## 2021-03-22 PROCEDURE — 82570 ASSAY OF URINE CREATININE: CPT | Performed by: INTERNAL MEDICINE

## 2021-03-22 PROCEDURE — 84156 ASSAY OF PROTEIN URINE: CPT | Performed by: INTERNAL MEDICINE

## 2021-03-24 ENCOUNTER — TELEPHONE (OUTPATIENT)
Dept: OPHTHALMOLOGY | Facility: CLINIC | Age: 61
End: 2021-03-24

## 2021-03-24 LAB
COLLECT DURATION TIME UR: 24 H
MAGNESIUM 24H UR-MCNC: 3 MG/DL
MAGNESIUM 24H UR-MRATE: 45 MG/24 H (ref 51–269)
SPECIMEN VOL 24H UR: 1500 ML

## 2021-03-27 ENCOUNTER — IMMUNIZATION (OUTPATIENT)
Dept: PHARMACY | Facility: CLINIC | Age: 61
End: 2021-03-27
Payer: COMMERCIAL

## 2021-03-27 DIAGNOSIS — Z23 NEED FOR VACCINATION: Primary | ICD-10-CM

## 2021-05-26 ENCOUNTER — HOSPITAL ENCOUNTER (OUTPATIENT)
Dept: RADIOLOGY | Facility: HOSPITAL | Age: 61
Discharge: HOME | End: 2021-05-26
Attending: UROLOGY
Payer: COMMERCIAL

## 2021-05-26 DIAGNOSIS — N28.1 RENAL CYST: ICD-10-CM

## 2021-05-31 ENCOUNTER — PATIENT MESSAGE (OUTPATIENT)
Dept: UROLOGY | Facility: CLINIC | Age: 61
End: 2021-05-31

## 2021-05-31 ENCOUNTER — HOSPITAL ENCOUNTER (OUTPATIENT)
Dept: RADIOLOGY | Facility: HOSPITAL | Age: 61
Discharge: HOME | End: 2021-05-31
Attending: UROLOGY
Payer: COMMERCIAL

## 2021-05-31 PROCEDURE — 76770 US EXAM ABDO BACK WALL COMP: CPT | Mod: TC

## 2021-05-31 PROCEDURE — 76770 US EXAM ABDO BACK WALL COMP: CPT | Mod: 26,,, | Performed by: RADIOLOGY

## 2021-05-31 PROCEDURE — 76770 US KIDNEY: ICD-10-PCS | Mod: 26,,, | Performed by: RADIOLOGY

## 2021-06-02 ENCOUNTER — PATIENT MESSAGE (OUTPATIENT)
Dept: INTERNAL MEDICINE | Facility: CLINIC | Age: 61
End: 2021-06-02

## 2021-06-03 ENCOUNTER — OFFICE VISIT (OUTPATIENT)
Dept: UROLOGY | Facility: CLINIC | Age: 61
End: 2021-06-03
Attending: UROLOGY
Payer: COMMERCIAL

## 2021-06-03 VITALS
HEIGHT: 76 IN | SYSTOLIC BLOOD PRESSURE: 105 MMHG | BODY MASS INDEX: 24.36 KG/M2 | DIASTOLIC BLOOD PRESSURE: 69 MMHG | HEART RATE: 76 BPM | WEIGHT: 200 LBS

## 2021-06-03 DIAGNOSIS — N28.1 COMPLEX RENAL CYST: Primary | ICD-10-CM

## 2021-06-03 DIAGNOSIS — N52.01 ERECTILE DYSFUNCTION DUE TO ARTERIAL INSUFFICIENCY: ICD-10-CM

## 2021-06-03 PROCEDURE — 1126F AMNT PAIN NOTED NONE PRSNT: CPT | Mod: S$GLB,,, | Performed by: UROLOGY

## 2021-06-03 PROCEDURE — 3008F BODY MASS INDEX DOCD: CPT | Mod: CPTII,S$GLB,, | Performed by: UROLOGY

## 2021-06-03 PROCEDURE — 99214 PR OFFICE/OUTPT VISIT, EST, LEVL IV, 30-39 MIN: ICD-10-PCS | Mod: S$GLB,,, | Performed by: UROLOGY

## 2021-06-03 PROCEDURE — 3008F PR BODY MASS INDEX (BMI) DOCUMENTED: ICD-10-PCS | Mod: CPTII,S$GLB,, | Performed by: UROLOGY

## 2021-06-03 PROCEDURE — 1126F PR PAIN SEVERITY QUANTIFIED, NO PAIN PRESENT: ICD-10-PCS | Mod: S$GLB,,, | Performed by: UROLOGY

## 2021-06-03 PROCEDURE — 99214 OFFICE O/P EST MOD 30 MIN: CPT | Mod: S$GLB,,, | Performed by: UROLOGY

## 2021-06-03 RX ORDER — TADALAFIL 20 MG/1
20 TABLET ORAL
Qty: 15 TABLET | Refills: 11 | Status: SHIPPED | OUTPATIENT
Start: 2021-06-03 | End: 2024-02-20

## 2021-06-24 ENCOUNTER — PATIENT OUTREACH (OUTPATIENT)
Dept: ADMINISTRATIVE | Facility: OTHER | Age: 61
End: 2021-06-24

## 2021-07-07 ENCOUNTER — PATIENT MESSAGE (OUTPATIENT)
Dept: INTERNAL MEDICINE | Facility: CLINIC | Age: 61
End: 2021-07-07

## 2021-07-07 ENCOUNTER — OFFICE VISIT (OUTPATIENT)
Dept: INTERNAL MEDICINE | Facility: CLINIC | Age: 61
End: 2021-07-07
Payer: COMMERCIAL

## 2021-07-07 ENCOUNTER — TELEPHONE (OUTPATIENT)
Dept: INTERNAL MEDICINE | Facility: CLINIC | Age: 61
End: 2021-07-07

## 2021-07-07 ENCOUNTER — HOSPITAL ENCOUNTER (EMERGENCY)
Facility: HOSPITAL | Age: 61
Discharge: HOME | End: 2021-07-07
Attending: EMERGENCY MEDICINE
Payer: COMMERCIAL

## 2021-07-07 VITALS
SYSTOLIC BLOOD PRESSURE: 160 MMHG | BODY MASS INDEX: 24.34 KG/M2 | WEIGHT: 200 LBS | HEART RATE: 65 BPM | RESPIRATION RATE: 18 BRPM | TEMPERATURE: 98 F | OXYGEN SATURATION: 98 % | DIASTOLIC BLOOD PRESSURE: 84 MMHG

## 2021-07-07 VITALS
WEIGHT: 202.63 LBS | RESPIRATION RATE: 16 BRPM | HEIGHT: 76 IN | SYSTOLIC BLOOD PRESSURE: 140 MMHG | DIASTOLIC BLOOD PRESSURE: 80 MMHG | HEART RATE: 80 BPM | TEMPERATURE: 98 F | BODY MASS INDEX: 24.67 KG/M2

## 2021-07-07 DIAGNOSIS — R26.9 GAIT DISTURBANCE: Primary | ICD-10-CM

## 2021-07-07 DIAGNOSIS — E11.69 DYSLIPIDEMIA ASSOCIATED WITH TYPE 2 DIABETES MELLITUS: ICD-10-CM

## 2021-07-07 DIAGNOSIS — E11.59 HYPERTENSION ASSOCIATED WITH DIABETES: ICD-10-CM

## 2021-07-07 DIAGNOSIS — R25.2 MUSCLE CRAMP: ICD-10-CM

## 2021-07-07 DIAGNOSIS — R25.2 MUSCLE CRAMPS: ICD-10-CM

## 2021-07-07 DIAGNOSIS — I15.2 HYPERTENSION ASSOCIATED WITH DIABETES: ICD-10-CM

## 2021-07-07 DIAGNOSIS — E11.9 DIABETES MELLITUS WITH INSULIN THERAPY: ICD-10-CM

## 2021-07-07 DIAGNOSIS — E78.5 DYSLIPIDEMIA ASSOCIATED WITH TYPE 2 DIABETES MELLITUS: ICD-10-CM

## 2021-07-07 DIAGNOSIS — Z79.4 DIABETES MELLITUS WITH INSULIN THERAPY: ICD-10-CM

## 2021-07-07 DIAGNOSIS — E86.0 DEHYDRATION: ICD-10-CM

## 2021-07-07 DIAGNOSIS — R42 DIZZINESS: ICD-10-CM

## 2021-07-07 DIAGNOSIS — R53.1 WEAKNESS: Primary | ICD-10-CM

## 2021-07-07 DIAGNOSIS — M62.82 NON-TRAUMATIC RHABDOMYOLYSIS: ICD-10-CM

## 2021-07-07 LAB
ALBUMIN SERPL BCP-MCNC: 4.3 G/DL (ref 3.5–5.2)
ALP SERPL-CCNC: 75 U/L (ref 55–135)
ALT SERPL W/O P-5'-P-CCNC: 35 U/L (ref 10–44)
ANION GAP SERPL CALC-SCNC: 10 MMOL/L (ref 8–16)
AST SERPL-CCNC: 33 U/L (ref 10–40)
BASOPHILS # BLD AUTO: 0.05 K/UL (ref 0–0.2)
BASOPHILS NFR BLD: 0.9 % (ref 0–1.9)
BILIRUB SERPL-MCNC: 0.7 MG/DL (ref 0.1–1)
BILIRUB UR QL STRIP: NEGATIVE
BUN SERPL-MCNC: 12 MG/DL (ref 8–23)
CALCIUM SERPL-MCNC: 10.2 MG/DL (ref 8.7–10.5)
CHLORIDE SERPL-SCNC: 106 MMOL/L (ref 95–110)
CK SERPL-CCNC: 295 U/L (ref 20–200)
CLARITY UR REFRACT.AUTO: CLEAR
CO2 SERPL-SCNC: 23 MMOL/L (ref 23–29)
COLOR UR AUTO: YELLOW
CREAT SERPL-MCNC: 1.1 MG/DL (ref 0.5–1.4)
CRP SERPL-MCNC: 4.5 MG/L (ref 0–8.2)
DIFFERENTIAL METHOD: NORMAL
EOSINOPHIL # BLD AUTO: 0.3 K/UL (ref 0–0.5)
EOSINOPHIL NFR BLD: 5.3 % (ref 0–8)
ERYTHROCYTE [DISTWIDTH] IN BLOOD BY AUTOMATED COUNT: 13.2 % (ref 11.5–14.5)
ERYTHROCYTE [SEDIMENTATION RATE] IN BLOOD BY WESTERGREN METHOD: 5 MM/HR (ref 0–23)
EST. GFR  (AFRICAN AMERICAN): >60 ML/MIN/1.73 M^2
EST. GFR  (NON AFRICAN AMERICAN): >60 ML/MIN/1.73 M^2
GLUCOSE SERPL-MCNC: 110 MG/DL (ref 70–110)
GLUCOSE UR QL STRIP: ABNORMAL
HCT VFR BLD AUTO: 45.8 % (ref 40–54)
HGB BLD-MCNC: 15.7 G/DL (ref 14–18)
HGB UR QL STRIP: NEGATIVE
IMM GRANULOCYTES # BLD AUTO: 0.01 K/UL (ref 0–0.04)
IMM GRANULOCYTES NFR BLD AUTO: 0.2 % (ref 0–0.5)
KETONES UR QL STRIP: NEGATIVE
LEUKOCYTE ESTERASE UR QL STRIP: NEGATIVE
LYMPHOCYTES # BLD AUTO: 1.5 K/UL (ref 1–4.8)
LYMPHOCYTES NFR BLD: 26.9 % (ref 18–48)
MAGNESIUM SERPL-MCNC: 2 MG/DL (ref 1.6–2.6)
MCH RBC QN AUTO: 30.7 PG (ref 27–31)
MCHC RBC AUTO-ENTMCNC: 34.3 G/DL (ref 32–36)
MCV RBC AUTO: 90 FL (ref 82–98)
MONOCYTES # BLD AUTO: 0.5 K/UL (ref 0.3–1)
MONOCYTES NFR BLD: 8.2 % (ref 4–15)
NEUTROPHILS # BLD AUTO: 3.2 K/UL (ref 1.8–7.7)
NEUTROPHILS NFR BLD: 58.5 % (ref 38–73)
NITRITE UR QL STRIP: NEGATIVE
NRBC BLD-RTO: 0 /100 WBC
PH UR STRIP: 7 [PH] (ref 5–8)
PLATELET # BLD AUTO: 239 K/UL (ref 150–450)
PMV BLD AUTO: 9.9 FL (ref 9.2–12.9)
POTASSIUM SERPL-SCNC: 4.3 MMOL/L (ref 3.5–5.1)
PROT SERPL-MCNC: 7.8 G/DL (ref 6–8.4)
PROT UR QL STRIP: NEGATIVE
RBC # BLD AUTO: 5.12 M/UL (ref 4.6–6.2)
SODIUM SERPL-SCNC: 139 MMOL/L (ref 136–145)
SP GR UR STRIP: 1.01 (ref 1–1.03)
TROPONIN I SERPL DL<=0.01 NG/ML-MCNC: <0.006 NG/ML (ref 0–0.03)
URN SPEC COLLECT METH UR: ABNORMAL
WBC # BLD AUTO: 5.47 K/UL (ref 3.9–12.7)

## 2021-07-07 PROCEDURE — 82550 ASSAY OF CK (CPK): CPT | Performed by: PHYSICIAN ASSISTANT

## 2021-07-07 PROCEDURE — 99999 PR PBB SHADOW E&M-EST. PATIENT-LVL V: CPT | Mod: PBBFAC,,, | Performed by: INTERNAL MEDICINE

## 2021-07-07 PROCEDURE — 87389 HIV-1 AG W/HIV-1&-2 AB AG IA: CPT | Performed by: EMERGENCY MEDICINE

## 2021-07-07 PROCEDURE — 84484 ASSAY OF TROPONIN QUANT: CPT | Performed by: PHYSICIAN ASSISTANT

## 2021-07-07 PROCEDURE — 99999 PR PBB SHADOW E&M-EST. PATIENT-LVL V: ICD-10-PCS | Mod: PBBFAC,,, | Performed by: INTERNAL MEDICINE

## 2021-07-07 PROCEDURE — 3008F BODY MASS INDEX DOCD: CPT | Mod: CPTII,S$GLB,, | Performed by: INTERNAL MEDICINE

## 2021-07-07 PROCEDURE — 99285 EMERGENCY DEPT VISIT HI MDM: CPT | Mod: ,,, | Performed by: EMERGENCY MEDICINE

## 2021-07-07 PROCEDURE — 80053 COMPREHEN METABOLIC PANEL: CPT | Performed by: PHYSICIAN ASSISTANT

## 2021-07-07 PROCEDURE — 99214 PR OFFICE/OUTPT VISIT, EST, LEVL IV, 30-39 MIN: ICD-10-PCS | Mod: S$GLB,,, | Performed by: INTERNAL MEDICINE

## 2021-07-07 PROCEDURE — 99285 EMERGENCY DEPT VISIT HI MDM: CPT | Mod: 25

## 2021-07-07 PROCEDURE — 99285 PR EMERGENCY DEPT VISIT,LEVEL V: ICD-10-PCS | Mod: ,,, | Performed by: EMERGENCY MEDICINE

## 2021-07-07 PROCEDURE — 93010 EKG 12-LEAD: ICD-10-PCS | Mod: ,,, | Performed by: INTERNAL MEDICINE

## 2021-07-07 PROCEDURE — 81003 URINALYSIS AUTO W/O SCOPE: CPT

## 2021-07-07 PROCEDURE — 85025 COMPLETE CBC W/AUTO DIFF WBC: CPT | Performed by: PHYSICIAN ASSISTANT

## 2021-07-07 PROCEDURE — 1126F PR PAIN SEVERITY QUANTIFIED, NO PAIN PRESENT: ICD-10-PCS | Mod: S$GLB,,, | Performed by: INTERNAL MEDICINE

## 2021-07-07 PROCEDURE — 99214 OFFICE O/P EST MOD 30 MIN: CPT | Mod: S$GLB,,, | Performed by: INTERNAL MEDICINE

## 2021-07-07 PROCEDURE — 25000003 PHARM REV CODE 250: Performed by: EMERGENCY MEDICINE

## 2021-07-07 PROCEDURE — 96360 HYDRATION IV INFUSION INIT: CPT

## 2021-07-07 PROCEDURE — 93005 ELECTROCARDIOGRAM TRACING: CPT

## 2021-07-07 PROCEDURE — 85652 RBC SED RATE AUTOMATED: CPT | Performed by: PHYSICIAN ASSISTANT

## 2021-07-07 PROCEDURE — 93010 ELECTROCARDIOGRAM REPORT: CPT | Mod: ,,, | Performed by: INTERNAL MEDICINE

## 2021-07-07 PROCEDURE — 1126F AMNT PAIN NOTED NONE PRSNT: CPT | Mod: S$GLB,,, | Performed by: INTERNAL MEDICINE

## 2021-07-07 PROCEDURE — 3008F PR BODY MASS INDEX (BMI) DOCUMENTED: ICD-10-PCS | Mod: CPTII,S$GLB,, | Performed by: INTERNAL MEDICINE

## 2021-07-07 PROCEDURE — 86803 HEPATITIS C AB TEST: CPT | Performed by: EMERGENCY MEDICINE

## 2021-07-07 PROCEDURE — 86140 C-REACTIVE PROTEIN: CPT | Performed by: PHYSICIAN ASSISTANT

## 2021-07-07 PROCEDURE — 83735 ASSAY OF MAGNESIUM: CPT | Performed by: PHYSICIAN ASSISTANT

## 2021-07-07 RX ADMIN — SODIUM CHLORIDE 500 ML: 0.9 INJECTION, SOLUTION INTRAVENOUS at 04:07

## 2021-07-08 ENCOUNTER — TELEPHONE (OUTPATIENT)
Dept: EMERGENCY MEDICINE | Facility: HOSPITAL | Age: 61
End: 2021-07-08

## 2021-07-08 LAB
HCV AB SERPL QL IA: NEGATIVE
HIV 1+2 AB+HIV1 P24 AG SERPL QL IA: NEGATIVE

## 2021-07-13 ENCOUNTER — TELEPHONE (OUTPATIENT)
Dept: NEUROLOGY | Facility: CLINIC | Age: 61
End: 2021-07-13

## 2021-07-14 ENCOUNTER — PATIENT MESSAGE (OUTPATIENT)
Dept: INTERNAL MEDICINE | Facility: CLINIC | Age: 61
End: 2021-07-14

## 2021-07-14 ENCOUNTER — TELEPHONE (OUTPATIENT)
Dept: INTERNAL MEDICINE | Facility: CLINIC | Age: 61
End: 2021-07-14

## 2021-07-15 ENCOUNTER — TELEPHONE (OUTPATIENT)
Dept: NEUROLOGY | Facility: CLINIC | Age: 61
End: 2021-07-15

## 2021-07-20 ENCOUNTER — PATIENT MESSAGE (OUTPATIENT)
Dept: INTERNAL MEDICINE | Facility: CLINIC | Age: 61
End: 2021-07-20

## 2021-07-20 ENCOUNTER — LAB VISIT (OUTPATIENT)
Dept: LAB | Facility: HOSPITAL | Age: 61
End: 2021-07-20
Payer: COMMERCIAL

## 2021-07-20 DIAGNOSIS — E11.9 DIABETES MELLITUS WITH INSULIN THERAPY: ICD-10-CM

## 2021-07-20 DIAGNOSIS — Z79.4 DIABETES MELLITUS WITH INSULIN THERAPY: ICD-10-CM

## 2021-07-20 LAB
ESTIMATED AVG GLUCOSE: 126 MG/DL (ref 68–131)
HBA1C MFR BLD: 6 % (ref 4–5.6)

## 2021-07-20 PROCEDURE — 83036 HEMOGLOBIN GLYCOSYLATED A1C: CPT | Performed by: INTERNAL MEDICINE

## 2021-07-20 PROCEDURE — 36415 COLL VENOUS BLD VENIPUNCTURE: CPT | Mod: PN | Performed by: INTERNAL MEDICINE

## 2021-08-06 ENCOUNTER — OFFICE VISIT (OUTPATIENT)
Dept: URGENT CARE | Facility: CLINIC | Age: 61
End: 2021-08-06
Payer: COMMERCIAL

## 2021-08-06 VITALS
SYSTOLIC BLOOD PRESSURE: 103 MMHG | DIASTOLIC BLOOD PRESSURE: 64 MMHG | RESPIRATION RATE: 18 BRPM | BODY MASS INDEX: 23.75 KG/M2 | HEIGHT: 76 IN | HEART RATE: 69 BPM | TEMPERATURE: 98 F | OXYGEN SATURATION: 98 % | WEIGHT: 195 LBS

## 2021-08-06 DIAGNOSIS — R19.7 DIARRHEA, UNSPECIFIED TYPE: ICD-10-CM

## 2021-08-06 DIAGNOSIS — R10.33 PERIUMBILICAL ABDOMINAL PAIN: Primary | ICD-10-CM

## 2021-08-06 LAB
CTP QC/QA: YES
SARS-COV-2 RDRP RESP QL NAA+PROBE: NEGATIVE

## 2021-08-06 PROCEDURE — 99213 OFFICE O/P EST LOW 20 MIN: CPT | Mod: S$GLB,,, | Performed by: NURSE PRACTITIONER

## 2021-08-06 PROCEDURE — 1159F PR MEDICATION LIST DOCUMENTED IN MEDICAL RECORD: ICD-10-PCS | Mod: CPTII,S$GLB,, | Performed by: NURSE PRACTITIONER

## 2021-08-06 PROCEDURE — 3074F PR MOST RECENT SYSTOLIC BLOOD PRESSURE < 130 MM HG: ICD-10-PCS | Mod: CPTII,S$GLB,, | Performed by: NURSE PRACTITIONER

## 2021-08-06 PROCEDURE — U0002: ICD-10-PCS | Mod: QW,S$GLB,, | Performed by: NURSE PRACTITIONER

## 2021-08-06 PROCEDURE — 3008F BODY MASS INDEX DOCD: CPT | Mod: CPTII,S$GLB,, | Performed by: NURSE PRACTITIONER

## 2021-08-06 PROCEDURE — U0002 COVID-19 LAB TEST NON-CDC: HCPCS | Mod: QW,S$GLB,, | Performed by: NURSE PRACTITIONER

## 2021-08-06 PROCEDURE — 3078F DIAST BP <80 MM HG: CPT | Mod: CPTII,S$GLB,, | Performed by: NURSE PRACTITIONER

## 2021-08-06 PROCEDURE — 1160F RVW MEDS BY RX/DR IN RCRD: CPT | Mod: CPTII,S$GLB,, | Performed by: NURSE PRACTITIONER

## 2021-08-06 PROCEDURE — 3078F PR MOST RECENT DIASTOLIC BLOOD PRESSURE < 80 MM HG: ICD-10-PCS | Mod: CPTII,S$GLB,, | Performed by: NURSE PRACTITIONER

## 2021-08-06 PROCEDURE — 3008F PR BODY MASS INDEX (BMI) DOCUMENTED: ICD-10-PCS | Mod: CPTII,S$GLB,, | Performed by: NURSE PRACTITIONER

## 2021-08-06 PROCEDURE — 1160F PR REVIEW ALL MEDS BY PRESCRIBER/CLIN PHARMACIST DOCUMENTED: ICD-10-PCS | Mod: CPTII,S$GLB,, | Performed by: NURSE PRACTITIONER

## 2021-08-06 PROCEDURE — 1159F MED LIST DOCD IN RCRD: CPT | Mod: CPTII,S$GLB,, | Performed by: NURSE PRACTITIONER

## 2021-08-06 PROCEDURE — 99213 PR OFFICE/OUTPT VISIT, EST, LEVL III, 20-29 MIN: ICD-10-PCS | Mod: S$GLB,,, | Performed by: NURSE PRACTITIONER

## 2021-08-06 PROCEDURE — 3074F SYST BP LT 130 MM HG: CPT | Mod: CPTII,S$GLB,, | Performed by: NURSE PRACTITIONER

## 2021-08-06 PROCEDURE — 3044F PR MOST RECENT HEMOGLOBIN A1C LEVEL <7.0%: ICD-10-PCS | Mod: CPTII,S$GLB,, | Performed by: NURSE PRACTITIONER

## 2021-08-06 PROCEDURE — 3044F HG A1C LEVEL LT 7.0%: CPT | Mod: CPTII,S$GLB,, | Performed by: NURSE PRACTITIONER

## 2021-08-06 RX ORDER — ONDANSETRON 4 MG/1
4 TABLET, FILM COATED ORAL EVERY 6 HOURS PRN
Qty: 30 TABLET | Refills: 0 | Status: SHIPPED | OUTPATIENT
Start: 2021-08-06

## 2021-08-06 RX ORDER — DICYCLOMINE HYDROCHLORIDE 20 MG/1
20 TABLET ORAL EVERY 6 HOURS
Qty: 30 TABLET | Refills: 0 | Status: SHIPPED | OUTPATIENT
Start: 2021-08-06 | End: 2022-02-23

## 2021-08-10 ENCOUNTER — TELEPHONE (OUTPATIENT)
Dept: INTERNAL MEDICINE | Facility: CLINIC | Age: 61
End: 2021-08-10

## 2021-08-10 ENCOUNTER — OFFICE VISIT (OUTPATIENT)
Dept: INTERNAL MEDICINE | Facility: CLINIC | Age: 61
End: 2021-08-10
Payer: COMMERCIAL

## 2021-08-10 VITALS
TEMPERATURE: 98 F | SYSTOLIC BLOOD PRESSURE: 130 MMHG | BODY MASS INDEX: 24.54 KG/M2 | DIASTOLIC BLOOD PRESSURE: 80 MMHG | HEIGHT: 76 IN | HEART RATE: 72 BPM | RESPIRATION RATE: 15 BRPM | WEIGHT: 201.5 LBS

## 2021-08-10 DIAGNOSIS — H57.89 RED EYE: ICD-10-CM

## 2021-08-10 DIAGNOSIS — M25.562 LEFT KNEE PAIN, UNSPECIFIED CHRONICITY: Primary | ICD-10-CM

## 2021-08-10 PROCEDURE — 1160F RVW MEDS BY RX/DR IN RCRD: CPT | Mod: CPTII,S$GLB,, | Performed by: INTERNAL MEDICINE

## 2021-08-10 PROCEDURE — 3075F SYST BP GE 130 - 139MM HG: CPT | Mod: CPTII,S$GLB,, | Performed by: INTERNAL MEDICINE

## 2021-08-10 PROCEDURE — 3008F BODY MASS INDEX DOCD: CPT | Mod: CPTII,S$GLB,, | Performed by: INTERNAL MEDICINE

## 2021-08-10 PROCEDURE — 1160F PR REVIEW ALL MEDS BY PRESCRIBER/CLIN PHARMACIST DOCUMENTED: ICD-10-PCS | Mod: CPTII,S$GLB,, | Performed by: INTERNAL MEDICINE

## 2021-08-10 PROCEDURE — 3075F PR MOST RECENT SYSTOLIC BLOOD PRESS GE 130-139MM HG: ICD-10-PCS | Mod: CPTII,S$GLB,, | Performed by: INTERNAL MEDICINE

## 2021-08-10 PROCEDURE — 99999 PR PBB SHADOW E&M-EST. PATIENT-LVL V: CPT | Mod: PBBFAC,,, | Performed by: INTERNAL MEDICINE

## 2021-08-10 PROCEDURE — 3079F DIAST BP 80-89 MM HG: CPT | Mod: CPTII,S$GLB,, | Performed by: INTERNAL MEDICINE

## 2021-08-10 PROCEDURE — 3008F PR BODY MASS INDEX (BMI) DOCUMENTED: ICD-10-PCS | Mod: CPTII,S$GLB,, | Performed by: INTERNAL MEDICINE

## 2021-08-10 PROCEDURE — 99213 OFFICE O/P EST LOW 20 MIN: CPT | Mod: S$GLB,,, | Performed by: INTERNAL MEDICINE

## 2021-08-10 PROCEDURE — 3044F HG A1C LEVEL LT 7.0%: CPT | Mod: CPTII,S$GLB,, | Performed by: INTERNAL MEDICINE

## 2021-08-10 PROCEDURE — 99999 PR PBB SHADOW E&M-EST. PATIENT-LVL V: ICD-10-PCS | Mod: PBBFAC,,, | Performed by: INTERNAL MEDICINE

## 2021-08-10 PROCEDURE — 1159F MED LIST DOCD IN RCRD: CPT | Mod: CPTII,S$GLB,, | Performed by: INTERNAL MEDICINE

## 2021-08-10 PROCEDURE — 3044F PR MOST RECENT HEMOGLOBIN A1C LEVEL <7.0%: ICD-10-PCS | Mod: CPTII,S$GLB,, | Performed by: INTERNAL MEDICINE

## 2021-08-10 PROCEDURE — 99213 PR OFFICE/OUTPT VISIT, EST, LEVL III, 20-29 MIN: ICD-10-PCS | Mod: S$GLB,,, | Performed by: INTERNAL MEDICINE

## 2021-08-10 PROCEDURE — 3079F PR MOST RECENT DIASTOLIC BLOOD PRESSURE 80-89 MM HG: ICD-10-PCS | Mod: CPTII,S$GLB,, | Performed by: INTERNAL MEDICINE

## 2021-08-10 PROCEDURE — 1159F PR MEDICATION LIST DOCUMENTED IN MEDICAL RECORD: ICD-10-PCS | Mod: CPTII,S$GLB,, | Performed by: INTERNAL MEDICINE

## 2021-08-15 ENCOUNTER — PATIENT OUTREACH (OUTPATIENT)
Dept: ADMINISTRATIVE | Facility: OTHER | Age: 61
End: 2021-08-15

## 2021-08-19 DIAGNOSIS — Z79.4 DIABETES MELLITUS WITH INSULIN THERAPY: ICD-10-CM

## 2021-08-19 DIAGNOSIS — E11.9 DIABETES MELLITUS WITH INSULIN THERAPY: ICD-10-CM

## 2021-08-19 RX ORDER — HUMAN INSULIN 100 [IU]/ML
9 INJECTION, SUSPENSION SUBCUTANEOUS
Qty: 5 SYRINGE | Refills: 1 | Status: SHIPPED | OUTPATIENT
Start: 2021-08-19 | End: 2022-11-25

## 2021-08-24 ENCOUNTER — OFFICE VISIT (OUTPATIENT)
Dept: SPORTS MEDICINE | Facility: CLINIC | Age: 61
End: 2021-08-24
Payer: COMMERCIAL

## 2021-08-24 ENCOUNTER — HOSPITAL ENCOUNTER (OUTPATIENT)
Dept: RADIOLOGY | Facility: HOSPITAL | Age: 61
Discharge: HOME | End: 2021-08-24
Attending: PHYSICIAN ASSISTANT
Payer: COMMERCIAL

## 2021-08-24 VITALS — WEIGHT: 198 LBS | HEIGHT: 76 IN | BODY MASS INDEX: 24.11 KG/M2

## 2021-08-24 DIAGNOSIS — M25.562 LEFT KNEE PAIN, UNSPECIFIED CHRONICITY: ICD-10-CM

## 2021-08-24 DIAGNOSIS — M25.462 EFFUSION OF LEFT KNEE: ICD-10-CM

## 2021-08-24 DIAGNOSIS — M17.12 PRIMARY OSTEOARTHRITIS OF LEFT KNEE: Primary | ICD-10-CM

## 2021-08-24 PROCEDURE — 3044F PR MOST RECENT HEMOGLOBIN A1C LEVEL <7.0%: ICD-10-PCS | Mod: CPTII,S$GLB,, | Performed by: PHYSICIAN ASSISTANT

## 2021-08-24 PROCEDURE — 99999 PR PBB SHADOW E&M-EST. PATIENT-LVL IV: CPT | Mod: PBBFAC,,, | Performed by: PHYSICIAN ASSISTANT

## 2021-08-24 PROCEDURE — 1125F AMNT PAIN NOTED PAIN PRSNT: CPT | Mod: CPTII,S$GLB,, | Performed by: PHYSICIAN ASSISTANT

## 2021-08-24 PROCEDURE — 3044F HG A1C LEVEL LT 7.0%: CPT | Mod: CPTII,S$GLB,, | Performed by: PHYSICIAN ASSISTANT

## 2021-08-24 PROCEDURE — 99204 OFFICE O/P NEW MOD 45 MIN: CPT | Mod: S$GLB,,, | Performed by: PHYSICIAN ASSISTANT

## 2021-08-24 PROCEDURE — 73562 X-RAY EXAM OF KNEE 3: CPT | Mod: 26,RT,, | Performed by: RADIOLOGY

## 2021-08-24 PROCEDURE — 1125F PR PAIN SEVERITY QUANTIFIED, PAIN PRESENT: ICD-10-PCS | Mod: CPTII,S$GLB,, | Performed by: PHYSICIAN ASSISTANT

## 2021-08-24 PROCEDURE — 73564 X-RAY EXAM KNEE 4 OR MORE: CPT | Mod: 26,LT,, | Performed by: RADIOLOGY

## 2021-08-24 PROCEDURE — 73564 XR KNEE ORTHO LEFT WITH FLEXION: ICD-10-PCS | Mod: 26,LT,, | Performed by: RADIOLOGY

## 2021-08-24 PROCEDURE — 73564 X-RAY EXAM KNEE 4 OR MORE: CPT | Mod: TC,LT

## 2021-08-24 PROCEDURE — 99999 PR PBB SHADOW E&M-EST. PATIENT-LVL IV: ICD-10-PCS | Mod: PBBFAC,,, | Performed by: PHYSICIAN ASSISTANT

## 2021-08-24 PROCEDURE — 3008F BODY MASS INDEX DOCD: CPT | Mod: CPTII,S$GLB,, | Performed by: PHYSICIAN ASSISTANT

## 2021-08-24 PROCEDURE — 1160F PR REVIEW ALL MEDS BY PRESCRIBER/CLIN PHARMACIST DOCUMENTED: ICD-10-PCS | Mod: CPTII,S$GLB,, | Performed by: PHYSICIAN ASSISTANT

## 2021-08-24 PROCEDURE — 3008F PR BODY MASS INDEX (BMI) DOCUMENTED: ICD-10-PCS | Mod: CPTII,S$GLB,, | Performed by: PHYSICIAN ASSISTANT

## 2021-08-24 PROCEDURE — 99204 PR OFFICE/OUTPT VISIT, NEW, LEVL IV, 45-59 MIN: ICD-10-PCS | Mod: S$GLB,,, | Performed by: PHYSICIAN ASSISTANT

## 2021-08-24 PROCEDURE — 1159F PR MEDICATION LIST DOCUMENTED IN MEDICAL RECORD: ICD-10-PCS | Mod: CPTII,S$GLB,, | Performed by: PHYSICIAN ASSISTANT

## 2021-08-24 PROCEDURE — 73562 XR KNEE ORTHO LEFT WITH FLEXION: ICD-10-PCS | Mod: 26,RT,, | Performed by: RADIOLOGY

## 2021-08-24 PROCEDURE — 1159F MED LIST DOCD IN RCRD: CPT | Mod: CPTII,S$GLB,, | Performed by: PHYSICIAN ASSISTANT

## 2021-08-24 PROCEDURE — 1160F RVW MEDS BY RX/DR IN RCRD: CPT | Mod: CPTII,S$GLB,, | Performed by: PHYSICIAN ASSISTANT

## 2021-08-24 RX ORDER — CELECOXIB 100 MG/1
100 CAPSULE ORAL 2 TIMES DAILY
Qty: 60 CAPSULE | Refills: 0 | Status: SHIPPED | OUTPATIENT
Start: 2021-08-24 | End: 2021-09-15 | Stop reason: SDUPTHER

## 2021-09-13 ENCOUNTER — HOSPITAL ENCOUNTER (OUTPATIENT)
Dept: RADIOLOGY | Facility: HOSPITAL | Age: 61
Discharge: HOME | End: 2021-09-13
Attending: PHYSICIAN ASSISTANT
Payer: COMMERCIAL

## 2021-09-13 DIAGNOSIS — M25.562 LEFT KNEE PAIN, UNSPECIFIED CHRONICITY: ICD-10-CM

## 2021-09-13 DIAGNOSIS — M25.462 EFFUSION OF LEFT KNEE: ICD-10-CM

## 2021-09-13 PROCEDURE — 73721 MRI KNEE WITHOUT CONTRAST LEFT: ICD-10-PCS | Mod: 26,LT,, | Performed by: RADIOLOGY

## 2021-09-13 PROCEDURE — 73721 MRI JNT OF LWR EXTRE W/O DYE: CPT | Mod: TC,LT

## 2021-09-13 PROCEDURE — 73721 MRI JNT OF LWR EXTRE W/O DYE: CPT | Mod: 26,LT,, | Performed by: RADIOLOGY

## 2021-09-14 ENCOUNTER — OFFICE VISIT (OUTPATIENT)
Dept: SPORTS MEDICINE | Facility: CLINIC | Age: 61
End: 2021-09-14
Payer: COMMERCIAL

## 2021-09-14 ENCOUNTER — TELEPHONE (OUTPATIENT)
Dept: SPORTS MEDICINE | Facility: CLINIC | Age: 61
End: 2021-09-14

## 2021-09-14 ENCOUNTER — PATIENT MESSAGE (OUTPATIENT)
Dept: SPORTS MEDICINE | Facility: CLINIC | Age: 61
End: 2021-09-14

## 2021-09-14 DIAGNOSIS — M17.12 PRIMARY OSTEOARTHRITIS OF LEFT KNEE: Primary | ICD-10-CM

## 2021-09-14 PROCEDURE — 4010F PR ACE/ARB THEARPY RXD/TAKEN: ICD-10-PCS | Mod: CPTII,95,, | Performed by: PHYSICIAN ASSISTANT

## 2021-09-14 PROCEDURE — 1160F PR REVIEW ALL MEDS BY PRESCRIBER/CLIN PHARMACIST DOCUMENTED: ICD-10-PCS | Mod: CPTII,95,, | Performed by: PHYSICIAN ASSISTANT

## 2021-09-14 PROCEDURE — 4010F ACE/ARB THERAPY RXD/TAKEN: CPT | Mod: CPTII,95,, | Performed by: PHYSICIAN ASSISTANT

## 2021-09-14 PROCEDURE — 1159F PR MEDICATION LIST DOCUMENTED IN MEDICAL RECORD: ICD-10-PCS | Mod: CPTII,95,, | Performed by: PHYSICIAN ASSISTANT

## 2021-09-14 PROCEDURE — 3066F NEPHROPATHY DOC TX: CPT | Mod: CPTII,95,, | Performed by: PHYSICIAN ASSISTANT

## 2021-09-14 PROCEDURE — 3066F PR DOCUMENTATION OF TREATMENT FOR NEPHROPATHY: ICD-10-PCS | Mod: CPTII,95,, | Performed by: PHYSICIAN ASSISTANT

## 2021-09-14 PROCEDURE — 3044F PR MOST RECENT HEMOGLOBIN A1C LEVEL <7.0%: ICD-10-PCS | Mod: CPTII,95,, | Performed by: PHYSICIAN ASSISTANT

## 2021-09-14 PROCEDURE — 1160F RVW MEDS BY RX/DR IN RCRD: CPT | Mod: CPTII,95,, | Performed by: PHYSICIAN ASSISTANT

## 2021-09-14 PROCEDURE — 99212 OFFICE O/P EST SF 10 MIN: CPT | Mod: 95,,, | Performed by: PHYSICIAN ASSISTANT

## 2021-09-14 PROCEDURE — 3044F HG A1C LEVEL LT 7.0%: CPT | Mod: CPTII,95,, | Performed by: PHYSICIAN ASSISTANT

## 2021-09-14 PROCEDURE — 99212 PR OFFICE/OUTPT VISIT, EST, LEVL II, 10-19 MIN: ICD-10-PCS | Mod: 95,,, | Performed by: PHYSICIAN ASSISTANT

## 2021-09-14 PROCEDURE — 1159F MED LIST DOCD IN RCRD: CPT | Mod: CPTII,95,, | Performed by: PHYSICIAN ASSISTANT

## 2021-09-15 ENCOUNTER — PATIENT OUTREACH (OUTPATIENT)
Dept: ADMINISTRATIVE | Facility: OTHER | Age: 61
End: 2021-09-15

## 2021-09-16 ENCOUNTER — OFFICE VISIT (OUTPATIENT)
Dept: SPORTS MEDICINE | Facility: CLINIC | Age: 61
End: 2021-09-16
Payer: COMMERCIAL

## 2021-09-16 VITALS
DIASTOLIC BLOOD PRESSURE: 78 MMHG | SYSTOLIC BLOOD PRESSURE: 133 MMHG | HEART RATE: 81 BPM | HEIGHT: 76 IN | BODY MASS INDEX: 24.35 KG/M2 | WEIGHT: 199.94 LBS

## 2021-09-16 DIAGNOSIS — M17.12 PRIMARY OSTEOARTHRITIS OF LEFT KNEE: Primary | ICD-10-CM

## 2021-09-16 PROCEDURE — 99213 OFFICE O/P EST LOW 20 MIN: CPT | Mod: 25,S$GLB,, | Performed by: PHYSICIAN ASSISTANT

## 2021-09-16 PROCEDURE — 1159F PR MEDICATION LIST DOCUMENTED IN MEDICAL RECORD: ICD-10-PCS | Mod: CPTII,S$GLB,, | Performed by: PHYSICIAN ASSISTANT

## 2021-09-16 PROCEDURE — 1159F MED LIST DOCD IN RCRD: CPT | Mod: CPTII,S$GLB,, | Performed by: PHYSICIAN ASSISTANT

## 2021-09-16 PROCEDURE — 3044F PR MOST RECENT HEMOGLOBIN A1C LEVEL <7.0%: ICD-10-PCS | Mod: CPTII,S$GLB,, | Performed by: PHYSICIAN ASSISTANT

## 2021-09-16 PROCEDURE — 3008F BODY MASS INDEX DOCD: CPT | Mod: CPTII,S$GLB,, | Performed by: PHYSICIAN ASSISTANT

## 2021-09-16 PROCEDURE — 3075F PR MOST RECENT SYSTOLIC BLOOD PRESS GE 130-139MM HG: ICD-10-PCS | Mod: CPTII,S$GLB,, | Performed by: PHYSICIAN ASSISTANT

## 2021-09-16 PROCEDURE — 1160F RVW MEDS BY RX/DR IN RCRD: CPT | Mod: CPTII,S$GLB,, | Performed by: PHYSICIAN ASSISTANT

## 2021-09-16 PROCEDURE — 3066F NEPHROPATHY DOC TX: CPT | Mod: CPTII,S$GLB,, | Performed by: PHYSICIAN ASSISTANT

## 2021-09-16 PROCEDURE — 3078F PR MOST RECENT DIASTOLIC BLOOD PRESSURE < 80 MM HG: ICD-10-PCS | Mod: CPTII,S$GLB,, | Performed by: PHYSICIAN ASSISTANT

## 2021-09-16 PROCEDURE — 20610 PR DRAIN/INJECT LARGE JOINT/BURSA: ICD-10-PCS | Mod: LT,S$GLB,, | Performed by: PHYSICIAN ASSISTANT

## 2021-09-16 PROCEDURE — 99999 PR PBB SHADOW E&M-EST. PATIENT-LVL III: CPT | Mod: PBBFAC,,, | Performed by: PHYSICIAN ASSISTANT

## 2021-09-16 PROCEDURE — 4010F ACE/ARB THERAPY RXD/TAKEN: CPT | Mod: CPTII,S$GLB,, | Performed by: PHYSICIAN ASSISTANT

## 2021-09-16 PROCEDURE — 3075F SYST BP GE 130 - 139MM HG: CPT | Mod: CPTII,S$GLB,, | Performed by: PHYSICIAN ASSISTANT

## 2021-09-16 PROCEDURE — 3044F HG A1C LEVEL LT 7.0%: CPT | Mod: CPTII,S$GLB,, | Performed by: PHYSICIAN ASSISTANT

## 2021-09-16 PROCEDURE — 3008F PR BODY MASS INDEX (BMI) DOCUMENTED: ICD-10-PCS | Mod: CPTII,S$GLB,, | Performed by: PHYSICIAN ASSISTANT

## 2021-09-16 PROCEDURE — 99213 PR OFFICE/OUTPT VISIT, EST, LEVL III, 20-29 MIN: ICD-10-PCS | Mod: 25,S$GLB,, | Performed by: PHYSICIAN ASSISTANT

## 2021-09-16 PROCEDURE — 3066F PR DOCUMENTATION OF TREATMENT FOR NEPHROPATHY: ICD-10-PCS | Mod: CPTII,S$GLB,, | Performed by: PHYSICIAN ASSISTANT

## 2021-09-16 PROCEDURE — 99999 PR PBB SHADOW E&M-EST. PATIENT-LVL III: ICD-10-PCS | Mod: PBBFAC,,, | Performed by: PHYSICIAN ASSISTANT

## 2021-09-16 PROCEDURE — 1160F PR REVIEW ALL MEDS BY PRESCRIBER/CLIN PHARMACIST DOCUMENTED: ICD-10-PCS | Mod: CPTII,S$GLB,, | Performed by: PHYSICIAN ASSISTANT

## 2021-09-16 PROCEDURE — 20610 DRAIN/INJ JOINT/BURSA W/O US: CPT | Mod: LT,S$GLB,, | Performed by: PHYSICIAN ASSISTANT

## 2021-09-16 PROCEDURE — 3078F DIAST BP <80 MM HG: CPT | Mod: CPTII,S$GLB,, | Performed by: PHYSICIAN ASSISTANT

## 2021-09-16 PROCEDURE — 4010F PR ACE/ARB THEARPY RXD/TAKEN: ICD-10-PCS | Mod: CPTII,S$GLB,, | Performed by: PHYSICIAN ASSISTANT

## 2021-09-16 RX ORDER — TRIAMCINOLONE ACETONIDE 40 MG/ML
40 INJECTION, SUSPENSION INTRA-ARTICULAR; INTRAMUSCULAR
Status: COMPLETED | OUTPATIENT
Start: 2021-09-16 | End: 2021-09-16

## 2021-09-16 RX ADMIN — TRIAMCINOLONE ACETONIDE 40 MG: 40 INJECTION, SUSPENSION INTRA-ARTICULAR; INTRAMUSCULAR at 08:09

## 2021-09-17 ENCOUNTER — PATIENT MESSAGE (OUTPATIENT)
Dept: ORTHOPEDICS | Facility: CLINIC | Age: 61
End: 2021-09-17

## 2021-09-17 ENCOUNTER — TELEPHONE (OUTPATIENT)
Dept: ORTHOPEDICS | Facility: CLINIC | Age: 61
End: 2021-09-17

## 2021-09-17 DIAGNOSIS — M51.36 DDD (DEGENERATIVE DISC DISEASE), LUMBAR: Primary | ICD-10-CM

## 2021-09-24 ENCOUNTER — OFFICE VISIT (OUTPATIENT)
Dept: SPORTS MEDICINE | Facility: CLINIC | Age: 61
End: 2021-09-24
Payer: COMMERCIAL

## 2021-09-24 VITALS
HEIGHT: 76 IN | DIASTOLIC BLOOD PRESSURE: 72 MMHG | BODY MASS INDEX: 24.23 KG/M2 | HEART RATE: 73 BPM | WEIGHT: 199 LBS | SYSTOLIC BLOOD PRESSURE: 113 MMHG

## 2021-09-24 DIAGNOSIS — M17.12 PRIMARY OSTEOARTHRITIS OF LEFT KNEE: Primary | ICD-10-CM

## 2021-09-24 PROCEDURE — 1160F PR REVIEW ALL MEDS BY PRESCRIBER/CLIN PHARMACIST DOCUMENTED: ICD-10-PCS | Mod: CPTII,S$GLB,, | Performed by: PHYSICIAN ASSISTANT

## 2021-09-24 PROCEDURE — 1159F MED LIST DOCD IN RCRD: CPT | Mod: CPTII,S$GLB,, | Performed by: PHYSICIAN ASSISTANT

## 2021-09-24 PROCEDURE — 3078F PR MOST RECENT DIASTOLIC BLOOD PRESSURE < 80 MM HG: ICD-10-PCS | Mod: CPTII,S$GLB,, | Performed by: PHYSICIAN ASSISTANT

## 2021-09-24 PROCEDURE — 3066F NEPHROPATHY DOC TX: CPT | Mod: CPTII,S$GLB,, | Performed by: PHYSICIAN ASSISTANT

## 2021-09-24 PROCEDURE — 3008F BODY MASS INDEX DOCD: CPT | Mod: CPTII,S$GLB,, | Performed by: PHYSICIAN ASSISTANT

## 2021-09-24 PROCEDURE — 3066F PR DOCUMENTATION OF TREATMENT FOR NEPHROPATHY: ICD-10-PCS | Mod: CPTII,S$GLB,, | Performed by: PHYSICIAN ASSISTANT

## 2021-09-24 PROCEDURE — 4010F ACE/ARB THERAPY RXD/TAKEN: CPT | Mod: CPTII,S$GLB,, | Performed by: PHYSICIAN ASSISTANT

## 2021-09-24 PROCEDURE — 99213 PR OFFICE/OUTPT VISIT, EST, LEVL III, 20-29 MIN: ICD-10-PCS | Mod: S$GLB,,, | Performed by: PHYSICIAN ASSISTANT

## 2021-09-24 PROCEDURE — 3074F SYST BP LT 130 MM HG: CPT | Mod: CPTII,S$GLB,, | Performed by: PHYSICIAN ASSISTANT

## 2021-09-24 PROCEDURE — 99999 PR PBB SHADOW E&M-EST. PATIENT-LVL III: ICD-10-PCS | Mod: PBBFAC,,, | Performed by: PHYSICIAN ASSISTANT

## 2021-09-24 PROCEDURE — 3074F PR MOST RECENT SYSTOLIC BLOOD PRESSURE < 130 MM HG: ICD-10-PCS | Mod: CPTII,S$GLB,, | Performed by: PHYSICIAN ASSISTANT

## 2021-09-24 PROCEDURE — 99999 PR PBB SHADOW E&M-EST. PATIENT-LVL III: CPT | Mod: PBBFAC,,, | Performed by: PHYSICIAN ASSISTANT

## 2021-09-24 PROCEDURE — 3044F PR MOST RECENT HEMOGLOBIN A1C LEVEL <7.0%: ICD-10-PCS | Mod: CPTII,S$GLB,, | Performed by: PHYSICIAN ASSISTANT

## 2021-09-24 PROCEDURE — 99213 OFFICE O/P EST LOW 20 MIN: CPT | Mod: S$GLB,,, | Performed by: PHYSICIAN ASSISTANT

## 2021-09-24 PROCEDURE — 1159F PR MEDICATION LIST DOCUMENTED IN MEDICAL RECORD: ICD-10-PCS | Mod: CPTII,S$GLB,, | Performed by: PHYSICIAN ASSISTANT

## 2021-09-24 PROCEDURE — 3044F HG A1C LEVEL LT 7.0%: CPT | Mod: CPTII,S$GLB,, | Performed by: PHYSICIAN ASSISTANT

## 2021-09-24 PROCEDURE — 1160F RVW MEDS BY RX/DR IN RCRD: CPT | Mod: CPTII,S$GLB,, | Performed by: PHYSICIAN ASSISTANT

## 2021-09-24 PROCEDURE — 4010F PR ACE/ARB THEARPY RXD/TAKEN: ICD-10-PCS | Mod: CPTII,S$GLB,, | Performed by: PHYSICIAN ASSISTANT

## 2021-09-24 PROCEDURE — 3078F DIAST BP <80 MM HG: CPT | Mod: CPTII,S$GLB,, | Performed by: PHYSICIAN ASSISTANT

## 2021-09-24 PROCEDURE — 3008F PR BODY MASS INDEX (BMI) DOCUMENTED: ICD-10-PCS | Mod: CPTII,S$GLB,, | Performed by: PHYSICIAN ASSISTANT

## 2021-09-24 RX ORDER — PANTOPRAZOLE SODIUM 40 MG/1
TABLET, DELAYED RELEASE ORAL
Qty: 180 TABLET | Refills: 3 | Status: SHIPPED | OUTPATIENT
Start: 2021-09-24 | End: 2022-12-30 | Stop reason: SDUPTHER

## 2021-09-24 RX ORDER — METHYLPREDNISOLONE 4 MG/1
TABLET ORAL
Qty: 1 PACKAGE | Refills: 0 | Status: SHIPPED | OUTPATIENT
Start: 2021-09-24 | End: 2021-10-04 | Stop reason: ALTCHOICE

## 2021-09-27 ENCOUNTER — PATIENT MESSAGE (OUTPATIENT)
Dept: SPORTS MEDICINE | Facility: CLINIC | Age: 61
End: 2021-09-27

## 2021-09-28 ENCOUNTER — PATIENT MESSAGE (OUTPATIENT)
Dept: ORTHOPEDICS | Facility: CLINIC | Age: 61
End: 2021-09-28

## 2021-10-04 ENCOUNTER — OFFICE VISIT (OUTPATIENT)
Dept: ORTHOPEDICS | Facility: CLINIC | Age: 61
End: 2021-10-04
Payer: COMMERCIAL

## 2021-10-04 ENCOUNTER — HOSPITAL ENCOUNTER (OUTPATIENT)
Dept: RADIOLOGY | Facility: HOSPITAL | Age: 61
Discharge: HOME | End: 2021-10-04
Attending: PHYSICIAN ASSISTANT
Payer: COMMERCIAL

## 2021-10-04 VITALS — HEIGHT: 76 IN | WEIGHT: 194.13 LBS | BODY MASS INDEX: 23.64 KG/M2

## 2021-10-04 DIAGNOSIS — M54.16 LEFT LUMBAR RADICULOPATHY: Primary | ICD-10-CM

## 2021-10-04 DIAGNOSIS — M51.36 DDD (DEGENERATIVE DISC DISEASE), LUMBAR: ICD-10-CM

## 2021-10-04 PROCEDURE — 72110 X-RAY EXAM L-2 SPINE 4/>VWS: CPT | Mod: 26,,, | Performed by: RADIOLOGY

## 2021-10-04 PROCEDURE — 3008F PR BODY MASS INDEX (BMI) DOCUMENTED: ICD-10-PCS | Mod: CPTII,S$GLB,, | Performed by: ORTHOPAEDIC SURGERY

## 2021-10-04 PROCEDURE — 4010F PR ACE/ARB THEARPY RXD/TAKEN: ICD-10-PCS | Mod: CPTII,S$GLB,, | Performed by: ORTHOPAEDIC SURGERY

## 2021-10-04 PROCEDURE — 4010F ACE/ARB THERAPY RXD/TAKEN: CPT | Mod: CPTII,S$GLB,, | Performed by: ORTHOPAEDIC SURGERY

## 2021-10-04 PROCEDURE — 3066F NEPHROPATHY DOC TX: CPT | Mod: CPTII,S$GLB,, | Performed by: ORTHOPAEDIC SURGERY

## 2021-10-04 PROCEDURE — 72110 XR LUMBAR SPINE AP AND LAT WITH FLEX/EXT: ICD-10-PCS | Mod: 26,,, | Performed by: RADIOLOGY

## 2021-10-04 PROCEDURE — 3066F PR DOCUMENTATION OF TREATMENT FOR NEPHROPATHY: ICD-10-PCS | Mod: CPTII,S$GLB,, | Performed by: ORTHOPAEDIC SURGERY

## 2021-10-04 PROCEDURE — 3044F HG A1C LEVEL LT 7.0%: CPT | Mod: CPTII,S$GLB,, | Performed by: ORTHOPAEDIC SURGERY

## 2021-10-04 PROCEDURE — 3044F PR MOST RECENT HEMOGLOBIN A1C LEVEL <7.0%: ICD-10-PCS | Mod: CPTII,S$GLB,, | Performed by: ORTHOPAEDIC SURGERY

## 2021-10-04 PROCEDURE — 72110 X-RAY EXAM L-2 SPINE 4/>VWS: CPT | Mod: TC

## 2021-10-04 PROCEDURE — 1159F MED LIST DOCD IN RCRD: CPT | Mod: CPTII,S$GLB,, | Performed by: ORTHOPAEDIC SURGERY

## 2021-10-04 PROCEDURE — 99999 PR PBB SHADOW E&M-EST. PATIENT-LVL III: CPT | Mod: PBBFAC,,, | Performed by: ORTHOPAEDIC SURGERY

## 2021-10-04 PROCEDURE — 99204 OFFICE O/P NEW MOD 45 MIN: CPT | Mod: S$GLB,,, | Performed by: ORTHOPAEDIC SURGERY

## 2021-10-04 PROCEDURE — 3008F BODY MASS INDEX DOCD: CPT | Mod: CPTII,S$GLB,, | Performed by: ORTHOPAEDIC SURGERY

## 2021-10-04 PROCEDURE — 99999 PR PBB SHADOW E&M-EST. PATIENT-LVL III: ICD-10-PCS | Mod: PBBFAC,,, | Performed by: ORTHOPAEDIC SURGERY

## 2021-10-04 PROCEDURE — 99204 PR OFFICE/OUTPT VISIT, NEW, LEVL IV, 45-59 MIN: ICD-10-PCS | Mod: S$GLB,,, | Performed by: ORTHOPAEDIC SURGERY

## 2021-10-04 PROCEDURE — 1159F PR MEDICATION LIST DOCUMENTED IN MEDICAL RECORD: ICD-10-PCS | Mod: CPTII,S$GLB,, | Performed by: ORTHOPAEDIC SURGERY

## 2021-10-04 RX ORDER — GABAPENTIN 300 MG/1
300 CAPSULE ORAL 3 TIMES DAILY
Qty: 90 CAPSULE | Refills: 11 | Status: ON HOLD | OUTPATIENT
Start: 2021-10-04 | End: 2022-10-06 | Stop reason: SDUPTHER

## 2021-10-13 ENCOUNTER — OFFICE VISIT (OUTPATIENT)
Dept: SPORTS MEDICINE | Facility: CLINIC | Age: 61
End: 2021-10-13
Payer: COMMERCIAL

## 2021-10-13 VITALS
WEIGHT: 195.56 LBS | HEIGHT: 76 IN | BODY MASS INDEX: 23.81 KG/M2 | SYSTOLIC BLOOD PRESSURE: 111 MMHG | DIASTOLIC BLOOD PRESSURE: 73 MMHG | HEART RATE: 68 BPM

## 2021-10-13 DIAGNOSIS — M17.12 PRIMARY OSTEOARTHRITIS OF LEFT KNEE: Primary | ICD-10-CM

## 2021-10-13 PROCEDURE — 3078F DIAST BP <80 MM HG: CPT | Mod: CPTII,S$GLB,, | Performed by: PHYSICIAN ASSISTANT

## 2021-10-13 PROCEDURE — 3044F PR MOST RECENT HEMOGLOBIN A1C LEVEL <7.0%: ICD-10-PCS | Mod: CPTII,S$GLB,, | Performed by: PHYSICIAN ASSISTANT

## 2021-10-13 PROCEDURE — 99499 UNLISTED E&M SERVICE: CPT | Mod: S$GLB,,, | Performed by: PHYSICIAN ASSISTANT

## 2021-10-13 PROCEDURE — 99999 PR PBB SHADOW E&M-EST. PATIENT-LVL V: CPT | Mod: PBBFAC,,, | Performed by: PHYSICIAN ASSISTANT

## 2021-10-13 PROCEDURE — 20610 DRAIN/INJ JOINT/BURSA W/O US: CPT | Mod: LT,S$GLB,, | Performed by: PHYSICIAN ASSISTANT

## 2021-10-13 PROCEDURE — 3008F BODY MASS INDEX DOCD: CPT | Mod: CPTII,S$GLB,, | Performed by: PHYSICIAN ASSISTANT

## 2021-10-13 PROCEDURE — 3066F PR DOCUMENTATION OF TREATMENT FOR NEPHROPATHY: ICD-10-PCS | Mod: CPTII,S$GLB,, | Performed by: PHYSICIAN ASSISTANT

## 2021-10-13 PROCEDURE — 3078F PR MOST RECENT DIASTOLIC BLOOD PRESSURE < 80 MM HG: ICD-10-PCS | Mod: CPTII,S$GLB,, | Performed by: PHYSICIAN ASSISTANT

## 2021-10-13 PROCEDURE — 3074F PR MOST RECENT SYSTOLIC BLOOD PRESSURE < 130 MM HG: ICD-10-PCS | Mod: CPTII,S$GLB,, | Performed by: PHYSICIAN ASSISTANT

## 2021-10-13 PROCEDURE — 1160F RVW MEDS BY RX/DR IN RCRD: CPT | Mod: CPTII,S$GLB,, | Performed by: PHYSICIAN ASSISTANT

## 2021-10-13 PROCEDURE — 3008F PR BODY MASS INDEX (BMI) DOCUMENTED: ICD-10-PCS | Mod: CPTII,S$GLB,, | Performed by: PHYSICIAN ASSISTANT

## 2021-10-13 PROCEDURE — 3066F NEPHROPATHY DOC TX: CPT | Mod: CPTII,S$GLB,, | Performed by: PHYSICIAN ASSISTANT

## 2021-10-13 PROCEDURE — 4010F ACE/ARB THERAPY RXD/TAKEN: CPT | Mod: CPTII,S$GLB,, | Performed by: PHYSICIAN ASSISTANT

## 2021-10-13 PROCEDURE — 3044F HG A1C LEVEL LT 7.0%: CPT | Mod: CPTII,S$GLB,, | Performed by: PHYSICIAN ASSISTANT

## 2021-10-13 PROCEDURE — 20610 PR DRAIN/INJECT LARGE JOINT/BURSA: ICD-10-PCS | Mod: LT,S$GLB,, | Performed by: PHYSICIAN ASSISTANT

## 2021-10-13 PROCEDURE — 3074F SYST BP LT 130 MM HG: CPT | Mod: CPTII,S$GLB,, | Performed by: PHYSICIAN ASSISTANT

## 2021-10-13 PROCEDURE — 1159F PR MEDICATION LIST DOCUMENTED IN MEDICAL RECORD: ICD-10-PCS | Mod: CPTII,S$GLB,, | Performed by: PHYSICIAN ASSISTANT

## 2021-10-13 PROCEDURE — 1160F PR REVIEW ALL MEDS BY PRESCRIBER/CLIN PHARMACIST DOCUMENTED: ICD-10-PCS | Mod: CPTII,S$GLB,, | Performed by: PHYSICIAN ASSISTANT

## 2021-10-13 PROCEDURE — 4010F PR ACE/ARB THEARPY RXD/TAKEN: ICD-10-PCS | Mod: CPTII,S$GLB,, | Performed by: PHYSICIAN ASSISTANT

## 2021-10-13 PROCEDURE — 99999 PR PBB SHADOW E&M-EST. PATIENT-LVL V: ICD-10-PCS | Mod: PBBFAC,,, | Performed by: PHYSICIAN ASSISTANT

## 2021-10-13 PROCEDURE — 99499 NO LOS: ICD-10-PCS | Mod: S$GLB,,, | Performed by: PHYSICIAN ASSISTANT

## 2021-10-13 PROCEDURE — 1159F MED LIST DOCD IN RCRD: CPT | Mod: CPTII,S$GLB,, | Performed by: PHYSICIAN ASSISTANT

## 2021-10-19 ENCOUNTER — PATIENT OUTREACH (OUTPATIENT)
Dept: ADMINISTRATIVE | Facility: OTHER | Age: 61
End: 2021-10-19

## 2021-10-20 ENCOUNTER — OFFICE VISIT (OUTPATIENT)
Dept: SPORTS MEDICINE | Facility: CLINIC | Age: 61
End: 2021-10-20
Payer: COMMERCIAL

## 2021-10-20 VITALS
SYSTOLIC BLOOD PRESSURE: 112 MMHG | HEART RATE: 76 BPM | DIASTOLIC BLOOD PRESSURE: 74 MMHG | BODY MASS INDEX: 23.75 KG/M2 | WEIGHT: 195 LBS | HEIGHT: 76 IN

## 2021-10-20 DIAGNOSIS — M17.12 PRIMARY OSTEOARTHRITIS OF LEFT KNEE: Primary | ICD-10-CM

## 2021-10-20 PROCEDURE — 99999 PR PBB SHADOW E&M-EST. PATIENT-LVL IV: CPT | Mod: PBBFAC,,, | Performed by: PHYSICIAN ASSISTANT

## 2021-10-20 PROCEDURE — 1160F PR REVIEW ALL MEDS BY PRESCRIBER/CLIN PHARMACIST DOCUMENTED: ICD-10-PCS | Mod: CPTII,S$GLB,, | Performed by: PHYSICIAN ASSISTANT

## 2021-10-20 PROCEDURE — 3078F PR MOST RECENT DIASTOLIC BLOOD PRESSURE < 80 MM HG: ICD-10-PCS | Mod: CPTII,S$GLB,, | Performed by: PHYSICIAN ASSISTANT

## 2021-10-20 PROCEDURE — 3074F PR MOST RECENT SYSTOLIC BLOOD PRESSURE < 130 MM HG: ICD-10-PCS | Mod: CPTII,S$GLB,, | Performed by: PHYSICIAN ASSISTANT

## 2021-10-20 PROCEDURE — 1160F RVW MEDS BY RX/DR IN RCRD: CPT | Mod: CPTII,S$GLB,, | Performed by: PHYSICIAN ASSISTANT

## 2021-10-20 PROCEDURE — 3044F HG A1C LEVEL LT 7.0%: CPT | Mod: CPTII,S$GLB,, | Performed by: PHYSICIAN ASSISTANT

## 2021-10-20 PROCEDURE — 3044F PR MOST RECENT HEMOGLOBIN A1C LEVEL <7.0%: ICD-10-PCS | Mod: CPTII,S$GLB,, | Performed by: PHYSICIAN ASSISTANT

## 2021-10-20 PROCEDURE — 3078F DIAST BP <80 MM HG: CPT | Mod: CPTII,S$GLB,, | Performed by: PHYSICIAN ASSISTANT

## 2021-10-20 PROCEDURE — 1159F MED LIST DOCD IN RCRD: CPT | Mod: CPTII,S$GLB,, | Performed by: PHYSICIAN ASSISTANT

## 2021-10-20 PROCEDURE — 20610 PR DRAIN/INJECT LARGE JOINT/BURSA: ICD-10-PCS | Mod: LT,S$GLB,, | Performed by: PHYSICIAN ASSISTANT

## 2021-10-20 PROCEDURE — 3008F BODY MASS INDEX DOCD: CPT | Mod: CPTII,S$GLB,, | Performed by: PHYSICIAN ASSISTANT

## 2021-10-20 PROCEDURE — 20610 DRAIN/INJ JOINT/BURSA W/O US: CPT | Mod: LT,S$GLB,, | Performed by: PHYSICIAN ASSISTANT

## 2021-10-20 PROCEDURE — 99499 NO LOS: ICD-10-PCS | Mod: S$GLB,,, | Performed by: PHYSICIAN ASSISTANT

## 2021-10-20 PROCEDURE — 99499 UNLISTED E&M SERVICE: CPT | Mod: S$GLB,,, | Performed by: PHYSICIAN ASSISTANT

## 2021-10-20 PROCEDURE — 4010F ACE/ARB THERAPY RXD/TAKEN: CPT | Mod: CPTII,S$GLB,, | Performed by: PHYSICIAN ASSISTANT

## 2021-10-20 PROCEDURE — 1159F PR MEDICATION LIST DOCUMENTED IN MEDICAL RECORD: ICD-10-PCS | Mod: CPTII,S$GLB,, | Performed by: PHYSICIAN ASSISTANT

## 2021-10-20 PROCEDURE — 3066F NEPHROPATHY DOC TX: CPT | Mod: CPTII,S$GLB,, | Performed by: PHYSICIAN ASSISTANT

## 2021-10-20 PROCEDURE — 3066F PR DOCUMENTATION OF TREATMENT FOR NEPHROPATHY: ICD-10-PCS | Mod: CPTII,S$GLB,, | Performed by: PHYSICIAN ASSISTANT

## 2021-10-20 PROCEDURE — 4010F PR ACE/ARB THEARPY RXD/TAKEN: ICD-10-PCS | Mod: CPTII,S$GLB,, | Performed by: PHYSICIAN ASSISTANT

## 2021-10-20 PROCEDURE — 3008F PR BODY MASS INDEX (BMI) DOCUMENTED: ICD-10-PCS | Mod: CPTII,S$GLB,, | Performed by: PHYSICIAN ASSISTANT

## 2021-10-20 PROCEDURE — 99999 PR PBB SHADOW E&M-EST. PATIENT-LVL IV: ICD-10-PCS | Mod: PBBFAC,,, | Performed by: PHYSICIAN ASSISTANT

## 2021-10-20 PROCEDURE — 3074F SYST BP LT 130 MM HG: CPT | Mod: CPTII,S$GLB,, | Performed by: PHYSICIAN ASSISTANT

## 2021-10-27 ENCOUNTER — OFFICE VISIT (OUTPATIENT)
Dept: SPORTS MEDICINE | Facility: CLINIC | Age: 61
End: 2021-10-27
Payer: COMMERCIAL

## 2021-10-27 ENCOUNTER — PATIENT MESSAGE (OUTPATIENT)
Dept: INTERNAL MEDICINE | Facility: CLINIC | Age: 61
End: 2021-10-27
Payer: COMMERCIAL

## 2021-10-27 VITALS
HEART RATE: 77 BPM | SYSTOLIC BLOOD PRESSURE: 126 MMHG | WEIGHT: 199.06 LBS | BODY MASS INDEX: 24.24 KG/M2 | HEIGHT: 76 IN | DIASTOLIC BLOOD PRESSURE: 73 MMHG

## 2021-10-27 DIAGNOSIS — M17.12 PRIMARY OSTEOARTHRITIS OF LEFT KNEE: Primary | ICD-10-CM

## 2021-10-27 PROCEDURE — 3066F PR DOCUMENTATION OF TREATMENT FOR NEPHROPATHY: ICD-10-PCS | Mod: CPTII,S$GLB,, | Performed by: PHYSICIAN ASSISTANT

## 2021-10-27 PROCEDURE — 20610 PR DRAIN/INJECT LARGE JOINT/BURSA: ICD-10-PCS | Mod: LT,S$GLB,, | Performed by: PHYSICIAN ASSISTANT

## 2021-10-27 PROCEDURE — 3008F BODY MASS INDEX DOCD: CPT | Mod: CPTII,S$GLB,, | Performed by: PHYSICIAN ASSISTANT

## 2021-10-27 PROCEDURE — 99999 PR PBB SHADOW E&M-EST. PATIENT-LVL V: CPT | Mod: PBBFAC,,, | Performed by: PHYSICIAN ASSISTANT

## 2021-10-27 PROCEDURE — 3078F DIAST BP <80 MM HG: CPT | Mod: CPTII,S$GLB,, | Performed by: PHYSICIAN ASSISTANT

## 2021-10-27 PROCEDURE — 4010F ACE/ARB THERAPY RXD/TAKEN: CPT | Mod: CPTII,S$GLB,, | Performed by: PHYSICIAN ASSISTANT

## 2021-10-27 PROCEDURE — 3074F SYST BP LT 130 MM HG: CPT | Mod: CPTII,S$GLB,, | Performed by: PHYSICIAN ASSISTANT

## 2021-10-27 PROCEDURE — 3078F PR MOST RECENT DIASTOLIC BLOOD PRESSURE < 80 MM HG: ICD-10-PCS | Mod: CPTII,S$GLB,, | Performed by: PHYSICIAN ASSISTANT

## 2021-10-27 PROCEDURE — 20610 DRAIN/INJ JOINT/BURSA W/O US: CPT | Mod: LT,S$GLB,, | Performed by: PHYSICIAN ASSISTANT

## 2021-10-27 PROCEDURE — 99999 PR PBB SHADOW E&M-EST. PATIENT-LVL V: ICD-10-PCS | Mod: PBBFAC,,, | Performed by: PHYSICIAN ASSISTANT

## 2021-10-27 PROCEDURE — 3066F NEPHROPATHY DOC TX: CPT | Mod: CPTII,S$GLB,, | Performed by: PHYSICIAN ASSISTANT

## 2021-10-27 PROCEDURE — 99499 NO LOS: ICD-10-PCS | Mod: S$GLB,,, | Performed by: PHYSICIAN ASSISTANT

## 2021-10-27 PROCEDURE — 3074F PR MOST RECENT SYSTOLIC BLOOD PRESSURE < 130 MM HG: ICD-10-PCS | Mod: CPTII,S$GLB,, | Performed by: PHYSICIAN ASSISTANT

## 2021-10-27 PROCEDURE — 4010F PR ACE/ARB THEARPY RXD/TAKEN: ICD-10-PCS | Mod: CPTII,S$GLB,, | Performed by: PHYSICIAN ASSISTANT

## 2021-10-27 PROCEDURE — 3044F HG A1C LEVEL LT 7.0%: CPT | Mod: CPTII,S$GLB,, | Performed by: PHYSICIAN ASSISTANT

## 2021-10-27 PROCEDURE — 3044F PR MOST RECENT HEMOGLOBIN A1C LEVEL <7.0%: ICD-10-PCS | Mod: CPTII,S$GLB,, | Performed by: PHYSICIAN ASSISTANT

## 2021-10-27 PROCEDURE — 99499 UNLISTED E&M SERVICE: CPT | Mod: S$GLB,,, | Performed by: PHYSICIAN ASSISTANT

## 2021-10-27 PROCEDURE — 1159F MED LIST DOCD IN RCRD: CPT | Mod: CPTII,S$GLB,, | Performed by: PHYSICIAN ASSISTANT

## 2021-10-27 PROCEDURE — 3008F PR BODY MASS INDEX (BMI) DOCUMENTED: ICD-10-PCS | Mod: CPTII,S$GLB,, | Performed by: PHYSICIAN ASSISTANT

## 2021-10-27 PROCEDURE — 1159F PR MEDICATION LIST DOCUMENTED IN MEDICAL RECORD: ICD-10-PCS | Mod: CPTII,S$GLB,, | Performed by: PHYSICIAN ASSISTANT

## 2021-10-27 PROCEDURE — 1160F RVW MEDS BY RX/DR IN RCRD: CPT | Mod: CPTII,S$GLB,, | Performed by: PHYSICIAN ASSISTANT

## 2021-10-27 PROCEDURE — 1160F PR REVIEW ALL MEDS BY PRESCRIBER/CLIN PHARMACIST DOCUMENTED: ICD-10-PCS | Mod: CPTII,S$GLB,, | Performed by: PHYSICIAN ASSISTANT

## 2021-11-08 ENCOUNTER — PATIENT MESSAGE (OUTPATIENT)
Dept: SPORTS MEDICINE | Facility: CLINIC | Age: 61
End: 2021-11-08
Payer: COMMERCIAL

## 2021-12-07 ENCOUNTER — PATIENT OUTREACH (OUTPATIENT)
Dept: ADMINISTRATIVE | Facility: OTHER | Age: 61
End: 2021-12-07
Payer: COMMERCIAL

## 2021-12-07 ENCOUNTER — IMMUNIZATION (OUTPATIENT)
Dept: INTERNAL MEDICINE | Facility: CLINIC | Age: 61
End: 2021-12-07
Payer: COMMERCIAL

## 2021-12-07 DIAGNOSIS — Z23 NEED FOR VACCINATION: Primary | ICD-10-CM

## 2021-12-07 PROCEDURE — 0064A COVID-19, MRNA, LNP-S, PF, 100 MCG/0.25 ML DOSE VACCINE (MODERNA BOOSTER): CPT | Mod: PBBFAC | Performed by: INTERNAL MEDICINE

## 2021-12-08 ENCOUNTER — OFFICE VISIT (OUTPATIENT)
Dept: SPORTS MEDICINE | Facility: CLINIC | Age: 61
End: 2021-12-08
Payer: COMMERCIAL

## 2021-12-08 DIAGNOSIS — M17.12 PRIMARY OSTEOARTHRITIS OF LEFT KNEE: Primary | ICD-10-CM

## 2021-12-08 PROCEDURE — 4010F ACE/ARB THERAPY RXD/TAKEN: CPT | Mod: CPTII,95,, | Performed by: PHYSICIAN ASSISTANT

## 2021-12-08 PROCEDURE — 3066F PR DOCUMENTATION OF TREATMENT FOR NEPHROPATHY: ICD-10-PCS | Mod: CPTII,95,, | Performed by: PHYSICIAN ASSISTANT

## 2021-12-08 PROCEDURE — 4010F PR ACE/ARB THEARPY RXD/TAKEN: ICD-10-PCS | Mod: CPTII,95,, | Performed by: PHYSICIAN ASSISTANT

## 2021-12-08 PROCEDURE — 99211 PR OFFICE/OUTPT VISIT, EST, LEVL I: ICD-10-PCS | Mod: 95,,, | Performed by: PHYSICIAN ASSISTANT

## 2021-12-08 PROCEDURE — 3066F NEPHROPATHY DOC TX: CPT | Mod: CPTII,95,, | Performed by: PHYSICIAN ASSISTANT

## 2021-12-08 PROCEDURE — 99211 OFF/OP EST MAY X REQ PHY/QHP: CPT | Mod: 95,,, | Performed by: PHYSICIAN ASSISTANT

## 2021-12-08 RX ORDER — CELECOXIB 200 MG/1
200 CAPSULE ORAL DAILY
Qty: 30 CAPSULE | Refills: 0 | Status: SHIPPED | OUTPATIENT
Start: 2021-12-08 | End: 2022-01-04 | Stop reason: SDUPTHER

## 2021-12-09 ENCOUNTER — OFFICE VISIT (OUTPATIENT)
Dept: SPORTS MEDICINE | Facility: CLINIC | Age: 61
End: 2021-12-09
Payer: COMMERCIAL

## 2021-12-09 DIAGNOSIS — M17.12 PRIMARY OSTEOARTHRITIS OF LEFT KNEE: Primary | ICD-10-CM

## 2021-12-09 PROCEDURE — 4010F ACE/ARB THERAPY RXD/TAKEN: CPT | Mod: CPTII,95,, | Performed by: PHYSICIAN ASSISTANT

## 2021-12-09 PROCEDURE — 99499 NO LOS: ICD-10-PCS | Mod: 95,,, | Performed by: PHYSICIAN ASSISTANT

## 2021-12-09 PROCEDURE — 3066F PR DOCUMENTATION OF TREATMENT FOR NEPHROPATHY: ICD-10-PCS | Mod: CPTII,95,, | Performed by: PHYSICIAN ASSISTANT

## 2021-12-09 PROCEDURE — 3066F NEPHROPATHY DOC TX: CPT | Mod: CPTII,95,, | Performed by: PHYSICIAN ASSISTANT

## 2021-12-09 PROCEDURE — 4010F PR ACE/ARB THEARPY RXD/TAKEN: ICD-10-PCS | Mod: CPTII,95,, | Performed by: PHYSICIAN ASSISTANT

## 2021-12-09 PROCEDURE — 99499 UNLISTED E&M SERVICE: CPT | Mod: 95,,, | Performed by: PHYSICIAN ASSISTANT

## 2021-12-15 ENCOUNTER — OFFICE VISIT (OUTPATIENT)
Dept: SPINE | Facility: CLINIC | Age: 61
End: 2021-12-15
Payer: COMMERCIAL

## 2021-12-15 VITALS
SYSTOLIC BLOOD PRESSURE: 117 MMHG | WEIGHT: 199.06 LBS | DIASTOLIC BLOOD PRESSURE: 64 MMHG | BODY MASS INDEX: 24.24 KG/M2 | HEIGHT: 76 IN | HEART RATE: 65 BPM

## 2021-12-15 DIAGNOSIS — M71.22 BAKER'S CYST OF KNEE, LEFT: ICD-10-CM

## 2021-12-15 DIAGNOSIS — M17.12 PRIMARY OSTEOARTHRITIS OF LEFT KNEE: Primary | ICD-10-CM

## 2021-12-15 PROCEDURE — 4010F ACE/ARB THERAPY RXD/TAKEN: CPT | Mod: CPTII,S$GLB,, | Performed by: ANESTHESIOLOGY

## 2021-12-15 PROCEDURE — 3066F PR DOCUMENTATION OF TREATMENT FOR NEPHROPATHY: ICD-10-PCS | Mod: CPTII,S$GLB,, | Performed by: ANESTHESIOLOGY

## 2021-12-15 PROCEDURE — 99204 OFFICE O/P NEW MOD 45 MIN: CPT | Mod: S$GLB,,, | Performed by: ANESTHESIOLOGY

## 2021-12-15 PROCEDURE — 99999 PR PBB SHADOW E&M-EST. PATIENT-LVL V: CPT | Mod: PBBFAC,,, | Performed by: ANESTHESIOLOGY

## 2021-12-15 PROCEDURE — 3066F NEPHROPATHY DOC TX: CPT | Mod: CPTII,S$GLB,, | Performed by: ANESTHESIOLOGY

## 2021-12-15 PROCEDURE — 4010F PR ACE/ARB THEARPY RXD/TAKEN: ICD-10-PCS | Mod: CPTII,S$GLB,, | Performed by: ANESTHESIOLOGY

## 2021-12-15 PROCEDURE — 99999 PR PBB SHADOW E&M-EST. PATIENT-LVL V: ICD-10-PCS | Mod: PBBFAC,,, | Performed by: ANESTHESIOLOGY

## 2021-12-15 PROCEDURE — 99204 PR OFFICE/OUTPT VISIT, NEW, LEVL IV, 45-59 MIN: ICD-10-PCS | Mod: S$GLB,,, | Performed by: ANESTHESIOLOGY

## 2021-12-21 ENCOUNTER — TELEPHONE (OUTPATIENT)
Dept: PAIN MEDICINE | Facility: CLINIC | Age: 61
End: 2021-12-21
Payer: COMMERCIAL

## 2021-12-23 ENCOUNTER — PATIENT MESSAGE (OUTPATIENT)
Dept: OTHER | Facility: OTHER | Age: 61
End: 2021-12-23
Payer: COMMERCIAL

## 2021-12-27 ENCOUNTER — PATIENT MESSAGE (OUTPATIENT)
Dept: INFECTIOUS DISEASES | Facility: HOSPITAL | Age: 61
End: 2021-12-27
Payer: COMMERCIAL

## 2021-12-28 ENCOUNTER — HOSPITAL ENCOUNTER (OUTPATIENT)
Facility: OTHER | Age: 61
Discharge: HOME | End: 2021-12-28
Attending: ANESTHESIOLOGY | Admitting: ANESTHESIOLOGY
Payer: COMMERCIAL

## 2021-12-28 VITALS
WEIGHT: 200 LBS | HEART RATE: 66 BPM | BODY MASS INDEX: 24.36 KG/M2 | OXYGEN SATURATION: 98 % | RESPIRATION RATE: 16 BRPM | TEMPERATURE: 98 F | SYSTOLIC BLOOD PRESSURE: 137 MMHG | DIASTOLIC BLOOD PRESSURE: 80 MMHG | HEIGHT: 76 IN

## 2021-12-28 DIAGNOSIS — M17.12 PRIMARY OSTEOARTHRITIS OF LEFT KNEE: Primary | ICD-10-CM

## 2021-12-28 DIAGNOSIS — G89.29 CHRONIC PAIN: ICD-10-CM

## 2021-12-28 LAB — POCT GLUCOSE: 109 MG/DL (ref 70–110)

## 2021-12-28 PROCEDURE — 25000003 PHARM REV CODE 250: Performed by: ANESTHESIOLOGY

## 2021-12-28 PROCEDURE — 63600175 PHARM REV CODE 636 W HCPCS: Performed by: ANESTHESIOLOGY

## 2021-12-28 PROCEDURE — 64454 NJX AA&/STRD GNCLR NRV BRNCH: CPT | Mod: LT | Performed by: ANESTHESIOLOGY

## 2021-12-28 PROCEDURE — 64454 NJX AA&/STRD GNCLR NRV BRNCH: CPT | Mod: LT,,, | Performed by: ANESTHESIOLOGY

## 2021-12-28 PROCEDURE — 64454 PR NERVE BLOCK INJ, ANES/STEROID, GENICULAR NERVE, W/IMG: ICD-10-PCS | Mod: LT,,, | Performed by: ANESTHESIOLOGY

## 2021-12-28 RX ORDER — BUPIVACAINE HYDROCHLORIDE 2.5 MG/ML
INJECTION, SOLUTION EPIDURAL; INFILTRATION; INTRACAUDAL
Status: DISCONTINUED | OUTPATIENT
Start: 2021-12-28 | End: 2021-12-28 | Stop reason: HOSPADM

## 2021-12-28 RX ORDER — LIDOCAINE HYDROCHLORIDE 20 MG/ML
INJECTION, SOLUTION INFILTRATION; PERINEURAL
Status: DISCONTINUED | OUTPATIENT
Start: 2021-12-28 | End: 2021-12-28 | Stop reason: HOSPADM

## 2021-12-28 RX ORDER — TRIAMCINOLONE ACETONIDE 40 MG/ML
INJECTION, SUSPENSION INTRA-ARTICULAR; INTRAMUSCULAR
Status: DISCONTINUED | OUTPATIENT
Start: 2021-12-28 | End: 2021-12-28 | Stop reason: HOSPADM

## 2021-12-28 RX ORDER — SODIUM CHLORIDE 9 MG/ML
INJECTION, SOLUTION INTRAVENOUS CONTINUOUS
Status: DISCONTINUED | OUTPATIENT
Start: 2021-12-28 | End: 2021-12-28 | Stop reason: HOSPADM

## 2021-12-29 ENCOUNTER — PATIENT MESSAGE (OUTPATIENT)
Dept: SPINE | Facility: CLINIC | Age: 61
End: 2021-12-29
Payer: COMMERCIAL

## 2021-12-29 ENCOUNTER — TELEPHONE (OUTPATIENT)
Dept: PAIN MEDICINE | Facility: CLINIC | Age: 61
End: 2021-12-29
Payer: COMMERCIAL

## 2022-01-10 ENCOUNTER — OFFICE VISIT (OUTPATIENT)
Dept: CARDIOLOGY | Facility: CLINIC | Age: 62
End: 2022-01-10
Payer: COMMERCIAL

## 2022-01-10 VITALS
HEART RATE: 68 BPM | DIASTOLIC BLOOD PRESSURE: 69 MMHG | HEIGHT: 76 IN | BODY MASS INDEX: 24.27 KG/M2 | SYSTOLIC BLOOD PRESSURE: 142 MMHG | WEIGHT: 199.31 LBS

## 2022-01-10 DIAGNOSIS — I25.84 CORONARY ARTERY DISEASE DUE TO CALCIFIED CORONARY LESION: ICD-10-CM

## 2022-01-10 DIAGNOSIS — I73.9 PAD (PERIPHERAL ARTERY DISEASE): ICD-10-CM

## 2022-01-10 DIAGNOSIS — G47.33 OSA (OBSTRUCTIVE SLEEP APNEA): ICD-10-CM

## 2022-01-10 DIAGNOSIS — E11.9 TYPE 2 DIABETES MELLITUS WITHOUT COMPLICATION, WITHOUT LONG-TERM CURRENT USE OF INSULIN: ICD-10-CM

## 2022-01-10 DIAGNOSIS — I25.10 CORONARY ARTERY DISEASE INVOLVING NATIVE CORONARY ARTERY OF NATIVE HEART WITHOUT ANGINA PECTORIS: Primary | ICD-10-CM

## 2022-01-10 DIAGNOSIS — E78.2 MIXED HYPERLIPIDEMIA: ICD-10-CM

## 2022-01-10 DIAGNOSIS — I25.10 ASCVD (ARTERIOSCLEROTIC CARDIOVASCULAR DISEASE): ICD-10-CM

## 2022-01-10 DIAGNOSIS — I25.10 CORONARY ARTERY DISEASE DUE TO CALCIFIED CORONARY LESION: ICD-10-CM

## 2022-01-10 DIAGNOSIS — I10 PRIMARY HYPERTENSION: ICD-10-CM

## 2022-01-10 PROCEDURE — 99214 PR OFFICE/OUTPT VISIT, EST, LEVL IV, 30-39 MIN: ICD-10-PCS | Mod: S$GLB,,, | Performed by: INTERNAL MEDICINE

## 2022-01-10 PROCEDURE — 99999 PR PBB SHADOW E&M-EST. PATIENT-LVL V: ICD-10-PCS | Mod: PBBFAC,,, | Performed by: INTERNAL MEDICINE

## 2022-01-10 PROCEDURE — 3008F PR BODY MASS INDEX (BMI) DOCUMENTED: ICD-10-PCS | Mod: CPTII,S$GLB,, | Performed by: INTERNAL MEDICINE

## 2022-01-10 PROCEDURE — 1159F PR MEDICATION LIST DOCUMENTED IN MEDICAL RECORD: ICD-10-PCS | Mod: CPTII,S$GLB,, | Performed by: INTERNAL MEDICINE

## 2022-01-10 PROCEDURE — 3078F PR MOST RECENT DIASTOLIC BLOOD PRESSURE < 80 MM HG: ICD-10-PCS | Mod: CPTII,S$GLB,, | Performed by: INTERNAL MEDICINE

## 2022-01-10 PROCEDURE — 3078F DIAST BP <80 MM HG: CPT | Mod: CPTII,S$GLB,, | Performed by: INTERNAL MEDICINE

## 2022-01-10 PROCEDURE — 99214 OFFICE O/P EST MOD 30 MIN: CPT | Mod: S$GLB,,, | Performed by: INTERNAL MEDICINE

## 2022-01-10 PROCEDURE — 3008F BODY MASS INDEX DOCD: CPT | Mod: CPTII,S$GLB,, | Performed by: INTERNAL MEDICINE

## 2022-01-10 PROCEDURE — 99999 PR PBB SHADOW E&M-EST. PATIENT-LVL V: CPT | Mod: PBBFAC,,, | Performed by: INTERNAL MEDICINE

## 2022-01-10 PROCEDURE — 3077F PR MOST RECENT SYSTOLIC BLOOD PRESSURE >= 140 MM HG: ICD-10-PCS | Mod: CPTII,S$GLB,, | Performed by: INTERNAL MEDICINE

## 2022-01-10 PROCEDURE — 3077F SYST BP >= 140 MM HG: CPT | Mod: CPTII,S$GLB,, | Performed by: INTERNAL MEDICINE

## 2022-01-10 PROCEDURE — 1159F MED LIST DOCD IN RCRD: CPT | Mod: CPTII,S$GLB,, | Performed by: INTERNAL MEDICINE

## 2022-01-10 RX ORDER — ROSUVASTATIN CALCIUM 40 MG/1
40 TABLET, COATED ORAL NIGHTLY
Qty: 90 TABLET | Refills: 3 | Status: SHIPPED | OUTPATIENT
Start: 2022-01-10 | End: 2022-02-23

## 2022-01-10 RX ORDER — LOSARTAN POTASSIUM 25 MG/1
100 TABLET ORAL DAILY
Qty: 90 TABLET | Refills: 3 | Status: SHIPPED | OUTPATIENT
Start: 2022-01-10 | End: 2022-02-23

## 2022-01-10 NOTE — PROGRESS NOTES
Subjective:    Patient ID:  Hi Braxton is a 62 y.o. male who presents for evaluation of Establish Care and Leg Swelling      HPI   The patient is a 61 year old male last seen by Dr Palacios in 2018. He has non obstructive CAD,ASCVD, diabetes , and hypertension. He has has left leg swelling related to DJD of knee post knee injection. He walks his don but no regular exercise.      CONCLUSIONS     1 - Normal left ventricular systolic function (EF 60-65%).     2 - No wall motion abnormalities.     3 - Normal left ventricular diastolic function.     4 - Normal right ventricular systolic function .     5 - The estimated PA systolic pressure is 27 mmHg.     6 - Mild tricuspid regurgitation.     This document has been electronically    SIGNED BY: Roslyn Uriostegui MD On: 03/27/2018 09:05    Angiographic Results 3/21/18      Diagnostic:          Patient has a left dominant coronary artery.        The coronary vessels are all normal.        - Left Main Coronary Artery:              The LM has luminal irregularities. There is AUGIE 3 flow.      - Left Anterior Descending Artery:              The LAD is normal. There is AUGIE 3 flow.      - Left Circumflex Artery:              The LCX is normal. There is AUGIE 3 flow.      - Right Coronary Artery:              The RCA is normal. There is AUGIE 3 flow.  Lab Results   Component Value Date     07/07/2021    K 4.3 07/07/2021     07/07/2021    CO2 23 07/07/2021    BUN 12 07/07/2021    CREATININE 1.1 07/07/2021     07/07/2021    HGBA1C 6.5 (H) 02/16/2022    MG 2.0 07/07/2021    AST 33 07/07/2021    ALT 35 07/07/2021    ALBUMIN 4.3 07/07/2021    PROT 7.8 07/07/2021    BILITOT 0.7 07/07/2021    WBC 5.47 07/07/2021    HGB 15.7 07/07/2021    HCT 45.8 07/07/2021    HCT 49 02/21/2021    MCV 90 07/07/2021     07/07/2021    INR 1.0 08/22/2019    PSA 21.2 (H) 02/06/2017    TSH 4.470 (H) 12/31/2020         Lab Results   Component Value Date    CHOL 200 (H) 02/21/2022     HDL 52 02/21/2022    TRIG 158 (H) 02/21/2022       Lab Results   Component Value Date    LDLCALC 116.4 02/21/2022       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 -     Hyperlipidemia     Hypertension     Memory loss     Thyroid nodule 8/22/2019    Traumatic iritis - Left Eye 2/27/2013       Current Outpatient Medications:     ALPRAZolam (XANAX) 0.5 MG tablet, TAKE 1 TABLET BY MOUTH THREE TIMES A DAY AS NEEDED FOR ANXIETY, Disp: 90 tablet, Rfl: 1    aspirin (ECOTRIN) 81 MG EC tablet, Take 1 tablet (81 mg total) by mouth once daily., Disp: , Rfl: 0    baclofen (LIORESAL) 10 MG tablet, Take 10 mg by mouth every evening., Disp: , Rfl:     CONTOUR NEXT TEST STRIPS Strp, USE TO TEST BLOOD SUGAR ONCE DAILY, Disp: 25 strip, Rfl: 6    diclofenac sodium (VOLTAREN) 1 % Gel, Apply 2 g topically 3 (three) times daily as needed (muscle spasm pain). Apply 2 grams to affected area 3 times daily as needed, Disp: 100 g, Rfl: 0    dicyclomine (BENTYL) 20 mg tablet, Take 1 tablet (20 mg total) by mouth every 6 (six) hours., Disp: 30 tablet, Rfl: 0    ezetimibe (ZETIA) 10 mg tablet, Take 1 tablet (10 mg total) by mouth once daily., Disp: 30 tablet, Rfl: 11    gabapentin (NEURONTIN) 300 MG capsule, Take 1 capsule (300 mg total) by mouth 3 (three) times daily., Disp: 90 capsule, Rfl: 11    HYDROcodone-acetaminophen (NORCO) 5-325 mg per tablet, Take 1 tablet by mouth every 6 (six) hours as needed for Pain., Disp: 30 tablet, Rfl: 0    insulin NPH isoph U-100 human (NOVOLIN N FLEXPEN) 100 unit/mL (3 mL) InPn, Inject 9 Units into the skin 2 (two) times daily before meals., Disp: 5 Syringe, Rfl: 1     "LIDOcaine (LIDODERM) 5 %, Apply to affected area as needed for pain for 12 hours, then off for 12 hours. Discard after each use. May use 4% lidocaine patch as alternative., Disp: 30 patch, Rfl: 0    metoprolol succinate (TOPROL-XL) 100 MG 24 hr tablet, TAKE 1 TABLET BY MOUTH EVERY DAY, Disp: 90 tablet, Rfl: 2    MICROLET LANCET Misc, 1 lancet by Misc.(Non-Drug; Combo Route) route once daily. Test blood glucose once daily as instructed., Disp: 25 each, Rfl: 11    NIFEdipine (PROCARDIA-XL) 60 MG (OSM) 24 hr tablet, TAKE 1 TABLET BY MOUTH EVERY DAY, Disp: 90 tablet, Rfl: 1    nitroGLYCERIN (NITROSTAT) 0.3 MG SL tablet, Place 1 tablet (0.3 mg total) under the tongue every 5 (five) minutes as needed for Chest pain., Disp: 25 tablet, Rfl: 0    ondansetron (ZOFRAN) 4 MG tablet, Take 1 tablet (4 mg total) by mouth every 6 (six) hours as needed for Nausea., Disp: 30 tablet, Rfl: 0    ondansetron (ZOFRAN-ODT) 4 MG TbDL, Take 1 tablet (4 mg total) by mouth every 8 (eight) hours as needed., Disp: 12 tablet, Rfl: 0    pantoprazole (PROTONIX) 40 MG tablet, TAKE 1 TABLET BY MOUTH TWICE A DAY, Disp: 180 tablet, Rfl: 3    pen needle, diabetic (BD ULTRA-FINE CHET PEN NEEDLE) 32 gauge x 5/32" Ndle, USE PEN NEEDLE AS INSTRUCTION WITH LANTUS INSULIN PRE-FILLED PEN., Disp: 200 each, Rfl: 1    potassium chloride SA (K-DUR,KLOR-CON) 10 MEQ tablet, Take 2 tablets (20 mEq total) by mouth once daily., Disp: 360 tablet, Rfl: 1    sertraline (ZOLOFT) 25 MG tablet, TAKE 1 TABLET BY MOUTH EVERY DAY, Disp: 30 tablet, Rfl: 6    spironolactone (ALDACTONE) 25 MG tablet, TAKE 2 TABLETS BY MOUTH ONCE DAILY., Disp: 180 tablet, Rfl: 0    celecoxib (CELEBREX) 200 MG capsule, TAKE 1 CAPSULE BY MOUTH ONCE DAILY, Disp: 30 capsule, Rfl: 0    losartan (COZAAR) 25 MG tablet, Take 4 tablets (100 mg total) by mouth once daily., Disp: 90 tablet, Rfl: 3    naproxen (NAPROSYN) 500 MG tablet, Take 1 tablet (500 mg total) by mouth 2 (two) times daily as " needed (pain). (Patient not taking: Reported on 1/10/2022), Disp: 60 tablet, Rfl: 0    rosuvastatin (CRESTOR) 40 MG Tab, Take 1 tablet (40 mg total) by mouth every evening., Disp: 90 tablet, Rfl: 3    tadalafiL (CIALIS) 20 MG Tab, Take 1 tablet (20 mg total) by mouth every 72 hours as needed (ED)., Disp: 15 tablet, Rfl: 11          Review of Systems   Constitutional: Negative for decreased appetite, diaphoresis, fever, malaise/fatigue, weight gain and weight loss.   HENT: Negative for congestion, ear discharge, ear pain and nosebleeds.    Eyes: Negative for blurred vision, double vision and visual disturbance.   Cardiovascular: Negative for chest pain, claudication, cyanosis, dyspnea on exertion, irregular heartbeat, leg swelling, near-syncope, orthopnea, palpitations, paroxysmal nocturnal dyspnea and syncope.   Respiratory: Negative for cough, hemoptysis, shortness of breath, sleep disturbances due to breathing, snoring, sputum production and wheezing.    Endocrine: Negative for polydipsia, polyphagia and polyuria.   Hematologic/Lymphatic: Negative for adenopathy and bleeding problem. Does not bruise/bleed easily.   Skin: Negative for color change, nail changes, poor wound healing and rash.   Musculoskeletal: Positive for joint pain (left knee). Negative for muscle cramps and muscle weakness.   Gastrointestinal: Negative for abdominal pain, anorexia, change in bowel habit, hematochezia, nausea and vomiting.   Genitourinary: Negative for dysuria, frequency and hematuria.   Neurological: Negative for brief paralysis, difficulty with concentration, excessive daytime sleepiness, dizziness, focal weakness, headaches, light-headedness, seizures, vertigo and weakness.   Psychiatric/Behavioral: Negative for altered mental status and depression.   Allergic/Immunologic: Negative for persistent infections.        Objective:BP (!) 142/69 (BP Location: Left arm, Patient Position: Sitting, BP Method: Medium (Automatic))    "Pulse 68   Ht 6' 4" (1.93 m)   Wt 90.4 kg (199 lb 4.7 oz)   BMI 24.26 kg/m²             Physical Exam  Constitutional:       Appearance: He is well-developed, normal weight and well-nourished.   HENT:      Head: Normocephalic.      Right Ear: External ear normal.      Left Ear: External ear normal.      Nose: Nose normal.        Comments: Inspection of lips, teeth and gums normalEyes:      General: No scleral icterus.     Extraocular Movements: EOM normal.      Pupils: Pupils are equal, round, and reactive to light.   Neck:      Thyroid: No thyromegaly.      Vascular: No JVD.      Trachea: No tracheal deviation.   Cardiovascular:      Rate and Rhythm: Normal rate and regular rhythm.      Pulses: Intact distal pulses.           Carotid pulses are 2+ on the right side and 2+ on the left side.       Femoral pulses are 2+ on the right side and 2+ on the left side.       Dorsalis pedis pulses are 0 on the right side and 0 on the left side.        Posterior tibial pulses are 2+ on the right side and 0 on the left side.      Heart sounds: No murmur heard.  No friction rub. No gallop.    Pulmonary:      Effort: Pulmonary effort is normal.      Breath sounds: Normal breath sounds.   Abdominal:      General: Bowel sounds are normal. There is no distension.      Palpations: There is no hepatosplenomegaly.      Tenderness: There is no abdominal tenderness. There is no guarding.   Musculoskeletal:         General: No tenderness or edema. Normal range of motion.      Cervical back: Normal range of motion and neck supple.   Lymphadenopathy:      Comments: Palpation of neck and groin lymph nodes normal   Skin:     General: Skin is dry.      Comments: Palpation of skin normal   Neurological:      Mental Status: He is alert and oriented to person, place, and time.      Cranial Nerves: No cranial nerve deficit.      Motor: No abnormal muscle tone.      Coordination: Coordination normal.   Psychiatric:         Behavior: Behavior " normal.         Thought Content: Thought content normal.         Judgment: Judgment normal.           Assessment:       1. Coronary artery disease involving native coronary artery of native heart without angina pectoris    2. Coronary artery disease due to calcified coronary lesion    3. PAD (peripheral artery disease)    4. ASCVD (arteriosclerotic cardiovascular disease)    5. Mixed hyperlipidemia    6. ROXY (obstructive sleep apnea)    7. Type 2 diabetes mellitus without complication, without long-term current use of insulin    8. Primary hypertension         Plan:       Hi was seen today for establish care and leg swelling.    Diagnoses and all orders for this visit:    Coronary artery disease involving native coronary artery of native heart without angina pectoris    Coronary artery disease due to calcified coronary lesion  -     Ambulatory referral/consult to Cardiology    PAD (peripheral artery disease)    ASCVD (arteriosclerotic cardiovascular disease)    Mixed hyperlipidemia  -     rosuvastatin (CRESTOR) 40 MG Tab; Take 1 tablet (40 mg total) by mouth every evening.  -     Lipid Panel; Future; Expected date: 02/21/2022  -     Lipid Panel; Future; Expected date: 01/10/2023    ROXY (obstructive sleep apnea)    Type 2 diabetes mellitus without complication, without long-term current use of insulin  -     Hemoglobin A1C; Future; Expected date: 01/10/2023  -     CBC Auto Differential; Future; Expected date: 01/10/2023  -     Comprehensive Metabolic Panel; Future; Expected date: 01/10/2023    Primary hypertension  -     losartan (COZAAR) 25 MG tablet; Take 4 tablets (100 mg total) by mouth once daily.

## 2022-02-08 ENCOUNTER — TELEPHONE (OUTPATIENT)
Dept: NEPHROLOGY | Facility: CLINIC | Age: 62
End: 2022-02-08
Payer: COMMERCIAL

## 2022-02-16 ENCOUNTER — LAB VISIT (OUTPATIENT)
Dept: LAB | Facility: HOSPITAL | Age: 62
End: 2022-02-16
Attending: INTERNAL MEDICINE
Payer: COMMERCIAL

## 2022-02-16 DIAGNOSIS — E11.9 DIABETES MELLITUS WITH INSULIN THERAPY: ICD-10-CM

## 2022-02-16 DIAGNOSIS — Z79.4 DIABETES MELLITUS WITH INSULIN THERAPY: ICD-10-CM

## 2022-02-16 LAB
ESTIMATED AVG GLUCOSE: 140 MG/DL (ref 68–131)
HBA1C MFR BLD: 6.5 % (ref 4–5.6)

## 2022-02-16 PROCEDURE — 83036 HEMOGLOBIN GLYCOSYLATED A1C: CPT | Performed by: INTERNAL MEDICINE

## 2022-02-16 PROCEDURE — 36415 COLL VENOUS BLD VENIPUNCTURE: CPT | Mod: PN | Performed by: INTERNAL MEDICINE

## 2022-02-17 ENCOUNTER — TELEPHONE (OUTPATIENT)
Dept: INTERNAL MEDICINE | Facility: CLINIC | Age: 62
End: 2022-02-17
Payer: COMMERCIAL

## 2022-02-21 ENCOUNTER — LAB VISIT (OUTPATIENT)
Dept: LAB | Facility: HOSPITAL | Age: 62
End: 2022-02-21
Attending: INTERNAL MEDICINE
Payer: COMMERCIAL

## 2022-02-21 DIAGNOSIS — E78.2 MIXED HYPERLIPIDEMIA: ICD-10-CM

## 2022-02-21 LAB
CHOLEST SERPL-MCNC: 200 MG/DL (ref 120–199)
CHOLEST/HDLC SERPL: 3.8 {RATIO} (ref 2–5)
HDLC SERPL-MCNC: 52 MG/DL (ref 40–75)
HDLC SERPL: 26 % (ref 20–50)
LDLC SERPL CALC-MCNC: 116.4 MG/DL (ref 63–159)
NONHDLC SERPL-MCNC: 148 MG/DL
TRIGL SERPL-MCNC: 158 MG/DL (ref 30–150)

## 2022-02-21 PROCEDURE — 80061 LIPID PANEL: CPT | Performed by: INTERNAL MEDICINE

## 2022-02-21 PROCEDURE — 36415 COLL VENOUS BLD VENIPUNCTURE: CPT | Mod: PN | Performed by: INTERNAL MEDICINE

## 2022-02-22 ENCOUNTER — PATIENT OUTREACH (OUTPATIENT)
Dept: ADMINISTRATIVE | Facility: OTHER | Age: 62
End: 2022-02-22
Payer: COMMERCIAL

## 2022-02-22 NOTE — PROGRESS NOTES
Care Everywhere: updated  Immunization: updated  Health Maintenance: updated  Media Review:   Legacy Review:   DIS:  Order placed:    Upcoming appts:  EFAX:  Task Tickets:  Referrals:

## 2022-02-23 ENCOUNTER — PATIENT MESSAGE (OUTPATIENT)
Dept: CARDIOLOGY | Facility: CLINIC | Age: 62
End: 2022-02-23

## 2022-02-23 ENCOUNTER — OFFICE VISIT (OUTPATIENT)
Dept: OPHTHALMOLOGY | Facility: CLINIC | Age: 62
End: 2022-02-23
Payer: COMMERCIAL

## 2022-02-23 ENCOUNTER — LAB VISIT (OUTPATIENT)
Dept: LAB | Facility: HOSPITAL | Age: 62
End: 2022-02-23
Attending: NURSE PRACTITIONER
Payer: COMMERCIAL

## 2022-02-23 ENCOUNTER — OFFICE VISIT (OUTPATIENT)
Dept: CARDIOLOGY | Facility: CLINIC | Age: 62
End: 2022-02-23
Payer: COMMERCIAL

## 2022-02-23 VITALS
DIASTOLIC BLOOD PRESSURE: 74 MMHG | SYSTOLIC BLOOD PRESSURE: 120 MMHG | WEIGHT: 199.06 LBS | HEART RATE: 72 BPM | HEIGHT: 76 IN | BODY MASS INDEX: 24.24 KG/M2

## 2022-02-23 DIAGNOSIS — R79.89 ABNORMAL TSH: ICD-10-CM

## 2022-02-23 DIAGNOSIS — I73.9 PERIPHERAL VASCULAR DISEASE: ICD-10-CM

## 2022-02-23 DIAGNOSIS — I65.23 BILATERAL CAROTID ARTERY STENOSIS: ICD-10-CM

## 2022-02-23 DIAGNOSIS — Z79.4 DIABETES MELLITUS WITH INSULIN THERAPY: ICD-10-CM

## 2022-02-23 DIAGNOSIS — E11.9 TYPE 2 DIABETES MELLITUS WITHOUT COMPLICATION, WITHOUT LONG-TERM CURRENT USE OF INSULIN: ICD-10-CM

## 2022-02-23 DIAGNOSIS — H25.813 COMBINED FORM OF AGE-RELATED CATARACT, BOTH EYES: ICD-10-CM

## 2022-02-23 DIAGNOSIS — I25.84 CORONARY ARTERY DISEASE DUE TO CALCIFIED CORONARY LESION: ICD-10-CM

## 2022-02-23 DIAGNOSIS — E78.2 MIXED HYPERLIPIDEMIA: ICD-10-CM

## 2022-02-23 DIAGNOSIS — I25.10 CORONARY ARTERY DISEASE DUE TO CALCIFIED CORONARY LESION: Primary | ICD-10-CM

## 2022-02-23 DIAGNOSIS — I25.10 CORONARY ARTERY DISEASE DUE TO CALCIFIED CORONARY LESION: ICD-10-CM

## 2022-02-23 DIAGNOSIS — H11.002 PTERYGIUM EYE, LEFT: ICD-10-CM
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
## 2022-02-23 DIAGNOSIS — I10 ESSENTIAL HYPERTENSION: ICD-10-CM

## 2022-02-23 DIAGNOSIS — I73.9 CLAUDICATION: ICD-10-CM

## 2022-02-23 DIAGNOSIS — I25.84 CORONARY ARTERY DISEASE DUE TO CALCIFIED CORONARY LESION: Primary | ICD-10-CM

## 2022-02-23 DIAGNOSIS — E87.6 HYPOKALEMIA: ICD-10-CM

## 2022-02-23 DIAGNOSIS — Z82.49 FAMILY HISTORY OF PREMATURE CAD: ICD-10-CM

## 2022-02-23 DIAGNOSIS — E11.9 DIABETES MELLITUS WITH INSULIN THERAPY: ICD-10-CM

## 2022-02-23 PROBLEM — A41.9 SEPSIS: Status: RESOLVED | Noted: 2020-05-05 | Resolved: 2022-02-23

## 2022-02-23 LAB
ALBUMIN SERPL BCP-MCNC: 4.3 G/DL (ref 3.5–5.2)
ALP SERPL-CCNC: 69 U/L (ref 55–135)
ALT SERPL W/O P-5'-P-CCNC: 27 U/L (ref 10–44)
ANION GAP SERPL CALC-SCNC: 11 MMOL/L (ref 8–16)
AST SERPL-CCNC: 21 U/L (ref 10–40)
BILIRUB SERPL-MCNC: 0.6 MG/DL (ref 0.1–1)
BUN SERPL-MCNC: 13 MG/DL (ref 8–23)
CALCIUM SERPL-MCNC: 9.9 MG/DL (ref 8.7–10.5)
CHLORIDE SERPL-SCNC: 102 MMOL/L (ref 95–110)
CO2 SERPL-SCNC: 25 MMOL/L (ref 23–29)
CREAT SERPL-MCNC: 1 MG/DL (ref 0.5–1.4)
ERYTHROCYTE [DISTWIDTH] IN BLOOD BY AUTOMATED COUNT: 12.7 % (ref 11.5–14.5)
EST. GFR  (AFRICAN AMERICAN): >60 ML/MIN/1.73 M^2
EST. GFR  (NON AFRICAN AMERICAN): >60 ML/MIN/1.73 M^2
GLUCOSE SERPL-MCNC: 121 MG/DL (ref 70–110)
HCT VFR BLD AUTO: 46.8 % (ref 40–54)
HGB BLD-MCNC: 15.6 G/DL (ref 14–18)
MAGNESIUM SERPL-MCNC: 2.1 MG/DL (ref 1.6–2.6)
MCH RBC QN AUTO: 30.3 PG (ref 27–31)
MCHC RBC AUTO-ENTMCNC: 33.3 G/DL (ref 32–36)
MCV RBC AUTO: 91 FL (ref 82–98)
PLATELET # BLD AUTO: 263 K/UL (ref 150–450)
PMV BLD AUTO: 10.6 FL (ref 9.2–12.9)
POTASSIUM SERPL-SCNC: 4.3 MMOL/L (ref 3.5–5.1)
PROT SERPL-MCNC: 7.3 G/DL (ref 6–8.4)
RBC # BLD AUTO: 5.15 M/UL (ref 4.6–6.2)
SODIUM SERPL-SCNC: 138 MMOL/L (ref 136–145)
TSH SERPL DL<=0.005 MIU/L-ACNC: 2.83 UIU/ML (ref 0.4–4)
WBC # BLD AUTO: 4.83 K/UL (ref 3.9–12.7)

## 2022-02-23 PROCEDURE — 4010F PR ACE/ARB THEARPY RXD/TAKEN: ICD-10-PCS | Mod: CPTII,S$GLB,, | Performed by: NURSE PRACTITIONER

## 2022-02-23 PROCEDURE — 85027 COMPLETE CBC AUTOMATED: CPT | Performed by: NURSE PRACTITIONER

## 2022-02-23 PROCEDURE — 36415 COLL VENOUS BLD VENIPUNCTURE: CPT | Mod: PO | Performed by: NURSE PRACTITIONER

## 2022-02-23 PROCEDURE — 3078F PR MOST RECENT DIASTOLIC BLOOD PRESSURE < 80 MM HG: ICD-10-PCS | Mod: CPTII,S$GLB,, | Performed by: NURSE PRACTITIONER

## 2022-02-23 PROCEDURE — 99203 PR OFFICE/OUTPT VISIT, NEW, LEVL III, 30-44 MIN: ICD-10-PCS | Mod: S$GLB,,, | Performed by: OPHTHALMOLOGY

## 2022-02-23 PROCEDURE — 99214 OFFICE O/P EST MOD 30 MIN: CPT | Mod: S$GLB,,, | Performed by: NURSE PRACTITIONER

## 2022-02-23 PROCEDURE — 1159F PR MEDICATION LIST DOCUMENTED IN MEDICAL RECORD: ICD-10-PCS | Mod: CPTII,S$GLB,, | Performed by: OPHTHALMOLOGY

## 2022-02-23 PROCEDURE — 3008F BODY MASS INDEX DOCD: CPT | Mod: CPTII,S$GLB,, | Performed by: NURSE PRACTITIONER

## 2022-02-23 PROCEDURE — 4010F PR ACE/ARB THEARPY RXD/TAKEN: ICD-10-PCS | Mod: CPTII,S$GLB,, | Performed by: OPHTHALMOLOGY

## 2022-02-23 PROCEDURE — 3044F PR MOST RECENT HEMOGLOBIN A1C LEVEL <7.0%: ICD-10-PCS | Mod: CPTII,S$GLB,, | Performed by: NURSE PRACTITIONER

## 2022-02-23 PROCEDURE — 1159F MED LIST DOCD IN RCRD: CPT | Mod: CPTII,S$GLB,, | Performed by: NURSE PRACTITIONER

## 2022-02-23 PROCEDURE — 4010F ACE/ARB THERAPY RXD/TAKEN: CPT | Mod: CPTII,S$GLB,, | Performed by: OPHTHALMOLOGY

## 2022-02-23 PROCEDURE — 1159F MED LIST DOCD IN RCRD: CPT | Mod: CPTII,S$GLB,, | Performed by: OPHTHALMOLOGY

## 2022-02-23 PROCEDURE — 3074F PR MOST RECENT SYSTOLIC BLOOD PRESSURE < 130 MM HG: ICD-10-PCS | Mod: CPTII,S$GLB,, | Performed by: NURSE PRACTITIONER

## 2022-02-23 PROCEDURE — 3044F HG A1C LEVEL LT 7.0%: CPT | Mod: CPTII,S$GLB,, | Performed by: OPHTHALMOLOGY

## 2022-02-23 PROCEDURE — 3078F DIAST BP <80 MM HG: CPT | Mod: CPTII,S$GLB,, | Performed by: NURSE PRACTITIONER

## 2022-02-23 PROCEDURE — 3074F SYST BP LT 130 MM HG: CPT | Mod: CPTII,S$GLB,, | Performed by: NURSE PRACTITIONER

## 2022-02-23 PROCEDURE — 84443 ASSAY THYROID STIM HORMONE: CPT | Performed by: NURSE PRACTITIONER

## 2022-02-23 PROCEDURE — 3044F HG A1C LEVEL LT 7.0%: CPT | Mod: CPTII,S$GLB,, | Performed by: NURSE PRACTITIONER

## 2022-02-23 PROCEDURE — 99214 PR OFFICE/OUTPT VISIT, EST, LEVL IV, 30-39 MIN: ICD-10-PCS | Mod: S$GLB,,, | Performed by: NURSE PRACTITIONER

## 2022-02-23 PROCEDURE — 83735 ASSAY OF MAGNESIUM: CPT | Performed by: NURSE PRACTITIONER

## 2022-02-23 PROCEDURE — 3008F PR BODY MASS INDEX (BMI) DOCUMENTED: ICD-10-PCS | Mod: CPTII,S$GLB,, | Performed by: NURSE PRACTITIONER

## 2022-02-23 PROCEDURE — 1160F PR REVIEW ALL MEDS BY PRESCRIBER/CLIN PHARMACIST DOCUMENTED: ICD-10-PCS | Mod: CPTII,S$GLB,, | Performed by: OPHTHALMOLOGY

## 2022-02-23 PROCEDURE — 99999 PR PBB SHADOW E&M-EST. PATIENT-LVL V: CPT | Mod: PBBFAC,,, | Performed by: NURSE PRACTITIONER

## 2022-02-23 PROCEDURE — 1160F PR REVIEW ALL MEDS BY PRESCRIBER/CLIN PHARMACIST DOCUMENTED: ICD-10-PCS | Mod: CPTII,S$GLB,, | Performed by: NURSE PRACTITIONER

## 2022-02-23 PROCEDURE — 1159F PR MEDICATION LIST DOCUMENTED IN MEDICAL RECORD: ICD-10-PCS | Mod: CPTII,S$GLB,, | Performed by: NURSE PRACTITIONER

## 2022-02-23 PROCEDURE — 99999 PR PBB SHADOW E&M-EST. PATIENT-LVL V: ICD-10-PCS | Mod: PBBFAC,,, | Performed by: NURSE PRACTITIONER

## 2022-02-23 PROCEDURE — 80053 COMPREHEN METABOLIC PANEL: CPT | Performed by: NURSE PRACTITIONER

## 2022-02-23 PROCEDURE — 1160F RVW MEDS BY RX/DR IN RCRD: CPT | Mod: CPTII,S$GLB,, | Performed by: OPHTHALMOLOGY

## 2022-02-23 PROCEDURE — 3044F PR MOST RECENT HEMOGLOBIN A1C LEVEL <7.0%: ICD-10-PCS | Mod: CPTII,S$GLB,, | Performed by: OPHTHALMOLOGY

## 2022-02-23 PROCEDURE — 99999 PR PBB SHADOW E&M-EST. PATIENT-LVL V: ICD-10-PCS | Mod: PBBFAC,,, | Performed by: OPHTHALMOLOGY

## 2022-02-23 PROCEDURE — 1160F RVW MEDS BY RX/DR IN RCRD: CPT | Mod: CPTII,S$GLB,, | Performed by: NURSE PRACTITIONER

## 2022-02-23 PROCEDURE — 99999 PR PBB SHADOW E&M-EST. PATIENT-LVL V: CPT | Mod: PBBFAC,,, | Performed by: OPHTHALMOLOGY

## 2022-02-23 PROCEDURE — 4010F ACE/ARB THERAPY RXD/TAKEN: CPT | Mod: CPTII,S$GLB,, | Performed by: NURSE PRACTITIONER

## 2022-02-23 PROCEDURE — 99203 OFFICE O/P NEW LOW 30 MIN: CPT | Mod: S$GLB,,, | Performed by: OPHTHALMOLOGY

## 2022-02-23 RX ORDER — EZETIMIBE 10 MG/1
10 TABLET ORAL DAILY
Qty: 90 TABLET | Refills: 3 | Status: SHIPPED | OUTPATIENT
Start: 2022-02-23 | End: 2022-12-30 | Stop reason: SDUPTHER

## 2022-02-23 RX ORDER — NITROGLYCERIN 0.3 MG/1
0.3 TABLET SUBLINGUAL EVERY 5 MIN PRN
Qty: 100 TABLET | Refills: 3 | Status: SHIPPED | OUTPATIENT
Start: 2022-02-23 | End: 2023-07-10 | Stop reason: SDUPTHER

## 2022-02-23 RX ORDER — PRAVASTATIN SODIUM 80 MG/1
80 TABLET ORAL DAILY
Qty: 90 TABLET | Refills: 3 | Status: SHIPPED | OUTPATIENT
Start: 2022-02-23 | End: 2022-12-30 | Stop reason: SDUPTHER

## 2022-02-23 RX ORDER — SPIRONOLACTONE 50 MG/1
50 TABLET, FILM COATED ORAL DAILY
Qty: 90 TABLET | Refills: 3 | Status: SHIPPED | OUTPATIENT
Start: 2022-02-23 | End: 2022-12-30 | Stop reason: SDUPTHER

## 2022-02-23 RX ORDER — LOSARTAN POTASSIUM 25 MG/1
25 TABLET ORAL DAILY
Qty: 90 TABLET | Refills: 3 | Status: SHIPPED | OUTPATIENT
Start: 2022-02-23 | End: 2022-05-12

## 2022-02-23 RX ORDER — ALPRAZOLAM 0.5 MG/1
TABLET ORAL
Qty: 90 TABLET | Refills: 1 | Status: SHIPPED | OUTPATIENT
Start: 2022-02-23 | End: 2022-06-01

## 2022-02-23 NOTE — PROGRESS NOTES
HPI     Blurred Vision     Comments: Pt states he was hit in OS by his dog's paw.              Comments     63 yo male comes in today for urgent care visit for dog hit him in OS   with his paw. Patient states vision is blurry and intermittent pain in OS.   Reports a eye injure from yrs ago where he was hit in the OS by a pair of   beads.     H/o iritis OS      I have personally interviewed the patient, reviewed the history and   examined the patient and agree with the technician's &/or resident's exam,   assessment and plan.             Last edited by Orlando Sheldon MD on 2/23/2022  2:51 PM. (History)            Assessment /Plan     For exam results, see Encounter Report.    Struck by dog, initial encounter    Pterygium eye, left    Combined form of age-related cataract, both eyes      I found no evidence of permanent injury related to the bump. Return as needed.

## 2022-02-23 NOTE — PROGRESS NOTES
Mr. Braxton is a patient of Dr. Palacios and was last seen in [facility] Cardiology Visit 1/10/22.      Subjective:   Patient ID:  Hi Braxton is a 62 y.o. male who presents for follow-up of Coronary Artery Disease    Problem List:  HTN  Hyperlipidemia  PVC  Family h/o CAD  Non-obstructive CAD  Elevated blood sugars    HPI:   Hi Rubio is in clinic today for routine follow up.  He takes cialis but does express understanding not to take it within 48 hours of NTG.  His losartan says take 4 of the 25 mg tablets daily but he is taking 1 tablet (25 mg).  He has not tolerated high intensity statin therapy in the past.  Dr. Duncan tried to switch his pravastatin to rosuvastatin but he has a h/o angioedema with the rosuvastatin so he did not fill it.  He walks the dog for 1.5 miles daily.      Review of Systems   Constitutional: Negative for decreased appetite, diaphoresis, malaise/fatigue, weight gain and weight loss.   Eyes: Negative for visual disturbance.   Cardiovascular: Negative for chest pain, claudication, dyspnea on exertion, irregular heartbeat, leg swelling, near-syncope, orthopnea, palpitations, paroxysmal nocturnal dyspnea and syncope.        Denies chest pressure   Respiratory: Negative for cough, hemoptysis, shortness of breath, sleep disturbances due to breathing and snoring.    Endocrine: Negative for cold intolerance and heat intolerance.   Hematologic/Lymphatic: Negative for bleeding problem. Does not bruise/bleed easily.   Musculoskeletal: Positive for joint pain (knee). Negative for myalgias.   Gastrointestinal: Negative for bloating, abdominal pain, anorexia, change in bowel habit, constipation, diarrhea, nausea and vomiting.   Neurological: Negative for difficulty with concentration, disturbances in coordination, excessive daytime sleepiness, dizziness, headaches, light-headedness, loss of balance, numbness and weakness.   Psychiatric/Behavioral: The patient does not have insomnia.         Allergies and current medications updated and reviewed:  Review of patient's allergies indicates:   Allergen Reactions    Lisinopril Anaphylaxis and Nausea And Vomiting     General bad feeling    Lipitor [atorvastatin] Other (See Comments)     Muscle aches    Adhesive     Crestor [rosuvastatin] Swelling     Lip swelling.     Jardiance [empagliflozin] Other (See Comments)     Possible related to recent UTI's     Metformin      Used XR and experienced flatulance and abdominal pain.      Current Outpatient Medications   Medication Sig    ALPRAZolam (XANAX) 0.5 MG tablet TAKE 1 TABLET BY MOUTH THREE TIMES A DAY AS NEEDED FOR ANXIETY    aspirin (ECOTRIN) 81 MG EC tablet Take 1 tablet (81 mg total) by mouth once daily.    baclofen (LIORESAL) 10 MG tablet Take 10 mg by mouth nightly as needed.    celecoxib (CELEBREX) 200 MG capsule TAKE 1 CAPSULE BY MOUTH ONCE DAILY    CONTOUR NEXT TEST STRIPS Strp USE TO TEST BLOOD SUGAR ONCE DAILY    diclofenac sodium (VOLTAREN) 1 % Gel Apply 2 g topically 3 (three) times daily as needed (muscle spasm pain). Apply 2 grams to affected area 3 times daily as needed    dicyclomine (BENTYL) 20 mg tablet Take 1 tablet (20 mg total) by mouth every 6 (six) hours.    gabapentin (NEURONTIN) 300 MG capsule Take 1 capsule (300 mg total) by mouth 3 (three) times daily.    insulin NPH isoph U-100 human (NOVOLIN N FLEXPEN) 100 unit/mL (3 mL) InPn Inject 9 Units into the skin 2 (two) times daily before meals.    metoprolol succinate (TOPROL-XL) 100 MG 24 hr tablet TAKE 1 TABLET BY MOUTH EVERY DAY    MICROLET LANCET Misc 1 lancet by Misc.(Non-Drug; Combo Route) route once daily. Test blood glucose once daily as instructed.    NIFEdipine (PROCARDIA-XL) 60 MG (OSM) 24 hr tablet TAKE 1 TABLET BY MOUTH EVERY DAY    ondansetron (ZOFRAN) 4 MG tablet Take 1 tablet (4 mg total) by mouth every 6 (six) hours as needed for Nausea.    pantoprazole (PROTONIX) 40 MG tablet TAKE 1 TABLET BY  "MOUTH TWICE A DAY    pen needle, diabetic (BD ULTRA-FINE CHET PEN NEEDLE) 32 gauge x 5/32" Ndle USE PEN NEEDLE AS INSTRUCTION WITH LANTUS INSULIN PRE-FILLED PEN.    potassium chloride SA (K-DUR,KLOR-CON) 10 MEQ tablet Take 2 tablets (20 mEq total) by mouth once daily. (Patient taking differently: Take 20 mEq by mouth as needed.)    sertraline (ZOLOFT) 25 MG tablet TAKE 1 TABLET BY MOUTH EVERY DAY (Patient taking differently: Take 25 mg by mouth as needed.)    tadalafiL (CIALIS) 20 MG Tab Take 1 tablet (20 mg total) by mouth every 72 hours as needed (ED).    ezetimibe (ZETIA) 10 mg tablet Take 1 tablet (10 mg total) by mouth once daily.    losartan (COZAAR) 25 MG tablet Take 1 tablet (25 mg total) by mouth once daily.    nitroGLYCERIN (NITROSTAT) 0.3 MG SL tablet Place 1 tablet (0.3 mg total) under the tongue every 5 (five) minutes as needed for Chest pain.    pravastatin (PRAVACHOL) 80 MG tablet Take 1 tablet (80 mg total) by mouth once daily.    spironolactone (ALDACTONE) 50 MG tablet Take 1 tablet (50 mg total) by mouth once daily.     No current facility-administered medications for this visit.       Objective:        /74   Pulse 72   Ht 6' 4" (1.93 m)   Wt 90.3 kg (199 lb 1.2 oz)   BMI 24.23 kg/m²       Physical Exam  Vitals and nursing note reviewed.   Constitutional:       General: He is not in acute distress.     Appearance: Normal appearance. He is well-developed. He is not diaphoretic.   HENT:      Head: Normocephalic and atraumatic.   Eyes:      General: Lids are normal. No scleral icterus.     Conjunctiva/sclera: Conjunctivae normal.   Neck:      Vascular: Normal carotid pulses. No carotid bruit, hepatojugular reflux or JVD.   Cardiovascular:      Rate and Rhythm: Normal rate and regular rhythm.      Chest Wall: PMI is not displaced.      Pulses: Intact distal pulses.           Carotid pulses are 2+ on the right side and 2+ on the left side.       Radial pulses are 2+ on the right side " and 2+ on the left side.        Dorsalis pedis pulses are 2+ on the right side and 2+ on the left side.        Posterior tibial pulses are 1+ on the right side and 1+ on the left side.      Heart sounds: S1 normal and S2 normal. No murmur heard.    No friction rub. No gallop.   Pulmonary:      Effort: Pulmonary effort is normal. No respiratory distress.      Breath sounds: Normal breath sounds. No decreased breath sounds, wheezing, rhonchi or rales.   Chest:      Chest wall: No tenderness.   Abdominal:      General: Bowel sounds are normal. There is no distension or abdominal bruit.      Palpations: Abdomen is soft. There is no fluid wave or pulsatile mass.      Tenderness: There is no abdominal tenderness.   Musculoskeletal:      Cervical back: Neck supple.   Skin:     General: Skin is warm and dry.      Findings: No rash.      Nails: There is no clubbing.   Neurological:      Mental Status: He is alert and oriented to person, place, and time.      Gait: Gait normal.   Psychiatric:         Speech: Speech normal.         Behavior: Behavior normal.         Thought Content: Thought content normal.         Judgment: Judgment normal.         Chemistry        Component Value Date/Time     07/07/2021 1413    K 4.3 07/07/2021 1413     07/07/2021 1413    CO2 23 07/07/2021 1413    BUN 12 07/07/2021 1413    CREATININE 1.1 07/07/2021 1413     07/07/2021 1413        Component Value Date/Time    CALCIUM 10.2 07/07/2021 1413    ALKPHOS 75 07/07/2021 1413    AST 33 07/07/2021 1413    ALT 35 07/07/2021 1413    BILITOT 0.7 07/07/2021 1413    ESTGFRAFRICA >60.0 07/07/2021 1413    EGFRNONAA >60.0 07/07/2021 1413        Lab Results   Component Value Date    HGBA1C 6.5 (H) 02/16/2022     Recent Labs   Lab 10/15/20  1358 11/18/20  0703 12/31/20  0703 02/21/21  0906 07/07/21  1413 02/21/22  0710   WBC 6.18   < >  --    < > 5.47  --    Hemoglobin 15.3   < >  --    < > 15.7  --    POC Hematocrit  --   --   --    < >  --    --    Hematocrit 44.9   < >  --    < > 45.8  --    MCV 87   < >  --    < > 90  --    Platelets 243   < >  --    < > 239  --    BNP <10  --   --   --   --   --    TSH  --    < > 4.470 H  --   --   --    Cholesterol  --   --   --    < >  --  200 H   HDL  --   --   --    < >  --  52   LDL Cholesterol  --   --   --    < >  --  116.4   Triglycerides  --   --   --    < >  --  158 H   HDL/Cholesterol Ratio  --   --   --    < >  --  26.0    < > = values in this interval not displayed.       Recent Labs   Lab 08/22/19  0820   INR 1.0        Test(s) Reviewed  I have reviewed the following in detail:  [] Stress test   [x] Angiography 2018   [x] Echocardiogram 2018   [x] Labs   [] Other:         Assessment/Plan:   1. Coronary artery disease due to calcified coronary lesion  Asymptomatic.  No changes.     - losartan (COZAAR) 25 MG tablet; Take 1 tablet (25 mg total) by mouth once daily.  Dispense: 90 tablet; Refill: 3  - nitroGLYCERIN (NITROSTAT) 0.3 MG SL tablet; Place 1 tablet (0.3 mg total) under the tongue every 5 (five) minutes as needed for Chest pain.  Dispense: 100 tablet; Refill: 3  - ezetimibe (ZETIA) 10 mg tablet; Take 1 tablet (10 mg total) by mouth once daily.  Dispense: 90 tablet; Refill: 3  - pravastatin (PRAVACHOL) 80 MG tablet; Take 1 tablet (80 mg total) by mouth once daily.  Dispense: 90 tablet; Refill: 3  - Comprehensive Metabolic Panel; Future  - Magnesium; Future  - CBC Without Differential; Future    2. Mixed hyperlipidemia  LDL not at goal <70.  He is taking pravastatin 80 mg and zetia 10 mg.  Encouraged mediterranean diet and 150 minutes of CV exercise.  He has subclinical disease and only luminal irregularities on angiogram.  He has premature CAD in his family.  Carotid mild bilateral disease in 2013.  Could consider adding PCSK9 inhibitor if his LDL remains above goal.  We did briefly discuss PCSK9 inhibitor therapy.  Will try lifestyle modifications first. Will also repeat his TSH as thyroid being  "hypo/hyper his lipid levels can be affected.     - ezetimibe (ZETIA) 10 mg tablet; Take 1 tablet (10 mg total) by mouth once daily.  Dispense: 90 tablet; Refill: 3  - pravastatin (PRAVACHOL) 80 MG tablet; Take 1 tablet (80 mg total) by mouth once daily.  Dispense: 90 tablet; Refill: 3    3. Peripheral vascular disease  CTA abdomen with runoff 12/18/20: "Moderate calcified atherosclerosis of the abdominal aorta and bilateral iliac arteries without hemodynamically significant stenosis or occlusion."  No significant calf arterial disease.  Symptomatic.  Encouraged walking program to promote angiogenesis.  If remains symptomatic, would start pletal 50 mg BID.  No recent imaging.  Will get arterial ultrasound of his legs to evaluate for severe stenosis.     4. Essential hypertension  BP at goal <130/80. Corrected his medication list to reflect what he is currently taking and refilled his medications.Continue current regimen.    - spironolactone (ALDACTONE) 50 MG tablet; Take 1 tablet (50 mg total) by mouth once daily.  Dispense: 90 tablet; Refill: 3  - losartan (COZAAR) 25 MG tablet; Take 1 tablet (25 mg total) by mouth once daily.  Dispense: 90 tablet; Refill: 3    5. Diabetes mellitus with insulin therapy  Lab Results   Component Value Date    HGBA1C 6.5 (H) 02/16/2022     A1C at goal <7. F/U with PCP as planned      6. Type 2 diabetes mellitus without complication, without long-term current use of insulin    - TSH; Future    7. Hypokalemia  Last potassium wnl.  Repeat potassium.     - spironolactone (ALDACTONE) 50 MG tablet; Take 1 tablet (50 mg total) by mouth once daily.  Dispense: 90 tablet; Refill: 3    8. Abnormal TSH    - TSH; Future    9. Family history of premature CAD    10. Bilateral carotid stenosis  Carotid ultrasound in 2013 showed mild disease.  LDL has been suboptimally controlled.  Will repeat ultrasound.     11. Claudication  See #3    A copy of this note will be forwarded to Dr. Kendall and  " Suzanne.     Follow up in about 6 months (around 8/23/2022).

## 2022-02-23 NOTE — PATIENT INSTRUCTIONS
Increase cardiovascular exercise to 30 minutes of brisk walking a day for 4-5 days a week.  You can use a stationary bike or swim for 30 minutes a day instead of walking.  Whatever exercise you choose, make sure you are working hard enough to increase your heart rate.     We recommend the mediterranean diet

## 2022-02-28 ENCOUNTER — TELEPHONE (OUTPATIENT)
Dept: INTERNAL MEDICINE | Facility: CLINIC | Age: 62
End: 2022-02-28
Payer: COMMERCIAL

## 2022-02-28 NOTE — TELEPHONE ENCOUNTER
----- Message from George Garza sent at 2/28/2022 12:23 PM CST -----  Contact: self [phone] or [phone]  Pt requesting a call back in regards to cyclobenzaprine or baclofen (LIORESAL) 10 MG tablet have questions in regards to two meds.    Please call and advise

## 2022-02-28 NOTE — TELEPHONE ENCOUNTER
Spoke with pt to advise that baclofen has been routed to his pharmacy.    Verbalized understanding.

## 2022-03-08 ENCOUNTER — TELEPHONE (OUTPATIENT)
Dept: CARDIOLOGY | Facility: CLINIC | Age: 62
End: 2022-03-08
Payer: COMMERCIAL

## 2022-03-08 NOTE — DISCHARGE INSTRUCTIONS
[facility]  Operative Report  3/8/2022     Murphy Councilman Patient Status:  Hospital Outpatient Surgery    1968 MRN [id number]   Location [address] [address] Attending Irineo Gaxiola MD   Hosp Day # 0 PCP Concepcion Soler MD     Indication: Cecilia Shea is a 52year old female with a history of pain following surgery    Preoperative Diagnosis:  Complex regional pain syndrome type 1 of left lower extremity [G90.522]    Postoperative Diagnosis: Same as above. Procedure performed: LEFT LUMBAR SYMPATHETIC BLOCK WITH LOCAL AND VALIUM     Anesthesia: Local    EBL: Less than 1 ml. Procedure Description:   After reviewing the patient's history and performing a focused physical examination, the diagnosis was confirmed and contraindications such as infection and coagulopathy were ruled out. Following review of potential side effects and complications, including but not necessarily limited to infection, allergic reaction, local tissue breakdown, nerve injury, and paresis, the patient indicated they understood and agreed to proceed. After obtaining the informed consent, the patient was brought to the procedure room and monitored. The patient was brought to the procedural suite and placed in a prone position. The back was prepped in the usual sterile fashion. AP imaging was used identify the L1 vertebral body. The C-arm was then obliqued ipsilaterally towards the left 20 degrees. The inferior border of the L1 vertebral body was identified and the overlying soft tissue was then anesthetized with 5 cc 1% lidocaine. After adequate skin analgesia, a 22-gauge spinal needle is advanced with imaging guidance to contact bone along the lateral aspect of the L1 vertebral body. AP and lateral imaging was used to confirm needle positioning. After negative aspiration for heme, the needle was then walked off anteriorly with live imaging guidance.   This was walked off the anterior portion of the L1 Please call with any questions or concerns.      Monday thru Friday 8:00 am - 4:30 pm    Interventional Radiology   [phone]    After Hours    Ask for the Radiology Intern on call  [phone]       vertebral body. After repeat negative aspiration, 1 cc of Omnipaque was injected. This was negative for vascular spread. Subsequently a total mixture of 10 mg of Decadron with 9 cc of 0.5% bupivacaine was injected. The needle was then withdrawn tip intact. The patient tolerated procedure well. Complications: None. Follow up: The patient was followed in the pain clinic as needed basis.       Gabby Gonsalez MD

## 2022-03-10 ENCOUNTER — HOSPITAL ENCOUNTER (OUTPATIENT)
Facility: OTHER | Age: 62
Discharge: HOME | End: 2022-03-11
Attending: EMERGENCY MEDICINE | Admitting: EMERGENCY MEDICINE
Payer: COMMERCIAL

## 2022-03-10 ENCOUNTER — OFFICE VISIT (OUTPATIENT)
Dept: UROLOGY | Facility: CLINIC | Age: 62
End: 2022-03-10
Attending: UROLOGY
Payer: COMMERCIAL

## 2022-03-10 VITALS
SYSTOLIC BLOOD PRESSURE: 150 MMHG | HEIGHT: 76 IN | WEIGHT: 199 LBS | BODY MASS INDEX: 24.23 KG/M2 | DIASTOLIC BLOOD PRESSURE: 112 MMHG

## 2022-03-10 DIAGNOSIS — N39.0 RECURRENT UTI: Primary | ICD-10-CM

## 2022-03-10 DIAGNOSIS — E11.59 HYPERTENSION ASSOCIATED WITH DIABETES: ICD-10-CM

## 2022-03-10 DIAGNOSIS — R07.9 CHEST PAIN: ICD-10-CM

## 2022-03-10 DIAGNOSIS — R42 DIZZINESS: Primary | ICD-10-CM

## 2022-03-10 DIAGNOSIS — I15.2 HYPERTENSION ASSOCIATED WITH DIABETES: ICD-10-CM

## 2022-03-10 DIAGNOSIS — R07.9 CHEST PAIN, UNSPECIFIED TYPE: ICD-10-CM

## 2022-03-10 DIAGNOSIS — N28.1 RENAL CYST, RIGHT: ICD-10-CM

## 2022-03-10 DIAGNOSIS — R52 PAIN: ICD-10-CM

## 2022-03-10 DIAGNOSIS — R07.2 PRECORDIAL PAIN: ICD-10-CM

## 2022-03-10 LAB
ALBUMIN SERPL BCP-MCNC: 4.4 G/DL (ref 3.5–5.2)
ALP SERPL-CCNC: 78 U/L (ref 55–135)
ALT SERPL W/O P-5'-P-CCNC: 32 U/L (ref 10–44)
ANION GAP SERPL CALC-SCNC: 11 MMOL/L (ref 8–16)
AST SERPL-CCNC: 23 U/L (ref 10–40)
BASOPHILS # BLD AUTO: 0.04 K/UL (ref 0–0.2)
BASOPHILS NFR BLD: 0.7 % (ref 0–1.9)
BILIRUB SERPL-MCNC: 0.4 MG/DL (ref 0.1–1)
BILIRUB UR QL STRIP: NEGATIVE
BNP SERPL-MCNC: <10 PG/ML (ref 0–99)
BUN SERPL-MCNC: 11 MG/DL (ref 8–23)
CALCIUM SERPL-MCNC: 10.2 MG/DL (ref 8.7–10.5)
CHLORIDE SERPL-SCNC: 106 MMOL/L (ref 95–110)
CLARITY UR: CLEAR
CO2 SERPL-SCNC: 25 MMOL/L (ref 23–29)
COLOR UR: YELLOW
CREAT SERPL-MCNC: 0.9 MG/DL (ref 0.5–1.4)
CTP QC/QA: YES
DIFFERENTIAL METHOD: NORMAL
EOSINOPHIL # BLD AUTO: 0.4 K/UL (ref 0–0.5)
EOSINOPHIL NFR BLD: 7.5 % (ref 0–8)
ERYTHROCYTE [DISTWIDTH] IN BLOOD BY AUTOMATED COUNT: 13.2 % (ref 11.5–14.5)
EST. GFR  (AFRICAN AMERICAN): >60 ML/MIN/1.73 M^2
EST. GFR  (NON AFRICAN AMERICAN): >60 ML/MIN/1.73 M^2
GLUCOSE SERPL-MCNC: 114 MG/DL (ref 70–110)
GLUCOSE UR QL STRIP: ABNORMAL
HCT VFR BLD AUTO: 44.6 % (ref 40–54)
HGB BLD-MCNC: 15.1 G/DL (ref 14–18)
HGB UR QL STRIP: NEGATIVE
IMM GRANULOCYTES # BLD AUTO: 0.02 K/UL (ref 0–0.04)
IMM GRANULOCYTES NFR BLD AUTO: 0.3 % (ref 0–0.5)
KETONES UR QL STRIP: NEGATIVE
LEUKOCYTE ESTERASE UR QL STRIP: NEGATIVE
LYMPHOCYTES # BLD AUTO: 1.6 K/UL (ref 1–4.8)
LYMPHOCYTES NFR BLD: 27.2 % (ref 18–48)
MAGNESIUM SERPL-MCNC: 1.7 MG/DL (ref 1.6–2.6)
MCH RBC QN AUTO: 30.3 PG (ref 27–31)
MCHC RBC AUTO-ENTMCNC: 33.9 G/DL (ref 32–36)
MCV RBC AUTO: 90 FL (ref 82–98)
MONOCYTES # BLD AUTO: 0.6 K/UL (ref 0.3–1)
MONOCYTES NFR BLD: 10 % (ref 4–15)
NEUTROPHILS # BLD AUTO: 3.2 K/UL (ref 1.8–7.7)
NEUTROPHILS NFR BLD: 54.3 % (ref 38–73)
NITRITE UR QL STRIP: NEGATIVE
NRBC BLD-RTO: 0 /100 WBC
PH UR STRIP: 6 [PH] (ref 5–8)
PLATELET # BLD AUTO: 195 K/UL (ref 150–450)
PMV BLD AUTO: 10.3 FL (ref 9.2–12.9)
POCT GLUCOSE: 126 MG/DL (ref 70–110)
POCT GLUCOSE: 150 MG/DL (ref 70–110)
POTASSIUM SERPL-SCNC: 4.1 MMOL/L (ref 3.5–5.1)
PROT SERPL-MCNC: 7.2 G/DL (ref 6–8.4)
PROT UR QL STRIP: NEGATIVE
RBC # BLD AUTO: 4.98 M/UL (ref 4.6–6.2)
SARS-COV-2 RDRP RESP QL NAA+PROBE: NEGATIVE
SODIUM SERPL-SCNC: 142 MMOL/L (ref 136–145)
SP GR UR STRIP: 1.01 (ref 1–1.03)
T4 FREE SERPL-MCNC: 0.8 NG/DL (ref 0.71–1.51)
TROPONIN I SERPL DL<=0.01 NG/ML-MCNC: <0.006 NG/ML (ref 0–0.03)
TROPONIN I SERPL DL<=0.01 NG/ML-MCNC: <0.006 NG/ML (ref 0–0.03)
TSH SERPL DL<=0.005 MIU/L-ACNC: 4.85 UIU/ML (ref 0.4–4)
URN SPEC COLLECT METH UR: ABNORMAL
UROBILINOGEN UR STRIP-ACNC: NEGATIVE EU/DL
WBC # BLD AUTO: 5.88 K/UL (ref 3.9–12.7)

## 2022-03-10 PROCEDURE — U0002 COVID-19 LAB TEST NON-CDC: HCPCS | Performed by: PHYSICIAN ASSISTANT

## 2022-03-10 PROCEDURE — 3080F PR MOST RECENT DIASTOLIC BLOOD PRESSURE >= 90 MM HG: ICD-10-PCS | Mod: CPTII,S$GLB,, | Performed by: UROLOGY

## 2022-03-10 PROCEDURE — 4010F ACE/ARB THERAPY RXD/TAKEN: CPT | Mod: CPTII,S$GLB,, | Performed by: UROLOGY

## 2022-03-10 PROCEDURE — 93010 ELECTROCARDIOGRAM REPORT: CPT | Mod: ,,, | Performed by: INTERNAL MEDICINE

## 2022-03-10 PROCEDURE — 3044F PR MOST RECENT HEMOGLOBIN A1C LEVEL <7.0%: ICD-10-PCS | Mod: CPTII,S$GLB,, | Performed by: UROLOGY

## 2022-03-10 PROCEDURE — G0378 HOSPITAL OBSERVATION PER HR: HCPCS

## 2022-03-10 PROCEDURE — 99236 PR OBSERV/HOSP SAME DATE,LEVL V: ICD-10-PCS | Mod: 25,,, | Performed by: INTERNAL MEDICINE

## 2022-03-10 PROCEDURE — 25000003 PHARM REV CODE 250: Performed by: PHYSICIAN ASSISTANT

## 2022-03-10 PROCEDURE — 83735 ASSAY OF MAGNESIUM: CPT | Performed by: NURSE PRACTITIONER

## 2022-03-10 PROCEDURE — 96360 HYDRATION IV INFUSION INIT: CPT

## 2022-03-10 PROCEDURE — 1159F PR MEDICATION LIST DOCUMENTED IN MEDICAL RECORD: ICD-10-PCS | Mod: CPTII,S$GLB,, | Performed by: UROLOGY

## 2022-03-10 PROCEDURE — 3008F PR BODY MASS INDEX (BMI) DOCUMENTED: ICD-10-PCS | Mod: CPTII,S$GLB,, | Performed by: UROLOGY

## 2022-03-10 PROCEDURE — 84484 ASSAY OF TROPONIN QUANT: CPT | Performed by: PHYSICIAN ASSISTANT

## 2022-03-10 PROCEDURE — 93010 EKG 12-LEAD: ICD-10-PCS | Mod: ,,, | Performed by: INTERNAL MEDICINE

## 2022-03-10 PROCEDURE — 84443 ASSAY THYROID STIM HORMONE: CPT | Performed by: NURSE PRACTITIONER

## 2022-03-10 PROCEDURE — 1159F MED LIST DOCD IN RCRD: CPT | Mod: CPTII,S$GLB,, | Performed by: UROLOGY

## 2022-03-10 PROCEDURE — 82962 GLUCOSE BLOOD TEST: CPT

## 2022-03-10 PROCEDURE — 1160F RVW MEDS BY RX/DR IN RCRD: CPT | Mod: CPTII,S$GLB,, | Performed by: UROLOGY

## 2022-03-10 PROCEDURE — 1160F PR REVIEW ALL MEDS BY PRESCRIBER/CLIN PHARMACIST DOCUMENTED: ICD-10-PCS | Mod: CPTII,S$GLB,, | Performed by: UROLOGY

## 2022-03-10 PROCEDURE — 99214 PR OFFICE/OUTPT VISIT, EST, LEVL IV, 30-39 MIN: ICD-10-PCS | Mod: S$GLB,,, | Performed by: UROLOGY

## 2022-03-10 PROCEDURE — 81003 URINALYSIS AUTO W/O SCOPE: CPT | Performed by: PHYSICIAN ASSISTANT

## 2022-03-10 PROCEDURE — 93005 ELECTROCARDIOGRAM TRACING: CPT

## 2022-03-10 PROCEDURE — 85025 COMPLETE CBC W/AUTO DIFF WBC: CPT | Performed by: PHYSICIAN ASSISTANT

## 2022-03-10 PROCEDURE — 99236 HOSP IP/OBS SAME DATE HI 85: CPT | Mod: 25,,, | Performed by: INTERNAL MEDICINE

## 2022-03-10 PROCEDURE — 3044F HG A1C LEVEL LT 7.0%: CPT | Mod: CPTII,S$GLB,, | Performed by: UROLOGY

## 2022-03-10 PROCEDURE — 99285 EMERGENCY DEPT VISIT HI MDM: CPT | Mod: 25

## 2022-03-10 PROCEDURE — 3080F DIAST BP >= 90 MM HG: CPT | Mod: CPTII,S$GLB,, | Performed by: UROLOGY

## 2022-03-10 PROCEDURE — 99214 OFFICE O/P EST MOD 30 MIN: CPT | Mod: S$GLB,,, | Performed by: UROLOGY

## 2022-03-10 PROCEDURE — 36415 COLL VENOUS BLD VENIPUNCTURE: CPT | Performed by: NURSE PRACTITIONER

## 2022-03-10 PROCEDURE — 83880 ASSAY OF NATRIURETIC PEPTIDE: CPT | Performed by: PHYSICIAN ASSISTANT

## 2022-03-10 PROCEDURE — 80053 COMPREHEN METABOLIC PANEL: CPT | Performed by: PHYSICIAN ASSISTANT

## 2022-03-10 PROCEDURE — 25000003 PHARM REV CODE 250: Performed by: NURSE PRACTITIONER

## 2022-03-10 PROCEDURE — 84439 ASSAY OF FREE THYROXINE: CPT | Performed by: NURSE PRACTITIONER

## 2022-03-10 PROCEDURE — 63600175 PHARM REV CODE 636 W HCPCS: Performed by: NURSE PRACTITIONER

## 2022-03-10 PROCEDURE — 3077F PR MOST RECENT SYSTOLIC BLOOD PRESSURE >= 140 MM HG: ICD-10-PCS | Mod: CPTII,S$GLB,, | Performed by: UROLOGY

## 2022-03-10 PROCEDURE — 25000242 PHARM REV CODE 250 ALT 637 W/ HCPCS: Performed by: NURSE PRACTITIONER

## 2022-03-10 PROCEDURE — 3008F BODY MASS INDEX DOCD: CPT | Mod: CPTII,S$GLB,, | Performed by: UROLOGY

## 2022-03-10 PROCEDURE — 4010F PR ACE/ARB THEARPY RXD/TAKEN: ICD-10-PCS | Mod: CPTII,S$GLB,, | Performed by: UROLOGY

## 2022-03-10 PROCEDURE — 3077F SYST BP >= 140 MM HG: CPT | Mod: CPTII,S$GLB,, | Performed by: UROLOGY

## 2022-03-10 RX ORDER — SODIUM CHLORIDE 0.9 % (FLUSH) 0.9 %
10 SYRINGE (ML) INJECTION
Status: DISCONTINUED | OUTPATIENT
Start: 2022-03-10 | End: 2022-03-11 | Stop reason: HOSPADM

## 2022-03-10 RX ORDER — EZETIMIBE 10 MG/1
10 TABLET ORAL DAILY
Status: DISCONTINUED | OUTPATIENT
Start: 2022-03-11 | End: 2022-03-11 | Stop reason: HOSPADM

## 2022-03-10 RX ORDER — ASPIRIN 325 MG
325 TABLET ORAL
Status: COMPLETED | OUTPATIENT
Start: 2022-03-10 | End: 2022-03-10

## 2022-03-10 RX ORDER — NITROGLYCERIN 0.4 MG/1
0.4 TABLET SUBLINGUAL
Status: COMPLETED | OUTPATIENT
Start: 2022-03-10 | End: 2022-03-10

## 2022-03-10 RX ORDER — INSULIN ASPART 100 [IU]/ML
1-10 INJECTION, SOLUTION INTRAVENOUS; SUBCUTANEOUS
Status: DISCONTINUED | OUTPATIENT
Start: 2022-03-10 | End: 2022-03-11 | Stop reason: HOSPADM

## 2022-03-10 RX ORDER — METOPROLOL SUCCINATE 50 MG/1
100 TABLET, EXTENDED RELEASE ORAL DAILY
Status: DISCONTINUED | OUTPATIENT
Start: 2022-03-11 | End: 2022-03-11 | Stop reason: HOSPADM

## 2022-03-10 RX ORDER — NIFEDIPINE 30 MG/1
60 TABLET, EXTENDED RELEASE ORAL DAILY
Status: DISCONTINUED | OUTPATIENT
Start: 2022-03-11 | End: 2022-03-10

## 2022-03-10 RX ORDER — ASPIRIN 81 MG/1
81 TABLET ORAL DAILY
Status: DISCONTINUED | OUTPATIENT
Start: 2022-03-11 | End: 2022-03-11 | Stop reason: HOSPADM

## 2022-03-10 RX ORDER — SPIRONOLACTONE 25 MG/1
50 TABLET ORAL NIGHTLY
Status: DISCONTINUED | OUTPATIENT
Start: 2022-03-10 | End: 2022-03-11 | Stop reason: HOSPADM

## 2022-03-10 RX ORDER — LOSARTAN POTASSIUM 25 MG/1
25 TABLET ORAL DAILY
Status: DISCONTINUED | OUTPATIENT
Start: 2022-03-11 | End: 2022-03-11 | Stop reason: HOSPADM

## 2022-03-10 RX ORDER — GLUCAGON 1 MG
1 KIT INJECTION
Status: DISCONTINUED | OUTPATIENT
Start: 2022-03-10 | End: 2022-03-11 | Stop reason: HOSPADM

## 2022-03-10 RX ORDER — TALC
6 POWDER (GRAM) TOPICAL NIGHTLY PRN
Status: DISCONTINUED | OUTPATIENT
Start: 2022-03-10 | End: 2022-03-11 | Stop reason: HOSPADM

## 2022-03-10 RX ORDER — GABAPENTIN 300 MG/1
300 CAPSULE ORAL 3 TIMES DAILY
Status: DISCONTINUED | OUTPATIENT
Start: 2022-03-11 | End: 2022-03-11 | Stop reason: HOSPADM

## 2022-03-10 RX ORDER — PRAVASTATIN SODIUM 20 MG/1
80 TABLET ORAL NIGHTLY
Status: DISCONTINUED | OUTPATIENT
Start: 2022-03-10 | End: 2022-03-11 | Stop reason: HOSPADM

## 2022-03-10 RX ORDER — PRAVASTATIN SODIUM 20 MG/1
80 TABLET ORAL DAILY
Status: DISCONTINUED | OUTPATIENT
Start: 2022-03-11 | End: 2022-03-10

## 2022-03-10 RX ORDER — IBUPROFEN 200 MG
16 TABLET ORAL
Status: DISCONTINUED | OUTPATIENT
Start: 2022-03-10 | End: 2022-03-11 | Stop reason: HOSPADM

## 2022-03-10 RX ORDER — IBUPROFEN 200 MG
24 TABLET ORAL
Status: DISCONTINUED | OUTPATIENT
Start: 2022-03-10 | End: 2022-03-11 | Stop reason: HOSPADM

## 2022-03-10 RX ORDER — NIFEDIPINE 30 MG/1
60 TABLET, EXTENDED RELEASE ORAL NIGHTLY
Status: DISCONTINUED | OUTPATIENT
Start: 2022-03-10 | End: 2022-03-11 | Stop reason: HOSPADM

## 2022-03-10 RX ORDER — ALPRAZOLAM 0.5 MG/1
0.5 TABLET ORAL 3 TIMES DAILY PRN
Status: DISCONTINUED | OUTPATIENT
Start: 2022-03-10 | End: 2022-03-11 | Stop reason: HOSPADM

## 2022-03-10 RX ORDER — SPIRONOLACTONE 25 MG/1
50 TABLET ORAL DAILY
Status: DISCONTINUED | OUTPATIENT
Start: 2022-03-11 | End: 2022-03-10

## 2022-03-10 RX ADMIN — NIFEDIPINE 60 MG: 30 TABLET, FILM COATED, EXTENDED RELEASE ORAL at 11:03

## 2022-03-10 RX ADMIN — NITROGLYCERIN 0.4 MG: 0.4 TABLET, ORALLY DISINTEGRATING SUBLINGUAL at 06:03

## 2022-03-10 RX ADMIN — SODIUM CHLORIDE, SODIUM LACTATE, POTASSIUM CHLORIDE, AND CALCIUM CHLORIDE 1000 ML: .6; .31; .03; .02 INJECTION, SOLUTION INTRAVENOUS at 03:03

## 2022-03-10 RX ADMIN — PRAVASTATIN SODIUM 80 MG: 20 TABLET ORAL at 11:03

## 2022-03-10 RX ADMIN — ASPIRIN 325 MG ORAL TABLET 325 MG: 325 PILL ORAL at 02:03

## 2022-03-10 RX ADMIN — SPIRONOLACTONE 50 MG: 25 TABLET ORAL at 11:03

## 2022-03-10 NOTE — FIRST PROVIDER EVALUATION
Emergency Department TeleTriage Encounter Note      CHIEF COMPLAINT    Chief Complaint   Patient presents with    blurred vision, lightheaded     Pt was sent by urologist. Pt c.o blurred vision and light headed onset couple days ago.  Pt has hx of htn. Pt has taken bp meds today.   Pt also c.o muscle spasms across chest 1 week ago treated with balcofen  AAO x 3nadn skin w.d         VITAL SIGNS   Initial Vitals [03/10/22 1217]   BP Pulse Resp Temp SpO2   (!) 189/90 65 16 98.4 °F (36.9 °C) 99 %      MAP       --            ALLERGIES    Review of patient's allergies indicates:   Allergen Reactions    Lisinopril Anaphylaxis and Nausea And Vomiting     General bad feeling    Lipitor [atorvastatin] Other (See Comments)     Muscle aches    Adhesive     Crestor [rosuvastatin] Swelling     Lip swelling.     Jardiance [empagliflozin] Other (See Comments)     Possible related to recent UTI's     Metformin      Used XR and experienced flatulance and abdominal pain.        PROVIDER TRIAGE NOTE  This is a teletriage evaluation of a 62 y.o. male presenting to the ED complaining of mild intermittent CPblurred vision, lightheadedness, dizzy and was instructed by his urologist for further evaluation.     Initial orders will be placed and care will be transferred to an alternate provider when patient is roomed for a full evaluation. Any additional orders and the final disposition will be determined by that provider.           ORDERS  Labs Reviewed   CBC W/ AUTO DIFFERENTIAL   COMPREHENSIVE METABOLIC PANEL   TROPONIN I   B-TYPE NATRIURETIC PEPTIDE   POCT GLUCOSE MONITORING CONTINUOUS       ED Orders (720h ago, onward)    Start Ordered     Status Ordering Provider    03/10/22 1624 03/10/22 1323  Troponin I #2  Once Timed         Ordered DEVIN FERRERA    03/10/22 1330 03/10/22 1323  aspirin tablet 325 mg  ED 1 Time         Ordered DEVIN FERRERA.    03/10/22 1324 03/10/22 1323  Vital signs  Every 15 min         Ordered  DEVIN FERRERA P.    03/10/22 1324 03/10/22 1323  Cardiac Monitoring - Adult  Continuous        Comments: Notify Physician If:    Ordered DEVIN FERRERA P.    03/10/22 1324 03/10/22 1323  Pulse Oximetry Continuous  Continuous         Ordered DEVIN FERRERA P.    03/10/22 1324 03/10/22 1323  Diet NPO  Diet effective now         Ordered DEVIN FERRERA P.    03/10/22 1324 03/10/22 1323  Saline lock IV  Once         Ordered DEVIN FERRERA P.    03/10/22 1324 03/10/22 1323  EKG 12-lead  Once        Comments: Do not perform if previously done during this visit/ triage    Ordered DEVIN FERRERA P.    03/10/22 1324 03/10/22 1323  CBC auto differential  STAT         Ordered DEVIN FERRERA P.    03/10/22 1324 03/10/22 1323  Comprehensive metabolic panel  STAT         Ordered DEVIN FERRERA P.    03/10/22 1324 03/10/22 1323  Troponin I #1  STAT         Ordered DEVIN FERRERA P.    03/10/22 1324 03/10/22 1323  B-Type natriuretic peptide (BNP)  STAT         Ordered DEVIN FERRERA P.    03/10/22 1242 03/10/22 1241  POCT glucose  Once         Ordered KRYSTIN GALLARDO    03/10/22 1220 03/10/22 1220  EKG 12-lead  Once         Completed by NERIS ARIAS on 3/10/2022 at  1:11 PM KLAUDIA GALAN            Virtual Visit Note: The provider triage portion of this emergency department evaluation and documentation was performed via [employer], a HIPAA-compliant telemedicine application, in concert with a tele-presenter in the room. A face to face patient evaluation with one of my colleagues will occur once the patient is placed in an emergency department room.      DISCLAIMER: This note was prepared with [employer] voice recognition transcription software. Garbled syntax, mangled pronouns, and other bizarre constructions may be attributed to that software system.

## 2022-03-10 NOTE — ED PROVIDER NOTES
Encounter Date: 3/10/2022       History     Chief Complaint   Patient presents with    blurred vision, lightheaded     Pt was sent by urologist. Pt c.o blurred vision and light headed onset couple days ago.  Pt has hx of htn. Pt has taken bp meds today.   Pt also c.o muscle spasms across chest 1 week ago treated with balcofen  AAO x 3nadn skin w.d       Afebrile 62-year-old male with PMH of CAD, carotid artery occlusion, CAD, DM, DJD, GERD, iritis, HLD, hypertension, memory loss, thyroid nodule previous MI presents the ED for evaluation of multiple complaints.  Patient states for the past week or so he has been having bilateral chest pain.  He describes it as a spasm like sensation.  He does report that he saw his PCP and was prescribed baclofen as thought related to possible spasm.  Has seen no improvement.  He does report that he has a known history of PVCs and noted that the pain was felt to occur when he would feel sensation related to his known PVCs.  He does also report that he has had labile blood pressure readings at home over the past several days.  He states that he takes his blood pressure daily.  He states that he was having readings with diastolic in the low 100s and then having episodes of hypotension as well.  He states that he has been compliant with all of his medications.  He does report that with onset of elevated blood pressure he noted some headache that he describes as pressure sensation.  He reports that he had routine visit urologist today where he continued with these symptoms.  He does report some associated dizziness and lightheadedness.  At that time he reported bilateral blurred vision that has since improved.  He is found to have elevated blood pressure at that office was encouraged to go to the ED for further evaluation.  He denies any diaphoresis, vomiting, shortness of breath, fever, chills,  symptoms or rash.    The history is provided by the patient.     Review of patient's  allergies indicates:   Allergen Reactions    Lisinopril Anaphylaxis and Nausea And Vomiting     General bad feeling    Lipitor [atorvastatin] Other (See Comments)     Muscle aches    Adhesive     Crestor [rosuvastatin] Swelling     Lip swelling.     Jardiance [empagliflozin] Other (See Comments)     Possible related to recent UTI's     Metformin      Used XR and experienced flatulance and abdominal pain.      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 -     Hyperlipidemia     Hypertension     Memory loss     Thyroid nodule 8/22/2019    Traumatic iritis - Left Eye 2/27/2013     Past Surgical History:   Procedure Laterality Date    CEREBRAL ANGIOGRAM N/A 1/6/2020    Procedure: ANGIOGRAM-CEREBRAL;  Surgeon: [facility] Provider;  [facility];  Service: Radiology;  Laterality: N/A;  /Nagi    CERVICAL FUSION  12/30/2015    COLONOSCOPY N/A 4/7/2017    Procedure: COLONOSCOPY;  Surgeon: Handy Frederick MD;  Location: [facility] (4TH FLR);  Service: Endoscopy;  Laterality: N/A;    HERNIA REPAIR      PROSTATECTOMY       Family History   Problem Relation Age of Onset    Heart disease Mother     Hypertension Mother     Hyperlipidemia Mother     Heart disease Father     Hyperlipidemia Brother     Hypertension Brother     Hypertension Brother     Hyperlipidemia Brother     Benign prostatic hyperplasia Brother     Hypertension Brother     Hypertension Brother     Hepatitis Brother     Cancer Sister     Cancer Brother         throat CA    Diabetes Paternal Uncle     Cataracts Maternal Grandmother     Amblyopia Neg Hx     Blindness Neg Hx     Glaucoma Neg Hx      Macular degeneration Neg Hx     Retinal detachment Neg Hx     Strabismus Neg Hx     Stroke Neg Hx     Thyroid disease Neg Hx     Colon cancer Neg Hx     Colon polyps Neg Hx      Social History     Tobacco Use    Smoking status: Former Smoker     Packs/day: 1.00     Years: 30.00     Pack years: 30.00     Types: Cigarettes     Quit date: 12/13/2011     Years since quitting: 10.2    Smokeless tobacco: Never Used   Substance Use Topics    Alcohol use: Yes     Comment: occas - nonalcoholic beer or beer    Drug use: No     Review of Systems   Constitutional: Negative for activity change, appetite change, chills and fever.   HENT: Negative for congestion and sore throat.    Eyes: Positive for visual disturbance (resolved).   Respiratory: Negative for cough and shortness of breath.    Cardiovascular: Positive for chest pain. Negative for palpitations.   Gastrointestinal: Positive for nausea. Negative for abdominal pain and vomiting.   Genitourinary: Negative for dysuria and flank pain.   Musculoskeletal: Negative for arthralgias, back pain, neck pain and neck stiffness.   Skin: Negative for rash.   Neurological: Positive for dizziness, light-headedness and headaches. Negative for syncope and weakness.   Hematological: Does not bruise/bleed easily.   Psychiatric/Behavioral: Negative for confusion.       Physical Exam     Initial Vitals [03/10/22 1217]   BP Pulse Resp Temp SpO2   (!) 189/90 65 16 98.4 °F (36.9 °C) 99 %      MAP       --         Vitals:    03/11/22 1000   BP:    Pulse: 81   Resp:    Temp:        Physical Exam    Nursing note and vitals reviewed.  Constitutional: Vital signs are normal. He appears well-developed and well-nourished. He is cooperative.  Non-toxic appearance. He does not appear ill. No distress.   HENT:   Head: Normocephalic and atraumatic.   Eyes: Conjunctivae, EOM and lids are normal. Pupils are equal, round, and reactive to light.   pterygium   Neck: Trachea normal. Neck supple.  No stridor present. No tracheal deviation present.   Normal range of motion.  Cardiovascular: Normal rate and regular rhythm.   Pulmonary/Chest: Breath sounds normal. No respiratory distress. He has no wheezes. He has no rhonchi.   Abdominal: Abdomen is soft.   Musculoskeletal:         General: Normal range of motion.      Cervical back: Normal range of motion and neck supple.     Neurological: He is alert and oriented to person, place, and time. He has normal strength. GCS eye subscore is 4. GCS verbal subscore is 5. GCS motor subscore is 6.   Skin: Skin is warm, dry and intact. No rash noted.   Psychiatric: He has a normal mood and affect. His speech is normal and behavior is normal. Thought content normal.         ED Course   Procedures  Labs Reviewed   COMPREHENSIVE METABOLIC PANEL - Abnormal; Notable for the following components:       Result Value    Glucose 114 (*)     All other components within normal limits   URINALYSIS, REFLEX TO URINE CULTURE - Abnormal; Notable for the following components:    Glucose, UA 1+ (*)     All other components within normal limits    Narrative:     Specimen Source->Urine   TSH - Abnormal; Notable for the following components:    TSH 4.850 (*)     All other components within normal limits   POCT GLUCOSE - Abnormal; Notable for the following components:    POCT Glucose 126 (*)     All other components within normal limits   POCT GLUCOSE - Abnormal; Notable for the following components:    POCT Glucose 150 (*)     All other components within normal limits   POCT GLUCOSE - Abnormal; Notable for the following components:    POCT Glucose 153 (*)     All other components within normal limits   CBC W/ AUTO DIFFERENTIAL   TROPONIN I   B-TYPE NATRIURETIC PEPTIDE   TROPONIN I   MAGNESIUM   T4, FREE   SARS-COV-2 RDRP GENE    Narrative:     This test utilizes isothermal nucleic acid amplification   technology to detect the SARS-CoV-2 RdRp nucleic acid segment.   The analytical sensitivity  (limit of detection) is 125 genome   equivalents/mL.   A POSITIVE result implies infection with the SARS-CoV-2 virus;   the patient is presumed to be contagious.     A NEGATIVE result means that SARS-CoV-2 nucleic acids are not   present above the limit of detection. A NEGATIVE result should be   treated as presumptive. It does not rule out the possibility of   COVID-19 and should not be the sole basis for treatment decisions.   If COVID-19 is strongly suspected based on clinical and exposure   history, re-testing using an alternate molecular assay should be   considered.   This test is only for use under the Food and Drug   Administration s Emergency Use Authorization (EUA).   Commercial kits are provided by [employer].   Performance characteristics of the EUA have been independently   verified by [facility] Department of   Pathology and Laboratory Medicine.   _________________________________________________________________   The authorized Fact Sheet for Healthcare Providers and the authorized Fact   Sheet for Patients of the ID NOW COVID-19 are available on the FDA   website:     https://www.fda.gov/media/463005/download  https://www.fda.gov/media/314329/download                ECG Results          EKG 12-lead (Final result)  Result time 03/10/22 18:25:13    Final result by Interface, Lab In [facility] (03/10/22 18:25:13)                 Narrative:    Test Reason : R07.9,    Vent. Rate : 063 BPM     Atrial Rate : 063 BPM     P-R Int : 156 ms          QRS Dur : 088 ms      QT Int : 380 ms       P-R-T Axes : 074 064 057 degrees     QTc Int : 388 ms    Normal sinus rhythm  Moderate voltage criteria for LVH, may be normal variant  Borderline Abnormal ECG    Confirmed by Mirian Painting MD ([id number]) on 3/10/2022 6:25:06 PM    Referred By:             Confirmed By:Mirian Painting MD                            Imaging Results          X-Ray Chest AP Portable (Final result)  Result time 03/10/22 15:57:21     Final result by Abby Vincent MD (03/10/22 15:57:21)                 Impression:      Stable chest with no acute cardiopulmonary process seen.      Electronically signed by: Abby Vincent  Date:    03/10/2022  Time:    15:57             Narrative:    EXAMINATION:  XR CHEST AP PORTABLE    CLINICAL HISTORY:  Pain, unspecified    TECHNIQUE:  Single frontal view of the chest was performed.    COMPARISON:  2/21/2021    FINDINGS:  The lungs are well inflated.  No acute consolidation, pleural effusion, or pneumothorax.  Cardiac silhouette is normal in size.                               CT Head Without Contrast (Final result)  Result time 03/10/22 15:28:29    Final result by Xu Canada MD (03/10/22 15:28:29)                 Impression:      No acute intracranial abnormality.    Mucosal thickening of the visualized paranasal sinuses.      Electronically signed by: Xu Canada MD  Date:    03/10/2022  Time:    15:28             Narrative:    EXAMINATION:  CT HEAD WITHOUT CONTRAST    CLINICAL HISTORY:  Dizziness, persistent/recurrent, cardiac or vascular cause suspected;    TECHNIQUE:  Low dose axial CT images obtained throughout the head without intravenous contrast. Sagittal and coronal reconstructions were performed.    COMPARISON:  MRI 07/07/2021    FINDINGS:  Intracranial compartment:    Ventricles and sulci are normal in size for age without evidence of hydrocephalus. No extra-axial blood or fluid collections.    The brain parenchyma appears normal. No parenchymal mass, hemorrhage, edema or major vascular distribution infarct.    Skull/extracranial contents (limited evaluation): No fracture. Mastoid air cells and paranasal sinuses are essentially clear.  Mucosal thickening of the visualized paranasal sinuses.                                 Medications   aspirin tablet 325 mg (325 mg Oral Given 3/10/22 1413)   lactated ringers bolus 1,000 mL (0 mLs Intravenous Stopped 3/10/22 1627)   nitroGLYCERIN SL  tablet 0.4 mg (0.4 mg Sublingual Given 3/10/22 4514)     Medical Decision Making:   History:   Old Medical Records: I decided to obtain old medical records.  Initial Assessment:   Afebrile 62-year-old male presenting to the ED for evaluation of multiple complaints.  Patient presenting with continued headache with associated elevated high blood pressure at home.  Some visual changes reported earlier today did have since improved.  Also reports chest pain for approximately 1 week.  Denies any shortness of breath, vomiting or diaphoresis.  Does endorse some associated dizziness and lightheadedness over the past several days.  Lightheadedness exacerbated with positional changes.  Unable to extrapolate room spinning sensation verses ataxia.  Denies any syncope.  He appears well in no significant distress.  No focal neuro deficit.  Lungs CTA; heart regular rate rhythm.  Abdomen is soft and nontender.  Differential Diagnosis:   Differential Diagnosis includes, but is not limited to:  ACS/MI, PE, aortic dissection, pneumothorax, cardiac tamponade, pericarditis/myocarditis, pneumonia, infection/abscess, lung mass, trauma/fracture, costochondritis/pleurisy, MSK pain/contusion, GERD, biliary disease, pancreatitis, anemia, Ischemic stroke, hemorrhagic stroke, subarachnoid hemorrhage/ruptured aneurysm, intracranial lesion/mass, pseudotumor cerebri, venous sinus thrombosis, hypertensive encephalopathy, dehydration, anxiety, medication non-compliance, primary headache (tension/cluster/migraine).    Clinical Tests:   Lab Tests: Reviewed and Ordered  Radiological Study: Reviewed and Ordered  Medical Tests: Reviewed and Ordered  ED Management:  Plan for labs, EKG, chest x-ray and CT to assess for acute emergent process.  Considered intracranial process 0 however lower suspicion given overall impression.  EKG reveals normal sinus rhythm at rate of 63.  LVH noted which appears unremarkable from previous comparison.  No STEMI.  Initial  troponin unremarkable.  Aspirin ordered from tele triage process.  Labs grossly unremarkable with exception of mildly elevated blood glucose.  CT and chest x-ray with no acute findings.  Given patient's cardiac risk factors and presentation believe he would benefit from further observation and probable cardiology evaluation although recognizing atypical presentation for NSTEMI given duration.  Did consider acute intracranial process however lower suspicion given the timeline and presentation today.  Patient to continue with some moderately elevated blood pressure and will likely need to continue monitor throughout ED course.  Discussed plan for observation with patient and nurse practitioner in observation unit in both her agreeable.  Other:   I have discussed this case with another health care provider.             ED Course as of 03/11/22 2132   Thu Mar 10, 2022   2240 Free T4: 0.80 [AS]   2240 TSH(!): 4.850 [AS]      ED Course User Index  [AS] KAREN Ramirez             Clinical Impression:   Final diagnoses:  [R07.9] Chest pain  [R52] Pain  [R42] Dizziness (Primary)  [E11.59, I15.2] Hypertension associated with diabetes          ED Disposition Condition    Discharge Stable        ED Prescriptions     Medication Sig Dispense Start Date End Date Auth. Provider    losartan (COZAAR) 25 MG tablet Take 2 tablets (50 mg total) by mouth once daily. 60 tablet 3/11/2022 4/10/2022 KAREN Ramirez        Follow-up Information     Follow up With Specialties Details Why Contact Info    Josefina Kendall MD Internal Medicine Schedule an appointment as soon as possible for a visit in 3 days  [address] [facility] [id number]  [phone]      [facility] Emergency Medicine Go to  If symptoms worsen [id number] [id number] [phone]           TERRENCE Cole  03/11/22 2134

## 2022-03-10 NOTE — PROGRESS NOTES
CT artifacts appearing on head scan. Service being called in for ER CT scanner. Best images possible due to mechanical error

## 2022-03-10 NOTE — ED NOTES
HPI  Pt aaox4, ambulatory, c/o elevated blood pressure x 2 days pta. Pt states headache, blurred vision and dizziness began today with bp of 190/94 at home. Pt denies n/v/d, numbness/tingling, no slurred speech/facial droop, extremity weakness noted.

## 2022-03-10 NOTE — PROGRESS NOTES
Subjective:      Hi Braxton is a 62 y.o. male who returns today regarding his recurrent UTIs.    He has known h/o recurrent UTIs. He has not had any UTIs within the past 6 months. He is s/p TURP in Oct 2017.     He saw Dr. Seay who did UDS and cysto. UDS demonstrated decreased contractility with elevated PVR (200cc). He recommended Urecholine but pt stopped taking it due to no help.    He is doing well today. AUA SS 15/2 today. Complains of nocturia 2-3x, recently started taking spironolactone before bedtime.    States that he woke up this morning with some chest pain unrelieved with baclofen. Also having some blurry vision, headache, dizziness, change in gait.    The following portions of the patient's history were reviewed and updated as appropriate: allergies, current medications, past family history, past medical history, past social history, past surgical history and problem list.    Review of Systems  A comprehensive multipoint review of systems was negative except as otherwise stated in the HPI.     Objective:   Vitals: There were no vitals taken for this visit.    Physical Exam   General: alert and oriented, no acute distress  Respiratory: Symmetric expansion, non-labored breathing  Neuro: no gross deficits  Psych: normal judgment and insight, normal mood/affect and non-anxious  : 50 g prostate without nodules or induration    Lab Review   Urinalysis demonstrates positive for leukocytes, red blood cells   Lab Results   Component Value Date    WBC 4.83 02/23/2022    HGB 15.6 02/23/2022    HCT 46.8 02/23/2022    HCT 49 02/21/2021    MCV 91 02/23/2022     02/23/2022     Lab Results   Component Value Date    CREATININE 1.0 02/23/2022    BUN 13 02/23/2022       Assessment and Plan:   1. Recurrent UTI  2. Renal cyst  - will repeat renal u/s for known renal cysts including Bosniak II right renal cyst  - will draw a PSA as he is overdue  - recommend that he go to the ED for chest pain accompanied by  headaches, blurry vision, change in gait

## 2022-03-10 NOTE — Clinical Note
Diagnosis: Dizziness [125882]   Future Attending Provider: SHANEL COSME [[id number]]   Is the patient being sent to ED Observation?: Yes   Admitting Provider:: SHANEL COSME [[id number]]

## 2022-03-11 VITALS
OXYGEN SATURATION: 95 % | BODY MASS INDEX: 24.36 KG/M2 | HEIGHT: 76 IN | HEART RATE: 81 BPM | SYSTOLIC BLOOD PRESSURE: 164 MMHG | DIASTOLIC BLOOD PRESSURE: 85 MMHG | RESPIRATION RATE: 18 BRPM | TEMPERATURE: 98 F | WEIGHT: 200 LBS

## 2022-03-11 LAB — POCT GLUCOSE: 153 MG/DL (ref 70–110)

## 2022-03-11 PROCEDURE — 82962 GLUCOSE BLOOD TEST: CPT

## 2022-03-11 PROCEDURE — 96372 THER/PROPH/DIAG INJ SC/IM: CPT | Performed by: NURSE PRACTITIONER

## 2022-03-11 PROCEDURE — G0378 HOSPITAL OBSERVATION PER HR: HCPCS

## 2022-03-11 PROCEDURE — 63600175 PHARM REV CODE 636 W HCPCS: Performed by: NURSE PRACTITIONER

## 2022-03-11 PROCEDURE — 25000003 PHARM REV CODE 250: Performed by: NURSE PRACTITIONER

## 2022-03-11 RX ORDER — LOSARTAN POTASSIUM 25 MG/1
50 TABLET ORAL DAILY
Qty: 60 TABLET | Refills: 0 | Status: SHIPPED | OUTPATIENT
Start: 2022-03-11 | End: 2022-04-10

## 2022-03-11 RX ADMIN — LOSARTAN POTASSIUM 25 MG: 25 TABLET, FILM COATED ORAL at 08:03

## 2022-03-11 RX ADMIN — EZETIMIBE 10 MG: 10 TABLET ORAL at 08:03

## 2022-03-11 RX ADMIN — ASPIRIN 81 MG: 81 TABLET, DELAYED RELEASE ORAL at 08:03

## 2022-03-11 RX ADMIN — INSULIN ASPART 2 UNITS: 100 INJECTION, SOLUTION INTRAVENOUS; SUBCUTANEOUS at 09:03

## 2022-03-11 RX ADMIN — METOPROLOL SUCCINATE 100 MG: 50 TABLET, EXTENDED RELEASE ORAL at 08:03

## 2022-03-11 RX ADMIN — GABAPENTIN 300 MG: 300 CAPSULE ORAL at 08:03

## 2022-03-11 NOTE — ED NOTES
Occasional runs of Bigeminy noted, event strips printed and provided to providers (Dr. Painting and Jina Berry NP). Dr. Painting, cardiologist, visiting with patient at bedside

## 2022-03-11 NOTE — DISCHARGE SUMMARY
[facility] ED Observation Unit  Discharge Summary        History of Present Illness:    Afebrile 62-year-old male with PMH of CAD, carotid artery occlusion, CAD, DM, DJD, GERD, iritis, HLD, hypertension, memory loss, thyroid nodule previous MI presents the ED for evaluation of multiple complaints.  Patient states for the past week or so he has been having bilateral chest pain.  He describes it as a spasm like sensation.  He does report that he saw his PCP and was prescribed baclofen as thought related to possible spasm.  Has seen no improvement.  He does report that he has a known history of PVCs and noted that the pain was felt to occur when he would feel sensation related to his known PVCs.  He does also report that he has had labile blood pressure readings at home over the past several days.  He states that he takes his blood pressure daily.  He states that he was having readings with diastolic in the low 100s and then having episodes of hypotension as well.  He states that he has been compliant with all of his medications.  He does report that with onset of elevated blood pressure he noted some headache that he describes as pressure sensation.  He reports that he had routine visit urologist today where he continued with these symptoms.  He does report some associated dizziness and lightheadedness.  At that time he reported bilateral blurred vision that has since improved.  He is found to have elevated blood pressure at that office was encouraged to go to the ED for further evaluation.  He denies any diaphoresis, vomiting, shortness of breath, fever, chills,  symptoms or rash.      ED Course: negative trop x 2, LVH on EKG, no ischemia.  Plan to check additional trop, cards consult, and BP control    Observation Course:    Trop negative x 2.  Evaluated by cards.  Treated with home BP meds and SSI.      Brief Physical Exam/Reassessment   Physical Exam    Nursing note and vitals reviewed.  Constitutional: He  appears well-developed and well-nourished. He does not appear ill. No distress.   Eyes: Pupils are equal, round, and reactive to light.   Neck: No JVD present.   Cardiovascular: Normal rate, regular rhythm, S1 normal and S2 normal.  Occasional extrasystoles are present.  Exam reveals gallop.    Pulmonary/Chest: Effort normal and breath sounds normal. No tachypnea. He has no decreased breath sounds. He has no wheezes.   Musculoskeletal:         General: No edema.      Right lower leg: No edema.      Left lower leg: No edema.     Neurological: He is alert and oriented to person, place, and time. GCS eye subscore is 4. GCS verbal subscore is 5. GCS motor subscore is 6.   Skin: Skin is warm, dry and intact.         Consultants:    Mirian Painting - Cardiology    1. Chest Pain              3/21/2018: Cath: Normal coronary vessels are all normal.               3/10/2022: Atypical pain.              See no need for further cardiac testing.     2. Carotid Disease               Mentioned in chart as a diagnosis.              7/10/2013: Carotid Duplex: Mild plaquing.              8/22/2019: MRA of Neck: Unremarkable motion limited MRA of the neck as detailed above without evidence for significant focal stenosis or occlusion.               Not supported by test results.     3. Ventricular Premature Contractions              On metoprolol 100 mg Q24.     4. Hypertension              3/27/2018: Echo: Normal left ventricular size and systolic function. Normal diastolic function.              At home been on metoprolol 100 mg Q24, nifedipine 60 mg Q24, losartan 25 mg Q24 and spironolactone 25 mg Q24.              Appears to mostly run high.              Suggest increase losartan  to 100 mg Q24.    ED/OBS Workup:  Vitals:    03/11/22 0600 03/11/22 0700 03/11/22 0800 03/11/22 0932   BP:    (!) 164/85   Pulse: 68 74 78 76   Resp:    18   Temp:    98.2 °F (36.8 °C)   TempSrc:    Oral   SpO2:    95%   Weight:       Height:         03/11/22 1000   BP:    Pulse: 81   Resp:    Temp:    TempSrc:    SpO2:    Weight:    Height:        Labs Reviewed   COMPREHENSIVE METABOLIC PANEL - Abnormal; Notable for the following components:       Result Value    Glucose 114 (*)     All other components within normal limits   URINALYSIS, REFLEX TO URINE CULTURE - Abnormal; Notable for the following components:    Glucose, UA 1+ (*)     All other components within normal limits    Narrative:     Specimen Source->Urine   TSH - Abnormal; Notable for the following components:    TSH 4.850 (*)     All other components within normal limits   POCT GLUCOSE - Abnormal; Notable for the following components:    POCT Glucose 126 (*)     All other components within normal limits   POCT GLUCOSE - Abnormal; Notable for the following components:    POCT Glucose 150 (*)     All other components within normal limits   POCT GLUCOSE - Abnormal; Notable for the following components:    POCT Glucose 153 (*)     All other components within normal limits   CBC W/ AUTO DIFFERENTIAL   TROPONIN I   B-TYPE NATRIURETIC PEPTIDE   TROPONIN I   MAGNESIUM   T4, FREE   SARS-COV-2 RDRP GENE    Narrative:     This test utilizes isothermal nucleic acid amplification   technology to detect the SARS-CoV-2 RdRp nucleic acid segment.   The analytical sensitivity (limit of detection) is 125 genome   equivalents/mL.   A POSITIVE result implies infection with the SARS-CoV-2 virus;   the patient is presumed to be contagious.     A NEGATIVE result means that SARS-CoV-2 nucleic acids are not   present above the limit of detection. A NEGATIVE result should be   treated as presumptive. It does not rule out the possibility of   COVID-19 and should not be the sole basis for treatment decisions.   If COVID-19 is strongly suspected based on clinical and exposure   history, re-testing using an alternate molecular assay should be   considered.   This test is only for use under the Food and Drug   Administration s  Emergency Use Authorization (EUA).   Commercial kits are provided by [employer].   Performance characteristics of the EUA have been independently   verified by [facility] Department of   Pathology and Laboratory Medicine.   _________________________________________________________________   The authorized Fact Sheet for Healthcare Providers and the authorized Fact   Sheet for Patients of the ID NOW COVID-19 are available on the FDA   website:     https://www.fda.gov/media/570744/download  https://www.fda.gov/media/786891/download           POCT GLUCOSE MONITORING CONTINUOUS   POCT GLUCOSE MONITORING CONTINUOUS       X-Ray Chest AP Portable   Final Result      Stable chest with no acute cardiopulmonary process seen.         Electronically signed by: Abby Vincent   Date:    03/10/2022   Time:    15:57      CT Head Without Contrast   Final Result      No acute intracranial abnormality.      Mucosal thickening of the visualized paranasal sinuses.         Electronically signed by: Xu Canada MD   Date:    03/10/2022   Time:    15:28          Final Diagnosis:  1. Dizziness    2. Chest pain    3. Pain    4. Hypertension associated with diabetes    5. Chest pain, unspecified type    6. Precordial pain        Plan:  Cards recommended increase losartan to 100mg daily, patient concerned this is too high of a dose.  Will increased losartan to 50mg daily, discussed starting blood pressure log and he can discuss with PCP/cards to continue increase or stay at 50mg.  Referral for Dr Eboni griffin. Return precautions given to the patient include SOB, fever, worsening CP or dizziness.  He stated understanding.      Discharge Condition: Good    Disposition: Home or Self Care     Time spent on the discharge of the patient including review of hospital course with the patient. reviewing discharge medications and arranging follow-up care 35 minutes.  Patient was seen and examined on the date of discharge and  determined to be suitable for discharge.    Follow Up:   ED Disposition Condition    Discharge Stable          ED Prescriptions     Medication Sig Dispense Start Date End Date Auth. Provider    losartan (COZAAR) 25 MG tablet Take 2 tablets (50 mg total) by mouth once daily. 60 tablet 3/11/2022 4/10/2022 KAREN Ramirez        Follow-up Information     Follow up With Specialties Details Why Contact Info    Josefina Kendall MD Internal Medicine Schedule an appointment as soon as possible for a visit in 3 days  [address] [facility]  [place] [address]  [phone]      [facility] Emergency Dept Emergency Medicine Go to  If symptoms worsen [address] [facility] [id number] [phone]          Future Appointments   Date Time Provider Department Center   4/18/2022  8:30 AM Hector Scott MD [facility] NEPHRO Jarek Basurto   8/16/2022  7:15 AM [facility] LAB [facility]   8/23/2022  8:30 AM No Palacios MD [facility] CARDIO [place]

## 2022-03-11 NOTE — CONSULTS
[facility], [place] Emergency Dept (Observation)  Cardiology  Consult Note    Patient Name: Hi Braxton  MRN: [id number]  Admission Date: 3/10/2022  Hospital Length of Stay: 0 days  Code Status: Full Code   Attending Provider: No att. providers found   Consulting Provider: Mirian Painting MD  Primary Care Physician: Josefina Kendall MD  Principal Problem:<principal problem not specified>    Patient information was obtained from patient, past medical records and ER records.     Inpatient consult to Cardiology  Consult performed by: Mirian Paintnig MD  Consult ordered by: KAREN Ramirez  Reason for consult: Chest pain        Subjective:     Chief Complaint:  High blood pressure.     HPI:     Hi Braxton is a 62 y.o.male with hypertension and hypercholesterolemia. He has undergone extensive cardiac and vascular testing in the past as described below. The test have all been negative. He presented to his urologist today. His pressure was 160/112 mmHg/ He felt a headache and dizzy. He was advised to go to the emergency room.       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 -     Hyperlipidemia     Hypertension     Memory loss     Thyroid nodule 8/22/2019    Traumatic iritis - Left Eye 2/27/2013       Past Surgical History:   Procedure Laterality Date    CEREBRAL ANGIOGRAM N/A 1/6/2020    Procedure: ANGIOGRAM-CEREBRAL;  Surgeon: [place] Diagnostic Provider;  Location: [facility] OR [address] [place] [address];  Service: Radiology;  Laterality: N/A;  /Nagi    CERVICAL FUSION  12/30/2015    COLONOSCOPY N/A 4/7/2017    Procedure: COLONOSCOPY;  Surgeon: Handy Frederick MD;  Location: [facility] ENDO (4TH FLR);  Service:  Endoscopy;  Laterality: N/A;    HERNIA REPAIR      PROSTATECTOMY         Review of patient's allergies indicates:   Allergen Reactions    Lisinopril Anaphylaxis and Nausea And Vomiting     General bad feeling    Lipitor [atorvastatin] Other (See Comments)     Muscle aches    Adhesive     Crestor [rosuvastatin] Swelling     Lip swelling.     Jardiance [empagliflozin] Other (See Comments)     Possible related to recent UTI's     Metformin      Used XR and experienced flatulance and abdominal pain.        No current facility-administered medications on file prior to encounter.     Current Outpatient Medications on File Prior to Encounter   Medication Sig    ALPRAZolam (XANAX) 0.5 MG tablet TAKE 1 TABLET BY MOUTH THREE TIMES A DAY AS NEEDED FOR ANXIETY    aspirin (ECOTRIN) 81 MG EC tablet Take 1 tablet (81 mg total) by mouth once daily.    baclofen (LIORESAL) 10 MG tablet TAKE 1 TABLET BY MOUTH EVERY DAY IN THE EVENING    celecoxib (CELEBREX) 200 MG capsule TAKE 1 CAPSULE BY MOUTH ONCE DAILY    CONTOUR NEXT TEST STRIPS Strp USE TO TEST BLOOD SUGAR ONCE DAILY    ezetimibe (ZETIA) 10 mg tablet Take 1 tablet (10 mg total) by mouth once daily.    gabapentin (NEURONTIN) 300 MG capsule Take 1 capsule (300 mg total) by mouth 3 (three) times daily.    insulin NPH isoph U-100 human (NOVOLIN N FLEXPEN) 100 unit/mL (3 mL) InPn Inject 9 Units into the skin 2 (two) times daily before meals.    losartan (COZAAR) 25 MG tablet Take 1 tablet (25 mg total) by mouth once daily.    metoprolol succinate (TOPROL-XL) 100 MG 24 hr tablet TAKE 1 TABLET BY MOUTH EVERY DAY    MICROLET LANCET Misc 1 lancet by Misc.(Non-Drug; Combo Route) route once daily. Test blood glucose once daily as instructed.    NIFEdipine (PROCARDIA-XL) 60 MG (OSM) 24 hr tablet TAKE 1 TABLET BY MOUTH EVERY DAY    nitroGLYCERIN (NITROSTAT) 0.3 MG SL tablet Place 1 tablet (0.3 mg total) under the tongue every 5 (five) minutes as needed for Chest pain.  "   ondansetron (ZOFRAN) 4 MG tablet Take 1 tablet (4 mg total) by mouth every 6 (six) hours as needed for Nausea.    pantoprazole (PROTONIX) 40 MG tablet TAKE 1 TABLET BY MOUTH TWICE A DAY    pen needle, diabetic (BD ULTRA-FINE CHET PEN NEEDLE) 32 gauge x 5/32" Ndle USE PEN NEEDLE AS INSTRUCTION WITH LANTUS INSULIN PRE-FILLED PEN.    pravastatin (PRAVACHOL) 80 MG tablet Take 1 tablet (80 mg total) by mouth once daily.    spironolactone (ALDACTONE) 50 MG tablet Take 1 tablet (50 mg total) by mouth once daily.    tadalafiL (CIALIS) 20 MG Tab Take 1 tablet (20 mg total) by mouth every 72 hours as needed (ED).     Family History     Problem Relation (Age of Onset)    Benign prostatic hyperplasia Brother    Cancer Sister, Brother    Cataracts Maternal Grandmother    Diabetes Paternal Uncle    Heart disease Mother, Father    Hepatitis Brother    Hyperlipidemia Mother, Brother, Brother    Hypertension Mother, Brother, Brother, Brother, Brother        Tobacco Use    Smoking status: Former Smoker     Packs/day: 1.00     Years: 30.00     Pack years: 30.00     Types: Cigarettes     Quit date: 12/13/2011     Years since quitting: 10.2    Smokeless tobacco: Never Used   Substance and Sexual Activity    Alcohol use: Yes     Comment: occas - nonalcoholic beer or beer    Drug use: No    Sexual activity: Yes     Partners: Female     Review of Systems   Constitutional: Negative for chills, fever and malaise/fatigue.   HENT: Negative for nosebleeds and tinnitus.    Eyes: Negative for double vision, vision loss in left eye and vision loss in right eye.   Cardiovascular: Positive for chest pain. Negative for claudication, dyspnea on exertion, irregular heartbeat, leg swelling, near-syncope, orthopnea, palpitations, paroxysmal nocturnal dyspnea and syncope.   Respiratory: Negative for cough, hemoptysis, shortness of breath and wheezing.    Endocrine: Negative for cold intolerance and heat intolerance.   Hematologic/Lymphatic: " Negative for bleeding problem. Does not bruise/bleed easily.   Skin: Negative for color change and rash.   Musculoskeletal: Negative for back pain, falls, muscle weakness and myalgias.   Gastrointestinal: Negative for abdominal pain, diarrhea, dysphagia, heartburn, hematemesis, hematochezia, hemorrhoids, jaundice, melena, nausea and vomiting.   Genitourinary: Negative for dysuria and hematuria.   Neurological: Positive for dizziness, headaches and light-headedness. Negative for focal weakness, loss of balance, numbness, tremors, vertigo and weakness.   Psychiatric/Behavioral: Negative for altered mental status, depression and memory loss. The patient is nervous/anxious.    Allergic/Immunologic: Negative for hives and persistent infections.       Objective:     Vital Signs (Most Recent):  Temp: 97.8 °F (36.6 °C) (03/10/22 1903)  Pulse: (!) 55 (03/10/22 1903)  Resp: 18 (03/10/22 1903)  BP: (!) 183/90 (03/10/22 1903)  SpO2: 97 % (03/10/22 1903) Vital Signs (24h Range):  Temp:  [97.8 °F (36.6 °C)-98.4 °F (36.9 °C)] 97.8 °F (36.6 °C)  Pulse:  [55-65] 55  Resp:  [16-18] 18  SpO2:  [97 %-99 %] 97 %  BP: (150-191)/() 183/90     Weight: 90.7 kg (200 lb)  Body mass index is 24.34 kg/m².    SpO2: 97 %  O2 Device (Oxygen Therapy): room air    No intake or output data in the 24 hours ending 03/10/22 1956    Lines/Drains/Airways     Peripheral Intravenous Line  Duration                Peripheral IV - Single Lumen 03/10/22 1448 18 G Right Antecubital <1 day                Physical Exam  Constitutional:       General: He is not in acute distress.     Appearance: Normal appearance. He is well-developed. He is not toxic-appearing or diaphoretic.   HENT:      Head: Normocephalic and atraumatic.      Nose: Nose normal.   Eyes:      General:         Right eye: No discharge.         Left eye: No discharge.      Conjunctiva/sclera:      Right eye: Right conjunctiva is not injected.      Left eye: Left conjunctiva is not injected.       Pupils: Pupils are equal.      Right eye: Pupil is round.      Left eye: Pupil is round.   Neck:      Thyroid: No thyromegaly.      Vascular: No carotid bruit or JVD.   Cardiovascular:      Rate and Rhythm: Normal rate and regular rhythm.  No extrasystoles are present.     Chest Wall: PMI is not displaced.      Pulses:           Radial pulses are 2+ on the right side and 2+ on the left side.        Femoral pulses are 2+ on the right side and 2+ on the left side.       Dorsalis pedis pulses are 2+ on the right side and 2+ on the left side.        Posterior tibial pulses are 2+ on the right side and 2+ on the left side.      Heart sounds: S1 normal and S2 normal. No murmur heard.    Gallop present. S4 sounds present.   Pulmonary:      Effort: Pulmonary effort is normal.      Breath sounds: Normal breath sounds.   Abdominal:      Palpations: Abdomen is soft.      Tenderness: There is no abdominal tenderness.   Musculoskeletal:      Cervical back: Neck supple.      Right lower leg: Normal. No swelling. No edema.      Left lower leg: Normal. No swelling. No edema.   Lymphadenopathy:      Head:      Right side of head: No submandibular adenopathy.      Left side of head: No submandibular adenopathy.      Cervical: No cervical adenopathy.   Skin:     General: Skin is warm and dry.      Findings: No rash.      Nails: There is no clubbing.   Neurological:      General: No focal deficit present.      Mental Status: He is alert and oriented to person, place, and time. He is not disoriented.      Cranial Nerves: No cranial nerve deficit.   Psychiatric:         Attention and Perception: Attention normal.         Mood and Affect: Mood and affect normal.         Speech: Speech normal.         Behavior: Behavior normal.         Thought Content: Thought content normal.         Cognition and Memory: Cognition and memory normal.         Judgment: Judgment normal.         Current Medications:      Current Laboratory  Results:    Recent Results (from the past 24 hour(s))   CBC auto differential    Collection Time: 03/10/22  1:41 PM   Result Value Ref Range    WBC 5.88 3.90 - 12.70 K/uL    RBC 4.98 4.60 - 6.20 M/uL    Hemoglobin 15.1 14.0 - 18.0 g/dL    Hematocrit 44.6 40.0 - 54.0 %    MCV 90 82 - 98 fL    MCH 30.3 27.0 - 31.0 pg    MCHC 33.9 32.0 - 36.0 g/dL    RDW 13.2 11.5 - 14.5 %    Platelets 195 150 - 450 K/uL    MPV 10.3 9.2 - 12.9 fL    Immature Granulocytes 0.3 0.0 - 0.5 %    Gran # (ANC) 3.2 1.8 - 7.7 K/uL    Immature Grans (Abs) 0.02 0.00 - 0.04 K/uL    Lymph # 1.6 1.0 - 4.8 K/uL    Mono # 0.6 0.3 - 1.0 K/uL    Eos # 0.4 0.0 - 0.5 K/uL    Baso # 0.04 0.00 - 0.20 K/uL    nRBC 0 0 /100 WBC    Gran % 54.3 38.0 - 73.0 %    Lymph % 27.2 18.0 - 48.0 %    Mono % 10.0 4.0 - 15.0 %    Eosinophil % 7.5 0.0 - 8.0 %    Basophil % 0.7 0.0 - 1.9 %    Differential Method Automated    Comprehensive metabolic panel    Collection Time: 03/10/22  1:41 PM   Result Value Ref Range    Sodium 142 136 - 145 mmol/L    Potassium 4.1 3.5 - 5.1 mmol/L    Chloride 106 95 - 110 mmol/L    CO2 25 23 - 29 mmol/L    Glucose 114 (H) 70 - 110 mg/dL    BUN 11 8 - 23 mg/dL    Creatinine 0.9 0.5 - 1.4 mg/dL    Calcium 10.2 8.7 - 10.5 mg/dL    Total Protein 7.2 6.0 - 8.4 g/dL    Albumin 4.4 3.5 - 5.2 g/dL    Total Bilirubin 0.4 0.1 - 1.0 mg/dL    Alkaline Phosphatase 78 55 - 135 U/L    AST 23 10 - 40 U/L    ALT 32 10 - 44 U/L    Anion Gap 11 8 - 16 mmol/L    eGFR if African American >60 >60 mL/min/1.73 m^2    eGFR if non African American >60 >60 mL/min/1.73 m^2   Troponin I #1    Collection Time: 03/10/22  1:41 PM   Result Value Ref Range    Troponin I <0.006 0.000 - 0.026 ng/mL   B-Type natriuretic peptide (BNP)    Collection Time: 03/10/22  1:41 PM   Result Value Ref Range    BNP <10 0 - 99 pg/mL   POCT glucose    Collection Time: 03/10/22  1:44 PM   Result Value Ref Range    POCT Glucose 126 (H) 70 - 110 mg/dL   POCT COVID-19 Rapid Screening    Collection  Time: 03/10/22  2:39 PM   Result Value Ref Range    POC Rapid COVID Negative Negative     Acceptable Yes    Urinalysis, Reflex to Urine Culture Urine, Clean Catch    Collection Time: 03/10/22  4:27 PM    Specimen: Urine   Result Value Ref Range    Specimen UA Urine, Clean Catch     Color, UA Yellow Yellow, Straw, Grace    Appearance, UA Clear Clear    pH, UA 6.0 5.0 - 8.0    Specific Gravity, UA 1.010 1.005 - 1.030    Protein, UA Negative Negative    Glucose, UA 1+ (A) Negative    Ketones, UA Negative Negative    Bilirubin (UA) Negative Negative    Occult Blood UA Negative Negative    Nitrite, UA Negative Negative    Urobilinogen, UA Negative <2.0 EU/dL    Leukocytes, UA Negative Negative   Troponin I #2    Collection Time: 03/10/22  4:41 PM   Result Value Ref Range    Troponin I <0.006 0.000 - 0.026 ng/mL     Current Imaging Results:    X-Ray Chest AP Portable   Final Result      Stable chest with no acute cardiopulmonary process seen.         Electronically signed by: Abby Vincent   Date:    03/10/2022   Time:    15:57      CT Head Without Contrast   Final Result      No acute intracranial abnormality.      Mucosal thickening of the visualized paranasal sinuses.         Electronically signed by: Xu Canada MD   Date:    03/10/2022   Time:    15:28        8/22/2019: MRA of Neck: Unremarkable motion limited MRA of the neck as detailed above without evidence for significant focal stenosis or occlusion.     7/10/2013: Carotid Duplex: Mild plaquing.    3/27/2018: Echo:     Normal left ventricular systolic function (EF 60-65%).   No wall motion abnormalities.   Normal left ventricular diastolic function.   Normal right ventricular systolic function .   The estimated PA systolic pressure is 27 mmHg.   Mild tricuspid regurgitation.       3/21/2018: Cath: Normal coronary vessels. are all normal.         Assessment and Plan:     There are no hospital problems to display for this patient.    Assessment  and Plan:    1. Chest Pain   3/21/2018: Cath: Normal coronary vessels are all normal.    3/10/2022: Atypical pain.   See no need for further cardiac testing.    2. Carotid Disease    Mentioned in chart as a diagnosis.   7/10/2013: Carotid Duplex: Mild plaquing.   8/22/2019: MRA of Neck: Unremarkable motion limited MRA of the neck as detailed above without evidence for significant focal stenosis or occlusion.    Not supported by test results.    3. Ventricular Premature Contractions   On metoprolol 100 mg Q24.    4. Hypertension   3/27/2018: Echo: Normal left ventricular size and systolic function. Normal diastolic function.   At home been on metoprolol 100 mg Q24, nifedipine 60 mg Q24, losartan 25 mg Q24 and spironolactone 25 mg Q24.   Appears to mostly run high.   Suggest increase losartan  to 100 mg Q24.    5. Hypercholesterolemia   On ezetimibe 10 mg Q24 and pravastatin 80 mg Q24.    6. Diabetes Mellitus, Type 2   On insulin.      VTE Risk Mitigation (From admission, onward)         Ordered     Place sequential compression device  Until discontinued         03/10/22 1800     IP VTE LOW RISK PATIENT  Once         03/10/22 1800                Thank you for your consult.    I will follow-up with patient. Please contact us if you have any additional questions.    Mirian Painting MD  Cardiology   [facility] - Emergency Dept (Observation)

## 2022-03-11 NOTE — ED NOTES
Improved BP reading through shift, minimal chest discomfort remains but improved since admission. Intermittent bigeminy noted on cardiac monitor. No distress throughout shift

## 2022-03-11 NOTE — H&P
[facility] ED Observation Unit  History and Physical      I assumed care of this patient from the Emergency Department at onset of observation time, 6:00 PM on 03/11/2022.       History of Present Illness:    Afebrile 62-year-old male with PMH of CAD, carotid artery occlusion, CAD, DM, DJD, GERD, iritis, HLD, hypertension, memory loss, thyroid nodule previous MI presents the ED for evaluation of multiple complaints.  Patient states for the past week or so he has been having bilateral chest pain.  He describes it as a spasm like sensation.  He does report that he saw his PCP and was prescribed baclofen as thought related to possible spasm.  Has seen no improvement.  He does report that he has a known history of PVCs and noted that the pain was felt to occur when he would feel sensation related to his known PVCs.  He does also report that he has had labile blood pressure readings at home over the past several days.  He states that he takes his blood pressure daily.  He states that he was having readings with diastolic in the low 100s and then having episodes of hypotension as well.  He states that he has been compliant with all of his medications.  He does report that with onset of elevated blood pressure he noted some headache that he describes as pressure sensation.  He reports that he had routine visit urologist today where he continued with these symptoms.  He does report some associated dizziness and lightheadedness.  At that time he reported bilateral blurred vision that has since improved.  He is found to have elevated blood pressure at that office was encouraged to go to the ED for further evaluation.  He denies any diaphoresis, vomiting, shortness of breath, fever, chills,  symptoms or rash.     ED Course: negative trop x 2, LVH on EKG, no ischemia.  Plan to check additional trop, cards consult, and BP control      I reviewed the ED Provider Note dated 03/11/2022 prior to my evaluation of this patient.  I  reviewed all labs and imaging performed in the Main ED, prior to patient being placed in Observation. Patient was placed in the ED Observation Unit for CP, dizziness, hypertension.    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;  Service: Radiology;  Laterality: N/A;  /Nagi    CERVICAL FUSION  12/30/2015    COLONOSCOPY N/A 4/7/2017    Procedure: COLONOSCOPY;  Surgeon: Handy Frederick MD;  Location: [facility] (4TH FLR);  Service: Endoscopy;  Laterality: N/A;    HERNIA REPAIR      PROSTATECTOMY          Family Hx   Family History   Problem Relation Age of Onset    Heart disease Mother     Hypertension Mother     Hyperlipidemia Mother     Heart disease Father     Hyperlipidemia Brother     Hypertension Brother     Hypertension Brother     Hyperlipidemia Brother     Benign prostatic hyperplasia Brother     Hypertension Brother     Hypertension Brother     Hepatitis Brother     Cancer Sister     Cancer Brother         throat CA    Diabetes Paternal Uncle     Cataracts Maternal Grandmother     Amblyopia Neg Hx     Blindness Neg Hx     Glaucoma Neg Hx     Macular degeneration Neg Hx     Retinal detachment Neg Hx     Strabismus Neg Hx     Stroke Neg Hx     Thyroid disease Neg Hx     Colon cancer Neg Hx     Colon polyps Neg Hx         Social Hx   Social  History     Socioeconomic History    Marital status:    Tobacco Use    Smoking status: Former Smoker     Packs/day: 1.00     Years: 30.00     Pack years: 30.00     Types: Cigarettes     Quit date: 12/13/2011     Years since quitting: 10.2    Smokeless tobacco: Never Used   Substance and Sexual Activity    Alcohol use: Yes     Comment: occas - nonalcoholic beer or beer    Drug use: No    Sexual activity: Yes     Partners: Female        Vital Signs   Vitals:    03/11/22 0600 03/11/22 0700 03/11/22 0800 03/11/22 0932   BP:    (!) 164/85   Pulse: 68 74 78 76   Resp:    18   Temp:    98.2 °F (36.8 °C)   TempSrc:    Oral   SpO2:    95%   Weight:       Height:        03/11/22 1000   BP:    Pulse: 81   Resp:    Temp:    TempSrc:    SpO2:    Weight:    Height:        Review of Systems  Review of Systems   Constitutional: Negative for chills and fever.   Respiratory: Negative for cough and sputum production.    Cardiovascular: Positive for chest pain and palpitations.   Neurological: Positive for dizziness. Negative for loss of consciousness and headaches.   All other systems reviewed and are negative.      Brief Physical Exam/Reassessment   Physical Exam    Nursing note and vitals reviewed.  Constitutional: He appears well-developed and well-nourished. He does not appear ill. No distress.   HENT:   Head: Normocephalic and atraumatic.   Mouth/Throat: Mucous membranes are normal. Mucous membranes are not pale and not dry.   Neck: Neck supple. Carotid bruit is not present. No JVD present.   Cardiovascular: Normal rate, regular rhythm, S1 normal and S2 normal.  Occasional extrasystoles are present.  Exam reveals gallop.    Pulmonary/Chest: Effort normal and breath sounds normal. No tachypnea. He has no decreased breath sounds. He has no wheezes.   Musculoskeletal:         General: No edema.      Cervical back: Neck supple.     Neurological: He is alert and oriented to person, place, and time. GCS eye subscore is 4.  GCS verbal subscore is 5. GCS motor subscore is 6.   Skin: Skin is warm, dry and intact.         Medications:   Scheduled Meds:   aspirin  81 mg Oral Daily    ezetimibe  10 mg Oral Daily    gabapentin  300 mg Oral TID    losartan  25 mg Oral Daily    metoprolol succinate  100 mg Oral Daily    NIFEdipine  60 mg Oral QHS    pravastatin  80 mg Oral QHS    spironolactone  50 mg Oral QHS     Continuous Infusions:  PRN Meds:.ALPRAZolam, dextrose 10%, dextrose 10%, glucagon (human recombinant), glucose, glucose, insulin aspart U-100, melatonin, sodium chloride 0.9%      Assessment/Plan:    1. Dizziness - trend trop, cards eval, BP control   2. Chest pain - cards eval, trend trop   3. Pain    4. Hypertension associated with diabetes - SSI   5. Chest pain, unspecified type    6. Precordial pain        Case was discussed with the ED provider, Mk Bridges MD

## 2022-03-11 NOTE — ED NOTES
"The patient is awake, alert and cooperative with a calm affect. Patient is aware of environment and behaving and speaking age appropriately. Airway is open and patent. Respirations are spontaneous with normal respiratory effort and rate. Patient is in no acute distress and resting comfortably. His skin is warm and dry with normal skin turgor. Mucous membranes are moist. Abdomen is soft and non tender in all four quadrants. Patient complains of slight headache, small enough to not be able to provide a number. The patient also complains of minimal dizziness. The patient stated, "I am dizzy because I sat up too fast." Will continue to monitor.   "

## 2022-03-25 ENCOUNTER — OFFICE VISIT (OUTPATIENT)
Dept: CARDIOLOGY | Facility: CLINIC | Age: 62
End: 2022-03-25
Payer: COMMERCIAL

## 2022-03-25 VITALS
OXYGEN SATURATION: 97 % | WEIGHT: 206.31 LBS | HEIGHT: 76 IN | BODY MASS INDEX: 25.12 KG/M2 | HEART RATE: 76 BPM | SYSTOLIC BLOOD PRESSURE: 122 MMHG | DIASTOLIC BLOOD PRESSURE: 76 MMHG

## 2022-03-25 DIAGNOSIS — R07.9 CHEST PAIN, UNSPECIFIED TYPE: ICD-10-CM

## 2022-03-25 DIAGNOSIS — E78.5 DYSLIPIDEMIA ASSOCIATED WITH TYPE 2 DIABETES MELLITUS: Primary | ICD-10-CM

## 2022-03-25 DIAGNOSIS — I25.10 CORONARY ARTERY DISEASE DUE TO CALCIFIED CORONARY LESION: ICD-10-CM

## 2022-03-25 DIAGNOSIS — I73.9 PERIPHERAL VASCULAR DISEASE: ICD-10-CM

## 2022-03-25 DIAGNOSIS — Z82.49 FAMILY HISTORY OF PREMATURE CAD: ICD-10-CM

## 2022-03-25 DIAGNOSIS — I25.84 CORONARY ARTERY DISEASE DUE TO CALCIFIED CORONARY LESION: ICD-10-CM

## 2022-03-25 DIAGNOSIS — R42 DIZZINESS: ICD-10-CM

## 2022-03-25 DIAGNOSIS — E11.59 HYPERTENSION ASSOCIATED WITH DIABETES: ICD-10-CM

## 2022-03-25 DIAGNOSIS — I10 ESSENTIAL HYPERTENSION: ICD-10-CM

## 2022-03-25 DIAGNOSIS — I15.2 HYPERTENSION ASSOCIATED WITH DIABETES: ICD-10-CM

## 2022-03-25 DIAGNOSIS — E11.69 DYSLIPIDEMIA ASSOCIATED WITH TYPE 2 DIABETES MELLITUS: Primary | ICD-10-CM

## 2022-03-25 PROCEDURE — 99999 PR PBB SHADOW E&M-EST. PATIENT-LVL V: ICD-10-PCS | Mod: PBBFAC,,, | Performed by: INTERNAL MEDICINE

## 2022-03-25 PROCEDURE — 1160F RVW MEDS BY RX/DR IN RCRD: CPT | Mod: CPTII,S$GLB,, | Performed by: INTERNAL MEDICINE

## 2022-03-25 PROCEDURE — 3044F PR MOST RECENT HEMOGLOBIN A1C LEVEL <7.0%: ICD-10-PCS | Mod: CPTII,S$GLB,, | Performed by: INTERNAL MEDICINE

## 2022-03-25 PROCEDURE — 1159F PR MEDICATION LIST DOCUMENTED IN MEDICAL RECORD: ICD-10-PCS | Mod: CPTII,S$GLB,, | Performed by: INTERNAL MEDICINE

## 2022-03-25 PROCEDURE — 99999 PR PBB SHADOW E&M-EST. PATIENT-LVL V: CPT | Mod: PBBFAC,,, | Performed by: INTERNAL MEDICINE

## 2022-03-25 PROCEDURE — 4010F PR ACE/ARB THEARPY RXD/TAKEN: ICD-10-PCS | Mod: CPTII,S$GLB,, | Performed by: INTERNAL MEDICINE

## 2022-03-25 PROCEDURE — 3008F PR BODY MASS INDEX (BMI) DOCUMENTED: ICD-10-PCS | Mod: CPTII,S$GLB,, | Performed by: INTERNAL MEDICINE

## 2022-03-25 PROCEDURE — 1159F MED LIST DOCD IN RCRD: CPT | Mod: CPTII,S$GLB,, | Performed by: INTERNAL MEDICINE

## 2022-03-25 PROCEDURE — 3074F PR MOST RECENT SYSTOLIC BLOOD PRESSURE < 130 MM HG: ICD-10-PCS | Mod: CPTII,S$GLB,, | Performed by: INTERNAL MEDICINE

## 2022-03-25 PROCEDURE — 99204 PR OFFICE/OUTPT VISIT, NEW, LEVL IV, 45-59 MIN: ICD-10-PCS | Mod: S$GLB,,, | Performed by: INTERNAL MEDICINE

## 2022-03-25 PROCEDURE — 3078F DIAST BP <80 MM HG: CPT | Mod: CPTII,S$GLB,, | Performed by: INTERNAL MEDICINE

## 2022-03-25 PROCEDURE — 1160F PR REVIEW ALL MEDS BY PRESCRIBER/CLIN PHARMACIST DOCUMENTED: ICD-10-PCS | Mod: CPTII,S$GLB,, | Performed by: INTERNAL MEDICINE

## 2022-03-25 PROCEDURE — 3078F PR MOST RECENT DIASTOLIC BLOOD PRESSURE < 80 MM HG: ICD-10-PCS | Mod: CPTII,S$GLB,, | Performed by: INTERNAL MEDICINE

## 2022-03-25 PROCEDURE — 3008F BODY MASS INDEX DOCD: CPT | Mod: CPTII,S$GLB,, | Performed by: INTERNAL MEDICINE

## 2022-03-25 PROCEDURE — 4010F ACE/ARB THERAPY RXD/TAKEN: CPT | Mod: CPTII,S$GLB,, | Performed by: INTERNAL MEDICINE

## 2022-03-25 PROCEDURE — 3074F SYST BP LT 130 MM HG: CPT | Mod: CPTII,S$GLB,, | Performed by: INTERNAL MEDICINE

## 2022-03-25 PROCEDURE — 3044F HG A1C LEVEL LT 7.0%: CPT | Mod: CPTII,S$GLB,, | Performed by: INTERNAL MEDICINE

## 2022-03-25 PROCEDURE — 99204 OFFICE O/P NEW MOD 45 MIN: CPT | Mod: S$GLB,,, | Performed by: INTERNAL MEDICINE

## 2022-03-25 NOTE — PROGRESS NOTES
Cardiology    3/25/2022  10:20 AM    Problem list  Patient Active Problem List   Diagnosis    Essential hypertension    Traumatic iritis - Left Eye    Chronic anterior uveitis    Unspecified iridocyclitis - Left Eye    Memory loss    GERD (gastroesophageal reflux disease)    Family history of premature CAD    Peripheral vascular disease    Subclavian steal syndrome    Trigger finger of left hand    Rapid palpitations    Muscle ache    Cervical spinal stenosis    DJD (degenerative joint disease), cervical    Lateral epicondylitis (tennis elbow)    Cervical radiculopathy    Pain    Mixed hyperlipidemia    Coronary artery disease due to calcified coronary lesion    Anxiety disorder    Chest pain    Unstable angina    Rectal bleeding    Lip swelling    Recurrent UTI    BPH with obstruction/lower urinary tract symptoms    Dysphagia    Type 2 diabetes mellitus without complication, without long-term current use of insulin    Pyelonephritis    Dysuria    Thyroid nodule    Abnormal magnetic resonance angiography (MRA)    Anterior cerebral artery aneurysm    Intermittent confusion    New daily persistent headache    Medication overuse headache    Suspected sleep apnea    Hypokalemia    Renal cyst, right    Distended bladder    Incomplete bladder emptying    Bilateral leg cramps    Pterygium eye, left    Hypertension associated with diabetes    Dyslipidemia associated with type 2 diabetes mellitus    Diabetes mellitus with insulin therapy    Dizziness       CC:  ER f/u    HPI:  Patient was referred by the emergency room at [facility] for follow-up.  He presented there for elevated blood pressure was 160/110.  Patient was and his urologist's office and was noted to have high blood pressure was sent to emergency room.  He was seen by Dr Painting in the ER.  His cardiologist is Dr Palacios at [facility].  Pt wants to change his care to [facility].  In the emergency room, he was  told to increase his losartan to 100 mg daily however patient decided to only take 50 mg daily.  His current regimen consists of losartan 50 mg daily, metoprolol 100 mg daily, nifedipine 60 mg daily, Aldactone 50 mg daily.  He states that his blood pressure fluctuates.  He also has sharp left-sided chest pain which he thinks it could be spasm.  He had an coronary angiogram done in 2018 which showed luminal irregularities. He walks his dog (husky) daily for 1 hour without any exertional symptoms.    Medications  Current Outpatient Medications   Medication Sig Dispense Refill    ALPRAZolam (XANAX) 0.5 MG tablet TAKE 1 TABLET BY MOUTH THREE TIMES A DAY AS NEEDED FOR ANXIETY 90 tablet 1    baclofen (LIORESAL) 10 MG tablet TAKE 1 TABLET BY MOUTH EVERY DAY IN THE EVENING 30 tablet 3    celecoxib (CELEBREX) 200 MG capsule TAKE 1 CAPSULE BY MOUTH ONCE DAILY (Patient taking differently: 2 (two) times daily as needed.) 30 capsule 0    CONTOUR NEXT TEST STRIPS Strp USE TO TEST BLOOD SUGAR ONCE DAILY 25 strip 6    ezetimibe (ZETIA) 10 mg tablet Take 1 tablet (10 mg total) by mouth once daily. 90 tablet 3    gabapentin (NEURONTIN) 300 MG capsule Take 1 capsule (300 mg total) by mouth 3 (three) times daily. 90 capsule 11    insulin NPH isoph U-100 human (NOVOLIN N FLEXPEN) 100 unit/mL (3 mL) InPn Inject 9 Units into the skin 2 (two) times daily before meals. 5 Syringe 1    losartan (COZAAR) 25 MG tablet Take 2 tablets (50 mg total) by mouth once daily. 60 tablet 0    metoprolol succinate (TOPROL-XL) 100 MG 24 hr tablet TAKE 1 TABLET BY MOUTH EVERY DAY 90 tablet 2    MICROLET LANCET Misc 1 lancet by Misc.(Non-Drug; Combo Route) route once daily. Test blood glucose once daily as instructed. 25 each 11    NIFEdipine (PROCARDIA-XL) 60 MG (OSM) 24 hr tablet TAKE 1 TABLET BY MOUTH EVERY DAY 90 tablet 1    nitroGLYCERIN (NITROSTAT) 0.3 MG SL tablet Place 1 tablet (0.3 mg total) under the tongue every 5 (five) minutes as  "needed for Chest pain. 100 tablet 3    ondansetron (ZOFRAN) 4 MG tablet Take 1 tablet (4 mg total) by mouth every 6 (six) hours as needed for Nausea. 30 tablet 0    pantoprazole (PROTONIX) 40 MG tablet TAKE 1 TABLET BY MOUTH TWICE A  tablet 3    pen needle, diabetic (BD ULTRA-FINE CHET PEN NEEDLE) 32 gauge x 5/32" Ndle USE PEN NEEDLE AS INSTRUCTION WITH LANTUS INSULIN PRE-FILLED PEN. 200 each 1    pravastatin (PRAVACHOL) 80 MG tablet Take 1 tablet (80 mg total) by mouth once daily. 90 tablet 3    spironolactone (ALDACTONE) 50 MG tablet Take 1 tablet (50 mg total) by mouth once daily. 90 tablet 3    aspirin (ECOTRIN) 81 MG EC tablet Take 1 tablet (81 mg total) by mouth once daily.  0    losartan (COZAAR) 25 MG tablet Take 1 tablet (25 mg total) by mouth once daily. (Patient not taking: Reported on 3/25/2022) 90 tablet 3    tadalafiL (CIALIS) 20 MG Tab Take 1 tablet (20 mg total) by mouth every 72 hours as needed (ED). 15 tablet 11     No current facility-administered medications for this visit.      Prior to Admission medications    Medication Sig Start Date End Date Taking? Authorizing Provider   ALPRAZolam (XANAX) 0.5 MG tablet TAKE 1 TABLET BY MOUTH THREE TIMES A DAY AS NEEDED FOR ANXIETY 2/23/22  Yes Josefina Kendall MD   baclofen (LIORESAL) 10 MG tablet TAKE 1 TABLET BY MOUTH EVERY DAY IN THE EVENING 2/28/22  Yes Josefina Kendall MD   celecoxib (CELEBREX) 200 MG capsule TAKE 1 CAPSULE BY MOUTH ONCE DAILY  Patient taking differently: 2 (two) times daily as needed. 1/28/22  Yes Shashank Montana PA-C   CONTOUR NEXT TEST STRIPS Strp USE TO TEST BLOOD SUGAR ONCE DAILY 2/13/20  Yes Josefina Kendall MD   ezetimibe (ZETIA) 10 mg tablet Take 1 tablet (10 mg total) by mouth once daily. 2/23/22  Yes Debbie Amaya NP   gabapentin (NEURONTIN) 300 MG capsule Take 1 capsule (300 mg total) by mouth 3 (three) times daily. 10/4/21 10/4/22 Yes Maxine Lindsay PA-C   insulin NPH isoph U-100 human " "(NOVOLIN N FLEXPEN) 100 unit/mL (3 mL) InPn Inject 9 Units into the skin 2 (two) times daily before meals. 8/19/21 8/19/22 Yes Josefina Kendall MD   losartan (COZAAR) 25 MG tablet Take 2 tablets (50 mg total) by mouth once daily. 3/11/22 4/10/22 Yes Jina Deluca, KAREN   metoprolol succinate (TOPROL-XL) 100 MG 24 hr tablet TAKE 1 TABLET BY MOUTH EVERY DAY 9/27/21  Yes Josefina Kendall MD   MICROLET LANCET Misc 1 lancet by Misc.(Non-Drug; Combo Route) route once daily. Test blood glucose once daily as instructed. 10/3/16  Yes Josefina Kendall MD   NIFEdipine (PROCARDIA-XL) 60 MG (OSM) 24 hr tablet TAKE 1 TABLET BY MOUTH EVERY DAY 11/2/21  Yes Josefina Kendall MD   nitroGLYCERIN (NITROSTAT) 0.3 MG SL tablet Place 1 tablet (0.3 mg total) under the tongue every 5 (five) minutes as needed for Chest pain. 2/23/22  Yes Debbie Amaya NP   ondansetron (ZOFRAN) 4 MG tablet Take 1 tablet (4 mg total) by mouth every 6 (six) hours as needed for Nausea. 8/6/21  Yes Bladimir Mendez NP   pantoprazole (PROTONIX) 40 MG tablet TAKE 1 TABLET BY MOUTH TWICE A DAY 9/24/21  Yes Josefina Kendall MD   pen needle, diabetic (BD ULTRA-FINE CHET PEN NEEDLE) 32 gauge x 5/32" Ndle USE PEN NEEDLE AS INSTRUCTION WITH LANTUS INSULIN PRE-FILLED PEN. 7/14/21  Yes Josefina Kendall MD   pravastatin (PRAVACHOL) 80 MG tablet Take 1 tablet (80 mg total) by mouth once daily. 2/23/22 2/23/23 Yes Debbie Amaya NP   spironolactone (ALDACTONE) 50 MG tablet Take 1 tablet (50 mg total) by mouth once daily. 2/23/22  Yes Debbie Amaya NP   aspirin (ECOTRIN) 81 MG EC tablet Take 1 tablet (81 mg total) by mouth once daily. 3/16/18 10/4/21  Brandy Patel, KATHERIN   losartan (COZAAR) 25 MG tablet Take 1 tablet (25 mg total) by mouth once daily.  Patient not taking: Reported on 3/25/2022 2/23/22   Debbie Amaya NP   tadalafiL (CIALIS) 20 MG Tab Take 1 tablet (20 mg total) by mouth every 72 hours as needed (ED). 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 -     Hyperlipidemia     Hypertension     Memory loss     Thyroid nodule 8/22/2019    Traumatic iritis - Left Eye 2/27/2013     Past Surgical History:   Procedure Laterality Date    CEREBRAL ANGIOGRAM N/A 1/6/2020    Procedure: ANGIOGRAM-CEREBRAL;  Surgeon: [facility] Diagnostic Provider;  [facility];  Service: Radiology;  Laterality: N/A;  /Nagi    CERVICAL FUSION  12/30/2015    COLONOSCOPY N/A 4/7/2017    Procedure: COLONOSCOPY;  Surgeon: Handy Frederick MD;  Location: [facility] ENDO (4TH FLR);  Service: Endoscopy;  Laterality: N/A;    HERNIA REPAIR      PROSTATECTOMY       Social History     Socioeconomic History    Marital status:    Tobacco Use    Smoking status: Former Smoker     Packs/day: 1.00     Years: 30.00     Pack years: 30.00     Types: Cigarettes     Quit date: 12/13/2011     Years since quitting: 10.2    Smokeless tobacco: Never Used   Substance and Sexual Activity    Alcohol use: Yes     Comment: occas - nonalcoholic beer or beer    Drug use: No    Sexual activity: Yes     Partners: Female         Allergies  Review of patient's allergies indicates:   Allergen Reactions    Lisinopril Anaphylaxis and Nausea And Vomiting     General bad feeling    Lipitor [atorvastatin] Other (See Comments)     Muscle aches    Adhesive     Crestor [rosuvastatin] Swelling     Lip swelling.     Jardiance [empagliflozin] Other (See Comments)     Possible related to recent UTI's     Metformin      Used XR and experienced flatulance and abdominal pain.          Review of Systems   Review of Systems    Constitutional: Negative for decreased appetite, fever and weight loss.   HENT: Negative for congestion and nosebleeds.    Eyes: Negative for double vision, vision loss in left eye, vision loss in right eye and visual disturbance.   Cardiovascular: Positive for chest pain. Negative for claudication, cyanosis, dyspnea on exertion, irregular heartbeat, leg swelling, near-syncope, orthopnea, palpitations, paroxysmal nocturnal dyspnea and syncope.   Respiratory: Negative for cough, hemoptysis, shortness of breath, sleep disturbances due to breathing, snoring, sputum production and wheezing.    Endocrine: Negative for cold intolerance and heat intolerance.   Skin: Negative for nail changes and rash.   Musculoskeletal: Negative for joint pain, muscle cramps, muscle weakness and myalgias.   Gastrointestinal: Negative for change in bowel habit, heartburn, hematemesis, hematochezia, hemorrhoids and melena.   Neurological: Negative for dizziness, focal weakness and headaches.         Physical Exam  Wt Readings from Last 1 Encounters:   03/25/22 93.6 kg (206 lb 4.8 oz)     BP Readings from Last 3 Encounters:   03/25/22 122/76   03/11/22 (!) 164/85   03/10/22 (!) 150/112     Pulse Readings from Last 1 Encounters:   03/25/22 76     Body mass index is 25.11 kg/m².    Physical Exam  Constitutional:       Appearance: He is well-developed.   HENT:      Head: Atraumatic.   Eyes:      General: No scleral icterus.  Neck:      Vascular: Normal carotid pulses. No carotid bruit, hepatojugular reflux or JVD.   Cardiovascular:      Rate and Rhythm: Normal rate and regular rhythm.      Chest Wall: PMI is not displaced.      Pulses: Intact distal pulses.           Carotid pulses are 2+ on the right side and 2+ on the left side.       Radial pulses are 2+ on the right side and 2+ on the left side.        Dorsalis pedis pulses are 2+ on the right side and 2+ on the left side.        Posterior tibial pulses are 2+ on the right side and 2+ on  the left side.      Heart sounds: Normal heart sounds, S1 normal and S2 normal. No murmur heard.    No friction rub.   Pulmonary:      Effort: Pulmonary effort is normal. No respiratory distress.      Breath sounds: Normal breath sounds. No stridor. No wheezing or rales.   Chest:      Chest wall: No tenderness.   Abdominal:      General: Bowel sounds are normal.      Palpations: Abdomen is soft.   Musculoskeletal:      Cervical back: Neck supple. No edema.   Skin:     General: Skin is warm and dry.      Nails: There is no clubbing.   Neurological:      Mental Status: He is alert and oriented to person, place, and time.   Psychiatric:         Behavior: Behavior normal.         Thought Content: Thought content normal.             Assessment  1. Dizziness  resolved  - Ambulatory referral/consult to Cardiology    2. Hypertension associated with diabetes  stable  - Ambulatory referral/consult to Cardiology    3. Chest pain, unspecified type  atypical  - Ambulatory referral/consult to Cardiology  - Echo; Future  - Exercise Stress - EKG; Future    4. Dyslipidemia associated with type 2 diabetes mellitus  stable    5. Essential hypertension  controlled  - Hypertension Digital Medicine ([facility]) Enrollment Order  - Hypertension Digital Medicine ([facility]): Assign Onboarding Questionnaires    6. Coronary artery disease due to calcified coronary lesion  stable    7. Peripheral vascular disease  stable    8. Family history of premature CAD  stable        Plan and Discussion  Discussed his blood pressure.  Discussed his goals for blood pressure management.  Will enroll in hypertension digital medicine program.  Continue current blood pressure.  Will get an echocardiogram and treadmill stress test to assess his uncontrolled hypertension and atypical chest pain.    Follow Up  1 month      Bebo Lyn MD, F.A.C.C, F.S.C.A.I.

## 2022-04-15 ENCOUNTER — PATIENT MESSAGE (OUTPATIENT)
Dept: ADMINISTRATIVE | Facility: OTHER | Age: 62
End: 2022-04-15
Payer: COMMERCIAL

## 2022-04-29 NOTE — TELEPHONE ENCOUNTER
Called pt to reschedule ultrasound appointments recently cancelled by pt. Pt states he will call our office at a later date to reschedule. Pt states he is unable to have studies done at this time due to his out of pocket cost.   Pneumonia

## 2022-05-01 ENCOUNTER — HOSPITAL ENCOUNTER (EMERGENCY)
Facility: OTHER | Age: 62
Discharge: HOME | End: 2022-05-01
Attending: EMERGENCY MEDICINE
Payer: COMMERCIAL

## 2022-05-01 VITALS
HEIGHT: 74 IN | SYSTOLIC BLOOD PRESSURE: 172 MMHG | BODY MASS INDEX: 24.9 KG/M2 | RESPIRATION RATE: 16 BRPM | WEIGHT: 194 LBS | TEMPERATURE: 99 F | HEART RATE: 53 BPM | OXYGEN SATURATION: 98 % | DIASTOLIC BLOOD PRESSURE: 85 MMHG

## 2022-05-01 DIAGNOSIS — T14.8XXA MUSCLE STRAIN: ICD-10-CM

## 2022-05-01 DIAGNOSIS — M54.6 ACUTE LEFT-SIDED THORACIC BACK PAIN: ICD-10-CM

## 2022-05-01 DIAGNOSIS — R10.9 LEFT FLANK PAIN: Primary | ICD-10-CM

## 2022-05-01 LAB
ALBUMIN SERPL BCP-MCNC: 4.3 G/DL (ref 3.5–5.2)
ALP SERPL-CCNC: 66 U/L (ref 55–135)
ALT SERPL W/O P-5'-P-CCNC: 30 U/L (ref 10–44)
ANION GAP SERPL CALC-SCNC: 12 MMOL/L (ref 8–16)
AST SERPL-CCNC: 27 U/L (ref 10–40)
BASOPHILS # BLD AUTO: 0.06 K/UL (ref 0–0.2)
BASOPHILS NFR BLD: 1 % (ref 0–1.9)
BILIRUB SERPL-MCNC: 0.4 MG/DL (ref 0.1–1)
BILIRUB UR QL STRIP: NEGATIVE
BUN SERPL-MCNC: 13 MG/DL (ref 8–23)
CALCIUM SERPL-MCNC: 9.8 MG/DL (ref 8.7–10.5)
CHLORIDE SERPL-SCNC: 104 MMOL/L (ref 95–110)
CLARITY UR: CLEAR
CO2 SERPL-SCNC: 21 MMOL/L (ref 23–29)
COLOR UR: YELLOW
CREAT SERPL-MCNC: 1.2 MG/DL (ref 0.5–1.4)
DIFFERENTIAL METHOD: ABNORMAL
EOSINOPHIL # BLD AUTO: 0.3 K/UL (ref 0–0.5)
EOSINOPHIL NFR BLD: 5 % (ref 0–8)
ERYTHROCYTE [DISTWIDTH] IN BLOOD BY AUTOMATED COUNT: 12.5 % (ref 11.5–14.5)
EST. GFR  (AFRICAN AMERICAN): >60 ML/MIN/1.73 M^2
EST. GFR  (NON AFRICAN AMERICAN): >60 ML/MIN/1.73 M^2
GLUCOSE SERPL-MCNC: 127 MG/DL (ref 70–110)
GLUCOSE UR QL STRIP: ABNORMAL
HCT VFR BLD AUTO: 43 % (ref 40–54)
HGB BLD-MCNC: 14.9 G/DL (ref 14–18)
HGB UR QL STRIP: NEGATIVE
IMM GRANULOCYTES # BLD AUTO: 0.01 K/UL (ref 0–0.04)
IMM GRANULOCYTES NFR BLD AUTO: 0.2 % (ref 0–0.5)
KETONES UR QL STRIP: NEGATIVE
LEUKOCYTE ESTERASE UR QL STRIP: NEGATIVE
LIPASE SERPL-CCNC: 24 U/L (ref 4–60)
LYMPHOCYTES # BLD AUTO: 1.5 K/UL (ref 1–4.8)
LYMPHOCYTES NFR BLD: 24.6 % (ref 18–48)
MCH RBC QN AUTO: 31.6 PG (ref 27–31)
MCHC RBC AUTO-ENTMCNC: 34.7 G/DL (ref 32–36)
MCV RBC AUTO: 91 FL (ref 82–98)
MONOCYTES # BLD AUTO: 0.6 K/UL (ref 0.3–1)
MONOCYTES NFR BLD: 10.4 % (ref 4–15)
NEUTROPHILS # BLD AUTO: 3.5 K/UL (ref 1.8–7.7)
NEUTROPHILS NFR BLD: 58.8 % (ref 38–73)
NITRITE UR QL STRIP: NEGATIVE
NRBC BLD-RTO: 0 /100 WBC
PH UR STRIP: 7 [PH] (ref 5–8)
PLATELET # BLD AUTO: 251 K/UL (ref 150–450)
PMV BLD AUTO: 9.7 FL (ref 9.2–12.9)
POTASSIUM SERPL-SCNC: 3.9 MMOL/L (ref 3.5–5.1)
PROT SERPL-MCNC: 7.1 G/DL (ref 6–8.4)
PROT UR QL STRIP: NEGATIVE
RBC # BLD AUTO: 4.71 M/UL (ref 4.6–6.2)
SODIUM SERPL-SCNC: 137 MMOL/L (ref 136–145)
SP GR UR STRIP: <=1.005 (ref 1–1.03)
URN SPEC COLLECT METH UR: ABNORMAL
UROBILINOGEN UR STRIP-ACNC: NEGATIVE EU/DL
WBC # BLD AUTO: 5.98 K/UL (ref 3.9–12.7)

## 2022-05-01 PROCEDURE — 99284 EMERGENCY DEPT VISIT MOD MDM: CPT | Mod: 25

## 2022-05-01 PROCEDURE — 63600175 PHARM REV CODE 636 W HCPCS: Performed by: NURSE PRACTITIONER

## 2022-05-01 PROCEDURE — 83690 ASSAY OF LIPASE: CPT | Performed by: NURSE PRACTITIONER

## 2022-05-01 PROCEDURE — 85025 COMPLETE CBC W/AUTO DIFF WBC: CPT | Performed by: NURSE PRACTITIONER

## 2022-05-01 PROCEDURE — 96374 THER/PROPH/DIAG INJ IV PUSH: CPT

## 2022-05-01 PROCEDURE — 81003 URINALYSIS AUTO W/O SCOPE: CPT | Performed by: NURSE PRACTITIONER

## 2022-05-01 PROCEDURE — 96361 HYDRATE IV INFUSION ADD-ON: CPT

## 2022-05-01 PROCEDURE — 25000003 PHARM REV CODE 250: Performed by: NURSE PRACTITIONER

## 2022-05-01 PROCEDURE — 80053 COMPREHEN METABOLIC PANEL: CPT | Performed by: NURSE PRACTITIONER

## 2022-05-01 RX ORDER — METHOCARBAMOL 750 MG/1
1500 TABLET, FILM COATED ORAL 3 TIMES DAILY
Qty: 30 TABLET | Refills: 0 | Status: SHIPPED | OUTPATIENT
Start: 2022-05-01 | End: 2022-05-12 | Stop reason: SDUPTHER

## 2022-05-01 RX ORDER — IBUPROFEN 600 MG/1
600 TABLET ORAL EVERY 6 HOURS PRN
Qty: 20 TABLET | Refills: 0 | Status: ON HOLD | OUTPATIENT
Start: 2022-05-01 | End: 2022-10-11 | Stop reason: HOSPADM

## 2022-05-01 RX ORDER — LIDOCAINE 50 MG/G
1 PATCH TOPICAL DAILY
Qty: 15 PATCH | Refills: 0 | Status: SHIPPED | OUTPATIENT
Start: 2022-05-01

## 2022-05-01 RX ORDER — KETOROLAC TROMETHAMINE 30 MG/ML
15 INJECTION, SOLUTION INTRAMUSCULAR; INTRAVENOUS
Status: COMPLETED | OUTPATIENT
Start: 2022-05-01 | End: 2022-05-01

## 2022-05-01 RX ORDER — HYDROCODONE BITARTRATE AND ACETAMINOPHEN 5; 325 MG/1; MG/1
1 TABLET ORAL
Status: COMPLETED | OUTPATIENT
Start: 2022-05-01 | End: 2022-05-01

## 2022-05-01 RX ORDER — METHOCARBAMOL 500 MG/1
500 TABLET, FILM COATED ORAL
Status: COMPLETED | OUTPATIENT
Start: 2022-05-01 | End: 2022-05-01

## 2022-05-01 RX ORDER — HYDROCODONE BITARTRATE AND ACETAMINOPHEN 5; 325 MG/1; MG/1
1 TABLET ORAL EVERY 8 HOURS PRN
Qty: 9 TABLET | Refills: 0 | Status: SHIPPED | OUTPATIENT
Start: 2022-05-01 | End: 2022-05-04

## 2022-05-01 RX ORDER — METHOCARBAMOL 500 MG/1
1000 TABLET, FILM COATED ORAL
Status: COMPLETED | OUTPATIENT
Start: 2022-05-01 | End: 2022-05-01

## 2022-05-01 RX ORDER — LIDOCAINE 50 MG/G
1 PATCH TOPICAL ONCE
Status: DISCONTINUED | OUTPATIENT
Start: 2022-05-01 | End: 2022-05-01 | Stop reason: HOSPADM

## 2022-05-01 RX ADMIN — HYDROCODONE BITARTRATE AND ACETAMINOPHEN 1 TABLET: 5; 325 TABLET ORAL at 06:05

## 2022-05-01 RX ADMIN — LIDOCAINE 1 PATCH: 50 PATCH CUTANEOUS at 04:05

## 2022-05-01 RX ADMIN — KETOROLAC TROMETHAMINE 15 MG: 30 INJECTION, SOLUTION INTRAMUSCULAR at 04:05

## 2022-05-01 RX ADMIN — SODIUM CHLORIDE 1000 ML: 0.9 INJECTION, SOLUTION INTRAVENOUS at 04:05

## 2022-05-01 RX ADMIN — METHOCARBAMOL TABLETS 1000 MG: 500 TABLET, COATED ORAL at 04:05

## 2022-05-01 RX ADMIN — METHOCARBAMOL TABLETS 500 MG: 500 TABLET, COATED ORAL at 06:05

## 2022-05-01 NOTE — DISCHARGE INSTRUCTIONS
While taking the Robaxin, do not also take the baclofen.  There are both the same type of medication and could result in over-sedation.  Furthermore if you were going to be taking ibuprofen as prescribed, do not take Celebrex as they are both in the NSAID class and if you take both you could cause stomach irritation.  Lastly benzodiazepines mixed with opiate pain medicine intensifies both medications effects.  If you are taking the pain meds, abstain from taking your Xanax.

## 2022-05-01 NOTE — ED PROVIDER NOTES
Source of History:  Patient    Chief complaint:  left side pain , left back pain (Pt c.o left side pain radiates to mid back onset 3 days ago. Pt does not have hx of kidney stones however he states the last time he had this pain he had kidney infection. Pt denies fever. Pt denies urinary symptoms.  AAO x 3 nadn skin tatiana )      HPI:  Hi Braxton is a 62 y.o. male PMH of CAD, carotid artery occlusion, CAD, DM, DJD, GERD, iritis, HLD, hypertension, memory loss, thyroid nodule previous MI presenting with left flank pain since Friday.  Denies associated dysuria, hematuria, and feeling of incomplete emptying.  Pain is worse with movement and with palpation of left flank in 1 specific spot.  Patient has tried massage felt but no medications for his symptoms.      This is the extent to the patients complaints today here in the emergency department.    ROS: As per HPI and below:  General: No fever.  No chills.  Eyes: No visual changes.  ENT: No sore throat. No ear pain  Head: No headache.    Chest: No shortness of breath.  Cardiovascular: No chest pain.  Abdomen: No abdominal pain.  No nausea or vomiting. +left flank pain  Genito-Urinary: No abnormal urination.  Neurologic: No focal weakness.  No numbness.  MSK: + back pain.  Integument: No rashes or lesions.  Hematologic: No easy bruising.  Endocrine: No excessive thirst or urination.    Review of patient's allergies indicates:   Allergen Reactions    Lisinopril Anaphylaxis and Nausea And Vomiting     General bad feeling    Lipitor [atorvastatin] Other (See Comments)     Muscle aches    Adhesive     Crestor [rosuvastatin] Swelling     Lip swelling.     Jardiance [empagliflozin] Other (See Comments)     Possible related to recent UTI's     Metformin      Used XR and experienced flatulance and abdominal pain.        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;  Service: Radiology;  Laterality: N/A;  /Nagi    CERVICAL FUSION  12/30/2015    COLONOSCOPY N/A 4/7/2017    Procedure: COLONOSCOPY;  Surgeon: Handy Frederick MD;  Location: [facility] (4TH FLR);  Service: Endoscopy;  Laterality: N/A;    HERNIA REPAIR      PROSTATECTOMY         Social History     Tobacco Use    Smoking status: Former Smoker     Packs/day: 1.00     Years: 30.00     Pack years: 30.00     Types: Cigarettes     Quit date: 12/13/2011     Years since quitting: 10.3    Smokeless tobacco: Never Used   Substance Use Topics    Alcohol use: Yes     Comment: occas - nonalcoholic beer or beer    Drug use: No       Physical Exam:    BP (!) 172/85   Pulse (!) 53   Temp 98.6 °F (37 °C) (Oral)   Resp 16   Ht 6' 2" (1.88 m)   Wt 88 kg (194 lb)   SpO2 98%   BMI 24.91 kg/m²   Nursing note and vital signs reviewed.  Appearance: No acute distress.  Eyes: No conjunctival injection.  ENT: Oropharynx clear.    Chest/ Respiratory: Clear to auscultation bilaterally.  Good air movement.  No wheezes.  No rhonchi. No rales. No accessory muscle use.  Cardiovascular: Regular rate and rhythm.  No murmurs. No gallops. No rubs.  Abdomen: Soft.  Not distended.  Nontender.  No guarding.  No rebound. Non-peritoneal.  Musculoskeletal: Good range of motion all joints.  No deformities.  Neck supple.  No meningismus.  Skin: No rashes seen.  Good turgor.  No " abrasions.  No ecchymoses.  Neurologic: Motor intact.  Sensation intact.  Cerebellar intact.  Cranial nerves intact.  Mental Status:  Alert and oriented x 3.  Appropriate, conversant  Physical Exam  Musculoskeletal:      Thoracic back: Tenderness present. No bony tenderness.        Back:        Labs that have been ordered have been independently reviewed and interpreted by myself.        Initial Impression/ Differential Dx:  Urgent evaluation of 62-year-old male presenting with left-sided flank pain.  Patient is afebrile, not toxic appearing and hemodynamically stable.  On exam at rest patient appears comfortable but winces and becomes visibly visibly uncomfortable with movement.  Patient has point tenderness in left lateral flank.  I suspect this may be a musculoskeletal strain however will plan for labs and CT renal to rule out nephrolithiasis and pyelonephritis.      Differential Diagnosis includes, but is not limited to:  AAA, aortic dissection, SBO/volvulus, intussusception, ileus, appendicitis, cholecystitis, hepatitis, nephrolithiasis, pancreatitis, IBD/IBS, biliary colic, GERD, PUD, constipation, UTI/pyelonephritis, musculoskeletal pain.       MDM:        ED Course as of 05/01/22 2038   Sun May 01, 2022   1702 Comp. Metabolic Panel(!)  Mild hyperglycemia.  Electrolyte abnormalities, normal kidney function, no elevation LFTs [CU]   1703 CBC W/ AUTO DIFFERENTIAL(!)  No leukocytosis or left shift, stable H&H [CU]   1703 Lipase: 24 [CU]   1725 CT Renal Stone Study ABD Pelvis WO  No nephrolithiasis or hydronephrosis. [CU]   1730 At bedside to reassess patient and inform him of his grossly normal workup.  He reports minimal relief after treatment here in the emergency department and looks visibly uncomfortable when trying to change positions.  Will give another 500 mg of Robaxin for total of 1500 mg and additional analgesia and continue to reassess. The patient's back pain is likely a musculoskeletal strain.   There are no signs of saddle anesthesia, incontinence, neurologic deficits, fevers, trauma or midline tenderness on history or physical to suggest cauda equina, infectious process, fracture or subluxation.  I [CU]   1849 At bedside to reassess patient.  He received 2nd dose of analgesia but 30 minutes ago.  He states he is feeling much better and he he does appear to be moving better.  Will discharge patient home with NSAIDs, muscle relaxers, Lidoderm patches and a few pain meds and have follow-up with his PCP as necessary.  Patient is amenable to this plan and discharged home in good condition. Patient educated on on signs and symptoms to monitor for and when to return to ED. Patient verbalized understanding agrees with treatment plan. All questions and concerns addressed.        [CU]      ED Course User Index  [CU] Irineo Davis NP                 Diagnostic Impression:    1. Left flank pain    2. Acute left-sided thoracic back pain    3. Muscle strain         ED Disposition Condition    Discharge Good          ED Prescriptions     Medication Sig Dispense Start Date End Date Auth. Provider    ibuprofen (ADVIL,MOTRIN) 600 MG tablet Take 1 tablet (600 mg total) by mouth every 6 (six) hours as needed for Pain. 20 tablet 5/1/2022  Irineo Davis NP    methocarbamoL (ROBAXIN) 750 MG Tab Take 2 tablets (1,500 mg total) by mouth 3 (three) times daily. for 5 days 30 tablet 5/1/2022 5/6/2022 Irineo Davis NP    LIDOcaine (LIDODERM) 5 % Place 1 patch onto the skin once daily. Remove & Discard patch within 12 hours or as directed by MD 15 patch 5/1/2022  Irineo Davis NP    HYDROcodone-acetaminophen (NORCO) 5-325 mg per tablet Take 1 tablet by mouth every 8 (eight) hours as needed for Pain. 9 tablet 5/1/2022 5/4/2022 Irineo Davis NP        Follow-up Information     Follow up With Specialties Details Why Contact Info Additional Information    Josefina Kendall MD Internal Medicine In 3 days  [address] [facility]  [place] [address]  [phone]       [facility] Schedule an appointment as soon as possible for a visit  As needed [address], [address]  [facility] [id number] [phone] Back [facility] Sp[facility] - [facility], 4th Floor Please park in [facility] and use [place] elevators           Irineo Davis, KATHERIN  05/01/22 2037

## 2022-05-01 NOTE — ED TRIAGE NOTES
Pt presents to ED c/o L flank pain x3 days. Describes pain as sharp in nature and 10/10 severity, worse with movement. Pt states he has hx of kidney stones and kidney infection and states this pain feels like it did when he last had an infection. Denies dysuria, hematuria, or any other urinary symptoms. Denies injury, N/V/D.

## 2022-05-02 ENCOUNTER — TELEPHONE (OUTPATIENT)
Dept: INTERNAL MEDICINE | Facility: CLINIC | Age: 62
End: 2022-05-02
Payer: COMMERCIAL

## 2022-05-02 NOTE — TELEPHONE ENCOUNTER
----- Message from Joni Pollard sent at 5/2/2022  7:20 AM CDT -----  Contact: Pt [phone]  The patient was told by the ER doctor to see his PCP today if possible. The patient didn't want to see a colleague.    Thank you

## 2022-05-12 ENCOUNTER — OFFICE VISIT (OUTPATIENT)
Dept: INTERNAL MEDICINE | Facility: CLINIC | Age: 62
End: 2022-05-12
Payer: COMMERCIAL

## 2022-05-12 VITALS
BODY MASS INDEX: 24.26 KG/M2 | HEIGHT: 74 IN | HEART RATE: 71 BPM | SYSTOLIC BLOOD PRESSURE: 118 MMHG | DIASTOLIC BLOOD PRESSURE: 62 MMHG | WEIGHT: 189 LBS | TEMPERATURE: 97 F | OXYGEN SATURATION: 99 %

## 2022-05-12 DIAGNOSIS — I10 ESSENTIAL HYPERTENSION: ICD-10-CM

## 2022-05-12 DIAGNOSIS — R10.9 FLANK PAIN: Primary | ICD-10-CM

## 2022-05-12 DIAGNOSIS — I25.84 CORONARY ARTERY DISEASE DUE TO CALCIFIED CORONARY LESION: ICD-10-CM

## 2022-05-12 DIAGNOSIS — I25.10 CORONARY ARTERY DISEASE DUE TO CALCIFIED CORONARY LESION: ICD-10-CM

## 2022-05-12 PROCEDURE — 1159F MED LIST DOCD IN RCRD: CPT | Mod: CPTII,S$GLB,, | Performed by: INTERNAL MEDICINE

## 2022-05-12 PROCEDURE — 1159F PR MEDICATION LIST DOCUMENTED IN MEDICAL RECORD: ICD-10-PCS | Mod: CPTII,S$GLB,, | Performed by: INTERNAL MEDICINE

## 2022-05-12 PROCEDURE — 3008F PR BODY MASS INDEX (BMI) DOCUMENTED: ICD-10-PCS | Mod: CPTII,S$GLB,, | Performed by: INTERNAL MEDICINE

## 2022-05-12 PROCEDURE — 4010F ACE/ARB THERAPY RXD/TAKEN: CPT | Mod: CPTII,S$GLB,, | Performed by: INTERNAL MEDICINE

## 2022-05-12 PROCEDURE — 99213 OFFICE O/P EST LOW 20 MIN: CPT | Mod: S$GLB,,, | Performed by: INTERNAL MEDICINE

## 2022-05-12 PROCEDURE — 1160F PR REVIEW ALL MEDS BY PRESCRIBER/CLIN PHARMACIST DOCUMENTED: ICD-10-PCS | Mod: CPTII,S$GLB,, | Performed by: INTERNAL MEDICINE

## 2022-05-12 PROCEDURE — 1160F RVW MEDS BY RX/DR IN RCRD: CPT | Mod: CPTII,S$GLB,, | Performed by: INTERNAL MEDICINE

## 2022-05-12 PROCEDURE — 3044F HG A1C LEVEL LT 7.0%: CPT | Mod: CPTII,S$GLB,, | Performed by: INTERNAL MEDICINE

## 2022-05-12 PROCEDURE — 3078F PR MOST RECENT DIASTOLIC BLOOD PRESSURE < 80 MM HG: ICD-10-PCS | Mod: CPTII,S$GLB,, | Performed by: INTERNAL MEDICINE

## 2022-05-12 PROCEDURE — 3074F PR MOST RECENT SYSTOLIC BLOOD PRESSURE < 130 MM HG: ICD-10-PCS | Mod: CPTII,S$GLB,, | Performed by: INTERNAL MEDICINE

## 2022-05-12 PROCEDURE — 3074F SYST BP LT 130 MM HG: CPT | Mod: CPTII,S$GLB,, | Performed by: INTERNAL MEDICINE

## 2022-05-12 PROCEDURE — 3008F BODY MASS INDEX DOCD: CPT | Mod: CPTII,S$GLB,, | Performed by: INTERNAL MEDICINE

## 2022-05-12 PROCEDURE — 3044F PR MOST RECENT HEMOGLOBIN A1C LEVEL <7.0%: ICD-10-PCS | Mod: CPTII,S$GLB,, | Performed by: INTERNAL MEDICINE

## 2022-05-12 PROCEDURE — 99999 PR PBB SHADOW E&M-EST. PATIENT-LVL IV: ICD-10-PCS | Mod: PBBFAC,,, | Performed by: INTERNAL MEDICINE

## 2022-05-12 PROCEDURE — 99999 PR PBB SHADOW E&M-EST. PATIENT-LVL IV: CPT | Mod: PBBFAC,,, | Performed by: INTERNAL MEDICINE

## 2022-05-12 PROCEDURE — 99213 PR OFFICE/OUTPT VISIT, EST, LEVL III, 20-29 MIN: ICD-10-PCS | Mod: S$GLB,,, | Performed by: INTERNAL MEDICINE

## 2022-05-12 PROCEDURE — 3078F DIAST BP <80 MM HG: CPT | Mod: CPTII,S$GLB,, | Performed by: INTERNAL MEDICINE

## 2022-05-12 PROCEDURE — 4010F PR ACE/ARB THEARPY RXD/TAKEN: ICD-10-PCS | Mod: CPTII,S$GLB,, | Performed by: INTERNAL MEDICINE

## 2022-05-12 RX ORDER — METHOCARBAMOL 750 MG/1
750 TABLET, FILM COATED ORAL 2 TIMES DAILY
Qty: 30 TABLET | Refills: 3 | Status: SHIPPED | OUTPATIENT
Start: 2022-05-12 | End: 2022-05-17

## 2022-05-12 RX ORDER — LOSARTAN POTASSIUM 50 MG/1
50 TABLET ORAL DAILY
Qty: 90 TABLET | Refills: 1
Start: 2022-05-12 | End: 2022-12-30 | Stop reason: SDUPTHER

## 2022-05-12 NOTE — PROGRESS NOTES
CC: followup of ED visit for left flank pain  HPI:  The patient is a 62 y.o. year old male who presents to the office for followup of ED visit for left flank pain.  Presents to the emergency department on May 1st complaining of left side and back pain that started 3 days prior to presentation.  He reports similar pain with kidney infection in the past.  Renal stone study was negative for kidney stones.  He has taken ibuprofen and robaxin with some relief.  He denies any trauma.  He reports pain was preventing him from sleeping.  Colonoscopy from 2017 showed no diverticulosis.  The patient denies any chest pain, shortness of breath, headache, blurred vision, excessive fatigue, nausea or vomiting.      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 [address] [place] [address];  Service: Radiology;  Laterality: N/A;  /Nagi    CERVICAL FUSION  12/30/2015    COLONOSCOPY N/A 4/7/2017    Procedure: COLONOSCOPY;  Surgeon: Handy Frederick MD;  [facility]  Service: Endoscopy;  Laterality: N/A;    HERNIA REPAIR      PROSTATECTOMY         MEDS:  Medcard reviewed and updated    ALLERGIES: Allergy Card reviewed and updated    SOCIAL HISTORY:   The patient is a nonsmoker.    PE:   APPEARANCE: Well nourished, well developed, in no acute distress.    CHEST: Lungs  clear to auscultation with unlabored respirations.  CARDIOVASCULAR: Normal S1, S2. No murmurs. No carotid bruits. No pedal edema.  ABDOMEN: Bowel sounds normal. Not distended. Soft. Mild tenderness of left lower quadrant.  PSYCHIATRIC: The patient is oriented to person, place, and time and has a pleasant affect.        ASSESSMENT/PLAN:   Hi was seen today for abdominal pain, back pain and hospital follow up.    Diagnoses and all orders for this visit:    Flank pain  -     Improved  -     refill Robaxin    Coronary artery disease due to calcified coronary lesion  -     losartan (COZAAR) 50 MG tablet; Take 1 tablet (50 mg total) by mouth once daily.    Essential hypertension  -     losartan (COZAAR) 50 MG tablet; Take 1 tablet (50 mg total) by mouth once daily.  -     blood pressure is controlled    Other orders  -     methocarbamoL (ROBAXIN) 750 MG Tab; Take 1 tablet (750 mg total) by mouth 2 (two) times a day. for 5 days

## 2022-05-25 ENCOUNTER — PATIENT MESSAGE (OUTPATIENT)
Dept: INTERNAL MEDICINE | Facility: CLINIC | Age: 62
End: 2022-05-25
Payer: COMMERCIAL

## 2022-06-21 ENCOUNTER — PATIENT MESSAGE (OUTPATIENT)
Dept: OTHER | Facility: OTHER | Age: 62
End: 2022-06-21
Payer: COMMERCIAL

## 2022-07-14 ENCOUNTER — PATIENT MESSAGE (OUTPATIENT)
Dept: OTHER | Facility: OTHER | Age: 62
End: 2022-07-14
Payer: COMMERCIAL

## 2022-07-27 ENCOUNTER — OFFICE VISIT (OUTPATIENT)
Dept: OPHTHALMOLOGY | Facility: CLINIC | Age: 62
End: 2022-07-27
Payer: COMMERCIAL

## 2022-07-27 DIAGNOSIS — H20.011 PRIMARY IRIDOCYCLITIS, RIGHT EYE: ICD-10-CM

## 2022-07-27 DIAGNOSIS — H15.011 ANTERIOR SCLERITIS, RIGHT EYE: Primary | ICD-10-CM

## 2022-07-27 PROCEDURE — 99215 OFFICE O/P EST HI 40 MIN: CPT | Mod: S$GLB,,, | Performed by: OPHTHALMOLOGY

## 2022-07-27 PROCEDURE — 1159F MED LIST DOCD IN RCRD: CPT | Mod: CPTII,S$GLB,, | Performed by: OPHTHALMOLOGY

## 2022-07-27 PROCEDURE — 99215 PR OFFICE/OUTPT VISIT, EST, LEVL V, 40-54 MIN: ICD-10-PCS | Mod: S$GLB,,, | Performed by: OPHTHALMOLOGY

## 2022-07-27 PROCEDURE — 1160F PR REVIEW ALL MEDS BY PRESCRIBER/CLIN PHARMACIST DOCUMENTED: ICD-10-PCS | Mod: CPTII,S$GLB,, | Performed by: OPHTHALMOLOGY

## 2022-07-27 PROCEDURE — 3044F HG A1C LEVEL LT 7.0%: CPT | Mod: CPTII,S$GLB,, | Performed by: OPHTHALMOLOGY

## 2022-07-27 PROCEDURE — 4010F PR ACE/ARB THEARPY RXD/TAKEN: ICD-10-PCS | Mod: CPTII,S$GLB,, | Performed by: OPHTHALMOLOGY

## 2022-07-27 PROCEDURE — 4010F ACE/ARB THERAPY RXD/TAKEN: CPT | Mod: CPTII,S$GLB,, | Performed by: OPHTHALMOLOGY

## 2022-07-27 PROCEDURE — 3044F PR MOST RECENT HEMOGLOBIN A1C LEVEL <7.0%: ICD-10-PCS | Mod: CPTII,S$GLB,, | Performed by: OPHTHALMOLOGY

## 2022-07-27 PROCEDURE — 1160F RVW MEDS BY RX/DR IN RCRD: CPT | Mod: CPTII,S$GLB,, | Performed by: OPHTHALMOLOGY

## 2022-07-27 PROCEDURE — 1159F PR MEDICATION LIST DOCUMENTED IN MEDICAL RECORD: ICD-10-PCS | Mod: CPTII,S$GLB,, | Performed by: OPHTHALMOLOGY

## 2022-07-27 PROCEDURE — 99999 PR PBB SHADOW E&M-EST. PATIENT-LVL III: ICD-10-PCS | Mod: PBBFAC,,, | Performed by: OPHTHALMOLOGY

## 2022-07-27 PROCEDURE — 99999 PR PBB SHADOW E&M-EST. PATIENT-LVL III: CPT | Mod: PBBFAC,,, | Performed by: OPHTHALMOLOGY

## 2022-07-27 RX ORDER — PREDNISOLONE ACETATE 10 MG/ML
1 SUSPENSION/ DROPS OPHTHALMIC 4 TIMES DAILY
Qty: 3 ML | Refills: 0 | Status: ON HOLD | OUTPATIENT
Start: 2022-07-27 | End: 2022-10-11 | Stop reason: HOSPADM

## 2022-07-27 RX ORDER — ATROPINE SULFATE 10 MG/ML
1 SOLUTION/ DROPS OPHTHALMIC 2 TIMES DAILY
Qty: 3 ML | Refills: 0 | Status: ON HOLD | OUTPATIENT
Start: 2022-07-27 | End: 2022-10-11 | Stop reason: HOSPADM

## 2022-07-27 RX ORDER — NAPROXEN 500 MG/1
500 TABLET ORAL 2 TIMES DAILY
Qty: 14 TABLET | Refills: 1 | Status: SHIPPED | OUTPATIENT
Start: 2022-07-27 | End: 2024-01-19 | Stop reason: SDUPTHER

## 2022-07-27 NOTE — PROGRESS NOTES
HPI     Triage pt  Patient states OD light sensativity and tender to touch x 3 days.  Vision off.  8 on pain scale.    Eye drops:OTC clear eyes prn OU    I have personally interviewed the patient, reviewed the history and   examined the patient and agree with the technician's &/or resident's exam,   assessment and plan.       Last edited by Orlando Sheldon MD on 7/27/2022 12:21 PM. (History)            Assessment /Plan     For exam results, see Encounter Report.    Anterior scleritis, right eye  -     naproxen (NAPROSYN) 500 MG tablet; Take 1 tablet (500 mg total) by mouth 2 (two) times daily.  Dispense: 14 tablet; Refill: 1    Primary iridocyclitis, right eye  -     prednisoLONE acetate (PRED FORTE) 1 % DrpS; Place 1 drop into the left eye 4 (four) times daily.  Dispense: 3 mL; Refill: 0  -     atropine 1% (ISOPTO ATROPINE) 1 % Drop; Place 1 drop into both eyes 2 (two) times daily.  Dispense: 3 mL; Refill: 0  -     CBC auto differential; Future; Expected date: 07/27/2022  -     Sedimentation rate, manual; Future; Expected date: 07/27/2022  -     C-REACTIVE PROTEIN; Future; Expected date: 07/27/2022  -     RPR; Future; Expected date: 07/27/2022  -     RHEUMATOID FACTOR; Future; Expected date: 07/27/2022      Patient seen with Dr. Vincent.. Most likely anterior scleritis. Therapy initiated with prednisolone drops, atropine drops, and oral naproxen. Initial blood work done to look for common cases of scleritis. Follow up in 3-5 days.

## 2022-07-29 ENCOUNTER — LAB VISIT (OUTPATIENT)
Dept: LAB | Facility: HOSPITAL | Age: 62
End: 2022-07-29
Payer: COMMERCIAL

## 2022-07-29 ENCOUNTER — OFFICE VISIT (OUTPATIENT)
Dept: OPHTHALMOLOGY | Facility: CLINIC | Age: 62
End: 2022-07-29
Payer: COMMERCIAL

## 2022-07-29 DIAGNOSIS — H20.011 PRIMARY IRIDOCYCLITIS, RIGHT EYE: ICD-10-CM

## 2022-07-29 DIAGNOSIS — H15.011 ANTERIOR SCLERITIS, RIGHT EYE: Primary | ICD-10-CM

## 2022-07-29 LAB
BASOPHILS # BLD AUTO: 0.05 K/UL (ref 0–0.2)
BASOPHILS NFR BLD: 0.9 % (ref 0–1.9)
CRP SERPL-MCNC: 1.6 MG/L (ref 0–8.2)
DIFFERENTIAL METHOD: NORMAL
EOSINOPHIL # BLD AUTO: 0.3 K/UL (ref 0–0.5)
EOSINOPHIL NFR BLD: 6.1 % (ref 0–8)
ERYTHROCYTE [DISTWIDTH] IN BLOOD BY AUTOMATED COUNT: 12.5 % (ref 11.5–14.5)
ERYTHROCYTE [SEDIMENTATION RATE] IN BLOOD BY PHOTOMETRIC METHOD: <2 MM/HR (ref 0–23)
HCT VFR BLD AUTO: 44.7 % (ref 40–54)
HGB BLD-MCNC: 15.1 G/DL (ref 14–18)
IMM GRANULOCYTES # BLD AUTO: 0.01 K/UL (ref 0–0.04)
IMM GRANULOCYTES NFR BLD AUTO: 0.2 % (ref 0–0.5)
LYMPHOCYTES # BLD AUTO: 1.4 K/UL (ref 1–4.8)
LYMPHOCYTES NFR BLD: 26.2 % (ref 18–48)
MCH RBC QN AUTO: 30.4 PG (ref 27–31)
MCHC RBC AUTO-ENTMCNC: 33.8 G/DL (ref 32–36)
MCV RBC AUTO: 90 FL (ref 82–98)
MONOCYTES # BLD AUTO: 0.5 K/UL (ref 0.3–1)
MONOCYTES NFR BLD: 9.5 % (ref 4–15)
NEUTROPHILS # BLD AUTO: 3.1 K/UL (ref 1.8–7.7)
NEUTROPHILS NFR BLD: 57.1 % (ref 38–73)
NRBC BLD-RTO: 0 /100 WBC
PLATELET # BLD AUTO: 247 K/UL (ref 150–450)
PMV BLD AUTO: 9.9 FL (ref 9.2–12.9)
RBC # BLD AUTO: 4.97 M/UL (ref 4.6–6.2)
RHEUMATOID FACT SERPL-ACNC: 15 IU/ML (ref 0–15)
WBC # BLD AUTO: 5.45 K/UL (ref 3.9–12.7)

## 2022-07-29 PROCEDURE — 1160F RVW MEDS BY RX/DR IN RCRD: CPT | Mod: CPTII,S$GLB,, | Performed by: OPHTHALMOLOGY

## 2022-07-29 PROCEDURE — 1159F PR MEDICATION LIST DOCUMENTED IN MEDICAL RECORD: ICD-10-PCS | Mod: CPTII,S$GLB,, | Performed by: OPHTHALMOLOGY

## 2022-07-29 PROCEDURE — 1160F PR REVIEW ALL MEDS BY PRESCRIBER/CLIN PHARMACIST DOCUMENTED: ICD-10-PCS | Mod: CPTII,S$GLB,, | Performed by: OPHTHALMOLOGY

## 2022-07-29 PROCEDURE — 99212 OFFICE O/P EST SF 10 MIN: CPT | Mod: S$GLB,,, | Performed by: OPHTHALMOLOGY

## 2022-07-29 PROCEDURE — 3044F HG A1C LEVEL LT 7.0%: CPT | Mod: CPTII,S$GLB,, | Performed by: OPHTHALMOLOGY

## 2022-07-29 PROCEDURE — 86592 SYPHILIS TEST NON-TREP QUAL: CPT | Performed by: STUDENT IN AN ORGANIZED HEALTH CARE EDUCATION/TRAINING PROGRAM

## 2022-07-29 PROCEDURE — 85652 RBC SED RATE AUTOMATED: CPT | Performed by: STUDENT IN AN ORGANIZED HEALTH CARE EDUCATION/TRAINING PROGRAM

## 2022-07-29 PROCEDURE — 4010F PR ACE/ARB THEARPY RXD/TAKEN: ICD-10-PCS | Mod: CPTII,S$GLB,, | Performed by: OPHTHALMOLOGY

## 2022-07-29 PROCEDURE — 86431 RHEUMATOID FACTOR QUANT: CPT | Performed by: STUDENT IN AN ORGANIZED HEALTH CARE EDUCATION/TRAINING PROGRAM

## 2022-07-29 PROCEDURE — 99212 PR OFFICE/OUTPT VISIT, EST, LEVL II, 10-19 MIN: ICD-10-PCS | Mod: S$GLB,,, | Performed by: OPHTHALMOLOGY

## 2022-07-29 PROCEDURE — 99999 PR PBB SHADOW E&M-EST. PATIENT-LVL III: ICD-10-PCS | Mod: PBBFAC,,, | Performed by: OPHTHALMOLOGY

## 2022-07-29 PROCEDURE — 85025 COMPLETE CBC W/AUTO DIFF WBC: CPT | Performed by: STUDENT IN AN ORGANIZED HEALTH CARE EDUCATION/TRAINING PROGRAM

## 2022-07-29 PROCEDURE — 86140 C-REACTIVE PROTEIN: CPT | Performed by: STUDENT IN AN ORGANIZED HEALTH CARE EDUCATION/TRAINING PROGRAM

## 2022-07-29 PROCEDURE — 1159F MED LIST DOCD IN RCRD: CPT | Mod: CPTII,S$GLB,, | Performed by: OPHTHALMOLOGY

## 2022-07-29 PROCEDURE — 99999 PR PBB SHADOW E&M-EST. PATIENT-LVL III: CPT | Mod: PBBFAC,,, | Performed by: OPHTHALMOLOGY

## 2022-07-29 PROCEDURE — 36415 COLL VENOUS BLD VENIPUNCTURE: CPT | Performed by: STUDENT IN AN ORGANIZED HEALTH CARE EDUCATION/TRAINING PROGRAM

## 2022-07-29 PROCEDURE — 4010F ACE/ARB THERAPY RXD/TAKEN: CPT | Mod: CPTII,S$GLB,, | Performed by: OPHTHALMOLOGY

## 2022-07-29 PROCEDURE — 3044F PR MOST RECENT HEMOGLOBIN A1C LEVEL <7.0%: ICD-10-PCS | Mod: CPTII,S$GLB,, | Performed by: OPHTHALMOLOGY

## 2022-07-29 NOTE — PROGRESS NOTES
HPI     DLS:07/27/2022 Pito  Patient here for 2 days ago OD scleritis.  Pt states OD vision blurry and feels heavy. Some improvement of light   sensitivity.    Eye drops:Pred forte QID OD                   Atropine BID OU    I have personally interviewed the patient, reviewed the history and   examined the patient and agree with the technician's &/or resident's exam,   assessment and plan.       Last edited by Orlando Sheldon MD on 7/29/2022 12:18 PM. (History)            Assessment /Plan     For exam results, see Encounter Report.    Anterior scleritis, right eye      Doing better on naproxen. Continue current regimen. Repeat exam in one week.

## 2022-07-30 LAB — RPR SER QL: NORMAL

## 2022-08-01 ENCOUNTER — PATIENT MESSAGE (OUTPATIENT)
Dept: OPHTHALMOLOGY | Facility: CLINIC | Age: 62
End: 2022-08-01
Payer: COMMERCIAL

## 2022-08-01 NOTE — TELEPHONE ENCOUNTER
Blood tests negative for systemic conditions potentially related to his uveitis. patient informed.

## 2022-08-05 ENCOUNTER — OFFICE VISIT (OUTPATIENT)
Dept: OPHTHALMOLOGY | Facility: CLINIC | Age: 62
End: 2022-08-05
Payer: COMMERCIAL

## 2022-08-05 DIAGNOSIS — H15.011 ANTERIOR SCLERITIS, RIGHT EYE: Primary | ICD-10-CM

## 2022-08-05 PROCEDURE — 1159F PR MEDICATION LIST DOCUMENTED IN MEDICAL RECORD: ICD-10-PCS | Mod: CPTII,S$GLB,, | Performed by: OPHTHALMOLOGY

## 2022-08-05 PROCEDURE — 1160F PR REVIEW ALL MEDS BY PRESCRIBER/CLIN PHARMACIST DOCUMENTED: ICD-10-PCS | Mod: CPTII,S$GLB,, | Performed by: OPHTHALMOLOGY

## 2022-08-05 PROCEDURE — 3044F HG A1C LEVEL LT 7.0%: CPT | Mod: CPTII,S$GLB,, | Performed by: OPHTHALMOLOGY

## 2022-08-05 PROCEDURE — 4010F ACE/ARB THERAPY RXD/TAKEN: CPT | Mod: CPTII,S$GLB,, | Performed by: OPHTHALMOLOGY

## 2022-08-05 PROCEDURE — 1159F MED LIST DOCD IN RCRD: CPT | Mod: CPTII,S$GLB,, | Performed by: OPHTHALMOLOGY

## 2022-08-05 PROCEDURE — 4010F PR ACE/ARB THEARPY RXD/TAKEN: ICD-10-PCS | Mod: CPTII,S$GLB,, | Performed by: OPHTHALMOLOGY

## 2022-08-05 PROCEDURE — 99212 OFFICE O/P EST SF 10 MIN: CPT | Mod: S$GLB,,, | Performed by: OPHTHALMOLOGY

## 2022-08-05 PROCEDURE — 99212 PR OFFICE/OUTPT VISIT, EST, LEVL II, 10-19 MIN: ICD-10-PCS | Mod: S$GLB,,, | Performed by: OPHTHALMOLOGY

## 2022-08-05 PROCEDURE — 1160F RVW MEDS BY RX/DR IN RCRD: CPT | Mod: CPTII,S$GLB,, | Performed by: OPHTHALMOLOGY

## 2022-08-05 PROCEDURE — 99999 PR PBB SHADOW E&M-EST. PATIENT-LVL IV: CPT | Mod: PBBFAC,,, | Performed by: OPHTHALMOLOGY

## 2022-08-05 PROCEDURE — 3044F PR MOST RECENT HEMOGLOBIN A1C LEVEL <7.0%: ICD-10-PCS | Mod: CPTII,S$GLB,, | Performed by: OPHTHALMOLOGY

## 2022-08-05 PROCEDURE — 99999 PR PBB SHADOW E&M-EST. PATIENT-LVL IV: ICD-10-PCS | Mod: PBBFAC,,, | Performed by: OPHTHALMOLOGY

## 2022-08-05 RX ORDER — METHOCARBAMOL 750 MG/1
TABLET, FILM COATED ORAL
COMMUNITY
Start: 2022-07-06 | End: 2022-09-06

## 2022-08-05 NOTE — PROGRESS NOTES
HPI     DLS:07/29/2022 Pito  Patient here for 1 week follow up OD scleritis.  Pt states OD feels better, but blurry.    Eye drops:Pred forte QID OD                    Atropine BID OU     I have personally interviewed the patient, reviewed the history and   examined the patient and agree with the technician's exam.     Last edited by Orlando Sheldon MD on 8/5/2022  4:07 PM. (History)            Assessment /Plan     For exam results, see Encounter Report.    Anterior scleritis, right eye      Recovering well. Stop atropine. Decrease prednisolone to 3 drops daily for one week, then 2 drops daily for one week,  Then one drop daily for one week, then stop. Return in three weeks.

## 2022-08-05 NOTE — PATIENT INSTRUCTIONS
Stop atropine. Decrease prednisolone to 3 drops daily for one week, then 2 drops daily for one week,  Then one drop daily for one week, then stop. Return in three weeks.

## 2022-08-15 ENCOUNTER — PATIENT MESSAGE (OUTPATIENT)
Dept: INTERNAL MEDICINE | Facility: CLINIC | Age: 62
End: 2022-08-15
Payer: COMMERCIAL

## 2022-08-15 ENCOUNTER — LAB VISIT (OUTPATIENT)
Dept: LAB | Facility: HOSPITAL | Age: 62
End: 2022-08-15
Attending: INTERNAL MEDICINE
Payer: COMMERCIAL

## 2022-08-15 DIAGNOSIS — I25.84 CORONARY ARTERY DISEASE DUE TO CALCIFIED CORONARY LESION: ICD-10-CM

## 2022-08-15 DIAGNOSIS — I25.10 CORONARY ARTERY DISEASE DUE TO CALCIFIED CORONARY LESION: ICD-10-CM

## 2022-08-15 LAB
ALBUMIN SERPL BCP-MCNC: 4 G/DL (ref 3.5–5.2)
ALP SERPL-CCNC: 69 U/L (ref 55–135)
ALT SERPL W/O P-5'-P-CCNC: 21 U/L (ref 10–44)
ANION GAP SERPL CALC-SCNC: 10 MMOL/L (ref 8–16)
AST SERPL-CCNC: 18 U/L (ref 10–40)
BILIRUB SERPL-MCNC: 1.1 MG/DL (ref 0.1–1)
BUN SERPL-MCNC: 13 MG/DL (ref 8–23)
CALCIUM SERPL-MCNC: 9.9 MG/DL (ref 8.7–10.5)
CHLORIDE SERPL-SCNC: 100 MMOL/L (ref 95–110)
CHOLEST SERPL-MCNC: 158 MG/DL (ref 120–199)
CHOLEST/HDLC SERPL: 2.7 {RATIO} (ref 2–5)
CO2 SERPL-SCNC: 26 MMOL/L (ref 23–29)
CREAT SERPL-MCNC: 1.1 MG/DL (ref 0.5–1.4)
ERYTHROCYTE [DISTWIDTH] IN BLOOD BY AUTOMATED COUNT: 12.9 % (ref 11.5–14.5)
EST. GFR  (NO RACE VARIABLE): >60 ML/MIN/1.73 M^2
GLUCOSE SERPL-MCNC: 156 MG/DL (ref 70–110)
HCT VFR BLD AUTO: 43.4 % (ref 40–54)
HDLC SERPL-MCNC: 59 MG/DL (ref 40–75)
HDLC SERPL: 37.3 % (ref 20–50)
HGB BLD-MCNC: 14.7 G/DL (ref 14–18)
LDLC SERPL CALC-MCNC: 81.4 MG/DL (ref 63–159)
MCH RBC QN AUTO: 30.6 PG (ref 27–31)
MCHC RBC AUTO-ENTMCNC: 33.9 G/DL (ref 32–36)
MCV RBC AUTO: 90 FL (ref 82–98)
NONHDLC SERPL-MCNC: 99 MG/DL
PLATELET # BLD AUTO: 224 K/UL (ref 150–450)
PMV BLD AUTO: 10.3 FL (ref 9.2–12.9)
POTASSIUM SERPL-SCNC: 3.9 MMOL/L (ref 3.5–5.1)
PROT SERPL-MCNC: 7.6 G/DL (ref 6–8.4)
RBC # BLD AUTO: 4.8 M/UL (ref 4.6–6.2)
SODIUM SERPL-SCNC: 136 MMOL/L (ref 136–145)
TRIGL SERPL-MCNC: 88 MG/DL (ref 30–150)
WBC # BLD AUTO: 17.74 K/UL (ref 3.9–12.7)

## 2022-08-15 PROCEDURE — 85027 COMPLETE CBC AUTOMATED: CPT | Performed by: NURSE PRACTITIONER

## 2022-08-15 PROCEDURE — 80061 LIPID PANEL: CPT | Performed by: NURSE PRACTITIONER

## 2022-08-15 PROCEDURE — 36415 COLL VENOUS BLD VENIPUNCTURE: CPT | Mod: PN | Performed by: NURSE PRACTITIONER

## 2022-08-15 PROCEDURE — 80053 COMPREHEN METABOLIC PANEL: CPT | Performed by: NURSE PRACTITIONER

## 2022-08-16 ENCOUNTER — TELEPHONE (OUTPATIENT)
Dept: INTERNAL MEDICINE | Facility: CLINIC | Age: 62
End: 2022-08-16
Payer: COMMERCIAL

## 2022-08-16 DIAGNOSIS — R30.0 DYSURIA: Primary | ICD-10-CM

## 2022-08-17 ENCOUNTER — PATIENT MESSAGE (OUTPATIENT)
Dept: INTERNAL MEDICINE | Facility: CLINIC | Age: 62
End: 2022-08-17
Payer: COMMERCIAL

## 2022-08-17 ENCOUNTER — LAB VISIT (OUTPATIENT)
Dept: LAB | Facility: HOSPITAL | Age: 62
End: 2022-08-17
Attending: INTERNAL MEDICINE
Payer: COMMERCIAL

## 2022-08-17 DIAGNOSIS — R30.0 DYSURIA: ICD-10-CM

## 2022-08-17 LAB
BACTERIA #/AREA URNS AUTO: ABNORMAL /HPF
BILIRUB UR QL STRIP: NEGATIVE
CLARITY UR REFRACT.AUTO: ABNORMAL
COLOR UR AUTO: YELLOW
GLUCOSE UR QL STRIP: ABNORMAL
HGB UR QL STRIP: ABNORMAL
KETONES UR QL STRIP: NEGATIVE
LEUKOCYTE ESTERASE UR QL STRIP: ABNORMAL
MICROSCOPIC COMMENT: ABNORMAL
NITRITE UR QL STRIP: POSITIVE
NON-SQ EPI CELLS #/AREA URNS AUTO: 1 /HPF
PH UR STRIP: 6 [PH] (ref 5–8)
PROT UR QL STRIP: ABNORMAL
RBC #/AREA URNS AUTO: 4 /HPF (ref 0–4)
SP GR UR STRIP: 1.02 (ref 1–1.03)
SQUAMOUS #/AREA URNS AUTO: 1 /HPF
URN SPEC COLLECT METH UR: ABNORMAL
WBC #/AREA URNS AUTO: >100 /HPF (ref 0–5)
YEAST UR QL AUTO: ABNORMAL

## 2022-08-17 PROCEDURE — 87077 CULTURE AEROBIC IDENTIFY: CPT | Performed by: INTERNAL MEDICINE

## 2022-08-17 PROCEDURE — 87186 SC STD MICRODIL/AGAR DIL: CPT | Performed by: INTERNAL MEDICINE

## 2022-08-17 PROCEDURE — 81001 URINALYSIS AUTO W/SCOPE: CPT | Performed by: INTERNAL MEDICINE

## 2022-08-17 PROCEDURE — 87088 URINE BACTERIA CULTURE: CPT | Performed by: INTERNAL MEDICINE

## 2022-08-17 PROCEDURE — 87086 URINE CULTURE/COLONY COUNT: CPT | Performed by: INTERNAL MEDICINE

## 2022-08-18 ENCOUNTER — PATIENT MESSAGE (OUTPATIENT)
Dept: INTERNAL MEDICINE | Facility: CLINIC | Age: 62
End: 2022-08-18
Payer: COMMERCIAL

## 2022-08-18 RX ORDER — AMOXICILLIN AND CLAVULANATE POTASSIUM 875; 125 MG/1; MG/1
1 TABLET, FILM COATED ORAL EVERY 12 HOURS
Qty: 14 TABLET | Refills: 0 | Status: SHIPPED | OUTPATIENT
Start: 2022-08-18 | End: 2022-08-24 | Stop reason: SDUPTHER

## 2022-08-20 LAB — BACTERIA UR CULT: ABNORMAL

## 2022-08-22 ENCOUNTER — PATIENT MESSAGE (OUTPATIENT)
Dept: OPHTHALMOLOGY | Facility: CLINIC | Age: 62
End: 2022-08-22
Payer: COMMERCIAL

## 2022-08-22 ENCOUNTER — PATIENT MESSAGE (OUTPATIENT)
Dept: INTERNAL MEDICINE | Facility: CLINIC | Age: 62
End: 2022-08-22
Payer: COMMERCIAL

## 2022-08-24 ENCOUNTER — OFFICE VISIT (OUTPATIENT)
Dept: INTERNAL MEDICINE | Facility: CLINIC | Age: 62
End: 2022-08-24
Payer: COMMERCIAL

## 2022-08-24 VITALS
TEMPERATURE: 97 F | WEIGHT: 193.88 LBS | RESPIRATION RATE: 20 BRPM | HEIGHT: 76 IN | SYSTOLIC BLOOD PRESSURE: 118 MMHG | DIASTOLIC BLOOD PRESSURE: 72 MMHG | HEART RATE: 70 BPM | BODY MASS INDEX: 23.61 KG/M2 | OXYGEN SATURATION: 97 %

## 2022-08-24 DIAGNOSIS — N28.1 RENAL CYST: ICD-10-CM

## 2022-08-24 DIAGNOSIS — N39.0 RECURRENT UTI: ICD-10-CM

## 2022-08-24 DIAGNOSIS — Z79.4 DIABETES MELLITUS WITH INSULIN THERAPY: Primary | ICD-10-CM

## 2022-08-24 DIAGNOSIS — E11.9 DIABETES MELLITUS WITH INSULIN THERAPY: Primary | ICD-10-CM

## 2022-08-24 DIAGNOSIS — E04.1 THYROID NODULE: ICD-10-CM

## 2022-08-24 PROCEDURE — 1160F RVW MEDS BY RX/DR IN RCRD: CPT | Mod: CPTII,S$GLB,, | Performed by: INTERNAL MEDICINE

## 2022-08-24 PROCEDURE — 99499 RISK ADDL DX/OHS AUDIT: ICD-10-PCS | Mod: S$GLB,,, | Performed by: INTERNAL MEDICINE

## 2022-08-24 PROCEDURE — 3074F PR MOST RECENT SYSTOLIC BLOOD PRESSURE < 130 MM HG: ICD-10-PCS | Mod: CPTII,S$GLB,, | Performed by: INTERNAL MEDICINE

## 2022-08-24 PROCEDURE — 3066F NEPHROPATHY DOC TX: CPT | Mod: CPTII,S$GLB,, | Performed by: INTERNAL MEDICINE

## 2022-08-24 PROCEDURE — 3078F DIAST BP <80 MM HG: CPT | Mod: CPTII,S$GLB,, | Performed by: INTERNAL MEDICINE

## 2022-08-24 PROCEDURE — 99213 OFFICE O/P EST LOW 20 MIN: CPT | Mod: S$GLB,,, | Performed by: INTERNAL MEDICINE

## 2022-08-24 PROCEDURE — 99999 PR PBB SHADOW E&M-EST. PATIENT-LVL V: CPT | Mod: PBBFAC,,, | Performed by: INTERNAL MEDICINE

## 2022-08-24 PROCEDURE — 1159F PR MEDICATION LIST DOCUMENTED IN MEDICAL RECORD: ICD-10-PCS | Mod: CPTII,S$GLB,, | Performed by: INTERNAL MEDICINE

## 2022-08-24 PROCEDURE — 99499 UNLISTED E&M SERVICE: CPT | Mod: S$GLB,,, | Performed by: INTERNAL MEDICINE

## 2022-08-24 PROCEDURE — 3078F PR MOST RECENT DIASTOLIC BLOOD PRESSURE < 80 MM HG: ICD-10-PCS | Mod: CPTII,S$GLB,, | Performed by: INTERNAL MEDICINE

## 2022-08-24 PROCEDURE — 1159F MED LIST DOCD IN RCRD: CPT | Mod: CPTII,S$GLB,, | Performed by: INTERNAL MEDICINE

## 2022-08-24 PROCEDURE — 3044F PR MOST RECENT HEMOGLOBIN A1C LEVEL <7.0%: ICD-10-PCS | Mod: CPTII,S$GLB,, | Performed by: INTERNAL MEDICINE

## 2022-08-24 PROCEDURE — 3074F SYST BP LT 130 MM HG: CPT | Mod: CPTII,S$GLB,, | Performed by: INTERNAL MEDICINE

## 2022-08-24 PROCEDURE — 3008F BODY MASS INDEX DOCD: CPT | Mod: CPTII,S$GLB,, | Performed by: INTERNAL MEDICINE

## 2022-08-24 PROCEDURE — 3061F NEG MICROALBUMINURIA REV: CPT | Mod: CPTII,S$GLB,, | Performed by: INTERNAL MEDICINE

## 2022-08-24 PROCEDURE — 99213 PR OFFICE/OUTPT VISIT, EST, LEVL III, 20-29 MIN: ICD-10-PCS | Mod: S$GLB,,, | Performed by: INTERNAL MEDICINE

## 2022-08-24 PROCEDURE — 3061F PR NEG MICROALBUMINURIA RESULT DOCUMENTED/REVIEW: ICD-10-PCS | Mod: CPTII,S$GLB,, | Performed by: INTERNAL MEDICINE

## 2022-08-24 PROCEDURE — 3066F PR DOCUMENTATION OF TREATMENT FOR NEPHROPATHY: ICD-10-PCS | Mod: CPTII,S$GLB,, | Performed by: INTERNAL MEDICINE

## 2022-08-24 PROCEDURE — 3044F HG A1C LEVEL LT 7.0%: CPT | Mod: CPTII,S$GLB,, | Performed by: INTERNAL MEDICINE

## 2022-08-24 PROCEDURE — 3008F PR BODY MASS INDEX (BMI) DOCUMENTED: ICD-10-PCS | Mod: CPTII,S$GLB,, | Performed by: INTERNAL MEDICINE

## 2022-08-24 PROCEDURE — 4010F PR ACE/ARB THEARPY RXD/TAKEN: ICD-10-PCS | Mod: CPTII,S$GLB,, | Performed by: INTERNAL MEDICINE

## 2022-08-24 PROCEDURE — 4010F ACE/ARB THERAPY RXD/TAKEN: CPT | Mod: CPTII,S$GLB,, | Performed by: INTERNAL MEDICINE

## 2022-08-24 PROCEDURE — 99999 PR PBB SHADOW E&M-EST. PATIENT-LVL V: ICD-10-PCS | Mod: PBBFAC,,, | Performed by: INTERNAL MEDICINE

## 2022-08-24 PROCEDURE — 1160F PR REVIEW ALL MEDS BY PRESCRIBER/CLIN PHARMACIST DOCUMENTED: ICD-10-PCS | Mod: CPTII,S$GLB,, | Performed by: INTERNAL MEDICINE

## 2022-08-24 RX ORDER — AMOXICILLIN AND CLAVULANATE POTASSIUM 875; 125 MG/1; MG/1
1 TABLET, FILM COATED ORAL EVERY 12 HOURS
Qty: 6 TABLET | Refills: 0 | Status: SHIPPED | OUTPATIENT
Start: 2022-08-24 | End: 2022-08-27

## 2022-08-24 NOTE — PROGRESS NOTES
CC: followup of hypertension  HPI:  The patient is a 62 y.o. year old male who presents to the office for followup of hypertension.  The patient denies any chest pain, shortness of breath, excessive fatigue, nausea or vomiting, but reports occasional headaches and blurred vision due to eye drops.  He reports dysuria has improved with antibiotics.  Back pain has resolved.  Malodorous urine has resolved, and urine culture is normalizing.  He complains of soreness of his right eye.  He recently used drops for anterior scleritis.  His symptoms improved initially then recurred once he completed the course of medication.  He reports blood sugars have been high recently, but he has also had a UTI recently.    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 -     Hyperlipidemia     Hypertension     Memory loss     Thyroid nodule 8/22/2019    Traumatic iritis - Left Eye 2/27/2013       SURGICAL HISTORY:  Past Surgical History:   Procedure Laterality Date    CEREBRAL ANGIOGRAM N/A 1/6/2020    Procedure: ANGIOGRAM-CEREBRAL;  Surgeon: [facility] Diagnostic Provider;  Location: [facility] OR [address] [place] [address];  Service: Radiology;  Laterality: N/A;  /Nagi    CERVICAL FUSION  12/30/2015    COLONOSCOPY N/A 4/7/2017    Procedure: COLONOSCOPY;  Surgeon: Handy Frederick MD;  Location: [facility] ([facility]);  Service: Endoscopy;  Laterality: N/A;    HERNIA REPAIR      PROSTATECTOMY         MEDS:  Medcard reviewed and updated    ALLERGIES: Allergy Card reviewed and updated    SOCIAL HISTORY:   The patient is a nonsmoker.    PE:   APPEARANCE: Well nourished, well developed, in no acute distress.    CHEST: Lungs  clear to auscultation with unlabored respirations.  CARDIOVASCULAR: Normal S1, S2. No murmurs. No carotid bruits. No pedal edema.  ABDOMEN: Bowel sounds normal. Not distended. Soft. No tenderness or masses.   PSYCHIATRIC: The patient is oriented to person, place, and time and has a pleasant affect.        ASSESSMENT/PLAN:  Hi was seen today for follow-up.    Diagnoses and all orders for this visit:    Diabetes mellitus with insulin therapy  -     CBC Auto Differential; Future  -     Hemoglobin A1C; Future    Thyroid nodule  -     CBC Auto Differential; Future  -     TSH; Future  -     US Soft Tissue Head Neck Thyroid; Future    Renal cyst  -     US Retroperitoneal Complete (Kidney and; Future    Recurrent UTI  -     PROSTATE SPECIFIC ANTIGEN, DIAGNOSTIC; Future  -     continue Augmentin    Other orders  -     amoxicillin-clavulanate 875-125mg (AUGMENTIN) 875-125 mg per tablet; Take 1 tablet by mouth every 12 (twelve) hours. for 3 days

## 2022-08-27 ENCOUNTER — LAB VISIT (OUTPATIENT)
Dept: LAB | Facility: HOSPITAL | Age: 62
End: 2022-08-27
Attending: INTERNAL MEDICINE
Payer: COMMERCIAL

## 2022-08-27 DIAGNOSIS — N39.0 RECURRENT UTI: ICD-10-CM

## 2022-08-27 DIAGNOSIS — Z79.4 DIABETES MELLITUS WITH INSULIN THERAPY: ICD-10-CM

## 2022-08-27 DIAGNOSIS — E11.9 DIABETES MELLITUS WITH INSULIN THERAPY: ICD-10-CM

## 2022-08-27 DIAGNOSIS — E04.1 THYROID NODULE: ICD-10-CM

## 2022-08-27 LAB
BASOPHILS # BLD AUTO: 0.08 K/UL (ref 0–0.2)
BASOPHILS NFR BLD: 1.2 % (ref 0–1.9)
COMPLEXED PSA SERPL-MCNC: 4.8 NG/ML (ref 0–4)
DIFFERENTIAL METHOD: ABNORMAL
EOSINOPHIL # BLD AUTO: 0.5 K/UL (ref 0–0.5)
EOSINOPHIL NFR BLD: 7 % (ref 0–8)
ERYTHROCYTE [DISTWIDTH] IN BLOOD BY AUTOMATED COUNT: 13.1 % (ref 11.5–14.5)
ESTIMATED AVG GLUCOSE: 140 MG/DL (ref 68–131)
HBA1C MFR BLD: 6.5 % (ref 4–5.6)
HCT VFR BLD AUTO: 42.8 % (ref 40–54)
HGB BLD-MCNC: 14.6 G/DL (ref 14–18)
HYPOCHROMIA BLD QL SMEAR: ABNORMAL
IMM GRANULOCYTES # BLD AUTO: 0.02 K/UL (ref 0–0.04)
IMM GRANULOCYTES NFR BLD AUTO: 0.3 % (ref 0–0.5)
LYMPHOCYTES # BLD AUTO: 1.7 K/UL (ref 1–4.8)
LYMPHOCYTES NFR BLD: 25.9 % (ref 18–48)
MCH RBC QN AUTO: 31.1 PG (ref 27–31)
MCHC RBC AUTO-ENTMCNC: 34.1 G/DL (ref 32–36)
MCV RBC AUTO: 91 FL (ref 82–98)
MONOCYTES # BLD AUTO: 0.6 K/UL (ref 0.3–1)
MONOCYTES NFR BLD: 8.2 % (ref 4–15)
NEUTROPHILS # BLD AUTO: 3.8 K/UL (ref 1.8–7.7)
NEUTROPHILS NFR BLD: 57.4 % (ref 38–73)
NRBC BLD-RTO: 0 /100 WBC
PLATELET # BLD AUTO: 312 K/UL (ref 150–450)
PLATELET BLD QL SMEAR: ABNORMAL
PMV BLD AUTO: 10.2 FL (ref 9.2–12.9)
RBC # BLD AUTO: 4.7 M/UL (ref 4.6–6.2)
TSH SERPL DL<=0.005 MIU/L-ACNC: 2.8 UIU/ML (ref 0.4–4)
WBC # BLD AUTO: 6.68 K/UL (ref 3.9–12.7)

## 2022-08-27 PROCEDURE — 36415 COLL VENOUS BLD VENIPUNCTURE: CPT | Mod: PO | Performed by: INTERNAL MEDICINE

## 2022-08-27 PROCEDURE — 85025 COMPLETE CBC W/AUTO DIFF WBC: CPT | Performed by: INTERNAL MEDICINE

## 2022-08-27 PROCEDURE — 84443 ASSAY THYROID STIM HORMONE: CPT | Performed by: INTERNAL MEDICINE

## 2022-08-27 PROCEDURE — 84153 ASSAY OF PSA TOTAL: CPT | Performed by: INTERNAL MEDICINE

## 2022-08-27 PROCEDURE — 83036 HEMOGLOBIN GLYCOSYLATED A1C: CPT | Performed by: INTERNAL MEDICINE

## 2022-08-29 ENCOUNTER — PATIENT MESSAGE (OUTPATIENT)
Dept: INTERNAL MEDICINE | Facility: CLINIC | Age: 62
End: 2022-08-29
Payer: COMMERCIAL

## 2022-09-01 ENCOUNTER — HOSPITAL ENCOUNTER (OUTPATIENT)
Dept: RADIOLOGY | Facility: HOSPITAL | Age: 62
Discharge: HOME | End: 2022-09-01
Attending: INTERNAL MEDICINE
Payer: COMMERCIAL

## 2022-09-01 DIAGNOSIS — E04.1 THYROID NODULE: ICD-10-CM

## 2022-09-01 DIAGNOSIS — N28.1 RENAL CYST: ICD-10-CM

## 2022-09-01 PROCEDURE — 76536 US EXAM OF HEAD AND NECK: CPT | Mod: 26,,, | Performed by: STUDENT IN AN ORGANIZED HEALTH CARE EDUCATION/TRAINING PROGRAM

## 2022-09-01 PROCEDURE — 76536 US SOFT TISSUE HEAD NECK THYROID: ICD-10-PCS | Mod: 26,,, | Performed by: STUDENT IN AN ORGANIZED HEALTH CARE EDUCATION/TRAINING PROGRAM

## 2022-09-01 PROCEDURE — 76770 US EXAM ABDO BACK WALL COMP: CPT | Mod: 26,,, | Performed by: STUDENT IN AN ORGANIZED HEALTH CARE EDUCATION/TRAINING PROGRAM

## 2022-09-01 PROCEDURE — 76770 US RETROPERITONEAL COMPLETE: ICD-10-PCS | Mod: 26,,, | Performed by: STUDENT IN AN ORGANIZED HEALTH CARE EDUCATION/TRAINING PROGRAM

## 2022-09-01 PROCEDURE — 76770 US EXAM ABDO BACK WALL COMP: CPT | Mod: TC

## 2022-09-01 PROCEDURE — 76536 US EXAM OF HEAD AND NECK: CPT | Mod: TC

## 2022-09-02 ENCOUNTER — OFFICE VISIT (OUTPATIENT)
Dept: UROLOGY | Facility: CLINIC | Age: 62
End: 2022-09-02
Payer: COMMERCIAL

## 2022-09-02 VITALS
WEIGHT: 195.75 LBS | BODY MASS INDEX: 23.84 KG/M2 | SYSTOLIC BLOOD PRESSURE: 130 MMHG | DIASTOLIC BLOOD PRESSURE: 77 MMHG | HEIGHT: 76 IN | HEART RATE: 69 BPM

## 2022-09-02 DIAGNOSIS — N39.0 RECURRENT UTI: ICD-10-CM

## 2022-09-02 DIAGNOSIS — Z90.79 S/P TURP: ICD-10-CM

## 2022-09-02 DIAGNOSIS — R97.20 ELEVATED PSA: ICD-10-CM

## 2022-09-02 DIAGNOSIS — K92.1 BLOOD IN STOOL: Primary | ICD-10-CM

## 2022-09-02 PROCEDURE — 3075F PR MOST RECENT SYSTOLIC BLOOD PRESS GE 130-139MM HG: ICD-10-PCS | Mod: CPTII,S$GLB,, | Performed by: UROLOGY

## 2022-09-02 PROCEDURE — 99999 PR PBB SHADOW E&M-EST. PATIENT-LVL V: CPT | Mod: PBBFAC,,, | Performed by: UROLOGY

## 2022-09-02 PROCEDURE — 1159F MED LIST DOCD IN RCRD: CPT | Mod: CPTII,S$GLB,, | Performed by: UROLOGY

## 2022-09-02 PROCEDURE — 3066F PR DOCUMENTATION OF TREATMENT FOR NEPHROPATHY: ICD-10-PCS | Mod: CPTII,S$GLB,, | Performed by: UROLOGY

## 2022-09-02 PROCEDURE — 4010F PR ACE/ARB THEARPY RXD/TAKEN: ICD-10-PCS | Mod: CPTII,S$GLB,, | Performed by: UROLOGY

## 2022-09-02 PROCEDURE — 3044F PR MOST RECENT HEMOGLOBIN A1C LEVEL <7.0%: ICD-10-PCS | Mod: CPTII,S$GLB,, | Performed by: UROLOGY

## 2022-09-02 PROCEDURE — 3078F DIAST BP <80 MM HG: CPT | Mod: CPTII,S$GLB,, | Performed by: UROLOGY

## 2022-09-02 PROCEDURE — 3078F PR MOST RECENT DIASTOLIC BLOOD PRESSURE < 80 MM HG: ICD-10-PCS | Mod: CPTII,S$GLB,, | Performed by: UROLOGY

## 2022-09-02 PROCEDURE — 99999 PR PBB SHADOW E&M-EST. PATIENT-LVL V: ICD-10-PCS | Mod: PBBFAC,,, | Performed by: UROLOGY

## 2022-09-02 PROCEDURE — 3061F PR NEG MICROALBUMINURIA RESULT DOCUMENTED/REVIEW: ICD-10-PCS | Mod: CPTII,S$GLB,, | Performed by: UROLOGY

## 2022-09-02 PROCEDURE — 4010F ACE/ARB THERAPY RXD/TAKEN: CPT | Mod: CPTII,S$GLB,, | Performed by: UROLOGY

## 2022-09-02 PROCEDURE — 3044F HG A1C LEVEL LT 7.0%: CPT | Mod: CPTII,S$GLB,, | Performed by: UROLOGY

## 2022-09-02 PROCEDURE — 99214 PR OFFICE/OUTPT VISIT, EST, LEVL IV, 30-39 MIN: ICD-10-PCS | Mod: S$GLB,,, | Performed by: UROLOGY

## 2022-09-02 PROCEDURE — 87086 URINE CULTURE/COLONY COUNT: CPT | Performed by: UROLOGY

## 2022-09-02 PROCEDURE — 3066F NEPHROPATHY DOC TX: CPT | Mod: CPTII,S$GLB,, | Performed by: UROLOGY

## 2022-09-02 PROCEDURE — 3075F SYST BP GE 130 - 139MM HG: CPT | Mod: CPTII,S$GLB,, | Performed by: UROLOGY

## 2022-09-02 PROCEDURE — 3008F BODY MASS INDEX DOCD: CPT | Mod: CPTII,S$GLB,, | Performed by: UROLOGY

## 2022-09-02 PROCEDURE — 1159F PR MEDICATION LIST DOCUMENTED IN MEDICAL RECORD: ICD-10-PCS | Mod: CPTII,S$GLB,, | Performed by: UROLOGY

## 2022-09-02 PROCEDURE — 3061F NEG MICROALBUMINURIA REV: CPT | Mod: CPTII,S$GLB,, | Performed by: UROLOGY

## 2022-09-02 PROCEDURE — 3008F PR BODY MASS INDEX (BMI) DOCUMENTED: ICD-10-PCS | Mod: CPTII,S$GLB,, | Performed by: UROLOGY

## 2022-09-02 PROCEDURE — 99214 OFFICE O/P EST MOD 30 MIN: CPT | Mod: S$GLB,,, | Performed by: UROLOGY

## 2022-09-02 RX ORDER — ALPRAZOLAM 0.5 MG/1
TABLET ORAL
Qty: 90 TABLET | Refills: 1 | Status: SHIPPED | OUTPATIENT
Start: 2022-09-02 | End: 2022-11-28

## 2022-09-02 RX ORDER — LIDOCAINE HYDROCHLORIDE 20 MG/ML
JELLY TOPICAL ONCE
Status: CANCELLED | OUTPATIENT
Start: 2022-09-02 | End: 2022-09-02

## 2022-09-02 RX ORDER — DOXYCYCLINE HYCLATE 100 MG
100 TABLET ORAL ONCE
Status: CANCELLED | OUTPATIENT
Start: 2022-09-02 | End: 2022-09-02

## 2022-09-02 NOTE — PATIENT INSTRUCTIONS
To prevent recurrent UTI:  Increase water and empty the bladder more regularly.  Probiotics  D-Mannose 1 gram twice a day  Cranberry Pills / Juice    Lab Results   Component Value Date    PSA 21.2 (H) 02/06/2017    PSA 1.2 06/22/2006    PSADIAG 4.8 (H) 08/27/2022    PSATOTAL 3.4 04/03/2017    PSAFREE 0.45 04/03/2017    PSAFREEPCT 13.24 04/03/2017

## 2022-09-02 NOTE — PROGRESS NOTES
CC: recurrent UTI, elevated PSA    Subjective:      Hi Braxton is a 62 y.o. male who returns today regarding his recurrent UTIs.  He saw his PCP Dr. Kendall for c/o burning on urination, frequency and urgency with dark urine.  Noted to have E. Coli UTI on 8/17/22. He was referred to me by her PCP.    Had US yesterday.   The report is pending.  Normal upper tracts.  A small right renal cyst noted on my reading.    He has known h/o recurrent UTIs. He has not had any UTIs within the past 6 months. He is s/p bipolar TURP in Oct 2017 by Dr. King.    He saw Dr. Seay who did UDS and cysto. UDS demonstrated decreased contractility with elevated PVR (200cc). He recommended Urecholine but pt stopped taking it due to no help.  Date/Time: 6/23/2020 8:00 AM   Performed by: Ryder Seay MD   Authorized by: Ryder Seay MD   Comments: Procedure Date:  06/23/2020     Procedure:   Diagnostic Cystourethroscopy   Complex Cystometrogram   Voiding / Pressure Study with Intrarectal Balloon   Complex Uroflow   Electromyogram of Anal Sphincter.   Findings:   --- Bladder ---   CYSTOMETROGRAM ( Filling Phase ):   Cystometric Numeric Data:   - First Desire (Sensation): 81 mL at 1cm of water.   - Normal Desire: 346mL at 2 cm of water.   - Strong Desire: 539 mL at 3 cm of water.   - Urgency (Imminent Void) : 686 mL at 4 cm of water.   - Maximum Cystometric Capacity: 687 mL.   Compliance:   - high.   Leak Point Pressure:   - Valsalva ( Abdominal ) Leak Point Pressure: none.   UROFLOW:   - Voided Volume: 696 mL.   - Residual Urine: 250 mL.   - Maximum Flow Rate: 20 mL/sec.   - Flow Pattern: broad peak   VOIDING PRESSURE STUDY ( Voiding Phase ):   Detrusor Pressure:   - Maximum Detrusor Pressure: 42cm of water.   - Detrusor Pressure at Maximum Flow: 38 cm of water.   - Detrusor Contraction Characteristics: Sustained contraction(s).   ELECTROMYOGRAM:   - normal.     ---Diagnostic Cystourethroscopy ---   Normal urethra.    Prostate: s/p  "TURP open prostate fossa, some residual adenoma anteriorly   but not obstructive   Width of Bladder Neck Opening: Approximately 18 Fr.   Normal bladder.   Normal ureteral orifices bilaterally.   CONCLUSIONS:   1. Incomplete bladder emptying   2. High bladder capacity 900 ml   3. Weak bladder contraction   Start him on urecholine 25 mg TID, titrated up to 50 mg TID   Will check PVR in 3 months.   If not improving, consider InterStim Therapy.     He has not been seen by me since 6/2020.  He is a .  His daughter is an oncologist.    AUA SS 15/2 previously. Complains of nocturia 2-3x, recently started taking spironolactone before bedtime.    The following portions of the patient's history were reviewed and updated as appropriate: allergies, current medications, past family history, past medical history, past social history, past surgical history and problem list.    Review of Systems  A comprehensive multipoint review of systems was negative except as otherwise stated in the HPI.     Objective:   Vitals: /77   Pulse 69   Ht 6' 4" (1.93 m)   Wt 88.8 kg (195 lb 12.3 oz)   BMI 23.83 kg/m²     Physical Exam   General: alert and oriented, no acute distress  Respiratory: Symmetric expansion, non-labored breathing  Neuro: no gross deficits  Psych: normal judgment and insight, normal mood/affect and non-anxious  : 30 g prostate without nodules or induration. No significant tenderness noted.  Prostate massage done.    Lab Review     Lab Results   Component Value Date    WBC 6.68 08/27/2022    HGB 14.6 08/27/2022    HCT 42.8 08/27/2022    HCT 49 02/21/2021    MCV 91 08/27/2022     08/27/2022     Lab Results   Component Value Date    CREATININE 1.1 08/15/2022    BUN 13 08/15/2022     Lab Results   Component Value Date    PSA 21.2 (H) 02/06/2017    PSA 1.2 06/22/2006    PSADIAG 4.8 (H) 08/27/2022    PSATOTAL 3.4 04/03/2017    PSAFREE 0.45 04/03/2017    PSAFREEPCT 13.24 04/03/2017      UA clear " today 50 + glucose  PVR per bladder scan: 0 ml    Post massage urine : sent for culture  CT RSS 5/2022  No nephrolithiasis or hydronephrosis.     Hepatic steatosis.    Assessment and Plan:   Blood in stool  -     Ambulatory referral/consult to Gastroenterology; Future; Expected date: 09/09/2022    Elevated PSA    S/P TURP    Recurrent UTI  -     Cystoscopy; Future  -     Urine culture    Other orders  -     LIDOcaine HCl 2% urojet  -     doxycycline tablet 100 mg     - recent UTI with E. Coli.    Therefore, his elevated PSA might not be accurate.  Will follow up with serial PSA.    For recurrent UTI, he need to have a good control of DM.  Recommend the followings:  Increase water and empty the bladder more regularly.  Probiotics ( Align)  D-Mannose 1 gram twice a day  Cranberry Pills / Juice    Will evaluate him with cysto.    Follow up cysto.

## 2022-09-03 LAB — BACTERIA UR CULT: NO GROWTH

## 2022-09-06 RX ORDER — METHOCARBAMOL 750 MG/1
TABLET, FILM COATED ORAL
Qty: 30 TABLET | Refills: 3 | Status: SHIPPED | OUTPATIENT
Start: 2022-09-06 | End: 2024-01-19 | Stop reason: SDUPTHER

## 2022-09-15 ENCOUNTER — TELEPHONE (OUTPATIENT)
Dept: UROLOGY | Facility: CLINIC | Age: 62
End: 2022-09-15
Payer: COMMERCIAL

## 2022-09-15 NOTE — TELEPHONE ENCOUNTER
----- Message from Jairo Rader sent at 9/15/2022  8:33 AM CDT -----  Contact: pt  Pt requesting call back RE: Pt would like to r/s procedure for this morning. Pt states he is not feeling         Confirmed contact below:  Contact Name:Hi Braxton  Phone Number: [phone]

## 2022-10-05 ENCOUNTER — HOSPITAL ENCOUNTER (OUTPATIENT)
Facility: HOSPITAL | Age: 62
Discharge: HOME | End: 2022-10-11
Attending: EMERGENCY MEDICINE | Admitting: INTERNAL MEDICINE
Payer: COMMERCIAL

## 2022-10-05 DIAGNOSIS — R07.9 CHEST PAIN: Primary | ICD-10-CM

## 2022-10-05 LAB
ALBUMIN SERPL BCP-MCNC: 4.3 G/DL (ref 3.5–5.2)
ALP SERPL-CCNC: 76 U/L (ref 55–135)
ALT SERPL W/O P-5'-P-CCNC: 24 U/L (ref 10–44)
ANION GAP SERPL CALC-SCNC: 11 MMOL/L (ref 8–16)
AST SERPL-CCNC: 22 U/L (ref 10–40)
BASOPHILS # BLD AUTO: 0.05 K/UL (ref 0–0.2)
BASOPHILS NFR BLD: 1 % (ref 0–1.9)
BILIRUB SERPL-MCNC: 0.4 MG/DL (ref 0.1–1)
BUN SERPL-MCNC: 14 MG/DL (ref 8–23)
CALCIUM SERPL-MCNC: 9.7 MG/DL (ref 8.7–10.5)
CHLORIDE SERPL-SCNC: 106 MMOL/L (ref 95–110)
CO2 SERPL-SCNC: 23 MMOL/L (ref 23–29)
CREAT SERPL-MCNC: 1.1 MG/DL (ref 0.5–1.4)
DIFFERENTIAL METHOD: NORMAL
EOSINOPHIL # BLD AUTO: 0.4 K/UL (ref 0–0.5)
EOSINOPHIL NFR BLD: 7.9 % (ref 0–8)
ERYTHROCYTE [DISTWIDTH] IN BLOOD BY AUTOMATED COUNT: 12.9 % (ref 11.5–14.5)
EST. GFR  (NO RACE VARIABLE): >60 ML/MIN/1.73 M^2
GLUCOSE SERPL-MCNC: 91 MG/DL (ref 70–110)
HCT VFR BLD AUTO: 43.1 % (ref 40–54)
HCV AB SERPL QL IA: NORMAL
HGB BLD-MCNC: 14.6 G/DL (ref 14–18)
HIV 1+2 AB+HIV1 P24 AG SERPL QL IA: NORMAL
IMM GRANULOCYTES # BLD AUTO: 0.01 K/UL (ref 0–0.04)
IMM GRANULOCYTES NFR BLD AUTO: 0.2 % (ref 0–0.5)
LIPASE SERPL-CCNC: 18 U/L (ref 4–60)
LYMPHOCYTES # BLD AUTO: 1.6 K/UL (ref 1–4.8)
LYMPHOCYTES NFR BLD: 32.2 % (ref 18–48)
MCH RBC QN AUTO: 29.8 PG (ref 27–31)
MCHC RBC AUTO-ENTMCNC: 33.9 G/DL (ref 32–36)
MCV RBC AUTO: 88 FL (ref 82–98)
MONOCYTES # BLD AUTO: 0.5 K/UL (ref 0.3–1)
MONOCYTES NFR BLD: 9.1 % (ref 4–15)
NEUTROPHILS # BLD AUTO: 2.5 K/UL (ref 1.8–7.7)
NEUTROPHILS NFR BLD: 49.6 % (ref 38–73)
NRBC BLD-RTO: 0 /100 WBC
PLATELET # BLD AUTO: 278 K/UL (ref 150–450)
PMV BLD AUTO: 10 FL (ref 9.2–12.9)
POTASSIUM SERPL-SCNC: 4 MMOL/L (ref 3.5–5.1)
PROT SERPL-MCNC: 7.4 G/DL (ref 6–8.4)
RBC # BLD AUTO: 4.9 M/UL (ref 4.6–6.2)
SODIUM SERPL-SCNC: 140 MMOL/L (ref 136–145)
TROPONIN I SERPL DL<=0.01 NG/ML-MCNC: <0.006 NG/ML (ref 0–0.03)
WBC # BLD AUTO: 5.06 K/UL (ref 3.9–12.7)

## 2022-10-05 PROCEDURE — 36415 COLL VENOUS BLD VENIPUNCTURE: CPT | Performed by: INTERNAL MEDICINE

## 2022-10-05 PROCEDURE — 80053 COMPREHEN METABOLIC PANEL: CPT | Performed by: EMERGENCY MEDICINE

## 2022-10-05 PROCEDURE — 93010 EKG 12-LEAD: ICD-10-PCS | Mod: ,,, | Performed by: INTERNAL MEDICINE

## 2022-10-05 PROCEDURE — 99285 PR EMERGENCY DEPT VISIT,LEVEL V: ICD-10-PCS | Mod: ,,, | Performed by: EMERGENCY MEDICINE

## 2022-10-05 PROCEDURE — 85025 COMPLETE CBC W/AUTO DIFF WBC: CPT | Performed by: EMERGENCY MEDICINE

## 2022-10-05 PROCEDURE — 99285 EMERGENCY DEPT VISIT HI MDM: CPT | Mod: ,,, | Performed by: EMERGENCY MEDICINE

## 2022-10-05 PROCEDURE — 99285 EMERGENCY DEPT VISIT HI MDM: CPT | Mod: 25

## 2022-10-05 PROCEDURE — G0378 HOSPITAL OBSERVATION PER HR: HCPCS

## 2022-10-05 PROCEDURE — 86803 HEPATITIS C AB TEST: CPT | Performed by: PHYSICIAN ASSISTANT

## 2022-10-05 PROCEDURE — 63600175 PHARM REV CODE 636 W HCPCS: Performed by: INTERNAL MEDICINE

## 2022-10-05 PROCEDURE — 93010 ELECTROCARDIOGRAM REPORT: CPT | Mod: ,,, | Performed by: INTERNAL MEDICINE

## 2022-10-05 PROCEDURE — 93005 ELECTROCARDIOGRAM TRACING: CPT

## 2022-10-05 PROCEDURE — 87389 HIV-1 AG W/HIV-1&-2 AB AG IA: CPT | Performed by: PHYSICIAN ASSISTANT

## 2022-10-05 PROCEDURE — 96372 THER/PROPH/DIAG INJ SC/IM: CPT | Performed by: INTERNAL MEDICINE

## 2022-10-05 PROCEDURE — 25000003 PHARM REV CODE 250: Performed by: INTERNAL MEDICINE

## 2022-10-05 PROCEDURE — 84484 ASSAY OF TROPONIN QUANT: CPT | Mod: 91 | Performed by: INTERNAL MEDICINE

## 2022-10-05 PROCEDURE — 25000003 PHARM REV CODE 250: Performed by: EMERGENCY MEDICINE

## 2022-10-05 PROCEDURE — 84484 ASSAY OF TROPONIN QUANT: CPT | Mod: 91 | Performed by: EMERGENCY MEDICINE

## 2022-10-05 PROCEDURE — 99219 PR INITIAL OBSERVATION CARE,LEVL II: CPT | Mod: ,,, | Performed by: INTERNAL MEDICINE

## 2022-10-05 PROCEDURE — 83690 ASSAY OF LIPASE: CPT | Performed by: EMERGENCY MEDICINE

## 2022-10-05 PROCEDURE — 99219 PR INITIAL OBSERVATION CARE,LEVL II: ICD-10-PCS | Mod: ,,, | Performed by: INTERNAL MEDICINE

## 2022-10-05 PROCEDURE — 25000242 PHARM REV CODE 250 ALT 637 W/ HCPCS: Performed by: EMERGENCY MEDICINE

## 2022-10-05 RX ORDER — EZETIMIBE 10 MG/1
10 TABLET ORAL DAILY
Status: DISCONTINUED | OUTPATIENT
Start: 2022-10-06 | End: 2022-10-11 | Stop reason: HOSPADM

## 2022-10-05 RX ORDER — PRAVASTATIN SODIUM 80 MG/1
80 TABLET ORAL DAILY
Status: DISCONTINUED | OUTPATIENT
Start: 2022-10-06 | End: 2022-10-05

## 2022-10-05 RX ORDER — SPIRONOLACTONE 25 MG/1
50 TABLET ORAL DAILY
Status: DISCONTINUED | OUTPATIENT
Start: 2022-10-06 | End: 2022-10-11 | Stop reason: HOSPADM

## 2022-10-05 RX ORDER — INSULIN ASPART 100 [IU]/ML
0-5 INJECTION, SOLUTION INTRAVENOUS; SUBCUTANEOUS
Status: DISCONTINUED | OUTPATIENT
Start: 2022-10-05 | End: 2022-10-11 | Stop reason: HOSPADM

## 2022-10-05 RX ORDER — ONDANSETRON 2 MG/ML
4 INJECTION INTRAMUSCULAR; INTRAVENOUS EVERY 8 HOURS PRN
Status: DISCONTINUED | OUTPATIENT
Start: 2022-10-05 | End: 2022-10-11 | Stop reason: HOSPADM

## 2022-10-05 RX ORDER — NAPROXEN SODIUM 220 MG/1
81 TABLET, FILM COATED ORAL DAILY
Status: DISCONTINUED | OUTPATIENT
Start: 2022-10-06 | End: 2022-10-11 | Stop reason: HOSPADM

## 2022-10-05 RX ORDER — MORPHINE SULFATE 2 MG/ML
5 INJECTION, SOLUTION INTRAMUSCULAR; INTRAVENOUS EVERY 4 HOURS PRN
Status: DISCONTINUED | OUTPATIENT
Start: 2022-10-05 | End: 2022-10-09

## 2022-10-05 RX ORDER — NALOXONE HCL 0.4 MG/ML
0.02 VIAL (ML) INJECTION
Status: DISCONTINUED | OUTPATIENT
Start: 2022-10-05 | End: 2022-10-11 | Stop reason: HOSPADM

## 2022-10-05 RX ORDER — GLUCAGON 1 MG
1 KIT INJECTION
Status: DISCONTINUED | OUTPATIENT
Start: 2022-10-05 | End: 2022-10-11 | Stop reason: HOSPADM

## 2022-10-05 RX ORDER — LOSARTAN POTASSIUM 50 MG/1
50 TABLET ORAL DAILY
Status: DISCONTINUED | OUTPATIENT
Start: 2022-10-06 | End: 2022-10-11 | Stop reason: HOSPADM

## 2022-10-05 RX ORDER — ASPIRIN 325 MG
325 TABLET ORAL
Status: COMPLETED | OUTPATIENT
Start: 2022-10-05 | End: 2022-10-05

## 2022-10-05 RX ORDER — ALPRAZOLAM 0.5 MG/1
0.5 TABLET ORAL 3 TIMES DAILY PRN
Status: DISCONTINUED | OUTPATIENT
Start: 2022-10-05 | End: 2022-10-11 | Stop reason: HOSPADM

## 2022-10-05 RX ORDER — POLYETHYLENE GLYCOL 3350 17 G/17G
17 POWDER, FOR SOLUTION ORAL 2 TIMES DAILY PRN
Status: DISCONTINUED | OUTPATIENT
Start: 2022-10-05 | End: 2022-10-11 | Stop reason: HOSPADM

## 2022-10-05 RX ORDER — IBUPROFEN 200 MG
16 TABLET ORAL
Status: DISCONTINUED | OUTPATIENT
Start: 2022-10-05 | End: 2022-10-11 | Stop reason: HOSPADM

## 2022-10-05 RX ORDER — NITROGLYCERIN 0.4 MG/1
0.4 TABLET SUBLINGUAL
Status: COMPLETED | OUTPATIENT
Start: 2022-10-05 | End: 2022-10-05

## 2022-10-05 RX ORDER — BACLOFEN 10 MG/1
10 TABLET ORAL NIGHTLY
Status: DISCONTINUED | OUTPATIENT
Start: 2022-10-05 | End: 2022-10-11 | Stop reason: HOSPADM

## 2022-10-05 RX ORDER — PANTOPRAZOLE SODIUM 40 MG/1
40 TABLET, DELAYED RELEASE ORAL 2 TIMES DAILY
Status: DISCONTINUED | OUTPATIENT
Start: 2022-10-05 | End: 2022-10-11 | Stop reason: HOSPADM

## 2022-10-05 RX ORDER — TALC
6 POWDER (GRAM) TOPICAL NIGHTLY PRN
Status: DISCONTINUED | OUTPATIENT
Start: 2022-10-05 | End: 2022-10-11 | Stop reason: HOSPADM

## 2022-10-05 RX ORDER — NIFEDIPINE 30 MG/1
60 TABLET, EXTENDED RELEASE ORAL DAILY
Status: DISCONTINUED | OUTPATIENT
Start: 2022-10-06 | End: 2022-10-06

## 2022-10-05 RX ORDER — OXYCODONE HYDROCHLORIDE 5 MG/1
5 TABLET ORAL EVERY 4 HOURS PRN
Status: DISCONTINUED | OUTPATIENT
Start: 2022-10-05 | End: 2022-10-11 | Stop reason: HOSPADM

## 2022-10-05 RX ORDER — ACETAMINOPHEN 325 MG/1
650 TABLET ORAL EVERY 4 HOURS PRN
Status: DISCONTINUED | OUTPATIENT
Start: 2022-10-05 | End: 2022-10-11 | Stop reason: HOSPADM

## 2022-10-05 RX ORDER — ENOXAPARIN SODIUM 100 MG/ML
40 INJECTION SUBCUTANEOUS EVERY 24 HOURS
Status: DISCONTINUED | OUTPATIENT
Start: 2022-10-05 | End: 2022-10-11 | Stop reason: HOSPADM

## 2022-10-05 RX ORDER — GABAPENTIN 300 MG/1
300 CAPSULE ORAL 3 TIMES DAILY
Status: DISCONTINUED | OUTPATIENT
Start: 2022-10-05 | End: 2022-10-11 | Stop reason: HOSPADM

## 2022-10-05 RX ORDER — METOPROLOL SUCCINATE 50 MG/1
100 TABLET, EXTENDED RELEASE ORAL DAILY
Status: DISCONTINUED | OUTPATIENT
Start: 2022-10-06 | End: 2022-10-06

## 2022-10-05 RX ORDER — IBUPROFEN 200 MG
24 TABLET ORAL
Status: DISCONTINUED | OUTPATIENT
Start: 2022-10-05 | End: 2022-10-11 | Stop reason: HOSPADM

## 2022-10-05 RX ORDER — SODIUM CHLORIDE 0.9 % (FLUSH) 0.9 %
10 SYRINGE (ML) INJECTION
Status: DISCONTINUED | OUTPATIENT
Start: 2022-10-05 | End: 2022-10-11 | Stop reason: HOSPADM

## 2022-10-05 RX ADMIN — PANTOPRAZOLE SODIUM 40 MG: 40 TABLET, DELAYED RELEASE ORAL at 08:10

## 2022-10-05 RX ADMIN — ASPIRIN 325 MG ORAL TABLET 325 MG: 325 PILL ORAL at 03:10

## 2022-10-05 RX ADMIN — GABAPENTIN 300 MG: 300 CAPSULE ORAL at 08:10

## 2022-10-05 RX ADMIN — NITROGLYCERIN 0.4 MG: 0.4 TABLET, ORALLY DISINTEGRATING SUBLINGUAL at 03:10

## 2022-10-05 RX ADMIN — BACLOFEN 10 MG: 10 TABLET ORAL at 08:10

## 2022-10-05 RX ADMIN — ENOXAPARIN SODIUM 40 MG: 100 INJECTION SUBCUTANEOUS at 08:10

## 2022-10-05 NOTE — ED TRIAGE NOTES
To the ED with c/o chest tightness, pressure that woke him up during the night.  Today pressure is better, now reporting light headedness.

## 2022-10-05 NOTE — ED PROVIDER NOTES
Encounter Date: 10/5/2022    SCRIBE #1 NOTE: I, Olga Farooq, am scribing for, and in the presence of,  Shashank Chanel MD. I have scribed the following portions of the note - the EKG reading. Other sections scribed: HPI, ROS.     History     Chief Complaint   Patient presents with    Chest Pain     Tightness and heaviness across the middle of chest, intermittent. Started 1am this morning.    Time patient was seen by the provider: 2:29 PM    The patient is a 62 y.o. male with past medical history of HTN, HLD, CAD, GERD, DM type 2, and thyroid nodule who presents to the ED with a complaint of chest pain onset yesterday. The patient reports that he woke up out of his sleep around midnight secondary to diaphoresis, SOB, and chest pain with a 10/10 severity. He states that the episode lasted for a few minutes. The patient notes that he switched from laying on his stomach to lying on his side to relieve pain. Laying on his back worsened the chest pain. Throughout the night, his chest pain resolved. At 12:30 pm the patient was at work sitting at his desk when he developed chest pain. He describes it as intense pressure and tightness across his chest that radiates to his back. The patient currently endorses chest pain, but it isn't as intense and rates it a 4/10. Associated symptoms include lightheadedness and nausea. The patient denies a history of pulmonary embolism and DTV. He notes that 7 of his siblings passed from MI's. He denies any recent surgery. Patient denies any recent long trips. He also denies fever, cough, sore throat, rhinorrhea, leg swelling, and leg pain.     The history is provided by the patient and medical records. No  was used.   Review of patient's allergies indicates:   Allergen Reactions    Lisinopril Anaphylaxis and Nausea And Vomiting     General bad feeling    Lipitor [atorvastatin] Other (See Comments)     Muscle aches    Adhesive     Crestor [rosuvastatin] Swelling     Lip  swelling.     Jardiance [empagliflozin] Other (See Comments)     Possible related to recent UTI's     Metformin      Used XR and experienced flatulance and abdominal pain.      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 -     Hyperlipidemia     Hypertension     Memory loss     Thyroid nodule 8/22/2019    Traumatic iritis - Left Eye 2/27/2013     Past Surgical History:   Procedure Laterality Date    CEREBRAL ANGIOGRAM N/A 1/6/2020    Procedure: ANGIOGRAM-CEREBRAL;  Surgeon: [facility] Diagnostic Provider;  [facility];  Service: Radiology;  Laterality: N/A;  /Nagi    CERVICAL FUSION  12/30/2015    COLONOSCOPY N/A 4/7/2017    Procedure: COLONOSCOPY;  Surgeon: Handy Frederick MD;  Location: [facility] ENDO (4TH FLR);  Service: Endoscopy;  Laterality: N/A;    HERNIA REPAIR      PROSTATECTOMY       Family History   Problem Relation Age of Onset    Heart disease Mother     Hypertension Mother     Hyperlipidemia Mother     Stroke Mother     Heart disease Father     Hyperlipidemia Brother     Hypertension Brother     Heart disease Brother     Hypertension Brother     Hyperlipidemia Brother     Benign prostatic hyperplasia Brother     Heart disease Brother     Hypertension Brother     Heart disease Brother     Hypertension Brother     Hepatitis Brother     Heart disease Brother     Cancer Sister     Cancer Brother         throat CA    Diabetes Paternal Uncle     Cataracts Maternal Grandmother     Amblyopia Neg Hx     Blindness Neg Hx     Glaucoma Neg Hx     Macular degeneration Neg Hx     Retinal detachment Neg Hx     Strabismus Neg Hx     Thyroid disease Neg Hx     Colon cancer Neg Hx     Colon polyps Neg  Hx      Social History     Tobacco Use    Smoking status: Former     Packs/day: 1.00     Years: 30.00     Pack years: 30.00     Types: Cigarettes     Quit date: 12/13/2011     Years since quitting: 10.8    Smokeless tobacco: Never   Substance Use Topics    Alcohol use: Yes     Comment: occas - nonalcoholic beer or beer    Drug use: No     Review of Systems  Constitutional:  No Fever, No Chills,   Eyes: No Vision Changes  ENT/Mouth: No sore throat, No rhinorrhea  Cardiovascular:  Chest Pain, No Palpitations, No leg swelling  Respiratory:  No Cough, No SOB  Gastrointestinal:  Nausea, No Vomiting, No Diarrhea, No abdo pain.  Genitourinary:  No  pain, No dysuria   Musculoskeletal:  No Arthralgias, No Back Pain, No Neck Pain, No Leg pain, No recent trauma.  Skin:  No skin Lesions  Neuro: Lightheadedness, No Weakness, No Numbness, No Paresthesias, No Dizziness, No Headache   Physical Exam     Initial Vitals [10/05/22 1400]   BP Pulse Resp Temp SpO2   (!) 156/79 71 18 98.8 °F (37.1 °C) 98 %      MAP       --         Physical Exam    Nursing note and vitals reviewed.    Physical Exam:  CONSTITUTIONAL: Well developed, well nourished, in no acute distress.  HENT: Normocephalic, atraumatic    EYES: Sclerae anicteric   NECK: Supple, no thyroid enlargement  CARDIOVASCULAR: Regular rate and rhythm without any murmurs, gallops, rubs.  No lower extremity swelling or tenderness to palpation.  RESPIRATORY: Speaking in full sentences. Breathing comfortably. Auscultation of the lungs revealed normal breath sounds b/l, no wheezing, no rales, no rhonchi.  ABDOMEN: Soft and nontender, no masses, no rebound or guarding   NEUROLOGIC: Alert, interacting normally. No facial droop.   MSK:  Chest pain is not clearly reproducible.  Moving all four extremities.  Skin: Warm and dry. No visible rash on exposed areas of skin.    Psych: Mood and affect normal.      ED Course   Procedures  Labs Reviewed   HIV 1 / 2 ANTIBODY    Narrative:     Release  to patient->Immediate   HEPATITIS C ANTIBODY    Narrative:     Release to patient->Immediate   CBC W/ AUTO DIFFERENTIAL   COMPREHENSIVE METABOLIC PANEL    Narrative:     add on lipase per Shashank Chanel MD order# [id number]  10/05/2022 @   15:10    TROPONIN I    Narrative:     add on lipase per Shashank Chanel MD order# [id number]  10/05/2022 @   15:10    LIPASE   LIPASE    Narrative:     add on lipase per Shashank Chanel MD order# [id number]  10/05/2022 @   15:10    TROPONIN I     EKG Readings: (Independently Interpreted)   Rhythm: Normal Sinus Rhythm. Heart Rate: 66. Axis: Normal.   No ischemic changes. Normal axis. Intervals are unremarkable.   ECG Results              EKG 12-lead (Final result)  Result time 10/05/22 17:43:41      Final result by Interface, Lab In [facility] (10/05/22 17:43:41)                   Narrative:    Test Reason : R07.9,    Vent. Rate : 066 BPM     Atrial Rate : 066 BPM     P-R Int : 160 ms          QRS Dur : 088 ms      QT Int : 404 ms       P-R-T Axes : 077 070 072 degrees     QTc Int : 423 ms    Normal sinus rhythm  Normal ECG  When compared with ECG of 10-MAR-2022 13:01,  No significant change was found  Confirmed by Nghia CASTRO MD ([id number]) on 10/5/2022 5:43:31 PM    Referred By: [email or web address]   SELF           Confirmed By:Nghia CASTRO MD                                  Imaging Results              X-Ray Chest PA And Lateral (Final result)  Result time 10/05/22 16:05:27      Final result by Orestes Nixon III, MD (10/05/22 16:05:27)                   Impression:      No acute process seen.      Electronically signed by: Orestes Nixon MD  Date:    10/05/2022  Time:    16:05               Narrative:    EXAMINATION:  XR CHEST PA AND LATERAL    CLINICAL HISTORY:  Chest pain, unspecified    FINDINGS:  Chest two views: Heart size is normal.  Lungs are clear and the bones bowel gas noncontributory.                                       Medications   sodium chloride 0.9% flush 10 mL (has no  administration in time range)   melatonin tablet 6 mg (has no administration in time range)   aspirin chewable tablet 81 mg (has no administration in time range)   ALPRAZolam tablet 0.5 mg (has no administration in time range)   baclofen tablet 10 mg (10 mg Oral Given 10/5/22 2047)   ezetimibe tablet 10 mg (has no administration in time range)   gabapentin capsule 300 mg (300 mg Oral Given 10/5/22 2047)   losartan tablet 50 mg (has no administration in time range)   metoprolol succinate (TOPROL-XL) 24 hr tablet 100 mg (has no administration in time range)   NIFEdipine 24 hr tablet 60 mg (has no administration in time range)   pantoprazole EC tablet 40 mg (40 mg Oral Given 10/5/22 2047)   spironolactone tablet 50 mg (has no administration in time range)   trazodone split tablet 25 mg (has no administration in time range)   polyethylene glycol packet 17 g (has no administration in time range)   oxyCODONE immediate release tablet 5 mg (has no administration in time range)   morphine injection 5 mg (has no administration in time range)   naloxone 0.4 mg/mL injection 0.02 mg (has no administration in time range)   insulin aspart U-100 pen 0-5 Units (has no administration in time range)   glucose chewable tablet 16 g (has no administration in time range)   glucose chewable tablet 24 g (has no administration in time range)   glucagon (human recombinant) injection 1 mg (has no administration in time range)   dextrose 10% bolus 125 mL (has no administration in time range)   dextrose 10% bolus 250 mL (has no administration in time range)   acetaminophen tablet 650 mg (has no administration in time range)   ondansetron injection 4 mg (has no administration in time range)   enoxaparin injection 40 mg (40 mg Subcutaneous Given 10/5/22 2030)   aspirin tablet 325 mg (325 mg Oral Given 10/5/22 1506)   nitroGLYCERIN SL tablet 0.4 mg (0.4 mg Sublingual Given 10/5/22 1515)     Medical Decision Making:   History:   Old Medical Records:  I decided to obtain old medical records.  Old Records Summarized: records from clinic visits.  Independently Interpreted Test(s):   I have ordered and independently interpreted EKG Reading(s) - see prior notes  Clinical Tests:   Lab Tests: Reviewed and Ordered  Radiological Study: Ordered and Reviewed  Medical Tests: Reviewed and Ordered  Other:   I have discussed this case with another health care provider.       Complexity:  High, level 5.    62-year-old male with past medical history as noted, strong family history of cardiac disease, history of coronary artery disease, coming in with intermittent chest pain 1st last night woke up from sleep as well as today was initially 10/10 now is more like a 4/10, some vague radiation to the back.  Some associated nausea and diaphoresis.  He has not taken anything for symptoms.   Yet shortness of breath when the symptoms were severe but not currently.  No leg swelling no recent surgeries, no recent travel, no PE DVT risk factors.    Currently his exam is unremarkable.  Chest pain is not clearly reproducible.    Concern for ACS/unstable angina.  Not consistent with pneumonia of ache history of cough will still obtain chest x-ray.  Not consistent with PE DVT.  Metabolic hematologic abnormalities are possible.    He did have a catheterization 2018 that showed nonobstructing CAD.    EKG thus far is reassuring.    Will obtain labs, troponin, chest x-ray to risk stratify for above etiologies.  Will give aspirin as well as dose of nitro to control symptoms.    Given his risk factors, elevated heart score, anticipate admission to the hospital for further ACS risk stratification.    Update:   Patient remains hemodynamically stable emergency department.    Labs and chest x-ray are unremarkable.  Patient's pain is significantly improved after nitro and aspirin.  Will admit to Hospital Medicine for further risk stratification for ACS given his elevated heart score, family history of  cardiac issues, concerning symptoms.         Scribe Attestation:   Scribe #1: I performed the above scribed service and the documentation accurately describes the services I performed. I attest to the accuracy of the note.    Attending Attestation:           Physician Attestation for Scribe:  Physician Attestation Statement for Scribe #1: I, reviewed documentation, as scribed by Olga Farooq in my presence, and it is both accurate and complete.                        Clinical Impression:   Final diagnoses:  [R07.9] Chest pain        ED Disposition Condition    Observation Stable                Shashank Chanel MD  10/06/22 0631

## 2022-10-05 NOTE — ED NOTES
"Pt states pain decreased, "only feels sore now." Denies pressure/ tightness. Vs remain stable at this time.   "

## 2022-10-05 NOTE — H&P
History and Physical for Admission  [facility].     Hi Braxton (MRN: [id number])  Admitting Service: Hospital Medicine  Date of Admission: 10/5/2022   Primary Care Provider: Josefina Kendall MD    ?  Chief complaint: chest pain  ?  History of Present Illness:     62-year-old male with history of nonobstructive coronary artery disease, diabetes and hypertension presents with 1 day of chest pressure.      On the morning of admission the patient developed crushing substernal chest pain that lasted for 30 minutes seemingly relieved by not lying on back but which otherwise spontaneously resolved.  It was described as a pressure of acute onset and severe.  By morning it had resolved but recurred around noon leading to his presentation.      With this he had mild shortness of breath.  He reports infrequently he has palpitations but that during this event he did not experience such.  He did have mild lightheadedness.      No recent febrile illness. ?    At the time of the interview the pain had essentially resolved as has the shortness of breath and lightheadedness.    ?  Review of Systems:   Constitutional: ; no fevers/chills, night sweats, weight changes, fatigue, recent illnesses, headaches  Eyes: ; denies vision changes including recent decrease in vision, double vision or blurriness  Ears, Nose, Mouth, Throat: denies hearing abnormalities, tinnitus, rhinorrhea, sinus pain/congestion, dysphagia, sore throat, hoarseness, neck pain  Cardiovascular:  See HPI  Respiratory:  Shortness of breath; denies , cough, wheezing  GI: denies abdominal pain, nausea, vomiting, diarrhea, constipation, melena,   :  denies dysuria, hematuria, polyuria, incontinence  MSK: denies joint pain, weakness, myalgias  Integument:  denies rashes, lesions, skin changes  Neuro: denies weakness, paresthesia, tremor, dizziness  Psych: denies history of anxiety/depression  Endocrine: ; denies polyuria/polydipsia,  polyphagia, heat/cold intolerance,  Hematologic/lymphatic:  denies easy bleeding/bruising, LAD    ?  Medical History    PAST MEDICAL HISTORY:  has a past medical history of Carotid artery occlusion, Coronary artery disease, Diabetes mellitus, type 2, Difficult intubation, DJD (degenerative joint disease), cervical (7/10/2015), GERD (gastroesophageal reflux disease), History of iritis (2013), Hyperlipidemia, Hypertension, Memory loss, Thyroid nodule (8/22/2019), and Traumatic iritis - Left Eye (2/27/2013).    PAST SURGICAL HISTORY:  has a past surgical history that includes Hernia repair; Cervical fusion (12/30/2015); Colonoscopy (N/A, 4/7/2017); Prostatectomy; and Cerebral angiogram (N/A, 1/6/2020).  ?  PAST SOCIAL HISTORY:  reports that he quit smoking about 10 years ago. His smoking use included cigarettes. He has a 30.00 pack-year smoking history. He has never used smokeless tobacco. He reports current alcohol use. He reports that he does not use drugs.    FAMILY HISTORY: family history includes Benign prostatic hyperplasia in his brother; Cancer in his brother and sister; Cataracts in his maternal grandmother; Diabetes in his paternal uncle; Heart disease in his brother, brother, brother, brother, father, and mother; Hepatitis in his brother; Hyperlipidemia in his brother, brother, and mother; Hypertension in his brother, brother, brother, brother, and mother; Stroke in his mother.    ?  CURRENT OUTPATIENT MEDS  Allergies:   Review of patient's allergies indicates:   Allergen Reactions    Lisinopril Anaphylaxis and Nausea And Vomiting     General bad feeling    Lipitor [atorvastatin] Other (See Comments)     Muscle aches    Adhesive     Crestor [rosuvastatin] Swelling     Lip swelling.     Jardiance [empagliflozin] Other (See Comments)     Possible related to recent UTI's     Metformin      Used XR and experienced flatulance and abdominal pain.      ?  [unfilled]    ?  PHYSICAL EXAM  Vitals: /75 (BP  Location: Right arm, Patient Position: Sitting)   Pulse 82   Temp 98.5 °F (36.9 °C) (Oral)   Resp 18   Wt 88.8 kg (195 lb 12.3 oz)   SpO2 98%   BMI 23.83 kg/m²  Body mass index is 23.83 kg/m².    General: NAD, AO3  HEENT: PERRL, EOMI.   Cardiovascular: RRR  Respiratory: lungs CTAB  GI: abdomen S/NT/ND, +BS  Extremities: no edema  MSK: moves upper extremities.   Neuro/Psych: A&OX3. CNII-XII grossly intact.      EKG and radiographs from the past 24h: reviewed and personally interpreted if available.      LABS    Recent Labs     10/05/22  1437   WBC 5.06   HGB 14.6        ?  Recent Labs     10/05/22  1437      K 4.0   CO2 23   BUN 14   CREATININE 1.1     ?  Recent Labs     10/05/22  1437   BILITOT 0.4   AST 22   ALT 24   ALKPHOS 76   PROT 7.4     ?  No results for input(s): INR, PT, PTT in the last 72 hours.  ?    ASSESSMENT & PLAN      62-year-old male with history of nonobstructive coronary artery disease, diabetes and hypertension presents with 1 day of chest pressure.        1. Chest pain   -trend troponins  -a.m. stress  -tele     2. Diabetes   -insulin sliding scale   -a.m.  A1c     3. Hypertension continue home regimen with hold parameters     DVT prophylaxis Lovenox     Moderately complex medical decision making, case discussed with ER physician, EKG reviewed        Code Status/Dispo: Full Code.   ?  Sabine Jimenez    Date: 10/5/2022  Time: 6:22 PM

## 2022-10-05 NOTE — ED NOTES
LOC: The patient is awake, alert, and oriented to self, place, time, and situation. Pt is calm and cooperative. Affect is appropriate.  Speech is appropriate and clear.     APPEARANCE: Patient resting uncomfortably. Reporting chest pressure and light headedness, in no acute distress.  Patient is clean and well groomed.    SKIN: The skin is warm and dry; color consistent with ethnicity.  Patient has normal skin turgor and moist mucus membranes.  Skin intact; no breakdown or bruising noted.     MUSCULOSKELETAL: Patient moving upper and lower extremities without difficulty; denies pain in the extremities or back.  Denies weakness.     RESPIRATORY: Airway is open and patent. Respirations spontaneous, even, easy, and non-labored.  Patient has a normal effort and rate.  No accessory muscle use noted. Denies cough.     CARDIAC:  Normal rate noted.  No peripheral edema noted.  Complaints of chest pain/ pressure.     ABDOMEN: Soft and non tender to palpation.  No distention noted. Pt denies abdominal pain; denies nausea, vomiting, diarrhea, or constipation.    NEUROLOGIC: Eyes open spontaneously.  Behavior appropriate to situation.  Follows commands; facial expression symmetrical.  Purposeful motor response noted; normal sensation in all extremities. Pt  c/o  headache; lightheadedness.

## 2022-10-06 LAB
CV STRESS BASE HR: 51 BPM
DIASTOLIC BLOOD PRESSURE: 87 MMHG
ESTIMATED AVG GLUCOSE: 134 MG/DL (ref 68–131)
HBA1C MFR BLD: 6.3 % (ref 4–5.6)
OHS CV CPX 1 MINUTE RECOVERY HEART RATE: 81 BPM
OHS CV CPX 85 PERCENT MAX PREDICTED HEART RATE MALE: 133
OHS CV CPX MAX PREDICTED HEART RATE: 156
OHS CV CPX PATIENT IS FEMALE: 0
OHS CV CPX PATIENT IS MALE: 1
OHS CV CPX PEAK DIASTOLIC BLOOD PRESSURE: 71 MMHG
OHS CV CPX PEAK HEAR RATE: 84 BPM
OHS CV CPX PEAK RATE PRESSURE PRODUCT: NORMAL
OHS CV CPX PEAK SYSTOLIC BLOOD PRESSURE: 141 MMHG
OHS CV CPX PERCENT MAX PREDICTED HEART RATE ACHIEVED: 54
OHS CV CPX RATE PRESSURE PRODUCT PRESENTING: 6936
POCT GLUCOSE: 116 MG/DL (ref 70–110)
POCT GLUCOSE: 122 MG/DL (ref 70–110)
POCT GLUCOSE: 99 MG/DL (ref 70–110)
SYSTOLIC BLOOD PRESSURE: 136 MMHG
TROPONIN I SERPL DL<=0.01 NG/ML-MCNC: <0.006 NG/ML (ref 0–0.03)

## 2022-10-06 PROCEDURE — 63600175 PHARM REV CODE 636 W HCPCS: Performed by: INTERNAL MEDICINE

## 2022-10-06 PROCEDURE — 96372 THER/PROPH/DIAG INJ SC/IM: CPT | Mod: 59 | Performed by: INTERNAL MEDICINE

## 2022-10-06 PROCEDURE — 36415 COLL VENOUS BLD VENIPUNCTURE: CPT | Performed by: INTERNAL MEDICINE

## 2022-10-06 PROCEDURE — 99225 PR SUBSEQUENT OBSERVATION CARE,LEVEL II: CPT | Mod: ,,, | Performed by: INTERNAL MEDICINE

## 2022-10-06 PROCEDURE — 25000003 PHARM REV CODE 250: Performed by: INTERNAL MEDICINE

## 2022-10-06 PROCEDURE — 84484 ASSAY OF TROPONIN QUANT: CPT | Performed by: INTERNAL MEDICINE

## 2022-10-06 PROCEDURE — 83036 HEMOGLOBIN GLYCOSYLATED A1C: CPT | Performed by: INTERNAL MEDICINE

## 2022-10-06 PROCEDURE — G0378 HOSPITAL OBSERVATION PER HR: HCPCS

## 2022-10-06 PROCEDURE — 99225 PR SUBSEQUENT OBSERVATION CARE,LEVEL II: ICD-10-PCS | Mod: ,,, | Performed by: INTERNAL MEDICINE

## 2022-10-06 RX ORDER — GABAPENTIN 300 MG/1
300 CAPSULE ORAL 3 TIMES DAILY
Qty: 90 CAPSULE | Refills: 11 | Status: SHIPPED | OUTPATIENT
Start: 2022-10-06 | End: 2022-12-30 | Stop reason: SDUPTHER

## 2022-10-06 RX ORDER — METOPROLOL SUCCINATE 50 MG/1
50 TABLET, EXTENDED RELEASE ORAL DAILY
Status: DISCONTINUED | OUTPATIENT
Start: 2022-10-07 | End: 2022-10-11 | Stop reason: HOSPADM

## 2022-10-06 RX ORDER — NIFEDIPINE 30 MG/1
60 TABLET, EXTENDED RELEASE ORAL EVERY EVENING
Status: DISCONTINUED | OUTPATIENT
Start: 2022-10-06 | End: 2022-10-11 | Stop reason: HOSPADM

## 2022-10-06 RX ORDER — REGADENOSON 0.08 MG/ML
0.4 INJECTION, SOLUTION INTRAVENOUS ONCE
Status: COMPLETED | OUTPATIENT
Start: 2022-10-06 | End: 2022-10-06

## 2022-10-06 RX ADMIN — GABAPENTIN 300 MG: 300 CAPSULE ORAL at 08:10

## 2022-10-06 RX ADMIN — PANTOPRAZOLE SODIUM 40 MG: 40 TABLET, DELAYED RELEASE ORAL at 08:10

## 2022-10-06 RX ADMIN — NIFEDIPINE 60 MG: 30 TABLET, FILM COATED, EXTENDED RELEASE ORAL at 05:10

## 2022-10-06 RX ADMIN — LOSARTAN POTASSIUM 50 MG: 50 TABLET, FILM COATED ORAL at 08:10

## 2022-10-06 RX ADMIN — GABAPENTIN 300 MG: 300 CAPSULE ORAL at 04:10

## 2022-10-06 RX ADMIN — BACLOFEN 10 MG: 10 TABLET ORAL at 08:10

## 2022-10-06 RX ADMIN — POLYETHYLENE GLYCOL 3350 17 G: 17 POWDER, FOR SOLUTION ORAL at 08:10

## 2022-10-06 RX ADMIN — EZETIMIBE 10 MG: 10 TABLET ORAL at 08:10

## 2022-10-06 RX ADMIN — SPIRONOLACTONE 50 MG: 25 TABLET, FILM COATED ORAL at 08:10

## 2022-10-06 RX ADMIN — REGADENOSON 0.4 MG: 0.08 INJECTION, SOLUTION INTRAVENOUS at 11:10

## 2022-10-06 RX ADMIN — ALPRAZOLAM 0.5 MG: 0.5 TABLET ORAL at 08:10

## 2022-10-06 RX ADMIN — ENOXAPARIN SODIUM 40 MG: 100 INJECTION SUBCUTANEOUS at 04:10

## 2022-10-06 RX ADMIN — ASPIRIN 81 MG: 81 TABLET, CHEWABLE ORAL at 08:10

## 2022-10-06 RX ADMIN — METOPROLOL SUCCINATE 100 MG: 50 TABLET, EXTENDED RELEASE ORAL at 08:10

## 2022-10-06 NOTE — PLAN OF CARE
Problem: Adult Inpatient Plan of Care  Goal: Plan of Care Review  Outcome: Ongoing, Progressing  Flowsheets (Taken 10/6/2022 1820)  Plan of Care Reviewed With:   patient   spouse  Goal: Patient-Specific Goal (Individualized)  Outcome: Ongoing, Progressing  Goal: Absence of Hospital-Acquired Illness or Injury  Outcome: Ongoing, Progressing  Intervention: Identify and Manage Fall Risk  Flowsheets (Taken 10/6/2022 1820)  Safety Promotion/Fall Prevention:   assistive device/personal item within reach   side rails raised x 2   /camera at bedside  Intervention: Prevent Skin Injury  Flowsheets (Taken 10/6/2022 1820)  Skin Protection: tubing/devices free from skin contact  Intervention: Prevent and Manage VTE (Venous Thromboembolism) Risk  Flowsheets (Taken 10/6/2022 1820)  Activity Management: Ambulated to bathroom - L4  VTE Prevention/Management: bleeding precations maintained  Intervention: Prevent Infection  Flowsheets (Taken 10/6/2022 1820)  Infection Prevention: single patient room provided     Patient alert and oriented x4; care plan discussed with patient; free from falls/injuries during the shift; blood sugar monitored throughout the shift; call light and personal belongings within reach; patient will be NPO at midnight due to scheduled dobutamine stress test scheduled for 10/7/22; patient able to express needs, no needs at this time; will continue with poc.

## 2022-10-06 NOTE — PLAN OF CARE
Problem: Adult Inpatient Plan of Care  Goal: Plan of Care Review  10/6/2022 0557 by Doris Mcmahon LPN  Outcome: Ongoing, Progressing  10/6/2022 0556 by Doris Mcmahon LPN  Outcome: Ongoing, Progressing  New admit to [facility].  Dx; Chest Pain.  Bradycardic on Telemetry monitor.  Pt AAO X 4; able to express needs.  No c/o pain. NPO since midnight for Stress Test today.  Oriented x 4 and Independent.   Safety maintained.  Bed in low position,  call  light in reach.

## 2022-10-06 NOTE — PLAN OF CARE
[facility] - [facility] ([place] [address])  Initial Discharge Assessment       Primary Care Provider: Josefina Kendall MD    Admission Diagnosis: Chest pain [R07.9]    Admission Date: 10/5/2022  Expected Discharge Date:     Discharge Barriers Identified: (P) None    Payor: [employer] BLUE [employer] / Plan: [employer] BLUE SAVER PPO - HD / Product Type: PPO /     Extended Emergency Contact Information  Primary Emergency Contact: Jennifer Rubio  Address: [address] [place] [address] [facility]  Home Phone: [phone]  Mobile Phone: [phone]  Relation: Spouse  Preferred language: English    Discharge Plan A: (P) Home         [id number] - [place], [place] - [address]  [address]  [place] [address]  Phone: [phone] Fax: [phone]    [facility] [address] [place], [place] - [address] [facility]  [address] [facility] [address]  Phone: [phone] Fax: [phone]      Initial Assessment (most recent)       Adult Discharge Assessment - 10/06/22 1454          Discharge Assessment    Assessment Type Discharge Planning Assessment (P)      Confirmed/corrected address, phone number and insurance Yes (P)      Confirmed Demographics Correct on Facesheet (P)      Source of Information patient (P)      Lives With spouse (P)      Facility Arrived From: home (P)      Do you expect to return to your current living situation? Yes (P)      Do you have help at home or someone to help you manage your care at home? Yes (P)      Who are your caregiver(s) and their phone number(s)? wife (P)      Prior to hospitilization cognitive status: Alert/Oriented (P)      Current cognitive status: Alert/Oriented (P)      Walking or Climbing Stairs Difficulty none (P)      Dressing/Bathing Difficulty none (P)      Equipment Currently Used at Home none (P)      Are you on dialysis? No (P)      Do you take coumadin? No (P)      Discharge Plan A Home (P)       DME Needed Upon Discharge  none (P)      Discharge Plan discussed with: Patient;Spouse/sig other (P)      Discharge Barriers Identified None (P)

## 2022-10-07 LAB
POCT GLUCOSE: 107 MG/DL (ref 70–110)
POCT GLUCOSE: 109 MG/DL (ref 70–110)
POCT GLUCOSE: 122 MG/DL (ref 70–110)
POCT GLUCOSE: 209 MG/DL (ref 70–110)

## 2022-10-07 PROCEDURE — 99226 PR SUBSEQUENT OBSERVATION CARE,LEVEL III: ICD-10-PCS | Mod: ,,, | Performed by: INTERNAL MEDICINE

## 2022-10-07 PROCEDURE — 25000003 PHARM REV CODE 250: Performed by: INTERNAL MEDICINE

## 2022-10-07 PROCEDURE — G0378 HOSPITAL OBSERVATION PER HR: HCPCS

## 2022-10-07 PROCEDURE — 99226 PR SUBSEQUENT OBSERVATION CARE,LEVEL III: CPT | Mod: ,,, | Performed by: INTERNAL MEDICINE

## 2022-10-07 PROCEDURE — 96372 THER/PROPH/DIAG INJ SC/IM: CPT | Mod: 59 | Performed by: INTERNAL MEDICINE

## 2022-10-07 PROCEDURE — 63600175 PHARM REV CODE 636 W HCPCS: Performed by: INTERNAL MEDICINE

## 2022-10-07 RX ADMIN — SPIRONOLACTONE 50 MG: 25 TABLET, FILM COATED ORAL at 09:10

## 2022-10-07 RX ADMIN — GABAPENTIN 300 MG: 300 CAPSULE ORAL at 04:10

## 2022-10-07 RX ADMIN — GABAPENTIN 300 MG: 300 CAPSULE ORAL at 09:10

## 2022-10-07 RX ADMIN — ALPRAZOLAM 0.5 MG: 0.5 TABLET ORAL at 08:10

## 2022-10-07 RX ADMIN — PANTOPRAZOLE SODIUM 40 MG: 40 TABLET, DELAYED RELEASE ORAL at 08:10

## 2022-10-07 RX ADMIN — EZETIMIBE 10 MG: 10 TABLET ORAL at 09:10

## 2022-10-07 RX ADMIN — NIFEDIPINE 60 MG: 30 TABLET, FILM COATED, EXTENDED RELEASE ORAL at 05:10

## 2022-10-07 RX ADMIN — PANTOPRAZOLE SODIUM 40 MG: 40 TABLET, DELAYED RELEASE ORAL at 09:10

## 2022-10-07 RX ADMIN — GABAPENTIN 300 MG: 300 CAPSULE ORAL at 08:10

## 2022-10-07 RX ADMIN — BACLOFEN 10 MG: 10 TABLET ORAL at 08:10

## 2022-10-07 RX ADMIN — ACETAMINOPHEN 650 MG: 325 TABLET ORAL at 09:10

## 2022-10-07 RX ADMIN — ASPIRIN 81 MG: 81 TABLET, CHEWABLE ORAL at 09:10

## 2022-10-07 RX ADMIN — ENOXAPARIN SODIUM 40 MG: 100 INJECTION SUBCUTANEOUS at 05:10

## 2022-10-07 RX ADMIN — LOSARTAN POTASSIUM 50 MG: 50 TABLET, FILM COATED ORAL at 09:10

## 2022-10-07 NOTE — ASSESSMENT & PLAN NOTE
Patient with Hi Braxton is a 62 year old patient with non-obstructive CAD, aortic atherosclerosis, HTN, Hyperlipidemia, PVC, 13 pk year tobacco hx, Family h/o CAD, DM on insulin who presented 2 days ago for angina. ECG without ischemic changes. Troponin wnl. He underwent a Ragadenoson stress test yesterday but this was terminated due to frequent PVCs. It was repeated today with the radiology department that read inferoseptal wall perfusion defect abnormality though this was inconclusive. Patient is currently chest pain free.     Recommendations  - We will plan for C on Monday 10/10/2022  - NPO at midnight 10/09/22  - Continue ASA and ezetimibe

## 2022-10-07 NOTE — CONSULTS
[facility] ([place] [address])  Interventional Cardiology  Consult Note    Patient Name: Hi Braxton  MRN: [id number]  Admission Date: 10/5/2022  Hospital Length of Stay: 0 days  Code Status: Full Code   Attending Provider: Sabine Jimenez MD  Consulting Provider: Celso Tovar MD  Primary Care Physician: Josefina Kendall MD  Principal Problem:<principal problem not specified>    Patient information was obtained from patient, past medical records and ER records.     Inpatient consult to Interventional Cardiology  Consult performed by: Celso Tovar MD  Consult ordered by: Sabine Jimenez MD        Subjective:     Chief Complaint:  Chest pain     HPI:  Hi Braxton is a 62 year old patient with non-obstructive CAD, aortic atherosclerosis, HTN, Hyperlipidemia, PVC, 13 pk year tobacco hx, Family h/o CAD, DM on insulin who presented 2 days ago for chest pain. The patient reports experiencing left sided chest pain Tuesday night that woke him from sleep. It was described as chest pressure. He was unable to find a comfortable position to relieve the pain. The next day he went to work and the chest pressure recurred while sitting on his desk. He reports associated diaphoresis that was transient. Denies jaw pain or radiation to the arms. No N/V. He presented to the ED for further eval. He was hemodynamically stable upon arrival. He received NTG that relieved his chest pain. ECG without ischemic changes. Troponin wnl. He underwent a Ragadenoson stress test yesterday but this was terminated due to frequent PVCs. It was repeated today with the radiology department that read inferoseptal wall perfusion defect abnormality though this was inconclusive. However, the stress test was read as normal with the cardiology department. He is chest pain free despite being able to ambulate in the hospital.     Of note, the patient had a LHC in 2018 for chest pain, though he is unable to remember if the symptoms are  similar. Previous TTE at that time showed preserved EF. Interventional cardiology was consulted for consideration of LHC.      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 -     Hyperlipidemia     Hypertension     Memory loss     Thyroid nodule 8/22/2019    Traumatic iritis - Left Eye 2/27/2013       Past Surgical History:   Procedure Laterality Date    CEREBRAL ANGIOGRAM N/A 1/6/2020    Procedure: ANGIOGRAM-CEREBRAL;  Surgeon: [place] [facility] Provider;  Location: [facility] OR 2ND FLR;  Service: Radiology;  Laterality: N/A;  /Nagi    CERVICAL FUSION  12/30/2015    COLONOSCOPY N/A 4/7/2017    Procedure: COLONOSCOPY;  Surgeon: Handy Frederick MD;  Location: [facility] ENDO (4TH FLR);  Service: Endoscopy;  Laterality: N/A;    HERNIA REPAIR      PROSTATECTOMY         Review of patient's allergies indicates:   Allergen Reactions    Lisinopril Anaphylaxis and Nausea And Vomiting     General bad feeling    Lipitor [atorvastatin] Other (See Comments)     Muscle aches    Adhesive     Crestor [rosuvastatin] Swelling     Lip swelling.     Jardiance [empagliflozin] Other (See Comments)     Possible related to recent UTI's     Metformin      Used XR and experienced flatulance and abdominal pain.        PTA Medications   Medication Sig    ALPRAZolam (XANAX) 0.5 MG tablet TAKE 1 TABLET BY MOUTH THREE TIMES A DAY AS NEEDED FOR ANXIETY    aspirin (ECOTRIN) 81 MG EC tablet Take 1 tablet (81 mg total) by mouth once daily.    ezetimibe (ZETIA) 10 mg tablet Take 1 tablet (10 mg total) by mouth once daily.    losartan (COZAAR) 50 MG tablet Take 1 tablet (50 mg total) by mouth once daily.    methocarbamoL  "(ROBAXIN) 750 MG Tab TAKE 1 TABLET (750 MG TOTAL) BY MOUTH 2 (TWO) TIMES A DAY. FOR 5 DAYS    metoprolol succinate (TOPROL-XL) 100 MG 24 hr tablet TAKE 1 TABLET BY MOUTH EVERY DAY    NIFEdipine (PROCARDIA-XL) 60 MG (OSM) 24 hr tablet TAKE 1 TABLET BY MOUTH EVERY DAY    pantoprazole (PROTONIX) 40 MG tablet TAKE 1 TABLET BY MOUTH TWICE A DAY    pravastatin (PRAVACHOL) 80 MG tablet Take 1 tablet (80 mg total) by mouth once daily.    atropine 1% (ISOPTO ATROPINE) 1 % Drop Place 1 drop into both eyes 2 (two) times daily. (Patient not taking: Reported on 9/2/2022)    baclofen (LIORESAL) 10 MG tablet TAKE 1 TABLET BY MOUTH EVERY DAY IN THE EVENING    celecoxib (CELEBREX) 200 MG capsule TAKE 1 CAPSULE BY MOUTH ONCE DAILY (Patient taking differently: 2 (two) times daily as needed.)    CONTOUR NEXT TEST STRIPS Strp USE TO TEST BLOOD SUGAR ONCE DAILY    ibuprofen (ADVIL,MOTRIN) 600 MG tablet Take 1 tablet (600 mg total) by mouth every 6 (six) hours as needed for Pain.    insulin NPH isoph U-100 human (NOVOLIN N FLEXPEN) 100 unit/mL (3 mL) InPn Inject 9 Units into the skin 2 (two) times daily before meals.    LIDOcaine (LIDODERM) 5 % Place 1 patch onto the skin once daily. Remove & Discard patch within 12 hours or as directed by MD    MICROLET LANCET Misc 1 lancet by Misc.(Non-Drug; Combo Route) route once daily. Test blood glucose once daily as instructed.    naproxen (NAPROSYN) 500 MG tablet Take 1 tablet (500 mg total) by mouth 2 (two) times daily.    nitroGLYCERIN (NITROSTAT) 0.3 MG SL tablet Place 1 tablet (0.3 mg total) under the tongue every 5 (five) minutes as needed for Chest pain.    ondansetron (ZOFRAN) 4 MG tablet Take 1 tablet (4 mg total) by mouth every 6 (six) hours as needed for Nausea.    pen needle, diabetic (BD ULTRA-FINE CHET PEN NEEDLE) 32 gauge x 5/32" Ndle USE PEN NEEDLE AS INSTRUCTION WITH LANTUS INSULIN PRE-FILLED PEN.    prednisoLONE acetate (PRED FORTE) 1 % DrpS Place 1 drop into the " left eye 4 (four) times daily. (Patient not taking: Reported on 9/2/2022)    spironolactone (ALDACTONE) 50 MG tablet Take 1 tablet (50 mg total) by mouth once daily.    tadalafiL (CIALIS) 20 MG Tab Take 1 tablet (20 mg total) by mouth every 72 hours as needed (ED).     Family History       Problem Relation (Age of Onset)    Benign prostatic hyperplasia Brother    Cancer Sister, Brother    Cataracts Maternal Grandmother    Diabetes Paternal Uncle    Heart disease Mother, Father, Brother, Brother, Brother, Brother    Hepatitis Brother    Hyperlipidemia Mother, Brother, Brother    Hypertension Mother, Brother, Brother, Brother, Brother    Stroke Mother          Tobacco Use    Smoking status: Former     Packs/day: 1.00     Years: 30.00     Pack years: 30.00     Types: Cigarettes     Quit date: 12/13/2011     Years since quitting: 10.8    Smokeless tobacco: Never   Substance and Sexual Activity    Alcohol use: Yes     Comment: occas - nonalcoholic beer or beer    Drug use: No    Sexual activity: Yes     Partners: Female     Review of Systems   Constitutional: Negative for chills and fever.   Cardiovascular:  Positive for chest pain and dyspnea on exertion. Negative for irregular heartbeat, leg swelling, orthopnea and palpitations.   Respiratory:  Negative for shortness of breath.    Endocrine: Negative.    Skin: Negative.    Musculoskeletal: Negative.    Genitourinary: Negative.    Neurological: Negative.    Psychiatric/Behavioral: Negative.     Objective:     Vital Signs (Most Recent):  Temp: 98.3 °F (36.8 °C) (10/07/22 1358)  Pulse: 62 (10/07/22 1424)  Resp: 18 (10/07/22 1358)  BP: 130/71 (10/07/22 1358)  SpO2: 96 % (10/07/22 1358) Vital Signs (24h Range):  Temp:  [97.5 °F (36.4 °C)-99.3 °F (37.4 °C)] 98.3 °F (36.8 °C)  Pulse:  [60-64] 62  Resp:  [17-18] 18  SpO2:  [94 %-96 %] 96 %  BP: (108-138)/(68-77) 130/71     Weight: 88.5 kg (195 lb)  Body mass index is 23.74 kg/m².    SpO2: 96 %  O2 Device (Oxygen  Therapy): room air      Intake/Output Summary (Last 24 hours) at 10/7/2022 1706  Last data filed at 10/7/2022 0602  Gross per 24 hour   Intake 473 ml   Output --   Net 473 ml       Lines/Drains/Airways       Peripheral Intravenous Line  Duration                  Peripheral IV - Single Lumen 10/05/22 1439 20 G Left Antecubital 2 days                    Physical Exam  Vitals and nursing note reviewed.   Constitutional:       General: He is not in acute distress.     Appearance: Normal appearance. He is normal weight. He is not ill-appearing, toxic-appearing or diaphoretic.   HENT:      Head: Normocephalic and atraumatic.      Nose: Nose normal.      Mouth/Throat:      Mouth: Mucous membranes are moist.   Eyes:      Extraocular Movements: Extraocular movements intact.   Cardiovascular:      Rate and Rhythm: Normal rate and regular rhythm.      Pulses: Normal pulses.      Heart sounds: Normal heart sounds. No murmur heard.  Pulmonary:      Effort: Pulmonary effort is normal. No respiratory distress.      Breath sounds: Normal breath sounds. No wheezing or rales.   Abdominal:      General: Abdomen is flat. Bowel sounds are normal. There is no distension.      Palpations: Abdomen is soft.      Tenderness: There is no abdominal tenderness.   Musculoskeletal:         General: No swelling or tenderness. Normal range of motion.      Cervical back: Normal range of motion.   Skin:     General: Skin is warm.      Coloration: Skin is not jaundiced.   Neurological:      Mental Status: He is alert and oriented to person, place, and time. Mental status is at baseline.   Psychiatric:         Mood and Affect: Mood normal.         Behavior: Behavior normal.         Thought Content: Thought content normal.       Significant Labs: BMP: No results for input(s): GLU, NA, K, CL, CO2, BUN, CREATININE, CALCIUM, MG in the last 48 hours., CMP No results for input(s): NA, K, CL, CO2, GLU, BUN, CREATININE, CALCIUM, PROT, ALBUMIN, BILITOT,  ALKPHOS, AST, ALT, ANIONGAP, ESTGFRAFRICA, EGFRNONAA in the last 48 hours., and CBC No results for input(s): WBC, HGB, HCT, PLT in the last 48 hours.    Significant Imaging:   Imaging Results              NM Myocardial Perfusion Spect Multi Pharmacologic (Final result)  Result time 10/06/22 14:59:23      Final result by Maximiliano Okeefe MD (10/06/22 14:59:23)                   Impression:      Nondiagnostic study.  Recommend repeat examination or Dobutamine stress echo if clinical suspicion for ischemic heart disease remains high.    I, Maximiliano Okeefe MD, endorse the above statement.    Electronically signed by resident: Crystal Bella  Date:    10/06/2022  Time:    14:21    Electronically signed by: Maximiliano Okeefe  Date:    10/06/2022  Time:    14:59               Narrative:    EXAMINATION:  NM MYOCARDIAL PERFUSION SPECT MULTI PHARM    CLINICAL HISTORY:  CAD screening, intermediate CAD risk, not treadmill candidate;    TECHNIQUE:  SPECT images were acquired after the injection of 11.0 mCi of Tc-99m sestamibi at rest and 31.8 mCi during a cardiac stress. The clinical stress and ECG portion of the study are to be read separately.    COMPARISON:  No priors.    FINDINGS:  This is a nondiagnostic exam compromised by significant patient motion and gating error due to arrhythmia versus technical difficulties..                                       X-Ray Chest PA And Lateral (Final result)  Result time 10/05/22 16:05:27      Final result by Orestes Nixon III, MD (10/05/22 16:05:27)                   Impression:      No acute process seen.      Electronically signed by: Orestes Nixon MD  Date:    10/05/2022  Time:    16:05               Narrative:    EXAMINATION:  XR CHEST PA AND LATERAL    CLINICAL HISTORY:  Chest pain, unspecified    FINDINGS:  Chest two views: Heart size is normal.  Lungs are clear and the bones bowel gas noncontributory.                                        Assessment and Plan:     Coronary artery  disease due to calcified coronary lesion  Patient with Hi Braxton is a 62 year old patient with non-obstructive CAD, aortic atherosclerosis, HTN, Hyperlipidemia, PVC, 13 pk year tobacco hx, Family h/o CAD, DM on insulin who presented 2 days ago for angina. ECG without ischemic changes. Troponin wnl. He underwent a Ragadenoson stress test yesterday but this was terminated due to frequent PVCs. It was repeated today with the radiology department that read inferoseptal wall perfusion defect abnormality though this was inconclusive. Patient is currently chest pain free.     Recommendations  - We will plan for LHC on Monday 10/10/2022  - NPO at midnight 10/09/22  - Continue ASA and ezetimibe    Case discussed with Dr. Nolan    VTE Risk Mitigation (From admission, onward)         Ordered     enoxaparin injection 40 mg  Daily         10/05/22 1827     IP VTE LOW RISK PATIENT  Once         10/05/22 1617     Place sequential compression device  Until discontinued         10/05/22 1617                Thank you for your consult. I will follow-up with patient. Please contact us if you have any additional questions.    Celso Tovar MD  Interventional Cardiology   [facility] ([place]

## 2022-10-07 NOTE — SUBJECTIVE & OBJECTIVE
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 -     Hyperlipidemia     Hypertension     Memory loss     Thyroid nodule 8/22/2019    Traumatic iritis - Left Eye 2/27/2013       Past Surgical History:   Procedure Laterality Date    CEREBRAL ANGIOGRAM N/A 1/6/2020    Procedure: ANGIOGRAM-CEREBRAL;  [facility];  Location: [facility] OR 2ND FLR;  Service: Radiology;  Laterality: N/A;  /Nagi    CERVICAL FUSION  12/30/2015    COLONOSCOPY N/A 4/7/2017    Procedure: COLONOSCOPY;  Surgeon: Handy Frederick MD;  Location: [facility] (4TH FLR);  Service: Endoscopy;  Laterality: N/A;    HERNIA REPAIR      PROSTATECTOMY         Review of patient's allergies indicates:   Allergen Reactions    Lisinopril Anaphylaxis and Nausea And Vomiting     General bad feeling    Lipitor [atorvastatin] Other (See Comments)     Muscle aches    Adhesive     Crestor [rosuvastatin] Swelling     Lip swelling.     Jardiance [empagliflozin] Other (See Comments)     Possible related to recent UTI's     Metformin      Used XR and experienced flatulance and abdominal pain.        PTA Medications   Medication Sig    ALPRAZolam (XANAX) 0.5 MG tablet TAKE 1 TABLET BY MOUTH THREE TIMES A DAY AS NEEDED FOR ANXIETY    aspirin (ECOTRIN) 81 MG EC tablet Take 1 tablet (81 mg total) by mouth once daily.    ezetimibe (ZETIA) 10 mg tablet Take 1 tablet (10 mg total) by mouth once daily.    losartan (COZAAR) 50 MG tablet Take 1 tablet (50 mg total) by mouth once daily.    methocarbamoL (ROBAXIN) 750 MG Tab TAKE 1 TABLET (750 MG TOTAL) BY MOUTH 2 (TWO) TIMES A DAY. FOR 5 DAYS    metoprolol succinate (TOPROL-XL)  "100 MG 24 hr tablet TAKE 1 TABLET BY MOUTH EVERY DAY    NIFEdipine (PROCARDIA-XL) 60 MG (OSM) 24 hr tablet TAKE 1 TABLET BY MOUTH EVERY DAY    pantoprazole (PROTONIX) 40 MG tablet TAKE 1 TABLET BY MOUTH TWICE A DAY    pravastatin (PRAVACHOL) 80 MG tablet Take 1 tablet (80 mg total) by mouth once daily.    atropine 1% (ISOPTO ATROPINE) 1 % Drop Place 1 drop into both eyes 2 (two) times daily. (Patient not taking: Reported on 9/2/2022)    baclofen (LIORESAL) 10 MG tablet TAKE 1 TABLET BY MOUTH EVERY DAY IN THE EVENING    celecoxib (CELEBREX) 200 MG capsule TAKE 1 CAPSULE BY MOUTH ONCE DAILY (Patient taking differently: 2 (two) times daily as needed.)    CONTOUR NEXT TEST STRIPS Strp USE TO TEST BLOOD SUGAR ONCE DAILY    ibuprofen (ADVIL,MOTRIN) 600 MG tablet Take 1 tablet (600 mg total) by mouth every 6 (six) hours as needed for Pain.    insulin NPH isoph U-100 human (NOVOLIN N FLEXPEN) 100 unit/mL (3 mL) InPn Inject 9 Units into the skin 2 (two) times daily before meals.    LIDOcaine (LIDODERM) 5 % Place 1 patch onto the skin once daily. Remove & Discard patch within 12 hours or as directed by MD    MICROLET LANCET Misc 1 lancet by Misc.(Non-Drug; Combo Route) route once daily. Test blood glucose once daily as instructed.    naproxen (NAPROSYN) 500 MG tablet Take 1 tablet (500 mg total) by mouth 2 (two) times daily.    nitroGLYCERIN (NITROSTAT) 0.3 MG SL tablet Place 1 tablet (0.3 mg total) under the tongue every 5 (five) minutes as needed for Chest pain.    ondansetron (ZOFRAN) 4 MG tablet Take 1 tablet (4 mg total) by mouth every 6 (six) hours as needed for Nausea.    pen needle, diabetic (BD ULTRA-FINE CHET PEN NEEDLE) 32 gauge x 5/32" Ndle USE PEN NEEDLE AS INSTRUCTION WITH LANTUS INSULIN PRE-FILLED PEN.    prednisoLONE acetate (PRED FORTE) 1 % DrpS Place 1 drop into the left eye 4 (four) times daily. (Patient not taking: Reported on 9/2/2022)    spironolactone (ALDACTONE) 50 MG tablet Take 1 " tablet (50 mg total) by mouth once daily.    tadalafiL (CIALIS) 20 MG Tab Take 1 tablet (20 mg total) by mouth every 72 hours as needed (ED).     Family History       Problem Relation (Age of Onset)    Benign prostatic hyperplasia Brother    Cancer Sister, Brother    Cataracts Maternal Grandmother    Diabetes Paternal Uncle    Heart disease Mother, Father, Brother, Brother, Brother, Brother    Hepatitis Brother    Hyperlipidemia Mother, Brother, Brother    Hypertension Mother, Brother, Brother, Brother, Brother    Stroke Mother          Tobacco Use    Smoking status: Former     Packs/day: 1.00     Years: 30.00     Pack years: 30.00     Types: Cigarettes     Quit date: 12/13/2011     Years since quitting: 10.8    Smokeless tobacco: Never   Substance and Sexual Activity    Alcohol use: Yes     Comment: occas - nonalcoholic beer or beer    Drug use: No    Sexual activity: Yes     Partners: Female     Review of Systems   Constitutional: Negative for chills and fever.   Cardiovascular:  Positive for chest pain and dyspnea on exertion. Negative for irregular heartbeat, leg swelling, orthopnea and palpitations.   Respiratory:  Negative for shortness of breath.    Endocrine: Negative.    Skin: Negative.    Musculoskeletal: Negative.    Genitourinary: Negative.    Neurological: Negative.    Psychiatric/Behavioral: Negative.     Objective:     Vital Signs (Most Recent):  Temp: 98.3 °F (36.8 °C) (10/07/22 1358)  Pulse: 62 (10/07/22 1424)  Resp: 18 (10/07/22 1358)  BP: 130/71 (10/07/22 1358)  SpO2: 96 % (10/07/22 1358) Vital Signs (24h Range):  Temp:  [97.5 °F (36.4 °C)-99.3 °F (37.4 °C)] 98.3 °F (36.8 °C)  Pulse:  [60-64] 62  Resp:  [17-18] 18  SpO2:  [94 %-96 %] 96 %  BP: (108-138)/(68-77) 130/71     Weight: 88.5 kg (195 lb)  Body mass index is 23.74 kg/m².    SpO2: 96 %  O2 Device (Oxygen Therapy): room air      Intake/Output Summary (Last 24 hours) at 10/7/2022 1706  Last data filed at 10/7/2022 0602  Gross per 24  hour   Intake 473 ml   Output --   Net 473 ml       Lines/Drains/Airways       Peripheral Intravenous Line  Duration                  Peripheral IV - Single Lumen 10/05/22 1439 20 G Left Antecubital 2 days                    Physical Exam  Vitals and nursing note reviewed.   Constitutional:       General: He is not in acute distress.     Appearance: Normal appearance. He is normal weight. He is not ill-appearing, toxic-appearing or diaphoretic.   HENT:      Head: Normocephalic and atraumatic.      Nose: Nose normal.      Mouth/Throat:      Mouth: Mucous membranes are moist.   Eyes:      Extraocular Movements: Extraocular movements intact.   Cardiovascular:      Rate and Rhythm: Normal rate and regular rhythm.      Pulses: Normal pulses.      Heart sounds: Normal heart sounds. No murmur heard.  Pulmonary:      Effort: Pulmonary effort is normal. No respiratory distress.      Breath sounds: Normal breath sounds. No wheezing or rales.   Abdominal:      General: Abdomen is flat. Bowel sounds are normal. There is no distension.      Palpations: Abdomen is soft.      Tenderness: There is no abdominal tenderness.   Musculoskeletal:         General: No swelling or tenderness. Normal range of motion.      Cervical back: Normal range of motion.   Skin:     General: Skin is warm.      Coloration: Skin is not jaundiced.   Neurological:      Mental Status: He is alert and oriented to person, place, and time. Mental status is at baseline.   Psychiatric:         Mood and Affect: Mood normal.         Behavior: Behavior normal.         Thought Content: Thought content normal.       Significant Labs: BMP: No results for input(s): GLU, NA, K, CL, CO2, BUN, CREATININE, CALCIUM, MG in the last 48 hours., CMP No results for input(s): NA, K, CL, CO2, GLU, BUN, CREATININE, CALCIUM, PROT, ALBUMIN, BILITOT, ALKPHOS, AST, ALT, ANIONGAP, ESTGFRAFRICA, EGFRNONAA in the last 48 hours., and CBC No results for input(s): WBC, HGB, HCT, PLT in the  last 48 hours.    Significant Imaging:   Imaging Results              NM Myocardial Perfusion Spect Multi Pharmacologic (Final result)  Result time 10/06/22 14:59:23      Final result by Maximiliano Okeefe MD (10/06/22 14:59:23)                   Impression:      Nondiagnostic study.  Recommend repeat examination or Dobutamine stress echo if clinical suspicion for ischemic heart disease remains high.    I, Maximiliano Okeefe MD, endorse the above statement.    Electronically signed by resident: Crystal Bella  Date:    10/06/2022  Time:    14:21    Electronically signed by: Maximiliano Okeefe  Date:    10/06/2022  Time:    14:59               Narrative:    EXAMINATION:  NM MYOCARDIAL PERFUSION SPECT MULTI PHARM    CLINICAL HISTORY:  CAD screening, intermediate CAD risk, not treadmill candidate;    TECHNIQUE:  SPECT images were acquired after the injection of 11.0 mCi of Tc-99m sestamibi at rest and 31.8 mCi during a cardiac stress. The clinical stress and ECG portion of the study are to be read separately.    COMPARISON:  No priors.    FINDINGS:  This is a nondiagnostic exam compromised by significant patient motion and gating error due to arrhythmia versus technical difficulties..                                       X-Ray Chest PA And Lateral (Final result)  Result time 10/05/22 16:05:27      Final result by Orestes Nixon III, MD (10/05/22 16:05:27)                   Impression:      No acute process seen.      Electronically signed by: Orestes Nixon MD  Date:    10/05/2022  Time:    16:05               Narrative:    EXAMINATION:  XR CHEST PA AND LATERAL    CLINICAL HISTORY:  Chest pain, unspecified    FINDINGS:  Chest two views: Heart size is normal.  Lungs are clear and the bones bowel gas noncontributory.

## 2022-10-07 NOTE — PLAN OF CARE
Problem: Adult Inpatient Plan of Care  Goal: Plan of Care Review  Outcome: Ongoing, Progressing  Goal: Patient-Specific Goal (Individualized)  Outcome: Ongoing, Progressing NO c/o chest pain .  Telemetry monitoring.  Bradycardic.  NPO at Midnight for Stress Test this morning.  (Second Attempt).  Safety maintained.  Bed in low position,  call  light in reach.

## 2022-10-07 NOTE — PROGRESS NOTES
"Daily Progress Note  [facility].     Hi Braxton (MRN: [id number])  Admitting Service: Hospital Medicine  Date of Admission: 10/5/2022   Primary Care Provider: Josefina Kendall MD    ?  Chief complaint: chest pain  ?  History of Present Illness:     62-year-old male with history of nonobstructive coronary artery disease, diabetes and hypertension presents with 1 day of chest pressure.      On the morning of admission the patient developed crushing substernal chest pain that lasted for 30 minutes seemingly relieved by not lying on back but which otherwise spontaneously resolved.  It was described as a pressure of acute onset and severe.  By morning it had resolved but recurred around noon leading to his presentation.      With this he had mild shortness of breath.  He reports infrequently he has palpitations but that during this event he did not experience such.  He did have mild lightheadedness.      No recent febrile illness. ?    At the time of the interview the pain had essentially resolved as has the shortness of breath and lightheadedness.      No chest pain, shortness of breath, lightheadedness. Tolerating oral intake.       ?  PHYSICAL EXAM  Vitals: /70 (Patient Position: Lying)   Pulse 61   Temp 99.3 °F (37.4 °C) (Oral)   Resp 18   Ht 6' 4" (1.93 m)   Wt 88.5 kg (195 lb)   SpO2 96%   BMI 23.74 kg/m²  Body mass index is 23.74 kg/m².    General: NAD, AO3  HEENT: PERRL, EOMI.   Cardiovascular: RRR  Respiratory: lungs CTAB  GI: abdomen S/NT/ND, +BS  Extremities: no edema  MSK: moves upper extremities.   Neuro/Psych: A&OX3. CNII-XII grossly intact.      EKG and radiographs from the past 24h: reviewed and personally interpreted if available.      LABS    Recent Labs     10/05/22  1437   WBC 5.06   HGB 14.6        ?  Recent Labs     10/05/22  1437      K 4.0   CO2 23   BUN 14   CREATININE 1.1     ?  Recent Labs     10/05/22  1437   BILITOT 0.4   AST 22 "   ALT 24   ALKPHOS 76   PROT 7.4     ?  No results for input(s): INR, PT, PTT in the last 72 hours.  ?    ASSESSMENT & PLAN      62-year-old male with history of nonobstructive coronary artery disease, diabetes and hypertension presents with 1 day of chest pressure.        1. Chest pain   -trend troponins; unremarkable.   -stress on 10/6 non-diagnostic.  Repeat with dobutamine on 10/7 as recommended  -tele     2. Diabetes   -insulin sliding scale   -a.m.  A1c     3. Hypertension continue home regimen with hold parameters     DVT prophylaxis Lovenox             Code Status/Dispo: Full Code.   ?  Sabine Jimenez    Date: 10/6/2022

## 2022-10-07 NOTE — HPI
Hi Braxton is a 62 year old patient with non-obstructive CAD, aortic atherosclerosis, HTN, Hyperlipidemia, PVC, 13 pk year tobacco hx, Family h/o CAD, DM on insulin who presented 2 days ago for chest pain. The patient reports experiencing left sided chest pain Tuesday night that woke him from sleep. It was described as chest pressure. He was unable to find a comfortable position to relieve the pain. The next day he went to work and the chest pressure recurred while sitting on his desk. He reports associated diaphoresis that was transient. Denies jaw pain or radiation to the arms. No N/V. He presented to the ED for further eval. He was hemodynamically stable upon arrival. He received NTG that relieved his chest pain. ECG without ischemic changes. Troponin wnl. He underwent a Ragadenoson stress test yesterday but this was terminated due to frequent PVCs. It was repeated today with the radiology department that read inferoseptal wall perfusion defect abnormality though this was inconclusive. However, the stress test was read as normal with the cardiology department. He is chest pain free despite being able to ambulate in the hospital.     Of note, the patient had a LHC in 2018 for chest pain, though he is unable to remember if the symptoms are similar. Previous TTE at that time showed preserved EF. Interventional cardiology was consulted for consideration of LHC.

## 2022-10-08 LAB
POCT GLUCOSE: 117 MG/DL (ref 70–110)
POCT GLUCOSE: 121 MG/DL (ref 70–110)
POCT GLUCOSE: 187 MG/DL (ref 70–110)
POCT GLUCOSE: 94 MG/DL (ref 70–110)

## 2022-10-08 PROCEDURE — G0378 HOSPITAL OBSERVATION PER HR: HCPCS

## 2022-10-08 PROCEDURE — 25000003 PHARM REV CODE 250: Performed by: INTERNAL MEDICINE

## 2022-10-08 PROCEDURE — 96372 THER/PROPH/DIAG INJ SC/IM: CPT | Performed by: INTERNAL MEDICINE

## 2022-10-08 PROCEDURE — 99225 PR SUBSEQUENT OBSERVATION CARE,LEVEL II: ICD-10-PCS | Mod: ,,, | Performed by: INTERNAL MEDICINE

## 2022-10-08 PROCEDURE — 99225 PR SUBSEQUENT OBSERVATION CARE,LEVEL II: CPT | Mod: ,,, | Performed by: INTERNAL MEDICINE

## 2022-10-08 PROCEDURE — 63600175 PHARM REV CODE 636 W HCPCS: Performed by: INTERNAL MEDICINE

## 2022-10-08 RX ADMIN — EZETIMIBE 10 MG: 10 TABLET ORAL at 09:10

## 2022-10-08 RX ADMIN — SPIRONOLACTONE 50 MG: 25 TABLET, FILM COATED ORAL at 09:10

## 2022-10-08 RX ADMIN — NIFEDIPINE 60 MG: 30 TABLET, FILM COATED, EXTENDED RELEASE ORAL at 06:10

## 2022-10-08 RX ADMIN — GABAPENTIN 300 MG: 300 CAPSULE ORAL at 09:10

## 2022-10-08 RX ADMIN — ALPRAZOLAM 0.5 MG: 0.5 TABLET ORAL at 09:10

## 2022-10-08 RX ADMIN — PANTOPRAZOLE SODIUM 40 MG: 40 TABLET, DELAYED RELEASE ORAL at 09:10

## 2022-10-08 RX ADMIN — ACETAMINOPHEN 650 MG: 325 TABLET ORAL at 04:10

## 2022-10-08 RX ADMIN — METOPROLOL SUCCINATE 50 MG: 50 TABLET, EXTENDED RELEASE ORAL at 09:10

## 2022-10-08 RX ADMIN — ALPRAZOLAM 0.5 MG: 0.5 TABLET ORAL at 04:10

## 2022-10-08 RX ADMIN — ENOXAPARIN SODIUM 40 MG: 100 INJECTION SUBCUTANEOUS at 04:10

## 2022-10-08 RX ADMIN — ASPIRIN 81 MG: 81 TABLET, CHEWABLE ORAL at 09:10

## 2022-10-08 RX ADMIN — GABAPENTIN 300 MG: 300 CAPSULE ORAL at 04:10

## 2022-10-08 RX ADMIN — LOSARTAN POTASSIUM 50 MG: 50 TABLET, FILM COATED ORAL at 09:10

## 2022-10-08 RX ADMIN — BACLOFEN 10 MG: 10 TABLET ORAL at 09:10

## 2022-10-08 NOTE — PLAN OF CARE
Problem: Adult Inpatient Plan of Care  Goal: Plan of Care Review  Outcome: Ongoing, Progressing  Goal: Patient-Specific Goal (Individualized)  Outcome: Ongoing, Progressing   Pt AAO X 4; able to express needs.  NO c/o chest pain.  .  Heart cath scheduled Monday morning.  Safety maintained.  Bed in low position,  call  light in reach.

## 2022-10-08 NOTE — PROGRESS NOTES
"Daily Progress Note  [facility].     Hi Braxton (MRN: [id number])  Admitting Service: Hospital Medicine  Date of Admission: 10/5/2022   Primary Care Provider: Josefina Kendall MD    ?  Chief complaint: chest pain  ?  History of Present Illness:     62-year-old male with history of nonobstructive coronary artery disease, diabetes and hypertension presents with 1 day of chest pressure.      On the morning of admission the patient developed crushing substernal chest pain that lasted for 30 minutes seemingly relieved by not lying on back but which otherwise spontaneously resolved.  It was described as a pressure of acute onset and severe.  By morning it had resolved but recurred around noon leading to his presentation.      With this he had mild shortness of breath.  He reports infrequently he has palpitations but that during this event he did not experience such.  He did have mild lightheadedness.      No recent febrile illness. ?    At the time of the interview the pain had essentially resolved as has the shortness of breath and lightheadedness.    Subjective    No chest pain, shortness of breath, lightheadedness. Tolerating oral intake.       ?  PHYSICAL EXAM  Vitals: /67 (BP Location: Left arm, Patient Position: Lying)   Pulse 60   Temp 97.7 °F (36.5 °C) (Oral)   Resp 20   Ht 6' 4" (1.93 m)   Wt 88.5 kg (195 lb)   SpO2 96%   BMI 23.74 kg/m²  Body mass index is 23.74 kg/m².    General: NAD, AO3  HEENT: PERRL, EOMI.   Cardiovascular: RRR  Respiratory: lungs CTAB  GI: abdomen S/NT/ND, +BS  Extremities: no edema  MSK: moves upper extremities.   Neuro/Psych: A&OX3. CNII-XII grossly intact.      EKG and radiographs from the past 24h: reviewed and personally interpreted if available.      LABS    Recent Labs     10/05/22  1437   WBC 5.06   HGB 14.6        ?  Recent Labs     10/05/22  1437      K 4.0   CO2 23   BUN 14   CREATININE 1.1     ?  Recent Labs     " 10/05/22  1437   BILITOT 0.4   AST 22   ALT 24   ALKPHOS 76   PROT 7.4     ?  No results for input(s): INR, PT, PTT in the last 72 hours.  ?    ASSESSMENT & PLAN      62-year-old male with history of nonobstructive coronary artery disease, diabetes and hypertension presents with 1 day of chest pressure.        1. Chest pain   -trend troponins; unremarkable.   -stress on 10/6 reviewed by cardiology and read as abnormal  -[facility] Monday   -tele     2. Diabetes   -insulin sliding scale   -a.m.  A1c     3. Hypertension continue home regimen with hold parameters     DVT prophylaxis Lovenox             Code Status/Dispo: Full Code.   ?  Sabine Jimenez    Date: 10/7/2022

## 2022-10-08 NOTE — PROGRESS NOTES
"Dictation #1  MRN:[id number]  CSN:[id number] Daily Progress Note  [facility].     Hi Braxton (MRN: [id number])  Admitting Service: Hospital Medicine  Date of Admission: 10/5/2022   Primary Care Provider: Josefina Kendall MD    ?  Chief complaint: chest pain  ?  History of Present Illness:     62-year-old male with history of nonobstructive coronary artery disease, diabetes and hypertension presents with 1 day of chest pressure.      On the morning of admission the patient developed crushing substernal chest pain that lasted for 30 minutes seemingly relieved by not lying on back but which otherwise spontaneously resolved.  It was described as a pressure of acute onset and severe.  By morning it had resolved but recurred around noon leading to his presentation.      With this he had mild shortness of breath.  He reports infrequently he has palpitations but that during this event he did not experience such.  He did have mild lightheadedness.      No recent febrile illness. ?    At the time of the interview the pain had essentially resolved as has the shortness of breath and lightheadedness.    Subjective  Chest pressure this morning, now resolved. No  shortness of breath, lightheadedness. Tolerating oral intake.       ?  PHYSICAL EXAM  Vitals: /76 (BP Location: Left arm, Patient Position: Lying)   Pulse 65   Temp 97.8 °F (36.6 °C) (Oral)   Resp 20   Ht 6' 4" (1.93 m)   Wt 88.5 kg (195 lb)   SpO2 95%   BMI 23.74 kg/m²  Body mass index is 23.74 kg/m².    General: NAD, AO3  HEENT: PERRL, EOMI.   Cardiovascular: RRR  Respiratory: lungs CTAB  GI: abdomen S/NT/ND, +BS  Extremities: no edema  MSK: moves upper extremities.   Neuro/Psych: A&OX3. CNII-XII grossly intact.      EKG and radiographs from the past 24h: reviewed and personally interpreted if available.      LABS    No results for input(s): WBC, HGB, PLT in the last 72 hours.    ?  No results for input(s): NA, K, CHLORIDE, " CO2, BUN, CREATININE, MG, PHOS in the last 72 hours.    ?  No results for input(s): BILITOT, AST, ALT, ALKPHOS, PROT, ALB in the last 72 hours.    ?  No results for input(s): INR, PT, PTT in the last 72 hours.  ?    ASSESSMENT & PLAN      62-year-old male with history of nonobstructive coronary artery disease, diabetes and hypertension presents with 1 day of chest pressure.        1. Chest pain   -trend troponins; unremarkable.   -stress on 10/6 reviewed by cardiology and read as abnormal  -[facility] Monday   -tele     2. Diabetes   -insulin sliding scale   -a.m.  A1c     3. Hypertension continue home regimen with hold parameters     DVT prophylaxis Lovenox             Code Status/Dispo: Full Code.   ?  Sabine Jimenez    Date: 10/8/2022

## 2022-10-08 NOTE — PLAN OF CARE
Problem: Adult Inpatient Plan of Care  Goal: Plan of Care Review  Outcome: Ongoing, Progressing  Flowsheets (Taken 10/7/2022 1909)  Plan of Care Reviewed With: patient  Goal: Patient-Specific Goal (Individualized)  Outcome: Ongoing, Progressing  Goal: Absence of Hospital-Acquired Illness or Injury  Outcome: Ongoing, Progressing  Intervention: Identify and Manage Fall Risk  Flowsheets (Taken 10/7/2022 1909)  Safety Promotion/Fall Prevention:   assistive device/personal item within reach   side rails raised x 2  Intervention: Prevent Skin Injury  Flowsheets (Taken 10/7/2022 1909)  Skin Protection: incontinence pads utilized  Intervention: Prevent and Manage VTE (Venous Thromboembolism) Risk  Flowsheets (Taken 10/7/2022 1909)  Activity Management: Ambulated to bathroom - L4  VTE Prevention/Management: bleeding risk assessed

## 2022-10-09 LAB
POCT GLUCOSE: 138 MG/DL (ref 70–110)
POCT GLUCOSE: 139 MG/DL (ref 70–110)
POCT GLUCOSE: 157 MG/DL (ref 70–110)
POCT GLUCOSE: 165 MG/DL (ref 70–110)

## 2022-10-09 PROCEDURE — 96372 THER/PROPH/DIAG INJ SC/IM: CPT | Performed by: INTERNAL MEDICINE

## 2022-10-09 PROCEDURE — 63600175 PHARM REV CODE 636 W HCPCS: Performed by: INTERNAL MEDICINE

## 2022-10-09 PROCEDURE — 96374 THER/PROPH/DIAG INJ IV PUSH: CPT

## 2022-10-09 PROCEDURE — 99225 PR SUBSEQUENT OBSERVATION CARE,LEVEL II: CPT | Mod: ,,, | Performed by: INTERNAL MEDICINE

## 2022-10-09 PROCEDURE — 99225 PR SUBSEQUENT OBSERVATION CARE,LEVEL II: ICD-10-PCS | Mod: ,,, | Performed by: INTERNAL MEDICINE

## 2022-10-09 PROCEDURE — G0378 HOSPITAL OBSERVATION PER HR: HCPCS

## 2022-10-09 PROCEDURE — 25000003 PHARM REV CODE 250: Performed by: INTERNAL MEDICINE

## 2022-10-09 PROCEDURE — 94761 N-INVAS EAR/PLS OXIMETRY MLT: CPT

## 2022-10-09 RX ORDER — MORPHINE SULFATE 4 MG/ML
3 INJECTION, SOLUTION INTRAMUSCULAR; INTRAVENOUS EVERY 4 HOURS PRN
Status: DISCONTINUED | OUTPATIENT
Start: 2022-10-09 | End: 2022-10-11 | Stop reason: HOSPADM

## 2022-10-09 RX ADMIN — EZETIMIBE 10 MG: 10 TABLET ORAL at 09:10

## 2022-10-09 RX ADMIN — GABAPENTIN 300 MG: 300 CAPSULE ORAL at 09:10

## 2022-10-09 RX ADMIN — GABAPENTIN 300 MG: 300 CAPSULE ORAL at 04:10

## 2022-10-09 RX ADMIN — NIFEDIPINE 60 MG: 30 TABLET, FILM COATED, EXTENDED RELEASE ORAL at 05:10

## 2022-10-09 RX ADMIN — SPIRONOLACTONE 50 MG: 25 TABLET, FILM COATED ORAL at 09:10

## 2022-10-09 RX ADMIN — ASPIRIN 81 MG: 81 TABLET, CHEWABLE ORAL at 09:10

## 2022-10-09 RX ADMIN — LOSARTAN POTASSIUM 50 MG: 50 TABLET, FILM COATED ORAL at 09:10

## 2022-10-09 RX ADMIN — ALPRAZOLAM 0.5 MG: 0.5 TABLET ORAL at 09:10

## 2022-10-09 RX ADMIN — MORPHINE SULFATE 3 MG: 4 INJECTION INTRAVENOUS at 05:10

## 2022-10-09 RX ADMIN — PANTOPRAZOLE SODIUM 40 MG: 40 TABLET, DELAYED RELEASE ORAL at 09:10

## 2022-10-09 RX ADMIN — METOPROLOL SUCCINATE 50 MG: 50 TABLET, EXTENDED RELEASE ORAL at 09:10

## 2022-10-09 RX ADMIN — BACLOFEN 10 MG: 10 TABLET ORAL at 09:10

## 2022-10-09 NOTE — PLAN OF CARE
Problem: Adult Inpatient Plan of Care  Goal: Plan of Care Review  Outcome: Ongoing, Progressing  Goal: Patient-Specific Goal (Individualized)  Outcome: Ongoing, Progressing    Pt AAO X 4; able to express needs.  No c/o pain. Xanax for anxiety last night.   NPO at midnight tonight for L Heart Cath on Monday morning.  Safety maintained.  Bed in low position,  call  light in reach.

## 2022-10-10 LAB
ALBUMIN SERPL BCP-MCNC: 3.7 G/DL (ref 3.5–5.2)
ALP SERPL-CCNC: 67 U/L (ref 55–135)
ALT SERPL W/O P-5'-P-CCNC: 31 U/L (ref 10–44)
ANION GAP SERPL CALC-SCNC: 9 MMOL/L (ref 8–16)
AST SERPL-CCNC: 20 U/L (ref 10–40)
BASOPHILS # BLD AUTO: 0.05 K/UL (ref 0–0.2)
BASOPHILS NFR BLD: 1.1 % (ref 0–1.9)
BILIRUB SERPL-MCNC: 0.4 MG/DL (ref 0.1–1)
BUN SERPL-MCNC: 13 MG/DL (ref 8–23)
CALCIUM SERPL-MCNC: 9.3 MG/DL (ref 8.7–10.5)
CHLORIDE SERPL-SCNC: 103 MMOL/L (ref 95–110)
CO2 SERPL-SCNC: 23 MMOL/L (ref 23–29)
CREAT SERPL-MCNC: 1 MG/DL (ref 0.5–1.4)
DIFFERENTIAL METHOD: NORMAL
EOSINOPHIL # BLD AUTO: 0.4 K/UL (ref 0–0.5)
EOSINOPHIL NFR BLD: 7.5 % (ref 0–8)
ERYTHROCYTE [DISTWIDTH] IN BLOOD BY AUTOMATED COUNT: 12.6 % (ref 11.5–14.5)
EST. GFR  (NO RACE VARIABLE): >60 ML/MIN/1.73 M^2
GLUCOSE SERPL-MCNC: 111 MG/DL (ref 70–110)
HCT VFR BLD AUTO: 43 % (ref 40–54)
HGB BLD-MCNC: 14.5 G/DL (ref 14–18)
IMM GRANULOCYTES # BLD AUTO: 0.01 K/UL (ref 0–0.04)
IMM GRANULOCYTES NFR BLD AUTO: 0.2 % (ref 0–0.5)
LYMPHOCYTES # BLD AUTO: 1.7 K/UL (ref 1–4.8)
LYMPHOCYTES NFR BLD: 37.1 % (ref 18–48)
MAGNESIUM SERPL-MCNC: 2 MG/DL (ref 1.6–2.6)
MCH RBC QN AUTO: 30.1 PG (ref 27–31)
MCHC RBC AUTO-ENTMCNC: 33.7 G/DL (ref 32–36)
MCV RBC AUTO: 89 FL (ref 82–98)
MONOCYTES # BLD AUTO: 0.4 K/UL (ref 0.3–1)
MONOCYTES NFR BLD: 9.5 % (ref 4–15)
NEUTROPHILS # BLD AUTO: 2.1 K/UL (ref 1.8–7.7)
NEUTROPHILS NFR BLD: 44.6 % (ref 38–73)
NRBC BLD-RTO: 0 /100 WBC
PHOSPHATE SERPL-MCNC: 4.2 MG/DL (ref 2.7–4.5)
PLATELET # BLD AUTO: 261 K/UL (ref 150–450)
PMV BLD AUTO: 9.9 FL (ref 9.2–12.9)
POTASSIUM SERPL-SCNC: 3.8 MMOL/L (ref 3.5–5.1)
PROT SERPL-MCNC: 6.4 G/DL (ref 6–8.4)
RBC # BLD AUTO: 4.81 M/UL (ref 4.6–6.2)
SODIUM SERPL-SCNC: 135 MMOL/L (ref 136–145)
WBC # BLD AUTO: 4.64 K/UL (ref 3.9–12.7)

## 2022-10-10 PROCEDURE — 25000003 PHARM REV CODE 250: Performed by: STUDENT IN AN ORGANIZED HEALTH CARE EDUCATION/TRAINING PROGRAM

## 2022-10-10 PROCEDURE — 93454 CORONARY ARTERY ANGIO S&I: CPT | Mod: 26,,, | Performed by: INTERNAL MEDICINE

## 2022-10-10 PROCEDURE — 99226 PR SUBSEQUENT OBSERVATION CARE,LEVEL III: ICD-10-PCS | Mod: ,,, | Performed by: INTERNAL MEDICINE

## 2022-10-10 PROCEDURE — C1769 GUIDE WIRE: HCPCS | Performed by: INTERNAL MEDICINE

## 2022-10-10 PROCEDURE — 93454 CORONARY ARTERY ANGIO S&I: CPT | Performed by: INTERNAL MEDICINE

## 2022-10-10 PROCEDURE — 99225 PR SUBSEQUENT OBSERVATION CARE,LEVEL II: ICD-10-PCS | Mod: 25,,, | Performed by: INTERNAL MEDICINE

## 2022-10-10 PROCEDURE — 99152 PR MOD CONSCIOUS SEDATION, SAME PHYS, 5+ YRS, FIRST 15 MIN: ICD-10-PCS | Mod: ,,, | Performed by: INTERNAL MEDICINE

## 2022-10-10 PROCEDURE — G0378 HOSPITAL OBSERVATION PER HR: HCPCS

## 2022-10-10 PROCEDURE — C1894 INTRO/SHEATH, NON-LASER: HCPCS | Performed by: INTERNAL MEDICINE

## 2022-10-10 PROCEDURE — 99225 PR SUBSEQUENT OBSERVATION CARE,LEVEL II: CPT | Mod: 25,,, | Performed by: INTERNAL MEDICINE

## 2022-10-10 PROCEDURE — 96360 HYDRATION IV INFUSION INIT: CPT | Mod: 59

## 2022-10-10 PROCEDURE — 63600175 PHARM REV CODE 636 W HCPCS: Performed by: INTERNAL MEDICINE

## 2022-10-10 PROCEDURE — 36415 COLL VENOUS BLD VENIPUNCTURE: CPT | Performed by: INTERNAL MEDICINE

## 2022-10-10 PROCEDURE — 25500020 PHARM REV CODE 255: Performed by: INTERNAL MEDICINE

## 2022-10-10 PROCEDURE — 99152 MOD SED SAME PHYS/QHP 5/>YRS: CPT | Performed by: INTERNAL MEDICINE

## 2022-10-10 PROCEDURE — 25000003 PHARM REV CODE 250: Performed by: INTERNAL MEDICINE

## 2022-10-10 PROCEDURE — 84100 ASSAY OF PHOSPHORUS: CPT | Performed by: INTERNAL MEDICINE

## 2022-10-10 PROCEDURE — 96361 HYDRATE IV INFUSION ADD-ON: CPT

## 2022-10-10 PROCEDURE — 99226 PR SUBSEQUENT OBSERVATION CARE,LEVEL III: CPT | Mod: ,,, | Performed by: INTERNAL MEDICINE

## 2022-10-10 PROCEDURE — 80053 COMPREHEN METABOLIC PANEL: CPT | Performed by: INTERNAL MEDICINE

## 2022-10-10 PROCEDURE — 83735 ASSAY OF MAGNESIUM: CPT | Performed by: INTERNAL MEDICINE

## 2022-10-10 PROCEDURE — C1887 CATHETER, GUIDING: HCPCS | Performed by: INTERNAL MEDICINE

## 2022-10-10 PROCEDURE — 99152 MOD SED SAME PHYS/QHP 5/>YRS: CPT | Mod: ,,, | Performed by: INTERNAL MEDICINE

## 2022-10-10 PROCEDURE — 93454 PR CATH PLACE/CORONARY ANGIO, IMG SUPER/INTERP: ICD-10-PCS | Mod: 26,,, | Performed by: INTERNAL MEDICINE

## 2022-10-10 PROCEDURE — 85025 COMPLETE CBC W/AUTO DIFF WBC: CPT | Performed by: INTERNAL MEDICINE

## 2022-10-10 RX ORDER — LIDOCAINE HYDROCHLORIDE 20 MG/ML
INJECTION, SOLUTION INFILTRATION; PERINEURAL
Status: DISCONTINUED | OUTPATIENT
Start: 2022-10-10 | End: 2022-10-10 | Stop reason: HOSPADM

## 2022-10-10 RX ORDER — ONDANSETRON 8 MG/1
8 TABLET, ORALLY DISINTEGRATING ORAL EVERY 8 HOURS PRN
Status: DISCONTINUED | OUTPATIENT
Start: 2022-10-10 | End: 2022-10-11 | Stop reason: HOSPADM

## 2022-10-10 RX ORDER — FENTANYL CITRATE 50 UG/ML
INJECTION, SOLUTION INTRAMUSCULAR; INTRAVENOUS
Status: DISCONTINUED | OUTPATIENT
Start: 2022-10-10 | End: 2022-10-10 | Stop reason: HOSPADM

## 2022-10-10 RX ORDER — MIDAZOLAM HYDROCHLORIDE 1 MG/ML
INJECTION INTRAMUSCULAR; INTRAVENOUS
Status: DISCONTINUED | OUTPATIENT
Start: 2022-10-10 | End: 2022-10-10 | Stop reason: HOSPADM

## 2022-10-10 RX ORDER — SODIUM CHLORIDE 9 MG/ML
INJECTION, SOLUTION INTRAVENOUS CONTINUOUS
Status: DISCONTINUED | OUTPATIENT
Start: 2022-10-10 | End: 2022-10-11 | Stop reason: HOSPADM

## 2022-10-10 RX ORDER — HEPARIN SODIUM 1000 [USP'U]/ML
INJECTION, SOLUTION INTRAVENOUS; SUBCUTANEOUS
Status: DISCONTINUED | OUTPATIENT
Start: 2022-10-10 | End: 2022-10-10 | Stop reason: HOSPADM

## 2022-10-10 RX ORDER — HEPARIN SOD,PORCINE/0.9 % NACL 1000/500ML
INTRAVENOUS SOLUTION INTRAVENOUS
Status: DISCONTINUED | OUTPATIENT
Start: 2022-10-10 | End: 2022-10-10 | Stop reason: HOSPADM

## 2022-10-10 RX ORDER — SODIUM CHLORIDE 9 MG/ML
INJECTION, SOLUTION INTRAVENOUS CONTINUOUS
Status: ACTIVE | OUTPATIENT
Start: 2022-10-10 | End: 2022-10-10

## 2022-10-10 RX ORDER — CLOPIDOGREL BISULFATE 75 MG/1
300 TABLET ORAL ONCE
Status: DISCONTINUED | OUTPATIENT
Start: 2022-10-10 | End: 2022-10-10

## 2022-10-10 RX ORDER — DIPHENHYDRAMINE HCL 25 MG
50 CAPSULE ORAL ONCE
Status: COMPLETED | OUTPATIENT
Start: 2022-10-10 | End: 2022-10-10

## 2022-10-10 RX ORDER — DEXTROSE, SODIUM CHLORIDE, SODIUM LACTATE, POTASSIUM CHLORIDE, AND CALCIUM CHLORIDE 5; .6; .31; .03; .02 G/100ML; G/100ML; G/100ML; G/100ML; G/100ML
INJECTION, SOLUTION INTRAVENOUS CONTINUOUS
Status: ACTIVE | OUTPATIENT
Start: 2022-10-10 | End: 2022-10-11

## 2022-10-10 RX ORDER — CLOPIDOGREL 300 MG/1
TABLET, FILM COATED ORAL
Status: DISCONTINUED | OUTPATIENT
Start: 2022-10-10 | End: 2022-10-10 | Stop reason: HOSPADM

## 2022-10-10 RX ORDER — ACETAMINOPHEN 325 MG/1
650 TABLET ORAL EVERY 4 HOURS PRN
Status: DISCONTINUED | OUTPATIENT
Start: 2022-10-10 | End: 2022-10-11 | Stop reason: HOSPADM

## 2022-10-10 RX ADMIN — ASPIRIN 81 MG: 81 TABLET, CHEWABLE ORAL at 09:10

## 2022-10-10 RX ADMIN — SODIUM CHLORIDE: 0.9 INJECTION, SOLUTION INTRAVENOUS at 02:10

## 2022-10-10 RX ADMIN — BACLOFEN 10 MG: 10 TABLET ORAL at 08:10

## 2022-10-10 RX ADMIN — GABAPENTIN 300 MG: 300 CAPSULE ORAL at 08:10

## 2022-10-10 RX ADMIN — DIPHENHYDRAMINE HYDROCHLORIDE 50 MG: 25 CAPSULE ORAL at 02:10

## 2022-10-10 RX ADMIN — SPIRONOLACTONE 50 MG: 25 TABLET, FILM COATED ORAL at 09:10

## 2022-10-10 RX ADMIN — PANTOPRAZOLE SODIUM 40 MG: 40 TABLET, DELAYED RELEASE ORAL at 08:10

## 2022-10-10 RX ADMIN — EZETIMIBE 10 MG: 10 TABLET ORAL at 09:10

## 2022-10-10 RX ADMIN — LOSARTAN POTASSIUM 50 MG: 50 TABLET, FILM COATED ORAL at 09:10

## 2022-10-10 RX ADMIN — ALPRAZOLAM 0.5 MG: 0.5 TABLET ORAL at 09:10

## 2022-10-10 RX ADMIN — DEXTROSE, SODIUM CHLORIDE, SODIUM LACTATE, POTASSIUM CHLORIDE, AND CALCIUM CHLORIDE: 5; .6; .31; .03; .02 INJECTION, SOLUTION INTRAVENOUS at 10:10

## 2022-10-10 RX ADMIN — SODIUM CHLORIDE: 0.9 INJECTION, SOLUTION INTRAVENOUS at 08:10

## 2022-10-10 RX ADMIN — PANTOPRAZOLE SODIUM 40 MG: 40 TABLET, DELAYED RELEASE ORAL at 09:10

## 2022-10-10 RX ADMIN — METOPROLOL SUCCINATE 50 MG: 50 TABLET, EXTENDED RELEASE ORAL at 09:10

## 2022-10-10 RX ADMIN — GABAPENTIN 300 MG: 300 CAPSULE ORAL at 09:10

## 2022-10-10 NOTE — PLAN OF CARE
Problem: Adult Inpatient Plan of Care  Goal: Plan of Care Review  Outcome: Ongoing, Progressing  Goal: Patient-Specific Goal (Individualized)  Outcome: Ongoing, Progressing  Pt AAO X 4; able to express needs.  No c/o pain.   Xanax given last night for anxiety.  NPO since midnight for Left Heart Cath today.  Safety maintained.  Bed in low position,  call  light in reach.

## 2022-10-10 NOTE — PROGRESS NOTES
Vss. Radial band remains in place to right wrist with pulse 2+. No signs of bleeding. Wife assisting patient with eating dinner. Denies needs at this time.

## 2022-10-10 NOTE — PROGRESS NOTES
[facility] - Int[facility] ([place] [id number])  Interventional Cardiology  Progress Note    Patient Name: Hi Braxton  MRN: [id number]  Admission Date: 10/5/2022  Hospital Length of Stay: 0 days  Code Status: Full Code   Attending Physician: Sabine Jimenez MD   Primary Care Physician: Josefina Kendall MD  Principal Problem:Coronary artery disease due to calcified coronary lesion    Subjective:     Interval History: Doing well. Has been NPO since midnight. States he had another episode of palpitations that he thinks may have precipitated chest pain last night that resolved with morphine    Objective:     Vital Signs (Most Recent):  Temp: 97.6 °F (36.4 °C) (10/10/22 0745)  Pulse: 64 (10/10/22 0745)  Resp: 20 (10/10/22 0745)  BP: 131/76 (10/10/22 0745)  SpO2: 95 % (10/10/22 0745) Vital Signs (24h Range):  Temp:  [97.6 °F (36.4 °C)-98.8 °F (37.1 °C)] 97.6 °F (36.4 °C)  Pulse:  [60-73] 64  Resp:  [18-20] 20  SpO2:  [95 %-98 %] 95 %  BP: (116-137)/(72-78) 131/76     Weight: 88.5 kg (195 lb)  Body mass index is 23.74 kg/m².    SpO2: 95 %  O2 Device (Oxygen Therapy): room air      Intake/Output Summary (Last 24 hours) at 10/10/2022 0804  Last data filed at 10/10/2022 0513  Gross per 24 hour   Intake 120 ml   Output 200 ml   Net -80 ml       Lines/Drains/Airways       Peripheral Intravenous Line  Duration                  Peripheral IV - Single Lumen 10/09/22 1851 20 G Anterior;Left Forearm <1 day                    Physical Exam  Vitals and nursing note reviewed.   Constitutional:       General: He is not in acute distress.     Appearance: Normal appearance. He is normal weight. He is not ill-appearing, toxic-appearing or diaphoretic.   HENT:      Head: Normocephalic and atraumatic.      Nose: Nose normal.      Mouth/Throat:      Mouth: Mucous membranes are moist.   Eyes:      Extraocular Movements: Extraocular movements intact.   Cardiovascular:      Rate and Rhythm: Normal rate and regular rhythm.      Pulses: Normal  pulses.   Pulmonary:      Effort: Pulmonary effort is normal. No respiratory distress.   Abdominal:      General: Abdomen is flat. Bowel sounds are normal. There is no distension.      Palpations: Abdomen is soft.      Tenderness: There is no abdominal tenderness.   Musculoskeletal:         General: No swelling or tenderness. Normal range of motion.      Cervical back: Normal range of motion.   Skin:     General: Skin is warm.      Coloration: Skin is not jaundiced.   Neurological:      Mental Status: He is alert and oriented to person, place, and time. Mental status is at baseline.   Psychiatric:         Mood and Affect: Mood normal.         Behavior: Behavior normal.         Thought Content: Thought content normal.       Significant Labs: BMP:   Recent Labs   Lab 10/10/22  0527   *   *   K 3.8      CO2 23   BUN 13   CREATININE 1.0   CALCIUM 9.3   MG 2.0   , CMP   Recent Labs   Lab 10/10/22  0527   *   K 3.8      CO2 23   *   BUN 13   CREATININE 1.0   CALCIUM 9.3   PROT 6.4   ALBUMIN 3.7   BILITOT 0.4   ALKPHOS 67   AST 20   ALT 31   ANIONGAP 9   , and CBC   Recent Labs   Lab 10/10/22  0527   WBC 4.64   HGB 14.5   HCT 43.0        Assessment and Plan:     Patient is a 62 y.o. male presenting with:    Coronary artery disease due to calcified coronary lesion  Patient with Hi Braxton is a 62 year old patient with non-obstructive CAD, aortic atherosclerosis, HTN, Hyperlipidemia, PVC, 13 pk year tobacco hx, Family h/o CAD, DM on insulin who presented 2 days ago for angina. ECG without ischemic changes. Troponin wnl. He underwent a Ragadenoson stress test yesterday but this was terminated due to frequent PVCs. It was repeated today with the radiology department that read inferoseptal wall perfusion defect abnormality though this was inconclusive. Patient is currently chest pain free.     Recommendations  - [facility] today  - Anti-platelet Therapy: ASA  - Access: Likely radial  access   - Creatinine/CrCl: 91  - Allergies: No shellfish / Iodine allergy  - Pre-Hydration: NS  - Pre-Op Med: Bendaryl 50mg pO   - All patient's questions were answered.  -The risks, benefits and alternatives of the procedure were explained to the patient.   -The risks of coronary angiography include but are not limited to: bleeding, infection, heart rhythm abnormalities, allergic reactions, kidney injury and potential need for dialysis, stroke and death.   - Should stenting be indicated, the patient has agreed to dual anti-platelet therapy for 1-consecutive year with a drug-eluting stent and a minimum of 1-month with the use of a bare metal stent  - Additionally, pt is aware that non-compliance is likely to result in stent clotting with heart attack, heart failure, and/or death  -The risks of moderate sedation include hypotension, respiratory depression, arrhythmias, bronchospasm, and death.   - Informed consent was obtained and the  patient is agreeable to proceed with the procedure.            VTE Risk Mitigation (From admission, onward)         Ordered     enoxaparin injection 40 mg  Daily         10/05/22 1827     IP VTE LOW RISK PATIENT  Once         10/05/22 1617     Place sequential compression device  Until discontinued         10/05/22 1617                Celso Tovar MD  Interventional Cardiology  [facility] - Intensive Care ([place]-[facility])

## 2022-10-10 NOTE — BRIEF OP NOTE
Brief Operative Note:    : Anson Nolan MD     Referring Physician: Self,Aaareferral     All Operators: Surgeon(s):  MD Tete Sepulveda MD     Preoperative Diagnosis: Chest pain [R07.9]     Postop Diagnosis: Chest pain [R07.9]    Treatments/Procedures: Procedure(s) (LRB):  ANGIOGRAM, CORONARY ARTERY (N/A)    Access: Right radial artery    Findings: Non-obstructive coronary artery disease is present. There are mild luminal irregularities in the LAD    See catheterization report for full details.    Intervention: None performed    See catheterization report for full details.    Closure device:  Vasc Band        Plan:  - Post cath protocol   - IVF @ 150 cc/kg/hr x 4 hours  - Bed rest x 2 hours   - Continue aspirin 81 mg daily indefinitely  - Maintain cholesterol control. (LDL goal < 70). Consider PCSK-9 inhibitor given intolerance to statin  - Risk factor reduction (BP <130/80 mmHg, glycemic control, etc)      Estimated Blood loss: 20 cc    Specimens removed: No    Celso Tovar MD  [facility]  Cardiovascular Disease, PGY-IV

## 2022-10-10 NOTE — SUBJECTIVE & OBJECTIVE
Interval History: Doing well. Has been NPO since midnight. States he had another episode of palpitations that he thinks may have precipitated chest pain last night that resolved with morphine    Objective:     Vital Signs (Most Recent):  Temp: 97.6 °F (36.4 °C) (10/10/22 0745)  Pulse: 64 (10/10/22 0745)  Resp: 20 (10/10/22 0745)  BP: 131/76 (10/10/22 0745)  SpO2: 95 % (10/10/22 0745) Vital Signs (24h Range):  Temp:  [97.6 °F (36.4 °C)-98.8 °F (37.1 °C)] 97.6 °F (36.4 °C)  Pulse:  [60-73] 64  Resp:  [18-20] 20  SpO2:  [95 %-98 %] 95 %  BP: (116-137)/(72-78) 131/76     Weight: 88.5 kg (195 lb)  Body mass index is 23.74 kg/m².    SpO2: 95 %  O2 Device (Oxygen Therapy): room air      Intake/Output Summary (Last 24 hours) at 10/10/2022 0804  Last data filed at 10/10/2022 0513  Gross per 24 hour   Intake 120 ml   Output 200 ml   Net -80 ml       Lines/Drains/Airways       Peripheral Intravenous Line  Duration                  Peripheral IV - Single Lumen 10/09/22 1851 20 G Anterior;Left Forearm <1 day                    Physical Exam  Vitals and nursing note reviewed.   Constitutional:       General: He is not in acute distress.     Appearance: Normal appearance. He is normal weight. He is not ill-appearing, toxic-appearing or diaphoretic.   HENT:      Head: Normocephalic and atraumatic.      Nose: Nose normal.      Mouth/Throat:      Mouth: Mucous membranes are moist.   Eyes:      Extraocular Movements: Extraocular movements intact.   Cardiovascular:      Rate and Rhythm: Normal rate and regular rhythm.      Pulses: Normal pulses.   Pulmonary:      Effort: Pulmonary effort is normal. No respiratory distress.   Abdominal:      General: Abdomen is flat. Bowel sounds are normal. There is no distension.      Palpations: Abdomen is soft.      Tenderness: There is no abdominal tenderness.   Musculoskeletal:         General: No swelling or tenderness. Normal range of motion.      Cervical back: Normal range of motion.   Skin:      General: Skin is warm.      Coloration: Skin is not jaundiced.   Neurological:      Mental Status: He is alert and oriented to person, place, and time. Mental status is at baseline.   Psychiatric:         Mood and Affect: Mood normal.         Behavior: Behavior normal.         Thought Content: Thought content normal.       Significant Labs: BMP:   Recent Labs   Lab 10/10/22  0527   *   *   K 3.8      CO2 23   BUN 13   CREATININE 1.0   CALCIUM 9.3   MG 2.0   , CMP   Recent Labs   Lab 10/10/22  0527   *   K 3.8      CO2 23   *   BUN 13   CREATININE 1.0   CALCIUM 9.3   PROT 6.4   ALBUMIN 3.7   BILITOT 0.4   ALKPHOS 67   AST 20   ALT 31   ANIONGAP 9   , and CBC   Recent Labs   Lab 10/10/22  0527   WBC 4.64   HGB 14.5   HCT 43.0

## 2022-10-10 NOTE — PROGRESS NOTES
"Daily Progress Note  [facility].     Hi Braxton (MRN: [id number])  Admitting Service: Hospital Medicine  Date of Admission: 10/5/2022   Primary Care Provider: Josefina Kendall MD    ?  Chief complaint: chest pain  ?  History of Present Illness:     62-year-old male with history of nonobstructive coronary artery disease, diabetes and hypertension presents with 1 day of chest pressure.      On the morning of admission the patient developed crushing substernal chest pain that lasted for 30 minutes seemingly relieved by not lying on back but which otherwise spontaneously resolved.  It was described as a pressure of acute onset and severe.  By morning it had resolved but recurred around noon leading to his presentation.      With this he had mild shortness of breath.  He reports infrequently he has palpitations but that during this event he did not experience such.  He did have mild lightheadedness.      No recent febrile illness. ?    At the time of the interview the pain had essentially resolved as has the shortness of breath and lightheadedness.    Subjective  Chest pressure again this morning, now resolved. No  shortness of breath, lightheadedness. Tolerating oral intake.       ?  PHYSICAL EXAM  Vitals: /72 (BP Location: Left arm, Patient Position: Lying)   Pulse 64   Temp 98.1 °F (36.7 °C) (Oral)   Resp 18   Ht 6' 4" (1.93 m)   Wt 88.5 kg (195 lb)   SpO2 96%   BMI 23.74 kg/m²  Body mass index is 23.74 kg/m².    General: NAD, AO3  HEENT: PERRL, EOMI.   Cardiovascular: RRR  Respiratory: lungs CTAB  GI: abdomen S/NT/ND, +BS  Extremities: no edema  MSK: moves upper extremities.   Neuro/Psych: A&OX3. CNII-XII grossly intact.      EKG and radiographs from the past 24h: reviewed and personally interpreted if available.      LABS    No results for input(s): WBC, HGB, PLT in the last 72 hours.    ?  No results for input(s): NA, K, CHLORIDE, CO2, BUN, CREATININE, MG, PHOS in " the last 72 hours.    ?  No results for input(s): BILITOT, AST, ALT, ALKPHOS, PROT, ALB in the last 72 hours.    ?  No results for input(s): INR, PT, PTT in the last 72 hours.  ?    ASSESSMENT & PLAN      62-year-old male with history of nonobstructive coronary artery disease, diabetes and hypertension presents with 1 day of chest pressure.        1. Chest pain   -trend troponins; unremarkable.   -stress on 10/6 reviewed by cardiology and read as abnormal  -[facility] Monday   -tele     2. Diabetes   -insulin sliding scale   -a.m.  A1c     3. Hypertension continue home regimen with hold parameters     DVT prophylaxis Lovenox             Code Status/Dispo: Full Code.   ?  Sabine Jimenez    Date: 10/10/2022

## 2022-10-10 NOTE — PLAN OF CARE
Problem: Adult Inpatient Plan of Care  Goal: Plan of Care Review  Outcome: Ongoing, Progressing  Goal: Patient-Specific Goal (Individualized)  Outcome: Ongoing, Progressing  Goal: Absence of Hospital-Acquired Illness or Injury  Outcome: Ongoing, Progressing  Goal: Optimal Comfort and Wellbeing  Outcome: Ongoing, Progressing  Goal: Readiness for Transition of Care  Outcome: Ongoing, Progressing     Problem: Diabetes Comorbidity  Goal: Blood Glucose Level Within Targeted Range  Outcome: Ongoing, Progressing     Problem: Skin Injury Risk Increased  Goal: Skin Health and Integrity  Outcome: Ongoing, Progressing     Patient alert and oriented. Cooperative with care. Patient taken to cath lab this afternoon. Patient has not returned from procedure as of yet

## 2022-10-10 NOTE — PLAN OF CARE
[facility] - Intensive Care ([facility]  Discharge Reassessment    Primary Care Provider: Josefina Kendall MD    Expected Discharge Date: 10/11/2022    Reassessment (most recent)       Discharge Reassessment - 10/10/22 1450          Discharge Reassessment    Assessment Type Discharge Planning Reassessment (P)      Did the patient's condition or plan change since previous assessment? No (P)      Discharge Plan discussed with: Spouse/sig other;Patient (P)      Discharge Plan A Home with family (P)      DME Needed Upon Discharge  none (P)      Discharge Barriers Identified None (P)         Post-Acute Status    Discharge Delays None known at this time (P)                    Schedduled for [facility] today anticipate dc tomorrow pending [facility] results

## 2022-10-10 NOTE — ASSESSMENT & PLAN NOTE
Patient with Hi Braxton is a 62 year old patient with non-obstructive CAD, aortic atherosclerosis, HTN, Hyperlipidemia, PVC, 13 pk year tobacco hx, Family h/o CAD, DM on insulin who presented 2 days ago for angina. ECG without ischemic changes. Troponin wnl. He underwent a Ragadenoson stress test yesterday but this was terminated due to frequent PVCs. It was repeated today with the radiology department that read inferoseptal wall perfusion defect abnormality though this was inconclusive. Patient is currently chest pain free.     Recommendations  - [facility] today  - Anti-platelet Therapy: ASA  - Access: Likely radial access   - Creatinine/CrCl: 91  - Allergies: No shellfish / Iodine allergy  - Pre-Hydration: NS  - Pre-Op Med: Bendaryl 50mg pO   - All patient's questions were answered.  -The risks, benefits and alternatives of the procedure were explained to the patient.   -The risks of coronary angiography include but are not limited to: bleeding, infection, heart rhythm abnormalities, allergic reactions, kidney injury and potential need for dialysis, stroke and death.   - Should stenting be indicated, the patient has agreed to dual anti-platelet therapy for 1-consecutive year with a drug-eluting stent and a minimum of 1-month with the use of a bare metal stent  - Additionally, pt is aware that non-compliance is likely to result in stent clotting with heart attack, heart failure, and/or death  -The risks of moderate sedation include hypotension, respiratory depression, arrhythmias, bronchospasm, and death.   - Informed consent was obtained and the  patient is agreeable to proceed with the procedure.

## 2022-10-11 ENCOUNTER — PATIENT MESSAGE (OUTPATIENT)
Dept: ADMINISTRATIVE | Facility: OTHER | Age: 62
End: 2022-10-11
Payer: COMMERCIAL

## 2022-10-11 VITALS
DIASTOLIC BLOOD PRESSURE: 77 MMHG | BODY MASS INDEX: 23.75 KG/M2 | HEART RATE: 60 BPM | SYSTOLIC BLOOD PRESSURE: 172 MMHG | RESPIRATION RATE: 18 BRPM | TEMPERATURE: 98 F | OXYGEN SATURATION: 97 % | WEIGHT: 195 LBS | HEIGHT: 76 IN

## 2022-10-11 LAB — POCT GLUCOSE: 123 MG/DL (ref 70–110)

## 2022-10-11 PROCEDURE — 99217 PR OBSERVATION CARE DISCHARGE: ICD-10-PCS | Mod: ,,, | Performed by: FAMILY MEDICINE

## 2022-10-11 PROCEDURE — 96361 HYDRATE IV INFUSION ADD-ON: CPT

## 2022-10-11 PROCEDURE — 25000003 PHARM REV CODE 250: Performed by: STUDENT IN AN ORGANIZED HEALTH CARE EDUCATION/TRAINING PROGRAM

## 2022-10-11 PROCEDURE — 99217 PR OBSERVATION CARE DISCHARGE: CPT | Mod: ,,, | Performed by: FAMILY MEDICINE

## 2022-10-11 PROCEDURE — 25000003 PHARM REV CODE 250: Performed by: INTERNAL MEDICINE

## 2022-10-11 PROCEDURE — G0378 HOSPITAL OBSERVATION PER HR: HCPCS

## 2022-10-11 RX ADMIN — EZETIMIBE 10 MG: 10 TABLET ORAL at 10:10

## 2022-10-11 RX ADMIN — SODIUM CHLORIDE: 0.9 INJECTION, SOLUTION INTRAVENOUS at 02:10

## 2022-10-11 RX ADMIN — PANTOPRAZOLE SODIUM 40 MG: 40 TABLET, DELAYED RELEASE ORAL at 10:10

## 2022-10-11 RX ADMIN — SPIRONOLACTONE 50 MG: 25 TABLET, FILM COATED ORAL at 10:10

## 2022-10-11 RX ADMIN — GABAPENTIN 300 MG: 300 CAPSULE ORAL at 10:10

## 2022-10-11 RX ADMIN — METOPROLOL SUCCINATE 50 MG: 50 TABLET, EXTENDED RELEASE ORAL at 10:10

## 2022-10-11 RX ADMIN — LOSARTAN POTASSIUM 50 MG: 50 TABLET, FILM COATED ORAL at 10:10

## 2022-10-11 RX ADMIN — ASPIRIN 81 MG: 81 TABLET, CHEWABLE ORAL at 10:10

## 2022-10-11 NOTE — PROGRESS NOTES
"Daily Progress Note  [facility].     Hi Braxton (MRN: [id number])  Admitting Service: Hospital Medicine  Date of Admission: 10/5/2022   Primary Care Provider: Josefina Kendall MD    ?  Chief complaint: chest pain  ?  History of Present Illness:     62-year-old male with history of nonobstructive coronary artery disease, diabetes and hypertension presents with 1 day of chest pressure.      On the morning of admission the patient developed crushing substernal chest pain that lasted for 30 minutes seemingly relieved by not lying on back but which otherwise spontaneously resolved.  It was described as a pressure of acute onset and severe.  By morning it had resolved but recurred around noon leading to his presentation.      With this he had mild shortness of breath.  He reports infrequently he has palpitations but that during this event he did not experience such.  He did have mild lightheadedness.      No recent febrile illness. ?    At the time of the interview the pain had essentially resolved as has the shortness of breath and lightheadedness.    Hospital Course:     The patient's chest pain has now become mild and intermittent.  He underwent left heart catheterization which demonstrated nonobstructive disease.  Cardiology has recommended continuation of aspirin in addition to consideration of evolocumab as an outpatient if intolerant to statins.     Subjective  Chest pressure again this morning, now resolved. No  shortness of breath, lightheadedness. Tolerating oral intake.       ?  PHYSICAL EXAM  Vitals: BP (!) 142/77 (BP Location: Right arm, Patient Position: Lying)   Pulse 68   Temp 98.2 °F (36.8 °C) (Oral)   Resp 18   Ht 6' 4" (1.93 m)   Wt 88.5 kg (195 lb)   SpO2 97%   BMI 23.74 kg/m²  Body mass index is 23.74 kg/m².    General: NAD, AO3  HEENT: PERRL, EOMI.   Cardiovascular: RRR  Respiratory: lungs CTAB  GI: abdomen S/NT/ND, +BS  Extremities: no edema  MSK: moves " upper extremities.   Neuro/Psych: A&OX3. CNII-XII grossly intact.      EKG and radiographs from the past 24h: reviewed and personally interpreted if available.      LABS    Recent Labs     10/10/22  0527   WBC 4.64   HGB 14.5          ?  Recent Labs     10/10/22  0527   *   K 3.8   CO2 23   BUN 13   CREATININE 1.0   MG 2.0   PHOS 4.2       ?  Recent Labs     10/10/22  0527   BILITOT 0.4   AST 20   ALT 31   ALKPHOS 67   PROT 6.4       ?  No results for input(s): INR, PT, PTT in the last 72 hours.  ?    ASSESSMENT & PLAN        62-year-old male with history of nonobstructive coronary artery disease, diabetes and hypertension presents with 1 day of chest pressure.      On the morning of admission the patient developed crushing substernal chest pain that lasted for 30 minutes seemingly relieved by not lying on back but which otherwise spontaneously resolved.  It was described as a pressure of acute onset and severe.  By morning it had resolved but recurred around noon leading to his presentation.      With this he had mild shortness of breath.  He reports infrequently he has palpitations but that during this event he did not experience such.  He did have mild lightheadedness.      No recent febrile illness. ?    At the time of the interview the pain had essentially resolved as has the shortness of breath and lightheadedness.    Hospital Course:     The patient's chest pain has now become mild and intermittent.  He underwent left heart catheterization which demonstrated nonobstructive disease.  Cardiology has recommended continuation of aspirin in addition to consideration of evolocumab as an outpatient if intolerant to statins.     1. Chest pain   -trend troponins; unremarkable.   -stress on 10/6 reviewed by cardiology and read as abnormal  -[facility] with non-obstructive disease  -tele   -as above    2. Diabetes   -insulin sliding scale   -a.m.  A1c     3. Hypertension continue home regimen with hold  parameters    4. HLD  -as above regarding evolocumab      DVT prophylaxis Lovenox             Code Status/Dispo: Full Code.   ?  Sabine Jimenez    Date: 10/11/2022

## 2022-10-11 NOTE — PLAN OF CARE
Problem: Adult Inpatient Plan of Care  Goal: Plan of Care Review  Outcome: Adequate for Care Transition  Flowsheets (Taken 10/11/2022 1639)  Plan of Care Reviewed With: patient  Goal: Patient-Specific Goal (Individualized)  Outcome: Adequate for Care Transition  Goal: Absence of Hospital-Acquired Illness or Injury  Outcome: Adequate for Care Transition  Goal: Optimal Comfort and Wellbeing  Outcome: Adequate for Care Transition  Goal: Readiness for Transition of Care  Outcome: Adequate for Care Transition     Problem: Diabetes Comorbidity  Goal: Blood Glucose Level Within Targeted Range  Outcome: Adequate for Care Transition  Intervention: Monitor and Manage Glycemia  Flowsheets (Taken 10/11/2022 1046)  Glycemic Management: blood glucose monitored     Problem: Skin Injury Risk Increased  Goal: Skin Health and Integrity  Outcome: Adequate for Care Transition  Intervention: Optimize Skin Protection  Flowsheets (Taken 10/11/2022 1046)  Pressure Reduction Techniques: frequent weight shift encouraged  Pressure Reduction Devices: pressure-redistributing mattress utilized  Head of Bed (HOB) Positioning: (up in chair) other (see comments)     Problem: Fall Injury Risk  Goal: Absence of Fall and Fall-Related Injury  Outcome: Adequate for Care Transition  Intervention: Identify and Manage Contributors  Flowsheets (Taken 10/11/2022 1046)  Self-Care Promotion:   independence encouraged   BADL personal objects within reach   BADL personal routines maintained   Ready for discharge.

## 2022-10-11 NOTE — PROGRESS NOTES
Able to ambulate and void with minimal assistance. Removed more air from vascband. No signs of bleeding. Wife remains at bedside.

## 2022-10-11 NOTE — HPI
62-year-old male with history of nonobstructive coronary artery disease, diabetes and hypertension presents with 1 day of chest pressure.      On the morning of admission the patient developed crushing substernal chest pain that lasted for 30 minutes seemingly relieved by not lying on back but which otherwise spontaneously resolved.  It was described as a pressure of acute onset and severe.  By morning it had resolved but recurred around noon leading to his presentation.      With this he had mild shortness of breath.  He reports infrequently he has palpitations but that during this event he did not experience such.  He did have mild lightheadedness.      No recent febrile illness. ?

## 2022-10-11 NOTE — NURSING
PT discharged today. Awake, alert, and oriented with no acute distress noted.  Wife at bedside. Reviewed discharge orders including ;medicine orders, prescriptions, followup appts, and patient education materials for diet and diagnosis.  Belongings packed for transport to home. Ambulating out per wheelchair at time of discharge.

## 2022-10-11 NOTE — HOSPITAL COURSE
stress on 10/6 he underwent a Ragadenoson stress test but this was terminated due to frequent PVCs. It was repeated 10/10 with the radiology department that read inferoseptal wall perfusion defect abnormality though this was inconclusive. He underwent left heart catheterization which demonstrated nonobstructive disease. Cardiology has recommended continuation of aspirin in addition to consideration of evolocumab as an outpatient if intolerant to statins.

## 2022-10-12 ENCOUNTER — PATIENT MESSAGE (OUTPATIENT)
Dept: HEPATOLOGY | Facility: HOSPITAL | Age: 62
End: 2022-10-12
Payer: COMMERCIAL

## 2022-10-12 NOTE — DISCHARGE SUMMARY
[facility] - Intensive Care ([place] [address])  [facility] Medicine  Discharge Summary      Patient Name: Hi Braxton  MRN: [id number]  Patient Class: OP- Observation  Admission Date: 10/5/2022  Hospital Length of Stay: 0 days  Discharge Date and Time: 10/11/2022  6:27 PM  Discharging Provider: Meliza Carey MD  Primary Care Provider: Josefina Kendall MD  Hospital Medicine Team: [facility] HOSP MED B Meliza Carey MD      HPI:   62-year-old male with history of nonobstructive coronary artery disease, diabetes and hypertension presents with 1 day of chest pressure.      On the morning of admission the patient developed crushing substernal chest pain that lasted for 30 minutes seemingly relieved by not lying on back but which otherwise spontaneously resolved.  It was described as a pressure of acute onset and severe.  By morning it had resolved but recurred around noon leading to his presentation.      With this he had mild shortness of breath.  He reports infrequently he has palpitations but that during this event he did not experience such.  He did have mild lightheadedness.      No recent febrile illness. ?      Procedure(s) (LRB):  ANGIOGRAM, CORONARY ARTERY (N/A)      Hospital Course:   stress on 10/6 he underwent a Ragadenoson stress test but this was terminated due to frequent PVCs. It was repeated 10/10 with the radiology department that read inferoseptal wall perfusion defect abnormality though this was inconclusive. He underwent left heart catheterization which demonstrated nonobstructive disease. Cardiology has recommended continuation of aspirin in addition to consideration of evolocumab as an outpatient if intolerant to statins.        Consults:   Consults (From admission, onward)        Status Ordering Provider     Inpatient consult to Interventional Cardiology  Once        Provider:  Celso Tovar MD    Completed ALLISON CROCKETT          1. Chest pain   -stress on 10/6 reviewed by cardiology and read as  abnormal  -C with non-obstructive disease  - medical management.  Patient to get established with cardiology for PVCs.    2. Diabetes   -resume home meds    3. Hypertension  - continue home regimen     4. HLD  -rigoberto w PCP regarding evolocumab        Final Active Diagnoses:    Diagnosis Date Noted POA    PRINCIPAL PROBLEM:  Coronary artery disease due to calcified coronary lesion [I25.10, I25.84] 01/20/2017 Yes      Problems Resolved During this Admission:       Discharged Condition: stable    Disposition: Home or Self Care    Follow Up:    Patient Instructions:      Diet Cardiac     Activity as tolerated       Significant Diagnostic Studies: Labs: All labs within the past 24 hours have been reviewed     Findings: Non-obstructive coronary artery disease is present. There are mild luminal irregularities in the LAD       Pending Diagnostic Studies:     None         Medications:  Reconciled Home Medications:      Medication List      CHANGE how you take these medications    celecoxib 200 MG capsule  Commonly known as: CeleBREX  TAKE 1 CAPSULE BY MOUTH ONCE DAILY  What changed:   · how much to take  · how to take this  · when to take this  · reasons to take this        CONTINUE taking these medications    ALPRAZolam 0.5 MG tablet  Commonly known as: XANAX  TAKE 1 TABLET BY MOUTH THREE TIMES A DAY AS NEEDED FOR ANXIETY     aspirin 81 MG EC tablet  Commonly known as: ECOTRIN  Take 1 tablet (81 mg total) by mouth once daily.     baclofen 10 MG tablet  Commonly known as: LIORESAL  TAKE 1 TABLET BY MOUTH EVERY DAY IN THE EVENING     CONTOUR NEXT TEST STRIPS Strp  Generic drug: blood sugar diagnostic  USE TO TEST BLOOD SUGAR ONCE DAILY     ezetimibe 10 mg tablet  Commonly known as: ZETIA  Take 1 tablet (10 mg total) by mouth once daily.     gabapentin 300 MG capsule  Commonly known as: NEURONTIN  Take 1 capsule (300 mg total) by mouth 3 (three) times daily.     LIDOcaine 5 %  Commonly known as: LIDODERM  Place 1 patch onto  "the skin once daily. Remove & Discard patch within 12 hours or as directed by MD     losartan 50 MG tablet  Commonly known as: COZAAR  Take 1 tablet (50 mg total) by mouth once daily.     methocarbamoL 750 MG Tab  Commonly known as: ROBAXIN  TAKE 1 TABLET (750 MG TOTAL) BY MOUTH 2 (TWO) TIMES A DAY. FOR 5 DAYS     metoprolol succinate 100 MG 24 hr tablet  Commonly known as: TOPROL-XL  TAKE 1 TABLET BY MOUTH EVERY DAY     MICROLET LANCET Misc  Generic drug: lancets  1 lancet by Misc.(Non-Drug; Combo Route) route once daily. Test blood glucose once daily as instructed.     naproxen 500 MG tablet  Commonly known as: NAPROSYN  Take 1 tablet (500 mg total) by mouth 2 (two) times daily.     NIFEdipine 60 MG (OSM) 24 hr tablet  Commonly known as: PROCARDIA-XL  TAKE 1 TABLET BY MOUTH EVERY DAY     nitroGLYCERIN 0.3 MG SL tablet  Commonly known as: NITROSTAT  Place 1 tablet (0.3 mg total) under the tongue every 5 (five) minutes as needed for Chest pain.     NovoLIN N Flexpen 100 unit/mL (3 mL) Inpn  Generic drug: insulin NPH isoph U-100 human  Inject 9 Units into the skin 2 (two) times daily before meals.     ondansetron 4 MG tablet  Commonly known as: ZOFRAN  Take 1 tablet (4 mg total) by mouth every 6 (six) hours as needed for Nausea.     pantoprazole 40 MG tablet  Commonly known as: PROTONIX  TAKE 1 TABLET BY MOUTH TWICE A DAY     pen needle, diabetic 32 gauge x 5/32" Ndle  Commonly known as: BD ULTRA-FINE CHET PEN NEEDLE  USE PEN NEEDLE AS INSTRUCTION WITH LANTUS INSULIN PRE-FILLED PEN.     pravastatin 80 MG tablet  Commonly known as: PRAVACHOL  Take 1 tablet (80 mg total) by mouth once daily.     spironolactone 50 MG tablet  Commonly known as: ALDACTONE  Take 1 tablet (50 mg total) by mouth once daily.     tadalafiL 20 MG Tab  Commonly known as: CIALIS  Take 1 tablet (20 mg total) by mouth every 72 hours as needed (ED).        STOP taking these medications    atropine 1% 1 % Drop  Commonly known as: ISOPTO ATROPINE   "   ibuprofen 600 MG tablet  Commonly known as: ADVIL,MOTRIN     prednisoLONE acetate 1 % Drps  Commonly known as: PRED FORTE          He is aware that he has 2 muscle relaxer's prescribed, but does not take them together.    Indwelling Lines/Drains at time of discharge:   Lines/Drains/Airways     None                 Time spent on the discharge of patient: 33 minutes   Patient examined at bedside on day of discharge. Exam findings stable. he was deemed safe for discharge.       Meliza Carey MD  Department of Hospital Medicine  [facility] - Intensive Care ([place])

## 2022-10-19 ENCOUNTER — IMMUNIZATION (OUTPATIENT)
Dept: PHARMACY | Facility: CLINIC | Age: 62
End: 2022-10-19
Payer: COMMERCIAL

## 2022-10-19 DIAGNOSIS — Z23 NEED FOR VACCINATION: Primary | ICD-10-CM

## 2022-10-25 ENCOUNTER — PATIENT MESSAGE (OUTPATIENT)
Dept: OTHER | Facility: OTHER | Age: 62
End: 2022-10-25
Payer: COMMERCIAL

## 2022-10-25 ENCOUNTER — OFFICE VISIT (OUTPATIENT)
Dept: CARDIOLOGY | Facility: CLINIC | Age: 62
End: 2022-10-25
Payer: COMMERCIAL

## 2022-10-25 VITALS
SYSTOLIC BLOOD PRESSURE: 110 MMHG | BODY MASS INDEX: 24.24 KG/M2 | DIASTOLIC BLOOD PRESSURE: 62 MMHG | HEIGHT: 76 IN | HEART RATE: 80 BPM | WEIGHT: 199.06 LBS

## 2022-10-25 DIAGNOSIS — R55 SYNCOPE AND COLLAPSE: ICD-10-CM

## 2022-10-25 DIAGNOSIS — I49.3 SYMPTOMATIC PVCS: ICD-10-CM

## 2022-10-25 DIAGNOSIS — I15.2 HYPERTENSION ASSOCIATED WITH DIABETES: ICD-10-CM

## 2022-10-25 DIAGNOSIS — R07.9 ACUTE CHEST PAIN: Primary | ICD-10-CM

## 2022-10-25 DIAGNOSIS — E78.2 MIXED HYPERLIPIDEMIA: ICD-10-CM

## 2022-10-25 DIAGNOSIS — I25.10 CORONARY ARTERY DISEASE INVOLVING NATIVE CORONARY ARTERY OF NATIVE HEART WITHOUT ANGINA PECTORIS: ICD-10-CM

## 2022-10-25 DIAGNOSIS — E11.59 HYPERTENSION ASSOCIATED WITH DIABETES: ICD-10-CM

## 2022-10-25 PROCEDURE — 99499 RISK ADDL DX/OHS AUDIT: ICD-10-PCS | Mod: S$GLB,,, | Performed by: INTERNAL MEDICINE

## 2022-10-25 PROCEDURE — 3074F SYST BP LT 130 MM HG: CPT | Mod: CPTII,S$GLB,, | Performed by: INTERNAL MEDICINE

## 2022-10-25 PROCEDURE — 99999 PR PBB SHADOW E&M-EST. PATIENT-LVL V: ICD-10-PCS | Mod: PBBFAC,,, | Performed by: INTERNAL MEDICINE

## 2022-10-25 PROCEDURE — 99499 UNLISTED E&M SERVICE: CPT | Mod: S$GLB,,, | Performed by: INTERNAL MEDICINE

## 2022-10-25 PROCEDURE — 3078F DIAST BP <80 MM HG: CPT | Mod: CPTII,S$GLB,, | Performed by: INTERNAL MEDICINE

## 2022-10-25 PROCEDURE — 3061F NEG MICROALBUMINURIA REV: CPT | Mod: CPTII,S$GLB,, | Performed by: INTERNAL MEDICINE

## 2022-10-25 PROCEDURE — 3066F NEPHROPATHY DOC TX: CPT | Mod: CPTII,S$GLB,, | Performed by: INTERNAL MEDICINE

## 2022-10-25 PROCEDURE — 4010F PR ACE/ARB THEARPY RXD/TAKEN: ICD-10-PCS | Mod: CPTII,S$GLB,, | Performed by: INTERNAL MEDICINE

## 2022-10-25 PROCEDURE — 3061F PR NEG MICROALBUMINURIA RESULT DOCUMENTED/REVIEW: ICD-10-PCS | Mod: CPTII,S$GLB,, | Performed by: INTERNAL MEDICINE

## 2022-10-25 PROCEDURE — 3074F PR MOST RECENT SYSTOLIC BLOOD PRESSURE < 130 MM HG: ICD-10-PCS | Mod: CPTII,S$GLB,, | Performed by: INTERNAL MEDICINE

## 2022-10-25 PROCEDURE — 99215 OFFICE O/P EST HI 40 MIN: CPT | Mod: S$GLB,,, | Performed by: INTERNAL MEDICINE

## 2022-10-25 PROCEDURE — 1160F PR REVIEW ALL MEDS BY PRESCRIBER/CLIN PHARMACIST DOCUMENTED: ICD-10-PCS | Mod: CPTII,S$GLB,, | Performed by: INTERNAL MEDICINE

## 2022-10-25 PROCEDURE — 3044F PR MOST RECENT HEMOGLOBIN A1C LEVEL <7.0%: ICD-10-PCS | Mod: CPTII,S$GLB,, | Performed by: INTERNAL MEDICINE

## 2022-10-25 PROCEDURE — 4010F ACE/ARB THERAPY RXD/TAKEN: CPT | Mod: CPTII,S$GLB,, | Performed by: INTERNAL MEDICINE

## 2022-10-25 PROCEDURE — 3066F PR DOCUMENTATION OF TREATMENT FOR NEPHROPATHY: ICD-10-PCS | Mod: CPTII,S$GLB,, | Performed by: INTERNAL MEDICINE

## 2022-10-25 PROCEDURE — 3044F HG A1C LEVEL LT 7.0%: CPT | Mod: CPTII,S$GLB,, | Performed by: INTERNAL MEDICINE

## 2022-10-25 PROCEDURE — 3078F PR MOST RECENT DIASTOLIC BLOOD PRESSURE < 80 MM HG: ICD-10-PCS | Mod: CPTII,S$GLB,, | Performed by: INTERNAL MEDICINE

## 2022-10-25 PROCEDURE — 1159F PR MEDICATION LIST DOCUMENTED IN MEDICAL RECORD: ICD-10-PCS | Mod: CPTII,S$GLB,, | Performed by: INTERNAL MEDICINE

## 2022-10-25 PROCEDURE — 99999 PR PBB SHADOW E&M-EST. PATIENT-LVL V: CPT | Mod: PBBFAC,,, | Performed by: INTERNAL MEDICINE

## 2022-10-25 PROCEDURE — 1159F MED LIST DOCD IN RCRD: CPT | Mod: CPTII,S$GLB,, | Performed by: INTERNAL MEDICINE

## 2022-10-25 PROCEDURE — 99215 PR OFFICE/OUTPT VISIT, EST, LEVL V, 40-54 MIN: ICD-10-PCS | Mod: S$GLB,,, | Performed by: INTERNAL MEDICINE

## 2022-10-25 PROCEDURE — 1160F RVW MEDS BY RX/DR IN RCRD: CPT | Mod: CPTII,S$GLB,, | Performed by: INTERNAL MEDICINE

## 2022-10-25 RX ORDER — METOPROLOL SUCCINATE 50 MG/1
75 TABLET, EXTENDED RELEASE ORAL DAILY
Qty: 45 TABLET | Refills: 5 | Status: SHIPPED | OUTPATIENT
Start: 2022-10-25 | End: 2022-12-30 | Stop reason: SDUPTHER

## 2022-10-25 NOTE — PATIENT INSTRUCTIONS
Do not drive until we sort out the issue of your fainting.     Please reduce the dose of metoprolol. Begin 75 mg once daily tomorrow and continue for one week. If you are feeling better at that dosage, ok to continue. If you continue to feel frequent irregular heart beats, reduce further to 50 mg once daily.     Feel free to reach out if you have any questions or concerns.       ======================    Cholesterol - total  LDL - bad type - lower is better.   HDL good type - higher is better  Your LDL should be less than 70. Do not look at the reference range in [facility]'s labs. Those do not apply to you!    LDL - bad type - improves with diet and medications: typically statins; most other medications that lower LDL have not been proven to prevent heart attacks.  May not improve significantly with exercise alone.  Should be less than 70 mg/dl, but the lower the better. Statins (cholesterol lowering meds) lower LDL and also reduce inflammation within blood vessels.    HDL - good type - improves with exercise.  Ideally greater than 50 mg/dl. The proportion of HDL to the total cholesterol is more important than the absolute HDL.  This means a HDL of 45 out of a total cholesterol of 130 mg'dl is pretty good, but the same HDL out of a total of  200 mg/dl is not quite as good. A level of 30% or higher is ideal.    TGs (triglycerides) - also bad - can change very quickly and considerably with certain foods. Improve with diet, exercise and high dose fish oil.  In some cases a low carbohydrate diet will lower TGs better than a low fat diet.  Ideal range  mg/dl.        Sugar, fat and cholesterol in food:     A sensible diet that limits the intake of sugars, saturated (bad) fats and trans fats while increasing the intake of unsaturated (good) fats and plant proteins is the basis of the current dietary recommendations.      We now recommend drastically reducing the intake of sugar and sugary drinks. There is less emphasis  "on excluding all fat and more emphasis on the types of different fats. We continue to recommend eating more vegetables.    Cholesterol in our food is generally present in relatively small amounts. New dietary guidelines are less obsessed with the amount of cholesterol. However please do not confuse this with the role of cholesterol in our blood and arteries. The liver converts certain foods into cholesterol.  It is this cholesterol and other fats that clog up our arteries.      Most foods that are high in cholesterol are also high in saturated fat. But there is much more saturated fat than cholesterol in these foods. The saturated fat content matters more than cholesterol. On the other hand, there are a handful of foods that are high in cholesterol but do not contain much saturated fat: eggs, shrimp, crab legs and crawfish are OK to eat in small quantities as long as you do not deep farmer them. So a few (2-3) eggs a week are fine (both the white and the yolk), but you can eat as many egg whites as you want. Also, some of these same foods irritate the inner lining of blood vessels by inducing inflammation (see below).  This occurs even if your blood cholesterol levels are "normal". So you should avoid foods that are high in saturated fat and sugar even if your blood cholesterol levels are normal.      Saturated fat is the bad fat - you should limit your intake of this. Deep fried foods, meats and other animal fats are high in saturated fat. Cookies, donuts and most dessert and cakes are usually high in both saturated fat and sugar.       Unsaturated fat is the good fat. It contains the same number of calories as saturated fat but these fats do not get deposited in our arteries. The Mediterranean style diet encourages the intake of unsaturated fat - olive oil, avocado and unsalted nuts. So instead of baking a piece of fish, consider pan-frying it using olive oil.     You should eat a few servings of vegetables (and " fruit as long as you are not diabetic) everyday. Substitute some plant proteins in place of meat: beans, lentils, quinoa and oatmeal. They are lean proteins.    Vegetables (particularly green vegetables such as broccoli, kale and spinach) have anti-inflammatory properties. Some fruits (certain berries) have anti-oxidant properties. However I think foods that reduce vascular inflammation are much  more important than the anti-oxidant effect of fruits and I predict that we will be prescribing anti inflammatory medication specifically for blood vessels to prevent heart attacks in the future. In the mean time eat more of the vegetables with anti-oxidant properties.     Do not use stick butter or stick margarine. Butter that comes in a tub is soft butter and consists of 1/2 butter and 1/2 vegetable oil (either canola or olive oil). It is preferable to use soft butter in small quantities. Avocado butter is also an option.      Trans fats should definitely be avoided. Most foods that are labelled as containing 0 gms of trans fat may still contain several hundred milligrams of trans fat: creamer, margarine, dough, deep fried foods, ready made frosting, potato, corn and torilla chips, cakes, cookies, pie crusts and crackers containing shortening made with hydrogenated vegetable oil.

## 2022-10-26 ENCOUNTER — TELEPHONE (OUTPATIENT)
Dept: UROLOGY | Facility: CLINIC | Age: 62
End: 2022-10-26
Payer: COMMERCIAL

## 2022-10-26 NOTE — TELEPHONE ENCOUNTER
----- Message from Joyce Garza sent at 10/26/2022 11:32 AM CDT -----  Regarding: speak with nurse  Contact: patient  [phone]    please call above patient have questions concerning procedure on tomorrow how long will it take also will he be able to go back to work an a couple of other questions thanks.

## 2022-10-27 ENCOUNTER — PROCEDURE VISIT (OUTPATIENT)
Dept: UROLOGY | Facility: CLINIC | Age: 62
End: 2022-10-27
Payer: COMMERCIAL

## 2022-10-27 VITALS
WEIGHT: 199.31 LBS | TEMPERATURE: 98 F | BODY MASS INDEX: 24.27 KG/M2 | HEIGHT: 76 IN | HEART RATE: 71 BPM | RESPIRATION RATE: 17 BRPM | DIASTOLIC BLOOD PRESSURE: 73 MMHG | SYSTOLIC BLOOD PRESSURE: 126 MMHG

## 2022-10-27 DIAGNOSIS — R33.9 INCOMPLETE BLADDER EMPTYING: Primary | ICD-10-CM

## 2022-10-27 DIAGNOSIS — N39.0 RECURRENT UTI: ICD-10-CM

## 2022-10-27 PROCEDURE — 52000 CYSTOSCOPY: ICD-10-PCS | Mod: S$GLB,,, | Performed by: UROLOGY

## 2022-10-27 PROCEDURE — 52000 CYSTOURETHROSCOPY: CPT | Mod: S$GLB,,, | Performed by: UROLOGY

## 2022-10-27 PROCEDURE — 87086 URINE CULTURE/COLONY COUNT: CPT | Performed by: UROLOGY

## 2022-10-27 RX ORDER — LIDOCAINE HYDROCHLORIDE 20 MG/ML
JELLY TOPICAL ONCE
Status: COMPLETED | OUTPATIENT
Start: 2022-10-27 | End: 2022-10-27

## 2022-10-27 RX ORDER — DOXYCYCLINE HYCLATE 100 MG
100 TABLET ORAL ONCE
Status: COMPLETED | OUTPATIENT
Start: 2022-10-27 | End: 2022-10-27

## 2022-10-27 RX ADMIN — LIDOCAINE HYDROCHLORIDE: 20 JELLY TOPICAL at 08:10

## 2022-10-27 RX ADMIN — Medication 100 MG: at 08:10

## 2022-10-27 NOTE — PATIENT INSTRUCTIONS
What to Expect After a Cystoscopy  For the next 24-48 hours, you may feel a mild burning when you urinate. This burning is normal and expected. Drink plenty of water to dilute the urine to help relieve the burning sensation. You may also see a small amount of blood in your urine after the procedure.    Unless you are already taking antibiotics, you may be given an antibiotic after the test to prevent infection.    Signs and Symptoms to Report  Call the [facility] at [phone] if you develop any of the following:  Fever of 101 degrees or higher  Chills or persistent bleeding  Inability to urinate          Learn  self intermittent catheterization.  2. Consider sacral nerve stimulation.  InterStim Therapy  Axonics Therapy

## 2022-10-27 NOTE — PROCEDURES
Cystoscopy    Date/Time: 10/27/2022 8:00 AM  Performed by: Ryder Seay MD  Authorized by: Ryder Seay MD     Comments:      Procedure Date:  10/27/2022      Procedure:  Male Diagnostic Cystourethroscopy    Pre-op diagnosis: recurrent UTI, s/p TURP, incomplete bladder emptying  Post-op diagnosis: same  Anesthesia: Local  Surgeon:  Ryder Seay MD    Findings:    Cath PVR: 250 ml    Urethra:  Normal urethra.   Sphincter: competent.  Prostate: Estimated Length Prostatic Urethra: s/p TURP, wide open channel. no obstruction  Bladder neck: patent with no stricture  Bladder:  Normal bladder.  No stone or tumor seen.  Normal ureteral orifices bilaterally.   Moderate trabeculation with early sacculation.     Description of Procedure:                                                         Informed Consent:                                                            - Risks, benefits and alternatives of procedure discussed with               patient and informed consent obtained.       Patient Position:   - Supine. --- Bladder ---   Prep and Drape:   - Patient prepped and draped in usual sterile fashion using povidone     iodine (Betadine).   Instruments:   - 16 Fr flexible cystoscope with 0 degree lens.   Procedure Details:   - Cystoscope passed under vision into bladder.   - Bladder and urethra examined in their entirety with findings as     above.     Conclusion:  1. Incomplete bladder emptying in this pt with s/p TURP and hx of diabetes.  Suspect that his bladder is not emptying completely.  Has been urecholine 50 mg TID, without significant improvement from 25 mg TID.    Recommend CIC or / and sacral neuromodulation.  Nature of procedures explained in detail.  He will consider them and let me know.  Give the website to study them.    Plan:  Patient was discharged home in a stable condition.  Medications: doxy  Follow up:  3 months

## 2022-10-29 LAB — BACTERIA UR CULT: NO GROWTH

## 2022-11-25 DIAGNOSIS — E11.9 DIABETES MELLITUS WITH INSULIN THERAPY: ICD-10-CM

## 2022-11-25 DIAGNOSIS — Z79.4 DIABETES MELLITUS WITH INSULIN THERAPY: ICD-10-CM

## 2022-11-25 RX ORDER — HUMAN INSULIN 100 [IU]/ML
INJECTION, SUSPENSION SUBCUTANEOUS
Qty: 15 ML | Refills: 0 | Status: SHIPPED | OUTPATIENT
Start: 2022-11-25 | End: 2022-12-30 | Stop reason: SDUPTHER

## 2022-11-28 RX ORDER — ALPRAZOLAM 0.5 MG/1
TABLET ORAL
Qty: 90 TABLET | Refills: 1 | Status: SHIPPED | OUTPATIENT
Start: 2022-11-28 | End: 2022-12-30 | Stop reason: SDUPTHER

## 2022-12-30 DIAGNOSIS — M17.12 PRIMARY OSTEOARTHRITIS OF LEFT KNEE: ICD-10-CM

## 2022-12-30 DIAGNOSIS — I25.10 CORONARY ARTERY DISEASE DUE TO CALCIFIED CORONARY LESION: ICD-10-CM

## 2022-12-30 DIAGNOSIS — R25.2 MUSCLE CRAMPS: ICD-10-CM

## 2022-12-30 DIAGNOSIS — Z79.4 DIABETES MELLITUS WITH INSULIN THERAPY: ICD-10-CM

## 2022-12-30 DIAGNOSIS — E87.6 HYPOKALEMIA: ICD-10-CM

## 2022-12-30 DIAGNOSIS — E11.9 DIABETES MELLITUS WITH INSULIN THERAPY: ICD-10-CM

## 2022-12-30 DIAGNOSIS — I10 ESSENTIAL HYPERTENSION: ICD-10-CM

## 2022-12-30 DIAGNOSIS — I49.3 SYMPTOMATIC PVCS: ICD-10-CM

## 2022-12-30 DIAGNOSIS — I25.84 CORONARY ARTERY DISEASE DUE TO CALCIFIED CORONARY LESION: ICD-10-CM

## 2022-12-30 DIAGNOSIS — E78.2 MIXED HYPERLIPIDEMIA: ICD-10-CM

## 2023-01-03 ENCOUNTER — PATIENT MESSAGE (OUTPATIENT)
Dept: OTHER | Facility: OTHER | Age: 63
End: 2023-01-03
Payer: COMMERCIAL

## 2023-01-04 RX ORDER — PRAVASTATIN SODIUM 80 MG/1
80 TABLET ORAL DAILY
Qty: 90 TABLET | Refills: 3 | Status: SHIPPED | OUTPATIENT
Start: 2023-01-04 | End: 2023-12-13

## 2023-01-04 RX ORDER — ALPRAZOLAM 0.5 MG/1
TABLET ORAL
Qty: 90 TABLET | Refills: 1 | Status: SHIPPED | OUTPATIENT
Start: 2023-01-04 | End: 2023-03-30

## 2023-01-04 RX ORDER — SPIRONOLACTONE 50 MG/1
50 TABLET, FILM COATED ORAL DAILY
Qty: 90 TABLET | Refills: 3 | Status: SHIPPED | OUTPATIENT
Start: 2023-01-04 | End: 2023-12-13

## 2023-01-04 RX ORDER — CELECOXIB 200 MG/1
200 CAPSULE ORAL 2 TIMES DAILY PRN
Qty: 180 CAPSULE | Refills: 1 | Status: SHIPPED | OUTPATIENT
Start: 2023-01-04 | End: 2023-07-11

## 2023-01-04 RX ORDER — LOSARTAN POTASSIUM 50 MG/1
50 TABLET ORAL DAILY
Qty: 90 TABLET | Refills: 1 | Status: SHIPPED | OUTPATIENT
Start: 2023-01-04 | End: 2023-07-10

## 2023-01-04 RX ORDER — BACLOFEN 10 MG/1
10 TABLET ORAL NIGHTLY
Qty: 90 TABLET | Refills: 1 | Status: SHIPPED | OUTPATIENT
Start: 2023-01-04 | End: 2023-07-11

## 2023-01-04 RX ORDER — METOPROLOL SUCCINATE 50 MG/1
75 TABLET, EXTENDED RELEASE ORAL DAILY
Qty: 135 TABLET | Refills: 3 | Status: SHIPPED | OUTPATIENT
Start: 2023-01-04 | End: 2023-12-13

## 2023-01-04 RX ORDER — EZETIMIBE 10 MG/1
10 TABLET ORAL DAILY
Qty: 90 TABLET | Refills: 3 | Status: SHIPPED | OUTPATIENT
Start: 2023-01-04 | End: 2023-12-13

## 2023-01-04 RX ORDER — PANTOPRAZOLE SODIUM 40 MG/1
40 TABLET, DELAYED RELEASE ORAL 2 TIMES DAILY
Qty: 180 TABLET | Refills: 3 | Status: SHIPPED | OUTPATIENT
Start: 2023-01-04 | End: 2023-12-14

## 2023-01-04 RX ORDER — HUMAN INSULIN 100 [IU]/ML
9 INJECTION, SUSPENSION SUBCUTANEOUS
Qty: 18 ML | Refills: 2 | Status: SHIPPED | OUTPATIENT
Start: 2023-01-04 | End: 2023-03-30

## 2023-01-04 RX ORDER — GABAPENTIN 300 MG/1
300 CAPSULE ORAL 3 TIMES DAILY
Qty: 270 CAPSULE | Refills: 1 | Status: SHIPPED | OUTPATIENT
Start: 2023-01-04 | End: 2023-07-11

## 2023-01-04 RX ORDER — NIFEDIPINE 60 MG/1
60 TABLET, EXTENDED RELEASE ORAL DAILY
Qty: 90 TABLET | Refills: 3 | Status: SHIPPED | OUTPATIENT
Start: 2023-01-04 | End: 2023-12-13 | Stop reason: SDUPTHER

## 2023-02-01 ENCOUNTER — TELEPHONE (OUTPATIENT)
Dept: INTERNAL MEDICINE | Facility: CLINIC | Age: 63
End: 2023-02-01
Payer: COMMERCIAL

## 2023-02-01 NOTE — TELEPHONE ENCOUNTER
----- Message from Grace Loaiza sent at 1/31/2023  4:37 PM CST -----  Contact: Maxwell @[facility] [phone]  Would like to receive medical advice.      Would they like a call back or a response via [facility]:  call back    Additional information:  Calling to verify pt current condition.

## 2023-02-07 DIAGNOSIS — Z00.00 ENCOUNTER FOR MEDICARE ANNUAL WELLNESS EXAM: ICD-10-CM

## 2023-02-09 DIAGNOSIS — Z00.00 ENCOUNTER FOR MEDICARE ANNUAL WELLNESS EXAM: ICD-10-CM

## 2023-03-13 ENCOUNTER — TELEPHONE (OUTPATIENT)
Dept: INTERNAL MEDICINE | Facility: CLINIC | Age: 63
End: 2023-03-13
Payer: COMMERCIAL

## 2023-03-13 ENCOUNTER — NURSE TRIAGE (OUTPATIENT)
Dept: ADMINISTRATIVE | Facility: CLINIC | Age: 63
End: 2023-03-13
Payer: COMMERCIAL

## 2023-03-13 ENCOUNTER — PATIENT MESSAGE (OUTPATIENT)
Dept: INTERNAL MEDICINE | Facility: CLINIC | Age: 63
End: 2023-03-13
Payer: COMMERCIAL

## 2023-03-13 NOTE — TELEPHONE ENCOUNTER
OOC   Rn  blood Sugar over 254 at 10:15  No symptoms but would like to know what ot do,  take more insulini?      For any new symptoms worsening symptoms to call back OOC RN.    Can someone please reach out to patient.   Reason for Disposition   Blood glucose 240 - 300 mg/dL (13.3 - 16.7 mmol/L)    Additional Information   Negative: Unconscious or difficult to awaken   Negative: Acting confused (e.g., disoriented, slurred speech)   Negative: Very weak (can't stand)   Negative: Sounds like a life-threatening emergency to the triager   Negative: Vomiting and signs of dehydration (e.g., very dry mouth, lightheaded, dark urine)   Negative: Blood glucose > 240 mg/dL (13.3 mmol/L) and rapid breathing   Negative: Blood glucose > 500 mg/dL (27.8 mmol/L)   Negative: Blood glucose > 240 mg/dL (13.3 mmol/L) AND urine ketones moderate-large (or more than 1+)   Negative: Blood glucose > 240 mg/dL (13.3 mmol/L) and blood ketones > 1.4 mmol/L   Negative: Blood glucose > 240 mg/dL (13.3 mmol/L) AND vomiting AND unable to check for ketones (in blood or urine)   Negative: Vomiting lasting > 4 hours   Negative: Patient sounds very sick or weak to the triager   Negative: Fever > 100.4 F (38.0 C)   Negative: Caller has URGENT medication or insulin pump question and triager unable to answer question   Negative: Blood glucose > 400 mg/dL (22.2 mmol/L)   Negative: Blood glucose > 300 mg/dL (16.7 mmol/L) AND two or more times in a row   Negative: Urine ketones moderate - large (or blood ketones > 1.4 mmol/L)   Negative: New-onset diabetes mellitus suspected (e.g., frequent urination, weak, weight loss)   Negative: Symptoms of high blood sugar (e.g., frequent urination, weak, weight loss) and not able to test blood glucose   Negative: Patient wants to be seen   Negative: Caller has NON-URGENT medication question about med that PCP prescribed and triager unable to answer question   Negative: Blood glucose > 300 mg/dL (16.7  mmol/L)    Protocols used: Diabetes - High Blood Sugar-A-OH

## 2023-03-13 NOTE — TELEPHONE ENCOUNTER
Please advise patient to increase insulin to 12 units twice daily if blood sugars remain over 200.

## 2023-03-13 NOTE — TELEPHONE ENCOUNTER
Patient has an elevated glucose of 254.  Please advise if patient has had any change in his diet recently, specifically last night or early this morning.

## 2023-03-21 ENCOUNTER — TELEPHONE (OUTPATIENT)
Dept: INTERNAL MEDICINE | Facility: CLINIC | Age: 63
End: 2023-03-21
Payer: COMMERCIAL

## 2023-03-21 DIAGNOSIS — Z00.00 ROUTINE MEDICAL EXAM: ICD-10-CM

## 2023-03-21 DIAGNOSIS — I15.2 HYPERTENSION ASSOCIATED WITH DIABETES: ICD-10-CM

## 2023-03-21 DIAGNOSIS — E11.59 HYPERTENSION ASSOCIATED WITH DIABETES: ICD-10-CM

## 2023-03-21 DIAGNOSIS — Z79.4 DIABETES MELLITUS WITH INSULIN THERAPY: ICD-10-CM

## 2023-03-21 DIAGNOSIS — E11.9 DIABETES MELLITUS WITH INSULIN THERAPY: ICD-10-CM

## 2023-03-21 DIAGNOSIS — I10 ESSENTIAL HYPERTENSION: Primary | ICD-10-CM

## 2023-03-21 NOTE — TELEPHONE ENCOUNTER
Labs for annual exam have been ordered.  Please schedule.  Also, given recent elevated blood sugar readings, recommend we schedule BMP and hemoglobin A1c now.

## 2023-03-21 NOTE — TELEPHONE ENCOUNTER
----- Message from Trinity Sosa sent at 3/21/2023  8:27 AM CDT -----  Type:  Needs Medical Advice    Who Called:  pt  Symptoms (please be specific):    How long has patient had these symptoms:    Pharmacy name and phone #:    Would the patient rather a call back or a response via [facility]?   Best Call Back Number: [phone]  Additional Information: wants to talk to office about his A1C lab . He received  msg through portal.

## 2023-03-21 NOTE — TELEPHONE ENCOUNTER
Spoke to pt [place] called pt to check his A1c    Pt has annual scheduled in June. Please order labs. Also would he need his A1c checked now or can it wait till June??

## 2023-03-24 ENCOUNTER — LAB VISIT (OUTPATIENT)
Dept: LAB | Facility: HOSPITAL | Age: 63
End: 2023-03-24
Attending: INTERNAL MEDICINE
Payer: COMMERCIAL

## 2023-03-24 DIAGNOSIS — Z00.00 ROUTINE MEDICAL EXAM: ICD-10-CM

## 2023-03-24 LAB
BILIRUB UR QL STRIP: NEGATIVE
CLARITY UR REFRACT.AUTO: CLEAR
COLOR UR AUTO: YELLOW
GLUCOSE UR QL STRIP: ABNORMAL
HGB UR QL STRIP: NEGATIVE
KETONES UR QL STRIP: NEGATIVE
LEUKOCYTE ESTERASE UR QL STRIP: NEGATIVE
NITRITE UR QL STRIP: NEGATIVE
PH UR STRIP: 6 [PH] (ref 5–8)
PROT UR QL STRIP: ABNORMAL
SP GR UR STRIP: 1.03 (ref 1–1.03)
URN SPEC COLLECT METH UR: ABNORMAL

## 2023-03-24 PROCEDURE — 81003 URINALYSIS AUTO W/O SCOPE: CPT | Performed by: INTERNAL MEDICINE

## 2023-03-29 ENCOUNTER — PATIENT MESSAGE (OUTPATIENT)
Dept: INTERNAL MEDICINE | Facility: CLINIC | Age: 63
End: 2023-03-29
Payer: COMMERCIAL

## 2023-04-13 ENCOUNTER — PATIENT OUTREACH (OUTPATIENT)
Dept: ADMINISTRATIVE | Facility: HOSPITAL | Age: 63
End: 2023-04-13
Payer: COMMERCIAL

## 2023-06-08 ENCOUNTER — PES CALL (OUTPATIENT)
Dept: ADMINISTRATIVE | Facility: CLINIC | Age: 63
End: 2023-06-08
Payer: COMMERCIAL

## 2023-06-08 ENCOUNTER — TELEPHONE (OUTPATIENT)
Dept: INTERNAL MEDICINE | Facility: CLINIC | Age: 63
End: 2023-06-08
Payer: COMMERCIAL

## 2023-06-08 NOTE — TELEPHONE ENCOUNTER
Pt states he will take recommended medication for his symptoms , he denies wanting paxlovid at this time and will call if symptoms worsen , he will keep his f/u

## 2023-06-08 NOTE — TELEPHONE ENCOUNTER
Please inform patient that he does not need to reschedule his appointment on June 23rd.  Also, he is eligible for Paxlovid because he is within 5 days of symptom onset.  However, Paxlovid has multiple drug interactions, including nifedipine, Xanax and cholesterol medications.  Recommend Tylenol over-the-counter for headache.  Also, he can take plain Mucinex or sugar free Robitussin for cough.

## 2023-06-08 NOTE — TELEPHONE ENCOUNTER
----- Message from Liz Mcknight sent at 6/8/2023  8:46 AM CDT -----  Contact: pt/[phone]  Type:  Patient  Call    Who Called:Pt    Would the patient rather a call back or a response via [facility]? Call   Best Call Back Number:[phone]  Additional Information: Per  pt  called in  to advise that  he  tested  pos  with  covid -19 he wants to  know if he need to reschedule.

## 2023-06-08 NOTE — TELEPHONE ENCOUNTER
Pt called in regards to testing positive for covid-19 yesterday , symptoms started yesterday headache only .exposed by someone at work .

## 2023-06-12 ENCOUNTER — PATIENT OUTREACH (OUTPATIENT)
Dept: ADMINISTRATIVE | Facility: HOSPITAL | Age: 63
End: 2023-06-12
Payer: COMMERCIAL

## 2023-06-12 NOTE — PROGRESS NOTES
Health Maintenance Due   Topic Date Due    LDCT Lung Screen  07/09/2016    Foot Exam  05/12/2018    Pneumococcal Vaccines (Age 65+) (2 - PCV) 10/09/2020    Eye Exam  03/20/2022    COVID-19 Vaccine (5 - Moderna series) 02/19/2023     Chart reviewed.   Immunizations: Reconciled  Orders placed: N/A  Upcoming appts to satisfy JOSE LUIS topics: N/A

## 2023-06-16 NOTE — SUBJECTIVE & OBJECTIVE
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 -     Hyperlipidemia     Hypertension     Memory loss     Thyroid nodule 8/22/2019    Traumatic iritis - Left Eye 2/27/2013       Past Surgical History:   Procedure Laterality Date    CEREBRAL ANGIOGRAM N/A 1/6/2020    Procedure: ANGIOGRAM-CEREBRAL;  Surgeon: Dosc Diagnostic Provider;  Location: [facility] OR 2ND FLR;  Service: Radiology;  Laterality: N/A;  /Nagi    CERVICAL FUSION  12/30/2015    COLONOSCOPY N/A 4/7/2017    Procedure: COLONOSCOPY;  Surgeon: Handy Frederick MD;  Location: [facility] (4TH FLR);  Service: Endoscopy;  Laterality: N/A;    HERNIA REPAIR      PROSTATECTOMY         Review of patient's allergies indicates:   Allergen Reactions    Lisinopril Anaphylaxis and Nausea And Vomiting     General bad feeling    Lipitor [atorvastatin] Other (See Comments)     Muscle aches    Adhesive     Crestor [rosuvastatin] Swelling     Lip swelling.     Jardiance [empagliflozin] Other (See Comments)     Possible related to recent UTI's     Metformin      Used XR and experienced flatulance and abdominal pain.        No current facility-administered medications on file prior to encounter.      Current Outpatient Medications on File Prior to Encounter   Medication Sig    aspirin (ECOTRIN) 81 MG EC tablet Take 1 tablet (81 mg total) by mouth once daily.    BD ULTRA-FINE CHET PEN NEEDLE 32 gauge x 5/32" Ndle USE PEN NEEDLE AS INSTRUCTION WITH LANTUS INSULIN PRE FILLED PEN.    bethanechol (URECHOLINE) 25 MG Tab Take 1 tablet (25 mg total) by mouth 3 (three) times daily. Start with 1 tablet (25mg) 3 times a day for 1 wk. Then increase to 2 " "tablets (50mg) 3 times a day as tolerates.    chlorthalidone (HYGROTEN) 25 MG Tab Take 1 tablet (25 mg total) by mouth once daily.    CONTOUR NEXT TEST STRIPS Strp USE TO TEST BLOOD SUGAR ONCE DAILY    ezetimibe (ZETIA) 10 mg tablet Take 1 tablet (10 mg total) by mouth once daily.    insulin (LANTUS SOLOSTAR U-100 INSULIN) glargine 100 units/mL (3mL) SubQ pen Inject 17 Units into the skin every evening.    metoprolol succinate (TOPROL-XL) 100 MG 24 hr tablet TAKE 1 TABLET BY MOUTH EVERY DAY    MICROLET LANCET Misc 1 lancet by Misc.(Non-Drug; Combo Route) route once daily. Test blood glucose once daily as instructed.    NIFEdipine (PROCARDIA-XL) 60 MG (OSM) 24 hr tablet TAKE 1 TABLET BY MOUTH EVERY DAY    pen needle, diabetic 32 gauge x 5/16" Ndle Use pen needle as instruction with Lantus insulin pre-filled pen.    potassium chloride SA (K-DUR,KLOR-CON) 10 MEQ tablet TAKE 4 TABLETS (40 MEQ TOTAL) BY MOUTH ONCE DAILY.    sertraline (ZOLOFT) 25 MG tablet TAKE 1 TABLET BY MOUTH EVERY DAY    ALPRAZolam (XANAX) 0.5 MG tablet TAKE 1 TABLET BY MOUTH THREE TIMES A DAY AS NEEDED FOR ANXIETY    baclofen (LIORESAL) 10 MG tablet Take 10 mg by mouth every evening.    diclofenac sodium (VOLTAREN) 1 % Gel Apply 2 g topically once daily.    flu vacc [id number] 6mos up,PF, 60 mcg (15 mcg x 4)/0.5 mL Syrg Inject into the muscle.    HYDROcodone-acetaminophen (NORCO) 5-325 mg per tablet Take 1 tablet by mouth every 6 (six) hours as needed for Pain.    lidocaine (LIDODERM) 5 % Place 1 patch onto the skin once daily. Remove & Discard patch within 12 hours or as directed by MD    nitroGLYCERIN (NITROSTAT) 0.3 MG SL tablet Place 1 tablet (0.3 mg total) under the tongue every 5 (five) minutes as needed for Chest pain.    ondansetron (ZOFRAN-ODT) 4 MG TbDL Take 1 tablet (4 mg total) by mouth every 8 (eight) hours as needed.    pantoprazole (PROTONIX) 40 MG tablet TAKE 1 TABLET BY MOUTH TWICE A DAY    pravastatin (PRAVACHOL) " 80 MG tablet TAKE 1 TABLET BY MOUTH EVERYDAY AT BEDTIME    sildenafil (VIAGRA) 100 MG tablet Take 1 tablet (100 mg total) by mouth daily as needed for Erectile Dysfunction.    [DISCONTINUED] sildenafil (REVATIO) 20 mg Tab TAKE 2-5 TABLETS BY MOUTH AS NEEDED     Family History     Problem Relation (Age of Onset)    Benign prostatic hyperplasia Brother    Cancer Sister, Brother    Cataracts Maternal Grandmother    Diabetes Paternal Uncle    Heart disease Mother, Father    Hepatitis Brother    Hyperlipidemia Mother, Brother, Brother    Hypertension Mother, Brother, Brother, Brother, Brother        Tobacco Use    Smoking status: Former Smoker     Packs/day: 1.00     Years: 30.00     Pack years: 30.00     Types: Cigarettes     Quit date: 2011     Years since quittin.0    Smokeless tobacco: Never Used   Substance and Sexual Activity    Alcohol use: Yes     Frequency: 2-3 times a week     Drinks per session: 1 or 2     Binge frequency: Never     Comment: occas - nonalcoholic beer or beer    Drug use: No    Sexual activity: Yes     Partners: Female     Review of Systems   Constitutional: Negative for chills and fever.   HENT: Negative for congestion, dental problem and trouble swallowing.    Eyes: Negative for photophobia and visual disturbance.   Respiratory: Negative for cough and shortness of breath.    Cardiovascular: Negative for chest pain and palpitations.   Gastrointestinal: Negative for abdominal pain, diarrhea, nausea and vomiting.   Genitourinary: Negative for decreased urine volume and dysuria.   Musculoskeletal: Positive for myalgias. Negative for arthralgias.   Skin: Negative for pallor and rash.   Allergic/Immunologic: Negative for immunocompromised state.   Neurological: Negative for weakness and headaches.   Hematological: Does not bruise/bleed easily.   Psychiatric/Behavioral: Negative for confusion. The patient is not nervous/anxious.      Objective:     Vital Signs (Most Recent):  Temp:  97.8 °F (36.6 °C) (12/18/20 1616)  Pulse: 75 (12/18/20 1900)  Resp: 18 (12/18/20 1616)  BP: 116/78 (12/18/20 1616)  SpO2: (Abnormal) 93 % (12/18/20 1616) Vital Signs (24h Range):  Temp:  [97.8 °F (36.6 °C)-98.1 °F (36.7 °C)] 97.8 °F (36.6 °C)  Pulse:  [68-92] 75  Resp:  [8-19] 18  SpO2:  [93 %-99 %] 93 %  BP: (116-160)/(71-93) 116/78     Weight: 90 kg (198 lb 6.6 oz)  Body mass index is 24.15 kg/m².    Physical Exam  Vitals signs and nursing note reviewed.   Constitutional:       General: He is not in acute distress.     Appearance: He is normal weight.   HENT:      Head: Normocephalic and atraumatic.      Nose: Nose normal. No congestion.      Mouth/Throat:      Mouth: Mucous membranes are moist.      Pharynx: Oropharynx is clear.   Eyes:      Extraocular Movements: EOM normal.      Pupils: Pupils are equal, round, and reactive to light.   Neck:      Musculoskeletal: Normal range of motion.   Cardiovascular:      Rate and Rhythm: Normal rate and regular rhythm.      Pulses: Normal pulses.      Heart sounds: Normal heart sounds.   Pulmonary:      Effort: Pulmonary effort is normal.      Breath sounds: Normal breath sounds.   Abdominal:      General: Abdomen is flat. Bowel sounds are normal.      Palpations: Abdomen is soft.      Tenderness: There is no abdominal tenderness.   Musculoskeletal: Normal range of motion.         General: No tenderness.   Skin:     General: Skin is warm and dry.      Capillary Refill: Capillary refill takes less than 2 seconds.   Neurological:      Mental Status: He is alert and oriented to person, place, and time. Mental status is at baseline.   Psychiatric:         Mood and Affect: Mood normal.         Behavior: Behavior normal.           CRANIAL NERVES     CN III, IV, VI   Pupils are equal, round, and reactive to light.  Extraocular motions are normal.        Significant Labs:   CBC:   Recent Labs   Lab 12/18/20  0804   WBC 5.92   HGB 16.5   HCT 47.8        CMP:   Recent Labs    Lab 12/18/20  0804 12/18/20  1327    140   K 3.0* 3.3*   CL 99 100   CO2 27 31*   * 102   BUN 15 13   CREATININE 1.1 1.0   CALCIUM 9.8 9.5   PROT 8.3  --    ALBUMIN 4.8  --    BILITOT 0.4  --    ALKPHOS 80  --    AST 35  --    ALT 38  --    ANIONGAP 13 9   EGFRNONAA >60 >60       All pertinent labs within the past 24 hours have been reviewed.    Significant Imaging: I have reviewed all pertinent imaging results/findings within the past 24 hours.   Birth Control Pills Pregnancy And Lactation Text: This medication should be avoided if pregnant and for the first 30 days post-partum.

## 2023-06-23 ENCOUNTER — OFFICE VISIT (OUTPATIENT)
Dept: INTERNAL MEDICINE | Facility: CLINIC | Age: 63
End: 2023-06-23
Payer: COMMERCIAL

## 2023-06-23 VITALS
DIASTOLIC BLOOD PRESSURE: 72 MMHG | OXYGEN SATURATION: 97 % | RESPIRATION RATE: 20 BRPM | HEART RATE: 72 BPM | BODY MASS INDEX: 24.74 KG/M2 | WEIGHT: 203.13 LBS | SYSTOLIC BLOOD PRESSURE: 108 MMHG | TEMPERATURE: 98 F | HEIGHT: 76 IN

## 2023-06-23 DIAGNOSIS — Z00.00 ROUTINE MEDICAL EXAM: Primary | ICD-10-CM

## 2023-06-23 DIAGNOSIS — R25.2 LEG CRAMP: ICD-10-CM

## 2023-06-23 DIAGNOSIS — E11.9 TYPE 2 DIABETES MELLITUS WITHOUT COMPLICATION, WITHOUT LONG-TERM CURRENT USE OF INSULIN: ICD-10-CM

## 2023-06-23 DIAGNOSIS — I73.9 PERIPHERAL VASCULAR DISEASE: ICD-10-CM

## 2023-06-23 DIAGNOSIS — E11.59 HYPERTENSION ASSOCIATED WITH DIABETES: ICD-10-CM

## 2023-06-23 DIAGNOSIS — I10 ESSENTIAL HYPERTENSION: ICD-10-CM

## 2023-06-23 DIAGNOSIS — I15.2 HYPERTENSION ASSOCIATED WITH DIABETES: ICD-10-CM

## 2023-06-23 PROCEDURE — 1160F RVW MEDS BY RX/DR IN RCRD: CPT | Mod: CPTII,S$GLB,, | Performed by: INTERNAL MEDICINE

## 2023-06-23 PROCEDURE — 3044F PR MOST RECENT HEMOGLOBIN A1C LEVEL <7.0%: ICD-10-PCS | Mod: CPTII,S$GLB,, | Performed by: INTERNAL MEDICINE

## 2023-06-23 PROCEDURE — 99397 PR PREVENTIVE VISIT,EST,65 & OVER: ICD-10-PCS | Mod: S$GLB,,, | Performed by: INTERNAL MEDICINE

## 2023-06-23 PROCEDURE — 1101F PT FALLS ASSESS-DOCD LE1/YR: CPT | Mod: CPTII,S$GLB,, | Performed by: INTERNAL MEDICINE

## 2023-06-23 PROCEDURE — 3078F DIAST BP <80 MM HG: CPT | Mod: CPTII,S$GLB,, | Performed by: INTERNAL MEDICINE

## 2023-06-23 PROCEDURE — 1101F PR PT FALLS ASSESS DOC 0-1 FALLS W/OUT INJ PAST YR: ICD-10-PCS | Mod: CPTII,S$GLB,, | Performed by: INTERNAL MEDICINE

## 2023-06-23 PROCEDURE — 3078F PR MOST RECENT DIASTOLIC BLOOD PRESSURE < 80 MM HG: ICD-10-PCS | Mod: CPTII,S$GLB,, | Performed by: INTERNAL MEDICINE

## 2023-06-23 PROCEDURE — 99999 PR PBB SHADOW E&M-EST. PATIENT-LVL V: ICD-10-PCS | Mod: PBBFAC,,, | Performed by: INTERNAL MEDICINE

## 2023-06-23 PROCEDURE — 3044F HG A1C LEVEL LT 7.0%: CPT | Mod: CPTII,S$GLB,, | Performed by: INTERNAL MEDICINE

## 2023-06-23 PROCEDURE — 99999 PR PBB SHADOW E&M-EST. PATIENT-LVL V: CPT | Mod: PBBFAC,,, | Performed by: INTERNAL MEDICINE

## 2023-06-23 PROCEDURE — 3288F PR FALLS RISK ASSESSMENT DOCUMENTED: ICD-10-PCS | Mod: CPTII,S$GLB,, | Performed by: INTERNAL MEDICINE

## 2023-06-23 PROCEDURE — 4010F ACE/ARB THERAPY RXD/TAKEN: CPT | Mod: CPTII,S$GLB,, | Performed by: INTERNAL MEDICINE

## 2023-06-23 PROCEDURE — 3288F FALL RISK ASSESSMENT DOCD: CPT | Mod: CPTII,S$GLB,, | Performed by: INTERNAL MEDICINE

## 2023-06-23 PROCEDURE — 3074F SYST BP LT 130 MM HG: CPT | Mod: CPTII,S$GLB,, | Performed by: INTERNAL MEDICINE

## 2023-06-23 PROCEDURE — 1160F PR REVIEW ALL MEDS BY PRESCRIBER/CLIN PHARMACIST DOCUMENTED: ICD-10-PCS | Mod: CPTII,S$GLB,, | Performed by: INTERNAL MEDICINE

## 2023-06-23 PROCEDURE — 4010F PR ACE/ARB THEARPY RXD/TAKEN: ICD-10-PCS | Mod: CPTII,S$GLB,, | Performed by: INTERNAL MEDICINE

## 2023-06-23 PROCEDURE — 1159F PR MEDICATION LIST DOCUMENTED IN MEDICAL RECORD: ICD-10-PCS | Mod: CPTII,S$GLB,, | Performed by: INTERNAL MEDICINE

## 2023-06-23 PROCEDURE — 1159F MED LIST DOCD IN RCRD: CPT | Mod: CPTII,S$GLB,, | Performed by: INTERNAL MEDICINE

## 2023-06-23 PROCEDURE — 3008F BODY MASS INDEX DOCD: CPT | Mod: CPTII,S$GLB,, | Performed by: INTERNAL MEDICINE

## 2023-06-23 PROCEDURE — 99397 PER PM REEVAL EST PAT 65+ YR: CPT | Mod: S$GLB,,, | Performed by: INTERNAL MEDICINE

## 2023-06-23 PROCEDURE — 3074F PR MOST RECENT SYSTOLIC BLOOD PRESSURE < 130 MM HG: ICD-10-PCS | Mod: CPTII,S$GLB,, | Performed by: INTERNAL MEDICINE

## 2023-06-23 PROCEDURE — 3008F PR BODY MASS INDEX (BMI) DOCUMENTED: ICD-10-PCS | Mod: CPTII,S$GLB,, | Performed by: INTERNAL MEDICINE

## 2023-06-23 NOTE — LETTER
July 2, 2019      Josefina Kendall MD  [address]  [place] [address]           [place] [facility]  [address]  [place] [address]  Phone: [phone]  Fax: [phone]          Patient: Hi Rubio   MR Number: [id number]   YOB: 1960   Date of Visit: 7/2/2019       Dear Dr. Josefina Kendall:    Thank you for referring Hi Rubio to me for evaluation. Attached you will find relevant portions of my assessment and plan of care.    If you have questions, please do not hesitate to call me. I look forward to following Hi Rubio along with you.    Sincerely,    Handy Farooq, OD    Enclosure  CC:  No Recipients    If you would like to receive this communication electronically, please contact [email or web address] or [phone] to request more information on [employer] [employer] access.    For providers and/or their staff who would like to refer a patient to [facility], please contact us through our one-stop-shop provider referral line, [facility], at [phone].    If you feel you have received this communication in error or would no longer like to receive these types of communications, please e-mail [email or web address]          23-Jun-2023 10:50

## 2023-06-23 NOTE — PROGRESS NOTES
The patient is a 63 y.o. old male with hypertension associated with diabetes and peripheral vascular disease who presents to the office for a physical.    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 -     Hyperlipidemia     Hypertension     Memory loss     Thyroid nodule 8/22/2019    Traumatic iritis - Left Eye 2/27/2013       SURGICAL HISTORY:  Past Surgical History:   Procedure Laterality Date    CEREBRAL ANGIOGRAM N/A 1/6/2020    Procedure: ANGIOGRAM-CEREBRAL;  Surgeon: [facility] Diagnostic Provider;  [facility];  Service: Radiology;  Laterality: N/A;  /Nagi    CERVICAL FUSION  12/30/2015    COLONOSCOPY N/A 4/7/2017    Procedure: COLONOSCOPY;  Surgeon: Handy Frederick MD;  Location: [facility] ENDO (4TH FLR);  Service: Endoscopy;  Laterality: N/A;    CORONARY ANGIOGRAPHY N/A 10/10/2022    Procedure: ANGIOGRAM, CORONARY ARTERY;  Surgeon: Anson Nolan MD;  Location: [facility] CATH LAB;  Service: Cardiology;  Laterality: N/A;    HERNIA REPAIR      PROSTATECTOMY           MEDS:  Medcard reviewed and updated    ALLERGIES: Allergy Card reviewed and updated    SOCIAL HISTORY:   The patient is a nonsmoker, denies alcohol or illicit drug use.    ROS:  GENERAL: No fever, chills, fatigability or weight loss.  SKIN: No rashes.  HEAD: Positive headaches.  Denies recent head trauma.  EYES: No photophobia, ocular pain or diplopia.  EARS: Denies ear pain, discharge or vertigo.  NOSE: No epistaxis or postnasal drip.  MOUTH & THROAT: No hoarseness or change in voice.   NODES: Denies swollen glands.  CHEST: Denies shortness of breath, wheezing, cough and sputum production.  CARDIOVASCULAR:  Denies chest pain or palpitations.  ABDOMEN: Appetite fine. Denies diarrhea, abdominal pain, constipation or blood in stool.  Intermittent difficulty swallowing.  URINARY: No dysuria or hematuria.  MUSCULOSKELETAL: Positive leg cramps. Denies back pain.  NEUROLOGIC: No history of seizures.  ENDOCRINE: Denies polyuria or polydipsia.  PSYCHIATRIC: Denies mood swings, depression, anxiety, homicidal or suicidal thoughts.    SCREENINGS:  Last cholesterol: 2023  Last colonoscopy: 2017  Last tetanus: 2019  Last Pneumovax: 2019  Last eye exam: 2021  Last PSA: 2022    PE:   Vitals:  Vitals:    06/23/23 1457   BP: 108/72   Pulse: 72   Resp: 20   Temp: 97.7 °F (36.5 °C)       APPEARANCE: Well nourished, well developed, in no acute distress.    EYES: Sclerae anicteric. PERRL. EOMI.      EARS: TM's intact. No retraction or perforation.    NOSE: Mucosa pink. Airway clear.  MOUTH & THROAT: No tonsillar enlargement. No pharyngeal erythema or exudate. No stridor.  NECK: Supple, no thyromegaly.  CHEST: Lungs clear to auscultation with unlabored respirations.  CARDIOVASCULAR: Normal S1, S2. No murmurs. No carotid bruits. No pedal edema.  ABDOMEN: Bowel sounds normal. Not distended. Soft. No tenderness or masses.   MUSCULOSKELETAL:  Normal gait, no cyanosis or clubbing.   SKIN: Normal skin turgor, warm and dry.  NEUROLOGIC: Cranial Nerves: Intact.  PSYCHIATRIC: The patient is oriented to person, place, and time and has a pleasant affect.        ASSESSMENT/PLAN:  Hi was seen today for annual exam.    Diagnoses and all orders for this visit:    Routine medical exam    Essential hypertension  -     TSH; Future    Type 2 diabetes mellitus without complication, without long-term current use of insulin  -     Comprehensive Metabolic Panel; Future  -     Hemoglobin A1C; Future  -     Lipid Panel; Future    Leg cramp  -     Magnesium; Future    Hypertension associated with diabetes  -     blood pressure is controlled, continue current  medication    Peripheral vascular disease  -     stable, continue pravastatin              Answers submitted by the patient for this visit:  Review of Systems Questionnaire (Submitted on 6/21/2023)  activity change: Yes  unexpected weight change: No  hearing loss: No  rhinorrhea: No  trouble swallowing: Yes  eye discharge: No  visual disturbance: Yes  chest tightness: No  wheezing: No  chest pain: No  palpitations: Yes  constipation: No  vomiting: No  diarrhea: No  polydipsia: No  polyuria: No  difficulty urinating: No  urgency: No  hematuria: No  joint swelling: Yes  arthralgias: No  headaches: Yes  weakness: No  confusion: No  dysphoric mood: No

## 2023-06-27 NOTE — PLAN OF CARE
Patient and spouse state they are ready to be discharged. Instructions and report given to patient and family. Both verbalize understanding. Patient tolerating po liquids with no difficulty. Patient denies pain. Anesthesia consent and surgical consent in chart upon patient's discharge from [facility].   28-Jun-2023

## 2023-06-28 ENCOUNTER — LAB VISIT (OUTPATIENT)
Dept: LAB | Facility: HOSPITAL | Age: 63
End: 2023-06-28
Attending: INTERNAL MEDICINE
Payer: COMMERCIAL

## 2023-06-28 DIAGNOSIS — Z79.4 DIABETES MELLITUS WITH INSULIN THERAPY: ICD-10-CM

## 2023-06-28 DIAGNOSIS — E11.59 HYPERTENSION ASSOCIATED WITH DIABETES: ICD-10-CM

## 2023-06-28 DIAGNOSIS — I15.2 HYPERTENSION ASSOCIATED WITH DIABETES: ICD-10-CM

## 2023-06-28 DIAGNOSIS — E11.9 DIABETES MELLITUS WITH INSULIN THERAPY: ICD-10-CM

## 2023-06-28 DIAGNOSIS — E11.9 TYPE 2 DIABETES MELLITUS WITHOUT COMPLICATION, WITHOUT LONG-TERM CURRENT USE OF INSULIN: ICD-10-CM

## 2023-06-28 DIAGNOSIS — R25.2 LEG CRAMP: ICD-10-CM

## 2023-06-28 DIAGNOSIS — I10 ESSENTIAL HYPERTENSION: ICD-10-CM

## 2023-06-28 LAB
ALBUMIN SERPL BCP-MCNC: 4.2 G/DL (ref 3.5–5.2)
ALP SERPL-CCNC: 69 U/L (ref 55–135)
ALT SERPL W/O P-5'-P-CCNC: 20 U/L (ref 10–44)
ANION GAP SERPL CALC-SCNC: 10 MMOL/L (ref 8–16)
AST SERPL-CCNC: 19 U/L (ref 10–40)
BASOPHILS # BLD AUTO: 0.07 K/UL (ref 0–0.2)
BASOPHILS NFR BLD: 1.3 % (ref 0–1.9)
BILIRUB SERPL-MCNC: 0.5 MG/DL (ref 0.1–1)
BUN SERPL-MCNC: 19 MG/DL (ref 8–23)
CALCIUM SERPL-MCNC: 9.2 MG/DL (ref 8.7–10.5)
CHLORIDE SERPL-SCNC: 107 MMOL/L (ref 95–110)
CHOLEST SERPL-MCNC: 178 MG/DL (ref 120–199)
CHOLEST/HDLC SERPL: 4 {RATIO} (ref 2–5)
CO2 SERPL-SCNC: 21 MMOL/L (ref 23–29)
CREAT SERPL-MCNC: 1 MG/DL (ref 0.5–1.4)
DIFFERENTIAL METHOD: NORMAL
EOSINOPHIL # BLD AUTO: 0.4 K/UL (ref 0–0.5)
EOSINOPHIL NFR BLD: 7 % (ref 0–8)
ERYTHROCYTE [DISTWIDTH] IN BLOOD BY AUTOMATED COUNT: 12.6 % (ref 11.5–14.5)
EST. GFR  (NO RACE VARIABLE): >60 ML/MIN/1.73 M^2
GLUCOSE SERPL-MCNC: 155 MG/DL (ref 70–110)
HCT VFR BLD AUTO: 44.2 % (ref 40–54)
HDLC SERPL-MCNC: 44 MG/DL (ref 40–75)
HDLC SERPL: 24.7 % (ref 20–50)
HGB BLD-MCNC: 14.4 G/DL (ref 14–18)
IMM GRANULOCYTES # BLD AUTO: 0.01 K/UL (ref 0–0.04)
IMM GRANULOCYTES NFR BLD AUTO: 0.2 % (ref 0–0.5)
LDLC SERPL CALC-MCNC: 100 MG/DL (ref 63–159)
LYMPHOCYTES # BLD AUTO: 1.4 K/UL (ref 1–4.8)
LYMPHOCYTES NFR BLD: 25.8 % (ref 18–48)
MAGNESIUM SERPL-MCNC: 2 MG/DL (ref 1.6–2.6)
MCH RBC QN AUTO: 29.3 PG (ref 27–31)
MCHC RBC AUTO-ENTMCNC: 32.6 G/DL (ref 32–36)
MCV RBC AUTO: 90 FL (ref 82–98)
MONOCYTES # BLD AUTO: 0.4 K/UL (ref 0.3–1)
MONOCYTES NFR BLD: 8 % (ref 4–15)
NEUTROPHILS # BLD AUTO: 3 K/UL (ref 1.8–7.7)
NEUTROPHILS NFR BLD: 57.7 % (ref 38–73)
NONHDLC SERPL-MCNC: 134 MG/DL
NRBC BLD-RTO: 0 /100 WBC
PLATELET # BLD AUTO: 273 K/UL (ref 150–450)
PMV BLD AUTO: 10.6 FL (ref 9.2–12.9)
POTASSIUM SERPL-SCNC: 4 MMOL/L (ref 3.5–5.1)
PROT SERPL-MCNC: 7.3 G/DL (ref 6–8.4)
RBC # BLD AUTO: 4.92 M/UL (ref 4.6–6.2)
SODIUM SERPL-SCNC: 138 MMOL/L (ref 136–145)
TRIGL SERPL-MCNC: 170 MG/DL (ref 30–150)
TSH SERPL DL<=0.005 MIU/L-ACNC: 3.39 UIU/ML (ref 0.4–4)
WBC # BLD AUTO: 5.27 K/UL (ref 3.9–12.7)

## 2023-06-28 PROCEDURE — 83036 HEMOGLOBIN GLYCOSYLATED A1C: CPT | Performed by: INTERNAL MEDICINE

## 2023-06-28 PROCEDURE — 84443 ASSAY THYROID STIM HORMONE: CPT | Performed by: INTERNAL MEDICINE

## 2023-06-28 PROCEDURE — 80061 LIPID PANEL: CPT | Performed by: INTERNAL MEDICINE

## 2023-06-28 PROCEDURE — 85025 COMPLETE CBC W/AUTO DIFF WBC: CPT | Performed by: INTERNAL MEDICINE

## 2023-06-28 PROCEDURE — 36415 COLL VENOUS BLD VENIPUNCTURE: CPT | Mod: PN | Performed by: INTERNAL MEDICINE

## 2023-06-28 PROCEDURE — 83735 ASSAY OF MAGNESIUM: CPT | Performed by: INTERNAL MEDICINE

## 2023-06-28 PROCEDURE — 80053 COMPREHEN METABOLIC PANEL: CPT | Performed by: INTERNAL MEDICINE

## 2023-06-29 LAB
ESTIMATED AVG GLUCOSE: 143 MG/DL (ref 68–131)
HBA1C MFR BLD: 6.6 % (ref 4–5.6)

## 2023-07-10 DIAGNOSIS — I25.84 CORONARY ARTERY DISEASE DUE TO CALCIFIED CORONARY LESION: ICD-10-CM

## 2023-07-10 DIAGNOSIS — I25.10 CORONARY ARTERY DISEASE DUE TO CALCIFIED CORONARY LESION: ICD-10-CM

## 2023-07-10 DIAGNOSIS — R25.2 MUSCLE CRAMPS: ICD-10-CM

## 2023-07-10 DIAGNOSIS — I10 ESSENTIAL HYPERTENSION: ICD-10-CM

## 2023-07-10 DIAGNOSIS — M17.12 PRIMARY OSTEOARTHRITIS OF LEFT KNEE: ICD-10-CM

## 2023-07-10 RX ORDER — LOSARTAN POTASSIUM 50 MG/1
TABLET ORAL
Qty: 90 TABLET | Refills: 3 | Status: SHIPPED | OUTPATIENT
Start: 2023-07-10 | End: 2023-08-16

## 2023-07-10 NOTE — TELEPHONE ENCOUNTER
No care due was identified.  Health [facility] Embedded Care Due Messages. Reference number: [id number].   7/10/2023 10:51:17 AM CDT

## 2023-07-10 NOTE — TELEPHONE ENCOUNTER
----- Message from Gregoria Bailey sent at 7/10/2023 10:05 AM CDT -----      Can the clinic reply in [facility]:Y        Please refill the medication(s) listed below. Please call the patient when the prescription(s) is ready for  at this phone number         Medication #1 nitroGLYCERIN (NITROSTAT) 0.3 MG SL tablet    Medication #2       Preferred Pharmacy: [facility]/PHARMACY #[id number] - [place], [place]  Frieda ZAMORA

## 2023-07-10 NOTE — TELEPHONE ENCOUNTER
No care due was identified.  Health [facility] Embedded Care Due Messages. Reference number: [id number].   7/10/2023 10:50:31 AM CDT

## 2023-07-11 RX ORDER — NITROGLYCERIN 0.3 MG/1
0.3 TABLET SUBLINGUAL EVERY 5 MIN PRN
Qty: 100 TABLET | Refills: 3 | Status: SHIPPED | OUTPATIENT
Start: 2023-07-11

## 2023-07-11 RX ORDER — CELECOXIB 200 MG/1
CAPSULE ORAL
Qty: 180 CAPSULE | Refills: 1 | Status: SHIPPED | OUTPATIENT
Start: 2023-07-11 | End: 2024-01-19

## 2023-07-11 RX ORDER — BACLOFEN 10 MG/1
TABLET ORAL
Qty: 90 TABLET | Refills: 1 | Status: SHIPPED | OUTPATIENT
Start: 2023-07-11 | End: 2024-01-19

## 2023-07-11 RX ORDER — GABAPENTIN 300 MG/1
CAPSULE ORAL
Qty: 270 CAPSULE | Refills: 1 | Status: SHIPPED | OUTPATIENT
Start: 2023-07-11 | End: 2024-01-19

## 2023-07-11 NOTE — TELEPHONE ENCOUNTER
Refill Routing Note   Medication(s) are not appropriate for processing by [facility] for the following reason(s):      Responsible provider unclear    [facility] action(s):  Defer Care Due:  None identified   Medication Therapy Plan: The provider responsible for managing the medication isnt PCP      Appointments  past 12m or future 3m with PCP    Date Provider   Last Visit   6/23/2023 Josefina Kendall MD   Next Visit   7/10/2023 Josefina Kendall MD   ED visits in past 90 days: 0        Note composed:8:02 PM 07/10/2023

## 2023-07-11 NOTE — TELEPHONE ENCOUNTER
Refill Routing Note   Medication(s) are not appropriate for processing by [facility] for the following reason(s):      - Outside of protocol    [facility] action(s):  Route  Approve       Medication Therapy Plan: approve losartan potassium; route gabapentin, baclofen and celecoxib  Medication reconciliation completed: No     Appointments  past 12m or future 3m with PCP    Date Provider   Last Visit   6/23/2023 Josefina Kendall MD   Next Visit   10/13/2023 Josefina Kendall MD   ED visits in past 90 days: 0        Note composed:8:26 PM 07/10/2023

## 2023-07-27 RX ORDER — ALPRAZOLAM 0.5 MG/1
TABLET ORAL
Qty: 90 TABLET | Refills: 3 | Status: SHIPPED | OUTPATIENT
Start: 2023-07-27 | End: 2023-12-26

## 2023-07-27 NOTE — TELEPHONE ENCOUNTER
No care due was identified.  [facility] Embedded Care Due Messages. Reference number: [id number].   7/27/2023 6:29:37 AM CDT

## 2023-08-15 ENCOUNTER — HOSPITAL ENCOUNTER (EMERGENCY)
Facility: HOSPITAL | Age: 63
Discharge: HOME | End: 2023-08-15
Attending: STUDENT IN AN ORGANIZED HEALTH CARE EDUCATION/TRAINING PROGRAM
Payer: MEDICARE

## 2023-08-15 ENCOUNTER — TELEPHONE (OUTPATIENT)
Dept: ENDOSCOPY | Facility: HOSPITAL | Age: 63
End: 2023-08-15
Payer: MEDICARE

## 2023-08-15 ENCOUNTER — TELEPHONE (OUTPATIENT)
Dept: INTERNAL MEDICINE | Facility: CLINIC | Age: 63
End: 2023-08-15
Payer: MEDICARE

## 2023-08-15 VITALS
BODY MASS INDEX: 24.59 KG/M2 | RESPIRATION RATE: 19 BRPM | HEART RATE: 72 BPM | TEMPERATURE: 98 F | OXYGEN SATURATION: 98 % | SYSTOLIC BLOOD PRESSURE: 129 MMHG | DIASTOLIC BLOOD PRESSURE: 76 MMHG | WEIGHT: 202 LBS

## 2023-08-15 DIAGNOSIS — R07.9 CHEST PAIN: ICD-10-CM

## 2023-08-15 DIAGNOSIS — R07.9 CHEST PAIN, UNSPECIFIED TYPE: Primary | ICD-10-CM

## 2023-08-15 DIAGNOSIS — Q45.3 PANCREATIC ABNORMALITY: ICD-10-CM

## 2023-08-15 LAB
ALBUMIN SERPL BCP-MCNC: 4.3 G/DL (ref 3.5–5.2)
ALP SERPL-CCNC: 87 U/L (ref 55–135)
ALT SERPL W/O P-5'-P-CCNC: 28 U/L (ref 10–44)
ANION GAP SERPL CALC-SCNC: 10 MMOL/L (ref 8–16)
AST SERPL-CCNC: 32 U/L (ref 10–40)
BASOPHILS # BLD AUTO: 0.08 K/UL (ref 0–0.2)
BASOPHILS NFR BLD: 1.2 % (ref 0–1.9)
BILIRUB SERPL-MCNC: 0.3 MG/DL (ref 0.1–1)
BILIRUB UR QL STRIP: NEGATIVE
BNP SERPL-MCNC: <10 PG/ML (ref 0–99)
BUN SERPL-MCNC: 13 MG/DL (ref 8–23)
CALCIUM SERPL-MCNC: 9.7 MG/DL (ref 8.7–10.5)
CHLORIDE SERPL-SCNC: 106 MMOL/L (ref 95–110)
CLARITY UR REFRACT.AUTO: CLEAR
CO2 SERPL-SCNC: 23 MMOL/L (ref 23–29)
COLOR UR AUTO: COLORLESS
CREAT SERPL-MCNC: 1.2 MG/DL (ref 0.5–1.4)
DIFFERENTIAL METHOD: ABNORMAL
EOSINOPHIL # BLD AUTO: 0.7 K/UL (ref 0–0.5)
EOSINOPHIL NFR BLD: 10.2 % (ref 0–8)
ERYTHROCYTE [DISTWIDTH] IN BLOOD BY AUTOMATED COUNT: 12.3 % (ref 11.5–14.5)
EST. GFR  (NO RACE VARIABLE): >60 ML/MIN/1.73 M^2
GLUCOSE SERPL-MCNC: 169 MG/DL (ref 70–110)
GLUCOSE UR QL STRIP: ABNORMAL
HCT VFR BLD AUTO: 45.8 % (ref 40–54)
HGB BLD-MCNC: 15.7 G/DL (ref 14–18)
HGB UR QL STRIP: NEGATIVE
IMM GRANULOCYTES # BLD AUTO: 0.03 K/UL (ref 0–0.04)
IMM GRANULOCYTES NFR BLD AUTO: 0.4 % (ref 0–0.5)
KETONES UR QL STRIP: NEGATIVE
LEUKOCYTE ESTERASE UR QL STRIP: NEGATIVE
LYMPHOCYTES # BLD AUTO: 1.7 K/UL (ref 1–4.8)
LYMPHOCYTES NFR BLD: 25.4 % (ref 18–48)
MCH RBC QN AUTO: 29.8 PG (ref 27–31)
MCHC RBC AUTO-ENTMCNC: 34.3 G/DL (ref 32–36)
MCV RBC AUTO: 87 FL (ref 82–98)
MONOCYTES # BLD AUTO: 0.6 K/UL (ref 0.3–1)
MONOCYTES NFR BLD: 9.2 % (ref 4–15)
NEUTROPHILS # BLD AUTO: 3.6 K/UL (ref 1.8–7.7)
NEUTROPHILS NFR BLD: 53.6 % (ref 38–73)
NITRITE UR QL STRIP: NEGATIVE
NRBC BLD-RTO: 0 /100 WBC
PH UR STRIP: 8 [PH] (ref 5–8)
PLATELET # BLD AUTO: 257 K/UL (ref 150–450)
PMV BLD AUTO: 10 FL (ref 9.2–12.9)
POC CARDIAC TROPONIN I: 0 NG/ML (ref 0–0.08)
POC CARDIAC TROPONIN I: 0 NG/ML (ref 0–0.08)
POTASSIUM SERPL-SCNC: 3.9 MMOL/L (ref 3.5–5.1)
PROT SERPL-MCNC: 7.7 G/DL (ref 6–8.4)
PROT UR QL STRIP: NEGATIVE
RBC # BLD AUTO: 5.27 M/UL (ref 4.6–6.2)
SAMPLE: NORMAL
SAMPLE: NORMAL
SODIUM SERPL-SCNC: 139 MMOL/L (ref 136–145)
SP GR UR STRIP: 1.03 (ref 1–1.03)
TROPONIN I SERPL DL<=0.01 NG/ML-MCNC: 0.01 NG/ML (ref 0–0.03)
TROPONIN I SERPL DL<=0.01 NG/ML-MCNC: <0.006 NG/ML (ref 0–0.03)
URN SPEC COLLECT METH UR: ABNORMAL
WBC # BLD AUTO: 6.76 K/UL (ref 3.9–12.7)

## 2023-08-15 PROCEDURE — 84484 ASSAY OF TROPONIN QUANT: CPT | Performed by: STUDENT IN AN ORGANIZED HEALTH CARE EDUCATION/TRAINING PROGRAM

## 2023-08-15 PROCEDURE — 25500020 PHARM REV CODE 255: Performed by: STUDENT IN AN ORGANIZED HEALTH CARE EDUCATION/TRAINING PROGRAM

## 2023-08-15 PROCEDURE — 93010 EKG 12-LEAD: ICD-10-PCS | Mod: ,,, | Performed by: INTERNAL MEDICINE

## 2023-08-15 PROCEDURE — 94761 N-INVAS EAR/PLS OXIMETRY MLT: CPT

## 2023-08-15 PROCEDURE — 93005 ELECTROCARDIOGRAM TRACING: CPT

## 2023-08-15 PROCEDURE — 93010 ELECTROCARDIOGRAM REPORT: CPT | Mod: ,,, | Performed by: INTERNAL MEDICINE

## 2023-08-15 PROCEDURE — 99285 EMERGENCY DEPT VISIT HI MDM: CPT | Mod: 25

## 2023-08-15 PROCEDURE — 63600175 PHARM REV CODE 636 W HCPCS: Performed by: STUDENT IN AN ORGANIZED HEALTH CARE EDUCATION/TRAINING PROGRAM

## 2023-08-15 PROCEDURE — 80053 COMPREHEN METABOLIC PANEL: CPT | Performed by: STUDENT IN AN ORGANIZED HEALTH CARE EDUCATION/TRAINING PROGRAM

## 2023-08-15 PROCEDURE — 85025 COMPLETE CBC W/AUTO DIFF WBC: CPT | Performed by: STUDENT IN AN ORGANIZED HEALTH CARE EDUCATION/TRAINING PROGRAM

## 2023-08-15 PROCEDURE — 96374 THER/PROPH/DIAG INJ IV PUSH: CPT | Mod: 59

## 2023-08-15 PROCEDURE — 81003 URINALYSIS AUTO W/O SCOPE: CPT | Performed by: EMERGENCY MEDICINE

## 2023-08-15 PROCEDURE — 83880 ASSAY OF NATRIURETIC PEPTIDE: CPT | Performed by: STUDENT IN AN ORGANIZED HEALTH CARE EDUCATION/TRAINING PROGRAM

## 2023-08-15 RX ORDER — MORPHINE SULFATE 4 MG/ML
4 INJECTION, SOLUTION INTRAMUSCULAR; INTRAVENOUS
Status: COMPLETED | OUTPATIENT
Start: 2023-08-15 | End: 2023-08-15

## 2023-08-15 RX ORDER — ASPIRIN 325 MG
325 TABLET ORAL
Status: DISCONTINUED | OUTPATIENT
Start: 2023-08-15 | End: 2023-08-15

## 2023-08-15 RX ADMIN — MORPHINE SULFATE 4 MG: 4 INJECTION INTRAVENOUS at 05:08

## 2023-08-15 RX ADMIN — IOHEXOL 100 ML: 350 INJECTION, SOLUTION INTRAVENOUS at 08:08

## 2023-08-15 NOTE — TELEPHONE ENCOUNTER
----- Message from Eli Lyles sent at 8/15/2023  2:51 PM CDT -----  Regarding: appt  Contact: [phone]  Type:  Sooner Apoointment Request    Caller is requesting a sooner appointment.  Caller declined first available appointment listed below.  Caller will not accept being placed on the waitlist and is requesting a message be sent to doctor.  Name of Caller: Hi   Would the patient rather a call back or a response via [facility]? Call   Best Call Back Number: [phone]  Additional Information: Pt would like to schedule an appt. Pt was seen in emergency room today and was told he needs to be seen by a gastro Dr by Friday. Pt needs to be seen earlier than the earliest appt available. Please give him a call back about scheduling this appt.

## 2023-08-15 NOTE — TELEPHONE ENCOUNTER
----- Message from Eli Lyles sent at 8/15/2023  2:51 PM CDT -----  Regarding: appt  Contact: [phone]  Type:  Sooner Apoointment Request    Caller is requesting a sooner appointment.  Caller declined first available appointment listed below.  Caller will not accept being placed on the waitlist and is requesting a message be sent to doctor.  Name of Caller: Hi   Would the patient rather a call back or a response via [facility]? Call   Best Call Back Number: [phone]  Additional Information: Pt would like to schedule an appt. Pt was seen in emergency room today and was told he needs to be seen by a gastro Dr by Friday. Pt needs to be seen earlier than the earliest appt available. Please give him a call back about scheduling this appt.

## 2023-08-15 NOTE — DISCHARGE INSTRUCTIONS
The CT scan of your chest, abdomen, and pelvis showed several abnormalities which we refer to as incidental findings which we recommend that you alert your primary care provider of.   We noticed:   The hilar lymph node that they have noticed before in the right hilum has now increased in size, we recommend you follow-up with your primary care provider as a may want to repeat imaging or schedule a biopsy.  They also noticed an increased size of your pancreatic duct diameter which sometimes can reflect an abnormality such as a mass.  Our radiologist recommended that an MRI or MRCP be completed as an outpatient and a gastroenterology referral has been placed for you but your primary care provider can help arrange this imaging tests.  It also noticed a 12 mm nodule in the left lower pole of your thyroid.  There are some very small but we refer to his hypodensities on your right kidney which may reflect very small abnormalities.  They also noticed a likely hemangioma in the right lobe of your liver.  Your primary care provider may want to repeat an ultrasound or an MRI or CT to further evaluate this to ensure that is hemangioma.    Return to the ED immediately for any new chest pain, shortness of breath, severe abdominal pain, abdominal pain that is constant, nausea/and vomiting that does not stop on its own, severe headache, new numbness, tingling, or weakness anywhere, fever greater than 101F or any additional concerns.

## 2023-08-15 NOTE — TELEPHONE ENCOUNTER
----- Message from Lupe Bethea sent at 8/15/2023  2:13 PM CDT -----  Contact: Pt [phone]  Pt called in regards to speaking with Dr Kendall he was seen in the ER on today and have some concerns he would like to discuss please advise

## 2023-08-15 NOTE — ED NOTES
Assumed care of the patient. Report received from DONALD Clark. Pt on continuous cardiac monitoring, continuous pulse oximetry, and automatic BP cuff cycling Q15min. Pt in hospital gown, side rails up X2, bed low and locked, and call light is placed within reach. One family/visitors at bedside at this time. Pt denies any complaints or needs.

## 2023-08-15 NOTE — ED PROVIDER NOTES
Chief Complaint   Chest Pain (Intermittent midsternal chest pain radiating to back x2 days. Pt endorses intermittent SOB. Took nitro tab at home about an hour ago with no relief. )      History Of Present Illness   Hi Braxton is a 63 y.o. male with past medical history of CAD presenting with chest pain.  Per chart review, patient last had a heart catheterization about a year ago that showed nonobstructive CAD.  Patient states that he started having chest pain that will come on bottom asleep about 2 days ago.  States he did not initially present with the chest pain has continued to worsen.  Reports it is a pressure-like pain that goes through to his back between his shoulder blades.  He otherwise reports no shortness of breath but states it does hurt when he takes deep breaths.  States this does not feel like the chest pain that he is had in the past.  He otherwise reports no lightheadedness, dizziness, loss of consciousness, weakness, numbness, diaphoresis, nausea, or vomiting.  States he has been taking his daily baby aspirin as usual.  States he tried taking 1 sublingual nitroglycerin tablet at home this morning and it did not improve his pain at all.    Independent Historian: yes  Other Historian or Collateral: wife at bedside  Interpretor: no    Review of patient's allergies indicates:   Allergen Reactions    Lisinopril Anaphylaxis and Nausea And Vomiting     General bad feeling    Lipitor [atorvastatin] Other (See Comments)     Muscle aches    Adhesive     Crestor [rosuvastatin] Swelling     Lip swelling.     Jardiance [empagliflozin] Other (See Comments)     Possible related to recent UTI's     Metformin      Used XR and experienced flatulance and abdominal pain.        No current facility-administered medications on file prior to encounter.     Current Outpatient Medications on File Prior to Encounter   Medication Sig Dispense Refill    baclofen (LIORESAL) 10 MG tablet TAKE 1 TABLET EVERY EVENING 90  tablet 1    celecoxib (CELEBREX) 200 MG capsule TAKE 1 CAPSULE TWICE DAILY AS NEEDED FOR PAIN 180 capsule 1    gabapentin (NEURONTIN) 300 MG capsule TAKE 1 CAPSULE THREE TIMES DAILY 270 capsule 1    ALPRAZolam (XANAX) 0.5 MG tablet TAKE 1 TABLET THREE TIMES DAILY AS NEEDED FOR ANXIETY 90 tablet 3    aspirin (ECOTRIN) 81 MG EC tablet Take 1 tablet (81 mg total) by mouth once daily.  0    CONTOUR NEXT TEST STRIPS Strp USE TO TEST BLOOD SUGAR ONCE DAILY 25 strip 6    ezetimibe (ZETIA) 10 mg tablet Take 1 tablet (10 mg total) by mouth once daily. 90 tablet 3    insulin (LANTUS SOLOSTAR U-100 INSULIN) glargine 100 units/mL SubQ pen Inject 15 Units into the skin every evening. 15 mL 1    LIDOcaine (LIDODERM) 5 % Place 1 patch onto the skin once daily. Remove & Discard patch within 12 hours or as directed by MD 15 patch 0    losartan (COZAAR) 50 MG tablet TAKE 1 TABLET ONE TIME DAILY 90 tablet 3    methocarbamoL (ROBAXIN) 750 MG Tab TAKE 1 TABLET (750 MG TOTAL) BY MOUTH 2 (TWO) TIMES A DAY. FOR 5 DAYS (Patient taking differently: as needed.) 30 tablet 3    metoprolol succinate (TOPROL-XL) 50 MG 24 hr tablet Take 1.5 tablets (75 mg total) by mouth once daily. 135 tablet 3    MICROLET LANCET Misc 1 lancet by Misc.(Non-Drug; Combo Route) route once daily. Test blood glucose once daily as instructed. 25 each 11    naproxen (NAPROSYN) 500 MG tablet Take 1 tablet (500 mg total) by mouth 2 (two) times daily. (Patient taking differently: Take 500 mg by mouth as needed.) 14 tablet 1    NIFEdipine (PROCARDIA-XL) 60 MG (OSM) 24 hr tablet Take 1 tablet (60 mg total) by mouth once daily. 90 tablet 3    nitroGLYCERIN (NITROSTAT) 0.3 MG SL tablet Place 1 tablet (0.3 mg total) under the tongue every 5 (five) minutes as needed for Chest pain. 100 tablet 3    ondansetron (ZOFRAN) 4 MG tablet Take 1 tablet (4 mg total) by mouth every 6 (six) hours as needed for Nausea. 30 tablet 0    pantoprazole (PROTONIX) 40 MG tablet Take 1 tablet (40 mg  "total) by mouth 2 (two) times daily. 180 tablet 3    pen needle, diabetic (BD ULTRA-FINE CHET PEN NEEDLE) 32 gauge x 5/32" Ndle USE PEN NEEDLE AS INSTRUCTION WITH LANTUS INSULIN PRE-FILLED PEN. 200 each 1    pravastatin (PRAVACHOL) 80 MG tablet Take 1 tablet (80 mg total) by mouth once daily. 90 tablet 3    spironolactone (ALDACTONE) 50 MG tablet Take 1 tablet (50 mg total) by mouth once daily. 90 tablet 3    tadalafiL (CIALIS) 20 MG Tab Take 1 tablet (20 mg total) by mouth every 72 hours as needed (ED). 15 tablet 11       Past History   As per HPI and below:  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 -     Hyperlipidemia     Hypertension     Memory loss     Thyroid nodule 8/22/2019    Traumatic iritis - Left Eye 2/27/2013     Past Surgical History:   Procedure Laterality Date    CEREBRAL ANGIOGRAM N/A 1/6/2020    Procedure: ANGIOGRAM-CEREBRAL;  Surgeon: [facility] Diagnostic Provider;  [facility];  Service: Radiology;  Laterality: N/A;  /Nagi    CERVICAL FUSION  12/30/2015    COLONOSCOPY N/A 4/7/2017    Procedure: COLONOSCOPY;  Surgeon: Handy Frederick MD;  Location: [facility] ENDO (4TH FLR);  Service: Endoscopy;  Laterality: N/A;    CORONARY ANGIOGRAPHY N/A 10/10/2022    Procedure: ANGIOGRAM, CORONARY ARTERY;  Surgeon: Anson Nolan MD;  Location: [facility] CATH LAB;  Service: Cardiology;  Laterality: N/A;    HERNIA REPAIR      PROSTATECTOMY         Social History     Socioeconomic History    Marital status:    Tobacco Use    Smoking status: Former     Current packs/day: 0.00     Average packs/day: 1 pack/day for 30.0 years (30.0 ttl pk-yrs)     Types: Cigarettes     Start date: " 1981     Quit date: 2011     Years since quittin.6    Smokeless tobacco: Never   Substance and Sexual Activity    Alcohol use: Yes     Comment: occas - nonalcoholic beer or beer    Drug use: No    Sexual activity: Yes     Partners: Female     Social Determinants of Health     Financial Resource Strain: High Risk (2019)    Overall Financial Resource Strain (CARDIA)     Difficulty of Paying Living Expenses: Hard   Food Insecurity: No Food Insecurity (2019)    Hunger Vital Sign     Worried About Running Out of Food in the Last Year: Never true     Ran Out of Food in the Last Year: Never true   Transportation Needs: No Transportation Needs (2019)    PRAPARE - Transportation     Lack of Transportation (Medical): No     Lack of Transportation (Non-Medical): No   Physical Activity: Inactive (2020)    Exercise Vital Sign     Days of Exercise per Week: 0 days     Minutes of Exercise per Session: 0 min   Social Connections: Unknown (2019)    Social Connection and Isolation Panel [NHANES]     Frequency of Communication with Friends and Family: More than three times a week       Family History   Problem Relation Age of Onset    Heart disease Mother     Hypertension Mother     Hyperlipidemia Mother     Stroke Mother     Heart disease Father     Hyperlipidemia Brother     Hypertension Brother     Heart disease Brother     Hypertension Brother     Hyperlipidemia Brother     Benign prostatic hyperplasia Brother     Heart disease Brother     Hypertension Brother     Heart disease Brother     Hypertension Brother     Hepatitis Brother     Heart disease Brother     Cancer Sister     Cancer Brother         throat CA    Diabetes Paternal Uncle     Cataracts Maternal Grandmother     Amblyopia Neg Hx     Blindness Neg Hx     Glaucoma Neg Hx     Macular degeneration Neg Hx     Retinal detachment Neg Hx     Strabismus Neg Hx     Thyroid disease Neg Hx     Colon cancer Neg Hx     Colon polyps Neg  Hx        Physical Exam     Vitals:    08/15/23 0545 08/15/23 0600 08/15/23 0615 08/15/23 0659   BP: (!) 143/77 (!) 142/77 (!) 153/79 138/79   BP Location:    Right arm   Patient Position:    Lying   Pulse: 78 80 71 76   Resp: 10 13 20 (!) 21   Temp:    97.8 °F (36.6 °C)   TempSrc:    Oral   SpO2: 95% 95% 98% 98%   Weight:           Physical Exam  Constitutional:       General: He is not in acute distress.     Appearance: He is not toxic-appearing.   HENT:      Head: Normocephalic and atraumatic.   Eyes:      Extraocular Movements: Extraocular movements intact.      Pupils: Pupils are equal, round, and reactive to light.   Cardiovascular:      Rate and Rhythm: Normal rate and regular rhythm.      Pulses:           Radial pulses are 2+ on the right side and 2+ on the left side.        Dorsalis pedis pulses are 2+ on the right side and 2+ on the left side.   Pulmonary:      Effort: Pulmonary effort is normal. No respiratory distress.      Breath sounds: No wheezing or rhonchi.   Chest:      Chest wall: No tenderness.   Abdominal:      General: Abdomen is flat. There is no distension.      Palpations: Abdomen is soft.   Musculoskeletal:         General: No swelling or deformity.      Cervical back: Normal range of motion.   Skin:     General: Skin is warm and dry.      Capillary Refill: Capillary refill takes less than 2 seconds.   Neurological:      General: No focal deficit present.      Mental Status: He is alert and oriented to person, place, and time.           Initial MDM   63 y.o. male with chest pain.  Given his history of CAD, consider ACS.  We will get labs including troponin and EKG to further evaluate.  Given that the chest pain goes through to the back, and this does not feel like the chest pain he is had before, concerning for possible dissection.  We will get CTA chest/abdomen/pelvis to further evaluate.  Lower suspicion for PE but CTA chest will also evaluate for this.  Lower suspicion for acute  electrolyte abnormality, or acute anemia but labs will further evaluate for this.  Low suspicion for pneumothorax but imaging we will further evaluate.    External Records Reviewed:  Yes    Medications Given     Medications   morphine injection 4 mg (4 mg Intravenous Given 8/15/23 0529)       Results     Labs Reviewed   CBC W/ AUTO DIFFERENTIAL - Abnormal; Notable for the following components:       Result Value    Eos # 0.7 (*)     Eosinophil % 10.2 (*)     All other components within normal limits   COMPREHENSIVE METABOLIC PANEL - Abnormal; Notable for the following components:    Glucose 169 (*)     All other components within normal limits   TROPONIN I   B-TYPE NATRIURETIC PEPTIDE   TROPONIN ISTAT   POCT TROPONIN   POCT TROPONIN       Imaging Results              X-Ray Chest AP Portable (In process)                           Reassessment and ED Course     ED Course as of 08/15/23 0720   Tue Aug 15, 2023   0719 Initial EKG is reassuring without signs of acute ischemia. [CH]   0719 Initial lab work reassuring without acute electrolyte abnormality, acute anemia, initial troponin negative, and initial BNP negative. [CH]   0719 Signed out to oncoming provider pending CTA chest/abdomen/pelvis [CH]      ED Course User Index  [CH] Dafne Canela MD                Final diagnoses:  [R07.9] Chest pain         Dispo:              Dafne Canela MD  08/15/23 0720

## 2023-08-15 NOTE — ED TRIAGE NOTES
Hi Braxton, a 63 y.o. male presents to the ED w/ complaint of chest pain x2 days.  Pt says he started getting chest pain 2 days ago and has progressively gotten worse.  PT describes pain as pressure and is midsternal radiating to his back.  Pt says he took a nitro this morning with no relief.     Triage note:  Chief Complaint   Patient presents with    Chest Pain     Intermittent midsternal chest pain radiating to back x2 days. Pt endorses intermittent SOB. Took nitro tab at home about an hour ago with no relief.      Review of patient's allergies indicates:   Allergen Reactions    Lisinopril Anaphylaxis and Nausea And Vomiting     General bad feeling    Lipitor [atorvastatin] Other (See Comments)     Muscle aches    Adhesive     Crestor [rosuvastatin] Swelling     Lip swelling.     Jardiance [empagliflozin] Other (See Comments)     Possible related to recent UTI's     Metformin      Used XR and experienced flatulance and abdominal pain.      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

## 2023-08-16 ENCOUNTER — OFFICE VISIT (OUTPATIENT)
Dept: INTERNAL MEDICINE | Facility: CLINIC | Age: 63
End: 2023-08-16
Payer: MEDICARE

## 2023-08-16 ENCOUNTER — HOSPITAL ENCOUNTER (OUTPATIENT)
Dept: RADIOLOGY | Facility: HOSPITAL | Age: 63
Discharge: HOME | End: 2023-08-16
Attending: INTERNAL MEDICINE
Payer: MEDICARE

## 2023-08-16 VITALS
HEIGHT: 76 IN | WEIGHT: 200.5 LBS | SYSTOLIC BLOOD PRESSURE: 100 MMHG | DIASTOLIC BLOOD PRESSURE: 70 MMHG | TEMPERATURE: 98 F | HEART RATE: 64 BPM | BODY MASS INDEX: 24.42 KG/M2 | OXYGEN SATURATION: 96 %

## 2023-08-16 DIAGNOSIS — Q45.3 ABNORMALITY OF PANCREATIC DUCT: ICD-10-CM

## 2023-08-16 DIAGNOSIS — I10 ESSENTIAL HYPERTENSION: ICD-10-CM

## 2023-08-16 DIAGNOSIS — M54.9 BACK PAIN, UNSPECIFIED BACK LOCATION, UNSPECIFIED BACK PAIN LATERALITY, UNSPECIFIED CHRONICITY: ICD-10-CM

## 2023-08-16 DIAGNOSIS — R59.0 LOCALIZED ENLARGED LYMPH NODES: ICD-10-CM

## 2023-08-16 DIAGNOSIS — M54.9 BACK PAIN, UNSPECIFIED BACK LOCATION, UNSPECIFIED BACK PAIN LATERALITY, UNSPECIFIED CHRONICITY: Primary | ICD-10-CM

## 2023-08-16 PROCEDURE — 3074F PR MOST RECENT SYSTOLIC BLOOD PRESSURE < 130 MM HG: ICD-10-PCS | Mod: CPTII,S$GLB,, | Performed by: INTERNAL MEDICINE

## 2023-08-16 PROCEDURE — 3078F PR MOST RECENT DIASTOLIC BLOOD PRESSURE < 80 MM HG: ICD-10-PCS | Mod: CPTII,S$GLB,, | Performed by: INTERNAL MEDICINE

## 2023-08-16 PROCEDURE — 99214 PR OFFICE/OUTPT VISIT, EST, LEVL IV, 30-39 MIN: ICD-10-PCS | Mod: S$GLB,,, | Performed by: INTERNAL MEDICINE

## 2023-08-16 PROCEDURE — 4010F PR ACE/ARB THEARPY RXD/TAKEN: ICD-10-PCS | Mod: CPTII,S$GLB,, | Performed by: INTERNAL MEDICINE

## 2023-08-16 PROCEDURE — 99214 OFFICE O/P EST MOD 30 MIN: CPT | Mod: S$GLB,,, | Performed by: INTERNAL MEDICINE

## 2023-08-16 PROCEDURE — 99999 PR PBB SHADOW E&M-EST. PATIENT-LVL V: ICD-10-PCS | Mod: PBBFAC,,, | Performed by: INTERNAL MEDICINE

## 2023-08-16 PROCEDURE — 99499 UNLISTED E&M SERVICE: CPT | Mod: S$GLB,,, | Performed by: INTERNAL MEDICINE

## 2023-08-16 PROCEDURE — 72080 XR THORACOLUMBAR SPINE AP LATERAL: ICD-10-PCS | Mod: 26,,, | Performed by: RADIOLOGY

## 2023-08-16 PROCEDURE — 3044F HG A1C LEVEL LT 7.0%: CPT | Mod: CPTII,S$GLB,, | Performed by: INTERNAL MEDICINE

## 2023-08-16 PROCEDURE — 3008F BODY MASS INDEX DOCD: CPT | Mod: CPTII,S$GLB,, | Performed by: INTERNAL MEDICINE

## 2023-08-16 PROCEDURE — 4010F ACE/ARB THERAPY RXD/TAKEN: CPT | Mod: CPTII,S$GLB,, | Performed by: INTERNAL MEDICINE

## 2023-08-16 PROCEDURE — 3044F PR MOST RECENT HEMOGLOBIN A1C LEVEL <7.0%: ICD-10-PCS | Mod: CPTII,S$GLB,, | Performed by: INTERNAL MEDICINE

## 2023-08-16 PROCEDURE — 72080 X-RAY EXAM THORACOLMB 2/> VW: CPT | Mod: TC,PO

## 2023-08-16 PROCEDURE — 99999 PR PBB SHADOW E&M-EST. PATIENT-LVL V: CPT | Mod: PBBFAC,,, | Performed by: INTERNAL MEDICINE

## 2023-08-16 PROCEDURE — 3008F PR BODY MASS INDEX (BMI) DOCUMENTED: ICD-10-PCS | Mod: CPTII,S$GLB,, | Performed by: INTERNAL MEDICINE

## 2023-08-16 PROCEDURE — 3074F SYST BP LT 130 MM HG: CPT | Mod: CPTII,S$GLB,, | Performed by: INTERNAL MEDICINE

## 2023-08-16 PROCEDURE — 3288F FALL RISK ASSESSMENT DOCD: CPT | Mod: CPTII,S$GLB,, | Performed by: INTERNAL MEDICINE

## 2023-08-16 PROCEDURE — 3078F DIAST BP <80 MM HG: CPT | Mod: CPTII,S$GLB,, | Performed by: INTERNAL MEDICINE

## 2023-08-16 PROCEDURE — 1101F PT FALLS ASSESS-DOCD LE1/YR: CPT | Mod: CPTII,S$GLB,, | Performed by: INTERNAL MEDICINE

## 2023-08-16 PROCEDURE — 72080 X-RAY EXAM THORACOLMB 2/> VW: CPT | Mod: 26,,, | Performed by: RADIOLOGY

## 2023-08-16 PROCEDURE — 3288F PR FALLS RISK ASSESSMENT DOCUMENTED: ICD-10-PCS | Mod: CPTII,S$GLB,, | Performed by: INTERNAL MEDICINE

## 2023-08-16 PROCEDURE — 1101F PR PT FALLS ASSESS DOC 0-1 FALLS W/OUT INJ PAST YR: ICD-10-PCS | Mod: CPTII,S$GLB,, | Performed by: INTERNAL MEDICINE

## 2023-08-16 PROCEDURE — 99499 RISK ADDL DX/OHS AUDIT: ICD-10-PCS | Mod: S$GLB,,, | Performed by: INTERNAL MEDICINE

## 2023-08-16 RX ORDER — GABAPENTIN 100 MG/1
100 CAPSULE ORAL 3 TIMES DAILY
Qty: 21 CAPSULE | Refills: 0 | Status: SHIPPED | OUTPATIENT
Start: 2023-08-16 | End: 2023-08-23

## 2023-08-16 RX ORDER — ZOSTER VACCINE RECOMBINANT, ADJUVANTED 50 MCG/0.5
KIT INTRAMUSCULAR
COMMUNITY
Start: 2023-02-23

## 2023-08-16 RX ORDER — LOSARTAN POTASSIUM 100 MG/1
100 TABLET ORAL DAILY
Qty: 90 TABLET | Refills: 1 | Status: SHIPPED | OUTPATIENT
Start: 2023-08-16 | End: 2023-12-25

## 2023-08-16 RX ORDER — GABAPENTIN 100 MG/1
100 CAPSULE ORAL 3 TIMES DAILY
Qty: 270 CAPSULE | Refills: 0 | Status: SHIPPED | OUTPATIENT
Start: 2023-08-16 | End: 2023-08-16 | Stop reason: SDUPTHER

## 2023-08-16 NOTE — PROGRESS NOTES
"CC: followup of ED visit for chest pain  HPI:  The patient is a 63 y.o. year old male who presents to the office for followup of ED visit for chest pain.  His symptoms started about 1-1/2 weeks prior to presentation and progressively worsened.  The pain radiated to his back.  He denies any rashes.  He denied any associated shortness of breath, but reported pain with deep inspiration.  He denied any associated nausea or vomiting.  He takes baby aspirin daily, and took 1 sublingual nitroglycerin the morning of presentation to the emergency room without relief.  Chest x-ray shows no acute abnormalities.  Chest CT revealed:   "No aneurysm, dissection, or other acute abnormality of the thoracic or abdominal aorta.     Scattered ASCVD, including evidence of coronary disease.     No pulmonary thromboemboli apparent, through the level of the segmental pulmonary arteries.     4 mm pancreatic duct diameter at the pancreatic neck.  Underlying ductectatic tumor not excluded.  Consider outpatient MRI/MRCP.     12 mm rim enhancing left lower pole thyroid (or parathyroid?) nodule.  Correlate clinically.     Interval enlargement of a right superior hilar lymph node, now measuring approximately 14 x 13 mm in axial dimension.  Etiology uncertain.  Broad differential diagnostic considerations include infectious, inflammatory, or neoplastic processes.  Correlate clinically, and with follow-up chest CT in 3-6 months.     Small right renal cortical hypodensities, too small fully characterize by CT.  Correlate clinically.  Consider outpatient sonography.     Diffuse hepatic steatosis.     Probable flash-filling hepatic hemangioma, and adjacent focal fatty sparing or transient attenuation difference, in the caudal aspect of the right hepatic lobe.  Malignancy not fully excluded; correlate clinically, and consider outpatient sonography, or liver mass protocol MRI or CT.     Old granulomatous disease     Currently, he reports his blood " pressure has been labile.  He continues to experience constant pain, exacerbated with certain movements.  Pain is primarily in his back, burning sensation with squeezing sensation in his lungs..  He denies any trauma.  Muscle relaxer has provided brief, temporary relief.  At worst, pain is 10/10.    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 -     Hyperlipidemia     Hypertension     Memory loss     Thyroid nodule 8/22/2019    Traumatic iritis - Left Eye 2/27/2013       SURGICAL HISTORY:  Past Surgical History:   Procedure Laterality Date    CEREBRAL ANGIOGRAM N/A 1/6/2020    Procedure: ANGIOGRAM-CEREBRAL;  Surgeon: [place] Diagnostic Provider;  [facility];  Service: Radiology;  Laterality: N/A;  /Nagi    CERVICAL FUSION  12/30/2015    COLONOSCOPY N/A 4/7/2017    Procedure: COLONOSCOPY;  Surgeon: Handy Frederick MD;  Location: [facility] ENDO (4TH FLR);  Service: Endoscopy;  Laterality: N/A;    CORONARY ANGIOGRAPHY N/A 10/10/2022    Procedure: ANGIOGRAM, CORONARY ARTERY;  Surgeon: Anson Nolan MD;  Location: [facility] CATH LAB;  Service: Cardiology;  Laterality: N/A;    HERNIA REPAIR      PROSTATECTOMY         MEDS:  Medcard reviewed and updated    ALLERGIES: Allergy Card reviewed and updated    SOCIAL HISTORY:   The patient is a nonsmoker.    PE:   APPEARANCE: Well nourished, well developed, in no acute distress.    CHEST: Lungs clear to auscultation with unlabored respirations.  CARDIOVASCULAR: Normal S1, S2. No murmurs. No carotid bruits. No pedal edema.  ABDOMEN: Bowel sounds normal. Not distended. Soft. No tenderness or masses.   PSYCHIATRIC: The patient is oriented  to person, place, and time and has a pleasant affect.        ASSESSMENT/PLAN:  Hi was seen today for follow-up and back pain.    Diagnoses and all orders for this visit:    Back pain, unspecified back location, unspecified back pain laterality, unspecified chronicity  -     X-Ray Thoracolumbar Spine AP Lateral; Future    Abnormality of pancreatic duct  -     Ambulatory referral/consult to Gastroenterology; Future    Localized enlarged lymph nodes  -     CT Chest Without Contrast; Future    Essential hypertension  -     losartan (COZAAR) 100 MG tablet; Take 1 tablet (100 mg total) by mouth once daily.  -     blood pressure is mildly elevated, may be secondary to pain    Other orders  -     Discontinue: gabapentin (NEURONTIN) 100 MG capsule; Take 1 capsule (100 mg total) by mouth 3 (three) times daily.  -     gabapentin (NEURONTIN) 100 MG capsule; Take 1 capsule (100 mg total) by mouth 3 (three) times daily. for 7 days

## 2023-08-17 ENCOUNTER — TELEPHONE (OUTPATIENT)
Dept: ENDOSCOPY | Facility: HOSPITAL | Age: 63
End: 2023-08-17
Payer: MEDICARE

## 2023-08-17 NOTE — TELEPHONE ENCOUNTER
----- Message from Dina Bailey sent at 8/17/2023 12:50 PM CDT -----  Regarding: Appt  Contact: Pt @ [phone]  Pt is calling to get appt. Asking for a call back

## 2023-08-21 ENCOUNTER — OFFICE VISIT (OUTPATIENT)
Dept: GASTROENTEROLOGY | Facility: CLINIC | Age: 63
End: 2023-08-21
Payer: MEDICARE

## 2023-08-21 ENCOUNTER — TELEPHONE (OUTPATIENT)
Dept: ENDOSCOPY | Facility: HOSPITAL | Age: 63
End: 2023-08-21
Payer: MEDICARE

## 2023-08-21 ENCOUNTER — PATIENT MESSAGE (OUTPATIENT)
Dept: INTERNAL MEDICINE | Facility: CLINIC | Age: 63
End: 2023-08-21
Payer: MEDICARE

## 2023-08-21 ENCOUNTER — PATIENT MESSAGE (OUTPATIENT)
Dept: ENDOSCOPY | Facility: HOSPITAL | Age: 63
End: 2023-08-21
Payer: MEDICARE

## 2023-08-21 ENCOUNTER — TELEPHONE (OUTPATIENT)
Dept: ENDOSCOPY | Facility: HOSPITAL | Age: 63
End: 2023-08-21

## 2023-08-21 VITALS
DIASTOLIC BLOOD PRESSURE: 87 MMHG | WEIGHT: 200 LBS | HEART RATE: 76 BPM | SYSTOLIC BLOOD PRESSURE: 133 MMHG | HEIGHT: 76 IN | BODY MASS INDEX: 24.36 KG/M2

## 2023-08-21 VITALS — WEIGHT: 200 LBS | BODY MASS INDEX: 24.36 KG/M2 | HEIGHT: 76 IN

## 2023-08-21 DIAGNOSIS — D37.4 VILLOUS ADENOMA OF COLON: ICD-10-CM

## 2023-08-21 DIAGNOSIS — K92.1 BLOOD IN STOOL: ICD-10-CM

## 2023-08-21 DIAGNOSIS — R93.89 ABNORMAL CT SCAN, CHEST: Primary | ICD-10-CM

## 2023-08-21 DIAGNOSIS — R93.89 ABNORMAL FINDING ON IMAGING: Primary | ICD-10-CM

## 2023-08-21 DIAGNOSIS — R94.6 ABNORMAL THYROID SCAN: ICD-10-CM

## 2023-08-21 DIAGNOSIS — D37.4 POLYP OF COLON, VILLOUS ADENOMA: ICD-10-CM

## 2023-08-21 DIAGNOSIS — R07.9 CHEST PAIN, UNSPECIFIED TYPE: ICD-10-CM

## 2023-08-21 DIAGNOSIS — M54.6 ACUTE THORACIC BACK PAIN, UNSPECIFIED BACK PAIN LATERALITY: ICD-10-CM

## 2023-08-21 DIAGNOSIS — Z12.11 ENCOUNTER FOR SCREENING COLONOSCOPY: Primary | ICD-10-CM

## 2023-08-21 PROCEDURE — 3079F DIAST BP 80-89 MM HG: CPT | Mod: CPTII,S$GLB,, | Performed by: INTERNAL MEDICINE

## 2023-08-21 PROCEDURE — 99999 PR PBB SHADOW E&M-EST. PATIENT-LVL V: ICD-10-PCS | Mod: PBBFAC,,, | Performed by: INTERNAL MEDICINE

## 2023-08-21 PROCEDURE — 1159F PR MEDICATION LIST DOCUMENTED IN MEDICAL RECORD: ICD-10-PCS | Mod: CPTII,S$GLB,, | Performed by: INTERNAL MEDICINE

## 2023-08-21 PROCEDURE — 3008F BODY MASS INDEX DOCD: CPT | Mod: CPTII,S$GLB,, | Performed by: INTERNAL MEDICINE

## 2023-08-21 PROCEDURE — 1101F PR PT FALLS ASSESS DOC 0-1 FALLS W/OUT INJ PAST YR: ICD-10-PCS | Mod: CPTII,S$GLB,, | Performed by: INTERNAL MEDICINE

## 2023-08-21 PROCEDURE — 4010F ACE/ARB THERAPY RXD/TAKEN: CPT | Mod: CPTII,S$GLB,, | Performed by: INTERNAL MEDICINE

## 2023-08-21 PROCEDURE — 1159F MED LIST DOCD IN RCRD: CPT | Mod: CPTII,S$GLB,, | Performed by: INTERNAL MEDICINE

## 2023-08-21 PROCEDURE — 3288F PR FALLS RISK ASSESSMENT DOCUMENTED: ICD-10-PCS | Mod: CPTII,S$GLB,, | Performed by: INTERNAL MEDICINE

## 2023-08-21 PROCEDURE — 3075F SYST BP GE 130 - 139MM HG: CPT | Mod: CPTII,S$GLB,, | Performed by: INTERNAL MEDICINE

## 2023-08-21 PROCEDURE — 3079F PR MOST RECENT DIASTOLIC BLOOD PRESSURE 80-89 MM HG: ICD-10-PCS | Mod: CPTII,S$GLB,, | Performed by: INTERNAL MEDICINE

## 2023-08-21 PROCEDURE — 3288F FALL RISK ASSESSMENT DOCD: CPT | Mod: CPTII,S$GLB,, | Performed by: INTERNAL MEDICINE

## 2023-08-21 PROCEDURE — 3008F PR BODY MASS INDEX (BMI) DOCUMENTED: ICD-10-PCS | Mod: CPTII,S$GLB,, | Performed by: INTERNAL MEDICINE

## 2023-08-21 PROCEDURE — 99205 PR OFFICE/OUTPT VISIT, NEW, LEVL V, 60-74 MIN: ICD-10-PCS | Mod: S$GLB,,, | Performed by: INTERNAL MEDICINE

## 2023-08-21 PROCEDURE — 3044F HG A1C LEVEL LT 7.0%: CPT | Mod: CPTII,S$GLB,, | Performed by: INTERNAL MEDICINE

## 2023-08-21 PROCEDURE — 4010F PR ACE/ARB THEARPY RXD/TAKEN: ICD-10-PCS | Mod: CPTII,S$GLB,, | Performed by: INTERNAL MEDICINE

## 2023-08-21 PROCEDURE — 99999 PR PBB SHADOW E&M-EST. PATIENT-LVL V: CPT | Mod: PBBFAC,,, | Performed by: INTERNAL MEDICINE

## 2023-08-21 PROCEDURE — 99205 OFFICE O/P NEW HI 60 MIN: CPT | Mod: S$GLB,,, | Performed by: INTERNAL MEDICINE

## 2023-08-21 PROCEDURE — 1101F PT FALLS ASSESS-DOCD LE1/YR: CPT | Mod: CPTII,S$GLB,, | Performed by: INTERNAL MEDICINE

## 2023-08-21 PROCEDURE — 3075F PR MOST RECENT SYSTOLIC BLOOD PRESS GE 130-139MM HG: ICD-10-PCS | Mod: CPTII,S$GLB,, | Performed by: INTERNAL MEDICINE

## 2023-08-21 PROCEDURE — 3044F PR MOST RECENT HEMOGLOBIN A1C LEVEL <7.0%: ICD-10-PCS | Mod: CPTII,S$GLB,, | Performed by: INTERNAL MEDICINE

## 2023-08-21 NOTE — Clinical Note
Angelina and Dr. Means looks like your doing upper EUS tomorrow to look at his pancreas based on CT scan just want to make sure your aware of his Interval enlargement of a right superior hilar lymph node, now measuring approximately 14 x 13 mm in axial dimension.  Etiology uncertain.  Broad differential diagnostic considerations include infectious, inflammatory, or neoplastic processes.  Correlate clinically, and with follow-up chest CT in 3-6 months.

## 2023-08-21 NOTE — TELEPHONE ENCOUNTER
Spoke to patient to schedule procedure(s) Upper Endoscopy Ultrasound (EUS)       Physician to perform procedure(s) Dr. JASON Means  Date of Procedure (s) 8/22/23  Arrival Time 10:00 AM  Time of Procedure(s) 11:00 AM   Location of Procedure(s) [address]  Type of Rx Prep sent to patient: Other  Instructions provided to patient via [facility]    Patient was informed on the following information and verbalized understanding. Screening questionnaire reviewed with patient and complete. If procedure requires anesthesia, a responsible adult needs to be present to accompany the patient home, patient cannot drive after receiving anesthesia. Appointment details are tentative, especially check-in time. Patient will receive a prep-op call 4 days prior to confirm check-in time for procedure. If applicable the patient should contact their pharmacy to verify Rx for procedure prep is ready for pick-up. Patient was advised to call the scheduling department at [phone] if pharmacy states no Rx is available. Patient was advised to call the endoscopy scheduling department if any questions or concerns arise.      [facility] Scheduling Department

## 2023-08-21 NOTE — Clinical Note
"Procedure: Colonoscopy  Diagnosis:  Blood in stool, past due on surveillance colonoscopy for advanced colon polyp villous adenoma greater than 10 mm  Procedure Timin-4 weeks  *If within 4 weeks selected, please noris as high priority*  *If greater than 12 weeks, please select "5-12 weeks" and delay sending until 2 months prior to requested date*  Provider: Any [facility] provider (last scoped Dr. Frederick)  Location: [address]  Additional Scheduling Information: No scheduling concerns  Prep Specifications:Standard prep  Is the patient taking a GLP-1 Agonist:no  Have you attached a patient to this message: yes "

## 2023-08-21 NOTE — PATIENT INSTRUCTIONS
"Procedure: Colonoscopy    Diagnosis:  Blood in stool, past due on surveillance colonoscopy for advanced colon polyp villous adenoma greater than 10 mm    Procedure Timin-4 weeks    *If within 4 weeks selected, please noris as high priority*    *If greater than 12 weeks, please select "5-12 weeks" and delay sending until 2 months prior to requested date*    Provider: Any [facility] provider (last scoped Dr. Frederick)    Location: [address]    Additional Scheduling Information: No scheduling concerns    Prep Specifications:Standard prep    Is the patient taking a GLP-1 Agonist:no    Have you attached a patient to this message: yes   "

## 2023-08-21 NOTE — Clinical Note
Angelina please refer patient to Cardiology for cardiac evaluation referral placed  Please refer patient to Pulmonary for evaluation of abnormal CT scan of the long referral placed  Please refer patient to pain management for pain management evaluation for chest and back pain  Please have patient see primary care doctor this week or early next week for evaluation of chest pain back pain  Referral placed endoscopy schedulers for colonoscopy  Return to general GI clinic 8 weeks telemedicine video visit

## 2023-08-21 NOTE — PROGRESS NOTES
[facility] Consultation Note    Reason for Consult:  The primary encounter diagnosis was Abnormal CT scan, chest. Diagnoses of Blood in stool, Villous adenoma of colon, Chest pain, unspecified type, and Acute thoracic back pain, unspecified back pain laterality were also pertinent to this visit.    PCP:   Josefina Kendall   [address] / [place] [address]    Referring MD:  Ryder Seay Md  [address] [place] [address]    Initial History of Present Illness (HPI):  This is a 63 y.o. male here for ER follow-up for chest pain thoracic back pain.  Patient was in the ER recently complaining of new onset chest pain thoracic back pain as the part of his evaluation he underwent a CTA of the chest abdomen and pelvis which had several abnormal findings not certain why he is here in general GI clinic it looks like he has advanced endoscopy pancreas service endoscopic ultrasound tomorrow to take a look at his pancreas and perihilar lymphadenopathy he does have a history of advanced colon adenomas polyp diagnosed at age 57 he is past due on surveillance colonoscopy noticed a little bit of painless red blood today will set him up for follow-up surveillance colonoscopy with colon rectal surgery he did his last colonoscopy knows that all his first-degree relatives his siblings his children his parents should go for the 1st screening colonoscopy at age 38 and every 5 years based on his advanced colon adenomas polyp that he had before the age of 58.  He is in significant discomfort for his chest and thoracic back pain we recommend he follow-up with his primary care doctor in pain management for further evaluation should consider thoracic imaging if EUS tomorrow is unrevealing will refer patient to Pulmonary for follow-up abnormal hilar findings he quit smoking about 15 years ago will recommend follow-up with Endocrinology for thyroid abnormality nodule versus parathyroid on CT scan  as well as Cardiology to rule out cardiac cause of chest pain for which he was in the ER for recently.  No dysphagia no odynophagia no heartburn symptoms no weight loss no change in bowel habits no family history of esophageal or stomach cancer nobody with pancreatitis or pancreatic cancer nobody with gallbladder disease nobody with ovarian uterine cancer nobody with kidney or bladder cancer nobody with colon cancer or advanced colon adenomas polyps no weight loss not constipated not having diarrhea.  Not having any abdominal pain.    Abdominal pain - no  Reflux - no  Dysphagia - no   Bowel habits - normal  GI bleeding - none  NSAID usage - none    Interval HPI 08/21/2023:  The patient's last visit with me was on Visit date not found.      ROS:  Constitutional: No fevers, chills, No weight loss  ENT:  No heartburn no dysphagia no odynophagia no hoarseness  CV:  As above chest pain, no palpitation  Pulm: No cough, No shortness of breath, no wheezing  Ophtho: No vision changes  GI: see HPI  Derm: No rash, no itching  Heme: No lymphadenopathy, No easy bruising  MSK:  Thoracic back pain  : No dysuria, No hematuria  Endo: No hot or cold intolerance  Neuro: No syncope, No seizure, no strokes  Psych: No uncontrolled anxiety, No uncontrolled depression    Medical History:  has a past medical history of Carotid artery occlusion, Coronary artery disease, Diabetes mellitus, type 2, Difficult intubation, DJD (degenerative joint disease), cervical (7/10/2015), GERD (gastroesophageal reflux disease), History of iritis (2013), Hyperlipidemia, Hypertension, Memory loss, Thyroid nodule (8/22/2019), and Traumatic iritis - Left Eye (2/27/2013).    Surgical History:  has a past surgical history that includes Hernia repair; Cervical fusion (12/30/2015); Colonoscopy (N/A, 4/7/2017); Prostatectomy; Cerebral angiogram (N/A, 1/6/2020); and Coronary angiography (N/A, 10/10/2022).    Family History: family history includes Benign prostatic  hyperplasia in his brother; Cancer in his brother and sister; Cataracts in his maternal grandmother; Diabetes in his paternal uncle; Heart disease in his brother, brother, brother, brother, father, and mother; Hepatitis in his brother; Hyperlipidemia in his brother, brother, and mother; Hypertension in his brother, brother, brother, brother, and mother; Stroke in his mother..     Social History:  reports that he quit smoking about 11 years ago. His smoking use included cigarettes. He started smoking about 41 years ago. He has a 30.0 pack-year smoking history. He has never been exposed to tobacco smoke. He has never used smokeless tobacco. He reports current alcohol use. He reports that he does not use drugs.    Review of patient's allergies indicates:   Allergen Reactions    Lisinopril Anaphylaxis and Nausea And Vomiting     General bad feeling    Lipitor [atorvastatin] Other (See Comments)     Muscle aches    Adhesive     Crestor [rosuvastatin] Swelling     Lip swelling.     Jardiance [empagliflozin] Other (See Comments)     Possible related to recent UTI's     Metformin      Used XR and experienced flatulance and abdominal pain.        Medication List with Changes/Refills   Current Medications    ALPRAZOLAM (XANAX) 0.5 MG TABLET    TAKE 1 TABLET THREE TIMES DAILY AS NEEDED FOR ANXIETY    ASPIRIN (ECOTRIN) 81 MG EC TABLET    Take 1 tablet (81 mg total) by mouth once daily.    BACLOFEN (LIORESAL) 10 MG TABLET    TAKE 1 TABLET EVERY EVENING    CELECOXIB (CELEBREX) 200 MG CAPSULE    TAKE 1 CAPSULE TWICE DAILY AS NEEDED FOR PAIN    CONTOUR NEXT TEST STRIPS STRP    USE TO TEST BLOOD SUGAR ONCE DAILY    EZETIMIBE (ZETIA) 10 MG TABLET    Take 1 tablet (10 mg total) by mouth once daily.    GABAPENTIN (NEURONTIN) 100 MG CAPSULE    Take 1 capsule (100 mg total) by mouth 3 (three) times daily. for 7 days    GABAPENTIN (NEURONTIN) 300 MG CAPSULE    TAKE 1 CAPSULE THREE TIMES DAILY    INSULIN (LANTUS SOLOSTAR U-100 INSULIN)  "GLARGINE 100 UNITS/ML SUBQ PEN    Inject 15 Units into the skin every evening.    LIDOCAINE (LIDODERM) 5 %    Place 1 patch onto the skin once daily. Remove & Discard patch within 12 hours or as directed by MD    LOSARTAN (COZAAR) 100 MG TABLET    Take 1 tablet (100 mg total) by mouth once daily.    METHOCARBAMOL (ROBAXIN) 750 MG TAB    TAKE 1 TABLET (750 MG TOTAL) BY MOUTH 2 (TWO) TIMES A DAY. FOR 5 DAYS    METOPROLOL SUCCINATE (TOPROL-XL) 50 MG 24 HR TABLET    Take 1.5 tablets (75 mg total) by mouth once daily.    MICROLET LANCET MISC    1 lancet by Misc.(Non-Drug; Combo Route) route once daily. Test blood glucose once daily as instructed.    NAPROXEN (NAPROSYN) 500 MG TABLET    Take 1 tablet (500 mg total) by mouth 2 (two) times daily.    NIFEDIPINE (PROCARDIA-XL) 60 MG (OSM) 24 HR TABLET    Take 1 tablet (60 mg total) by mouth once daily.    NITROGLYCERIN (NITROSTAT) 0.3 MG SL TABLET    Place 1 tablet (0.3 mg total) under the tongue every 5 (five) minutes as needed for Chest pain.    ONDANSETRON (ZOFRAN) 4 MG TABLET    Take 1 tablet (4 mg total) by mouth every 6 (six) hours as needed for Nausea.    PANTOPRAZOLE (PROTONIX) 40 MG TABLET    Take 1 tablet (40 mg total) by mouth 2 (two) times daily.    PEN NEEDLE, DIABETIC (BD ULTRA-FINE CHET PEN NEEDLE) 32 GAUGE X 5/32" NDLE    USE PEN NEEDLE AS INSTRUCTION WITH LANTUS INSULIN PRE-FILLED PEN.    PRAVASTATIN (PRAVACHOL) 80 MG TABLET    Take 1 tablet (80 mg total) by mouth once daily.    SHINGRIX, PF, 50 MCG/0.5 ML INJECTION        SPIRONOLACTONE (ALDACTONE) 50 MG TABLET    Take 1 tablet (50 mg total) by mouth once daily.    TADALAFIL (CIALIS) 20 MG TAB    Take 1 tablet (20 mg total) by mouth every 72 hours as needed (ED).         Objective Findings:    Vital Signs:  /87   Pulse 76   Ht 6' 4" (1.93 m)   Wt 90.7 kg (200 lb)   BMI 24.34 kg/m²   Body mass index is 24.34 kg/m².    Physical Exam:  General Appearance: Well appearing   Eyes:    No scleral " icterus  ENT:  No lesions or masses   Lungs: CTA bilaterally, no wheezes, no rhonchi, no rales  Heart:  S1, S2 normal, no murmurs heard  Abdomen:  Non distended, soft, no guarding, no rebound, no tenderness, no appreciated ascites, no bruits, no hepatosplenomegaly,  No CVA tenderness, no appreciated hernias, no Hurley sign no McBurney point tenderness  Musculoskeletal:  No major joint deformities no thoracic spine tenderness  Skin: No petechiae or rash on exposed skin areas  Neurologic:  Alert and oriented x4  Psychiatric:  Normal speech mentation and affect    Labs:  Lab Results   Component Value Date    WBC 6.76 08/15/2023    HGB 15.7 08/15/2023    HCT 45.8 08/15/2023     08/15/2023    CHOL 178 06/28/2023    TRIG 170 (H) 06/28/2023    HDL 44 06/28/2023    ALT 28 08/15/2023    AST 32 08/15/2023     08/15/2023    K 3.9 08/15/2023     08/15/2023    CREATININE 1.2 08/15/2023    BUN 13 08/15/2023    CO2 23 08/15/2023    TSH 3.393 06/28/2023    PSA 21.2 (H) 02/06/2017    INR 0.9 08/22/2019    HGBA1C 6.6 (H) 06/28/2023           Medical Decision Making:  Total time with patient reviewing prior medical records  Including taking history physical exam reviewing allergies medications  CT scan images personally myself prior colonoscopy EGD images and path  Making recommendations on family members getting screened early at age 40 and every 5 years based on his advanced colon adenomas polyp  Making referrals Has been 60 minutes with  50% of the time face-to-face in room with patient today  EXAMINATION:  CTA CHEST ABDOMEN PELVIS     CLINICAL HISTORY:  Aortic dissection suspected;     Additional history, from today's ED nursing triage note and today's ED provider note: Midsternal chest pain/pressure, radiating to the back, for 2 days.  No relief from oral nitroglycerin at home.     TECHNIQUE:  Prior to administration of IV contrast, CT imaging of the thoracic aorta was performed from the thoracic inlet through  the upper abdomen.     Following the intravenous administration of 100 mL Omnipaque 350, arterial phase angiographic CT imaging of the chest, abdomen, and pelvis was performed from the thoracic inlet through the lesser femoral trochanters.     Images submitted and reviewed include  images, overlapping 1.25 mm axial images, and overlapping sagittal coronal 3 mm two-dimensional multiplanar reformations.     COMPARISON:  Chest CTA 11/19/2014.     Noncontrast renal stone protocol CT abdomen and pelvis 05/01/2022     FINDINGS:  Pre contrast images demonstrate no evidence of hyperattenuating acute intramural hematoma in the thoracic aorta.     There are occasional, scattered aortic and branch vessel atherosclerotic calcifications, including calcification in the proximal LAD coronary artery (a CT marker for underlying coronary artery disease).     Postcontrast images demonstrate no evidence of dissection, aneurysm, pseudoaneurysm, or other acute abnormality of the thoracic or abdominal aorta, or iliac arteries.     Celiac trunk, SMA, MACARIO, main renal arteries, and visualized portions of the brachiocephalic great arteries are patent.     Pulmonary arteries are reasonably well enhanced and demonstrate no filling defects suspicious for pulmonary thromboemboli, through the level of the segmental pulmonary artery branches.     No mediastinal hematoma, hemopericardium, hemothorax, retroperitoneal hematoma, or hemoperitoneum.     12 mm rim enhancing nodule in or adjacent to the caudal aspect of the left thyroid lobe.  Superior poles of the thyroid lobes are not fully visualized.     Thoracic trachea and visualized central bronchi are patent.     Normal course and caliber of the thoracic esophagus.     A right hilar lymph node has enlarged since 2014, now measuring approximately 14 x 13 mm in axial dimension (and 12 mm in craniocaudal dimension).  Otherwise, no mediastinal, hilar, supraclavicular, or axillary mass or  lymphadenopathy (by size criteria).     Biapical paraseptal emphysema and fibronodular changes, similar to 2014, and including a right apical calcification that likely represents a granuloma.     Mild centrilobular pulmonary emphysema.     No consolidation or dominant pulmonary mass.     The liver demonstrates a normal size but appears diffusely hypoattenuating, including on pre contrast images or its attenuation ranges from approximately 41 to 46 HU.     On postcontrast images, there is a 1.3 cm nodule focus of bright parenchymal enhancement in the caudal aspect of the right hepatic lobe, likely a flash-filling hemangioma (series 4, image 523); malignancy not fully excluded.  Inferolateral to this, there is a geographic region of faintly higher attenuation in the right hepatic lobe, possibly representing a region of focal fatty sparing or transient attenuation difference.     Pancreas appears mildly atrophic.  The pancreatic duct appears prominent at the head and neck, with a total diameter of 4 mm at the neck.     Kidneys demonstrate no hydronephrosis or radiopaque calculi. Small cortical hypodensities in the right kidney are favored to represent cysts but are not fully characterized on this scan.     The gallbladder, bile ducts, spleen, and adrenal glands demonstrate no acute abnormalities.     Stomach: Poorly distended.  This limits CT assessment for mural mucosal thickening.     Intestines: Normal course and caliber of the duodenal C-loop.  Occasional colonic diverticula, no CT evidence of acute diverticulitis.  Normal appendix.     Peritoneal cavity: No free intraperitoneal air, or free or loculated intraperitoneal fluid.     Lymph nodes: No pathologic intra-abdominal intrapelvic lymph node enlargement, by size criteria.     Urinary bladder: Moderately distended.     Prostate gland: Normal size.     Seminal vesicles: Mild symmetric prominence.     Musculoskeletal/body wall: Scattered degenerative changes, with  partial ankylosis left SI joint.     Small sclerotic intraosseous foci, such as those seen in the left iliac wing, both proximal femurs, left inferior pubic ramus, left proximal humerus most likely represent bone islands; malignancy less likely.     Impression:     No aneurysm, dissection, or other acute abnormality of the thoracic or abdominal aorta.     Scattered ASCVD, including evidence of coronary disease.     No pulmonary thromboemboli apparent, through the level of the segmental pulmonary arteries.     4 mm pancreatic duct diameter at the pancreatic neck.  Underlying ductectatic tumor not excluded.  Consider outpatient MRI/MRCP.     12 mm rim enhancing left lower pole thyroid (or parathyroid?) nodule.  Correlate clinically.     Interval enlargement of a right superior hilar lymph node, now measuring approximately 14 x 13 mm in axial dimension.  Etiology uncertain.  Broad differential diagnostic considerations include infectious, inflammatory, or neoplastic processes.  Correlate clinically, and with follow-up chest CT in 3-6 months.     Small right renal cortical hypodensities, too small fully characterize by CT.  Correlate clinically.  Consider outpatient sonography.     Diffuse hepatic steatosis.     Probable flash-filling hepatic hemangioma, and adjacent focal fatty sparing or transient attenuation difference, in the caudal aspect of the right hepatic lobe.  Malignancy not fully excluded; correlate clinically, and consider outpatient sonography, or liver mass protocol MRI or CT.     Old granulomatous disease.     This report was flagged in Epic as abnormal.        Electronically signed by: Rich Mane  Date:                                            08/15/2023  The                         Olympus scope GIF- ([id number]) was introduced                         through the mouth, and advanced to the second part                         of duodenum. The upper GI endoscopy was                          accomplished without difficulty. The patient                         tolerated the procedure well.   Findings:        The examined esophagus was normal.        Esophagogastric landmarks were identified: the Z-line was found at        45 cm and the gastroesophageal junction was found at 45 cm from the        incisors. No hiatal hernia appreciated.        Biopsies were taken with a cold forceps in the mid esophagus and in        the distal esophagus for histology.        The cardia and gastric fundus were normal on retroflexion.        The gastric body and gastric antrum were normal.        The examined duodenum was normal.   Impression:           - Normal esophagus.                         - Esophagogastric landmarks identified.                         - Normal gastric body and antrum.                         - Normal examined duodenum.                         - Biopsies were taken with a cold forceps for                         histology in the mid esophagus and in the distal                         esophagus.   Recommendation:       - Discharge patient to home.                         - Patient has a contact number available for                         emergencies. The signs and symptoms of potential                         delayed complications were discussed with the                         patient. Return to normal activities tomorrow.                         Written discharge instructions were provided to the                         patient.                         - Resume previous diet.                         - Continue present medications.                         - Await pathology results.                         - Telephone GI clinic for pathology results in 1                         week.   Attending Participation:        I personally performed the entire procedure.   Rubin Barrios MD   3/13/2018     SPECIMEN 1) Lower esophagus biopsy, rule out EOE. 2) Mid esophagus biopsy, rule out EOE. FINAL  PATHOLOGIC DIAGNOSIS 1. LOWER ESOPHAGUS (BIOPSY): -Esophagus squamous mucosa with lymphocytic esophagitis -Favor reflux esophagitis -No significant eosinophil population 2. MID ESOPHAGUS (BIOPSY): -Esophagus squamous mucosa with mild esophagitis compatible with reflux -No significant eosinophil population Diagnosed by: Faiza Pichardo M.D. (Electronically Signed: 2018-03-16 09:41:39)   The Olympus scope CF-[id number] ([id number]) was                         introduced through the anus and advanced to the                         cecum, identified by appendiceal orifice and                         ileocecal valve. The colonoscopy was performed                         without difficulty. The patient tolerated the                         procedure well. The quality of the bowel                         preparation was good. The ileocecal valve,                         appendiceal orifice, and rectum were photographed.   Findings:        A 13 mm polyp was found in the recto-sigmoid colon. The polyp was        pedunculated. The polyp was removed with a hot snare. Resection and        retrieval were complete. Estimated blood loss: none.   Impression:           - One 13 mm polyp at the recto-sigmoid colon,                         removed with a hot snare. Resected and retrieved.   Recommendation:       - Repeat colonoscopy date to be determined after                         pending pathology results are reviewed for                         surveillance based on pathology results.                         - Discharge patient to home.                         - Resume previous diet.                         - Continue present medications.                         - Patient has a contact number available for                         emergencies. The signs and symptoms of potential                         delayed complications were discussed with the                         patient. Return to normal activities tomorrow.                          Written discharge instructions were provided to the                         patient.   Attending Participation:        I personally performed the entire procedure.   Handy Frederick MD   4/7/2017     SPECIMEN 1) Rectosigmoid polyp. FINAL PATHOLOGIC DIAGNOSIS SPECIMEN FROM RECTOSIGMOID: ADENOVILLOUS POLYP Diagnosed by: Emeka Washington M.D. (Electronically Signed: 2017-04-13 10:35:43)    Older Notes Back to Top      Notes Recorded by Handy Frederick MD on 4/15/2017 at 9:31 AM  Pathology reveals 1 large villous adenoma.  Recommend repeat colonoscopy 3 yrs for surveillance.  Please let patient know.  Thx.     Assessment:  1. Abnormal CT scan, chest    2. Blood in stool    3. Villous adenoma of colon    4. Chest pain, unspecified type    5. Acute thoracic back pain, unspecified back pain laterality         Recommendations:  1. Urgent referral to Cardiology for evaluation of chest pain patient with a very strong family history cardiovascular disease  2. Referral to primary care doctor for evaluation of chest pain and thoracic back pain evaluate for possible thoracic etiology  3. Referral to endoscopy schedulers for colonoscopy follow-up with [facility] for episode of blood in stool and advanced colon adenomas polyp.  4. Patient knows that all his first-degree relatives his children his siblings should go for the 1st screening colonoscopy at age 40 and every 5 years even if normal based on his advanced colon adenomas polyp that he had at age 57 years of age  5. Referral to pain management for pain control  6. Referral to Endocrinology for evaluation of 12 mm rim enhancing left lower pole thyroid (or parathyroid?) nodule  7. Referral to Pulmonary for evaluation CT scan finding of a right hilar lymph node has enlarged since 2014, now measuring approximately 14 x 13 mm in axial dimension (and 12 mm in craniocaudal dimension).  Otherwise, no mediastinal, hilar, supraclavicular, or axillary mass or lymphadenopathy (by size criteria).    6. Patient has EUS schedule with advanced endoscopy tomorrow for evaluation of abnormal CT scan showing 4 mm pancreatic duct diameter at the pancreatic neck.  Underlying ductectatic tumor not excluded.  A right hilar lymph node has enlarged since 2014, now measuring approximately 14 x 13 mm in axial dimension (and 12 mm in craniocaudal dimension).  Otherwise, no mediastinal, hilar, supraclavicular, or axillary mass or lymphadenopathy (by size criteria).   Follow up in about 3 months (around 11/21/2023).      Order summary:  Orders Placed This Encounter    Ambulatory referral/consult to Pulmonology    Ambulatory referral/consult to Cardiology    Ambulatory referral/consult to Pain Clinic         Thank you so much for allowing me to participate in the care of Hi Braxton    Eber Schulte MD    DISCLAIMER: This note was prepared with [employer] voice recognition transcription software. Garbled syntax, mangled or inadvertent pronouns, and other bizarre constructions may be attributed to that software system. While efforts were made to correct any mistakes made by this voice recognition program, some errors and/or omissions may remain in the note that were missed when the note was originally created.

## 2023-08-21 NOTE — Clinical Note
Dr. Kendall I think Enio needs to see you end of this week or beginning of next week he is in significant pain chest back looks like he has advanced endoscopy EUS to take a look at his pancreas and his hilar lymphadenopathy  I will refer him to colon rectal surgery Clinic for surveillance colonoscopy he is past due  I will refer him to Cardiology for cardiac evaluation and pain management for you  He may need imaging of his thoracic spine if EUS is unrevealing he says he is very uncomfortable.

## 2023-08-21 NOTE — PROGRESS NOTES
"GENERAL GI PATIENT INTAKE:    COVID symptoms in the last 7 days (runny nose, sore throat, congestion, cough, fever): No  PCP: Josefina Kendall  If not PCP-  number given to establish [phone]: N/A    ALLERGIES REVIEWED:  Yes    CHIEF COMPLAINT:    Chief Complaint   Patient presents with    Follow-up       VITAL SIGNS:  /87   Pulse 76   Ht 6' 4" (1.93 m)   Wt 90.7 kg (200 lb)   BMI 24.34 kg/m²      Change in medical, surgical, family or social history: No      REVIEWED MEDICATION LIST RECONCILED INCLUDING ABOVE MEDS:  Yes     "

## 2023-08-22 ENCOUNTER — HOSPITAL ENCOUNTER (OUTPATIENT)
Facility: HOSPITAL | Age: 63
Discharge: HOME | End: 2023-08-22
Attending: INTERNAL MEDICINE | Admitting: INTERNAL MEDICINE
Payer: MEDICARE

## 2023-08-22 ENCOUNTER — TELEPHONE (OUTPATIENT)
Dept: ENDOSCOPY | Facility: HOSPITAL | Age: 63
End: 2023-08-22
Payer: MEDICARE

## 2023-08-22 ENCOUNTER — ANESTHESIA (OUTPATIENT)
Dept: ENDOSCOPY | Facility: HOSPITAL | Age: 63
End: 2023-08-22
Payer: MEDICARE

## 2023-08-22 ENCOUNTER — ANESTHESIA EVENT (OUTPATIENT)
Dept: ENDOSCOPY | Facility: HOSPITAL | Age: 63
End: 2023-08-22
Payer: MEDICARE

## 2023-08-22 VITALS
RESPIRATION RATE: 15 BRPM | HEART RATE: 55 BPM | SYSTOLIC BLOOD PRESSURE: 161 MMHG | BODY MASS INDEX: 24.96 KG/M2 | HEIGHT: 76 IN | TEMPERATURE: 98 F | WEIGHT: 205 LBS | DIASTOLIC BLOOD PRESSURE: 77 MMHG | OXYGEN SATURATION: 97 %

## 2023-08-22 DIAGNOSIS — R93.5 ABNORMAL CT OF THE ABDOMEN: ICD-10-CM

## 2023-08-22 DIAGNOSIS — Z12.11 ENCOUNTER FOR SCREENING COLONOSCOPY: Primary | ICD-10-CM

## 2023-08-22 DIAGNOSIS — Z12.11 SCREEN FOR COLON CANCER: Primary | ICD-10-CM

## 2023-08-22 LAB
POCT GLUCOSE: 107 MG/DL (ref 70–110)
POCT GLUCOSE: 93 MG/DL (ref 70–110)

## 2023-08-22 PROCEDURE — 43259 EGD US EXAM DUODENUM/JEJUNUM: CPT | Mod: ,,, | Performed by: INTERNAL MEDICINE

## 2023-08-22 PROCEDURE — 43259 EGD US EXAM DUODENUM/JEJUNUM: CPT | Performed by: INTERNAL MEDICINE

## 2023-08-22 PROCEDURE — D9220A PRA ANESTHESIA: ICD-10-PCS | Mod: ANES,,, | Performed by: ANESTHESIOLOGY

## 2023-08-22 PROCEDURE — D9220A PRA ANESTHESIA: Mod: ANES,,, | Performed by: ANESTHESIOLOGY

## 2023-08-22 PROCEDURE — 82962 GLUCOSE BLOOD TEST: CPT | Performed by: INTERNAL MEDICINE

## 2023-08-22 PROCEDURE — D9220A PRA ANESTHESIA: Mod: CRNA,,, | Performed by: NURSE ANESTHETIST, CERTIFIED REGISTERED

## 2023-08-22 PROCEDURE — 37000009 HC ANESTHESIA EA ADD 15 MINS: Performed by: INTERNAL MEDICINE

## 2023-08-22 PROCEDURE — 63600175 PHARM REV CODE 636 W HCPCS: Performed by: NURSE ANESTHETIST, CERTIFIED REGISTERED

## 2023-08-22 PROCEDURE — D9220A PRA ANESTHESIA: ICD-10-PCS | Mod: CRNA,,, | Performed by: NURSE ANESTHETIST, CERTIFIED REGISTERED

## 2023-08-22 PROCEDURE — 25000003 PHARM REV CODE 250: Performed by: NURSE ANESTHETIST, CERTIFIED REGISTERED

## 2023-08-22 PROCEDURE — 43259 PR ENDOSCOPIC ULTRASOUND EXAM: ICD-10-PCS | Mod: ,,, | Performed by: INTERNAL MEDICINE

## 2023-08-22 PROCEDURE — 82962 GLUCOSE BLOOD TEST: CPT | Mod: 91 | Performed by: INTERNAL MEDICINE

## 2023-08-22 PROCEDURE — 25000003 PHARM REV CODE 250: Performed by: INTERNAL MEDICINE

## 2023-08-22 PROCEDURE — 37000008 HC ANESTHESIA 1ST 15 MINUTES: Performed by: INTERNAL MEDICINE

## 2023-08-22 RX ORDER — PROPOFOL 10 MG/ML
VIAL (ML) INTRAVENOUS CONTINUOUS PRN
Status: DISCONTINUED | OUTPATIENT
Start: 2023-08-22 | End: 2023-08-22

## 2023-08-22 RX ORDER — SODIUM CHLORIDE 0.9 % (FLUSH) 0.9 %
10 SYRINGE (ML) INJECTION
Status: DISCONTINUED | OUTPATIENT
Start: 2023-08-22 | End: 2023-08-22 | Stop reason: HOSPADM

## 2023-08-22 RX ORDER — SODIUM CHLORIDE 9 MG/ML
INJECTION, SOLUTION INTRAVENOUS CONTINUOUS
Status: DISCONTINUED | OUTPATIENT
Start: 2023-08-22 | End: 2023-08-22 | Stop reason: HOSPADM

## 2023-08-22 RX ORDER — PROPOFOL 10 MG/ML
VIAL (ML) INTRAVENOUS
Status: DISCONTINUED | OUTPATIENT
Start: 2023-08-22 | End: 2023-08-22

## 2023-08-22 RX ORDER — FENTANYL CITRATE 50 UG/ML
INJECTION, SOLUTION INTRAMUSCULAR; INTRAVENOUS
Status: DISCONTINUED | OUTPATIENT
Start: 2023-08-22 | End: 2023-08-22

## 2023-08-22 RX ORDER — LIDOCAINE HYDROCHLORIDE 20 MG/ML
INJECTION INTRAVENOUS
Status: DISCONTINUED | OUTPATIENT
Start: 2023-08-22 | End: 2023-08-22

## 2023-08-22 RX ADMIN — FENTANYL CITRATE 25 MCG: 50 INJECTION, SOLUTION INTRAMUSCULAR; INTRAVENOUS at 12:08

## 2023-08-22 RX ADMIN — LIDOCAINE HYDROCHLORIDE 100 MG: 20 INJECTION INTRAVENOUS at 12:08

## 2023-08-22 RX ADMIN — PROPOFOL 175 MCG/KG/MIN: 10 INJECTION, EMULSION INTRAVENOUS at 12:08

## 2023-08-22 RX ADMIN — SODIUM CHLORIDE: 0.9 INJECTION, SOLUTION INTRAVENOUS at 12:08

## 2023-08-22 RX ADMIN — PROPOFOL 50 MG: 10 INJECTION, EMULSION INTRAVENOUS at 12:08

## 2023-08-22 NOTE — TELEPHONE ENCOUNTER
"----- Message from Eber Schulte MD sent at 2023  4:30 PM CDT -----  Procedure: Colonoscopy    Diagnosis:  Blood in stool, past due on surveillance colonoscopy for advanced colon polyp villous adenoma greater than 10 mm    Procedure Timin-4 weeks    #If within 4 weeks selected, please noris as high priority#    #If greater than 12 weeks, please select "5-12 weeks" and delay sending until 2 months prior to requested date#    Provider: Any CRS provider (last scoped Dr. Frederick)    Location: [address]    Additional Scheduling Information: No scheduling concerns    Prep Specifications:Standard prep    Is the patient taking a GLP-1 Agonist:no    Have you attached a patient to this message: yes   "

## 2023-08-22 NOTE — PLAN OF CARE
Pt a/o x4.VSS. no c/o pain or nausea. No signs of distress noted. IV removed prior to discharge. D/c instructions given to pt and spouse both verbally agreed to understanding. Pt meets criteria for discharge and discharged in stable condition.

## 2023-08-22 NOTE — TELEPHONE ENCOUNTER
Spoke to patient to schedule procedure(s) Colonoscopy       Physician to perform procedure(s) Dr. ARMAAN Hinojosa  Date of Procedure (s) 11/28/2023  Arrival Time 7:00 am   Time of Procedure(s) 8:00 am    Location of Procedure(s) [facility] [facility]  Type of Rx Prep sent to patient: PEG  Instructions provided to patient via [facility]    Patient was informed on the following information and verbalized understanding. Screening questionnaire reviewed with patient and complete. If procedure requires anesthesia, a responsible adult needs to be present to accompany the patient home, patient cannot drive after receiving anesthesia. Appointment details are tentative, especially check-in time. Patient will receive a prep-op call 4 days prior to confirm check-in time for procedure. If applicable the patient should contact their pharmacy to verify Rx for procedure prep is ready for pick-up. Patient was advised to call the scheduling department at [phone] if pharmacy states no Rx is available. Patient was advised to call the endoscopy scheduling department if any questions or concerns arise.      [facility] Scheduling Department

## 2023-08-22 NOTE — ANESTHESIA POSTPROCEDURE EVALUATION
Anesthesia Post Evaluation    Patient: Hi Braxton    Procedure(s) Performed: Procedure(s) (LRB):  ULTRASOUND, UPPER GI TRACT, ENDOSCOPIC (N/A)    Final Anesthesia Type: general      Patient location during evaluation: [facility]  Patient participation: Yes- Able to Participate  Level of consciousness: awake and alert  Post-procedure vital signs: reviewed and stable  Pain management: adequate  Airway patency: patent    PONV status at discharge: No PONV  Anesthetic complications: no      Cardiovascular status: hemodynamically stable  Respiratory status: unassisted, spontaneous ventilation and room air  Hydration status: euvolemic  Follow-up not needed.          Vitals Value Taken Time   /77 08/22/23 1353   Temp 36.6 °C (97.8 °F) 08/22/23 1304   Pulse 55 08/22/23 1400   Resp 15 08/22/23 1400   SpO2 97 % 08/22/23 1400   Vitals shown include unvalidated device data.      No case tracking events are documented in the log.      Pain/Wai Score: Wai Score: 10 (8/22/2023  1:35 PM)

## 2023-08-22 NOTE — PROVATION PATIENT INSTRUCTIONS
Discharge Summary/Instructions after an Endoscopic Procedure  Patient Name: Hi Braxton  Patient MRN: [id number]  Patient YOB: 1960 Tuesday, August 22, 2023  Joe Means MD  Dear patient,  As a result of recent federal legislation (The Federal Cures Act), you may   receive lab or pathology results from your procedure in your [facility]   account before your physician is able to contact you. Your physician or   their representative will relay the results to you with their   recommendations at their soonest availability.  Thank you,  RESTRICTIONS:  During your procedure today, you received medications for sedation.  These   medications may affect your judgment, balance and coordination.  Therefore,   for 24 hours, you have the following restrictions:   - DO NOT drive a car, operate machinery, make legal/financial decisions,   sign important papers or drink alcohol.    ACTIVITY:  Today: no heavy lifting, straining or running due to procedural   sedation/anesthesia.  The following day: return to full activity including work.  DIET:  Eat and drink normally unless instructed otherwise.     TREATMENT FOR COMMON SIDE EFFECTS:  - Mild abdominal pain, nausea, belching, bloating or excessive gas:  rest,   eat lightly and use a heating pad.  - Sore Throat: treat with throat lozenges and/or gargle with warm salt   water.  - Because air was used during the procedure, expelling large amounts of air   from your rectum or belching is normal.  - If a bowel prep was taken, you may not have a bowel movement for 1-3 days.    This is normal.  SYMPTOMS TO WATCH FOR AND REPORT TO YOUR PHYSICIAN:  1. Abdominal pain or bloating, other than gas cramps.  2. Chest pain.  3. Back pain.  4. Signs of infection such as: chills or fever occurring within 24 hours   after the procedure.  5. Rectal bleeding, which would show as bright red, maroon, or black stools.   (A tablespoon of blood from the rectum is not serious, especially  if   hemorrhoids are present.)  6. Vomiting.  7. Weakness or dizziness.  GO DIRECTLY TO THE NEAREST EMERGENCY ROOM IF YOU HAVE ANY OF THE FOLLOWING:      Difficulty breathing              Chills and/or fever over 101 F   Persistent vomiting and/or vomiting blood   Severe abdominal pain   Severe chest pain   Black, tarry stools   Bleeding- more than one tablespoon   Any other symptom or condition that you feel may need urgent attention  Your doctor recommends these additional instructions:  If any biopsies were taken, your doctors clinic will contact you in 1 to 2   weeks with any results.  - Discharge patient to home (ambulatory).   - Resume previous diet; Discharge to home (ambulatory); Resume outpatient   medications  - Return to primary care physician as previously scheduled.   - Follow through with other specialist referrals that were arranged by Dr. Schulte.  For questions, problems or results please call your physician - Joe Means MD at Work:  [phone].  [facility], EMERGENCY ROOM PHONE NUMBER: [phone]  IF A COMPLICATION OR EMERGENCY SITUATION ARISES AND YOU ARE UNABLE TO REACH   YOUR PHYSICIAN - GO DIRECTLY TO THE EMERGENCY ROOM.  Joe Means MD  8/22/2023 1:04:13 PM  This report has been verified and signed electronically.  Dear patient,  As a result of recent federal legislation (The Federal Cures Act), you may   receive lab or pathology results from your procedure in your [facility]   account before your physician is able to contact you. Your physician or   their representative will relay the results to you with their   recommendations at their soonest availability.  Thank you,  PROVATION

## 2023-08-22 NOTE — ANESTHESIA PREPROCEDURE EVALUATION
08/22/2023  Hi Braxton is a 63 y.o., male.  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 -     Hyperlipidemia     Hypertension     Memory loss     Thyroid nodule 8/22/2019    Traumatic iritis - Left Eye 2/27/2013     Past Surgical History:   Procedure Laterality Date    CEREBRAL ANGIOGRAM N/A 1/6/2020    Procedure: ANGIOGRAM-CEREBRAL;  Surgeon: [facility] Provider;  Location: [facility] OR 2ND FLR;  Service: Radiology;  Laterality: N/A;  /Nagi    CERVICAL FUSION  12/30/2015    COLONOSCOPY N/A 4/7/2017    Procedure: COLONOSCOPY;  Surgeon: Handy Frederick MD;  Location: [facility] ENDO (4TH FLR);  Service: Endoscopy;  Laterality: N/A;    CORONARY ANGIOGRAPHY N/A 10/10/2022    Procedure: ANGIOGRAM, CORONARY ARTERY;  Surgeon: Anson Nolan MD;  Location: [facility] CATH LAB;  Service: Cardiology;  Laterality: N/A;    HERNIA REPAIR      PROSTATECTOMY             Pre-op Assessment    I have reviewed the Patient Summary Reports.    I have reviewed the NPO Status.   I have reviewed the Medications.     Review of Systems  Anesthesia Hx:  History of prior surgery of interest to airway management or planning: Denies Family Hx of Anesthesia complications.  Personal Hx of Anesthesia complications  Difficult Intubation, according to patient history, glidescope used successfully   Social:  Non-Smoker    Cardiovascular:   Exercise tolerance: good Hypertension CAD      Pulmonary:  Pulmonary Normal    Renal/:  Renal/ Normal     Hepatic/GI:   GERD, well controlled    Endocrine:   Diabetes,  type 2        Physical Exam  General: Well nourished    Airway:  Mallampati: III / II  Mouth Opening: Normal  TM Distance: 4 - 6 cm  Tongue: Normal    Dental:  Intact    Chest/Lungs:  Normal Respiratory Rate    Heart:  Rate: Normal        Anesthesia Plan  Type of Anesthesia, risks & benefits discussed:    Anesthesia Type: Gen Natural Airway  Intra-op Monitoring Plan: Standard ASA Monitors  Induction:  IV  Informed Consent: Informed consent signed with the Patient and all parties understand the risks and agree with anesthesia plan.  All questions answered.   ASA Score: 3  Day of Surgery Review of History & Physical: H&P Update referred to the surgeon/provider.    Ready For Surgery From Anesthesia Perspective.     .

## 2023-08-22 NOTE — ANESTHESIA RELEASE NOTE
"Anesthesia Release from PACU Note    Patient: Hi Braxton    Procedure(s) Performed: Procedure(s) (LRB):  ULTRASOUND, UPPER GI TRACT, ENDOSCOPIC (N/A)    Anesthesia type: general    Post pain: Adequate analgesia    Post assessment: no apparent anesthetic complications and tolerated procedure well    Last Vitals:   Visit Vitals  BP (!) 161/77   Pulse (!) 55   Temp 36.6 °C (97.8 °F) (Tympanic)   Resp 15   Ht 6' 4" (1.93 m)   Wt 93 kg (205 lb)   SpO2 97%   BMI 24.95 kg/m²       Post vital signs: stable    Level of consciousness: awake and alert     Nausea/Vomiting: no nausea/no vomiting    Complications: none    Airway Patency: patent    Respiratory: unassisted, spontaneous ventilation, room air    Cardiovascular: stable and blood pressure at baseline    Hydration: euvolemic  "

## 2023-08-22 NOTE — TRANSFER OF CARE
"Anesthesia Transfer of Care Note    Patient: Hi Braxton    Procedure(s) Performed: Procedure(s) (LRB):  ULTRASOUND, UPPER GI TRACT, ENDOSCOPIC (N/A)    Patient location: [facility]    Anesthesia Type: general    Transport from OR: Transported from OR on room air with adequate spontaneous ventilation    Post pain: adequate analgesia    Post assessment: no apparent anesthetic complications and tolerated procedure well    Post vital signs: stable    Level of consciousness: awake, alert and oriented    Nausea/Vomiting: no nausea/vomiting    Complications: none    Transfer of care protocol was followed      Last vitals:   Visit Vitals  /74   Pulse 73   Temp 36.6 °C (97.8 °F) (Temporal)   Resp 16   Ht 6' 4" (1.93 m)   Wt 93 kg (205 lb)   SpO2 97%   BMI 24.95 kg/m²     "

## 2023-08-22 NOTE — H&P
Short Stay Endoscopy History and Physical    PCP - Josefina Kendall MD  Referring Physician - Eber Schulte MD  [address] [place] [address]    Procedure - EGD/EUS  ASA - per anesthesia  Mallampati - per anesthesia  History of Anesthesia problems - no  Family history Anesthesia problems -  no   Plan of anesthesia - General    HPI:  This is a 63 y.o. male here for evaluation of: dilated PD on CT    Reflux - no  Dysphagia - no  Abdominal pain - no  Diarrhea - no    ROS:  Constitutional: No fevers, chills, No weight loss  CV: No chest pain  Pulm: No cough, No shortness of breath  GI: see HPI    Medical History:  has a past medical history of Carotid artery occlusion, Coronary artery disease, Diabetes mellitus, type 2, Difficult intubation, DJD (degenerative joint disease), cervical (7/10/2015), GERD (gastroesophageal reflux disease), History of iritis (2013), Hyperlipidemia, Hypertension, Memory loss, Thyroid nodule (8/22/2019), and Traumatic iritis - Left Eye (2/27/2013).    Surgical History:  has a past surgical history that includes Hernia repair; Cervical fusion (12/30/2015); Colonoscopy (N/A, 4/7/2017); Prostatectomy; Cerebral angiogram (N/A, 1/6/2020); and Coronary angiography (N/A, 10/10/2022).    Family History: family history includes Benign prostatic hyperplasia in his brother; Cancer in his brother and sister; Cataracts in his maternal grandmother; Diabetes in his paternal uncle; Heart disease in his brother, brother, brother, brother, father, and mother; Hepatitis in his brother; Hyperlipidemia in his brother, brother, and mother; Hypertension in his brother, brother, brother, brother, and mother; Stroke in his mother..    Social History:  reports that he quit smoking about 11 years ago. His smoking use included cigarettes. He started smoking about 41 years ago. He has a 30.0 pack-year smoking history. He has never been exposed to tobacco smoke. He has never used smokeless tobacco.  He reports current alcohol use. He reports that he does not use drugs.    Review of patient's allergies indicates:   Allergen Reactions    Lisinopril Anaphylaxis and Nausea And Vomiting     General bad feeling    Lipitor [atorvastatin] Other (See Comments)     Muscle aches    Adhesive     Crestor [rosuvastatin] Swelling     Lip swelling.     Jardiance [empagliflozin] Other (See Comments)     Possible related to recent UTI's     Metformin      Used XR and experienced flatulance and abdominal pain.        Medications:   Medications Prior to Admission   Medication Sig Dispense Refill Last Dose    ALPRAZolam (XANAX) 0.5 MG tablet TAKE 1 TABLET THREE TIMES DAILY AS NEEDED FOR ANXIETY 90 tablet 3 8/22/2023    ezetimibe (ZETIA) 10 mg tablet Take 1 tablet (10 mg total) by mouth once daily. 90 tablet 3 8/21/2023    gabapentin (NEURONTIN) 100 MG capsule Take 1 capsule (100 mg total) by mouth 3 (three) times daily. for 7 days 21 capsule 0 8/21/2023    insulin (LANTUS SOLOSTAR U-100 INSULIN) glargine 100 units/mL SubQ pen Inject 15 Units into the skin every evening. 15 mL 1 8/21/2023    losartan (COZAAR) 100 MG tablet Take 1 tablet (100 mg total) by mouth once daily. 90 tablet 1 8/22/2023    methocarbamoL (ROBAXIN) 750 MG Tab TAKE 1 TABLET (750 MG TOTAL) BY MOUTH 2 (TWO) TIMES A DAY. FOR 5 DAYS (Patient taking differently: as needed.) 30 tablet 3 8/21/2023    metoprolol succinate (TOPROL-XL) 50 MG 24 hr tablet Take 1.5 tablets (75 mg total) by mouth once daily. 135 tablet 3 8/21/2023    NIFEdipine (PROCARDIA-XL) 60 MG (OSM) 24 hr tablet Take 1 tablet (60 mg total) by mouth once daily. 90 tablet 3 8/21/2023    nitroGLYCERIN (NITROSTAT) 0.3 MG SL tablet Place 1 tablet (0.3 mg total) under the tongue every 5 (five) minutes as needed for Chest pain. 100 tablet 3 Past Week    pantoprazole (PROTONIX) 40 MG tablet Take 1 tablet (40 mg total) by mouth 2 (two) times daily. 180 tablet 3 8/21/2023    pravastatin (PRAVACHOL) 80 MG tablet  "Take 1 tablet (80 mg total) by mouth once daily. 90 tablet 3 8/21/2023    spironolactone (ALDACTONE) 50 MG tablet Take 1 tablet (50 mg total) by mouth once daily. 90 tablet 3 8/21/2023    aspirin (ECOTRIN) 81 MG EC tablet Take 1 tablet (81 mg total) by mouth once daily.  0     baclofen (LIORESAL) 10 MG tablet TAKE 1 TABLET EVERY EVENING 90 tablet 1 More than a month    celecoxib (CELEBREX) 200 MG capsule TAKE 1 CAPSULE TWICE DAILY AS NEEDED FOR PAIN 180 capsule 1 More than a month    CONTOUR NEXT TEST STRIPS Strp USE TO TEST BLOOD SUGAR ONCE DAILY 25 strip 6     gabapentin (NEURONTIN) 300 MG capsule TAKE 1 CAPSULE THREE TIMES DAILY 270 capsule 1     LIDOcaine (LIDODERM) 5 % Place 1 patch onto the skin once daily. Remove & Discard patch within 12 hours or as directed by MD 15 patch 0 Unknown    MICROLET LANCET Misc 1 lancet by Misc.(Non-Drug; Combo Route) route once daily. Test blood glucose once daily as instructed. 25 each 11     naproxen (NAPROSYN) 500 MG tablet Take 1 tablet (500 mg total) by mouth 2 (two) times daily. (Patient taking differently: Take 500 mg by mouth as needed.) 14 tablet 1 Unknown    ondansetron (ZOFRAN) 4 MG tablet Take 1 tablet (4 mg total) by mouth every 6 (six) hours as needed for Nausea. 30 tablet 0 More than a month    pen needle, diabetic (BD ULTRA-FINE CHET PEN NEEDLE) 32 gauge x 5/32" Ndle USE PEN NEEDLE AS INSTRUCTION WITH LANTUS INSULIN PRE-FILLED PEN. 200 each 1     SHINGRIX, PF, 50 mcg/0.5 mL injection        tadalafiL (CIALIS) 20 MG Tab Take 1 tablet (20 mg total) by mouth every 72 hours as needed (ED). 15 tablet 11 More than a month       Physical Exam:    Vital Signs:   Vitals:    08/22/23 1153   BP: (!) 166/84   Pulse: 62   Resp: 17   Temp: 97.8 °F (36.6 °C)       General Appearance: Well appearing in no acute distress  Lungs: no labored breathing  CVS:  regular rate  Abdomen: non tender    Labs:  Lab Results   Component Value Date    WBC 6.76 08/15/2023    HGB 15.7 08/15/2023 "    HCT 45.8 08/15/2023     08/15/2023    CHOL 178 06/28/2023    TRIG 170 (H) 06/28/2023    HDL 44 06/28/2023    ALT 28 08/15/2023    AST 32 08/15/2023     08/15/2023    K 3.9 08/15/2023     08/15/2023    CREATININE 1.2 08/15/2023    BUN 13 08/15/2023    CO2 23 08/15/2023    TSH 3.393 06/28/2023    PSA 21.2 (H) 02/06/2017    INR 0.9 08/22/2019    HGBA1C 6.6 (H) 06/28/2023       I have explained the risks and benefits of this endoscopic procedure to the patient including but not limited to bleeding, inflammation, infection, perforation, and death.      Joe Means MD

## 2023-08-22 NOTE — TELEPHONE ENCOUNTER
"----- Message from Eber Schulte MD sent at 2023  4:30 PM CDT -----  Procedure: Colonoscopy    Diagnosis:  Blood in stool, past due on surveillance colonoscopy for advanced colon polyp villous adenoma greater than 10 mm    Procedure Timin-4 weeks    #If within 4 weeks selected, please noris as high priority#    #If greater than 12 weeks, please select "5-12 weeks" and delay sending until 2 months prior to requested date#    Provider: Any CRS provider (last scoped Dr. Frederick)    Location: [address]    Additional Scheduling Information: No scheduling concerns    Prep Specifications:Standard prep    Is the patient taking a GLP-1 Agonist:no    Have you attached a patient to this message: yes   "

## 2023-09-07 DIAGNOSIS — E11.9 TYPE 2 DIABETES MELLITUS WITHOUT COMPLICATION, WITHOUT LONG-TERM CURRENT USE OF INSULIN: ICD-10-CM

## 2023-09-07 RX ORDER — INSULIN GLARGINE 100 [IU]/ML
15 INJECTION, SOLUTION SUBCUTANEOUS
Qty: 15 ML | Refills: 1 | Status: SHIPPED | OUTPATIENT
Start: 2023-09-07 | End: 2023-12-13 | Stop reason: SDUPTHER

## 2023-09-07 NOTE — TELEPHONE ENCOUNTER
No care due was identified.  [facility] Embedded Care Due Messages. Reference number: [id number].   9/07/2023 2:43:13 AM CDT

## 2023-09-07 NOTE — TELEPHONE ENCOUNTER
Refill Decision Note   Hi Braxton  is requesting a refill authorization.  Brief Assessment and Rationale for Refill:  Approve     Medication Therapy Plan:         Comments:     Note composed:12:09 PM 09/07/2023             Appointments     Last Visit   8/16/2023 Josefina Kendall MD   Next Visit   10/13/2023 Josefina Kendall MD

## 2023-09-10 ENCOUNTER — PATIENT MESSAGE (OUTPATIENT)
Dept: INTERNAL MEDICINE | Facility: CLINIC | Age: 63
End: 2023-09-10
Payer: MEDICARE

## 2023-09-10 DIAGNOSIS — R10.9 ABDOMINAL PAIN, UNSPECIFIED ABDOMINAL LOCATION: Primary | ICD-10-CM

## 2023-09-11 ENCOUNTER — PATIENT MESSAGE (OUTPATIENT)
Dept: INTERNAL MEDICINE | Facility: CLINIC | Age: 63
End: 2023-09-11
Payer: MEDICARE

## 2023-09-11 ENCOUNTER — OFFICE VISIT (OUTPATIENT)
Dept: URGENT CARE | Facility: CLINIC | Age: 63
End: 2023-09-11
Payer: MEDICARE

## 2023-09-11 VITALS
OXYGEN SATURATION: 95 % | BODY MASS INDEX: 24.96 KG/M2 | HEIGHT: 76 IN | HEART RATE: 74 BPM | SYSTOLIC BLOOD PRESSURE: 150 MMHG | WEIGHT: 205 LBS | DIASTOLIC BLOOD PRESSURE: 82 MMHG | RESPIRATION RATE: 16 BRPM | TEMPERATURE: 98 F

## 2023-09-11 DIAGNOSIS — R07.9 RIGHT-SIDED CHEST PAIN: Primary | ICD-10-CM

## 2023-09-11 PROCEDURE — 71046 X-RAY EXAM CHEST 2 VIEWS: CPT | Mod: FY,S$GLB,, | Performed by: RADIOLOGY

## 2023-09-11 PROCEDURE — 99213 PR OFFICE/OUTPT VISIT, EST, LEVL III, 20-29 MIN: ICD-10-PCS | Mod: S$GLB,,, | Performed by: NURSE PRACTITIONER

## 2023-09-11 PROCEDURE — 71046 XR CHEST PA AND LATERAL: ICD-10-PCS | Mod: FY,S$GLB,, | Performed by: RADIOLOGY

## 2023-09-11 PROCEDURE — 99213 OFFICE O/P EST LOW 20 MIN: CPT | Mod: S$GLB,,, | Performed by: NURSE PRACTITIONER

## 2023-09-11 NOTE — PROGRESS NOTES
"Subjective:      Patient ID: Hi Braxton is a 63 y.o. male.    Vitals:  height is 6' 4" (1.93 m) and weight is 93 kg (205 lb). His oral temperature is 97.7 °F (36.5 °C). His blood pressure is 150/82 (abnormal) and his pulse is 74. His respiration is 16 and oxygen saturation is 95%.     Chief Complaint: Flank Pain    This is a 63 y.o. male who presents today with a chief complaint of R side pain that started a month ago. Pt stated that the pain is under the breast on the URQ. Pt states that the pain takes his breath way when he moves a certain way.       Flank Pain  This is a recurrent problem. The current episode started 1 to 4 weeks ago. The problem occurs constantly. The problem is unchanged. Quality: dull constant. Radiates to: lower back. The pain is at a severity of 10/10. The pain is severe. The pain is The same all the time. The symptoms are aggravated by sitting, position and standing. Stiffness is present All day. Pertinent negatives include no abdominal pain, bladder incontinence, bowel incontinence, chest pain, dysuria, fever, headaches, leg pain, numbness, paresis, paresthesias, perianal numbness, tingling, weakness or weight loss. He has tried nothing for the symptoms. The treatment provided no relief.     Constitution: Negative for fever.   Cardiovascular:  Negative for chest pain.   Gastrointestinal:  Negative for abdominal pain and bowel incontinence.   Genitourinary:  Positive for flank pain. Negative for dysuria and bladder incontinence.   Neurological:  Negative for headaches and numbness.      Objective:     Physical Exam   Constitutional: He is oriented to person, place, and time. He appears well-developed. He is cooperative.  Non-toxic appearance. He does not appear ill. No distress.   HENT:   Head: Normocephalic and atraumatic.   Ears:   Right Ear: Hearing, tympanic membrane, external ear and ear canal normal.   Left Ear: Hearing, tympanic membrane, external ear and ear canal normal. "   Nose: Nose normal. No mucosal edema, rhinorrhea or nasal deformity. No epistaxis. Right sinus exhibits no maxillary sinus tenderness and no frontal sinus tenderness. Left sinus exhibits no maxillary sinus tenderness and no frontal sinus tenderness.   Mouth/Throat: Uvula is midline, oropharynx is clear and moist and mucous membranes are normal. No trismus in the jaw. Normal dentition. No uvula swelling. No posterior oropharyngeal erythema.   Eyes: Conjunctivae and lids are normal. Right eye exhibits no discharge. Left eye exhibits no discharge. No scleral icterus.   Neck: Trachea normal and phonation normal. Neck supple.   Cardiovascular: Normal rate, regular rhythm, normal heart sounds and normal pulses.   Pulmonary/Chest: Effort normal and breath sounds normal. No respiratory distress. Chest wall is not dull to percussion. He exhibits tenderness. He exhibits no mass, no bony tenderness, no laceration, no crepitus, no edema, no deformity, no swelling and no retraction.       Abdominal: Normal appearance and bowel sounds are normal. He exhibits no distension and no mass. Soft. There is no abdominal tenderness.   Musculoskeletal: Normal range of motion.         General: No deformity. Normal range of motion.   Neurological: He is alert and oriented to person, place, and time. He exhibits normal muscle tone. Coordination normal.   Skin: Skin is warm, dry, intact, not diaphoretic and not pale.   Psychiatric: His speech is normal and behavior is normal. Judgment and thought content normal.   Nursing note and vitals reviewed.      Assessment:     1. Right-sided chest pain        Plan:     Chart reviewed and queried.   Discussed previous results with patient.   No new symptoms.  States for over a month he has had this pain with shortness of breath.  Nothing new.  Advised by PCP's office today to go to  or ER.  He does not want to go to the ER because he does not want to be admitted.    Offered to do chest xray and ekg  today.  He does not want to do EKG, would be okay to chest xray.  Chest xray in clinic today negative.    Denies trauma or injury but pain is positional.  Offered muscle relaxant, he states that he has already tried this and it did not work.    Advised to patient that he should continue with his PCP's advisement and keep appointment with them and counseled on going to the hospital if symptoms worsen.    Right-sided chest pain  -     XR CHEST PA AND LATERAL; Future; Expected date: 09/11/2023      Patient Instructions   Continue with advisement of PCP.  Keep follow up with PCP.  Go to the ER as needed for worsening condition.

## 2023-09-11 NOTE — TELEPHONE ENCOUNTER
Spoke to pt and tried to offer appt for today at 3:30 pm with  it had just opened. Pt may go to urgent care , I also have him on our wait list     Pt c/o SOB and flank pain on right upper ribs, hurts with movement and cause the SOB now.

## 2023-09-12 ENCOUNTER — PATIENT MESSAGE (OUTPATIENT)
Dept: INTERNAL MEDICINE | Facility: CLINIC | Age: 63
End: 2023-09-12
Payer: MEDICARE

## 2023-09-12 NOTE — TELEPHONE ENCOUNTER
Spoke to pt and tried to offer appt. And he states he was seen at  and they did not see anything too. Pt states he will just have to deal with the pain till he cant.

## 2023-09-13 NOTE — TELEPHONE ENCOUNTER
Called patient and left message to call the office.  Please inform patient that I recommend an ultrasound of the mesenteric arteries, which is of the abdomen to determine if there is any blockage in the blood flow to the abdominal organs.

## 2023-09-19 ENCOUNTER — PATIENT MESSAGE (OUTPATIENT)
Dept: OTHER | Facility: OTHER | Age: 63
End: 2023-09-19
Payer: MEDICARE

## 2023-09-21 ENCOUNTER — HOSPITAL ENCOUNTER (OUTPATIENT)
Dept: CARDIOLOGY | Facility: HOSPITAL | Age: 63
Discharge: HOME | End: 2023-09-21
Attending: INTERNAL MEDICINE
Payer: MEDICARE

## 2023-09-21 ENCOUNTER — TELEPHONE (OUTPATIENT)
Dept: INTERNAL MEDICINE | Facility: CLINIC | Age: 63
End: 2023-09-21
Payer: MEDICARE

## 2023-09-21 DIAGNOSIS — R10.9 ABDOMINAL PAIN, UNSPECIFIED ABDOMINAL LOCATION: ICD-10-CM

## 2023-09-21 LAB
AORTA PROX EDV: 15 CM/S
AORTA PROX PSV: 94 CM/S
CELIAC ORIGIN PSV: 281 CM/S
CELIAC TRUNK PSV: 339 CM/S
COM HEPATIC PSV: 207 CM/S
DIST SMA PSV: 128 CM/S
IMA ORIGIN PSV: 201 CM/S
MID SMA PSV: 126 CM/S
PROX IMA PSV: 154 CM/S
PROX SMA PSV: 197 CM/S
SMA ORIGIN PSV: 192 CM/S
SPLENIC PSV: 281 CM/S

## 2023-09-21 PROCEDURE — 93975 CV US MESENTERIC ARTERIAL: ICD-10-PCS | Mod: 26,,, | Performed by: INTERNAL MEDICINE

## 2023-09-21 PROCEDURE — 93975 VASCULAR STUDY: CPT | Mod: 26,,, | Performed by: INTERNAL MEDICINE

## 2023-09-21 NOTE — TELEPHONE ENCOUNTER
With Provider: Josefina Kendall MD [[facility] Internal Medicine]      Preferred Date Range: Any date 9/21/2023 or later      Preferred Times: Any Time      Reason for visit: Flu      Comments:   Flu shot

## 2023-09-22 ENCOUNTER — PATIENT MESSAGE (OUTPATIENT)
Dept: INTERNAL MEDICINE | Facility: CLINIC | Age: 63
End: 2023-09-22
Payer: MEDICARE

## 2023-09-22 DIAGNOSIS — K55.1 MESENTERIC ARTERY STENOSIS: Primary | ICD-10-CM

## 2023-09-22 NOTE — TELEPHONE ENCOUNTER
Please inform patient that ultrasound reveals stenosis or narrowing of the blood vessels exiting the aorta.  Recommend referral to vascular surgery to determine if any intervention is required

## 2023-09-25 ENCOUNTER — PATIENT MESSAGE (OUTPATIENT)
Dept: VASCULAR SURGERY | Facility: CLINIC | Age: 63
End: 2023-09-25
Payer: MEDICARE

## 2023-09-28 ENCOUNTER — INITIAL CONSULT (OUTPATIENT)
Dept: VASCULAR SURGERY | Facility: CLINIC | Age: 63
End: 2023-09-28
Attending: SURGERY
Payer: MEDICARE

## 2023-09-28 VITALS
TEMPERATURE: 98 F | DIASTOLIC BLOOD PRESSURE: 72 MMHG | HEART RATE: 71 BPM | WEIGHT: 205 LBS | BODY MASS INDEX: 24.96 KG/M2 | SYSTOLIC BLOOD PRESSURE: 130 MMHG | HEIGHT: 76 IN

## 2023-09-28 DIAGNOSIS — K55.1 MESENTERIC ARTERY STENOSIS: ICD-10-CM

## 2023-09-28 PROCEDURE — 99999 PR PBB SHADOW E&M-EST. PATIENT-LVL V: CPT | Mod: PBBFAC,,, | Performed by: SURGERY

## 2023-09-28 PROCEDURE — 99203 OFFICE O/P NEW LOW 30 MIN: CPT | Mod: S$GLB,,, | Performed by: SURGERY

## 2023-09-28 PROCEDURE — 99203 PR OFFICE/OUTPT VISIT, NEW, LEVL III, 30-44 MIN: ICD-10-PCS | Mod: S$GLB,,, | Performed by: SURGERY

## 2023-09-28 PROCEDURE — 99999 PR PBB SHADOW E&M-EST. PATIENT-LVL V: ICD-10-PCS | Mod: PBBFAC,,, | Performed by: SURGERY

## 2023-09-28 NOTE — PROGRESS NOTES
VASCULAR SURGERY NOTE    Patient ID: Hi Braxton is a 63 y.o. male.    I. HISTORY     Chief Complaint: abd pain    HPI: Hi Braxton is a 63 y.o. male who is here today for new patient initial appointment.  He is referred for incidental finding of mesenteric artery stenosis on mesenteric ultrasound which was ordered by primary care after he presented to an urgent care with RIGHT-SIDED abdominal pain.  The pain is mostly in right upper quadrant. Takes away breath when he moves a certain way.  This symptom is in no way reminiscent of the pain of chronic mesenteric ischemia which is postprandial and diffuse.    Primary complaint is R sided pain under rib cage, diffuse chest pain and sob that has been occurring intermittently occurring for months. Reports chest pain is worse at night and makes it difficult to sleep. Quit smoking in 2011. Drinks 1-2 beers every day.  Reports abdominal pain that localizes to R side under rib cage, chest, and back after eating. Also reports nausea. Takes ASA/statin every day.     Family History:  - significant for mother, father, sister and brother have all had an MI or CVA.     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 -     Hyperlipidemia     Hypertension     Memory loss     Thyroid nodule 8/22/2019    Traumatic iritis - Left Eye 2/27/2013        Past Surgical History:   Procedure Laterality Date    CEREBRAL ANGIOGRAM N/A 1/6/2020    Procedure: ANGIOGRAM-CEREBRAL;  Surgeon: Dosc Diagnostic Provider;  Location: [facility] OR [address] [place] [address];  Service: Radiology;  Laterality: N/A;  /Nagi    CERVICAL FUSION  12/30/2015    COLONOSCOPY N/A 4/7/2017     Procedure: COLONOSCOPY;  Surgeon: Handy Frederick MD;  Location: [facility] ENDO (4TH FLR);  Service: Endoscopy;  Laterality: N/A;    CORONARY ANGIOGRAPHY N/A 10/10/2022    Procedure: ANGIOGRAM, CORONARY ARTERY;  Surgeon: Anson Nolan MD;  Location: [facility] CATH LAB;  Service: Cardiology;  Laterality: N/A;    ENDOSCOPIC ULTRASOUND OF UPPER GASTROINTESTINAL TRACT N/A 2023    Procedure: ULTRASOUND, UPPER GI TRACT, ENDOSCOPIC;  Surgeon: Joe Means MD;  Location: [facility] ENDO (2ND FLR);  Service: Endoscopy;  Laterality: N/A;  instr portal-pt stated difficult intubation with surgery in the past-tb    HERNIA REPAIR      PROSTATECTOMY         Social History     Occupational History    Not on file   Tobacco Use    Smoking status: Former     Current packs/day: 0.00     Average packs/day: 1 pack/day for 30.0 years (30.0 ttl pk-yrs)     Types: Cigarettes     Start date: 1981     Quit date: 2011     Years since quittin.8     Passive exposure: Never    Smokeless tobacco: Never   Substance and Sexual Activity    Alcohol use: Yes     Comment: occas - nonalcoholic beer or beer    Drug use: No    Sexual activity: Yes     Partners: Female         Review of Systems   Constitutional: Negative for weight loss.   HENT:  Negative for ear pain and nosebleeds.    Eyes:  Negative for discharge and pain.   Cardiovascular:  Negative for chest pain and palpitations.   Respiratory:  Negative for cough, shortness of breath and wheezing.    Endocrine: Negative for cold intolerance, heat intolerance and polyphagia.   Hematologic/Lymphatic: Negative for adenopathy. Does not bruise/bleed easily.   Skin:  Negative for itching and rash.   Musculoskeletal:  Negative for joint swelling and muscle cramps.   Gastrointestinal:  Negative for abdominal pain, diarrhea, nausea and vomiting.   Genitourinary:  Negative for dysuria and flank pain.   Neurological:  Negative for numbness and seizures.   All other systems reviewed and are  negative.        II. PHYSICAL EXAM     Physical Exam  Constitutional:       Appearance: Normal appearance. He is not ill-appearing or diaphoretic.   HENT:      Head: Normocephalic and atraumatic.   Eyes:      General: No scleral icterus.        Right eye: No discharge.         Left eye: No discharge.      Extraocular Movements: Extraocular movements intact.      Conjunctiva/sclera: Conjunctivae normal.   Cardiovascular:      Rate and Rhythm: Normal rate and regular rhythm.   Pulmonary:      Effort: Pulmonary effort is normal. No respiratory distress.   Musculoskeletal:         General: Normal range of motion.      Cervical back: Normal range of motion and neck supple.   Skin:     General: Skin is warm and dry.   Neurological:      General: No focal deficit present.      Mental Status: He is alert and oriented to person, place, and time.   Psychiatric:         Mood and Affect: Mood normal.         Behavior: Behavior normal.           III. ASSESSMENT & PLAN (MEDICAL DECISION MAKING)       Imaging Results: (I have personally reviewed all images and provided interpretation below)  Mesenteric duplex 9/21/23: celiac PSV > 225cm/s would suggest >70% stenosis however study was performed poorly with incorrect angle correction so I suspect this was overestimated. No evidence of stenosis.    CTA chest/abd/pelvis 8/15/23: No evidence of celiac or SMA stenosis. MACARIO patent.    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  I discussed the diagnosis, pathophysiology, treatment for asymptomatic mesenteric vessel stenosis.  I reviewed the imaging in detail with the patient.  His ultrasound overestimates his celiac artery stenosis. We reviewed his CTA which shows no evidence of stenosis in the celiac or SMA.  He has widely patent SMA and MACARIO.  There was no possibility for mesenteric ischemia in this anatomy. His description of his symptoms is not consistent with chronic mesenteric ishemia  either. I would suggest further workup of his abdominal pain with GI.  There is no indication for surgical treatment at this time.  Patient expressed understanding and agree with the below treatment plan.    -Secondary prevention of atherosclerotic risk factors: Goal BP <140/90, goal LDL <100, goal HbA1C <7.0  -ASA 81mg daily for prevention of MI, stroke, and acute limb ischemia in setting of known extensive family history  -High intensity statin (atorvastatin 40mg or rosuvastatin 20mg)  -RTC PRN    MOSES Lino II, MD, VI  Vascular Surgery  [facility]

## 2023-10-16 ENCOUNTER — HOSPITAL ENCOUNTER (EMERGENCY)
Facility: HOSPITAL | Age: 63
Discharge: HOME | End: 2023-10-16
Attending: STUDENT IN AN ORGANIZED HEALTH CARE EDUCATION/TRAINING PROGRAM
Payer: MEDICARE

## 2023-10-16 ENCOUNTER — PATIENT MESSAGE (OUTPATIENT)
Dept: INTERNAL MEDICINE | Facility: CLINIC | Age: 63
End: 2023-10-16
Payer: MEDICARE

## 2023-10-16 VITALS
DIASTOLIC BLOOD PRESSURE: 80 MMHG | TEMPERATURE: 98 F | HEIGHT: 76 IN | RESPIRATION RATE: 18 BRPM | BODY MASS INDEX: 24.48 KG/M2 | OXYGEN SATURATION: 97 % | SYSTOLIC BLOOD PRESSURE: 165 MMHG | HEART RATE: 68 BPM | WEIGHT: 201 LBS

## 2023-10-16 DIAGNOSIS — K62.5 RECTAL BLEEDING: Primary | ICD-10-CM

## 2023-10-16 LAB
ABO + RH BLD: NORMAL
ALBUMIN SERPL BCP-MCNC: 4.5 G/DL (ref 3.5–5.2)
ALP SERPL-CCNC: 84 U/L (ref 55–135)
ALT SERPL W/O P-5'-P-CCNC: 28 U/L (ref 10–44)
ANION GAP SERPL CALC-SCNC: 9 MMOL/L (ref 8–16)
AST SERPL-CCNC: 24 U/L (ref 10–40)
BASOPHILS # BLD AUTO: 0.06 K/UL (ref 0–0.2)
BASOPHILS NFR BLD: 1 % (ref 0–1.9)
BILIRUB SERPL-MCNC: 0.4 MG/DL (ref 0.1–1)
BLD GP AB SCN CELLS X3 SERPL QL: NORMAL
BUN SERPL-MCNC: 12 MG/DL (ref 8–23)
CALCIUM SERPL-MCNC: 9.9 MG/DL (ref 8.7–10.5)
CHLORIDE SERPL-SCNC: 104 MMOL/L (ref 95–110)
CO2 SERPL-SCNC: 28 MMOL/L (ref 23–29)
CREAT SERPL-MCNC: 1 MG/DL (ref 0.5–1.4)
DIFFERENTIAL METHOD: NORMAL
EOSINOPHIL # BLD AUTO: 0.5 K/UL (ref 0–0.5)
EOSINOPHIL NFR BLD: 7.6 % (ref 0–8)
ERYTHROCYTE [DISTWIDTH] IN BLOOD BY AUTOMATED COUNT: 12.7 % (ref 11.5–14.5)
EST. GFR  (NO RACE VARIABLE): >60 ML/MIN/1.73 M^2
GLUCOSE SERPL-MCNC: 93 MG/DL (ref 70–110)
HCT VFR BLD AUTO: 45.8 % (ref 40–54)
HCV AB SERPL QL IA: NORMAL
HGB BLD-MCNC: 15.2 G/DL (ref 14–18)
HIV 1+2 AB+HIV1 P24 AG SERPL QL IA: NORMAL
IMM GRANULOCYTES # BLD AUTO: 0.01 K/UL (ref 0–0.04)
IMM GRANULOCYTES NFR BLD AUTO: 0.2 % (ref 0–0.5)
LYMPHOCYTES # BLD AUTO: 1.7 K/UL (ref 1–4.8)
LYMPHOCYTES NFR BLD: 28.1 % (ref 18–48)
MCH RBC QN AUTO: 29.6 PG (ref 27–31)
MCHC RBC AUTO-ENTMCNC: 33.2 G/DL (ref 32–36)
MCV RBC AUTO: 89 FL (ref 82–98)
MONOCYTES # BLD AUTO: 0.5 K/UL (ref 0.3–1)
MONOCYTES NFR BLD: 8.7 % (ref 4–15)
NEUTROPHILS # BLD AUTO: 3.3 K/UL (ref 1.8–7.7)
NEUTROPHILS NFR BLD: 54.4 % (ref 38–73)
NRBC BLD-RTO: 0 /100 WBC
PLATELET # BLD AUTO: 278 K/UL (ref 150–450)
PMV BLD AUTO: 10.2 FL (ref 9.2–12.9)
POTASSIUM SERPL-SCNC: 4 MMOL/L (ref 3.5–5.1)
PROT SERPL-MCNC: 7.8 G/DL (ref 6–8.4)
RBC # BLD AUTO: 5.13 M/UL (ref 4.6–6.2)
SODIUM SERPL-SCNC: 141 MMOL/L (ref 136–145)
SPECIMEN OUTDATE: NORMAL
WBC # BLD AUTO: 6.08 K/UL (ref 3.9–12.7)

## 2023-10-16 PROCEDURE — 86803 HEPATITIS C AB TEST: CPT | Performed by: PHYSICIAN ASSISTANT

## 2023-10-16 PROCEDURE — 86900 BLOOD TYPING SEROLOGIC ABO: CPT | Performed by: PHYSICIAN ASSISTANT

## 2023-10-16 PROCEDURE — 99283 EMERGENCY DEPT VISIT LOW MDM: CPT

## 2023-10-16 PROCEDURE — 87389 HIV-1 AG W/HIV-1&-2 AB AG IA: CPT | Performed by: PHYSICIAN ASSISTANT

## 2023-10-16 PROCEDURE — 85025 COMPLETE CBC W/AUTO DIFF WBC: CPT | Performed by: PHYSICIAN ASSISTANT

## 2023-10-16 PROCEDURE — 80053 COMPREHEN METABOLIC PANEL: CPT | Performed by: PHYSICIAN ASSISTANT

## 2023-10-16 NOTE — FIRST PROVIDER EVALUATION
Emergency Department TeleTriage Encounter Note      CHIEF COMPLAINT    Chief Complaint   Patient presents with    Rectal Bleeding     Pt reports rectal bleeding x 2wks.  States initially was bright red, now dark       VITAL SIGNS   Initial Vitals [10/16/23 1318]   BP Pulse Resp Temp SpO2   (!) 148/68 82 16 98.4 °F (36.9 °C) 96 %      MAP       --            ALLERGIES    Review of patient's allergies indicates:   Allergen Reactions    Lisinopril Anaphylaxis and Nausea And Vomiting     General bad feeling    Lipitor [atorvastatin] Other (See Comments)     Muscle aches    Adhesive     Crestor [rosuvastatin] Swelling     Lip swelling.     Jardiance [empagliflozin] Other (See Comments)     Possible related to recent UTI's     Metformin      Used XR and experienced flatulance and abdominal pain.        PROVIDER TRIAGE NOTE  Patient presents with complaint of rectal bleeding for 2 weeks.  He reports 1-2 episodes today.  He reports it was bright red blood but now is more of a brownish color.  Reports nausea with no vomiting.  Mild associated abdominal cramping.  States he takes an aspirin daily.      Phy:   Constitutional: well nourished, well developed, appearing stated age, NAD      Initial orders will be placed and care will be transferred to an alternate provider when patient is roomed for a full evaluation. Any additional orders and the final disposition will be determined by that provider.        ORDERS  Labs Reviewed   HIV 1 / 2 ANTIBODY   HEPATITIS C ANTIBODY       ED Orders (720h ago, onward)      Start Ordered     Status Ordering Provider    10/16/23 1319 10/16/23 1319  HIV 1/2 Ag/Ab (4th Gen)  STAT         Acknowledged JAMESON SHAH    10/16/23 1319 10/16/23 1319  Hepatitis C Antibody  STAT         Acknowledged JAMESON SHAH              Virtual Visit Note: The provider triage portion of this emergency department evaluation and documentation was performed via [employer], a HIPAA-compliant  telemedicine application, in concert with a tele-presenter in the room. A face to face patient evaluation with one of my colleagues will occur once the patient is placed in an emergency department room.      DISCLAIMER: This note was prepared with [employer] voice recognition transcription software. Garbled syntax, mangled pronouns, and other bizarre constructions may be attributed to that software system.

## 2023-10-16 NOTE — DISCHARGE INSTRUCTIONS
Thank you for coming to our Emergency Department today! It is important to remember that some problems or medical conditions are difficult to diagnose and may not be found or addressed during your Emergency Department visit.     Be sure to follow up with your primary care doctor and review all labs/imaging/tests that were performed during your ER visit with them. Some labs/imaging/tests may be outside of the normal range, and require non-emergent follow-up and/or further investigation/treatment/procedures/testing to help diagnose/exclude/prevent complications or other potentially serious medical conditions that were not discussed or addressed during your ER visit.    Please take all medications as directed. All medications may potentially have side-effects and it is impossible to predict which medications may give you side-effects or what side-effects (if any) they will give you.. If you feel that you are having a negative effect or side-effect of any medication you should immediately stop taking them and seek medical attention. If you feel that you are having a life-threatening reaction call 911.    Return to the ER with any questions/concerns, new/concerning symptoms, worsening or failure to improve.

## 2023-10-16 NOTE — ED PROVIDER NOTES
Encounter Date: 10/16/2023       History     Chief Complaint   Patient presents with    Rectal Bleeding     Pt reports rectal bleeding x 2wks.  States initially was bright red, now dark     63-year-old male with past medical history of nonobstructive coronary artery disease, insulin-dependent diabetes and hypertension now presenting with 2 week history of rectal bleeding.  Patient reports that for the past 2 weeks he has been experiencing bright red rectal bleeding mixed with the stool and subsequently experienced dark brown/red stooling today.  Patient reports anal discomfort but denies any precipitous bleeding or bleeding after wiping. Of note patient has a GI referral for next month, colonoscopy planned for next month, and is taking pantoprazole.  Patient denies any weakness, shortness of breath or weight loss.    The history is provided by the patient.     Review of patient's allergies indicates:   Allergen Reactions    Lisinopril Anaphylaxis and Nausea And Vomiting     General bad feeling    Lipitor [atorvastatin] Other (See Comments)     Muscle aches    Adhesive     Crestor [rosuvastatin] Swelling     Lip swelling.     Jardiance [empagliflozin] Other (See Comments)     Possible related to recent UTI's     Metformin      Used XR and experienced flatulance and abdominal pain.      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 -     Hyperlipidemia     Hypertension     Memory loss     Thyroid nodule 8/22/2019    Traumatic iritis - Left Eye 2/27/2013     Past Surgical History:   Procedure Laterality Date    CEREBRAL ANGIOGRAM N/A 1/6/2020    Procedure: ANGIOGRAM-CEREBRAL;  Surgeon: [facility] Diagnostic Provider;   Location: [facility] OR 2ND FLR;  Service: Radiology;  Laterality: N/A;  /Nagi    CERVICAL FUSION  2015    COLONOSCOPY N/A 2017    Procedure: COLONOSCOPY;  Surgeon: Handy Frederick MD;  Location: [facility] ENDO (4TH FLR);  Service: Endoscopy;  Laterality: N/A;    CORONARY ANGIOGRAPHY N/A 10/10/2022    Procedure: ANGIOGRAM, CORONARY ARTERY;  Surgeon: Anson Nolan MD;  Location: [facility] CATH LAB;  Service: Cardiology;  Laterality: N/A;    ENDOSCOPIC ULTRASOUND OF UPPER GASTROINTESTINAL TRACT N/A 2023    Procedure: ULTRASOUND, UPPER GI TRACT, ENDOSCOPIC;  Surgeon: Joe Means MD;  Location: [facility] ENDO (2ND FLR);  Service: Endoscopy;  Laterality: N/A;  instr portal-pt stated difficult intubation with surgery in the past-tb    HERNIA REPAIR      PROSTATECTOMY       Family History   Problem Relation Age of Onset    Heart disease Mother     Hypertension Mother     Hyperlipidemia Mother     Stroke Mother     Heart disease Father     Cancer Sister     Hyperlipidemia Brother     Hypertension Brother     Heart disease Brother     Hypertension Brother     Hyperlipidemia Brother     Benign prostatic hyperplasia Brother     Heart disease Brother     Hypertension Brother     Heart disease Brother     Hypertension Brother     Hepatitis Brother     Heart disease Brother     Cancer Brother         throat CA    Diabetes Paternal Uncle     Cataracts Maternal Grandmother     Amblyopia Neg Hx     Blindness Neg Hx     Glaucoma Neg Hx     Macular degeneration Neg Hx     Retinal detachment Neg Hx     Strabismus Neg Hx     Thyroid disease Neg Hx     Colon cancer Neg Hx     Colon polyps Neg Hx     Esophageal cancer Neg Hx      Social History     Tobacco Use    Smoking status: Former     Current packs/day: 0.00     Average packs/day: 1 pack/day for 30.0 years (30.0 ttl pk-yrs)     Types: Cigarettes     Start date: 1981     Quit date: 2011     Years since quittin.8     Passive exposure: Never    Smokeless  tobacco: Never   Substance Use Topics    Alcohol use: Yes     Comment: occas - nonalcoholic beer or beer    Drug use: No     Review of Systems  See HPI    Physical Exam     Initial Vitals [10/16/23 1318]   BP Pulse Resp Temp SpO2   (!) 148/68 82 16 98.4 °F (36.9 °C) 96 %      MAP       --         Physical Exam    Nursing note and vitals reviewed.      Gen: AxOx3, well nourished, appears stated age, no pallor, no jaundice, appears well hydrated  Eye: EOMI, no scleral icterus, no periorbital edema or ecchymosis  Head: Normocephalic, atraumatic, no lesions, scalp appears normal  ENT: Neck supple, no stridor, no masses, no drooling or voice changes  CVS: All distal pulses intact with normal rate and rhythm, no JVD, normal S1/S2, no murmur  Pulm: Normal breath sounds, no wheezes, rales or rhonchi, no increased work of breathing  Abd:  Nondistended, soft, nontender, no organomegaly, no CVAT  SHAYY:  Performed with nurse bedside.  External skin tag noted.  Normal tone.  No intrarectal lesions palpated.  No blood on glove.  Hemoccult negative.  Ext: No edema, no lesions, rashes, or deformity  Neuro: GCS15, moving all extremities, gait intact, face grossly symmetric  Psych: normal affect, cooperative, well groomed, makes good eye contact      ED Course   Procedures  Labs Reviewed   HIV 1 / 2 ANTIBODY    Narrative:     Release to patient->Immediate   HEPATITIS C ANTIBODY    Narrative:     Release to patient->Immediate   CBC W/ AUTO DIFFERENTIAL   COMPREHENSIVE METABOLIC PANEL   TYPE & SCREEN          Imaging Results    None          Medications - No data to display  Medical Decision Making  Initial assessment  63-year-old male presenting with 2 week history of rectal bleeding. Patient is able to vocalise, breathing spontaneously, hemodynamically stable, oriented, moving all 4 limbs spontaneously.  Examination consistent with normal rectal exam.      Differential diagnosis  Peptic ulcer  disease  Diverticulosis  Angiodysplasia  Colorectal CA    ED management  Patient reports 2 week history of rectal bleeding but no red flags including weight loss.  Patient was well-appearing on examination and had stable vital signs.  Laboratory workup including CBC, CMP were within normal limits including hemoglobin of 15.2.  Patient tells me that he already is on pantoprazole and has planned colonoscopy for next month in addition to gastroenterology appointment.  Given stable vital signs, normal examination, unstable hemoglobin with GI appointment already planned I decided to discharge patient with plan to follow up with GI.    Amount and/or Complexity of Data Reviewed  Labs:  Decision-making details documented in ED Course.               ED Course as of 10/16/23 1708   Mon Oct 16, 2023   1641 WBC: 6.08 [PM]   1641 Hemoglobin: 15.2 [PM]   1641 Sodium: 141 [PM]   1641 Potassium: 4.0 [PM]   1641 BUN: 12 [PM]   1641 Creatinine: 1.0 [PM]   1641 BP(!): 155/77 [PM]   1641 Pulse: 80 [PM]      ED Course User Index  [PM] Sharmila Mathis MD                    Clinical Impression:   Final diagnoses:  [K62.5] Rectal bleeding (Primary)        ED Disposition Condition    Discharge Stable          ED Prescriptions    None       Follow-up Information       Follow up With Specialties Details Why Contact Info Additional Information    Josefina Kendall MD Internal Medicine Schedule an appointment as soon as possible for a visit in 3 days  [address] [facility] [address]  [phone]       [facility] - Emergency Dept Emergency Medicine  As needed, If symptoms worsen [address] [facility] [id number] [phone]     [facility] - Gi Center Atrium 4th Fl Gastroenterology   [address] [facility] [id number] [phone] GI Center & Urology - Main Building, 4th Floor Please park in [facility] and take Atrium elevator             Sharmila Mathis MD  Resident  10/16/23 2928

## 2023-10-16 NOTE — ED TRIAGE NOTES
Pt arrives to ED stating 2 weeks ago he began seeing clots with bright red blood in his stool. Pt states it has now become dark red. Pt states it has become worse. Pt denies CP or SOB.

## 2023-11-07 ENCOUNTER — LAB VISIT (OUTPATIENT)
Dept: LAB | Facility: HOSPITAL | Age: 63
End: 2023-11-07
Attending: INTERNAL MEDICINE
Payer: MEDICARE

## 2023-11-07 DIAGNOSIS — E78.2 MIXED HYPERLIPIDEMIA: ICD-10-CM

## 2023-11-07 DIAGNOSIS — E11.59 HYPERTENSION ASSOCIATED WITH DIABETES: ICD-10-CM

## 2023-11-07 DIAGNOSIS — I15.2 HYPERTENSION ASSOCIATED WITH DIABETES: ICD-10-CM

## 2023-11-07 LAB
ALBUMIN SERPL BCP-MCNC: 4.3 G/DL (ref 3.5–5.2)
ALP SERPL-CCNC: 77 U/L (ref 55–135)
ALT SERPL W/O P-5'-P-CCNC: 30 U/L (ref 10–44)
ANION GAP SERPL CALC-SCNC: 12 MMOL/L (ref 8–16)
AST SERPL-CCNC: 28 U/L (ref 10–40)
BILIRUB SERPL-MCNC: 0.7 MG/DL (ref 0.1–1)
BUN SERPL-MCNC: 14 MG/DL (ref 8–23)
CALCIUM SERPL-MCNC: 9.6 MG/DL (ref 8.7–10.5)
CHLORIDE SERPL-SCNC: 104 MMOL/L (ref 95–110)
CHOLEST SERPL-MCNC: 185 MG/DL (ref 120–199)
CHOLEST/HDLC SERPL: 3.9 {RATIO} (ref 2–5)
CO2 SERPL-SCNC: 23 MMOL/L (ref 23–29)
CREAT SERPL-MCNC: 1 MG/DL (ref 0.5–1.4)
EST. GFR  (NO RACE VARIABLE): >60 ML/MIN/1.73 M^2
GLUCOSE SERPL-MCNC: 156 MG/DL (ref 70–110)
HDLC SERPL-MCNC: 47 MG/DL (ref 40–75)
HDLC SERPL: 25.4 % (ref 20–50)
LDLC SERPL CALC-MCNC: 85.8 MG/DL (ref 63–159)
NONHDLC SERPL-MCNC: 138 MG/DL
POTASSIUM SERPL-SCNC: 3.8 MMOL/L (ref 3.5–5.1)
PROT SERPL-MCNC: 7.4 G/DL (ref 6–8.4)
SODIUM SERPL-SCNC: 139 MMOL/L (ref 136–145)
TRIGL SERPL-MCNC: 261 MG/DL (ref 30–150)

## 2023-11-07 PROCEDURE — 36415 COLL VENOUS BLD VENIPUNCTURE: CPT | Mod: PN | Performed by: INTERNAL MEDICINE

## 2023-11-07 PROCEDURE — 80053 COMPREHEN METABOLIC PANEL: CPT | Performed by: INTERNAL MEDICINE

## 2023-11-07 PROCEDURE — 80061 LIPID PANEL: CPT | Performed by: INTERNAL MEDICINE

## 2023-11-21 ENCOUNTER — PATIENT MESSAGE (OUTPATIENT)
Dept: ENDOSCOPY | Facility: HOSPITAL | Age: 63
End: 2023-11-21
Payer: MEDICARE

## 2023-11-24 ENCOUNTER — TELEPHONE (OUTPATIENT)
Dept: INTERNAL MEDICINE | Facility: CLINIC | Age: 63
End: 2023-11-24
Payer: MEDICARE

## 2023-11-27 ENCOUNTER — TELEPHONE (OUTPATIENT)
Dept: ENDOSCOPY | Facility: HOSPITAL | Age: 63
End: 2023-11-27
Payer: MEDICARE

## 2023-11-27 NOTE — TELEPHONE ENCOUNTER
Received patient's call. Spoke to patient. Instructions reviewed and verbalized. All questions answered. Patient verbalized an understanding.

## 2023-11-27 NOTE — H&P
COLONOSCOPY HISTORY AND PE    Procedure : Colonoscopy      INDICATIONS: asymptomatic screening exam and personal history of colon polyps      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 -     Hyperlipidemia     Hypertension     Memory loss     Thyroid nodule 8/22/2019    Traumatic iritis - Left Eye 2/27/2013       Past Surgical History:   Procedure Laterality Date    CEREBRAL ANGIOGRAM N/A 1/6/2020    Procedure: ANGIOGRAM-CEREBRAL;  Surgeon: [facility] Diagnostic Provider;  Location: [facility] OR 2ND FLR;  Service: Radiology;  Laterality: N/A;  /Nagi    CERVICAL FUSION  12/30/2015    COLONOSCOPY N/A 4/7/2017    Procedure: COLONOSCOPY;  Surgeon: Handy Frederick MD;  Location: [facility] ENDO (4TH FLR);  Service: Endoscopy;  Laterality: N/A;    CORONARY ANGIOGRAPHY N/A 10/10/2022    Procedure: ANGIOGRAM, CORONARY ARTERY;  Surgeon: Anson Nolan MD;  Location: [facility] CATH LAB;  Service: Cardiology;  Laterality: N/A;    ENDOSCOPIC ULTRASOUND OF UPPER GASTROINTESTINAL TRACT N/A 8/22/2023    Procedure: ULTRASOUND, UPPER GI TRACT, ENDOSCOPIC;  Surgeon: Joe Means MD;  Location: [facility] ENDO (2ND FLR);  Service: Endoscopy;  Laterality: N/A;  instr portal-pt stated difficult intubation with surgery in the past-tb    HERNIA REPAIR      PROSTATECTOMY         Review of patient's allergies indicates:   Allergen Reactions    Lisinopril Anaphylaxis and Nausea And Vomiting     General bad feeling    Lipitor [atorvastatin] Other (See Comments)     Muscle aches    Adhesive     Crestor [rosuvastatin] Swelling     Lip swelling.     Jardiance [empagliflozin] Other (See Comments)     Possible related to recent UTI's     Metformin       Used XR and experienced flatulance and abdominal pain.        No current facility-administered medications on file prior to encounter.     Current Outpatient Medications on File Prior to Encounter   Medication Sig Dispense Refill    ALPRAZolam (XANAX) 0.5 MG tablet TAKE 1 TABLET THREE TIMES DAILY AS NEEDED FOR ANXIETY 90 tablet 3    aspirin (ECOTRIN) 81 MG EC tablet Take 1 tablet (81 mg total) by mouth once daily.  0    baclofen (LIORESAL) 10 MG tablet TAKE 1 TABLET EVERY EVENING 90 tablet 1    celecoxib (CELEBREX) 200 MG capsule TAKE 1 CAPSULE TWICE DAILY AS NEEDED FOR PAIN 180 capsule 1    CONTOUR NEXT TEST STRIPS Strp USE TO TEST BLOOD SUGAR ONCE DAILY 25 strip 6    ezetimibe (ZETIA) 10 mg tablet Take 1 tablet (10 mg total) by mouth once daily. 90 tablet 3    gabapentin (NEURONTIN) 300 MG capsule TAKE 1 CAPSULE THREE TIMES DAILY 270 capsule 1    LIDOcaine (LIDODERM) 5 % Place 1 patch onto the skin once daily. Remove & Discard patch within 12 hours or as directed by MD 15 patch 0    losartan (COZAAR) 100 MG tablet Take 1 tablet (100 mg total) by mouth once daily. 90 tablet 1    methocarbamoL (ROBAXIN) 750 MG Tab TAKE 1 TABLET (750 MG TOTAL) BY MOUTH 2 (TWO) TIMES A DAY. FOR 5 DAYS (Patient taking differently: as needed.) 30 tablet 3    metoprolol succinate (TOPROL-XL) 50 MG 24 hr tablet Take 1.5 tablets (75 mg total) by mouth once daily. 135 tablet 3    MICROLET LANCET Misc 1 lancet by Misc.(Non-Drug; Combo Route) route once daily. Test blood glucose once daily as instructed. 25 each 11    naproxen (NAPROSYN) 500 MG tablet Take 1 tablet (500 mg total) by mouth 2 (two) times daily. (Patient taking differently: Take 500 mg by mouth as needed.) 14 tablet 1    NIFEdipine (PROCARDIA-XL) 60 MG (OSM) 24 hr tablet Take 1 tablet (60 mg total) by mouth once daily. 90 tablet 3    nitroGLYCERIN (NITROSTAT) 0.3 MG SL tablet Place 1 tablet (0.3 mg total) under the tongue every 5 (five) minutes as needed for Chest pain. 100  "tablet 3    ondansetron (ZOFRAN) 4 MG tablet Take 1 tablet (4 mg total) by mouth every 6 (six) hours as needed for Nausea. 30 tablet 0    pantoprazole (PROTONIX) 40 MG tablet Take 1 tablet (40 mg total) by mouth 2 (two) times daily. 180 tablet 3    pen needle, diabetic (BD ULTRA-FINE CHET PEN NEEDLE) 32 gauge x 5/32" Ndle USE PEN NEEDLE AS INSTRUCTION WITH LANTUS INSULIN PRE-FILLED PEN. 200 each 1    pravastatin (PRAVACHOL) 80 MG tablet Take 1 tablet (80 mg total) by mouth once daily. 90 tablet 3    SHINGRIX, PF, 50 mcg/0.5 mL injection       spironolactone (ALDACTONE) 50 MG tablet Take 1 tablet (50 mg total) by mouth once daily. 90 tablet 3    tadalafiL (CIALIS) 20 MG Tab Take 1 tablet (20 mg total) by mouth every 72 hours as needed (ED). 15 tablet 11       Family History   Problem Relation Age of Onset    Heart disease Mother     Hypertension Mother     Hyperlipidemia Mother     Stroke Mother     Heart disease Father     Cancer Sister     Hyperlipidemia Brother     Hypertension Brother     Heart disease Brother     Hypertension Brother     Hyperlipidemia Brother     Benign prostatic hyperplasia Brother     Heart disease Brother     Hypertension Brother     Heart disease Brother     Hypertension Brother     Hepatitis Brother     Heart disease Brother     Cancer Brother         throat CA    Diabetes Paternal Uncle     Cataracts Maternal Grandmother     Amblyopia Neg Hx     Blindness Neg Hx     Glaucoma Neg Hx     Macular degeneration Neg Hx     Retinal detachment Neg Hx     Strabismus Neg Hx     Thyroid disease Neg Hx     Colon cancer Neg Hx     Colon polyps Neg Hx     Esophageal cancer Neg Hx        Social History     Socioeconomic History    Marital status:    Tobacco Use    Smoking status: Former     Current packs/day: 0.00     Average packs/day: 1 pack/day for 30.0 years (30.0 ttl pk-yrs)     Types: Cigarettes     Start date: 1981     Quit date: 2011     Years since quittin.9     Passive " exposure: Never    Smokeless tobacco: Never   Substance and Sexual Activity    Alcohol use: Yes     Comment: occas - nonalcoholic beer or beer    Drug use: No    Sexual activity: Yes     Partners: Female     Social Determinants of Health     Financial Resource Strain: High Risk (12/18/2019)    Overall Financial Resource Strain (CARDIA)     Difficulty of Paying Living Expenses: Hard   Food Insecurity: No Food Insecurity (12/18/2019)    Hunger Vital Sign     Worried About Running Out of Food in the Last Year: Never true     Ran Out of Food in the Last Year: Never true   Transportation Needs: No Transportation Needs (12/18/2019)    PRAPARE - Transportation     Lack of Transportation (Medical): No     Lack of Transportation (Non-Medical): No   Physical Activity: Inactive (6/30/2020)    Exercise Vital Sign     Days of Exercise per Week: 0 days     Minutes of Exercise per Session: 0 min   Social Connections: Unknown (12/18/2019)    Social Connection and Isolation Panel [NHANES]     Frequency of Communication with Friends and Family: More than three times a week       Review of Systems -    Respiratory : no cough, shortness of breath, or wheezing  Cardiovascular  no chest pain or dyspnea on exertion  Gastrointestinal no abdominal pain, change in bowel habits, or black or bloody stools  Musculoskeletal no deformities, swelling  Neurological no TIA or stroke symptoms        Physical Exam:  General: NAD  AT NC EOMI  Mallampati Score   Neck supple, trachea midline  Lungs: nl excursions, no retractions.  Breathing comfortably  Abdomen ND soft NT.  No masses  Extremities: No CCE.      ASA:  II    PLAN  COLONOSCOPY.  The details of the procedure, the possible need for biopsy or polypectomy and the potential risks including bleeding, perforation, missed polyps were discussed in detail.

## 2023-11-28 ENCOUNTER — HOSPITAL ENCOUNTER (OUTPATIENT)
Facility: HOSPITAL | Age: 63
Discharge: HOME | End: 2023-11-28
Attending: COLON & RECTAL SURGERY | Admitting: COLON & RECTAL SURGERY
Payer: COMMERCIAL

## 2023-11-28 ENCOUNTER — ANESTHESIA (OUTPATIENT)
Dept: ENDOSCOPY | Facility: HOSPITAL | Age: 63
End: 2023-11-28
Payer: MEDICARE

## 2023-11-28 ENCOUNTER — ANESTHESIA EVENT (OUTPATIENT)
Dept: ENDOSCOPY | Facility: HOSPITAL | Age: 63
End: 2023-11-28
Payer: COMMERCIAL

## 2023-11-28 VITALS
WEIGHT: 206 LBS | HEIGHT: 76 IN | HEART RATE: 82 BPM | OXYGEN SATURATION: 98 % | SYSTOLIC BLOOD PRESSURE: 132 MMHG | RESPIRATION RATE: 18 BRPM | TEMPERATURE: 97 F | DIASTOLIC BLOOD PRESSURE: 89 MMHG | BODY MASS INDEX: 25.09 KG/M2

## 2023-11-28 DIAGNOSIS — Z12.11 ENCOUNTER FOR SCREENING COLONOSCOPY: ICD-10-CM

## 2023-11-28 LAB — POCT GLUCOSE: 136 MG/DL (ref 70–110)

## 2023-11-28 PROCEDURE — G0105 COLORECTAL SCRN; HI RISK IND: ICD-10-PCS | Mod: ,,, | Performed by: COLON & RECTAL SURGERY

## 2023-11-28 PROCEDURE — G0105 COLORECTAL SCRN; HI RISK IND: HCPCS | Performed by: COLON & RECTAL SURGERY

## 2023-11-28 PROCEDURE — E9220 PRA ENDO ANESTHESIA: ICD-10-PCS | Mod: ,,, | Performed by: NURSE ANESTHETIST, CERTIFIED REGISTERED

## 2023-11-28 PROCEDURE — 25000003 PHARM REV CODE 250: Performed by: NURSE ANESTHETIST, CERTIFIED REGISTERED

## 2023-11-28 PROCEDURE — 63600175 PHARM REV CODE 636 W HCPCS: Performed by: NURSE ANESTHETIST, CERTIFIED REGISTERED

## 2023-11-28 PROCEDURE — 37000008 HC ANESTHESIA 1ST 15 MINUTES: Performed by: COLON & RECTAL SURGERY

## 2023-11-28 PROCEDURE — 37000009 HC ANESTHESIA EA ADD 15 MINS: Performed by: COLON & RECTAL SURGERY

## 2023-11-28 PROCEDURE — E9220 PRA ENDO ANESTHESIA: HCPCS | Mod: ,,, | Performed by: NURSE ANESTHETIST, CERTIFIED REGISTERED

## 2023-11-28 PROCEDURE — G0105 COLORECTAL SCRN; HI RISK IND: HCPCS | Mod: ,,, | Performed by: COLON & RECTAL SURGERY

## 2023-11-28 RX ORDER — PROPOFOL 10 MG/ML
VIAL (ML) INTRAVENOUS
Status: DISCONTINUED | OUTPATIENT
Start: 2023-11-28 | End: 2023-11-28

## 2023-11-28 RX ORDER — SODIUM CHLORIDE 9 MG/ML
INJECTION, SOLUTION INTRAVENOUS CONTINUOUS
Status: DISCONTINUED | OUTPATIENT
Start: 2023-11-28 | End: 2023-11-28 | Stop reason: HOSPADM

## 2023-11-28 RX ORDER — LIDOCAINE HYDROCHLORIDE 20 MG/ML
INJECTION INTRAVENOUS
Status: DISCONTINUED | OUTPATIENT
Start: 2023-11-28 | End: 2023-11-28

## 2023-11-28 RX ADMIN — SODIUM CHLORIDE: 0.9 INJECTION, SOLUTION INTRAVENOUS at 07:11

## 2023-11-28 RX ADMIN — PROPOFOL 70 MG: 10 INJECTION, EMULSION INTRAVENOUS at 07:11

## 2023-11-28 RX ADMIN — LIDOCAINE HYDROCHLORIDE 100 MG: 20 INJECTION INTRAVENOUS at 07:11

## 2023-11-28 NOTE — PROVATION PATIENT INSTRUCTIONS
Discharge Summary/Instructions after an Endoscopic Procedure  Patient Name: Hi Braxton  Patient MRN: [id number]  Patient YOB: 1960 Tuesday, November 28, 2023  Jori Hinojosa MD  Dear patient,  As a result of recent federal legislation (The Federal Cures Act), you may   receive lab or pathology results from your procedure in your [facility]   account before your physician is able to contact you. Your physician or   their representative will relay the results to you with their   recommendations at their soonest availability.  Thank you,  RESTRICTIONS:  During your procedure today, you received medications for sedation.  These   medications may affect your judgment, balance and coordination.  Therefore,   for 24 hours, you have the following restrictions:   - DO NOT drive a car, operate machinery, make legal/financial decisions,   sign important papers or drink alcohol.    ACTIVITY:  Today: no heavy lifting, straining or running due to procedural   sedation/anesthesia.  The following day: return to full activity including work.  DIET:  Eat and drink normally unless instructed otherwise.     TREATMENT FOR COMMON SIDE EFFECTS:  - Mild abdominal pain, nausea, belching, bloating or excessive gas:  rest,   eat lightly and use a heating pad.  - Sore Throat: treat with throat lozenges and/or gargle with warm salt   water.  - Because air was used during the procedure, expelling large amounts of air   from your rectum or belching is normal.  - If a bowel prep was taken, you may not have a bowel movement for 1-3 days.    This is normal.  SYMPTOMS TO WATCH FOR AND REPORT TO YOUR PHYSICIAN:  1. Abdominal pain or bloating, other than gas cramps.  2. Chest pain.  3. Back pain.  4. Signs of infection such as: chills or fever occurring within 24 hours   after the procedure.  5. Rectal bleeding, which would show as bright red, maroon, or black stools.   (A tablespoon of blood from the rectum is not serious, especially  if   hemorrhoids are present.)  6. Vomiting.  7. Weakness or dizziness.  GO DIRECTLY TO THE NEAREST EMERGENCY ROOM IF YOU HAVE ANY OF THE FOLLOWING:      Difficulty breathing              Chills and/or fever over 101 F   Persistent vomiting and/or vomiting blood   Severe abdominal pain   Severe chest pain   Black, tarry stools   Bleeding- more than one tablespoon   Any other symptom or condition that you feel may need urgent attention  Your doctor recommends these additional instructions:  If any biopsies were taken, your doctors clinic will contact you in 1 to 2   weeks with any results.  - Discharge patient to home (ambulatory).   - Patient has a contact number available for emergencies.  The signs and   symptoms of potential delayed complications were discussed with the   patient.  Return to normal activities tomorrow.  Written discharge   instructions were provided to the patient.   - Resume previous diet.   - Continue present medications.   - Repeat colonoscopy in 10 years for screening purposes.  For questions, problems or results please call your physician - Jori Hinojosa MD at Work:  [phone].  [facility], EMERGENCY ROOM PHONE NUMBER: [phone]  IF A COMPLICATION OR EMERGENCY SITUATION ARISES AND YOU ARE UNABLE TO REACH   YOUR PHYSICIAN - GO DIRECTLY TO THE EMERGENCY ROOM.  Jori Hinojosa MD  11/28/2023 8:15:51 AM  This report has been verified and signed electronically.  Dear patient,  As a result of recent federal legislation (The Federal Cures Act), you may   receive lab or pathology results from your procedure in your [facility]   account before your physician is able to contact you. Your physician or   their representative will relay the results to you with their   recommendations at their soonest availability.  Thank you,  PROVATION

## 2023-11-28 NOTE — ANESTHESIA POSTPROCEDURE EVALUATION
Anesthesia Post Evaluation    Patient: Hi Braxton    Procedure(s) Performed: Procedure(s) (LRB):  COLONOSCOPY (N/A)    Final Anesthesia Type: general      Patient location during evaluation: PACU  Patient participation: Yes- Able to Participate  Level of consciousness: awake and alert and oriented  Post-procedure vital signs: reviewed and stable  Pain management: adequate  Airway patency: patent    PONV status at discharge: No PONV  Anesthetic complications: no      Cardiovascular status: blood pressure returned to baseline  Respiratory status: unassisted, room air and spontaneous ventilation  Hydration status: euvolemic  Follow-up not needed.          Vitals Value Taken Time   /62 11/28/23 0817   Temp 36.2 °C (97.2 °F) 11/28/23 0817   Pulse 82 11/28/23 0817   Resp 18 11/28/23 0817   SpO2 95 % 11/28/23 0817         No case tracking events are documented in the log.      Pain/Wai Score: Wai Score: 8 (11/28/2023  8:17 AM)

## 2023-11-28 NOTE — ANESTHESIA PREPROCEDURE EVALUATION
11/28/2023  Hi Braxton is a 63 y.o., male.  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 -     Hyperlipidemia     Hypertension     Memory loss     Thyroid nodule 8/22/2019    Traumatic iritis - Left Eye 2/27/2013     Past Surgical History:   Procedure Laterality Date    CEREBRAL ANGIOGRAM N/A 1/6/2020    Procedure: ANGIOGRAM-CEREBRAL;  Surgeon: [facility] Diagnostic Provider;  Location: [facility] OR 2ND FLR;  Service: Radiology;  Laterality: N/A;  /Nagi    CERVICAL FUSION  12/30/2015    COLONOSCOPY N/A 4/7/2017    Procedure: COLONOSCOPY;  Surgeon: Handy Frederick MD;  Location: [facility] ENDO (4TH FLR);  Service: Endoscopy;  Laterality: N/A;    CORONARY ANGIOGRAPHY N/A 10/10/2022    Procedure: ANGIOGRAM, CORONARY ARTERY;  Surgeon: Anson Nolan MD;  Location: [facility] CATH LAB;  Service: Cardiology;  Laterality: N/A;    ENDOSCOPIC ULTRASOUND OF UPPER GASTROINTESTINAL TRACT N/A 8/22/2023    Procedure: ULTRASOUND, UPPER GI TRACT, ENDOSCOPIC;  Surgeon: Joe Means MD;  Location: [facility] ENDO (2ND FLR);  Service: Endoscopy;  Laterality: N/A;  instr portal-pt stated difficult intubation with surgery in the past-tb    HERNIA REPAIR      PROSTATECTOMY       Patient Active Problem List   Diagnosis    Essential hypertension    Traumatic iritis - Left Eye    Chronic anterior uveitis    Unspecified iridocyclitis - Left Eye    Memory loss    GERD (gastroesophageal reflux disease)    Family history of premature CAD    Peripheral vascular disease    Subclavian steal syndrome    Trigger finger of left hand    Rapid  palpitations    Muscle ache    Cervical spinal stenosis    DJD (degenerative joint disease), cervical    Lateral epicondylitis (tennis elbow)    Cervical radiculopathy    Pain    Mixed hyperlipidemia    Coronary artery disease involving native coronary artery of native heart without angina pectoris    Anxiety disorder    Chest pain    Unstable angina    Rectal bleeding    Lip swelling    Recurrent UTI    BPH with obstruction/lower urinary tract symptoms    Dysphagia    Type 2 diabetes mellitus without complication, without long-term current use of insulin    Pyelonephritis    Dysuria    Thyroid nodule    Abnormal magnetic resonance angiography (MRA)    Anterior cerebral artery aneurysm    Intermittent confusion    New daily persistent headache    Medication overuse headache    Suspected sleep apnea    Hypokalemia    Renal cyst, right    Distended bladder    Incomplete bladder emptying    Bilateral leg cramps    Pterygium eye, left    Hypertension associated with diabetes    Dyslipidemia associated with type 2 diabetes mellitus    Diabetes mellitus with insulin therapy    Dizziness    Elevated PSA    S/P TURP           Pre-op Assessment    I have reviewed the Patient Summary Reports.    I have reviewed the NPO Status.   I have reviewed the Medications.     Review of Systems  Anesthesia Hx:  No problems with previous Anesthesia             Denies Family Hx of Anesthesia complications.    Denies Personal Hx of Anesthesia complications.                    Hematology/Oncology:  Hematology Normal   Oncology Normal                                   EENT/Dental:  EENT/Dental Normal           Cardiovascular:     Hypertension   CAD      Angina     hyperlipidemia                             Pulmonary:  Pulmonary Normal                       Renal/:  Chronic Renal Disease   Renal cyst             Hepatic/GI:     GERD             Musculoskeletal:  Arthritis               Neurological:    Neuromuscular Disease,  Headaches      Cervical spinal stenosis                            Endocrine:  Diabetes           Dermatological:  Skin Normal    Psych:  Psychiatric History                  Physical Exam  General: Well nourished, Cooperative, Alert and Oriented    Airway:  Mallampati: I / I  Mouth Opening: Normal  TM Distance: Normal  Tongue: Normal  Neck ROM: Normal ROM    Dental:  Intact    Chest/Lungs:  Clear to auscultation, Normal Respiratory Rate    Heart:  Rate: Normal  Rhythm: Regular Rhythm  Sounds: Normal    Abdomen:  Normal, Soft, Nontender        Anesthesia Plan  Type of Anesthesia, risks & benefits discussed:    Anesthesia Type: Gen Natural Airway, MAC  Intra-op Monitoring Plan: Standard ASA Monitors  Post Op Pain Control Plan: multimodal analgesia  Induction:  IV  Informed Consent: Informed consent signed with the Patient and all parties understand the risks and agree with anesthesia plan.  All questions answered.   ASA Score: 3  Day of Surgery Review of History & Physical: I have interviewed and examined the patient. I have reviewed the patient's H&P dated:     Ready For Surgery From Anesthesia Perspective.     .

## 2023-11-28 NOTE — TRANSFER OF CARE
"Anesthesia Transfer of Care Note    Patient: Hi Braxton    Procedure(s) Performed: Procedure(s) (LRB):  COLONOSCOPY (N/A)    Patient location: GI    Anesthesia Type: general    Transport from OR: Transported from OR on room air with adequate spontaneous ventilation    Post pain: adequate analgesia    Post assessment: no apparent anesthetic complications and tolerated procedure well    Post vital signs: stable    Level of consciousness: awake and alert    Nausea/Vomiting: no nausea/vomiting    Complications: none    Transfer of care protocol was followed      Last vitals: Visit Vitals  /86 (BP Location: Left arm, Patient Position: Lying)   Pulse 74   Temp 36.6 °C (97.9 °F) (Temporal)   Resp 18   Ht 6' 4" (1.93 m)   Wt 93.4 kg (206 lb)   SpO2 96%   BMI 25.08 kg/m²     "

## 2023-12-11 ENCOUNTER — PATIENT MESSAGE (OUTPATIENT)
Dept: ADMINISTRATIVE | Facility: HOSPITAL | Age: 63
End: 2023-12-11
Payer: MEDICARE

## 2023-12-13 ENCOUNTER — HOSPITAL ENCOUNTER (OUTPATIENT)
Dept: RADIOLOGY | Facility: HOSPITAL | Age: 63
Discharge: HOME | End: 2023-12-13
Attending: INTERNAL MEDICINE
Payer: COMMERCIAL

## 2023-12-13 ENCOUNTER — OFFICE VISIT (OUTPATIENT)
Dept: INTERNAL MEDICINE | Facility: CLINIC | Age: 63
End: 2023-12-13
Payer: COMMERCIAL

## 2023-12-13 VITALS
OXYGEN SATURATION: 97 % | SYSTOLIC BLOOD PRESSURE: 112 MMHG | HEIGHT: 76 IN | HEART RATE: 90 BPM | WEIGHT: 206.56 LBS | BODY MASS INDEX: 25.15 KG/M2 | DIASTOLIC BLOOD PRESSURE: 62 MMHG | TEMPERATURE: 97 F

## 2023-12-13 DIAGNOSIS — I25.84 CORONARY ARTERY DISEASE DUE TO CALCIFIED CORONARY LESION: ICD-10-CM

## 2023-12-13 DIAGNOSIS — I10 ESSENTIAL HYPERTENSION: ICD-10-CM

## 2023-12-13 DIAGNOSIS — M54.2 NECK PAIN: Primary | ICD-10-CM

## 2023-12-13 DIAGNOSIS — E87.6 HYPOKALEMIA: ICD-10-CM

## 2023-12-13 DIAGNOSIS — M54.2 CERVICALGIA: ICD-10-CM

## 2023-12-13 DIAGNOSIS — I25.10 CORONARY ARTERY DISEASE DUE TO CALCIFIED CORONARY LESION: ICD-10-CM

## 2023-12-13 DIAGNOSIS — I49.3 SYMPTOMATIC PVCS: ICD-10-CM

## 2023-12-13 DIAGNOSIS — M54.2 NECK PAIN: ICD-10-CM

## 2023-12-13 DIAGNOSIS — E78.2 MIXED HYPERLIPIDEMIA: ICD-10-CM

## 2023-12-13 PROCEDURE — 1159F MED LIST DOCD IN RCRD: CPT | Mod: CPTII,S$GLB,, | Performed by: INTERNAL MEDICINE

## 2023-12-13 PROCEDURE — 99213 OFFICE O/P EST LOW 20 MIN: CPT | Mod: S$GLB,,, | Performed by: INTERNAL MEDICINE

## 2023-12-13 PROCEDURE — 3044F HG A1C LEVEL LT 7.0%: CPT | Mod: CPTII,S$GLB,, | Performed by: INTERNAL MEDICINE

## 2023-12-13 PROCEDURE — 3074F PR MOST RECENT SYSTOLIC BLOOD PRESSURE < 130 MM HG: ICD-10-PCS | Mod: CPTII,S$GLB,, | Performed by: INTERNAL MEDICINE

## 2023-12-13 PROCEDURE — 3074F SYST BP LT 130 MM HG: CPT | Mod: CPTII,S$GLB,, | Performed by: INTERNAL MEDICINE

## 2023-12-13 PROCEDURE — 72040 XR CERVICAL SPINE AP LATERAL: ICD-10-PCS | Mod: 26,,, | Performed by: RADIOLOGY

## 2023-12-13 PROCEDURE — 1101F PR PT FALLS ASSESS DOC 0-1 FALLS W/OUT INJ PAST YR: ICD-10-PCS | Mod: CPTII,S$GLB,, | Performed by: INTERNAL MEDICINE

## 2023-12-13 PROCEDURE — 4010F PR ACE/ARB THEARPY RXD/TAKEN: ICD-10-PCS | Mod: CPTII,S$GLB,, | Performed by: INTERNAL MEDICINE

## 2023-12-13 PROCEDURE — 3078F DIAST BP <80 MM HG: CPT | Mod: CPTII,S$GLB,, | Performed by: INTERNAL MEDICINE

## 2023-12-13 PROCEDURE — 3288F PR FALLS RISK ASSESSMENT DOCUMENTED: ICD-10-PCS | Mod: CPTII,S$GLB,, | Performed by: INTERNAL MEDICINE

## 2023-12-13 PROCEDURE — 1160F RVW MEDS BY RX/DR IN RCRD: CPT | Mod: CPTII,S$GLB,, | Performed by: INTERNAL MEDICINE

## 2023-12-13 PROCEDURE — 99999 PR PBB SHADOW E&M-EST. PATIENT-LVL IV: CPT | Mod: PBBFAC,,, | Performed by: INTERNAL MEDICINE

## 2023-12-13 PROCEDURE — 3288F FALL RISK ASSESSMENT DOCD: CPT | Mod: CPTII,S$GLB,, | Performed by: INTERNAL MEDICINE

## 2023-12-13 PROCEDURE — 99999 PR PBB SHADOW E&M-EST. PATIENT-LVL IV: ICD-10-PCS | Mod: PBBFAC,,, | Performed by: INTERNAL MEDICINE

## 2023-12-13 PROCEDURE — 3078F PR MOST RECENT DIASTOLIC BLOOD PRESSURE < 80 MM HG: ICD-10-PCS | Mod: CPTII,S$GLB,, | Performed by: INTERNAL MEDICINE

## 2023-12-13 PROCEDURE — 72040 X-RAY EXAM NECK SPINE 2-3 VW: CPT | Mod: 26,,, | Performed by: RADIOLOGY

## 2023-12-13 PROCEDURE — 3008F PR BODY MASS INDEX (BMI) DOCUMENTED: ICD-10-PCS | Mod: CPTII,S$GLB,, | Performed by: INTERNAL MEDICINE

## 2023-12-13 PROCEDURE — 72040 X-RAY EXAM NECK SPINE 2-3 VW: CPT | Mod: TC,PO

## 2023-12-13 PROCEDURE — 1160F PR REVIEW ALL MEDS BY PRESCRIBER/CLIN PHARMACIST DOCUMENTED: ICD-10-PCS | Mod: CPTII,S$GLB,, | Performed by: INTERNAL MEDICINE

## 2023-12-13 PROCEDURE — 3044F PR MOST RECENT HEMOGLOBIN A1C LEVEL <7.0%: ICD-10-PCS | Mod: CPTII,S$GLB,, | Performed by: INTERNAL MEDICINE

## 2023-12-13 PROCEDURE — 1101F PT FALLS ASSESS-DOCD LE1/YR: CPT | Mod: CPTII,S$GLB,, | Performed by: INTERNAL MEDICINE

## 2023-12-13 PROCEDURE — 3008F BODY MASS INDEX DOCD: CPT | Mod: CPTII,S$GLB,, | Performed by: INTERNAL MEDICINE

## 2023-12-13 PROCEDURE — 4010F ACE/ARB THERAPY RXD/TAKEN: CPT | Mod: CPTII,S$GLB,, | Performed by: INTERNAL MEDICINE

## 2023-12-13 PROCEDURE — 99213 PR OFFICE/OUTPT VISIT, EST, LEVL III, 20-29 MIN: ICD-10-PCS | Mod: S$GLB,,, | Performed by: INTERNAL MEDICINE

## 2023-12-13 PROCEDURE — 1159F PR MEDICATION LIST DOCUMENTED IN MEDICAL RECORD: ICD-10-PCS | Mod: CPTII,S$GLB,, | Performed by: INTERNAL MEDICINE

## 2023-12-13 RX ORDER — EZETIMIBE 10 MG/1
10 TABLET ORAL
Qty: 90 TABLET | Refills: 3 | Status: SHIPPED | OUTPATIENT
Start: 2023-12-13

## 2023-12-13 RX ORDER — NIFEDIPINE 60 MG/1
60 TABLET, EXTENDED RELEASE ORAL
Qty: 90 TABLET | Refills: 3 | OUTPATIENT
Start: 2023-12-13

## 2023-12-13 RX ORDER — PRAVASTATIN SODIUM 80 MG/1
80 TABLET ORAL
Qty: 90 TABLET | Refills: 3 | Status: SHIPPED | OUTPATIENT
Start: 2023-12-13

## 2023-12-13 RX ORDER — SPIRONOLACTONE 50 MG/1
50 TABLET, FILM COATED ORAL
Qty: 90 TABLET | Refills: 3 | Status: SHIPPED | OUTPATIENT
Start: 2023-12-13

## 2023-12-13 RX ORDER — METOPROLOL SUCCINATE 50 MG/1
TABLET, EXTENDED RELEASE ORAL
Qty: 135 TABLET | Refills: 3 | Status: SHIPPED | OUTPATIENT
Start: 2023-12-13

## 2023-12-13 NOTE — TELEPHONE ENCOUNTER
Refill Routing Note   Medication(s) are not appropriate for processing by [facility] for the following reason(s):        Outside of protocol: total daily dose outside of [facility] protocol     [facility] action(s):  Route  Approve     Requires labs : Yes (A1C due 12/26/23            Appointments  past 12m or future 3m with PCP    Date Provider   Last Visit   8/16/2023 Josefina Kendall MD   Next Visit   12/13/2023 Josefina Kendall MD   ED visits in past 90 days: 1        Note composed:2:42 PM 12/13/2023

## 2023-12-13 NOTE — TELEPHONE ENCOUNTER
Care Due:                  Date            Visit Type   Department     Provider  --------------------------------------------------------------------------------                                EP -                              PRIMARY      MET INTERNAL  Last Visit: 08-      CARE ([facility])   MEDICINE       Josefina eKndall  Next Visit: None Scheduled  None         None Found                                                            Last  Test          Frequency    Reason                     Performed    Due Date  --------------------------------------------------------------------------------    HBA1C.......  6 months...  LANTUS...................  06- 12-    [facility] Embedded Care Due Messages. Reference number: [id number].   12/13/2023 2:50:03 AM CST

## 2023-12-13 NOTE — PROGRESS NOTES
CC: followup of hypertension  HPI:  The patient is a 63 y.o. year old male who presents to the office for followup of hypertension.  The patient denies any chest pain, shortness of breath, headache, blurred vision, excessive fatigue, nausea or vomiting.  He complains of neck and shoulder pin, worse at night, started a couple of weeks ago.  He is having to cross his arms for relief of symptoms.  The patient also reports intermittent numbness and tingling. He reports occasional weakness in his left hand. He is right hand dominant,  He has used TENS unit with some relief.    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 -     Hyperlipidemia     Hypertension     Memory loss     Thyroid nodule 8/22/2019    Traumatic iritis - Left Eye 2/27/2013       SURGICAL HISTORY:  Past Surgical History:   Procedure Laterality Date    CEREBRAL ANGIOGRAM N/A 1/6/2020    Procedure: ANGIOGRAM-CEREBRAL;  Surgeon: [facility] Diagnostic Provider;  [facility] [place] [address] [place] [address];  Service: Radiology;  Laterality: N/A;  /Nagi    CERVICAL FUSION  12/30/2015    COLONOSCOPY N/A 4/7/2017    Procedure: COLONOSCOPY;  Surgeon: Handy Frederick MD;  Location: [address]);  Service: Endoscopy;  Laterality: N/A;    COLONOSCOPY N/A 11/28/2023    Procedure: COLONOSCOPY;  Surgeon: ARMAAN Hinojosa MD;  Location: [facility] ([address] [place] [address]);  Service: Endoscopy;  Laterality: N/A;  8/21-referred by Dr. Schulte/ Diagnosis:  Blood in stool, past due on surveillance colonoscopy for advanced colon polyp villous adenoma greater than 10 mm /Prep instructions handed to patient and to portal. AS  11/21/23-Precall complete-DS    CORONARY  ANGIOGRAPHY N/A 10/10/2022    Procedure: ANGIOGRAM, CORONARY ARTERY;  Surgeon: Anson Nolan MD;  Location: [facility] CATH LAB;  Service: Cardiology;  Laterality: N/A;    ENDOSCOPIC ULTRASOUND OF UPPER GASTROINTESTINAL TRACT N/A 8/22/2023    Procedure: ULTRASOUND, UPPER GI TRACT, ENDOSCOPIC;  Surgeon: Joe Means MD;  Location: [facility] ENDO (2ND FLR);  Service: Endoscopy;  Laterality: N/A;  instr portal-pt stated difficult intubation with surgery in the past-tb    HERNIA REPAIR      PROSTATECTOMY         MEDS:  Medcard reviewed and updated    ALLERGIES: Allergy Card reviewed and updated    SOCIAL HISTORY:   The patient is a nonsmoker.    PE:   APPEARANCE: Well nourished, well developed, in no acute distress.    CHEST: Lungs clear to auscultation with unlabored respirations.  CARDIOVASCULAR: Normal S1, S2. No murmurs. No carotid bruits. No pedal edema.  ABDOMEN: Bowel sounds normal. Not distended. Soft. No tenderness or masses. .  PSYCHIATRIC: The patient is oriented to person, place, and time and has a pleasant affect.        ASSESSMENT/PLAN:  Mee was seen today for follow-up, shoulder pain and headache.    Diagnoses and all orders for this visit:    Neck pain  -     X-Ray Spine 1 View Any Level; Future  -     Ambulatory referral/consult to Physical Medicine Rehab; Future    Cervicalgia

## 2023-12-14 RX ORDER — PANTOPRAZOLE SODIUM 40 MG/1
40 TABLET, DELAYED RELEASE ORAL 2 TIMES DAILY
Qty: 180 TABLET | Refills: 3 | Status: SHIPPED | OUTPATIENT
Start: 2023-12-14

## 2023-12-21 NOTE — PROGRESS NOTES
Health Maintenance Due   Topic Date Due    LDCT Lung Screen  07/09/2016    Foot Exam  05/12/2018    Pneumococcal Vaccines (Age 65+) (2 - PCV) 10/09/2020    Eye Exam  03/20/2022     Chart reviewed.   Immunizations: Reconciled  Orders placed: N/A  Upcoming appts to satisfy JOSE LUIS topics: N/A         Detail Level: Detailed Detail Level: Simple Patient Specific Counseling (Will Not Stick From Patient To Patient): - Advised that skin tags are benign growths.\\n- The most common methods for treatment are snip removal and cryosurgery.\\n- More will likely develop over time.\\n- Patient requested treatment today due to symptoms (See HPI).\\n- Recommended treatment with cryosurgery.\\n- Patient expressed understanding. Patient Specific Counseling (Will Not Stick From Patient To Patient): - Seborrheic keratoses are benign growths.\\n- Patients get more of them as they age, and these may grow slowly over time.\\n- Some seborrheic keratoses (particularly larger lesions) require multiple treatments.\\n- Return to clinic should any treated growths not be resolved within 4 weeks. Detail Level: Generalized

## 2023-12-23 DIAGNOSIS — I10 ESSENTIAL HYPERTENSION: ICD-10-CM

## 2023-12-23 NOTE — TELEPHONE ENCOUNTER
No care due was identified.  Health [facility] Embedded Care Due Messages. Reference number: [id number].   12/23/2023 3:09:02 PM CST

## 2023-12-25 RX ORDER — LOSARTAN POTASSIUM 100 MG/1
100 TABLET ORAL
Qty: 90 TABLET | Refills: 3 | Status: SHIPPED | OUTPATIENT
Start: 2023-12-25

## 2023-12-25 NOTE — TELEPHONE ENCOUNTER
Refill Routing Note   Medication(s) are not appropriate for processing by [facility] for the following reason(s):      Medication outside of protocol    [facility] action(s):  Approve  Route Care Due:  None identified            Appointments  past 12m or future 3m with PCP    Date Provider   Last Visit   12/13/2023 Josefina Kendall MD   Next Visit   Visit date not found Josefina Kendall MD   ED visits in past 90 days: 1        Note composed:2:10 PM 12/25/2023

## 2023-12-26 RX ORDER — ALPRAZOLAM 0.5 MG/1
TABLET ORAL
Qty: 90 TABLET | Refills: 1 | Status: SHIPPED | OUTPATIENT
Start: 2023-12-26 | End: 2024-02-20

## 2023-12-28 ENCOUNTER — PATIENT MESSAGE (OUTPATIENT)
Dept: INTERNAL MEDICINE | Facility: CLINIC | Age: 63
End: 2023-12-28
Payer: MEDICARE

## 2023-12-28 NOTE — TELEPHONE ENCOUNTER
Pt still c/o neck pain and tingling in arms and fingers. His appt is on 01/26/24. Pt ask for something to help between that time.

## 2023-12-29 RX ORDER — GABAPENTIN 100 MG/1
100 CAPSULE ORAL 3 TIMES DAILY
Qty: 90 CAPSULE | Refills: 0 | Status: SHIPPED | OUTPATIENT
Start: 2023-12-29 | End: 2024-01-19

## 2024-01-12 ENCOUNTER — PATIENT MESSAGE (OUTPATIENT)
Dept: INTERNAL MEDICINE | Facility: CLINIC | Age: 64
End: 2024-01-12
Payer: COMMERCIAL

## 2024-01-12 DIAGNOSIS — M25.472 LEFT ANKLE SWELLING: Primary | ICD-10-CM

## 2024-01-16 ENCOUNTER — HOSPITAL ENCOUNTER (OUTPATIENT)
Dept: RADIOLOGY | Facility: HOSPITAL | Age: 64
Discharge: HOME | End: 2024-01-16
Attending: INTERNAL MEDICINE
Payer: COMMERCIAL

## 2024-01-16 DIAGNOSIS — M25.472 LEFT ANKLE SWELLING: ICD-10-CM

## 2024-01-16 PROCEDURE — 73610 X-RAY EXAM OF ANKLE: CPT | Mod: 26,LT,, | Performed by: RADIOLOGY

## 2024-01-16 PROCEDURE — 73610 X-RAY EXAM OF ANKLE: CPT | Mod: TC,PN,LT

## 2024-01-19 ENCOUNTER — HOSPITAL ENCOUNTER (OUTPATIENT)
Dept: RADIOLOGY | Facility: HOSPITAL | Age: 64
Discharge: HOME | End: 2024-01-19
Attending: PHYSICAL MEDICINE & REHABILITATION
Payer: COMMERCIAL

## 2024-01-19 ENCOUNTER — OFFICE VISIT (OUTPATIENT)
Dept: PHYSICAL MEDICINE AND REHAB | Facility: CLINIC | Age: 64
End: 2024-01-19
Payer: COMMERCIAL

## 2024-01-19 VITALS — HEIGHT: 76 IN | WEIGHT: 210.75 LBS | BODY MASS INDEX: 25.66 KG/M2

## 2024-01-19 DIAGNOSIS — M47.22 OSTEOARTHRITIS OF SPINE WITH RADICULOPATHY, CERVICAL REGION: ICD-10-CM

## 2024-01-19 DIAGNOSIS — G89.29 CHRONIC NECK PAIN: ICD-10-CM

## 2024-01-19 DIAGNOSIS — M54.2 CHRONIC NECK PAIN: ICD-10-CM

## 2024-01-19 DIAGNOSIS — M54.12 LEFT CERVICAL RADICULOPATHY: ICD-10-CM

## 2024-01-19 DIAGNOSIS — M25.512 CHRONIC LEFT SHOULDER PAIN: ICD-10-CM

## 2024-01-19 DIAGNOSIS — M54.2 CHRONIC NECK PAIN: Primary | ICD-10-CM

## 2024-01-19 DIAGNOSIS — M48.02 CERVICAL SPINAL STENOSIS: ICD-10-CM

## 2024-01-19 DIAGNOSIS — G89.29 CHRONIC NECK PAIN: Primary | ICD-10-CM

## 2024-01-19 DIAGNOSIS — Z98.1 STATUS POST CERVICAL SPINAL FUSION: ICD-10-CM

## 2024-01-19 DIAGNOSIS — G89.29 CHRONIC LEFT SHOULDER PAIN: ICD-10-CM

## 2024-01-19 DIAGNOSIS — M48.02 NEURAL FORAMINAL STENOSIS OF CERVICAL SPINE: ICD-10-CM

## 2024-01-19 PROCEDURE — 3008F BODY MASS INDEX DOCD: CPT | Mod: CPTII,S$GLB,, | Performed by: PHYSICAL MEDICINE & REHABILITATION

## 2024-01-19 PROCEDURE — 4010F ACE/ARB THERAPY RXD/TAKEN: CPT | Mod: CPTII,S$GLB,, | Performed by: PHYSICAL MEDICINE & REHABILITATION

## 2024-01-19 PROCEDURE — 72141 MRI NECK SPINE W/O DYE: CPT | Mod: 26,,, | Performed by: RADIOLOGY

## 2024-01-19 PROCEDURE — 72141 MRI NECK SPINE W/O DYE: CPT | Mod: TC

## 2024-01-19 PROCEDURE — 99999 PR PBB SHADOW E&M-EST. PATIENT-LVL IV: CPT | Mod: PBBFAC,,, | Performed by: PHYSICAL MEDICINE & REHABILITATION

## 2024-01-19 PROCEDURE — 99204 OFFICE O/P NEW MOD 45 MIN: CPT | Mod: S$GLB,,, | Performed by: PHYSICAL MEDICINE & REHABILITATION

## 2024-01-19 PROCEDURE — 1125F AMNT PAIN NOTED PAIN PRSNT: CPT | Mod: CPTII,S$GLB,, | Performed by: PHYSICAL MEDICINE & REHABILITATION

## 2024-01-19 PROCEDURE — 73030 X-RAY EXAM OF SHOULDER: CPT | Mod: TC,LT

## 2024-01-19 PROCEDURE — 1159F MED LIST DOCD IN RCRD: CPT | Mod: CPTII,S$GLB,, | Performed by: PHYSICAL MEDICINE & REHABILITATION

## 2024-01-19 PROCEDURE — 1101F PT FALLS ASSESS-DOCD LE1/YR: CPT | Mod: CPTII,S$GLB,, | Performed by: PHYSICAL MEDICINE & REHABILITATION

## 2024-01-19 PROCEDURE — 3288F FALL RISK ASSESSMENT DOCD: CPT | Mod: CPTII,S$GLB,, | Performed by: PHYSICAL MEDICINE & REHABILITATION

## 2024-01-19 PROCEDURE — 73030 X-RAY EXAM OF SHOULDER: CPT | Mod: 26,LT,, | Performed by: RADIOLOGY

## 2024-01-19 RX ORDER — METHOCARBAMOL 750 MG/1
750 TABLET, FILM COATED ORAL 3 TIMES DAILY PRN
Qty: 270 TABLET | Refills: 0 | Status: SHIPPED | OUTPATIENT
Start: 2024-01-19 | End: 2024-04-09

## 2024-01-19 RX ORDER — GABAPENTIN 600 MG/1
600 TABLET ORAL 3 TIMES DAILY
Qty: 270 TABLET | Refills: 1 | Status: SHIPPED | OUTPATIENT
Start: 2024-01-19 | End: 2024-06-04

## 2024-01-19 RX ORDER — NAPROXEN 500 MG/1
500 TABLET ORAL 2 TIMES DAILY
Qty: 60 TABLET | Refills: 0 | Status: SHIPPED | OUTPATIENT
Start: 2024-01-19 | End: 2024-02-19

## 2024-01-19 NOTE — PROGRESS NOTES
Subjective:       Patient ID: Hi Braxton is a 64 y.o. male.    Chief Complaint: patient is having pain from his neck down to his shoulders  (Patient also had a fall two weeks ago , and his knee has been swollen since the fall )      HPI      Mr. Braxton is a 64-year-old right-handed male with past medical history of hypertension, DM type 2, CAD, GERD.  He is presenting to the Physical Medicine Clinic for chronic neck pain.      The patient has been complaining of neck pain since about 2015.  His imaging studies were positive for multilevel degenerative changes with facet arthropathy, neural foraminal stenosis and spinal canal stenosis.  In 09/2015 he underwent C7-T1 interlaminar Epidural Steroid Injection.  He had limited relief.  In 12/2015 he underwent ACDF at C3-5.  His neck pain significantly improved afterwards.  X-rays showed stable hardware.  However, his neck pain started getting worse.  He received a course of physical therapy in 2019.    Currently, his neck pain is an almost constant aching and stabbing pain in the whole cervical spine, mostly on the left side.  Has occasional shooting pain to the left hand, mostly digit 1, with numbness.  His pain is aggravated by neck movement.  It is better with sitting still and local heat.  His maximum pain is 10/10 and minimum 3-4/10.  Today it is 3-4/10.  He complains of left upper extremity weakness mostly at the shoulder and hand .  He does not have any clear impairment of his coordination but has not been using his left hand often.  He complains of mild impairment of his gait.  He reports occasional falls with recent by trauma to his left knee.  He denies any back pain.  He does complain of chronic left shoulder pain, sometimes separate from his neck pain.  It is aggravated by shoulder movement.      He is currently taking:   Gabapentin 300 mg capsule and 100 mg capsule 3 times per day  Naproxen 500 mg p.r.n. but infrequently   Methocarbamol 750 mg  p.r.n., usually couple times per week.      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     Jardiance [empagliflozin] Other (See Comments)     Possible related to recent UTI's     Metformin      Used XR and experienced flatulance and abdominal pain.         Review of Systems   Constitutional:  Negative for chills and fever.   Eyes:  Negative for visual disturbance.   Respiratory:  Negative for shortness of breath.    Cardiovascular:  Positive for chest pain.   Gastrointestinal:  Negative for blood in stool, constipation, nausea and vomiting.   Genitourinary:  Negative for difficulty urinating.   Musculoskeletal:  Positive for gait problem, neck pain and neck stiffness. Negative for arthralgias and back pain.   Neurological:  Positive for headaches. Negative for dizziness and weakness.   Psychiatric/Behavioral:  Positive for sleep disturbance. Negative for behavioral problems.              Objective:      Physical Exam  Vitals reviewed.   Constitutional:       General: He is not in acute distress.     Appearance: He is well-developed.   HENT:      Head: Normocephalic and atraumatic.   Neck:      Comments: Healed ACDF scar.  ROM: decreased in all planes.  +ve tenderness Lt paravertebral muscles..  Spurling's test:      -ve to RUE.     +ve to LUE.      Musculoskeletal:      Comments: BUE:  ROM:   RUE: full.   LUE: full.  Strength:    RUE: 5/5 at shoulder abduction, 5 elbow flexion, 5 wrist extension, 5 elbow extension, 5 finger flexion, 5 finger abduction.   LUE: 4/5 at shoulder abduction, 4 elbow flexion, 4 wrist extension, 4 elbow extension, 5- finger flexion, 4 finger abduction.  Sensation to pinprick:   RUE: intact.   LUE: intact.  DTR:    RUE: +1 biceps, +1 triceps.   LUE:  +1 biceps, +1 triceps.  Garsia:   RUE: -ve.   LUE: -ve.    Shoulder Impingement Signs:  Neer:  RUE: -ve    LUE: +ve  Costello: RUE: -ve    LUE: +ve  Subacromial tenderness:    RUE: -ve    LUE: +ve   Empty Can test:    RUE: -ve    LUE: +ve      BLE:  ROM:   RLE: full.   LLE: full.  Strength:    RLE: 5/5 at hip flexion, 5 knee extension, 5 ankle DF, 5 PF, 5 EHL.   LLE: 5-/5 at hip flexion, 5- knee extension, 5 ankle DF, 5 PF, 5 EHL.  Sensation to pinprick:     RLE: intact.      LLE: intact.   DTR:     RLE: +1 knee, +1 ankle.    LLE: +1 knee, +1 ankle.  Clonus:    Rt ankle: -ve.    Lt ankle: -ve.      Gait: WNL     Skin:     General: Skin is warm.   Neurological:      Mental Status: He is alert.   Psychiatric:         Mood and Affect: Mood normal.         Behavior: Behavior normal.           IMAGING STUDIES:    XR CERVICAL SPINE AP LATERAL (12/13/2023):     CLINICAL HISTORY:  Cervicalgia     TECHNIQUE:  AP, lateral and open mouth views of the cervical spine were performed.     COMPARISON:  X-rays of the cervical spine dated 11/03/2016.     CT scan of the cervical spine dated 06/17/2020.     FINDINGS:  There are postoperative changes of anterior discectomy with fusion from C3 through C5.  The hardware is intact with expected osseous fusion.  There is significant osteophyte formation at the C2-C3 level, suggestive of segmental level disease.     The craniocervical junction is intact.  The predental space is maintained.  No prevertebral soft tissue swelling is  identified.  There is straightening of normal cervical lordosis.  There is no evidence of listhesis.  There is intervertebral disc space narrowing at multiple levels.  This appears to be most prominent at the C3-C4 level.     There is no evidence of acute fracture or listhesis of the cervical spine.     The paraspinal soft tissues are within normal limits.     Impression:     Stable postoperative findings of ACDF from C3 through C5 with suspected segmental level disease at the C2-C3 level.     Multilevel degenerative changes in the cervical spine, most significant at the C3-C4 level.  This can be further assessed on cross-sectional imaging.     No evidence of acute fracture or traumatic malalignment.      Assessment:       1. Chronic neck pain    2. Status post cervical spinal fusion (ACDF at C3-5, 12/31/2015)    3. Osteoarthritis of spine with radiculopathy, cervical region    4. Left cervical radiculopathy    5. Cervical spinal stenosis    6. Neural foraminal stenosis of cervical spine    7. Chronic left shoulder pain        Plan:         - MRI Cervical Spine Without Contrast; Future (due to chronic neck pain, some myelopathic eyes symptoms).  - X-Ray Shoulder 2 or More Views Left; Future  - Increase gabapentin (NEURONTIN) 600 MG tablet; Take 1 tablet (600 mg total) by mouth 3 (three) times daily.  - Continue methocarbamoL (ROBAXIN) 750 MG Tab; Take 1 tablet (750 mg total) by mouth 3 (three) times daily as needed.   - His left shoulder pain may be separate from his neck pain in his suggestive of subdeltoid bursitis.  He was asked to start a short course of: naproxen (NAPROSYN) 500 MG tablet; Take 1 tablet (500 mg total) by mouth 2 (two) times daily, for about 10 days.  He was asked to stop it if he has any chest pain.  - Decision on referral for spine injections and/or physical therapy may be done depending on his MRI results.       - Follow up in about 4 months (around 5/19/2024).    This was a 50 minute visit,  50% of which was spent educating the patient about the diagnosis and the treatment plan.    This note was partly generated with Trell[employer] voice recognition software. I apologize for any possible typographical errors.

## 2024-01-22 ENCOUNTER — PATIENT MESSAGE (OUTPATIENT)
Dept: PHYSICAL MEDICINE AND REHAB | Facility: CLINIC | Age: 64
End: 2024-01-22
Payer: COMMERCIAL

## 2024-01-22 DIAGNOSIS — M47.22 OSTEOARTHRITIS OF SPINE WITH RADICULOPATHY, CERVICAL REGION: ICD-10-CM

## 2024-01-22 DIAGNOSIS — M48.02 CERVICAL SPINAL STENOSIS: ICD-10-CM

## 2024-01-22 DIAGNOSIS — Z98.1 STATUS POST CERVICAL SPINAL FUSION: ICD-10-CM

## 2024-01-22 DIAGNOSIS — M54.2 CHRONIC NECK PAIN: Primary | ICD-10-CM

## 2024-01-22 DIAGNOSIS — G89.29 CHRONIC NECK PAIN: Primary | ICD-10-CM

## 2024-01-22 DIAGNOSIS — M54.12 LEFT CERVICAL RADICULOPATHY: ICD-10-CM

## 2024-01-22 DIAGNOSIS — G95.89 MYELOMALACIA OF CERVICAL CORD: ICD-10-CM

## 2024-01-22 DIAGNOSIS — M48.02 NEURAL FORAMINAL STENOSIS OF CERVICAL SPINE: ICD-10-CM

## 2024-01-23 ENCOUNTER — PATIENT MESSAGE (OUTPATIENT)
Dept: INTERNAL MEDICINE | Facility: CLINIC | Age: 64
End: 2024-01-23
Payer: COMMERCIAL

## 2024-02-02 NOTE — PROGRESS NOTES
[facility] Note    Chief Complaint      Chief Complaint   Patient presents with    Medicare AWV       History of Present Illness      Hi Braxton is a 64 y.o. male who presents today for   Chief Complaint   Patient presents with    Medicare AW         Patient presents to clinic today for his annual wellness visit required by Medicare.  He reports feeling well today.  There are no complaints today.         Review of Systems   All 12 systems otherwise negative.       Family History:  family history includes Aneurysm in his brother; Benign prostatic hyperplasia in his brother; Cataracts in his maternal grandmother; Diabetes in his paternal uncle; Heart attack in his brother and brother; Heart attacks under age 50 in his father; Heart disease in his brother, brother, brother, brother, father, mother, and paternal grandmother; Hepatitis in his brother; Hyperlipidemia in his brother, brother, brother, mother, and sister; Hypertension in his brother, brother, brother, brother, brother, mother, and sister; Multiple sclerosis in his daughter; No Known Problems in his maternal grandfather and paternal grandfather; Stroke in his mother and sister; Throat cancer in his brother.   Family history was reviewed with patient.     Medications:  Outpatient Encounter Medications as of 2/20/2024   Medication Sig Dispense Refill    ALPRAZolam (XANAX) 0.5 MG tablet Take 1 tablet (0.5 mg total) by mouth 3 (three) times daily as needed for Anxiety. 90 tablet 3    CONTOUR NEXT TEST STRIPS Strp USE TO TEST BLOOD SUGAR ONCE DAILY 25 strip 6    ezetimibe (ZETIA) 10 mg tablet TAKE 1 TABLET ONE TIME DAILY 90 tablet 3    gabapentin (NEURONTIN) 600 MG tablet Take 1 tablet (600 mg total) by mouth 3 (three) times daily. 270 tablet 1    insulin (LANTUS SOLOSTAR U-100 INSULIN) glargine 100 units/mL SubQ pen Inject 22 Units into the skin every evening. 30 mL 0    LIDOcaine (LIDODERM) 5 % Place 1 patch onto the skin once  "daily. Remove & Discard patch within 12 hours or as directed by MD 15 patch 0    losartan (COZAAR) 100 MG tablet TAKE 1 TABLET ONE TIME DAILY 90 tablet 3    methocarbamoL (ROBAXIN) 750 MG Tab Take 1 tablet (750 mg total) by mouth 3 (three) times daily as needed. 270 tablet 0    metoprolol succinate (TOPROL-XL) 50 MG 24 hr tablet TAKE 1 AND 1/2 TABLETS (75 MG TOTAL) ONCE DAILY. 135 tablet 3    MICROLET LANCET Misc 1 lancet by Misc.(Non-Drug; Combo Route) route once daily. Test blood glucose once daily as instructed. 25 each 11    naproxen (NAPROSYN) 500 MG tablet TAKE 1 TABLET BY MOUTH TWICE A DAY 60 tablet 1    NIFEdipine (PROCARDIA-XL) 60 MG (OSM) 24 hr tablet Take 1 tablet (60 mg total) by mouth 2 (two) times a day. 180 tablet 1    nitroGLYCERIN (NITROSTAT) 0.3 MG SL tablet Place 1 tablet (0.3 mg total) under the tongue every 5 (five) minutes as needed for Chest pain. 100 tablet 3    ondansetron (ZOFRAN) 4 MG tablet Take 1 tablet (4 mg total) by mouth every 6 (six) hours as needed for Nausea. 30 tablet 0    pantoprazole (PROTONIX) 40 MG tablet TAKE 1 TABLET TWICE DAILY 180 tablet 3    pen needle, diabetic (BD ULTRA-FINE CHET PEN NEEDLE) 32 gauge x 5/32" Ndle USE PEN NEEDLE AS INSTRUCTION WITH LANTUS INSULIN PRE-FILLED PEN. 200 each 1    pravastatin (PRAVACHOL) 80 MG tablet TAKE 1 TABLET ONE TIME DAILY 90 tablet 3    spironolactone (ALDACTONE) 50 MG tablet TAKE 1 TABLET ONE TIME DAILY 90 tablet 3    tadalafiL (CIALIS) 20 MG Tab Take 1 tablet (20 mg total) by mouth every 72 hours as needed (ED). 15 tablet 11    aspirin (ECOTRIN) 81 MG EC tablet Take 1 tablet (81 mg total) by mouth once daily.  0    SHINGRIX, PF, 50 mcg/0.5 mL injection       [DISCONTINUED] ALPRAZolam (XANAX) 0.5 MG tablet TAKE 1 TABLET THREE TIMES DAILY AS NEEDED FOR ANXIETY 90 tablet 1    [DISCONTINUED] ALPRAZolam (XANAX) 0.5 MG tablet  90 tablet 0    [DISCONTINUED] insulin (LANTUS SOLOSTAR U-100 INSULIN) glargine 100 units/mL " "SubQ pen Inject 18 Units into the skin every evening. 30 mL 0    [DISCONTINUED] naproxen (NAPROSYN) 500 MG tablet Take 1 tablet (500 mg total) by mouth 2 (two) times daily. 60 tablet 0     No facility-administered encounter medications on file as of 2/20/2024.     Review for Opioid Screening: Pt does not have Rx for Opioids    Review for Substance Use Disorders: Patient does not have a controlled substance abuse disorder.        Allergies:  Review of patient's allergies indicates:   Allergen Reactions    Lisinopril Anaphylaxis and Nausea And Vomiting     General bad feeling    Lipitor [atorvastatin] Other (See Comments)     Muscle aches    Adhesive     Crestor [rosuvastatin] Swelling     Lip swelling.     Jardiance [empagliflozin] Other (See Comments)     Possible related to recent UTI's     Metformin      Used XR and experienced flatulance and abdominal pain.        Health Maintenance:  Health Maintenance   Topic Date Due    Aspirin/Antiplatelet Therapy  Never done    LDCT Lung Screen  07/09/2016    Foot Exam  05/12/2018    Eye Exam  07/15/2024    Hemoglobin A1c  08/19/2024    Lipid Panel  11/07/2024    TETANUS VACCINE  10/09/2029    Colorectal Cancer Screening  11/28/2033    Hepatitis C Screening  Completed    Shingles Vaccine  Completed    Abdominal Aortic Aneurysm Screening  Completed     Health Maintenance Topics with due status: Not Due       Topic Last Completion Date    TETANUS VACCINE 10/09/2019    Eye Exam 07/15/2023    Lipid Panel 11/07/2023    Colorectal Cancer Screening 11/28/2023    Hemoglobin A1c 02/19/2024       Physical Exam      Vital Signs  Temp: 98.6 °F (37 °C)  Temp Source: Oral  Pulse: 86  Resp: 16  SpO2: 98 %  BP: 122/74  BP Location: Right arm  Patient Position: Sitting  Pain Score: 0-No pain  Height and Weight  Height: 6' 4" (193 cm)  Weight: 95 kg (209 lb 7 oz)  BSA (Calculated - sq m): 2.26 sq meters  BMI (Calculated): 25.5  Weight in (lb) to have BMI = 25: " 205]    Physical Exam  Vitals reviewed.   Constitutional:       Appearance: Normal appearance. He is normal weight.   HENT:      Head: Normocephalic and atraumatic.      Nose: Nose normal.      Mouth/Throat:      Mouth: Mucous membranes are moist.      Pharynx: Oropharynx is clear.   Eyes:      Extraocular Movements: Extraocular movements intact.      Conjunctiva/sclera: Conjunctivae normal.      Pupils: Pupils are equal, round, and reactive to light.   Cardiovascular:      Rate and Rhythm: Normal rate and regular rhythm.      Pulses: Normal pulses.      Heart sounds: Normal heart sounds.   Pulmonary:      Effort: Pulmonary effort is normal.      Breath sounds: Normal breath sounds.   Musculoskeletal:         General: Normal range of motion.      Cervical back: Normal range of motion and neck supple.   Skin:     General: Skin is warm and dry.      Capillary Refill: Capillary refill takes less than 2 seconds.   Neurological:      General: No focal deficit present.      Mental Status: He is alert and oriented to person, place, and time. Mental status is at baseline.   Psychiatric:         Mood and Affect: Mood normal.         Behavior: Behavior normal.         Thought Content: Thought content normal.         Judgment: Judgment normal.            Assessment/Plan     Hi Braxton is a 64 y.o.male with:    Need for immunization against respiratory syncytial virus    Encounter for Medicare annual wellness exam  -     Ambulatory Referral/Consult to Enhanced Annual Wellness Visit (eAWV)    Need for vaccination for Strep pneumoniae    Need for influenza vaccination    Type 2 diabetes mellitus without complication, without long-term current use of insulin  -     MICROALBUMIN / CREATININE RATIO URINE  -     Ambulatory referral/consult to Podiatry; Future; Expected date: 02/27/2024    Prostate cancer screening    - Patient will receive RSV vaccine, Pneumonia vaccine, and influenza vaccine today in [facility] pharmacy  -  Patient will f/u with PCP for PSA.  As above, continue current medications and maintain follow up with specialists.  Return to clinic as needed.    Greater than 50% of visit was spent face to face with patient.  All questions were answered to patient's satisfaction.           Yani Burgos, NP-C  [facility]    I offered to discuss advanced care planning, including how to pick a person who would make decisions for you if you were unable to make them for yourself, called a health care power of , and what kind of decisions you might make such as use of life sustaining treatments such as ventilators and tube feeding when faced with a life limiting illness recorded on a living will that they will need to know. (How you want to be cared for as you near the end of your natural life)     X Patient is interested in learning more about how to make advanced directives.  I provided them paperwork and offered to discuss this with them.  I offered to discuss advanced care planning, including how to pick a person who would make decisions for you if you were unable to make them for yourself, called a health care power of , and what kind of decisions you might make such as use of life sustaining treatments such as ventilators and tube feeding when faced with a life limiting illness recorded on a living will that they will need to know. (How you want to be cared for as you near the end of your natural life)     X Patient is interested in learning more about how to make advanced directives.  I provided them paperwork and offered to discuss this with them.

## 2024-02-17 DIAGNOSIS — G89.29 CHRONIC LEFT SHOULDER PAIN: ICD-10-CM

## 2024-02-17 DIAGNOSIS — M25.512 CHRONIC LEFT SHOULDER PAIN: ICD-10-CM

## 2024-02-19 ENCOUNTER — LAB VISIT (OUTPATIENT)
Dept: LAB | Facility: HOSPITAL | Age: 64
End: 2024-02-19
Attending: INTERNAL MEDICINE
Payer: MEDICARE

## 2024-02-19 ENCOUNTER — TELEPHONE (OUTPATIENT)
Dept: ADMINISTRATIVE | Facility: CLINIC | Age: 64
End: 2024-02-19
Payer: COMMERCIAL

## 2024-02-19 DIAGNOSIS — E11.9 TYPE 2 DIABETES MELLITUS WITHOUT COMPLICATION, WITHOUT LONG-TERM CURRENT USE OF INSULIN: ICD-10-CM

## 2024-02-19 LAB
ESTIMATED AVG GLUCOSE: 146 MG/DL (ref 68–131)
HBA1C MFR BLD: 6.7 % (ref 4–5.6)

## 2024-02-19 PROCEDURE — 36415 COLL VENOUS BLD VENIPUNCTURE: CPT | Mod: PO | Performed by: INTERNAL MEDICINE

## 2024-02-19 PROCEDURE — 83036 HEMOGLOBIN GLYCOSYLATED A1C: CPT | Performed by: INTERNAL MEDICINE

## 2024-02-19 RX ORDER — NAPROXEN 500 MG/1
500 TABLET ORAL 2 TIMES DAILY
Qty: 60 TABLET | Refills: 1 | Status: SHIPPED | OUTPATIENT
Start: 2024-02-19 | End: 2024-04-18

## 2024-02-19 NOTE — TELEPHONE ENCOUNTER
Care Due:                  Date            Visit Type   Department     Provider  --------------------------------------------------------------------------------                                EP -                              PRIMARY      MET INTERNAL  Last Visit: 12-      CARE ([facility])   MEDICINE       Josefina Kendall  Next Visit: None Scheduled  None         None Found                                                            Last  Test          Frequency    Reason                     Performed    Due Date  --------------------------------------------------------------------------------    HBA1C.......  6 months...  insulin..................  06- 12-    Health [facility] Embedded Care Due Messages. Reference number: [id number].   2/19/2024 10:10:24 AM CST

## 2024-02-20 ENCOUNTER — OFFICE VISIT (OUTPATIENT)
Dept: PRIMARY CARE CLINIC | Facility: CLINIC | Age: 64
End: 2024-02-20
Payer: MEDICARE

## 2024-02-20 VITALS
TEMPERATURE: 99 F | HEART RATE: 86 BPM | BODY MASS INDEX: 25.5 KG/M2 | HEIGHT: 76 IN | WEIGHT: 209.44 LBS | SYSTOLIC BLOOD PRESSURE: 122 MMHG | OXYGEN SATURATION: 98 % | RESPIRATION RATE: 16 BRPM | DIASTOLIC BLOOD PRESSURE: 74 MMHG

## 2024-02-20 DIAGNOSIS — I15.2 HYPERTENSION ASSOCIATED WITH DIABETES: ICD-10-CM

## 2024-02-20 DIAGNOSIS — E11.9 TYPE 2 DIABETES MELLITUS WITHOUT COMPLICATION, WITHOUT LONG-TERM CURRENT USE OF INSULIN: ICD-10-CM

## 2024-02-20 DIAGNOSIS — Z00.00 ENCOUNTER FOR MEDICARE ANNUAL WELLNESS EXAM: ICD-10-CM

## 2024-02-20 DIAGNOSIS — E11.59 HYPERTENSION ASSOCIATED WITH DIABETES: ICD-10-CM

## 2024-02-20 DIAGNOSIS — Z12.5 PROSTATE CANCER SCREENING: ICD-10-CM

## 2024-02-20 DIAGNOSIS — K55.1 MESENTERIC ARTERY STENOSIS: ICD-10-CM

## 2024-02-20 DIAGNOSIS — Z29.11 NEED FOR IMMUNIZATION AGAINST RESPIRATORY SYNCYTIAL VIRUS: Primary | ICD-10-CM

## 2024-02-20 DIAGNOSIS — Z23 NEED FOR INFLUENZA VACCINATION: ICD-10-CM

## 2024-02-20 DIAGNOSIS — Z00.00 ENCOUNTER FOR PREVENTIVE HEALTH EXAMINATION: ICD-10-CM

## 2024-02-20 DIAGNOSIS — Z23 NEED FOR VACCINATION FOR STREP PNEUMONIAE: ICD-10-CM

## 2024-02-20 DIAGNOSIS — J43.9 PULMONARY EMPHYSEMA, UNSPECIFIED EMPHYSEMA TYPE: ICD-10-CM

## 2024-02-20 PROBLEM — N32.89 DISTENDED BLADDER: Status: RESOLVED | Noted: 2020-06-23 | Resolved: 2024-02-20

## 2024-02-20 PROBLEM — G44.52 NEW DAILY PERSISTENT HEADACHE: Status: RESOLVED | Noted: 2020-01-20 | Resolved: 2024-02-20

## 2024-02-20 PROBLEM — E78.2 MIXED HYPERLIPIDEMIA: Status: RESOLVED | Noted: 2017-01-20 | Resolved: 2024-02-20

## 2024-02-20 PROBLEM — N40.1 BPH WITH OBSTRUCTION/LOWER URINARY TRACT SYMPTOMS: Status: RESOLVED | Noted: 2017-09-28 | Resolved: 2024-02-20

## 2024-02-20 PROBLEM — N39.0 RECURRENT UTI: Status: RESOLVED | Noted: 2017-09-28 | Resolved: 2024-02-20

## 2024-02-20 PROBLEM — Z90.79 S/P TURP: Status: RESOLVED | Noted: 2022-09-02 | Resolved: 2024-02-20

## 2024-02-20 PROBLEM — R30.0 DYSURIA: Status: RESOLVED | Noted: 2018-05-11 | Resolved: 2024-02-20

## 2024-02-20 PROBLEM — H11.002 PTERYGIUM EYE, LEFT: Status: RESOLVED | Noted: 2021-03-20 | Resolved: 2024-02-20

## 2024-02-20 PROBLEM — N13.8 BPH WITH OBSTRUCTION/LOWER URINARY TRACT SYMPTOMS: Status: RESOLVED | Noted: 2017-09-28 | Resolved: 2024-02-20

## 2024-02-20 PROBLEM — E87.6 HYPOKALEMIA: Status: RESOLVED | Noted: 2020-05-05 | Resolved: 2024-02-20

## 2024-02-20 PROBLEM — K62.5 RECTAL BLEEDING: Status: RESOLVED | Noted: 2017-04-07 | Resolved: 2024-02-20

## 2024-02-20 PROBLEM — R22.0 LIP SWELLING: Status: RESOLVED | Noted: 2017-09-05 | Resolved: 2024-02-20

## 2024-02-20 PROBLEM — R33.9 INCOMPLETE BLADDER EMPTYING: Status: RESOLVED | Noted: 2020-06-23 | Resolved: 2024-02-20

## 2024-02-20 PROBLEM — N12 PYELONEPHRITIS: Status: RESOLVED | Noted: 2018-05-11 | Resolved: 2024-02-20

## 2024-02-20 PROBLEM — R29.818 SUSPECTED SLEEP APNEA: Status: RESOLVED | Noted: 2020-02-05 | Resolved: 2024-02-20

## 2024-02-20 LAB
ALBUMIN/CREAT UR: 5.8 UG/MG (ref 0–30)
CREAT UR-MCNC: 206 MG/DL (ref 23–375)
MICROALBUMIN UR DL<=1MG/L-MCNC: 12 UG/ML

## 2024-02-20 PROCEDURE — 3074F SYST BP LT 130 MM HG: CPT | Mod: CPTII,S$GLB,, | Performed by: NURSE PRACTITIONER

## 2024-02-20 PROCEDURE — 99999 PR PBB SHADOW E&M-EST. PATIENT-LVL V: CPT | Mod: PBBFAC,,, | Performed by: NURSE PRACTITIONER

## 2024-02-20 PROCEDURE — 1159F MED LIST DOCD IN RCRD: CPT | Mod: CPTII,S$GLB,, | Performed by: NURSE PRACTITIONER

## 2024-02-20 PROCEDURE — 1160F RVW MEDS BY RX/DR IN RCRD: CPT | Mod: CPTII,S$GLB,, | Performed by: NURSE PRACTITIONER

## 2024-02-20 PROCEDURE — 3288F FALL RISK ASSESSMENT DOCD: CPT | Mod: CPTII,S$GLB,, | Performed by: NURSE PRACTITIONER

## 2024-02-20 PROCEDURE — 1126F AMNT PAIN NOTED NONE PRSNT: CPT | Mod: CPTII,S$GLB,, | Performed by: NURSE PRACTITIONER

## 2024-02-20 PROCEDURE — 1101F PT FALLS ASSESS-DOCD LE1/YR: CPT | Mod: CPTII,S$GLB,, | Performed by: NURSE PRACTITIONER

## 2024-02-20 PROCEDURE — G0439 PPPS, SUBSEQ VISIT: HCPCS | Mod: S$GLB,,, | Performed by: NURSE PRACTITIONER

## 2024-02-20 PROCEDURE — 3078F DIAST BP <80 MM HG: CPT | Mod: CPTII,S$GLB,, | Performed by: NURSE PRACTITIONER

## 2024-02-20 PROCEDURE — 1170F FXNL STATUS ASSESSED: CPT | Mod: CPTII,S$GLB,, | Performed by: NURSE PRACTITIONER

## 2024-02-20 PROCEDURE — 4010F ACE/ARB THERAPY RXD/TAKEN: CPT | Mod: CPTII,S$GLB,, | Performed by: NURSE PRACTITIONER

## 2024-02-20 PROCEDURE — 3044F HG A1C LEVEL LT 7.0%: CPT | Mod: CPTII,S$GLB,, | Performed by: NURSE PRACTITIONER

## 2024-02-20 PROCEDURE — 82043 UR ALBUMIN QUANTITATIVE: CPT | Performed by: NURSE PRACTITIONER

## 2024-02-20 RX ORDER — ALPRAZOLAM 0.5 MG/1
TABLET ORAL
Qty: 90 TABLET | Refills: 0 | Status: SHIPPED | OUTPATIENT
Start: 2024-02-20 | End: 2024-02-20 | Stop reason: SDUPTHER

## 2024-02-20 RX ORDER — ALPRAZOLAM 0.5 MG/1
0.5 TABLET ORAL 3 TIMES DAILY PRN
Qty: 90 TABLET | Refills: 3 | Status: SHIPPED | OUTPATIENT
Start: 2024-02-20

## 2024-02-20 NOTE — PATIENT INSTRUCTIONS
Counseling and Referral of Other Preventative  (Italic type indicates deductible and co-insurance are waived)    Patient Name: Hi Braxton  Today's Date: 2/20/2024    Health Maintenance       Date Due Completion Date    Aspirin/Antiplatelet Therapy Never done ---    LDCT Lung Screen 07/09/2016 7/9/2015    Foot Exam 05/12/2018 5/12/2017    Diabetes Urine Screening 08/15/2023 8/15/2022    PROSTATE-SPECIFIC ANTIGEN 08/27/2023 8/27/2022    Influenza Vaccine (1) 02/20/2024 (Originally 9/1/2023) 11/26/2022    COVID-19 Vaccine (6 - 2023-24 season) 02/20/2024 (Originally 9/1/2023) 10/19/2022    RSV Vaccine (Age 60+ and Pregnant patients) (1 - 1-dose 60+ series) 02/20/2024 (Originally 1/11/2018) ---    Pneumococcal Vaccines (Age 65+) (2 of 2 - PCV) 02/20/2024 (Originally 10/9/2020) 10/9/2019    Eye Exam 07/15/2024 7/15/2023    Hemoglobin A1c 08/19/2024 2/19/2024    Lipid Panel 11/07/2024 11/7/2023    TETANUS VACCINE 10/09/2029 10/9/2019    Colorectal Cancer Screening 11/28/2033 11/28/2023        Orders Placed This Encounter   Procedures    MICROALBUMIN / CREATININE RATIO URINE    Ambulatory referral/consult to Podiatry       The following information is provided to all patients.  This information is to help you find resources for any of the problems found today that may be affecting your health:                  Living healthy guide: www.[facility].[place].gov      Understanding Diabetes: www.diabetes.org      Eating healthy: www.cdc.gov/healthyweight      CDC home safety checklist: www.cdc.gov/steadi/patient.html      Agency on Aging: www.goea.[place].gov      Alcoholics anonymous (AA): www.aa.org      Physical Activity: www.ricky.nih.gov/hj3ymjm      Tobacco use: www.quitwithusla.org         Counseling and Referral of Other Preventative  (Italic type indicates deductible and co-insurance are waived)    Patient Name: Hi Braxton  Today's Date: 2/20/2024    Health Maintenance       Date Due Completion Date     Aspirin/Antiplatelet Therapy Never done ---    LDCT Lung Screen 07/09/2016 7/9/2015    Foot Exam 05/12/2018 5/12/2017    Diabetes Urine Screening 08/15/2023 8/15/2022    PROSTATE-SPECIFIC ANTIGEN 08/27/2023 8/27/2022    Influenza Vaccine (1) 02/20/2024 (Originally 9/1/2023) 11/26/2022    COVID-19 Vaccine (6 - 2023-24 season) 02/20/2024 (Originally 9/1/2023) 10/19/2022    RSV Vaccine (Age 60+ and Pregnant patients) (1 - 1-dose 60+ series) 02/20/2024 (Originally 1/11/2018) ---    Pneumococcal Vaccines (Age 65+) (2 of 2 - PCV) 02/20/2024 (Originally 10/9/2020) 10/9/2019    Eye Exam 07/15/2024 7/15/2023    Hemoglobin A1c 08/19/2024 2/19/2024    Lipid Panel 11/07/2024 11/7/2023    TETANUS VACCINE 10/09/2029 10/9/2019    Colorectal Cancer Screening 11/28/2033 11/28/2023        Orders Placed This Encounter   Procedures    MICROALBUMIN / CREATININE RATIO URINE    Ambulatory referral/consult to Podiatry       The following information is provided to all patients.  This information is to help you find resources for any of the problems found today that may be affecting your health:                  Living healthy guide: www.[facility].[place].gov      Understanding Diabetes: www.diabetes.org      Eating healthy: www.cdc.gov/healthyweight      CDC home safety checklist: www.cdc.gov/steadi/patient.html      Agency on Aging: www.goea.[place].[facility]      Alcoholics anonymous (AA): www.aa.org      Physical Activity: www.ricky.nih.gov/vf8wxxb      Tobacco use: www.quitwithusla.org

## 2024-02-21 ENCOUNTER — HOSPITAL ENCOUNTER (OUTPATIENT)
Dept: RADIOLOGY | Facility: HOSPITAL | Age: 64
Discharge: HOME | End: 2024-02-21
Attending: ORTHOPAEDIC SURGERY
Payer: MEDICARE

## 2024-02-21 DIAGNOSIS — M54.12 CERVICAL RADICULOPATHY: Primary | ICD-10-CM

## 2024-02-21 DIAGNOSIS — M54.12 CERVICAL RADICULOPATHY: ICD-10-CM

## 2024-02-21 PROCEDURE — 72040 X-RAY EXAM NECK SPINE 2-3 VW: CPT | Mod: 26,,, | Performed by: RADIOLOGY

## 2024-02-21 PROCEDURE — 72040 X-RAY EXAM NECK SPINE 2-3 VW: CPT | Mod: TC

## 2024-02-22 ENCOUNTER — OFFICE VISIT (OUTPATIENT)
Dept: ORTHOPEDICS | Facility: CLINIC | Age: 64
End: 2024-02-22
Payer: MEDICARE

## 2024-02-22 VITALS — HEIGHT: 76 IN | BODY MASS INDEX: 25.5 KG/M2 | WEIGHT: 209.38 LBS

## 2024-02-22 DIAGNOSIS — M48.02 SPINAL STENOSIS, CERVICAL REGION: ICD-10-CM

## 2024-02-22 DIAGNOSIS — G95.89 MYELOMALACIA OF CERVICAL CORD: ICD-10-CM

## 2024-02-22 DIAGNOSIS — M50.30 DDD (DEGENERATIVE DISC DISEASE), CERVICAL: ICD-10-CM

## 2024-02-22 DIAGNOSIS — M47.812 CERVICAL SPONDYLOSIS: ICD-10-CM

## 2024-02-22 DIAGNOSIS — G99.2 STENOSIS OF CERVICAL SPINE WITH MYELOPATHY: Primary | ICD-10-CM

## 2024-02-22 DIAGNOSIS — M48.02 STENOSIS OF CERVICAL SPINE WITH MYELOPATHY: Primary | ICD-10-CM

## 2024-02-22 DIAGNOSIS — Z98.1 HISTORY OF FUSION OF CERVICAL SPINE: ICD-10-CM

## 2024-02-22 PROCEDURE — 3008F BODY MASS INDEX DOCD: CPT | Mod: CPTII,S$GLB,, | Performed by: ORTHOPAEDIC SURGERY

## 2024-02-22 PROCEDURE — 3061F NEG MICROALBUMINURIA REV: CPT | Mod: CPTII,S$GLB,, | Performed by: ORTHOPAEDIC SURGERY

## 2024-02-22 PROCEDURE — 99999 PR PBB SHADOW E&M-EST. PATIENT-LVL IV: CPT | Mod: PBBFAC,,, | Performed by: ORTHOPAEDIC SURGERY

## 2024-02-22 PROCEDURE — 99214 OFFICE O/P EST MOD 30 MIN: CPT | Mod: S$GLB,,, | Performed by: ORTHOPAEDIC SURGERY

## 2024-02-22 PROCEDURE — 1160F RVW MEDS BY RX/DR IN RCRD: CPT | Mod: CPTII,S$GLB,, | Performed by: ORTHOPAEDIC SURGERY

## 2024-02-22 PROCEDURE — 1159F MED LIST DOCD IN RCRD: CPT | Mod: CPTII,S$GLB,, | Performed by: ORTHOPAEDIC SURGERY

## 2024-02-22 PROCEDURE — 4010F ACE/ARB THERAPY RXD/TAKEN: CPT | Mod: CPTII,S$GLB,, | Performed by: ORTHOPAEDIC SURGERY

## 2024-02-22 PROCEDURE — 1100F PTFALLS ASSESS-DOCD GE2>/YR: CPT | Mod: CPTII,S$GLB,, | Performed by: ORTHOPAEDIC SURGERY

## 2024-02-22 PROCEDURE — 1125F AMNT PAIN NOTED PAIN PRSNT: CPT | Mod: CPTII,S$GLB,, | Performed by: ORTHOPAEDIC SURGERY

## 2024-02-22 PROCEDURE — 3288F FALL RISK ASSESSMENT DOCD: CPT | Mod: CPTII,S$GLB,, | Performed by: ORTHOPAEDIC SURGERY

## 2024-02-22 PROCEDURE — 3044F HG A1C LEVEL LT 7.0%: CPT | Mod: CPTII,S$GLB,, | Performed by: ORTHOPAEDIC SURGERY

## 2024-02-22 PROCEDURE — 3066F NEPHROPATHY DOC TX: CPT | Mod: CPTII,S$GLB,, | Performed by: ORTHOPAEDIC SURGERY

## 2024-02-22 NOTE — PROGRESS NOTES
DATE: 2/22/2024  PATIENT: Hi Braxton    Attending Physician: Edd Burnette M.D.    CHIEF COMPLAINT: neck and LUE pain    HISTORY:  Hi Braxton is a 64 y.o. male w/ a hx of C3-5 ACDF in 2015 presents for initial evaluation of neck and left arm pain (Neck - 3, Arm - 3). The pain has been present for 4-5 months. The patient describes the pain as dull and it radiates down LUE to the fingers. The pain is worse with activity and improved by rest. There is LUE associated numbness and tingling. There is LUE subjective weakness. Prior treatments have included meds (gabapentin and naproxen), PT and MARYBETH, but no surgery.     The Patient endorses myelopathic symptoms such as handwriting changes or difficulty with buttons/coins/keys. Denies perineal paresthesias, bowel/bladder dysfunction.    The patient does not smoke or endorse IVDU. He has DM (HA1C of 6.7). The patient is not on any blood thinners and does not take chronic narcotics. He works at a [email or web address].    PAST MEDICAL/SURGICAL HISTORY:  Past Medical History:   Diagnosis Date    BPH with obstruction/lower urinary tract symptoms 09/28/2017    Carotid artery occlusion     Chronic anterior uveitis 03/26/2013    Coronary artery disease     Diabetes mellitus, type 2     diet controlled    Difficult intubation     Distended bladder 06/23/2020    Dizziness     DJD (degenerative joint disease), cervical 07/10/2015    Dysuria 05/11/2018    GERD (gastroesophageal reflux disease)     History of iritis 2013    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 Dose of amlodipine increased from 5 to 10 mg in the weeks prior to the incidnt.    Memory loss     Memory loss 06/21/2013    Mixed hyperlipidemia 01/20/2017    Muscle ache 01/28/2015    New daily persistent headache 01/20/2020     Pterygium eye, left 03/20/2021    Pyelonephritis 05/11/2018    Rectal bleeding 04/07/2017    Recurrent UTI 09/28/2017    S/P TURP 09/02/2022    Suspected sleep apnea 02/05/2020    Thyroid nodule 08/22/2019    Traumatic iritis - Left Eye 02/27/2013    Traumatic iritis - Left Eye 02/27/2013    Trigger finger of left hand 09/11/2014    Unspecified iridocyclitis - Left Eye 04/17/2013     Past Surgical History:   Procedure Laterality Date    CEREBRAL ANGIOGRAM N/A 1/6/2020    Procedure: ANGIOGRAM-CEREBRAL;  Surgeon: [facility] Diagnostic Provider;  Location: [facility] OR 2ND FLR;  Service: Radiology;  Laterality: N/A;  /Nagi    CERVICAL FUSION  12/30/2015    COLONOSCOPY N/A 4/7/2017    Procedure: COLONOSCOPY;  Surgeon: Handy Frederick MD;  Location: [facility] (4TH FLR);  Service: Endoscopy;  Laterality: N/A;    COLONOSCOPY N/A 11/28/2023    Procedure: COLONOSCOPY;  Surgeon: ARMAAN Hinojosa MD;  Location: [facility] ENDO (4TH FLR);  Service: Endoscopy;  Laterality: N/A;  8/21-referred by Dr. Schulte/ Diagnosis:  Blood in stool, past due on surveillance colonoscopy for advanced colon polyp villous adenoma greater than 10 mm /Prep instructions handed to patient and to portal. AS  11/21/23-Precall complete-DS    CORONARY ANGIOGRAPHY N/A 10/10/2022    Procedure: ANGIOGRAM, CORONARY ARTERY;  Surgeon: Anson Nolan MD;  Location: [facility] CATH LAB;  Service: Cardiology;  Laterality: N/A;    ENDOSCOPIC ULTRASOUND OF UPPER GASTROINTESTINAL TRACT N/A 8/22/2023    Procedure: ULTRASOUND, UPPER GI TRACT, ENDOSCOPIC;  Surgeon: Joe Means MD;  Location: [facility] ENDO (2ND FLR);  Service: Endoscopy;  Laterality: N/A;  instr portal-pt stated difficult intubation with surgery in the past-tb    HERNIA REPAIR      PROSTATECTOMY         Current Medications:   Current Outpatient Medications:     ALPRAZolam (XANAX) 0.5 MG tablet, Take 1 tablet (0.5 mg total) by mouth 3 (three) times daily as needed for Anxiety., Disp: 90 tablet, Rfl: 3    CONTOUR  "NEXT TEST STRIPS Strp, USE TO TEST BLOOD SUGAR ONCE DAILY, Disp: 25 strip, Rfl: 6    ezetimibe (ZETIA) 10 mg tablet, TAKE 1 TABLET ONE TIME DAILY, Disp: 90 tablet, Rfl: 3    gabapentin (NEURONTIN) 600 MG tablet, Take 1 tablet (600 mg total) by mouth 3 (three) times daily., Disp: 270 tablet, Rfl: 1    insulin (LANTUS SOLOSTAR U-100 INSULIN) glargine 100 units/mL SubQ pen, Inject 22 Units into the skin every evening., Disp: 30 mL, Rfl: 0    LIDOcaine (LIDODERM) 5 %, Place 1 patch onto the skin once daily. Remove & Discard patch within 12 hours or as directed by MD, Disp: 15 patch, Rfl: 0    losartan (COZAAR) 100 MG tablet, TAKE 1 TABLET ONE TIME DAILY, Disp: 90 tablet, Rfl: 3    methocarbamoL (ROBAXIN) 750 MG Tab, Take 1 tablet (750 mg total) by mouth 3 (three) times daily as needed., Disp: 270 tablet, Rfl: 0    metoprolol succinate (TOPROL-XL) 50 MG 24 hr tablet, TAKE 1 AND 1/2 TABLETS (75 MG TOTAL) ONCE DAILY., Disp: 135 tablet, Rfl: 3    MICROLET LANCET Misc, 1 lancet by Misc.(Non-Drug; Combo Route) route once daily. Test blood glucose once daily as instructed., Disp: 25 each, Rfl: 11    naproxen (NAPROSYN) 500 MG tablet, TAKE 1 TABLET BY MOUTH TWICE A DAY, Disp: 60 tablet, Rfl: 1    NIFEdipine (PROCARDIA-XL) 60 MG (OSM) 24 hr tablet, Take 1 tablet (60 mg total) by mouth 2 (two) times a day., Disp: 180 tablet, Rfl: 1    nitroGLYCERIN (NITROSTAT) 0.3 MG SL tablet, Place 1 tablet (0.3 mg total) under the tongue every 5 (five) minutes as needed for Chest pain., Disp: 100 tablet, Rfl: 3    ondansetron (ZOFRAN) 4 MG tablet, Take 1 tablet (4 mg total) by mouth every 6 (six) hours as needed for Nausea., Disp: 30 tablet, Rfl: 0    pantoprazole (PROTONIX) 40 MG tablet, TAKE 1 TABLET TWICE DAILY, Disp: 180 tablet, Rfl: 3    pen needle, diabetic (BD ULTRA-FINE CHET PEN NEEDLE) 32 gauge x 5/32" Ndle, USE PEN NEEDLE AS INSTRUCTION WITH LANTUS INSULIN PRE-FILLED PEN., Disp: 200 each, Rfl: 1    pneumoc 20-jonny conj-dip jasiel,PF, " (PREVNAR 20, PF,) 0.5 mL Syrg injection, Inject into the muscle., Disp: 0.5 mL, Rfl: 0    pravastatin (PRAVACHOL) 80 MG tablet, TAKE 1 TABLET ONE TIME DAILY, Disp: 90 tablet, Rfl: 3    RSVPreF3 antigen-AS01E, PF, (AREXVY, PF,) 120 mcg/0.5 mL SusR vaccine, Inject into the muscle., Disp: 1 each, Rfl: 0    SHINGRIX, PF, 50 mcg/0.5 mL injection, , Disp: , Rfl:     spironolactone (ALDACTONE) 50 MG tablet, TAKE 1 TABLET ONE TIME DAILY, Disp: 90 tablet, Rfl: 3    aspirin (ECOTRIN) 81 MG EC tablet, Take 1 tablet (81 mg total) by mouth once daily., Disp: , Rfl: 0    tadalafiL (CIALIS) 20 MG Tab, Take 1 tablet (20 mg total) by mouth every 72 hours as needed (ED)., Disp: 15 tablet, Rfl: 11    Social History:   Social History     Socioeconomic History    Marital status:    Tobacco Use    Smoking status: Former     Current packs/day: 0.00     Average packs/day: 1 pack/day for 30.0 years (30.0 ttl pk-yrs)     Types: Cigarettes     Start date: 1981     Quit date: 2011     Years since quittin.2     Passive exposure: Never    Smokeless tobacco: Never   Substance and Sexual Activity    Alcohol use: Yes     Comment: occas - nonalcoholic beer or beer    Drug use: No    Sexual activity: Yes     Partners: Female     Social Determinants of Health     Financial Resource Strain: Low Risk  (2024)    Overall Financial Resource Strain (CARDIA)     Difficulty of Paying Living Expenses: Not hard at all   Food Insecurity: No Food Insecurity (2024)    Hunger Vital Sign     Worried About Running Out of Food in the Last Year: Never true     Ran Out of Food in the Last Year: Never true   Transportation Needs: No Transportation Needs (2024)    PRAPARE - Transportation     Lack of Transportation (Medical): No     Lack of Transportation (Non-Medical): No   Physical Activity: Insufficiently Active (2024)    Exercise Vital Sign     Days of Exercise per Week: 2 days     Minutes of Exercise per Session: 30 min  "  Stress: No Stress Concern Present (2/20/2024)    [employer] - Occupational Stress Questionnaire     Feeling of Stress : Not at all   Social Connections: Moderately Integrated (2/20/2024)    Social Connection and Isolation Panel [NHANES]     Frequency of Communication with Friends and Family: More than three times a week     Frequency of Social Gatherings with Friends and Family: Once a week     Attends Scientologist Services: More than 4 times per year     Active Member of Clubs or Organizations: No     Attends Club or Organization Meetings: Never     Marital Status:    Housing Stability: Unknown (2/20/2024)    Housing Stability Vital Sign     Unable to Pay for Housing in the Last Year: No     Unstable Housing in the Last Year: No       REVIEW OF SYSTEMS:  Constitution: Negative. Negative for chills, fever and night sweats.   Cardiovascular: Negative for chest pain and syncope.   Respiratory: Negative for cough and shortness of breath.   Gastrointestinal: See HPI. Negative for nausea/vomiting. Negative for abdominal pain.  Genitourinary: See HPI. Negative for discoloration or dysuria.  Skin: Negative for dry skin, itching and rash.   Hematologic/Lymphatic: negative for bleeding/clotting disorders.   Musculoskeletal: Negative for falls and muscle weakness.   Neurological: See HPI. no history of seizures. no history of cranial surgery or shunts.  Endocrine: Negative for polydipsia, polyphagia and polyuria.   Allergic/Immunologic: Negative for hives and persistent infections.  Psychiatric/Behavioral: Negative for depression and insomnia.         EXAM:  Ht 6' 4" (1.93 m)   Wt 95 kg (209 lb 6.4 oz)   BMI 25.49 kg/m²     General: The patient is a 64 y.o. male in no apparent distress, the patient is orientatied to person, place and time.  Psych: Normal mood and affect  HEENT: Vision grossly intact, hearing intact to the spoken word.  Lungs: Respirations unlabored.  Gait: Normal station " and gait, no difficulty with toe or heel walk.   Skin: Cervical skin negative for rashes, lesions, hairy patches and surgical scars.  Range of motion: Cervical range of motion is acceptable. There is posterior cervical tenderness to palpation.  Spinal Balance: Global saggital and coronal spinal balance acceptable, no significant for scoliosis and kyphosis.  Musculoskeletal: No pain with the range of motion of the bilateral shoulders and elbows. Normal bulk and contour of the bilateral hands.  Vascular: Bilateral hands warm and well perfused, Radial pulses 2+ bilaterally.  Neurological: Normal strength and tone in all major motor groups in the bilateral upper and lower extremities. Normal sensation to light touch in the C5-T1 and L2-S1 dermatomes bilaterally.  Deep tendon reflexes symmetric 2+ in the bilateral upper and lower extremities.  Negative Inverted Radial Reflex and Garsia's bilaterally. Negative Babinski bilaterally.     IMAGING:   Today I independently reviewed the following images and my interpretations are as follows:    AP, Lat and Flex/Ex  upright C-spine films demonstrate spondylosis and DDD. C3-5 ACDF.     MRI cervical showed C6-7 mod-severe central stenosis and L foraminal stenosis. C4-5 R sided myelomalacia. C3-5 mild-mod central stenosis.    Body mass index is 25.49 kg/m².  Hemoglobin A1C   Date Value Ref Range Status   02/19/2024 6.7 (H) 4.0 - 5.6 % Final     Comment:     ADA Screening Guidelines:  5.7-6.4%  Consistent with prediabetes  >or=6.5%  Consistent with diabetes    High levels of fetal hemoglobin interfere with the HbA1C  assay. Heterozygous hemoglobin variants (HbS, HgC, etc)do  not significantly interfere with this assay.   However, presence of multiple variants may affect accuracy.     06/28/2023 6.6 (H) 4.0 - 5.6 % Final     Comment:     ADA Screening Guidelines:  5.7-6.4%  Consistent with prediabetes  >or=6.5%  Consistent with diabetes    High levels of fetal hemoglobin interfere  with the HbA1C  assay. Heterozygous hemoglobin variants (HbS, HgC, etc)do  not significantly interfere with this assay.   However, presence of multiple variants may affect accuracy.     03/24/2023 6.5 (H) 4.0 - 5.6 % Final     Comment:     ADA Screening Guidelines:  5.7-6.4%  Consistent with prediabetes  >or=6.5%  Consistent with diabetes    High levels of fetal hemoglobin interfere with the HbA1C  assay. Heterozygous hemoglobin variants (HbS, HgC, etc)do  not significantly interfere with this assay.   However, presence of multiple variants may affect accuracy.         ASSESSMENT/PLAN:  Stenosis of cervical spine with myelopathy    Myelomalacia of cervical cord  -     Ambulatory referral/consult to Orthopedics    History of fusion of cervical spine    DDD (degenerative disc disease), cervical    Cervical spondylosis    Spinal stenosis, cervical region  -     CT Cervical Spine Without Contrast; Future; Expected date: 02/22/2024      Follow up in about 2 weeks (around 3/7/2024).    Patient has cervical spondylosis and stenosis with myeloradiculopathy, in the setting of prior C3-5 ACDF. I discussed the natural history of their diagnoses as well as surgical and nonsurgical treatment options. I educated the patient on the importance of core/back strengthening, correct posture, bending/lifting ergonomics, and low-impact aerobic exercises (walking, elliptical, and aquatherapy). Continue medications. I ordered CT cervical to evaluate fusion. Patient will follow up in 2 weeks for CT review. Patient has failed conservative management and is a candidate for C5-6 extension ACDF vs. C3-6 PCDF depending on previous ACDF fusion status.    I had a sit down discussion with the patient and wife regarding cervical myelopathy. I discussed the known stepwise degeneration of cervical myelopathy. I discussed the risks and benefits of decompressive surgery, including the concept that surgery would be done to prevent further neurological  injury/degeneration rather than to ameliorate any existing deficits.     Edd Burnette MD  Orthopaedic Spine Surgeon  Department of Orthopaedic Surgery  [phone]

## 2024-03-11 ENCOUNTER — OFFICE VISIT (OUTPATIENT)
Dept: ORTHOPEDICS | Facility: CLINIC | Age: 64
End: 2024-03-11
Attending: ORTHOPAEDIC SURGERY
Payer: MEDICARE

## 2024-03-11 ENCOUNTER — HOSPITAL ENCOUNTER (OUTPATIENT)
Dept: RADIOLOGY | Facility: HOSPITAL | Age: 64
Discharge: HOME | End: 2024-03-11
Attending: ORTHOPAEDIC SURGERY
Payer: MEDICARE

## 2024-03-11 VITALS — BODY MASS INDEX: 25.5 KG/M2 | HEIGHT: 76 IN | WEIGHT: 209.38 LBS

## 2024-03-11 DIAGNOSIS — G95.89 MYELOMALACIA OF CERVICAL CORD: ICD-10-CM

## 2024-03-11 DIAGNOSIS — Z98.1 HISTORY OF FUSION OF CERVICAL SPINE: ICD-10-CM

## 2024-03-11 DIAGNOSIS — M47.812 CERVICAL SPONDYLOSIS: Primary | ICD-10-CM

## 2024-03-11 DIAGNOSIS — M48.02 STENOSIS OF CERVICAL SPINE WITH MYELOPATHY: ICD-10-CM

## 2024-03-11 DIAGNOSIS — M48.02 SPINAL STENOSIS, CERVICAL REGION: ICD-10-CM

## 2024-03-11 DIAGNOSIS — G99.2 STENOSIS OF CERVICAL SPINE WITH MYELOPATHY: ICD-10-CM

## 2024-03-11 DIAGNOSIS — M50.30 DDD (DEGENERATIVE DISC DISEASE), CERVICAL: ICD-10-CM

## 2024-03-11 PROCEDURE — 1159F MED LIST DOCD IN RCRD: CPT | Mod: CPTII,S$GLB,, | Performed by: ORTHOPAEDIC SURGERY

## 2024-03-11 PROCEDURE — 3288F FALL RISK ASSESSMENT DOCD: CPT | Mod: CPTII,S$GLB,, | Performed by: ORTHOPAEDIC SURGERY

## 2024-03-11 PROCEDURE — 1125F AMNT PAIN NOTED PAIN PRSNT: CPT | Mod: CPTII,S$GLB,, | Performed by: ORTHOPAEDIC SURGERY

## 2024-03-11 PROCEDURE — 99999 PR PBB SHADOW E&M-EST. PATIENT-LVL IV: CPT | Mod: PBBFAC,,, | Performed by: ORTHOPAEDIC SURGERY

## 2024-03-11 PROCEDURE — 72125 CT NECK SPINE W/O DYE: CPT | Mod: TC

## 2024-03-11 PROCEDURE — 1101F PT FALLS ASSESS-DOCD LE1/YR: CPT | Mod: CPTII,S$GLB,, | Performed by: ORTHOPAEDIC SURGERY

## 2024-03-11 PROCEDURE — 3008F BODY MASS INDEX DOCD: CPT | Mod: CPTII,S$GLB,, | Performed by: ORTHOPAEDIC SURGERY

## 2024-03-11 PROCEDURE — 3044F HG A1C LEVEL LT 7.0%: CPT | Mod: CPTII,S$GLB,, | Performed by: ORTHOPAEDIC SURGERY

## 2024-03-11 PROCEDURE — 72125 CT NECK SPINE W/O DYE: CPT | Mod: 26,,, | Performed by: RADIOLOGY

## 2024-03-11 PROCEDURE — 4010F ACE/ARB THERAPY RXD/TAKEN: CPT | Mod: CPTII,S$GLB,, | Performed by: ORTHOPAEDIC SURGERY

## 2024-03-11 PROCEDURE — 3066F NEPHROPATHY DOC TX: CPT | Mod: CPTII,S$GLB,, | Performed by: ORTHOPAEDIC SURGERY

## 2024-03-11 PROCEDURE — 1160F RVW MEDS BY RX/DR IN RCRD: CPT | Mod: CPTII,S$GLB,, | Performed by: ORTHOPAEDIC SURGERY

## 2024-03-11 PROCEDURE — 3061F NEG MICROALBUMINURIA REV: CPT | Mod: CPTII,S$GLB,, | Performed by: ORTHOPAEDIC SURGERY

## 2024-03-11 PROCEDURE — 99214 OFFICE O/P EST MOD 30 MIN: CPT | Mod: S$GLB,,, | Performed by: ORTHOPAEDIC SURGERY

## 2024-03-11 NOTE — PROGRESS NOTES
"DATE: 3/11/2024  PATIENT: Hi Braxton    Attending Physician: Edd Burnette M.D.    HISTORY:  Hi Braxton is a 64 y.o. male w/ a hx of C3-5 ACDF in 2015 who returns to me today for CT review. Patient continues to have neck pain that radiates down LUE to the fingers. Patient has been taking meds and doing exercises without much relief. He is miserable and wants surgical intervention.    The Patient endorses myelopathic symptoms such as handwriting changes or difficulty with buttons/coins/keys. Denies perineal paresthesias, bowel/bladder dysfunction.    The patient does not smoke or endorse IVDU. He has DM (HA1C of 6.7). The patient is not on any blood thinners and does not take chronic narcotics. He works at a [employer].    PMH/PSH/FamHx/SocHx:  Unchanged from prior visit    ROS:  Positive for neck and LUE pain  Denies perineal paresthesias, bowel or bladder incontinence    EXAM:  Ht 6' 4" (1.93 m)   Wt 95 kg (209 lb 6.4 oz)   BMI 25.49 kg/m²     My physical examination was notable for the following findings: motor intact BUE; SILT    IMAGING:  Today I independently reviewed the following images and my interpretations are as follows:    Previous AP and Lat upright C-spine films showed spondylosis and DDD. C3-5 ACDF.     MRI cervical showed C6-7 mod-severe central stenosis and L foraminal stenosis. C4-5 R sided myelomalacia. C3-5 mild-mod central stenosis.    CT cervical showed spondylosis. C4-5 complete fusion and C3-4 incomplete fusion.    ASSESSMENT/PLAN:  Patient has cervical spondylosis and stenosis with myeloradiculopathy, in the setting of prior C3-5 ACDF. I discussed the natural history of their diagnoses as well as surgical and nonsurgical treatment options. I educated the patient on the importance of core/back strengthening, correct posture, bending/lifting ergonomics, and low-impact aerobic exercises (walking, elliptical, and aquatherapy). Continue medications. Patient has failed " conservative management and is a candidate for C3-6 PCDF.    I had a sit down discussion with the patient and wife regarding cervical myelopathy. I discussed the known stepwise degeneration of cervical myelopathy. I discussed the risks and benefits of decompressive surgery, including the concept that surgery would be done to prevent further neurological injury/degeneration rather than to ameliorate any existing deficits.     I had a sit down discussion with the patient and wife regarding the above surgery. We specifically discussed the risks, benefits, and alternatives to surgery. We discussed the surgical procedure including the skin incision, nerve decompression, bone fusion, allograft and/or iliac crest bone graft, and surgical implants including lateral mass screws and pedicle screws as indicated: they understand the risks include but are not limited to death, paralysis, blindness, bleeding, infection, damage to arteries, veins and nerves, vertebral artery injury, dysphagia, spinal fluid leak, continued or worsening pain, no improvement in symptoms, non-union, and the possible need for more surgery in the future, as well as the possibility other unforseen and unknown complications. We talked about expected hospital stay and recovery period. All questions were answered; they understand and wish to proceed.    Edd Burnette MD  Orthopaedic Spine Surgeon  Department of Orthopaedic Surgery  [phone]

## 2024-03-25 ENCOUNTER — TELEPHONE (OUTPATIENT)
Dept: INTERNAL MEDICINE | Facility: CLINIC | Age: 64
End: 2024-03-25
Payer: COMMERCIAL

## 2024-03-25 NOTE — TELEPHONE ENCOUNTER
Spoke to pt and informed there are no sooner appt then May. I have him on my list to call if there is any cancellation before 04/03/24

## 2024-03-25 NOTE — TELEPHONE ENCOUNTER
----- Message from Arabella Perales sent at 3/25/2024 10:15 AM CDT -----  Regarding: LEIDY TYLER [[id number]]  Good morning,  Patient: LEIDY TYLER [[id number]] is requesting a call from the Nurse please. Pt is a Book-Out for his pre-op appt on 4/3. Patient is requesting to see Dr. Kendall before his surgery on 4/24. Thank you in advance.

## 2024-03-26 ENCOUNTER — OFFICE VISIT (OUTPATIENT)
Dept: INTERNAL MEDICINE | Facility: CLINIC | Age: 64
End: 2024-03-26
Payer: MEDICARE

## 2024-03-26 ENCOUNTER — LAB VISIT (OUTPATIENT)
Dept: LAB | Facility: HOSPITAL | Age: 64
End: 2024-03-26
Attending: INTERNAL MEDICINE
Payer: MEDICARE

## 2024-03-26 VITALS
WEIGHT: 211 LBS | TEMPERATURE: 97 F | HEART RATE: 78 BPM | BODY MASS INDEX: 25.69 KG/M2 | DIASTOLIC BLOOD PRESSURE: 68 MMHG | OXYGEN SATURATION: 97 % | HEIGHT: 76 IN | SYSTOLIC BLOOD PRESSURE: 134 MMHG

## 2024-03-26 DIAGNOSIS — Z79.4 TYPE 2 DIABETES MELLITUS WITHOUT COMPLICATION, WITH LONG-TERM CURRENT USE OF INSULIN: ICD-10-CM

## 2024-03-26 DIAGNOSIS — I10 ESSENTIAL HYPERTENSION: ICD-10-CM

## 2024-03-26 DIAGNOSIS — M54.2 CERVICALGIA: ICD-10-CM

## 2024-03-26 DIAGNOSIS — E11.9 TYPE 2 DIABETES MELLITUS WITHOUT COMPLICATION, WITH LONG-TERM CURRENT USE OF INSULIN: ICD-10-CM

## 2024-03-26 DIAGNOSIS — M54.2 CERVICALGIA: Primary | ICD-10-CM

## 2024-03-26 LAB
ALBUMIN SERPL BCP-MCNC: 4.4 G/DL (ref 3.5–5.2)
ALP SERPL-CCNC: 79 U/L (ref 55–135)
ALT SERPL W/O P-5'-P-CCNC: 37 U/L (ref 10–44)
ANION GAP SERPL CALC-SCNC: 9 MMOL/L (ref 8–16)
AST SERPL-CCNC: 29 U/L (ref 10–40)
BASOPHILS # BLD AUTO: 0.09 K/UL (ref 0–0.2)
BASOPHILS NFR BLD: 1.3 % (ref 0–1.9)
BILIRUB SERPL-MCNC: 0.5 MG/DL (ref 0.1–1)
BUN SERPL-MCNC: 17 MG/DL (ref 8–23)
CALCIUM SERPL-MCNC: 10.3 MG/DL (ref 8.7–10.5)
CHLORIDE SERPL-SCNC: 107 MMOL/L (ref 95–110)
CO2 SERPL-SCNC: 24 MMOL/L (ref 23–29)
CREAT SERPL-MCNC: 1.1 MG/DL (ref 0.5–1.4)
DIFFERENTIAL METHOD BLD: ABNORMAL
EOSINOPHIL # BLD AUTO: 0.6 K/UL (ref 0–0.5)
EOSINOPHIL NFR BLD: 8.7 % (ref 0–8)
ERYTHROCYTE [DISTWIDTH] IN BLOOD BY AUTOMATED COUNT: 12.8 % (ref 11.5–14.5)
EST. GFR  (NO RACE VARIABLE): >60 ML/MIN/1.73 M^2
GLUCOSE SERPL-MCNC: 100 MG/DL (ref 70–110)
HCT VFR BLD AUTO: 45.9 % (ref 40–54)
HGB BLD-MCNC: 15.3 G/DL (ref 14–18)
IMM GRANULOCYTES # BLD AUTO: 0.02 K/UL (ref 0–0.04)
IMM GRANULOCYTES NFR BLD AUTO: 0.3 % (ref 0–0.5)
LYMPHOCYTES # BLD AUTO: 1.8 K/UL (ref 1–4.8)
LYMPHOCYTES NFR BLD: 26.6 % (ref 18–48)
MCH RBC QN AUTO: 30.2 PG (ref 27–31)
MCHC RBC AUTO-ENTMCNC: 33.3 G/DL (ref 32–36)
MCV RBC AUTO: 91 FL (ref 82–98)
MONOCYTES # BLD AUTO: 0.6 K/UL (ref 0.3–1)
MONOCYTES NFR BLD: 8.4 % (ref 4–15)
NEUTROPHILS # BLD AUTO: 3.7 K/UL (ref 1.8–7.7)
NEUTROPHILS NFR BLD: 54.7 % (ref 38–73)
NRBC BLD-RTO: 0 /100 WBC
OHS QRS DURATION: 88 MS
OHS QTC CALCULATION: 431 MS
PLATELET # BLD AUTO: 255 K/UL (ref 150–450)
PMV BLD AUTO: 10.6 FL (ref 9.2–12.9)
POTASSIUM SERPL-SCNC: 4.1 MMOL/L (ref 3.5–5.1)
PROT SERPL-MCNC: 7.8 G/DL (ref 6–8.4)
RBC # BLD AUTO: 5.07 M/UL (ref 4.6–6.2)
SODIUM SERPL-SCNC: 140 MMOL/L (ref 136–145)
WBC # BLD AUTO: 6.77 K/UL (ref 3.9–12.7)

## 2024-03-26 PROCEDURE — 85025 COMPLETE CBC W/AUTO DIFF WBC: CPT | Performed by: INTERNAL MEDICINE

## 2024-03-26 PROCEDURE — 99999 PR PBB SHADOW E&M-EST. PATIENT-LVL III: CPT | Mod: PBBFAC,,, | Performed by: INTERNAL MEDICINE

## 2024-03-26 PROCEDURE — 3008F BODY MASS INDEX DOCD: CPT | Mod: CPTII,S$GLB,, | Performed by: INTERNAL MEDICINE

## 2024-03-26 PROCEDURE — 93010 ELECTROCARDIOGRAM REPORT: CPT | Mod: S$GLB,,, | Performed by: INTERNAL MEDICINE

## 2024-03-26 PROCEDURE — 36415 COLL VENOUS BLD VENIPUNCTURE: CPT | Mod: PO | Performed by: INTERNAL MEDICINE

## 2024-03-26 PROCEDURE — 1160F RVW MEDS BY RX/DR IN RCRD: CPT | Mod: CPTII,S$GLB,, | Performed by: INTERNAL MEDICINE

## 2024-03-26 PROCEDURE — 3044F HG A1C LEVEL LT 7.0%: CPT | Mod: CPTII,S$GLB,, | Performed by: INTERNAL MEDICINE

## 2024-03-26 PROCEDURE — 1101F PT FALLS ASSESS-DOCD LE1/YR: CPT | Mod: CPTII,S$GLB,, | Performed by: INTERNAL MEDICINE

## 2024-03-26 PROCEDURE — 3288F FALL RISK ASSESSMENT DOCD: CPT | Mod: CPTII,S$GLB,, | Performed by: INTERNAL MEDICINE

## 2024-03-26 PROCEDURE — 3066F NEPHROPATHY DOC TX: CPT | Mod: CPTII,S$GLB,, | Performed by: INTERNAL MEDICINE

## 2024-03-26 PROCEDURE — 3061F NEG MICROALBUMINURIA REV: CPT | Mod: CPTII,S$GLB,, | Performed by: INTERNAL MEDICINE

## 2024-03-26 PROCEDURE — 93005 ELECTROCARDIOGRAM TRACING: CPT | Mod: S$GLB,,, | Performed by: INTERNAL MEDICINE

## 2024-03-26 PROCEDURE — 99214 OFFICE O/P EST MOD 30 MIN: CPT | Mod: S$GLB,,, | Performed by: INTERNAL MEDICINE

## 2024-03-26 PROCEDURE — 3078F DIAST BP <80 MM HG: CPT | Mod: CPTII,S$GLB,, | Performed by: INTERNAL MEDICINE

## 2024-03-26 PROCEDURE — 1159F MED LIST DOCD IN RCRD: CPT | Mod: CPTII,S$GLB,, | Performed by: INTERNAL MEDICINE

## 2024-03-26 PROCEDURE — 3075F SYST BP GE 130 - 139MM HG: CPT | Mod: CPTII,S$GLB,, | Performed by: INTERNAL MEDICINE

## 2024-03-26 PROCEDURE — 80053 COMPREHEN METABOLIC PANEL: CPT | Performed by: INTERNAL MEDICINE

## 2024-03-26 PROCEDURE — 4010F ACE/ARB THERAPY RXD/TAKEN: CPT | Mod: CPTII,S$GLB,, | Performed by: INTERNAL MEDICINE

## 2024-03-26 NOTE — PROGRESS NOTES
65 yo male here for pre-op evaluation of neck pain    no CP/SOB/nausea/MI last 3 months, no heart failure, no arrhythmias, no valvular heart disease.    RCRI criteria: yes IDDM, no CAD, no CVA, no chronic renal insufficiency, no heart failure, no high risk surgery    Functional status: greater tyhan 4 METS    Bleeding risk - history of bleeding with prior surgeries - no, family history of bleeding disorders - no    History of prior anesthetic complications - no    no tobacco, yes EtOH, no Illicit substances    Chronic Steroid usage - none    The patient denies any chest pain, shortness of breath, headache, blurred vision, excessive fatigue, nausea or vomiting.      PAST MEDICAL HISTORY:  Past Medical History:   Diagnosis Date    BPH with obstruction/lower urinary tract symptoms 09/28/2017    Carotid artery occlusion     Chronic anterior uveitis 03/26/2013    Coronary artery disease     Diabetes mellitus, type 2     diet controlled    Difficult intubation     Distended bladder 06/23/2020    Dizziness     DJD (degenerative joint disease), cervical 07/10/2015    Dysuria 05/11/2018    GERD (gastroesophageal reflux disease)     History of iritis 2013    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 Dose of amlodipine increased from 5 to 10 mg in the weeks prior to the incidnt.    Memory loss     Memory loss 06/21/2013    Mixed hyperlipidemia 01/20/2017    Muscle ache 01/28/2015    New daily persistent headache 01/20/2020    Pterygium eye, left 03/20/2021    Pyelonephritis 05/11/2018    Rectal bleeding 04/07/2017    Recurrent UTI 09/28/2017    S/P TURP 09/02/2022    Suspected sleep apnea 02/05/2020    Thyroid nodule 08/22/2019    Traumatic iritis - Left Eye 02/27/2013    Traumatic iritis - Left Eye 02/27/2013    Trigger finger of  left hand 09/11/2014    Unspecified iridocyclitis - Left Eye 04/17/2013       SURGICAL HISTORY:  Past Surgical History:   Procedure Laterality Date    CEREBRAL ANGIOGRAM N/A 1/6/2020    Procedure: ANGIOGRAM-CEREBRAL;  Surgeon: [facility] Diagnostic Provider;  Location: [facility] OR 2ND FLR;  Service: Radiology;  Laterality: N/A;  /Nagi    CERVICAL FUSION  12/30/2015    COLONOSCOPY N/A 4/7/2017    Procedure: COLONOSCOPY;  Surgeon: Handy Frederick MD;  Location: [facility] (4TH FLR);  Service: Endoscopy;  Laterality: N/A;    COLONOSCOPY N/A 11/28/2023    Procedure: COLONOSCOPY;  Surgeon: ARMAAN Hinojosa MD;  Location: [facility] ENDO (4TH FLR);  Service: Endoscopy;  Laterality: N/A;  8/21-referred by Dr. Schulte/ Diagnosis:  Blood in stool, past due on surveillance colonoscopy for advanced colon polyp villous adenoma greater than 10 mm /Prep instructions handed to patient and to portal. AS  11/21/23-Precall complete-DS    CORONARY ANGIOGRAPHY N/A 10/10/2022    Procedure: ANGIOGRAM, CORONARY ARTERY;  Surgeon: Anson Nolan MD;  Location: [facility] CATH LAB;  Service: Cardiology;  Laterality: N/A;    ENDOSCOPIC ULTRASOUND OF UPPER GASTROINTESTINAL TRACT N/A 8/22/2023    Procedure: ULTRASOUND, UPPER GI TRACT, ENDOSCOPIC;  Surgeon: Joe Means MD;  Location: [facility] ENDO (2ND FLR);  Service: Endoscopy;  Laterality: N/A;  instr portal-pt stated difficult intubation with surgery in the past-tb    HERNIA REPAIR      PROSTATECTOMY         MEDS:  Medcard reviewed and updated    ALLERGIES: Allergy Card reviewed and updated    SOCIAL HISTORY:   The patient is a nonsmoker.    PE:   APPEARANCE: Well nourished, well developed, in no acute distress.    EYES: Sclerae anicteric. PERRL. EOMI.      EARS: TM's intact. No retraction or perforation.    NOSE: Mucosa pink. Airway clear.  MOUTH & THROAT: No tonsillar enlargement. No pharyngeal erythema or exudate. No stridor.  NECK: Supple, no thyromegaly.  CHEST: Lungs clear to auscultation with  unlabored respirations.  CARDIOVASCULAR: Normal S1, S2. No murmurs. No carotid bruits.   ABDOMEN: Bowel sounds normal. Not distended. Soft. No tenderness or masses.   MUSCULOSKELETAL:  Normal gait, no cyanosis or clubbing.  SKIN: Normal skin turgor, warm and dry.  NEUROLOGIC: Cranial Nerves: Intact.  PSYCHIATRIC: The patient is oriented to person, place, and time and has a pleasant affect.        ASSESSMENT/PLAN:  Diagnoses and all orders for this visit:    Cervicalgia  -     EKG 12-lead  -     CBC Auto Differential; Future  -     Comprehensive Metabolic Panel; Future  -     await surgery    Essential hypertension  -     EKG 12-lead  -     CBC Auto Differential; Future  -     Comprehensive Metabolic Panel; Future  -     blood pressure is controlled, continue current therapy    Type 2 diabetes mellitus without complication, with long-term current use of insulin  -     EKG 12-lead  -     CBC Auto Differential; Future  -     Comprehensive Metabolic Panel; Future  -     stable, good glycemic control, continue current therapy    Addendum:   EKG and labs are essentially normal.  Cardiac catheterization in 2022 showed no significant coronary artery disease.  The patient is medically maximized for surgery.

## 2024-04-04 DIAGNOSIS — M79.602 PAIN OF LEFT UPPER EXTREMITY: ICD-10-CM

## 2024-04-04 DIAGNOSIS — Z01.818 PREOPERATIVE TESTING: Primary | ICD-10-CM

## 2024-04-04 NOTE — ANESTHESIA PAT ROS NOTE
4/18/2024  Hi Braxton is a 64 y.o., male, s/p cervical spine fusion with cervical spondylosis, myelomalacia, presents to periop center for anesthesia assessment and preop instructions.      Pre-op Assessment    I have reviewed the Patient Summary Reports.     I have reviewed the Nursing Notes. I have reviewed the NPO Status.   I have reviewed the Medications.     Review of Systems  Anesthesia Hx:    ** DIFFICULT INTUBATION PER EPIC**      ** H/O CERVICAL FUSION**   History of prior surgery of interest to airway management or planning: cervical fusion.         Denies Family Hx of Anesthesia complications.   Personal Hx of Anesthesia complications   Difficult Intubation                  Social:  Former Smoker, Alcohol Use Smoking Status  Former  Started  12/13/1981 - 12/13/2011 Quit  Types  Cigarettes from 12/13/1981 to 12/13/2011  Amount  Average: 1 pack/day for 30.0 years; Total pack years: 30.0  Pack Year History  0 packs/day, Started 12/13/2011; 1 packs/day, 12/13/1981 - 12/13/2011 (30.0 years)        Hematology/Oncology:  Hematology Normal   Oncology Normal                                   EENT/Dental:  denies chronic allergic rhinitis  Eyes:              04/17/2013  Unspecified iridocyclitis - Left Eye  03/26/2013  Chronic anterior uveitis  02/27/2013  Traumatic iritis - Left Eye  02/27/2013  Traumatic iritis - Left Eye  2013  History of iritis            Cardiovascular:  Exercise tolerance: poor   Hypertension, well controlled   CAD       Denies Angina.     hyperlipidemia  Denies HE.     Family history of premature CAD, Peripheral vascular disease,   Subclavian steal syndrome,   Rapid palpitation  Unstable angina  Abnormal magnetic resonance angiography (MRA)  Hypertension associated with diabetes  Dyslipidemia associated with type 2 diabetes mellitu   Functional Capacity 4 METS  Cardiovascular Symptoms: Denies Angina.       Coronary Artery Disease:                     Carotoid Artery Disease , Left  stenosis is Small aneurysm in the pericallosal segment of the left anterior cerebral artery at the A3 a 4 level.  It is wide neck measures approximately 1.5 mm.%        Hypertension       Other Cardiac Conditions     Extensive Family CV History:  Mother () Heart disease   Hypertension   Hyperlipidemia   Stroke  Father () Heart disease   Heart attacks under age 50  Sister Stroke   Hypertension   Hyperlipidemia  Brother () Hyperlipidemia   Hypertension   Heart disease   Aneurysm  Brother () Hypertension   Hyperlipidemia   Benign prostatic hyperplasia   Heart disease   Heart attack  Brother () Hypertension   Heart disease   Heart attack  Brother () Hypertension   Hepatitis   Heart disease  BrotherThroat cancer   Hypertension   Hyperlipidemia  Paternal Grandmother () Heart disease          Pulmonary:    Denies COPD.                     Renal/:  Chronic Renal Disease  BPH   Elevated PSA   Polynephritis  Right Renal Cysts  2020  Distended bladder  2020  Hypokalemia  2018  Dysuria  2018  Pyelonephritis  2017  BPH with obstruction/lower urinary tract symptoms  2017  Recurrent UTI                 Hepatic/GI:   Denies PUD.  GERD, well controlled Denies Liver Disease.  Denies Hepatitis.     23   Dx: Abdominal pain, unspecified abdominal location   Interpretation Summary  ·  There is no evidence of an Abdominal Aortic Aneurysm.  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  ·  The celiac trunk has greater than 70% stenosis.  ·  The splenic artery has greater than 70% stenosis.            Musculoskeletal:  Arthritis    Musculoskeletal General/Symptoms: neck pain. Functional capacity is ambulatory without assistance.   Daughter--Multiple sclerosis      Joint Disease:  Arthritis, Osteoarthritis      Cervical Spine Disorder, Cervical Disc Disease, S/P Cervical Fusion 2015  Cervical fusion  DJD (degenerative joint  disease),   cervical  Cervical radiculopathy  Cervical spinal stenosis    Neurological:  Denies TIA.  Denies CVA. Neuromuscular Disease,  Headaches Denies Seizures.    06/21/2013  Memory loss  Anterior cerebral artery aneurysm, Intermittent confusion Neuro Symptoms of pain Cervical spinal stenosis  Anterior cerebral artery aneurysm  Intermittent confusion  Medication overuse headache Bilateral leg cramps  Dx of Headaches  Pain Syndrome   Pain Etiology/Diagnosis, Osteoarthritis, Cervical Radiculopathy                         Neuromuscular Disease   Endocrine:  Diabetes, poorly controlled, type 2, using insulin    Diabetes, Type 2 Diabetes  , Complications include Atherosclerotic CV Disease , controlled by insulin, diet.             Thyroid Disease   Hypo-Functioning Nodule    Thyroid nodule, Abnormal magnetic resonance angiography (MRA),       Psych:  Psychiatric History anxiety                 Physical Exam  General: Well nourished, Cooperative, Alert and Oriented    Airway:  Mouth Opening: Small, but > 3cm  Tongue: Large  Neck ROM: Flexion Decreased, Left Lateral Motion Decreased, Right Lateral Motion Decreased, Extension Decreased    Dental:  Intact    Chest/Lungs:  Clear to auscultation, Normal Respiratory Rate    Heart:  Rate: Normal  Rhythm: Occasional Prematures  Sounds: Normal          Anesthesia Assessment: Preoperative EQUATION    Planned Procedure: Procedure(s) (LRB):  FUSION, SPINE, CERVICAL, POSTERIOR APPROACH C3-6 DEPUY   TONGS WTIH WEIGHTS, FREDIS 4 POST SNS: MOTORS/SSEP/EMG (N/A)  Requested Anesthesia Type:General  Surgeon: Edd Burnette MD  Service: Orthopedics  Known or anticipated Date of Surgery:4/24/2024    Surgeon notes: reviewed    Electronic QUestionnaire Assessment completed via nurse interview with patient.        Triage considerations:     Previous anesthesia records:MAC and No problems 11/28/2023 COLONOSCOPY   Airway:  Mallampati: I / I  Mouth Opening: Normal  TM Distance:  Normal  Tongue: Normal  Neck ROM: Normal ROM     ** DIFFICULT INTUBATION PER EPIC**      ** H/O CERVICAL FUSION**    Last PCP note: within 1 month , within [facility]   Subspecialty notes: Gastroenterology, Ortho, PM&R, Vascular Surgery    Other important co-morbidities: PER EPIC: DM2, GERD, HLD, HTN, and CERVICAL SPINAL STENOSIS, PVD, CAD, EMPHYSEMA,BPH       Tests already available:  Available tests,  within 1 month , within 3 months , 3-6 months ago , within [facility] .3/26/2024 CMP, CBC, 3/11/2024 CT CERVICAL SPINE W/O CONTRAST, 2/21/2024 XRAY CERVICAL SPINE F/&E ONLY,   2/19/2024 HGA1C, 1/19/2024 MRI CERVICAL SPINE W/O CONTRAST            Instructions given. (See in Nurse's note)    Optimization:  Anesthesia Preop Clinic Assessment  Indicated    Medical Opinion Indicated           Plan:    Testing:  PT/INR and T&S   Pre-anesthesia  visit       Visit focus: concerns in complex and/or prolonged anesthesia, COMORBIDITIES     Consultation:Patient's PCP for re-evaluation     Patient  has previously scheduled Medical Appointment:NONE    Navigation: Tests Scheduled. TBD             Consults scheduled.TBD             Results will be tracked by Preop Clinic.  4/4 Medical clearance given by Dr. Josefina Kendall on 3/28: EKG and labs are essentially normal. Cardiac catheterization in 2022 showed no significant coronary artery disease. The patient is medically maximized for surgery.   Farideh Pritchard RN BSN

## 2024-04-04 NOTE — PRE-PROCEDURE INSTRUCTIONS
Patient stated has not had any problem with anesthesia in the past. Has gotten medical clearance from his PCP, Dr. Josefina Kendall on 3/28. Will need poc appt and labs.  Our  will call to set these appts. He said he takes ASA 81 for prevention.    Preop instructions given. Hold aspirin, aspirin containing products, nsaids(aleve, advil, motrin, ibuprofen, naprosyn, naproxen, voltaren, diclofenac, Celebrex, Celecoxib), vitamins and supplements one week prior to surgery.     May take Tylenol.( Also sent to [facility] portal)  Verbalizes understanding.

## 2024-04-05 ENCOUNTER — TELEPHONE (OUTPATIENT)
Dept: PREADMISSION TESTING | Facility: HOSPITAL | Age: 64
End: 2024-04-05
Payer: COMMERCIAL

## 2024-04-05 NOTE — TELEPHONE ENCOUNTER
----- Message from Farideh Pritchard RN sent at 4/4/2024  1:58 PM CDT -----  Surgery 4/24  Please schedule poc and labs.  Thanks!

## 2024-04-09 RX ORDER — METHOCARBAMOL 750 MG/1
750 TABLET, FILM COATED ORAL 3 TIMES DAILY PRN
Qty: 270 TABLET | Refills: 1 | Status: ON HOLD | OUTPATIENT
Start: 2024-04-09 | End: 2024-04-26 | Stop reason: HOSPADM

## 2024-04-17 NOTE — PRE-PROCEDURE INSTRUCTIONS
Patient called and had a question about his Gabapentin. Instructed to take in am of surgery.  Verbalizes understanding.

## 2024-04-18 ENCOUNTER — HOSPITAL ENCOUNTER (OUTPATIENT)
Dept: PREADMISSION TESTING | Facility: HOSPITAL | Age: 64
Discharge: HOME | End: 2024-04-18
Attending: ORTHOPAEDIC SURGERY
Payer: COMMERCIAL

## 2024-04-18 VITALS
HEART RATE: 64 BPM | SYSTOLIC BLOOD PRESSURE: 152 MMHG | RESPIRATION RATE: 16 BRPM | WEIGHT: 208.56 LBS | DIASTOLIC BLOOD PRESSURE: 72 MMHG | BODY MASS INDEX: 25.4 KG/M2 | HEIGHT: 76 IN | OXYGEN SATURATION: 98 % | TEMPERATURE: 98 F

## 2024-04-18 RX ORDER — CELECOXIB 100 MG/1
100 CAPSULE ORAL 2 TIMES DAILY
Qty: 28 CAPSULE | Refills: 0 | Status: SHIPPED | OUTPATIENT
Start: 2024-04-18 | End: 2024-05-23 | Stop reason: SDUPTHER

## 2024-04-18 RX ORDER — OXYCODONE HYDROCHLORIDE 5 MG/1
5 TABLET ORAL EVERY 8 HOURS PRN
Qty: 21 TABLET | Refills: 0 | Status: SHIPPED | OUTPATIENT
Start: 2024-04-18 | End: 2024-05-23 | Stop reason: SDUPTHER

## 2024-04-18 RX ORDER — ACETAMINOPHEN 500 MG
500 TABLET ORAL 3 TIMES DAILY
Qty: 90 TABLET | Refills: 0 | Status: SHIPPED | OUTPATIENT
Start: 2024-04-18

## 2024-04-18 RX ORDER — METHOCARBAMOL 500 MG/1
1000 TABLET, FILM COATED ORAL 3 TIMES DAILY
Qty: 90 TABLET | Refills: 0 | Status: SHIPPED | OUTPATIENT
Start: 2024-04-18 | End: 2024-05-23 | Stop reason: SDUPTHER

## 2024-04-18 NOTE — H&P
DATE: 4/18/2024  PATIENT: Hi Braxton    Attending Physician: Edd Burnette M.D.    CHIEF COMPLAINT: neck and LUE pain    HISTORY:  Hi Braxton is a 64 y.o. male w/ a hx of C3-5 ACDF in 2015 presents for initial evaluation of neck and left arm pain (Neck - 3, Arm - 3). The pain has been present for 4-5 months. The patient describes the pain as dull and it radiates down LUE to the fingers. The pain is worse with activity and improved by rest. There is LUE associated numbness and tingling. There is LUE subjective weakness. Prior treatments have included meds (gabapentin and naproxen), PT and MARYBETH, but no surgery.     The Patient endorses myelopathic symptoms such as handwriting changes or difficulty with buttons/coins/keys. Denies perineal paresthesias, bowel/bladder dysfunction.    The patient does not smoke or endorse IVDU. He has DM (HA1C of 6.7). The patient is not on any blood thinners and does not take chronic narcotics. He works at a [employer].    PAST MEDICAL/SURGICAL HISTORY:  Past Medical History:   Diagnosis Date    BPH with obstruction/lower urinary tract symptoms 09/28/2017    Carotid artery occlusion     Chronic anterior uveitis 03/26/2013    Coronary artery disease     Diabetes mellitus, type 2     diet controlled    Difficult intubation     Distended bladder 06/23/2020    Dizziness     DJD (degenerative joint disease), cervical 07/10/2015    Dysuria 05/11/2018    GERD (gastroesophageal reflux disease)     History of iritis 2013    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 Dose of amlodipine increased from 5 to 10 mg in the weeks prior to the incidnt.    Memory loss     Memory loss 06/21/2013    Mixed hyperlipidemia 01/20/2017    Muscle ache 01/28/2015    New daily persistent headache 01/20/2020     Pterygium eye, left 03/20/2021    Pyelonephritis 05/11/2018    Rectal bleeding 04/07/2017    Recurrent UTI 09/28/2017    S/P TURP 09/02/2022    Suspected sleep apnea 02/05/2020    Thyroid nodule 08/22/2019    Traumatic iritis - Left Eye 02/27/2013    Traumatic iritis - Left Eye 02/27/2013    Trigger finger of left hand 09/11/2014    Unspecified iridocyclitis - Left Eye 04/17/2013     Past Surgical History:   Procedure Laterality Date    CEREBRAL ANGIOGRAM N/A 1/6/2020    Procedure: ANGIOGRAM-CEREBRAL;  Surgeon: [facility] Diagnostic Provider;  Location: [facility] OR 2ND FLR;  Service: Radiology;  Laterality: N/A;  /Nagi    CERVICAL FUSION  12/30/2015    COLONOSCOPY N/A 4/7/2017    Procedure: COLONOSCOPY;  Surgeon: Handy Frederick MD;  Location: [facility] ENDO (4TH FLR);  Service: Endoscopy;  Laterality: N/A;    COLONOSCOPY N/A 11/28/2023    Procedure: COLONOSCOPY;  Surgeon: ARMAAN Hinojosa MD;  Location: [facility] ENDO (4TH FLR);  Service: Endoscopy;  Laterality: N/A;  8/21-referred by Dr. Schulte/ Diagnosis:  Blood in stool, past due on surveillance colonoscopy for advanced colon polyp villous adenoma greater than 10 mm /Prep instructions handed to patient and to portal. AS  11/21/23-Precall complete-DS    CORONARY ANGIOGRAPHY N/A 10/10/2022    Procedure: ANGIOGRAM, CORONARY ARTERY;  Surgeon: Anson Nolan MD;  Location: [facility] CATH LAB;  Service: Cardiology;  Laterality: N/A;    ENDOSCOPIC ULTRASOUND OF UPPER GASTROINTESTINAL TRACT N/A 8/22/2023    Procedure: ULTRASOUND, UPPER GI TRACT, ENDOSCOPIC;  Surgeon: Joe Means MD;  Location: [facility] ENDO (2ND FLR);  Service: Endoscopy;  Laterality: N/A;  instr portal-pt stated difficult intubation with surgery in the past-tb    HERNIA REPAIR      PROSTATECTOMY         Current Medications: No current facility-administered medications for this encounter.    Current Outpatient Medications:     acetaminophen (TYLENOL) 500 MG tablet, Take 1 tablet (500 mg total) by mouth 3 (three)  times daily., Disp: 90 tablet, Rfl: 0    ALPRAZolam (XANAX) 0.5 MG tablet, Take 1 tablet (0.5 mg total) by mouth 3 (three) times daily as needed for Anxiety., Disp: 90 tablet, Rfl: 3    aspirin (ECOTRIN) 81 MG EC tablet, Take 1 tablet (81 mg total) by mouth once daily., Disp: , Rfl: 0    celecoxib (CELEBREX) 100 MG capsule, Take 1 capsule (100 mg total) by mouth 2 (two) times daily., Disp: 28 capsule, Rfl: 0    CONTOUR NEXT TEST STRIPS Strp, USE TO TEST BLOOD SUGAR ONCE DAILY, Disp: 25 strip, Rfl: 6    ezetimibe (ZETIA) 10 mg tablet, TAKE 1 TABLET ONE TIME DAILY, Disp: 90 tablet, Rfl: 3    gabapentin (NEURONTIN) 600 MG tablet, Take 1 tablet (600 mg total) by mouth 3 (three) times daily., Disp: 270 tablet, Rfl: 1    insulin (LANTUS SOLOSTAR U-100 INSULIN) glargine 100 units/mL SubQ pen, Inject 25 Units into the skin every evening., Disp: 30 mL, Rfl: 0    losartan (COZAAR) 100 MG tablet, TAKE 1 TABLET ONE TIME DAILY, Disp: 90 tablet, Rfl: 3    methocarbamoL (ROBAXIN) 500 MG Tab, Take 2 tablets (1,000 mg total) by mouth 3 (three) times daily., Disp: 90 tablet, Rfl: 0    methocarbamoL (ROBAXIN) 750 MG Tab, TAKE 1 TABLET (750 MG TOTAL) BY MOUTH 3 (THREE) TIMES DAILY AS NEEDED., Disp: 270 tablet, Rfl: 1    metoprolol succinate (TOPROL-XL) 50 MG 24 hr tablet, TAKE 1 AND 1/2 TABLETS (75 MG TOTAL) ONCE DAILY., Disp: 135 tablet, Rfl: 3    MICROLET LANCET Misc, 1 lancet by Misc.(Non-Drug; Combo Route) route once daily. Test blood glucose once daily as instructed., Disp: 25 each, Rfl: 11    NIFEdipine (PROCARDIA-XL) 60 MG (OSM) 24 hr tablet, Take 1 tablet (60 mg total) by mouth 2 (two) times a day., Disp: 180 tablet, Rfl: 1    nitroGLYCERIN (NITROSTAT) 0.3 MG SL tablet, Place 1 tablet (0.3 mg total) under the tongue every 5 (five) minutes as needed for Chest pain., Disp: 100 tablet, Rfl: 3    ondansetron (ZOFRAN) 4 MG tablet, Take 1 tablet (4 mg total) by mouth every 6 (six) hours as needed for Nausea., Disp: 30 tablet, Rfl:  "0    oxyCODONE (ROXICODONE) 5 MG immediate release tablet, Take 1 tablet (5 mg total) by mouth every 8 (eight) hours as needed for Pain (for breakthrough pain)., Disp: 21 tablet, Rfl: 0    pantoprazole (PROTONIX) 40 MG tablet, TAKE 1 TABLET TWICE DAILY, Disp: 180 tablet, Rfl: 3    pen needle, diabetic (BD ULTRA-FINE CHET PEN NEEDLE) 32 gauge x 5/32" Ndle, USE PEN NEEDLE AS INSTRUCTION WITH LANTUS INSULIN PRE-FILLED PEN., Disp: 200 each, Rfl: 1    pneumoc 20-jonny conj-dip cr,PF, (PREVNAR 20, PF,) 0.5 mL Syrg injection, Inject into the muscle., Disp: 0.5 mL, Rfl: 0    pravastatin (PRAVACHOL) 80 MG tablet, TAKE 1 TABLET ONE TIME DAILY, Disp: 90 tablet, Rfl: 3    RSVPreF3 antigen-AS01E, PF, (AREXVY, PF,) 120 mcg/0.5 mL SusR vaccine, Inject into the muscle., Disp: 1 each, Rfl: 0    SHINGRIX, PF, 50 mcg/0.5 mL injection, , Disp: , Rfl:     spironolactone (ALDACTONE) 50 MG tablet, TAKE 1 TABLET ONE TIME DAILY, Disp: 90 tablet, Rfl: 3    tadalafiL (CIALIS) 20 MG Tab, Take 1 tablet (20 mg total) by mouth every 72 hours as needed (ED)., Disp: 15 tablet, Rfl: 11    Social History:   Social History     Socioeconomic History    Marital status:    Tobacco Use    Smoking status: Former     Current packs/day: 0.00     Average packs/day: 1 pack/day for 30.0 years (30.0 ttl pk-yrs)     Types: Cigarettes     Start date: 1981     Quit date: 2011     Years since quittin.3     Passive exposure: Never    Smokeless tobacco: Never   Substance and Sexual Activity    Alcohol use: Yes     Comment: occas - nonalcoholic beer or beer    Drug use: No    Sexual activity: Yes     Partners: Female     Social Determinants of Health     Financial Resource Strain: Low Risk  (2024)    Overall Financial Resource Strain (CARDIA)     Difficulty of Paying Living Expenses: Not hard at all   Food Insecurity: No Food Insecurity (2024)    Hunger Vital Sign     Worried About Running Out of Food in the Last Year: Never true     Ran " Out of Food in the Last Year: Never true   Transportation Needs: No Transportation Needs (2/20/2024)    PRAPARE - Transportation     Lack of Transportation (Medical): No     Lack of Transportation (Non-Medical): No   Physical Activity: Insufficiently Active (2/20/2024)    Exercise Vital Sign     Days of Exercise per Week: 2 days     Minutes of Exercise per Session: 30 min   Stress: No Stress Concern Present (2/20/2024)    [employer] [place] [employer] - Occupational Stress Questionnaire     Feeling of Stress : Not at all   Social Connections: Moderately Integrated (2/20/2024)    Social Connection and Isolation Panel [NHANES]     Frequency of Communication with Friends and Family: More than three times a week     Frequency of Social Gatherings with Friends and Family: Once a week     Attends Christianity Services: More than 4 times per year     Active Member of Clubs or Organizations: No     Attends Club or Organization Meetings: Never     Marital Status:    Housing Stability: Unknown (2/20/2024)    Housing Stability Vital Sign     Unable to Pay for Housing in the Last Year: No     Unstable Housing in the Last Year: No       REVIEW OF SYSTEMS:  Constitution: Negative. Negative for chills, fever and night sweats.   Cardiovascular: Negative for chest pain and syncope.   Respiratory: Negative for cough and shortness of breath.   Gastrointestinal: See HPI. Negative for nausea/vomiting. Negative for abdominal pain.  Genitourinary: See HPI. Negative for discoloration or dysuria.  Skin: Negative for dry skin, itching and rash.   Hematologic/Lymphatic: negative for bleeding/clotting disorders.   Musculoskeletal: Negative for falls and muscle weakness.   Neurological: See HPI. no history of seizures. no history of cranial surgery or shunts.  Endocrine: Negative for polydipsia, polyphagia and polyuria.   Allergic/Immunologic: Negative for hives and persistent infections.  Psychiatric/Behavioral: Negative for  depression and insomnia.         EXAM:  There were no vitals taken for this visit.    General: The patient is a 64 y.o. male in no apparent distress, the patient is orientatied to person, place and time.  Psych: Normal mood and affect  HEENT: Vision grossly intact, hearing intact to the spoken word.  Lungs: Respirations unlabored.  Gait: Normal station and gait, no difficulty with toe or heel walk.   Skin: Cervical skin negative for rashes, lesions, hairy patches and surgical scars.  Range of motion: Cervical range of motion is acceptable. There is posterior cervical tenderness to palpation.  Spinal Balance: Global saggital and coronal spinal balance acceptable, no significant for scoliosis and kyphosis.  Musculoskeletal: No pain with the range of motion of the bilateral shoulders and elbows. Normal bulk and contour of the bilateral hands.  Vascular: Bilateral hands warm and well perfused, Radial pulses 2+ bilaterally.  Neurological: Normal strength and tone in all major motor groups in the bilateral upper and lower extremities. Normal sensation to light touch in the C5-T1 and L2-S1 dermatomes bilaterally.  Deep tendon reflexes symmetric 2+ in the bilateral upper and lower extremities.  Negative Inverted Radial Reflex and Garsia's bilaterally. Negative Babinski bilaterally.     IMAGING:   Today I independently reviewed the following images and my interpretations are as follows:    AP, Lat and Flex/Ex  upright C-spine films demonstrate spondylosis and DDD. C3-5 ACDF.     MRI cervical showed C6-7 mod-severe central stenosis and L foraminal stenosis. C4-5 R sided myelomalacia. C3-5 mild-mod central stenosis.    There is no height or weight on file to calculate BMI.  Hemoglobin A1C   Date Value Ref Range Status   02/19/2024 6.7 (H) 4.0 - 5.6 % Final     Comment:     ADA Screening Guidelines:  5.7-6.4%  Consistent with prediabetes  >or=6.5%  Consistent with diabetes    High levels of fetal hemoglobin interfere with the  HbA1C  assay. Heterozygous hemoglobin variants (HbS, HgC, etc)do  not significantly interfere with this assay.   However, presence of multiple variants may affect accuracy.     06/28/2023 6.6 (H) 4.0 - 5.6 % Final     Comment:     ADA Screening Guidelines:  5.7-6.4%  Consistent with prediabetes  >or=6.5%  Consistent with diabetes    High levels of fetal hemoglobin interfere with the HbA1C  assay. Heterozygous hemoglobin variants (HbS, HgC, etc)do  not significantly interfere with this assay.   However, presence of multiple variants may affect accuracy.     03/24/2023 6.5 (H) 4.0 - 5.6 % Final     Comment:     ADA Screening Guidelines:  5.7-6.4%  Consistent with prediabetes  >or=6.5%  Consistent with diabetes    High levels of fetal hemoglobin interfere with the HbA1C  assay. Heterozygous hemoglobin variants (HbS, HgC, etc)do  not significantly interfere with this assay.   However, presence of multiple variants may affect accuracy.         ASSESSMENT/PLAN:  Will proceed with C3-6 ACDF

## 2024-04-18 NOTE — DISCHARGE INSTRUCTIONS
Your surgery has been scheduled for:____4/24/2024______________________________________    You should report to:  ____[facility], located on the [place] side of the first floor of the           [facility] ([phone])  ____[facility], located on the [facility] side of the            Second floor of the [facility] ([phone])  ____3rd Floor [facility] located on the [facility] side of the [facility] [phone]  _X___[facility]: located at [address] [place] [address]. [address].     Please Note   Tell your doctor if you take Aspirin, products containing Aspirin, herbal medications  or blood thinners, such as Coumadin, Ticlid, or Plavix.  (Consult your provider regarding holding or stopping before surgery).  Arrange for someone to drive you home following surgery.  You will not be allowed to leave the surgical facility alone or drive yourself home following sedation and anesthesia.        Before Surgery  Stop taking all herbal medications, vitamins, and supplements 7 days prior to surgery  No Motrin/Advil (Ibuprofen) 7 days before surgery  No Aleve (Naproxen) 7 days before surgery  Stop Taking Asprin, products containing Asprin _____days before surgery  Stop taking blood thinners_______days before surgery  No Goody's/BC  Powder 7 days before surgery  Refrain from drinking alcoholic beverages for 24hours before and after surgery  Stop or limit smoking _________days before surgery  You may take Tylenol for pain    Night before Surgery  Do not eat or drink after midnight  Take a shower or bath (shower is recommended).  Bathe with Hibiclens soap or an antibacterial soap from the neck down.  If not supplied by your surgeon, hibiclens soap will need to be purchased over the counter in pharmacy.  Rinse soap off thoroughly.  Shampoo your hair with your regular shampoo    The Day of  Surgery  Take another bath or shower with hibiclens or any antibacterial soap, to reduce the chance of infection.  Take heart and blood pressure medications with a small sip of water, as advised by the perioperative team.  Do not take fluid pills  You may brush your teeth and rinse your mouth, but do not swall any additional water.   Do not apply perfumes, powder, body lotions or deodorant on the day of surgery.  Nail polish should be removed.  Do not wear makeup or moisturizer  Wear comfortable clothes, such as a button front shirt and loose fitting pants.  Leave all jewelry, including body piercings, and valuables at home.    Bring any devices you will neeed after surgery such as crutches or canes.  If you have sleep apnea, please bring your CPAP machine  In the event that your physical condition changes including the onset of a cold or respiratory illness, or if you have to delay or cancel your surgery, please notify your surgeon.     Anesthesia: General Anesthesia     You are watched continuously during your procedure by your anesthesia provider.     Youre due to have surgery. During surgery, youll be given medicine called anesthesia or anesthetic. This will keep you comfortable and pain-free. Your anesthesia provider will use general anesthesia.  What is general anesthesia?  General anesthesia puts you into a state like deep sleep. It goes into the bloodstream (IV anesthetics), into the lungs (gas anesthetics), or both. You feel nothing during the procedure. You will not remember it. During the procedure, the anesthesia provider monitors you continuously. He or she checks your heart rate and rhythm, blood pressure, breathing, and blood oxygen.  IV anesthetics. IV anesthetics are given through an IV line in your arm. Theyre often given first. This is so you are asleep before a gas anesthetic is started. Some kinds of IV anesthetics relieve pain. Others relax you. Your doctor will decide which kind is best in  your case.  Gas anesthetics. Gas anesthetics are breathed into the lungs. They are often used to keep you asleep. They can be given through a facemask or a tube placed in your larynx or trachea (breathing tube).  If you have a facemask, your anesthesia provider will most likely place it over your nose and mouth while youre still awake. Youll breathe oxygen through the mask as your IV anesthetic is started. Gas anesthetic may be added through the mask.  If you have a tube in the larynx or trachea, it will be inserted into your throat after youre asleep.  Anesthesia tools and medicines  You will likely have:  IV anesthetics. These are put into an IV line into your bloodstream.  Gas anesthetics. You breathe these anesthetics into your lungs, where they pass into your bloodstream.  Pulse oximeter. This is a small clip that is attached to the end of your finger. This measures your blood oxygen level.  Electrocardiography leads (electrodes). These are small sticky pads that are placed on your chest. They record your heart rate and rhythm.  Blood pressure cuff. This reads your blood pressure.  Risks and possible complications  General anesthesia has some risks. These include:  Breathing problems  Nausea and vomiting  Sore throat or hoarseness (usually temporary)  Allergic reaction to the anesthetic  Irregular heartbeat (rare)  Cardiac arrest (rare)   Anesthesia safety  Follow all instructions you are given for how long not to eat or drink before your procedure.  Be sure your doctor knows what medicines and drugs you take. This includes over-the-counter medicines, herbs, supplements, alcohol or other drugs. You will be asked when those were last taken.  Have an adult family member or friend drive you home after the procedure.  For the first 24 hours after your surgery:  Do not drive or use heavy equipment.  Do not make important decisions or sign legal documents. If important decisions or signing legal documents is  necessary during the first 24 hours after surgery, have a trusted family member or spouse act on your behalf.  Avoid alcohol.  Have a responsible adult stay with you. He or she can watch for problems and help keep you safe.  Date Last Reviewed: 12/1/2016 © 2000-2017 [employer]. [address] [place] [address], [place] [address]. All rights reserved. This information is not intended as a substitute for professional medical care. Always follow your healthcare professional's instructions.

## 2024-04-23 ENCOUNTER — TELEPHONE (OUTPATIENT)
Dept: ORTHOPEDICS | Facility: CLINIC | Age: 64
End: 2024-04-23
Payer: COMMERCIAL

## 2024-04-23 NOTE — TELEPHONE ENCOUNTER
Spoke to pt, informed his arrival time for surgery tomorrow is 0800am at the [place] location, [address]. No food or drink after midnight. Reminded pt to shower with Hibclens tonight and tomorrow morning. Pt pleased and verbalized understanding.

## 2024-04-24 ENCOUNTER — HOSPITAL ENCOUNTER (INPATIENT)
Facility: HOSPITAL | Age: 64
LOS: 2 days | Discharge: HOME | DRG: 472 | End: 2024-04-26
Attending: ORTHOPAEDIC SURGERY | Admitting: ORTHOPAEDIC SURGERY
Payer: MEDICARE

## 2024-04-24 ENCOUNTER — ANESTHESIA EVENT (OUTPATIENT)
Dept: SURGERY | Facility: HOSPITAL | Age: 64
DRG: 472 | End: 2024-04-24
Payer: MEDICARE

## 2024-04-24 ENCOUNTER — ANESTHESIA (OUTPATIENT)
Dept: SURGERY | Facility: HOSPITAL | Age: 64
DRG: 472 | End: 2024-04-24
Payer: MEDICARE

## 2024-04-24 DIAGNOSIS — M48.02 CERVICAL SPINAL STENOSIS: Primary | ICD-10-CM

## 2024-04-24 DIAGNOSIS — Z98.1 S/P CERVICAL SPINAL FUSION: Primary | ICD-10-CM

## 2024-04-24 LAB
POCT GLUCOSE: 139 MG/DL (ref 70–110)
POCT GLUCOSE: 140 MG/DL (ref 70–110)
POCT GLUCOSE: 156 MG/DL (ref 70–110)
POCT GLUCOSE: 157 MG/DL (ref 70–110)

## 2024-04-24 PROCEDURE — 22614 ARTHRD PST TQ 1NTRSPC EA ADD: CPT | Mod: AS,HCNC,, | Performed by: ORTHOPAEDIC SURGERY

## 2024-04-24 PROCEDURE — D9220A PRA ANESTHESIA: Mod: CRNA,,, | Performed by: NURSE ANESTHETIST, CERTIFIED REGISTERED

## 2024-04-24 PROCEDURE — D9220A PRA ANESTHESIA: Mod: ANES,,, | Performed by: ANESTHESIOLOGY

## 2024-04-24 PROCEDURE — 36000710: Performed by: ORTHOPAEDIC SURGERY

## 2024-04-24 PROCEDURE — 25000003 PHARM REV CODE 250: Performed by: ORTHOPAEDIC SURGERY

## 2024-04-24 PROCEDURE — 22614 ARTHRD PST TQ 1NTRSPC EA ADD: CPT | Mod: HCNC,,, | Performed by: ORTHOPAEDIC SURGERY

## 2024-04-24 PROCEDURE — 63600175 PHARM REV CODE 636 W HCPCS: Performed by: NURSE ANESTHETIST, CERTIFIED REGISTERED

## 2024-04-24 PROCEDURE — 63600175 PHARM REV CODE 636 W HCPCS: Performed by: ORTHOPAEDIC SURGERY

## 2024-04-24 PROCEDURE — 25000003 PHARM REV CODE 250: Performed by: ANESTHESIOLOGY

## 2024-04-24 PROCEDURE — 25000003 PHARM REV CODE 250: Performed by: PHYSICIAN ASSISTANT

## 2024-04-24 PROCEDURE — 22842 INSERT SPINE FIXATION DEVICE: CPT | Mod: HCNC,,, | Performed by: ORTHOPAEDIC SURGERY

## 2024-04-24 PROCEDURE — 25000003 PHARM REV CODE 250: Performed by: NURSE ANESTHETIST, CERTIFIED REGISTERED

## 2024-04-24 PROCEDURE — 37000009 HC ANESTHESIA EA ADD 15 MINS: Performed by: ORTHOPAEDIC SURGERY

## 2024-04-24 PROCEDURE — 82962 GLUCOSE BLOOD TEST: CPT | Performed by: ORTHOPAEDIC SURGERY

## 2024-04-24 PROCEDURE — 63015 REMOVE SPINE LAMINA >2 CRVCL: CPT | Mod: HCNC,,, | Performed by: ORTHOPAEDIC SURGERY

## 2024-04-24 PROCEDURE — 63600175 PHARM REV CODE 636 W HCPCS: Performed by: ANESTHESIOLOGY

## 2024-04-24 PROCEDURE — 22842 INSERT SPINE FIXATION DEVICE: CPT | Mod: AS,HCNC,, | Performed by: ORTHOPAEDIC SURGERY

## 2024-04-24 PROCEDURE — 94761 N-INVAS EAR/PLS OXIMETRY MLT: CPT

## 2024-04-24 PROCEDURE — 63015 REMOVE SPINE LAMINA >2 CRVCL: CPT | Mod: AS,HCNC,, | Performed by: ORTHOPAEDIC SURGERY

## 2024-04-24 PROCEDURE — 36000711: Performed by: ORTHOPAEDIC SURGERY

## 2024-04-24 PROCEDURE — 4A11X4G MONITORING OF PERIPHERAL NERVOUS ELECTRICAL ACTIVITY, INTRAOPERATIVE, EXTERNAL APPROACH: ICD-10-PCS | Performed by: ORTHOPAEDIC SURGERY

## 2024-04-24 PROCEDURE — 11000001 HC ACUTE MED/SURG PRIVATE ROOM

## 2024-04-24 PROCEDURE — 22600 ARTHRD PST TQ 1NTRSPC CRV: CPT | Mod: AS,51,HCNC, | Performed by: ORTHOPAEDIC SURGERY

## 2024-04-24 PROCEDURE — 27800903 OPTIME MED/SURG SUP & DEVICES OTHER IMPLANTS: Performed by: ORTHOPAEDIC SURGERY

## 2024-04-24 PROCEDURE — 00NW0ZZ RELEASE CERVICAL SPINAL CORD, OPEN APPROACH: ICD-10-PCS | Performed by: ORTHOPAEDIC SURGERY

## 2024-04-24 PROCEDURE — 37000008 HC ANESTHESIA 1ST 15 MINUTES: Performed by: ORTHOPAEDIC SURGERY

## 2024-04-24 PROCEDURE — 22600 ARTHRD PST TQ 1NTRSPC CRV: CPT | Mod: 51,HCNC,, | Performed by: ORTHOPAEDIC SURGERY

## 2024-04-24 PROCEDURE — C1713 ANCHOR/SCREW BN/BN,TIS/BN: HCPCS | Performed by: ORTHOPAEDIC SURGERY

## 2024-04-24 PROCEDURE — 27201423 OPTIME MED/SURG SUP & DEVICES STERILE SUPPLY: Performed by: ORTHOPAEDIC SURGERY

## 2024-04-24 PROCEDURE — 20930 SP BONE ALGRFT MORSEL ADD-ON: CPT | Mod: HCNC,,, | Performed by: ORTHOPAEDIC SURGERY

## 2024-04-24 PROCEDURE — 71000033 HC RECOVERY, INTIAL HOUR: Performed by: ORTHOPAEDIC SURGERY

## 2024-04-24 PROCEDURE — 0RG2071 FUSION OF 2 OR MORE CERVICAL VERTEBRAL JOINTS WITH AUTOLOGOUS TISSUE SUBSTITUTE, POSTERIOR APPROACH, POSTERIOR COLUMN, OPEN APPROACH: ICD-10-PCS | Performed by: ORTHOPAEDIC SURGERY

## 2024-04-24 PROCEDURE — C1729 CATH, DRAINAGE: HCPCS | Performed by: ORTHOPAEDIC SURGERY

## 2024-04-24 PROCEDURE — 20936 SP BONE AGRFT LOCAL ADD-ON: CPT | Mod: HCNC,,, | Performed by: ORTHOPAEDIC SURGERY

## 2024-04-24 PROCEDURE — 99900035 HC TECH TIME PER 15 MIN (STAT)

## 2024-04-24 PROCEDURE — 25000003 PHARM REV CODE 250

## 2024-04-24 PROCEDURE — 71000039 HC RECOVERY, EACH ADD'L HOUR: Performed by: ORTHOPAEDIC SURGERY

## 2024-04-24 PROCEDURE — 01N10ZZ RELEASE CERVICAL NERVE, OPEN APPROACH: ICD-10-PCS | Performed by: ORTHOPAEDIC SURGERY

## 2024-04-24 DEVICE — ROD SYMPHONY PRE-CUT 3.5X60MM: Type: IMPLANTABLE DEVICE | Site: NECK | Status: FUNCTIONAL

## 2024-04-24 DEVICE — IMPLANTABLE DEVICE: Type: IMPLANTABLE DEVICE | Site: NECK | Status: FUNCTIONAL

## 2024-04-24 DEVICE — BONE CHIP CANC 1.7-10MM 15ML: Type: IMPLANTABLE DEVICE | Site: NECK | Status: FUNCTIONAL

## 2024-04-24 DEVICE — SET SYMPHONY SCREW NS: Type: IMPLANTABLE DEVICE | Site: NECK | Status: FUNCTIONAL

## 2024-04-24 DEVICE — SCREW SYMPHONY PA 3.5X14MM: Type: IMPLANTABLE DEVICE | Site: NECK | Status: FUNCTIONAL

## 2024-04-24 RX ORDER — MUPIROCIN 20 MG/G
OINTMENT TOPICAL 2 TIMES DAILY
Status: COMPLETED | OUTPATIENT
Start: 2024-04-24 | End: 2024-04-26

## 2024-04-24 RX ORDER — IBUPROFEN 200 MG
16 TABLET ORAL
Status: DISCONTINUED | OUTPATIENT
Start: 2024-04-24 | End: 2024-04-26 | Stop reason: HOSPADM

## 2024-04-24 RX ORDER — OXYCODONE HYDROCHLORIDE 10 MG/1
10 TABLET ORAL EVERY 4 HOURS PRN
Status: DISCONTINUED | OUTPATIENT
Start: 2024-04-24 | End: 2024-04-24

## 2024-04-24 RX ORDER — MIDAZOLAM HYDROCHLORIDE 1 MG/ML
INJECTION INTRAMUSCULAR; INTRAVENOUS
Status: DISCONTINUED | OUTPATIENT
Start: 2024-04-24 | End: 2024-04-24

## 2024-04-24 RX ORDER — CELECOXIB 100 MG/1
100 CAPSULE ORAL 2 TIMES DAILY
Qty: 28 CAPSULE | Refills: 0 | Status: SHIPPED | OUTPATIENT
Start: 2024-04-24 | End: 2024-04-26 | Stop reason: HOSPADM

## 2024-04-24 RX ORDER — CEFAZOLIN 2 G/1
INJECTION, POWDER, FOR SOLUTION INTRAMUSCULAR; INTRAVENOUS
Status: DISCONTINUED | OUTPATIENT
Start: 2024-04-24 | End: 2024-04-24

## 2024-04-24 RX ORDER — GABAPENTIN 100 MG/1
100 CAPSULE ORAL 3 TIMES DAILY
Qty: 45 CAPSULE | Refills: 0 | Status: SHIPPED | OUTPATIENT
Start: 2024-04-24 | End: 2024-05-11

## 2024-04-24 RX ORDER — SODIUM CHLORIDE 9 MG/ML
INJECTION, SOLUTION INTRAVENOUS CONTINUOUS
Status: DISCONTINUED | OUTPATIENT
Start: 2024-04-24 | End: 2024-04-24

## 2024-04-24 RX ORDER — PRAVASTATIN SODIUM 80 MG/1
80 TABLET ORAL DAILY
Status: DISCONTINUED | OUTPATIENT
Start: 2024-04-24 | End: 2024-04-24

## 2024-04-24 RX ORDER — METHOCARBAMOL 500 MG/1
1000 TABLET, FILM COATED ORAL 3 TIMES DAILY
Qty: 90 TABLET | Refills: 0 | Status: SHIPPED | OUTPATIENT
Start: 2024-04-24 | End: 2024-04-26 | Stop reason: HOSPADM

## 2024-04-24 RX ORDER — OXYCODONE HYDROCHLORIDE 5 MG/1
5 TABLET ORAL EVERY 4 HOURS PRN
Status: DISCONTINUED | OUTPATIENT
Start: 2024-04-24 | End: 2024-04-26 | Stop reason: HOSPADM

## 2024-04-24 RX ORDER — CHOLECALCIFEROL (VITAMIN D3) 25 MCG
2000 TABLET ORAL DAILY
Status: DISCONTINUED | OUTPATIENT
Start: 2024-04-24 | End: 2024-04-26 | Stop reason: HOSPADM

## 2024-04-24 RX ORDER — VANCOMYCIN HYDROCHLORIDE 1 G/20ML
INJECTION, POWDER, LYOPHILIZED, FOR SOLUTION INTRAVENOUS
Status: DISCONTINUED | OUTPATIENT
Start: 2024-04-24 | End: 2024-04-24 | Stop reason: HOSPADM

## 2024-04-24 RX ORDER — LIDOCAINE HYDROCHLORIDE 20 MG/ML
INJECTION INTRAVENOUS
Status: DISCONTINUED | OUTPATIENT
Start: 2024-04-24 | End: 2024-04-24

## 2024-04-24 RX ORDER — GABAPENTIN 300 MG/1
600 CAPSULE ORAL 3 TIMES DAILY
Status: DISCONTINUED | OUTPATIENT
Start: 2024-04-24 | End: 2024-04-26 | Stop reason: HOSPADM

## 2024-04-24 RX ORDER — ASCORBIC ACID 250 MG
250 TABLET ORAL DAILY
Status: DISCONTINUED | OUTPATIENT
Start: 2024-04-24 | End: 2024-04-26 | Stop reason: HOSPADM

## 2024-04-24 RX ORDER — SODIUM CHLORIDE 0.9 % (FLUSH) 0.9 %
10 SYRINGE (ML) INJECTION
Status: DISCONTINUED | OUTPATIENT
Start: 2024-04-24 | End: 2024-04-24

## 2024-04-24 RX ORDER — SODIUM CHLORIDE 9 MG/ML
INJECTION, SOLUTION INTRAVENOUS CONTINUOUS
Status: ACTIVE | OUTPATIENT
Start: 2024-04-24 | End: 2024-04-25

## 2024-04-24 RX ORDER — PREGABALIN 75 MG/1
75 CAPSULE ORAL
Status: DISCONTINUED | OUTPATIENT
Start: 2024-04-24 | End: 2024-04-24

## 2024-04-24 RX ORDER — PROPOFOL 10 MG/ML
VIAL (ML) INTRAVENOUS CONTINUOUS PRN
Status: DISCONTINUED | OUTPATIENT
Start: 2024-04-24 | End: 2024-04-24

## 2024-04-24 RX ORDER — IBUPROFEN 200 MG
24 TABLET ORAL
Status: DISCONTINUED | OUTPATIENT
Start: 2024-04-24 | End: 2024-04-26 | Stop reason: HOSPADM

## 2024-04-24 RX ORDER — ROCURONIUM BROMIDE 10 MG/ML
INJECTION, SOLUTION INTRAVENOUS
Status: DISCONTINUED | OUTPATIENT
Start: 2024-04-24 | End: 2024-04-24

## 2024-04-24 RX ORDER — CELECOXIB 200 MG/1
200 CAPSULE ORAL DAILY
Status: DISCONTINUED | OUTPATIENT
Start: 2024-04-25 | End: 2024-04-26 | Stop reason: HOSPADM

## 2024-04-24 RX ORDER — CELECOXIB 200 MG/1
400 CAPSULE ORAL
Status: COMPLETED | OUTPATIENT
Start: 2024-04-24 | End: 2024-04-24

## 2024-04-24 RX ORDER — ACETAMINOPHEN 325 MG/1
650 TABLET ORAL
Status: DISCONTINUED | OUTPATIENT
Start: 2024-04-24 | End: 2024-04-24

## 2024-04-24 RX ORDER — METHOCARBAMOL 500 MG/1
1000 TABLET, FILM COATED ORAL ONCE
Status: COMPLETED | OUTPATIENT
Start: 2024-04-24 | End: 2024-04-24

## 2024-04-24 RX ORDER — INSULIN ASPART 100 [IU]/ML
0-10 INJECTION, SOLUTION INTRAVENOUS; SUBCUTANEOUS
Status: DISCONTINUED | OUTPATIENT
Start: 2024-04-24 | End: 2024-04-26 | Stop reason: HOSPADM

## 2024-04-24 RX ORDER — ONDANSETRON HYDROCHLORIDE 2 MG/ML
4 INJECTION, SOLUTION INTRAVENOUS EVERY 12 HOURS PRN
Status: DISCONTINUED | OUTPATIENT
Start: 2024-04-24 | End: 2024-04-26 | Stop reason: HOSPADM

## 2024-04-24 RX ORDER — ACETAMINOPHEN 325 MG/1
650 TABLET ORAL EVERY 6 HOURS
Status: DISCONTINUED | OUTPATIENT
Start: 2024-04-25 | End: 2024-04-26 | Stop reason: HOSPADM

## 2024-04-24 RX ORDER — OXYCODONE HYDROCHLORIDE 5 MG/1
5 TABLET ORAL EVERY 6 HOURS PRN
Qty: 24 TABLET | Refills: 0 | Status: SHIPPED | OUTPATIENT
Start: 2024-04-24 | End: 2024-05-23

## 2024-04-24 RX ORDER — METHOCARBAMOL 750 MG/1
750 TABLET, FILM COATED ORAL 3 TIMES DAILY
Status: DISCONTINUED | OUTPATIENT
Start: 2024-04-24 | End: 2024-04-26 | Stop reason: HOSPADM

## 2024-04-24 RX ORDER — MORPHINE SULFATE 2 MG/ML
2 INJECTION, SOLUTION INTRAMUSCULAR; INTRAVENOUS
Status: DISCONTINUED | OUTPATIENT
Start: 2024-04-24 | End: 2024-04-26 | Stop reason: HOSPADM

## 2024-04-24 RX ORDER — PROPOFOL 10 MG/ML
VIAL (ML) INTRAVENOUS
Status: DISCONTINUED | OUTPATIENT
Start: 2024-04-24 | End: 2024-04-24

## 2024-04-24 RX ORDER — LOSARTAN POTASSIUM 25 MG/1
100 TABLET ORAL DAILY
Status: DISCONTINUED | OUTPATIENT
Start: 2024-04-24 | End: 2024-04-26 | Stop reason: HOSPADM

## 2024-04-24 RX ORDER — GLUCAGON 1 MG
1 KIT INJECTION
Status: DISCONTINUED | OUTPATIENT
Start: 2024-04-24 | End: 2024-04-26 | Stop reason: HOSPADM

## 2024-04-24 RX ORDER — EZETIMIBE 10 MG/1
10 TABLET ORAL DAILY
Status: DISCONTINUED | OUTPATIENT
Start: 2024-04-24 | End: 2024-04-26 | Stop reason: HOSPADM

## 2024-04-24 RX ORDER — POLYETHYLENE GLYCOL 3350 17 G/17G
17 POWDER, FOR SOLUTION ORAL DAILY
Status: DISCONTINUED | OUTPATIENT
Start: 2024-04-24 | End: 2024-04-26 | Stop reason: HOSPADM

## 2024-04-24 RX ORDER — ACETAMINOPHEN 325 MG/1
650 TABLET ORAL EVERY 4 HOURS PRN
Status: DISCONTINUED | OUTPATIENT
Start: 2024-04-24 | End: 2024-04-24

## 2024-04-24 RX ORDER — FENTANYL CITRATE 50 UG/ML
INJECTION, SOLUTION INTRAMUSCULAR; INTRAVENOUS
Status: DISCONTINUED | OUTPATIENT
Start: 2024-04-24 | End: 2024-04-24

## 2024-04-24 RX ORDER — GABAPENTIN 300 MG/1
300 CAPSULE ORAL 3 TIMES DAILY
Status: DISCONTINUED | OUTPATIENT
Start: 2024-04-24 | End: 2024-04-24

## 2024-04-24 RX ORDER — SPIRONOLACTONE 50 MG/1
50 TABLET, FILM COATED ORAL DAILY
Status: DISCONTINUED | OUTPATIENT
Start: 2024-04-24 | End: 2024-04-26 | Stop reason: HOSPADM

## 2024-04-24 RX ORDER — DEXAMETHASONE SODIUM PHOSPHATE 4 MG/ML
INJECTION, SOLUTION INTRA-ARTICULAR; INTRALESIONAL; INTRAMUSCULAR; INTRAVENOUS; SOFT TISSUE
Status: DISCONTINUED | OUTPATIENT
Start: 2024-04-24 | End: 2024-04-24

## 2024-04-24 RX ORDER — OXYCODONE HYDROCHLORIDE 5 MG/1
5 TABLET ORAL
Status: DISCONTINUED | OUTPATIENT
Start: 2024-04-24 | End: 2024-04-24

## 2024-04-24 RX ORDER — OXYCODONE HYDROCHLORIDE 10 MG/1
10 TABLET ORAL EVERY 4 HOURS PRN
Status: DISCONTINUED | OUTPATIENT
Start: 2024-04-24 | End: 2024-04-26 | Stop reason: HOSPADM

## 2024-04-24 RX ORDER — SUCCINYLCHOLINE CHLORIDE 20 MG/ML
INJECTION INTRAMUSCULAR; INTRAVENOUS
Status: DISCONTINUED | OUTPATIENT
Start: 2024-04-24 | End: 2024-04-24

## 2024-04-24 RX ORDER — LIDOCAINE HYDROCHLORIDE AND EPINEPHRINE 10; 10 MG/ML; UG/ML
INJECTION, SOLUTION INFILTRATION; PERINEURAL
Status: DISCONTINUED | OUTPATIENT
Start: 2024-04-24 | End: 2024-04-24 | Stop reason: HOSPADM

## 2024-04-24 RX ORDER — FAMOTIDINE 20 MG/1
20 TABLET, FILM COATED ORAL 2 TIMES DAILY
Status: DISCONTINUED | OUTPATIENT
Start: 2024-04-24 | End: 2024-04-26 | Stop reason: HOSPADM

## 2024-04-24 RX ORDER — MUPIROCIN 20 MG/G
OINTMENT TOPICAL
Status: DISCONTINUED | OUTPATIENT
Start: 2024-04-24 | End: 2024-04-24

## 2024-04-24 RX ORDER — METOPROLOL SUCCINATE 25 MG/1
75 TABLET, EXTENDED RELEASE ORAL DAILY
Status: DISCONTINUED | OUTPATIENT
Start: 2024-04-24 | End: 2024-04-26 | Stop reason: HOSPADM

## 2024-04-24 RX ORDER — OXYCODONE HYDROCHLORIDE 5 MG/1
5 TABLET ORAL EVERY 4 HOURS PRN
Status: DISCONTINUED | OUTPATIENT
Start: 2024-04-24 | End: 2024-04-24

## 2024-04-24 RX ORDER — BETHANECHOL CHLORIDE 10 MG/1
10 TABLET ORAL ONCE
Status: COMPLETED | OUTPATIENT
Start: 2024-04-24 | End: 2024-04-24

## 2024-04-24 RX ORDER — BACITRACIN ZINC 500 UNIT/G
OINTMENT (GRAM) TOPICAL
Status: DISCONTINUED | OUTPATIENT
Start: 2024-04-24 | End: 2024-04-24 | Stop reason: HOSPADM

## 2024-04-24 RX ORDER — AMOXICILLIN 250 MG
1 CAPSULE ORAL 2 TIMES DAILY
Status: DISCONTINUED | OUTPATIENT
Start: 2024-04-24 | End: 2024-04-26 | Stop reason: HOSPADM

## 2024-04-24 RX ORDER — FENTANYL CITRATE 50 UG/ML
25 INJECTION, SOLUTION INTRAMUSCULAR; INTRAVENOUS EVERY 5 MIN PRN
Status: COMPLETED | OUTPATIENT
Start: 2024-04-24 | End: 2024-04-24

## 2024-04-24 RX ORDER — PHENYLEPHRINE HYDROCHLORIDE 10 MG/ML
INJECTION INTRAVENOUS
Status: DISCONTINUED | OUTPATIENT
Start: 2024-04-24 | End: 2024-04-24

## 2024-04-24 RX ORDER — METHOCARBAMOL 500 MG/1
1000 TABLET, FILM COATED ORAL
Status: DISCONTINUED | OUTPATIENT
Start: 2024-04-24 | End: 2024-04-24

## 2024-04-24 RX ORDER — KETAMINE HYDROCHLORIDE 100 MG/ML
INJECTION, SOLUTION INTRAMUSCULAR; INTRAVENOUS
Status: DISCONTINUED | OUTPATIENT
Start: 2024-04-24 | End: 2024-04-24

## 2024-04-24 RX ORDER — ACETAMINOPHEN 500 MG
1000 TABLET ORAL EVERY 8 HOURS
Qty: 90 TABLET | Refills: 0 | Status: SHIPPED | OUTPATIENT
Start: 2024-04-24 | End: 2024-04-26 | Stop reason: HOSPADM

## 2024-04-24 RX ORDER — PRAVASTATIN SODIUM 20 MG/1
80 TABLET ORAL NIGHTLY
Status: DISCONTINUED | OUTPATIENT
Start: 2024-04-24 | End: 2024-04-26 | Stop reason: HOSPADM

## 2024-04-24 RX ORDER — HALOPERIDOL 5 MG/ML
0.5 INJECTION INTRAMUSCULAR EVERY 10 MIN PRN
Status: DISCONTINUED | OUTPATIENT
Start: 2024-04-24 | End: 2024-04-24

## 2024-04-24 RX ORDER — ONDANSETRON 8 MG/1
8 TABLET, ORALLY DISINTEGRATING ORAL EVERY 8 HOURS PRN
Status: DISCONTINUED | OUTPATIENT
Start: 2024-04-24 | End: 2024-04-26 | Stop reason: HOSPADM

## 2024-04-24 RX ORDER — ACETAMINOPHEN 500 MG
1000 TABLET ORAL
Status: COMPLETED | OUTPATIENT
Start: 2024-04-24 | End: 2024-04-24

## 2024-04-24 RX ORDER — NIFEDIPINE 30 MG/1
60 TABLET, EXTENDED RELEASE ORAL 2 TIMES DAILY
Status: DISCONTINUED | OUTPATIENT
Start: 2024-04-24 | End: 2024-04-26 | Stop reason: HOSPADM

## 2024-04-24 RX ADMIN — INSULIN ASPART 2 UNITS: 100 INJECTION, SOLUTION INTRAVENOUS; SUBCUTANEOUS at 05:04

## 2024-04-24 RX ADMIN — FENTANYL CITRATE 100 MCG: 50 INJECTION, SOLUTION INTRAMUSCULAR; INTRAVENOUS at 09:04

## 2024-04-24 RX ADMIN — Medication 250 MG: at 04:04

## 2024-04-24 RX ADMIN — THERA TABS 1 TABLET: TAB at 04:04

## 2024-04-24 RX ADMIN — PROPOFOL 200 MG: 10 INJECTION, EMULSION INTRAVENOUS at 09:04

## 2024-04-24 RX ADMIN — ACETAMINOPHEN 650 MG: 325 TABLET ORAL at 11:04

## 2024-04-24 RX ADMIN — MIDAZOLAM HYDROCHLORIDE 2 MG: 2 INJECTION, SOLUTION INTRAMUSCULAR; INTRAVENOUS at 09:04

## 2024-04-24 RX ADMIN — LOSARTAN POTASSIUM 100 MG: 25 TABLET, FILM COATED ORAL at 04:04

## 2024-04-24 RX ADMIN — FENTANYL CITRATE 25 MCG: 50 INJECTION INTRAMUSCULAR; INTRAVENOUS at 01:04

## 2024-04-24 RX ADMIN — NIFEDIPINE 60 MG: 30 TABLET, FILM COATED, EXTENDED RELEASE ORAL at 08:04

## 2024-04-24 RX ADMIN — FAMOTIDINE 20 MG: 20 TABLET, FILM COATED ORAL at 08:04

## 2024-04-24 RX ADMIN — DEXAMETHASONE SODIUM PHOSPHATE 8 MG: 4 INJECTION, SOLUTION INTRAMUSCULAR; INTRAVENOUS at 09:04

## 2024-04-24 RX ADMIN — ACETAMINOPHEN 650 MG: 325 TABLET ORAL at 02:04

## 2024-04-24 RX ADMIN — BETHANECHOL CHLORIDE 10 MG: 10 TABLET ORAL at 02:04

## 2024-04-24 RX ADMIN — FENTANYL CITRATE 25 MCG: 50 INJECTION INTRAMUSCULAR; INTRAVENOUS at 12:04

## 2024-04-24 RX ADMIN — METHOCARBAMOL 1000 MG: 500 TABLET ORAL at 12:04

## 2024-04-24 RX ADMIN — SODIUM CHLORIDE, SODIUM GLUCONATE, SODIUM ACETATE, POTASSIUM CHLORIDE, MAGNESIUM CHLORIDE, SODIUM PHOSPHATE, DIBASIC, AND POTASSIUM PHOSPHATE: .53; .5; .37; .037; .03; .012; .00082 INJECTION, SOLUTION INTRAVENOUS at 11:04

## 2024-04-24 RX ADMIN — SUGAMMADEX 200 MG: 100 INJECTION, SOLUTION INTRAVENOUS at 11:04

## 2024-04-24 RX ADMIN — CEFAZOLIN 2 G: 2 INJECTION, POWDER, FOR SOLUTION INTRAMUSCULAR; INTRAVENOUS at 06:04

## 2024-04-24 RX ADMIN — INSULIN ASPART 1 UNITS: 100 INJECTION, SOLUTION INTRAVENOUS; SUBCUTANEOUS at 08:04

## 2024-04-24 RX ADMIN — PRAVASTATIN SODIUM 80 MG: 20 TABLET ORAL at 08:04

## 2024-04-24 RX ADMIN — SODIUM CHLORIDE, SODIUM GLUCONATE, SODIUM ACETATE, POTASSIUM CHLORIDE, MAGNESIUM CHLORIDE, SODIUM PHOSPHATE, DIBASIC, AND POTASSIUM PHOSPHATE: .53; .5; .37; .037; .03; .012; .00082 INJECTION, SOLUTION INTRAVENOUS at 10:04

## 2024-04-24 RX ADMIN — OXYCODONE HYDROCHLORIDE 10 MG: 10 TABLET ORAL at 04:04

## 2024-04-24 RX ADMIN — KETAMINE HYDROCHLORIDE 20 MG: 100 INJECTION INTRAMUSCULAR; INTRAVENOUS at 09:04

## 2024-04-24 RX ADMIN — GABAPENTIN 300 MG: 300 CAPSULE ORAL at 02:04

## 2024-04-24 RX ADMIN — PHENYLEPHRINE HYDROCHLORIDE 100 MCG: 10 INJECTION INTRAVENOUS at 10:04

## 2024-04-24 RX ADMIN — ROCURONIUM BROMIDE 10 MG: 10 INJECTION INTRAVENOUS at 09:04

## 2024-04-24 RX ADMIN — METHOCARBAMOL 750 MG: 750 TABLET ORAL at 08:04

## 2024-04-24 RX ADMIN — GABAPENTIN 600 MG: 300 CAPSULE ORAL at 08:04

## 2024-04-24 RX ADMIN — SODIUM CHLORIDE: 9 INJECTION, SOLUTION INTRAVENOUS at 12:04

## 2024-04-24 RX ADMIN — OXYCODONE HYDROCHLORIDE 10 MG: 10 TABLET ORAL at 11:04

## 2024-04-24 RX ADMIN — OXYCODONE 5 MG: 5 TABLET ORAL at 12:04

## 2024-04-24 RX ADMIN — CEFAZOLIN 2 EACH: 2 INJECTION, POWDER, FOR SOLUTION INTRAMUSCULAR; INTRAVENOUS at 10:04

## 2024-04-24 RX ADMIN — CELECOXIB 400 MG: 200 CAPSULE ORAL at 09:04

## 2024-04-24 RX ADMIN — ROCURONIUM BROMIDE 30 MG: 10 INJECTION INTRAVENOUS at 10:04

## 2024-04-24 RX ADMIN — ACETAMINOPHEN 1000 MG: 500 TABLET ORAL at 09:04

## 2024-04-24 RX ADMIN — SODIUM CHLORIDE: 0.9 INJECTION, SOLUTION INTRAVENOUS at 11:04

## 2024-04-24 RX ADMIN — OXYCODONE 5 MG: 5 TABLET ORAL at 08:04

## 2024-04-24 RX ADMIN — PROPOFOL 150 MCG/KG/MIN: 10 INJECTION, EMULSION INTRAVENOUS at 09:04

## 2024-04-24 RX ADMIN — LIDOCAINE HYDROCHLORIDE 100 MG: 20 INJECTION INTRAVENOUS at 09:04

## 2024-04-24 RX ADMIN — EZETIMIBE 10 MG: 10 TABLET ORAL at 04:04

## 2024-04-24 RX ADMIN — PHENYLEPHRINE HYDROCHLORIDE 0.2 MCG/KG/MIN: 10 INJECTION INTRAVENOUS at 10:04

## 2024-04-24 RX ADMIN — SUCCINYLCHOLINE CHLORIDE 140 MG: 20 INJECTION, SOLUTION INTRAMUSCULAR; INTRAVENOUS at 09:04

## 2024-04-24 RX ADMIN — DOCUSATE SODIUM AND SENNOSIDES 1 TABLET: 8.6; 5 TABLET, FILM COATED ORAL at 08:04

## 2024-04-24 RX ADMIN — MUPIROCIN: 20 OINTMENT TOPICAL at 08:04

## 2024-04-24 RX ADMIN — SODIUM CHLORIDE: 0.9 INJECTION, SOLUTION INTRAVENOUS at 09:04

## 2024-04-24 RX ADMIN — MUPIROCIN: 20 OINTMENT TOPICAL at 09:04

## 2024-04-24 RX ADMIN — SODIUM CHLORIDE 0.05 MCG/KG/MIN: 9 INJECTION, SOLUTION INTRAVENOUS at 09:04

## 2024-04-24 RX ADMIN — MUPIROCIN: 20 OINTMENT TOPICAL at 12:04

## 2024-04-24 NOTE — DISCHARGE INSTRUCTIONS
DR. ANGELES VALDEZ'S POSTOPERATIVE INSTRUCTIONS -   POSTERIOR CERVICAL LAMINECTOMY AND FUSION       Antibiotics: You do not need additional antibiotics at home.    NSAIDs: Please refrain from taking ibuprofen (Advil), naproxen (Aleve), and other non steroidal anti-inflammatory medications other than the Celebrex that will be prescribed to you after surgery.    Wound Care: You may remove your dressing and shower 7 days after surgery. Until then please keep your wound clean and dry. Sponge baths are acceptable. Do not go in a pool or hot tub until seen in clinic. Please leave the small steri-strips covering your wound in place until they fall off naturally (2 weeks). You may notice clear suture ends hanging from the sides of your incision after the steri-strips are removed, it is ok to clip these with scissors.    Cervical Collar: If given a collar after surgery you should wear it at all times. The only times it may be removed are for sleeping, eating and showering.    Pain: We will use a multimodal approach for pain management after your surgery.  You will be given a prescription for pain medicine when you are discharged from the hospital.  You will also be given prescriptions for Robaxin (a muscle relaxer), Gabapentin, Celebrex and Tylenol.  Please note: you will only be given ONE prescription for narcotics when you are discharged from the hospital.  This medication is for breakthrough pain only. This medication will not be refilled.  The other medications given to you may be refilled if needed.      Difficulty Breathing: This is uncommon after surgery and may represent dangerous swelling in your neck. If you experience difficulty breathing please call 911 immediately.    Infection: Signs of infection include increasing wound drainage and redness around the wound, as well as a temperature over 101.5 degrees. It is unnecessary to take your temperature on a routine basis. Please call the above number if you are concerned  about an infection.    Driving and Work: It is ok to return to driving and work as long as you are not taking narcotic pain medications or wearing your cervical collar. Please do not lift over 5 pounds or participate in exercise or sports until cleared by Dr. Burnette.    Deep Venous Thrombosis (Blood Clots): Symptoms include swelling in the legs and shortness of breath. Please call the office or proceed to the nearest emergency room if you have any of these symptoms.    Physical Therapy: The best physical therapy immediately after surgery is walking. Please try to walk as much as possible.    Follow-up: You will be scheduled for a follow-up appointment in 4 weeks with either Dr. Burnette or his physician assistant, Maxine Lindsay PA-C.    Questions: During business hours please call [phone] for routine questions. For after hours questions please call [phone] and ask to speak with the Orthopaedic resident on call.    Disability: If you submitted short term disability paperwork for us to complete and would like to check the status, please call the Disability Department at [phone].  You may fax any necessary paperwork to [phone].

## 2024-04-24 NOTE — ANESTHESIA PROCEDURE NOTES
Intubation    Date/Time: 4/24/2024 9:42 AM    Performed by: Madelin Suresh CRNA  Authorized by: Meli Alejandre MD    Intubation:     Induction:  Intravenous    Intubated:  Postinduction    Mask Ventilation:  Easy with oral airway    Attempts:  1    Attempted By:  CRNA    Method of Intubation:  Video laryngoscopy    Blade:  Cervantes 3    Laryngeal View Grade: Grade I - full view of cords      Difficult Airway Encountered?: No      Complications:  None    Airway Device:  Oral endotracheal tube    Airway Device Size:  7.5    Style/Cuff Inflation:  Cuffed (inflated to minimal occlusive pressure)    Tube secured:  22    Secured at:  The teeth    Placement Verified By:  Capnometry    Complicating Factors:  None    Findings Post-Intubation:  BS equal bilateral and atraumatic/condition of teeth unchanged

## 2024-04-24 NOTE — OPERATIVE NOTE ADDENDUM
Certification of Assistant at Surgery       Surgery Date: 4/24/2024     Participating Surgeons:  Surgeons and Role:     * Edd Burnette MD - Primary    Procedures:  Procedure(s) (LRB):  FUSION, SPINE, CERVICAL, POSTERIOR APPROACH C3-6 DEPUY  GW TONGS WTIH SUZI, FREDIS 4 POST SNS: MOTORS/SSEP/EMG (N/A)    Assistant Surgeon's Certification of Necessity:  I understand that section 1842 (b) (6) (d) of the Social Security Act generally prohibits Medicare Part B reasonable charge payment for the services of assistants at surgery in teaching hospitals when qualified residents are available to furnish such services. I certify that the services for which payment is claimed were medically necessary, and that no qualified resident was available to perform the services. I further understand that these services are subject to post-payment review by the Medicare carrier.      Maxine Lindsay PA-C    04/24/2024  12:10 PM

## 2024-04-24 NOTE — PLAN OF CARE
Problem: Diabetes Comorbidity  Goal: Blood Glucose Level Within Targeted Range  Intervention: Monitor and Manage Glycemia  Flowsheets (Taken 4/24/2024 1602)  Glycemic Management: blood glucose monitored     Problem: Spinal Surgery  Goal: Absence of Bleeding  Intervention: Monitor and Manage Bleeding  Flowsheets (Taken 4/24/2024 1602)  Bleeding Management: dressing monitored     Problem: Spinal Surgery  Goal: Absence of Infection Signs and Symptoms  Intervention: Prevent or Manage Infection  Flowsheets (Taken 4/24/2024 1602)  Infection Management: aseptic technique maintained     Problem: Fall Injury Risk  Goal: Absence of Fall and Fall-Related Injury  Intervention: Identify and Manage Contributors  Flowsheets (Taken 4/24/2024 1602)  Medication Review/Management: medications reviewed     Problem: Fall Injury Risk  Goal: Absence of Fall and Fall-Related Injury  Intervention: Promote Injury-Free Environment  Flowsheets (Taken 4/24/2024 1602)  Safety Promotion/Fall Prevention:   assistive device/personal item within reach   Fall Risk reviewed with patient/family   nonskid shoes/socks when out of bed   side rails raised x 2   instructed to call staff for mobility    Plan of care reviewed with pt, verbalized understanding. Medications and possible side effects reviewed and administered as ordered.  Purposeful rounding completed.  Safety precautions maintained.  Call light placed within reach.

## 2024-04-24 NOTE — OP NOTE
DATE OF PROCEDURE: 4/24/2024.     SURGEON: Edd Burnette M.D.     FIRST ASSISTANT: Maxine Lindsay PA-C, no resident was available.     PREOPERATIVE DIAGNOSIS:     1. Cervical stenosis C3-6 with myeloradiculopathy  2. Cervical pseudoarthrosis  3. History of C3-5 ACDF     POSTOPERATIVE DIAGNOSIS: Same      PROCEDURES PERFORMED:  1.  Posterior cervical spinal fusion C3-6  2.  C4-6 laminectomy for decompression of central and foraminal stenosis  3.  Posterior segmental instrumentation C3-6  4.  Cadaveric and local bone grafting.     ANESTHESIA: General endotracheal anesthesia.     ESTIMATED BLOOD LOSS:  100 mL.     IMPLANTS: E[employer]  3.5x14mm lateral mass screws bilaterally C3-6  3.5mm Ti rods bilaterally  15cc of cancellous chips     SPECIMENS: None.     FINDINGS:  None.     DRAINS: One deep and one superficial     COMPLICATIONS: None.     SPONGE AND NEEDLE COUNT: Correct x2.     NEUROLOGICAL MONITORING: Motor evoked potentials, somatosensory evoked potential, and free-running EMG.  There were no changes to stable and reliable bilateral upper and lower extremity motor and somatosensory potentials throughout the entire procedure.     DESCRIPTION INFORMED CONSENT: I had a sit down discussion with the patient regarding the planned procedure. We specifically discussed the risks, benefits, and alternatives to surgery. We discussed the surgical procedure including the skin incision, nerve decompression, bone fusion, allograft and/or iliac crest bone graft, and surgical implants including lateral mass screws and pedicle screws as indicated: they understand the risks include but are not limited to death, paralysis, blindness, bleeding, infection, damage to arteries, veins and nerves, vertebral artery injury, dysphagia, spinal fluid leak, continued or worsening pain, no improvement in symptoms, non-union, and the possible need for more surgery in the future, as well as the possibility other unforseen and unknown  complications. We talked about expected hospital stay and recovery period. All questions were answered; they understand and wish to proceed.     REASON FOR OPERATION AND BRIEF HISTORY AND PHYSICAL: The patient is a 64 y.o. male with cervical stenosis C3-6 and myeloradiculopathy.  He presented to clinic recently with symptoms of consistent with cervical myelopathy. He is here for surgery today.     DESCRIPTION OF PROCEDURE: The patient was met in the preoperative area where his posterior cervical spine was marked as the operative site. Subsequently, they were brought to the Operating Room where they was placed under general endotracheal anesthesia. Sequential compression devices were placed in the patient's bilateral calves and run throughout the case. A Forbes catheter was then sterilely placed. [employer] tongs were then placed in the standard sterile fashion and the patient was positioned prone on a Leo table with 4 post pads.  The head was secured to 15 lb of in-line traction. The arms were padded talked. The shoulders were gently taped down. The posterior cervical spine was then prepped and draped in a normal sterile fashion.     A full timeout was then called, identifying the patient, the procedural site and levels, the availability of all instruments and implants, and no specific nursing, surgical, anesthetic, or neurological monitoring concerns. Finally, it was safe to proceed with surgery and the patient was given weight-appropriate dose of Ancef by the Anesthesia Service.     We then made a midline posterior cervical incision and performed a preliminary subperiosteal exposure to the C4-C5 lamina transverse processes and relevant intervening facet joints. At the conclusion of my preliminary exposure we placed an elevator to the left C4-5 facet joint took a lateral radiograph as well as a kocher clamp on C4 spinous process and I confirmed the levels radiographically. Next we prepared lateral mass screw  tracts in the standard fashion from C3-C6 bilaterally but did not place screws to facilitate laminectomy.     We then began the neurological decompression portion of the procedure. A high-speed drill was used to perform a trough style laminectomy from C4-6 bilaterally.  These laminar fragments were then removed on block. The spinal cord and bilateral nerves were adequately decompressed from C3-7. Epidural hemostasis was achieved with a combination of bipolar electrocautery as well as FloSeal and patties. Wound was thoroughly irrigated. Screws were placed in the previously prepared tracts from C3-6. AP and lateral radiographs then taken demonstrating correct level as well as adequate position of all implants. All bony surfaces were then decorticated from C3-6. Rods were then measured, cut, contoured and locked into place with provided set plugs and torque wrench. The patient's local bone graft was milled in a bone mill. It was combined with 15cc of allograft and was laid dorsally along the decorticated surfaces from C3-6 bilaterally. 1g of vancomycin powder was spread in the wound. One 10-Georgian hemovac drain was placed deep and was left to gravity.     At this point the wound was closed in layers using 0 Ethibond for the fascia. One superficial 10-Georgian HV drain was placed to suction. Would was closed in layers with 2-0 Vicryl for the dermis and 3-0 Monocryl for the skin.     Cervical collar was placed. The patient was then flipped supine. The [employer] tongs were removed. He was extubated and transferred to the recovery room in stable condition.    Edd Burnette MD  Orthopaedic Spine Surgeon  Department of Orthopaedic Surgery  [phone]

## 2024-04-24 NOTE — TRANSFER OF CARE
"Anesthesia Transfer of Care Note    Patient: Hi Braxton    Procedure(s) Performed: Procedure(s) (LRB):  FUSION, SPINE, CERVICAL, POSTERIOR APPROACH C3-6 DEPUY  GW TONGS WTIH WEIGHTS, FREDIS 4 POST SNS: MOTORS/SSEP/EMG (N/A)    Patient location: PACU    Anesthesia Type: general    Transport from OR: Transported from OR on 6-10 L/min O2 by face mask with adequate spontaneous ventilation    Post pain: adequate analgesia    Post assessment: no apparent anesthetic complications and tolerated procedure well    Post vital signs: stable    Level of consciousness: sedated    Nausea/Vomiting: no nausea/vomiting    Complications: none    Transfer of care protocol was followed      Last vitals: Visit Vitals  BP (!) 140/70 (BP Location: Left arm, Patient Position: Lying)   Pulse 73   Temp 36.4 °C (97.6 °F) (Temporal)   Resp 18   Ht 6' 4" (1.93 m)   Wt 94.3 kg (208 lb)   SpO2 98%   BMI 25.32 kg/m²     "

## 2024-04-24 NOTE — NURSING
Nurses Note -- 4 Eyes      4/24/2024   4:13 PM      Skin assessed during: Admit      [x] No Altered Skin Integrity Present    []Prevention Measures Documented      [] Yes- Altered Skin Integrity Present or Discovered   [] LDA Added if Not in Epic (Describe Wound)   [] New Altered Skin Integrity was Present on Admit and Documented in LDA   [] Wound Image Taken    Wound Care Consulted? No    Attending Nurse:  Nelly Chaidez RN/Staff Member:   Lisa

## 2024-04-24 NOTE — ANESTHESIA PREPROCEDURE EVALUATION
04/24/2024  Hi Braxton is a 64 y.o., male.  Patient Active Problem List   Diagnosis    Essential hypertension    GERD (gastroesophageal reflux disease)    Family history of premature CAD    Peripheral vascular disease    Subclavian steal syndrome    Rapid palpitations    Cervical spinal stenosis    DJD (degenerative joint disease), cervical    Cervical radiculopathy    Pain    Coronary artery disease involving native coronary artery of native heart without angina pectoris    Anxiety disorder    Chest pain    Unstable angina    Dysphagia    Type 2 diabetes mellitus without complication, without long-term current use of insulin    Thyroid nodule    Abnormal magnetic resonance angiography (MRA)    Anterior cerebral artery aneurysm    Intermittent confusion    Medication overuse headache    Renal cyst, right    Bilateral leg cramps    Hypertension associated with diabetes    Dyslipidemia associated with type 2 diabetes mellitus    Diabetes mellitus with insulin therapy    Elevated PSA    Pulmonary emphysema, unspecified emphysema type     Past Surgical History:   Procedure Laterality Date    CEREBRAL ANGIOGRAM N/A 1/6/2020    Procedure: ANGIOGRAM-CEREBRAL;  Surgeon: [place] Diagnostic Provider;  Location: [facility] OR [address] [place] [address];  Service: Radiology;  Laterality: N/A;  /Nagi    CERVICAL FUSION  12/30/2015    COLONOSCOPY N/A 4/7/2017    Procedure: COLONOSCOPY;  Surgeon: Handy Frederick MD;  Location: [facility] ([address] [place] [address]);  Service: Endoscopy;  Laterality: N/A;    COLONOSCOPY N/A 11/28/2023    Procedure: COLONOSCOPY;  Surgeon: ARMAAN Hinojosa MD;  Location: [facility] ([address] [place] [address]);  Service: Endoscopy;  Laterality: N/A;  8/21-referred by Dr. Schulte/ Diagnosis:  Blood in stool, past due on surveillance colonoscopy for advanced colon polyp villous adenoma greater than 10 mm /Prep instructions handed to patient and to  portal. AS  11/21/23-Precall complete-DS    CORONARY ANGIOGRAPHY N/A 10/10/2022    Procedure: ANGIOGRAM, CORONARY ARTERY;  Surgeon: Anson Nolan MD;  Location: [facility] CATH LAB;  Service: Cardiology;  Laterality: N/A;    ENDOSCOPIC ULTRASOUND OF UPPER GASTROINTESTINAL TRACT N/A 8/22/2023    Procedure: ULTRASOUND, UPPER GI TRACT, ENDOSCOPIC;  Surgeon: Joe Means MD;  Location: [facility] ENDO (2ND FLR);  Service: Endoscopy;  Laterality: N/A;  instr portal-pt stated difficult intubation with surgery in the past-tb    HERNIA REPAIR      PROSTATECTOMY       2018    CONCLUSIONS     1 - Normal left ventricular systolic function (EF 60-65%).     2 - No wall motion abnormalities.     3 - Normal left ventricular diastolic function.     4 - Normal right ventricular systolic function .     5 - The estimated PA systolic pressure is 27 mmHg.     6 - Mild tricuspid regurgitation.       Pre-op Assessment    I have reviewed the Patient Summary Reports.     I have reviewed the Nursing Notes. I have reviewed the NPO Status.   I have reviewed the Medications.     Review of Systems      Physical Exam  General: Well nourished    Airway:  Mallampati: II   Mouth Opening: Normal  TM Distance: Normal  Tongue: Normal  Neck ROM: Normal ROM        Anesthesia Plan  Type of Anesthesia, risks & benefits discussed:    Anesthesia Type: Gen ETT  Intra-op Monitoring Plan: Standard ASA Monitors  Post Op Pain Control Plan: multimodal analgesia  Induction:  IV  Airway Plan: Direct and Video  Informed Consent: Informed consent signed with the Patient and all parties understand the risks and agree with anesthesia plan.  All questions answered. Patient consented to blood products? No  ASA Score: 3  Day of Surgery Review of History & Physical: H&P Update referred to the surgeon/provider.    Ready For Surgery From Anesthesia Perspective.     .

## 2024-04-24 NOTE — NURSING
Pt arrived to unit via hospital bed from PACU.  Pt aaox4, no s/s of distress noted.  Dressing to upper back intact w/ some bloody drainage, no active bleeding noted. Hemovac x 2 intact, right side to gravity, left side to suction.  Collar on and intact.  IVF infusing.  SCDs on.  Pt instructed to call for assistance when getting up and he verbalized understanding.  Call light placed within reach.  Spouse at bedside.

## 2024-04-25 LAB
BUN SERPL-MCNC: 14 MG/DL (ref 6–30)
CHLORIDE SERPL-SCNC: 105 MMOL/L (ref 95–110)
CREAT SERPL-MCNC: 0.8 MG/DL (ref 0.5–1.4)
GLUCOSE SERPL-MCNC: 171 MG/DL (ref 70–110)
HCT VFR BLD CALC: 41 %PCV (ref 36–54)
POC IONIZED CALCIUM: 1.14 MMOL/L (ref 1.06–1.42)
POC TCO2 (MEASURED): 20 MMOL/L (ref 23–29)
POCT GLUCOSE: 130 MG/DL (ref 70–110)
POCT GLUCOSE: 165 MG/DL (ref 70–110)
POCT GLUCOSE: 180 MG/DL (ref 70–110)
POCT GLUCOSE: 197 MG/DL (ref 70–110)
POTASSIUM BLD-SCNC: 3.8 MMOL/L (ref 3.5–5.1)
SAMPLE: ABNORMAL
SODIUM BLD-SCNC: 138 MMOL/L (ref 136–145)

## 2024-04-25 PROCEDURE — 84132 ASSAY OF SERUM POTASSIUM: CPT

## 2024-04-25 PROCEDURE — 11000001 HC ACUTE MED/SURG PRIVATE ROOM

## 2024-04-25 PROCEDURE — 97535 SELF CARE MNGMENT TRAINING: CPT

## 2024-04-25 PROCEDURE — 97116 GAIT TRAINING THERAPY: CPT

## 2024-04-25 PROCEDURE — 25000003 PHARM REV CODE 250

## 2024-04-25 PROCEDURE — 97165 OT EVAL LOW COMPLEX 30 MIN: CPT

## 2024-04-25 PROCEDURE — 94761 N-INVAS EAR/PLS OXIMETRY MLT: CPT

## 2024-04-25 PROCEDURE — 25000003 PHARM REV CODE 250: Performed by: ORTHOPAEDIC SURGERY

## 2024-04-25 PROCEDURE — 97162 PT EVAL MOD COMPLEX 30 MIN: CPT

## 2024-04-25 PROCEDURE — 99900035 HC TECH TIME PER 15 MIN (STAT)

## 2024-04-25 PROCEDURE — 82330 ASSAY OF CALCIUM: CPT

## 2024-04-25 PROCEDURE — 63600175 PHARM REV CODE 636 W HCPCS: Performed by: ORTHOPAEDIC SURGERY

## 2024-04-25 PROCEDURE — 84295 ASSAY OF SERUM SODIUM: CPT

## 2024-04-25 PROCEDURE — 85014 HEMATOCRIT: CPT

## 2024-04-25 PROCEDURE — 97530 THERAPEUTIC ACTIVITIES: CPT

## 2024-04-25 PROCEDURE — 82565 ASSAY OF CREATININE: CPT

## 2024-04-25 RX ADMIN — METHOCARBAMOL 750 MG: 750 TABLET ORAL at 09:04

## 2024-04-25 RX ADMIN — DOCUSATE SODIUM AND SENNOSIDES 1 TABLET: 8.6; 5 TABLET, FILM COATED ORAL at 09:04

## 2024-04-25 RX ADMIN — OXYCODONE 5 MG: 5 TABLET ORAL at 03:04

## 2024-04-25 RX ADMIN — Medication 250 MG: at 08:04

## 2024-04-25 RX ADMIN — MUPIROCIN: 20 OINTMENT TOPICAL at 09:04

## 2024-04-25 RX ADMIN — LOSARTAN POTASSIUM 100 MG: 25 TABLET, FILM COATED ORAL at 04:04

## 2024-04-25 RX ADMIN — OXYCODONE 5 MG: 5 TABLET ORAL at 09:04

## 2024-04-25 RX ADMIN — EZETIMIBE 10 MG: 10 TABLET ORAL at 04:04

## 2024-04-25 RX ADMIN — METHOCARBAMOL 750 MG: 750 TABLET ORAL at 08:04

## 2024-04-25 RX ADMIN — FAMOTIDINE 20 MG: 20 TABLET, FILM COATED ORAL at 08:04

## 2024-04-25 RX ADMIN — ACETAMINOPHEN 650 MG: 325 TABLET ORAL at 11:04

## 2024-04-25 RX ADMIN — INSULIN ASPART 2 UNITS: 100 INJECTION, SOLUTION INTRAVENOUS; SUBCUTANEOUS at 06:04

## 2024-04-25 RX ADMIN — Medication 2000 UNITS: at 08:04

## 2024-04-25 RX ADMIN — MUPIROCIN: 20 OINTMENT TOPICAL at 08:04

## 2024-04-25 RX ADMIN — INSULIN ASPART 1 UNITS: 100 INJECTION, SOLUTION INTRAVENOUS; SUBCUTANEOUS at 09:04

## 2024-04-25 RX ADMIN — METHOCARBAMOL 750 MG: 750 TABLET ORAL at 03:04

## 2024-04-25 RX ADMIN — GABAPENTIN 600 MG: 300 CAPSULE ORAL at 03:04

## 2024-04-25 RX ADMIN — CEFAZOLIN 2 G: 2 INJECTION, POWDER, FOR SOLUTION INTRAMUSCULAR; INTRAVENOUS at 01:04

## 2024-04-25 RX ADMIN — OXYCODONE 5 MG: 5 TABLET ORAL at 04:04

## 2024-04-25 RX ADMIN — DOCUSATE SODIUM AND SENNOSIDES 1 TABLET: 8.6; 5 TABLET, FILM COATED ORAL at 08:04

## 2024-04-25 RX ADMIN — ACETAMINOPHEN 650 MG: 325 TABLET ORAL at 05:04

## 2024-04-25 RX ADMIN — OXYCODONE 5 MG: 5 TABLET ORAL at 10:04

## 2024-04-25 RX ADMIN — GABAPENTIN 600 MG: 300 CAPSULE ORAL at 09:04

## 2024-04-25 RX ADMIN — INSULIN ASPART 2 UNITS: 100 INJECTION, SOLUTION INTRAVENOUS; SUBCUTANEOUS at 11:04

## 2024-04-25 RX ADMIN — METOPROLOL SUCCINATE 75 MG: 25 TABLET, EXTENDED RELEASE ORAL at 04:04

## 2024-04-25 RX ADMIN — POLYETHYLENE GLYCOL 3350 17 G: 17 POWDER, FOR SOLUTION ORAL at 08:04

## 2024-04-25 RX ADMIN — THERA TABS 1 TABLET: TAB at 08:04

## 2024-04-25 RX ADMIN — CELECOXIB 200 MG: 200 CAPSULE ORAL at 08:04

## 2024-04-25 RX ADMIN — PRAVASTATIN SODIUM 80 MG: 20 TABLET ORAL at 09:04

## 2024-04-25 RX ADMIN — GABAPENTIN 600 MG: 300 CAPSULE ORAL at 08:04

## 2024-04-25 RX ADMIN — FAMOTIDINE 20 MG: 20 TABLET, FILM COATED ORAL at 09:04

## 2024-04-25 RX ADMIN — ACETAMINOPHEN 650 MG: 325 TABLET ORAL at 12:04

## 2024-04-25 NOTE — PLAN OF CARE
Problem: Occupational Therapy  Goal: Occupational Therapy Goal  Description: Goals to be met by: 4/25/24     Patient will increase functional independence with ADLs by performing:    UE Dressing with [place].  LE Dressing with [place].  Grooming while standing at sink with [place].  Toileting from toilet with [place] for hygiene and clothing management.   Bathing from  shower chair/bench with [place].  Toilet transfer to toilet with [place].    Outcome: Progressing

## 2024-04-25 NOTE — NURSING
Pt had 70 ml bloody drainage to R Hemovac - open to gravity , 0 ml output in L Hemovac - to suction, no complaints at this time, will continue to monitor.

## 2024-04-25 NOTE — ANESTHESIA POSTPROCEDURE EVALUATION
Anesthesia Post Evaluation    Patient: Hi Braxton    Procedure(s) Performed: Procedure(s) (LRB):  FUSION, SPINE, CERVICAL, POSTERIOR APPROACH C3-6 DEPUY  GW TONGS WTIH WEIGHTS, FREDIS 4 POST SNS: MOTORS/SSEP/EMG (N/A)    Final Anesthesia Type: general      Patient location during evaluation: PACU  Patient participation: Yes- Able to Participate  Level of consciousness: awake and alert and oriented  Pain management: adequate  Airway patency: patent    PONV status at discharge: No PONV  Anesthetic complications: no      Cardiovascular status: blood pressure returned to baseline and hemodynamically stable  Respiratory status: unassisted  Hydration status: euvolemic  Follow-up not needed.              Vitals Value Taken Time   /76 04/25/24 0405   Temp 37.1 °C (98.7 °F) 04/25/24 0405   Pulse 87 04/25/24 0704   Resp 18 04/25/24 0704   SpO2 95 % 04/25/24 0704         Event Time   Out of Recovery 15:00:00         Pain/Wai Score: Pain Rating Prior to Med Admin: 8 (4/25/2024  5:19 AM)  Pain Rating Post Med Admin: 3 (4/25/2024  6:19 AM)  Wai Score: 10 (4/24/2024  7:14 PM)

## 2024-04-25 NOTE — PLAN OF CARE
Problem: Adult Inpatient Plan of Care  Goal: Plan of Care Review  Outcome: Progressing  Goal: Patient-Specific Goal (Individualized)  Outcome: Progressing  Goal: Absence of Hospital-Acquired Illness or Injury  Outcome: Progressing  Goal: Optimal Comfort and Wellbeing  Outcome: Progressing  Goal: Readiness for Transition of Care  Outcome: Progressing     Problem: Diabetes Comorbidity  Goal: Blood Glucose Level Within Targeted Range  Outcome: Progressing     Problem: Wound  Goal: Optimal Coping  Outcome: Progressing  Goal: Optimal Functional Ability  Outcome: Progressing  Goal: Absence of Infection Signs and Symptoms  Outcome: Progressing  Goal: Improved Oral Intake  Outcome: Progressing  Goal: Optimal Pain Control and Function  Outcome: Progressing  Goal: Skin Health and Integrity  Outcome: Progressing  Goal: Optimal Wound Healing  Outcome: Progressing     Problem: Spinal Surgery  Goal: Optimal Coping with Surgery  Outcome: Progressing  Goal: Absence of Bleeding  Outcome: Progressing  Goal: Effective Bowel Elimination  Outcome: Progressing  Goal: Fluid and Electrolyte Balance  Outcome: Progressing  Goal: Optimal Functional Ability  Outcome: Progressing  Goal: Absence of Infection Signs and Symptoms  Outcome: Progressing  Goal: Optimal Neurologic Function  Outcome: Progressing  Goal: Anesthesia/Sedation Recovery  Outcome: Progressing  Goal: Optimal Pain Control and Function  Outcome: Progressing  Goal: Nausea and Vomiting Relief  Outcome: Progressing  Goal: Effective Urinary Elimination  Outcome: Progressing  Goal: Effective Oxygenation and Ventilation  Outcome: Progressing     Problem: Fall Injury Risk  Goal: Absence of Fall and Fall-Related Injury  Outcome: Progressing     Problem: Comorbidity Management  Goal: Blood Pressure in Desired Range  Outcome: Progressing

## 2024-04-25 NOTE — PROGRESS NOTES
[facility])  Orthopedics  Progress Note    Patient Name: Hi Braxton  MRN: [id number]  Admission Date: 4/24/2024  Hospital Length of Stay: 1 days  Attending Provider: Edd Burnette MD  Primary Care Provider: Josefina Kendall MD  Follow-up For: Procedure(s) (LRB):  FUSION, SPINE, CERVICAL, POSTERIOR APPROACH C3-6 DEPUY  GW TONGS WTIH WEIGHTS, FREDIS 4 POST SNS: MOTORS/SSEP/EMG (N/A)    Post-Operative Day: 1 Day Post-Op  Subjective:     Principal Problem:Cervical spinal stenosis    Principal Orthopedic Problem: same, s/p PCF C3-6 w/ C4-6 laminectomy    Interval History: Pt seen and examined at bedside. NAEON, VSS, AF. Pain controlled. Denies fevers, chills, chest pain, SOB, N/V/D.   Glucose 197 this AM, insulin administration pending. Plan to work with PT today.     Review of patient's allergies indicates:   Allergen Reactions    Lisinopril Anaphylaxis and Nausea And Vomiting     General bad feeling    Lipitor [atorvastatin] Other (See Comments)     Muscle aches    Adhesive     Crestor [rosuvastatin] Swelling     Lip swelling.     Jardiance [empagliflozin] Other (See Comments)     Possible related to recent UTI's     Metformin      Used XR and experienced flatulance and abdominal pain.        Current Facility-Administered Medications   Medication Dose Route Frequency Provider Last Rate Last Admin    0.9%  NaCl infusion   Intravenous Continuous Maxine Lindsay PA-C 75 mL/hr at 04/24/24 1253 New Bag at 04/24/24 1253    acetaminophen tablet 650 mg  650 mg Oral Q6H Aubrey Leonard MD   650 mg at 04/25/24 0519    ascorbic acid (vitamin C) tablet 250 mg  250 mg Oral Daily Aubrey Leonard MD   250 mg at 04/24/24 1634    celecoxib capsule 200 mg  200 mg Oral Daily Aubrey Leonard MD        dextrose 10% bolus 125 mL 125 mL  12.5 g Intravenous PRN Maxine Lindsay PA-C        dextrose 10% bolus 250 mL 250 mL  25 g Intravenous PRN Maxine Lindsay PA-C        ezetimibe tablet 10  mg  10 mg Oral Daily Maxine Lindsay PA-C   10 mg at 04/24/24 1635    famotidine tablet 20 mg  20 mg Oral BID Maxine Lindsay PA-C   20 mg at 04/24/24 2010    gabapentin capsule 600 mg  600 mg Oral TID Maxine Lindsay PA-C   600 mg at 04/24/24 2010    glucagon (human recombinant) injection 1 mg  1 mg Intramuscular PRN Maxine Lindsay PA-C        glucose chewable tablet 16 g  16 g Oral PRN Maxine Lindsay PA-C        glucose chewable tablet 24 g  24 g Oral PRN Maxine Lindsay PA-C        insulin aspart U-100 pen 0-10 Units  0-10 Units Subcutaneous QID (AC + HS) PRN Maxine Lindsay PA-C   1 Units at 04/24/24 2014    losartan tablet 100 mg  100 mg Oral Daily Maxine Lindsay PA-C   100 mg at 04/24/24 1634    methocarbamoL tablet 750 mg  750 mg Oral TID Aubrey Leonard MD   750 mg at 04/24/24 2010    metoprolol succinate (TOPROL-XL) 24 hr tablet 75 mg  75 mg Oral Daily Maxine Lindsay PA-C        morphine injection 2 mg  2 mg Intravenous Q3H PRN Aubrey Leonard MD        multivitamin tablet  1 tablet Oral Daily Aubrey Leonard MD   1 tablet at 04/24/24 1634    mupirocin 2 % ointment   Nasal BID Maxine Lindsay PA-C   Given at 04/24/24 2011    NIFEdipine 24 hr tablet 60 mg  60 mg Oral BID Maxine Lindsay PA-C   60 mg at 04/24/24 2055    ondansetron disintegrating tablet 8 mg  8 mg Oral Q8H PRN Maxine Lindsay PA-C        ondansetron injection 4 mg  4 mg Intravenous Q12H PRN Maxine Lindsay PA-C        oxyCODONE immediate release tablet 5 mg  5 mg Oral Q4H PRN Maxine Lindsay PA-C   5 mg at 04/25/24 0404    oxyCODONE immediate release tablet Tab 10 mg  10 mg Oral Q4H PRN Maxine Lindsay PA-C   10 mg at 04/24/24 2358    polyethylene glycol packet 17 g  17 g Oral Daily Maxine Lindsay PA-C        pravastatin tablet 80 mg  80 mg Oral Nightly Maxine Lindsay PA-C   80 mg at 04/24/24 2010     "senna-docusate 8.6-50 mg per tablet 1 tablet  1 tablet Oral BID Maxine Lindsay PA-C   1 tablet at 04/24/24 2010    spironolactone tablet 50 mg  50 mg Oral Daily Maxine Lindsay PA-C        vitamin D 1000 units tablet 2,000 Units  2,000 Units Oral Daily Aubrey Leonard MD         Objective:     Vital Signs (Most Recent):  Temp: 98.7 °F (37.1 °C) (04/25/24 0405)  Pulse: 82 (04/25/24 0405)  Resp: 18 (04/25/24 0405)  BP: (!) 142/76 (04/25/24 0405)  SpO2: 96 % (04/25/24 0405) Vital Signs (24h Range):  Temp:  [97.5 °F (36.4 °C)-98.7 °F (37.1 °C)] 98.7 °F (37.1 °C)  Pulse:  [72-89] 82  Resp:  [10-20] 18  SpO2:  [94 %-99 %] 96 %  BP: (127-176)/(70-94) 142/76     Weight: 94.3 kg (208 lb)  Height: 6' 4" (193 cm)  Body mass index is 25.32 kg/m².      Intake/Output Summary (Last 24 hours) at 4/25/2024 0630  Last data filed at 4/25/2024 0523  Gross per 24 hour   Intake 3760.37 ml   Output 3245 ml   Net 515.37 ml        Ortho/SPM Exam     Gen: NAD, WDWN  CV: peripherally well perfused  Resp: unlabored respirations, symmetric chest rise  Neck: TM  Neuro: CN 2-12 grossly intact. No FND.    MSK:  Bilateral upper extremities SILT   AIN/PIN/Ulnar nerves intact   2+ radial arteries bilaterally   Dressings CDI   Drains with SS output  C collar in place    Significant Labs: All pertinent labs within the past 24 hours have been reviewed.    Significant Imaging: I have reviewed all pertinent imaging results/findings.  Assessment/Plan:     * Cervical spinal stenosis  Hi Braxton is a 64 y.o. male is s/p PCF C3-6 w/ C4-6 laminectomy on 4/4/25.    Surgical dressing C/D/I  Pain control: multimodal  PT/OT: WBAT BLE, C spine precautions  DVT PPx: Early ambulation, no chemical DVT Ppx, SCDs at all times when not ambulating  Abx: postop Ancef x 24hrs   Drain: remain in place  Nursing: Incentive spirometry, Monitor and record drain output each shift     Dispo: plan to dc pending drain removal and PT    Output by Drain (mL) " 04/23/24 0701 - 04/23/24 1900 04/23/24 1901 - 04/24/24 0700 04/24/24 0701 - 04/24/24 1900 04/24/24 1901 - 04/25/24 0632        Closed/Suction Drain 04/24/24 Tube - 2 Neck Accordion    75 70                Aubrey Leonard MD  Orthopedics  [facility])

## 2024-04-25 NOTE — PT/OT/SLP EVAL
"Occupational Therapy   Evaluation    Name: Hi Braxton  MRN: [id number]  Admitting Diagnosis: Cervical spinal stenosis  Recent Surgery: Procedure(s) (LRB):  FUSION, SPINE, CERVICAL, POSTERIOR APPROACH C3-6 DEPUY  GW KRISTINAS WTIH FREDIS ARCHER 4 POST SNS: MOTORS/SSEP/EMG (N/A) 1 Day Post-Op    Recommendations:     Discharge Recommendations: No Therapy Indicated  Discharge Equipment Recommendations:  none  Barriers to discharge:  None    Assessment:     Hi Braxton is a 64 y.o. male with a medical diagnosis of Cervical spinal stenosis.  He presents with PCDF. Performance deficits affecting function: impaired self care skills, impaired functional mobility, orthopedic precautions.  Pt was able to perform sit/stand T/F c S and was able to walk to bathroom and then back to chair c CGA.  Has 4/5 B UE strength and has no s/s of numbness or tingling.  Able to perform LB dressing and toilet hygiene c set-up.  Pt is progressing well and educated on spinal precautions.    Rehab Prognosis: Good; patient would benefit from acute skilled OT services to address these deficits and reach maximum level of function.       Plan:     Patient to be seen daily to address the above listed problems via self-care/home management, therapeutic activities, therapeutic exercises  Plan of Care Expires: 04/26/24  Plan of Care Reviewed with: patient    Subjective     Chief Complaint: PCDF  Patient/Family Comments/goals: "I am doing good."    Occupational Profile:  Living Environment: Pt lives in a one story house c 2 EDER and has a tub/shower combo and a walk-in shower.  Previous level of function: I PTA  Roles and Routines: Retired  Equipment Used at Home: walker, rolling  Assistance upon Discharge: Pt lives c his wife    Pain/Comfort:  Pain Rating 1: 5/10    Patients cultural, spiritual, Shinto conflicts given the current situation: no    Objective:     Communicated with: RN prior to session.  Patient found up in chair with JOHNNA hollins " drain upon OT entry to room.    General Precautions: Standard, fall  Orthopedic Precautions: spinal precautions  Braces: Aspen collar  Respiratory Status: Room air    Occupational Performance:    Functional Mobility/Transfers:  Patient completed Sit <> Stand Transfer with supervision  with  no assistive device   Patient completed Toilet Transfer Stand Pivot technique with supervision with  no AD  Functional Mobility: Pt was able to walk to bathroom c S    Activities of Daily Living:  Grooming: modified independence to wash hands while standing at sink.  Lower Body Dressing: minimum assistance to don/doff socks crossing B LE.  Toileting: independence for toilet hygiene.        Physical Exam:  Upper Extremity Range of Motion:     -       Right Upper Extremity: WFL  -       Left Upper Extremity: WFL  Upper Extremity Strength:    -       Right Upper Extremity: WFL  -       Left Upper Extremity: WFL    AMPAC 6 Click ADL:  AMPAC Total Score: 23      Patient left up in chair with all lines intact, call button in reach, and RN notified    GOALS:   Multidisciplinary Problems       Occupational Therapy Goals          Problem: Occupational Therapy    Goal Priority Disciplines Outcome Interventions   Occupational Therapy Goal     OT, PT/OT Progressing    Description: Goals to be met by: 4/25/24     Patient will increase functional independence with ADLs by performing:    UE Dressing with [place].  LE Dressing with [place].  Grooming while standing at sink with [place].  Toileting from toilet with [place] for hygiene and clothing management.   Bathing from  shower chair/bench with [place].  Toilet transfer to toilet with [place].                         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 Dose of amlodipine increased from 5 to 10 mg in the weeks prior to the incidnt.    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         Past Surgical History:   Procedure Laterality Date    CEREBRAL ANGIOGRAM N/A 1/6/2020    Procedure: ANGIOGRAM-CEREBRAL;  Surgeon: [facility] Diagnostic Provider;  Location: [facility] OR [address] [place] [address];  Service: Radiology;  Laterality: N/A;  /Nagi    CERVICAL FUSION  12/30/2015    COLONOSCOPY N/A 4/7/2017    Procedure: COLONOSCOPY;  Surgeon: Handy Frederick MD;  [facility]  Service: Endoscopy;  Laterality: N/A;    COLONOSCOPY N/A 11/28/2023    Procedure: COLONOSCOPY;  Surgeon: ARMAAN Hinojosa MD;  Location: [facility] ENDO (4TH FLR);  Service: Endoscopy;  Laterality: N/A;  8/21-referred by Dr. Schulte/ Diagnosis:  Blood in stool, past due on surveillance colonoscopy for advanced colon polyp villous adenoma greater than 10 mm /Prep instructions handed to patient and to portal. AS  11/21/23-Precall complete-DS    CORONARY ANGIOGRAPHY N/A  10/10/2022    Procedure: ANGIOGRAM, CORONARY ARTERY;  Surgeon: Anson Nolan MD;  Location: [facility] CATH LAB;  Service: Cardiology;  Laterality: N/A;    ENDOSCOPIC ULTRASOUND OF UPPER GASTROINTESTINAL TRACT N/A 8/22/2023    Procedure: ULTRASOUND, UPPER GI TRACT, ENDOSCOPIC;  Surgeon: Joe Means MD;  Location: [facility] ENDO (2ND FLR);  Service: Endoscopy;  Laterality: N/A;  instr portal-pt stated difficult intubation with surgery in the past-tb    FUSION OF CERVICAL SPINE BY POSTERIOR APPROACH N/A 4/24/2024    Procedure: FUSION, SPINE, CERVICAL, POSTERIOR APPROACH C3-6 DEPUY  GW TONGS WTIH WEIGHTS, FREDIS 4 POST SNS: MOTORS/SSEP/EMG;  Surgeon: Edd Burnette MD;  Location: [facility] OR;  Service: Orthopedics;  Laterality: N/A;    HERNIA REPAIR      PROSTATECTOMY         Time Tracking:     OT Date of Treatment: 04/25/24  OT Start Time: 1055  OT Stop Time: 1122  OT Total Time (min): 27 min    Billable Minutes:Evaluation 14  Self Care/Home Management 13    4/25/2024

## 2024-04-25 NOTE — SUBJECTIVE & OBJECTIVE
Principal Problem:Cervical spinal stenosis    Principal Orthopedic Problem: same, s/p PCF C3-6 w/ C4-6 laminectomy    Interval History: Pt seen and examined at bedside. ELMO, VSS, AF. Pain controlled. Denies fevers, chills, chest pain, SOB, N/V/D.   Glucose 197 this AM, insulin administration pending. Plan to work with PT today.     Review of patient's allergies indicates:   Allergen Reactions    Lisinopril Anaphylaxis and Nausea And Vomiting     General bad feeling    Lipitor [atorvastatin] Other (See Comments)     Muscle aches    Adhesive     Crestor [rosuvastatin] Swelling     Lip swelling.     Jardiance [empagliflozin] Other (See Comments)     Possible related to recent UTI's     Metformin      Used XR and experienced flatulance and abdominal pain.        Current Facility-Administered Medications   Medication Dose Route Frequency Provider Last Rate Last Admin    0.9%  NaCl infusion   Intravenous Continuous Maxine Lindsay PA-C 75 mL/hr at 04/24/24 1253 New Bag at 04/24/24 1253    acetaminophen tablet 650 mg  650 mg Oral Q6H Aubrey Leonard MD   650 mg at 04/25/24 0519    ascorbic acid (vitamin C) tablet 250 mg  250 mg Oral Daily Aubrey Leonard MD   250 mg at 04/24/24 1634    celecoxib capsule 200 mg  200 mg Oral Daily Aubrey Leonard MD        dextrose 10% bolus 125 mL 125 mL  12.5 g Intravenous PRN Maxine Lindsay PA-C        dextrose 10% bolus 250 mL 250 mL  25 g Intravenous PRN Maxine Lindsay PA-C        ezetimibe tablet 10 mg  10 mg Oral Daily Maxine Lindsay PA-C   10 mg at 04/24/24 1635    famotidine tablet 20 mg  20 mg Oral BID Maxine Lindsay PA-C   20 mg at 04/24/24 2010    gabapentin capsule 600 mg  600 mg Oral TID Maxine Lindsay PA-C   600 mg at 04/24/24 2010    glucagon (human recombinant) injection 1 mg  1 mg Intramuscular PRN Maxine Lindsay PA-C        glucose chewable tablet 16 g  16 g Oral PRN Maxine Lindsay PA-C         glucose chewable tablet 24 g  24 g Oral PRN Maxine Lindsay PA-C        insulin aspart U-100 pen 0-10 Units  0-10 Units Subcutaneous QID (AC + HS) PRN Maxine Lindsay PA-C   1 Units at 04/24/24 2014    losartan tablet 100 mg  100 mg Oral Daily Maxine Lindsay PA-C   100 mg at 04/24/24 1634    methocarbamoL tablet 750 mg  750 mg Oral TID Aubrey Leonard MD   750 mg at 04/24/24 2010    metoprolol succinate (TOPROL-XL) 24 hr tablet 75 mg  75 mg Oral Daily Maxine Lindsay PA-C        morphine injection 2 mg  2 mg Intravenous Q3H PRN Aubrey Leonard MD        multivitamin tablet  1 tablet Oral Daily Aubrey Leonard MD   1 tablet at 04/24/24 1634    mupirocin 2 % ointment   Nasal BID Maxine Lindsay PA-C   Given at 04/24/24 2011    NIFEdipine 24 hr tablet 60 mg  60 mg Oral BID Maxine Lindsay PA-C   60 mg at 04/24/24 2055    ondansetron disintegrating tablet 8 mg  8 mg Oral Q8H PRN Maxine Lindsay PA-C        ondansetron injection 4 mg  4 mg Intravenous Q12H PRN Maxine Lindsay PA-C        oxyCODONE immediate release tablet 5 mg  5 mg Oral Q4H PRN Maxine Lindsay PA-C   5 mg at 04/25/24 0404    oxyCODONE immediate release tablet Tab 10 mg  10 mg Oral Q4H PRN Maxine Lindsay PA-C   10 mg at 04/24/24 8288    polyethylene glycol packet 17 g  17 g Oral Daily Maxine Lindsay PA-C        pravastatin tablet 80 mg  80 mg Oral Nightly Maxine Lindsay PA-C   80 mg at 04/24/24 2010    senna-docusate 8.6-50 mg per tablet 1 tablet  1 tablet Oral BID Maxine Lindsay PA-C   1 tablet at 04/24/24 2010    spironolactone tablet 50 mg  50 mg Oral Daily Maxine Lindsay PA-C        vitamin D 1000 units tablet 2,000 Units  2,000 Units Oral Daily Aubrey Leonard MD         Objective:     Vital Signs (Most Recent):  Temp: 98.7 °F (37.1 °C) (04/25/24 0405)  Pulse: 82 (04/25/24 0405)  Resp: 18 (04/25/24 0405)  BP: (!) 142/76 (04/25/24  "0405)  SpO2: 96 % (04/25/24 0405) Vital Signs (24h Range):  Temp:  [97.5 °F (36.4 °C)-98.7 °F (37.1 °C)] 98.7 °F (37.1 °C)  Pulse:  [72-89] 82  Resp:  [10-20] 18  SpO2:  [94 %-99 %] 96 %  BP: (127-176)/(70-94) 142/76     Weight: 94.3 kg (208 lb)  Height: 6' 4" (193 cm)  Body mass index is 25.32 kg/m².      Intake/Output Summary (Last 24 hours) at 4/25/2024 0630  Last data filed at 4/25/2024 0523  Gross per 24 hour   Intake 3760.37 ml   Output 3245 ml   Net 515.37 ml        Ortho/SPM Exam     Gen: NAD, WDWN  CV: peripherally well perfused  Resp: unlabored respirations, symmetric chest rise  Neck: TM  Neuro: CN 2-12 grossly intact. No FND.    MSK:  Bilateral upper extremities SILT   AIN/PIN/Ulnar nerves intact   2+ radial arteries bilaterally   Dressings CDI   Drains with SS output  C collar in place    Significant Labs: All pertinent labs within the past 24 hours have been reviewed.    Significant Imaging: I have reviewed all pertinent imaging results/findings.  "

## 2024-04-25 NOTE — NURSING
Report received from Nelly RN, care assumed, pt is AAOx4, resting in bed with siderails up x2,dressing to neck CDI, C Collar in place,  safety maintained, bed locked in lowest position, bed alarm turned on, call light and bedside table within reach, NAD noted or voiced concerns at this time, POC reviewed with pt, all questions answered, pt reminded to use call light for all requests and assistance, pt verbalized understanding, will continue to monitor.

## 2024-04-25 NOTE — PT/OT/SLP EVAL
Physical Therapy Evaluation and Treatment    Patient Name: Hi Braxton   MRN: [id number]  Recent Surgery: Procedure(s) (LRB):  FUSION, SPINE, CERVICAL, POSTERIOR APPROACH C3-6 DEPUY  GW KRISTINAS WTIH SUZIFREDIS 4 POST SNS: MOTORS/SSEP/EMG (N/A) 1 Day Post-Op    Recommendations:     Discharge Recommendations: No Therapy Indicated   Discharge Equipment Recommendations: walker, rolling   Barriers to discharge:  bedroom on 2nd floor    DME justification:   Patient demonstrates a mobility limitation that significantly impairs their ability to participate in one or more mobility related activities of daily living. Patient's mobility limitation cannot be sufficiently resolved with the use of a cane, but can be sufficiently resolved with the use of a rolling walker.The use of a rolling walker will considerably improve their ability to participate in MRADLs. Patient will use the walker on a regular basis at home.     Assessment:     Hi Braxton is a 64 y.o. male admitted with a medical diagnosis of Cervical spinal stenosis. He presents with the following impairments/functional limitations: impaired endurance, impaired self care skills, impaired functional mobility, gait instability, impaired balance, pain, orthopedic precautions, decreased coordination. Pt presents s/p PCDF C3-6 and C4-6 laminectomy with expected post op deficits.  He did well today and was able to amb 100' with RW and CGA with mild unsteadiness noted.  Pt required min assist to amb without and AD with noted unsteadiness and B LE trembling.  Pt had increase safety with use of RW and noted improvement in balance. He will benefit from cont PT prior to d/c to maximize his functional safety and mobility.  He had noted fatigue after gait training and was not able to attempt the stairs.     Rehab Prognosis: Good; patient would benefit from acute PT services to address these deficits and reach maximum level of function.    Plan:     During this  "hospitalization, patient to be seen daily to address the above listed problems via therapeutic activities, gait training, therapeutic exercises, neuromuscular re-education    Plan of Care Expires: 05/25/24    Subjective     Chief Complaint: " This is my first time up, so I know it would hurt more"  Patient Comments/Goals: to get better and go home  Pain/Comfort:  Pain Rating 1: 2/10  Location - Orientation 1: posterior  Location 1: neck  Pain Addressed 1: Pre-medicate for activity, Reposition, Distraction  Pain Rating Post-Intervention 1: 2/10    Social History:  Living Environment: Patient lives with their spouse in a 2 story home with number of outside stair(s): entry step only, number of inside stair(s): full flight with L rail, and bed/bath on 2nd floor  Prior Level of Function: Prior to admission, patient was independent  Equipment Used at Home: none  DME owned (not currently used):  pt may have his mother in law's old RW but not sure if RW or SW and not sure if he has it.  Assistance Upon Discharge:  his wife    Objective:     Communicated with RN, Mona,  prior to session. Patient found HOB elevated with JOHNNA drain, hemovac, peripheral IV, SCD, cervical collar,  upon PT entry to room.    General Precautions: Standard, fall   Orthopedic Precautions: spinal precautions   Braces: Cervical collar    Respiratory Status: Room air    Exams:  RLE ROM: WNL  RLE Strength: WNL  LLE ROM: WNL  LLE Strength: WNL  Cognitive: Patient is oriented to Person, Place, Time, Situation  Fine Motor Coordination:    -       Intact  Left hand thumb/finger opposition skills, Right hand thumb/finger opposition skills, Left hand, diadochokinesis skill , Right hand, diadochokinesis skill , RLE heel shin, LLE heel shin, and Rapid alternating ankle DF/PF  Gross Motor Coordination: WFL  Postural Exam: Patient presented with the following abnormalities:    -       unable to assess cervical posture due to C Collar and not abnormalities noted " at thoracic/lumbar spine  Sensation:    -       Intact    Functional Mobility:  Gait belt applied - Yes  Bed Mobility  Rolling Right: stand by assistance  Supine to Sit: stand by assistance for log rolling technique  Transfers  Sit to Stand: contact guard assistance with no AD and with cues for hand placement  Sit to Stand: contact guard assistance with RW and with cues for hand placement  Gait  Patient ambulated 20 feet with no AD and minimum assistance. Patient required cues for step through gait pattern and upright posture to increase independence and safety. Patient required cues ~ 25% of the time.  Pt with noted B LE trembling during gait with marked unsteadiness, and slow steppage gait pattern   Patient ambulated 100 feet with RW and CGA.    Patient required cues for step through gait pattern and upright posture to increase independence and safety. Patient required cues ~ 25% of the time.  Pt with noted improved balance and increase steadiness with step through gait pattern and no LOB.      Balance  Sitting: GOOD: Maintains balance through MODERATE excursions of active trunk movement  Standing: FAIR: Needs CONTACT GUARD during gait    Pt unable to perform stairs due to fatigue.    Therapeutic Activities and Exercises:  Patient educated on role of acute care PT and PT POC, safety while in hospital including calling nurse for mobility, and call light usage.  Educated about importance of OOB mobility and remaining up in chair most of the day.  PT ed pt on spinal precautions,( including avoidance of bending, lifting, and twisting), log rolling, posture and body mechanics during mobility and pt verbalized good understanding back to PT.  Pt with good recall of spinal precautions.   PT ed pt on brace management and proper positioning of C Collar in the bathroom with pt looking in the mirror to identify anatomical landmarks for proper position of the brace and for proper adjustment with emphasis on adjusting the Aspen  collar around pt's neck/head for proper positioning and to not adjust his head/neck position around the brace. Pt demonstrated good understanding back to PT.     AM-PAC 6 CLICK MOBILITY  Total Score:17    Patient left up in chair with all lines intact, call button in reach, and RN notified.    GOALS:   Multidisciplinary Problems       Physical Therapy Goals          Problem: Physical Therapy    Goal Priority Disciplines Outcome Goal Variances Interventions   Physical Therapy Goal     PT, PT/OT Progressing     Description: Goals to be met by: 24     Patient will increase functional independence with mobility by performin. Supine to sit with Supervision via log roll technique  2. Sit to stand transfer with Supervision  3. Gait  x 200 feet with Supervision using Rolling Walker.   4. Ascend/descend 4 stair with left Handrails Stand-by Assistance using No Assistive Device.                          History:     Past Medical History:   Diagnosis Date    BPH with obstruction/lower urinary tract symptoms 2017    Carotid artery occlusion     Chronic anterior uveitis 2013    Coronary artery disease     Diabetes mellitus, type 2     diet controlled    Difficult intubation     Distended bladder 2020    Dizziness     DJD (degenerative joint disease), cervical 07/10/2015    Dysuria 2018    GERD (gastroesophageal reflux disease)     History of iritis     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 -     Hyperlipidemia     Hypertension     Hypokalemia 2020    Lateral epicondylitis (tennis elbow) 2015    Lip swelling 2017    On amlodipine and rosuvastatin. Dose of amlodipine increased from 5 to 10 mg in the weeks prior to the incidnt.    Memory loss     Memory loss 2013    Mixed hyperlipidemia 2017    Muscle ache 2015    New daily persistent headache 2020    Pterygium eye, left 2021    Pyelonephritis 2018    Rectal bleeding  04/07/2017    Recurrent UTI 09/28/2017    S/P TURP 09/02/2022    Suspected sleep apnea 02/05/2020    Thyroid nodule 08/22/2019    Traumatic iritis - Left Eye 02/27/2013    Traumatic iritis - Left Eye 02/27/2013    Trigger finger of left hand 09/11/2014    Unspecified iridocyclitis - Left Eye 04/17/2013       Past Surgical History:   Procedure Laterality Date    CEREBRAL ANGIOGRAM N/A 1/6/2020    Procedure: ANGIOGRAM-CEREBRAL;  Surgeon: [facility] Diagnostic Provider;  Location: [facility] OR 2ND FLR;  Service: Radiology;  Laterality: N/A;  /Nagi    CERVICAL FUSION  12/30/2015    COLONOSCOPY N/A 4/7/2017    Procedure: COLONOSCOPY;  Surgeon: Handy Frederick MD;  Location: [facility] (4TH FLR);  Service: Endoscopy;  Laterality: N/A;    COLONOSCOPY N/A 11/28/2023    Procedure: COLONOSCOPY;  Surgeon: ARMAAN Hinojosa MD;  Location: [facility] (4TH FLR);  Service: Endoscopy;  Laterality: N/A;  8/21-referred by Dr. Schulte/ Diagnosis:  Blood in stool, past due on surveillance colonoscopy for advanced colon polyp villous adenoma greater than 10 mm /Prep instructions handed to patient and to portal. AS  11/21/23-Precall complete-DS    CORONARY ANGIOGRAPHY N/A 10/10/2022    Procedure: ANGIOGRAM, CORONARY ARTERY;  Surgeon: Anson Nolan MD;  Location: [facility] CATH LAB;  Service: Cardiology;  Laterality: N/A;    ENDOSCOPIC ULTRASOUND OF UPPER GASTROINTESTINAL TRACT N/A 8/22/2023    Procedure: ULTRASOUND, UPPER GI TRACT, ENDOSCOPIC;  Surgeon: Joe Means MD;  Location: [facility] ENDO (2ND FLR);  Service: Endoscopy;  Laterality: N/A;  instr portal-pt stated difficult intubation with surgery in the past-tb    FUSION OF CERVICAL SPINE BY POSTERIOR APPROACH N/A 4/24/2024    Procedure: FUSION, SPINE, CERVICAL, POSTERIOR APPROACH C3-6 DEPUY  GW TONGS WTIH SUZI, FREDIS 4 POST SNS: MOTORS/SSEP/EMG;  Surgeon: Edd Burnette MD;  Location: [facility];  Service: Orthopedics;  Laterality: N/A;    HERNIA REPAIR      PROSTATECTOMY         Time  Tracking:     PT Received On: 04/25/24  PT Start Time: 0932  PT Stop Time: 1012  PT Total Time (min): 40 min     Billable Minutes: Evaluation 10, Gait Training 16, and Therapeutic Activity 14    4/25/2024

## 2024-04-25 NOTE — PLAN OF CARE
Problem: Adult Inpatient Plan of Care  Goal: Plan of Care Review  4/25/2024 0937 by Mgadalena Napoles, RN  Outcome: Progressing  Flowsheets (Taken 4/25/2024 0937)  Plan of Care Reviewed With:   patient   spouse     Problem: Adult Inpatient Plan of Care  Goal: Patient-Specific Goal (Individualized)  4/25/2024 0937 by Magdalena Napoles, RN  Outcome: Progressing  Flowsheets (Taken 4/25/2024 0937)  Individualized Care Needs: manage pain, monitor drain out put     Problem: Adult Inpatient Plan of Care  Goal: Absence of Hospital-Acquired Illness or Injury  Intervention: Identify and Manage Fall Risk  Flowsheets (Taken 4/25/2024 0909)  Safety Promotion/Fall Prevention:   assistive device/personal item within reach   Fall Risk reviewed with patient/family   high risk medications identified   in recliner, wheels locked   lighting adjusted   medications reviewed   muscle strengthening facilitated   nonskid shoes/socks when out of bed   side rails raised x 2   instructed to call staff for mobility     Problem: Adult Inpatient Plan of Care  Goal: Optimal Comfort and Wellbeing  Intervention: Monitor Pain and Promote Comfort  Flowsheets (Taken 4/25/2024 0909)  Pain Management Interventions:   care clustered   medication offered   quiet environment facilitated   relaxation techniques promoted   pillow support provided   pain management plan reviewed with patient/caregiver   position adjusted     Problem: Adult Inpatient Plan of Care  Goal: Optimal Comfort and Wellbeing  Intervention: Provide Person-Centered Care  Flowsheets (Taken 4/25/2024 0909)  Trust Relationship/Rapport:   care explained   reassurance provided   thoughts/feelings acknowledged     Problem: Diabetes Comorbidity  Goal: Blood Glucose Level Within Targeted Range  Intervention: Monitor and Manage Glycemia  Flowsheets (Taken 4/25/2024 0909)  Glycemic Management: blood glucose monitored     Problem: Wound  Goal: Optimal Functional Ability  Intervention: Optimize  Functional Ability  Flowsheets (Taken 4/25/2024 0909)  Activity Assistance Provided: assistance, 1 person     Problem: Spinal Surgery  Goal: Optimal Coping with Surgery  Intervention: Support Psychosocial Response to Surgery  Flowsheets (Taken 4/25/2024 0937)  Supportive Measures:   active listening utilized   positive reinforcement provided   verbalization of feelings encouraged  Family/Support System Care: involvement promoted     Problem: Spinal Surgery  Goal: Absence of Bleeding  Intervention: Monitor and Manage Bleeding  Flowsheets (Taken 4/25/2024 0909)  Bleeding Management: dressing monitored     Problem: Spinal Surgery  Goal: Nausea and Vomiting Relief  Intervention: Prevent or Manage Nausea and Vomiting  Flowsheets (Taken 4/25/2024 0937)  Nausea/Vomiting Interventions: stimuli minimized     Problem: Spinal Surgery  Goal: Effective Oxygenation and Ventilation  Intervention: Optimize Oxygenation and Ventilation  Flowsheets (Taken 4/25/2024 0937)  Head of Bed (HOB) Positioning: HOB elevated     Problem: Fall Injury Risk  Goal: Absence of Fall and Fall-Related Injury  Intervention: Identify and Manage Contributors  Flowsheets (Taken 4/25/2024 0937)  Self-Care Promotion: independence encouraged  Medication Review/Management: medications reviewed     Problem: Fall Injury Risk  Goal: Absence of Fall and Fall-Related Injury  Intervention: Promote Injury-Free Environment  Flowsheets (Taken 4/25/2024 0937)  Safety Promotion/Fall Prevention:   assistive device/personal item within reach   Fall Risk reviewed with patient/family   medications reviewed   lighting adjusted   in recliner, wheels locked   high risk medications identified   muscle strengthening facilitated   Supervised toileting - stay within arms reach   instructed to call staff for mobility   side rails raised x 2   supervised activity   nonskid shoes/socks when out of bed     Problem: Comorbidity Management  Goal: Blood Pressure in Desired  Range  4/25/2024 0911 by Magdalena Napoles, RN  Outcome: Progressing   Plan of care reviewed with patient; verbalized understanding.   Medications reviewed and administered as ordered.  Rounding for safety and patient care per policy.   Safety precautions maintained. Patient working appropriately with PT/OT.  DME at bedside.  Call light within reach, bed wheels locked, bed in lowest position, side rails ^x2, safety maintained. NADN, Will continue monitor.

## 2024-04-25 NOTE — ASSESSMENT & PLAN NOTE
Hi Braxton is a 64 y.o. male is s/p PCF C3-6 w/ C4-6 laminectomy on 4/4/25.    Surgical dressing C/D/I  Pain control: multimodal  PT/OT: WBAT BLE, C spine precautions  DVT PPx: Early ambulation, no chemical DVT Ppx, SCDs at all times when not ambulating  Abx: postop Ancef x 24hrs   Drain: remain in place  Nursing: Incentive spirometry, Monitor and record drain output each shift     Dispo: plan to dc pending drain removal and PT    Output by Drain (mL) 04/23/24 0701 - 04/23/24 1900 04/23/24 1901 - 04/24/24 0700 04/24/24 0701 - 04/24/24 1900 04/24/24 1901 - 04/25/24 0632        Closed/Suction Drain 04/24/24 Tube - 2 Neck Accordion    75 70

## 2024-04-25 NOTE — PLAN OF CARE
Problem: Physical Therapy  Goal: Physical Therapy Goal  Description: Goals to be met by: 24     Patient will increase functional independence with mobility by performin. Supine to sit with Supervision via log roll technique  2. Sit to stand transfer with Supervision  3. Gait  x 200 feet with Supervision using Rolling Walker.   4. Ascend/descend 4 stair with left Handrails Stand-by Assistance using No Assistive Device.     Outcome: Progressing

## 2024-04-26 VITALS
HEIGHT: 76 IN | OXYGEN SATURATION: 95 % | HEART RATE: 63 BPM | WEIGHT: 208 LBS | RESPIRATION RATE: 16 BRPM | DIASTOLIC BLOOD PRESSURE: 74 MMHG | TEMPERATURE: 98 F | BODY MASS INDEX: 25.33 KG/M2 | SYSTOLIC BLOOD PRESSURE: 155 MMHG

## 2024-04-26 LAB — POCT GLUCOSE: 195 MG/DL (ref 70–110)

## 2024-04-26 PROCEDURE — 99900035 HC TECH TIME PER 15 MIN (STAT)

## 2024-04-26 PROCEDURE — 63600175 PHARM REV CODE 636 W HCPCS: Performed by: ORTHOPAEDIC SURGERY

## 2024-04-26 PROCEDURE — 97535 SELF CARE MNGMENT TRAINING: CPT

## 2024-04-26 PROCEDURE — 25000003 PHARM REV CODE 250

## 2024-04-26 PROCEDURE — 97116 GAIT TRAINING THERAPY: CPT

## 2024-04-26 PROCEDURE — 94761 N-INVAS EAR/PLS OXIMETRY MLT: CPT

## 2024-04-26 PROCEDURE — 25000003 PHARM REV CODE 250: Performed by: ORTHOPAEDIC SURGERY

## 2024-04-26 RX ORDER — OXYCODONE HYDROCHLORIDE 5 MG/1
5 TABLET ORAL EVERY 4 HOURS PRN
Qty: 42 TABLET | Refills: 0 | Status: SHIPPED | OUTPATIENT
Start: 2024-04-26 | End: 2024-05-23

## 2024-04-26 RX ADMIN — THERA TABS 1 TABLET: TAB at 08:04

## 2024-04-26 RX ADMIN — Medication 250 MG: at 08:04

## 2024-04-26 RX ADMIN — OXYCODONE 5 MG: 5 TABLET ORAL at 08:04

## 2024-04-26 RX ADMIN — Medication 2000 UNITS: at 08:04

## 2024-04-26 RX ADMIN — FAMOTIDINE 20 MG: 20 TABLET, FILM COATED ORAL at 08:04

## 2024-04-26 RX ADMIN — LOSARTAN POTASSIUM 100 MG: 25 TABLET, FILM COATED ORAL at 10:04

## 2024-04-26 RX ADMIN — NIFEDIPINE 60 MG: 30 TABLET, FILM COATED, EXTENDED RELEASE ORAL at 06:04

## 2024-04-26 RX ADMIN — ACETAMINOPHEN 650 MG: 325 TABLET ORAL at 06:04

## 2024-04-26 RX ADMIN — METOPROLOL SUCCINATE 75 MG: 25 TABLET, EXTENDED RELEASE ORAL at 08:04

## 2024-04-26 RX ADMIN — ONDANSETRON 4 MG: 2 INJECTION INTRAMUSCULAR; INTRAVENOUS at 04:04

## 2024-04-26 RX ADMIN — OXYCODONE HYDROCHLORIDE 10 MG: 10 TABLET ORAL at 04:04

## 2024-04-26 RX ADMIN — GABAPENTIN 600 MG: 300 CAPSULE ORAL at 08:04

## 2024-04-26 RX ADMIN — POLYETHYLENE GLYCOL 3350 17 G: 17 POWDER, FOR SOLUTION ORAL at 08:04

## 2024-04-26 RX ADMIN — INSULIN ASPART 2 UNITS: 100 INJECTION, SOLUTION INTRAVENOUS; SUBCUTANEOUS at 06:04

## 2024-04-26 RX ADMIN — SPIRONOLACTONE 50 MG: 50 TABLET ORAL at 10:04

## 2024-04-26 RX ADMIN — EZETIMIBE 10 MG: 10 TABLET ORAL at 08:04

## 2024-04-26 RX ADMIN — CELECOXIB 200 MG: 200 CAPSULE ORAL at 08:04

## 2024-04-26 RX ADMIN — DOCUSATE SODIUM AND SENNOSIDES 1 TABLET: 8.6; 5 TABLET, FILM COATED ORAL at 08:04

## 2024-04-26 RX ADMIN — MUPIROCIN: 20 OINTMENT TOPICAL at 08:04

## 2024-04-26 RX ADMIN — METHOCARBAMOL 750 MG: 750 TABLET ORAL at 08:04

## 2024-04-26 NOTE — PT/OT/SLP PROGRESS
Physical Therapy Treatment    Patient Name:  Hi Braxton   MRN:  [id number]  Admitting Diagnosis:  Cervical spinal stenosis   Recent Surgery: Procedure(s) (LRB):  FUSION, SPINE, CERVICAL, POSTERIOR APPROACH C3-6 DEPUY  FREDIS BARRIOS 4 POST SNS: MOTORS/SSEP/EMG (N/A) 2 Days Post-Op  Admit Date: 4/24/2024  Length of Stay: 2 days    Recommendations:     Discharge Recommendations:  No Therapy Indicated  Discharge Equipment Recommendations: walker, rolling   Justification for Equipment: The mobility limitation cannot be sufficiently resolved by the use of a cane. Patient's functional mobility deficit can be sufficiently resolved with the use of a Rolling Walker. Patient's mobility limitation significantly impairs their ability to participate in one of more activities of daily living. The use of a Rolling Walker will significantly improve the patient's ability to participate in MRADLS and the patient will use it on regular basis in the home.   Barriers to discharge: None    Assessment:     Hi Braxton is a 64 y.o. male admitted with a medical diagnosis of Cervical spinal stenosis.  He presents with the following impairments/functional limitations:  impaired self care skills, gait instability, pain, decreased safety awareness, decreased lower extremity function.     Pt agreeable to therapy, improved ambulation without use of A/D, improved endurance, tolerates stairs well, appropriate for discharge when medically ready    Rehab Prognosis: Good; patient would benefit from acute skilled PT services to address these deficits and reach maximum level of function.    Recent Surgery: Procedure(s) (LRB):  FUSION, SPINE, CERVICAL, POSTERIOR APPROACH C3-6 ANDRESUY  FREDIS BARRIOS 4 POST SNS: MOTORS/SSEP/EMG (N/A) 2 Days Post-Op    Treatment Tolerated: Well    Highest level of mobility achieved this visit: 8 stairs with LHR and SBA    Activity with RN/PCT: ambulate with one person assist    Plan:  "    During this hospitalization, patient to be seen daily to address the identified rehab impairments via gait training, therapeutic activities, therapeutic exercises, neuromuscular re-education and progress toward the following goals:    Plan of Care Expires:  05/25/24    Subjective     RN Mona notified prior to session. No family present upon PT entrance into room.    Chief Complaint: pain  Patient/Family Comments/goals: "I plan to stay upstairs for a while once I get home"  Pain/Comfort:  Pain Rating 1: 7/10  Location - Orientation 1: posterior  Location 1: neck (incision)  Pain Addressed 1: Pre-medicate for activity, Reposition, Distraction      Objective:     Additional staff present: none    Patient found HOB elevated with: SCD, cervical collar   Cognition:   Alert and Cooperative  Command following: Follows multistep verbal commands  Fluency: clear/fluent  General Precautions: Standard, fall   Orthopedic Precautions:spinal precautions   Braces: Cervical collar   Body mass index is 25.32 kg/m².  Oxygen Device: Room Air    Vitals: /66 (BP Location: Left arm, Patient Position: Lying)   Pulse 77   Temp 97.6 °F (36.4 °C) (Tympanic)   Resp 16   Ht 6' 4" (1.93 m)   Wt 94.3 kg (208 lb)   SpO2 95%   BMI 25.32 kg/m²     Outcome Measures:  AM-PAC 6 CLICK MOBILITY  Turning over in bed (including adjusting bedclothes, sheets and blankets)?: 4  Sitting down on and standing up from a chair with arms (e.g., wheelchair, bedside commode, etc.): 3  Moving from lying on back to sitting on the side of the bed?: 4  Moving to and from a bed to a chair (including a wheelchair)?: 3  Need to walk in hospital room?: 3  Climbing 3-5 steps with a railing?: 3  Basic Mobility Total Score: 20     Functional Mobility:    Bed Mobility:   Supine to Sit: independence; from L side of bed  Scooting anteriorly to EOB to have both feet planted on floor: independence    Sitting Balance at Edge of Bed:  Assistance Level Required: " "Supervision  Time: 3 min  Postural deviations noted: no deviations noted    Transfers:   Sit <> Stand Transfer: stand by assistance with no assistive device   Stand <> Sit Transfer: stand by assistance with no assistive device   x1 trials from EOB    Standing Balance:  Assistance Level Required: Stand-by Assistance  Patient used: no assistive device   Time: 9 min  Postural deviations noted: no deviations noted      Gait:  Patient ambulated: 110ft + 250ft   Patient required: standy by assistance  Patient used:  no assistive device   Gait Pattern observed: swing-through gait  Gait Deviation(s): occasional mildly unsteady gait, decreased lucia  Impairments due to: impaired balance  Gait belt utilized    Stairs:  Pt ascended/descended 8 stair(s) with No Assistive Device with left handrail with Stand-by Assistance.   Step over step  Pt ascended/descended 6" curb step with No Assistive Device with no handrails with Stand-by Assistance.     Education:  Time provided for education, counseling and discussion of health disposition in regards to patient's current status  All questions answered within PT scope of practice and to patient's satisfaction  PT role in POC to address current functional deficits  Pt educated on proper body mechanics, safety techniques, and energy conservation with PT facilitation and cueing throughout session    Patient left up in chair with call button in reach.    GOALS:   Multidisciplinary Problems       Physical Therapy Goals          Problem: Physical Therapy    Goal Priority Disciplines Outcome Goal Variances Interventions   Physical Therapy Goal     PT, PT/OT Progressing     Description: Goals to be met by: 24     Patient will increase functional independence with mobility by performin. Supine to sit with Supervision via log roll technique  2. Sit to stand transfer with Supervision  3. Gait  x 200 feet with Supervision using Rolling Walker.   4. Ascend/descend 4 stair with left " Handrails Stand-by Assistance using No Assistive Device.                        Time Tracking:     PT Received On: 04/26/24  PT Start Time: 0834     PT Stop Time: 0844  PT Total Time (min): 10 min     Billable Minutes:   Gait Training 9 min    Treatment Type: Treatment  PT/PTA: PT       Laz Da Silva, PT, DPT  4/26/2024

## 2024-04-26 NOTE — PROGRESS NOTES
[facility])  Orthopedics  Progress Note    Patient Name: Hi Braxton  MRN: [id number]  Admission Date: 4/24/2024  Hospital Length of Stay: 2 days  Attending Provider: Edd Burnette MD  Primary Care Provider: Josefina Kendall MD  Follow-up For: Procedure(s) (LRB):  FUSION, SPINE, CERVICAL, POSTERIOR APPROACH C3-6 DEPUY  GW TONGS WTIH WEIGHTS, FREDIS 4 POST SNS: MOTORS/SSEP/EMG (N/A)    Post-Operative Day: 2 Days Post-Op  Subjective:     Principal Problem:Cervical spinal stenosis    Principal Orthopedic Problem: same, s/p PCF C3-6 w/ C4-6 laminectomy    Interval History: Pt seen and examined at bedside. NAEON, VSS, AF. Pain controlled. Denies fevers, chills, chest pain, SOB, diarrhea. Some N/V this AM relived with medications.   PT eval ongoing. Drains removed, plan for discharge home today after PT and C spine X ray.     Review of patient's allergies indicates:   Allergen Reactions    Lisinopril Anaphylaxis and Nausea And Vomiting     General bad feeling    Lipitor [atorvastatin] Other (See Comments)     Muscle aches    Adhesive     Crestor [rosuvastatin] Swelling     Lip swelling.     Jardiance [empagliflozin] Other (See Comments)     Possible related to recent UTI's     Metformin      Used XR and experienced flatulance and abdominal pain.        Current Facility-Administered Medications   Medication Dose Route Frequency Provider Last Rate Last Admin    acetaminophen tablet 650 mg  650 mg Oral Q6H Aubrey Leonard MD   650 mg at 04/26/24 0614    ascorbic acid (vitamin C) tablet 250 mg  250 mg Oral Daily Aubrey Leonard MD   250 mg at 04/25/24 0824    celecoxib capsule 200 mg  200 mg Oral Daily Aubrey Leonard MD   200 mg at 04/25/24 0824    dextrose 10% bolus 125 mL 125 mL  12.5 g Intravenous PRN Maxine Lindsay PA-C        dextrose 10% bolus 250 mL 250 mL  25 g Intravenous PRN Maxine Lindsay PA-C        ezetimibe tablet 10 mg  10 mg Oral Daily Maxine Lindsay,  PA-C   10 mg at 04/25/24 1637    famotidine tablet 20 mg  20 mg Oral BID Maxine Lindsay PA-C   20 mg at 04/25/24 2120    gabapentin capsule 600 mg  600 mg Oral TID Maxine Lindsay PA-C   600 mg at 04/25/24 2118    glucagon (human recombinant) injection 1 mg  1 mg Intramuscular PRN Maxine Lindsay PA-C        glucose chewable tablet 16 g  16 g Oral PRN Maxine Lindsay PA-C        glucose chewable tablet 24 g  24 g Oral PRN Maxine Lindsay PA-OMKAR        insulin aspart U-100 pen 0-10 Units  0-10 Units Subcutaneous QID (AC + HS) PRN Maxine Lindsay PA-C   2 Units at 04/26/24 0621    losartan tablet 100 mg  100 mg Oral Daily Maxine Lindsay PA-C   100 mg at 04/25/24 1637    methocarbamoL tablet 750 mg  750 mg Oral TID Aubrey Leonard MD   750 mg at 04/25/24 2119    metoprolol succinate (TOPROL-XL) 24 hr tablet 75 mg  75 mg Oral Daily Maxine Lindsay PA-C   75 mg at 04/25/24 1637    morphine injection 2 mg  2 mg Intravenous Q3H PRN Aubrey Leonard MD        multivitamin tablet  1 tablet Oral Daily Aubrey Leonard MD   1 tablet at 04/25/24 0824    mupirocin 2 % ointment   Nasal BID Maxine Lindsay PA-OMKAR   Given at 04/25/24 2119    NIFEdipine 24 hr tablet 60 mg  60 mg Oral BID Maxine Lindsay PA-C   60 mg at 04/26/24 0614    ondansetron disintegrating tablet 8 mg  8 mg Oral Q8H PRN Maxine Lindsay PA-OMKAR        ondansetron injection 4 mg  4 mg Intravenous Q12H PRN Maxine Lindsay PA-OMKAR   4 mg at 04/26/24 0447    oxyCODONE immediate release tablet 5 mg  5 mg Oral Q4H PRN Maxine Lindsay PA-C   5 mg at 04/25/24 2119    oxyCODONE immediate release tablet Tab 10 mg  10 mg Oral Q4H PRN Maxine Lindsay PA-C   10 mg at 04/26/24 0412    polyethylene glycol packet 17 g  17 g Oral Daily Maxine Lindsay PA-C   17 g at 04/25/24 0824    pravastatin tablet 80 mg  80 mg Oral Nightly Maxine Lindsay PA-C   80 mg at  "04/25/24 2119    senna-docusate 8.6-50 mg per tablet 1 tablet  1 tablet Oral BID Maxine Lindsay PA-C   1 tablet at 04/25/24 2119    spironolactone tablet 50 mg  50 mg Oral Daily Maxine Lindsay PA-C        vitamin D 1000 units tablet 2,000 Units  2,000 Units Oral Daily Aubrey Leonard MD   2,000 Units at 04/25/24 0824     Objective:     Vital Signs (Most Recent):  Temp: 98 °F (36.7 °C) (04/26/24 0404)  Pulse: 72 (04/26/24 0608)  Resp: 16 (04/26/24 0412)  BP: (!) 163/75 (04/26/24 0608)  SpO2: 96 % (04/26/24 0404) Vital Signs (24h Range):  Temp:  [97.6 °F (36.4 °C)-98.4 °F (36.9 °C)] 98 °F (36.7 °C)  Pulse:  [66-83] 72  Resp:  [16-18] 16  SpO2:  [95 %-98 %] 96 %  BP: (120-163)/(58-79) 163/75     Weight: 94.3 kg (208 lb)  Height: 6' 4" (193 cm)  Body mass index is 25.32 kg/m².      Intake/Output Summary (Last 24 hours) at 4/26/2024 0716  Last data filed at 4/26/2024 0422  Gross per 24 hour   Intake 360 ml   Output 1622 ml   Net -1262 ml        Ortho/SPM Exam     Gen: NAD, WDWN  CV: peripherally well perfused  Resp: unlabored respirations, symmetric chest rise  Neck: TM  Neuro: CN 2-12 grossly intact. No FND.    MSK:  Bilateral upper extremities SILT   AIN/PIN/Ulnar nerves intact   2+ radial arteries bilaterally   Dressings CDI   Drains with SS output  C collar in place    Significant Labs: All pertinent labs within the past 24 hours have been reviewed.    Significant Imaging: I have reviewed all pertinent imaging results/findings.  Assessment/Plan:     * Cervical spinal stenosis  Hi Braxton is a 64 y.o. male is s/p PCF C3-6 w/ C4-6 laminectomy on 4/4/25.    Surgical dressing C/D/I  Pain control: multimodal  PT/OT: WBAT BLE, C spine precautions  DVT PPx: Early ambulation, no chemical DVT Ppx, SCDs at all times when not ambulating  Drain: removed  Nursing: Incentive spirometry, Monitor and record drain output each shift     Dispo: plan to dc pending PT and C spine X ray                 Aubrey " MD Aisha  Orthopedics  [facility]

## 2024-04-26 NOTE — HOSPITAL COURSE
On 4/24/24, the patient arrived to the [facility] for proper pre-operative management.  Upon completion of pre-operative preparation, the patient was taken back to the operative theatre. PCF C3-6 w/ C4-6 laminectomy  was performed without complication and the patient was transported to the post anesthesia care unit in stable condition.  After appropriate recovery from the anaesthetic agents used during the surgery, the patient was then transported to the hospital inpatient floor.  The interim of the hospital stay from arrival on the floor up to discharge has been uncomplicated. The patient has tolerated regular diet.  The patient's pain has been controlled using a multimodal approach. Currently, the patient's pain is well controlled on an oral regimen.  The patient has been voiding without difficulty.  The patient began participation in physical therapy after surgery and has progressed throughout the entire hospital stay.  Currently, the patient's progress is sufficient to allow the them to be discharged to home safely.  The patient agrees with this assessment and desires a discharge today.

## 2024-04-26 NOTE — SUBJECTIVE & OBJECTIVE
Principal Problem:Cervical spinal stenosis    Principal Orthopedic Problem: same, s/p PCF C3-6 w/ C4-6 laminectomy    Interval History: Pt seen and examined at bedside. ELMO, VSS, AF. Pain controlled. Denies fevers, chills, chest pain, SOB, diarrhea. Some N/V this AM relived with medications.   PT eval ongoing. Drains removed, plan for discharge home today after PT and C spine X ray.     Review of patient's allergies indicates:   Allergen Reactions    Lisinopril Anaphylaxis and Nausea And Vomiting     General bad feeling    Lipitor [atorvastatin] Other (See Comments)     Muscle aches    Adhesive     Crestor [rosuvastatin] Swelling     Lip swelling.     Jardiance [empagliflozin] Other (See Comments)     Possible related to recent UTI's     Metformin      Used XR and experienced flatulance and abdominal pain.        Current Facility-Administered Medications   Medication Dose Route Frequency Provider Last Rate Last Admin    acetaminophen tablet 650 mg  650 mg Oral Q6H Aubrey Leonard MD   650 mg at 04/26/24 0614    ascorbic acid (vitamin C) tablet 250 mg  250 mg Oral Daily Aubrey Leonard MD   250 mg at 04/25/24 0824    celecoxib capsule 200 mg  200 mg Oral Daily Aubrey Leonard MD   200 mg at 04/25/24 0824    dextrose 10% bolus 125 mL 125 mL  12.5 g Intravenous PRN Maxine Lindsay PA-OMKAR        dextrose 10% bolus 250 mL 250 mL  25 g Intravenous PRN Maxine Lindsay PA-OMKAR        ezetimibe tablet 10 mg  10 mg Oral Daily Maxine Lindsay PA-C   10 mg at 04/25/24 1637    famotidine tablet 20 mg  20 mg Oral BID Maxine Lindsay PA-C   20 mg at 04/25/24 2120    gabapentin capsule 600 mg  600 mg Oral TID Maxine Lindsay PA-C   600 mg at 04/25/24 2118    glucagon (human recombinant) injection 1 mg  1 mg Intramuscular PRN Maxine Lindsay PA-C        glucose chewable tablet 16 g  16 g Oral PRN Maxine Lindsay PA-OMKAR        glucose chewable tablet 24 g  24 g Oral PRN Neftali  Maxine SAUCEDO PA-C        insulin aspart U-100 pen 0-10 Units  0-10 Units Subcutaneous QID (AC + HS) PRN Maxine Lindsay PA-C   2 Units at 04/26/24 0621    losartan tablet 100 mg  100 mg Oral Daily Maxine Lindsay PA-C   100 mg at 04/25/24 1637    methocarbamoL tablet 750 mg  750 mg Oral TID Aubrey Leonard MD   750 mg at 04/25/24 2119    metoprolol succinate (TOPROL-XL) 24 hr tablet 75 mg  75 mg Oral Daily Maxine Lindsay PA-C   75 mg at 04/25/24 1637    morphine injection 2 mg  2 mg Intravenous Q3H PRN Aubrey Leonard MD        multivitamin tablet  1 tablet Oral Daily Aubrey Leonard MD   1 tablet at 04/25/24 0824    mupirocin 2 % ointment   Nasal BID Maxine Lindsay PA-C   Given at 04/25/24 2119    NIFEdipine 24 hr tablet 60 mg  60 mg Oral BID Maxine Lindsay PA-C   60 mg at 04/26/24 0614    ondansetron disintegrating tablet 8 mg  8 mg Oral Q8H PRN Maxine Lindsay PA-C        ondansetron injection 4 mg  4 mg Intravenous Q12H PRN Maxine Lindsay PA-C   4 mg at 04/26/24 0447    oxyCODONE immediate release tablet 5 mg  5 mg Oral Q4H PRN Maxine Lindsay PA-C   5 mg at 04/25/24 2119    oxyCODONE immediate release tablet Tab 10 mg  10 mg Oral Q4H PRN Maxine Lindsay PA-C   10 mg at 04/26/24 0412    polyethylene glycol packet 17 g  17 g Oral Daily Maxine Lindsay PA-C   17 g at 04/25/24 0824    pravastatin tablet 80 mg  80 mg Oral Nightly Maxine Lindsay PA-C   80 mg at 04/25/24 2119    senna-docusate 8.6-50 mg per tablet 1 tablet  1 tablet Oral BID Maxine Lindsay PA-C   1 tablet at 04/25/24 2119    spironolactone tablet 50 mg  50 mg Oral Daily Maxine Lindsay PA-C        vitamin D 1000 units tablet 2,000 Units  2,000 Units Oral Daily Aubrey Leonard MD   2,000 Units at 04/25/24 0824     Objective:     Vital Signs (Most Recent):  Temp: 98 °F (36.7 °C) (04/26/24 0404)  Pulse: 72 (04/26/24 0608)  Resp: 16 (04/26/24  "0412)  BP: (!) 163/75 (04/26/24 0608)  SpO2: 96 % (04/26/24 0404) Vital Signs (24h Range):  Temp:  [97.6 °F (36.4 °C)-98.4 °F (36.9 °C)] 98 °F (36.7 °C)  Pulse:  [66-83] 72  Resp:  [16-18] 16  SpO2:  [95 %-98 %] 96 %  BP: (120-163)/(58-79) 163/75     Weight: 94.3 kg (208 lb)  Height: 6' 4" (193 cm)  Body mass index is 25.32 kg/m².      Intake/Output Summary (Last 24 hours) at 4/26/2024 0716  Last data filed at 4/26/2024 0422  Gross per 24 hour   Intake 360 ml   Output 1622 ml   Net -1262 ml        Ortho/SPM Exam     Gen: NAD, WDWN  CV: peripherally well perfused  Resp: unlabored respirations, symmetric chest rise  Neck: TM  Neuro: CN 2-12 grossly intact. No FND.    MSK:  Bilateral upper extremities SILT   AIN/PIN/Ulnar nerves intact   2+ radial arteries bilaterally   Dressings CDI   Drains with SS output  C collar in place    Significant Labs: All pertinent labs within the past 24 hours have been reviewed.    Significant Imaging: I have reviewed all pertinent imaging results/findings.  "

## 2024-04-26 NOTE — DISCHARGE SUMMARY
[facility])  Orthopedics  Discharge Summary      Patient Name: Hi Braxton  MRN: [id number]  Admission Date: 4/24/2024  Hospital Length of Stay: 2 days  Discharge Date and Time: 4/26/2024 11:30 AM  Attending Physician: Tania att. providers found   Discharging Provider: Aubrey Leonard MD  Primary Care Provider: Josefina Kendall MD    HPI:   Hi Braxton is a 64 y.o. male w/ a hx of C3-5 ACDF in 2015 presents for initial evaluation of neck and left arm pain (Neck - 3, Arm - 3). The pain has been present for 4-5 months. The patient describes the pain as dull and it radiates down LUE to the fingers. The pain is worse with activity and improved by rest. There is LUE associated numbness and tingling. There is LUE subjective weakness. Prior treatments have included meds (gabapentin and naproxen), PT and MARYBETH, but no surgery.      The Patient endorses myelopathic symptoms such as handwriting changes or difficulty with buttons/coins/keys. Denies perineal paresthesias, bowel/bladder dysfunction.     The patient does not smoke or endorse IVDU. He has DM (HA1C of 6.7). The patient is not on any blood thinners and does not take chronic narcotics. He works at a [employer].    Procedure(s) (LRB):  FUSION, SPINE, CERVICAL, POSTERIOR APPROACH C3-6 DEPUY  GW [facility] WTIH [facility], FREDIS 4 POST SNS: MOTORS/SSEP/EMG (N/A)      Hospital Course:  On 4/24/24, the patient arrived to the [facility] for proper pre-operative management.  Upon completion of pre-operative preparation, the patient was taken back to the operative theatre. PCF C3-6 w/ C4-6 laminectomy  was performed without complication and the patient was transported to the post anesthesia care unit in stable condition.  After appropriate recovery from the anaesthetic agents used during the surgery, the patient was then transported to the hospital inpatient floor.  The interim of the hospital stay from arrival on the floor up to  discharge has been uncomplicated. The patient has tolerated regular diet.  The patient's pain has been controlled using a multimodal approach. Currently, the patient's pain is well controlled on an oral regimen.  The patient has been voiding without difficulty.  The patient began participation in physical therapy after surgery and has progressed throughout the entire hospital stay.  Currently, the patient's progress is sufficient to allow the them to be discharged to home safely.  The patient agrees with this assessment and desires a discharge today.      Goals of Care Treatment Preferences:  Code Status: Full Code          Significant Diagnostic Studies: Labs: All labs within the past 24 hours have been reviewed    Pending Diagnostic Studies:       Procedure Component Value Units Date/Time    Comprehensive metabolic panel [[id number]]     Order Status: Sent Lab Status: No result     Specimen: Blood           Final Active Diagnoses:    Diagnosis Date Noted POA    PRINCIPAL PROBLEM:  Cervical spinal stenosis [M48.02] 07/10/2015 Yes      Problems Resolved During this Admission:      Discharged Condition: good    Disposition: Home or Self Care    Follow Up:   Follow-up Information       Edd Burnette MD Follow up in 4 week(s).    Specialty: Orthopedic Surgery  Why: For wound re-check  Contact information:  [facility] [address]  [phone]                           Patient Instructions:      Notify your health care provider if you experience any of the following:  temperature >100.4     Notify your health care provider if you experience any of the following:  persistent nausea and vomiting or diarrhea     Notify your health care provider if you experience any of the following:  severe uncontrolled pain     Notify your health care provider if you experience any of the following:  redness, tenderness, or signs of infection (pain, swelling, redness, odor or green/yellow discharge around incision  site)     Notify your health care provider if you experience any of the following:  difficulty breathing or increased cough     Notify your health care provider if you experience any of the following:  severe persistent headache     Notify your health care provider if you experience any of the following:  worsening rash     Notify your health care provider if you experience any of the following:  persistent dizziness, light-headedness, or visual disturbances     Notify your health care provider if you experience any of the following:  increased confusion or weakness     Medications:  Reconciled Home Medications:      Medication List        CHANGE how you take these medications      * gabapentin 600 MG tablet  Commonly known as: NEURONTIN  Take 1 tablet (600 mg total) by mouth 3 (three) times daily.  What changed: Another medication with the same name was added. Make sure you understand how and when to take each.     * gabapentin 100 MG capsule  Commonly known as: NEURONTIN  Take 1 capsule (100 mg total) by mouth 3 (three) times daily. for 15 days  What changed: You were already taking a medication with the same name, and this prescription was added. Make sure you understand how and when to take each.     methocarbamoL 500 MG Tab  Commonly known as: ROBAXIN  Take 2 tablets (1,000 mg total) by mouth 3 (three) times daily.  What changed: Another medication with the same name was removed. Continue taking this medication, and follow the directions you see here.     * oxyCODONE 5 MG immediate release tablet  Commonly known as: ROXICODONE  Take 1 tablet (5 mg total) by mouth every 8 (eight) hours as needed for Pain (for breakthrough pain).  What changed: Another medication with the same name was added. Make sure you understand how and when to take each.     * oxyCODONE 5 MG immediate release tablet  Commonly known as: ROXICODONE  Take 1 tablet (5 mg total) by mouth every 6 (six) hours as needed for Pain.  What changed: You  "were already taking a medication with the same name, and this prescription was added. Make sure you understand how and when to take each.     * oxyCODONE 5 MG immediate release tablet  Commonly known as: ROXICODONE  Take 1 tablet (5 mg total) by mouth every 4 (four) hours as needed.  What changed: You were already taking a medication with the same name, and this prescription was added. Make sure you understand how and when to take each.           * This list has 5 medication(s) that are the same as other medications prescribed for you. Read the directions carefully, and ask your doctor or other care provider to review them with you.                CONTINUE taking these medications      acetaminophen 500 MG tablet  Commonly known as: TYLENOL  Take 1 tablet (500 mg total) by mouth 3 (three) times daily.     ALPRAZolam 0.5 MG tablet  Commonly known as: XANAX  Take 1 tablet (0.5 mg total) by mouth 3 (three) times daily as needed for Anxiety.     AREXVY (PF) 120 mcg/0.5 mL Susr vaccine  Generic drug: RSVPreF3 antigen-AS01E (PF)  Inject into the muscle.     aspirin 81 MG EC tablet  Commonly known as: ECOTRIN  Take 1 tablet (81 mg total) by mouth once daily.     BD ULTRA-FINE CHET PEN NEEDLE 32 gauge x 5/32" Ndle  Generic drug: pen needle, diabetic  USE PEN NEEDLE AS INSTRUCTION WITH LANTUS INSULIN PRE-FILLED PEN.     celecoxib 100 MG capsule  Commonly known as: CeleBREX  Take 1 capsule (100 mg total) by mouth 2 (two) times daily.     CONTOUR NEXT TEST STRIPS Strp  Generic drug: blood sugar diagnostic  USE TO TEST BLOOD SUGAR ONCE DAILY     ezetimibe 10 mg tablet  Commonly known as: ZETIA  TAKE 1 TABLET ONE TIME DAILY     LANTUS SOLOSTAR U-100 INSULIN glargine 100 units/mL SubQ pen  Generic drug: insulin  Inject 25 Units into the skin every evening.     losartan 100 MG tablet  Commonly known as: COZAAR  TAKE 1 TABLET ONE TIME DAILY     metoprolol succinate 50 MG 24 hr tablet  Commonly known as: TOPROL-XL  TAKE 1 AND 1/2 " TABLETS (75 MG TOTAL) ONCE DAILY.     MICROLET LANCET Misc  Generic drug: lancets  1 lancet by Misc.(Non-Drug; Combo Route) route once daily. Test blood glucose once daily as instructed.     NIFEdipine 60 MG (OSM) 24 hr tablet  Commonly known as: PROCARDIA-XL  Take 1 tablet (60 mg total) by mouth 2 (two) times a day.     nitroGLYCERIN 0.3 MG SL tablet  Commonly known as: NITROSTAT  Place 1 tablet (0.3 mg total) under the tongue every 5 (five) minutes as needed for Chest pain.     ondansetron 4 MG tablet  Commonly known as: ZOFRAN  Take 1 tablet (4 mg total) by mouth every 6 (six) hours as needed for Nausea.     pantoprazole 40 MG tablet  Commonly known as: PROTONIX  TAKE 1 TABLET TWICE DAILY     pravastatin 80 MG tablet  Commonly known as: PRAVACHOL  TAKE 1 TABLET ONE TIME DAILY     PREVNAR 20 (PF) 0.5 mL Syrg injection  Generic drug: pneumoc 20-jonny conj-dip cr(PF)  Inject into the muscle.     SHINGRIX (PF) 50 mcg/0.5 mL injection  Generic drug: varicella-zoster gE-AS01B (PF)     spironolactone 50 MG tablet  Commonly known as: ALDACTONE  TAKE 1 TABLET ONE TIME DAILY     tadalafiL 20 MG Tab  Commonly known as: CIALIS  Take 1 tablet (20 mg total) by mouth every 72 hours as needed (ED).              Aubrey Leonard MD  Orthopedics  [facility])

## 2024-04-26 NOTE — NURSING
/75, HR 66. Nifedipine 24 hr Tab 60mg due. Spoke to Aakash Gray PA-C, who said to hold the med for now, continue to monitor, and give med if BP creeps up into 150's.

## 2024-04-26 NOTE — PT/OT/SLP PROGRESS
"Occupational Therapy   Treatment and Discharge Note    Name: Hi Braxton  MRN: [id number]  Admitting Diagnosis:  Cervical spinal stenosis  2 Days Post-Op    Recommendations:     Discharge Recommendations: Low Intensity Therapy  Discharge Equipment Recommendations:  walker, rolling  Barriers to discharge:  None    Assessment:     Hi Braxton is a 64 y.o. male with a medical diagnosis of Cervical spinal stenosis.  He presents with PCDF. Performance deficits affecting function are impaired self care skills, impaired functional mobility, orthopedic precautions. Pt was able to perform UB/LB dressing c mod I.  Educated pt on bathing and car T/F's.  Will D/C from inpatient OT.    Rehab Prognosis:  Good; patient would benefit from acute skilled OT services to address these deficits and reach maximum level of function.       Plan:     Patient to be seen daily to address the above listed problems via self-care/home management, therapeutic activities, therapeutic exercises  Plan of Care Expires: 04/26/24  Plan of Care Reviewed with: patient    Subjective     Chief Complaint: PCDF  Patient/Family Comments/goals: "I'm just waiting on my x-ray"  Pain/Comfort:  Pain Rating 1: 7/10    Objective:     Communicated with: RN prior to session.  Patient found ambulatory in room/willett with cervical collar, SCD upon OT entry to room.    General Precautions: Standard, fall    Orthopedic Precautions:spinal precautions  Braces: Aspen collar  Respiratory Status: Room air     Occupational Performance:       Functional Mobility/Transfers:  Patient completed Sit <> Stand Transfer with independence  with  no assistive device   Functional Mobility: Pt was able to walk from bathroom to chair I.    Activities of Daily Living:  Upper Body Dressing: independence to don shirt.  Lower Body Dressing: independence to don underwear and pants.      [facility] 6 Click ADL: 24      Patient left up in chair with all lines intact, call button in reach, and RN " notified    GOALS:   Multidisciplinary Problems       Occupational Therapy Goals          Problem: Occupational Therapy    Goal Priority Disciplines Outcome Interventions   Occupational Therapy Goal     OT, PT/OT Progressing    Description: Goals to be met by: 4/25/24     Patient will increase functional independence with ADLs by performing:    UE Dressing with Bates.  LE Dressing with Bates.  Grooming while standing at sink with Bates.  Toileting from toilet with Bates for hygiene and clothing management.   Bathing from  shower chair/bench with Bates.  Toilet transfer to toilet with Bates.                         Time Tracking:     OT Date of Treatment: 04/26/24  OT Start Time: 0845  OT Stop Time: 0900  OT Total Time (min): 15 min    Billable Minutes:Self Care/Home Management 15               4/26/2024

## 2024-04-26 NOTE — NURSING
Multiple attempts made to contact resident for Dr. Burnette starting at 0835 for medication reconciliation. Unable to reach MD. Pending AVS med rec, pt and spouse verbalized that they were comfortable with discharge instructions based upon medicines received. Pt discharge to home.  Has met unit/department guidelines for discharge from each phase of the post procedure continuum. Patient discharged.  Instructions, placed in dc folder and Prescriptions given.  IV removed, tolerated well, w/ catheter intact, no redness or swelling to area. . Dressing to anterior neck remains CDI. Cervical Collar in place. Patient verbalized understanding instructions.  AAOx3, VSS, NADN, no complaints of pain noted at this time.  Wheelchair to private vehicle in care of spouse.  All personal belongings sent with pt.

## 2024-04-26 NOTE — HPI
Hi Braxton is a 64 y.o. male w/ a hx of C3-5 ACDF in 2015 presents for initial evaluation of neck and left arm pain (Neck - 3, Arm - 3). The pain has been present for 4-5 months. The patient describes the pain as dull and it radiates down LUE to the fingers. The pain is worse with activity and improved by rest. There is LUE associated numbness and tingling. There is LUE subjective weakness. Prior treatments have included meds (gabapentin and naproxen), PT and MARYBETH, but no surgery.      The Patient endorses myelopathic symptoms such as handwriting changes or difficulty with buttons/coins/keys. Denies perineal paresthesias, bowel/bladder dysfunction.     The patient does not smoke or endorse IVDU. He has DM (HA1C of 6.7). The patient is not on any blood thinners and does not take chronic narcotics. He works at a [employer].

## 2024-04-26 NOTE — PLAN OF CARE
Problem: Adult Inpatient Plan of Care  Goal: Plan of Care Review  Outcome: Progressing  Flowsheets (Taken 4/26/2024 0701)  Plan of Care Reviewed With:   patient   spouse  Goal: Patient-Specific Goal (Individualized)  Outcome: Progressing  Flowsheets (Taken 4/26/2024 0701)  Individualized Care Needs: Pain management  Anxieties, Fears or Concerns: Generalized  Patient/Family-Specific Goals (Include Timeframe): Keep patient and spouse up to date on Plan of Care     Problem: Diabetes Comorbidity  Goal: Blood Glucose Level Within Targeted Range  Outcome: Progressing  Intervention: Monitor and Manage Glycemia  Flowsheets (Taken 4/26/2024 0701)  Glycemic Management:   blood glucose monitored   supplemental insulin given     Problem: Spinal Surgery  Goal: Optimal Pain Control and Function  Outcome: Progressing  Intervention: Prevent or Manage Pain  Flowsheets (Taken 4/26/2024 0701)  Complementary Therapy: acupuncture performed  Pain Management Interventions:   care clustered   diversional activity provided   medication offered   pain management plan reviewed with patient/caregiver   quiet environment facilitated   relaxation techniques promoted     Problem: Fall Injury Risk  Goal: Absence of Fall and Fall-Related Injury  Outcome: Progressing  Intervention: Identify and Manage Contributors  Flowsheets (Taken 4/26/2024 0701)  Self-Care Promotion:   BADL personal objects within reach   BADL personal routines maintained   adaptive equipment use encouraged  Medication Review/Management:   medications reviewed   high-risk medications identified     Problem: Comorbidity Management  Goal: Blood Pressure in Desired Range  Outcome: Progressing  Intervention: Maintain Blood Pressure Management  Flowsheets (Taken 4/26/2024 0701)  Medication Review/Management:   medications reviewed   high-risk medications identified

## 2024-04-26 NOTE — PLAN OF CARE
Problem: Adult Inpatient Plan of Care  Goal: Plan of Care Review  Outcome: Adequate for Care Transition  Flowsheets (Taken 4/26/2024 0745)  Plan of Care Reviewed With:   patient   spouse     Problem: Adult Inpatient Plan of Care  Goal: Patient-Specific Goal (Individualized)  Outcome: Adequate for Care Transition  Flowsheets (Taken 4/26/2024 0745)  Individualized Care Needs: manage pain, xray, work with PT and OT. discharge teaching.     Problem: Adult Inpatient Plan of Care  Goal: Optimal Comfort and Wellbeing  Intervention: Monitor Pain and Promote Comfort  Flowsheets (Taken 4/26/2024 0745)  Pain Management Interventions:   care clustered   medication offered   pain management plan reviewed with patient/caregiver   pillow support provided   position adjusted   prescribed exercises encouraged   quiet environment facilitated   relaxation techniques promoted   warm blanket provided     Problem: Wound  Goal: Skin Health and Integrity  Intervention: Optimize Skin Protection  Flowsheets (Taken 4/26/2024 0745)  Head of Bed (HOB) Positioning: HOB elevated     Problem: Wound  Goal: Optimal Wound Healing  Intervention: Promote Wound Healing  Flowsheets (Taken 4/26/2024 0745)  Sleep/Rest Enhancement:   relaxation techniques promoted   noise level reduced     Problem: Spinal Surgery  Goal: Absence of Bleeding  Intervention: Monitor and Manage Bleeding  Flowsheets (Taken 4/26/2024 0745)  Bleeding Management: dressing monitored     Problem: Spinal Surgery  Goal: Effective Oxygenation and Ventilation  Intervention: Optimize Oxygenation and Ventilation  Flowsheets (Taken 4/26/2024 0745)  Head of Bed (HOB) Positioning: HOB elevated     Problem: Fall Injury Risk  Goal: Absence of Fall and Fall-Related Injury  Intervention: Identify and Manage Contributors  Flowsheets (Taken 4/26/2024 0745)  Self-Care Promotion:   independence encouraged   meal set-up provided  Medication Review/Management:   medications reviewed   provider  consulted   dosing adjusted   high-risk medications identified     Problem: Fall Injury Risk  Goal: Absence of Fall and Fall-Related Injury  Intervention: Promote Injury-Free Environment  Flowsheets (Taken 4/26/2024 7820)  Safety Promotion/Fall Prevention:   assistive device/personal item within reach   Fall Risk reviewed with patient/family   muscle strengthening facilitated   nonskid shoes/socks when out of bed   instructed to call staff for mobility   medications reviewed   lighting adjusted   in recliner, wheels locked   high risk medications identified   side rails raised x 2     Problem: Comorbidity Management  Goal: Blood Pressure in Desired Range  Intervention: Maintain Blood Pressure Management  Flowsheets (Taken 4/26/2024 0705)  Medication Review/Management:   medications reviewed   provider consulted   dosing adjusted   high-risk medications identified   Plan of care reviewed with patient; verbalized understanding.   Medications reviewed and administered as ordered.  Rounding for safety and patient care per policy.   Safety precautions maintained. Patient working appropriately with PT/OT.  DME at bedside.  Call light within reach, bed wheels locked, bed in lowest position, side rails ^x2, safety maintained. NADN, preparing for discharge.

## 2024-04-26 NOTE — NURSING
/79, HR 79. Patient c/o pain 7/10 at this time, appears very uncomfortable. Medicated with Oxycodone 10mg, will reevaluate BP after pain meds take effect. Discussed this plan with Aakash Gray PA-C who agrees with plan.

## 2024-04-26 NOTE — ASSESSMENT & PLAN NOTE
Hi Braxton is a 64 y.o. male is s/p PCF C3-6 w/ C4-6 laminectomy on 4/4/25.    Surgical dressing C/D/I  Pain control: multimodal  PT/OT: WBAT BLE, C spine precautions  DVT PPx: Early ambulation, no chemical DVT Ppx, SCDs at all times when not ambulating  Drain: removed  Nursing: Incentive spirometry, Monitor and record drain output each shift     Dispo: plan to dc pending PT and C spine X ray

## 2024-04-29 ENCOUNTER — PATIENT MESSAGE (OUTPATIENT)
Dept: ORTHOPEDIC SURGERY | Facility: CLINIC | Age: 64
End: 2024-04-29
Payer: COMMERCIAL

## 2024-04-29 ENCOUNTER — PATIENT MESSAGE (OUTPATIENT)
Dept: ORTHOPEDICS | Facility: CLINIC | Age: 64
End: 2024-04-29
Payer: COMMERCIAL

## 2024-04-29 DIAGNOSIS — I15.2 HYPERTENSION ASSOCIATED WITH DIABETES: ICD-10-CM

## 2024-04-29 DIAGNOSIS — E11.59 HYPERTENSION ASSOCIATED WITH DIABETES: ICD-10-CM

## 2024-04-29 RX ORDER — NIFEDIPINE 60 MG/1
60 TABLET, EXTENDED RELEASE ORAL 2 TIMES DAILY
Qty: 180 TABLET | Refills: 1 | Status: SHIPPED | OUTPATIENT
Start: 2024-04-29

## 2024-04-29 NOTE — PLAN OF CARE
[facility])  Discharge Final Note    Primary Care Provider: Josefina Kendall MD    Expected Discharge Date: 4/26/2024    Final Discharge Note (most recent)       Final Note - 04/26/24 1130          Final Note    Assessment Type Final Discharge Note     Anticipated Discharge Disposition Home or Self Care     Hospital Resources/Appts/Education Provided Provided patient/caregiver with written discharge plan information;Provided education on problems/symptoms using teachback                   Future Appointments   Date Time Provider Department Center   5/23/2024 12:30 PM [facility]   5/23/2024  1:30 PM Maxine Lindsay PA-C [facility] SPINE [facility]   5/30/2024  3:40 PM Myles Camejo MD [facility] PHYSMED [facility]   6/14/2024  3:15 PM Handy Farooq, OD [facility] OPTOMTY [place]     Contact Info       Edd Burnette MD   Specialty: Orthopedic Surgery    [address] [address]   Phone: [phone]       Next Steps: Follow up in 4 week(s)    Instructions: For wound re-check

## 2024-04-29 NOTE — TELEPHONE ENCOUNTER
----- Message from Lily Cisneros sent at 4/29/2024  3:52 PM CDT -----  Contact: [facility]   Requesting an RX refill or new RX.  Is this a refill or new RX: refill   RX name and strength (copy/paste from chart):  Nifedipine 60 mg   Is this a 30 day or 90 day RX: 90 day   Pharmacy name and phone # (copy/paste from chart):  [facility]   The doctors have asked that we provide their patients with the following 2 reminders -- prescription refills can take up to 72 hours, and a friendly reminder that in the future you can use your [facility] account to request refills:

## 2024-04-29 NOTE — PLAN OF CARE
04/24/24 1830   PORTER Message   Medicare Outpatient and Observation Notification regarding financial responsibility Given to patient/caregiver;Explained to patient/caregiver;Signed/date by patient/caregiver   Date PORTER was signed 04/24/24   Time PORTER was signed 1830     Per David london

## 2024-04-29 NOTE — TELEPHONE ENCOUNTER
No care due was identified.  [facility] Embedded Care Due Messages. Reference number: [id number].   4/29/2024 4:00:18 PM CDT

## 2024-05-04 DIAGNOSIS — E11.9 TYPE 2 DIABETES MELLITUS WITHOUT COMPLICATION, WITHOUT LONG-TERM CURRENT USE OF INSULIN: ICD-10-CM

## 2024-05-06 RX ORDER — INSULIN GLARGINE 100 [IU]/ML
25 INJECTION, SOLUTION SUBCUTANEOUS NIGHTLY
Qty: 30 ML | Refills: 1 | Status: SHIPPED | OUTPATIENT
Start: 2024-05-06

## 2024-05-06 NOTE — TELEPHONE ENCOUNTER
Refill Routing Note   Medication(s) are not appropriate for processing by [facility] for the following reason(s):        New or recently adjusted medication: dose increase within 90 days    [facility] action(s):  Defer      Medication Therapy Plan:  Last PCP Visit: 3/26/2024 Last Admission: 4/24/2024 for a planned surgery, upon discharge patient was instructed to continue lantus therapy as usual. Notes reviewed.    Extended chart review required: Yes     Appointments  past 12m or future 3m with PCP    Date Provider   Last Visit   3/26/2024 Josefina Kendall MD   Next Visit   Visit date not found Josefina Kendall MD   ED visits in past 90 days: 0        Note composed:10:38 AM 05/06/2024

## 2024-05-06 NOTE — TELEPHONE ENCOUNTER
No care due was identified.  [facility] Embedded Care Due Messages. Reference number: [id number].   5/06/2024 10:27:20 AM CDT

## 2024-05-22 NOTE — PROGRESS NOTES
Date: 05/22/2024    Supervising Physician: Edd Burnette M.D.    Date of Surgery: 4/24/24    Procedure: C3-6 PCDF    History: Hi Braxton is seen today for follow-up following the above listed procedure. Overall the patient is doing well but today notes this pain down his arm with numbness and tingling has improved since surgery. He is still having neck pain.   Pain is well controlled with current pain medication.  he denies fever, chills, and sweats since the time of the surgery.     Exam: Post op dressing taken down.  Incision is healing well, clean, dry and intact.   There is no sign of infection. Neuro exam is stable. No signs of DVT.    Radiographs: hardware in place no failure    Assessment/Plan: 4 weeks post op.    Doing well postoperatively.  reviewed. Will wean out of collar in 2 weeks.    I will plan to see the patient back for the next postop visit in 2 months.     Thank you for the opportunity to participate in this patient's care. Please give me a call if there are any concerns or questions.

## 2024-05-23 ENCOUNTER — PATIENT MESSAGE (OUTPATIENT)
Dept: ORTHOPEDICS | Facility: CLINIC | Age: 64
End: 2024-05-23

## 2024-05-23 ENCOUNTER — OFFICE VISIT (OUTPATIENT)
Dept: ORTHOPEDICS | Facility: CLINIC | Age: 64
End: 2024-05-23
Payer: COMMERCIAL

## 2024-05-23 ENCOUNTER — HOSPITAL ENCOUNTER (OUTPATIENT)
Dept: RADIOLOGY | Facility: HOSPITAL | Age: 64
Discharge: HOME | End: 2024-05-23
Attending: ORTHOPAEDIC SURGERY
Payer: COMMERCIAL

## 2024-05-23 VITALS — HEIGHT: 76 IN | WEIGHT: 207.88 LBS | BODY MASS INDEX: 25.31 KG/M2

## 2024-05-23 DIAGNOSIS — M50.30 DDD (DEGENERATIVE DISC DISEASE), CERVICAL: Primary | ICD-10-CM

## 2024-05-23 DIAGNOSIS — Z98.1 S/P CERVICAL SPINAL FUSION: ICD-10-CM

## 2024-05-23 DIAGNOSIS — Z98.1 S/P CERVICAL SPINAL FUSION: Primary | ICD-10-CM

## 2024-05-23 PROCEDURE — 99999 PR PBB SHADOW E&M-EST. PATIENT-LVL III: CPT | Mod: PBBFAC,,, | Performed by: ORTHOPAEDIC SURGERY

## 2024-05-23 PROCEDURE — 3044F HG A1C LEVEL LT 7.0%: CPT | Mod: CPTII,S$GLB,, | Performed by: ORTHOPAEDIC SURGERY

## 2024-05-23 PROCEDURE — 4010F ACE/ARB THERAPY RXD/TAKEN: CPT | Mod: CPTII,S$GLB,, | Performed by: ORTHOPAEDIC SURGERY

## 2024-05-23 PROCEDURE — 99024 POSTOP FOLLOW-UP VISIT: CPT | Mod: S$GLB,,, | Performed by: ORTHOPAEDIC SURGERY

## 2024-05-23 PROCEDURE — 3066F NEPHROPATHY DOC TX: CPT | Mod: CPTII,S$GLB,, | Performed by: ORTHOPAEDIC SURGERY

## 2024-05-23 PROCEDURE — 1101F PT FALLS ASSESS-DOCD LE1/YR: CPT | Mod: CPTII,S$GLB,, | Performed by: ORTHOPAEDIC SURGERY

## 2024-05-23 PROCEDURE — 1125F AMNT PAIN NOTED PAIN PRSNT: CPT | Mod: CPTII,S$GLB,, | Performed by: ORTHOPAEDIC SURGERY

## 2024-05-23 PROCEDURE — 1159F MED LIST DOCD IN RCRD: CPT | Mod: CPTII,S$GLB,, | Performed by: ORTHOPAEDIC SURGERY

## 2024-05-23 PROCEDURE — 72040 X-RAY EXAM NECK SPINE 2-3 VW: CPT | Mod: TC

## 2024-05-23 PROCEDURE — 3288F FALL RISK ASSESSMENT DOCD: CPT | Mod: CPTII,S$GLB,, | Performed by: ORTHOPAEDIC SURGERY

## 2024-05-23 PROCEDURE — 72040 X-RAY EXAM NECK SPINE 2-3 VW: CPT | Mod: 26,,, | Performed by: RADIOLOGY

## 2024-05-23 PROCEDURE — 3061F NEG MICROALBUMINURIA REV: CPT | Mod: CPTII,S$GLB,, | Performed by: ORTHOPAEDIC SURGERY

## 2024-05-23 RX ORDER — OXYCODONE HYDROCHLORIDE 5 MG/1
5 TABLET ORAL EVERY 8 HOURS PRN
Qty: 21 TABLET | Refills: 0 | Status: SHIPPED | OUTPATIENT
Start: 2024-05-23

## 2024-05-23 RX ORDER — CELECOXIB 100 MG/1
100 CAPSULE ORAL 2 TIMES DAILY
Qty: 28 CAPSULE | Refills: 0 | Status: SHIPPED | OUTPATIENT
Start: 2024-05-23

## 2024-05-23 RX ORDER — METHOCARBAMOL 500 MG/1
1000 TABLET, FILM COATED ORAL 3 TIMES DAILY
Qty: 90 TABLET | Refills: 0 | Status: SHIPPED | OUTPATIENT
Start: 2024-05-23

## 2024-05-29 ENCOUNTER — TELEPHONE (OUTPATIENT)
Dept: PHYSICAL MEDICINE AND REHAB | Facility: CLINIC | Age: 64
End: 2024-05-29
Payer: COMMERCIAL

## 2024-05-29 NOTE — TELEPHONE ENCOUNTER
----- Message from Lilibeth Jeronimo MA sent at 5/29/2024 11:59 AM CDT -----  Regarding: FW: Question  Contact: [phone]  Please advise. Thank you  ----- Message -----  From: Chasidy Raya  Sent: 5/29/2024  10:48 AM CDT  To: [facility] Staff  Subject: Question                                         Patient calling requesting a callback from nurse or provider in need to know if he need to keep the appointment because he just saw Dr. Lindsay in Orthopedics on Thursday. Please call back as soon as possible.

## 2024-05-29 NOTE — TELEPHONE ENCOUNTER
I called the pt.  He was seen by Orthopedics/Spine clinic on 5/23/24.  He said he has been feeling better.  He is off gabapentin.  He was given oxycodone p.r.n. but has not been taking it.  Takes naproxen p.r.n. but infrequently.  I told him if his pain is under good control he does not need to see me on a regular basis.  He can call as needed and I will try to give him an appointment soon.

## 2024-06-03 DIAGNOSIS — M54.2 CHRONIC NECK PAIN: ICD-10-CM

## 2024-06-03 DIAGNOSIS — G89.29 CHRONIC NECK PAIN: ICD-10-CM

## 2024-06-03 DIAGNOSIS — M54.12 LEFT CERVICAL RADICULOPATHY: ICD-10-CM

## 2024-06-04 RX ORDER — GABAPENTIN 600 MG/1
600 TABLET ORAL 3 TIMES DAILY
Qty: 270 TABLET | Refills: 3 | Status: SHIPPED | OUTPATIENT
Start: 2024-06-04

## 2024-06-10 ENCOUNTER — PATIENT MESSAGE (OUTPATIENT)
Dept: ORTHOPEDICS | Facility: CLINIC | Age: 64
End: 2024-06-10
Payer: COMMERCIAL

## 2024-06-11 ENCOUNTER — PATIENT MESSAGE (OUTPATIENT)
Dept: INTERNAL MEDICINE | Facility: CLINIC | Age: 64
End: 2024-06-11
Payer: COMMERCIAL

## 2024-06-11 DIAGNOSIS — Z98.1 S/P CERVICAL SPINAL FUSION: Primary | ICD-10-CM

## 2024-06-11 DIAGNOSIS — R93.89 ABNORMAL CHEST CT: Primary | ICD-10-CM

## 2024-06-13 ENCOUNTER — OFFICE VISIT (OUTPATIENT)
Dept: ORTHOPEDICS | Facility: CLINIC | Age: 64
End: 2024-06-13
Payer: COMMERCIAL

## 2024-06-13 ENCOUNTER — HOSPITAL ENCOUNTER (OUTPATIENT)
Dept: RADIOLOGY | Facility: HOSPITAL | Age: 64
Discharge: HOME | End: 2024-06-13
Attending: ORTHOPAEDIC SURGERY
Payer: COMMERCIAL

## 2024-06-13 VITALS — BODY MASS INDEX: 24.16 KG/M2 | WEIGHT: 198.38 LBS | HEIGHT: 76 IN

## 2024-06-13 DIAGNOSIS — Z98.1 S/P CERVICAL SPINAL FUSION: ICD-10-CM

## 2024-06-13 DIAGNOSIS — M50.30 DDD (DEGENERATIVE DISC DISEASE), CERVICAL: ICD-10-CM

## 2024-06-13 DIAGNOSIS — Z98.1 S/P CERVICAL SPINAL FUSION: Primary | ICD-10-CM

## 2024-06-13 DIAGNOSIS — M47.812 CERVICAL SPONDYLOSIS: ICD-10-CM

## 2024-06-13 PROCEDURE — 3066F NEPHROPATHY DOC TX: CPT | Mod: CPTII,S$GLB,, | Performed by: ORTHOPAEDIC SURGERY

## 2024-06-13 PROCEDURE — 3288F FALL RISK ASSESSMENT DOCD: CPT | Mod: CPTII,S$GLB,, | Performed by: ORTHOPAEDIC SURGERY

## 2024-06-13 PROCEDURE — 1160F RVW MEDS BY RX/DR IN RCRD: CPT | Mod: CPTII,S$GLB,, | Performed by: ORTHOPAEDIC SURGERY

## 2024-06-13 PROCEDURE — 72040 X-RAY EXAM NECK SPINE 2-3 VW: CPT | Mod: TC

## 2024-06-13 PROCEDURE — 4010F ACE/ARB THERAPY RXD/TAKEN: CPT | Mod: CPTII,S$GLB,, | Performed by: ORTHOPAEDIC SURGERY

## 2024-06-13 PROCEDURE — 99024 POSTOP FOLLOW-UP VISIT: CPT | Mod: S$GLB,,, | Performed by: ORTHOPAEDIC SURGERY

## 2024-06-13 PROCEDURE — 99999 PR PBB SHADOW E&M-EST. PATIENT-LVL IV: CPT | Mod: PBBFAC,,, | Performed by: ORTHOPAEDIC SURGERY

## 2024-06-13 PROCEDURE — 3044F HG A1C LEVEL LT 7.0%: CPT | Mod: CPTII,S$GLB,, | Performed by: ORTHOPAEDIC SURGERY

## 2024-06-13 PROCEDURE — 1101F PT FALLS ASSESS-DOCD LE1/YR: CPT | Mod: CPTII,S$GLB,, | Performed by: ORTHOPAEDIC SURGERY

## 2024-06-13 PROCEDURE — 3061F NEG MICROALBUMINURIA REV: CPT | Mod: CPTII,S$GLB,, | Performed by: ORTHOPAEDIC SURGERY

## 2024-06-13 PROCEDURE — 1125F AMNT PAIN NOTED PAIN PRSNT: CPT | Mod: CPTII,S$GLB,, | Performed by: ORTHOPAEDIC SURGERY

## 2024-06-13 PROCEDURE — 72040 X-RAY EXAM NECK SPINE 2-3 VW: CPT | Mod: 26,,, | Performed by: RADIOLOGY

## 2024-06-13 PROCEDURE — 1159F MED LIST DOCD IN RCRD: CPT | Mod: CPTII,S$GLB,, | Performed by: ORTHOPAEDIC SURGERY

## 2024-06-13 NOTE — PROGRESS NOTES
4/24/24: C3-6 PCDF    History: Hi Braxton is seen today for follow-up following the above listed procedure. Overall the patient is doing well. His preop arm pain has significantly improved. He has neck pain.  Pain is well controlled with current pain medication.  he denies fever, chills, and sweats since the time of the surgery.     Exam: Incision is healed. There is no sign of infection. Neuro exam is stable. No signs of DVT.    Radiographs: C3-6 PCDF without hardware failure    Assessment/Plan: 7 weeks post op.    Doing well postoperatively.  reviewed. I will plan to see the patient back for the next postop visit in 6 weeks with repeat XRs. Thank you for the opportunity to participate in this patient's care. Please give me a call if there are any concerns or questions.    Edd Burnette MD  Orthopaedic Spine Surgeon  Department of Orthopaedic Surgery  [phone]

## 2024-06-14 ENCOUNTER — OFFICE VISIT (OUTPATIENT)
Dept: OPTOMETRY | Facility: CLINIC | Age: 64
End: 2024-06-14
Payer: COMMERCIAL

## 2024-06-14 DIAGNOSIS — E11.9 TYPE 2 DIABETES MELLITUS WITHOUT RETINOPATHY: ICD-10-CM

## 2024-06-14 DIAGNOSIS — H25.13 NUCLEAR SCLEROSIS, BILATERAL: Primary | ICD-10-CM

## 2024-06-14 DIAGNOSIS — H52.4 PRESBYOPIA: ICD-10-CM

## 2024-06-14 DIAGNOSIS — Z13.5 GLAUCOMA SCREENING: ICD-10-CM

## 2024-06-14 PROCEDURE — 99999 PR PBB SHADOW E&M-EST. PATIENT-LVL III: CPT | Mod: PBBFAC,,, | Performed by: OPTOMETRIST

## 2024-06-14 PROCEDURE — 92015 DETERMINE REFRACTIVE STATE: CPT | Mod: S$GLB,,, | Performed by: OPTOMETRIST

## 2024-06-14 PROCEDURE — 92014 COMPRE OPH EXAM EST PT 1/>: CPT | Mod: S$GLB,,, | Performed by: OPTOMETRIST

## 2024-06-14 NOTE — PROGRESS NOTES
HPI    63 Y/o male is here for diabetic eye exam with C/o pt states vision is   blurry both near and far say's he has worn Rx glasses in about year. Pt   has pterygium on OD that is decreasing his vision.   Pt denies pain and discomfort   Occ floaters     Eye med: no gtt   Last edited by Blade Raya MA on 6/14/2024  3:15 PM.            Assessment /Plan     For exam results, see Encounter Report.    Nuclear sclerosis, bilateral    Type 2 diabetes mellitus without retinopathy    Glaucoma screening    Presbyopia      1. Cat OD>OS--pt having glare problems, and difficulty on computer.  Wishes surgery  2. Pterygium OS  3. DM- WITHOUT RETINOPATHY.  Advised yearly DFE    PLAN:    Surgical consult--Dr Means

## 2024-06-17 NOTE — TELEPHONE ENCOUNTER
Recommend spirometry to determine if patient has any clinical manifestations of COPD.  Please schedule.

## 2024-06-19 ENCOUNTER — HOSPITAL ENCOUNTER (OUTPATIENT)
Dept: PULMONOLOGY | Facility: HOSPITAL | Age: 64
Discharge: HOME | End: 2024-06-19
Attending: INTERNAL MEDICINE
Payer: COMMERCIAL

## 2024-06-19 DIAGNOSIS — R93.89 ABNORMAL CHEST CT: ICD-10-CM

## 2024-06-19 LAB
FEF 25 75 LLN: 1
FEF 25 75 PRE REF: 143.1 %
FEF 25 75 REF: 2.63
FET100 CHG: 3.9 %
FEV05 LLN: 2.14
FEV05 REF: 3.27
FEV1 CHG: -1.3 %
FEV1 FVC LLN: 64
FEV1 FVC PRE REF: 105.7 %
FEV1 FVC REF: 76
FEV1 LLN: 2.4
FEV1 PRE REF: 110.9 %
FEV1 REF: 3.43
FEV1 VOL CHG: -0.05
FVC CHG: -4.2 %
FVC LLN: 3.3
FVC PRE REF: 104.5 %
FVC REF: 4.52
FVC VOL CHG: -0.2
PEF LLN: 6.59
PEF PRE REF: 123.2 %
PEF REF: 9.72
PHYSICIAN COMMENT: NORMAL
POST FEF 25 75: 4.12 L/S (ref 1–5.03)
POST FET 100: 6.27 SEC
POST FEV1 FVC: 82.91 % (ref 63.81–86.97)
POST FEV1: 3.75 L (ref 2.4–4.38)
POST FEV5: 3.02 L (ref 2.14–4.41)
POST FVC: 4.52 L (ref 3.3–5.77)
POST PEF: 8.62 L/S (ref 6.59–12.85)
PRE FEF 25 75: 3.76 L/S (ref 1–5.03)
PRE FET 100: 6.03 SEC
PRE FEV05 REF: 96.2 %
PRE FEV1 FVC: 80.43 % (ref 63.81–86.97)
PRE FEV1: 3.8 L (ref 2.4–4.38)
PRE FEV5: 3.15 L (ref 2.14–4.41)
PRE FVC: 4.72 L (ref 3.3–5.77)
PRE PEF: 11.97 L/S (ref 6.59–12.85)

## 2024-06-19 PROCEDURE — 94060 EVALUATION OF WHEEZING: CPT | Mod: 26,S$GLB,, | Performed by: INTERNAL MEDICINE

## 2024-06-25 RX ORDER — ALPRAZOLAM 0.5 MG/1
0.5 TABLET ORAL
Qty: 90 TABLET | Refills: 2 | Status: SHIPPED | OUTPATIENT
Start: 2024-06-25

## 2024-06-25 NOTE — TELEPHONE ENCOUNTER
Patient is requesting a refill of ALPRAZolam (XANAX) 0.5 MG tablet     LOV: 03/26/2024  Start:02/20/2024   Upcoming:

## 2024-06-25 NOTE — TELEPHONE ENCOUNTER
Care Due:                  Date            Visit Type   Department     Provider  --------------------------------------------------------------------------------                                             [facility]  Last Visit: 03-      PRE-OP       MEDICINE       Josefina Kendall  Next Visit: None Scheduled  None         None Found                                                            Last  Test          Frequency    Reason                     Performed    Due Date  --------------------------------------------------------------------------------    HBA1C.......  6 months...  insulin..................  02- 08-    [facility] Embedded Care Due Messages. Reference number: [id number].   6/25/2024 10:29:26 AM CDT

## 2024-07-03 ENCOUNTER — TELEPHONE (OUTPATIENT)
Dept: INTERNAL MEDICINE | Facility: CLINIC | Age: 64
End: 2024-07-03
Payer: COMMERCIAL

## 2024-07-03 NOTE — TELEPHONE ENCOUNTER
Called Last back and answered question on   NIFEdipine (PROCARDIA-XL)  . This seems to be a PA on the medication.

## 2024-07-03 NOTE — TELEPHONE ENCOUNTER
----- Message from Joni Pollard sent at 7/3/2024 10:39 AM CDT -----  Contact: Last Starks [phone]  She has clinical question that need to be answered for the NIFEdipine (PROCARDIA-XL) 60 MG (OSM) 24 hr tablet. Ref# [id number]    Thank you

## 2024-07-08 ENCOUNTER — HOSPITAL ENCOUNTER (OUTPATIENT)
Facility: HOSPITAL | Age: 64
Discharge: HOME | End: 2024-07-09
Attending: EMERGENCY MEDICINE | Admitting: STUDENT IN AN ORGANIZED HEALTH CARE EDUCATION/TRAINING PROGRAM
Payer: MEDICARE

## 2024-07-08 ENCOUNTER — PATIENT MESSAGE (OUTPATIENT)
Dept: ORTHOPEDICS | Facility: CLINIC | Age: 64
End: 2024-07-08
Payer: MEDICARE

## 2024-07-08 DIAGNOSIS — M25.512 LEFT SHOULDER PAIN: ICD-10-CM

## 2024-07-08 DIAGNOSIS — M54.12 LEFT CERVICAL RADICULOPATHY: Primary | ICD-10-CM
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
## 2024-07-08 DIAGNOSIS — M54.2 NECK PAIN: ICD-10-CM

## 2024-07-08 DIAGNOSIS — R07.9 CHEST PAIN: ICD-10-CM

## 2024-07-08 LAB
ALBUMIN SERPL BCP-MCNC: 4.1 G/DL (ref 3.5–5.2)
ALP SERPL-CCNC: 91 U/L (ref 55–135)
ALT SERPL W/O P-5'-P-CCNC: 27 U/L (ref 10–44)
ANION GAP SERPL CALC-SCNC: 7 MMOL/L (ref 8–16)
AST SERPL-CCNC: 20 U/L (ref 10–40)
BASOPHILS # BLD AUTO: 0.05 K/UL (ref 0–0.2)
BASOPHILS NFR BLD: 0.9 % (ref 0–1.9)
BILIRUB SERPL-MCNC: 0.4 MG/DL (ref 0.1–1)
BNP SERPL-MCNC: <10 PG/ML (ref 0–99)
BUN SERPL-MCNC: 14 MG/DL (ref 8–23)
CALCIUM SERPL-MCNC: 9.5 MG/DL (ref 8.7–10.5)
CHLORIDE SERPL-SCNC: 109 MMOL/L (ref 95–110)
CK SERPL-CCNC: 254 U/L (ref 20–200)
CO2 SERPL-SCNC: 23 MMOL/L (ref 23–29)
CREAT SERPL-MCNC: 1 MG/DL (ref 0.5–1.4)
DIFFERENTIAL METHOD BLD: ABNORMAL
EOSINOPHIL # BLD AUTO: 0.6 K/UL (ref 0–0.5)
EOSINOPHIL NFR BLD: 10.3 % (ref 0–8)
ERYTHROCYTE [DISTWIDTH] IN BLOOD BY AUTOMATED COUNT: 13.3 % (ref 11.5–14.5)
EST. GFR  (NO RACE VARIABLE): >60 ML/MIN/1.73 M^2
GLUCOSE SERPL-MCNC: 169 MG/DL (ref 70–110)
HCT VFR BLD AUTO: 41.7 % (ref 40–54)
HGB BLD-MCNC: 14.4 G/DL (ref 14–18)
IMM GRANULOCYTES # BLD AUTO: 0.02 K/UL (ref 0–0.04)
IMM GRANULOCYTES NFR BLD AUTO: 0.4 % (ref 0–0.5)
LYMPHOCYTES # BLD AUTO: 1.6 K/UL (ref 1–4.8)
LYMPHOCYTES NFR BLD: 28.5 % (ref 18–48)
MAGNESIUM SERPL-MCNC: 2 MG/DL (ref 1.6–2.6)
MCH RBC QN AUTO: 29.4 PG (ref 27–31)
MCHC RBC AUTO-ENTMCNC: 34.5 G/DL (ref 32–36)
MCV RBC AUTO: 85 FL (ref 82–98)
MONOCYTES # BLD AUTO: 0.3 K/UL (ref 0.3–1)
MONOCYTES NFR BLD: 6.3 % (ref 4–15)
NEUTROPHILS # BLD AUTO: 2.9 K/UL (ref 1.8–7.7)
NEUTROPHILS NFR BLD: 53.6 % (ref 38–73)
NRBC BLD-RTO: 0 /100 WBC
OHS QRS DURATION: 82 MS
OHS QTC CALCULATION: 445 MS
PLATELET # BLD AUTO: 254 K/UL (ref 150–450)
PMV BLD AUTO: 9.8 FL (ref 9.2–12.9)
POCT GLUCOSE: 93 MG/DL (ref 70–110)
POTASSIUM SERPL-SCNC: 3.8 MMOL/L (ref 3.5–5.1)
PROT SERPL-MCNC: 7.2 G/DL (ref 6–8.4)
RBC # BLD AUTO: 4.89 M/UL (ref 4.6–6.2)
SODIUM SERPL-SCNC: 139 MMOL/L (ref 136–145)
TROPONIN I SERPL DL<=0.01 NG/ML-MCNC: <0.006 NG/ML (ref 0–0.03)
WBC # BLD AUTO: 5.44 K/UL (ref 3.9–12.7)

## 2024-07-08 PROCEDURE — 80053 COMPREHEN METABOLIC PANEL: CPT | Performed by: EMERGENCY MEDICINE

## 2024-07-08 PROCEDURE — 84484 ASSAY OF TROPONIN QUANT: CPT | Performed by: EMERGENCY MEDICINE

## 2024-07-08 PROCEDURE — 25000003 PHARM REV CODE 250: Performed by: STUDENT IN AN ORGANIZED HEALTH CARE EDUCATION/TRAINING PROGRAM

## 2024-07-08 PROCEDURE — 85025 COMPLETE CBC W/AUTO DIFF WBC: CPT | Performed by: EMERGENCY MEDICINE

## 2024-07-08 PROCEDURE — 25000003 PHARM REV CODE 250: Performed by: EMERGENCY MEDICINE

## 2024-07-08 PROCEDURE — 63600175 PHARM REV CODE 636 W HCPCS: Performed by: EMERGENCY MEDICINE

## 2024-07-08 PROCEDURE — 93005 ELECTROCARDIOGRAM TRACING: CPT

## 2024-07-08 PROCEDURE — 82550 ASSAY OF CK (CPK): CPT | Performed by: EMERGENCY MEDICINE

## 2024-07-08 PROCEDURE — 83880 ASSAY OF NATRIURETIC PEPTIDE: CPT | Performed by: EMERGENCY MEDICINE

## 2024-07-08 PROCEDURE — 93010 ELECTROCARDIOGRAM REPORT: CPT | Mod: ,,, | Performed by: INTERNAL MEDICINE

## 2024-07-08 PROCEDURE — 83735 ASSAY OF MAGNESIUM: CPT | Performed by: EMERGENCY MEDICINE

## 2024-07-08 RX ORDER — OXYCODONE AND ACETAMINOPHEN 5; 325 MG/1; MG/1
1 TABLET ORAL
Status: COMPLETED | OUTPATIENT
Start: 2024-07-08 | End: 2024-07-08

## 2024-07-08 RX ORDER — MORPHINE SULFATE 2 MG/ML
6 INJECTION, SOLUTION INTRAMUSCULAR; INTRAVENOUS
Status: COMPLETED | OUTPATIENT
Start: 2024-07-08 | End: 2024-07-08

## 2024-07-08 RX ADMIN — OXYCODONE HYDROCHLORIDE AND ACETAMINOPHEN 1 TABLET: 5; 325 TABLET ORAL at 11:07

## 2024-07-08 RX ADMIN — OXYCODONE HYDROCHLORIDE AND ACETAMINOPHEN 1 TABLET: 5; 325 TABLET ORAL at 01:07

## 2024-07-08 RX ADMIN — MORPHINE SULFATE 6 MG: 2 INJECTION, SOLUTION INTRAMUSCULAR; INTRAVENOUS at 03:07

## 2024-07-08 NOTE — ASSESSMENT & PLAN NOTE
Hi Braxton is a 64 y.o. male who is 3 months s/p C3-6 fusion presents with neck pain and LUE radicular symptoms after a GLF 1 day ago. He endorsed return of his preoperative symptoms of LUE paresthesias, pain and mild weakness to the left side. Pt is admitted to obs for pain control.    - WBAT bilateral upper and lower extremities  - Pain control: multimodal pain management

## 2024-07-08 NOTE — SUBJECTIVE & OBJECTIVE
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 Dose of amlodipine increased from 5 to 10 mg in the weeks prior to the incidnt.    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       Past Surgical History:   Procedure Laterality Date    CEREBRAL ANGIOGRAM N/A 1/6/2020    Procedure: ANGIOGRAM-CEREBRAL;  Surgeon: [facility] Diagnostic Provider;  Location: [facility] OR [address] [place] [address];  Service: Radiology;  Laterality: N/A;  /Nagi    CERVICAL FUSION  12/30/2015    COLONOSCOPY N/A 4/7/2017    Procedure: COLONOSCOPY;  Surgeon: Handy Frederick MD;  Location: [facility] ENDO (4TH FLR);  Service: Endoscopy;  Laterality: N/A;    COLONOSCOPY N/A 11/28/2023    Procedure: COLONOSCOPY;  Surgeon: ARMAAN Hinojosa MD;  Location: [facility] ENDO (4TH FLR);  Service: Endoscopy;  Laterality: N/A;  8/21-referred by Dr. Schulte/  Diagnosis:  Blood in stool, past due on surveillance colonoscopy for advanced colon polyp villous adenoma greater than 10 mm /Prep instructions handed to patient and to portal. AS  11/21/23-Precall complete-DS    CORONARY ANGIOGRAPHY N/A 10/10/2022    Procedure: ANGIOGRAM, CORONARY ARTERY;  Surgeon: Anson Nolan MD;  Location: [facility] CATH LAB;  Service: Cardiology;  Laterality: N/A;    ENDOSCOPIC ULTRASOUND OF UPPER GASTROINTESTINAL TRACT N/A 8/22/2023    Procedure: ULTRASOUND, UPPER GI TRACT, ENDOSCOPIC;  Surgeon: Joe Means MD;  Location: [facility] ENDO (2ND FLR);  Service: Endoscopy;  Laterality: N/A;  instr portal-pt stated difficult intubation with surgery in the past-tb    FUSION OF CERVICAL SPINE BY POSTERIOR APPROACH N/A 4/24/2024    Procedure: FUSION, SPINE, CERVICAL, POSTERIOR APPROACH C3-6 DEPUY  GW TONGS WTIH WEIGHTS, FREDIS 4 POST SNS: MOTORS/SSEP/EMG;  Surgeon: Edd Burnette MD;  Location: [facility] OR;  Service: Orthopedics;  Laterality: N/A;    HERNIA REPAIR      PROSTATECTOMY         Review of patient's allergies indicates:   Allergen Reactions    Lisinopril Anaphylaxis and Nausea And Vomiting     General bad feeling    Lipitor [atorvastatin] Other (See Comments)     Muscle aches    Adhesive     Crestor [rosuvastatin] Swelling     Lip swelling.     Jardiance [empagliflozin] Other (See Comments)     Possible related to recent UTI's     Metformin      Used XR and experienced flatulance and abdominal pain.        No current facility-administered medications for this encounter.     Current Outpatient Medications   Medication Sig    acetaminophen (TYLENOL) 500 MG tablet Take 1 tablet (500 mg total) by mouth 3 (three) times daily.    ALPRAZolam (XANAX) 0.5 MG tablet TAKE 1 TABLET THREE TIMES DAILY AS NEEDED FOR ANXIETY    aspirin (ECOTRIN) 81 MG EC tablet Take 1 tablet (81 mg total) by mouth once daily.    celecoxib (CELEBREX) 100 MG capsule Take 1 capsule (100 mg total) by mouth 2 (two) times daily.  "   CONTOUR NEXT TEST STRIPS Strp USE TO TEST BLOOD SUGAR ONCE DAILY    ezetimibe (ZETIA) 10 mg tablet TAKE 1 TABLET ONE TIME DAILY    gabapentin (NEURONTIN) 600 MG tablet TAKE 1 TABLET THREE TIMES DAILY    insulin (LANTUS SOLOSTAR U-100 INSULIN) glargine 100 units/mL SubQ pen Inject 25 Units into the skin every evening.    losartan (COZAAR) 100 MG tablet TAKE 1 TABLET ONE TIME DAILY    methocarbamoL (ROBAXIN) 500 MG Tab Take 2 tablets (1,000 mg total) by mouth 3 (three) times daily.    metoprolol succinate (TOPROL-XL) 50 MG 24 hr tablet TAKE 1 AND 1/2 TABLETS (75 MG TOTAL) ONCE DAILY.    MICROLET LANCET Misc 1 lancet by Misc.(Non-Drug; Combo Route) route once daily. Test blood glucose once daily as instructed.    NIFEdipine (PROCARDIA-XL) 60 MG (OSM) 24 hr tablet Take 1 tablet (60 mg total) by mouth 2 (two) times a day.    nitroGLYCERIN (NITROSTAT) 0.3 MG SL tablet Place 1 tablet (0.3 mg total) under the tongue every 5 (five) minutes as needed for Chest pain.    ondansetron (ZOFRAN) 4 MG tablet Take 1 tablet (4 mg total) by mouth every 6 (six) hours as needed for Nausea.    oxyCODONE (ROXICODONE) 5 MG immediate release tablet Take 1 tablet (5 mg total) by mouth every 8 (eight) hours as needed for Pain (for breakthrough pain).    pantoprazole (PROTONIX) 40 MG tablet TAKE 1 TABLET TWICE DAILY    pen needle, diabetic (BD ULTRA-FINE CHET PEN NEEDLE) 32 gauge x 5/32" Ndle USE PEN NEEDLE AS INSTRUCTION WITH LANTUS INSULIN PRE-FILLED PEN.    pneumoc 20-jonny conj-dip cr,PF, (PREVNAR 20, PF,) 0.5 mL Syrg injection Inject into the muscle.    pravastatin (PRAVACHOL) 80 MG tablet TAKE 1 TABLET ONE TIME DAILY    RSVPreF3 antigen-AS01E, PF, (AREXVY, PF,) 120 mcg/0.5 mL SusR vaccine Inject into the muscle.    SHINGRIX, PF, 50 mcg/0.5 mL injection     spironolactone (ALDACTONE) 50 MG tablet TAKE 1 TABLET ONE TIME DAILY    tadalafiL (CIALIS) 20 MG Tab Take 1 tablet (20 mg total) by mouth every 72 hours as needed (ED).     Family " "History       Problem Relation (Age of Onset)    Aneurysm Brother    Benign prostatic hyperplasia Brother    Cataracts Maternal Grandmother    Diabetes Paternal Uncle    Heart attack Brother, Brother    Heart attacks under age 50 Father    Heart disease Mother, Father, Brother, Brother, Brother, Brother, Paternal Grandmother    Hepatitis Brother    Hyperlipidemia Mother, Sister, Brother, Brother, Brother    Hypertension Mother, Sister, Brother, Brother, Brother, Brother, Brother    Multiple sclerosis Daughter    No Known Problems Maternal Grandfather, Paternal Grandfather    Stroke Mother, Sister    Throat cancer Brother          Tobacco Use    Smoking status: Former     Current packs/day: 0.00     Average packs/day: 1 pack/day for 30.0 years (30.0 ttl pk-yrs)     Types: Cigarettes     Start date: 1981     Quit date: 2011     Years since quittin.5     Passive exposure: Never    Smokeless tobacco: Never   Substance and Sexual Activity    Alcohol use: Yes     Comment: occas - nonalcoholic beer or beer    Drug use: No    Sexual activity: Yes     Partners: Female     Review of Systems   Constitutional: Negative for fever.   Respiratory:  Negative for cough, sleep disturbances due to breathing and wheezing.      Objective:     Vital Signs (Most Recent):  Temp: 97.4 °F (36.3 °C) (24 1200)  Pulse: 83 (24 1200)  Resp: 20 (24 1507)  BP: 137/72 (24 1200)  SpO2: 96 % (24 1200) Vital Signs (24h Range):  Temp:  [97.4 °F (36.3 °C)-98 °F (36.7 °C)] 97.4 °F (36.3 °C)  Pulse:  [82-85] 83  Resp:  [16-24] 20  SpO2:  [96 %-97 %] 96 %  BP: (120-137)/(63-72) 137/72     Weight: 88.5 kg (195 lb)  Height: 6' 4" (193 cm)  Body mass index is 23.74 kg/m².    No intake or output data in the 24 hours ending 24 1623     Ortho/SPM Exam   Gen:  No acute distress, well-developed, well nourished.  CV:  Peripherally well-perfused. 2+ radial pulses, symmetric.  Respiratory:  Normal respiratory " "effort. No accessory muscle use.   Head/Neck:  Normocephalic.  Atraumatic. Sclera anicteric. TM. Neck supple.  Neuro: No FND. Awake. Alert. Oriented to person, place, time, and situation.  Abdomen: Soft, NTND.      MSK:  Right Upper Extremity  Inspection  - Skin intact throughout, no open wounds  - No swelling  - No ecchymosis, erythema, or signs of cellulitis  Palpation  - NonTTP throughout, no palpable abnormality   Range of motion  - AROM and PROM of the shoulder, elbow, wrist, and hand intact  Stability  - No evidence of joint dislocation or abnormal laxity   Neurovascular  - AIN/PIN/Radial/Median/Ulnar Nerves assessed in isolation without deficit  - Able to give thumbs up, make "OK" sign, cross IF/LF, abduct/adduct fingers, make fist  - SILT throughout  - Compartments soft  - Radial artery palpated  - Muscle tone normal    Left Upper Extremity  Inspection  - Skin intact throughout, no open wounds  - No swelling  - No ecchymosis, erythema, or signs of cellulitis  Palpation  - NonTTP throughout, no palpable abnormality   Range of motion  - AROM and PROM of the shoulder, elbow, wrist, and hand intact  Stability  - No evidence of joint dislocation or abnormal laxity  Neurovascular  - AIN/PIN/Radial/Median/Ulnar Nerves assessed in isolation without deficit  - Able to give thumbs up, make "OK" sign, cross IF/LF, abduct/adduct fingers, make fist  - Paresthesias of the digits. Reduced sensation to the arm, forearm and hand.  - Compartments soft  - Radial artery palpated  - Muscle tone normal      Right Lower Extremity  Inspection  - Skin intact throughout, no open wounds  - No swelling  - No ecchymosis, erythema, or signs of cellulitis  Palpation  - NonTTP throughout, no palpable abnormality   Range of motion  - AROM and PROM of the hip, knee, ankle, and foot intact  Stability  - No evidence of joint dislocation or abnormal laxity,   Neurovascular  - TA/EHL/Gastroc/FHL assessed in isolation without deficit  - SILT " throughout  - Compartments soft  - DP palpated   - Negative Log roll  - Negative Stinchfield  - Muscle tone normal    Left Lower Extremity  Inspection  - Skin intact throughout, no open wounds  - No swelling  - No ecchymosis, erythema, or signs of cellulitis  Palpation  - NonTTP throughout, no palpable abnormality   Range of motion  - AROM and PROM of the hip, knee, ankle, and foot intact  Stability  - No evidence of joint dislocation or abnormal laxity,   Neurovascular  - TA/EHL/Gastroc/FHL assessed in isolation without deficit  - Reduced sensation over the lateral leg and foot  - Compartments soft  - DP palpated   - Negative Log roll  - Negative Stinchfield  - Muscle tone normal    Spine/pelvis/axial body:  Pt in a neck collar  Well healed cervical wound.  TTP over the cervical spine.  No tenderness to palpation of thoracic, or lumbar spine  No pain with compression of pelvis  No chest wall or abdominal tenderness  No decubitus ulcers  Muscle tone normal    Significant Labs: All pertinent labs within the past 24 hours have been reviewed.    Significant Imaging: I have reviewed and interpreted all pertinent imaging results/findings.

## 2024-07-08 NOTE — ED TRIAGE NOTES
Patient arrives to emergency department reporting that he was working in his home yesterday evening that was 95 degrees, when he fell. Unable to recall the actual events that lead up to the fall. C/O pain to left shoulder, left side and neck. At rest he rates the pain a 5/10 and with movement he reports 8/10.

## 2024-07-08 NOTE — ED PROVIDER NOTES
Emergency Department Encounter  Provider Note    Hi Braxton  [id number]  7/8/2024    Evaluation:    History Acquisition:     Chief Complaint   Patient presents with    Fall     Was hot in house, ? Passed out, called wife found and around 5 pm, inc fluids and felt better, had cervical fusion April, neck pain feeling more cracking       History of Present Illness:  Hi Braxton is a 64 y.o. male who has a past medical history of BPH with obstruction/lower urinary tract symptoms (09/28/2017), Carotid artery occlusion, Chronic anterior uveitis (03/26/2013), Coronary artery disease, Diabetes mellitus, type 2, Difficult intubation, Distended bladder (06/23/2020), Dizziness, DJD (degenerative joint disease), cervical (07/10/2015), Dysuria (05/11/2018), GERD (gastroesophageal reflux disease), History of iritis (2013), Hyperlipidemia, Hypertension, Hypokalemia (05/05/2020), Lateral epicondylitis (tennis elbow) (07/20/2015), Lip swelling (09/05/2017), Memory loss, Memory loss (06/21/2013), Mixed hyperlipidemia (01/20/2017), Muscle ache (01/28/2015), New daily persistent headache (01/20/2020), Pterygium eye, left (03/20/2021), Pyelonephritis (05/11/2018), Rectal bleeding (04/07/2017), Recurrent UTI (09/28/2017), S/P TURP (09/02/2022), Suspected sleep apnea (02/05/2020), Thyroid nodule (08/22/2019), Traumatic iritis - Left Eye (02/27/2013), Traumatic iritis - Left Eye (02/27/2013), Trigger finger of left hand (09/11/2014), and Unspecified iridocyclitis - Left Eye (04/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    The patient presents to the ED due to neck pain after fall.   Patient reports he was checking on his rental unit and found that it was 95° inside.  He remembers falling on the ground.  He called his wife, who found him on the ground in the rental unit.  She was able to get him into the car with the help of multiple family members.  They brought him inside of their home in the EAC.  He drank several bottles of Gatorade.  Since the fall, he has  been having worsening neck pain.  He recently had a posterior fusion in April of this year.  Prior to the surgery, he had pain that radiated down his left arm associated with tingling and numbness.  He reports tingling and numbness that has returned since his fall yesterday.  He states his neck feels tight.  He denies vision changes, nausea/vomiting, chest pain, shortness of breath, palpitations, abdominal pain, lower extremity injury, or any other concerns.    Additional historians utilized:  Wife at bedside, states he was initially very fatigued and unable to walk, but he has been ambulatory afterward.    Prior medical records were reviewed:   Optometry visit 06/14 for follow-up nuclear sclerosis  Ortho visits 6/13 for follow-up PCDF C3-6  Ortho visti 5/23 for f/u PCDF    The patient's list of active medical history, family/social history, medications, and allergies as documented has been reviewed.     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 Dose of amlodipine increased from 5 to 10 mg in the weeks prior to the incidnt.    Memory loss     Memory loss 06/21/2013    Mixed hyperlipidemia 01/20/2017    Muscle ache 01/28/2015    New daily persistent headache 01/20/2020    Pterygium eye, left 03/20/2021    Pyelonephritis 05/11/2018    Rectal bleeding 04/07/2017     Recurrent UTI 09/28/2017    S/P TURP 09/02/2022    Suspected sleep apnea 02/05/2020    Thyroid nodule 08/22/2019    Traumatic iritis - Left Eye 02/27/2013    Traumatic iritis - Left Eye 02/27/2013    Trigger finger of left hand 09/11/2014    Unspecified iridocyclitis - Left Eye 04/17/2013     Past Surgical History:   Procedure Laterality Date    CEREBRAL ANGIOGRAM N/A 1/6/2020    Procedure: ANGIOGRAM-CEREBRAL;  Surgeon: [facility];  Location: [facility] OR 2ND FLR;  Service: Radiology;  Laterality: N/A;  /Nagi    CERVICAL FUSION  12/30/2015    COLONOSCOPY N/A 4/7/2017    Procedure: COLONOSCOPY;  Surgeon: Handy Frederick MD;  Location: [facility] (4TH FLR);  Service: Endoscopy;  Laterality: N/A;    COLONOSCOPY N/A 11/28/2023    Procedure: COLONOSCOPY;  Surgeon: ARMAAN Hinojosa MD;  Location: [facility] (4TH FLR);  Service: Endoscopy;  Laterality: N/A;  8/21-referred by Dr. Schulte/ Diagnosis:  Blood in stool, past due on surveillance colonoscopy for advanced colon polyp villous adenoma greater than 10 mm /Prep instructions handed to patient and to portal. AS  11/21/23-Precall complete-DS    CORONARY ANGIOGRAPHY N/A 10/10/2022    Procedure: ANGIOGRAM, CORONARY ARTERY;  Surgeon: Anson Nolan MD;  Location: [facility] CATH LAB;  Service: Cardiology;  Laterality: N/A;    ENDOSCOPIC ULTRASOUND OF UPPER GASTROINTESTINAL TRACT N/A 8/22/2023    Procedure: ULTRASOUND, UPPER GI TRACT, ENDOSCOPIC;  Surgeon: Joe Means MD;  Location: [facility] ENDO (2ND FLR);  Service: Endoscopy;  Laterality: N/A;  instr portal-pt stated difficult intubation with surgery in the past-tb    FUSION OF CERVICAL SPINE BY POSTERIOR APPROACH N/A 4/24/2024    Procedure: FUSION, SPINE, CERVICAL, POSTERIOR APPROACH C3-6 DEPUY  GW TONGS WTIH WEIGHTS, FREDIS 4 POST SNS: MOTORS/SSEP/EMG;  Surgeon: Edd Burnette MD;  Location: [facility] OR;  Service: Orthopedics;  Laterality: N/A;    HERNIA REPAIR      PROSTATECTOMY       Family History    Problem Relation Name Age of Onset    Heart disease Mother      Hypertension Mother      Hyperlipidemia Mother      Stroke Mother      Heart disease Father      Heart attacks under age 50 Father      Stroke Sister Joselin     Hypertension Sister Joselin     Hyperlipidemia Sister Joselin     Hyperlipidemia Brother Antonio     Hypertension Brother Antonio     Heart disease Brother Antonio     Aneurysm Brother Antonio     Hypertension Brother Kelby     Hyperlipidemia Brother Kelby     Benign prostatic hyperplasia Brother Kelby     Heart disease Brother Kelby     Heart attack Brother Kelby     Hypertension Brother Travis     Heart disease Brother Travis     Heart attack Brother Travis     Hypertension Brother Tevin     Hepatitis Brother Tevin     Heart disease Brother Tevin     Throat cancer Brother Steven     Hypertension Brother Steven     Hyperlipidemia Brother Steven     Cataracts Maternal Grandmother      No Known Problems Maternal Grandfather      Heart disease Paternal Grandmother      No Known Problems Paternal Grandfather      Diabetes Paternal Uncle Bob     Multiple sclerosis Daughter Cori     Amblyopia Neg Hx      Blindness Neg Hx      Glaucoma Neg Hx      Macular degeneration Neg Hx      Retinal detachment Neg Hx      Strabismus Neg Hx      Thyroid disease Neg Hx      Colon cancer Neg Hx      Colon polyps Neg Hx      Esophageal cancer Neg Hx       Social History     Socioeconomic History    Marital status:    Tobacco Use    Smoking status: Former     Current packs/day: 0.00     Average packs/day: 1 pack/day for 30.0 years (30.0 ttl pk-yrs)     Types: Cigarettes     Start date: 1981     Quit date: 2011     Years since quittin.5     Passive exposure: Never    Smokeless tobacco: Never   Substance and Sexual Activity    Alcohol use: Yes     Comment: occas - nonalcoholic beer or beer    Drug use: No    Sexual activity: Yes     Partners: Female     Social Determinants of Health     Financial Resource  Strain: Low Risk  (4/25/2024)    Overall Financial Resource Strain (CARDIA)     Difficulty of Paying Living Expenses: Not hard at all   Food Insecurity: No Food Insecurity (4/25/2024)    Hunger Vital Sign     Worried About Running Out of Food in the Last Year: Never true     Ran Out of Food in the Last Year: Never true   Transportation Needs: No Transportation Needs (4/25/2024)    PRAPARE - Transportation     Lack of Transportation (Medical): No     Lack of Transportation (Non-Medical): No   Physical Activity: Inactive (6/13/2024)    Exercise Vital Sign     Days of Exercise per Week: 0 days     Minutes of Exercise per Session: 30 min   Stress: No Stress Concern Present (4/25/2024)    [employer] - Occupational Stress Questionnaire     Feeling of Stress : Not at all   Housing Stability: Unknown (6/13/2024)    Housing Stability Vital Sign     Unable to Pay for Housing in the Last Year: Patient declined     Homeless in the Last Year: No       Medications:  Medication List with Changes/Refills   Current Medications    ACETAMINOPHEN (TYLENOL) 500 MG TABLET    Take 1 tablet (500 mg total) by mouth 3 (three) times daily.    ALPRAZOLAM (XANAX) 0.5 MG TABLET    TAKE 1 TABLET THREE TIMES DAILY AS NEEDED FOR ANXIETY    ASPIRIN (ECOTRIN) 81 MG EC TABLET    Take 1 tablet (81 mg total) by mouth once daily.    CELECOXIB (CELEBREX) 100 MG CAPSULE    Take 1 capsule (100 mg total) by mouth 2 (two) times daily.    CONTOUR NEXT TEST STRIPS STRP    USE TO TEST BLOOD SUGAR ONCE DAILY    EZETIMIBE (ZETIA) 10 MG TABLET    TAKE 1 TABLET ONE TIME DAILY    GABAPENTIN (NEURONTIN) 600 MG TABLET    TAKE 1 TABLET THREE TIMES DAILY    INSULIN (LANTUS SOLOSTAR U-100 INSULIN) GLARGINE 100 UNITS/ML SUBQ PEN    Inject 25 Units into the skin every evening.    LOSARTAN (COZAAR) 100 MG TABLET    TAKE 1 TABLET ONE TIME DAILY    METHOCARBAMOL (ROBAXIN) 500 MG TAB    Take 2 tablets (1,000 mg total) by mouth 3 (three) times  "daily.    METOPROLOL SUCCINATE (TOPROL-XL) 50 MG 24 HR TABLET    TAKE 1 AND 1/2 TABLETS (75 MG TOTAL) ONCE DAILY.    MICROLET LANCET MISC    1 lancet by Misc.(Non-Drug; Combo Route) route once daily. Test blood glucose once daily as instructed.    NIFEDIPINE (PROCARDIA-XL) 60 MG (OSM) 24 HR TABLET    Take 1 tablet (60 mg total) by mouth 2 (two) times a day.    NITROGLYCERIN (NITROSTAT) 0.3 MG SL TABLET    Place 1 tablet (0.3 mg total) under the tongue every 5 (five) minutes as needed for Chest pain.    ONDANSETRON (ZOFRAN) 4 MG TABLET    Take 1 tablet (4 mg total) by mouth every 6 (six) hours as needed for Nausea.    OXYCODONE (ROXICODONE) 5 MG IMMEDIATE RELEASE TABLET    Take 1 tablet (5 mg total) by mouth every 8 (eight) hours as needed for Pain (for breakthrough pain).    PANTOPRAZOLE (PROTONIX) 40 MG TABLET    TAKE 1 TABLET TWICE DAILY    PEN NEEDLE, DIABETIC (BD ULTRA-FINE CHET PEN NEEDLE) 32 GAUGE X 5/32" NDLE    USE PEN NEEDLE AS INSTRUCTION WITH LANTUS INSULIN PRE-FILLED PEN.    PNEUMOC 20-RYAN CONJ-DIP CR,PF, (PREVNAR 20, PF,) 0.5 ML SYRG INJECTION    Inject into the muscle.    PRAVASTATIN (PRAVACHOL) 80 MG TABLET    TAKE 1 TABLET ONE TIME DAILY    RSVPREF3 ANTIGEN-AS01E, PF, (AREXVY, PF,) 120 MCG/0.5 ML SUSR VACCINE    Inject into the muscle.    SHINGRIX, PF, 50 MCG/0.5 ML INJECTION        SPIRONOLACTONE (ALDACTONE) 50 MG TABLET    TAKE 1 TABLET ONE TIME DAILY    TADALAFIL (CIALIS) 20 MG TAB    Take 1 tablet (20 mg total) by mouth every 72 hours as needed (ED).       Allergies:  Review of patient's allergies indicates:   Allergen Reactions    Lisinopril Anaphylaxis and Nausea And Vomiting     General bad feeling    Lipitor [atorvastatin] Other (See Comments)     Muscle aches    Adhesive     Crestor [rosuvastatin] Swelling     Lip swelling.     Jardiance [empagliflozin] Other (See Comments)     Possible related to recent UTI's     Metformin      Used XR and experienced flatulance and abdominal pain.  "       Review of Systems      Physical Exam:     Initial Vitals [07/08/24 1054]   BP Pulse Resp Temp SpO2   129/66 85 18 98 °F (36.7 °C) 97 %      MAP       --         Physical Exam    Differential Diagnoses:   Based on available information and initial assessment, Differential Diagnosis includes, but is not limited to:  Fracture, dislocation, compartment syndrome, nerve injury/palsy, vascular injury, DVT, rhabdomyolysis, hemarthrosis, septic joint, cellulitis, bursitis, muscle strain, ligament tear/sprain, laceration, foreign body, abrasion, soft tissue contusion, osteoarthritis.      ED Management:   Procedures    Orders Placed This Encounter    COLLAR CERVICAL ADULT REGULAR    CT Head Without Contrast    CT Cervical Spine Without Contrast    X-Ray Chest AP Portable    X-Ray Shoulder Trauma Left    CBC auto differential    Comprehensive metabolic panel    Magnesium    Troponin I    Brain natriuretic peptide    CK    Inpatient consult to Orthopedic Surgery    EKG 12-lead    oxyCODONE-acetaminophen 5-325 mg per tablet 1 tablet    morphine injection 6 mg          EKG:   EKG interpretation by ED attending physician:  NSR, rate 88, no ST changes, no ischemia, normal intervals.  Compared with prior EKG dated 03/2024, grossly stable without significant change.      Labs:     Labs Reviewed   CBC W/ AUTO DIFFERENTIAL - Abnormal; Notable for the following components:       Result Value    Eos # 0.6 (*)     Eosinophil % 10.3 (*)     All other components within normal limits   COMPREHENSIVE METABOLIC PANEL - Abnormal; Notable for the following components:    Glucose 169 (*)     Anion Gap 7 (*)     All other components within normal limits   CK - Abnormal; Notable for the following components:     (*)     All other components within normal limits   MAGNESIUM   TROPONIN I   B-TYPE NATRIURETIC PEPTIDE     Independent review of the labs ordered include:   See ED course    Imaging:     Imaging Results              X-Ray  "Shoulder Trauma Left (Final result)  Result time 07/08/24 13:47:11      Final result by Tom Dougherty MD (07/08/24 13:47:11)                   Impression:      No acute displaced fracture.      Electronically signed by: oTm Dougherty MD  Date:    07/08/2024  Time:    13:47               Narrative:    EXAMINATION:  XR SHOULDER TRAUMA 3 VIEW LEFT    CLINICAL HISTORY:  Pain in left shoulder    TECHNIQUE:  Three views of the left shoulder were performed.    COMPARISON  01/19/2024.    FINDINGS:  No acute displaced fracture.  No dislocation.  Degenerative changes in the acromioclavicular joint.  No unexpected radiopaque foreign body.                                       X-Ray Chest AP Portable (Final result)  Result time 07/08/24 13:46:00      Final result by Tom Dougherty MD (07/08/24 13:46:00)                   Impression:      No acute cardiopulmonary finding identified on this single view.      Electronically signed by: Tom Dougherty MD  Date:    07/08/2024  Time:    13:46               Narrative:    EXAMINATION:  XR CHEST AP PORTABLE    CLINICAL HISTORY:  Provided history is "syncope;  ".    TECHNIQUE:  One view of the chest.    COMPARISON:  09/11/2023.    FINDINGS:  Cardiac wires overlie the chest.  Cardiac silhouette is not enlarged.  No focal consolidation.  No sizable pleural effusion.  No pneumothorax.                                        CT Cervical Spine Without Contrast (Final result)  Result time 07/08/24 12:32:05      Final result by Juan Rosales DO (07/08/24 12:32:05)                   Impression:      CT head: No acute intracranial findings specifically evidence for acute intracranial hemorrhage or sulcal effacement to suggest large territory recent infarction.    CT cervical spine: Remote operative change anterior spinal fusion with interval operative change from posterior spinal fusion..  There is subtle lucency along the intervally placed bilateral C6 facet screws component of " hardware loosening not excluded.  Clinical correlation advised.    No evidence for acute fracture cervical spine.  Trace grade 1 anterolisthesis of C5 on C6.    Further evaluation as warranted clinically.      Electronically signed by: Juan Rosales DO  Date:    07/08/2024  Time:    12:32               Narrative:    EXAMINATION:  CT HEAD WITHOUT CONTRAST; CT CERVICAL SPINE WITHOUT CONTRAST    CLINICAL HISTORY:  Head trauma, minor (Age >= 65y);; Neck trauma (Age >= 65y);    TECHNIQUE:  CT head: Multiple sequential 5 mm axial images of the head without contrast.  Coronal and sagittal reformatted imaging from the axial acquisition.    CT cervical spine: Multiple sequential 1.25 mm axial images of the cervical spine without contrast.  Coronal and sagittal reformatted imaging from the axial acquisition.    COMPARISON:  CT head 03/10/2022 and CT cervical spine 03/11/2020 for    FINDINGS:  CT head: There is no evidence for acute intracranial hemorrhage or sulcal effacement.  The ventricles are normal in size without hydrocephalus.  There is no midline shift or mass effect.  Mild mucosal thickening maxillary antra with moderate patchy mucosal thickening ethmoid air cells.  There is a focal hyperdensity within the left posterior ethmoid air cell measuring 0.6 cm suggestive for osteoma unchanged paranasal sinuses and mastoid air cells are clear.    CT cervical spine: Right operative change from C3 through C5 anterior spinal fusion with metallic plate screw device interbody devices.  There is no evidence for definite hardware failure.  There is interval superimposed operative change with posterior spinal fusion C3 through C6 with paired spinal rods facet screws.  No evidence for hardware fracture.  Straightening of the cervical lordosis similar to prior with trace grade 1 anterolisthesis of C5 on C6.  Allowing for artifacts from motion and postoperative metal there is no definite acute fracture of the cervical spine.  Please  note there is subtle lucency along the bilateral C6 facet screws cannot exclude component of hardware loosening.  Please note evaluation of the central canal neural foramen is distorted by artifact from postoperative metal with interval C4 through C6 laminectomies.  No significant consolidation visualized lung apices    This report was flagged in Epic as abnormal.                                        CT Head Without Contrast (Final result)  Result time 07/08/24 12:32:05      Final result by Juan Rosales DO (07/08/24 12:32:05)                   Impression:      CT head: No acute intracranial findings specifically evidence for acute intracranial hemorrhage or sulcal effacement to suggest large territory recent infarction.    CT cervical spine: Remote operative change anterior spinal fusion with interval operative change from posterior spinal fusion..  There is subtle lucency along the intervally placed bilateral C6 facet screws component of hardware loosening not excluded.  Clinical correlation advised.    No evidence for acute fracture cervical spine.  Trace grade 1 anterolisthesis of C5 on C6.    Further evaluation as warranted clinically.      Electronically signed by: Juan Rosales DO  Date:    07/08/2024  Time:    12:32               Narrative:    EXAMINATION:  CT HEAD WITHOUT CONTRAST; CT CERVICAL SPINE WITHOUT CONTRAST    CLINICAL HISTORY:  Head trauma, minor (Age >= 65y);; Neck trauma (Age >= 65y);    TECHNIQUE:  CT head: Multiple sequential 5 mm axial images of the head without contrast.  Coronal and sagittal reformatted imaging from the axial acquisition.    CT cervical spine: Multiple sequential 1.25 mm axial images of the cervical spine without contrast.  Coronal and sagittal reformatted imaging from the axial acquisition.    COMPARISON:  CT head 03/10/2022 and CT cervical spine 03/11/2020 for    FINDINGS:  CT head: There is no evidence for acute intracranial hemorrhage or sulcal effacement.  The  ventricles are normal in size without hydrocephalus.  There is no midline shift or mass effect.  Mild mucosal thickening maxillary antra with moderate patchy mucosal thickening ethmoid air cells.  There is a focal hyperdensity within the left posterior ethmoid air cell measuring 0.6 cm suggestive for osteoma unchanged paranasal sinuses and mastoid air cells are clear.    CT cervical spine: Right operative change from C3 through C5 anterior spinal fusion with metallic plate screw device interbody devices.  There is no evidence for definite hardware failure.  There is interval superimposed operative change with posterior spinal fusion C3 through C6 with paired spinal rods facet screws.  No evidence for hardware fracture.  Straightening of the cervical lordosis similar to prior with trace grade 1 anterolisthesis of C5 on C6.  Allowing for artifacts from motion and postoperative metal there is no definite acute fracture of the cervical spine.  Please note there is subtle lucency along the bilateral C6 facet screws cannot exclude component of hardware loosening.  Please note evaluation of the central canal neural foramen is distorted by artifact from postoperative metal with interval C4 through C6 laminectomies.  No significant consolidation visualized lung apices    This report was flagged in Epic as abnormal.                                         Medications Given:     Medications   oxyCODONE-acetaminophen 5-325 mg per tablet 1 tablet (1 tablet Oral Given 7/8/24 1309)   morphine injection 6 mg (6 mg Intravenous Given 7/8/24 1507)        Medical Decision Making:    Additional Consideration:   Additional testing considered during clinical course: none    Social determinants of health considered during development of treatment plan include: poor access to care    Current co-morbidities considered which impacted clinical decision making: post-op cervical fusion, HTN, GERD, PVD, DM    Case discussed with additional  provider:   Ortho resident, Dr. Leonard, will evaluate at bedside    ED Course as of 07/08/24 1538   Mon Jul 08, 2024   1402 SpO2: 97 % [SS]   1402 Resp: 18 [SS]   1402 Pulse: 85 [SS]   1402 Temp Source: Oral [SS]   1402 Temp: 98 °F (36.7 °C) [SS]   1402 BP: 129/66  Vitals reassuring  [SS]   1402 CK(!)  Mildly elevated [SS]   1402 Troponin I  WNL [SS]   1402 Magnesium  WNL [SS]   1402 Brain natriuretic peptide  WNL [SS]   1402 Comprehensive metabolic panel(!)  Unremarkable  [SS]   1402 CBC auto differential(!)  Unremarkable  [SS]   1402 X-Ray Shoulder Trauma Left  Shoulder x-ray independently interpreted: no fracture or dislocation.  Agree with radiologist interpretation.    [SS]   1402 X-Ray Chest AP Portable  CXR independently interpreted: no focal infiltrate, effusion, edema, free air, or other acute process.  Agree with radiologist interpretation.    [SS]   1403 CT Head Without Contrast(!)  CT head independently interpreted: no intracranial hemorrhage, mass effect, or midline shift.  Agree with radiologist interpretation.    [SS]   1403 CT Cervical Spine Without Contrast(!)  CT c-spine independently interpreted: post-op anterior and posterior fusion. Possible hardware loosening along C6 screw.   Agree with radiologist interpretation.    [SS]   1403 Ortho spine consulted, awaiting recommendations.  [SS]      ED Course User Index  [SS] Edison Anne MD            Medical Decision Making  Problems Addressed:  Fall: acute illness or injury  Left cervical radiculopathy: acute illness or injury  Left shoulder pain: acute illness or injury  Neck pain: acute illness or injury    Amount and/or Complexity of Data Reviewed  Independent Historian: spouse  External Data Reviewed: notes.  Labs: ordered. Decision-making details documented in ED Course.  Radiology: ordered and independent interpretation performed. Decision-making details documented in ED Course.  ECG/medicine tests: ordered and independent interpretation  performed. Decision-making details documented in ED Course.    Risk  Parenteral controlled substances.  Diagnosis or treatment significantly limited by social determinants of health.        Clinical Impression:       ICD-10-CM ICD-9-CM   1. Left cervical radiculopathy  M54.12 723.4   2. Fall  W19.XXXA E888.9   3. Left shoulder pain  M25.512 719.41   4. Neck pain  M54.2 723.1         Follow-up Information    None              Edison Anne MD  07/08/24 9814

## 2024-07-08 NOTE — HPI
Hi Braxton is a 64 y.o. male with Pmhx of PVD, GERD, cervical fusion presenting with neck pain and left shoulder pain after a glf 1 day ago. He fell after loc due to dizziness in an overheated room. He is unsure if he hit his head or neck. He reports associated symptoms of paresthesias to the distal tip of left all five digits and left shoulder pain. Shoulder pain improves with pain med. He is 3 months s/p posterior cervical fusion with Dr. Burnette. He reported that his paresthesias revolved and neck pain improved after surgery. Neck pain was better whenever he took tylenol. He also reported persistent mild left sided weakness after the surgery. Walks w/out assisted devices at baseline. Doesn't take any anticoagulation at baseline. Last ate     They deny IV drug use  They deny tobacco use.   They deny alcohol use.   They deny immunosuppressant medications.  They deny chemotherapy.  They deny radiation therapy.

## 2024-07-09 VITALS
SYSTOLIC BLOOD PRESSURE: 129 MMHG | OXYGEN SATURATION: 99 % | HEART RATE: 68 BPM | TEMPERATURE: 98 F | HEIGHT: 76 IN | WEIGHT: 195 LBS | BODY MASS INDEX: 23.75 KG/M2 | RESPIRATION RATE: 18 BRPM | DIASTOLIC BLOOD PRESSURE: 72 MMHG

## 2024-07-09 LAB
ESTIMATED AVG GLUCOSE: 128 MG/DL (ref 68–131)
HBA1C MFR BLD: 6.1 % (ref 4–5.6)
POCT GLUCOSE: 114 MG/DL (ref 70–110)

## 2024-07-09 PROCEDURE — G0378 HOSPITAL OBSERVATION PER HR: HCPCS

## 2024-07-09 PROCEDURE — 83036 HEMOGLOBIN GLYCOSYLATED A1C: CPT

## 2024-07-09 PROCEDURE — 25000003 PHARM REV CODE 250

## 2024-07-09 PROCEDURE — 63600175 PHARM REV CODE 636 W HCPCS: Performed by: STUDENT IN AN ORGANIZED HEALTH CARE EDUCATION/TRAINING PROGRAM

## 2024-07-09 RX ORDER — OXYCODONE HYDROCHLORIDE 5 MG/1
5 TABLET ORAL EVERY 8 HOURS PRN
Qty: 15 TABLET | Refills: 0 | Status: SHIPPED | OUTPATIENT
Start: 2024-07-09 | End: 2024-07-14

## 2024-07-09 RX ORDER — ALPRAZOLAM 0.25 MG/1
0.5 TABLET ORAL 3 TIMES DAILY PRN
Status: DISCONTINUED | OUTPATIENT
Start: 2024-07-09 | End: 2024-07-09 | Stop reason: HOSPADM

## 2024-07-09 RX ORDER — EZETIMIBE 10 MG/1
10 TABLET ORAL DAILY
Status: DISCONTINUED | OUTPATIENT
Start: 2024-07-09 | End: 2024-07-09 | Stop reason: HOSPADM

## 2024-07-09 RX ORDER — CELECOXIB 100 MG/1
100 CAPSULE ORAL 2 TIMES DAILY
Status: DISCONTINUED | OUTPATIENT
Start: 2024-07-09 | End: 2024-07-09 | Stop reason: HOSPADM

## 2024-07-09 RX ORDER — ONDANSETRON 8 MG/1
8 TABLET, ORALLY DISINTEGRATING ORAL EVERY 8 HOURS PRN
Status: DISCONTINUED | OUTPATIENT
Start: 2024-07-09 | End: 2024-07-09 | Stop reason: HOSPADM

## 2024-07-09 RX ORDER — IBUPROFEN 200 MG
24 TABLET ORAL
Status: DISCONTINUED | OUTPATIENT
Start: 2024-07-09 | End: 2024-07-09 | Stop reason: HOSPADM

## 2024-07-09 RX ORDER — MORPHINE SULFATE 2 MG/ML
2 INJECTION, SOLUTION INTRAMUSCULAR; INTRAVENOUS
Status: COMPLETED | OUTPATIENT
Start: 2024-07-09 | End: 2024-07-09

## 2024-07-09 RX ORDER — NALOXONE HCL 0.4 MG/ML
0.02 VIAL (ML) INJECTION
Status: DISCONTINUED | OUTPATIENT
Start: 2024-07-09 | End: 2024-07-09 | Stop reason: HOSPADM

## 2024-07-09 RX ORDER — ACETAMINOPHEN 500 MG
1000 TABLET ORAL EVERY 8 HOURS PRN
Status: DISCONTINUED | OUTPATIENT
Start: 2024-07-09 | End: 2024-07-09 | Stop reason: HOSPADM

## 2024-07-09 RX ORDER — METHOCARBAMOL 500 MG/1
1000 TABLET, FILM COATED ORAL 3 TIMES DAILY
Status: DISCONTINUED | OUTPATIENT
Start: 2024-07-09 | End: 2024-07-09 | Stop reason: HOSPADM

## 2024-07-09 RX ORDER — POLYETHYLENE GLYCOL 3350 17 G/17G
17 POWDER, FOR SOLUTION ORAL 2 TIMES DAILY PRN
Status: DISCONTINUED | OUTPATIENT
Start: 2024-07-09 | End: 2024-07-09 | Stop reason: HOSPADM

## 2024-07-09 RX ORDER — INSULIN GLARGINE 100 [IU]/ML
14 INJECTION, SOLUTION SUBCUTANEOUS DAILY
Status: DISCONTINUED | OUTPATIENT
Start: 2024-07-09 | End: 2024-07-09 | Stop reason: HOSPADM

## 2024-07-09 RX ORDER — LOSARTAN POTASSIUM 50 MG/1
100 TABLET ORAL DAILY
Status: DISCONTINUED | OUTPATIENT
Start: 2024-07-09 | End: 2024-07-09 | Stop reason: HOSPADM

## 2024-07-09 RX ORDER — CELECOXIB 200 MG/1
200 CAPSULE ORAL 2 TIMES DAILY PRN
COMMUNITY

## 2024-07-09 RX ORDER — PANTOPRAZOLE SODIUM 40 MG/1
40 TABLET, DELAYED RELEASE ORAL 2 TIMES DAILY
Status: DISCONTINUED | OUTPATIENT
Start: 2024-07-09 | End: 2024-07-09 | Stop reason: HOSPADM

## 2024-07-09 RX ORDER — IBUPROFEN 200 MG
16 TABLET ORAL
Status: DISCONTINUED | OUTPATIENT
Start: 2024-07-09 | End: 2024-07-09 | Stop reason: HOSPADM

## 2024-07-09 RX ORDER — ACETAMINOPHEN 500 MG
1000 TABLET ORAL EVERY 8 HOURS
Status: DISCONTINUED | OUTPATIENT
Start: 2024-07-09 | End: 2024-07-09 | Stop reason: HOSPADM

## 2024-07-09 RX ORDER — ACETAMINOPHEN 325 MG/1
650 TABLET ORAL EVERY 4 HOURS PRN
Status: DISCONTINUED | OUTPATIENT
Start: 2024-07-09 | End: 2024-07-09 | Stop reason: HOSPADM

## 2024-07-09 RX ORDER — NIFEDIPINE 30 MG/1
60 TABLET, EXTENDED RELEASE ORAL 2 TIMES DAILY
Status: DISCONTINUED | OUTPATIENT
Start: 2024-07-09 | End: 2024-07-09 | Stop reason: HOSPADM

## 2024-07-09 RX ORDER — PRAVASTATIN SODIUM 80 MG/1
80 TABLET ORAL DAILY
Status: DISCONTINUED | OUTPATIENT
Start: 2024-07-09 | End: 2024-07-09 | Stop reason: HOSPADM

## 2024-07-09 RX ORDER — GLUCAGON 1 MG
1 KIT INJECTION
Status: DISCONTINUED | OUTPATIENT
Start: 2024-07-09 | End: 2024-07-09 | Stop reason: HOSPADM

## 2024-07-09 RX ORDER — INSULIN ASPART 100 [IU]/ML
0-5 INJECTION, SOLUTION INTRAVENOUS; SUBCUTANEOUS
Status: DISCONTINUED | OUTPATIENT
Start: 2024-07-09 | End: 2024-07-09 | Stop reason: HOSPADM

## 2024-07-09 RX ORDER — GABAPENTIN 300 MG/1
600 CAPSULE ORAL 3 TIMES DAILY
Status: DISCONTINUED | OUTPATIENT
Start: 2024-07-09 | End: 2024-07-09 | Stop reason: HOSPADM

## 2024-07-09 RX ORDER — OXYCODONE HYDROCHLORIDE 5 MG/1
10 TABLET ORAL EVERY 6 HOURS PRN
Status: DISCONTINUED | OUTPATIENT
Start: 2024-07-09 | End: 2024-07-09 | Stop reason: HOSPADM

## 2024-07-09 RX ORDER — TALC
6 POWDER (GRAM) TOPICAL NIGHTLY PRN
Status: DISCONTINUED | OUTPATIENT
Start: 2024-07-09 | End: 2024-07-09 | Stop reason: HOSPADM

## 2024-07-09 RX ORDER — ACETAMINOPHEN 500 MG
1000 TABLET ORAL EVERY 8 HOURS PRN
Start: 2024-07-09

## 2024-07-09 RX ORDER — ASPIRIN 81 MG/1
81 TABLET ORAL DAILY
Status: DISCONTINUED | OUTPATIENT
Start: 2024-07-09 | End: 2024-07-09 | Stop reason: HOSPADM

## 2024-07-09 RX ORDER — ALUMINUM HYDROXIDE, MAGNESIUM HYDROXIDE, AND SIMETHICONE 1200; 120; 1200 MG/30ML; MG/30ML; MG/30ML
30 SUSPENSION ORAL 4 TIMES DAILY PRN
Status: DISCONTINUED | OUTPATIENT
Start: 2024-07-09 | End: 2024-07-09 | Stop reason: HOSPADM

## 2024-07-09 RX ORDER — SPIRONOLACTONE 25 MG/1
50 TABLET ORAL DAILY
Status: DISCONTINUED | OUTPATIENT
Start: 2024-07-10 | End: 2024-07-09 | Stop reason: HOSPADM

## 2024-07-09 RX ORDER — SODIUM CHLORIDE 0.9 % (FLUSH) 0.9 %
5 SYRINGE (ML) INJECTION
Status: DISCONTINUED | OUTPATIENT
Start: 2024-07-09 | End: 2024-07-09 | Stop reason: HOSPADM

## 2024-07-09 RX ORDER — SIMETHICONE 80 MG
1 TABLET,CHEWABLE ORAL 4 TIMES DAILY PRN
Status: DISCONTINUED | OUTPATIENT
Start: 2024-07-09 | End: 2024-07-09 | Stop reason: HOSPADM

## 2024-07-09 RX ORDER — IPRATROPIUM BROMIDE AND ALBUTEROL SULFATE 2.5; .5 MG/3ML; MG/3ML
3 SOLUTION RESPIRATORY (INHALATION) EVERY 4 HOURS PRN
Status: DISCONTINUED | OUTPATIENT
Start: 2024-07-09 | End: 2024-07-09 | Stop reason: HOSPADM

## 2024-07-09 RX ORDER — BISACODYL 10 MG/1
10 SUPPOSITORY RECTAL DAILY PRN
Status: DISCONTINUED | OUTPATIENT
Start: 2024-07-09 | End: 2024-07-09 | Stop reason: HOSPADM

## 2024-07-09 RX ADMIN — GABAPENTIN 600 MG: 300 CAPSULE ORAL at 08:07

## 2024-07-09 RX ADMIN — ONDANSETRON 8 MG: 8 TABLET, ORALLY DISINTEGRATING ORAL at 10:07

## 2024-07-09 RX ADMIN — METHOCARBAMOL 1000 MG: 500 TABLET ORAL at 09:07

## 2024-07-09 RX ADMIN — PANTOPRAZOLE SODIUM 40 MG: 40 TABLET, DELAYED RELEASE ORAL at 08:07

## 2024-07-09 RX ADMIN — OXYCODONE 10 MG: 5 TABLET ORAL at 08:07

## 2024-07-09 RX ADMIN — GABAPENTIN 600 MG: 300 CAPSULE ORAL at 05:07

## 2024-07-09 RX ADMIN — METOPROLOL SUCCINATE 75 MG: 50 TABLET, EXTENDED RELEASE ORAL at 08:07

## 2024-07-09 RX ADMIN — ACETAMINOPHEN 1000 MG: 500 TABLET ORAL at 05:07

## 2024-07-09 RX ADMIN — LOSARTAN POTASSIUM 100 MG: 50 TABLET, FILM COATED ORAL at 09:07

## 2024-07-09 RX ADMIN — ASPIRIN 81 MG: 81 TABLET, COATED ORAL at 09:07

## 2024-07-09 RX ADMIN — NIFEDIPINE 60 MG: 30 TABLET, FILM COATED, EXTENDED RELEASE ORAL at 08:07

## 2024-07-09 RX ADMIN — MORPHINE SULFATE 2 MG: 2 INJECTION, SOLUTION INTRAMUSCULAR; INTRAVENOUS at 02:07

## 2024-07-09 NOTE — HPI
Hi Braxton is a 64 y.o. male with PMHx of T2DM, CAD, GERD, HLD, HTN. He is 3 months s/p posterior cervical fusion with Dr. Burnette. [facility] for neck pain after a ground level fall at home. Pain is posterior neck into left shoulder/arm. No new numbness, tingling, weakness but does have some ongoing numbness/weakness LUE and some ongoing weakness LLE. He reports pain is same distrubution as prior to fusion in April. Severe in nature. He reports he worked in his house all day Sunday without air conditioning in 95F temperature and became dehydrated which resulted in his fall. He hit his right eyebrow and had small bleed that resolved. Denies any pain elsewhere. Denies confusion. He may have lsot consciousness, he is not sure how long. Denies other complaints.       In the ED: intermittently hypertensive otherwise VSS.  . Otherwise labs unremarkable. CT head no acute intracranial findings. CT cervical spine w/ remote operative change anterior spinal fusion with interval operative change from posterior spinal fusion w/ subtle lucency along the intervally placed bilateral C6 facet screws component. MRI cervical spine done. Ortho consulted and recommended observation for pain control.  Given morphine 6 mg and 2 mg, percocet 5 x2. Admitted to .

## 2024-07-09 NOTE — PROVIDER PROGRESS NOTES - EMERGENCY DEPT.
ED Physician Hand-off Note:    ED Course: I assumed care of patient from off-going ED physician, Dr. Anne.  Briefly, Patient is presented for fall with prior cervical spine surgeries.    CT C-spine showed possible loosening of hardware.  Orthopedic spine service was consulted for further recommendations.  Patient was placed in aspen collar    At the time of signout plan was pending Orthopedic recommendations.    Orthopedic surgery ordered an x-ray of the C-spine which did not show acute changes.    Orthopedic surgery later decided to order a MRI C-spine.    Patient signed out to oncoming provider pending MRI, and further ortho recs    Disposition: pending    Final diagnoses:  [W19.XXXA] Fall  [M25.512] Left shoulder pain  [M54.2] Neck pain  [M54.12] Left cervical radiculopathy (Primary)

## 2024-07-09 NOTE — ASSESSMENT & PLAN NOTE
Patient with known CAD which is controlled Will continue ASA and Statin and monitor for S/Sx of angina/ACS. Continue to monitor on telemetry.

## 2024-07-09 NOTE — ASSESSMENT & PLAN NOTE
64 M 3 months s/p C3-6 fusion with Dr. Burnette presenting with neck pain and LUE numbness/clumsiness after a ground level fall with head trauma  1 day ago.   - Imaging as above.   - Ortho consulted, appreciate recommendations  - Scheduled Celebrex, tylenol, gabapentin, robaxin for pain  - PRN oxycodone

## 2024-07-09 NOTE — SUBJECTIVE & OBJECTIVE
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 Dose of amlodipine increased from 5 to 10 mg in the weeks prior to the incidnt.    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       Past Surgical History:   Procedure Laterality Date    CEREBRAL ANGIOGRAM N/A 1/6/2020    Procedure: ANGIOGRAM-CEREBRAL;  Surgeon: [facility] Diagnostic Provider;  Location: [facility] OR [address] [place] [address];  Service: Radiology;  Laterality: N/A;  /Nagi    CERVICAL FUSION  12/30/2015    COLONOSCOPY N/A 4/7/2017    Procedure: COLONOSCOPY;  Surgeon: Handy Frederick MD;  Location: [facility] ENDO (4TH FLR);  Service: Endoscopy;  Laterality: N/A;    COLONOSCOPY N/A 11/28/2023    Procedure: COLONOSCOPY;  Surgeon: ARMAAN Hinojosa MD;  Location: [facility] ENDO (4TH FLR);  Service: Endoscopy;  Laterality: N/A;  8/21-referred by Dr. Schulte/  Diagnosis:  Blood in stool, past due on surveillance colonoscopy for advanced colon polyp villous adenoma greater than 10 mm /Prep instructions handed to patient and to portal. AS  11/21/23-Precall complete-DS    CORONARY ANGIOGRAPHY N/A 10/10/2022    Procedure: ANGIOGRAM, CORONARY ARTERY;  Surgeon: Anson Nolan MD;  Location: [facility] CATH LAB;  Service: Cardiology;  Laterality: N/A;    ENDOSCOPIC ULTRASOUND OF UPPER GASTROINTESTINAL TRACT N/A 8/22/2023    Procedure: ULTRASOUND, UPPER GI TRACT, ENDOSCOPIC;  Surgeon: Joe Means MD;  Location: [facility] ENDO (2ND FLR);  Service: Endoscopy;  Laterality: N/A;  instr portal-pt stated difficult intubation with surgery in the past-tb    FUSION OF CERVICAL SPINE BY POSTERIOR APPROACH N/A 4/24/2024    Procedure: FUSION, SPINE, CERVICAL, POSTERIOR APPROACH C3-6 DEPUY  GW TONGS WTIH WEIGHTS, FREDIS 4 POST SNS: MOTORS/SSEP/EMG;  Surgeon: Edd Burnette MD;  Location: [facility] OR;  Service: Orthopedics;  Laterality: N/A;    HERNIA REPAIR      PROSTATECTOMY         Review of patient's allergies indicates:   Allergen Reactions    Lisinopril Anaphylaxis and Nausea And Vomiting     General bad feeling    Lipitor [atorvastatin] Other (See Comments)     Muscle aches    Adhesive     Crestor [rosuvastatin] Swelling     Lip swelling.     Jardiance [empagliflozin] Other (See Comments)     Possible related to recent UTI's     Metformin      Used XR and experienced flatulance and abdominal pain.        No current facility-administered medications on file prior to encounter.     Current Outpatient Medications on File Prior to Encounter   Medication Sig    acetaminophen (TYLENOL) 500 MG tablet Take 1 tablet (500 mg total) by mouth 3 (three) times daily.    ALPRAZolam (XANAX) 0.5 MG tablet TAKE 1 TABLET THREE TIMES DAILY AS NEEDED FOR ANXIETY    aspirin (ECOTRIN) 81 MG EC tablet Take 1 tablet (81 mg total) by mouth once daily.    celecoxib (CELEBREX) 100 MG capsule Take 1 capsule (100 mg  "total) by mouth 2 (two) times daily.    CONTOUR NEXT TEST STRIPS Strp USE TO TEST BLOOD SUGAR ONCE DAILY    ezetimibe (ZETIA) 10 mg tablet TAKE 1 TABLET ONE TIME DAILY    gabapentin (NEURONTIN) 600 MG tablet TAKE 1 TABLET THREE TIMES DAILY    insulin (LANTUS SOLOSTAR U-100 INSULIN) glargine 100 units/mL SubQ pen Inject 25 Units into the skin every evening.    losartan (COZAAR) 100 MG tablet TAKE 1 TABLET ONE TIME DAILY    methocarbamoL (ROBAXIN) 500 MG Tab Take 2 tablets (1,000 mg total) by mouth 3 (three) times daily.    metoprolol succinate (TOPROL-XL) 50 MG 24 hr tablet TAKE 1 AND 1/2 TABLETS (75 MG TOTAL) ONCE DAILY.    MICROLET LANCET Misc 1 lancet by Misc.(Non-Drug; Combo Route) route once daily. Test blood glucose once daily as instructed.    NIFEdipine (PROCARDIA-XL) 60 MG (OSM) 24 hr tablet Take 1 tablet (60 mg total) by mouth 2 (two) times a day.    nitroGLYCERIN (NITROSTAT) 0.3 MG SL tablet Place 1 tablet (0.3 mg total) under the tongue every 5 (five) minutes as needed for Chest pain.    ondansetron (ZOFRAN) 4 MG tablet Take 1 tablet (4 mg total) by mouth every 6 (six) hours as needed for Nausea.    oxyCODONE (ROXICODONE) 5 MG immediate release tablet Take 1 tablet (5 mg total) by mouth every 8 (eight) hours as needed for Pain (for breakthrough pain).    pantoprazole (PROTONIX) 40 MG tablet TAKE 1 TABLET TWICE DAILY    pen needle, diabetic (BD ULTRA-FINE CHET PEN NEEDLE) 32 gauge x 5/32" Ndle USE PEN NEEDLE AS INSTRUCTION WITH LANTUS INSULIN PRE-FILLED PEN.    pneumoc 20-jonny conj-dip cr,PF, (PREVNAR 20, PF,) 0.5 mL Syrg injection Inject into the muscle.    pravastatin (PRAVACHOL) 80 MG tablet TAKE 1 TABLET ONE TIME DAILY    RSVPreF3 antigen-AS01E, PF, (AREXVY, PF,) 120 mcg/0.5 mL SusR vaccine Inject into the muscle.    SHINGRIX, PF, 50 mcg/0.5 mL injection     spironolactone (ALDACTONE) 50 MG tablet TAKE 1 TABLET ONE TIME DAILY    tadalafiL (CIALIS) 20 MG Tab Take 1 tablet (20 mg total) by mouth every 72 " hours as needed (ED).     Family History       Problem Relation (Age of Onset)    Aneurysm Brother    Benign prostatic hyperplasia Brother    Cataracts Maternal Grandmother    Diabetes Paternal Uncle    Heart attack Brother, Brother    Heart attacks under age 50 Father    Heart disease Mother, Father, Brother, Brother, Brother, Brother, Paternal Grandmother    Hepatitis Brother    Hyperlipidemia Mother, Sister, Brother, Brother, Brother    Hypertension Mother, Sister, Brother, Brother, Brother, Brother, Brother    Multiple sclerosis Daughter    No Known Problems Maternal Grandfather, Paternal Grandfather    Stroke Mother, Sister    Throat cancer Brother          Tobacco Use    Smoking status: Former     Current packs/day: 0.00     Average packs/day: 1 pack/day for 30.0 years (30.0 ttl pk-yrs)     Types: Cigarettes     Start date: 1981     Quit date: 2011     Years since quittin.5     Passive exposure: Never    Smokeless tobacco: Never   Substance and Sexual Activity    Alcohol use: Yes     Comment: occas - nonalcoholic beer or beer    Drug use: No    Sexual activity: Yes     Partners: Female     Review of Systems   Constitutional:  Negative for chills and fever.   HENT:  Negative for trouble swallowing.    Eyes:  Negative for photophobia and visual disturbance.   Respiratory:  Negative for cough and shortness of breath.    Cardiovascular:  Negative for chest pain and leg swelling.   Gastrointestinal:  Negative for abdominal pain, nausea and vomiting.   Genitourinary:  Negative for dysuria and frequency.   Musculoskeletal:  Positive for arthralgias and neck pain. Negative for back pain and gait problem.   Skin:  Negative for rash and wound.   Neurological:  Positive for weakness and numbness. Negative for light-headedness and headaches.   Psychiatric/Behavioral:  Negative for agitation and confusion.      Objective:     Vital Signs (Most Recent):  Temp: 97.8 °F (36.6 °C) (24)  Pulse: 86  (07/09/24 0332)  Resp: 18 (07/09/24 0213)  BP: (!) 165/90 (07/09/24 0332)  SpO2: (!) 92 % (07/09/24 0332) Vital Signs (24h Range):  Temp:  [97.4 °F (36.3 °C)-98 °F (36.7 °C)] 97.8 °F (36.6 °C)  Pulse:  [70-86] 86  Resp:  [12-27] 18  SpO2:  [91 %-97 %] 92 %  BP: (120-165)/(62-90) 165/90     Weight: 88.5 kg (195 lb)  Body mass index is 23.74 kg/m².     Physical Exam  Vitals and nursing note reviewed.   Constitutional:       General: He is not in acute distress.  HENT:      Head: Normocephalic and atraumatic.      Comments: 3 mm non bleeding lesion right eyebrow     Nose: Nose normal.      Mouth/Throat:      Pharynx: No oropharyngeal exudate.   Eyes:      Extraocular Movements: Extraocular movements intact.      Conjunctiva/sclera: Conjunctivae normal.   Neck:      Comments: C collar in place  Cardiovascular:      Rate and Rhythm: Normal rate and regular rhythm.      Heart sounds: Normal heart sounds.   Pulmonary:      Effort: Pulmonary effort is normal. No respiratory distress.      Breath sounds: Normal breath sounds.   Abdominal:      General: Bowel sounds are normal. There is no distension.      Palpations: Abdomen is soft.      Tenderness: There is no abdominal tenderness.   Musculoskeletal:         General: Normal range of motion.      Cervical back: Tenderness present.      Right lower leg: No edema.      Left lower leg: No edema.   Skin:     General: Skin is warm and dry.   Neurological:      Mental Status: He is alert and oriented to person, place, and time.      Comments: Sensory change LUE and LLE compared to right. Some weakness LLE compared to RLE   Psychiatric:         Mood and Affect: Mood normal.         Behavior: Behavior normal.                Significant Labs: All pertinent labs within the past 24 hours have been reviewed.  CBC:   Recent Labs   Lab 07/08/24  1135   WBC 5.44   HGB 14.4   HCT 41.7        CMP:   Recent Labs   Lab 07/08/24  1135      K 3.8      CO2 23   *   BUN  14   CREATININE 1.0   CALCIUM 9.5   PROT 7.2   ALBUMIN 4.1   BILITOT 0.4   ALKPHOS 91   AST 20   ALT 27   ANIONGAP 7*     Cardiac Markers:   Recent Labs   Lab 07/08/24  1203   BNP <10     Magnesium:   Recent Labs   Lab 07/08/24  1135   MG 2.0     POCT Glucose:   Recent Labs   Lab 07/08/24  2038   POCTGLUCOSE 93     Troponin:   Recent Labs   Lab 07/08/24  1203   TROPONINI <0.006     Significant Imaging: I have reviewed all pertinent imaging results/findings within the past 24 hours.  MRI Cervical Spine Without Contrast  Narrative: EXAMINATION:  MRI CERVICAL SPINE WITHOUT CONTRAST    CLINICAL HISTORY:  TRauma, neck;    TECHNIQUE:  Multiplanar, multisequence MR images of the cervical spine were performed without the administration of contrast.    COMPARISON:  CT 07/08/2024, MRI 01/19/2024    FINDINGS:  Postoperative change of C3-C5 ACDF and C3-C6 posterior decompression and instrumented fusion.  There is osseous bridging noted across the C4-C5 vertebral bodies.  Previously questioned hardware loosening difficult to evaluate on MRI due to artifact.    There is a large area of edema through the posterior cervical musculature overlying the operative site.  There is a 1.0 x 7.0 x 2.5 cm superficial collection through the superficial soft tissues just below the incision site.  Finding may represent resolving hematoma, seroma, with abscess not excluded.    C1-C2: Dens is intact. Pre dens space is maintained.    Alignment: Straightening of cervical lordosis.    Vertebrae: Normal marrow signal. No fracture.  C6 hemangioma.    Discs: Disc heights are relatively well preserved at the non operative levels.    Cord: Stable focus of cord signal hyperintensity at the C5 level, compatible with compressive myelomalacia, unchanged from prior.    Skull base and craniocervical junction: Normal.    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    C2-C3:  Uncovertebral spurring with small posterior disc osteophyte complex.  No high-grade spinal canal stenosis or neural foraminal narrowing.    C3-C4: Operative level.  Uncovertebral spurring with posterior disc osteophyte complex.  Moderate left neural foraminal narrowing.  No high-grade spinal canal stenosis.    C4-C5: Operative level.  Uncovertebral spurring.  Moderate left neural foraminal narrowing.  No high-grade spinal canal stenosis.    C5-C6: Operative level.  No significant spinal canal stenosis or neural foraminal narrowing.    C6-C7: No significant spinal canal stenosis or neural foraminal narrowing.    C7-T1: No significant spinal canal stenosis or neural foraminal narrowing.    Paraspinal muscles & soft tissues: Subcentimeter thyroid nodule in the left thyroid lobe.  Impression: Redemonstration of postoperative change of C3-C5 ACDF and C3-C6 posterior decompression/fusion.    Edema through the posterior cervical musculature at the operative site, compatible with healing following recent surgery.  There is a thin superficial collection through the soft tissues just below the incision site which may represent a hematoma, seroma, with abscess not excluded.    The suspected lucency at C6 facet pedicular screws is not confidently identified as fluid signal or bone erosion by MR. This may be difficult to detect on MR due to metallic signal void.    Other findings as above.    Electronically signed by resident: Nicky Cazares  Date:    07/09/2024  Time:    00:35    Electronically signed by: Jori Brownlee  Date:    07/09/2024  Time:    01:41

## 2024-07-09 NOTE — CONSULTS
[facility] - Emergency Dept  Orthopedics  Consult Note    Patient Name: Hi Braxton  MRN: [id number]  Admission Date: 7/8/2024  Hospital Length of Stay: 0 days  Attending Provider: Yane Sanders MD  Primary Care Provider: Josefina Kendall MD    Patient information was obtained from ER records.     Inpatient consult to Orthopedic Surgery  Consult performed by: Joss Leonard MD  Consult ordered by: Dafne Canela MD        Subjective:     Principal Problem:<principal problem not specified>    Chief Complaint:   Chief Complaint   Patient presents with    Fall     Was hot in house, ? Passed out, called wife found and around 5 pm, inc fluids and felt better, had cervical fusion April, neck pain feeling more cracking        HPI: Hi Braxton is a 64 y.o. male with Pmhx of PVD, GERD, cervical fusion presenting with neck pain and left shoulder pain after a glf 1 day ago. He fell after loc due to dizziness in an overheated room. He is unsure if he hit his head or neck. He reports associated symptoms of paresthesias to the distal tip of left all five digits and left shoulder pain. Shoulder pain improves with pain med. He is 3 months s/p posterior cervical fusion with Dr. Burnette. He reported that his paresthesias revolved and neck pain improved after surgery. Neck pain was better whenever he took tylenol. He also reported persistent mild left sided weakness after the surgery. Walks w/out assisted devices at baseline. Doesn't take any anticoagulation at baseline. Last ate     They deny IV drug use  They deny tobacco use.   They deny alcohol use.   They deny immunosuppressant medications.  They deny chemotherapy.  They deny radiation therapy.       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 Dose of amlodipine increased from 5 to 10 mg in the weeks prior to the incidnt.    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       Past Surgical History:   Procedure Laterality Date    CEREBRAL ANGIOGRAM N/A 1/6/2020    Procedure: ANGIOGRAM-CEREBRAL;  Surgeon: [facility];  Location: [facility] OR [address] [place] [address];  Service: Radiology;  Laterality: N/A;  /Nagi    CERVICAL FUSION  12/30/2015    COLONOSCOPY N/A 4/7/2017    Procedure: COLONOSCOPY;  Surgeon: Handy Frederick MD;  Location: [facility] ([address] [place] [address]);  Service: Endoscopy;  Laterality: N/A;    COLONOSCOPY N/A 11/28/2023    Procedure: COLONOSCOPY;  Surgeon: ARMAAN Hinojosa MD;  [facility]  Service: Endoscopy;  Laterality: N/A;  8/21-referred by Dr. Schulte/ Diagnosis:  Blood in stool, past due on surveillance colonoscopy for advanced colon polyp villous adenoma greater than 10 mm /Prep instructions handed to patient and to portal. AS  11/21/23-Precall complete-DS    CORONARY ANGIOGRAPHY N/A 10/10/2022    Procedure: ANGIOGRAM,  CORONARY ARTERY;  Surgeon: Anson Nolan MD;  Location: [facility] CATH LAB;  Service: Cardiology;  Laterality: N/A;    ENDOSCOPIC ULTRASOUND OF UPPER GASTROINTESTINAL TRACT N/A 8/22/2023    Procedure: ULTRASOUND, UPPER GI TRACT, ENDOSCOPIC;  Surgeon: Joe Means MD;  Location: [facility] ENDO (2ND FLR);  Service: Endoscopy;  Laterality: N/A;  instr portal-pt stated difficult intubation with surgery in the past-tb    FUSION OF CERVICAL SPINE BY POSTERIOR APPROACH N/A 4/24/2024    Procedure: FUSION, SPINE, CERVICAL, POSTERIOR APPROACH C3-6 DEPUY  GW TONGS WTIH WEIGHTS, FREDIS 4 POST SNS: MOTORS/SSEP/EMG;  Surgeon: Edd Burnette MD;  Location: [facility] OR;  Service: Orthopedics;  Laterality: N/A;    HERNIA REPAIR      PROSTATECTOMY         Review of patient's allergies indicates:   Allergen Reactions    Lisinopril Anaphylaxis and Nausea And Vomiting     General bad feeling    Lipitor [atorvastatin] Other (See Comments)     Muscle aches    Adhesive     Crestor [rosuvastatin] Swelling     Lip swelling.     Jardiance [empagliflozin] Other (See Comments)     Possible related to recent UTI's     Metformin      Used XR and experienced flatulance and abdominal pain.        No current facility-administered medications for this encounter.     Current Outpatient Medications   Medication Sig    acetaminophen (TYLENOL) 500 MG tablet Take 1 tablet (500 mg total) by mouth 3 (three) times daily.    ALPRAZolam (XANAX) 0.5 MG tablet TAKE 1 TABLET THREE TIMES DAILY AS NEEDED FOR ANXIETY    aspirin (ECOTRIN) 81 MG EC tablet Take 1 tablet (81 mg total) by mouth once daily.    celecoxib (CELEBREX) 100 MG capsule Take 1 capsule (100 mg total) by mouth 2 (two) times daily.    CONTOUR NEXT TEST STRIPS Strp USE TO TEST BLOOD SUGAR ONCE DAILY    ezetimibe (ZETIA) 10 mg tablet TAKE 1 TABLET ONE TIME DAILY    gabapentin (NEURONTIN) 600 MG tablet TAKE 1 TABLET THREE TIMES DAILY    insulin (LANTUS SOLOSTAR U-100 INSULIN) glargine 100 units/mL SubQ  "pen Inject 25 Units into the skin every evening.    losartan (COZAAR) 100 MG tablet TAKE 1 TABLET ONE TIME DAILY    methocarbamoL (ROBAXIN) 500 MG Tab Take 2 tablets (1,000 mg total) by mouth 3 (three) times daily.    metoprolol succinate (TOPROL-XL) 50 MG 24 hr tablet TAKE 1 AND 1/2 TABLETS (75 MG TOTAL) ONCE DAILY.    MICROLET LANCET Misc 1 lancet by Misc.(Non-Drug; Combo Route) route once daily. Test blood glucose once daily as instructed.    NIFEdipine (PROCARDIA-XL) 60 MG (OSM) 24 hr tablet Take 1 tablet (60 mg total) by mouth 2 (two) times a day.    nitroGLYCERIN (NITROSTAT) 0.3 MG SL tablet Place 1 tablet (0.3 mg total) under the tongue every 5 (five) minutes as needed for Chest pain.    ondansetron (ZOFRAN) 4 MG tablet Take 1 tablet (4 mg total) by mouth every 6 (six) hours as needed for Nausea.    oxyCODONE (ROXICODONE) 5 MG immediate release tablet Take 1 tablet (5 mg total) by mouth every 8 (eight) hours as needed for Pain (for breakthrough pain).    pantoprazole (PROTONIX) 40 MG tablet TAKE 1 TABLET TWICE DAILY    pen needle, diabetic (BD ULTRA-FINE CHET PEN NEEDLE) 32 gauge x 5/32" Ndle USE PEN NEEDLE AS INSTRUCTION WITH LANTUS INSULIN PRE-FILLED PEN.    pneumoc 20-jonny conj-dip cr,PF, (PREVNAR 20, PF,) 0.5 mL Syrg injection Inject into the muscle.    pravastatin (PRAVACHOL) 80 MG tablet TAKE 1 TABLET ONE TIME DAILY    RSVPreF3 antigen-AS01E, PF, (AREXVY, PF,) 120 mcg/0.5 mL SusR vaccine Inject into the muscle.    SHINGRIX, PF, 50 mcg/0.5 mL injection     spironolactone (ALDACTONE) 50 MG tablet TAKE 1 TABLET ONE TIME DAILY    tadalafiL (CIALIS) 20 MG Tab Take 1 tablet (20 mg total) by mouth every 72 hours as needed (ED).     Family History       Problem Relation (Age of Onset)    Aneurysm Brother    Benign prostatic hyperplasia Brother    Cataracts Maternal Grandmother    Diabetes Paternal Uncle    Heart attack Brother, Brother    Heart attacks under age 50 Father    Heart disease Mother, Father, " "Brother, Brother, Brother, Brother, Paternal Grandmother    Hepatitis Brother    Hyperlipidemia Mother, Sister, Brother, Brother, Brother    Hypertension Mother, Sister, Brother, Brother, Brother, Brother, Brother    Multiple sclerosis Daughter    No Known Problems Maternal Grandfather, Paternal Grandfather    Stroke Mother, Sister    Throat cancer Brother          Tobacco Use    Smoking status: Former     Current packs/day: 0.00     Average packs/day: 1 pack/day for 30.0 years (30.0 ttl pk-yrs)     Types: Cigarettes     Start date: 1981     Quit date: 2011     Years since quittin.5     Passive exposure: Never    Smokeless tobacco: Never   Substance and Sexual Activity    Alcohol use: Yes     Comment: occas - nonalcoholic beer or beer    Drug use: No    Sexual activity: Yes     Partners: Female     Review of Systems   Constitutional: Negative for fever.   Respiratory:  Negative for cough, sleep disturbances due to breathing and wheezing.      Objective:     Vital Signs (Most Recent):  Temp: 97.4 °F (36.3 °C) (24 1200)  Pulse: 83 (24 1200)  Resp: 20 (24 1507)  BP: 137/72 (24 1200)  SpO2: 96 % (24 1200) Vital Signs (24h Range):  Temp:  [97.4 °F (36.3 °C)-98 °F (36.7 °C)] 97.4 °F (36.3 °C)  Pulse:  [82-85] 83  Resp:  [16-24] 20  SpO2:  [96 %-97 %] 96 %  BP: (120-137)/(63-72) 137/72     Weight: 88.5 kg (195 lb)  Height: 6' 4" (193 cm)  Body mass index is 23.74 kg/m².    No intake or output data in the 24 hours ending 24 1623     Ortho/SPM Exam   Gen:  No acute distress, well-developed, well nourished.  CV:  Peripherally well-perfused. 2+ radial pulses, symmetric.  Respiratory:  Normal respiratory effort. No accessory muscle use.   Head/Neck:  Normocephalic.  Atraumatic. Sclera anicteric. TM. Neck supple.  Neuro: No FND. Awake. Alert. Oriented to person, place, time, and situation.  Abdomen: Soft, NTND.      MSK:  Right Upper Extremity  Inspection  - Skin intact " "throughout, no open wounds  - No swelling  - No ecchymosis, erythema, or signs of cellulitis  Palpation  - NonTTP throughout, no palpable abnormality   Range of motion  - AROM and PROM of the shoulder, elbow, wrist, and hand intact  Stability  - No evidence of joint dislocation or abnormal laxity   Neurovascular  - AIN/PIN/Radial/Median/Ulnar Nerves assessed in isolation without deficit  - Able to give thumbs up, make "OK" sign, cross IF/LF, abduct/adduct fingers, make fist  - SILT throughout  - Compartments soft  - Radial artery palpated  - Muscle tone normal    Left Upper Extremity  Inspection  - Skin intact throughout, no open wounds  - No swelling  - No ecchymosis, erythema, or signs of cellulitis  Palpation  - NonTTP throughout, no palpable abnormality   Range of motion  - AROM and PROM of the shoulder, elbow, wrist, and hand intact  Stability  - No evidence of joint dislocation or abnormal laxity  Neurovascular  - AIN/PIN/Radial/Median/Ulnar Nerves assessed in isolation without deficit  - Able to give thumbs up, make "OK" sign, cross IF/LF, abduct/adduct fingers, make fist  - Paresthesias of the digits. Reduced sensation to the arm, forearm and hand.  - Compartments soft  - Radial artery palpated  - Muscle tone normal      Right Lower Extremity  Inspection  - Skin intact throughout, no open wounds  - No swelling  - No ecchymosis, erythema, or signs of cellulitis  Palpation  - NonTTP throughout, no palpable abnormality   Range of motion  - AROM and PROM of the hip, knee, ankle, and foot intact  Stability  - No evidence of joint dislocation or abnormal laxity,   Neurovascular  - TA/EHL/Gastroc/FHL assessed in isolation without deficit  - SILT throughout  - Compartments soft  - DP palpated   - Negative Log roll  - Negative Stinchfield  - Muscle tone normal    Left Lower Extremity  Inspection  - Skin intact throughout, no open wounds  - No swelling  - No ecchymosis, erythema, or signs of cellulitis  Palpation  - " NonTTP throughout, no palpable abnormality   Range of motion  - AROM and PROM of the hip, knee, ankle, and foot intact  Stability  - No evidence of joint dislocation or abnormal laxity,   Neurovascular  - TA/EHL/Gastroc/FHL assessed in isolation without deficit  - Reduced sensation over the lateral leg and foot  - Compartments soft  - DP palpated   - Negative Log roll  - Negative Stinchfield  - Muscle tone normal    Spine/pelvis/axial body:  Pt in a neck collar  Well healed cervical wound.  TTP over the cervical spine.  No tenderness to palpation of thoracic, or lumbar spine  No pain with compression of pelvis  No chest wall or abdominal tenderness  No decubitus ulcers  Muscle tone normal    Significant Labs: All pertinent labs within the past 24 hours have been reviewed.    Significant Imaging: I have reviewed and interpreted all pertinent imaging results/findings.  Assessment/Plan:     Cervical radiculopathy  Hi Barxton is a 64 y.o. male who is 3 months s/p C3-6 fusion presents with neck pain and LUE radicular symptoms after a GLF 1 day ago. He endorsed return of his preoperative symptoms of LUE paresthesias, pain and mild weakness to the left side. Pt is admitted to obs for pain control.    - WBAT bilateral upper and lower extremities  - Pain control: multimodal pain management

## 2024-07-09 NOTE — PHARMACY MED REC
"Admission Medication History     The home medication history was taken by Floresita Villatoro.    You may go to "Admission" then "Reconcile Home Medications" tabs to review and/or act upon these items.     The home medication list has been updated by the Pharmacy department.   Please read ALL comments highlighted in yellow.   Please address this information as you see fit.    Feel free to contact us if you have any questions or require assistance.      The medications listed below were removed from the home medication list. Please reorder if appropriate:  Patient reports no longer taking the following medication(s):  ACETAMINOPHEN 500 MG    Medications listed below were obtained from: Patient and Analytic software- [employer]  Current Outpatient Medications on File Prior to Encounter   Medication Sig    ALPRAZolam (XANAX) 0.5 MG tablet Take 1 tablet by mouth 3 times daily as needed for anxiety.    aspirin (ECOTRIN) 81 MG EC tablet Take 1 tablet (81 mg total) by mouth once daily.    aspirin/acetaminophen/caffeine (EXCEDRIN MIGRAINE ORAL) Take 1 tablet by mouth daily as needed (Pain).    celecoxib (CELEBREX) 100 MG capsule Take 1 capsule (100 mg total) by mouth 2 (two) times daily.    celecoxib (CELEBREX) 200 MG capsule Take 200 mg by mouth 2 (two) times daily as needed for Pain. Patient currently taking 200 mg, however switches between 100 mg & 200 mg.    DULCOLAX, BISACODYL, ORAL Take 1 tablet by mouth once daily.    ezetimibe (ZETIA) 10 mg tablet Take 1 tablet by mouth once daily.    gabapentin (NEURONTIN) 600 MG tablet Take 1 tablet by mouth 3 times daily.    insulin (LANTUS SOLOSTAR U-100 INSULIN) glargine 100 units/mL SubQ pen Inject 25 Units into the skin every evening.    losartan (COZAAR) 100 MG tablet Take 100 mg by mouth once daily.    methocarbamoL (ROBAXIN) 500 MG Tab Take 500 mg by mouth 2 (two) times daily as needed (Muscle spasms).    metoprolol succinate (TOPROL-XL) 50 MG 24 hr tablet Take 1 and 1/2 tablets (75 " "mg total) by mouth once daily.    multivit-min/folic/vit K/lycop (MEN'S MULTIVITAMIN ORAL) Take 1 tablet by mouth once daily.    NIFEdipine (PROCARDIA-XL) 60 MG (OSM) 24 hr tablet Take 1 tablet (60 mg total) by mouth 2 (two) times a day.    ondansetron (ZOFRAN) 4 MG tablet Take 1 tablet (4 mg total) by mouth every 6 (six) hours as needed for Nausea.    oxyCODONE (ROXICODONE) 5 MG immediate release tablet Take 1 tablet (5 mg total) by mouth every 8 (eight) hours as needed for Pain (for breakthrough pain).    pantoprazole (PROTONIX) 40 MG tablet Take 1 tablet by mouth twice daily.    pravastatin (PRAVACHOL) 80 MG tablet Take 1 tablet by mouth once daily.    spironolactone (ALDACTONE) 50 MG tablet Take 1 tablet by mouth once daily.    CONTOUR NEXT TEST STRIPS Strp USE TO TEST BLOOD SUGAR ONCE DAILY    MICROLET LANCET Misc 1 lancet by Misc.(Non-Drug; Combo Route) route once daily. Test blood glucose once daily as instructed.    nitroGLYCERIN (NITROSTAT) 0.3 MG SL tablet Place 1 tablet (0.3 mg total) under the tongue every 5 (five) minutes as needed for Chest pain.    pen needle, diabetic (BD ULTRA-FINE CHET PEN NEEDLE) 32 gauge x 5/32" Ndle USE PEN NEEDLE AS INSTRUCTION WITH LANTUS INSULIN PRE-FILLED PEN.    pneumoc 20-jonny conj-dip cr,PF, (PREVNAR 20, PF,) 0.5 mL Syrg injection Inject into the muscle.    RSVPreF3 antigen-AS01E, PF, (AREXVY, PF,) 120 mcg/0.5 mL SusR vaccine Inject into the muscle.    SHINGRIX, PF, 50 mcg/0.5 mL injection     tadalafiL (CIALIS) 20 MG Tab Take 1 tablet (20 mg total) by mouth every 72 hours as needed (ED). Patient has at home, however hasn't taken in > 1 yr             Scottantonio Villatroo  EXT [id number]                  .          "

## 2024-07-09 NOTE — PROGRESS NOTES
Progress Note    Received patient at signout.    65 y/o M here with a fall. Pending MRI and orthopedic recs.    -MRI completed, ortho reviewed imaging  -Patient reassessed bedside, he remains in moderate pain despite IV meds. Neuro intact.  -Ortho recommending admission for pain control and further eval.  -Admit to .

## 2024-07-09 NOTE — DISCHARGE SUMMARY
[facility] - Emergency Dept  Hospital Medicine  Discharge Summary      Patient Name: Hi Braxton  MRN: [id number]  CONNIE: [id number]  Patient Class: OP- Observation  Admission Date: 7/8/2024  Hospital Length of Stay: 0 days  Discharge Date and Time:  07/09/2024 3:33 PM  Attending Physician: No att. providers found   Discharging Provider: Yane Sanders MD  Primary Care Provider: Josefina Kendall MD  Hospital Medicine Team: [facility] HOSP MED D Yane Sanders MD  Primary Care Team: [facility] HOS[facility] MED D    HPI:   Hi Braxton is a 64 y.o. male with PMHx of T2DM, CAD, GERD, HLD, HTN. He is 3 months s/p posterior cervical fusion with Dr. Burnette. He presents to [facility] ED for neck pain after a ground level fall at home. Pain is posterior neck into left shoulder/arm. No new numbness, tingling, weakness but does have some ongoing numbness/weakness LUE and some ongoing weakness LLE. He reports pain is same distrubution as prior to fusion in April. Severe in nature. He reports he worked in his house all day Sunday without air conditioning in 95F temperature and became dehydrated which resulted in his fall. He hit his right eyebrow and had small bleed that resolved. Denies any pain elsewhere. Denies confusion. He may have lsot consciousness, he is not sure how long. Denies other complaints.       In the ED: intermittently hypertensive otherwise VSS.  . Otherwise labs unremarkable. CT head no acute intracranial findings. CT cervical spine w/ remote operative change anterior spinal fusion with interval operative change from posterior spinal fusion w/ subtle lucency along the intervally placed bilateral C6 facet screws component. MRI cervical spine done. Ortho consulted and recommended observation for pain control.  Given morphine 6 mg and 2 mg, percocet 5 x2. Admitted to .    * No surgery found *      Hospital Course:   Patient admitted to hospital medicine for worsening upper extremity cervical radiculopathy symptoms following ground  level fall 2 days prior to admission when he was working in a room without air conditioning.  In terms of his fall, he has felt better since increasing hydration at home.  He presents because he is status post posterior cervical fusion recently and is worried about his paresthesias returning in his left arm.  In addition to imaging mentioned in H and P, MRI cervical spine without contrast was completed with postoperative changes of posterior decompression/fusion.  Patient was seen and evaluated by Orthopedic surgery; imaging results reviewed with him.  Per Orthopedic surgery, no indications for any acute intervention, patient can follow-up in clinic.  He does not have to wear C-collar at home.  Notably, paresthesias with improvement all patient in ED. Patient without further hospitalization needs.  Patient medically stable for discharge.  Return precautions advised; patient's questions answered.  Patient in agreement with discharge plan.    Vitals:    07/09/24 1342   BP: 129/72   Pulse: 68   Resp: 18   Temp: 98 °F (36.7 °C)     Physical Exam  Vitals and nursing note reviewed.   Constitutional:       General: He is not in acute distress.  HENT:      Head: Normocephalic and atraumatic.      Comments: 3 mm non bleeding lesion right eyebrow     Nose: Nose normal.      Mouth/Throat:      Pharynx: No oropharyngeal exudate.   Eyes:      Extraocular Movements: Extraocular movements intact.      Conjunctiva/sclera: Conjunctivae normal.   Neck:      Comments: C collar in place  Cardiovascular:      Rate and Rhythm: Normal rate and regular rhythm.      Heart sounds: Normal heart sounds.   Pulmonary:      Effort: Pulmonary effort is normal. No respiratory distress.      Breath sounds: Normal breath sounds.   Abdominal:      General: Bowel sounds are normal. There is no distension.      Palpations: Abdomen is soft.      Tenderness: There is no abdominal tenderness.   Musculoskeletal:         General: Normal range of motion.       Cervical back: Tenderness present.      Right lower leg: No edema.      Left lower leg: No edema.   Skin:     General: Skin is warm and dry.   Neurological:      Mental Status: He is alert and oriented to person, place, and time.      Comments:  Patient reports tingling improved now limited to fingertips of left upper extremity.  Psychiatric:         Mood and Affect: Mood normal.         Behavior: Behavior normal.      Goals of Care Treatment Preferences:  Code Status: Full Code      Consults:   Consults (From admission, onward)          Status Ordering Provider     Inpatient consult to Orthopedic Surgery  Once        Provider:  (Not yet assigned)    MANDO Stephens new Assessment & Plan notes have been filed under this hospital service since the last note was generated.  Service: Hospital Medicine    Final Active Diagnoses:    Diagnosis Date Noted POA    PRINCIPAL PROBLEM:  Cervical radiculopathy [M54.12] 07/20/2015 Yes    Diabetes mellitus with insulin therapy [E11.9, Z79.4] 07/07/2021 Not Applicable    Dyslipidemia associated with type 2 diabetes mellitus [E11.69, E78.5] 07/07/2021 Yes    Anxiety disorder [F41.9] 01/20/2017 Yes    Coronary artery disease involving native coronary artery of native heart without angina pectoris [I25.10] 01/20/2017 Yes    GERD (gastroesophageal reflux disease) [K21.9] 07/24/2013 Yes    Essential hypertension [I10] 07/16/2012 Yes      Problems Resolved During this Admission:       Discharged Condition: stable    Disposition: Home or Self Care    Follow Up:    Patient Instructions:      Diet Cardiac     Diet diabetic     Notify your health care provider if you experience any of the following:  temperature >100.4     Notify your health care provider if you experience any of the following:  persistent nausea and vomiting or diarrhea     Notify your health care provider if you experience any of the following:  severe uncontrolled pain     Notify your health  "care provider if you experience any of the following:  redness, tenderness, or signs of infection (pain, swelling, redness, odor or green/yellow discharge around incision site)     Notify your health care provider if you experience any of the following:  severe persistent headache     Notify your health care provider if you experience any of the following:  persistent dizziness, light-headedness, or visual disturbances     Notify your health care provider if you experience any of the following:  increased confusion or weakness     Activity as tolerated       Significant Diagnostic Studies: Labs: CMP   Recent Labs   Lab 07/08/24  1135      K 3.8      CO2 23   *   BUN 14   CREATININE 1.0   CALCIUM 9.5   PROT 7.2   ALBUMIN 4.1   BILITOT 0.4   ALKPHOS 91   AST 20   ALT 27   ANIONGAP 7*    and CBC   Recent Labs   Lab 07/08/24  1135   WBC 5.44   HGB 14.4   HCT 41.7          Pending Diagnostic Studies:       None           Medications:  Reconciled Home Medications:      Medication List        CHANGE how you take these medications      acetaminophen 500 MG tablet  Commonly known as: TYLENOL  Take 2 tablets (1,000 mg total) by mouth every 8 (eight) hours as needed for Pain.  What changed:   how much to take  when to take this  reasons to take this     losartan 100 MG tablet  Commonly known as: COZAAR  TAKE 1 TABLET ONE TIME DAILY  What changed: when to take this     methocarbamoL 500 MG Tab  Commonly known as: ROBAXIN  Take 2 tablets (1,000 mg total) by mouth 3 (three) times daily.  What changed:   how much to take  when to take this  reasons to take this            CONTINUE taking these medications      ALPRAZolam 0.5 MG tablet  Commonly known as: XANAX  TAKE 1 TABLET THREE TIMES DAILY AS NEEDED FOR ANXIETY     aspirin 81 MG EC tablet  Commonly known as: ECOTRIN  Take 1 tablet (81 mg total) by mouth once daily.     BD ULTRA-FINE CHET PEN NEEDLE 32 gauge x 5/32" Ndle  Generic drug: pen needle, " diabetic  USE PEN NEEDLE AS INSTRUCTION WITH LANTUS INSULIN PRE-FILLED PEN.     * celecoxib 200 MG capsule  Commonly known as: CeleBREX  Take 200 mg by mouth 2 (two) times daily as needed for Pain.     * celecoxib 100 MG capsule  Commonly known as: CeleBREX  Take 1 capsule (100 mg total) by mouth 2 (two) times daily.     CONTOUR NEXT TEST STRIPS Strp  Generic drug: blood sugar diagnostic  USE TO TEST BLOOD SUGAR ONCE DAILY     DULCOLAX (BISACODYL) ORAL  Take 1 tablet by mouth once daily.     EXCEDRIN MIGRAINE ORAL  Take 1 tablet by mouth daily as needed (Pain).     ezetimibe 10 mg tablet  Commonly known as: ZETIA  TAKE 1 TABLET ONE TIME DAILY     gabapentin 600 MG tablet  Commonly known as: NEURONTIN  TAKE 1 TABLET THREE TIMES DAILY     LANTUS SOLOSTAR U-100 INSULIN 100 unit/mL (3 mL) Inpn pen  Generic drug: insulin glargine U-100 (Lantus)  Inject 25 Units into the skin every evening.     MEN'S MULTIVITAMIN ORAL  Take 1 tablet by mouth once daily.     metoprolol succinate 50 MG 24 hr tablet  Commonly known as: TOPROL-XL  TAKE 1 AND 1/2 TABLETS (75 MG TOTAL) ONCE DAILY.     MICROLET LANCET Misc  Generic drug: lancets  1 lancet by Misc.(Non-Drug; Combo Route) route once daily. Test blood glucose once daily as instructed.     NIFEdipine 60 MG (OSM) 24 hr tablet  Commonly known as: PROCARDIA-XL  Take 1 tablet (60 mg total) by mouth 2 (two) times a day.     nitroGLYCERIN 0.3 MG SL tablet  Commonly known as: NITROSTAT  Place 1 tablet (0.3 mg total) under the tongue every 5 (five) minutes as needed for Chest pain.     ondansetron 4 MG tablet  Commonly known as: ZOFRAN  Take 1 tablet (4 mg total) by mouth every 6 (six) hours as needed for Nausea.     oxyCODONE 5 MG immediate release tablet  Commonly known as: ROXICODONE  Take 1 tablet (5 mg total) by mouth every 8 (eight) hours as needed for Pain (for breakthrough pain).     pantoprazole 40 MG tablet  Commonly known as: PROTONIX  TAKE 1 TABLET TWICE DAILY     pravastatin 80  MG tablet  Commonly known as: PRAVACHOL  TAKE 1 TABLET ONE TIME DAILY     spironolactone 50 MG tablet  Commonly known as: ALDACTONE  TAKE 1 TABLET ONE TIME DAILY     tadalafiL 20 MG Tab  Commonly known as: CIALIS  Take 1 tablet (20 mg total) by mouth every 72 hours as needed (ED).           * This list has 2 medication(s) that are the same as other medications prescribed for you. Read the directions carefully, and ask your doctor or other care provider to review them with you.                STOP taking these medications      AREXVY (PF) 120 mcg/0.5 mL Susr vaccine  Generic drug: RSVPreF3 antigen-AS01E (PF)     PREVNAR 20 (PF) 0.5 mL Syrg injection  Generic drug: pneumoc 20-jonny conj-dip cr(PF)     SHINGRIX (PF) 50 mcg/0.5 mL injection  Generic drug: varicella-zoster gE-AS01B (PF)              Indwelling Lines/Drains at time of discharge:   Lines/Drains/Airways       None                   Time spent on the discharge of patient: 45 minutes         Yane Sanders MD  Department of Hospital Medicine  [facility] - Emergency Dept

## 2024-07-09 NOTE — ED NOTES
Assumed care of the patient. Report received from DONALD Santos.  Pt in hospital gown, side rails up X2, bed low and locked, and call light is placed within reach. Pt denies any complaints or needs. C-collar remains in place.

## 2024-07-09 NOTE — ED NOTES
Received report from Chanel BENNETT. Assumed care of patient. Patient is alert and resting comfortably in bed in NAD. BP, cardiac, and O2 monitoring continued. Family member at bedside. Patient updated on plan of care, pt denies any needs or complaints at this time. Bed locked in lowest position, side rails up x2, call light within reach. VSS.

## 2024-07-09 NOTE — HOSPITAL COURSE
Patient admitted to hospital medicine for worsening upper extremity cervical radiculopathy symptoms following ground level fall 2 days prior to admission when he was working in a room without air conditioning.  In terms of his fall, he has felt better since increasing hydration at home.  He presents because he is status post posterior cervical fusion recently and is worried about his paresthesias returning in his left arm.  In addition to imaging mentioned in H and P, MRI cervical spine without contrast was completed with postoperative changes of posterior decompression/fusion.  Patient was seen and evaluated by Orthopedic surgery; imaging results reviewed with him.  Per Orthopedic surgery, no indications for any acute intervention, patient can follow-up in clinic.  He does not have to wear C-collar at home.  Notably, paresthesias with improvement all patient in ED. Patient without further hospitalization needs.  Patient medically stable for discharge.  Return precautions advised; patient's questions answered.  Patient in agreement with discharge plan.    Vitals:    07/09/24 1342   BP: 129/72   Pulse: 68   Resp: 18   Temp: 98 °F (36.7 °C)     Physical Exam  Vitals and nursing note reviewed.   Constitutional:       General: He is not in acute distress.  HENT:      Head: Normocephalic and atraumatic.      Comments: 3 mm non bleeding lesion right eyebrow     Nose: Nose normal.      Mouth/Throat:      Pharynx: No oropharyngeal exudate.   Eyes:      Extraocular Movements: Extraocular movements intact.      Conjunctiva/sclera: Conjunctivae normal.   Neck:      Comments: C collar in place  Cardiovascular:      Rate and Rhythm: Normal rate and regular rhythm.      Heart sounds: Normal heart sounds.   Pulmonary:      Effort: Pulmonary effort is normal. No respiratory distress.      Breath sounds: Normal breath sounds.   Abdominal:      General: Bowel sounds are normal. There is no distension.      Palpations: Abdomen is  soft.      Tenderness: There is no abdominal tenderness.   Musculoskeletal:         General: Normal range of motion.      Cervical back: Tenderness present.      Right lower leg: No edema.      Left lower leg: No edema.   Skin:     General: Skin is warm and dry.   Neurological:      Mental Status: He is alert and oriented to person, place, and time.      Comments:  Patient reports tingling improved now limited to fingertips of left upper extremity.  Psychiatric:         Mood and Affect: Mood normal.         Behavior: Behavior normal.

## 2024-07-09 NOTE — ED NOTES
Patient educated on new prescriptions.  Patient voiced understanding and where prescriptions were sent.  C-collar removed per ortho order.  VS taken. IV removed. Patient provided with wheelchair for easier dc.

## 2024-07-09 NOTE — ED NOTES
Assumed care of the patient. Report received from DONALD Edwards. Pt in hospital gown, side rails up X2, bed low and locked, and call light is placed within reach. no family/visitors at bedside at this time. Pt denies any complaints or needs. Patient states pain is subjective to now he leans.  Patient updated on plan of care.  C-collar in place.       Hi Braxton, a 64 y.o. male presents to the ED w/ complaint of neck pain    Triage note:  Chief Complaint   Patient presents with    Fall     Was hot in house, ? Passed out, called wife found and around 5 pm, inc fluids and felt better, had cervical fusion April, neck pain feeling more cracking     Review of patient's allergies indicates:   Allergen Reactions    Lisinopril Anaphylaxis and Nausea And Vomiting     General bad feeling    Lipitor [atorvastatin] Other (See Comments)     Muscle aches    Adhesive     Crestor [rosuvastatin] Swelling     Lip swelling.     Jardiance [empagliflozin] Other (See Comments)     Possible related to recent UTI's     Metformin      Used XR and experienced flatulance and abdominal pain.      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 Dose of amlodipine increased from 5 to 10 mg in the weeks prior to the incidnt.    Memory loss     Memory loss 06/21/2013    Mixed hyperlipidemia  01/20/2017    Muscle ache 01/28/2015    New daily persistent headache 01/20/2020    Pterygium eye, left 03/20/2021    Pyelonephritis 05/11/2018    Rectal bleeding 04/07/2017    Recurrent UTI 09/28/2017    S/P TURP 09/02/2022    Suspected sleep apnea 02/05/2020    Thyroid nodule 08/22/2019    Traumatic iritis - Left Eye 02/27/2013    Traumatic iritis - Left Eye 02/27/2013    Trigger finger of left hand 09/11/2014    Unspecified iridocyclitis - Left Eye 04/17/2013

## 2024-07-09 NOTE — ASSESSMENT & PLAN NOTE
Patient's FSGs are controlled on current medication regimen.  Last A1c reviewed-   Lab Results   Component Value Date    HGBA1C 6.7 (H) 02/19/2024     Most recent fingerstick glucose reviewed-   Recent Labs   Lab 07/08/24 2038   POCTGLUCOSE 93     Current correctional scale  Low  Maintain anti-hyperglycemic dose as follows-   Antihyperglycemics (From admission, onward)      Start     Stop Route Frequency Ordered    07/09/24 0900  insulin glargine U-100 (Lantus) pen 14 Units         -- SubQ Daily 07/09/24 0440    07/09/24 0445  insulin aspart U-100 pen 0-5 Units         -- SubQ Before meals & nightly PRN 07/09/24 0346        Hold Oral hypoglycemics while patient is in the hospital.  Home regimen lantus 25 mg QAM

## 2024-07-09 NOTE — ASSESSMENT & PLAN NOTE
Chronic, controlled. Latest blood pressure and vitals reviewed-     Temp:  [97.4 °F (36.3 °C)-98 °F (36.7 °C)]   Pulse:  [70-86]   Resp:  [12-27]   BP: (120-165)/(62-90)   SpO2:  [91 %-97 %] .   Home meds for hypertension were reviewed and noted below.   Hypertension Medications               losartan (COZAAR) 100 MG tablet TAKE 1 TABLET ONE TIME DAILY    metoprolol succinate (TOPROL-XL) 50 MG 24 hr tablet TAKE 1 AND 1/2 TABLETS (75 MG TOTAL) ONCE DAILY.    NIFEdipine (PROCARDIA-XL) 60 MG (OSM) 24 hr tablet Take 1 tablet (60 mg total) by mouth 2 (two) times a day.    nitroGLYCERIN (NITROSTAT) 0.3 MG SL tablet Place 1 tablet (0.3 mg total) under the tongue every 5 (five) minutes as needed for Chest pain.    spironolactone (ALDACTONE) 50 MG tablet TAKE 1 TABLET ONE TIME DAILY   While in the hospital, will manage blood pressure as follows; Continue home antihypertensive regimen  Will utilize p.r.n. blood pressure medication only if patient's blood pressure greater than 180/110 and he develops symptoms such as worsening chest pain or shortness of breath.

## 2024-07-09 NOTE — H&P
Jarek Basurto - Emergency Dept  [facility] Medicine  History & Physical    Patient Name: Hi Braxton  MRN: [id number]  Patient Class: OP- Observation  Admission Date: 7/8/2024  Attending Physician: Yane Sanders MD   Primary Care Provider: Josefina Kendall MD         Patient information was obtained from patient, past medical records, and ER records.     Subjective:     Principal Problem:<principal problem not specified>    Chief Complaint:   Chief Complaint   Patient presents with    Fall     Was hot in house, ? Passed out, called wife found and around 5 pm, inc fluids and felt better, had cervical fusion April, neck pain feeling more cracking        HPI: Hi Braxton is a 64 y.o. male with PMHx of T2DM, CAD, GERD, HLD, HTN. He is 3 months s/p posterior cervical fusion with Dr. Burnette. He presents to [facility] for neck pain after a ground level fall at home. Pain is posterior neck into left shoulder/arm. No new numbness, tingling, weakness but does have some ongoing numbness/weakness LUE and some ongoing weakness LLE. He reports pain is same distrubution as prior to fusion in April. Severe in nature. He reports he worked in his house all day Sunday without air conditioning in 95F temperature and became dehydrated which resulted in his fall. He hit his right eyebrow and had small bleed that resolved. Denies any pain elsewhere. Denies confusion. He may have lsot consciousness, he is not sure how long. Denies other complaints.       In the ED: intermittently hypertensive otherwise VSS.  . Otherwise labs unremarkable. CT head no acute intracranial findings. CT cervical spine w/ remote operative change anterior spinal fusion with interval operative change from posterior spinal fusion w/ subtle lucency along the intervally placed bilateral C6 facet screws component. MRI cervical spine done. Ortho consulted and recommended observation for pain control.  Given morphine 6 mg and 2 mg, percocet 5 x2. Admitted to  HM.    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 Dose of amlodipine increased from 5 to 10 mg in the weeks prior to the incidnt.    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       Past Surgical History:   Procedure Laterality Date    CEREBRAL ANGIOGRAM N/A 1/6/2020    Procedure: ANGIOGRAM-CEREBRAL;  Surgeon: [facility] Diagnostic Provider;  Location: [facility] OR [address] [place] [address];  Service: Radiology;  Laterality: N/A;  /Nagi    CERVICAL FUSION  12/30/2015    COLONOSCOPY N/A 4/7/2017    Procedure: COLONOSCOPY;  Surgeon: Handy Frederick MD;  Location: [facility] ENDO (4TH FLR);  Service: Endoscopy;  Laterality: N/A;    COLONOSCOPY N/A 11/28/2023    Procedure: COLONOSCOPY;  Surgeon: ARMAAN Hinojosa MD;  Location: [facility] ENDO (4TH [facility]);  Service: Endoscopy;  Laterality: N/A;  8/21-referred by Dr. Schulte/  Diagnosis:  Blood in stool, past due on surveillance colonoscopy for advanced colon polyp villous adenoma greater than 10 mm /Prep instructions handed to patient and to portal. AS  11/21/23-Precall complete-DS    CORONARY ANGIOGRAPHY N/A 10/10/2022    Procedure: ANGIOGRAM, CORONARY ARTERY;  Surgeon: Anson Nolan MD;  Location: [facility] CATH LAB;  Service: Cardiology;  Laterality: N/A;    ENDOSCOPIC ULTRASOUND OF UPPER GASTROINTESTINAL TRACT N/A 8/22/2023    Procedure: ULTRASOUND, UPPER GI TRACT, ENDOSCOPIC;  Surgeon: Joe Means MD;  Location: [facility] ENDO (2ND FLR);  Service: Endoscopy;  Laterality: N/A;  instr portal-pt stated difficult intubation with surgery in the past-tb    FUSION OF CERVICAL SPINE BY POSTERIOR APPROACH N/A 4/24/2024    Procedure: FUSION, SPINE, CERVICAL, POSTERIOR APPROACH C3-6 DEPUY  GW TONGS WTIH WEIGHTS, FREDIS 4 POST SNS: MOTORS/SSEP/EMG;  Surgeon: Edd Burnette MD;  Location: [facility] OR;  Service: Orthopedics;  Laterality: N/A;    HERNIA REPAIR      PROSTATECTOMY         Review of patient's allergies indicates:   Allergen Reactions    Lisinopril Anaphylaxis and Nausea And Vomiting     General bad feeling    Lipitor [atorvastatin] Other (See Comments)     Muscle aches    Adhesive     Crestor [rosuvastatin] Swelling     Lip swelling.     Jardiance [empagliflozin] Other (See Comments)     Possible related to recent UTI's     Metformin      Used XR and experienced flatulance and abdominal pain.        No current facility-administered medications on file prior to encounter.     Current Outpatient Medications on File Prior to Encounter   Medication Sig    acetaminophen (TYLENOL) 500 MG tablet Take 1 tablet (500 mg total) by mouth 3 (three) times daily.    ALPRAZolam (XANAX) 0.5 MG tablet TAKE 1 TABLET THREE TIMES DAILY AS NEEDED FOR ANXIETY    aspirin (ECOTRIN) 81 MG EC tablet Take 1 tablet (81 mg total) by mouth once daily.    celecoxib (CELEBREX) 100 MG capsule Take 1 capsule (100 mg  "total) by mouth 2 (two) times daily.    CONTOUR NEXT TEST STRIPS Strp USE TO TEST BLOOD SUGAR ONCE DAILY    ezetimibe (ZETIA) 10 mg tablet TAKE 1 TABLET ONE TIME DAILY    gabapentin (NEURONTIN) 600 MG tablet TAKE 1 TABLET THREE TIMES DAILY    insulin (LANTUS SOLOSTAR U-100 INSULIN) glargine 100 units/mL SubQ pen Inject 25 Units into the skin every evening.    losartan (COZAAR) 100 MG tablet TAKE 1 TABLET ONE TIME DAILY    methocarbamoL (ROBAXIN) 500 MG Tab Take 2 tablets (1,000 mg total) by mouth 3 (three) times daily.    metoprolol succinate (TOPROL-XL) 50 MG 24 hr tablet TAKE 1 AND 1/2 TABLETS (75 MG TOTAL) ONCE DAILY.    MICROLET LANCET Misc 1 lancet by Misc.(Non-Drug; Combo Route) route once daily. Test blood glucose once daily as instructed.    NIFEdipine (PROCARDIA-XL) 60 MG (OSM) 24 hr tablet Take 1 tablet (60 mg total) by mouth 2 (two) times a day.    nitroGLYCERIN (NITROSTAT) 0.3 MG SL tablet Place 1 tablet (0.3 mg total) under the tongue every 5 (five) minutes as needed for Chest pain.    ondansetron (ZOFRAN) 4 MG tablet Take 1 tablet (4 mg total) by mouth every 6 (six) hours as needed for Nausea.    oxyCODONE (ROXICODONE) 5 MG immediate release tablet Take 1 tablet (5 mg total) by mouth every 8 (eight) hours as needed for Pain (for breakthrough pain).    pantoprazole (PROTONIX) 40 MG tablet TAKE 1 TABLET TWICE DAILY    pen needle, diabetic (BD ULTRA-FINE CHET PEN NEEDLE) 32 gauge x 5/32" Ndle USE PEN NEEDLE AS INSTRUCTION WITH LANTUS INSULIN PRE-FILLED PEN.    pneumoc 20-jonny conj-dip cr,PF, (PREVNAR 20, PF,) 0.5 mL Syrg injection Inject into the muscle.    pravastatin (PRAVACHOL) 80 MG tablet TAKE 1 TABLET ONE TIME DAILY    RSVPreF3 antigen-AS01E, PF, (AREXVY, PF,) 120 mcg/0.5 mL SusR vaccine Inject into the muscle.    SHINGRIX, PF, 50 mcg/0.5 mL injection     spironolactone (ALDACTONE) 50 MG tablet TAKE 1 TABLET ONE TIME DAILY    tadalafiL (CIALIS) 20 MG Tab Take 1 tablet (20 mg total) by mouth every 72 " hours as needed (ED).     Family History       Problem Relation (Age of Onset)    Aneurysm Brother    Benign prostatic hyperplasia Brother    Cataracts Maternal Grandmother    Diabetes Paternal Uncle    Heart attack Brother, Brother    Heart attacks under age 50 Father    Heart disease Mother, Father, Brother, Brother, Brother, Brother, Paternal Grandmother    Hepatitis Brother    Hyperlipidemia Mother, Sister, Brother, Brother, Brother    Hypertension Mother, Sister, Brother, Brother, Brother, Brother, Brother    Multiple sclerosis Daughter    No Known Problems Maternal Grandfather, Paternal Grandfather    Stroke Mother, Sister    Throat cancer Brother          Tobacco Use    Smoking status: Former     Current packs/day: 0.00     Average packs/day: 1 pack/day for 30.0 years (30.0 ttl pk-yrs)     Types: Cigarettes     Start date: 1981     Quit date: 2011     Years since quittin.5     Passive exposure: Never    Smokeless tobacco: Never   Substance and Sexual Activity    Alcohol use: Yes     Comment: occas - nonalcoholic beer or beer    Drug use: No    Sexual activity: Yes     Partners: Female     Review of Systems   Constitutional:  Negative for chills and fever.   HENT:  Negative for trouble swallowing.    Eyes:  Negative for photophobia and visual disturbance.   Respiratory:  Negative for cough and shortness of breath.    Cardiovascular:  Negative for chest pain and leg swelling.   Gastrointestinal:  Negative for abdominal pain, nausea and vomiting.   Genitourinary:  Negative for dysuria and frequency.   Musculoskeletal:  Positive for arthralgias and neck pain. Negative for back pain and gait problem.   Skin:  Negative for rash and wound.   Neurological:  Positive for weakness and numbness. Negative for light-headedness and headaches.   Psychiatric/Behavioral:  Negative for agitation and confusion.      Objective:     Vital Signs (Most Recent):  Temp: 97.8 °F (36.6 °C) (24)  Pulse: 86  (07/09/24 0332)  Resp: 18 (07/09/24 0213)  BP: (!) 165/90 (07/09/24 0332)  SpO2: (!) 92 % (07/09/24 0332) Vital Signs (24h Range):  Temp:  [97.4 °F (36.3 °C)-98 °F (36.7 °C)] 97.8 °F (36.6 °C)  Pulse:  [70-86] 86  Resp:  [12-27] 18  SpO2:  [91 %-97 %] 92 %  BP: (120-165)/(62-90) 165/90     Weight: 88.5 kg (195 lb)  Body mass index is 23.74 kg/m².     Physical Exam  Vitals and nursing note reviewed.   Constitutional:       General: He is not in acute distress.  HENT:      Head: Normocephalic and atraumatic.      Comments: 3 mm non bleeding lesion right eyebrow     Nose: Nose normal.      Mouth/Throat:      Pharynx: No oropharyngeal exudate.   Eyes:      Extraocular Movements: Extraocular movements intact.      Conjunctiva/sclera: Conjunctivae normal.   Neck:      Comments: C collar in place  Cardiovascular:      Rate and Rhythm: Normal rate and regular rhythm.      Heart sounds: Normal heart sounds.   Pulmonary:      Effort: Pulmonary effort is normal. No respiratory distress.      Breath sounds: Normal breath sounds.   Abdominal:      General: Bowel sounds are normal. There is no distension.      Palpations: Abdomen is soft.      Tenderness: There is no abdominal tenderness.   Musculoskeletal:         General: Normal range of motion.      Cervical back: Tenderness present.      Right lower leg: No edema.      Left lower leg: No edema.   Skin:     General: Skin is warm and dry.   Neurological:      Mental Status: He is alert and oriented to person, place, and time.      Comments: Sensory change LUE and LLE compared to right. Some weakness LLE compared to RLE   Psychiatric:         Mood and Affect: Mood normal.         Behavior: Behavior normal.                Significant Labs: All pertinent labs within the past 24 hours have been reviewed.  CBC:   Recent Labs   Lab 07/08/24  1135   WBC 5.44   HGB 14.4   HCT 41.7        CMP:   Recent Labs   Lab 07/08/24  1135      K 3.8      CO2 23   *   BUN  14   CREATININE 1.0   CALCIUM 9.5   PROT 7.2   ALBUMIN 4.1   BILITOT 0.4   ALKPHOS 91   AST 20   ALT 27   ANIONGAP 7*     Cardiac Markers:   Recent Labs   Lab 07/08/24  1203   BNP <10     Magnesium:   Recent Labs   Lab 07/08/24  1135   MG 2.0     POCT Glucose:   Recent Labs   Lab 07/08/24  2038   POCTGLUCOSE 93     Troponin:   Recent Labs   Lab 07/08/24  1203   TROPONINI <0.006     Significant Imaging: I have reviewed all pertinent imaging results/findings within the past 24 hours.  MRI Cervical Spine Without Contrast  Narrative: EXAMINATION:  MRI CERVICAL SPINE WITHOUT CONTRAST    CLINICAL HISTORY:  TRauma, neck;    TECHNIQUE:  Multiplanar, multisequence MR images of the cervical spine were performed without the administration of contrast.    COMPARISON:  CT 07/08/2024, MRI 01/19/2024    FINDINGS:  Postoperative change of C3-C5 ACDF and C3-C6 posterior decompression and instrumented fusion.  There is osseous bridging noted across the C4-C5 vertebral bodies.  Previously questioned hardware loosening difficult to evaluate on MRI due to artifact.    There is a large area of edema through the posterior cervical musculature overlying the operative site.  There is a 1.0 x 7.0 x 2.5 cm superficial collection through the superficial soft tissues just below the incision site.  Finding may represent resolving hematoma, seroma, with abscess not excluded.    C1-C2: Dens is intact. Pre dens space is maintained.    Alignment: Straightening of cervical lordosis.    Vertebrae: Normal marrow signal. No fracture.  C6 hemangioma.    Discs: Disc heights are relatively well preserved at the non operative levels.    Cord: Stable focus of cord signal hyperintensity at the C5 level, compatible with compressive myelomalacia, unchanged from prior.    Skull base and craniocervical junction: Normal.    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    C2-C3:  Uncovertebral spurring with small posterior disc osteophyte complex.  No high-grade spinal canal stenosis or neural foraminal narrowing.    C3-C4: Operative level.  Uncovertebral spurring with posterior disc osteophyte complex.  Moderate left neural foraminal narrowing.  No high-grade spinal canal stenosis.    C4-C5: Operative level.  Uncovertebral spurring.  Moderate left neural foraminal narrowing.  No high-grade spinal canal stenosis.    C5-C6: Operative level.  No significant spinal canal stenosis or neural foraminal narrowing.    C6-C7: No significant spinal canal stenosis or neural foraminal narrowing.    C7-T1: No significant spinal canal stenosis or neural foraminal narrowing.    Paraspinal muscles & soft tissues: Subcentimeter thyroid nodule in the left thyroid lobe.  Impression: Redemonstration of postoperative change of C3-C5 ACDF and C3-C6 posterior decompression/fusion.    Edema through the posterior cervical musculature at the operative site, compatible with healing following recent surgery.  There is a thin superficial collection through the soft tissues just below the incision site which may represent a hematoma, seroma, with abscess not excluded.    The suspected lucency at C6 facet pedicular screws is not confidently identified as fluid signal or bone erosion by MR. This may be difficult to detect on MR due to metallic signal void.    Other findings as above.    Electronically signed by resident: Nicky Cazares  Date:    07/09/2024  Time:    00:35    Electronically signed by: Jori Brownlee  Date:    07/09/2024  Time:    01:41     Assessment/Plan:     * Cervical radiculopathy  64 M 3 months s/p C3-6 fusion with Dr. Burnette presenting with neck pain and LUE numbness/clumsiness after a ground level fall with head trauma  1 day ago.   - Imaging as above.   - Ortho consulted, appreciate recommendations  - Scheduled Celebrex, tylenol, gabapentin, robaxin for pain  - PRN oxycodone    Diabetes mellitus with  insulin therapy  Patient's FSGs are controlled on current medication regimen.  Last A1c reviewed-   Lab Results   Component Value Date    HGBA1C 6.7 (H) 02/19/2024     Most recent fingerstick glucose reviewed-   Recent Labs   Lab 07/08/24 2038   POCTGLUCOSE 93     Current correctional scale  Low  Maintain anti-hyperglycemic dose as follows-   Antihyperglycemics (From admission, onward)      Start     Stop Route Frequency Ordered    07/09/24 0900  insulin glargine U-100 (Lantus) pen 14 Units         -- SubQ Daily 07/09/24 0440    07/09/24 0445  insulin aspart U-100 pen 0-5 Units         -- SubQ Before meals & nightly PRN 07/09/24 0346        Hold Oral hypoglycemics while patient is in the hospital.  Home regimen lantus 25 mg QAM    Dyslipidemia associated with type 2 diabetes mellitus  - Continue home statin & zetia     Anxiety disorder  - Chronic, controled  - Home PRN alprazolam     Coronary artery disease involving native coronary artery of native heart without angina pectoris  Patient with known CAD which is controlled Will continue ASA and Statin and monitor for S/Sx of angina/ACS. Continue to monitor on telemetry.     GERD (gastroesophageal reflux disease)  - Continue PPI     Essential hypertension  Chronic, controlled. Latest blood pressure and vitals reviewed-     Temp:  [97.4 °F (36.3 °C)-98 °F (36.7 °C)]   Pulse:  [70-86]   Resp:  [12-27]   BP: (120-165)/(62-90)   SpO2:  [91 %-97 %] .   Home meds for hypertension were reviewed and noted below.   Hypertension Medications               losartan (COZAAR) 100 MG tablet TAKE 1 TABLET ONE TIME DAILY    metoprolol succinate (TOPROL-XL) 50 MG 24 hr tablet TAKE 1 AND 1/2 TABLETS (75 MG TOTAL) ONCE DAILY.    NIFEdipine (PROCARDIA-XL) 60 MG (OSM) 24 hr tablet Take 1 tablet (60 mg total) by mouth 2 (two) times a day.    nitroGLYCERIN (NITROSTAT) 0.3 MG SL tablet Place 1 tablet (0.3 mg total) under the tongue every 5 (five) minutes as needed for Chest pain.     spironolactone (ALDACTONE) 50 MG tablet TAKE 1 TABLET ONE TIME DAILY   While in the hospital, will manage blood pressure as follows; Continue home antihypertensive regimen  Will utilize p.r.n. blood pressure medication only if patient's blood pressure greater than 180/110 and he develops symptoms such as worsening chest pain or shortness of breath.      VTE Risk Mitigation (From admission, onward)           Ordered     IP VTE HIGH RISK PATIENT  Once         07/09/24 0346     Place sequential compression device  Until discontinued         07/09/24 0346                         On 07/09/2024, patient should be placed in hospital observation services under my care in collaboration with Dorian Owusu DO.      Makayla Brothers PA-C  Department of Hospital Medicine  [facility] - Emergency Dept

## 2024-07-11 ENCOUNTER — PATIENT OUTREACH (OUTPATIENT)
Dept: ADMINISTRATIVE | Facility: CLINIC | Age: 64
End: 2024-07-11
Payer: MEDICARE

## 2024-07-15 NOTE — PROGRESS NOTES
Date: 07/15/2024    Supervising Physician: Edd Burnette M.D.    4/24/24: C3-6 PCDF     History: Hi Braxton is seen today for follow-up following the above listed procedure. Overall the patient is doing well but today notes he was doing well until 7/8/24 when he had a ground level fall at home. He reports he worked in his house all day Sunday without air conditioning in 95F temperature and became dehydrated which resulted in his fall. He hit his right eyebrow and had small bleed that resolved. Pt presented to the ED for evaluation and was told his hardware might be loose but was eventually discharged home. He reports L>R sided neck pain with some numbness down his left arm. Pt says he was trending in the right direction recovery wise until the fall. he denies fever, chills, and sweats since the time of the surgery.     Exam: Post op dressing taken down.  Incision is healing well, clean, dry and intact.   There is no sign of infection. Neuro exam is stable. No signs of DVT.    Radiographs: CT cervical demonstrates screw loosening at C6, L>R    Assessment/Plan: 3 months post op.    Will book C3-T1 PCDF revision    Thank you for the opportunity to participate in this patient's care. Please give me a call if there are any concerns or questions.

## 2024-07-16 ENCOUNTER — PATIENT MESSAGE (OUTPATIENT)
Dept: ORTHOPEDICS | Facility: CLINIC | Age: 64
End: 2024-07-16

## 2024-07-16 ENCOUNTER — OFFICE VISIT (OUTPATIENT)
Dept: ORTHOPEDICS | Facility: CLINIC | Age: 64
End: 2024-07-16
Payer: COMMERCIAL

## 2024-07-16 VITALS — HEIGHT: 76 IN | WEIGHT: 195.13 LBS | BODY MASS INDEX: 23.76 KG/M2

## 2024-07-16 DIAGNOSIS — Z98.1 S/P CERVICAL SPINAL FUSION: Primary | ICD-10-CM

## 2024-07-16 DIAGNOSIS — T84.216A HARDWARE FAILURE OF ANTERIOR COLUMN OF SPINE: ICD-10-CM

## 2024-07-16 PROCEDURE — 3044F HG A1C LEVEL LT 7.0%: CPT | Mod: CPTII,S$GLB,, | Performed by: ORTHOPAEDIC SURGERY

## 2024-07-16 PROCEDURE — 3288F FALL RISK ASSESSMENT DOCD: CPT | Mod: CPTII,S$GLB,, | Performed by: ORTHOPAEDIC SURGERY

## 2024-07-16 PROCEDURE — 1101F PT FALLS ASSESS-DOCD LE1/YR: CPT | Mod: CPTII,S$GLB,, | Performed by: ORTHOPAEDIC SURGERY

## 2024-07-16 PROCEDURE — 3066F NEPHROPATHY DOC TX: CPT | Mod: CPTII,S$GLB,, | Performed by: ORTHOPAEDIC SURGERY

## 2024-07-16 PROCEDURE — 1125F AMNT PAIN NOTED PAIN PRSNT: CPT | Mod: CPTII,S$GLB,, | Performed by: ORTHOPAEDIC SURGERY

## 2024-07-16 PROCEDURE — 99024 POSTOP FOLLOW-UP VISIT: CPT | Mod: S$GLB,,, | Performed by: ORTHOPAEDIC SURGERY

## 2024-07-16 PROCEDURE — 99999 PR PBB SHADOW E&M-EST. PATIENT-LVL IV: CPT | Mod: PBBFAC,,, | Performed by: ORTHOPAEDIC SURGERY

## 2024-07-16 PROCEDURE — 3061F NEG MICROALBUMINURIA REV: CPT | Mod: CPTII,S$GLB,, | Performed by: ORTHOPAEDIC SURGERY

## 2024-07-16 PROCEDURE — 1159F MED LIST DOCD IN RCRD: CPT | Mod: CPTII,S$GLB,, | Performed by: ORTHOPAEDIC SURGERY

## 2024-07-16 PROCEDURE — 4010F ACE/ARB THERAPY RXD/TAKEN: CPT | Mod: CPTII,S$GLB,, | Performed by: ORTHOPAEDIC SURGERY

## 2024-07-16 RX ORDER — METHYLPREDNISOLONE 4 MG/1
TABLET ORAL
Qty: 1 EACH | Refills: 0 | Status: SHIPPED | OUTPATIENT
Start: 2024-07-16 | End: 2024-08-06

## 2024-07-16 NOTE — LETTER
July 16, 2024      [id number]  [address]  [facility] [address]  Phone: [phone]       Patient: Hi Braxton   YOB: 1960  Date of Visit: 07/16/2024    To Whom It May Concern:    Chris Braxton  was at [facility] on 07/16/2024. His recent images show hardware loosening, so we are planning for a revision surgery ASAP. His disability will need to be extended. We will provide a date as soon as we have it. If you have any questions or concerns, or if I can be of further assistance, please do not hesitate to contact me.    Sincerely,    Maxine Lindsay PA-C

## 2024-07-17 ENCOUNTER — TELEPHONE (OUTPATIENT)
Dept: INTERNAL MEDICINE | Facility: CLINIC | Age: 64
End: 2024-07-17
Payer: MEDICARE

## 2024-07-17 ENCOUNTER — TELEPHONE (OUTPATIENT)
Dept: PREADMISSION TESTING | Facility: HOSPITAL | Age: 64
End: 2024-07-17
Payer: MEDICARE

## 2024-07-17 ENCOUNTER — PATIENT MESSAGE (OUTPATIENT)
Dept: INTERNAL MEDICINE | Facility: CLINIC | Age: 64
End: 2024-07-17
Payer: MEDICARE

## 2024-07-17 DIAGNOSIS — Z01.818 PREOPERATIVE TESTING: Primary | ICD-10-CM

## 2024-07-17 DIAGNOSIS — M79.602 PAIN OF LEFT UPPER EXTREMITY: ICD-10-CM

## 2024-07-17 NOTE — TELEPHONE ENCOUNTER
----- Message from Farideh Pritchard RN sent at 7/17/2024  9:49 AM CDT -----  Surgery 7/24  Please schedule labs and ua.  Thanks!

## 2024-07-17 NOTE — ANESTHESIA PAT ROS NOTE
07/17/2024  Hi Braxton is a 64 y.o., male.      Pre-op Assessment          Review of Systems  Anesthesia Hx:    IN CHART DOCUMENTED DIFFICULT AIRWAY           Denies Family Hx of Anesthesia complications.    Denies Personal Hx of Anesthesia complications.                    Social:  Former Smoker, Social Alcohol Use       Hematology/Oncology:  Hematology Normal   Oncology Normal                                   EENT/Dental:  EENT/Dental Normal   LIMITED NECK MOBILITY UP AND DOWN AND TO THE LEFT PER PATIENT          Cardiovascular:                hyperlipidemia    PVD Functional Capacity 4 METS, ABLE TO CLIMB 2 FLIGHTS OF STAIRS   Coronary Artery Disease:                            Hypertension         Pulmonary:       Denies Shortness of breath.  Denies Recent URI.  EMPHYSEMA               Renal/:    BPH R RENAL CYST PER EPIC             Hepatic/GI:    Esophageal / Stomach Disorders Gerd Controlled by chronic antireflux medication.         Musculoskeletal:   Musculoskeletal General/Symptoms:  Functional capacity is ambulatory without assistance.        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 Typical AM glucose range: 149 , most recent HgA1c value was 6.1 on 7/9/2024.                Psych:   Anxiety Disorder.                    Anesthesia Assessment: Preoperative EQUATION    Planned Procedure: Procedure(s) (LRB):  FUSION, SPINE, CERVICAL, POSTERIOR APPRO[facility]/SSEP/EMG (N/A)  Requested Anesthesia Type:General  Surgeon: Edd Burnette MD  Service: Orthopedics  Known or anticipated Date of Surgery:7/24/2024, Date change  7/26/2024    Surgeon notes: reviewed    Electronic QUestionnaire Assessment completed via nurse interview with patient.        Triage considerations:     The patient has no apparent active cardiac condition (No unstable coronary Syndrome such as severe unstable angina or recent [<1 month] myocardial infarction, decompensated CHF, severe valvular   disease or significant arrhythmia)    Previous anesthesia records:GETA and No problems  4/24/2024 FUSION, SPINE, CERVICAL, POSTERIOR APPROACH C3-6 DEPUY  GW TONGS WTIH WEIGHTS, FREDIS 4 POST SNS: MOTORS/SSEP/EMG (Back)   Airway:  Mallampati: II   Mouth Opening: Normal  TM Distance: Normal  Tongue: Normal  Neck ROM: Normal ROM  Airway Placement Date: 04/24/24 Placement Time: 0942 , created via procedure documentation Method of Intubation: Video Laryngoscopy Mask Ventilation: Easy - oral Intubated: Postinduction Blade: Cervantes #3 Airway Device Size: 7.5 Cuff Inflation: Minimal occlusive pressure Placement Verified By: Capnometry Complicating Factors: None Findings Post-Intubation: Bilateral breath sounds;Atraumatic/Condition of teeth unchanged Secured at: Teeth Complications: None Removal Date: 04/24/24 Removal Time: 1219     ** RECENT CERVICAL FUSION**    ** LIMITED NECK MOBILITY UP AND DOWN AND TO THE LEFT PER PATIENT**    ** IN EPIC:  DIFFICULT INTUBATION**    Last PCP note: 3-6 months ago , within [facility]   Subspecialty notes: Gastroenterology, Ortho, PM&R, Vascular Surgery, OPTOMETRY    Other important co-morbidities: DM2, GERD, HLD, HTN, Obesity, Smoker, and 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   7/9/2024 HGA1C, 7/8/2024 CMP, CBC, MRI CERVICAL SPINE W/O CONTRAST, EKG, XRAY  CERVICAL SPINE 2 OR 3 VIEWS, CT CERVICAL SPINE          Instructions given. (See in Nurse's note)    Optimization:  Anesthesia Preop Clinic Assessment not  Indicated for this surgery ( recent surgery)     Medical Opinion Indicated           Plan:    Testing:  PT/INR, PTT, T&S, and UA      Consultation:Patient's PCP for re-evaluation     Patient  has previously scheduled Medical Appointment:none    Navigation: Tests Scheduled. TBD             Consults scheduled.TBD             Results will be tracked by Preop Clinic.  7/18 Labs and UA resulted and noted by Dr. Knowles.Medical clearance given by Dr. Josefina Kendall on 7/18:    EKG is normal.  Labs are essentially normal.  Cardiac catheterization in 2022 showed nonobstructive coronary artery disease.  The patient is medically maximized for surgery.  RCRI: 10.1%   Farideh Pritchard RN BSN

## 2024-07-17 NOTE — PRE-PROCEDURE INSTRUCTIONS
Patient stated has not had any problem with anesthesia in the past. Will need medical clearance from your PCP, Dr. Josefina Kendall. He will make an appt. Will need  labs,& ua. Our  will call to set up these appts.        Preop instructions given. Hold aspirin, aspirin containing products, nsaids( Aleve, Advil, Motrin, Ibuprofen, Naprosyn, Naproxen, Voltaren, Diclofenac, Mobic, Meloxicam, Celebrex, Celecoxib), vitamins ( Multivitamin) and supplements one week prior to surgery.     May take Tylenol.    Medication instructions given:    Disp Refills Start End   acetaminophen (TYLENOL) 500 MG tablet -- -- 7/9/2024 --   Sig: Take 2 tablets (1,000 mg total) by mouth every 8 (eight) hours as needed for Pain.   Class: No Print   Route: Farideh Padgett RN 7/17/2024  9:18 AM  TAKE IF NEEDED            ALPRAZolam (XANAX) 0.5 MG tablet 90 tablet 2 6/25/2024 --   Sig: TAKE 1 TABLET THREE TIMES DAILY AS NEEDED FOR ANXIETY   Route: Farideh Padgett RN 7/17/2024  9:19 AM  TAKE IF NEEDED            aspirin (ECOTRIN) 81 MG EC tablet -- 0 3/16/2018 7/9/2024   Sig: Take 1 tablet (81 mg total) by mouth once daily.   Class: OTC   Route: Farideh Padgett RN 7/17/2024  9:17 AM  HOLD ONE WEEK PRIOR TO SURGERY.              aspirin/acetaminophen/caffeine (EXCEDRIN MIGRAINE ORAL) -- --  --   Sig: Take 1 tablet by mouth daily as needed (Pain).   Class: Historical Med   Route: Farideh Padgett RN 7/17/2024  9:18 AM  HOLD ONE WEEK PRIOR TO SURGERY.            celecoxib (CELEBREX) 100 MG capsule 28 capsule 0 5/23/2024 --   Sig: Take 1 capsule (100 mg total) by mouth 2 (two) times daily.   Route: Farideh Padgett RN 7/17/2024  9:17 AM  HOLD ONE WEEK PRIOR TO SURGERY.            celecoxib (CELEBREX) 200 MG capsule -- --  --   Sig: Take 200 mg by mouth 2 (two) times daily as needed for Pain.   Class: Historical Med   Route: Oral       Farideh Pritchard RN 7/17/2024  9:17 AM  HOLD ONE WEEK PRIOR TO  SURGERY.              CONTOUR NEXT TEST STRIPS Strp 25 strip 6 2020 --   Sig: USE TO TEST BLOOD SUGAR ONCE DAILY   DULCOLAX, BISACODYL, ORAL -- --  --   Sig: Take 1 tablet by mouth once daily.   Class: Historical Med   Route: Oral       Farideh Pritchard RN 2024  9:19 AM  HOLD IN AM OF SURGERY.            ezetimibe (ZETIA) 10 mg tablet 90 tablet 3 2023 --   Sig: TAKE 1 TABLET ONE TIME DAILY   Route: Oral       Farideh Pritchard RN 2024  9:19 AM  TAKE IN AM OF SURGERY.              gabapentin (NEURONTIN) 600 MG tablet 270 tablet 3 2024 --   Sig: TAKE 1 TABLET THREE TIMES DAILY   Route: Oral       Farideh Pritchard RN 2024  9:19 AM  TAKE IN AM OF SURGERY.              insulin (LANTUS SOLOSTAR U-100 INSULIN) glargine 100 units/mL SubQ pen 30 mL 1 2024 --   Sig: Inject 25 Units into the skin every evening.   Route: Subcutaneous       Farideh Pritchard RN 2024  9:21 AM  TAKE 50% OR USUAL DOSE , 12 UNITS IN AM OF SURGERY            losartan (COZAAR) 100 MG tablet 90 tablet 3 2023 --   Sig: TAKE 1 TABLET ONE TIME DAILY   Route: Oral       Farideh Pritchard RN 2024  9:21 AM  HOLD IN AM OF SURGERY.            methocarbamoL (ROBAXIN) 500 MG Tab 90 tablet 0 2024 --   Sig: Take 2 tablets (1,000 mg total) by mouth 3 (three) times daily.   Route: Oral       Farideh Pritchard RN 2024  9:21 AM  TAKE IN AM OF SURGERY.              methylPREDNISolone (MEDROL DOSEPACK) 4 mg tablet 1 each 0 2024   Sig: use as directed       Farideh Pritchard RN 2024  9:21 AM  TAKE IN AM OF SURGERY.              metoprolol succinate (TOPROL-XL) 50 MG 24 hr tablet 135 tablet 3 2023 --   Sig: TAKE 1 AND 1/2 TABLETS (75 MG TOTAL) ONCE DAILY.       Farideh Pritchard RN 2024  9:22 AM  TAKE IN AM OF SURGERY.              MICROLET LANCET Misc 25 each 11 10/3/2016 --   Si lancet by Misc.(Non-Drug; Combo Route) route once daily. Test blood glucose once daily as instructed.   Route: Misc.(Non-Drug;  "Combo Route)   multivit-min/folic/vit K/lycop (MEN'S MULTIVITAMIN ORAL) -- --  --   Sig: Take 1 tablet by mouth once daily.   Class: Historical Med   Route: Farideh Padgett RN 7/17/2024  9:18 AM  HOLD ONE WEEK PRIOR TO SURGERY.              NIFEdipine (PROCARDIA-XL) 60 MG (OSM) 24 hr tablet 180 tablet 1 4/29/2024 --   Sig: Take 1 tablet (60 mg total) by mouth 2 (two) times a day.   Route: Farideh Padgett RN 7/17/2024  9:22 AM  TAKE IN AM OF SURGERY.              nitroGLYCERIN (NITROSTAT) 0.3 MG SL tablet 100 tablet 3 7/11/2023 --   Sig: Place 1 tablet (0.3 mg total) under the tongue every 5 (five) minutes as needed for Chest pain.   Route: Sublingual       Farideh Pritchard RN 7/17/2024  9:22 AM  TAKE IF NEEDED            ondansetron (ZOFRAN) 4 MG tablet 30 tablet 0 8/6/2021 --   Sig: Take 1 tablet (4 mg total) by mouth every 6 (six) hours as needed for Nausea.   Route: Oral       Farideh Pritchard RN 7/17/2024  9:22 AM  TAKE IF NEEDED            pantoprazole (PROTONIX) 40 MG tablet 180 tablet 3 12/14/2023 --   Sig: TAKE 1 TABLET TWICE DAILY   Route: Farideh Padgett RN 7/17/2024  9:23 AM  TAKE IN AM OF SURGERY.              pen needle, diabetic (BD ULTRA-FINE CHET PEN NEEDLE) 32 gauge x 5/32" Ndle 200 each 1 2/20/2024 --   Sig: USE PEN NEEDLE AS INSTRUCTION WITH LANTUS INSULIN PRE-FILLED PEN.   pravastatin (PRAVACHOL) 80 MG tablet 90 tablet 3 12/13/2023 --   Sig: TAKE 1 TABLET ONE TIME DAILY   Route: Farideh Pdagett RN 7/17/2024  9:23 AM  TAKE THE NIGHT BEFORE SURGERY.              spironolactone (ALDACTONE) 50 MG tablet 90 tablet 3 12/13/2023 --   Sig: TAKE 1 TABLET ONE TIME DAILY   Route: Farideh Padgett RN 7/17/2024  9:23 AM  HOLD IN AM OF SURGERY.            tadalafiL (CIALIS) 20 MG Tab 15 tablet 11 6/3/2021 2/20/2024   Sig: Take 1 tablet (20 mg total) by mouth every 72 hours as needed (ED).   Route: Oral       Farideh Pritchard, RN 7/17/2024  9:23 AM  HOLD IN AM OF SURGERY.       "      Your surgery has been scheduled for:Wednesday 7/24/2024  [facility]) [phone]__________________________________________    You should report to:  __X__[facility], [facility], [facility]  ____[facility], located on the [place] side of the first floor of the           [facility] ([phone])  ____[facility], located on the [facility] side of the            Second floor of the [facility] ([phone])  ____3rd Floor [facility] located on the [facility] side of the [facility] [phone]  Please Note   Tell your doctor if you take Aspirin, products containing Aspirin, herbal medications  or blood thinners, such as Coumadin, Ticlid, or Plavix.  (Consult your provider regarding holding or stopping before surgery).  Arrange for someone to drive you home following surgery.  You will not be allowed to leave the surgical facility alone or drive yourself home following sedation and anesthesia.  Before Surgery  Stop taking all vitamins/ herbal medications 14days prior to surgery  No Motrin/Advil (Ibuprofen) 7 days before surgery  No Aleve (Naproxen) 7 days before surgery  Stop Taking Asprin, products containing Asprin __7___days before surgery  Stop taking blood thinners_______days before surgery  No Goody's/BC  Powder 7 days before surgery  Refrain from drinking alcoholic beverages for 24hours before and after surgery  Stop or limit smoking _________days before surgery( former smoker)  You may take Tylenol for pain  Night before Surgery  Nothing to eat or drink after midnight.  Take a shower or bath (shower is recommended).  Bathe with Hibiclens soap or an antibacterial soap from the neck down.  If not supplied by your surgeon, hibiclens soap will need to be purchased over the counter in pharmacy.  Rinse soap off thoroughly.  Shampoo your hair with your regular shampoo  The Day of  Surgery  NOTHING TO  DRINK 2 hours before arrival time. If you are told to take medication on the morning of surgery, it may be taken with a sip of water.   Take another bath or shower with hibiclens or any antibacterial soap, to reduce the chance of infection.  Take heart and blood pressure medications with a small sip of water, as advised by the perioperative team.  Do not take fluid pills  You may brush your teeth and rinse your mouth, but do not swall any additional water.   Do not apply perfumes, powder, body lotions or deodorant on the day of surgery.  Nail polish should be removed.  Do not wear makeup or moisturizer  Wear comfortable clothes, such as a button front shirt and loose fitting pants.  Leave all jewelry, including body piercings, and valuables at home.    Bring any devices you will neeed after surgery such as crutches or canes.  If you have sleep apnea, please bring your CPAP machine  In the event that your physical condition changes including the onset of a cold or respiratory illness, or if you have to delay or cancel your surgery, please notify your surgeon.    Stated knows where the surgery center is located. Also sent preop and med instructions to [facility] portal. Verbalizes understanding.

## 2024-07-17 NOTE — TELEPHONE ENCOUNTER
----- Message from Farideh Pritchard RN sent at 7/17/2024  9:50 AM CDT -----  Patient is scheduled for C3-T1 posterior cervical fusion revision  on  7/24 with Dr. Burnette .( Approximately 210 minutes of general anesthesia) He will need medical clearance. Please schedule a preop clearance appt.   Thanks!

## 2024-07-18 ENCOUNTER — OFFICE VISIT (OUTPATIENT)
Dept: INTERNAL MEDICINE | Facility: CLINIC | Age: 64
End: 2024-07-18
Payer: MEDICARE

## 2024-07-18 ENCOUNTER — PATIENT MESSAGE (OUTPATIENT)
Dept: ORTHOPEDICS | Facility: CLINIC | Age: 64
End: 2024-07-18
Payer: MEDICARE

## 2024-07-18 ENCOUNTER — LAB VISIT (OUTPATIENT)
Dept: LAB | Facility: HOSPITAL | Age: 64
End: 2024-07-18
Attending: ANESTHESIOLOGY
Payer: MEDICARE

## 2024-07-18 VITALS
HEIGHT: 76 IN | BODY MASS INDEX: 23.36 KG/M2 | HEART RATE: 78 BPM | WEIGHT: 191.81 LBS | DIASTOLIC BLOOD PRESSURE: 58 MMHG | TEMPERATURE: 97 F | SYSTOLIC BLOOD PRESSURE: 126 MMHG | OXYGEN SATURATION: 99 %

## 2024-07-18 DIAGNOSIS — I10 ESSENTIAL HYPERTENSION: ICD-10-CM

## 2024-07-18 DIAGNOSIS — M54.2 CERVICALGIA: Primary | ICD-10-CM

## 2024-07-18 DIAGNOSIS — E11.9 TYPE 2 DIABETES MELLITUS WITHOUT COMPLICATION, WITHOUT LONG-TERM CURRENT USE OF INSULIN: ICD-10-CM

## 2024-07-18 DIAGNOSIS — Z01.818 PREOPERATIVE TESTING: ICD-10-CM

## 2024-07-18 LAB
AMORPH CRY UR QL COMP ASSIST: NORMAL
BACTERIA #/AREA URNS AUTO: NORMAL /HPF
BILIRUB UR QL STRIP: NEGATIVE
CAOX CRY UR QL COMP ASSIST: NORMAL
CLARITY UR REFRACT.AUTO: CLEAR
COLOR UR AUTO: YELLOW
GLUCOSE UR QL STRIP: ABNORMAL
HGB UR QL STRIP: NEGATIVE
KETONES UR QL STRIP: NEGATIVE
LEUKOCYTE ESTERASE UR QL STRIP: NEGATIVE
MICROSCOPIC COMMENT: NORMAL
NITRITE UR QL STRIP: NEGATIVE
PH UR STRIP: 6 [PH] (ref 5–8)
PROT UR QL STRIP: ABNORMAL
RBC #/AREA URNS AUTO: 0 /HPF (ref 0–4)
SP GR UR STRIP: >=1.03 (ref 1–1.03)
SQUAMOUS #/AREA URNS AUTO: 1 /HPF
URN SPEC COLLECT METH UR: ABNORMAL
WBC #/AREA URNS AUTO: 1 /HPF (ref 0–5)
YEAST UR QL AUTO: NORMAL

## 2024-07-18 PROCEDURE — 1125F AMNT PAIN NOTED PAIN PRSNT: CPT | Mod: HCNC,CPTII,S$GLB, | Performed by: INTERNAL MEDICINE

## 2024-07-18 PROCEDURE — 3288F FALL RISK ASSESSMENT DOCD: CPT | Mod: HCNC,CPTII,S$GLB, | Performed by: INTERNAL MEDICINE

## 2024-07-18 PROCEDURE — 99214 OFFICE O/P EST MOD 30 MIN: CPT | Mod: HCNC,S$GLB,, | Performed by: INTERNAL MEDICINE

## 2024-07-18 PROCEDURE — 3066F NEPHROPATHY DOC TX: CPT | Mod: HCNC,CPTII,S$GLB, | Performed by: INTERNAL MEDICINE

## 2024-07-18 PROCEDURE — 3078F DIAST BP <80 MM HG: CPT | Mod: HCNC,CPTII,S$GLB, | Performed by: INTERNAL MEDICINE

## 2024-07-18 PROCEDURE — 1159F MED LIST DOCD IN RCRD: CPT | Mod: HCNC,CPTII,S$GLB, | Performed by: INTERNAL MEDICINE

## 2024-07-18 PROCEDURE — 99999 PR PBB SHADOW E&M-EST. PATIENT-LVL III: CPT | Mod: PBBFAC,HCNC,, | Performed by: INTERNAL MEDICINE

## 2024-07-18 PROCEDURE — 1160F RVW MEDS BY RX/DR IN RCRD: CPT | Mod: HCNC,CPTII,S$GLB, | Performed by: INTERNAL MEDICINE

## 2024-07-18 PROCEDURE — 3074F SYST BP LT 130 MM HG: CPT | Mod: HCNC,CPTII,S$GLB, | Performed by: INTERNAL MEDICINE

## 2024-07-18 PROCEDURE — 3061F NEG MICROALBUMINURIA REV: CPT | Mod: HCNC,CPTII,S$GLB, | Performed by: INTERNAL MEDICINE

## 2024-07-18 PROCEDURE — 3008F BODY MASS INDEX DOCD: CPT | Mod: HCNC,CPTII,S$GLB, | Performed by: INTERNAL MEDICINE

## 2024-07-18 PROCEDURE — 3044F HG A1C LEVEL LT 7.0%: CPT | Mod: HCNC,CPTII,S$GLB, | Performed by: INTERNAL MEDICINE

## 2024-07-18 PROCEDURE — 1100F PTFALLS ASSESS-DOCD GE2>/YR: CPT | Mod: HCNC,CPTII,S$GLB, | Performed by: INTERNAL MEDICINE

## 2024-07-18 PROCEDURE — 81001 URINALYSIS AUTO W/SCOPE: CPT | Mod: HCNC | Performed by: ANESTHESIOLOGY

## 2024-07-18 PROCEDURE — 4010F ACE/ARB THERAPY RXD/TAKEN: CPT | Mod: HCNC,CPTII,S$GLB, | Performed by: INTERNAL MEDICINE

## 2024-07-18 NOTE — PROGRESS NOTES
65 yo male here for pre-op evaluation for cervical fusion    no CP/SOB/nausea/MI last 3 months, no heart failure, no arrhythmias, no valvular heart disease.    RCRI criteria: yes IDDM, yes CAD, no CVA, no chronic renal insufficiency, no heart failure, no high risk surgery    Functional status: greater than 4 METS    Bleeding risk - history of bleeding with prior surgeries - no, family history of bleeding disorders - no    History of prior anesthetic complications - no    no tobacco, yes EtOH, no Illicit substances    Chronic Steroid usage - none    CC: neck pain  HPI:  The patient is a 64 y.o. year old male who presents to the office for neck pain.  He is scheduled for cervical fusiion July 24.  He initially underwent a cervical fusion in April.  He suffered a fall about a week and a half ago due to a syncopal episode.  He reports episode of syncope was secondary to dehydration.  The morning following the fall he awakened with severe neck pain and numbness and tingling of left upper extremity.   MRI of cervical spine showed:  Redemonstration of postoperative change of C3-C5 ACDF and C3-C6 posterior decompression/fusion.     Edema through the posterior cervical musculature at the operative site, compatible with healing following recent surgery.  There is a thin superficial collection through the soft tissues just below the incision site which may represent a hematoma, seroma, with abscess not excluded.     The suspected lucency at C6 facet pedicular screws is not confidently identified as fluid signal or bone erosion by MR. This may be difficult to detect on MR due to metallic signal void.  Currently, he reports pain is controlled with methocarbamol.       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 Dose of amlodipine increased from 5 to 10 mg in the weeks prior to the incidnt.    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       SURGICAL HISTORY:  Past Surgical History:   Procedure Laterality Date    CEREBRAL ANGIOGRAM N/A 1/6/2020    Procedure: ANGIOGRAM-CEREBRAL;  Surgeon: [facility] Diagnostic Provider;  Location: [facility] OR [address] [place] [address];  Service: Radiology;  Laterality: N/A;  /Nagi    CERVICAL FUSION  12/30/2015    COLONOSCOPY N/A 4/7/2017    Procedure: COLONOSCOPY;  Surgeon: Handy Frederick MD;  [facility]  Service: Endoscopy;  Laterality: N/A;    COLONOSCOPY N/A 11/28/2023    Procedure: COLONOSCOPY;  Surgeon: ARMAAN Hinojosa MD;  [facility]  Service: Endoscopy;  Laterality: N/A;  8/21-referred by Dr. Schulte/ Diagnosis:  Blood in stool, past due on surveillance colonoscopy for advanced colon polyp villous adenoma greater than 10 mm /Prep instructions handed to patient and to portal. AS  11/21/23-Precall complete-DS    CORONARY  ANGIOGRAPHY N/A 10/10/2022    Procedure: ANGIOGRAM, CORONARY ARTERY;  Surgeon: Anson Nolan MD;  Location: [facility] CATH LAB;  Service: Cardiology;  Laterality: N/A;    ENDOSCOPIC ULTRASOUND OF UPPER GASTROINTESTINAL TRACT N/A 8/22/2023    Procedure: ULTRASOUND, UPPER GI TRACT, ENDOSCOPIC;  Surgeon: Joe Means MD;  Location: [facility] ENDO (2ND FLR);  Service: Endoscopy;  Laterality: N/A;  instr portal-pt stated difficult intubation with surgery in the past-tb    FUSION OF CERVICAL SPINE BY POSTERIOR APPROACH N/A 4/24/2024    Procedure: FUSION, SPINE, CERVICAL, POSTERIOR APPROACH C3-6 DEPUY  GW TONGS WTIH WEIGHTS, FREDIS 4 POST SNS: MOTORS/SSEP/EMG;  Surgeon: Edd Burnette MD;  Location: [facility] OR;  Service: Orthopedics;  Laterality: N/A;    HERNIA REPAIR      PROSTATECTOMY         MEDS:  Medcard reviewed and updated    ALLERGIES: Allergy Card reviewed and updated    SOCIAL HISTORY:   The patient is a nonsmoker.    PE:   APPEARANCE: Well nourished, well developed, in no acute distress.    EYES: Sclerae anicteric. PERRL. EOMI.      EARS: TM's intact. No retraction or perforation.    NOSE: Mucosa pink. Airway clear.  MOUTH & THROAT: No tonsillar enlargement. No pharyngeal erythema or exudate. No stridor.  NECK: Limited range of motion.  Decreased lateral rotation to the left.  CHEST: Lungs clear to auscultation with unlabored respirations.  CARDIOVASCULAR: Normal S1, S2. No murmurs. No carotid bruits. No pedal edema.  ABDOMEN: Bowel sounds normal. Not distended. Soft. No tenderness or masses.   MUSCULOSKELETAL:  Normal gait.   SKIN: Normal skin turgor, warm and dry.  NEUROLOGIC: Cranial Nerves: Intact.  PSYCHIATRIC: The patient is oriented to person, place, and time and has a pleasant affect.        ASSESSMENT/PLAN:  Hi was seen today for pre-op exam.    Diagnoses and all orders for this visit:    Cervicalgia  -     cervical fusion scheduled    Essential hypertension  -     blood pressure is controlled,  continue current therapy    Type 2 diabetes mellitus without complication, without long-term current use of insulin  -     good glycemic control, continue current therapy    Addendum:   EKG is normal.  Labs are essentially normal.  Cardiac catheterization in 2022 showed nonobstructive coronary artery disease.  The patient is medically maximized for surgery.  RCRI: 10.1%

## 2024-07-22 ENCOUNTER — PATIENT MESSAGE (OUTPATIENT)
Dept: ORTHOPEDICS | Facility: CLINIC | Age: 64
End: 2024-07-22
Payer: MEDICARE

## 2024-07-22 ENCOUNTER — ANESTHESIA EVENT (OUTPATIENT)
Dept: SURGERY | Facility: HOSPITAL | Age: 64
DRG: 473 | End: 2024-07-22
Payer: MEDICARE

## 2024-07-22 RX ORDER — OXYCODONE HYDROCHLORIDE 5 MG/1
5 TABLET ORAL EVERY 6 HOURS PRN
Qty: 21 TABLET | Refills: 0 | Status: ON HOLD | OUTPATIENT
Start: 2024-07-22

## 2024-07-22 RX ORDER — METHOCARBAMOL 500 MG/1
1000 TABLET, FILM COATED ORAL 3 TIMES DAILY
Qty: 90 TABLET | Refills: 0 | Status: ON HOLD | OUTPATIENT
Start: 2024-07-22

## 2024-07-22 RX ORDER — ACETAMINOPHEN 500 MG
500 TABLET ORAL 3 TIMES DAILY
Qty: 90 TABLET | Refills: 0 | Status: ON HOLD | OUTPATIENT
Start: 2024-07-22

## 2024-07-22 RX ORDER — CELECOXIB 100 MG/1
100 CAPSULE ORAL 2 TIMES DAILY
Qty: 28 CAPSULE | Refills: 0 | Status: ON HOLD | OUTPATIENT
Start: 2024-07-22

## 2024-07-22 NOTE — H&P
Date: 07/22/2024    Supervising Physician: Edd Burnette M.D.    4/24/24: C3-6 PCDF     History: Hi Braxton is seen today for follow-up following the above listed procedure. Overall the patient is doing well but today notes he was doing well until 7/8/24 when he had a ground level fall at home. He reports he worked in his house all day Sunday without air conditioning in 95F temperature and became dehydrated which resulted in his fall. He hit his right eyebrow and had small bleed that resolved. Pt presented to the ED for evaluation and was told his hardware might be loose but was eventually discharged home. He reports L>R sided neck pain with some numbness down his left arm. Pt says he was trending in the right direction recovery wise until the fall. he denies fever, chills, and sweats since the time of the surgery.     Exam: Post op dressing taken down.  Incision is healing well, clean, dry and intact.   There is no sign of infection. Neuro exam is stable. No signs of DVT.    Radiographs: CT cervical demonstrates screw loosening at C6, L>R    Assessment/Plan:     Will proceed with C3-T1 PCDF revision

## 2024-07-23 ENCOUNTER — PATIENT MESSAGE (OUTPATIENT)
Dept: ORTHOPEDICS | Facility: CLINIC | Age: 64
End: 2024-07-23
Payer: MEDICARE

## 2024-07-24 ENCOUNTER — ANESTHESIA (OUTPATIENT)
Dept: SURGERY | Facility: HOSPITAL | Age: 64
DRG: 473 | End: 2024-07-24
Payer: MEDICARE

## 2024-07-24 ENCOUNTER — TELEPHONE (OUTPATIENT)
Dept: ORTHOPEDICS | Facility: CLINIC | Age: 64
End: 2024-07-24
Payer: MEDICARE

## 2024-07-25 ENCOUNTER — TELEPHONE (OUTPATIENT)
Dept: ORTHOPEDICS | Facility: CLINIC | Age: 64
End: 2024-07-25
Payer: MEDICARE

## 2024-07-25 NOTE — ANESTHESIA PREPROCEDURE EVALUATION
[facility]  Anesthesia Pre-Operative Evaluation         Patient Name: Hi Braxton  YOB: 1960  MRN: [id number]    SUBJECTIVE:     Pre-operative evaluation for Procedure(s) (LRB):  FUSION, SPINE, CERVICAL, POSTERIOR APPROACH C3-T1 REVISION DEPUY SNS: MOTORS/SSEP/EMG (N/A)     07/25/2024    Hi Braxton is a 64 y.o. male w/ a significant PMHx of T2DM, GERD, HLD, HTN, CAD, cervical spinal stenosis s/p C3-6 PCDF. Patient had syncopal episode with fall from ground level about 1 week ago thought to be due to dehydration, woke up with severe neck pain/numbness LUE.    he has a current medication list which includes the following long-term medication(s): alprazolam, aspirin, contour next test strips, ezetimibe, gabapentin, lantus solostar u-100 insulin, losartan, metoprolol succinate, microlet lancet, nifedipine, nitroglycerin, pantoprazole, pravastatin, spironolactone, and tadalafil.     Patient now presents for the above procedure(s).    TTE 3/27/18:  CONCLUSIONS     1 - Normal left ventricular systolic function (EF 60-65%).     2 - No wall motion abnormalities.     3 - Normal left ventricular diastolic function.     4 - Normal right ventricular systolic function .     5 - The estimated PA systolic pressure is 27 mmHg.     6 - Mild tricuspid regurgitation.     Nuclear Stress Test 10/5/22:  Interpretation Summary    The EKG portion of this study is negative for ischemia.    The patient reported no chest pain during the stress test.    There were no arrhythmias during stress.     Cardiac Cath 10/5/22:    Non-obstructive CAD - minimal luminal irregularities in LAD    LDA: None documented.     Drips: None documented.    Prev airway:Intubation:     Induction:  Intravenous    Intubated:  Postinduction    Mask Ventilation:  Easy with oral airway    Attempts:  1    Attempted By:  CRNA    Method of Intubation:  Video laryngoscopy    Blade:  Cervantes 3    Laryngeal View Grade: Grade I - full  view of cords      Difficult Airway Encountered?: No      Complications:  None    Airway Device:  Oral endotracheal tube    Airway Device Size:  7.5    Style/Cuff Inflation:  Cuffed (inflated to minimal occlusive pressure)    Tube secured:  22    Secured at:  The teeth    Placement Verified By:  Capnometry    Complicating Factors:  None    Findings Post-Intubation:  BS equal bilateral and atraumatic/condition of teeth unchanged    Medications:     Current Outpatient Medications   Medication Instructions    acetaminophen (TYLENOL) 1,000 mg, Oral, Every 8 hours PRN    acetaminophen (TYLENOL) 500 mg, Oral, 3 times daily    ALPRAZolam (XANAX) 0.5 mg, Oral, FOR ANXIETY    aspirin (ECOTRIN) 81 mg, Oral, Daily    aspirin/acetaminophen/caffeine (EXCEDRIN MIGRAINE ORAL) 1 tablet, Oral, Daily PRN    celecoxib (CELEBREX) 100 mg, Oral, 2 times daily    celecoxib (CELEBREX) 200 mg, Oral, 2 times daily PRN    celecoxib (CELEBREX) 100 mg, Oral, 2 times daily    CONTOUR NEXT TEST STRIPS Strp USE TO TEST BLOOD SUGAR ONCE DAILY    DULCOLAX, BISACODYL, ORAL 1 tablet, Oral, Daily    ezetimibe (ZETIA) 10 mg, Oral    gabapentin (NEURONTIN) 600 mg, Oral, 3 times daily    LANTUS SOLOSTAR U-100 INSULIN 25 Units, Subcutaneous, Nightly    losartan (COZAAR) 100 mg, Oral    methocarbamoL (ROBAXIN) 1,000 mg, Oral, 3 times daily    methocarbamoL (ROBAXIN) 1,000 mg, Oral, 3 times daily    methylPREDNISolone (MEDROL DOSEPACK) 4 mg tablet use as directed    metoprolol succinate (TOPROL-XL) 50 MG 24 hr tablet TAKE 1 AND 1/2 TABLETS (75 MG TOTAL) ONCE DAILY.    MICROLET LANCET Misc 1 lancet , Misc.(Non-Drug; Combo Route), Daily, Test blood glucose once daily as instructed.    multivit-min/folic/vit K/lycop (MEN'S MULTIVITAMIN ORAL) 1 tablet, Oral, Daily    NIFEdipine (PROCARDIA-XL) 60 mg, Oral, 2 times daily    nitroGLYCERIN (NITROSTAT) 0.3 mg, Sublingual, Every 5 min PRN    ondansetron (ZOFRAN) 4 mg, Oral, Every 6 hours PRN    oxyCODONE (ROXICODONE)  "5 mg, Oral, Every 6 hours PRN    pantoprazole (PROTONIX) 40 mg, Oral, 2 times daily    pen needle, diabetic (BD ULTRA-FINE CHET PEN NEEDLE) 32 gauge x 5/32" Ndle USE PEN NEEDLE AS INSTRUCTION WITH LANTUS INSULIN PRE-FILLED PEN.    pravastatin (PRAVACHOL) 80 mg, Oral    spironolactone (ALDACTONE) 50 mg, Oral    tadalafiL (CIALIS) 20 mg, Oral, Every 72 hours PRN        History:     Past Medical History:   Diagnosis Date    BPH with obstruction/lower urinary tract symptoms 09/28/2017    Carotid artery occlusion     Chronic anterior uveitis 03/26/2013    Coronary artery disease     Diabetes mellitus, type 2     diet controlled    Difficult intubation     Distended bladder 06/23/2020    Dizziness     DJD (degenerative joint disease), cervical 07/10/2015    Dysuria 05/11/2018    GERD (gastroesophageal reflux disease)     History of iritis 2013    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 Dose of amlodipine increased from 5 to 10 mg in the weeks prior to the incidnt.    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     Surgical History:    has a past surgical history that includes Hernia repair; Cervical fusion (12/30/2015); Colonoscopy (N/A, 4/7/2017); Prostatectomy; Cerebral angiogram (N/A, 1/6/2020); Coronary angiography (N/A, 10/10/2022); Endoscopic " ultrasound of upper gastrointestinal tract (N/A, 8/22/2023); Colonoscopy (N/A, 11/28/2023); and Fusion of cervical spine by posterior approach (N/A, 4/24/2024).   Social History:   Tobacco Use: Medium Risk (7/18/2024)    Patient History     Smoking Tobacco Use: Former     Smokeless Tobacco Use: Never     Passive Exposure: Never     Alcohol Use: Not At Risk (2/20/2024)    AUDIT-C     Frequency of Alcohol Consumption: Monthly or less     Average Number of Drinks: 1 or 2     Frequency of Binge Drinking: Never      reports that he quit smoking about 12 years ago. His smoking use included cigarettes. He started smoking about 42 years ago. He has a 30 pack-year smoking history. He has never been exposed to tobacco smoke. He has never used smokeless tobacco. He reports current alcohol use. He reports that he does not use drugs.   reports being sexually active and has had partner(s) who are female.    OBJECTIVE:   Vital Signs Range:  Wt Readings from Last 1 Encounters:   07/18/24 87 kg (191 lb 12.8 oz)      BMI Readings from Last 1 Encounters:   07/18/24 23.35 kg/m²     BP Readings from Last 3 Encounters:   07/18/24 (!) 126/58   07/09/24 129/72   04/26/24 (!) 155/74     Pulse Readings from Last 3 Encounters:   07/18/24 78   07/09/24 68   04/26/24 63       Significant Labs:      Component Value Date/Time    WBC 5.44 07/08/2024 1135    HGB 14.4 07/08/2024 1135    HCT 41.7 07/08/2024 1135    HCT 41 04/25/2024 0415     07/08/2024 1135     07/08/2024 1135    K 3.8 07/08/2024 1135     07/08/2024 1135    CO2 23 07/08/2024 1135     (H) 07/08/2024 1135    BUN 14 07/08/2024 1135    CREATININE 1.0 07/08/2024 1135    MG 2.0 07/08/2024 1135    PHOS 4.2 10/10/2022 0527    CALCIUM 9.5 07/08/2024 1135    ALBUMIN 4.1 07/08/2024 1135    PROT 7.2 07/08/2024 1135    ALKPHOS 91 07/08/2024 1135    BILITOT 0.4 07/08/2024 1135    AST 20 07/08/2024 1135    ALT 27 07/08/2024 1135    INR 1.0 07/18/2024 0901    INR 0.9  08/22/2019 0841    HGBA1C 6.1 (H) 07/09/2024 0641      Please see Results Review for additional labs.     Diagnostic Studies: No relevant studies.    EKG:   Results for orders placed or performed during the hospital encounter of 07/08/24   EKG 12-lead    Collection Time: 07/08/24 10:57 AM   Result Value Ref Range    QRS Duration 82 ms    [facility] QTC Calculation 445 ms    Narrative    Test Reason : W19.XXXA,    Vent. Rate : 088 BPM     Atrial Rate : 088 BPM     P-R Int : 138 ms          QRS Dur : 082 ms      QT Int : 368 ms       P-R-T Axes : 080 051 064 degrees     QTc Int : 445 ms    Normal sinus rhythm  Normal ECG  When compared with ECG of 26-MAR-2024 14:06,  No significant change was found  Confirmed by Nghia CASTRO MD ([id number]) on 7/8/2024 11:10:16 AM    Referred By: [email or web address]   SELF           Confirmed By:Nghia CASTRO MD     ECHO:  See subjective, if available.  ASSESSMENT/PLAN:       Pre-op Assessment          Review of Systems  Anesthesia Hx:    IN CHART DOCUMENTED DIFFICULT AIRWAY           Denies Family Hx of Anesthesia complications.    Denies Personal Hx of Anesthesia complications.                    Social:  Former Smoker, Social Alcohol Use       Hematology/Oncology:  Hematology Normal   Oncology Normal                                   EENT/Dental:  EENT/Dental Normal   LIMITED NECK MOBILITY UP AND DOWN AND TO THE LEFT PER PATIENT          Cardiovascular:                hyperlipidemia    PVD Functional Capacity 4 METS, ABLE TO CLIMB 2 FLIGHTS OF STAIRS   Coronary Artery Disease:                            Hypertension         Pulmonary:       Denies Shortness of breath.  Denies Recent URI.  EMPHYSEMA               Renal/:    BPH R RENAL CYST PER EPIC             Hepatic/GI:    Esophageal / Stomach Disorders Gerd Controlled by chronic antireflux medication.         Musculoskeletal:   Musculoskeletal General/Symptoms:  Functional capacity is ambulatory without assistance.        Cervical Spine Disorder,  Cervical Disc Disease, S/P Cervical Fusion HARDWARE FAILURE OF ANTERIOR COLUMN OF SPINE    Neurological:   Neuro Symptoms of pain OF NECK AND NUMBNESS TO L ARM                           Endocrine:     THYROID NODULE Diabetes, Type 2 Diabetes   , controlled by insulin. Typical AM glucose range: 149 , most recent HgA1c value was 6.1 on 7/9/2024.                Psych:   Anxiety Disorder.                  Anesthesia Plan  Type of Anesthesia, risks & benefits discussed:    Anesthesia Type: Gen ETT  Intra-op Monitoring Plan: Standard ASA Monitors and Art Line  Post Op Pain Control Plan: multimodal analgesia and IV/PO Opioids PRN  Induction:  IV  Airway Plan: Direct and Video, Post-Induction  Informed Consent: Informed consent signed with the Patient and all parties understand the risks and agree with anesthesia plan.  All questions answered.   ASA Score: 3  Day of Surgery Review of History & Physical: H&P Update referred to the surgeon/provider.    Ready For Surgery From Anesthesia Perspective.     .

## 2024-07-25 NOTE — TELEPHONE ENCOUNTER
Spoke to pt, informed his arrival time for surgery tomorrow is 9:30am at [facility] 2nd floor. No food or drink after midnight. Reminded pt to shower with Hibclens antibacterial soap tonight and tomorrow. Pt pleased and verbalized understanding.

## 2024-07-26 ENCOUNTER — HOSPITAL ENCOUNTER (INPATIENT)
Facility: HOSPITAL | Age: 64
LOS: 3 days | Discharge: HOME HEALTH | DRG: 473 | End: 2024-07-29
Attending: STUDENT IN AN ORGANIZED HEALTH CARE EDUCATION/TRAINING PROGRAM | Admitting: STUDENT IN AN ORGANIZED HEALTH CARE EDUCATION/TRAINING PROGRAM
Payer: MEDICARE

## 2024-07-26 DIAGNOSIS — Z98.1 S/P CERVICAL SPINAL FUSION: Primary | ICD-10-CM

## 2024-07-26 DIAGNOSIS — J43.9 PULMONARY EMPHYSEMA, UNSPECIFIED EMPHYSEMA TYPE: ICD-10-CM

## 2024-07-26 DIAGNOSIS — T84.216A HARDWARE FAILURE OF ANTERIOR COLUMN OF SPINE: Primary | ICD-10-CM

## 2024-07-26 LAB
ABO + RH BLD: NORMAL
ABO + RH BLD: NORMAL
APTT PPP: 27.6 SEC (ref 21–32)
BASOPHILS # BLD AUTO: 0.04 K/UL (ref 0–0.2)
BASOPHILS NFR BLD: 0.8 % (ref 0–1.9)
BLD GP AB SCN CELLS X3 SERPL QL: NORMAL
BLD GP AB SCN CELLS X3 SERPL QL: NORMAL
DIFFERENTIAL METHOD BLD: NORMAL
EOSINOPHIL # BLD AUTO: 0.3 K/UL (ref 0–0.5)
EOSINOPHIL NFR BLD: 5.5 % (ref 0–8)
ERYTHROCYTE [DISTWIDTH] IN BLOOD BY AUTOMATED COUNT: 13.3 % (ref 11.5–14.5)
GLUCOSE SERPL-MCNC: 98 MG/DL (ref 70–110)
GRAM STN SPEC: NORMAL
GRAM STN SPEC: NORMAL
HCO3 UR-SCNC: 22.5 MMOL/L (ref 24–28)
HCT VFR BLD AUTO: 42.2 % (ref 40–54)
HCT VFR BLD CALC: 36 %PCV (ref 36–54)
HGB BLD-MCNC: 14.4 G/DL (ref 14–18)
IMM GRANULOCYTES # BLD AUTO: 0.02 K/UL (ref 0–0.04)
IMM GRANULOCYTES NFR BLD AUTO: 0.4 % (ref 0–0.5)
INR PPP: 1 (ref 0.8–1.2)
LYMPHOCYTES # BLD AUTO: 1.6 K/UL (ref 1–4.8)
LYMPHOCYTES NFR BLD: 30.9 % (ref 18–48)
MCH RBC QN AUTO: 29.6 PG (ref 27–31)
MCHC RBC AUTO-ENTMCNC: 34.1 G/DL (ref 32–36)
MCV RBC AUTO: 87 FL (ref 82–98)
MONOCYTES # BLD AUTO: 0.4 K/UL (ref 0.3–1)
MONOCYTES NFR BLD: 8.4 % (ref 4–15)
NEUTROPHILS # BLD AUTO: 2.8 K/UL (ref 1.8–7.7)
NEUTROPHILS NFR BLD: 54 % (ref 38–73)
NRBC BLD-RTO: 0 /100 WBC
PCO2 BLDA: 37.6 MMHG (ref 35–45)
PH SMN: 7.38 [PH] (ref 7.35–7.45)
PLATELET # BLD AUTO: 270 K/UL (ref 150–450)
PMV BLD AUTO: 10.2 FL (ref 9.2–12.9)
PO2 BLDA: 225 MMHG (ref 80–100)
POC BE: -3 MMOL/L
POC IONIZED CALCIUM: 1.15 MMOL/L (ref 1.06–1.42)
POC SATURATED O2: 100 % (ref 95–100)
POC TCO2: 24 MMOL/L (ref 23–27)
POCT GLUCOSE: 104 MG/DL (ref 70–110)
POCT GLUCOSE: 121 MG/DL (ref 70–110)
POCT GLUCOSE: 150 MG/DL (ref 70–110)
POTASSIUM BLD-SCNC: 3.5 MMOL/L (ref 3.5–5.1)
PROTHROMBIN TIME: 10.8 SEC (ref 9–12.5)
RBC # BLD AUTO: 4.86 M/UL (ref 4.6–6.2)
SAMPLE: ABNORMAL
SODIUM BLD-SCNC: 143 MMOL/L (ref 136–145)
SPECIMEN OUTDATE: NORMAL
SPECIMEN OUTDATE: NORMAL
WBC # BLD AUTO: 5.11 K/UL (ref 3.9–12.7)

## 2024-07-26 PROCEDURE — D9220A PRA ANESTHESIA: Mod: HCNC,CRNA,, | Performed by: NURSE ANESTHETIST, CERTIFIED REGISTERED

## 2024-07-26 PROCEDURE — 36415 COLL VENOUS BLD VENIPUNCTURE: CPT | Mod: HCNC,PO | Performed by: ANESTHESIOLOGY

## 2024-07-26 PROCEDURE — 25000003 PHARM REV CODE 250: Mod: HCNC | Performed by: STUDENT IN AN ORGANIZED HEALTH CARE EDUCATION/TRAINING PROGRAM

## 2024-07-26 PROCEDURE — 87075 CULTR BACTERIA EXCEPT BLOOD: CPT | Mod: HCNC | Performed by: STUDENT IN AN ORGANIZED HEALTH CARE EDUCATION/TRAINING PROGRAM

## 2024-07-26 PROCEDURE — 85610 PROTHROMBIN TIME: CPT | Mod: HCNC | Performed by: STUDENT IN AN ORGANIZED HEALTH CARE EDUCATION/TRAINING PROGRAM

## 2024-07-26 PROCEDURE — 37000009 HC ANESTHESIA EA ADD 15 MINS: Mod: HCNC | Performed by: ORTHOPAEDIC SURGERY

## 2024-07-26 PROCEDURE — 25000003 PHARM REV CODE 250: Mod: HCNC | Performed by: ORTHOPAEDIC SURGERY

## 2024-07-26 PROCEDURE — 63600175 PHARM REV CODE 636 W HCPCS: Mod: HCNC

## 2024-07-26 PROCEDURE — 27800903 OPTIME MED/SURG SUP & DEVICES OTHER IMPLANTS: Mod: HCNC | Performed by: ORTHOPAEDIC SURGERY

## 2024-07-26 PROCEDURE — 36620 INSERTION CATHETER ARTERY: CPT | Mod: 59,HCNC,, | Performed by: SURGERY

## 2024-07-26 PROCEDURE — 36000710: Mod: HCNC | Performed by: ORTHOPAEDIC SURGERY

## 2024-07-26 PROCEDURE — D9220A PRA ANESTHESIA: Mod: HCNC,ANES,, | Performed by: SURGERY

## 2024-07-26 PROCEDURE — 85025 COMPLETE CBC W/AUTO DIFF WBC: CPT | Mod: HCNC | Performed by: STUDENT IN AN ORGANIZED HEALTH CARE EDUCATION/TRAINING PROGRAM

## 2024-07-26 PROCEDURE — 71000015 HC POSTOP RECOV 1ST HR: Mod: HCNC | Performed by: ORTHOPAEDIC SURGERY

## 2024-07-26 PROCEDURE — 63600175 PHARM REV CODE 636 W HCPCS: Mod: HCNC | Performed by: NURSE ANESTHETIST, CERTIFIED REGISTERED

## 2024-07-26 PROCEDURE — 37000008 HC ANESTHESIA 1ST 15 MINUTES: Mod: HCNC | Performed by: ORTHOPAEDIC SURGERY

## 2024-07-26 PROCEDURE — 27201423 OPTIME MED/SURG SUP & DEVICES STERILE SUPPLY: Mod: HCNC | Performed by: ORTHOPAEDIC SURGERY

## 2024-07-26 PROCEDURE — 71000016 HC POSTOP RECOV ADDL HR: Mod: HCNC | Performed by: ORTHOPAEDIC SURGERY

## 2024-07-26 PROCEDURE — 25000003 PHARM REV CODE 250: Mod: HCNC

## 2024-07-26 PROCEDURE — 11000001 HC ACUTE MED/SURG PRIVATE ROOM: Mod: HCNC

## 2024-07-26 PROCEDURE — 86850 RBC ANTIBODY SCREEN: CPT | Mod: HCNC | Performed by: STUDENT IN AN ORGANIZED HEALTH CARE EDUCATION/TRAINING PROGRAM

## 2024-07-26 PROCEDURE — 27201037 HC PRESSURE MONITORING SET UP: Mod: HCNC

## 2024-07-26 PROCEDURE — 87206 SMEAR FLUORESCENT/ACID STAI: CPT | Mod: HCNC | Performed by: STUDENT IN AN ORGANIZED HEALTH CARE EDUCATION/TRAINING PROGRAM

## 2024-07-26 PROCEDURE — 87116 MYCOBACTERIA CULTURE: CPT | Mod: HCNC | Performed by: STUDENT IN AN ORGANIZED HEALTH CARE EDUCATION/TRAINING PROGRAM

## 2024-07-26 PROCEDURE — 0RG40K1 FUSION OF CERVICOTHORACIC VERTEBRAL JOINT WITH NONAUTOLOGOUS TISSUE SUBSTITUTE, POSTERIOR APPROACH, POSTERIOR COLUMN, OPEN APPROACH: ICD-10-PCS | Performed by: STUDENT IN AN ORGANIZED HEALTH CARE EDUCATION/TRAINING PROGRAM

## 2024-07-26 PROCEDURE — 85730 THROMBOPLASTIN TIME PARTIAL: CPT | Mod: HCNC | Performed by: STUDENT IN AN ORGANIZED HEALTH CARE EDUCATION/TRAINING PROGRAM

## 2024-07-26 PROCEDURE — 25000003 PHARM REV CODE 250: Mod: HCNC | Performed by: NURSE ANESTHETIST, CERTIFIED REGISTERED

## 2024-07-26 PROCEDURE — 36000711: Mod: HCNC | Performed by: ORTHOPAEDIC SURGERY

## 2024-07-26 PROCEDURE — 63600175 PHARM REV CODE 636 W HCPCS: Mod: HCNC | Performed by: SURGERY

## 2024-07-26 PROCEDURE — 87102 FUNGUS ISOLATION CULTURE: CPT | Mod: HCNC | Performed by: STUDENT IN AN ORGANIZED HEALTH CARE EDUCATION/TRAINING PROGRAM

## 2024-07-26 PROCEDURE — 0RG20K1 FUSION OF 2 OR MORE CERVICAL VERTEBRAL JOINTS WITH NONAUTOLOGOUS TISSUE SUBSTITUTE, POSTERIOR APPROACH, POSTERIOR COLUMN, OPEN APPROACH: ICD-10-PCS | Performed by: ORTHOPAEDIC SURGERY

## 2024-07-26 PROCEDURE — 0PP304Z REMOVAL OF INTERNAL FIXATION DEVICE FROM CERVICAL VERTEBRA, OPEN APPROACH: ICD-10-PCS | Performed by: ORTHOPAEDIC SURGERY

## 2024-07-26 PROCEDURE — C1734 ORTH/DEVIC/DRUG BN/BN,TIS/BN: HCPCS | Mod: HCNC | Performed by: ORTHOPAEDIC SURGERY

## 2024-07-26 PROCEDURE — A6010 COLLAGEN BASED WOUND FILLER: HCPCS | Mod: HCNC | Performed by: ORTHOPAEDIC SURGERY

## 2024-07-26 PROCEDURE — 63600175 PHARM REV CODE 636 W HCPCS: Mod: HCNC | Performed by: STUDENT IN AN ORGANIZED HEALTH CARE EDUCATION/TRAINING PROGRAM

## 2024-07-26 PROCEDURE — C1713 ANCHOR/SCREW BN/BN,TIS/BN: HCPCS | Mod: HCNC | Performed by: ORTHOPAEDIC SURGERY

## 2024-07-26 PROCEDURE — 87070 CULTURE OTHR SPECIMN AEROBIC: CPT | Mod: HCNC | Performed by: STUDENT IN AN ORGANIZED HEALTH CARE EDUCATION/TRAINING PROGRAM

## 2024-07-26 PROCEDURE — 71000033 HC RECOVERY, INTIAL HOUR: Mod: HCNC | Performed by: ORTHOPAEDIC SURGERY

## 2024-07-26 PROCEDURE — 0KX20ZZ TRANSFER RIGHT NECK MUSCLE, OPEN APPROACH: ICD-10-PCS | Performed by: STUDENT IN AN ORGANIZED HEALTH CARE EDUCATION/TRAINING PROGRAM

## 2024-07-26 PROCEDURE — 0KX30ZZ TRANSFER LEFT NECK MUSCLE, OPEN APPROACH: ICD-10-PCS | Performed by: STUDENT IN AN ORGANIZED HEALTH CARE EDUCATION/TRAINING PROGRAM

## 2024-07-26 PROCEDURE — 87205 SMEAR GRAM STAIN: CPT | Mod: HCNC | Performed by: STUDENT IN AN ORGANIZED HEALTH CARE EDUCATION/TRAINING PROGRAM

## 2024-07-26 PROCEDURE — 63600175 PHARM REV CODE 636 W HCPCS: Mod: HCNC | Performed by: ORTHOPAEDIC SURGERY

## 2024-07-26 DEVICE — BONE 30CC CANCELLOUS CRUSHED: Type: IMPLANTABLE DEVICE | Site: POSTERIOR CERVICAL | Status: FUNCTIONAL

## 2024-07-26 DEVICE — SET SYMPHONY SCREW NS: Type: IMPLANTABLE DEVICE | Site: POSTERIOR CERVICAL | Status: FUNCTIONAL

## 2024-07-26 DEVICE — IMPLANTABLE DEVICE: Type: IMPLANTABLE DEVICE | Site: POSTERIOR CERVICAL | Status: FUNCTIONAL

## 2024-07-26 DEVICE — KIT MED BONE INFUSE: Type: IMPLANTABLE DEVICE | Site: POSTERIOR CERVICAL | Status: FUNCTIONAL

## 2024-07-26 DEVICE — COLLAGEN CELLERATE ACTIVATED 1GM: Type: IMPLANTABLE DEVICE | Site: POSTERIOR CERVICAL | Status: FUNCTIONAL

## 2024-07-26 RX ORDER — PREGABALIN 75 MG/1
75 CAPSULE ORAL 2 TIMES DAILY
Status: DISCONTINUED | OUTPATIENT
Start: 2024-07-26 | End: 2024-07-26

## 2024-07-26 RX ORDER — PROPOFOL 10 MG/ML
VIAL (ML) INTRAVENOUS CONTINUOUS PRN
Status: DISCONTINUED | OUTPATIENT
Start: 2024-07-26 | End: 2024-07-26

## 2024-07-26 RX ORDER — MUPIROCIN 20 MG/G
OINTMENT TOPICAL
Status: DISPENSED
Start: 2024-07-26 | End: 2024-07-26

## 2024-07-26 RX ORDER — DEXAMETHASONE SODIUM PHOSPHATE 4 MG/ML
INJECTION, SOLUTION INTRA-ARTICULAR; INTRALESIONAL; INTRAMUSCULAR; INTRAVENOUS; SOFT TISSUE
Status: DISCONTINUED | OUTPATIENT
Start: 2024-07-26 | End: 2024-07-26

## 2024-07-26 RX ORDER — ASPIRIN 81 MG/1
81 TABLET ORAL DAILY
Status: DISCONTINUED | OUTPATIENT
Start: 2024-07-27 | End: 2024-07-29 | Stop reason: HOSPADM

## 2024-07-26 RX ORDER — SODIUM CHLORIDE 9 MG/ML
INJECTION, SOLUTION INTRAVENOUS CONTINUOUS
Status: DISCONTINUED | OUTPATIENT
Start: 2024-07-26 | End: 2024-07-29

## 2024-07-26 RX ORDER — METOPROLOL SUCCINATE 50 MG/1
50 TABLET, EXTENDED RELEASE ORAL DAILY
Status: DISCONTINUED | OUTPATIENT
Start: 2024-07-27 | End: 2024-07-29 | Stop reason: HOSPADM

## 2024-07-26 RX ORDER — LIDOCAINE HYDROCHLORIDE AND EPINEPHRINE 10; 10 MG/ML; UG/ML
INJECTION, SOLUTION INFILTRATION; PERINEURAL
Status: DISCONTINUED | OUTPATIENT
Start: 2024-07-26 | End: 2024-07-26 | Stop reason: HOSPADM

## 2024-07-26 RX ORDER — ONDANSETRON HYDROCHLORIDE 2 MG/ML
4 INJECTION, SOLUTION INTRAVENOUS ONCE
Status: COMPLETED | OUTPATIENT
Start: 2024-07-26 | End: 2024-07-26

## 2024-07-26 RX ORDER — HALOPERIDOL 5 MG/ML
0.5 INJECTION INTRAMUSCULAR EVERY 10 MIN PRN
Status: DISCONTINUED | OUTPATIENT
Start: 2024-07-26 | End: 2024-07-26 | Stop reason: HOSPADM

## 2024-07-26 RX ORDER — PHENYLEPHRINE HYDROCHLORIDE 10 MG/ML
INJECTION INTRAVENOUS
Status: DISCONTINUED | OUTPATIENT
Start: 2024-07-26 | End: 2024-07-26

## 2024-07-26 RX ORDER — CEFAZOLIN SODIUM 1 G/3ML
INJECTION, POWDER, FOR SOLUTION INTRAMUSCULAR; INTRAVENOUS
Status: DISCONTINUED | OUTPATIENT
Start: 2024-07-26 | End: 2024-07-26

## 2024-07-26 RX ORDER — SODIUM CHLORIDE 0.9 % (FLUSH) 0.9 %
10 SYRINGE (ML) INJECTION
Status: DISCONTINUED | OUTPATIENT
Start: 2024-07-26 | End: 2024-07-26 | Stop reason: HOSPADM

## 2024-07-26 RX ORDER — CALCIUM CARBONATE 200(500)MG
1000 TABLET,CHEWABLE ORAL DAILY
Status: DISCONTINUED | OUTPATIENT
Start: 2024-07-27 | End: 2024-07-29 | Stop reason: HOSPADM

## 2024-07-26 RX ORDER — ONDANSETRON HYDROCHLORIDE 2 MG/ML
INJECTION, SOLUTION INTRAVENOUS
Status: DISCONTINUED | OUTPATIENT
Start: 2024-07-26 | End: 2024-07-26

## 2024-07-26 RX ORDER — NOREPINEPHRINE BITARTRATE 1 MG/ML
INJECTION, SOLUTION INTRAVENOUS
Status: DISCONTINUED | OUTPATIENT
Start: 2024-07-26 | End: 2024-07-26

## 2024-07-26 RX ORDER — OXYCODONE HYDROCHLORIDE 10 MG/1
10 TABLET ORAL EVERY 4 HOURS PRN
Status: DISCONTINUED | OUTPATIENT
Start: 2024-07-26 | End: 2024-07-29 | Stop reason: HOSPADM

## 2024-07-26 RX ORDER — VANCOMYCIN HYDROCHLORIDE 1 G/20ML
INJECTION, POWDER, LYOPHILIZED, FOR SOLUTION INTRAVENOUS
Status: DISCONTINUED | OUTPATIENT
Start: 2024-07-26 | End: 2024-07-26 | Stop reason: HOSPADM

## 2024-07-26 RX ORDER — POTASSIUM CHLORIDE 14.9 MG/ML
INJECTION INTRAVENOUS CONTINUOUS PRN
Status: DISCONTINUED | OUTPATIENT
Start: 2024-07-26 | End: 2024-07-26

## 2024-07-26 RX ORDER — IBUPROFEN 200 MG
24 TABLET ORAL
Status: DISCONTINUED | OUTPATIENT
Start: 2024-07-26 | End: 2024-07-29 | Stop reason: HOSPADM

## 2024-07-26 RX ORDER — PROPOFOL 10 MG/ML
VIAL (ML) INTRAVENOUS
Status: DISCONTINUED | OUTPATIENT
Start: 2024-07-26 | End: 2024-07-26

## 2024-07-26 RX ORDER — IBUPROFEN 200 MG
16 TABLET ORAL
Status: DISCONTINUED | OUTPATIENT
Start: 2024-07-26 | End: 2024-07-29 | Stop reason: HOSPADM

## 2024-07-26 RX ORDER — MORPHINE SULFATE 2 MG/ML
2 INJECTION, SOLUTION INTRAMUSCULAR; INTRAVENOUS
Status: DISCONTINUED | OUTPATIENT
Start: 2024-07-26 | End: 2024-07-29 | Stop reason: HOSPADM

## 2024-07-26 RX ORDER — SODIUM CHLORIDE 9 MG/ML
INJECTION, SOLUTION INTRAVENOUS CONTINUOUS
Status: DISCONTINUED | OUTPATIENT
Start: 2024-07-26 | End: 2024-07-29 | Stop reason: HOSPADM

## 2024-07-26 RX ORDER — ACETAMINOPHEN 500 MG
1000 TABLET ORAL EVERY 8 HOURS
Status: DISCONTINUED | OUTPATIENT
Start: 2024-07-26 | End: 2024-07-29 | Stop reason: HOSPADM

## 2024-07-26 RX ORDER — CELECOXIB 200 MG/1
200 CAPSULE ORAL DAILY
Status: DISCONTINUED | OUTPATIENT
Start: 2024-07-27 | End: 2024-07-29 | Stop reason: HOSPADM

## 2024-07-26 RX ORDER — GABAPENTIN 300 MG/1
600 CAPSULE ORAL 3 TIMES DAILY
Status: DISCONTINUED | OUTPATIENT
Start: 2024-07-26 | End: 2024-07-29 | Stop reason: HOSPADM

## 2024-07-26 RX ORDER — ONDANSETRON HYDROCHLORIDE 2 MG/ML
INJECTION, SOLUTION INTRAVENOUS
Status: DISPENSED
Start: 2024-07-26 | End: 2024-07-26

## 2024-07-26 RX ORDER — EZETIMIBE 10 MG/1
10 TABLET ORAL DAILY
Status: DISCONTINUED | OUTPATIENT
Start: 2024-07-27 | End: 2024-07-29 | Stop reason: HOSPADM

## 2024-07-26 RX ORDER — INSULIN ASPART 100 [IU]/ML
0-10 INJECTION, SOLUTION INTRAVENOUS; SUBCUTANEOUS
Status: DISCONTINUED | OUTPATIENT
Start: 2024-07-26 | End: 2024-07-29 | Stop reason: HOSPADM

## 2024-07-26 RX ORDER — METHOCARBAMOL 750 MG/1
750 TABLET, FILM COATED ORAL 3 TIMES DAILY
Status: DISCONTINUED | OUTPATIENT
Start: 2024-07-26 | End: 2024-07-29 | Stop reason: HOSPADM

## 2024-07-26 RX ORDER — ROCURONIUM BROMIDE 10 MG/ML
INJECTION, SOLUTION INTRAVENOUS
Status: DISCONTINUED | OUTPATIENT
Start: 2024-07-26 | End: 2024-07-26

## 2024-07-26 RX ORDER — PRAVASTATIN SODIUM 40 MG/1
80 TABLET ORAL DAILY
Status: DISCONTINUED | OUTPATIENT
Start: 2024-07-27 | End: 2024-07-29 | Stop reason: HOSPADM

## 2024-07-26 RX ORDER — LOSARTAN POTASSIUM 25 MG/1
100 TABLET ORAL DAILY
Status: DISCONTINUED | OUTPATIENT
Start: 2024-07-27 | End: 2024-07-29 | Stop reason: HOSPADM

## 2024-07-26 RX ORDER — FENTANYL CITRATE 50 UG/ML
INJECTION, SOLUTION INTRAMUSCULAR; INTRAVENOUS
Status: DISCONTINUED | OUTPATIENT
Start: 2024-07-26 | End: 2024-07-26

## 2024-07-26 RX ORDER — INSULIN GLARGINE 100 [IU]/ML
15 INJECTION, SOLUTION SUBCUTANEOUS DAILY
Status: DISCONTINUED | OUTPATIENT
Start: 2024-07-27 | End: 2024-07-29 | Stop reason: HOSPADM

## 2024-07-26 RX ORDER — HYDROMORPHONE HYDROCHLORIDE 1 MG/ML
0.2 INJECTION, SOLUTION INTRAMUSCULAR; INTRAVENOUS; SUBCUTANEOUS EVERY 5 MIN PRN
Status: COMPLETED | OUTPATIENT
Start: 2024-07-26 | End: 2024-07-26

## 2024-07-26 RX ORDER — MUPIROCIN 20 MG/G
1 OINTMENT TOPICAL 2 TIMES DAILY
Status: DISCONTINUED | OUTPATIENT
Start: 2024-07-26 | End: 2024-07-26 | Stop reason: HOSPADM

## 2024-07-26 RX ORDER — GLUCAGON 1 MG
1 KIT INJECTION
Status: DISCONTINUED | OUTPATIENT
Start: 2024-07-26 | End: 2024-07-26 | Stop reason: HOSPADM

## 2024-07-26 RX ORDER — OXYCODONE HYDROCHLORIDE 5 MG/1
5 TABLET ORAL EVERY 4 HOURS PRN
Status: DISCONTINUED | OUTPATIENT
Start: 2024-07-26 | End: 2024-07-29 | Stop reason: HOSPADM

## 2024-07-26 RX ORDER — ONDANSETRON 4 MG/1
4 TABLET, FILM COATED ORAL EVERY 6 HOURS PRN
Status: DISCONTINUED | OUTPATIENT
Start: 2024-07-26 | End: 2024-07-29 | Stop reason: HOSPADM

## 2024-07-26 RX ORDER — MUPIROCIN 20 MG/G
OINTMENT TOPICAL
Status: DISCONTINUED | OUTPATIENT
Start: 2024-07-26 | End: 2024-07-26 | Stop reason: HOSPADM

## 2024-07-26 RX ORDER — AMOXICILLIN 250 MG
1 CAPSULE ORAL 2 TIMES DAILY
Status: DISCONTINUED | OUTPATIENT
Start: 2024-07-26 | End: 2024-07-29 | Stop reason: HOSPADM

## 2024-07-26 RX ORDER — LIDOCAINE HYDROCHLORIDE 20 MG/ML
INJECTION, SOLUTION EPIDURAL; INFILTRATION; INTRACAUDAL; PERINEURAL
Status: DISCONTINUED | OUTPATIENT
Start: 2024-07-26 | End: 2024-07-26

## 2024-07-26 RX ORDER — GLUCAGON 1 MG
1 KIT INJECTION
Status: DISCONTINUED | OUTPATIENT
Start: 2024-07-26 | End: 2024-07-29 | Stop reason: HOSPADM

## 2024-07-26 RX ORDER — CHOLECALCIFEROL (VITAMIN D3) 25 MCG
1000 TABLET ORAL DAILY
Status: DISCONTINUED | OUTPATIENT
Start: 2024-07-27 | End: 2024-07-29 | Stop reason: HOSPADM

## 2024-07-26 RX ORDER — SUCCINYLCHOLINE CHLORIDE 20 MG/ML
INJECTION INTRAMUSCULAR; INTRAVENOUS
Status: DISCONTINUED | OUTPATIENT
Start: 2024-07-26 | End: 2024-07-26

## 2024-07-26 RX ADMIN — SODIUM CHLORIDE 0.2 MCG/KG/MIN: 9 INJECTION, SOLUTION INTRAVENOUS at 02:07

## 2024-07-26 RX ADMIN — MUPIROCIN: 20 OINTMENT TOPICAL at 10:07

## 2024-07-26 RX ADMIN — PROPOFOL 150 MCG/KG/MIN: 10 INJECTION, EMULSION INTRAVENOUS at 02:07

## 2024-07-26 RX ADMIN — DEXAMETHASONE SODIUM PHOSPHATE 8 MG: 4 INJECTION, SOLUTION INTRAMUSCULAR; INTRAVENOUS at 02:07

## 2024-07-26 RX ADMIN — SENNOSIDES AND DOCUSATE SODIUM 1 TABLET: 50; 8.6 TABLET ORAL at 09:07

## 2024-07-26 RX ADMIN — PHENYLEPHRINE HYDROCHLORIDE 200 MCG: 10 INJECTION INTRAVENOUS at 02:07

## 2024-07-26 RX ADMIN — PHENYLEPHRINE HYDROCHLORIDE 0.5 MCG/KG/MIN: 10 INJECTION INTRAVENOUS at 02:07

## 2024-07-26 RX ADMIN — Medication 40 MG: at 02:07

## 2024-07-26 RX ADMIN — HYDROMORPHONE HYDROCHLORIDE 0.2 MG: 1 INJECTION, SOLUTION INTRAMUSCULAR; INTRAVENOUS; SUBCUTANEOUS at 05:07

## 2024-07-26 RX ADMIN — GLYCOPYRROLATE 0.2 MG: 0.2 INJECTION, SOLUTION INTRAMUSCULAR; INTRAVENOUS at 02:07

## 2024-07-26 RX ADMIN — HYDROMORPHONE HYDROCHLORIDE 0.2 MG: 1 INJECTION, SOLUTION INTRAMUSCULAR; INTRAVENOUS; SUBCUTANEOUS at 06:07

## 2024-07-26 RX ADMIN — SODIUM CHLORIDE: 0.9 INJECTION, SOLUTION INTRAVENOUS at 02:07

## 2024-07-26 RX ADMIN — NOREPINEPHRINE BITARTRATE 16 MCG: 1 INJECTION, SOLUTION, CONCENTRATE INTRAVENOUS at 02:07

## 2024-07-26 RX ADMIN — POTASSIUM CHLORIDE: 14.9 INJECTION INTRAVENOUS at 03:07

## 2024-07-26 RX ADMIN — CEFAZOLIN 2 G: 2 INJECTION, POWDER, FOR SOLUTION INTRAMUSCULAR; INTRAVENOUS at 10:07

## 2024-07-26 RX ADMIN — ROCURONIUM BROMIDE 10 MG: 10 INJECTION, SOLUTION INTRAVENOUS at 02:07

## 2024-07-26 RX ADMIN — SUGAMMADEX 200 MG: 100 INJECTION, SOLUTION INTRAVENOUS at 04:07

## 2024-07-26 RX ADMIN — HALOPERIDOL LACTATE 0.5 MG: 5 INJECTION, SOLUTION INTRAMUSCULAR at 05:07

## 2024-07-26 RX ADMIN — SUCCINYLCHOLINE 160 MG: 20 INJECTION, SOLUTION INTRAMUSCULAR; INTRAVENOUS at 02:07

## 2024-07-26 RX ADMIN — SODIUM CHLORIDE, SODIUM GLUCONATE, SODIUM ACETATE, POTASSIUM CHLORIDE, MAGNESIUM CHLORIDE, SODIUM PHOSPHATE, DIBASIC, AND POTASSIUM PHOSPHATE: .53; .5; .37; .037; .03; .012; .00082 INJECTION, SOLUTION INTRAVENOUS at 02:07

## 2024-07-26 RX ADMIN — Medication 160 MG: at 02:07

## 2024-07-26 RX ADMIN — ONDANSETRON 4 MG: 2 INJECTION INTRAMUSCULAR; INTRAVENOUS at 10:07

## 2024-07-26 RX ADMIN — CEFAZOLIN 2 G: 330 INJECTION, POWDER, FOR SOLUTION INTRAMUSCULAR; INTRAVENOUS at 02:07

## 2024-07-26 RX ADMIN — GABAPENTIN 600 MG: 300 CAPSULE ORAL at 10:07

## 2024-07-26 RX ADMIN — OXYCODONE HYDROCHLORIDE 10 MG: 10 TABLET ORAL at 06:07

## 2024-07-26 RX ADMIN — ACETAMINOPHEN 1000 MG: 500 TABLET ORAL at 10:07

## 2024-07-26 RX ADMIN — FENTANYL CITRATE 100 MCG: 50 INJECTION, SOLUTION INTRAMUSCULAR; INTRAVENOUS at 02:07

## 2024-07-26 RX ADMIN — SODIUM CHLORIDE: 9 INJECTION, SOLUTION INTRAVENOUS at 05:07

## 2024-07-26 RX ADMIN — ROCURONIUM BROMIDE 40 MG: 10 INJECTION, SOLUTION INTRAVENOUS at 03:07

## 2024-07-26 RX ADMIN — METHOCARBAMOL 750 MG: 750 TABLET ORAL at 10:07

## 2024-07-26 RX ADMIN — LIDOCAINE HYDROCHLORIDE 100 MG: 20 INJECTION, SOLUTION EPIDURAL; INFILTRATION; INTRACAUDAL; PERINEURAL at 02:07

## 2024-07-26 RX ADMIN — NOREPINEPHRINE BITARTRATE 16 MCG: 1 INJECTION, SOLUTION, CONCENTRATE INTRAVENOUS at 03:07

## 2024-07-26 RX ADMIN — ONDANSETRON 4 MG: 2 INJECTION INTRAMUSCULAR; INTRAVENOUS at 04:07

## 2024-07-26 RX ADMIN — MORPHINE SULFATE 2 MG: 2 INJECTION, SOLUTION INTRAMUSCULAR; INTRAVENOUS at 07:07

## 2024-07-26 NOTE — TRANSFER OF CARE
"Anesthesia Transfer of Care Note    Patient: Hi Braxton    Procedure(s) Performed: Procedure(s) (LRB):  FUSION, SPINE, CERVICAL, POSTERIOR APPROACH C3-T1 REVISION (N/A)    Patient location: PACU    Anesthesia Type: general    Transport from OR: Transported from OR on 6-10 L/min O2 by face mask with adequate spontaneous ventilation    Post pain: adequate analgesia    Post assessment: no apparent anesthetic complications and tolerated procedure well    Post vital signs: stable    Level of consciousness: awake and alert    Nausea/Vomiting: no nausea/vomiting    Complications: none    Transfer of care protocol was followed      Last vitals: Visit Vitals  BP (!) 147/78 (BP Location: Left arm, Patient Position: Lying)   Pulse 70   Temp 36.5 °C (97.7 °F) (Temporal)   Resp 14   Ht 6' 4" (1.93 m)   Wt 86.6 kg (191 lb)   SpO2 100%   BMI 23.25 kg/m²     "

## 2024-07-26 NOTE — OP NOTE
DATE OF PROCEDURE: 7/26/2024.     PRIMARY SURGEON: Edd Burnette M.D.     CO-SURGEON: DO BRADLEY Liu     FIRST ASSISTANT: Dwaine Head MD, PGY-2 Orthopedics     PREOPERATIVE DIAGNOSIS:     1. C6 screw loosening and hardware failure  2. History of C3-6 PCDF     POSTOPERATIVE DIAGNOSIS: Same      PROCEDURES PERFORMED:  1.  Revision posterior cervical spinal fusion C3-T1  2.  Removal of hardware C6 screws  3.  Posterior segmental instrumentation C3-T1  4. Brainlab assistance for placement of the pedicle screws (requiring greater than 30 minutes of preoperative planning time)  5.  Cadaveric bone grafting.  6.  Bilateral paraspinal advancement flap 17 cm x 4 cm      ANESTHESIA: General endotracheal anesthesia.     ESTIMATED BLOOD LOSS:  150 mL.     IMPLANTS: Depuy  4.5x26 pedicle screws bilaterally C7  4.5x28 pedicle screws bilaterally T1  3.5mm Ti rods bilaterally  Medium Infuse  60cc of cancellous chips     SPECIMENS: C6 screws     FINDINGS:  C6 screw loosening     DRAINS: One deep and one superficial     COMPLICATIONS: None.     SPONGE AND NEEDLE COUNT: Correct x2.     NEUROLOGICAL MONITORING: Motor evoked potentials, somatosensory evoked potential, and free-running EMG.  There were no changes to stable and reliable bilateral upper and lower extremity motor and somatosensory potentials throughout the entire procedure.     DESCRIPTION INFORMED CONSENT: I had a sit down discussion with the patient regarding the planned procedure. We specifically discussed the risks, benefits, and alternatives to surgery. We discussed the surgical procedure including the skin incision, nerve decompression, bone fusion, allograft and/or iliac crest bone graft, and surgical implants including lateral mass screws and pedicle screws as indicated: they understand the risks include but are not limited to death, paralysis, blindness, bleeding, infection, damage to arteries, veins and nerves, vertebral artery injury, dysphagia, spinal  fluid leak, continued or worsening pain, no improvement in symptoms, non-union, and the possible need for more surgery in the future, as well as the possibility other unforseen and unknown complications. We talked about expected hospital stay and recovery period. All questions were answered; they understand and wish to proceed.     REASON FOR OPERATION AND BRIEF HISTORY AND PHYSICAL: The patient is a 64 y.o. male with a hx of C3-6 PCDF that sustained C6 hardware failure after sustaining a fall. He is here for surgery today.     DESCRIPTION OF PROCEDURE: The patient was met in the preoperative area where his posterior cervical spine was marked as the operative site. Subsequently, they were brought to the Operating Room where they was placed under general endotracheal anesthesia. Sequential compression devices were placed in the patient's bilateral calves and run throughout the case. A Forbes catheter was then sterilely placed. [place] headholder then placed in the standard sterile fashion and the patient was positioned prone on a slider bed with gel pads. The arms were padded talked. The shoulders were gently taped down. The posterior cervical spine was then prepped and draped in a normal sterile fashion.     A full timeout was then called, identifying the patient, the procedural site and levels, the availability of all instruments and implants, and no specific nursing, surgical, anesthetic, or neurological monitoring concerns. Finally, it was safe to proceed with surgery and the patient was given weight-appropriate dose of Ancef by the Anesthesia Service.     We then extended the previous midline posterior cervical incision and performed a preliminary subperiosteal exposure of the previous hardware. We extended the exposure to T1. We removed the set screws and rods. The C6 screws were loose and the tracts were compromised. The C6 screws were sent for cultures.     The neuro navigation array was attached to the T1  spinous process and surface matching was completed. Using neuro-navigation, pedicle screws were placed from C7-T1 bilaterally. Fluoroscopy confirmed good hardware position. Motors following were consistent with baseline.     Rods were then measured, cut, contoured and locked into place with provided set plugs and torque wrench. We laid infuse sponges over bony surfaces. 60cc of allograft was laid dorsally along the decorticated surfaces from C3-T1 bilaterally. 1g of vancomycin powder was spread in the wound. One 10-Congolese hemovac drain was placed deep and was left to gravity.     Bilateral paraspinal muscles were mobilized based on lateral perforators and advanced to obliterate the dead space at midline. The overlying fascia was then created 17 cm in length by 4 cm in width in order to create a tension free closure and to assist with wound healing.     At this point the wound was closed in layers using 0 Ethibond for the fascia. One superficial 10-Congolese HV drain was placed to suction. Cellerate was applied. Wound was closed in layers with 2-0 Vicryl for the dermis, 3-0 Monocryl for the skin, dermabond, and staples.     Cervical collar was placed. The patient was then flipped supine. The Karimi head-justice was removed. He was extubated and transferred to the recovery room in stable condition.     JUSTIFICATION OF MODIFIER -22 AND CO-SURGEON: This was a complex case in a patient with hardware failure and nonunion. Two attending surgeons were indicated to reduce operative times, blood loss, accurately place hardware and to improve patient outcomes.

## 2024-07-26 NOTE — ANESTHESIA PROCEDURE NOTES
Arterial    Diagnosis: S/P cervical spinal fusion    Patient location during procedure: done in OR  Timeout: 7/26/2024 2:38 PM  Procedure end time: 7/26/2024 2:40 PM    Staffing  Authorizing Provider: Maximiliano Mccollum MD  Performing Provider: Austen Rand DO    Staffing  Performed by: Austen Rand DO  Authorized by: Maximiliano Mccollum MD    Anesthesiologist was present at the time of the procedure.    Preanesthetic Checklist  Completed: patient identified, IV checked, site marked, risks and benefits discussed, surgical consent, monitors and equipment checked, pre-op evaluation, timeout performed and anesthesia consent givenArterial  Skin Prep: chlorhexidine gluconate  Orientation: right  Location: radial    Catheter Size: 20 G  Catheter placement by Ultrasound guidance. Heme positive aspiration all ports.   Vessel Caliber: medium, patent, compressibility normal  Needle advanced into vessel with real time Ultrasound guidance.Insertion Attempts: 1  Assessment  Dressing: secured with tape and tegaderm  Patient: Tolerated well

## 2024-07-26 NOTE — OP NOTE
DATE OF PROCEDURE: 7/26/2024.     PRIMARY SURGEON: Edd Burnette M.D.    CO-SURGEON: DO BRADLEY Liu     FIRST ASSISTANT: Dwaine Head MD, PGY-2 Orthopedics     PREOPERATIVE DIAGNOSIS:     1. C6 screw loosening and hardware failure  2. History of C3-6 PCDF     POSTOPERATIVE DIAGNOSIS: Same      PROCEDURES PERFORMED:  1.  Revision posterior cervical spinal fusion C3-T1  2.  Removal of hardware C6 screws  3.  Posterior segmental instrumentation C3-T1  4. Brainlab assistance for placement of the pedicle screws (requiring greater than 30 minutes of preoperative planning time)  5.  Cadaveric bone grafting.  6.  Bilateral paraspinal advancement flap 17 cm x 4 cm      ANESTHESIA: General endotracheal anesthesia.     ESTIMATED BLOOD LOSS:  150 mL.     IMPLANTS: Depuy  4.5x26 pedicle screws bilaterally C7  4.5x28 pedicle screws bilaterally T1  3.5mm Ti rods bilaterally  Medium Infuse  60cc of cancellous chips     SPECIMENS: C6 screws     FINDINGS:  C6 screw loosening     DRAINS: One deep and one superficial     COMPLICATIONS: None.     SPONGE AND NEEDLE COUNT: Correct x2.     NEUROLOGICAL MONITORING: Motor evoked potentials, somatosensory evoked potential, and free-running EMG.  There were no changes to stable and reliable bilateral upper and lower extremity motor and somatosensory potentials throughout the entire procedure.     DESCRIPTION INFORMED CONSENT: I had a sit down discussion with the patient regarding the planned procedure. We specifically discussed the risks, benefits, and alternatives to surgery. We discussed the surgical procedure including the skin incision, nerve decompression, bone fusion, allograft and/or iliac crest bone graft, and surgical implants including lateral mass screws and pedicle screws as indicated: they understand the risks include but are not limited to death, paralysis, blindness, bleeding, infection, damage to arteries, veins and nerves, vertebral artery injury, dysphagia, spinal  fluid leak, continued or worsening pain, no improvement in symptoms, non-union, and the possible need for more surgery in the future, as well as the possibility other unforseen and unknown complications. We talked about expected hospital stay and recovery period. All questions were answered; they understand and wish to proceed.     REASON FOR OPERATION AND BRIEF HISTORY AND PHYSICAL: The patient is a 64 y.o. male with a hx of C3-6 PCDF that sustained C6 hardware failure after sustaining a fall. He is here for surgery today.     DESCRIPTION OF PROCEDURE: The patient was met in the preoperative area where his posterior cervical spine was marked as the operative site. Subsequently, they were brought to the Operating Room where they was placed under general endotracheal anesthesia. Sequential compression devices were placed in the patient's bilateral calves and run throughout the case. A Forbes catheter was then sterilely placed. [place] headholder then placed in the standard sterile fashion and the patient was positioned prone on a slider bed with gel pads. The arms were padded talked. The shoulders were gently taped down. The posterior cervical spine was then prepped and draped in a normal sterile fashion.     A full timeout was then called, identifying the patient, the procedural site and levels, the availability of all instruments and implants, and no specific nursing, surgical, anesthetic, or neurological monitoring concerns. Finally, it was safe to proceed with surgery and the patient was given weight-appropriate dose of Ancef by the Anesthesia Service.     We then extended the previous midline posterior cervical incision and performed a preliminary subperiosteal exposure of the previous hardware. We extended the exposure to T1. We removed the set screws and rods. The C6 screws were loose and the tracts were compromised. The C6 screws were sent for cultures.    The neuro navigation array was attached to the T1  spinous process and surface matching was completed. Using neuro-navigation, pedicle screws were placed from C7-T1 bilaterally. Fluoroscopy confirmed good hardware position. Motors following were consistent with baseline.     Rods were then measured, cut, contoured and locked into place with provided set plugs and torque wrench. We laid infuse sponges over bony surfaces. 60cc of allograft was laid dorsally along the decorticated surfaces from C3-T1 bilaterally. 1g of vancomycin powder was spread in the wound. One 10-Angolan hemovac drain was placed deep and was left to gravity.     Bilateral paraspinal muscles were mobilized based on lateral perforators and advanced to obliterate the dead space at midline. The overlying fascia was then created 17 cm in length by 4 cm in width in order to create a tension free closure and to assist with wound healing.    At this point the wound was closed in layers using 0 Ethibond for the fascia. One superficial 10-Angolan HV drain was placed to suction. Cellerate was applied. Wound was closed in layers with 2-0 Vicryl for the dermis, 3-0 Monocryl for the skin, dermabond, and staples.     Cervical collar was placed. The patient was then flipped supine. The [place] head-justice was removed. He was extubated and transferred to the recovery room in stable condition.    JUSTIFICATION OF MODIFIER -22 AND CO-SURGEON: This was a complex case in a patient with hardware failure and nonunion. Two attending surgeons were indicated to reduce operative times, blood loss, accurately place hardware and to improve patient outcomes.    Edd Burnette MD  Orthopaedic Spine Surgeon  Department of Orthopaedic Surgery  [phone]

## 2024-07-26 NOTE — ANESTHESIA PROCEDURE NOTES
Intubation    Date/Time: 7/26/2024 2:31 PM    Performed by: Austen Rand DO  Authorized by: Maximiliano Mccollum MD    Intubation:     Induction:  Intravenous    Intubated:  Postinduction    Mask Ventilation:  Easy mask    Attempts:  1    Attempted By:  Resident anesthesiologist    Method of Intubation:  Video laryngoscopy    Blade:  Cervantes 3    Laryngeal View Grade: Grade I - full view of cords      Difficult Airway Encountered?: No      Complications:  None    Airway Device:  Oral endotracheal tube    Airway Device Size:  7.5    Style/Cuff Inflation:  Cuffed (inflated to minimal occlusive pressure)    Tube secured:  22    Secured at:  The teeth    Placement Verified By:  Capnometry and Revisualization with laryngoscopy    Complicating Factors:  None, retrognathia and small mouth    Findings Post-Intubation:  BS equal bilateral and atraumatic/condition of teeth unchanged

## 2024-07-27 LAB
ALBUMIN SERPL BCP-MCNC: 3.3 G/DL (ref 3.5–5.2)
ALP SERPL-CCNC: 68 U/L (ref 55–135)
ALT SERPL W/O P-5'-P-CCNC: 12 U/L (ref 10–44)
ANION GAP SERPL CALC-SCNC: 7 MMOL/L (ref 8–16)
AST SERPL-CCNC: 14 U/L (ref 10–40)
BASOPHILS # BLD AUTO: 0.01 K/UL (ref 0–0.2)
BASOPHILS NFR BLD: 0.1 % (ref 0–1.9)
BILIRUB SERPL-MCNC: 0.4 MG/DL (ref 0.1–1)
BUN SERPL-MCNC: 9 MG/DL (ref 8–23)
CALCIUM SERPL-MCNC: 8.8 MG/DL (ref 8.7–10.5)
CHLORIDE SERPL-SCNC: 106 MMOL/L (ref 95–110)
CO2 SERPL-SCNC: 23 MMOL/L (ref 23–29)
CREAT SERPL-MCNC: 0.9 MG/DL (ref 0.5–1.4)
DIFFERENTIAL METHOD BLD: ABNORMAL
EOSINOPHIL # BLD AUTO: 0 K/UL (ref 0–0.5)
EOSINOPHIL NFR BLD: 0.1 % (ref 0–8)
ERYTHROCYTE [DISTWIDTH] IN BLOOD BY AUTOMATED COUNT: 13.2 % (ref 11.5–14.5)
EST. GFR  (NO RACE VARIABLE): >60 ML/MIN/1.73 M^2
GLUCOSE SERPL-MCNC: 132 MG/DL (ref 70–110)
HCT VFR BLD AUTO: 39.9 % (ref 40–54)
HGB BLD-MCNC: 13.3 G/DL (ref 14–18)
IMM GRANULOCYTES # BLD AUTO: 0.02 K/UL (ref 0–0.04)
IMM GRANULOCYTES NFR BLD AUTO: 0.2 % (ref 0–0.5)
LYMPHOCYTES # BLD AUTO: 0.6 K/UL (ref 1–4.8)
LYMPHOCYTES NFR BLD: 7.2 % (ref 18–48)
MCH RBC QN AUTO: 28.9 PG (ref 27–31)
MCHC RBC AUTO-ENTMCNC: 33.3 G/DL (ref 32–36)
MCV RBC AUTO: 87 FL (ref 82–98)
MONOCYTES # BLD AUTO: 0.6 K/UL (ref 0.3–1)
MONOCYTES NFR BLD: 6.9 % (ref 4–15)
NEUTROPHILS # BLD AUTO: 7.4 K/UL (ref 1.8–7.7)
NEUTROPHILS NFR BLD: 85.5 % (ref 38–73)
NRBC BLD-RTO: 0 /100 WBC
PLATELET # BLD AUTO: 281 K/UL (ref 150–450)
PMV BLD AUTO: 10 FL (ref 9.2–12.9)
POCT GLUCOSE: 133 MG/DL (ref 70–110)
POCT GLUCOSE: 139 MG/DL (ref 70–110)
POCT GLUCOSE: 170 MG/DL (ref 70–110)
POTASSIUM SERPL-SCNC: 4.1 MMOL/L (ref 3.5–5.1)
PROT SERPL-MCNC: 6.2 G/DL (ref 6–8.4)
RBC # BLD AUTO: 4.61 M/UL (ref 4.6–6.2)
SODIUM SERPL-SCNC: 136 MMOL/L (ref 136–145)
WBC # BLD AUTO: 8.64 K/UL (ref 3.9–12.7)

## 2024-07-27 PROCEDURE — 99900035 HC TECH TIME PER 15 MIN (STAT): Mod: HCNC

## 2024-07-27 PROCEDURE — 25000003 PHARM REV CODE 250: Mod: HCNC | Performed by: STUDENT IN AN ORGANIZED HEALTH CARE EDUCATION/TRAINING PROGRAM

## 2024-07-27 PROCEDURE — 36415 COLL VENOUS BLD VENIPUNCTURE: CPT | Mod: HCNC

## 2024-07-27 PROCEDURE — 97530 THERAPEUTIC ACTIVITIES: CPT | Mod: HCNC

## 2024-07-27 PROCEDURE — 85025 COMPLETE CBC W/AUTO DIFF WBC: CPT | Mod: HCNC

## 2024-07-27 PROCEDURE — 80053 COMPREHEN METABOLIC PANEL: CPT | Mod: HCNC

## 2024-07-27 PROCEDURE — 11000001 HC ACUTE MED/SURG PRIVATE ROOM: Mod: HCNC

## 2024-07-27 PROCEDURE — 94761 N-INVAS EAR/PLS OXIMETRY MLT: CPT | Mod: HCNC

## 2024-07-27 PROCEDURE — 63600175 PHARM REV CODE 636 W HCPCS: Mod: HCNC | Performed by: STUDENT IN AN ORGANIZED HEALTH CARE EDUCATION/TRAINING PROGRAM

## 2024-07-27 PROCEDURE — 97165 OT EVAL LOW COMPLEX 30 MIN: CPT | Mod: HCNC

## 2024-07-27 PROCEDURE — 97161 PT EVAL LOW COMPLEX 20 MIN: CPT | Mod: HCNC

## 2024-07-27 PROCEDURE — 63600175 PHARM REV CODE 636 W HCPCS: Mod: HCNC

## 2024-07-27 PROCEDURE — 25000003 PHARM REV CODE 250: Mod: HCNC

## 2024-07-27 RX ADMIN — GABAPENTIN 600 MG: 300 CAPSULE ORAL at 02:07

## 2024-07-27 RX ADMIN — CALCIUM CARBONATE (ANTACID) CHEW TAB 500 MG 1000 MG: 500 CHEW TAB at 09:07

## 2024-07-27 RX ADMIN — ACETAMINOPHEN 1000 MG: 500 TABLET ORAL at 02:07

## 2024-07-27 RX ADMIN — METHOCARBAMOL 750 MG: 750 TABLET ORAL at 08:07

## 2024-07-27 RX ADMIN — GABAPENTIN 600 MG: 300 CAPSULE ORAL at 09:07

## 2024-07-27 RX ADMIN — EZETIMIBE 10 MG: 10 TABLET ORAL at 09:07

## 2024-07-27 RX ADMIN — ASPIRIN 81 MG: 81 TABLET, COATED ORAL at 09:07

## 2024-07-27 RX ADMIN — SENNOSIDES AND DOCUSATE SODIUM 1 TABLET: 50; 8.6 TABLET ORAL at 09:07

## 2024-07-27 RX ADMIN — OXYCODONE HYDROCHLORIDE 10 MG: 10 TABLET ORAL at 09:07

## 2024-07-27 RX ADMIN — ACETAMINOPHEN 1000 MG: 500 TABLET ORAL at 05:07

## 2024-07-27 RX ADMIN — METHOCARBAMOL 750 MG: 750 TABLET ORAL at 02:07

## 2024-07-27 RX ADMIN — OXYCODONE 5 MG: 5 TABLET ORAL at 08:07

## 2024-07-27 RX ADMIN — OXYCODONE HYDROCHLORIDE 10 MG: 10 TABLET ORAL at 04:07

## 2024-07-27 RX ADMIN — CEFAZOLIN 2 G: 2 INJECTION, POWDER, FOR SOLUTION INTRAMUSCULAR; INTRAVENOUS at 02:07

## 2024-07-27 RX ADMIN — MORPHINE SULFATE 2 MG: 2 INJECTION, SOLUTION INTRAMUSCULAR; INTRAVENOUS at 12:07

## 2024-07-27 RX ADMIN — CHOLECALCIFEROL TAB 25 MCG (1000 UNIT) 1000 UNITS: 25 TAB at 09:07

## 2024-07-27 RX ADMIN — METHOCARBAMOL 750 MG: 750 TABLET ORAL at 09:07

## 2024-07-27 RX ADMIN — SODIUM CHLORIDE: 9 INJECTION, SOLUTION INTRAVENOUS at 04:07

## 2024-07-27 RX ADMIN — INSULIN GLARGINE 15 UNITS: 100 INJECTION, SOLUTION SUBCUTANEOUS at 11:07

## 2024-07-27 RX ADMIN — SENNOSIDES AND DOCUSATE SODIUM 1 TABLET: 50; 8.6 TABLET ORAL at 08:07

## 2024-07-27 RX ADMIN — CELECOXIB 200 MG: 200 CAPSULE ORAL at 09:07

## 2024-07-27 RX ADMIN — METOPROLOL SUCCINATE 50 MG: 50 TABLET, EXTENDED RELEASE ORAL at 09:07

## 2024-07-27 RX ADMIN — CEFAZOLIN 2 G: 2 INJECTION, POWDER, FOR SOLUTION INTRAMUSCULAR; INTRAVENOUS at 05:07

## 2024-07-27 RX ADMIN — SODIUM CHLORIDE: 9 INJECTION, SOLUTION INTRAVENOUS at 02:07

## 2024-07-27 RX ADMIN — GABAPENTIN 600 MG: 300 CAPSULE ORAL at 08:07

## 2024-07-27 RX ADMIN — LOSARTAN POTASSIUM 100 MG: 25 TABLET, FILM COATED ORAL at 09:07

## 2024-07-27 RX ADMIN — OXYCODONE HYDROCHLORIDE 10 MG: 10 TABLET ORAL at 12:07

## 2024-07-27 NOTE — PROGRESS NOTES
[facility] - [facility]  Orthopedics  Progress Note    Patient Name: Hi Braxton  MRN: [id number]  Admission Date: 7/26/2024  Hospital Length of Stay: 1 days  Attending Provider: Edd Burnette MD  Primary Care Provider: Josefina Kendall MD  Follow-up For: Procedure(s) (LRB):  FUSION, SPINE, CERVICAL, POSTERIOR APPROACH C3-T1 REVISION (N/A)    Post-Operative Day: 1 Day Post-Op  Subjective:     Principal Problem:History of fusion of cervical spine    Principal Orthopedic Problem:  As above s/p revision C3-T1 PCF 07/26/2024    Interval History:  No acute events overnight.  Afebrile, hemodynamically stable.  Pain is well-controlled.  Tolerating p.o..  Plan to mobilize with PT today.  Tolerating C-collar.    Review of patient's allergies indicates:   Allergen Reactions    Lisinopril Anaphylaxis and Nausea And Vomiting     General bad feeling    Lipitor [atorvastatin] Other (See Comments)     Muscle aches    Adhesive     Crestor [rosuvastatin] Swelling     Lip swelling.     Jardiance [empagliflozin] Other (See Comments)     Possible related to recent UTI's     Metformin      Used XR and experienced flatulance and abdominal pain.        Current Facility-Administered Medications   Medication    0.9%  NaCl infusion    0.9%  NaCl infusion    acetaminophen tablet 1,000 mg    aspirin EC tablet 81 mg    calcium carbonate 200 mg calcium (500 mg) chewable tablet 1,000 mg    ceFAZolin 2 g in D5W 50 mL IVPB (MB+)    celecoxib capsule 200 mg    dextrose 10% bolus 125 mL 125 mL    dextrose 10% bolus 250 mL 250 mL    ezetimibe tablet 10 mg    gabapentin capsule 600 mg    glucagon (human recombinant) injection 1 mg    glucose chewable tablet 16 g    glucose chewable tablet 24 g    insulin aspart U-100 pen 0-10 Units    insulin glargine U-100 (Lantus) pen 15 Units    losartan tablet 100 mg    methocarbamoL tablet 750 mg    metoprolol succinate (TOPROL-XL) 24 hr tablet 50 mg    morphine injection 2 mg    ondansetron tablet 4 mg     "oxyCODONE immediate release tablet 5 mg    oxyCODONE immediate release tablet Tab 10 mg    pravastatin tablet 80 mg    senna-docusate 8.6-50 mg per tablet 1 tablet    vitamin D 1000 units tablet 1,000 Units     Objective:     Vital Signs (Most Recent):  Temp: 98 °F (36.7 °C) (07/27/24 0805)  Pulse: 72 (07/27/24 0805)  Resp: 16 (07/27/24 0902)  BP: (!) 144/75 (07/27/24 0805)  SpO2: 96 % (07/27/24 0805) Vital Signs (24h Range):  Temp:  [97.5 °F (36.4 °C)-98 °F (36.7 °C)] 98 °F (36.7 °C)  Pulse:  [70-81] 72  Resp:  [11-21] 16  SpO2:  [93 %-100 %] 96 %  BP: (125-170)/(75-94) 144/75     Weight: 86.9 kg (191 lb 9.3 oz)  Height: 6' 4" (193 cm)  Body mass index is 23.32 kg/m².      Intake/Output Summary (Last 24 hours) at 7/27/2024 1024  Last data filed at 7/27/2024 0415  Gross per 24 hour   Intake 1900 ml   Output 4605 ml   Net -2705 ml        Ortho/SPM Exam  NAD, resting comfortably in bed  A&O x3   Breathing comfortably w/o distress   Extremities WWP     DP pulses palpated   Radial pulses palpated  Incisions clean, dry, and intact.    Drains with serosanguineous output.    EHL, FHL, GSC, TA isolated without deficit.  Able to make a composite fist.    Full strength in deltoid, bicep, tricep, wrist extensors, wrist flexors, intrinsics, extrinsics  Sensation intact to light touch throughout the bilateral upper and lower extremities.     Significant Labs: All pertinent labs within the past 24 hours have been reviewed.    Significant Imaging: I have reviewed all pertinent imaging results/findings.  Assessment/Plan:     * History of C3-6 PCDF w/ failure of hardware  Hi Braxton is a 64 y.o. male is s/p revision C3-T1 PCF on 7/26    Surgical dressing C/D/I  Pain control: multimodal  PT/OT: WBAT BLE in C-collar at all times, spine precautions  DVT PPx: none, FCDs at all times when not ambulating  Abx: postop Ancef x 24hrs   Drain: x2  Forbes: Remove POD 1   Nursing: Incentive spirometry, Monitor and record drain output each " shift     » Dispo: plan to dc pending PT/OT progress, drain output      Output by Drain (mL) 07/24/24 0701 - 07/24/24 1900 07/24/24 1901 - 07/25/24 0700 07/25/24 0701 - 07/25/24 1900 07/25/24 1901 - 07/26/24 0700 07/26/24 0701 - 07/26/24 1900 07/26/24 1901 - 07/26/24 2339        Closed/Suction Drain 07/26/24 1631 Tube - 1 Posterior;Right Neck Accordion 10 Fr.      95        Closed/Suction Drain 07/26/24 1632 Tube - 2 Posterior;Left Neck Accordion 10 Fr.      10               W Dwaine Head MD  Orthopedics  [facility] - Surgery

## 2024-07-27 NOTE — PLAN OF CARE
Problem: Occupational Therapy  Goal: Occupational Therapy Goal  Description: Goals to be met by: 8/3/2024     Patient will increase functional independence with ADLs by performing:    UE Dressing with Supervision (retrieve and don).  LE Dressing with Supervision (retrieve and don).  Grooming while standing at sink with Supervision.  Toileting from toilet with [place] for hygiene and clothing management.   Toilet transfer to toilet with Supervision.    Outcome: Progressing

## 2024-07-27 NOTE — PLAN OF CARE
Problem: Adult Inpatient Plan of Care  Goal: Plan of Care Review  Outcome: Progressing  Goal: Patient-Specific Goal (Individualized)  Outcome: Progressing  Goal: Absence of Hospital-Acquired Illness or Injury  Outcome: Progressing  Goal: Optimal Comfort and Wellbeing  Outcome: Progressing  Goal: Readiness for Transition of Care  Outcome: Progressing     Problem: Adult Inpatient Plan of Care  Goal: Plan of Care Review  Outcome: Progressing     Problem: Adult Inpatient Plan of Care  Goal: Patient-Specific Goal (Individualized)  Outcome: Progressing     Problem: Adult Inpatient Plan of Care  Goal: Absence of Hospital-Acquired Illness or Injury  Outcome: Progressing     Problem: Adult Inpatient Plan of Care  Goal: Optimal Comfort and Wellbeing  Outcome: Progressing     Problem: Diabetes Comorbidity  Goal: Blood Glucose Level Within Targeted Range  Outcome: Progressing     Problem: Wound  Goal: Optimal Coping  Outcome: Progressing  Goal: Optimal Functional Ability  Outcome: Progressing  Goal: Absence of Infection Signs and Symptoms  Outcome: Progressing  Goal: Improved Oral Intake  Outcome: Progressing  Goal: Optimal Pain Control and Function  Outcome: Progressing  Goal: Skin Health and Integrity  Outcome: Progressing  Goal: Optimal Wound Healing  Outcome: Progressing     Problem: Wound  Goal: Optimal Functional Ability  Outcome: Progressing   PRN medication effective for pain Explained plan of care, verbalized understanding. Educated pt .on new medicine . No injury during shift, Side rails up x 2, call light by bedside.

## 2024-07-27 NOTE — ASSESSMENT & PLAN NOTE
Hi Braxton is a 64 y.o. male is s/p revision C3-T1 PCF on 7/26    Surgical dressing C/D/I  Pain control: multimodal  PT/OT: WBAT BLE in C-collar at all times, spine precautions  DVT PPx: none, FCDs at all times when not ambulating  Abx: postop Ancef x 24hrs   Drain: x2  Forbes: Remove POD 1   Nursing: Incentive spirometry, Monitor and record drain output each shift     » Dispo: plan to dc pending PT/OT progress, drain output      Output by Drain (mL) 07/24/24 0701 - 07/24/24 1900 07/24/24 1901 - 07/25/24 0700 07/25/24 0701 - 07/25/24 1900 07/25/24 1901 - 07/26/24 0700 07/26/24 0701 - 07/26/24 1900 07/26/24 1901 - 07/26/24 2339        Closed/Suction Drain 07/26/24 1631 Tube - 1 Posterior;Right Neck Accordion 10 Fr.      95        Closed/Suction Drain 07/26/24 1632 Tube - 2 Posterior;Left Neck Accordion 10 Fr.      10

## 2024-07-27 NOTE — NURSING TRANSFER
Nursing Transfer Note      7/26/2024   7:11 PM    Transfer To: Room 542    Transfer via bed    Transported by RN/PCT    Telemetry: Rate 72  Order for Tele Monitor? No    Additional Lines: Forbes Catheter    4eyes on Skin: yes    Medicines sent: IVF    Any special needs or follow-up needed: Collar applied    Patient belongings transferred with patient: No    Chart send with patient: Yes    Notified: spouse    Patient reassessed at: 7/26/2024  19:10 (date, time)

## 2024-07-27 NOTE — NURSING
Update: Patient arrived to the unit via Bed with transport. And Nurse Currently running NS @100mL/h and on room air . VS and H2T exam completed and recorded in Epic. Admissions navigator completed. NAD noted. Fall/Safety precautions maintained. Call light and personal items within reach. Communication board updated.   Nurses Note -- 4 Eyes      7/26/2024   7:49 PM      Skin assessed during: Admit      [x] No Altered Skin Integrity Present    []Prevention Measures Documented      [] Yes- Altered Skin Integrity Present or Discovered   [] LDA Added if Not in Epic (Describe Wound)   [] New Altered Skin Integrity was Present on Admit and Documented in LDA   [] Wound Image Taken    Wound Care Consulted? No    Attending Nurse:  Jennifer Chaidez RN/Staff Member:   Dom    2  hemovadrains  and iqbal present upon admit

## 2024-07-27 NOTE — SUBJECTIVE & OBJECTIVE
Principal Problem:History of fusion of cervical spine    Principal Orthopedic Problem:  As above s/p revision C3-T1 PCF 07/26/2024    Interval History:  No acute events overnight.  Afebrile, hemodynamically stable.  Pain is well-controlled.  Tolerating p.o..  Plan to mobilize with PT today.  Tolerating C-collar.    Review of patient's allergies indicates:   Allergen Reactions    Lisinopril Anaphylaxis and Nausea And Vomiting     General bad feeling    Lipitor [atorvastatin] Other (See Comments)     Muscle aches    Adhesive     Crestor [rosuvastatin] Swelling     Lip swelling.     Jardiance [empagliflozin] Other (See Comments)     Possible related to recent UTI's     Metformin      Used XR and experienced flatulance and abdominal pain.        Current Facility-Administered Medications   Medication    0.9%  NaCl infusion    0.9%  NaCl infusion    acetaminophen tablet 1,000 mg    aspirin EC tablet 81 mg    calcium carbonate 200 mg calcium (500 mg) chewable tablet 1,000 mg    ceFAZolin 2 g in D5W 50 mL IVPB (MB+)    celecoxib capsule 200 mg    dextrose 10% bolus 125 mL 125 mL    dextrose 10% bolus 250 mL 250 mL    ezetimibe tablet 10 mg    gabapentin capsule 600 mg    glucagon (human recombinant) injection 1 mg    glucose chewable tablet 16 g    glucose chewable tablet 24 g    insulin aspart U-100 pen 0-10 Units    insulin glargine U-100 (Lantus) pen 15 Units    losartan tablet 100 mg    methocarbamoL tablet 750 mg    metoprolol succinate (TOPROL-XL) 24 hr tablet 50 mg    morphine injection 2 mg    ondansetron tablet 4 mg    oxyCODONE immediate release tablet 5 mg    oxyCODONE immediate release tablet Tab 10 mg    pravastatin tablet 80 mg    senna-docusate 8.6-50 mg per tablet 1 tablet    vitamin D 1000 units tablet 1,000 Units     Objective:     Vital Signs (Most Recent):  Temp: 98 °F (36.7 °C) (07/27/24 0805)  Pulse: 72 (07/27/24 0805)  Resp: 16 (07/27/24 0902)  BP: (!) 144/75 (07/27/24 0805)  SpO2: 96 % (07/27/24  "0805) Vital Signs (24h Range):  Temp:  [97.5 °F (36.4 °C)-98 °F (36.7 °C)] 98 °F (36.7 °C)  Pulse:  [70-81] 72  Resp:  [11-21] 16  SpO2:  [93 %-100 %] 96 %  BP: (125-170)/(75-94) 144/75     Weight: 86.9 kg (191 lb 9.3 oz)  Height: 6' 4" (193 cm)  Body mass index is 23.32 kg/m².      Intake/Output Summary (Last 24 hours) at 7/27/2024 1024  Last data filed at 7/27/2024 0415  Gross per 24 hour   Intake 1900 ml   Output 4605 ml   Net -2705 ml        Ortho/SPM Exam  NAD, resting comfortably in bed  A&O x3   Breathing comfortably w/o distress   Extremities WWP     DP pulses palpated   Radial pulses palpated  Incisions clean, dry, and intact.    Drains with serosanguineous output.    EHL, FHL, GSC, TA isolated without deficit.  Able to make a composite fist.    Full strength in deltoid, bicep, tricep, wrist extensors, wrist flexors, intrinsics, extrinsics  Sensation intact to light touch throughout the bilateral upper and lower extremities.     Significant Labs: All pertinent labs within the past 24 hours have been reviewed.    Significant Imaging: I have reviewed all pertinent imaging results/findings.  "

## 2024-07-27 NOTE — PT/OT/SLP EVAL
"Occupational Therapy   Evaluation    Name: Hi Braxton  MRN: [id number]  Admitting Diagnosis: History of fusion of cervical spine  Recent Surgery: Procedure(s) (LRB):  FUSION, SPINE, CERVICAL, POSTERIOR APPROACH C3-T1 REVISION (N/A) 1 Day Post-Op    Recommendations:     Discharge Recommendations: Low Intensity Therapy  Discharge Equipment Recommendations:  shower chair  Barriers to discharge:  None    Assessment:     Hi Braxton is a 64 y.o. male with a medical diagnosis of History of fusion of cervical spine.  He presents with the following performance deficits affecting function: weakness, impaired endurance, orthopedic precautions, gait instability, impaired self care skills, impaired functional mobility, pain, impaired balance. Pt was willing to participate and tolerated session fairly well overall. He was able to perform functional mobility and ADLs assessed with little to no assistance. Pt demonstrated good functional endurance and activity tolerance with minimal verbal cueing for safety awareness and sequencing.    Rehab Prognosis: Good; patient would benefit from acute skilled OT services to address these deficits and reach maximum level of function.       Plan:     Patient to be seen 3 x/week to address the above listed problems via self-care/home management, therapeutic activities, therapeutic exercises, neuromuscular re-education  Plan of Care Expires: 08/27/24  Plan of Care Reviewed with: patient, spouse    Subjective     Chief Complaint: L shoulder/neck pain upon sitting eob  Patient/Family Comments/goals: "I got up to the bathroom earlier."    Occupational Profile:  Living Environment: 2SH, 3 EDER c/ 1 step down into house; bed and bath upstairs; t/s and WIS, lives c/ wife; 1 flight c/ L[facility] to 2nd flr  Previous level of function: indep, drives  Roles and Routines: retired  Equipment Used at Home: none  Assistance upon Discharge: spouse    Pain/Comfort:  Pain Rating 1: 0/10  Pain Rating " Post-Intervention 1: 0/10    Patients cultural, spiritual, Confucianist conflicts given the current situation: no    Objective:     Communicated with: RN prior to session.  Patient found supine with cervical collar, peripheral IV, SCD upon OT entry to room.    General Precautions: Standard, fall  Orthopedic Precautions: spinal precautions  Braces: Cervical collar  Respiratory Status: Room air    Occupational Performance:    Bed Mobility:    Patient completed Rolling/Turning to Right with stand by assistance  Patient completed Scooting/Bridging with stand by assistance  Patient completed Supine to Sit with stand by assistance    Functional Mobility/Transfers:  Patient completed Sit <> Stand Transfer with stand by assistance  with  no assistive device   Patient completed Toilet Transfer Step Transfer technique with stand by assistance with  no AD  Functional Mobility: from bed to bathroom back to bed; SBA no AD; pt managed IV pole; no LOB; good safety awareness, min vc's for sequencing c/ t/f's    Activities of Daily Living:  Grooming: pt declined to perform; reported performing earlier in day    Upper Body Dressing: minimum assistance donned gown as robe sitting eob  Lower Body Dressing: minimum assistance donned socks sitting eob  Toileting: pt not needing to void at this time      Cognitive/Visual Perceptual:  Cognitive/Psychosocial Skills:     -       Oriented to: Person, Place, Time, and Situation   -       Follows Commands/attention:Follows multistep  commands  -       Safety awareness/insight to disability: intact     Physical Exam:  Balance:    -       static sitting balance: SPV; dynamic standing balance: SBA  Upper Extremity Range of Motion:     -       Right Upper Extremity: WFL  -       Left Upper Extremity: WFL  Upper Extremity Strength:    -       Right Upper Extremity: grossly 5/5  -       Left Upper Extremity: grossly 4/5   Strength:    -       Right Upper Extremity: WFL  -       Left Upper  Extremity: WFL  Fine Motor Coordination:    -       Intact  Left hand thumb/finger opposition skills and Right hand thumb/finger opposition skills    AMPAC 6 Click ADL:  AMPAC Total Score: 20    Treatment & Education:  Pt edu on role of OT, POC, safety when performing self care tasks , benefit of performing OOB activity, and safety when performing functional transfers and mobility.  - White board updated  - Self care tasks completed-- as noted above      Patient left supine with all lines intact, call button in reach, RN notified, and wife present    GOALS:   Multidisciplinary Problems       Occupational Therapy Goals          Problem: Occupational Therapy    Goal Priority Disciplines Outcome Interventions   Occupational Therapy Goal     OT, PT/OT Progressing    Description: Goals to be met by: 8/3/2024     Patient will increase functional independence with ADLs by performing:    UE Dressing with Supervision (retrieve and don).  LE Dressing with Supervision (retrieve and don).  Grooming while standing at sink with Supervision.  Toileting from toilet with [place] for hygiene and clothing management.   Toilet transfer to toilet with Supervision.                         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 Dose of amlodipine increased from 5  to 10 mg in the weeks prior to the incidnt.    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         Past Surgical History:   Procedure Laterality Date    CEREBRAL ANGIOGRAM N/A 1/6/2020    Procedure: ANGIOGRAM-CEREBRAL;  Surgeon: [facility] Diagnostic Provider;  Location: [facility] OR 2ND FLR;  Service: Radiology;  Laterality: N/A;  /Nagi    CERVICAL FUSION  12/30/2015    COLONOSCOPY N/A 4/7/2017    Procedure: COLONOSCOPY;  Surgeon: Handy Frederick MD;  Location: [facility] (4TH FLR);  Service: Endoscopy;  Laterality: N/A;    COLONOSCOPY N/A 11/28/2023    Procedure: COLONOSCOPY;  Surgeon: ARMAAN Hinojosa MD;  Location: [facility] (4TH FLR);  Service: Endoscopy;  Laterality: N/A;  8/21-referred by Dr. Schulte/ Diagnosis:  Blood in stool, past due on surveillance colonoscopy for advanced colon polyp villous adenoma greater than 10 mm /Prep instructions handed to patient and to portal. AS  11/21/23-Precall complete-DS    CORONARY ANGIOGRAPHY N/A 10/10/2022    Procedure: ANGIOGRAM, CORONARY ARTERY;  Surgeon: Anson Nolan MD;  Location: [facility] CATH LAB;  Service: Cardiology;  Laterality: N/A;    ENDOSCOPIC ULTRASOUND OF UPPER GASTROINTESTINAL TRACT N/A 8/22/2023    Procedure: ULTRASOUND, UPPER GI TRACT, ENDOSCOPIC;  Surgeon: Joe Means MD;  Location: [facility] ENDO (2ND FLR);  Service: Endoscopy;  Laterality: N/A;  instr portal-pt stated difficult intubation with surgery in the past-tb    FUSION OF CERVICAL SPINE BY POSTERIOR APPROACH N/A  4/24/2024    Procedure: FUSION, SPINE, CERVICAL, POSTERIOR APPROACH C3-6 DEPUY  GW TONGS WTIH WEIGHTS, FREDIS 4 POST SNS: MOTORS/SSEP/EMG;  Surgeon: Edd Burnette MD;  Location: [facility];  Service: Orthopedics;  Laterality: N/A;    HERNIA REPAIR      PROSTATECTOMY         Time Tracking:     OT Date of Treatment: 07/27/24  OT Start Time: 1415  OT Stop Time: 1431  OT Total Time (min): 16 min    Billable Minutes:Evaluation 8  Therapeutic Activity 8    7/27/2024

## 2024-07-27 NOTE — PT/OT/SLP EVAL
"Physical Therapy Evaluation    Patient Name:  Hi Braxton   MRN:  [id number]    Recommendations:     Discharge Recommendations: No Therapy Indicated   Discharge Equipment Recommendations: shower chair   Barriers to discharge: Inaccessible home bed/bathroom upstairs/14 steps    Assessment:     Hi Braxton is a 64 y.o. male admitted with a medical diagnosis of History of fusion of cervical spine.  He presents with the following impairments/functional limitations: gait instability, impaired functional mobility, pain, orthopedic precautions . Pt requires supervision for bed mobility, SBA for transfers, CGA up/down steps with L UE rail, and SBA  for gait due to pain and mild instability. Pt is motivated to progress with functional mobility.     Rehab Prognosis: Good; patient would benefit from acute skilled PT services to address these deficits and reach maximum level of function.    Recent Surgery: Procedure(s) (LRB):  FUSION, SPINE, CERVICAL, POSTERIOR APPROACH C3-T1 REVISION (N/A) 1 Day Post-Op    Plan:     During this hospitalization, patient to be seen 3 x/week to address the identified rehab impairments via gait training, therapeutic activities and progress toward the following goals:    Plan of Care Expires:  08/26/24    Subjective   "I had neck surgery in April"    Pain/Comfort:  Pain Rating 1: 5/10 (at rest in supine)  Location - Orientation 1: generalized  Location 1: neck  Pain Addressed 1: Reposition, Cessation of Activity, Pre-medicate for activity  Pain Rating Post-Intervention 1: 10/10 (with movement)    Patients cultural, spiritual, Zoroastrianism conflicts given the current situation: no    Living Environment:  Pt lives with his wife in a 2 story home with 3 EDER and 14 steps to bed/bathroom with L UE rail support  Prior to admission, patients level of function was independent with gait and ADLs.  Equipment used at home: none.  Upon discharge, patient will have assistance from wife.    Objective: "     Communicated with nurse prior to session.  Patient found HOB elevated with cervical collar, peripheral IV, SCD, telemetry  upon PT entry to room.    General Precautions: Standard, fall  Orthopedic Precautions:spinal precautions   Braces: Cervical collar on at all times  Respiratory Status: Room air    Exams:  Cognitive Exam:  Patient is oriented to Person, Place, and Time  Sensation:    -       Intact  light/touch B LE  RLE ROM: WFL  RLE Strength: WFL  LLE ROM: WFL  LLE Strength: WFL    Functional Mobility:  Bed Mobility:     Rolling Right: supervision; pt educated to log roll prior to coming to sit and when returning to supine  Supine to Sit: supervision  Transfers:     Sit to Stand:  stand by assistance with no AD  Gait: 100ft with no AD with SBA. Pt required verbal cues to relax his shoulders and for scapular retraction.   Stairs:  Pt ascended/descended 5 stair(s) with No Assistive Device with left handrail with Contact Guard Assistance. Limited number of steps due to IV present. PT educated pt and his wife in proper technique in guarding pt when going up and down the steps for safety. They expressed understanding.    AM-PAC 6 CLICK MOBILITY  Total Score:20     Pt and his wife educated in spinal precautions during mobility. They expressed understanding.  Patient left up in chair with all lines intact, call button in reach, nurse notified, and wife present.    GOALS:   Multidisciplinary Problems       Physical Therapy Goals          Problem: Physical Therapy    Goal Priority Disciplines Outcome Goal Variances Interventions   Physical Therapy Goal     PT, PT/OT Progressing     Description: PT goals until 7/30/24    1. Pt sit to stand with no AD with independent-not met  2. Pt to perform gait 300ft with no AD with independent.-not met  3. Pt to up/down 10 steps with L UE rail with independent.-not met                           History:     Past Medical History:   Diagnosis Date    BPH with obstruction/lower  urinary tract symptoms 09/28/2017    Carotid artery occlusion     Chronic anterior uveitis 03/26/2013    Coronary artery disease     Diabetes mellitus, type 2     diet controlled    Difficult intubation     Distended bladder 06/23/2020    Dizziness     DJD (degenerative joint disease), cervical 07/10/2015    Dysuria 05/11/2018    GERD (gastroesophageal reflux disease)     History of iritis 2013    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 Dose of amlodipine increased from 5 to 10 mg in the weeks prior to the incidnt.    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       Past Surgical History:   Procedure Laterality Date    CEREBRAL ANGIOGRAM N/A 1/6/2020    Procedure: ANGIOGRAM-CEREBRAL;  Surgeon: [facility] Diagnostic Provider;  Location: [facility] OR [address] [place] [address];  Service: Radiology;  Laterality: N/A;  /Nagi    CERVICAL FUSION  12/30/2015    COLONOSCOPY N/A 4/7/2017    Procedure: COLONOSCOPY;  Surgeon: Handy Frederick MD;  Location: [facility] ENDO (4TH FLR);  Service: Endoscopy;  Laterality: N/A;    COLONOSCOPY N/A 11/28/2023    Procedure: COLONOSCOPY;  Surgeon: ARMAAN Hinojosa MD;  Location: [facility] ENDO (4TH FLR);  Service: Endoscopy;  Laterality: N/A;  8/21-referred by Dr. Schulte/ Diagnosis:  Blood in stool, past due on surveillance colonoscopy for  advanced colon polyp villous adenoma greater than 10 mm /Prep instructions handed to patient and to portal. AS  11/21/23-Precall complete-DS    CORONARY ANGIOGRAPHY N/A 10/10/2022    Procedure: ANGIOGRAM, CORONARY ARTERY;  Surgeon: Anson Nolan MD;  Location: [facility] CATH LAB;  Service: Cardiology;  Laterality: N/A;    ENDOSCOPIC ULTRASOUND OF UPPER GASTROINTESTINAL TRACT N/A 8/22/2023    Procedure: ULTRASOUND, UPPER GI TRACT, ENDOSCOPIC;  Surgeon: Joe Means MD;  Location: [facility] ENDO (2ND FLR);  Service: Endoscopy;  Laterality: N/A;  instr portal-pt stated difficult intubation with surgery in the past-tb    FUSION OF CERVICAL SPINE BY POSTERIOR APPROACH N/A 4/24/2024    Procedure: FUSION, SPINE, CERVICAL, POSTERIOR APPROACH C3-6 DEPUY  GW TONGS WTIH WEIGHTS, FREDIS 4 POST SNS: MOTORS/SSEP/EMG;  Surgeon: Edd Burnette MD;  Location: [facility] OR;  Service: Orthopedics;  Laterality: N/A;    HERNIA REPAIR      PROSTATECTOMY         Time Tracking:     PT Received On: 07/27/24  PT Start Time: 1021     PT Stop Time: 1049  PT Total Time (min): 28 min     Billable Minutes: Evaluation 15 and Therapeutic Activity 13      07/27/2024

## 2024-07-27 NOTE — PLAN OF CARE
Problem: Physical Therapy  Goal: Physical Therapy Goal  Description: PT goals until 7/30/24    1. Pt sit to stand with no AD with independent-not met  2. Pt to perform gait 300ft with no AD with independent.-not met  3. Pt to up/down 10 steps with L UE rail with independent.-not met      Outcome: Progressing   Pt's goals set and pt will benefit from skilled PT services to work towards improved functional mobility including: up/down steps, transfers, and gait. Isha Herrera PT  7/27/2024

## 2024-07-28 LAB
POCT GLUCOSE: 111 MG/DL (ref 70–110)
POCT GLUCOSE: 119 MG/DL (ref 70–110)
POCT GLUCOSE: 143 MG/DL (ref 70–110)
POCT GLUCOSE: 159 MG/DL (ref 70–110)

## 2024-07-28 PROCEDURE — 11000001 HC ACUTE MED/SURG PRIVATE ROOM: Mod: HCNC

## 2024-07-28 PROCEDURE — 25000003 PHARM REV CODE 250: Mod: HCNC

## 2024-07-28 PROCEDURE — 63600175 PHARM REV CODE 636 W HCPCS: Mod: HCNC

## 2024-07-28 PROCEDURE — 99900035 HC TECH TIME PER 15 MIN (STAT): Mod: HCNC

## 2024-07-28 PROCEDURE — 94761 N-INVAS EAR/PLS OXIMETRY MLT: CPT | Mod: HCNC

## 2024-07-28 RX ORDER — POLYETHYLENE GLYCOL 3350 17 G/17G
17 POWDER, FOR SOLUTION ORAL 2 TIMES DAILY PRN
Status: DISCONTINUED | OUTPATIENT
Start: 2024-07-28 | End: 2024-07-29 | Stop reason: HOSPADM

## 2024-07-28 RX ORDER — ALPRAZOLAM 0.5 MG/1
0.5 TABLET ORAL 3 TIMES DAILY PRN
Status: DISCONTINUED | OUTPATIENT
Start: 2024-07-28 | End: 2024-07-29 | Stop reason: HOSPADM

## 2024-07-28 RX ADMIN — SODIUM CHLORIDE: 9 INJECTION, SOLUTION INTRAVENOUS at 10:07

## 2024-07-28 RX ADMIN — CHOLECALCIFEROL TAB 25 MCG (1000 UNIT) 1000 UNITS: 25 TAB at 08:07

## 2024-07-28 RX ADMIN — EZETIMIBE 10 MG: 10 TABLET ORAL at 08:07

## 2024-07-28 RX ADMIN — SENNOSIDES AND DOCUSATE SODIUM 1 TABLET: 50; 8.6 TABLET ORAL at 08:07

## 2024-07-28 RX ADMIN — INSULIN GLARGINE 15 UNITS: 100 INJECTION, SOLUTION SUBCUTANEOUS at 08:07

## 2024-07-28 RX ADMIN — METHOCARBAMOL 750 MG: 750 TABLET ORAL at 08:07

## 2024-07-28 RX ADMIN — METOPROLOL SUCCINATE 50 MG: 50 TABLET, EXTENDED RELEASE ORAL at 08:07

## 2024-07-28 RX ADMIN — GABAPENTIN 600 MG: 300 CAPSULE ORAL at 08:07

## 2024-07-28 RX ADMIN — LOSARTAN POTASSIUM 100 MG: 25 TABLET, FILM COATED ORAL at 08:07

## 2024-07-28 RX ADMIN — POLYETHYLENE GLYCOL 3350 17 G: 17 POWDER, FOR SOLUTION ORAL at 08:07

## 2024-07-28 RX ADMIN — ACETAMINOPHEN 1000 MG: 500 TABLET ORAL at 08:07

## 2024-07-28 RX ADMIN — SODIUM CHLORIDE: 9 INJECTION, SOLUTION INTRAVENOUS at 12:07

## 2024-07-28 RX ADMIN — OXYCODONE HYDROCHLORIDE 10 MG: 10 TABLET ORAL at 08:07

## 2024-07-28 RX ADMIN — ACETAMINOPHEN 1000 MG: 500 TABLET ORAL at 02:07

## 2024-07-28 RX ADMIN — METHOCARBAMOL 750 MG: 750 TABLET ORAL at 02:07

## 2024-07-28 RX ADMIN — OXYCODONE HYDROCHLORIDE 10 MG: 10 TABLET ORAL at 02:07

## 2024-07-28 RX ADMIN — ACETAMINOPHEN 1000 MG: 500 TABLET ORAL at 06:07

## 2024-07-28 RX ADMIN — ASPIRIN 81 MG: 81 TABLET, COATED ORAL at 08:07

## 2024-07-28 RX ADMIN — CALCIUM CARBONATE (ANTACID) CHEW TAB 500 MG 1000 MG: 500 CHEW TAB at 08:07

## 2024-07-28 RX ADMIN — ALPRAZOLAM 0.5 MG: 0.5 TABLET ORAL at 08:07

## 2024-07-28 RX ADMIN — OXYCODONE HYDROCHLORIDE 10 MG: 10 TABLET ORAL at 09:07

## 2024-07-28 RX ADMIN — GABAPENTIN 600 MG: 300 CAPSULE ORAL at 02:07

## 2024-07-28 RX ADMIN — OXYCODONE 5 MG: 5 TABLET ORAL at 04:07

## 2024-07-28 RX ADMIN — CELECOXIB 200 MG: 200 CAPSULE ORAL at 09:07

## 2024-07-28 RX ADMIN — MORPHINE SULFATE 2 MG: 2 INJECTION, SOLUTION INTRAMUSCULAR; INTRAVENOUS at 06:07

## 2024-07-28 RX ADMIN — ACETAMINOPHEN 1000 MG: 500 TABLET ORAL at 12:07

## 2024-07-28 NOTE — NURSING
Patient let RN know that he had requested his Xanax yesterday and was not given it so he had his wife come and give it to him last night. RN educated patient on not take home medications while in the hospital. RN informed night provider with no new orders given as he had already taken his evening dose. RN informed day shift provider bedside, no new orders given.

## 2024-07-28 NOTE — PROGRESS NOTES
[facility] - Surgery  Orthopedics  Progress Note    Patient Name: Hi Bratxon  MRN: [id number]  Admission Date: 7/26/2024  Hospital Length of Stay: 2 days  Attending Provider: Edd Burnette MD  Primary Care Provider: Josefina Kendall MD  Follow-up For: Procedure(s) (LRB):  FUSION, SPINE, CERVICAL, POSTERIOR APPROACH C3-T1 REVISION (N/A)    Post-Operative Day: 2 Days Post-Op  Subjective:     Principal Problem:History of fusion of cervical spine    Principal Orthopedic Problem:  As above s/p revision C3-T1 PCF 07/26/2024    Interval History:  No acute events overnight.  AF, HDS.  Pain is well-controlled.  Tolerating PO.  Tolerating C-collar. Walked 100 feet with Pt yesterday.     Review of patient's allergies indicates:   Allergen Reactions    Lisinopril Anaphylaxis and Nausea And Vomiting     General bad feeling    Lipitor [atorvastatin] Other (See Comments)     Muscle aches    Adhesive     Crestor [rosuvastatin] Swelling     Lip swelling.     Jardiance [empagliflozin] Other (See Comments)     Possible related to recent UTI's     Metformin      Used XR and experienced flatulance and abdominal pain.        Current Facility-Administered Medications   Medication    0.9%  NaCl infusion    0.9%  NaCl infusion    acetaminophen tablet 1,000 mg    aspirin EC tablet 81 mg    calcium carbonate 200 mg calcium (500 mg) chewable tablet 1,000 mg    celecoxib capsule 200 mg    dextrose 10% bolus 125 mL 125 mL    dextrose 10% bolus 250 mL 250 mL    ezetimibe tablet 10 mg    gabapentin capsule 600 mg    glucagon (human recombinant) injection 1 mg    glucose chewable tablet 16 g    glucose chewable tablet 24 g    insulin aspart U-100 pen 0-10 Units    insulin glargine U-100 (Lantus) pen 15 Units    losartan tablet 100 mg    methocarbamoL tablet 750 mg    metoprolol succinate (TOPROL-XL) 24 hr tablet 50 mg    morphine injection 2 mg    ondansetron tablet 4 mg    oxyCODONE immediate release tablet 5 mg    oxyCODONE immediate  "release tablet Tab 10 mg    pravastatin tablet 80 mg    senna-docusate 8.6-50 mg per tablet 1 tablet    vitamin D 1000 units tablet 1,000 Units     Objective:     Vital Signs (Most Recent):  Temp: 98.3 °F (36.8 °C) (07/28/24 0341)  Pulse: 72 (07/28/24 0341)  Resp: 16 (07/28/24 0642)  BP: (!) 140/71 (07/28/24 0341)  SpO2: (!) 94 % (07/28/24 0341) Vital Signs (24h Range):  Temp:  [97.4 °F (36.3 °C)-98.3 °F (36.8 °C)] 98.3 °F (36.8 °C)  Pulse:  [60-75] 72  Resp:  [14-20] 16  SpO2:  [94 %-96 %] 94 %  BP: (138-157)/(63-80) 140/71     Weight: 86.9 kg (191 lb 9.3 oz)  Height: 6' 4" (193 cm)  Body mass index is 23.32 kg/m².      Intake/Output Summary (Last 24 hours) at 7/28/2024 0731  Last data filed at 7/28/2024 0642  Gross per 24 hour   Intake 960 ml   Output 540 ml   Net 420 ml        Ortho/SPM Exam  NAD, resting comfortably in bed  A&O x3   Breathing comfortably w/o distress   Extremities WWP     Radial pulses palpated  Incisions clean, dry, and intact.    Drains with serosanguineous output.    Able to make a composite fist.    Full strength in deltoid, bicep, tricep, wrist extensors, wrist flexors, intrinsics, extrinsics  Sensation intact to light touch throughout the bilateral upper and lower extremities.     Significant Labs: All pertinent labs within the past 24 hours have been reviewed.    Significant Imaging: I have reviewed all pertinent imaging results/findings.  Assessment/Plan:     * 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 C3-T1 PCF  Hi Braxton is a 64 y.o. male is s/p revision C3-T1 PCF on 7/26    Surgical dressing C/D/I  Pain control: multimodal  PT/OT: WBAT BLE in C-collar at all times, spine precautions  DVT PPx: none, FCDs at all times when not ambulating  Drain: x2; plan to leave drains today   [place]: Removed yesterday   Nursing: Incentive spirometry, Monitor and record drain output each shift     » Dispo: plan to dc pending PT/OT progress, drain output      Output by Drain (mL) " 07/26/24 0701 - 07/26/24 1900 07/26/24 1901 - 07/27/24 0700 07/27/24 0701 - 07/27/24 1900 07/27/24 1901 - 07/28/24 0700 07/28/24 0701 - 07/28/24 0740        Closed/Suction Drain 07/26/24 1631 Tube - 1 Posterior;Right Neck Accordion 10 Fr.  135 (P)80 30         Closed/Suction Drain 07/26/24 1632 Tube - 2 Posterior;Left Neck Accordion 10 Fr.  20 (P)0 30                W Dwaine Head MD  Orthopedics  [facility] - Surgery

## 2024-07-28 NOTE — ASSESSMENT & PLAN NOTE
Hi Braxton is a 64 y.o. male is s/p revision C3-T1 PCF on 7/26    Surgical dressing C/D/I  Pain control: multimodal  PT/OT: WBAT BLE in C-collar at all times, spine precautions  DVT PPx: none, FCDs at all times when not ambulating  Drain: x2; plan to leave drains today   [place]bes: Removed yesterday   Nursing: Incentive spirometry, Monitor and record drain output each shift     » Dispo: plan to dc pending PT/OT progress, drain output      Output by Drain (mL) 07/26/24 0701 - 07/26/24 1900 07/26/24 1901 - 07/27/24 0700 07/27/24 0701 - 07/27/24 1900 07/27/24 1901 - 07/28/24 0700 07/28/24 0701 - 07/28/24 0740        Closed/Suction Drain 07/26/24 1631 Tube - 1 Posterior;Right Neck Accordion 10 Fr.  135 (P)80 30         Closed/Suction Drain 07/26/24 1632 Tube - 2 Posterior;Left Neck Accordion 10 Fr.  20 (P)0 30

## 2024-07-28 NOTE — PLAN OF CARE
Problem: Adult Inpatient Plan of Care  Goal: Plan of Care Review  Outcome: Progressing  Flowsheets (Taken 7/28/2024 0543)  Plan of Care Reviewed With: patient  Goal: Patient-Specific Goal (Individualized)  Outcome: Progressing  Flowsheets (Taken 7/28/2024 0543)  Individualized Care Needs: around the clock pain control  Anxieties, Fears or Concerns: Pain control  Patient/Family-Specific Goals (Include Timeframe): update family on poc     Problem: Wound  Goal: Optimal Coping  Outcome: Progressing  Intervention: Support Patient and Family Response  Flowsheets (Taken 7/28/2024 0543)  Supportive Measures:   active listening utilized   verbalization of feelings encouraged   positive reinforcement provided  Family/Support System Care: self-care encouraged  Goal: Optimal Pain Control and Function  Outcome: Progressing  Intervention: Prevent or Manage Pain  Flowsheets (Taken 7/28/2024 0543)  Sleep/Rest Enhancement:   awakenings minimized   room darkened   relaxation techniques promoted   regular sleep/rest pattern promoted  Pain Management Interventions:   care clustered   pain management plan reviewed with patient/caregiver   pillow support provided   position adjusted   quiet environment facilitated   relaxation techniques promoted     Problem: Infection  Goal: Absence of Infection Signs and Symptoms  Outcome: Progressing  Intervention: Prevent or Manage Infection  Flowsheets (Taken 7/28/2024 0543)  Fever Reduction/Comfort Measures:   lightweight clothing   lightweight bedding  Infection Management: aseptic technique maintained

## 2024-07-28 NOTE — SUBJECTIVE & OBJECTIVE
Principal Problem:History of fusion of cervical spine    Principal Orthopedic Problem:  As above s/p revision C3-T1 PCF 07/26/2024    Interval History:  No acute events overnight.  AF, HDS.  Pain is well-controlled.  Tolerating PO.  Tolerating C-collar. Walked 100 feet with Pt yesterday.     Review of patient's allergies indicates:   Allergen Reactions    Lisinopril Anaphylaxis and Nausea And Vomiting     General bad feeling    Lipitor [atorvastatin] Other (See Comments)     Muscle aches    Adhesive     Crestor [rosuvastatin] Swelling     Lip swelling.     Jardiance [empagliflozin] Other (See Comments)     Possible related to recent UTI's     Metformin      Used XR and experienced flatulance and abdominal pain.        Current Facility-Administered Medications   Medication    0.9%  NaCl infusion    0.9%  NaCl infusion    acetaminophen tablet 1,000 mg    aspirin EC tablet 81 mg    calcium carbonate 200 mg calcium (500 mg) chewable tablet 1,000 mg    celecoxib capsule 200 mg    dextrose 10% bolus 125 mL 125 mL    dextrose 10% bolus 250 mL 250 mL    ezetimibe tablet 10 mg    gabapentin capsule 600 mg    glucagon (human recombinant) injection 1 mg    glucose chewable tablet 16 g    glucose chewable tablet 24 g    insulin aspart U-100 pen 0-10 Units    insulin glargine U-100 (Lantus) pen 15 Units    losartan tablet 100 mg    methocarbamoL tablet 750 mg    metoprolol succinate (TOPROL-XL) 24 hr tablet 50 mg    morphine injection 2 mg    ondansetron tablet 4 mg    oxyCODONE immediate release tablet 5 mg    oxyCODONE immediate release tablet Tab 10 mg    pravastatin tablet 80 mg    senna-docusate 8.6-50 mg per tablet 1 tablet    vitamin D 1000 units tablet 1,000 Units     Objective:     Vital Signs (Most Recent):  Temp: 98.3 °F (36.8 °C) (07/28/24 0341)  Pulse: 72 (07/28/24 0341)  Resp: 16 (07/28/24 0642)  BP: (!) 140/71 (07/28/24 0341)  SpO2: (!) 94 % (07/28/24 0341) Vital Signs (24h Range):  Temp:  [97.4 °F (36.3 °C)-98.3  "°F (36.8 °C)] 98.3 °F (36.8 °C)  Pulse:  [60-75] 72  Resp:  [14-20] 16  SpO2:  [94 %-96 %] 94 %  BP: (138-157)/(63-80) 140/71     Weight: 86.9 kg (191 lb 9.3 oz)  Height: 6' 4" (193 cm)  Body mass index is 23.32 kg/m².      Intake/Output Summary (Last 24 hours) at 7/28/2024 0731  Last data filed at 7/28/2024 0642  Gross per 24 hour   Intake 960 ml   Output 540 ml   Net 420 ml        Ortho/SPM Exam  NAD, resting comfortably in bed  A&O x3   Breathing comfortably w/o distress   Extremities WWP     Radial pulses palpated  Incisions clean, dry, and intact.    Drains with serosanguineous output.    Able to make a composite fist.    Full strength in deltoid, bicep, tricep, wrist extensors, wrist flexors, intrinsics, extrinsics  Sensation intact to light touch throughout the bilateral upper and lower extremities.     Significant Labs: All pertinent labs within the past 24 hours have been reviewed.    Significant Imaging: I have reviewed all pertinent imaging results/findings.  "

## 2024-07-28 NOTE — ANESTHESIA POSTPROCEDURE EVALUATION
Anesthesia Post Evaluation    Patient: Hi Braxton    Procedure(s) Performed: Procedure(s) (LRB):  FUSION, SPINE, CERVICAL, POSTERIOR APPROACH C3-T1 REVISION (N/A)    Final Anesthesia Type: general      Patient location during evaluation: PACU  Patient participation: Yes- Able to Participate  Level of consciousness: awake and alert  Post-procedure vital signs: reviewed and stable  Pain management: adequate  Airway patency: patent  ROXY mitigation strategies: Multimodal analgesia, Preoperative use of mandibular advancement devices or oral appliances, Intraoperative administration of CPAP, nasopharyngeal airway, or oral appliance during sedation, Extubation while patient is awake and Verification of full reversal of neuromuscular block  PONV status at discharge: No PONV  Anesthetic complications: no      Cardiovascular status: blood pressure returned to baseline, hemodynamically stable and stable  Respiratory status: unassisted and spontaneous ventilation  Hydration status: euvolemic  Follow-up not needed.              Vitals Value Taken Time   /83 07/28/24 0756   Temp 36.8 °C (98.3 °F) 07/28/24 0341   Pulse 73 07/28/24 0756   Resp 20 07/28/24 0756   SpO2 97 % 07/28/24 0756         Event Time   Out of Recovery 17:45:00         Pain/Wai Score: Pain Rating Prior to Med Admin: 10 (7/28/2024  8:16 AM)  Pain Rating Post Med Admin: 4 (7/28/2024  5:32 AM)

## 2024-07-29 VITALS
DIASTOLIC BLOOD PRESSURE: 74 MMHG | RESPIRATION RATE: 18 BRPM | HEIGHT: 76 IN | BODY MASS INDEX: 23.33 KG/M2 | HEART RATE: 77 BPM | WEIGHT: 191.56 LBS | TEMPERATURE: 98 F | SYSTOLIC BLOOD PRESSURE: 126 MMHG | OXYGEN SATURATION: 94 %

## 2024-07-29 LAB
ACID FAST MOD KINY STN SPEC: NORMAL
BACTERIA SPEC AEROBE CULT: NO GROWTH
MYCOBACTERIUM SPEC QL CULT: NORMAL
POCT GLUCOSE: 102 MG/DL (ref 70–110)
POCT GLUCOSE: 149 MG/DL (ref 70–110)

## 2024-07-29 PROCEDURE — 94761 N-INVAS EAR/PLS OXIMETRY MLT: CPT | Mod: HCNC

## 2024-07-29 PROCEDURE — 25000003 PHARM REV CODE 250: Mod: HCNC

## 2024-07-29 PROCEDURE — 63600175 PHARM REV CODE 636 W HCPCS: Mod: HCNC

## 2024-07-29 PROCEDURE — 97530 THERAPEUTIC ACTIVITIES: CPT | Mod: HCNC

## 2024-07-29 RX ORDER — METHOCARBAMOL 500 MG/1
500 TABLET, FILM COATED ORAL 3 TIMES DAILY
Qty: 30 TABLET | Refills: 0 | Status: SHIPPED | OUTPATIENT
Start: 2024-07-29 | End: 2024-08-08

## 2024-07-29 RX ORDER — GABAPENTIN 300 MG/1
600 CAPSULE ORAL 3 TIMES DAILY
Qty: 180 CAPSULE | Refills: 0 | Status: SHIPPED | OUTPATIENT
Start: 2024-07-29 | End: 2024-08-28

## 2024-07-29 RX ORDER — ACETAMINOPHEN 500 MG
1000 TABLET ORAL EVERY 8 HOURS
Qty: 180 TABLET | Refills: 0 | Status: SHIPPED | OUTPATIENT
Start: 2024-07-29 | End: 2024-08-28

## 2024-07-29 RX ORDER — OXYCODONE HYDROCHLORIDE 5 MG/1
5 TABLET ORAL EVERY 4 HOURS PRN
Qty: 28 TABLET | Refills: 0 | Status: SHIPPED | OUTPATIENT
Start: 2024-07-29

## 2024-07-29 RX ORDER — CELECOXIB 200 MG/1
200 CAPSULE ORAL DAILY
Qty: 30 CAPSULE | Refills: 0 | Status: SHIPPED | OUTPATIENT
Start: 2024-07-29 | End: 2024-08-02

## 2024-07-29 RX ORDER — DOCUSATE SODIUM 100 MG/1
100 CAPSULE, LIQUID FILLED ORAL 2 TIMES DAILY PRN
Qty: 30 CAPSULE | Refills: 0 | Status: SHIPPED | OUTPATIENT
Start: 2024-07-29

## 2024-07-29 RX ADMIN — ACETAMINOPHEN 1000 MG: 500 TABLET ORAL at 05:07

## 2024-07-29 RX ADMIN — METHOCARBAMOL 750 MG: 750 TABLET ORAL at 08:07

## 2024-07-29 RX ADMIN — CHOLECALCIFEROL TAB 25 MCG (1000 UNIT) 1000 UNITS: 25 TAB at 08:07

## 2024-07-29 RX ADMIN — OXYCODONE HYDROCHLORIDE 10 MG: 10 TABLET ORAL at 01:07

## 2024-07-29 RX ADMIN — ASPIRIN 81 MG: 81 TABLET, COATED ORAL at 08:07

## 2024-07-29 RX ADMIN — GABAPENTIN 600 MG: 300 CAPSULE ORAL at 08:07

## 2024-07-29 RX ADMIN — METHOCARBAMOL 750 MG: 750 TABLET ORAL at 02:07

## 2024-07-29 RX ADMIN — EZETIMIBE 10 MG: 10 TABLET ORAL at 08:07

## 2024-07-29 RX ADMIN — SENNOSIDES AND DOCUSATE SODIUM 1 TABLET: 50; 8.6 TABLET ORAL at 08:07

## 2024-07-29 RX ADMIN — OXYCODONE HYDROCHLORIDE 10 MG: 10 TABLET ORAL at 08:07

## 2024-07-29 RX ADMIN — METOPROLOL SUCCINATE 50 MG: 50 TABLET, EXTENDED RELEASE ORAL at 08:07

## 2024-07-29 RX ADMIN — LOSARTAN POTASSIUM 100 MG: 25 TABLET, FILM COATED ORAL at 08:07

## 2024-07-29 RX ADMIN — CELECOXIB 200 MG: 200 CAPSULE ORAL at 08:07

## 2024-07-29 RX ADMIN — CALCIUM CARBONATE (ANTACID) CHEW TAB 500 MG 1000 MG: 500 CHEW TAB at 08:07

## 2024-07-29 RX ADMIN — GABAPENTIN 600 MG: 300 CAPSULE ORAL at 02:07

## 2024-07-29 RX ADMIN — INSULIN GLARGINE 15 UNITS: 100 INJECTION, SOLUTION SUBCUTANEOUS at 08:07

## 2024-07-29 RX ADMIN — MORPHINE SULFATE 2 MG: 2 INJECTION, SOLUTION INTRAMUSCULAR; INTRAVENOUS at 06:07

## 2024-07-29 RX ADMIN — ALPRAZOLAM 0.5 MG: 0.5 TABLET ORAL at 02:07

## 2024-07-29 RX ADMIN — OXYCODONE HYDROCHLORIDE 10 MG: 10 TABLET ORAL at 03:07

## 2024-07-29 NOTE — PROGRESS NOTES
Nurses Note -- 4 Eyes      7/29/2024   4:04 PM      Skin assessed during: Q Shift Change      [x] No Altered Skin Integrity Present    []Prevention Measures Documented      [] Yes- Altered Skin Integrity Present or Discovered   [] LDA Added if Not in Epic (Describe Wound)   [] New Altered Skin Integrity was Present on Admit and Documented in LDA   [] Wound Image Taken    Wound Care Consulted? No    Attending Nurse:  Rogelio Chaidez RN/Staff Member:   Paul

## 2024-07-29 NOTE — PLAN OF CARE
Hi Braxton tolerated treatment well today. He was resting in bed this afternoon ready for D/C home upon my entry to room. Mr. Do was in great spirits, happy with his mobility progress. States he's been up in the chair today, walked the hallways independently multiple times since PT on Saturday (7/27/24). He has no concerns regarding stairs at home. We reviewed his mobility precautions for the time-being: wear C-collar at all times, avoid lifting > 5 lbs. Patient asking good questions re: timeline of return to exercise. I suggested simply focusing on level-ground ambulation and possibly stationary bike; hold off on any weight training, treadmill, elliptical work until cleared by his physician; verbalized understanding. Discussed discharge from acute PT services with patient as there are no further acute PT needs identified at this time; verbalized understanding. Will now discharge from acute PT services.    Problem: Physical Therapy  Goal: Physical Therapy Goal  Description: Discharged from acute PT on 7/29:    1. Pt sit to stand with no AD with independent-not met  2. Pt to perform gait 300ft with no AD with independent.-not met  3. Pt to up/down 10 steps with L UE rail with independent.-not met  Outcome: Met    Blas Adams, PT  7/29/2024

## 2024-07-29 NOTE — ASSESSMENT & PLAN NOTE
Hi Braxton is a 64 y.o. male is s/p revision C3-T1 PCF on 7/26    Surgical dressing C/D/I  Pain control: multimodal  PT/OT: WBAT BLE in C-collar at all times, spine precautions  DVT PPx: none, FCDs at all times when not ambulating  Drain: x2; removed 7/29  [place]bes: Removed yesterday   Nursing: Incentive spirometry, Monitor and record drain output each shift     » Dispo: plan to dc pending XR results      Output by Drain (mL) 07/27/24 0701 - 07/27/24 1900 07/27/24 1901 - 07/28/24 0700 07/28/24 0701 - 07/28/24 1900 07/28/24 1901 - 07/29/24 0700 07/29/24 0701 - 07/29/24 0855        Closed/Suction Drain 07/26/24 1631 Tube - 1 Posterior;Right Neck Accordion 10 Fr. 80 30  30         Closed/Suction Drain 07/26/24 1632 Tube - 2 Posterior;Left Neck Accordion 10 Fr. 0 30

## 2024-07-29 NOTE — PLAN OF CARE
07/29/24 1144   Post-Acute Status   Post-Acute Authorization Home Health   Home Health Status Referrals Sent   Discharge Plan   Discharge Plan A Home Health     SW faxed referral to [facility] via [facility] for [facility]. SW will follow up as needed.    [facility]- Accepted.    Prisca Villagomez LMSW  Case Management   [facility]   Ext. [phone]

## 2024-07-29 NOTE — PLAN OF CARE
Problem: Infection  Goal: Absence of Infection Signs and Symptoms  Intervention: Prevent or Manage Infection  Flowsheets (Taken 7/29/2024 0956)  Infection Management: aseptic technique maintained     Problem: Diabetes Comorbidity  Goal: Blood Glucose Level Within Targeted Range  Intervention: Monitor and Manage Glycemia  Flowsheets (Taken 7/29/2024 0956)  Glycemic Management: blood glucose monitored

## 2024-07-29 NOTE — PLAN OF CARE
[facility]    HOME HEALTH ORDERS  FACE TO FACE ENCOUNTER    Patient Name: Hi Braxton  YOB: 1960    PCP: Josefina Kendall MD   PCP Address: [address] [facility] / [id number]  PCP Phone Number: [phone]  PCP Fax: [phone]    Encounter Date: 07/29/2024    Admit to Home Health    Diagnoses:  Active Hospital Problems    Diagnosis  POA    *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 C3-T1 PCF [Z98.1]  Not Applicable      Resolved Hospital Problems   No resolved problems to display.       Future Appointments   Date Time Provider Department Center   8/23/2024  9:30 AM [facility] OIC-XRAY [facility]   8/23/2024 10:30 AM CHANEL Garza, NP [facility] SPINE [facility]   9/13/2024  9:30 AM Elva Means MD [facility] O[facility] [place]           I have seen and examined this patient face to face today. My clinical findings that support the need for the home health skilled services and home bound status are the following:  Weakness/numbness causing balance and gait disturbance due to Surgery making it taxing to leave home.  Requiring assistive device to leave home due to unsteady gait caused by Surgery.    Allergies:  Review of patient's allergies indicates:   Allergen Reactions    Lisinopril Anaphylaxis and Nausea And Vomiting     General bad feeling    Lipitor [atorvastatin] Other (See Comments)     Muscle aches    Adhesive     Crestor [rosuvastatin] Swelling     Lip swelling.     Jardiance [empagliflozin] Other (See Comments)     Possible related to recent UTI's     Metformin      Used XR and experienced flatulance and abdominal pain.        Diet: diabetic diet: 2000 calorie    Activities: WBAT, c-collar at all times     Home Health Admitting Clinician:   SN/PT to complete comprehensive assessment including routine vital signs. Instruct on disease process and s/s of complications to report to MD. Follow specific home health arthoplasty protocol. Review/verify  medication list sent home with the patient at time of discharge  and instruct patient/caregiver as needed. If coumadin ordered, coumadin clinic to manage INR with INR draws 2x per week with a goal to maintain INR between 1.8 and 2.2. Frequency may be adjusted depending on start of care date.    Notify MD if SBP > 160 or < 90; DBP > 90 or < 50; HR > 120 or < 50; Temp > 101    Home Medical Equipment:  Walker, 3-1 bedside commode, transfer tub bench    CONSULTS:    Physical Therapy may admit if patient not on coumadin, PT to perform comprehensive assessment if performing admit visit and generate therapy plan of care. Evaluate for home safety and equipment needs; Establish/upgrade home exercise program. Perform/instruct on therapeutic exercises, gait training, transfer training, and Range of Motion.      OTHER: (only select if patient needs other therapy disciplines)  Occupational Therapy to evaluate and treat. Evaluate home environment for safety and equipment needs. Perform/Instruct on transfers, ADL training, ROM, and therapeutic exercises.   to evaluate for community resources/long-range planning.  Aide to provide assistance with personal care, ADLs, and vital signs.    MISCELLANEOUS CARE:  Routine Skin for Bedridden Patients: Instruct patient/caregiver to apply moisture barrier cream to all skin folds and wet areas in perineal area daily and after baths and all bowel movements.    WOUND CARE ORDERS:  Assess Surgical Incision/DSRG each TX  Aquacel AG drsg applied post-op leave on 14 days post op. Call MD if any drainage reaches border to border of drsg horizontally, s/s of infection, temp >101, induration, swelling or redness.  If dressing is removed per MD order, then apply island dressing, change/teach caregiver to perform daily dressing change if island dressing present.    Medications: Review discharge medications with patient and family and provide education.      Current Discharge Medication List         CONTINUE these medications which have NOT CHANGED    Details   !! acetaminophen (TYLENOL) 500 MG tablet Take 2 tablets (1,000 mg total) by mouth every 8 (eight) hours as needed for Pain.      !! acetaminophen (TYLENOL) 500 MG tablet Take 1 tablet (500 mg total) by mouth 3 (three) times daily.  Qty: 90 tablet, Refills: 0    Comments: [place] [facility] 7/24/24      ALPRAZolam (XANAX) 0.5 MG tablet TAKE 1 TABLET THREE TIMES DAILY AS NEEDED FOR ANXIETY  Qty: 90 tablet, Refills: 2      aspirin (ECOTRIN) 81 MG EC tablet Take 1 tablet (81 mg total) by mouth once daily.  Refills: 0    Associated Diagnoses: Coronary artery disease due to calcified coronary lesion      !! celecoxib (CELEBREX) 100 MG capsule Take 1 capsule (100 mg total) by mouth 2 (two) times daily.  Qty: 28 capsule, Refills: 0      !! celecoxib (CELEBREX) 100 MG capsule Take 1 capsule (100 mg total) by mouth 2 (two) times daily.  Qty: 28 capsule, Refills: 0    Comments: [facility] 7/24/24      !! celecoxib (CELEBREX) 200 MG capsule Take 200 mg by mouth 2 (two) times daily as needed for Pain.      ezetimibe (ZETIA) 10 mg tablet TAKE 1 TABLET ONE TIME DAILY  Qty: 90 tablet, Refills: 3    Associated Diagnoses: Coronary artery disease due to calcified coronary lesion; Mixed hyperlipidemia      gabapentin (NEURONTIN) 600 MG tablet TAKE 1 TABLET THREE TIMES DAILY  Qty: 270 tablet, Refills: 3    Associated Diagnoses: Chronic neck pain; Left cervical radiculopathy      insulin (LANTUS SOLOSTAR U-100 INSULIN) glargine 100 units/mL SubQ pen Inject 25 Units into the skin every evening.  Qty: 30 mL, Refills: 1    Associated Diagnoses: Type 2 diabetes mellitus without complication, without long-term current use of insulin      losartan (COZAAR) 100 MG tablet TAKE 1 TABLET ONE TIME DAILY  Qty: 90 tablet, Refills: 3    Comments: .  Associated Diagnoses: Essential hypertension      !! methocarbamoL (ROBAXIN) 500 MG Tab Take 2 tablets (1,000 mg total) by mouth 3  (three) times daily.  Qty: 90 tablet, Refills: 0      !! methocarbamoL (ROBAXIN) 500 MG Tab Take 2 tablets (1,000 mg total) by mouth 3 (three) times daily.  Qty: 90 tablet, Refills: 0    Comments: [place] surgery 7/24/24      methylPREDNISolone (MEDROL DOSEPACK) 4 mg tablet use as directed  Qty: 1 each, Refills: 0      metoprolol succinate (TOPROL-XL) 50 MG 24 hr tablet TAKE 1 AND 1/2 TABLETS (75 MG TOTAL) ONCE DAILY.  Qty: 135 tablet, Refills: 3    Associated Diagnoses: Symptomatic PVCs      multivit-min/folic/vit K/lycop (MEN'S MULTIVITAMIN ORAL) Take 1 tablet by mouth once daily.      NIFEdipine (PROCARDIA-XL) 60 MG (OSM) 24 hr tablet Take 1 tablet (60 mg total) by mouth 2 (two) times a day.  Qty: 180 tablet, Refills: 1    Comments: .  Associated Diagnoses: Hypertension associated with diabetes      ondansetron (ZOFRAN) 4 MG tablet Take 1 tablet (4 mg total) by mouth every 6 (six) hours as needed for Nausea.  Qty: 30 tablet, Refills: 0    Associated Diagnoses: Periumbilical abdominal pain      oxyCODONE (ROXICODONE) 5 MG immediate release tablet Take 1 tablet (5 mg total) by mouth every 6 (six) hours as needed for Pain (for breakthrough pain).  Qty: 21 tablet, Refills: 0    Comments: Quantity prescribed more than 7 day supply? [facility] surgery 7/24/24      pantoprazole (PROTONIX) 40 MG tablet TAKE 1 TABLET TWICE DAILY  Qty: 180 tablet, Refills: 3      pravastatin (PRAVACHOL) 80 MG tablet TAKE 1 TABLET ONE TIME DAILY  Qty: 90 tablet, Refills: 3    Associated Diagnoses: Coronary artery disease due to calcified coronary lesion; Mixed hyperlipidemia      spironolactone (ALDACTONE) 50 MG tablet TAKE 1 TABLET ONE TIME DAILY  Qty: 90 tablet, Refills: 3    Associated Diagnoses: Essential hypertension; Hypokalemia      aspirin/acetaminophen/caffeine (EXCEDRIN MIGRAINE ORAL) Take 1 tablet by mouth daily as needed (Pain).      CONTOUR NEXT TEST STRIPS Strp USE TO TEST BLOOD SUGAR ONCE DAILY  Qty: 25 strip, Refills: 6     "Associated Diagnoses: Newly diagnosed diabetes      DULCOLAX, BISACODYL, ORAL Take 1 tablet by mouth once daily.      MICROLET LANCET Misc 1 lancet by Misc.(Non-Drug; Combo Route) route once daily. Test blood glucose once daily as instructed.  Qty: 25 each, Refills: 11    Associated Diagnoses: Newly diagnosed diabetes      nitroGLYCERIN (NITROSTAT) 0.3 MG SL tablet Place 1 tablet (0.3 mg total) under the tongue every 5 (five) minutes as needed for Chest pain.  Qty: 100 tablet, Refills: 3    Comments: Dispense 25 tablets x 4 bottles  Associated Diagnoses: Coronary artery disease due to calcified coronary lesion      pen needle, diabetic (BD ULTRA-FINE CHET PEN NEEDLE) 32 gauge x 5/32" Ndle USE PEN NEEDLE AS INSTRUCTION WITH LANTUS INSULIN PRE-FILLED PEN.  Qty: 200 each, Refills: 1    Associated Diagnoses: Type 2 diabetes mellitus without complication, without long-term current use of insulin      tadalafiL (CIALIS) 20 MG Tab Take 1 tablet (20 mg total) by mouth every 72 hours as needed (ED).  Qty: 15 tablet, Refills: 11    Associated Diagnoses: Erectile dysfunction due to arterial insufficiency       !! - Potential duplicate medications found. Please discuss with provider.          I certify that this patient is confined to his home and needs intermittent skilled nursing care, physical therapy, and occupational therapy.  "

## 2024-07-29 NOTE — PLAN OF CARE
Problem: Adult Inpatient Plan of Care  Goal: Plan of Care Review  Outcome: Progressing  Goal: Patient-Specific Goal (Individualized)  Outcome: Progressing  Goal: Absence of Hospital-Acquired Illness or Injury  Outcome: Progressing  Goal: Optimal Comfort and Wellbeing  Outcome: Progressing  Goal: Readiness for Transition of Care  Outcome: Progressing     Problem: Diabetes Comorbidity  Goal: Blood Glucose Level Within Targeted Range  Outcome: Progressing     Problem: Wound  Goal: Optimal Coping  Outcome: Progressing  Goal: Optimal Functional Ability  Outcome: Progressing  Goal: Absence of Infection Signs and Symptoms  Outcome: Progressing  Goal: Improved Oral Intake  Outcome: Progressing  Goal: Optimal Pain Control and Function  Outcome: Progressing  Goal: Skin Health and Integrity  Outcome: Progressing  Goal: Optimal Wound Healing  Outcome: Progressing     Problem: Infection  Goal: Absence of Infection Signs and Symptoms  Outcome: Progressing     Pt alert, calm, comfortable. BG monitored. Pain adequately managed. Pt ambulated to bathroom. Dressing c/d/I. No s/s of infection. Adequate oral intake.

## 2024-07-29 NOTE — PLAN OF CARE
Problem: Adult Inpatient Plan of Care  Goal: Plan of Care Review  Outcome: Progressing  Goal: Patient-Specific Goal (Individualized)  Outcome: Progressing  Goal: Absence of Hospital-Acquired Illness or Injury  Outcome: Progressing  Goal: Optimal Comfort and Wellbeing  Outcome: Progressing  Intervention: Monitor Pain and Promote Comfort  Flowsheets (Taken 7/29/2024 0046)  Pain Management Interventions:   position adjusted   medication offered   quiet environment facilitated   relaxation techniques promoted     Problem: Adult Inpatient Plan of Care  Goal: Plan of Care Review  Outcome: Progressing     Problem: Adult Inpatient Plan of Care  Goal: Patient-Specific Goal (Individualized)  Outcome: Progressing     Problem: Adult Inpatient Plan of Care  Goal: Absence of Hospital-Acquired Illness or Injury  Outcome: Progressing     Problem: Adult Inpatient Plan of Care  Goal: Optimal Comfort and Wellbeing  Intervention: Monitor Pain and Promote Comfort  Flowsheets (Taken 7/29/2024 0046)  Pain Management Interventions:   position adjusted   medication offered   quiet environment facilitated   relaxation techniques promoted   PRN medication effective for pain  Explained plan of care, verbalized understanding.  Provided PRN to promote bowel movement Educated pt .on new medicine .  Ambulated in room No injury during shift, Side rails up x 2, call light by bedside.

## 2024-07-29 NOTE — SUBJECTIVE & OBJECTIVE
Principal Problem:History of fusion of cervical spine    Principal Orthopedic Problem:  As above s/p revision C3-T1 PCF 07/26/2024    Interval History:  No acute events overnight.  AF, HDS.  Pain is well-controlled.  Tolerating PO.  Tolerating C-collar. Walked 100 feet with Pt on Saturday, plan to work with them again today. Drain output overnight was minimal, removed today. XR pending.     Plan for discharge pending XR results.     Review of patient's allergies indicates:   Allergen Reactions    Lisinopril Anaphylaxis and Nausea And Vomiting     General bad feeling    Lipitor [atorvastatin] Other (See Comments)     Muscle aches    Adhesive     Crestor [rosuvastatin] Swelling     Lip swelling.     Jardiance [empagliflozin] Other (See Comments)     Possible related to recent UTI's     Metformin      Used XR and experienced flatulance and abdominal pain.        Current Facility-Administered Medications   Medication    0.9%  NaCl infusion    acetaminophen tablet 1,000 mg    ALPRAZolam tablet 0.5 mg    aspirin EC tablet 81 mg    calcium carbonate 200 mg calcium (500 mg) chewable tablet 1,000 mg    celecoxib capsule 200 mg    dextrose 10% bolus 125 mL 125 mL    dextrose 10% bolus 250 mL 250 mL    ezetimibe tablet 10 mg    gabapentin capsule 600 mg    glucagon (human recombinant) injection 1 mg    glucose chewable tablet 16 g    glucose chewable tablet 24 g    insulin aspart U-100 pen 0-10 Units    insulin glargine U-100 (Lantus) pen 15 Units    losartan tablet 100 mg    methocarbamoL tablet 750 mg    metoprolol succinate (TOPROL-XL) 24 hr tablet 50 mg    morphine injection 2 mg    ondansetron tablet 4 mg    oxyCODONE immediate release tablet 5 mg    oxyCODONE immediate release tablet Tab 10 mg    polyethylene glycol packet 17 g    pravastatin tablet 80 mg    senna-docusate 8.6-50 mg per tablet 1 tablet    vitamin D 1000 units tablet 1,000 Units     Objective:     Vital Signs (Most Recent):  Temp: 98.2 °F (36.8 °C)  "(07/29/24 0800)  Pulse: 78 (07/29/24 0800)  Resp: 18 (07/29/24 0837)  BP: 138/74 (07/29/24 0800)  SpO2: (!) 94 % (07/29/24 0800) Vital Signs (24h Range):  Temp:  [98.1 °F (36.7 °C)-98.6 °F (37 °C)] 98.2 °F (36.8 °C)  Pulse:  [62-78] 78  Resp:  [15-20] 18  SpO2:  [92 %-95 %] 94 %  BP: (124-138)/(69-76) 138/74     Weight: 86.9 kg (191 lb 9.3 oz)  Height: 6' 4" (193 cm)  Body mass index is 23.32 kg/m².      Intake/Output Summary (Last 24 hours) at 7/29/2024 0848  Last data filed at 7/28/2024 2253  Gross per 24 hour   Intake 1440 ml   Output 1430 ml   Net 10 ml        Ortho/SPM Exam  NAD, resting comfortably in bed  A&O x3   Breathing comfortably w/o distress   Extremities WWP     Radial pulses palpated  Incisions clean, dry, and intact.    Drains removed this morning.   Able to make a composite fist.    Full strength in deltoid, bicep, tricep, wrist extensors, wrist flexors, intrinsics, extrinsics  Sensation intact to light touch throughout the bilateral upper and lower extremities.     Significant Labs: All pertinent labs within the past 24 hours have been reviewed.    Significant Imaging: I have reviewed all pertinent imaging results/findings.  "

## 2024-07-29 NOTE — PROGRESS NOTES
[facility] - [facility]  Orthopedics  Progress Note    Patient Name: Hi Braxton  MRN: [id number]  Admission Date: 7/26/2024  Hospital Length of Stay: 3 days  Attending Provider: Edd Burnette MD  Primary Care Provider: Josefina Kendall MD  Follow-up For: Procedure(s) (LRB):  FUSION, SPINE, CERVICAL, POSTERIOR APPROACH C3-T1 REVISION (N/A)    Post-Operative Day: 3 Days Post-Op  Subjective:     Principal Problem:History of fusion of cervical spine    Principal Orthopedic Problem:  As above s/p revision C3-T1 PCF 07/26/2024    Interval History:  No acute events overnight.  AF, HDS.  Pain is well-controlled.  Tolerating PO.  Tolerating C-collar. Walked 100 feet with Pt on Saturday, plan to work with them again today. Drain output overnight was minimal, removed today. XR pending.     Plan for discharge pending XR results.     Review of patient's allergies indicates:   Allergen Reactions    Lisinopril Anaphylaxis and Nausea And Vomiting     General bad feeling    Lipitor [atorvastatin] Other (See Comments)     Muscle aches    Adhesive     Crestor [rosuvastatin] Swelling     Lip swelling.     Jardiance [empagliflozin] Other (See Comments)     Possible related to recent UTI's     Metformin      Used XR and experienced flatulance and abdominal pain.        Current Facility-Administered Medications   Medication    0.9%  NaCl infusion    acetaminophen tablet 1,000 mg    ALPRAZolam tablet 0.5 mg    aspirin EC tablet 81 mg    calcium carbonate 200 mg calcium (500 mg) chewable tablet 1,000 mg    celecoxib capsule 200 mg    dextrose 10% bolus 125 mL 125 mL    dextrose 10% bolus 250 mL 250 mL    ezetimibe tablet 10 mg    gabapentin capsule 600 mg    glucagon (human recombinant) injection 1 mg    glucose chewable tablet 16 g    glucose chewable tablet 24 g    insulin aspart U-100 pen 0-10 Units    insulin glargine U-100 (Lantus) pen 15 Units    losartan tablet 100 mg    methocarbamoL tablet 750 mg    metoprolol succinate  "(TOPROL-XL) 24 hr tablet 50 mg    morphine injection 2 mg    ondansetron tablet 4 mg    oxyCODONE immediate release tablet 5 mg    oxyCODONE immediate release tablet Tab 10 mg    polyethylene glycol packet 17 g    pravastatin tablet 80 mg    senna-docusate 8.6-50 mg per tablet 1 tablet    vitamin D 1000 units tablet 1,000 Units     Objective:     Vital Signs (Most Recent):  Temp: 98.2 °F (36.8 °C) (07/29/24 0800)  Pulse: 78 (07/29/24 0800)  Resp: 18 (07/29/24 0837)  BP: 138/74 (07/29/24 0800)  SpO2: (!) 94 % (07/29/24 0800) Vital Signs (24h Range):  Temp:  [98.1 °F (36.7 °C)-98.6 °F (37 °C)] 98.2 °F (36.8 °C)  Pulse:  [62-78] 78  Resp:  [15-20] 18  SpO2:  [92 %-95 %] 94 %  BP: (124-138)/(69-76) 138/74     Weight: 86.9 kg (191 lb 9.3 oz)  Height: 6' 4" (193 cm)  Body mass index is 23.32 kg/m².      Intake/Output Summary (Last 24 hours) at 7/29/2024 0848  Last data filed at 7/28/2024 2253  Gross per 24 hour   Intake 1440 ml   Output 1430 ml   Net 10 ml        Ortho/SPM Exam  NAD, resting comfortably in bed  A&O x3   Breathing comfortably w/o distress   Extremities WWP     Radial pulses palpated  Incisions clean, dry, and intact.    Drains removed this morning.   Able to make a composite fist.    Full strength in deltoid, bicep, tricep, wrist extensors, wrist flexors, intrinsics, extrinsics  Sensation intact to light touch throughout the bilateral upper and lower extremities.     Significant Labs: All pertinent labs within the past 24 hours have been reviewed.    Significant Imaging: I have reviewed all pertinent imaging results/findings.  Assessment/Plan:     * 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 C3-T1 PCF  Hi Braxton is a 64 y.o. male is s/p revision C3-T1 PCF on 7/26    Surgical dressing C/D/I  Pain control: multimodal  PT/OT: WBAT BLE in C-collar at all times, spine precautions  DVT PPx: none, FCDs at all times when not ambulating  Drain: x2; removed 7/29  Fobres: Removed yesterday "   Nursing: Incentive spirometry, Monitor and record drain output each shift     » Dispo: plan to dc pending XR results      Output by Drain (mL) 07/27/24 0701 - 07/27/24 1900 07/27/24 1901 - 07/28/24 0700 07/28/24 0701 - 07/28/24 1900 07/28/24 1901 - 07/29/24 0700 07/29/24 0701 - 07/29/24 0855        Closed/Suction Drain 07/26/24 1631 Tube - 1 Posterior;Right Neck Accordion 10 Fr. 80 30  30         Closed/Suction Drain 07/26/24 1632 Tube - 2 Posterior;Left Neck Accordion 10 Fr. 0 30                  MIRELA WHITLEY MD  Orthopedics  [facility] - Surgery

## 2024-07-29 NOTE — PLAN OF CARE
Problem: Adult Inpatient Plan of Care  Goal: Plan of Care Review  Outcome: Met  Goal: Patient-Specific Goal (Individualized)  Outcome: Met  Goal: Absence of Hospital-Acquired Illness or Injury  Outcome: Met  Goal: Optimal Comfort and Wellbeing  Outcome: Met  Goal: Readiness for Transition of Care  Outcome: Met     Problem: Diabetes Comorbidity  Goal: Blood Glucose Level Within Targeted Range  Outcome: Met     Problem: Wound  Goal: Optimal Coping  Outcome: Met  Goal: Optimal Functional Ability  Outcome: Met  Goal: Absence of Infection Signs and Symptoms  Outcome: Met  Goal: Improved Oral Intake  Outcome: Met  Goal: Optimal Pain Control and Function  Outcome: Met  Goal: Skin Health and Integrity  Outcome: Met  Goal: Optimal Wound Healing  Outcome: Met     Problem: Infection  Goal: Absence of Infection Signs and Symptoms  Outcome: Met   He is discharged wearing C-collar to home with spouse.   All lda's are removed.  Dressings covering the previous drain sites are re dressed and patient is given extra supplies if needed for home.   Patients home meds are delivered to the bedside.   All discharge instructions have been reviewed and he voiced understanding.   He is discharge now to home in private car.   He is aaox4 and pleasant at discharge

## 2024-07-29 NOTE — PLAN OF CARE
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 No    Discharge Plan A: Home Health  Discharge Plan B: Home with [facility] - [place], [place] - [address]  [address] [address]  Phone: [phone] Fax: [phone]    [facility] #[id number] - [place], [place] - [address]  [address]  [place] [address]  Phone: [phone] Fax: [phone]    [facility]  [address]  [facility] [address]  Phone: [phone] Fax: [phone]      Initial Assessment (most recent)       Adult Discharge Assessment - 07/29/24 1110          Discharge Assessment    Assessment Type Discharge Planning Assessment     Confirmed/corrected address, phone number and insurance Yes     Confirmed Demographics Correct on Facesheet     Source of Information patient     Communicated ROHAN with patient/caregiver Yes     People in Home spouse     Do you expect to return to your current living situation? Yes     Do you have help at home or someone to help you manage your care at home? Yes     Who are your caregiver(s) and their phone number(s)? spouse - Jennifer     Prior to hospitilization cognitive status: Alert/Oriented     Current cognitive status: Alert/Oriented     Home Layout Bathroom on 2nd  floor;Bedroom on 2nd floor     Equipment Currently Used at Home glucometer     Do you currently have service(s) that help you manage your care at home? No     Do you take prescription medications? Yes     Do you have prescription coverage? Yes     Do you have any problems affording any of your prescribed medications? No     Is the patient taking medications as prescribed? yes     How do you get to doctors appointments? car, drives self;family or friend will provide     Are you on dialysis? No     Do you take coumadin? No     Discharge Plan A Home Health;Home with family     Discharge Plan B Home with family     DME Needed Upon Discharge  none     Discharge Plan discussed with: Patient     Transition of Care Barriers None                   Patient lives with spouse Jennifer in a two story home with 14 stairs to his bed/bath. Patient agreeable to  services with [facility]  being his first choice. Patient spouse is primary caregiver and will provide assistance in care and transportation home.

## 2024-07-29 NOTE — PT/OT/SLP PROGRESS
Physical Therapy  Treatment and Discharge    Hi Braxton   [id number]    Time Tracking:     PT Received On: 07/29/24   PT Start Time: 1409   PT Stop Time: 1421   PT Total Time (min): 12 min    Billable Minutes: Therapeutic Activity 12 minutes       Recommendations:     Therapy Intensity Recommendations at Discharge: No Therapy Indicated     Equipment Needed After Discharge: shower chair    Barriers to Discharge: None    Patient Information:     Recent Surgery: Procedure(s) (LRB):  FUSION, SPINE, CERVICAL, POSTERIOR APPROACH C3-T1 REVISION (N/A) 3 Days Post-Op    Diagnosis: History of fusion of cervical spine    Length of Stay: 3 days    General Precautions: Standard, fall  Orthopedic Precautions: spinal precautions  Brace: Cervical collar    Assessment:     Hi Braxton tolerated treatment well today. He was resting in bed this afternoon ready for D/C home upon my entry to room. Mr. Do was in great spirits, happy with his mobility progress. States he's been up in the chair today, walked the hallways independently multiple times since PT on Saturday (7/27/24). He has no concerns regarding stairs at home. We reviewed his mobility precautions for the time-being: wear C-collar at all times, avoid lifting > 5 lbs. Patient asking good questions re: timeline of return to exercise. I suggested simply focusing on level-ground ambulation and possibly stationary bike; hold off on any weight training, treadmill, elliptical work until cleared by his physician; verbalized understanding. Discussed discharge from acute PT services with patient as there are no further acute PT needs identified at this time; verbalized understanding. Will now discharge from acute PT services.    Problem List: pain, orthopedic precautions, decreased ROM, impaired functional mobility    Plan:     Discharge from acute PT services.    Plan of Care reviewed with: patient    Subjective:     Communicated with RN prior to treatment, appropriate to  "see for treatment.    Pt found supine in bed (HOB elevated) upon PT entry to room, agreeable to treatment.    Patient commenting: "I'm the bionic man, I have metal all over my neck now, front and back."    Does this patient have any cultural, spiritual, Caodaism conflicts given the current situation? Patient has no barriers to learning. Patient verbalizes understanding of his/her program and goals and demonstrates them correctly. No cultural, spiritual, or educational needs identified.    Objective:     Patient found with: cervical collar    Pain:  Pain Rating 1: 3/10  Location - Side 1: Left  Location - Orientation 1: generalized  Location 1: neck  Pain Addressed 1: Reposition, Distraction  Pain Rating Post-Intervention 1: 3/10    Functional Mobility:    None performed today as patient was being prepped for discharge to home and reports independence with his functional mobility post-op    Additional Therapeutic Activity/Exercises:     1. He was resting in bed this afternoon ready for D/C home upon my entry to room. Mr. Do was in great spirits, happy with his mobility progress. States he's been up in the chair today, walked the hallways independently multiple times since PT on Saturday (7/27/24). He has no concerns regarding stairs at home.    2. We reviewed his mobility precautions for the time-being: wear C-collar at all times, avoid lifting > 5 lbs. Patient asking good questions re: timeline of return to exercise. I suggested simply focusing on level-ground ambulation and possibly stationary bike; hold off on any weight training, treadmill, elliptical work until cleared by his physician; verbalized understanding.    3. Discussed discharge from acute PT services with patient as there are no further acute PT needs identified at this time; verbalized understanding.     AM-PAC 6 CLICK MOBILITY  Turning over in bed (including adjusting bedclothes, sheets and blankets)?: 4  Sitting down on and standing up from a " chair with arms (e.g., wheelchair, bedside commode, etc.): 4  Moving from lying on back to sitting on the side of the bed?: 4  Moving to and from a bed to a chair (including a wheelchair)?: 4  Need to walk in hospital room?: 4  Climbing 3-5 steps with a railing?: 3  Basic Mobility Total Score: 23    Patient was left supine in bed (HOB elevated) with all lines intact and call button in reach.    GOALS:   Multidisciplinary Problems       Physical Therapy Goals          Problem: Physical Therapy    Goal Priority Disciplines Outcome Goal Variances Interventions   Physical Therapy Goal     PT, PT/OT Progressing     Description: Discharged from acute PT on 7/29:    1. Pt sit to stand with no AD with independent-not met  2. Pt to perform gait 300ft with no AD with independent.-not met  3. Pt to up/down 10 steps with L UE rail with independent.-not met                     Blas Adams, PT  7/29/2024

## 2024-07-29 NOTE — DISCHARGE SUMMARY
[facility] - [facility]  Orthopedics  Discharge Summary      Patient Name: Hi Braxton  MRN: [id number]  Admission Date: 7/26/2024  Hospital Length of Stay: 3 days  Discharge Date and Time:  07/29/2024 10:36 AM  Attending Physician: Edd Burnette MD   Discharging Provider: MIRELA WHITLEY MD  Primary Care Provider: Josefina Kendall MD    HPI:  Hi Braxton is seen today for follow-up following the above listed procedure. Overall the patient is doing well but today notes he was doing well until 7/8/24 when he had a ground level fall at home. He reports he worked in his house all day Sunday without air conditioning in 95F temperature and became dehydrated which resulted in his fall. He hit his right eyebrow and had small bleed that resolved. Pt presented to the ED for evaluation and was told his hardware might be loose but was eventually discharged home. He reports L>R sided neck pain with some numbness down his left arm. Pt says he was trending in the right direction recovery wise until the fall. he denies fever, chills, and sweats since the time of the surgery.     Procedure(s) (LRB):  FUSION, SPINE, CERVICAL, POSTERIOR APPROACH C3-T1 REVISION (N/A)      Hospital Course: Patient presented for above procedure.  Tolerated it well and was discharged home POD3 after voiding, tolerating diet, ambulating, pain controlled. Discharge instructions, follow-up appointment, and med rec are below.      Consults (From admission, onward)          Status Ordering Provider     Inpatient consult to Social Work  Once        Provider:  (Not yet assigned)    Acknowledged MIRELA WHITLEY            Significant Diagnostic Studies: No pertinent studies.    Pending Diagnostic Studies:       Procedure Component Value Units Date/Time    APTT [[id number]] Collected: 07/26/24 1022    Order Status: Sent Lab Status: In process Updated: 07/26/24 1143    Specimen: Blood     CBC auto differential [[id number]] Collected: 07/26/24 1022     "Order Status: Sent Lab Status: In process Updated: 07/26/24 1143    Specimen: Blood     Protime-INR [[id number]] Collected: 07/26/24 1022    Order Status: Sent Lab Status: In process Updated: 07/26/24 1143    Specimen: Blood     X-Ray Cervical Spine 2 or 3 Views [[id number]]     Order Status: Sent Lab Status: No result           Final Active Diagnoses:    Diagnosis Date Noted POA    PRINCIPAL PROBLEM:  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    Patient Instructions:      BATH/SHOWER CHAIR FOR HOME USE     Order Specific Question Answer Comments   Height: 6' 4" (1.93 m)    Weight: 86.9 kg (191 lb 9.3 oz)    Does patient have medical equipment at home? none    Length of need (1-99 months): 6    Type: With back      Diet general     Call MD for:  temperature >100.4     Call MD for:  persistent nausea and vomiting     Call MD for:  severe uncontrolled pain     Call MD for:  difficulty breathing, headache or visual disturbances     Call MD for:  redness, tenderness, or signs of infection (pain, swelling, redness, odor or green/yellow discharge around incision site)     Call MD for:  hives     Call MD for:  persistent dizziness or light-headedness     Call MD for:  extreme fatigue     Leave dressing on - Keep it clean, dry, and intact until clinic visit     Medications:  Reconciled Home Medications:      Medication List        START taking these medications      docusate sodium 100 MG capsule  Commonly known as: COLACE  Take 1 capsule (100 mg total) by mouth 2 (two) times daily as needed for Constipation.     gabapentin 300 MG capsule  Commonly known as: NEURONTIN  Take 2 capsules (600 mg total) by mouth 3 (three) times daily.  Replaces: gabapentin 600 MG tablet            CHANGE how you take these medications  " "    acetaminophen 500 MG tablet  Commonly known as: TYLENOL  Take 2 tablets (1,000 mg total) by mouth every 8 (eight) hours.  What changed:   how much to take  when to take this  Another medication with the same name was removed. Continue taking this medication, and follow the directions you see here.     celecoxib 200 MG capsule  Commonly known as: CeleBREX  Take 1 capsule (200 mg total) by mouth once daily.  What changed:   medication strength  how much to take  when to take this  Another medication with the same name was removed. Continue taking this medication, and follow the directions you see here.     methocarbamoL 500 MG Tab  Commonly known as: ROBAXIN  Take 1 tablet (500 mg total) by mouth 3 (three) times daily. for 10 days  What changed:   how much to take  Another medication with the same name was removed. Continue taking this medication, and follow the directions you see here.     oxyCODONE 5 MG immediate release tablet  Commonly known as: ROXICODONE  Take 1 tablet (5 mg total) by mouth every 4 (four) hours as needed for Pain.  What changed:   when to take this  reasons to take this            CONTINUE taking these medications      ALPRAZolam 0.5 MG tablet  Commonly known as: XANAX  TAKE 1 TABLET THREE TIMES DAILY AS NEEDED FOR ANXIETY     BD ULTRA-FINE CHET PEN NEEDLE 32 gauge x 5/32" Ndle  Generic drug: pen needle, diabetic  USE PEN NEEDLE AS INSTRUCTION WITH LANTUS INSULIN PRE-FILLED PEN.     CONTOUR NEXT TEST STRIPS Strp  Generic drug: blood sugar diagnostic  USE TO TEST BLOOD SUGAR ONCE DAILY     DULCOLAX (BISACODYL) ORAL  Take 1 tablet by mouth once daily.     EXCEDRIN MIGRAINE ORAL  Take 1 tablet by mouth daily as needed (Pain).     LANTUS SOLOSTAR U-100 INSULIN 100 unit/mL (3 mL) Inpn pen  Generic drug: insulin glargine U-100 (Lantus)  Inject 25 Units into the skin every evening.     losartan 100 MG tablet  Commonly known as: COZAAR  TAKE 1 TABLET ONE TIME DAILY     MEN'S MULTIVITAMIN ORAL  Take " 1 tablet by mouth once daily.     methylPREDNISolone 4 mg tablet  Commonly known as: MEDROL DOSEPACK  use as directed     metoprolol succinate 50 MG 24 hr tablet  Commonly known as: TOPROL-XL  TAKE 1 AND 1/2 TABLETS (75 MG TOTAL) ONCE DAILY.     MICROLET LANCET Misc  Generic drug: lancets  1 lancet by Misc.(Non-Drug; Combo Route) route once daily. Test blood glucose once daily as instructed.     NIFEdipine 60 MG (OSM) 24 hr tablet  Commonly known as: PROCARDIA-XL  Take 1 tablet (60 mg total) by mouth 2 (two) times a day.     nitroGLYCERIN 0.3 MG SL tablet  Commonly known as: NITROSTAT  Place 1 tablet (0.3 mg total) under the tongue every 5 (five) minutes as needed for Chest pain.     ondansetron 4 MG tablet  Commonly known as: ZOFRAN  Take 1 tablet (4 mg total) by mouth every 6 (six) hours as needed for Nausea.     pantoprazole 40 MG tablet  Commonly known as: PROTONIX  TAKE 1 TABLET TWICE DAILY     pravastatin 80 MG tablet  Commonly known as: PRAVACHOL  TAKE 1 TABLET ONE TIME DAILY     spironolactone 50 MG tablet  Commonly known as: ALDACTONE  TAKE 1 TABLET ONE TIME DAILY     tadalafiL 20 MG Tab  Commonly known as: CIALIS  Take 1 tablet (20 mg total) by mouth every 72 hours as needed (ED).            STOP taking these medications      aspirin 81 MG EC tablet  Commonly known as: ECOTRIN     ezetimibe 10 mg tablet  Commonly known as: ZETIA     gabapentin 600 MG tablet  Commonly known as: NEURONTIN  Replaced by: gabapentin 300 MG capsule              MIRELA WHITLEY MD  Orthopedics  [facility] - Surgery

## 2024-07-30 LAB — FUNGUS SPEC CULT: NORMAL

## 2024-07-30 NOTE — PLAN OF CARE
[facility] - Surgery  Discharge Final Note    Primary Care Provider: Josefina Kendall MD    Expected Discharge Date: 7/29/2024    Final Discharge Note (most recent)       Final Note - 07/29/24 1659          Final Note    Assessment Type Final Discharge Note     Anticipated Discharge Disposition [facility] Resources/Appts/Education Provided Provided patient/caregiver with written discharge plan information;Provided education on problems/symptoms using teachback                   Future Appointments   Date Time Provider Department Center   8/23/2024  9:30 AM [facility]   8/23/2024 10:30 AM CHANEL Garza, NP [facility] [address]   9/13/2024  9:30 AM Elva Means MD [facility]

## 2024-07-31 ENCOUNTER — PATIENT OUTREACH (OUTPATIENT)
Dept: ADMINISTRATIVE | Facility: CLINIC | Age: 64
End: 2024-07-31
Payer: MEDICARE

## 2024-07-31 PROCEDURE — G0180 MD CERTIFICATION HHA PATIENT: HCPCS | Mod: ,,, | Performed by: ORTHOPAEDIC SURGERY

## 2024-07-31 NOTE — PROGRESS NOTES
C3 nurse spoke with Hi Braxton for a TCC post hospital discharge follow up call. The patient does not have a scheduled HOSFU appointment with Josefina Kendall MD within 5-7 days post hospital discharge date 07/29/2024. C3 nurse was unable to schedule HOSFU appointment in [facility].    Message sent to PCP staff requesting they contact patient and schedule follow up appointment.    The patient states he would prefer to see PCP instead of NP care at home appointment.

## 2024-08-01 ENCOUNTER — TELEPHONE (OUTPATIENT)
Dept: INTERNAL MEDICINE | Facility: CLINIC | Age: 64
End: 2024-08-01
Payer: MEDICARE

## 2024-08-02 LAB — BACTERIA SPEC ANAEROBE CULT: NORMAL

## 2024-08-02 RX ORDER — NAPROXEN 500 MG/1
500 TABLET ORAL 2 TIMES DAILY WITH MEALS
Qty: 180 TABLET | Refills: 1 | Status: SHIPPED | OUTPATIENT
Start: 2024-08-02

## 2024-08-05 ENCOUNTER — HOSPITAL ENCOUNTER (OUTPATIENT)
Dept: RADIOLOGY | Facility: HOSPITAL | Age: 64
Discharge: HOME | End: 2024-08-05
Attending: ORTHOPAEDIC SURGERY
Payer: MEDICARE

## 2024-08-05 ENCOUNTER — PATIENT MESSAGE (OUTPATIENT)
Dept: ORTHOPEDICS | Facility: CLINIC | Age: 64
End: 2024-08-05

## 2024-08-05 ENCOUNTER — OFFICE VISIT (OUTPATIENT)
Dept: ORTHOPEDICS | Facility: CLINIC | Age: 64
End: 2024-08-05
Payer: MEDICARE

## 2024-08-05 VITALS — WEIGHT: 191.56 LBS | BODY MASS INDEX: 23.33 KG/M2 | HEIGHT: 76 IN

## 2024-08-05 DIAGNOSIS — M25.552 LEFT HIP PAIN: Primary | ICD-10-CM

## 2024-08-05 DIAGNOSIS — M25.552 LEFT HIP PAIN: ICD-10-CM

## 2024-08-05 PROCEDURE — 99024 POSTOP FOLLOW-UP VISIT: CPT | Mod: HCNC,S$GLB,, | Performed by: ORTHOPAEDIC SURGERY

## 2024-08-05 PROCEDURE — 1159F MED LIST DOCD IN RCRD: CPT | Mod: HCNC,CPTII,S$GLB, | Performed by: ORTHOPAEDIC SURGERY

## 2024-08-05 PROCEDURE — 3061F NEG MICROALBUMINURIA REV: CPT | Mod: HCNC,CPTII,S$GLB, | Performed by: ORTHOPAEDIC SURGERY

## 2024-08-05 PROCEDURE — 73502 X-RAY EXAM HIP UNI 2-3 VIEWS: CPT | Mod: TC,HCNC,LT

## 2024-08-05 PROCEDURE — 3066F NEPHROPATHY DOC TX: CPT | Mod: HCNC,CPTII,S$GLB, | Performed by: ORTHOPAEDIC SURGERY

## 2024-08-05 PROCEDURE — 1125F AMNT PAIN NOTED PAIN PRSNT: CPT | Mod: HCNC,CPTII,S$GLB, | Performed by: ORTHOPAEDIC SURGERY

## 2024-08-05 PROCEDURE — 73502 X-RAY EXAM HIP UNI 2-3 VIEWS: CPT | Mod: 26,HCNC,LT, | Performed by: RADIOLOGY

## 2024-08-05 PROCEDURE — 3044F HG A1C LEVEL LT 7.0%: CPT | Mod: HCNC,CPTII,S$GLB, | Performed by: ORTHOPAEDIC SURGERY

## 2024-08-05 PROCEDURE — 99999 PR PBB SHADOW E&M-EST. PATIENT-LVL IV: CPT | Mod: PBBFAC,HCNC,, | Performed by: ORTHOPAEDIC SURGERY

## 2024-08-05 PROCEDURE — 4010F ACE/ARB THERAPY RXD/TAKEN: CPT | Mod: HCNC,CPTII,S$GLB, | Performed by: ORTHOPAEDIC SURGERY

## 2024-08-19 NOTE — PROGRESS NOTES
Date: 08/19/2024    Supervising Physician: Edd Burnette M.D.    Date of Surgery: 7/26/24     Procedure: C3-T1 PCDF revision    History: Hi Braxton is seen today for follow-up following the above listed procedure. Overall the patient is doing well but today notes he is feeling much better than before surgery.   Pain is well controlled with current pain medication.  he denies fever, chills, and sweats since the time of the surgery.       Exam: Post op dressing taken down.  Incision is healing well, clean, dry and intact.   There is no sign of infection. Neuro exam is stable. No signs of DVT.    Radiographs: hardware in place no failure    Assessment/Plan: 4 weeks post op.    Doing well postoperatively.  reviewed.    I will plan to see the patient back for the next postop visit in 2 months    Thank you for the opportunity to participate in this patient's care. Please give me a call if there are any concerns or questions.

## 2024-08-22 ENCOUNTER — OFFICE VISIT (OUTPATIENT)
Dept: OPTOMETRY | Facility: CLINIC | Age: 64
End: 2024-08-22
Payer: MEDICARE

## 2024-08-22 DIAGNOSIS — H02.054 TRICHIASIS OF LEFT UPPER EYELID: Primary | ICD-10-CM

## 2024-08-22 PROCEDURE — 3044F HG A1C LEVEL LT 7.0%: CPT | Mod: HCNC,CPTII,S$GLB, | Performed by: OPTOMETRIST

## 2024-08-22 PROCEDURE — 1126F AMNT PAIN NOTED NONE PRSNT: CPT | Mod: HCNC,CPTII,S$GLB, | Performed by: OPTOMETRIST

## 2024-08-22 PROCEDURE — 99999 PR PBB SHADOW E&M-EST. PATIENT-LVL III: CPT | Mod: PBBFAC,HCNC,, | Performed by: OPTOMETRIST

## 2024-08-22 PROCEDURE — 1160F RVW MEDS BY RX/DR IN RCRD: CPT | Mod: HCNC,CPTII,S$GLB, | Performed by: OPTOMETRIST

## 2024-08-22 PROCEDURE — 3061F NEG MICROALBUMINURIA REV: CPT | Mod: HCNC,CPTII,S$GLB, | Performed by: OPTOMETRIST

## 2024-08-22 PROCEDURE — 2023F DILAT RTA XM W/O RTNOPTHY: CPT | Mod: HCNC,CPTII,S$GLB, | Performed by: OPTOMETRIST

## 2024-08-22 PROCEDURE — 4010F ACE/ARB THERAPY RXD/TAKEN: CPT | Mod: HCNC,CPTII,S$GLB, | Performed by: OPTOMETRIST

## 2024-08-22 PROCEDURE — 3066F NEPHROPATHY DOC TX: CPT | Mod: HCNC,CPTII,S$GLB, | Performed by: OPTOMETRIST

## 2024-08-22 PROCEDURE — 3288F FALL RISK ASSESSMENT DOCD: CPT | Mod: HCNC,CPTII,S$GLB, | Performed by: OPTOMETRIST

## 2024-08-22 PROCEDURE — 1159F MED LIST DOCD IN RCRD: CPT | Mod: HCNC,CPTII,S$GLB, | Performed by: OPTOMETRIST

## 2024-08-22 PROCEDURE — 1111F DSCHRG MED/CURRENT MED MERGE: CPT | Mod: HCNC,CPTII,S$GLB, | Performed by: OPTOMETRIST

## 2024-08-22 PROCEDURE — 99213 OFFICE O/P EST LOW 20 MIN: CPT | Mod: HCNC,S$GLB,, | Performed by: OPTOMETRIST

## 2024-08-22 PROCEDURE — 1101F PT FALLS ASSESS-DOCD LE1/YR: CPT | Mod: HCNC,CPTII,S$GLB, | Performed by: OPTOMETRIST

## 2024-08-22 NOTE — PROGRESS NOTES
HPI    65 Y/o male is here for [facility] pt say's he has something in OS that's   irritating him say's he feels a fb sensation all the time under eyelid in   corner part of eye. Pt also say's he cannot get Cataract Sx at this time   and would like to discuss Rx correction. Poss pterygium.   Pt denies pain and discomfort   No f/f    Eye med: Visine OU PRN   Last edited by Blade Raya MA on 8/22/2024  7:40 AM.            Assessment /Plan     For exam results, see Encounter Report.    Trichiasis of left upper eyelid      See notes from 2 mos ago:  1. Cat OD>OS--pt having glare problems, and difficulty on computer.  Wishes surgery  2. Pterygium OS  3. DM- WITHOUT RETINOPATHY.  Advised yearly DFE    Pt presents TODAY for:  Trichiasis  DAYRON--Epilated lashes with forceps after instillation of Fluress.  Pt tolerated procedure well.  Advised SYSTANE COMPLETE ATs QID  Pt in neck brace from 2nd neck surgery, and can't get cat surgery now--wishes spex Rx to tide him over.  Wrote Rx, but discussed even w spex VA will NOT be perfect due to cats!    PLAN:    Rtc as sched for cat eval

## 2024-08-23 ENCOUNTER — OFFICE VISIT (OUTPATIENT)
Dept: ORTHOPEDICS | Facility: CLINIC | Age: 64
End: 2024-08-23
Payer: MEDICARE

## 2024-08-23 ENCOUNTER — HOSPITAL ENCOUNTER (OUTPATIENT)
Dept: RADIOLOGY | Facility: HOSPITAL | Age: 64
Discharge: HOME | End: 2024-08-23
Attending: ORTHOPAEDIC SURGERY
Payer: MEDICARE

## 2024-08-23 VITALS — HEIGHT: 76 IN | WEIGHT: 191.56 LBS | BODY MASS INDEX: 23.33 KG/M2

## 2024-08-23 DIAGNOSIS — Z98.1 S/P CERVICAL SPINAL FUSION: Primary | ICD-10-CM

## 2024-08-23 DIAGNOSIS — Z98.1 S/P CERVICAL SPINAL FUSION: ICD-10-CM

## 2024-08-23 PROCEDURE — 72040 X-RAY EXAM NECK SPINE 2-3 VW: CPT | Mod: TC,HCNC

## 2024-08-23 PROCEDURE — 99999 PR PBB SHADOW E&M-EST. PATIENT-LVL IV: CPT | Mod: PBBFAC,HCNC,, | Performed by: ORTHOPAEDIC SURGERY

## 2024-08-23 PROCEDURE — 72040 X-RAY EXAM NECK SPINE 2-3 VW: CPT | Mod: 26,HCNC,, | Performed by: RADIOLOGY

## 2024-08-26 ENCOUNTER — PATIENT MESSAGE (OUTPATIENT)
Dept: ORTHOPEDICS | Facility: CLINIC | Age: 64
End: 2024-08-26
Payer: MEDICARE

## 2024-08-29 ENCOUNTER — PATIENT MESSAGE (OUTPATIENT)
Dept: OTHER | Facility: OTHER | Age: 64
End: 2024-08-29
Payer: MEDICARE

## 2024-09-03 ENCOUNTER — PATIENT MESSAGE (OUTPATIENT)
Dept: ORTHOPEDICS | Facility: CLINIC | Age: 64
End: 2024-09-03
Payer: MEDICARE

## 2024-09-05 ENCOUNTER — PATIENT MESSAGE (OUTPATIENT)
Dept: ORTHOPEDICS | Facility: CLINIC | Age: 64
End: 2024-09-05
Payer: MEDICARE

## 2024-09-09 ENCOUNTER — PATIENT MESSAGE (OUTPATIENT)
Dept: ORTHOPEDICS | Facility: CLINIC | Age: 64
End: 2024-09-09
Payer: MEDICARE

## 2024-09-12 ENCOUNTER — PATIENT MESSAGE (OUTPATIENT)
Dept: ORTHOPEDICS | Facility: CLINIC | Age: 64
End: 2024-09-12
Payer: MEDICARE

## 2024-09-13 ENCOUNTER — OFFICE VISIT (OUTPATIENT)
Dept: OPHTHALMOLOGY | Facility: CLINIC | Age: 64
End: 2024-09-13
Payer: COMMERCIAL

## 2024-09-13 ENCOUNTER — TELEPHONE (OUTPATIENT)
Dept: OPHTHALMOLOGY | Facility: CLINIC | Age: 64
End: 2024-09-13

## 2024-09-13 DIAGNOSIS — H25.11 NUCLEAR SCLEROTIC CATARACT OF RIGHT EYE: Primary | ICD-10-CM

## 2024-09-13 DIAGNOSIS — H25.13 NUCLEAR SCLEROSIS, BILATERAL: Primary | ICD-10-CM

## 2024-09-13 PROCEDURE — 99999 PR PBB SHADOW E&M-EST. PATIENT-LVL III: CPT | Mod: PBBFAC,,, | Performed by: OPHTHALMOLOGY

## 2024-09-13 RX ORDER — PREDNISOLONE ACETATE-GATIFLOXACIN-BROMFENAC .75; 5; 1 MG/ML; MG/ML; MG/ML
1 SUSPENSION/ DROPS OPHTHALMIC 3 TIMES DAILY
Qty: 5 ML | Refills: 3 | Status: SHIPPED | OUTPATIENT
Start: 2024-09-13

## 2024-09-13 RX ORDER — SODIUM CHLORIDE 0.9 % (FLUSH) 0.9 %
10 SYRINGE (ML) INJECTION
OUTPATIENT
Start: 2024-09-13

## 2024-09-13 RX ORDER — PHENYLEPHRINE HYDROCHLORIDE 25 MG/ML
1 SOLUTION/ DROPS OPHTHALMIC
OUTPATIENT
Start: 2024-09-13

## 2024-09-13 RX ORDER — TETRACAINE HYDROCHLORIDE 5 MG/ML
1 SOLUTION OPHTHALMIC
OUTPATIENT
Start: 2024-09-13

## 2024-09-13 RX ORDER — MOXIFLOXACIN 5 MG/ML
1 SOLUTION/ DROPS OPHTHALMIC
OUTPATIENT
Start: 2024-09-13

## 2024-09-13 RX ORDER — TROPICAMIDE 10 MG/ML
1 SOLUTION/ DROPS OPHTHALMIC
OUTPATIENT
Start: 2024-09-13

## 2024-09-13 RX ORDER — PHENYLEPHRINE HYDROCHLORIDE 100 MG/ML
1 SOLUTION/ DROPS OPHTHALMIC
OUTPATIENT
Start: 2024-09-13

## 2024-09-13 NOTE — PROGRESS NOTES
HPI    Patient present today for Cataract Evaluation   Pt blurry VA OU. Glare OU. Floaters OU.   Pt also say's he cannot get Cataract Sx at this time due to insurance,   because he just had 2 sx back to back on his neck.    Pt denies pain and discomfort.     Last edited by Eliezer Morales on 9/13/2024  9:52 AM.            Assessment /Plan     For exam results, see Encounter Report.    Nuclear sclerosis, bilateral      Visually Significant Cataract: Patient reports decreased vision consistent with the clinical amount of lenticular opacity, which reaches the level of visual significance and affects activities of daily living.     Specifically, this patient describes difficulty with:  - driving safely at night  - reading road signs  - reading small print  - deciphering medicine bottles  - reading the newspaper  - using the phone  - reading texts     Risks, benefits, and alternatives to cataract surgery were discussed and the consent reviewed. IOL options were discussed, including ATIOLs and the associated side effects and additional patient cost associated with them.   IOL Selections:   Right eye  IOL: CNA0T0 21.5     Left eye  IOL: CNA0T0 22.5    Pt wishes to have RIGHT eye done first.  After discussion of refractive cataract surgery options, patient has chosen traditional manual surgery and is satisfied with wearing bifocal glasses if necessary after surgery.

## 2024-09-23 DIAGNOSIS — E11.59 HYPERTENSION ASSOCIATED WITH DIABETES: ICD-10-CM

## 2024-09-23 DIAGNOSIS — I49.3 SYMPTOMATIC PVCS: ICD-10-CM

## 2024-09-23 DIAGNOSIS — I25.84 CORONARY ARTERY DISEASE DUE TO CALCIFIED CORONARY LESION: ICD-10-CM

## 2024-09-23 DIAGNOSIS — I10 ESSENTIAL HYPERTENSION: ICD-10-CM

## 2024-09-23 DIAGNOSIS — E78.2 MIXED HYPERLIPIDEMIA: ICD-10-CM

## 2024-09-23 DIAGNOSIS — I25.10 CORONARY ARTERY DISEASE DUE TO CALCIFIED CORONARY LESION: ICD-10-CM

## 2024-09-23 DIAGNOSIS — E87.6 HYPOKALEMIA: ICD-10-CM

## 2024-09-23 DIAGNOSIS — I15.2 HYPERTENSION ASSOCIATED WITH DIABETES: ICD-10-CM

## 2024-09-23 RX ORDER — PANTOPRAZOLE SODIUM 40 MG/1
40 TABLET, DELAYED RELEASE ORAL 2 TIMES DAILY
Qty: 180 TABLET | Refills: 3 | Status: SHIPPED | OUTPATIENT
Start: 2024-09-23

## 2024-09-23 RX ORDER — METOPROLOL SUCCINATE 50 MG/1
TABLET, EXTENDED RELEASE ORAL
Qty: 135 TABLET | Refills: 3 | Status: SHIPPED | OUTPATIENT
Start: 2024-09-23

## 2024-09-23 RX ORDER — NIFEDIPINE 60 MG/1
60 TABLET, EXTENDED RELEASE ORAL 2 TIMES DAILY
Qty: 180 TABLET | Refills: 3 | Status: SHIPPED | OUTPATIENT
Start: 2024-09-23

## 2024-09-23 RX ORDER — SPIRONOLACTONE 50 MG/1
50 TABLET, FILM COATED ORAL
Qty: 90 TABLET | Refills: 3 | Status: SHIPPED | OUTPATIENT
Start: 2024-09-23

## 2024-09-23 RX ORDER — PRAVASTATIN SODIUM 80 MG/1
80 TABLET ORAL
Qty: 90 TABLET | Refills: 0 | Status: SHIPPED | OUTPATIENT
Start: 2024-09-23

## 2024-09-23 NOTE — TELEPHONE ENCOUNTER
Provider Staff:  Action required. This patient has received emergency care.     Please schedule patient for a follow up appointment.     Thanks!  [facility]     Appointments      Date Provider   Last Visit   7/18/2024 Josefina Kendall MD   Next Visit   Visit date not found Josefina Kendall MD

## 2024-09-23 NOTE — TELEPHONE ENCOUNTER
Care Due:                  Date            Visit Type   Department     Provider  --------------------------------------------------------------------------------                                             [facility]  Last Visit: 07-      PRE-OP       MEDICINE       Josefina Kendall  Next Visit: None Scheduled  None         None Found                                                            Last  Test          Frequency    Reason                     Performed    Due Date  --------------------------------------------------------------------------------    Lipid Panel.  12 months..  pravastatin..............  11- 11-    [facility] Embedded Care Due Messages. Reference number: [id number].   9/23/2024 1:31:12 AM CDT

## 2024-09-23 NOTE — TELEPHONE ENCOUNTER
Refill Routing Note   Medication(s) are not appropriate for processing by [facility] for the following reason(s):        Outside of protocol-PPI dosing outside of [facility] protocols    [facility] action(s):  Route  Approve   Requires appointment : Yes     Requires labs : Yes             Appointments  past 12m or future 3m with PCP    Date Provider   Last Visit   7/18/2024 Josefina Kendall MD   Next Visit   Visit date not found Josefina Kendall MD   ED visits in past 90 days: 0        Note composed:1:19 PM 09/23/2024

## 2024-09-24 ENCOUNTER — EXTERNAL HOME HEALTH (OUTPATIENT)
Dept: HOME HEALTH SERVICES | Facility: HOSPITAL | Age: 64
End: 2024-09-24
Payer: MEDICARE

## 2024-09-26 ENCOUNTER — TELEPHONE (OUTPATIENT)
Dept: OPHTHALMOLOGY | Facility: CLINIC | Age: 64
End: 2024-09-26
Payer: MEDICARE

## 2024-09-26 DIAGNOSIS — H25.12 NUCLEAR SCLEROSIS OF LEFT EYE: Primary | ICD-10-CM

## 2024-09-27 ENCOUNTER — PATIENT MESSAGE (OUTPATIENT)
Dept: INTERNAL MEDICINE | Facility: CLINIC | Age: 64
End: 2024-09-27
Payer: MEDICARE

## 2024-09-27 NOTE — TELEPHONE ENCOUNTER
Pt would like to know if he can get a note to state that he can take his dog with him because he has noticed that the dog calms him down when anxious or when BP is elevated    LOV 7/18/2024

## 2024-10-01 RX ORDER — ALPRAZOLAM 0.5 MG/1
0.5 TABLET ORAL 3 TIMES DAILY PRN
Qty: 90 TABLET | Refills: 2 | Status: SHIPPED | OUTPATIENT
Start: 2024-10-01

## 2024-10-01 NOTE — TELEPHONE ENCOUNTER
No care due was identified.  [facility] Embedded Care Due Messages. Reference number: [id number].   10/01/2024 2:19:54 PM CDT

## 2024-10-03 ENCOUNTER — TELEPHONE (OUTPATIENT)
Dept: OPHTHALMOLOGY | Facility: CLINIC | Age: 64
End: 2024-10-03
Payer: MEDICARE

## 2024-10-17 ENCOUNTER — PATIENT MESSAGE (OUTPATIENT)
Dept: INTERNAL MEDICINE | Facility: CLINIC | Age: 64
End: 2024-10-17
Payer: MEDICARE

## 2024-10-22 NOTE — PROGRESS NOTES
Date: 10/22/2024    Supervising Physician: Edd Burnette M.D.    Date of Surgery: 7/26/24     Procedure: C3-T1 PCDF revision    History: Hi Braxton is seen today for follow-up following the above listed procedure. Overall the patient is doing well but today notes he is doing ok with episodes of neck pain.   Pain is well controlled with current pain medication.  he denies fever, chills, and sweats since the time of the surgery.       Exam: Post op dressing taken down.  Incision is healing well, clean, dry and intact.   There is no sign of infection. Neuro exam is stable. No signs of DVT.    Radiographs: hardware in place no failure    Assessment/Plan: 3 months post op.    Doing well postoperatively.  reviewed.    I will plan to see the patient back for the next postop visit in 9 months.     Thank you for the opportunity to participate in this patient's care. Please give me a call if there are any concerns or questions.

## 2024-10-23 DIAGNOSIS — I10 ESSENTIAL HYPERTENSION: ICD-10-CM

## 2024-10-23 RX ORDER — LOSARTAN POTASSIUM 100 MG/1
100 TABLET ORAL
Qty: 90 TABLET | Refills: 2 | Status: SHIPPED | OUTPATIENT
Start: 2024-10-23

## 2024-10-23 NOTE — TELEPHONE ENCOUNTER
Care Due:                  Date            Visit Type   Department     Provider  --------------------------------------------------------------------------------                                             [facility]  Last Visit: 07-      PRE-OP       MEDICINE       Josefina Kendall  Next Visit: None Scheduled  None         None Found                                                            Last  Test          Frequency    Reason                     Performed    Due Date  --------------------------------------------------------------------------------    HBA1C.......  6 months...  insulin..................  07- 01-    [facility] Embedded Care Due Messages. Reference number: [id number].   10/23/2024 1:43:14 AM CDT

## 2024-10-23 NOTE — TELEPHONE ENCOUNTER
Provider Staff:  Action required for this patient    Requires labs      Please see care gap opportunities below in Care Due Message.    Thanks!  [facility]     Appointments      Date Provider   Last Visit   7/18/2024 Josefina Kendall MD   Next Visit   Visit date not found Josefina Kendall MD     Refill Decision Note   Hi Braxton  is requesting a refill authorization.  Brief Assessment and Rationale for Refill:  Approve     Medication Therapy Plan:  ED documentation reviewed. No changes to therapy note      Extended chart review required: Yes   Comments:     Note composed:10:56 AM 10/23/2024

## 2024-10-24 ENCOUNTER — TELEPHONE (OUTPATIENT)
Dept: OPHTHALMOLOGY | Facility: CLINIC | Age: 64
End: 2024-10-24
Payer: MEDICARE

## 2024-10-24 ENCOUNTER — PATIENT MESSAGE (OUTPATIENT)
Dept: INTERNAL MEDICINE | Facility: CLINIC | Age: 64
End: 2024-10-24
Payer: MEDICARE

## 2024-10-24 ENCOUNTER — OFFICE VISIT (OUTPATIENT)
Dept: ORTHOPEDICS | Facility: CLINIC | Age: 64
End: 2024-10-24
Payer: MEDICARE

## 2024-10-24 ENCOUNTER — PATIENT MESSAGE (OUTPATIENT)
Dept: ORTHOPEDICS | Facility: CLINIC | Age: 64
End: 2024-10-24

## 2024-10-24 ENCOUNTER — HOSPITAL ENCOUNTER (OUTPATIENT)
Dept: RADIOLOGY | Facility: HOSPITAL | Age: 64
Discharge: HOME | End: 2024-10-24
Attending: ORTHOPAEDIC SURGERY
Payer: COMMERCIAL

## 2024-10-24 VITALS — BODY MASS INDEX: 23.33 KG/M2 | WEIGHT: 191.56 LBS | HEIGHT: 76 IN

## 2024-10-24 DIAGNOSIS — Z98.1 S/P CERVICAL SPINAL FUSION: ICD-10-CM

## 2024-10-24 DIAGNOSIS — Z98.1 S/P CERVICAL SPINAL FUSION: Primary | ICD-10-CM

## 2024-10-24 PROCEDURE — 99024 POSTOP FOLLOW-UP VISIT: CPT | Mod: S$GLB,,, | Performed by: ORTHOPAEDIC SURGERY

## 2024-10-24 PROCEDURE — 1159F MED LIST DOCD IN RCRD: CPT | Mod: CPTII,S$GLB,, | Performed by: ORTHOPAEDIC SURGERY

## 2024-10-24 PROCEDURE — 1101F PT FALLS ASSESS-DOCD LE1/YR: CPT | Mod: CPTII,S$GLB,, | Performed by: ORTHOPAEDIC SURGERY

## 2024-10-24 PROCEDURE — 3044F HG A1C LEVEL LT 7.0%: CPT | Mod: CPTII,S$GLB,, | Performed by: ORTHOPAEDIC SURGERY

## 2024-10-24 PROCEDURE — 4010F ACE/ARB THERAPY RXD/TAKEN: CPT | Mod: CPTII,S$GLB,, | Performed by: ORTHOPAEDIC SURGERY

## 2024-10-24 PROCEDURE — 99999 PR PBB SHADOW E&M-EST. PATIENT-LVL IV: CPT | Mod: PBBFAC,,, | Performed by: ORTHOPAEDIC SURGERY

## 2024-10-24 PROCEDURE — 3066F NEPHROPATHY DOC TX: CPT | Mod: CPTII,S$GLB,, | Performed by: ORTHOPAEDIC SURGERY

## 2024-10-24 PROCEDURE — 3288F FALL RISK ASSESSMENT DOCD: CPT | Mod: CPTII,S$GLB,, | Performed by: ORTHOPAEDIC SURGERY

## 2024-10-24 PROCEDURE — 72040 X-RAY EXAM NECK SPINE 2-3 VW: CPT | Mod: TC

## 2024-10-24 PROCEDURE — 72040 X-RAY EXAM NECK SPINE 2-3 VW: CPT | Mod: 26,,, | Performed by: RADIOLOGY

## 2024-10-24 PROCEDURE — 3061F NEG MICROALBUMINURIA REV: CPT | Mod: CPTII,S$GLB,, | Performed by: ORTHOPAEDIC SURGERY

## 2024-10-24 PROCEDURE — 1125F AMNT PAIN NOTED PAIN PRSNT: CPT | Mod: CPTII,S$GLB,, | Performed by: ORTHOPAEDIC SURGERY

## 2024-10-24 NOTE — TELEPHONE ENCOUNTER
Spoke with patient and gave arrival time of 8:15 am at [facility]. I went over instructions of nothing to eat after midnight, start drops on Saturday TID in sx eye, no lotions or creams around eye, and wear something comfortable.  Informed patient they must have someone with them that drives and be prepared to be here 3-4 hours. EB

## 2024-10-28 ENCOUNTER — HOSPITAL ENCOUNTER (OUTPATIENT)
Facility: HOSPITAL | Age: 64
Discharge: HOME | End: 2024-10-28
Attending: OPHTHALMOLOGY | Admitting: OPHTHALMOLOGY
Payer: MEDICARE

## 2024-10-28 VITALS
HEART RATE: 64 BPM | SYSTOLIC BLOOD PRESSURE: 126 MMHG | TEMPERATURE: 98 F | RESPIRATION RATE: 13 BRPM | WEIGHT: 200 LBS | DIASTOLIC BLOOD PRESSURE: 72 MMHG | HEIGHT: 76 IN | OXYGEN SATURATION: 97 % | BODY MASS INDEX: 24.36 KG/M2

## 2024-10-28 DIAGNOSIS — H25.13 NUCLEAR SCLEROSIS, BILATERAL: ICD-10-CM

## 2024-10-28 LAB — POCT GLUCOSE: 115 MG/DL (ref 70–110)

## 2024-10-28 PROCEDURE — 36000707: Performed by: OPHTHALMOLOGY

## 2024-10-28 PROCEDURE — 94761 N-INVAS EAR/PLS OXIMETRY MLT: CPT | Mod: XB

## 2024-10-28 PROCEDURE — 82962 GLUCOSE BLOOD TEST: CPT | Performed by: OPHTHALMOLOGY

## 2024-10-28 PROCEDURE — V2632 POST CHMBR INTRAOCULAR LENS: HCPCS | Performed by: OPHTHALMOLOGY

## 2024-10-28 PROCEDURE — 99900035 HC TECH TIME PER 15 MIN (STAT)

## 2024-10-28 PROCEDURE — 66984 XCAPSL CTRC RMVL W/O ECP: CPT | Mod: RT,,, | Performed by: OPHTHALMOLOGY

## 2024-10-28 PROCEDURE — 99152 MOD SED SAME PHYS/QHP 5/>YRS: CPT | Mod: ,,, | Performed by: OPHTHALMOLOGY

## 2024-10-28 PROCEDURE — 99152 MOD SED SAME PHYS/QHP 5/>YRS: CPT | Performed by: OPHTHALMOLOGY

## 2024-10-28 PROCEDURE — 25000003 PHARM REV CODE 250: Performed by: OPHTHALMOLOGY

## 2024-10-28 PROCEDURE — 36000706: Performed by: OPHTHALMOLOGY

## 2024-10-28 PROCEDURE — 63600175 PHARM REV CODE 636 W HCPCS: Performed by: OPHTHALMOLOGY

## 2024-10-28 PROCEDURE — 71000015 HC POSTOP RECOV 1ST HR: Performed by: OPHTHALMOLOGY

## 2024-10-28 DEVICE — LENS CLAREON AUTONOME 21.5D: Type: IMPLANTABLE DEVICE | Site: EYE | Status: FUNCTIONAL

## 2024-10-28 RX ORDER — MOXIFLOXACIN 5 MG/ML
1 SOLUTION/ DROPS OPHTHALMIC
Status: COMPLETED | OUTPATIENT
Start: 2024-10-28 | End: 2024-10-28

## 2024-10-28 RX ORDER — MIDAZOLAM HYDROCHLORIDE 1 MG/ML
1 INJECTION, SOLUTION INTRAMUSCULAR; INTRAVENOUS
Status: DISCONTINUED | OUTPATIENT
Start: 2024-10-28 | End: 2024-10-28 | Stop reason: HOSPADM

## 2024-10-28 RX ORDER — MOXIFLOXACIN 5 MG/ML
SOLUTION/ DROPS OPHTHALMIC
Status: DISCONTINUED | OUTPATIENT
Start: 2024-10-28 | End: 2024-10-28 | Stop reason: HOSPADM

## 2024-10-28 RX ORDER — TROPICAMIDE 10 MG/ML
1 SOLUTION/ DROPS OPHTHALMIC
Status: DISCONTINUED | OUTPATIENT
Start: 2024-10-28 | End: 2024-10-28

## 2024-10-28 RX ORDER — PHENYLEPHRINE HYDROCHLORIDE 25 MG/ML
1 SOLUTION/ DROPS OPHTHALMIC
Status: DISCONTINUED | OUTPATIENT
Start: 2024-10-28 | End: 2024-10-28

## 2024-10-28 RX ORDER — ACETAMINOPHEN 325 MG/1
650 TABLET ORAL EVERY 4 HOURS PRN
Status: DISCONTINUED | OUTPATIENT
Start: 2024-10-28 | End: 2024-10-28 | Stop reason: HOSPADM

## 2024-10-28 RX ORDER — LIDOCAINE HYDROCHLORIDE 40 MG/ML
INJECTION, SOLUTION RETROBULBAR
Status: DISCONTINUED | OUTPATIENT
Start: 2024-10-28 | End: 2024-10-28 | Stop reason: HOSPADM

## 2024-10-28 RX ORDER — TETRACAINE HYDROCHLORIDE 5 MG/ML
1 SOLUTION OPHTHALMIC
Status: DISCONTINUED | OUTPATIENT
Start: 2024-10-28 | End: 2024-10-28

## 2024-10-28 RX ORDER — CYCLOP/TROP/PROPA/PHEN/KET/WAT 1-1-0.1%
1 DROPS (EA) OPHTHALMIC (EYE) EVERY 5 MIN PRN
Status: COMPLETED | OUTPATIENT
Start: 2024-10-28 | End: 2024-10-28

## 2024-10-28 RX ORDER — SODIUM CHLORIDE 0.9 % (FLUSH) 0.9 %
10 SYRINGE (ML) INJECTION
Status: DISCONTINUED | OUTPATIENT
Start: 2024-10-28 | End: 2024-10-28 | Stop reason: HOSPADM

## 2024-10-28 RX ORDER — PROPARACAINE HYDROCHLORIDE 5 MG/ML
1 SOLUTION/ DROPS OPHTHALMIC
Status: DISCONTINUED | OUTPATIENT
Start: 2024-10-28 | End: 2024-10-28 | Stop reason: HOSPADM

## 2024-10-28 RX ORDER — PHENYLEPHRINE HYDROCHLORIDE 100 MG/ML
1 SOLUTION/ DROPS OPHTHALMIC
Status: DISCONTINUED | OUTPATIENT
Start: 2024-10-28 | End: 2024-10-28 | Stop reason: HOSPADM

## 2024-10-28 RX ORDER — PROPARACAINE HYDROCHLORIDE 5 MG/ML
SOLUTION/ DROPS OPHTHALMIC
Status: DISCONTINUED | OUTPATIENT
Start: 2024-10-28 | End: 2024-10-28 | Stop reason: HOSPADM

## 2024-10-28 RX ORDER — MOXIFLOXACIN 5 MG/ML
1 SOLUTION/ DROPS OPHTHALMIC
Status: DISCONTINUED | OUTPATIENT
Start: 2024-10-28 | End: 2024-10-28 | Stop reason: HOSPADM

## 2024-10-28 RX ADMIN — Medication 1 DROP: at 08:10

## 2024-10-28 RX ADMIN — MOXIFLOXACIN OPHTHALMIC 1 DROP: 5 SOLUTION/ DROPS OPHTHALMIC at 08:10

## 2024-10-28 RX ADMIN — MIDAZOLAM HYDROCHLORIDE 2 MG: 1 INJECTION, SOLUTION INTRAMUSCULAR; INTRAVENOUS at 09:10

## 2024-10-29 ENCOUNTER — OFFICE VISIT (OUTPATIENT)
Dept: OPHTHALMOLOGY | Facility: CLINIC | Age: 64
End: 2024-10-29
Payer: COMMERCIAL

## 2024-10-29 DIAGNOSIS — Z98.890 POST-OPERATIVE STATE: Primary | ICD-10-CM

## 2024-10-29 DIAGNOSIS — H25.11 NUCLEAR SCLEROTIC CATARACT OF RIGHT EYE: ICD-10-CM

## 2024-10-29 PROCEDURE — 99999 PR PBB SHADOW E&M-EST. PATIENT-LVL II: CPT | Mod: PBBFAC,,, | Performed by: OPHTHALMOLOGY

## 2024-11-05 ENCOUNTER — OFFICE VISIT (OUTPATIENT)
Dept: OPHTHALMOLOGY | Facility: CLINIC | Age: 64
End: 2024-11-05
Payer: MEDICARE

## 2024-11-05 ENCOUNTER — TELEPHONE (OUTPATIENT)
Dept: ORTHOPEDICS | Facility: CLINIC | Age: 64
End: 2024-11-05
Payer: MEDICARE

## 2024-11-05 DIAGNOSIS — H25.12 NUCLEAR SCLEROSIS OF LEFT EYE: ICD-10-CM

## 2024-11-05 DIAGNOSIS — Z98.890 POST-OPERATIVE STATE: Primary | ICD-10-CM

## 2024-11-05 PROCEDURE — 99999 PR PBB SHADOW E&M-EST. PATIENT-LVL IV: CPT | Mod: PBBFAC,,, | Performed by: OPHTHALMOLOGY

## 2024-11-05 RX ORDER — CYCLOP/TROP/PROPA/PHEN/KET/WAT 1-1-0.1%
1 DROPS (EA) OPHTHALMIC (EYE)
OUTPATIENT
Start: 2024-11-05 | End: 2024-11-05

## 2024-11-05 RX ORDER — MIDAZOLAM HYDROCHLORIDE 1 MG/ML
1 INJECTION, SOLUTION INTRAMUSCULAR; INTRAVENOUS
Status: SHIPPED | OUTPATIENT
Start: 2024-11-05

## 2024-11-05 RX ORDER — FENTANYL CITRATE 50 UG/ML
25 INJECTION, SOLUTION INTRAMUSCULAR; INTRAVENOUS
Status: SHIPPED | OUTPATIENT
Start: 2024-11-05

## 2024-11-05 NOTE — PROGRESS NOTES
HPI    DATE OF SURGERY: 10/28/2024   IMPLANT: cnaoto 21.5 OD    PMB TID OD    Pt here for 1 week post op OD. Pt states doing well.  Pt denies eye pain   OD.       Last edited by Loretta Ferrell MA on 11/5/2024  2:07 PM.            Assessment /Plan     For exam results, see Encounter Report.    Post-operative state      Slit lamp exam:  L/L: nl  K: clear, wound sealed  AC: trace cell  Iris/Lens: IOL centered and stable    POW1 s/p phaco: Surgery healing well with no signs of infection or abnormal inflammation.    Patient wishes to proceed with surgery in the second eye. Risks, benefits, alternatives reviewed. IOL selection reviewed.     Left eye  IOL: CNA0T0 22.5

## 2024-11-05 NOTE — TELEPHONE ENCOUNTER
"Called to speak with patient regarding completion of Complex Spine Questionnaires. No answer /  left message. Instructions provided to complete  "Complex Spine Questionnaire links sent to patient by email and text message please complete at earliest opportunity"     ANA Ortiz (Dee)  Complex Spine Navigator      "

## 2024-11-14 ENCOUNTER — TELEPHONE (OUTPATIENT)
Dept: OPHTHALMOLOGY | Facility: CLINIC | Age: 64
End: 2024-11-14
Payer: MEDICARE

## 2024-11-14 ENCOUNTER — PATIENT MESSAGE (OUTPATIENT)
Dept: OPHTHALMOLOGY | Facility: CLINIC | Age: 64
End: 2024-11-14
Payer: MEDICARE

## 2024-11-14 NOTE — TELEPHONE ENCOUNTER
Spoke with patient and gave arrival time of 8:15 am at [place] [facility]. I went over instructions of nothing to eat after midnight, start drops on Saturday TID in sx eye, no lotions or creams around eye, and wear something comfortable.  Informed patient they must have someone with them that drives and be prepared to be here 3-4 hours. EB

## 2024-11-15 NOTE — PRE-PROCEDURE INSTRUCTIONS
The following was discussed with pt via phone and sent to pt portal. Pt verbalized understanding. Pt to be accompanied by his wife.    - Nothing to eat or drink after midnight the night before your surgery, except AM meds with small sips of water    - HOLD all Diabetic meds AM of surgery  - HOLD all Insulin AM of surgery  - HOLD all Fluid pills AM of surgery  - HOLD all non-insulin shots until after surgery (Ozempic, Mounjaro, Trulicity, Victoza, Byetta, Wegovy and Adlyxin) (up to 7 days prior)  - HOLD all vits and herbal meds AM of surgery    - TAKE blood thinner meds AM of surgery (unless otherwise directed)  - TAKE all B/P meds, EXCEPT those that contain a fluid pill  - USE inhalers as needed and bring AM of surgery  - USE eye drops as directed    - Shower and wash face with dial soap for 3 mins PM prior and AM of surgery  - No powder, lotions, creams, (makeup),  or jewelry    - Wear comfortable clothing (button up shirt)     (Patients ride may not leave while patient is in surgery)     -- 2nd floor surgery ctr at [facility] @ [address], [place] [address]

## 2024-11-16 ENCOUNTER — PATIENT MESSAGE (OUTPATIENT)
Dept: ADMINISTRATIVE | Facility: HOSPITAL | Age: 64
End: 2024-11-16
Payer: MEDICARE

## 2024-11-18 ENCOUNTER — PATIENT OUTREACH (OUTPATIENT)
Dept: ADMINISTRATIVE | Facility: HOSPITAL | Age: 64
End: 2024-11-18
Payer: MEDICARE

## 2024-11-18 ENCOUNTER — HOSPITAL ENCOUNTER (OUTPATIENT)
Facility: HOSPITAL | Age: 64
Discharge: HOME | End: 2024-11-18
Attending: OPHTHALMOLOGY | Admitting: OPHTHALMOLOGY
Payer: MEDICARE

## 2024-11-18 VITALS
DIASTOLIC BLOOD PRESSURE: 84 MMHG | HEART RATE: 83 BPM | RESPIRATION RATE: 16 BRPM | OXYGEN SATURATION: 96 % | TEMPERATURE: 98 F | SYSTOLIC BLOOD PRESSURE: 141 MMHG

## 2024-11-18 DIAGNOSIS — H25.13 NUCLEAR SCLEROSIS, BILATERAL: Primary | ICD-10-CM

## 2024-11-18 DIAGNOSIS — Z13.6 ENCOUNTER FOR SCREENING FOR CARDIOVASCULAR DISORDERS: Primary | ICD-10-CM

## 2024-11-18 DIAGNOSIS — I20.0 UNSTABLE ANGINA: ICD-10-CM

## 2024-11-18 DIAGNOSIS — H25.12 NUCLEAR SCLEROSIS OF LEFT EYE: ICD-10-CM

## 2024-11-18 LAB — POCT GLUCOSE: 112 MG/DL (ref 70–110)

## 2024-11-18 PROCEDURE — 63600175 PHARM REV CODE 636 W HCPCS: Performed by: OPHTHALMOLOGY

## 2024-11-18 PROCEDURE — 71000015 HC POSTOP RECOV 1ST HR: Performed by: OPHTHALMOLOGY

## 2024-11-18 PROCEDURE — 66984 XCAPSL CTRC RMVL W/O ECP: CPT | Mod: 79,LT,, | Performed by: OPHTHALMOLOGY

## 2024-11-18 PROCEDURE — 99152 MOD SED SAME PHYS/QHP 5/>YRS: CPT | Mod: ,,, | Performed by: OPHTHALMOLOGY

## 2024-11-18 PROCEDURE — 82962 GLUCOSE BLOOD TEST: CPT | Performed by: OPHTHALMOLOGY

## 2024-11-18 PROCEDURE — 36000707: Performed by: OPHTHALMOLOGY

## 2024-11-18 PROCEDURE — 99152 MOD SED SAME PHYS/QHP 5/>YRS: CPT | Performed by: OPHTHALMOLOGY

## 2024-11-18 PROCEDURE — 94761 N-INVAS EAR/PLS OXIMETRY MLT: CPT

## 2024-11-18 PROCEDURE — 36000706: Performed by: OPHTHALMOLOGY

## 2024-11-18 PROCEDURE — V2632 POST CHMBR INTRAOCULAR LENS: HCPCS | Performed by: OPHTHALMOLOGY

## 2024-11-18 PROCEDURE — 99900035 HC TECH TIME PER 15 MIN (STAT)

## 2024-11-18 PROCEDURE — 25000003 PHARM REV CODE 250: Performed by: OPHTHALMOLOGY

## 2024-11-18 DEVICE — LENS CLAREON AUTONOME 22.5D: Type: IMPLANTABLE DEVICE | Site: EYE | Status: FUNCTIONAL

## 2024-11-18 RX ORDER — CYCLOP/TROP/PROPA/PHEN/KET/WAT 1-1-0.1%
1 DROPS (EA) OPHTHALMIC (EYE)
Status: DISCONTINUED | OUTPATIENT
Start: 2024-11-18 | End: 2024-11-18

## 2024-11-18 RX ORDER — MOXIFLOXACIN 5 MG/ML
1 SOLUTION/ DROPS OPHTHALMIC
Status: COMPLETED | OUTPATIENT
Start: 2024-11-18 | End: 2024-11-18

## 2024-11-18 RX ORDER — TETRACAINE HYDROCHLORIDE 5 MG/ML
SOLUTION OPHTHALMIC
Status: DISCONTINUED | OUTPATIENT
Start: 2024-11-18 | End: 2024-11-18 | Stop reason: HOSPADM

## 2024-11-18 RX ORDER — TETRACAINE HYDROCHLORIDE 5 MG/ML
1 SOLUTION OPHTHALMIC EVERY 5 MIN PRN
Status: COMPLETED | OUTPATIENT
Start: 2024-11-18 | End: 2024-11-18

## 2024-11-18 RX ORDER — MOXIFLOXACIN 5 MG/ML
SOLUTION/ DROPS OPHTHALMIC
Status: DISCONTINUED | OUTPATIENT
Start: 2024-11-18 | End: 2024-11-18 | Stop reason: HOSPADM

## 2024-11-18 RX ORDER — PROPARACAINE HYDROCHLORIDE 5 MG/ML
1 SOLUTION/ DROPS OPHTHALMIC
Status: DISCONTINUED | OUTPATIENT
Start: 2024-11-18 | End: 2024-11-18 | Stop reason: HOSPADM

## 2024-11-18 RX ORDER — PHENYLEPHRINE HYDROCHLORIDE 100 MG/ML
1 SOLUTION/ DROPS OPHTHALMIC
Status: DISCONTINUED | OUTPATIENT
Start: 2024-11-18 | End: 2024-11-18 | Stop reason: HOSPADM

## 2024-11-18 RX ORDER — SODIUM CHLORIDE 0.9 % (FLUSH) 0.9 %
10 SYRINGE (ML) INJECTION
Status: DISCONTINUED | OUTPATIENT
Start: 2024-11-18 | End: 2024-11-18 | Stop reason: HOSPADM

## 2024-11-18 RX ORDER — FENTANYL CITRATE 50 UG/ML
25 INJECTION, SOLUTION INTRAMUSCULAR; INTRAVENOUS
Status: DISCONTINUED | OUTPATIENT
Start: 2024-11-18 | End: 2024-11-18 | Stop reason: HOSPADM

## 2024-11-18 RX ORDER — TROPICAMIDE 10 MG/ML
1 SOLUTION/ DROPS OPHTHALMIC EVERY 5 MIN PRN
Status: COMPLETED | OUTPATIENT
Start: 2024-11-18 | End: 2024-11-18

## 2024-11-18 RX ORDER — MIDAZOLAM HYDROCHLORIDE 1 MG/ML
1 INJECTION, SOLUTION INTRAMUSCULAR; INTRAVENOUS
Status: DISCONTINUED | OUTPATIENT
Start: 2024-11-18 | End: 2024-11-18 | Stop reason: HOSPADM

## 2024-11-18 RX ORDER — LIDOCAINE HYDROCHLORIDE 40 MG/ML
INJECTION, SOLUTION RETROBULBAR
Status: DISCONTINUED | OUTPATIENT
Start: 2024-11-18 | End: 2024-11-18 | Stop reason: HOSPADM

## 2024-11-18 RX ORDER — PHENYLEPHRINE HYDROCHLORIDE 25 MG/ML
1 SOLUTION/ DROPS OPHTHALMIC EVERY 5 MIN PRN
Status: COMPLETED | OUTPATIENT
Start: 2024-11-18 | End: 2024-11-18

## 2024-11-18 RX ORDER — ACETAMINOPHEN 325 MG/1
650 TABLET ORAL EVERY 4 HOURS PRN
Status: DISCONTINUED | OUTPATIENT
Start: 2024-11-18 | End: 2024-11-18 | Stop reason: HOSPADM

## 2024-11-18 RX ORDER — EZETIMIBE 10 MG/1
10 TABLET ORAL DAILY
COMMUNITY

## 2024-11-18 RX ADMIN — MIDAZOLAM HYDROCHLORIDE 1 MG: 1 INJECTION, SOLUTION INTRAMUSCULAR; INTRAVENOUS at 10:11

## 2024-11-18 RX ADMIN — MIDAZOLAM HYDROCHLORIDE 2 MG: 1 INJECTION, SOLUTION INTRAMUSCULAR; INTRAVENOUS at 09:11

## 2024-11-18 RX ADMIN — MOXIFLOXACIN 1 DROP: 5 SOLUTION/ DROPS OPHTHALMIC at 10:11

## 2024-11-18 RX ADMIN — TETRACAINE HYDROCHLORIDE 1 DROP: 5 SOLUTION OPHTHALMIC at 09:11

## 2024-11-18 RX ADMIN — PHENYLEPHRINE HYDROCHLORIDE 1 DROP: 25 SOLUTION/ DROPS OPHTHALMIC at 08:11

## 2024-11-18 RX ADMIN — TROPICAMIDE 1 DROP: 10 SOLUTION/ DROPS OPHTHALMIC at 09:11

## 2024-11-18 RX ADMIN — TETRACAINE HYDROCHLORIDE 1 DROP: 5 SOLUTION OPHTHALMIC at 08:11

## 2024-11-18 RX ADMIN — TROPICAMIDE 1 DROP: 10 SOLUTION/ DROPS OPHTHALMIC at 08:11

## 2024-11-18 RX ADMIN — MOXIFLOXACIN OPHTHALMIC 1 DROP: 5 SOLUTION/ DROPS OPHTHALMIC at 08:11

## 2024-11-18 RX ADMIN — PHENYLEPHRINE HYDROCHLORIDE 1 DROP: 25 SOLUTION/ DROPS OPHTHALMIC at 09:11

## 2024-11-18 RX ADMIN — FENTANYL CITRATE 25 MCG: 50 INJECTION, SOLUTION INTRAMUSCULAR; INTRAVENOUS at 10:11

## 2024-11-18 RX ADMIN — MOXIFLOXACIN OPHTHALMIC 1 DROP: 5 SOLUTION/ DROPS OPHTHALMIC at 09:11

## 2024-11-18 NOTE — DISCHARGE INSTRUCTIONS
CATARACT SURGERY    POST-OPERATIVE INSTRUCTIONS    · Apply drops THREE times a day into operative eye for 30 days.    · DO NOT rub your eye    · Wear protective sunglasses during the day    · Resume moderate activity    · Bathe/shower/wash face normally    · DO NOT apply makeup around the operative eye for 1 week.         You should expect    - Blurry vision and halos for 24-48 hours    - Dilated pupil for 24-48 hours    - Scratchy feeling in the eye for 1-2 days    - Curved shadow in your peripheral vision for 2-3 weeks    - Occasional flickering of lights for up to 1 week    -If you experience severe pain or nausea, please call Dr Means or the on-call doctor at [phone]    - Plan to see Dr Means tomorrow .      [facility]    [address] [id number]    ** Most patients can drive the next day, but if you do not feel comfortable driving, please arrange for transportation.

## 2024-11-18 NOTE — OP NOTE
SURGEON:  Elva Means M.D.    PREOPERATIVE DIAGNOSIS:    Nuclear Sclerotic Cataract Left Eye    POSTOPERATIVE DIAGNOSIS:    Nuclear Sclerotic Cataract Left Eye    PROCEDURES:    Phacoemulsification with  intraocular lens, Left eye (66781)  With moderate sedation >10min (69734)    DATE OF SURGERY: 11/18/2024    IMPLANT: CNA0T0 22.5    ANESTHESIA:  Under my direct supervision, intravenous moderate sedation was administered during the course of this procedure, with continuous monitoring of hemodynamic parameters. Total time of sedation and amount of sedatives are documented in the nursing logs.    COMPLICATIONS:  None    ESTIMATED BLOOD LOSS: None    SPECIMENS: None    INDICATIONS:    The patient has a history of painless progressive visual loss and difficulty with activities of daily living, which specifically include difficult driving at night due to glare and difficulty reading small print, secondary to cataract formation.  After a thorough discussion of the risks, benefits, and alternatives to cataract surgery, including, but not limited to, the rare risks of infection, retinal detachment, hemorrhage, need for additional surgery, loss of vision, and even loss of the eye, the patient voices understanding and desires to proceed.    DESCRIPTION OF PROCEDURE:    The patients IOL calculations were reviewed, and the lens selection confirmed.   After verification and marking of the proper eye in the preop holding area, the patient was brought to the operating room in supine position where the eye was prepped and draped in standard sterile fashion with 5% Betadine and a lid speculum placed in the eye.   Topical 4% Lidocaine was used in addition to the preoperative anesthesia and the procedure was begun by the creation of a paracentesis incision through which viscoelastic was used to fill the anterior chamber.  Next, a keratome blade was used to create a triplanar temporal clear corneal incision and a cystotome and Utrata  forceps used to fashion a continuous curvilinear capsulorrhexis.  Hydrodissection was carried out using the Jacobs hydrodissection cannula and the nucleus was found to be mobile.  Phacoemulsification of the nucleus was carried out using a quick chop technique, and all remaining epinuclear and cortical material was removed.  The eye was then reformed with Viscoelastic and the  intraocular lens was implanted into the capsular bag.  All remaining viscoelastics were removed from the eye and at the end of the case the pupil was round, the lens was well-centered within the capsular bag and all wounds were found to be water tight.  Drops of Vigamox and Pred Forte were instilled and a shield was placed over the eye. The patient will follow up with Dr. Means in the morning.

## 2024-11-18 NOTE — DISCHARGE SUMMARY
Outcome: Successful outpatient ophthalmic surgical procedure  Preprinted Instructions given to patient.  Regular diet.  Activity: No restrictions  Meds: see Med Rec  Condition: stable  Follow up: 1 day with Dr Means  Disposition: Home  Diagnosis: s/p eye surgery  Date of discharge: 11/18/2024

## 2024-11-18 NOTE — PLAN OF CARE
Pt in preop bay 1, VSS, meds given and IV inserted. Pt denies any open wounds on body or the use of any weight loss injections. Pt needs a site marking and an H&P, otherwise ready to roll.  Procedural consents verified with pt.

## 2024-11-19 ENCOUNTER — OFFICE VISIT (OUTPATIENT)
Dept: OPHTHALMOLOGY | Facility: CLINIC | Age: 64
End: 2024-11-19
Payer: MEDICARE

## 2024-11-19 DIAGNOSIS — Z98.890 POST-OPERATIVE STATE: Primary | ICD-10-CM

## 2024-11-19 DIAGNOSIS — H25.12 NUCLEAR SCLEROTIC CATARACT OF LEFT EYE: ICD-10-CM

## 2024-11-19 PROCEDURE — 99999 PR PBB SHADOW E&M-EST. PATIENT-LVL II: CPT | Mod: PBBFAC,,, | Performed by: OPHTHALMOLOGY

## 2024-11-19 NOTE — PROGRESS NOTES
HPI    DATE OF SURGERY: 11/18/2024   IMPLANT: CNA0T0 22.5    1 day Post Op OS   Pt state slight headache around the eye     PGB TID OS- have not started yet     Last edited by Eliezer Morales on 11/19/2024  8:22 AM.            Assessment /Plan     For exam results, see Encounter Report.    Post-operative state    Nuclear sclerotic cataract of left eye      Slit lamp exam:  L/L: nl  K: clear, wound sealed  AC: 1+ cell  Lens: IOL centered and stable    POD1 s/p Phaco/IOL  Appropriate precautions and post op medications reviewed.  Patient instructed to call or come in if symptoms of redness, decreased vision, or pain are experienced.    Excellent result. Plan DFE and MRx in 1 month with optometry.  RTC PRN for any acute changes in vision or pain in the eye.

## 2024-12-06 DIAGNOSIS — E78.2 MIXED HYPERLIPIDEMIA: ICD-10-CM

## 2024-12-06 DIAGNOSIS — I25.84 CORONARY ARTERY DISEASE DUE TO CALCIFIED CORONARY LESION: ICD-10-CM

## 2024-12-06 DIAGNOSIS — I25.10 CORONARY ARTERY DISEASE DUE TO CALCIFIED CORONARY LESION: ICD-10-CM

## 2024-12-06 RX ORDER — PRAVASTATIN SODIUM 80 MG/1
80 TABLET ORAL
Qty: 90 TABLET | Refills: 3 | Status: SHIPPED | OUTPATIENT
Start: 2024-12-06

## 2024-12-06 NOTE — TELEPHONE ENCOUNTER
Care Due:                  Date            Visit Type   Department     Provider  --------------------------------------------------------------------------------                                             [facility]  Last Visit: 07-      PRE-OP       MEDICINE       Josefina Kendall  Next Visit: None Scheduled  None         None Found                                                            Last  Test          Frequency    Reason                     Performed    Due Date  --------------------------------------------------------------------------------    Lipid Panel.  12 months..  pravastatin..............  11- 11-    [facility] Embedded Care Due Messages. Reference number: [id number].   12/06/2024 5:06:34 PM CST

## 2024-12-06 NOTE — TELEPHONE ENCOUNTER
Refill Routing Note   Medication(s) are not appropriate for processing by [facility] for the following reason(s):        ED/Hospital Visit since last OV with provider  Required labs outdated  Allergy or intolerance    [facility] action(s):  Defer      Medication Therapy Plan: Allergy/Contraindication: Rosuvastatin Reactions: Swelling    Pharmacist review requested: Yes     Appointments  past 12m or future 3m with PCP    Date Provider   Last Visit   7/18/2024 Josefina Kendall MD   Next Visit   Visit date not found Josefina Kendall MD   ED visits in past 90 days: 0        Note composed:5:06 PM 12/06/2024

## 2025-01-02 ENCOUNTER — OFFICE VISIT (OUTPATIENT)
Dept: URGENT CARE | Facility: CLINIC | Age: 65
End: 2025-01-02
Payer: MEDICARE

## 2025-01-02 VITALS
OXYGEN SATURATION: 97 % | HEIGHT: 76 IN | SYSTOLIC BLOOD PRESSURE: 106 MMHG | RESPIRATION RATE: 16 BRPM | TEMPERATURE: 99 F | DIASTOLIC BLOOD PRESSURE: 69 MMHG | HEART RATE: 86 BPM | BODY MASS INDEX: 24.36 KG/M2 | WEIGHT: 200 LBS

## 2025-01-02 DIAGNOSIS — M25.512 ACUTE PAIN OF LEFT SHOULDER: Primary | ICD-10-CM

## 2025-01-02 DIAGNOSIS — J40 BRONCHITIS: ICD-10-CM

## 2025-01-02 DIAGNOSIS — M19.019 SHOULDER ARTHRITIS: ICD-10-CM

## 2025-01-02 PROCEDURE — 73030 X-RAY EXAM OF SHOULDER: CPT | Mod: LT,S$GLB,, | Performed by: RADIOLOGY

## 2025-01-02 RX ORDER — PROMETHAZINE HYDROCHLORIDE AND DEXTROMETHORPHAN HYDROBROMIDE 6.25; 15 MG/5ML; MG/5ML
5 SYRUP ORAL EVERY 4 HOURS PRN
Qty: 118 ML | Refills: 0 | Status: SHIPPED | OUTPATIENT
Start: 2025-01-02

## 2025-01-02 RX ORDER — METHOCARBAMOL 500 MG/1
500 TABLET, FILM COATED ORAL 3 TIMES DAILY PRN
Qty: 30 TABLET | Refills: 0 | Status: SHIPPED | OUTPATIENT
Start: 2025-01-02

## 2025-01-02 RX ORDER — KETOROLAC TROMETHAMINE 30 MG/ML
30 INJECTION, SOLUTION INTRAMUSCULAR; INTRAVENOUS
Status: COMPLETED | OUTPATIENT
Start: 2025-01-02 | End: 2025-01-02

## 2025-01-02 RX ORDER — BENZONATATE 200 MG/1
200 CAPSULE ORAL 3 TIMES DAILY PRN
Qty: 30 CAPSULE | Refills: 0 | Status: SHIPPED | OUTPATIENT
Start: 2025-01-02 | End: 2025-01-12

## 2025-01-02 RX ADMIN — KETOROLAC TROMETHAMINE 30 MG: 30 INJECTION, SOLUTION INTRAMUSCULAR; INTRAVENOUS at 10:01

## 2025-01-02 NOTE — PATIENT INSTRUCTIONS
I have placed orthopedic  Call to schedule an appointment: [phone]      If no improvement in cough in 7 days, you will need to be reevaluated.

## 2025-01-02 NOTE — PROGRESS NOTES
"Subjective:      Patient ID: Hi Braxton is a 64 y.o. male.    Vitals:  height is 6' 4" (1.93 m) and weight is 90.7 kg (200 lb). His oral temperature is 98.9 °F (37.2 °C). His blood pressure is 106/69 and his pulse is 86. His respiration is 16 and oxygen saturation is 97%.     Chief Complaint: Cough and Shoulder Pain (Left shoulder)    Patient reports a productive cough started 1 week ago. Patient took Mucinex for his symptoms. Patient reports left shoulder pain that started 3-4 days ago with no known injury. Patient reports having hardware in neck. Patient took oxycodone for shoulder pain.    Cough  This is a new problem. The current episode started 1 to 4 weeks ago (1 week). The problem has been gradually worsening. The problem occurs every few minutes. The cough is Productive of sputum. Associated symptoms include nasal congestion (at night), postnasal drip and a sore throat (when coughing). Pertinent negatives include no chest pain, fever, headaches, shortness of breath or wheezing. The symptoms are aggravated by lying down. Treatments tried: Mucinex. There is no history of asthma or bronchitis.   Shoulder Pain   The pain is present in the left shoulder. This is a new problem. The current episode started in the past 7 days (3-4 days). The problem occurs constantly. The problem has been gradually worsening. The quality of the pain is described as sharp. The pain is at a severity of 10/10. Associated symptoms include a limited range of motion. Pertinent negatives include no fever, headaches or numbness. Treatments tried: oxycodone. The treatment provided no relief. There is no history of Injuries to Extremity.       Constitution: Negative for activity change, appetite change and fever.   HENT:  Positive for postnasal drip and sore throat (when coughing). Negative for sinus pain and sinus pressure.    Cardiovascular:  Negative for chest pain, palpitations and sob on exertion.   Respiratory:  Positive for " "cough. Negative for shortness of breath and wheezing.    Musculoskeletal:  Positive for abnormal ROM of joint.   Neurological:  Negative for facial drooping, speech difficulty, coordination disturbances, loss of balance, headaches, disorientation, altered mental status, loss of consciousness, numbness and tingling.   Psychiatric/Behavioral:  Negative for altered mental status and disorientation.       Objective:     Vitals:    01/02/25 0907   BP: 106/69   BP Location: Right arm   Patient Position: Sitting   Pulse: 86   Resp: 16   Temp: 98.9 °F (37.2 °C)   TempSrc: Oral   SpO2: 97%   Weight: 90.7 kg (200 lb)   Height: 6' 4" (1.93 m)      Physical Exam   Constitutional: He is oriented to person, place, and time. He appears well-developed. He is cooperative.  Non-toxic appearance. He does not appear ill. No distress.   HENT:   Head: Normocephalic and atraumatic.   Ears:   Right Ear: Hearing, tympanic membrane, external ear and ear canal normal.   Left Ear: Hearing, tympanic membrane, external ear and ear canal normal.   Nose: Congestion present. No mucosal edema, rhinorrhea or nasal deformity. No epistaxis. Right sinus exhibits no maxillary sinus tenderness and no frontal sinus tenderness. Left sinus exhibits no maxillary sinus tenderness and no frontal sinus tenderness.   Mouth/Throat: Uvula is midline, oropharynx is clear and moist and mucous membranes are normal. No trismus in the jaw. Normal dentition. No uvula swelling. No oropharyngeal exudate, posterior oropharyngeal edema or posterior oropharyngeal erythema.   Eyes: Conjunctivae and lids are normal. No scleral icterus.   Neck: Trachea normal and phonation normal. Neck supple. No edema present. No erythema present. No neck rigidity present.   Cardiovascular: Normal rate, regular rhythm, normal heart sounds and normal pulses.   Pulmonary/Chest: Effort normal and breath sounds normal. No respiratory distress. He has no decreased breath sounds. He has no rhonchi. "   Abdominal: Normal appearance.   Musculoskeletal:         General: Tenderness (left shoulder with decreased ROM in all direction of left shoulder; known history of limited ROM of neck is unchanged) present.      Comments: Peripheral pulses intact   Neurological: He is alert and oriented to person, place, and time. No sensory deficit. He exhibits normal muscle tone.   Skin: Skin is warm, intact and not diaphoretic.   Psychiatric: His speech is normal and behavior is normal. Judgment and thought content normal.   Nursing note and vitals reviewed.    X-Ray Shoulder 2 or More Views Left    Result Date: 1/2/2025  EXAMINATION: XR SHOULDER COMPLETE 2 OR MORE VIEWS LEFT CLINICAL HISTORY: Pain in left shoulder TECHNIQUE: Two or three views of the left shoulder were performed. COMPARISON: 07/08/2024. FINDINGS: The bones are intact.  There is no evidence for acute fracture or bone destruction.  There is no evidence for dislocation.  There are degenerative changes of the glenohumeral and acromioclavicular joints identified.  There are surgical changes within the lower cervical spine.  Soft tissues are unremarkable.     No evidence for acute fracture, bone destruction, or dislocation. Degenerative changes of the glenohumeral and acromioclavicular joints, similar to the prior study dated 07/08/2024. Electronically signed by: Emmanuel Warner MD Date:    01/02/2025 Time:    10:01    Assessment:     1. Acute pain of left shoulder    2. Shoulder arthritis    3. Bronchitis        Plan:       Acute pain of left shoulder  Shoulder arthritis  -     X-Ray Shoulder 2 or More Views Left; Future; Expected date: 01/02/2025  -     methocarbamoL (ROBAXIN) 500 MG Tab; Take 1 tablet (500 mg total) by mouth 3 (three) times daily as needed (pain). Do not engage in activities which require full cognitive function if this medication makes you drowsy.  Dispense: 30 tablet; Refill: 0  -     ketorolac injection 30 mg  -     Ambulatory referral/consult to  Orthopedics      3. Bronchitis  -     promethazine-dextromethorphan (PROMETHAZINE-DM) 6.25-15 mg/5 mL Syrp; Take 5 mLs by mouth every 4 (four) hours as needed (cough). This medication can make you feel drowsy  Dispense: 118 mL; Refill: 0  -     benzonatate (TESSALON) 200 MG capsule; Take 1 capsule (200 mg total) by mouth 3 (three) times daily as needed for Cough.  Dispense: 30 capsule; Refill: 0      Patient Instructions   I have placed orthopedic  Call to schedule an appointment: [phone]      If no improvement in cough in 7 days, you will need to be reevaluated.

## 2025-01-10 ENCOUNTER — PATIENT MESSAGE (OUTPATIENT)
Dept: ORTHOPEDICS | Facility: CLINIC | Age: 65
End: 2025-01-10
Payer: MEDICARE

## 2025-01-25 DIAGNOSIS — F41.1 GENERALIZED ANXIETY DISORDER: Primary | ICD-10-CM

## 2025-01-26 NOTE — TELEPHONE ENCOUNTER
Care Due:                  Date            Visit Type   Department     Provider  --------------------------------------------------------------------------------                                             [facility]  Last Visit: 07-      PRE-OP       MEDICINE       Josefina Kendall  Next Visit: None Scheduled  None         None Found                                                            Last  Test          Frequency    Reason                     Performed    Due Date  --------------------------------------------------------------------------------    HBA1C.......  6 months...  insulin..................  07- 01-    Health [facility] Embedded Care Due Messages. Reference number: [id number].   1/25/2025 8:52:28 PM CST

## 2025-01-27 ENCOUNTER — LAB VISIT (OUTPATIENT)
Dept: LAB | Facility: HOSPITAL | Age: 65
End: 2025-01-27
Attending: INTERNAL MEDICINE
Payer: MEDICARE

## 2025-01-27 ENCOUNTER — TELEPHONE (OUTPATIENT)
Dept: ORTHOPEDICS | Facility: CLINIC | Age: 65
End: 2025-01-27
Payer: MEDICARE

## 2025-01-27 DIAGNOSIS — E11.9 TYPE 2 DIABETES MELLITUS WITHOUT COMPLICATION, WITHOUT LONG-TERM CURRENT USE OF INSULIN: ICD-10-CM

## 2025-01-27 LAB
ESTIMATED AVG GLUCOSE: 131 MG/DL (ref 68–131)
HBA1C MFR BLD: 6.2 % (ref 4–5.6)

## 2025-01-27 PROCEDURE — 83036 HEMOGLOBIN GLYCOSYLATED A1C: CPT | Mod: HCNC | Performed by: INTERNAL MEDICINE

## 2025-01-27 PROCEDURE — 36415 COLL VENOUS BLD VENIPUNCTURE: CPT | Mod: HCNC,PN | Performed by: INTERNAL MEDICINE

## 2025-01-27 RX ORDER — ALPRAZOLAM 0.5 MG/1
0.5 TABLET ORAL
Qty: 90 TABLET | Refills: 1 | Status: SHIPPED | OUTPATIENT
Start: 2025-01-27

## 2025-01-28 ENCOUNTER — HOSPITAL ENCOUNTER (OUTPATIENT)
Dept: RADIOLOGY | Facility: HOSPITAL | Age: 65
Discharge: HOME | End: 2025-01-28
Attending: PHYSICIAN ASSISTANT
Payer: MEDICARE

## 2025-01-28 ENCOUNTER — OFFICE VISIT (OUTPATIENT)
Dept: ORTHOPEDICS | Facility: CLINIC | Age: 65
End: 2025-01-28
Payer: MEDICARE

## 2025-01-28 VITALS — HEIGHT: 76 IN | WEIGHT: 205 LBS | BODY MASS INDEX: 24.96 KG/M2

## 2025-01-28 DIAGNOSIS — Z98.1 S/P CERVICAL SPINAL FUSION: ICD-10-CM

## 2025-01-28 DIAGNOSIS — G89.29 CHRONIC PAIN OF LEFT KNEE: ICD-10-CM

## 2025-01-28 DIAGNOSIS — M75.52 BURSITIS OF LEFT SHOULDER: ICD-10-CM

## 2025-01-28 DIAGNOSIS — M25.819 SHOULDER IMPINGEMENT: ICD-10-CM

## 2025-01-28 DIAGNOSIS — M17.10 ARTHRITIS OF KNEE: ICD-10-CM

## 2025-01-28 DIAGNOSIS — G89.29 CHRONIC PAIN OF LEFT KNEE: Primary | ICD-10-CM

## 2025-01-28 DIAGNOSIS — M25.562 CHRONIC PAIN OF LEFT KNEE: Primary | ICD-10-CM

## 2025-01-28 DIAGNOSIS — M25.562 CHRONIC PAIN OF LEFT KNEE: ICD-10-CM

## 2025-01-28 DIAGNOSIS — M19.012 PRIMARY OSTEOARTHRITIS OF LEFT SHOULDER: ICD-10-CM

## 2025-01-28 PROCEDURE — 3044F HG A1C LEVEL LT 7.0%: CPT | Mod: HCNC,CPTII,S$GLB, | Performed by: PHYSICIAN ASSISTANT

## 2025-01-28 PROCEDURE — 73560 X-RAY EXAM OF KNEE 1 OR 2: CPT | Mod: 26,59,HCNC,RT | Performed by: RADIOLOGY

## 2025-01-28 PROCEDURE — 73562 X-RAY EXAM OF KNEE 3: CPT | Mod: 26,HCNC,LT, | Performed by: RADIOLOGY

## 2025-01-28 PROCEDURE — 1160F RVW MEDS BY RX/DR IN RCRD: CPT | Mod: HCNC,CPTII,S$GLB, | Performed by: PHYSICIAN ASSISTANT

## 2025-01-28 PROCEDURE — 3288F FALL RISK ASSESSMENT DOCD: CPT | Mod: HCNC,CPTII,S$GLB, | Performed by: PHYSICIAN ASSISTANT

## 2025-01-28 PROCEDURE — 99999 PR PBB SHADOW E&M-EST. PATIENT-LVL III: CPT | Mod: PBBFAC,HCNC,, | Performed by: PHYSICIAN ASSISTANT

## 2025-01-28 PROCEDURE — 1101F PT FALLS ASSESS-DOCD LE1/YR: CPT | Mod: HCNC,CPTII,S$GLB, | Performed by: PHYSICIAN ASSISTANT

## 2025-01-28 PROCEDURE — 3008F BODY MASS INDEX DOCD: CPT | Mod: HCNC,CPTII,S$GLB, | Performed by: PHYSICIAN ASSISTANT

## 2025-01-28 PROCEDURE — 73560 X-RAY EXAM OF KNEE 1 OR 2: CPT | Mod: TC,HCNC,RT

## 2025-01-28 PROCEDURE — 99214 OFFICE O/P EST MOD 30 MIN: CPT | Mod: HCNC,25,S$GLB, | Performed by: PHYSICIAN ASSISTANT

## 2025-01-28 PROCEDURE — 20610 DRAIN/INJ JOINT/BURSA W/O US: CPT | Mod: HCNC,LT,S$GLB, | Performed by: PHYSICIAN ASSISTANT

## 2025-01-28 PROCEDURE — 1125F AMNT PAIN NOTED PAIN PRSNT: CPT | Mod: HCNC,CPTII,S$GLB, | Performed by: PHYSICIAN ASSISTANT

## 2025-01-28 PROCEDURE — 1159F MED LIST DOCD IN RCRD: CPT | Mod: HCNC,CPTII,S$GLB, | Performed by: PHYSICIAN ASSISTANT

## 2025-01-28 RX ORDER — BETAMETHASONE SODIUM PHOSPHATE AND BETAMETHASONE ACETATE 3; 3 MG/ML; MG/ML
6 INJECTION, SUSPENSION INTRA-ARTICULAR; INTRALESIONAL; INTRAMUSCULAR; SOFT TISSUE
Status: COMPLETED | OUTPATIENT
Start: 2025-01-28 | End: 2025-01-28

## 2025-01-28 RX ORDER — CYCLOBENZAPRINE HCL 10 MG
TABLET ORAL
Qty: 30 TABLET | Refills: 0 | Status: SHIPPED | OUTPATIENT
Start: 2025-01-28 | End: 2025-01-29 | Stop reason: SDUPTHER

## 2025-01-28 RX ADMIN — BETAMETHASONE SODIUM PHOSPHATE AND BETAMETHASONE ACETATE 6 MG: 3; 3 INJECTION, SUSPENSION INTRA-ARTICULAR; INTRALESIONAL; INTRAMUSCULAR; SOFT TISSUE at 08:01

## 2025-01-28 NOTE — PROGRESS NOTES
"  SUBJECTIVE:     Chief Complaint & History of Present Illness:  Hi Braxton is a  Established  patient 65 y.o. male who is seen here today with a complaint of    Chief Complaint   Patient presents with    Left Knee - Pain    Left Shoulder - Pain    . History of Present Illness    - Left shoulder pain and bilateral knee pain.    - Presents with left shoulder pain and bilateral knee pain  - Believes left shoulder pain is due to previous surgery  - Experiences severe pain when trying to raise arm, with limited range of motion and pain with movement  - Reports tightness in trapezius muscle since surgery, with limited neck rotation  - Recently went to urgent care due to inability to raise left shoulder and feeling sick  - At urgent care, received x-ray and shot in buttocks  - Urgent care provider mentioned "bone on bone" in shoulder and lack of cartilage causing pain  - Knee pain had previously resolved with injections but has now returned  - Received three shots in knee about two years ago, which provided relief at the time  - A week ago, had significant difficulty walking due to knee pain, but it has since improved  - Reports having had three surgeries on neck - one in front and two in back  - Denies current use of oxycodone  - Denies history of significant heart disease or stroke    - Mr. Braxton received knee injections a few years ago which provided relief for about two years.  - Mr. Braxton received a cortisone injection in the buttocks at urgent care for shoulder pain about 2-3 weeks ago.    - Works as a  for a [employer]  - Sits at a desk all day or works from home       On a scale of 1-10, with 10 being worst pain imaginable, he rates this pain as 4 on good days and 7 on bad days.  he describes the pain as sore and achy.    Review of patient's allergies indicates:   Allergen Reactions    Lisinopril Anaphylaxis and Nausea And Vomiting     General bad feeling    Lipitor [atorvastatin] " Other (See Comments)     Muscle aches    Adhesive     Crestor [rosuvastatin] Swelling     Lip swelling.     Jardiance [empagliflozin] Other (See Comments)     Possible related to recent UTI's     Metformin      Used XR and experienced flatulance and abdominal pain.          Current Outpatient Medications   Medication Sig Dispense Refill    ALPRAZolam (XANAX) 0.5 MG tablet TAKE 1 TABLET THREE TIMES DAILY AS NEEDED FOR ANXIETY 90 tablet 1    aspirin/acetaminophen/caffeine (EXCEDRIN MIGRAINE ORAL) Take 1 tablet by mouth daily as needed (Pain).      CONTOUR NEXT TEST STRIPS Strp USE TO TEST BLOOD SUGAR ONCE DAILY 25 strip 6    cyclobenzaprine (FLEXERIL) 10 MG tablet Take 1 tablet 3 times per day x2 days followed by 1 tablet in the evening as needed for muscle spasm and pain 30 tablet 0    docusate sodium (COLACE) 100 MG capsule Take 1 capsule (100 mg total) by mouth 2 (two) times daily as needed for Constipation. 30 capsule 0    DULCOLAX, BISACODYL, ORAL Take 1 tablet by mouth once daily.      ezetimibe (ZETIA) 10 mg tablet Take 10 mg by mouth once daily.      gabapentin (NEURONTIN) 300 MG capsule Take 2 capsules (600 mg total) by mouth 3 (three) times daily. 180 capsule 0    insulin (LANTUS SOLOSTAR U-100 INSULIN) glargine 100 units/mL SubQ pen Inject 25 Units into the skin every evening. 30 mL 1    metoprolol succinate (TOPROL-XL) 50 MG 24 hr tablet TAKE 1 AND 1/2 TABLETS (75 MG TOTAL) ONCE DAILY. 135 tablet 3    MICROLET LANCET Misc 1 lancet by Misc.(Non-Drug; Combo Route) route once daily. Test blood glucose once daily as instructed. 25 each 11    multivit-min/folic/vit K/lycop (MEN'S MULTIVITAMIN ORAL) Take 1 tablet by mouth once daily.      naproxen (NAPROSYN) 500 MG tablet Take 1 tablet (500 mg total) by mouth 2 (two) times daily with meals. 180 tablet 1    NIFEdipine (PROCARDIA-XL) 60 MG (OSM) 24 hr tablet TAKE 1 TABLET TWICE DAILY 180 tablet 3    nitroGLYCERIN (NITROSTAT) 0.3 MG SL tablet Place 1 tablet (0.3 mg  "total) under the tongue every 5 (five) minutes as needed for Chest pain. 100 tablet 3    ondansetron (ZOFRAN) 4 MG tablet Take 1 tablet (4 mg total) by mouth every 6 (six) hours as needed for Nausea. 30 tablet 0    oxyCODONE (ROXICODONE) 5 MG immediate release tablet Take 1 tablet (5 mg total) by mouth every 4 (four) hours as needed for Pain. 28 tablet 0    pantoprazole (PROTONIX) 40 MG tablet TAKE 1 TABLET TWICE DAILY 180 tablet 3    pen needle, diabetic (BD ULTRA-FINE CHET PEN NEEDLE) 32 gauge x 5/32" Ndle USE PEN NEEDLE AS INSTRUCTION WITH LANTUS INSULIN PRE-FILLED PEN. 200 each 1    pravastatin (PRAVACHOL) 80 MG tablet TAKE 1 TABLET EVERY DAY 90 tablet 3    prednisolon/gatiflox/bromfenac (PREDNISOL ACE-GATIFLOX-BROMFEN) 1-0.5-0.075 % DrpS Apply 1 drop to eye 3 (three) times daily. in operative eye for 1 month after surgery 5 mL 3    promethazine-dextromethorphan (PROMETHAZINE-DM) 6.25-15 mg/5 mL Syrp Take 5 mLs by mouth every 4 (four) hours as needed (cough). This medication can make you feel drowsy 118 mL 0    spironolactone (ALDACTONE) 50 MG tablet TAKE 1 TABLET EVERY DAY 90 tablet 3    tadalafiL (CIALIS) 20 MG Tab Take 1 tablet (20 mg total) by mouth every 72 hours as needed (ED). 15 tablet 11    telmisartan (MICARDIS) 80 MG Tab Take 1 tablet (80 mg total) by mouth once daily. 90 tablet 1     Current Facility-Administered Medications   Medication Dose Route Frequency Provider Last Rate Last Admin    betamethasone acetate-betamethasone sodium phosphate injection 6 mg  6 mg Intra-articular 1 time in Clinic/HOD Venkatesh Prieto PA-C        fentaNYL injection 25 mcg  25 mcg Intravenous On Call Procedure Elav Means MD        midazolam injection 1 mg  1 mg Intravenous On Call Procedure Elva Means MD        sodium hyaluronate (EUFLEXXA) 10 mg/mL(mw 2.4 -3.6 million) injection 20 mg  20 mg Intra-articular Weekly Venkatesh Prieto PA-C           Past Medical History:   Diagnosis Date    BPH with " obstruction/lower urinary tract symptoms 09/28/2017    Carotid artery occlusion     Chronic anterior uveitis 03/26/2013    Coronary artery disease     Diabetes mellitus, type 2     diet controlled    Difficult intubation     Distended bladder 06/23/2020    Dizziness     DJD (degenerative joint disease), cervical 07/10/2015    Dysuria 05/11/2018    GERD (gastroesophageal reflux disease)     History of iritis 2013    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 Dose of amlodipine increased from 5 to 10 mg in the weeks prior to the incidnt.    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       Past Surgical History:   Procedure Laterality Date    CATARACT EXTRACTION W/  INTRAOCULAR LENS IMPLANT Right 10/28/2024    Procedure: EXTRACTION, CATARACT, WITH IOL INSERTION;  Surgeon: Elva Means MD;  Location: [facility] OR;  Service: Ophthalmology;  Laterality: Right;    CATARACT EXTRACTION W/  INTRAOCULAR LENS IMPLANT Left 11/18/2024    Procedure: EXTRACTION, CATARACT, WITH IOL INSERTION;  Surgeon: Elva Means MD;  Location: [facility] OR;  Service: Ophthalmology;  Laterality: Left;    CEREBRAL ANGIOGRAM N/A 1/6/2020    Procedure: ANGIOGRAM-CEREBRAL;  Surgeon: [facility];  Location: [facility] OR [address] [place] [address];  Service: Radiology;  Laterality: N/A;    190/Nagi    CERVICAL FUSION  12/30/2015    COLONOSCOPY N/A 4/7/2017    Procedure: COLONOSCOPY;  Surgeon: Handy Frederick MD;  Location: [facility] ENDO (4TH FLR);  Service: Endoscopy;  Laterality: N/A;    COLONOSCOPY N/A 11/28/2023    Procedure: COLONOSCOPY;  Surgeon: ARMAAN Hinojosa MD;  Location: [facility] ENDO (4TH FLR);  Service: Endoscopy;  Laterality: N/A;  8/21-referred by Dr. Schulte/ Diagnosis:  Blood in stool, past due on surveillance colonoscopy for advanced colon polyp villous adenoma greater than 10 mm /Prep instructions handed to patient and to portal. AS  11/21/23-Precall complete-DS    CORONARY ANGIOGRAPHY N/A 10/10/2022    Procedure: ANGIOGRAM, CORONARY ARTERY;  Surgeon: Anson Nolan MD;  Location: [facility] CATH LAB;  Service: Cardiology;  Laterality: N/A;    ENDOSCOPIC ULTRASOUND OF UPPER GASTROINTESTINAL TRACT N/A 8/22/2023    Procedure: ULTRASOUND, UPPER GI TRACT, ENDOSCOPIC;  Surgeon: Joe Means MD;  Location: [facility] ENDO (2ND FLR);  Service: Endoscopy;  Laterality: N/A;  instr portal-pt stated difficult intubation with surgery in the past-tb    FUSION OF CERVICAL SPINE BY POSTERIOR APPROACH N/A 4/24/2024    Procedure: FUSION, SPINE, CERVICAL, POSTERIOR APPROACH C3-6 DEPUY  GW TONGS WTIH SUZI, FREDIS 4 POST SNS: MOTORS/SSEP/EMG;  Surgeon: Edd Burnette MD;  Location: [facility];  Service: Orthopedics;  Laterality: N/A;    FUSION OF CERVICAL SPINE BY POSTERIOR APPROACH N/A 7/26/2024    Procedure: FUSION, SPINE, CERVICAL, POSTERIOR APPROACH C3-T1 REVISION;  Surgeon: Edd Burnette MD;  Location: [facility] 2ND FLR;  Service: Orthopedics;  Laterality: N/A;    HERNIA REPAIR      PROSTATECTOMY         Vital Signs (Most Recent)  There were no vitals filed for this visit.    Review of Systems:  ROS:  Constitutional: no fever or chills  Eyes: no visual changes  ENT: no nasal congestion or sore throat  Respiratory: no cough or shortness of breath, positive pulmonary emphysema  Cardiovascular: no chest  "pain or palpitations, positive hypertension, history of coronary artery disease, peripheral vascular disease, subclavian steal syndrome, palpitations, unstable angina,  Gastrointestinal: no nausea or vomiting, tolerating diet, positive for GERD, dysphagia  Genitourinary: no hematuria or dysuria, positive renal cysts of the right  Integument/Breast: no rash or pruritis  Hematologic/Lymphatic: no easy bruising or lymphadenopathy  Musculoskeletal: no arthralgias or myalgias  Neurological: no seizures or tremors, cervical spinal stenosis, degenerative disc disease of the cervical spine cervical radiculopathy, anterior cervical arterial aneurysm, intermittent confusion, status post C3 through C6 hardware failure with revision surgery and fusion  Behavioral/Psych: no auditory or visual hallucinations, positive for anxiety disorder  Endocrine: no heat or cold intolerance, positive diabetes type 2 diet controlled, the thyroid nodule      OBJECTIVE:     PHYSICAL EXAM:  Height: 6' 4" (193 cm) Weight: 93 kg (205 lb 0.4 oz), General Appearance: Well nourished, well developed, in no acute distress.  Neurological: Mood & affect are normal.    Shoulder exam:  left  Tenderness: lateral acromial, deltoid muscle, trapezius muscle  ROM: forward flexion 130/130, extension 45/45, full abduction 100/100, abduction-glenohumeral 70/70, external rotation 40/40, pain at the extremes of mobility  Shoulder Strength: biceps 5/5, triceps 5/5, abduction 5/5, adduction 5/5, external rotation 5/5 with shoulder at side, flexion 5/5, and extension 5/5  positive for tenderness about the glenohumeral joint, positive for tenderness over the acromioclavicular joint, and positive for impingement sign  Stability tests: anterior apprehension test positive for pain only and posterior apprehension test negative  Special Tests:Cross-chest abduction: negative and [place]'s test: negative       Shoulder exam:  right  Tenderness: none  ROM: forward flexion " 180/180, extension 45/45, full abduction 180/180, abduction-glenohumeral 90/90, external rotation 50/50  Shoulder Strength: biceps 5/5, triceps 5/5, abduction 5/5, adduction 5/5, external rotation 5/5 with shoulder at side, flexion 5/5, and extension 5/5  negative for tenderness about the glenohumeral joint, negative for tenderness over the acromioclavicular joint, and negative for impingement sign  Stability tests: anterior apprehension test negative and posterior apprehension test negative  Special Tests:Cross-chest abduction: negative and [place]'s test: negative    left  Knee Exam:  Knee Range of Motion:limited by pain and 0-115 degrees flexion   Effusion:none  Condition of skin:intact  Location of tenderness:Medial joint line   Strength:limited by pain and 5 of 5  Stability:  Lachman: stable, LCL: stable, MCL: stable, PCL: stable, and posteromedial (dial): stable  Varus /Valgus stress:  normal  Mindi:   negative/negative    Back Exam:  left greater than right tenderness: mild, moderate, left paraspinous spasm, limitation of flexion: fairly severe, limitation of rotation to the right: fairly severe, limitation of rotation to the left: fairly severe, limitation of extension: fairly severe    Tenderness: Cervical   Swelling:  None       Range of Motion:      Flexion: 20     Extension: 10     Lateral Bend:   Right 20                              Left 15     Rotation:          Right 30                              Left 20       Muscle Strength    Biceps: 5/5    Triceps: 5/5       Reflexes     Biceps: 4/4    Triceps: 4/4     RADIOGRAPHS:  X-rays of the knees taken today films reviewed by me demonstrate moderate arthritic changes with medial joint space narrowing early sclerotic changes.  Mild chondromalacia patella no evidence of fracture dislocation or other bony abnormalities    X-rays of the shoulder taken at previous visit reviewed by me today demonstrate moderate arthritic changes with significant loss of the  glenohumeral space and evidence of impingement syndrome.  No evidence of fracture dislocation    X-rays of the cervical spine from previous visits demonstrate extensive hardware from C2 through C6 with no evidence of loosening at this time stable unchanged from previous x-rays      ASSESSMENT/PLAN:       ICD-10-CM ICD-9-CM   1. Chronic pain of left knee  M25.562 719.46    G89.29 338.29   2. Arthritis of knee  M17.10 716.96   3. Shoulder impingement  M25.819 719.81   4. Bursitis of left shoulder  M75.52 726.10   5. Primary osteoarthritis of left shoulder  M19.012 715.11   6. S/P cervical spinal fusion  Z98.1 V45.4       Plan:   Assessment & Plan    M19.011 Primary osteoarthritis, right shoulder  M25.511 Pain in right shoulder  M17.0 Bilateral primary osteoarthritis of knee  M54.2 Cervicalgia  M24.119 Other articular cartilage disorders, unspecified shoulder  M75.100 Unspecified rotator cuff tear or rupture of unspecified shoulder, not specified as traumatic  M19.91 Primary osteoarthritis, unspecified site  Z79.1 Long term (current) use of non-steroidal anti-inflammatories (NSAID)  Z82.49 Family history of ischemic heart disease and other diseases of the circulatory system  Z82.3 Family history of stroke    MEDICATIONS:  - Started Flexeril, to be taken 3 times daily for 2 days, then as needed.  - Discontinue methocarbamol.  - Take Flexeril and Xanax at different times, not simultaneously.  - Avoid driving while taking Flexeril with Xanax.    REFERRALS:  - Referred to physical therapy for shoulder.    PROCEDURES:  - Administered cortisone injection in the shoulder joint today.  - Ordered gel shots for the patient's knees, to start next week.    FOLLOW UP:  - Follow up in a few weeks to assess shoulder progress and continue knee gel shots.    PATIENT INSTRUCTIONS:  - Perform shoulder movements to spread injected medication.       The risks, benefits, pros, cons, and potential side effects of the procedure were discussed  with the patient in detail all questions were answered.  The patient is comfortable and willing to proceed with the procedure. Verbal consent was obtained and the proper joint was identified by the patient and provider      The injection site was identified and the skin was prepared with an ETOH solution. The    left  shoulder was injected with 1 ml of Celestone and 5 ml Lidocaine under sterile technique. Hi Braxton tolerated the procedure well, he was advised to rest the  shoulder  today, ice and support. he did receive immediate relief of the pain in and about his  shoulder  he was told this would be short lived and is secondary to the lidocaine. he may have an increase in his discomfort tonight followed by steady improvement over the next several days. It may take 1-3 weeks following the injection to get the full benefit of the medication.  I will see him back in 1 week. Sooner if he has any problems or concerns.    Hi Braxton was advised to monitor his blood sugars closely over the next several days. The steroid may cause a rise in them. If his glucose levels rise to a point he is uncomfortable or he is unable to control them is is to contact his PCP or go immediately to the emergency department.     This note was generated with the assistance of ambient listening technology. Verbal consent was obtained by the patient and accompanying visitor(s) for the recording of patient appointment to facilitate this note. I attest to having reviewed and edited the generated note for accuracy, though some syntax or spelling errors may persist. Please contact the author of this note for any clarification.

## 2025-01-29 DIAGNOSIS — M25.562 CHRONIC PAIN OF LEFT KNEE: Primary | ICD-10-CM

## 2025-01-29 DIAGNOSIS — M17.10 ARTHRITIS OF KNEE: ICD-10-CM

## 2025-01-29 DIAGNOSIS — G89.29 CHRONIC PAIN OF LEFT KNEE: Primary | ICD-10-CM

## 2025-01-29 RX ORDER — CYCLOBENZAPRINE HCL 10 MG
TABLET ORAL
Qty: 30 TABLET | Refills: 0 | Status: SHIPPED | OUTPATIENT
Start: 2025-01-29

## 2025-02-03 ENCOUNTER — TELEPHONE (OUTPATIENT)
Dept: ORTHOPEDICS | Facility: CLINIC | Age: 65
End: 2025-02-03
Payer: MEDICARE

## 2025-02-04 ENCOUNTER — OFFICE VISIT (OUTPATIENT)
Dept: ORTHOPEDICS | Facility: CLINIC | Age: 65
End: 2025-02-04
Payer: MEDICARE

## 2025-02-04 VITALS — BODY MASS INDEX: 22.15 KG/M2 | HEIGHT: 76 IN | WEIGHT: 181.88 LBS

## 2025-02-04 DIAGNOSIS — M17.12 PRIMARY OSTEOARTHRITIS OF LEFT KNEE: Primary | ICD-10-CM

## 2025-02-04 PROCEDURE — 20610 DRAIN/INJ JOINT/BURSA W/O US: CPT | Mod: HCNC,LT,S$GLB, | Performed by: PHYSICIAN ASSISTANT

## 2025-02-04 PROCEDURE — 99999 PR PBB SHADOW E&M-EST. PATIENT-LVL II: CPT | Mod: PBBFAC,HCNC,, | Performed by: PHYSICIAN ASSISTANT

## 2025-02-04 PROCEDURE — 99499 UNLISTED E&M SERVICE: CPT | Mod: HCNC,S$GLB,, | Performed by: PHYSICIAN ASSISTANT

## 2025-02-04 NOTE — PROGRESS NOTES
Hi Braxton is a 65 y.o. year old his here today for his 1st Synvisc-One injection for degenerative changes of his left knee . he was last seen and treated in the clinic on 1/28/2025. There has been no significant change in his medical status since his last visit. No Fever, chills, malaise, or unexplained weight change.      Allergies, Medications, past medical and surgical history were reviewed .    Examination of the knee demonstrates  No evidence of edema, erythema , echymosis strength and range of motion are unchanged from previous visit.    The risks, benefits, pros, cons, and potential side effects of the procedure were discussed with the patient in detail all questions were answered.  The patient is comfortable and willing to proceed with the procedure. Verbal consent was obtained and the proper joint was identified by the patient and provider    The injection site was identified and the skin was prepared with a betadine solution. The  left knee  joint was injected with 6 ml of Synvisc-One solution under sterile technique. Hi Braxton tolerated the procedure well, he was advised to rest the knee today, ice and elevation. I may take 3 -6 weeks following the last injection to get the full benefit of the medication.  I will see him back in 6 months. Sooner if he has any problems or concerns.      ICD-10-CM ICD-9-CM   1. Primary osteoarthritis of left knee  M17.12 715.16              .

## 2025-02-11 ENCOUNTER — CLINICAL SUPPORT (OUTPATIENT)
Dept: REHABILITATION | Facility: HOSPITAL | Age: 65
End: 2025-02-11
Payer: MEDICARE

## 2025-02-11 DIAGNOSIS — M19.012 PRIMARY OSTEOARTHRITIS OF LEFT SHOULDER: ICD-10-CM

## 2025-02-11 DIAGNOSIS — Z98.1 S/P CERVICAL SPINAL FUSION: ICD-10-CM

## 2025-02-11 DIAGNOSIS — M25.612 DECREASED ROM OF LEFT SHOULDER: Primary | ICD-10-CM

## 2025-02-11 PROCEDURE — 97110 THERAPEUTIC EXERCISES: CPT | Mod: HCNC,PO

## 2025-02-11 PROCEDURE — 97161 PT EVAL LOW COMPLEX 20 MIN: CPT | Mod: HCNC,PO

## 2025-02-18 ENCOUNTER — CLINICAL SUPPORT (OUTPATIENT)
Dept: REHABILITATION | Facility: HOSPITAL | Age: 65
End: 2025-02-18
Payer: MEDICARE

## 2025-02-18 DIAGNOSIS — M25.612 DECREASED ROM OF LEFT SHOULDER: ICD-10-CM

## 2025-02-18 DIAGNOSIS — M19.012 PRIMARY OSTEOARTHRITIS OF LEFT SHOULDER: Primary | ICD-10-CM

## 2025-02-18 DIAGNOSIS — Z98.1 S/P CERVICAL SPINAL FUSION: ICD-10-CM

## 2025-02-18 PROCEDURE — 97110 THERAPEUTIC EXERCISES: CPT | Mod: HCNC,PO

## 2025-02-18 PROCEDURE — 97140 MANUAL THERAPY 1/> REGIONS: CPT | Mod: HCNC,PO

## 2025-02-18 PROCEDURE — 97112 NEUROMUSCULAR REEDUCATION: CPT | Mod: HCNC,PO

## 2025-02-18 NOTE — PROGRESS NOTES
Outpatient Rehab    Physical Therapy Evaluation    Patient Name: Hi Braxton  MRN: [id number]  YOB: 1960  Today's Date: 2/18/2025    Therapy Diagnosis:   Encounter Diagnoses   Name Primary?    Primary osteoarthritis of left shoulder     S/P cervical spinal fusion     Decreased ROM of left shoulder Yes     Physician: Venkatesh Prieto PA*    Physician Orders: Eval and Treat  Medical Diagnosis:   M19.012 (ICD-10-CM) - Primary osteoarthritis of left shoulder   Z98.1 (ICD-10-CM) - S/P cervical spinal fusion       Visit # / Visits Authorized:  1 / 1   Date of Evaluation:  2/11/2025   Insurance Authorization Period: 1/28/2025 to 1/28/2026  Plan of Care Certification:  2/11/2025 to 5/30/2025      Time In:   5:00 PM  Time Out:  5:55 PM  Total Time:   55 mins  Total Billable Time: 55 mins    Intake Outcome Measure for FOTO Survey    Therapist reviewed FOTO scores for Hi Braxton on 2/11/2025.   FOTO report - see Media section or FOTO account episode details.     Intake Score: 18.3%         Subjective   History of Present Illness  Hi is a 65 y.o. male who reports to physical therapy with a chief concern of L shoulder pain. According to the patient's chart, Hi has a past medical history of BPH with obstruction/lower urinary tract symptoms, Carotid artery occlusion, Chronic anterior uveitis, Coronary artery disease, Diabetes mellitus, type 2, Difficult intubation, Distended bladder, Dizziness, DJD (degenerative joint disease), cervical, Dysuria, GERD (gastroesophageal reflux disease), History of iritis, Hyperlipidemia, Hypertension, Hypokalemia, Lateral epicondylitis (tennis elbow), Lip swelling, Memory loss, Memory loss, Mixed hyperlipidemia, Muscle ache, New daily persistent headache, Pterygium eye, left, Pyelonephritis, Rectal bleeding, Recurrent UTI, S/P TURP, Suspected sleep apnea, Thyroid nodule, Traumatic iritis - Left Eye, Traumatic iritis - Left Eye, Trigger finger of left hand, and  Unspecified iridocyclitis - Left Eye. Hi has a past surgical history that includes Hernia repair; Cervical fusion (12/30/2015); Colonoscopy (N/A, 4/7/2017); Prostatectomy; Cerebral angiogram (N/A, 1/6/2020); Coronary angiography (N/A, 10/10/2022); Endoscopic ultrasound of upper gastrointestinal tract (N/A, 8/22/2023); Colonoscopy (N/A, 11/28/2023); Fusion of cervical spine by posterior approach (N/A, 4/24/2024); Fusion of cervical spine by posterior approach (N/A, 7/26/2024); Cataract extraction w/  intraocular lens implant (Right, 10/28/2024); and Cataract extraction w/  intraocular lens implant (Left, 11/18/2024).    The patient reports a medical diagnosis of M19.012 (ICD-10-CM) - Primary osteoarthritis of left shoulder  Z98.1 (ICD-10-CM) - S/P cervical spinal fusion.    Diagnostic tests related to this condition: X-ray.   X-Ray Details: No evidence for acute fracture, bone destruction, or dislocation.     Degenerative changes of the glenohumeral and acromioclavicular joints, similar to the prior study dated 07/08/2024.    Dominant Hand: Right  History of Present Condition/Illness: Pt is a 66 yo male who presents to clinic w/ c/o chronic L shoulder pain.  Pain aggravates with overhead activities, reach out and lifting objects. Pain improves with rest.  Pain is located over bicipital groove and lateral shoulder. Denies any numbness, tingling or radiating pain. Pt would like to improve shoulder pain with daily activities.     Activities of Daily Living  Social history was obtained from Patient.    General Prior Level of Function Comments: Independent  General Current Level of Function Comments: limited due to shoulder pain  Patient Responsibilities: Community mobility, Driving, Financial management, Health management, Home management, Laundry, Meal prep, Personal ADL, Shopping, Yard work    Previously independent with activities of daily living? Yes     Currently independent with activities of daily living?  Yes          Previously independent with instrumental activities of daily living? Yes     Currently independent with instrumental activities of daily living? Yes              Pain     Patient reports a current pain level of 2/10. Pain at best is reported as 2/10. Pain at worst is reported as 6/10.   Location: bicipital groove and lateral shoulder  Clinical Progression (since onset): Stable  Pain Qualities: Dull, Aching  Pain-Relieving Factors: Activity modification, Rest, Medications - prescription, Medications - over-the-counter  Pain-Aggravating Factors: Lifting, Holding objects, Exercise, Reaching           Past Medical History/Physical Systems Review:   Hi Braxton  has a past medical history of BPH with obstruction/lower urinary tract symptoms, Carotid artery occlusion, Chronic anterior uveitis, Coronary artery disease, Diabetes mellitus, type 2, Difficult intubation, Distended bladder, Dizziness, DJD (degenerative joint disease), cervical, Dysuria, GERD (gastroesophageal reflux disease), History of iritis, Hyperlipidemia, Hypertension, Hypokalemia, Lateral epicondylitis (tennis elbow), Lip swelling, Memory loss, Memory loss, Mixed hyperlipidemia, Muscle ache, New daily persistent headache, Pterygium eye, left, Pyelonephritis, Rectal bleeding, Recurrent UTI, S/P TURP, Suspected sleep apnea, Thyroid nodule, Traumatic iritis - Left Eye, Traumatic iritis - Left Eye, Trigger finger of left hand, and Unspecified iridocyclitis - Left Eye.    Hi Braxton  has a past surgical history that includes Hernia repair; Cervical fusion (12/30/2015); Colonoscopy (N/A, 4/7/2017); Prostatectomy; Cerebral angiogram (N/A, 1/6/2020); Coronary angiography (N/A, 10/10/2022); Endoscopic ultrasound of upper gastrointestinal tract (N/A, 8/22/2023); Colonoscopy (N/A, 11/28/2023); Fusion of cervical spine by posterior approach (N/A, 4/24/2024); Fusion of cervical spine by posterior approach (N/A, 7/26/2024); Cataract extraction  w/  intraocular lens implant (Right, 10/28/2024); and Cataract extraction w/  intraocular lens implant (Left, 11/18/2024).    Hi has a current medication list which includes the following prescription(s): alprazolam, aspirin/acetaminophen/caffeine, contour next test strips, cyclobenzaprine, docusate sodium, bisacodyl, ezetimibe, gabapentin, lantus solostar u-100 insulin, metoprolol succinate, microlet lancet, multivit-min/folic/vit k/lycop, naproxen, nifedipine, nitroglycerin, ondansetron, oxycodone, pantoprazole, pen needle, diabetic, pravastatin, prednisol ace-gatiflox-bromfen, promethazine-dextromethorphan, spironolactone, tadalafil, and telmisartan, and the following Facility-Administered Medications: fentanyl and midazolam.    Review of patient's allergies indicates:   Allergen Reactions    Lisinopril Anaphylaxis and Nausea And Vomiting     General bad feeling    Lipitor [atorvastatin] Other (See Comments)     Muscle aches    Adhesive     Crestor [rosuvastatin] Swelling     Lip swelling.     Jardiance [empagliflozin] Other (See Comments)     Possible related to recent UTI's     Metformin      Used XR and experienced flatulance and abdominal pain.         Objective   Posture  Patient presents with a Forward head position. Flat cervical spine is observed.   Shoulders are Rounded. Left scapula is: Depressed and Downwardly Rotated              Subcranial Range of Motion   Active Restricted? Passive Restricted? Pain   Flexion         Protraction         Retraction           WNL: Flexion and Extension         Thoracic Range of Motion  WNL: Flexion              Shoulder Range of Motion  Right Shoulder   Active (deg) Passive (deg) Pain   Flexion 160 170     Extension         Scaption         ABduction 135 145     ADduction         Horizontal ABduction         Horizontal ADduction         External Rotation (Shoulder ABducted 0 degrees) 55 60     External Rotation (Shoulder ABducted 45 degrees)         External  Rotation (Shoulder ABducted 90 degrees)         Internal Rotation (Shoulder ABducted 0 degrees) 45 50     Internal Rotation (Shoulder ABducted 45 degrees)         Internal Rotation (Shoulder ABducted 90 degrees)           Left Shoulder   Active (deg) Passive (deg) Pain   Flexion 140 150     Extension         Scaption         ABduction 130 135     ADduction         Horizontal ABduction         Horizontal ADduction         External Rotation (Shoulder ABducted 0 degrees) 45 50     External Rotation (Shoulder ABducted 45 degrees)         External Rotation (Shoulder ABducted 90 degrees)         Internal Rotation (Shoulder ABducted 0 degrees) 40 45     Internal Rotation (Shoulder ABducted 45 degrees)         Internal Rotation (Shoulder ABducted 90 degrees)                       Shoulder Strength - Planes of Motion   Right Strength Right Pain Left Strength Left  Pain   Flexion 4   4-     Extension           ABduction 4+   4-     ADduction           Horizontal ABduction           Horizontal ADduction           Internal Rotation 0° 4+   4     Internal Rotation 90°           External Rotation 0° 4   3+     External Rotation 90°               Shoulder Strength - Scapular Stabilizing Muscles   Right Strength Right Pain Left Strength Left  Pain   Lower Trapezius 3   3     Middle Trapezius 3   3     Rhomboids 4   4                 Cervical Screen  Cervical Range of Motion  Less than 60 degrees rotation to involved side? No  Flexion WNL? Yes  Extension WNL? Yes  Thoracic Range of Motion  Flexion WNL? Yes  Extension WNL? No       Shoulder Special Tests  Rotator Cuff Tests  Positive: Left Empty Can and Left External Rotation Lag Sign  Negative: Right Bear Hug, Left Bear Hug, Right Drop Arm, Left Drop Arm, Right Empty Can, and Right External Rotation Lag Sign  Impingement Tests  Positive: Left Costello-Syed and Left Painful Arc  Negative: Right Costello-Syed and Right Painful Arc           Shoulder Joint Mobility  Right Shoulder  Mobility  Normal: Posterior Capsule Mobility and Inferior Capsule Mobility  Left Shoulder Mobility  Hypomobile: Posterior Capsule Mobility and Inferior Capsule Mobility                        Treatment:   ER walk out with band 3 x 10   No moneys 3 x 10   Wall slides 3 x 10   Shoulder flexion AAROM 3 x 10    Assessment & Plan   Assessment  Hi presents with a condition of Low complexity.   Presentation of Symptoms: Stable  Will Comorbidities Impact Care: No       Functional Limitations: Activity tolerance, Carrying objects, Completing self-care activities, Functional mobility, Getting off the floor, Pain when reaching, Participating in leisure activities, Performing household chores, Range of motion, Reaching  Impairments: Abnormal muscle firing, Abnormal muscle tone, Abnormal or restricted range of motion, Activity intolerance, Lack of appropriate home exercise program, Pain with functional activity, Impaired physical strength    Patient Goal for Therapy (PT): improve shoulder pain with daily activties  Prognosis: Good  Assessment Details: Hi is a 66 yo male who is referred to outpatient Physical Therapy with a medical diagnosis of 19.012 (ICD-10-CM) - Primary osteoarthritis of left shoulder. Pt presents with limited and/or painful active ROM, RC and periscapular muscle weakness, tenderness/tightness of posterior cuff and lats, and functional limitations of overhead activities. Pt presents with signs and symptoms are consistent with AGMR shoulder impingement.     Plan  From a physical therapy perspective, the patient would benefit from: Skilled Rehab Services    Planned therapy interventions include: Therapeutic exercise, Therapeutic activities, Neuromuscular re-education, and Manual therapy.            Visit Frequency: 1 times Per Week for 12 Weeks.       This plan was discussed with Patient.   Discussion participants: Agreed Upon Plan of Care             Patient's spiritual, cultural, and educational needs  considered and patient agreeable to plan of care and goals.           Goals:   Active       Functional outcome       Patient will show a significant change in 20% patient-reported outcome tool to demonstrate subjective improvement       Start:  02/18/25    Expected End:  04/15/25            Patient stated goal: improve shoulder pain with daily activties        Start:  02/18/25    Expected End:  03/18/25            Patient will demonstrate independence in home program for support of progression       Start:  02/18/25    Expected End:  03/18/25               Pain       Patient will report pain of 2/10 demonstrating a reduction of overall pain       Start:  02/18/25    Expected End:  03/18/25               Range of Motion       Patient will achieve left shoulder flexion of 170 degrees       Start:  02/18/25    Expected End:  03/18/25            Patient will achieve left shoulder external rotation of 80 degrees in 90 degrees abd       Start:  02/18/25    Expected End:  03/18/25               Strength       Patient will achieve left shoulder flexion strength of 4/5       Start:  02/18/25    Expected End:  04/29/25            Patient will achieve left shoulder external rotation strength of 4/5 in neutral       Start:  02/18/25    Expected End:  04/29/25                Wilber Joseph, PT

## 2025-02-19 ENCOUNTER — PATIENT MESSAGE (OUTPATIENT)
Dept: CARDIOLOGY | Facility: HOSPITAL | Age: 65
End: 2025-02-19
Payer: MEDICARE

## 2025-02-24 ENCOUNTER — CLINICAL SUPPORT (OUTPATIENT)
Dept: REHABILITATION | Facility: HOSPITAL | Age: 65
End: 2025-02-24
Payer: MEDICARE

## 2025-02-24 DIAGNOSIS — M25.612 DECREASED ROM OF LEFT SHOULDER: ICD-10-CM

## 2025-02-24 DIAGNOSIS — Z98.1 S/P CERVICAL SPINAL FUSION: ICD-10-CM

## 2025-02-24 DIAGNOSIS — M19.012 PRIMARY OSTEOARTHRITIS OF LEFT SHOULDER: Primary | ICD-10-CM

## 2025-02-24 PROCEDURE — 97140 MANUAL THERAPY 1/> REGIONS: CPT | Mod: HCNC,PO

## 2025-02-24 PROCEDURE — 97110 THERAPEUTIC EXERCISES: CPT | Mod: HCNC,PO

## 2025-02-24 PROCEDURE — 97112 NEUROMUSCULAR REEDUCATION: CPT | Mod: HCNC,PO

## 2025-03-03 NOTE — PROGRESS NOTES
Outpatient Rehab    Physical Therapy Visit    Patient Name: Hi Braxton  MRN: [id number]  YOB: 1960  Encounter Date: 2/18/2025    Therapy Diagnosis: No diagnosis found.  Physician: Venkatesh Prieto PA*    Physician Orders: Eval and Treat  Medical Diagnosis:   M19.012 (ICD-10-CM) - Primary osteoarthritis of left shoulder   Z98.1 (ICD-10-CM) - S/P cervical spinal fusion       Visit # / Visits Authorized:  1 / 20  Date of Evaluation:  2/18/2025  Insurance Authorization Period: 2/11/2025 to 4/24/2025  Plan of Care Certification:  2/18/2025 to 5/13/2025      Time In:   5:00 PM  Time Out:  6:00 PM  Total Time:   60 mins  Total Billable Time: 60 mins    FOTO:  Intake Score:  %  Survey Score 1:  %  Survey Score 2:  %         Subjective   Doing Okay, has been doing exercise at home.  Pain reported as 2/10.      Objective            Treatment:  Therapeutic Exercise  Therapeutic Exercise Activity 1: Supine cane flexion 3 x 15  Therapeutic Exercise Activity 2: SL ER with 2# DB 3 x 15  Therapeutic Exercise Activity 3: Standing T with G band 3 x 15  Therapeutic Exercise Activity 4: Thoracic extension on chair 3 x 10    Manual Therapy  Manual Therapy Activity 1: Posterior/inferior GH glide grade III    Balance/Neuromuscular Re-Education  Balance/Neuromuscular Re-Education Activity 1: Wall ball 30 sec x 3  Balance/Neuromuscular Re-Education Activity 2: SA wall slide with towel 3 x 10  Balance/Neuromuscular Re-Education Activity 3: Landmine press with 5# 3 x 12  Balance/Neuromuscular Re-Education Activity 4: Seated cable pull 10# 3 x 15    Assessment & Plan   Assessment: Pt presented to clinic w/ improved shoulder AROM and improved pain from last week. Session today focus on shoulder RC control and periscapular muscle strengthening with focus on correcting scapula anterior tipping. Pt jessica progressed session well w/o increased shoulder pain. Will continue to progress per tolerance  Evaluation/Treatment  Tolerance: Patient tolerated treatment well    Patient will continue to benefit from skilled outpatient physical therapy to address the deficits listed in the problem list box on initial evaluation, provide pt/family education and to maximize pt's level of independence in the home and community environment.     Patient's spiritual, cultural, and educational needs considered and patient agreeable to plan of care and goals.           Plan:      Goals:   Active       Functional outcome       Patient will show a significant change in 20% patient-reported outcome tool to demonstrate subjective improvement       Start:  02/18/25    Expected End:  04/15/25            Patient stated goal: improve shoulder pain with daily activties        Start:  02/18/25    Expected End:  03/18/25            Patient will demonstrate independence in home program for support of progression       Start:  02/18/25    Expected End:  03/18/25               Pain       Patient will report pain of 2/10 demonstrating a reduction of overall pain       Start:  02/18/25    Expected End:  03/18/25               Range of Motion       Patient will achieve left shoulder flexion of 170 degrees       Start:  02/18/25    Expected End:  03/18/25            Patient will achieve left shoulder external rotation of 80 degrees in 90 degrees abd       Start:  02/18/25    Expected End:  03/18/25               Strength       Patient will achieve left shoulder flexion strength of 4/5       Start:  02/18/25    Expected End:  04/29/25            Patient will achieve left shoulder external rotation strength of 4/5 in neutral       Start:  02/18/25    Expected End:  04/29/25                Wilber Joseph, PT

## 2025-03-07 NOTE — PROGRESS NOTES
Outpatient Rehab    Physical Therapy Visit    Patient Name: Hi Braxton  MRN: [id number]  YOB: 1960  Encounter Date: 2/24/2025    Therapy Diagnosis:   Encounter Diagnoses   Name Primary?    Primary osteoarthritis of left shoulder Yes    S/P cervical spinal fusion     Decreased ROM of left shoulder      Physician: Venkatesh Prieto PA*    Physician Orders: Eval and Treat  Medical Diagnosis:   M19.012 (ICD-10-CM) - Primary osteoarthritis of left shoulder   Z98.1 (ICD-10-CM) - S/P cervical spinal fusion       Visit # / Visits Authorized:  1 / 20   Date of Evaluation:  2/18/2025  Insurance Authorization Period: 2/11/2025 to 4/24/2025  Plan of Care Certification:  2/8/2025 to 5/13/2025      Time In: 1700   Time Out: 1800  Total Time: 60   Total Billable Time: 60 mins    FOTO:  Intake Score:  %  Survey Score 1:  %  Survey Score 2:  %         Subjective   Doing well, no new complaints upon arrival today..  Pain reported as 2/10.      Objective            Treatment:  Therapeutic Exercise  Therapeutic Exercise Activity 1: Supine cane flexion 3 x 15  Therapeutic Exercise Activity 2: SL ER with 2# DB 3 x 15  Therapeutic Exercise Activity 3: Standing T with G band 3 x 15  Therapeutic Exercise Activity 4: Thoracic extension on chair 3 x 10  Therapeutic Exercise Activity 5: Half kneeling open book 3 x 10    Manual Therapy  Manual Therapy Activity 1: Posterior/inferior GH glide grade III    Balance/Neuromuscular Re-Education  Balance/Neuromuscular Re-Education Activity 1: Wall ball 30 sec x 3  Balance/Neuromuscular Re-Education Activity 2: SA wall slide with towel 3 x 10  Balance/Neuromuscular Re-Education Activity 3: Landmine press with 5# 3 x 12  Balance/Neuromuscular Re-Education Activity 4: Seated cable pull 10# 3 x 15    Assessment & Plan   Assessment: Pt presented to clinic w/ improved shoulder AROM and cuff activation from last week. Session today focus on shoulder RC control and periscapular muscle  strengthening with focus on correcting scapula anterior tipping. Pt jessica progressed session well w/o increased shoulder pain. Will continue to progress per tolerance       Patient will continue to benefit from skilled outpatient physical therapy to address the deficits listed in the problem list box on initial evaluation, provide pt/family education and to maximize pt's level of independence in the home and community environment.     Patient's spiritual, cultural, and educational needs considered and patient agreeable to plan of care and goals.           Plan: Will continue to focus on cuff activation and periscapula muscle strengthening.    Goals:   Active       Functional outcome       Patient will show a significant change in 20% patient-reported outcome tool to demonstrate subjective improvement       Start:  02/18/25    Expected End:  04/15/25            Patient stated goal: improve shoulder pain with daily activties        Start:  02/18/25    Expected End:  03/18/25            Patient will demonstrate independence in home program for support of progression       Start:  02/18/25    Expected End:  03/18/25               Pain       Patient will report pain of 2/10 demonstrating a reduction of overall pain       Start:  02/18/25    Expected End:  03/18/25               Range of Motion       Patient will achieve left shoulder flexion of 170 degrees       Start:  02/18/25    Expected End:  03/18/25            Patient will achieve left shoulder external rotation of 80 degrees in 90 degrees abd       Start:  02/18/25    Expected End:  03/18/25               Strength       Patient will achieve left shoulder flexion strength of 4/5       Start:  02/18/25    Expected End:  04/29/25            Patient will achieve left shoulder external rotation strength of 4/5 in neutral       Start:  02/18/25    Expected End:  04/29/25                Wilber Joseph, PT

## 2025-03-11 ENCOUNTER — CLINICAL SUPPORT (OUTPATIENT)
Dept: REHABILITATION | Facility: HOSPITAL | Age: 65
End: 2025-03-11
Payer: MEDICARE

## 2025-03-11 DIAGNOSIS — Z98.1 S/P CERVICAL SPINAL FUSION: ICD-10-CM

## 2025-03-11 DIAGNOSIS — M25.612 DECREASED ROM OF LEFT SHOULDER: ICD-10-CM

## 2025-03-11 DIAGNOSIS — M19.012 PRIMARY OSTEOARTHRITIS OF LEFT SHOULDER: Primary | ICD-10-CM

## 2025-03-11 PROCEDURE — 97530 THERAPEUTIC ACTIVITIES: CPT | Mod: HCNC,PO

## 2025-03-11 PROCEDURE — 97110 THERAPEUTIC EXERCISES: CPT | Mod: HCNC,PO

## 2025-03-11 PROCEDURE — 97140 MANUAL THERAPY 1/> REGIONS: CPT | Mod: HCNC,PO

## 2025-03-11 PROCEDURE — 97112 NEUROMUSCULAR REEDUCATION: CPT | Mod: HCNC,PO

## 2025-03-18 ENCOUNTER — CLINICAL SUPPORT (OUTPATIENT)
Dept: REHABILITATION | Facility: HOSPITAL | Age: 65
End: 2025-03-18
Payer: MEDICARE

## 2025-03-18 DIAGNOSIS — Z98.1 S/P CERVICAL SPINAL FUSION: ICD-10-CM

## 2025-03-18 DIAGNOSIS — M25.612 DECREASED ROM OF LEFT SHOULDER: ICD-10-CM

## 2025-03-18 DIAGNOSIS — M19.012 PRIMARY OSTEOARTHRITIS OF LEFT SHOULDER: Primary | ICD-10-CM

## 2025-03-18 PROCEDURE — 97112 NEUROMUSCULAR REEDUCATION: CPT | Mod: HCNC,PO

## 2025-03-18 PROCEDURE — 97530 THERAPEUTIC ACTIVITIES: CPT | Mod: HCNC,PO

## 2025-03-18 PROCEDURE — 97110 THERAPEUTIC EXERCISES: CPT | Mod: HCNC,PO

## 2025-03-21 ENCOUNTER — HOSPITAL ENCOUNTER (EMERGENCY)
Facility: HOSPITAL | Age: 65
Discharge: HOME | End: 2025-03-21
Attending: EMERGENCY MEDICINE
Payer: MEDICARE

## 2025-03-21 VITALS
HEIGHT: 76 IN | TEMPERATURE: 98 F | WEIGHT: 181 LBS | HEART RATE: 67 BPM | BODY MASS INDEX: 22.04 KG/M2 | DIASTOLIC BLOOD PRESSURE: 73 MMHG | OXYGEN SATURATION: 98 % | RESPIRATION RATE: 20 BRPM | SYSTOLIC BLOOD PRESSURE: 128 MMHG

## 2025-03-21 DIAGNOSIS — M79.642 LEFT HAND PAIN: ICD-10-CM

## 2025-03-21 DIAGNOSIS — Z98.1 HISTORY OF FUSION OF CERVICAL SPINE: ICD-10-CM

## 2025-03-21 DIAGNOSIS — M54.2 NECK PAIN: ICD-10-CM
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
## 2025-03-21 PROCEDURE — 99284 EMERGENCY DEPT VISIT MOD MDM: CPT | Mod: 25,HCNC

## 2025-03-21 PROCEDURE — 25000003 PHARM REV CODE 250: Mod: HCNC | Performed by: PHYSICIAN ASSISTANT

## 2025-03-21 RX ORDER — OXYCODONE HYDROCHLORIDE 5 MG/1
5 TABLET ORAL EVERY 4 HOURS PRN
Qty: 7 TABLET | Refills: 0 | Status: SHIPPED | OUTPATIENT
Start: 2025-03-21 | End: 2025-03-24

## 2025-03-21 RX ORDER — METHOCARBAMOL 500 MG/1
1000 TABLET, FILM COATED ORAL 2 TIMES DAILY PRN
Qty: 20 TABLET | Refills: 0 | Status: SHIPPED | OUTPATIENT
Start: 2025-03-21 | End: 2025-03-26

## 2025-03-21 RX ORDER — OXYCODONE HYDROCHLORIDE 5 MG/1
5 TABLET ORAL
Refills: 0 | Status: COMPLETED | OUTPATIENT
Start: 2025-03-21 | End: 2025-03-21

## 2025-03-21 RX ADMIN — OXYCODONE 5 MG: 5 TABLET ORAL at 08:03

## 2025-03-21 NOTE — Clinical Note
"Hi Gerard" Fox was seen and treated in our emergency department on 3/21/2025.  He may return to work on 03/24/2025.       If you have any questions or concerns, please don't hesitate to call.      Handy Maldonado PA-C"

## 2025-03-21 NOTE — ED TRIAGE NOTES
Pt reports mechanical fall this morning, pt complaining on LUE, LLE, and L side pain after the fall. -LOC, pt unsure if he hit is head. Pt reports torn rotator cuff on L side.

## 2025-03-21 NOTE — DISCHARGE INSTRUCTIONS
Follow-up with your primary care provider and spine doctor for further management of your neck pain.  Take the prescribed Oxycodone and Robaxin as discussed for your pain as needed.    Return to the emergency room for new, worsening, or concerning symptoms.     Future Appointments   Date Time Provider Department Center   3/25/2025  5:00 PM Wilber Joseph, PT [facility]   5/15/2025  3:15 PM Handy Farooq, OD [facility] [place]

## 2025-03-21 NOTE — ED PROVIDER NOTES
Encounter Date: 3/21/2025       History     Chief Complaint   Patient presents with    Fall     Fell at 7a, tripped at home on dog gate, fell onto left side, now having pain to left side. Hx neck and back surgery and torn rotator cuff on left      The history is provided by the patient and medical records. No  was used.     Hi Braxton is a 65 y.o. male with medical history of T2DM, Cervical fusion, HTN, HLD, GERD presenting to the ED with the chief complaint of fall.     Experienced a ground-level, mechanical, non-syncopal fall this morning. Tripped over a dog gate causing him to fall. Landed on his L hand and L side. He is unsure if he hit his head. He is experiencing L sided neck pain, L shoulder pain, L hand pain. He has associated numbness in his L hand. He recently had c-spine surgery with Dr. Burnette in [facility] 6 months ago. He has chronic neck pain but states he feels like his neck pain today is more in the middle. He also states his L hand numbness is new. He was able to stand up and ambulate after the fall. Wife heard him fall and denied LOC. No blood thinner use. He was feeling at his baseline health prior to the fall.     Review of patient's allergies indicates:   Allergen Reactions    Lisinopril Anaphylaxis and Nausea And Vomiting     General bad feeling    Lipitor [atorvastatin] Other (See Comments)     Muscle aches    Adhesive     Crestor [rosuvastatin] Swelling     Lip swelling.     Jardiance [empagliflozin] Other (See Comments)     Possible related to recent UTI's     Metformin      Used XR and experienced flatulance and abdominal pain.      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 Dose of amlodipine increased from 5 to 10 mg in the weeks prior to the incidnt.    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     Past Surgical History:   Procedure Laterality Date    CATARACT EXTRACTION W/  INTRAOCULAR LENS IMPLANT Right 10/28/2024    Procedure: EXTRACTION, CATARACT, WITH IOL INSERTION;  Surgeon: Elva Means MD;  [facility];  Service: Ophthalmology;  Laterality: Right;    CATARACT EXTRACTION W/  INTRAOCULAR LENS IMPLANT Left 11/18/2024    Procedure: EXTRACTION, CATARACT, WITH IOL INSERTION;  Surgeon: Elva Means MD;  [facility];  Service: Ophthalmology;  Laterality: Left;    CEREBRAL ANGIOGRAM N/A 1/6/2020    Procedure: ANGIOGRAM-CEREBRAL;  Surgeon: [facility] Provider;  Location: [facility] OR 2ND FLR;  Service: Radiology;  Laterality: N/A;  /Nagi    CERVICAL FUSION  12/30/2015    COLONOSCOPY N/A 4/7/2017    Procedure: COLONOSCOPY;  Surgeon: Handy Frederick MD;  Location: [facility] (4TH FLR);  Service: Endoscopy;  Laterality: N/A;    COLONOSCOPY N/A 11/28/2023    Procedure: COLONOSCOPY;  Surgeon: ARMAAN Hinojosa MD;  Location: [facility] ENDO  (4TH FLR);  Service: Endoscopy;  Laterality: N/A;  8/21-referred by Dr. Schulte/ Diagnosis:  Blood in stool, past due on surveillance colonoscopy for advanced colon polyp villous adenoma greater than 10 mm /Prep instructions handed to patient and to portal. AS  11/21/23-Precall complete-DS    CORONARY ANGIOGRAPHY N/A 10/10/2022    Procedure: ANGIOGRAM, CORONARY ARTERY;  Surgeon: Anson Nolan MD;  Location: [facility] CATH LAB;  Service: Cardiology;  Laterality: N/A;    ENDOSCOPIC ULTRASOUND OF UPPER GASTROINTESTINAL TRACT N/A 8/22/2023    Procedure: ULTRASOUND, UPPER GI TRACT, ENDOSCOPIC;  Surgeon: Joe Means MD;  Location: [facility] ENDO (2ND FLR);  Service: Endoscopy;  Laterality: N/A;  instr portal-pt stated difficult intubation with surgery in the past-tb    FUSION OF CERVICAL SPINE BY POSTERIOR APPROACH N/A 4/24/2024    Procedure: FUSION, SPINE, CERVICAL, POSTERIOR APPROACH C3-6 DEPUY  GW TONGS WTIH WEIGHTS, FREDIS 4 POST SNS: MOTORS/SSEP/EMG;  Surgeon: Edd Burnette MD;  Location: [facility] OR;  Service: Orthopedics;  Laterality: N/A;    FUSION OF CERVICAL SPINE BY POSTERIOR APPROACH N/A 7/26/2024    Procedure: FUSION, SPINE, CERVICAL, POSTERIOR APPROACH C3-T1 REVISION;  Surgeon: Edd Burnette MD;  Location: [facility] OR 2ND FLR;  Service: Orthopedics;  Laterality: N/A;    HERNIA REPAIR      PROSTATECTOMY       Family History   Problem Relation Name Age of Onset    Heart disease Mother      Hypertension Mother      Hyperlipidemia Mother      Stroke Mother      Heart disease Father      Heart attacks under age 50 Father      Stroke Sister Joselin     Hypertension Sister Joselin     Hyperlipidemia Sister Joselin     Hyperlipidemia Brother Antonio     Hypertension Brother Antonio     Heart disease Brother Antonio     Aneurysm Brother Antonio     Hypertension Brother Kelby     Hyperlipidemia Brother Kelby     Benign prostatic hyperplasia Brother Kelby     Heart disease Brother Kelby     Heart attack Brother Kelby     Hypertension  Brother Travis     Heart disease Brother Travis     Heart attack Brother Travis     Hypertension Brother Tevin     Hepatitis Brother Tevin     Heart disease Brother Tevin     Throat cancer Brother Steven     Hypertension Brother Steven     Hyperlipidemia Brother Steven     Cataracts Maternal Grandmother      No Known Problems Maternal Grandfather      Heart disease Paternal Grandmother      No Known Problems Paternal Grandfather      Diabetes Paternal Uncle Bob     Multiple sclerosis Daughter Saba     Amblyopia Neg Hx      Blindness Neg Hx      Glaucoma Neg Hx      Macular degeneration Neg Hx      Retinal detachment Neg Hx      Strabismus Neg Hx      Thyroid disease Neg Hx      Colon cancer Neg Hx      Colon polyps Neg Hx      Esophageal cancer Neg Hx       Social History[1]  Review of Systems   Musculoskeletal:  Positive for back pain and neck pain.       Physical Exam     Initial Vitals [03/21/25 0809]   BP Pulse Resp Temp SpO2   136/68 74 16 98.1 °F (36.7 °C) 97 %      MAP       --         Physical Exam    Constitutional: He appears well-developed and well-nourished. He is not diaphoretic. He is easily aroused.   HENT:   Head: Normocephalic and atraumatic. Mouth/Throat: Oropharynx is clear and moist. No oropharyngeal exudate.   Eyes: EOM and lids are normal. Pupils are equal, round, and reactive to light. No scleral icterus.   Neck: Phonation normal. Neck supple. No stridor present.   Normal range of motion.  Cardiovascular:  Normal rate and regular rhythm.           Pulmonary/Chest: Breath sounds normal. No stridor. No respiratory distress. He has no wheezes. He has no rales.   Abdominal: Abdomen is soft. He exhibits no distension. There is no abdominal tenderness. There is no rebound.   Musculoskeletal:         General: No tenderness or edema. Normal range of motion.      Cervical back: Normal range of motion and neck supple.      Comments: C-spine surgical scar. L paraspinal c-spine tenderness  Ecchymosis  L palm. Full A/P ROM of extremities     Neurological: He is alert, oriented to person, place, and time and easily aroused. He has normal strength. No sensory deficit.   Skin: Skin is warm and dry. No rash noted. No erythema.   Psychiatric: He has a normal mood and affect. His speech is normal.       ED Course   Procedures  Labs Reviewed - No data to display         Imaging Results              X-Ray Wrist Complete Left (Final result)  Result time 03/21/25 10:42:56      Final result by Amisha Perez MD (03/21/25 10:42:56)                   Impression:      No fracture identified of the left wrist or left hand.    Mild degenerative change at the 1st carpometacarpal joint.      Electronically signed by: Amisha Perez MD  Date:    03/21/2025  Time:    10:42               Narrative:    EXAMINATION:  XR WRIST COMPLETE 3 VIEWS LEFT; XR HAND 2 VIEW LEFT    CLINICAL HISTORY:  Unspecified fall, initial encounter    TECHNIQUE:  PA, lateral, and oblique views of the left wrist were performed.    Two views of the left hand, PA and lateral    COMPARISON:  None    FINDINGS:  The osseous structures the wrist and hand demonstrate normal alignment, normal mineralization.  The radiocarpal joint, intercarpal joint, carpometacarpal joints appear normal.  Minimal osteophyte and sclerosis at the 1st carpal metacarpal joint.  The smaller joints of the hand demonstrate no advanced degenerative change.  No fracture or osseous lesion seen.  The soft tissues appear normal.                                       X-Ray Hand 2 View Left (Final result)  Result time 03/21/25 10:42:56      Final result by Amisha Perez MD (03/21/25 10:42:56)                   Impression:      No fracture identified of the left wrist or left hand.    Mild degenerative change at the 1st carpometacarpal joint.      Electronically signed by: Amisha Perez MD  Date:    03/21/2025  Time:    10:42               Narrative:    EXAMINATION:  XR  WRIST COMPLETE 3 VIEWS LEFT; XR HAND 2 VIEW LEFT    CLINICAL HISTORY:  Unspecified fall, initial encounter    TECHNIQUE:  PA, lateral, and oblique views of the left wrist were performed.    Two views of the left hand, PA and lateral    COMPARISON:  None    FINDINGS:  The osseous structures the wrist and hand demonstrate normal alignment, normal mineralization.  The radiocarpal joint, intercarpal joint, carpometacarpal joints appear normal.  Minimal osteophyte and sclerosis at the 1st carpal metacarpal joint.  The smaller joints of the hand demonstrate no advanced degenerative change.  No fracture or osseous lesion seen.  The soft tissues appear normal.                                       X-Ray Shoulder Trauma Left (Final result)  Result time 03/21/25 10:17:35      Final result by Maxwell Kelsey MD (03/21/25 10:17:35)                   Impression:      See above      Electronically signed by: Maxwell Kelsey MD  Date:    03/21/2025  Time:    10:17               Narrative:    EXAMINATION:  XR SHOULDER TRAUMA 3 VIEW LEFT    CLINICAL HISTORY:  Unspecified fall, initial encounter    TECHNIQUE:  Three views of the left shoulder were performed.    COMPARISON  None    FINDINGS:  Mild DJD.  No fracture or dislocation.  No bone destruction identified.  No significant soft tissue calcifications                                       CT Head Without Contrast (Final result)  Result time 03/21/25 10:26:55      Final result by Colton Bland MD (03/21/25 10:26:55)                   Impression:      1. No evidence of acute intracranial pathology no intracranial hemorrhage.  2. No acute fracture or traumatic malalignment of cervical spine.  3. Extensive operative change of the cervical spine detailed above.  No evidence of hardware fracture or loosening.    Electronically signed by resident: Jonas Michaels  Date:    03/21/2025  Time:    09:41    Electronically signed by: Colton Bland  Date:    03/21/2025  Time:    10:26                Narrative:    EXAMINATION:  CT HEAD WITHOUT CONTRAST; CT CERVICAL SPINE WITHOUT CONTRAST    CLINICAL HISTORY:  Head trauma, moderate-severe;; Neck trauma (Age >= 65y);    TECHNIQUE:  Low dose axial CT images obtained throughout the head and cervical spine without the use of intravenous contrast.  Axial, sagittal and coronal reconstructions were performed.    COMPARISON:  CT head and cervical spine 07/08/2024    FINDINGS:  CT head:    Intracranial compartment:    Ventricles are stable in size without evidence of hydrocephalus.    No parenchymal hemorrhage, edema, mass effect or major vascular distribution infarct.    No extra-axial blood or fluid collections.    Skull/extracranial contents (limited evaluation):    No displaced calvarial fracture.    Mastoid air cells are clear.  Patchy mucosal thickening in the ethmoid air cells and maxillary sinuses.  Small left ethmoid osteoma measuring 6 mm.    CT cervical spine:    Similar operative change of C3-C5 ACDF and C3-T1 posterior decompression instrumented fusion.  No evidence of hardware loosening or fracture.    Unchanged sagittal alignment.  No spondylolisthesis.    Dens is intact.  Pre dens space is maintained.  Atlantooccipital and atlantoaxial articulations are maintained.    Vertebral body heights are maintained.  No fracture or aggressive appearing osseous lesion.    Disc spaces are maintained at non-fused levels.    Spinal cord is poorly visualized.  No focal mass or collection within the spinal canal.    No high-grade spinal canal stenosis.  Multilevel mild-to-moderate neural foraminal narrowing.    Subcentimeter hypodense left thyroid nodule, unchanged.  Mild biapical fibronodular scarring.  Centrilobular and paraseptal emphysema in the visualized lung apices.  No apical pneumothorax., specifically                                       CT Cervical Spine Without Contrast (Final result)  Result time 03/21/25 10:26:55      Final result by Colton Bland MD  (03/21/25 10:26:55)                   Impression:      1. No evidence of acute intracranial pathology no intracranial hemorrhage.  2. No acute fracture or traumatic malalignment of cervical spine.  3. Extensive operative change of the cervical spine detailed above.  No evidence of hardware fracture or loosening.    Electronically signed by resident: Jonas Michaels  Date:    03/21/2025  Time:    09:41    Electronically signed by: Colton Bland  Date:    03/21/2025  Time:    10:26               Narrative:    EXAMINATION:  CT HEAD WITHOUT CONTRAST; CT CERVICAL SPINE WITHOUT CONTRAST    CLINICAL HISTORY:  Head trauma, moderate-severe;; Neck trauma (Age >= 65y);    TECHNIQUE:  Low dose axial CT images obtained throughout the head and cervical spine without the use of intravenous contrast.  Axial, sagittal and coronal reconstructions were performed.    COMPARISON:  CT head and cervical spine 07/08/2024    FINDINGS:  CT head:    Intracranial compartment:    Ventricles are stable in size without evidence of hydrocephalus.    No parenchymal hemorrhage, edema, mass effect or major vascular distribution infarct.    No extra-axial blood or fluid collections.    Skull/extracranial contents (limited evaluation):    No displaced calvarial fracture.    Mastoid air cells are clear.  Patchy mucosal thickening in the ethmoid air cells and maxillary sinuses.  Small left ethmoid osteoma measuring 6 mm.    CT cervical spine:    Similar operative change of C3-C5 ACDF and C3-T1 posterior decompression instrumented fusion.  No evidence of hardware loosening or fracture.    Unchanged sagittal alignment.  No spondylolisthesis.    Dens is intact.  Pre dens space is maintained.  Atlantooccipital and atlantoaxial articulations are maintained.    Vertebral body heights are maintained.  No fracture or aggressive appearing osseous lesion.    Disc spaces are maintained at non-fused levels.    Spinal cord is poorly visualized.  No focal mass or  collection within the spinal canal.    No high-grade spinal canal stenosis.  Multilevel mild-to-moderate neural foraminal narrowing.    Subcentimeter hypodense left thyroid nodule, unchanged.  Mild biapical fibronodular scarring.  Centrilobular and paraseptal emphysema in the visualized lung apices.  No apical pneumothorax., specifically                                       Medications   oxyCODONE immediate release tablet 5 mg (5 mg Oral Given 3/21/25 0854)     Medical Decision Making  65 y.o. male with medical history of T2DM, Cervical fusion, HTN, HLD, GERD presenting to the ED c/o fall this morning.     DDx includes but not limited to contusion, muscular strain, ligament/tendon injury, hardware complication, vertebral fracture, traumatic brain injury, fracture, dislocation.     Amount and/or Complexity of Data Reviewed  Radiology: ordered and independent interpretation performed.    Risk  Prescription drug management.    APC / Resident Notes:  Work-up reviewed. XRs reviewed with degenerative changes. No fracture or dislocation. CT head with no acute intracranial finding. CT cervical spine with extensive operative changes. No evidence of hardware complication or fracture. Okay for outpatient follow-up. Short RX for Roxicodone and Robaxin provided for pain. Patient expresses understanding and agreeable to the plan. Return to ED precautions given for new, worsening, or concerning symptoms.                                  Clinical Impression:  Final diagnoses:  [W19.XXXA] Fall (Primary)  [M54.2] Neck pain  [M79.642] Left hand pain  [Z98.1] History of fusion of cervical spine          ED Disposition Condition    Discharge Stable          ED Prescriptions       Medication Sig Dispense Start Date End Date Auth. Provider    oxyCODONE (ROXICODONE) 5 MG immediate release tablet Take 1 tablet (5 mg total) by mouth every 4 (four) hours as needed for Pain. 7 tablet 3/21/2025 3/24/2025 Handy Maldonado, CATHRYN    methocarbamoL  (ROBAXIN) 500 MG Tab Take 2 tablets (1,000 mg total) by mouth 2 (two) times daily as needed. 20 tablet 3/21/2025 3/26/2025 Handy Maldonado PA-C          Follow-up Information    None              [1]   Social History  Tobacco Use    Smoking status: Former     Current packs/day: 0.00     Average packs/day: 1 pack/day for 30.0 years (30.0 ttl pk-yrs)     Types: Cigarettes     Start date: 1981     Quit date: 2011     Years since quittin.2     Passive exposure: Never    Smokeless tobacco: Never   Substance Use Topics    Alcohol use: Yes     Comment: occas - nonalcoholic beer or beer    Drug use: No        Handy Maldonado PA-C  25 [id number]

## 2025-03-22 ENCOUNTER — PATIENT OUTREACH (OUTPATIENT)
Facility: OTHER | Age: 65
End: 2025-03-22
Payer: MEDICARE

## 2025-03-24 ENCOUNTER — PATIENT MESSAGE (OUTPATIENT)
Dept: ADMINISTRATIVE | Facility: OTHER | Age: 65
End: 2025-03-24
Payer: MEDICARE

## 2025-03-24 ENCOUNTER — PATIENT MESSAGE (OUTPATIENT)
Dept: INTERNAL MEDICINE | Facility: CLINIC | Age: 65
End: 2025-03-24
Payer: MEDICARE

## 2025-03-24 NOTE — TELEPHONE ENCOUNTER
LOV with Josefina Kendall MD , 7/18/2024 (preop) cervical fusion on 7/26/25  Recent ED visit d/t fall on 3/21/25. Last annual appt was 6/23/23. Patient redirected to annual appointment for further discussion of new dx of emphysema and/or possible referral.

## 2025-03-25 ENCOUNTER — HOSPITAL ENCOUNTER (OUTPATIENT)
Dept: RADIOLOGY | Facility: HOSPITAL | Age: 65
Discharge: HOME | End: 2025-03-25
Attending: INTERNAL MEDICINE
Payer: MEDICARE

## 2025-03-25 ENCOUNTER — OFFICE VISIT (OUTPATIENT)
Dept: INTERNAL MEDICINE | Facility: CLINIC | Age: 65
End: 2025-03-25
Payer: MEDICARE

## 2025-03-25 ENCOUNTER — RESULTS FOLLOW-UP (OUTPATIENT)
Dept: INTERNAL MEDICINE | Facility: CLINIC | Age: 65
End: 2025-03-25

## 2025-03-25 VITALS
HEIGHT: 76 IN | DIASTOLIC BLOOD PRESSURE: 68 MMHG | TEMPERATURE: 97 F | RESPIRATION RATE: 12 BRPM | WEIGHT: 200.63 LBS | SYSTOLIC BLOOD PRESSURE: 114 MMHG | HEART RATE: 80 BPM | OXYGEN SATURATION: 96 % | BODY MASS INDEX: 24.43 KG/M2

## 2025-03-25 DIAGNOSIS — Z00.00 ANNUAL PHYSICAL EXAM: Primary | ICD-10-CM

## 2025-03-25 DIAGNOSIS — Z82.49 FAMILY HISTORY OF PREMATURE CAD: Chronic | ICD-10-CM

## 2025-03-25 DIAGNOSIS — J43.9 PULMONARY EMPHYSEMA, UNSPECIFIED EMPHYSEMA TYPE: ICD-10-CM

## 2025-03-25 DIAGNOSIS — I10 ESSENTIAL HYPERTENSION: Chronic | ICD-10-CM

## 2025-03-25 DIAGNOSIS — E78.5 DYSLIPIDEMIA ASSOCIATED WITH TYPE 2 DIABETES MELLITUS: Chronic | ICD-10-CM

## 2025-03-25 DIAGNOSIS — E11.69 DYSLIPIDEMIA ASSOCIATED WITH TYPE 2 DIABETES MELLITUS: Chronic | ICD-10-CM

## 2025-03-25 DIAGNOSIS — K21.9 GASTROESOPHAGEAL REFLUX DISEASE, UNSPECIFIED WHETHER ESOPHAGITIS PRESENT: Chronic | ICD-10-CM

## 2025-03-25 DIAGNOSIS — Z12.5 PROSTATE CANCER SCREENING: ICD-10-CM

## 2025-03-25 DIAGNOSIS — M47.812 OSTEOARTHRITIS OF CERVICAL SPINE, UNSPECIFIED SPINAL OSTEOARTHRITIS COMPLICATION STATUS: Chronic | ICD-10-CM

## 2025-03-25 DIAGNOSIS — M54.12 CERVICAL RADICULOPATHY: Chronic | ICD-10-CM

## 2025-03-25 DIAGNOSIS — G47.00 INSOMNIA, UNSPECIFIED TYPE: ICD-10-CM

## 2025-03-25 DIAGNOSIS — M48.02 CERVICAL SPINAL STENOSIS: Chronic | ICD-10-CM

## 2025-03-25 DIAGNOSIS — I73.9 PERIPHERAL VASCULAR DISEASE: Chronic | ICD-10-CM

## 2025-03-25 DIAGNOSIS — E11.9 TYPE 2 DIABETES MELLITUS WITHOUT COMPLICATION, WITHOUT LONG-TERM CURRENT USE OF INSULIN: Chronic | ICD-10-CM

## 2025-03-25 PROBLEM — E11.59 HYPERTENSION ASSOCIATED WITH DIABETES: Status: RESOLVED | Noted: 2021-07-07 | Resolved: 2025-03-25

## 2025-03-25 PROBLEM — I15.2 HYPERTENSION ASSOCIATED WITH DIABETES: Status: RESOLVED | Noted: 2021-07-07 | Resolved: 2025-03-25

## 2025-03-25 PROCEDURE — 3078F DIAST BP <80 MM HG: CPT | Mod: HCNC,CPTII,S$GLB, | Performed by: INTERNAL MEDICINE

## 2025-03-25 PROCEDURE — 4010F ACE/ARB THERAPY RXD/TAKEN: CPT | Mod: HCNC,CPTII,S$GLB, | Performed by: INTERNAL MEDICINE

## 2025-03-25 PROCEDURE — 3008F BODY MASS INDEX DOCD: CPT | Mod: HCNC,CPTII,S$GLB, | Performed by: INTERNAL MEDICINE

## 2025-03-25 PROCEDURE — 3074F SYST BP LT 130 MM HG: CPT | Mod: HCNC,CPTII,S$GLB, | Performed by: INTERNAL MEDICINE

## 2025-03-25 PROCEDURE — 1159F MED LIST DOCD IN RCRD: CPT | Mod: HCNC,CPTII,S$GLB, | Performed by: INTERNAL MEDICINE

## 2025-03-25 PROCEDURE — 3288F FALL RISK ASSESSMENT DOCD: CPT | Mod: HCNC,CPTII,S$GLB, | Performed by: INTERNAL MEDICINE

## 2025-03-25 PROCEDURE — 1125F AMNT PAIN NOTED PAIN PRSNT: CPT | Mod: HCNC,CPTII,S$GLB, | Performed by: INTERNAL MEDICINE

## 2025-03-25 PROCEDURE — 99999 PR PBB SHADOW E&M-EST. PATIENT-LVL III: CPT | Mod: PBBFAC,HCNC,, | Performed by: INTERNAL MEDICINE

## 2025-03-25 PROCEDURE — 1101F PT FALLS ASSESS-DOCD LE1/YR: CPT | Mod: HCNC,CPTII,S$GLB, | Performed by: INTERNAL MEDICINE

## 2025-03-25 PROCEDURE — 71250 CT THORAX DX C-: CPT | Mod: 26,HCNC,, | Performed by: STUDENT IN AN ORGANIZED HEALTH CARE EDUCATION/TRAINING PROGRAM

## 2025-03-25 PROCEDURE — 71250 CT THORAX DX C-: CPT | Mod: TC,HCNC

## 2025-03-25 PROCEDURE — 3044F HG A1C LEVEL LT 7.0%: CPT | Mod: HCNC,CPTII,S$GLB, | Performed by: INTERNAL MEDICINE

## 2025-03-25 PROCEDURE — 1160F RVW MEDS BY RX/DR IN RCRD: CPT | Mod: HCNC,CPTII,S$GLB, | Performed by: INTERNAL MEDICINE

## 2025-03-25 PROCEDURE — 99397 PER PM REEVAL EST PAT 65+ YR: CPT | Mod: HCNC,S$GLB,, | Performed by: INTERNAL MEDICINE

## 2025-03-25 RX ORDER — FLUTICASONE PROPIONATE AND SALMETEROL 100; 50 UG/1; UG/1
1 POWDER RESPIRATORY (INHALATION) 2 TIMES DAILY
Qty: 60 EACH | Refills: 2 | Status: SHIPPED | OUTPATIENT
Start: 2025-03-25 | End: 2026-03-25

## 2025-03-25 RX ORDER — ALBUTEROL SULFATE 90 UG/1
1-2 INHALANT RESPIRATORY (INHALATION) EVERY 6 HOURS PRN
Qty: 18 G | Refills: 2 | Status: SHIPPED | OUTPATIENT
Start: 2025-03-25

## 2025-03-25 NOTE — PROGRESS NOTES
Assessment:       1. Annual physical exam    2. Type 2 diabetes mellitus without complication, without long-term current use of insulin  - CBC Auto Differential; Future  - Comprehensive Metabolic Panel; Future  - TSH; Future  - Lipid Panel; Future  - Hemoglobin A1C; Future  - Microalbumin/Creatinine Ratio, Urine; Future    3. Essential hypertension    4. Dyslipidemia associated with type 2 diabetes mellitus    5. Family history of premature CAD    6. Peripheral vascular disease    7. Gastroesophageal reflux disease, unspecified whether esophagitis present    8. Cervical spinal stenosis    9. Osteoarthritis of cervical spine, unspecified spinal osteoarthritis complication status    10. Cervical radiculopathy    11. Pulmonary emphysema, unspecified emphysema type  - Complete PFT with bronchodilator; Future  - CT Chest Without Contrast; Future  - albuterol (PROVENTIL/VENTOLIN HFA) 90 mcg/actuation inhaler; Inhale 1-2 puffs into the lungs every 6 (six) hours as needed.  Dispense: 18 g; Refill: 2  - fluticasone-salmeterol diskus inhaler 100-50 mcg; Inhale 1 puff into the lungs 2 (two) times daily. Controller  Dispense: 60 each; Refill: 2    12. Prostate cancer screening  - PSA, Screening; Future    13. Insomnia, unspecified type        Plan:       1. Check CBC, CMP, TSH, lipids, A1c.  Discussed diet and exercise.  Discussed vaccines.  2. Continue Lantus 25 units  3. Continue Procardia XL 60 mg, Micardis 80 mg, Aldactone 50 mg, Toprol-XL 50 mg  4/5/6. Continue Zetia 10 mg, Crestor 20 mg   7. Continue Protonix 40 mg   8/9/10.  Continue gabapentin 600 mg 3 times daily.  11. Check PFTs, CT chest, albuterol and Advair prescribed.  Return to clinic in a month to reassess   12.  Check PSA   13. Advised on over-the-counter options.    Deep Scribe:  IMPRESSION:  1. Reviewed CT showing evidence of emphysema in upper lung fields.    SUMMARY:   Ordered CT Chest and pulmonary function tests to assess COPD/emphysema   Prescribed  Advair 100/50 mcg twice daily to open airways and improve energy   Recommend OTC sleep aids: Melatonin 5-10 mg or diphenhydramine   Suggested diphenhydramine for sinus problems   Advised reducing alcohol intake   Recommend behavioral changes for sleep improvement   Continue therapy for shoulder, neck, and back issues   Follow-up to review CT and pulmonary function test results    PULMONARY EMPHYSEMA, UNSPECIFIED EMPHYSEMA TYPE:   Explained the progressive nature of emphysema and its impact on lung function.   Confirmed presence of emphysema based on CT of head and neck showing top of lungs.   Ordered CT Chest and pulmonary function tests to further assess the extent of COPD/emphysema.    INSOMNIA:   Addressed sleep disturbances, recommending behavioral changes and OTC sleep aids.   Mr. Braxton can fall asleep quickly but wakes up after a few hours and has difficulty returning to sleep.   Advised avoiding television and phone use before bedtime due to disruptive light wavelengths.   Recommend using a lamp or low reading light when awake at night, and reading a book or magazine instead of electronic devices.   Suggested OTC options to help reset sleep cycle: Melatonin 5-10 mg, taken 1-4 hours before bedtime; alternatively, diphenhydramine 30 minutes before bedtime (cautioned about potential next-day drowsiness).    FATIGUE:   Acknowledged fatigue symptoms and considered potential causes, including newly discovered emphysema.   Mr. Braxton reports getting tired quickly during activities and wanting to lie down after a few hours.   Prescribed Advair 100/50 mcg twice daily to help open airways and potentially improve energy levels.    ROTATOR CUFF TEAR:   Noted the patient has a partially torn rotator cuff.   Continued therapy for shoulder, neck, and back issues.    FALL:   Noted the patient's recent fall over dog feces, resulting in a hospital visit.   Mr. Braxton is still sore from the fall and had to reschedule  therapy.   Advised not to cancel therapy appointments due to soreness from fall.    CHRONIC SINUSITIS:   Noted the patient reports significant problems with sinuses.   Suggested diphenhydramine for sinus problems, which can also help with sleep.    HEADACHE:   Noted the patient reports experiencing headaches.    ADVERSE DRUG EFFECT:   Noted the patient reports adverse effects from cyclobenzaprine, including difficulty functioning and cognitive impairment.    ALCOHOL ABUSE:   Discussed effects of alcohol on health risks, including dementia, cardiovascular disease, and hepatic disease.   Noted the patient reports drinking 2 beers every other day after work.   Advised the patient to reduce alcohol intake to lower these risks.    ANOREXIA:   Noted the patient reports decreased appetite and eating only daily sometimes.   Observed that the patient's weight is holding steady despite decreased appetite.   Assess  ed that decreased appetite is likely due to aging and not concerning if the patient is not experiencing hunger or discomfort.                 This note was generated with the assistance of ambient listening technology. Verbal consent was obtained by the patient and accompanying visitor(s) for the recording of patient appointment to facilitate this note. I attest to having reviewed and edited the generated note for accuracy, though some syntax or spelling errors may persist. Please contact the author of this note for any clarification.       Subjective:       Patient ID: Hi Braxton is a 65 y.o. male.    Chief Complaint: Annual Exam (Possible pulm consult)    HPI    65 y.o. male here for annual exam.     Cholesterol: needs  Vaccines: Influenza - 2024; Tetanus - 2019; Prevnar 20 - 20 done; Zoster - 2 done; COVID - 6 done  Sexual Screening: active  STD screening: no concern  Eye exam: had cataract surgery recently  Prostate:needs  Colonoscopy: 2023, due 2033  A1c: needs    Exercise: In PT for his shoulder, back,  neck.    Diet: home cooked usually. Appetite is not what it used to be.    History of Present Illness    CHIEF COMPLAINT:  Mr. Braxton presents today for annual exam.    SLEEP:  He falls asleep within 5 minutes of lying down but experiences frequent interruptions 2-3 hours after initial sleep onset. Wakeful periods last approximately 3 hours nightly.    FATIGUE:  He experiences fatigue with activity, getting tired quickly. After a couple hours of activity, he feels exhausted and needs to lie down. He denies chronic shortness of breath.    MUSCULOSKELETAL:  He has a partially torn rotator cuff and continues physical therapy for shoulder, neck, and back. He performs home exercises but at a reduced frequency compared to previous levels. He experienced a fall after tripping over dog feces requiring hospital evaluation.    ENT:  He reports significant sinus problems with associated frequent headaches.    MEDICATIONS:  He has a history of adverse reaction to Flexeril, reporting significant cognitive impairment that interfered with daily functioning.    SOCIAL HISTORY:  He consumes alcohol regularly, typically two beers every other day (6-8 alcoholic beverages per week). His appetite is decreased compared to baseline, resulting in eating only daily. He primarily eats at home.    SURGICAL HISTORY:  He underwent cataract surgery several months ago.      ROS:  Constitutional: +fatigue, +loss in appetite, +sleep disturbances  Head: +headaches  ENT: +sinus pressure  Musculoskeletal: +muscle pain, +pain with movement  Neurological: +difficulty falling asleep, +difficulty staying asleep         Review of Systems          Objective:      Physical Exam  Vitals reviewed.   Constitutional:       Appearance: He is well-developed.   HENT:      Head: Normocephalic and atraumatic.      Mouth/Throat:      Pharynx: No oropharyngeal exudate.   Eyes:      General: No scleral icterus.        Right eye: No discharge.         Left eye: No  discharge.      Pupils: Pupils are equal, round, and reactive to light.   Neck:      Thyroid: No thyromegaly.      Trachea: No tracheal deviation.   Cardiovascular:      Rate and Rhythm: Normal rate and regular rhythm.      Heart sounds: Normal heart sounds. No murmur heard.     No friction rub. No gallop.   Pulmonary:      Effort: Pulmonary effort is normal. No respiratory distress.      Breath sounds: Normal breath sounds. No wheezing or rales.   Chest:      Chest wall: No tenderness.   Abdominal:      General: Bowel sounds are normal. There is no distension.      Palpations: Abdomen is soft. There is no mass.      Tenderness: There is no abdominal tenderness. There is no guarding or rebound.   Musculoskeletal:         General: No tenderness. Normal range of motion.      Cervical back: Normal range of motion and neck supple.   Skin:     General: Skin is warm and dry.      Coloration: Skin is not pale.      Findings: No erythema or rash.   Neurological:      Mental Status: He is alert and oriented to person, place, and time.   Psychiatric:         Behavior: Behavior normal.

## 2025-03-31 ENCOUNTER — PATIENT MESSAGE (OUTPATIENT)
Dept: INTERNAL MEDICINE | Facility: CLINIC | Age: 65
End: 2025-03-31
Payer: MEDICARE

## 2025-03-31 ENCOUNTER — CLINICAL SUPPORT (OUTPATIENT)
Dept: REHABILITATION | Facility: HOSPITAL | Age: 65
End: 2025-03-31
Payer: MEDICARE

## 2025-03-31 DIAGNOSIS — M25.612 DECREASED ROM OF LEFT SHOULDER: ICD-10-CM

## 2025-03-31 DIAGNOSIS — Z98.1 S/P CERVICAL SPINAL FUSION: ICD-10-CM

## 2025-03-31 DIAGNOSIS — M19.012 PRIMARY OSTEOARTHRITIS OF LEFT SHOULDER: Primary | ICD-10-CM

## 2025-03-31 PROCEDURE — 97112 NEUROMUSCULAR REEDUCATION: CPT | Mod: HCNC,PO

## 2025-03-31 PROCEDURE — 97530 THERAPEUTIC ACTIVITIES: CPT | Mod: HCNC,PO

## 2025-03-31 PROCEDURE — 97110 THERAPEUTIC EXERCISES: CPT | Mod: HCNC,PO

## 2025-03-31 NOTE — TELEPHONE ENCOUNTER
LOV with Josefina Kendall MD , 3/25/2025    Patient inquiring about f/u to Emphysema and thyroid results.    Lab result note dated 3/25/25:  Your prostate antigen is normal.  Your thyroid function is under active.  Awaiting T4.

## 2025-03-31 NOTE — TELEPHONE ENCOUNTER
"Sent pt a message in [employer] re: the following:     "  Nothing to do for the thyroid.  Patient was started on medication for his emphysema.  Patient is on Advair twice daily and albuterol as needed.   "  "

## 2025-03-31 NOTE — TELEPHONE ENCOUNTER
Nothing to do for the thyroid.  Patient was started on medication for his emphysema.  Patient is on Advair twice daily and albuterol as needed.

## 2025-04-03 NOTE — PROGRESS NOTES
Outpatient Rehab    Physical Therapy Visit    Patient Name: Hi Braxton  MRN: [id number]  YOB: 1960  Encounter Date: 3/11/2025    Therapy Diagnosis:   Encounter Diagnoses   Name Primary?    Primary osteoarthritis of left shoulder Yes    S/P cervical spinal fusion     Decreased ROM of left shoulder      Physician: Venkatesh Prieto PA*    Physician Orders: Eval and Treat  Medical Diagnosis: Primary osteoarthritis of left shoulder  S/P cervical spinal fusion    Visit # / Visits Authorized:  5 / 20  Insurance Authorization Period: 2/11/2025 to 4/24/2025  Date of Evaluation: 2/18/2025  Plan of Care Certification: 2/11/2025 to 4/24/2025     PT/PTA:     Number of PTA visits since last PT visit:   Time In:   0455 PM  Time Out:  0555 PM  Total Time:   60 mins  Total Billable Time:  60 mins    FOTO:  Intake Score:  %  Survey Score 1:  %  Survey Score 2:  %         Subjective   Doing okay, still has pain with reaching out..  Pain reported as 2/10.      Objective            Treatment:  Therapeutic Exercise  TE 1: Supine cane flexion 3 x 15  TE 2: SL ER with 3# DB 3 x 15  TE 3: Standing T with G band 3 x 15  TE 4: Thoracic extension on chair 3 x 10  TE 5: Half kneeling open book 3 x 10  TE 6: Standing 90degrees ER 2# at edge of table  3 x 1-  Manual Therapy  MT 1: Posterior/inferior GH glide grade III  Balance/Neuromuscular Re-Education  NMR 1: Wall ball 30 sec x 3  NMR 2: SA wall slide with towel 3 x 10  NMR 3: Landmine press with 5# 3 x 12  NMR 4: Standing T with G band 3 x 12  Therapeutic Activity  TA 1: Shoulder scaption 3# 3 x 12    Time Entry(in minutes):       Assessment & Plan   Assessment: Pt presented to clinic w/ good carryover shoulder ROM from last week. Session today focus on shoulder RC control and periscapular muscle strengthening with focus on correcting scapula anterior tipping and scapula upward rotation. Pt jessica progressed session well w/o increased shoulder pain. Will continue to  progress per tolerance  Evaluation/Treatment Tolerance: Patient tolerated treatment well    Patient will continue to benefit from skilled outpatient physical therapy to address the deficits listed in the problem list box on initial evaluation, provide pt/family education and to maximize pt's level of independence in the home and community environment.     Patient's spiritual, cultural, and educational needs considered and patient agreeable to plan of care and goals.           Plan: Will continue to focus on cuff activation and periscapula muscle strengthening.    Goals:   Active       Functional outcome       Patient will show a significant change in 20% patient-reported outcome tool to demonstrate subjective improvement (Progressing)       Start:  02/18/25    Expected End:  04/15/25            Patient stated goal: improve shoulder pain with daily activties  (Progressing)       Start:  02/18/25    Expected End:  03/18/25            Patient will demonstrate independence in home program for support of progression (Progressing)       Start:  02/18/25    Expected End:  03/18/25               Range of Motion       Patient will achieve left shoulder flexion of 170 degrees (Progressing)       Start:  02/18/25    Expected End:  03/18/25            Patient will achieve left shoulder external rotation of 80 degrees in 90 degrees abd (Progressing)       Start:  02/18/25    Expected End:  03/18/25               Strength       Patient will achieve left shoulder flexion strength of 4/5 (Progressing)       Start:  02/18/25    Expected End:  04/29/25            Patient will achieve left shoulder external rotation strength of 4/5 in neutral (Progressing)       Start:  02/18/25    Expected End:  04/29/25              Resolved       Pain       Patient will report pain of 2/10 demonstrating a reduction of overall pain (Met)       Start:  02/18/25    Expected End:  03/18/25    Resolved:  04/03/25             Wilber Joseph, PT

## 2025-04-04 ENCOUNTER — OFFICE VISIT (OUTPATIENT)
Dept: INTERNAL MEDICINE | Facility: CLINIC | Age: 65
End: 2025-04-04
Payer: MEDICARE

## 2025-04-04 VITALS
DIASTOLIC BLOOD PRESSURE: 64 MMHG | HEIGHT: 76 IN | WEIGHT: 202.81 LBS | HEART RATE: 86 BPM | OXYGEN SATURATION: 97 % | BODY MASS INDEX: 24.7 KG/M2 | TEMPERATURE: 97 F | SYSTOLIC BLOOD PRESSURE: 122 MMHG

## 2025-04-04 DIAGNOSIS — E78.2 MIXED HYPERLIPIDEMIA: ICD-10-CM

## 2025-04-04 DIAGNOSIS — R49.0 HOARSENESS: Primary | ICD-10-CM

## 2025-04-04 DIAGNOSIS — E11.9 TYPE 2 DIABETES MELLITUS WITHOUT COMPLICATION, WITHOUT LONG-TERM CURRENT USE OF INSULIN: ICD-10-CM

## 2025-04-04 DIAGNOSIS — I10 ESSENTIAL HYPERTENSION: ICD-10-CM

## 2025-04-04 PROCEDURE — 99214 OFFICE O/P EST MOD 30 MIN: CPT | Mod: HCNC,S$GLB,, | Performed by: INTERNAL MEDICINE

## 2025-04-04 PROCEDURE — 1126F AMNT PAIN NOTED NONE PRSNT: CPT | Mod: HCNC,CPTII,S$GLB, | Performed by: INTERNAL MEDICINE

## 2025-04-04 PROCEDURE — 3078F DIAST BP <80 MM HG: CPT | Mod: HCNC,CPTII,S$GLB, | Performed by: INTERNAL MEDICINE

## 2025-04-04 PROCEDURE — 1160F RVW MEDS BY RX/DR IN RCRD: CPT | Mod: HCNC,CPTII,S$GLB, | Performed by: INTERNAL MEDICINE

## 2025-04-04 PROCEDURE — 3074F SYST BP LT 130 MM HG: CPT | Mod: HCNC,CPTII,S$GLB, | Performed by: INTERNAL MEDICINE

## 2025-04-04 PROCEDURE — 4010F ACE/ARB THERAPY RXD/TAKEN: CPT | Mod: HCNC,CPTII,S$GLB, | Performed by: INTERNAL MEDICINE

## 2025-04-04 PROCEDURE — G2211 COMPLEX E/M VISIT ADD ON: HCPCS | Mod: HCNC,S$GLB,, | Performed by: INTERNAL MEDICINE

## 2025-04-04 PROCEDURE — 1101F PT FALLS ASSESS-DOCD LE1/YR: CPT | Mod: HCNC,CPTII,S$GLB, | Performed by: INTERNAL MEDICINE

## 2025-04-04 PROCEDURE — 1159F MED LIST DOCD IN RCRD: CPT | Mod: HCNC,CPTII,S$GLB, | Performed by: INTERNAL MEDICINE

## 2025-04-04 PROCEDURE — 3008F BODY MASS INDEX DOCD: CPT | Mod: HCNC,CPTII,S$GLB, | Performed by: INTERNAL MEDICINE

## 2025-04-04 PROCEDURE — 3288F FALL RISK ASSESSMENT DOCD: CPT | Mod: HCNC,CPTII,S$GLB, | Performed by: INTERNAL MEDICINE

## 2025-04-04 PROCEDURE — 3044F HG A1C LEVEL LT 7.0%: CPT | Mod: HCNC,CPTII,S$GLB, | Performed by: INTERNAL MEDICINE

## 2025-04-04 PROCEDURE — 99999 PR PBB SHADOW E&M-EST. PATIENT-LVL III: CPT | Mod: PBBFAC,HCNC,, | Performed by: INTERNAL MEDICINE

## 2025-04-04 NOTE — PROGRESS NOTES
History of Present Illness    CHIEF COMPLAINT:  Hi presents today for follow up    RESPIRATORY:  He has a history of emphysema with multiple prior diagnoses. He experiences dyspnea after walking 2-3 blocks and reports dry cough when inhaler medication is wearing off. He takes Wixela inhaler twice daily at 5 AM and 5 PM, and uses Albuterol as needed for breakthrough shortness of breath.    ENT:  He reports persistent hoarseness since initial cervical fusion surgery and has chronic sinusitis. He denies previous ENT referral for evaluation of hoarseness.    FATIGUE:  He previously experienced significant fatigue, feeling tired daily and falling asleep by 6:30-7:00 PM. However, he notes improvement in fatigue levels since starting new medications, particularly Albuterol.    LABS:  HbA1c is stable at 6.4%. Cholesterol panel shows total cholesterol 151, triglycerides within normal range, HDL 45, and LDL 82.6. TSH is fluctuating with most recent value elevated at 4.7, preceded by 3.3, and 4.0. Glucose was elevated at 164. Sodium, potassium, kidney, and liver function tests were normal.    SIDE EFFECTS:  He reports experiencing nausea, which he attributes to medication side effects.    ALLERGIES:  He has allergies to Lisinopril, Lipitor, Adhesive, Crestor, Jardiance, and Metformin.      ROS:  Constitutional: +fatigue  ENT: +sinus pressure, +hoarseness  Respiratory: +shortness of breath, +cough, +exertional dyspnea  Cardiovascular: -chest pain  Gastrointestinal: +nausea       PAST MEDICAL HISTORY:  Past Medical History:   Diagnosis Date    BPH with obstruction/lower urinary tract symptoms 09/28/2017    Carotid artery occlusion     Chronic anterior uveitis 03/26/2013    Coronary artery disease     Diabetes mellitus, type 2     diet controlled    Difficult intubation     Distended bladder 06/23/2020    Dizziness     DJD (degenerative joint disease), cervical 07/10/2015    Dysuria 05/11/2018    GERD (gastroesophageal reflux  disease)     Hearing loss     History of iritis 2013    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 Dose of amlodipine increased from 5 to 10 mg in the weeks prior to the incidnt.    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       SURGICAL HISTORY:  Past Surgical History:   Procedure Laterality Date    CATARACT EXTRACTION W/  INTRAOCULAR LENS IMPLANT Right 10/28/2024    Procedure: EXTRACTION, CATARACT, WITH IOL INSERTION;  Surgeon: Elva Means MD;  Location: [facility] OR;  Service: Ophthalmology;  Laterality: Right;    CATARACT EXTRACTION W/  INTRAOCULAR LENS IMPLANT Left 11/18/2024    Procedure: EXTRACTION, CATARACT, WITH IOL INSERTION;  Surgeon: Elva Means MD;  Location: [facility] OR;  Service: Ophthalmology;  Laterality: Left;    CEREBRAL ANGIOGRAM N/A 1/6/2020    Procedure: ANGIOGRAM-CEREBRAL;  Surgeon: [facility] Diagnostic Provider;  Location: [facility] OR 2ND FLR;  Service: Radiology;  Laterality: N/A;  /Nagi    CERVICAL FUSION  12/30/2015    COLONOSCOPY N/A 4/7/2017    Procedure: COLONOSCOPY;  Surgeon: Handy Frederick MD;  Location: [facility] ENDO (4TH FLR);  Service: Endoscopy;  Laterality: N/A;    COLONOSCOPY N/A 11/28/2023    Procedure: COLONOSCOPY;  Surgeon: ARMAAN Hinojosa MD;  Location: [facility] ENDO (4TH FLR);  Service: Endoscopy;   Laterality: N/A;  8/21-referred by Dr. Schulte/ Diagnosis:  Blood in stool, past due on surveillance colonoscopy for advanced colon polyp villous adenoma greater than 10 mm /Prep instructions handed to patient and to portal. AS  11/21/23-Precall complete-DS    CORONARY ANGIOGRAPHY N/A 10/10/2022    Procedure: ANGIOGRAM, CORONARY ARTERY;  Surgeon: Anson Nolan MD;  Location: [facility] CATH LAB;  Service: Cardiology;  Laterality: N/A;    ENDOSCOPIC ULTRASOUND OF UPPER GASTROINTESTINAL TRACT N/A 8/22/2023    Procedure: ULTRASOUND, UPPER GI TRACT, ENDOSCOPIC;  Surgeon: Joe Means MD;  Location: [facility] ENDO (2ND FLR);  Service: Endoscopy;  Laterality: N/A;  instr portal-pt stated difficult intubation with surgery in the past-tb    FUSION OF CERVICAL SPINE BY POSTERIOR APPROACH N/A 4/24/2024    Procedure: FUSION, SPINE, CERVICAL, POSTERIOR APPROACH C3-6 DEPUY  GW TONGS WTIH WEIGHTS, FREDIS 4 POST SNS: MOTORS/SSEP/EMG;  Surgeon: Edd Burnette MD;  Location: [facility] OR;  Service: Orthopedics;  Laterality: N/A;    FUSION OF CERVICAL SPINE BY POSTERIOR APPROACH N/A 7/26/2024    Procedure: FUSION, SPINE, CERVICAL, POSTERIOR APPROACH C3-T1 REVISION;  Surgeon: Edd Burnette MD;  Location: [facility] OR 2ND FLR;  Service: Orthopedics;  Laterality: N/A;    HERNIA REPAIR      PROSTATECTOMY         MEDS:  Medcard reviewed and updated    ALLERGIES: Allergy Card reviewed and updated    SOCIAL HISTORY:   The patient is a nonsmoker.    PE:   APPEARANCE: Well nourished, well developed, in no acute distress.    CHEST: Lungs clear to auscultation with unlabored respirations.  CARDIOVASCULAR: Normal S1, S2. No murmurs. No carotid bruits. No pedal edema.  ABDOMEN: Bowel sounds normal. Not distended. Soft. No tenderness or masses.   PSYCHIATRIC: The patient is oriented to person, place, and time and has a pleasant affect.        ASSESSMENT/PLAN:  Hi was seen today for follow-up and hoarse.    Diagnoses and all orders for this  visit:    Hoarseness  -     Ambulatory referral/consult to ENT; Future    Essential hypertension  -     blood pressure is controlled, continue current therapy    Type 2 diabetes mellitus without complication, without long-term current use of insulin  -     Basic Metabolic Panel; Future  -     Hemoglobin A1C; Future  -     stable, continue current therapy    Mixed hyperlipidemia  -     TSH; Future        IMPRESSION:  - Evaluated thyroid function, noting fluctuating TSH levels (4.7, previously 3.3 and 4.0). Decided against initiating thyroid medication due to mild elevation and fluctuation pattern.  - Reviewed recent lab results, including normal 3T4, PSA, lipid panel, electrolytes, kidney and liver function tests, and blood count.  - Considered potential causes of persistent hoarseness, including vocal cord paralysis, polyps, and chronic sinus problems.  - Assessed emphysema symptoms and current management with inhalers.  - Discussed relationship between shortness of breath and chest pain symptoms.    EMPHYSEMA:  - Continued Wixela (inhaler) at 5:00 AM and 5:00 PM daily.  - Acknowledged the patient's emphysema diagnosis and its impact on lung tissue.  - Ordered a full pulmonary function test to assess the severity of emphysema.  - Prescribed Albuterol for breakthrough symptoms.  - Continued Albuterol as needed for breakthrough symptoms.  - Instructed the patient to use Wixela (inhaler) twice daily and Albuterol for breakthrough symptoms of shortness of breath.    SHORTNESS OF BREATH:  - Hi reports shortness of breath when walking short distances and a dry cough when medication is wearing off.  - Hi reports shortness of breath when walking short distances.  - Acknowledged the patient's shortness of breath in relation to emphysema diagnosis.    CHRONIC SINUSITIS:  - Hi mentions having chronic sinus problems.  - Acknowledged that chronic sinus problems can cause hoarseness.    DYSPHONIA:  - Hi reports  persistent hoarseness since cervical fusion surgery.  - Acknowledged the hoarseness and its potential causes.  - Planned to refer the patient to ENT for laryngoscopy to evaluate vocal cords and airway.  - Referred the patient to ENT for evaluation of persistent hoarseness, including possible laryngoscopy.    HYPOTHYROIDISM:  - Ordered TSH test in 3 months.  - Barbaras TSH levels are fluctuating, with the most recent result being 4.7.  - Evaluated thyroid function as mildly elevated but fluctuating.    PREDIABETES AND ABNORMAL GLUCOSE:  - Barbaras Hemoglobin A1c is 6.4, which is stable but indicative of prediabetes.  - Hi's glucose level was slightly elevated at 164.  - Hi's glucose level was slightly elevated at 164.    NAUSEA:  - Hi reports experiencing nausea, possibly as a side effect of medication.    ALLERGIES:  - Hi has multiple medication allergies.  - Hi has allergies to multiple medications including Lisinopril, Lipitor, Adhesive, Crestor, Jardiance, and Metformin.     Answers submitted by the patient for this visit:  Review of Systems Questionnaire (Submitted on 4/3/2025)  activity change: Yes  neck pain: Yes  hearing loss: Yes  rhinorrhea: Yes  trouble swallowing: No  eye discharge: No  chest tightness: No  wheezing: No  chest pain: No  palpitations: No  blood in stool: No  constipation: No  vomiting: No  diarrhea: No  polydipsia: No  difficulty urinating: No  urgency: No  hematuria: No  headaches: Yes  dysphoric mood: Yes

## 2025-04-07 ENCOUNTER — CLINICAL SUPPORT (OUTPATIENT)
Dept: REHABILITATION | Facility: HOSPITAL | Age: 65
End: 2025-04-07
Payer: MEDICARE

## 2025-04-07 ENCOUNTER — PATIENT MESSAGE (OUTPATIENT)
Dept: INTERNAL MEDICINE | Facility: CLINIC | Age: 65
End: 2025-04-07
Payer: MEDICARE

## 2025-04-07 DIAGNOSIS — M19.012 PRIMARY OSTEOARTHRITIS OF LEFT SHOULDER: Primary | ICD-10-CM

## 2025-04-07 DIAGNOSIS — M25.612 DECREASED ROM OF LEFT SHOULDER: ICD-10-CM

## 2025-04-07 DIAGNOSIS — Z98.1 S/P CERVICAL SPINAL FUSION: ICD-10-CM

## 2025-04-07 PROCEDURE — 97110 THERAPEUTIC EXERCISES: CPT | Mod: HCNC,PO

## 2025-04-07 PROCEDURE — 97140 MANUAL THERAPY 1/> REGIONS: CPT | Mod: HCNC,PO

## 2025-04-07 PROCEDURE — 97112 NEUROMUSCULAR REEDUCATION: CPT | Mod: HCNC,PO

## 2025-04-07 PROCEDURE — 97530 THERAPEUTIC ACTIVITIES: CPT | Mod: HCNC,PO

## 2025-04-14 ENCOUNTER — CLINICAL SUPPORT (OUTPATIENT)
Dept: REHABILITATION | Facility: HOSPITAL | Age: 65
End: 2025-04-14
Payer: MEDICARE

## 2025-04-14 DIAGNOSIS — M25.612 DECREASED ROM OF LEFT SHOULDER: ICD-10-CM

## 2025-04-14 DIAGNOSIS — Z98.1 S/P CERVICAL SPINAL FUSION: ICD-10-CM

## 2025-04-14 DIAGNOSIS — M19.012 PRIMARY OSTEOARTHRITIS OF LEFT SHOULDER: Primary | ICD-10-CM

## 2025-04-14 PROCEDURE — 97112 NEUROMUSCULAR REEDUCATION: CPT | Mod: HCNC,PO

## 2025-04-14 PROCEDURE — 97140 MANUAL THERAPY 1/> REGIONS: CPT | Mod: HCNC,PO

## 2025-04-14 PROCEDURE — 97530 THERAPEUTIC ACTIVITIES: CPT | Mod: HCNC,PO

## 2025-04-14 NOTE — PROGRESS NOTES
Outpatient Rehab    Physical Therapy Visit    Patient Name: Hi Braxton  MRN: [id number]  YOB: 1960  Encounter Date: 3/18/2025    Therapy Diagnosis:   Encounter Diagnoses   Name Primary?    Primary osteoarthritis of left shoulder Yes    S/P cervical spinal fusion     Decreased ROM of left shoulder      Physician: Venkatesh Prieto PA*    Physician Orders: Eval and Treat  Medical Diagnosis: Primary osteoarthritis of left shoulder  S/P cervical spinal fusion    Visit # / Visits Authorized:  6 / 20  Insurance Authorization Period: 2/11/2025 to 4/24/2025  Date of Evaluation: 2/18/2025  Plan of Care Certification: 2/11/2025 to 4/24/2025     PT/PTA:     Number of PTA visits since last PT visit:   Time In:   0500PM  Time Out:  0600PM  Total Time:   60 mins  Total Billable Time:  60 mins    FOTO:  Intake Score:  %  Survey Score 1:  %  Survey Score 2:  %         Subjective   doing well, neck/shoulder pain stays the same from last visit..  Pain reported as 2/10.      Objective      Shoulder Range of Motion  Left Shoulder   Active (deg) Passive (deg) Pain   Flexion 150 155     Extension         Scaption         ABduction 135 140     ADduction         Horizontal ABduction         Horizontal ADduction         External Rotation (Shoulder ABducted 0 degrees) 50 55     External Rotation (Shoulder ABducted 45 degrees)         External Rotation (Shoulder ABducted 90 degrees)         Internal Rotation (Shoulder ABducted 0 degrees) 45 45     Internal Rotation (Shoulder ABducted 45 degrees)         Internal Rotation (Shoulder ABducted 90 degrees)                          Treatment:  Therapeutic Exercise  TE 1: Supine cane flexion 3 x 15  TE 2: SL ER with 3# DB 3 x 15  TE 3: Standing T with G band 3 x 15  TE 4: Thoracic extension on chair 3 x 10  TE 5: Half kneeling open book 3 x 10  TE 6: Standing 90degrees ER 2# at edge of table  3 x 1-  Manual Therapy  MT 1: Posterior/inferior GH glide grade  III  Balance/Neuromuscular Re-Education  NMR 1: Wall ball 30 sec x 3  NMR 2: SA wall slide with towel 3 x 10  NMR 3: Landmine press with 5# 3 x 12  NMR 4: Standing T with G band 3 x 12  Therapeutic Activity  TA 1: Shoulder scaption 3# 3 x 12    Time Entry(in minutes):       Assessment & Plan   Assessment: Pt presented to clinic w/ good carryover shoulder ROM from last week. Session today focus on shoulder RC control and periscapular muscle strengthening with focus on correcting scapula anterior tipping and scapula upward rotation. Pt jessica progressed session well w/o increased shoulder pain. Will continue to progress per tolerance  Evaluation/Treatment Tolerance: Patient tolerated treatment well    Patient will continue to benefit from skilled outpatient physical therapy to address the deficits listed in the problem list box on initial evaluation, provide pt/family education and to maximize pt's level of independence in the home and community environment.     Patient's spiritual, cultural, and educational needs considered and patient agreeable to plan of care and goals.           Plan:      Goals:   Active       Functional outcome       Patient will show a significant change in 20% patient-reported outcome tool to demonstrate subjective improvement (Progressing)       Start:  02/18/25    Expected End:  04/15/25            Patient stated goal: improve shoulder pain with daily activties  (Progressing)       Start:  02/18/25    Expected End:  03/18/25            Patient will demonstrate independence in home program for support of progression (Progressing)       Start:  02/18/25    Expected End:  03/18/25               Range of Motion       Patient will achieve left shoulder flexion of 170 degrees (Progressing)       Start:  02/18/25    Expected End:  03/18/25            Patient will achieve left shoulder external rotation of 80 degrees in 90 degrees abd (Progressing)       Start:  02/18/25    Expected End:  03/18/25                Strength       Patient will achieve left shoulder flexion strength of 4/5 (Progressing)       Start:  02/18/25    Expected End:  04/29/25            Patient will achieve left shoulder external rotation strength of 4/5 in neutral (Progressing)       Start:  02/18/25    Expected End:  04/29/25              Resolved       Pain       Patient will report pain of 2/10 demonstrating a reduction of overall pain (Met)       Start:  02/18/25    Expected End:  03/18/25    Resolved:  04/03/25             Wilber Joseph, PT

## 2025-04-16 ENCOUNTER — HOSPITAL ENCOUNTER (OUTPATIENT)
Dept: PULMONOLOGY | Facility: CLINIC | Age: 65
Discharge: HOME | End: 2025-04-16
Payer: MEDICARE

## 2025-04-16 DIAGNOSIS — J43.9 PULMONARY EMPHYSEMA, UNSPECIFIED EMPHYSEMA TYPE: ICD-10-CM

## 2025-04-16 LAB
DLCO ADJ PRE: 23.45 ML/(MIN*MMHG) (ref 25.9–39.76)
DLCO SINGLE BREATH LLN: 25.9
DLCO SINGLE BREATH PRE REF: 71 %
DLCO SINGLE BREATH REF: 32.83
DLCOC SBVA LLN: 2.94
DLCOC SBVA PRE REF: 93.3 %
DLCOC SBVA REF: 3.93
DLCOC SINGLE BREATH LLN: 25.9
DLCOC SINGLE BREATH PRE REF: 71.4 %
DLCOC SINGLE BREATH REF: 32.83
DLCOCSBVAULN: 4.93
DLCOCSINGLEBREATHULN: 39.76
DLCOCSINGLEBREATHZSCORE: -2.23
DLCOSINGLEBREATHULN: 39.76
DLCOSINGLEBREATHZSCORE: -2.26
DLCOVA LLN: 2.94
DLCOVA PRE REF: 92.8 %
DLCOVA PRE: 3.65 ML/(MIN*MMHG*L) (ref 2.94–4.93)
DLCOVA REF: 3.93
DLCOVAULN: 4.93
DLVAADJ PRE: 3.67 ML/(MIN*MMHG*L) (ref 2.94–4.93)
ERVN2 LLN: -16448.74
ERVN2 PRE REF: 133.7 %
ERVN2 PRE: 1.68 L (ref -16448.74–16451.26)
ERVN2 REF: 1.26
ERVN2ULN: ABNORMAL
FEF 25 75 LLN: 1.85
FEF 25 75 PRE REF: 85.6 %
FEF 25 75 REF: 3.56
FEV05 LLN: 2.12
FEV05 REF: 3.26
FEV1 FVC LLN: 64
FEV1 FVC PRE REF: 102.5 %
FEV1 FVC REF: 76
FEV1 LLN: 2.37
FEV1 PRE REF: 101.9 %
FEV1 REF: 3.4
FEV1FVCZSCORE: 0.28
FEV1ZSCORE: 0.11
FRCN2 LLN: 3.04
FRCN2 PRE REF: 100.1 %
FRCN2 REF: 4.03
FRCN2ULN: 5.02
FVC LLN: 3.26
FVC PRE REF: 98.9 %
FVC REF: 4.49
FVCZSCORE: -0.07
ICN2REF: 3.99
IVC PRE: 4.33 L (ref 3.26–5.73)
IVC SINGLE BREATH LLN: 3.26
IVC SINGLE BREATH PRE REF: 96.4 %
IVC SINGLE BREATH REF: 4.49
IVCSINGLEBREATHULN: 5.73
LLN IC N2: -9999996.01
PEF LLN: 6.55
PEF PRE REF: 99.6 %
PEF REF: 9.68
PHYSICIAN COMMENT: ABNORMAL
PRE DLCO: 23.31 ML/(MIN*MMHG) (ref 25.9–39.76)
PRE FEF 25 75: 3.05 L/S (ref 1.85–5.27)
PRE FET 100: 6.67 SEC
PRE FEV05 REF: 87.9 %
PRE FEV1 FVC: 77.92 % (ref 63.57–86.97)
PRE FEV1: 3.46 L (ref 2.37–4.35)
PRE FEV5: 2.86 L (ref 2.12–4.39)
PRE FRC N2: 4.03 L (ref 3.04–5.02)
PRE FVC: 4.44 L (ref 3.26–5.73)
PRE IC N2: 2.76 L (ref -9999996.01–#######.####)
PRE PEF: 9.64 L/S (ref 6.55–12.8)
PRE REF IC N2: 69.1 %
RVN2 LLN: 2.1
RVN2 PRE REF: 84.8 %
RVN2 PRE: 2.35 L (ref 2.1–3.45)
RVN2 REF: 2.77
RVN2TLCN2 LLN: 31.11
RVN2TLCN2 PRE REF: 86.4 %
RVN2TLCN2 PRE: 34.63 % (ref 31.11–49.07)
RVN2TLCN2 REF: 40.09
RVN2TLCN2ULN: 49.07
RVN2ULN: 3.45
TLCN2 LLN: 7.19
TLCN2 PRE REF: 81.4 %
TLCN2 PRE: 6.79 L (ref 7.19–9.5)
TLCN2 REF: 8.34
TLCN2ULN: 9.5
TLCN2ZSCORE: -2.22
ULN IC N2: ABNORMAL
VA PRE: 6.39 L (ref 8.19–8.19)
VA SINGLE BREATH LLN: 8.19
VA SINGLE BREATH PRE REF: 78 %
VA SINGLE BREATH REF: 8.19
VASINGLEBREATHULN: 8.19
VCMAXN2 LLN: 3.26
VCMAXN2 PRE REF: 98.9 %
VCMAXN2 PRE: 4.44 L (ref 3.26–5.73)
VCMAXN2 REF: 4.49
VCMAXN2ULN: 5.73

## 2025-04-17 ENCOUNTER — PATIENT MESSAGE (OUTPATIENT)
Dept: INTERNAL MEDICINE | Facility: CLINIC | Age: 65
End: 2025-04-17
Payer: MEDICARE

## 2025-04-17 NOTE — TELEPHONE ENCOUNTER
Patient has lung function test came back normal with mild restriction.  The mild restriction is indicative of the emphysema.  The CT scan shows structurally that he has emphysema.  What this means is that patient has emphysema, but it may not be as clinically apparent, because functionally this is a mild disease.

## 2025-04-17 NOTE — TELEPHONE ENCOUNTER
" LOV with Josefina Kendall MD , 4/4/25  LOV with Dr. Okeefe 3/25/2025 (Annual)  Pt seeking further clarification on PFT results.  Pt c/o SOB and chest tightness during PFT test.  PFT resulted 4/16/25. Dr. Okeefe commented " Your lung function test is normal."      "

## 2025-04-20 ENCOUNTER — PATIENT MESSAGE (OUTPATIENT)
Dept: ADMINISTRATIVE | Facility: OTHER | Age: 65
End: 2025-04-20
Payer: MEDICARE

## 2025-04-21 ENCOUNTER — PATIENT MESSAGE (OUTPATIENT)
Dept: INTERNAL MEDICINE | Facility: CLINIC | Age: 65
End: 2025-04-21
Payer: MEDICARE

## 2025-04-21 DIAGNOSIS — F41.1 GENERALIZED ANXIETY DISORDER: ICD-10-CM

## 2025-04-21 RX ORDER — ALPRAZOLAM 0.5 MG/1
0.5 TABLET ORAL
Qty: 90 TABLET | Refills: 3 | Status: SHIPPED | OUTPATIENT
Start: 2025-04-21

## 2025-04-21 NOTE — TELEPHONE ENCOUNTER
No care due was identified.  [facility] Embedded Care Due Messages. Reference number: [id number].   4/21/2025 8:38:50 AM CDT

## 2025-04-23 ENCOUNTER — CLINICAL SUPPORT (OUTPATIENT)
Dept: REHABILITATION | Facility: HOSPITAL | Age: 65
End: 2025-04-23
Payer: MEDICARE

## 2025-04-23 DIAGNOSIS — M19.012 PRIMARY OSTEOARTHRITIS OF LEFT SHOULDER: Primary | ICD-10-CM

## 2025-04-23 DIAGNOSIS — Z98.1 S/P CERVICAL SPINAL FUSION: ICD-10-CM

## 2025-04-23 DIAGNOSIS — M25.612 DECREASED ROM OF LEFT SHOULDER: ICD-10-CM

## 2025-04-23 PROCEDURE — 97110 THERAPEUTIC EXERCISES: CPT | Mod: HCNC,PO

## 2025-04-23 PROCEDURE — 97112 NEUROMUSCULAR REEDUCATION: CPT | Mod: HCNC,PO

## 2025-04-23 PROCEDURE — 97530 THERAPEUTIC ACTIVITIES: CPT | Mod: HCNC,PO

## 2025-04-28 ENCOUNTER — CLINICAL SUPPORT (OUTPATIENT)
Dept: REHABILITATION | Facility: HOSPITAL | Age: 65
End: 2025-04-28
Payer: MEDICARE

## 2025-04-28 DIAGNOSIS — Z98.1 S/P CERVICAL SPINAL FUSION: ICD-10-CM

## 2025-04-28 DIAGNOSIS — M19.012 PRIMARY OSTEOARTHRITIS OF LEFT SHOULDER: Primary | ICD-10-CM

## 2025-04-28 DIAGNOSIS — M25.612 DECREASED ROM OF LEFT SHOULDER: ICD-10-CM

## 2025-04-28 PROCEDURE — 97112 NEUROMUSCULAR REEDUCATION: CPT | Mod: PO

## 2025-04-28 PROCEDURE — 97530 THERAPEUTIC ACTIVITIES: CPT | Mod: PO

## 2025-04-28 PROCEDURE — 97140 MANUAL THERAPY 1/> REGIONS: CPT | Mod: PO

## 2025-04-29 DIAGNOSIS — J43.9 PULMONARY EMPHYSEMA, UNSPECIFIED EMPHYSEMA TYPE: ICD-10-CM

## 2025-04-29 RX ORDER — ALBUTEROL SULFATE 90 UG/1
INHALANT RESPIRATORY (INHALATION)
Qty: 54 G | Refills: 0 | OUTPATIENT
Start: 2025-04-29

## 2025-04-29 RX ORDER — FLUTICASONE PROPIONATE AND SALMETEROL 100; 50 UG/1; UG/1
POWDER RESPIRATORY (INHALATION)
Refills: 0 | OUTPATIENT
Start: 2025-04-29

## 2025-04-29 NOTE — TELEPHONE ENCOUNTER
Refill Decision Note   Hi Braxton  is requesting a refill authorization.  Brief Assessment and Rationale for Refill:  Quick Discontinue     Medication Therapy Plan:         Comments:     Note composed:11:51 AM 04/29/2025

## 2025-04-29 NOTE — TELEPHONE ENCOUNTER
No care due was identified.  [facility] Embedded Care Due Messages. Reference number: [id number].   4/29/2025 8:38:18 AM CDT

## 2025-05-03 DIAGNOSIS — J43.9 PULMONARY EMPHYSEMA, UNSPECIFIED EMPHYSEMA TYPE: ICD-10-CM

## 2025-05-03 NOTE — TELEPHONE ENCOUNTER
No care due was identified.  [facility] Embedded Care Due Messages. Reference number: [id number].   5/03/2025 1:12:35 PM CDT

## 2025-05-04 RX ORDER — ALBUTEROL SULFATE 90 UG/1
INHALANT RESPIRATORY (INHALATION)
Refills: 0 | OUTPATIENT
Start: 2025-05-04

## 2025-05-04 RX ORDER — FLUTICASONE PROPIONATE AND SALMETEROL 100; 50 UG/1; UG/1
POWDER RESPIRATORY (INHALATION)
Refills: 0 | OUTPATIENT
Start: 2025-05-04

## 2025-05-04 NOTE — TELEPHONE ENCOUNTER
Refill Decision Note   Hi Braxton  is requesting a refill authorization.  Brief Assessment and Rationale for Refill:  Quick Discontinue     Medication Therapy Plan:    Pharmacy is requesting new scripts for the following medications without required information, (sig/ frequency/qty/etc)      Medication Reconciliation Completed: No     Comments: Pharmacies have been requesting medications for patients without required information, (sig, frequency, qty, etc.). In addition, requests are sent for medication(s) pt. are currently not taking, and medications patients have never taken.    We have spoken to the pharmacies about these request types and advised their teams previously that we are unable to assess these New Script requests and require all details for these requests. This is a known issue and has been reported.     Note composed:11:29 AM 05/04/2025

## 2025-05-05 ENCOUNTER — PATIENT MESSAGE (OUTPATIENT)
Dept: INTERNAL MEDICINE | Facility: CLINIC | Age: 65
End: 2025-05-05
Payer: MEDICARE

## 2025-05-05 DIAGNOSIS — R07.9 CHEST PAIN, UNSPECIFIED TYPE: Primary | ICD-10-CM

## 2025-05-05 DIAGNOSIS — R00.1 BRADYCARDIA: ICD-10-CM

## 2025-05-05 NOTE — PROGRESS NOTES
Outpatient Rehab    Physical Therapy Progress Note    Patient Name: Hi Braxton  MRN: [id number]  YOB: 1960  Encounter Date: 3/31/2025    Therapy Diagnosis:   Encounter Diagnoses   Name Primary?    Primary osteoarthritis of left shoulder Yes    S/P cervical spinal fusion     Decreased ROM of left shoulder      Physician: Venkatesh Prieto PA*    Physician Orders: Eval and Treat  Medical Diagnosis: Primary osteoarthritis of left shoulder  S/P cervical spinal fusion    Visit # / Visits Authorized:  9 / 30  Insurance Authorization Period: 2/11/2025 to 7/10/2025  Date of Evaluation: 2/18/2025  Plan of Care Certification: 2/11/2025 to 4/24/2025     PT/PTA:     Number of PTA visits since last PT visit:   Time In:   0500PM  Time Out:  0555PM  Total Time (in minutes):   55 mins  Total Billable Time (in minutes):  55 mins    FOTO:  Intake Score:  %  Survey Score 2:  %  Survey Score 3:  %         Subjective   doing okay, no new complaints upon arrival..  Pain reported as 2/10.      Objective            Treatment:  Therapeutic Exercise  TE 1: Supine cane flexion 3 x 15  TE 2: SL ER with 3# DB 3 x 15  TE 3: Standing T with G band 3 x 15  TE 4: Thoracic extension on chair 3 x 10  TE 5: Half kneeling open book 3 x 10  TE 6: Standing 90degrees ER 2# at edge of table  3 x 1-  Manual Therapy  MT 1: Posterior/inferior GH glide grade III  Balance/Neuromuscular Re-Education  NMR 1: Wall ball 30 sec x 3  NMR 2: SA wall slide with towel 3 x 10  NMR 3: Landmine press with 5# 3 x 12  NMR 4: Standing T with G band 3 x 12  Therapeutic Activity  TA 1: Shoulder scaption 3# 3 x 12  TA 2: Landmine press 3# 3 x 12    Time Entry(in minutes):       Assessment & Plan   Assessment: Pt presented to clinic w/ good carryover shoulder ROM from last week. Session today focus on shoulder RC control and periscapular muscle strengthening with focus on correcting scapula anterior tipping and scapula upward rotation. Pt jessica progressed  session well w/o increased shoulder pain. Will continue to progress per tolerance  Evaluation/Treatment Tolerance: Patient tolerated treatment well    Patient will continue to benefit from skilled outpatient physical therapy to address the deficits listed in the problem list box on initial evaluation, provide pt/family education and to maximize pt's level of independence in the home and community environment.     Patient's spiritual, cultural, and educational needs considered and patient agreeable to plan of care and goals.           Plan:      Goals:   Active       Functional outcome       Patient will show a significant change in 20% patient-reported outcome tool to demonstrate subjective improvement (Progressing)       Start:  02/18/25    Expected End:  04/15/25            Patient stated goal: improve shoulder pain with daily activties  (Progressing)       Start:  02/18/25    Expected End:  03/18/25            Patient will demonstrate independence in home program for support of progression (Progressing)       Start:  02/18/25    Expected End:  03/18/25               Range of Motion       Patient will achieve left shoulder flexion of 170 degrees (Met)       Start:  02/18/25    Expected End:  03/18/25    Resolved:  05/05/25         Patient will achieve left shoulder external rotation of 80 degrees in 90 degrees abd (Progressing)       Start:  02/18/25    Expected End:  03/18/25               Strength       Patient will achieve left shoulder flexion strength of 4/5 (Progressing)       Start:  02/18/25    Expected End:  04/29/25            Patient will achieve left shoulder external rotation strength of 4/5 in neutral (Progressing)       Start:  02/18/25    Expected End:  04/29/25              Resolved       Pain       Patient will report pain of 2/10 demonstrating a reduction of overall pain (Met)       Start:  02/18/25    Expected End:  03/18/25    Resolved:  04/03/25             Wilber Joseph, PT

## 2025-05-05 NOTE — TELEPHONE ENCOUNTER
"LOV with Josefina Kendall MD , 4/4/2025  Note mentions   "EMPHYSEMA:  - Continued Wixela (inhaler) at 5:00 AM and 5:00 PM daily.  - Acknowledged the patient's emphysema diagnosis and its impact on lung tissue.  - Ordered a full pulmonary function test to assess the severity of emphysema.  - Prescribed Albuterol for breakthrough symptoms."    4/16 PFT performed, results normal  "

## 2025-05-06 ENCOUNTER — OFFICE VISIT (OUTPATIENT)
Dept: URGENT CARE | Facility: CLINIC | Age: 65
End: 2025-05-06
Payer: MEDICARE

## 2025-05-06 ENCOUNTER — PATIENT MESSAGE (OUTPATIENT)
Dept: INTERNAL MEDICINE | Facility: CLINIC | Age: 65
End: 2025-05-06
Payer: MEDICARE

## 2025-05-06 VITALS
DIASTOLIC BLOOD PRESSURE: 75 MMHG | OXYGEN SATURATION: 98 % | BODY MASS INDEX: 25.24 KG/M2 | RESPIRATION RATE: 18 BRPM | HEART RATE: 80 BPM | SYSTOLIC BLOOD PRESSURE: 120 MMHG | HEIGHT: 76 IN | TEMPERATURE: 98 F | WEIGHT: 207.25 LBS

## 2025-05-06 DIAGNOSIS — J43.9 PULMONARY EMPHYSEMA, UNSPECIFIED EMPHYSEMA TYPE: ICD-10-CM

## 2025-05-06 DIAGNOSIS — R07.9 CHEST PAIN, UNSPECIFIED TYPE: Primary | ICD-10-CM

## 2025-05-06 PROCEDURE — 99214 OFFICE O/P EST MOD 30 MIN: CPT | Mod: S$GLB,,, | Performed by: SURGERY

## 2025-05-06 PROCEDURE — 71046 X-RAY EXAM CHEST 2 VIEWS: CPT | Mod: S$GLB,,, | Performed by: RADIOLOGY

## 2025-05-06 PROCEDURE — 93005 ELECTROCARDIOGRAM TRACING: CPT | Mod: S$GLB,,, | Performed by: SURGERY

## 2025-05-06 PROCEDURE — 93010 ELECTROCARDIOGRAM REPORT: CPT | Mod: S$GLB,,, | Performed by: INTERNAL MEDICINE

## 2025-05-06 RX ORDER — ALBUTEROL SULFATE 90 UG/1
1-2 INHALANT RESPIRATORY (INHALATION) EVERY 6 HOURS PRN
Qty: 54 G | Refills: 0 | Status: SHIPPED | OUTPATIENT
Start: 2025-05-06

## 2025-05-06 RX ORDER — FLUTICASONE PROPIONATE AND SALMETEROL 100; 50 UG/1; UG/1
1 POWDER RESPIRATORY (INHALATION) 2 TIMES DAILY
Qty: 180 EACH | Refills: 0 | Status: SHIPPED | OUTPATIENT
Start: 2025-05-06

## 2025-05-06 NOTE — TELEPHONE ENCOUNTER
"I spoke to the patient. Pt is speaking clearly and coherently. Pt reports "muscle spasms in his heart," sweating, SOB, varying pulse from low to high, nausea since yesterday morning. Pt instructed to go to emergency room. Pt refused because he didn't want to sit and wait a long time. Pt encouraged to seek in-person medical attention ASAP. Pt agreed to go to urgent care.    "

## 2025-05-06 NOTE — TELEPHONE ENCOUNTER
No care due was identified.  [facility] Embedded Care Due Messages. Reference number: [id number].   5/06/2025 7:13:48 AM CDT

## 2025-05-06 NOTE — TELEPHONE ENCOUNTER
LOV: 4/4/25  Upcoming OV: 7/18/25  LF fluticasone-salmeterol diskus 100-50: 3/25/25  LF albuterol hfa 90: 3/25/25

## 2025-05-06 NOTE — PROGRESS NOTES
"Subjective:      Patient ID: Hi Braxton is a 65 y.o. male.    Vitals:  height is 6' 4" (1.93 m) and weight is 94 kg (207 lb 3.7 oz). His oral temperature is 98 °F (36.7 °C). His blood pressure is 120/75 and his pulse is 80. His respiration is 18 and oxygen saturation is 98%.     Chief Complaint: Chest Pain    Pt presents today w/ chest px accompanied w/ light headedness ; onset yesterday 05/05/25. Pt c/o intermittent palpitations and initially sensation of sob and then subsided. . Pt denies sinus sx, emesis , fever and trauma. Pt has hx CAD, sleep apnea , diabetes , HTN, HLD, cerebral aneurysm . Pt self medicated w/ advair;no relief.   He has nitrostat but didn't think of taking it. Reports mild mid sternal pressure currently but much less than before. He did not do anything to improve it. He denies CP with exertion, it started when at rest.   He reports recent episodes of low pulse(45 BPM) with dizzy spells, causing him to pull over when driving once. He carries pulse ox with him. Resolved spontaneously.     Chest Pain   This is a new problem. The current episode started yesterday. The onset quality is sudden. The problem occurs constantly. The problem has been waxing and waning. The pain is present in the substernal region. The pain is at a severity of 5/10. The pain is moderate. The quality of the pain is described as tightness and sharp. The pain does not radiate. Associated symptoms include palpitations. Pertinent negatives include no abdominal pain, back pain, claudication, cough, diaphoresis, dizziness, exertional chest pressure, fever, headaches, hemoptysis, irregular heartbeat, leg pain, lower extremity edema, malaise/fatigue, nausea, near-syncope, numbness, orthopnea, PND, shortness of breath, sputum production, syncope, vomiting or weakness. Treatments tried: advair inhealer. The treatment provided mild relief. Risk factors include being elderly.   His past medical history is significant for " aneurysm, CAD, diabetes, hyperlipidemia, hypertension and sleep apnea.   Pertinent negatives for past medical history include no anxiety/panic attacks, no aortic aneurysm, no aortic dissection, no arrhythmia, no bicuspid aortic valve, no cancer, no congenital heart disease, no connective tissue disease, no COPD, no CHF, no DVT, no hyperhomocysteinemia, no Kawasaki disease, no Marfan's syndrome, no MI, no mitral valve prolapse, no pacemaker, no PE, no PVD, no recent injury, no rheumatic fever, no seizures, no sickle cell disease, no spontaneous pneumothorax, no stimulant use, no strokes, no thyroid problem, no TIA, Dixon syndrome and no valve disorder.   His family medical history is significant for CAD.   Pertinent negatives for family medical history include: no aortic dissection, no connective tissue disease, no diabetes, no heart disease, no hyperlipidemia, no hypertension, no early MI, no PE, no PVD, no sickle cell disease, no stroke, no sudden death and no TIA.       Constitution: Negative for sweating and fever.   Cardiovascular:  Positive for chest pain and palpitations. Negative for passing out.   Respiratory:  Negative for cough, sputum production, bloody sputum, COPD and shortness of breath.    Gastrointestinal:  Negative for abdominal pain, nausea and vomiting.   Musculoskeletal:  Negative for back pain.   Neurological:  Negative for dizziness, headaches, numbness and seizures.      Objective:     Physical Exam   Constitutional: He is oriented to person, place, and time. He appears well-developed. He is cooperative.  Non-toxic appearance. He does not appear ill. No distress.   HENT:   Head: Normocephalic and atraumatic.   Ears:   Right Ear: Hearing, tympanic membrane, external ear and ear canal normal.   Left Ear: Hearing, tympanic membrane, external ear and ear canal normal.   Nose: Nose normal. No mucosal edema, rhinorrhea or nasal deformity. No epistaxis. Right sinus exhibits no maxillary sinus  tenderness and no frontal sinus tenderness. Left sinus exhibits no maxillary sinus tenderness and no frontal sinus tenderness.   Mouth/Throat: Uvula is midline, oropharynx is clear and moist and mucous membranes are normal. No trismus in the jaw. Normal dentition. No uvula swelling. No posterior oropharyngeal erythema.   Eyes: Conjunctivae and lids are normal. Right eye exhibits no discharge. Left eye exhibits no discharge. No scleral icterus.   Neck: Trachea normal and phonation normal. Neck supple.   Cardiovascular: Normal rate, regular rhythm, normal heart sounds and normal pulses.   Pulmonary/Chest: Effort normal and breath sounds normal. No respiratory distress. He exhibits tenderness. He exhibits no crepitus.       Abdominal: Normal appearance and bowel sounds are normal. He exhibits no distension and no mass. Soft. There is no abdominal tenderness.   Musculoskeletal: Normal range of motion.         General: No deformity. Normal range of motion.   Neurological: He is alert and oriented to person, place, and time. He exhibits normal muscle tone. Coordination normal.   Skin: Skin is warm, dry, intact, not diaphoretic and not pale.   Psychiatric: His speech is normal and behavior is normal. Judgment and thought content normal.   Nursing note and vitals reviewed.      Assessment:     1. Chest pain, unspecified type        Plan:       Chest pain, unspecified type  -     XR CHEST PA AND LATERAL; Future; Expected date: 05/06/2025  -     EKG 12-lead  -     E-Consult to General Cardiology    EKG: normal EKG, normal sinus rhythm, unchanged from previous tracings, 79.    Normal EKG. Pain reproducible on exam. Unlikely cardiac but given concerns re episodes of symptomatic bradycardia, will need close follow up with cardiology    XR CHEST PA AND LATERAL  Result Date: 5/6/2025  EXAMINATION: XR CHEST PA AND LATERAL CLINICAL HISTORY: Chest pain, unspecified TECHNIQUE: PA and lateral views of the chest were performed.  FINDINGS: The lungs are well expanded.  There is bilateral apical pleural thickening and/or scar.  There is no pneumothorax or significant amount of pleural fluid.  The cardiac silhouette is not enlarged.  The osseous structures demonstrate degenerative change.  There is partial visualization of cervical and thoracic fixation hardware.     As above. Electronically signed by: Emmanuel Marques MD Date:    05/06/2025 Time:    15:40        Medical Decision Making:   History:   Old Medical Records: I decided to obtain old medical records.  Old Records Summarized: records from clinic visits and records from previous admission(s).       <> Summary of Records: Prior cardiac cath reviewed. Old EKG's reviewed. Recent conversations with primary care noted. Chest CT reviewed. Emphysema management noted.   Initial Assessment:   Chest pain for > 1 day. Hx CAD,, Hx emphysema  Clinical Tests:   Radiological Study: Reviewed  Medical Tests: Reviewed    Additional MDM:     Heart Failure Score:   COPD = No

## 2025-05-06 NOTE — TELEPHONE ENCOUNTER
Please see pt note. Pt c/o episodes of bradycardia. Denies bradycardia, chest pain, and dyspnea at present. Pt encouraged to schedule with PCP and or cardiology for f/u and to have someone drive him to ED for acute CP, dyspnea. Pt is currently prescribed Metoprolol 75mg QD.     LOV with Josefina Kendall MD , 4/4/2025

## 2025-05-06 NOTE — TELEPHONE ENCOUNTER
Refill Routing Note   Medication(s) are not appropriate for processing by [facility] for the following reason(s):        New or recently adjusted medication    [facility] action(s):  Defer               Appointments  past 12m or future 3m with PCP    Date Provider   Last Visit   3/25/2025 Sim Okeefe MD   Next Visit   Visit date not found Sim Okeefe MD   ED visits in past 90 days: 1        Note composed:12:30 PM 05/06/2025

## 2025-05-06 NOTE — TELEPHONE ENCOUNTER
LOV with oJsefina Kendall MD , 4/4/2025  Pharmacy change;requesting 90 day supply thru his mail order pharmacy

## 2025-05-07 LAB
OHS QRS DURATION: 90 MS
OHS QTC CALCULATION: 442 MS

## 2025-05-07 NOTE — TELEPHONE ENCOUNTER
Called patient, who reports recent recent episode of chest pain and bradycardia.  He was seen in urgent care.  Cardiology referral has been ordered.  Please forward to referral coordinator.

## 2025-05-09 NOTE — PROGRESS NOTES
Outpatient Rehab    Physical Therapy Visit    Patient Name: Hi Braxton  MRN: [id number]  YOB: 1960  Encounter Date: 4/7/2025    Therapy Diagnosis: No diagnosis found.  Physician: Venkatesh Prieto PA*    Physician Orders: Eval and Treat  Medical Diagnosis: Primary osteoarthritis of left shoulder  S/P cervical spinal fusion    Physician Orders: Eval and Treat  Medical Diagnosis: Primary osteoarthritis of left shoulder  S/P cervical spinal fusion     Visit # / Visits Authorized:  9 / 30  Insurance Authorization Period: 2/11/2025 to 7/10/2025  Date of Evaluation: 2/18/2025  Plan of Care Certification: 2/11/2025 to 6/30/2025                PT/PTA:     Number of PTA visits since last PT visit:   Time In:   0500PM  Time Out:  0600PM  Total Time (in minutes):   60 mins  Total Billable Time (in minutes):  60 mins         Subjective   doing well, would like to try dry needling for his neck pain..  Pain reported as 2/10.      Objective            Treatment:  Therapeutic Exercise  TE 1: Supine cane flexion 3 x 15  TE 2: SL ER with 3# DB 3 x 15  TE 3: Standing T with G band 3 x 15  TE 4: Thoracic extension on chair 3 x 10  TE 5: Half kneeling open book 3 x 10  TE 6: Standing 90degrees ER 2# at edge of table  3 x 1-  TE 7: C1-C2 SNAG 3 x10  Manual Therapy  MT 1: Posterior/inferior GH glide grade III NP-  MT 2: DN to middle lower cervical muscle  Balance/Neuromuscular Re-Education  NMR 1: Wall ball 30 sec x 3  NMR 2: SA wall slide with towel 3 x 10  NMR 3: Landmine press with 5# 3 x 12  NMR 4: Standing T with G band 3 x 12  Therapeutic Activity  TA 1: Shoulder scaption 3# 3 x 12  TA 2: Landmine press 3# 3 x 12    Time Entry(in minutes):  Manual Therapy Time Entry: 15  Neuromuscular Re-Education Time Entry: 18  Therapeutic Activity Time Entry: 14  Therapeutic Exercise Time Entry: 13    Assessment & Plan   Assessment: Pt presented to clinic w/ good carryover shoulder ROM from last week. Session today added DN  Initial Anesthesia Post-op Note    Patient: Dorys Valente  Procedure(s) Performed: ROBOTIC XI PARTIAL NEPHRECTOMY - RIGHT  Anesthesia type: General w/Full Monitor    Vital Last Value   Temperature 36.3 °C (97.3 °F) (01/29/19 1118)   Pulse 70 (01/29/19 1118)   Respiratory Rate 18 (01/29/19 1118)   Non-Invasive   Blood Pressure 170/78 (01/29/19 1118)   Arterial  Blood Pressure     Pulse Oximetry 98 % (01/29/19 1118)     Last 24 I/O:     Intake/Output Summary (Last 24 hours) at 1/29/2019 1538  Last data filed at 1/29/2019 1511  Gross per 24 hour   Intake 2200 ml   Output 250 ml   Net 1950 ml       PATIENT LOCATION: PACU Phase 1  POST-OP VITAL SIGNS: stable  LEVEL OF CONSCIOUSNESS: participates in exam, awake and alert  RESPIRATORY STATUS: spontaneous ventilation and nasal cannula  CARDIOVASCULAR: stable  HYDRATION: euvolemic    PAIN MANAGEMENT: adequately controlled  NAUSEA: None  AIRWAY PATENCY: patent  POST-OP ASSESSMENT: no complications, patient tolerated procedure well with no complications and no evidence of recall  COMPLICATIONS: none  Comments: Extubated without incident. Transported to PACU in stable condition. Report given.    HANDOFF:  Handoff to receiving nurse was performed and questions were answered       to the middle lowe cervical to address myofascial pain. Pt jessica progressed session well w/o increased neck/shoulder pain. Will continue to progress per tolerance  Evaluation/Treatment Tolerance: Patient tolerated treatment well    Patient will continue to benefit from skilled outpatient physical therapy to address the deficits listed in the problem list box on initial evaluation, provide pt/family education and to maximize pt's level of independence in the home and community environment.     Patient's spiritual, cultural, and educational needs considered and patient agreeable to plan of care and goals.           Plan: Will continue to focus on neck mobility, cuff activation and periscapula muscle strengthening.    Goals:   Active       Functional outcome       Patient will show a significant change in 20% patient-reported outcome tool to demonstrate subjective improvement (Progressing)       Start:  02/18/25    Expected End:  04/15/25            Patient stated goal: improve shoulder pain with daily activties  (Progressing)       Start:  02/18/25    Expected End:  03/18/25            Patient will demonstrate independence in home program for support of progression (Met)       Start:  02/18/25    Expected End:  03/18/25    Resolved:  05/09/25            Range of Motion       Patient will achieve left shoulder flexion of 170 degrees (Met)       Start:  02/18/25    Expected End:  03/18/25    Resolved:  05/05/25         Patient will achieve left shoulder external rotation of 80 degrees in 90 degrees abd (Progressing)       Start:  02/18/25    Expected End:  03/18/25               Strength       Patient will achieve left shoulder flexion strength of 4/5 (Progressing)       Start:  02/18/25    Expected End:  04/29/25            Patient will achieve left shoulder external rotation strength of 4/5 in neutral (Progressing)       Start:  02/18/25    Expected End:  04/29/25              Resolved       Pain       Patient will  report pain of 2/10 demonstrating a reduction of overall pain (Met)       Start:  02/18/25    Expected End:  03/18/25    Resolved:  04/03/25             Wilber Joseph, PT

## 2025-05-13 ENCOUNTER — CLINICAL SUPPORT (OUTPATIENT)
Dept: REHABILITATION | Facility: HOSPITAL | Age: 65
End: 2025-05-13
Payer: MEDICARE

## 2025-05-13 DIAGNOSIS — M19.012 PRIMARY OSTEOARTHRITIS OF LEFT SHOULDER: Primary | ICD-10-CM

## 2025-05-13 DIAGNOSIS — M25.612 DECREASED ROM OF LEFT SHOULDER: ICD-10-CM

## 2025-05-13 DIAGNOSIS — Z98.1 S/P CERVICAL SPINAL FUSION: ICD-10-CM

## 2025-05-13 PROCEDURE — 97140 MANUAL THERAPY 1/> REGIONS: CPT | Mod: PO

## 2025-05-13 PROCEDURE — 97112 NEUROMUSCULAR REEDUCATION: CPT | Mod: PO

## 2025-05-13 PROCEDURE — 97110 THERAPEUTIC EXERCISES: CPT | Mod: PO

## 2025-05-15 NOTE — PROGRESS NOTES
Outpatient Rehab    Physical Therapy Visit    Patient Name: Hi Braxton  MRN: [id number]  YOB: 1960  Encounter Date: 4/14/2025    Therapy Diagnosis:   Encounter Diagnoses   Name Primary?    Primary osteoarthritis of left shoulder Yes    S/P cervical spinal fusion     Decreased ROM of left shoulder      Physician: Venkatesh Prieto PA*    Physician Orders: Eval and Treat  Medical Diagnosis: Primary osteoarthritis of left shoulder  S/P cervical spinal fusion    Visit # / Visits Authorized:  10 / 30  Insurance Authorization Period: 2/11/2025 to 7/10/2025  Date of Evaluation: 2/11/2025  Plan of Care Certification: 5/5/2025 to 6/30/2025      PT/PTA:   PT  Number of PTA visits since last PT visit:   Time In:   0500PM  Time Out:  0555PM  Total Time (in minutes):   55 mins  Total Billable Time (in minutes):  50 mins    FOTO:  Intake Score:  %  Survey Score 2:  %  Survey Score 3:  %    Precautions:       Subjective   doing well, improved neck pain with dry needling..  Pain reported as 2/10.      Objective            Treatment:  Therapeutic Exercise  TE 1: Supine cane flexion 3 x 15  TE 2: SL ER with 3# DB 3 x 15  TE 3: Standing T with G band 3 x 15  TE 4: Thoracic extension on chair 3 x 10  TE 5: Half kneeling open book 3 x 10  TE 6: Standing 90degrees ER 2# at edge of table  3 x 10  TE 7: C1-C2 SNAG 3 x10  Manual Therapy  MT 2: DN to middle lower cervical muscle  Balance/Neuromuscular Re-Education  NMR 1: Wall ball 30 sec x 3  NMR 2: SA wall slide with towel 3 x 10  NMR 3: Landmine press with 5# 3 x 12  NMR 4: Standing T with G band 3 x 12  Therapeutic Activity  TA 1: Shoulder scaption 3# 3 x 12  TA 2: Landmine press 3# 3 x 12    Time Entry(in minutes):  Manual Therapy Time Entry: 15  Neuromuscular Re-Education Time Entry: 18  Therapeutic Activity Time Entry: 14  Therapeutic Exercise Time Entry: 13    Assessment & Plan   Assessment: Pt presented to clinic w/ good carryover shoulder ROM from last week.  Session today continue to apply DN to the middle lowe cervical to address myofascial pain. Pt jessica progressed session well w/o increased neck/shoulder pain. Will continue to progress per tolerance  Evaluation/Treatment Tolerance: Patient tolerated treatment well    Patient will continue to benefit from skilled outpatient physical therapy to address the deficits listed in the problem list box on initial evaluation, provide pt/family education and to maximize pt's level of independence in the home and community environment.     Patient's spiritual, cultural, and educational needs considered and patient agreeable to plan of care and goals.           Plan: Will continue to focus on neck mobility, cuff activation and periscapula muscle strengthening.    Goals:   Active       Functional outcome       Patient will show a significant change in 20% patient-reported outcome tool to demonstrate subjective improvement (Progressing)       Start:  02/18/25    Expected End:  04/15/25            Patient stated goal: improve shoulder pain with daily activties  (Progressing)       Start:  02/18/25    Expected End:  03/18/25            Patient will demonstrate independence in home program for support of progression (Met)       Start:  02/18/25    Expected End:  03/18/25    Resolved:  05/09/25            Strength       Patient will achieve left shoulder flexion strength of 4/5 (Progressing)       Start:  02/18/25    Expected End:  04/29/25            Patient will achieve left shoulder external rotation strength of 4/5 in neutral (Progressing)       Start:  02/18/25    Expected End:  04/29/25              Resolved       Pain       Patient will report pain of 2/10 demonstrating a reduction of overall pain (Met)       Start:  02/18/25    Expected End:  03/18/25    Resolved:  04/03/25            Range of Motion       Patient will achieve left shoulder flexion of 170 degrees (Met)       Start:  02/18/25    Expected End:  03/18/25     Resolved:  05/05/25         Patient will achieve left shoulder external rotation of 80 degrees in 90 degrees abd (Met)       Start:  02/18/25    Expected End:  03/18/25    Resolved:  05/15/25             Wilber Joseph PT

## 2025-05-20 ENCOUNTER — CLINICAL SUPPORT (OUTPATIENT)
Dept: REHABILITATION | Facility: HOSPITAL | Age: 65
End: 2025-05-20
Payer: MEDICARE

## 2025-05-20 DIAGNOSIS — M25.612 DECREASED ROM OF LEFT SHOULDER: Primary | ICD-10-CM

## 2025-05-20 DIAGNOSIS — M19.012 PRIMARY OSTEOARTHRITIS OF LEFT SHOULDER: ICD-10-CM

## 2025-05-20 DIAGNOSIS — Z98.1 S/P CERVICAL SPINAL FUSION: ICD-10-CM

## 2025-05-20 PROCEDURE — 97140 MANUAL THERAPY 1/> REGIONS: CPT | Mod: PO

## 2025-05-20 PROCEDURE — 97530 THERAPEUTIC ACTIVITIES: CPT | Mod: PO

## 2025-05-20 NOTE — PROGRESS NOTES
Outpatient Rehab    Physical Therapy Visit    Patient Name: Hi Braxton  MRN: [id number]  YOB: 1960  Encounter Date: 4/23/2025    Therapy Diagnosis:   Encounter Diagnoses   Name Primary?    Primary osteoarthritis of left shoulder Yes    S/P cervical spinal fusion     Decreased ROM of left shoulder      Physician: Venkatesh Prieto PA*    Physician Orders: Eval and Treat  Medical Diagnosis: Primary osteoarthritis of left shoulder  S/P cervical spinal fusion    Visit # / Visits Authorized:  10 / 30  Insurance Authorization Period: 2/11/2025 to 7/10/2025  Date of Evaluation: 2/11/2025  Plan of Care Certification: 5/5/2025 to 6/30/2025      PT/PTA:   PT  Number of PTA visits since last PT visit:   Time In:   0500PM  Time Out:  0555PM  Total Time (in minutes):   55 mins  Total Billable Time (in minutes):  55 mins    FOTO:  Intake Score:  %  Survey Score 2:  %  Survey Score 3:  %    Precautions:       Subjective   doing well, improved neck pain with dry needling..  Pain reported as 2/10.      Objective            Treatment:  Therapeutic Exercise  TE 1: Supine cane flexion 3 x 15  TE 2: SL ER with 3# DB 3 x 15  TE 3: Standing T with G band 3 x 15  TE 4: Thoracic extension on chair 3 x 10  TE 5: Half kneeling open book 3 x 10  TE 6: Standing 90degrees ER 2# at edge of table  3 x 10  TE 7: C1-C2 SNAG 3 x10  Manual Therapy  MT 1: Posterior/inferior GH glide grade III NP-  MT 2: DN to middle lower cervical muscle  Balance/Neuromuscular Re-Education  NMR 1: Wall ball 30 sec x 3  NMR 2: SA wall slide with towel 3 x 10  NMR 3: Landmine press with 5# 3 x 12  NMR 4: Standing T with G band 3 x 12  Therapeutic Activity  TA 1: Shoulder scaption 3# 3 x 12  TA 2: Landmine press 3# 3 x 12    Time Entry(in minutes):  Manual Therapy Time Entry: 14  Neuromuscular Re-Education Time Entry: 18  Therapeutic Activity Time Entry: 12  Therapeutic Exercise Time Entry: 13    Assessment & Plan   Assessment: Pt presented to  clinic w/ good carryover shoulder ROM from last week. Session today continue to apply DN to the middle lowe cervical to address myofascial pain. Pt jessica progressed session well w/o increased neck/shoulder pain. Will continue to progress per tolerance  Evaluation/Treatment Tolerance: Patient tolerated treatment well    Patient will continue to benefit from skilled outpatient physical therapy to address the deficits listed in the problem list box on initial evaluation, provide pt/family education and to maximize pt's level of independence in the home and community environment.     Patient's spiritual, cultural, and educational needs considered and patient agreeable to plan of care and goals.           Plan: Will continue to focus on neck mobility, cuff activation and periscapula muscle strengthening.    Goals:   Active       Functional outcome       Patient will show a significant change in 20% patient-reported outcome tool to demonstrate subjective improvement (Progressing)       Start:  02/18/25    Expected End:  04/15/25            Patient stated goal: improve shoulder pain with daily activties  (Progressing)       Start:  02/18/25    Expected End:  03/18/25            Patient will demonstrate independence in home program for support of progression (Met)       Start:  02/18/25    Expected End:  03/18/25    Resolved:  05/09/25            Strength       Patient will achieve left shoulder flexion strength of 4/5 (Progressing)       Start:  02/18/25    Expected End:  04/29/25            Patient will achieve left shoulder external rotation strength of 4/5 in neutral (Progressing)       Start:  02/18/25    Expected End:  04/29/25              Resolved       Pain       Patient will report pain of 2/10 demonstrating a reduction of overall pain (Met)       Start:  02/18/25    Expected End:  03/18/25    Resolved:  04/03/25            Range of Motion       Patient will achieve left shoulder flexion of 170 degrees (Met)        Start:  02/18/25    Expected End:  03/18/25    Resolved:  05/05/25         Patient will achieve left shoulder external rotation of 80 degrees in 90 degrees abd (Met)       Start:  02/18/25    Expected End:  03/18/25    Resolved:  05/15/25             Wilber Joseph, PT

## 2025-05-21 ENCOUNTER — OFFICE VISIT (OUTPATIENT)
Dept: CARDIOLOGY | Facility: CLINIC | Age: 65
End: 2025-05-21
Payer: MEDICARE

## 2025-05-21 VITALS
WEIGHT: 204.56 LBS | HEART RATE: 74 BPM | SYSTOLIC BLOOD PRESSURE: 111 MMHG | BODY MASS INDEX: 24.9 KG/M2 | DIASTOLIC BLOOD PRESSURE: 67 MMHG

## 2025-05-21 DIAGNOSIS — R00.1 BRADYCARDIA: ICD-10-CM

## 2025-05-21 DIAGNOSIS — E78.5 DYSLIPIDEMIA ASSOCIATED WITH TYPE 2 DIABETES MELLITUS: Chronic | ICD-10-CM

## 2025-05-21 DIAGNOSIS — E11.69 DYSLIPIDEMIA ASSOCIATED WITH TYPE 2 DIABETES MELLITUS: Chronic | ICD-10-CM

## 2025-05-21 DIAGNOSIS — I73.9 PERIPHERAL VASCULAR DISEASE: Chronic | ICD-10-CM

## 2025-05-21 DIAGNOSIS — G45.8 SUBCLAVIAN STEAL SYNDROME: ICD-10-CM

## 2025-05-21 DIAGNOSIS — Z82.49 FAMILY HISTORY OF PREMATURE CAD: Chronic | ICD-10-CM

## 2025-05-21 DIAGNOSIS — I25.118 CORONARY ARTERY DISEASE OF NATIVE ARTERY OF NATIVE HEART WITH STABLE ANGINA PECTORIS: ICD-10-CM

## 2025-05-21 DIAGNOSIS — R07.2 PRECORDIAL PAIN: Primary | ICD-10-CM

## 2025-05-21 DIAGNOSIS — I10 ESSENTIAL HYPERTENSION: Chronic | ICD-10-CM

## 2025-05-21 PROCEDURE — 1159F MED LIST DOCD IN RCRD: CPT | Mod: CPTII,S$GLB,, | Performed by: INTERNAL MEDICINE

## 2025-05-21 PROCEDURE — 4010F ACE/ARB THERAPY RXD/TAKEN: CPT | Mod: CPTII,S$GLB,, | Performed by: INTERNAL MEDICINE

## 2025-05-21 PROCEDURE — 3044F HG A1C LEVEL LT 7.0%: CPT | Mod: CPTII,S$GLB,, | Performed by: INTERNAL MEDICINE

## 2025-05-21 PROCEDURE — 3074F SYST BP LT 130 MM HG: CPT | Mod: CPTII,S$GLB,, | Performed by: INTERNAL MEDICINE

## 2025-05-21 PROCEDURE — 99999 PR PBB SHADOW E&M-EST. PATIENT-LVL III: CPT | Mod: PBBFAC,,, | Performed by: INTERNAL MEDICINE

## 2025-05-21 PROCEDURE — 3008F BODY MASS INDEX DOCD: CPT | Mod: CPTII,S$GLB,, | Performed by: INTERNAL MEDICINE

## 2025-05-21 PROCEDURE — 99205 OFFICE O/P NEW HI 60 MIN: CPT | Mod: 25,S$GLB,, | Performed by: INTERNAL MEDICINE

## 2025-05-21 PROCEDURE — 1126F AMNT PAIN NOTED NONE PRSNT: CPT | Mod: CPTII,S$GLB,, | Performed by: INTERNAL MEDICINE

## 2025-05-21 PROCEDURE — 1101F PT FALLS ASSESS-DOCD LE1/YR: CPT | Mod: CPTII,S$GLB,, | Performed by: INTERNAL MEDICINE

## 2025-05-21 PROCEDURE — 3078F DIAST BP <80 MM HG: CPT | Mod: CPTII,S$GLB,, | Performed by: INTERNAL MEDICINE

## 2025-05-21 PROCEDURE — 3288F FALL RISK ASSESSMENT DOCD: CPT | Mod: CPTII,S$GLB,, | Performed by: INTERNAL MEDICINE

## 2025-05-21 PROCEDURE — 93000 ELECTROCARDIOGRAM COMPLETE: CPT | Mod: S$GLB,,, | Performed by: INTERNAL MEDICINE

## 2025-05-21 RX ORDER — EVOLOCUMAB 140 MG/ML
140 INJECTION, SOLUTION SUBCUTANEOUS
Qty: 2 ML | Refills: 11 | Status: ACTIVE | OUTPATIENT
Start: 2025-05-21 | End: 2026-05-21

## 2025-05-21 NOTE — PROGRESS NOTES
Subjective:   05/21/2025     Patient ID:  Hi Braxton is a 65 y.o. male who presents for evaulation of Chest Pain       History of Present Illness    CHIEF COMPLAINT:  Patient reports recurrent chest pain leading to urgent care visit and subsequent ED evaluation.    HPI:  Patient reports recurrent chest pain for an extended period, without definitive diagnosis. Pain is sometimes localized to the cardiac region. Recently, an episode progressed from mild to severe, prompting urgent care and ED visits.    During the recent episode, pain was intermittent, each lasting 1-2 minutes. The pattern progressed from mild to increasingly severe, eventually becoming constant for a few hours. Healthcare providers have suggested possible cardiac muscle spasms as a potential etiology.    He has underlying conditions that warrant seeking medical attention for significant chest pain. He is currently in therapy for neck and shoulder issues, including a partially torn rotator cuff and recent posterior neck surgery with metal implants. He also has emphysema and a strong family history of heart disease.    He reports nausea, attributed to new inhalers prescribed for emphysema. He is unable to determine which of the two inhalers is causing this side effect.    His weight fluctuates in cycles of 4 lbs, attributed to dietary restrictions and other factors.    He denies breast tenderness, nipple tenderness, abdominal pain after eating, or consistent weight loss. He also denies dyspnea at rest.    CARDIAC HISTORY:  US abdomen 2023 (Dr. Goodson): 70% stenosis of celiac origin, 70% stenosis of splenic artery Angiogram 2022: no significant stenosis  Mild coronary stenosis on last angiogram    MEDICATIONS:  Metoprolol for HTN  Telmisartan 80 mg daily for HTN  Nifedipine 60 mg twice daily for HTN  Spironolactone 50 mg daily for HTN  Ezetimibe 10 mg daily for cholesterol  Pravastatin 80 mg daily for cholesterol  Discontinued Lipitor  (atorvastatin)  Discontinued Crestor (rosuvastatin) Patient is on insulin for diabetes management.    TEST RESULTS:  A lipid panel was conducted in 2025, revealing a total cholesterol of 151, triglycerides of 117, and LDL of 83.    MEDICAL HISTORY:  Patient has a history of hypertension, emphysema, diabetes, a partially torn rotator cuff, and arthritis.    SURGICAL HISTORY:  Patient underwent neck surgery last year using a posterior approach, which resulted in the placement of extensive metal implants.    FAMILY HISTORY:  All 7 siblings:  from MI or CVA  Mother:  from MI or CVA  Father:  from MI or CVA  Father's side:  from MI or CVA    SOCIAL HISTORY:  Smoking: Quit 15 years ago      ROS:  General: -fever, -chills, -fatigue, -weight gain, -weight loss, +change in weight  Eyes: -vision changes, -redness, -discharge  ENT: -ear pain, -nasal congestion, -sore throat  Cardiovascular: +chest pain, -palpitations, -lower extremity edema, +chest pain at rest  Respiratory: -cough, -shortness of breath  Gastrointestinal: -abdominal pain, +nausea, -vomiting, -diarrhea, -constipation, -blood in stool  Genitourinary: -dysuria, -hematuria, -frequency  Musculoskeletal: -joint pain, +muscle pain, +neck pain, +pain with movement, +arm pain on exertion  Skin: -rash, -lesion  Neurological: -headache, -dizziness, -numbness, -tingling  Psychiatric: -anxiety, -depression, -sleep difficulty          Problem List[1]     Review of patient's allergies indicates:   Allergen Reactions    Lisinopril Anaphylaxis and Nausea And Vomiting     General bad feeling    Lipitor [atorvastatin] Other (See Comments)     Muscle aches    Adhesive     Crestor [rosuvastatin] Swelling     Lip swelling.     Jardiance [empagliflozin] Other (See Comments)     Possible related to recent UTI's     Metformin      Used XR and experienced flatulance and abdominal pain.        Current Medications[2]     Objective:   Physical Exam     General: No acute distress. Well-developed. Well-nourished.  Eyes: EOMI. Sclerae anicteric.  HENT: Normocephalic. Atraumatic. Nares patent. Moist oral mucosa.  Cardiovascular: Regular rate. Regular rhythm. No murmurs. No rubs. No gallops. Normal S1, S2.  Respiratory: Normal respiratory effort. Clear to auscultation bilaterally. No rales. No rhonchi. No wheezing.  Musculoskeletal: No  obvious deformity.  Extremities: No lower extremity edema.  Neurological: Alert & oriented x3. No slurred speech. Normal gait.  Psychiatric: Normal mood. Normal affect. Good insight. Good judgment.  Skin: Warm. Dry. No rash.          Vitals:    05/21/25 0816   BP: 111/67   Pulse: 74     Wt Readings from Last 3 Encounters:   05/21/25 92.8 kg (204 lb 9.4 oz)   05/06/25 94 kg (207 lb 3.7 oz)   04/04/25 92 kg (202 lb 13.2 oz)     Temp Readings from Last 3 Encounters:   05/06/25 98 °F (36.7 °C) (Oral)   04/04/25 97.4 °F (36.3 °C) (Temporal)   03/25/25 97.3 °F (36.3 °C) (Temporal)     BP Readings from Last 3 Encounters:   05/21/25 111/67   05/06/25 120/75   04/04/25 122/64     Pulse Readings from Last 3 Encounters:   05/21/25 74   05/06/25 80   04/04/25 86               Lab Results   Component Value Date    CHOL 151 03/25/2025    CHOL 185 11/07/2023    CHOL 178 06/28/2023     Lab Results   Component Value Date    HDL 45 03/25/2025    HDL 47 11/07/2023    HDL 44 06/28/2023     Lab Results   Component Value Date    LDLCALC 82.6 03/25/2025    LDLCALC 85.8 11/07/2023    LDLCALC 100.0 06/28/2023     Lab Results   Component Value Date    ALT 18 03/25/2025    AST 15 03/25/2025    AST 19 10/24/2024    AST 14 07/27/2024     Lab Results   Component Value Date    CREATININE 1.0 03/25/2025    BUN 12 03/25/2025     03/25/2025    K 4.1 03/25/2025    CO2 21 (L) 03/25/2025    CO2 24 10/24/2024    CO2 23 07/27/2024     Lab Results   Component Value Date    HGB 14.4 03/25/2025    HCT 43.8 03/25/2025    HCT 43.2 10/24/2024    HCT 39.9 (L) 07/27/2024        Assessment and Plan:   Assessment & Plan    R07.2 Precordial pain  R00.1 Bradycardia  I73.9 Peripheral vascular disease, documented celiac and splenic artery stenosis  G45.8 Subclavian steal syndrome  Z82.49 Family history of premature CAD  I10 Essential hypertension  E11.69, E78.5 Dyslipidemia associated with type 2 diabetes mellitus  I25.118 Coronary artery disease of native artery of native heart with stable angina pectoris    IMPRESSION:  - Initiated PCSK9 inhibitor injections to further lower cholesterol given risk factors and inability to tolerate higher dose statins, to be administered subcutaneously in the abdomen, pending insurance approval.  He can not tolerate higher dose statin therapy with atorvastatin rosuvastatin, has been tried on both of these.    PERIPHERAL VASCULAR DISEASE:  - Educated on symptoms of mesenteric ischemia to monitor for, including abdominal pain after meals and unexplained weight loss.    CORONARY ARTERY DISEASE OF NATIVE ARTERY OF NATIVE HEART WITH STABLE ANGINA PECTORIS:  - Ordered nuclear stress test (laying down stress test) to evaluate for potential progression of CAD since last angiogram 3 years ago.          No follow-ups on file.        Future Appointments   Date Time Provider Department Center   5/27/2025  5:00 PM Wilber Joseph, PT [facility]   6/3/2025  5:00 PM Wilber Joseph, PT [facility]   7/11/2025  7:00 AM [facility]   7/16/2025  9:00 AM Margarito Hernadez MD [facility]   7/18/2025  3:30 PM Josefina Kendall MD [facility]       This note was generated with the assistance of ambient listening technology. Verbal consent was obtained by the patient and accompanying visitor(s) for the recording of patient appointment to facilitate this note. I attest to having reviewed and edited the generated note for accuracy, though some syntax or spelling errors may persist. Please contact the author  of this note for any clarification.                      [1]   Patient Active Problem List  Diagnosis    Essential hypertension    GERD (gastroesophageal reflux disease)    Family history of premature CAD    Peripheral vascular disease    Subclavian steal syndrome    Rapid palpitations    Cervical spinal stenosis    DJD (degenerative joint disease), cervical    Cervical radiculopathy    Pain    Coronary artery disease of native artery of native heart with stable angina pectoris    Anxiety disorder    Chest pain    Unstable angina    Dysphagia    Type 2 diabetes mellitus without complication, without long-term current use of insulin    Thyroid nodule    Abnormal magnetic resonance angiography (MRA)    Anterior cerebral artery aneurysm    Intermittent confusion    Medication overuse headache    Renal cyst, right    Bilateral leg cramps    Dyslipidemia associated with type 2 diabetes mellitus    Diabetes mellitus with insulin therapy    Elevated PSA    Pulmonary emphysema, unspecified emphysema type    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 C3-T1 PCF    Nuclear sclerosis of left eye    Decreased ROM of left shoulder   [2]   Current Outpatient Medications:     albuterol (PROVENTIL/VENTOLIN HFA) 90 mcg/actuation inhaler, Inhale 1-2 puffs into the lungs every 6 (six) hours as needed for Wheezing., Disp: 54 g, Rfl: 0    ALPRAZolam (XANAX) 0.5 MG tablet, TAKE 1 TABLET THREE TIMES DAILY AS NEEDED FOR ANXIETY, Disp: 90 tablet, Rfl: 3    aspirin/acetaminophen/caffeine (EXCEDRIN MIGRAINE ORAL), Take 1 tablet by mouth daily as needed (Pain)., Disp: , Rfl:     CONTOUR NEXT TEST STRIPS Strp, USE TO TEST BLOOD SUGAR ONCE DAILY, Disp: 25 strip, Rfl: 6    cyclobenzaprine (FLEXERIL) 10 MG tablet, Take 1 tablet 3 times per day x2 days followed by 1 tablet in the evening as needed for muscle spasm and pain, Disp: 30 tablet, Rfl: 0    docusate sodium (COLACE) 100 MG capsule, Take 1 capsule (100 mg total) by mouth 2  "(two) times daily as needed for Constipation., Disp: 30 capsule, Rfl: 0    DULCOLAX, BISACODYL, ORAL, Take 1 tablet by mouth once daily., Disp: , Rfl:     ezetimibe (ZETIA) 10 mg tablet, Take 10 mg by mouth once daily., Disp: , Rfl:     fluticasone-salmeterol diskus inhaler 100-50 mcg, Inhale 1 puff into the lungs 2 (two) times daily. Controller, Disp: 180 each, Rfl: 0    insulin (LANTUS SOLOSTAR U-100 INSULIN) glargine 100 units/mL SubQ pen, Inject 25 Units into the skin every evening., Disp: 30 mL, Rfl: 1    metoprolol succinate (TOPROL-XL) 50 MG 24 hr tablet, TAKE 1 AND 1/2 TABLETS (75 MG TOTAL) ONCE DAILY., Disp: 135 tablet, Rfl: 3    MICROLET LANCET Misc, 1 lancet by Misc.(Non-Drug; Combo Route) route once daily. Test blood glucose once daily as instructed., Disp: 25 each, Rfl: 11    multivit-min/folic/vit K/lycop (MEN'S MULTIVITAMIN ORAL), Take 1 tablet by mouth once daily., Disp: , Rfl:     naproxen (NAPROSYN) 500 MG tablet, Take 1 tablet (500 mg total) by mouth 2 (two) times daily with meals., Disp: 180 tablet, Rfl: 1    NIFEdipine (PROCARDIA-XL) 60 MG (OSM) 24 hr tablet, TAKE 1 TABLET TWICE DAILY, Disp: 180 tablet, Rfl: 3    nitroGLYCERIN (NITROSTAT) 0.3 MG SL tablet, Place 1 tablet (0.3 mg total) under the tongue every 5 (five) minutes as needed for Chest pain., Disp: 100 tablet, Rfl: 3    ondansetron (ZOFRAN) 4 MG tablet, Take 1 tablet (4 mg total) by mouth every 6 (six) hours as needed for Nausea., Disp: 30 tablet, Rfl: 0    pantoprazole (PROTONIX) 40 MG tablet, TAKE 1 TABLET TWICE DAILY, Disp: 180 tablet, Rfl: 3    pen needle, diabetic (BD ULTRA-FINE CHET PEN NEEDLE) 32 gauge x 5/32" Ndle, USE PEN NEEDLE AS INSTRUCTION WITH LANTUS INSULIN PRE-FILLED PEN., Disp: 200 each, Rfl: 1    pravastatin (PRAVACHOL) 80 MG tablet, TAKE 1 TABLET EVERY DAY, Disp: 90 tablet, Rfl: 3    prednisolon/gatiflox/bromfenac (PREDNISOL ACE-GATIFLOX-BROMFEN) 1-0.5-0.075 % DrpS, Apply 1 drop to eye 3 (three) times daily. in " operative eye for 1 month after surgery, Disp: 5 mL, Rfl: 3    prednisoLONE 5 mg (21 tabs) DsPk, Use as directed STARTING 2 DAYS PRIOR TO SURGERY insert 1 drop in surgical eye three times daily AND CONTINUE FOR 30 DAYS AFTER, Disp: , Rfl:     promethazine-dextromethorphan (PROMETHAZINE-DM) 6.25-15 mg/5 mL Syrp, Take 5 mLs by mouth every 4 (four) hours as needed (cough). This medication can make you feel drowsy, Disp: 118 mL, Rfl: 0    spironolactone (ALDACTONE) 50 MG tablet, TAKE 1 TABLET EVERY DAY, Disp: 90 tablet, Rfl: 3    telmisartan (MICARDIS) 80 MG Tab, Take 1 tablet (80 mg total) by mouth once daily., Disp: 90 tablet, Rfl: 1    gabapentin (NEURONTIN) 300 MG capsule, Take 2 capsules (600 mg total) by mouth 3 (three) times daily., Disp: 180 capsule, Rfl: 0    tadalafiL (CIALIS) 20 MG Tab, Take 1 tablet (20 mg total) by mouth every 72 hours as needed (ED)., Disp: 15 tablet, Rfl: 11    Current Facility-Administered Medications:     fentaNYL injection 25 mcg, 25 mcg, Intravenous, On Call Procedure, Elva Means MD    midazolam injection 1 mg, 1 mg, Intravenous, On Call Procedure, Elva Means MD

## 2025-05-22 LAB
OHS QRS DURATION: 88 MS
OHS QTC CALCULATION: 415 MS

## 2025-05-27 NOTE — PROGRESS NOTES
Outpatient Rehab    Physical Therapy Visit    Patient Name: Hi Braxton  MRN: [id number]  YOB: 1960  Encounter Date: 4/28/2025    Therapy Diagnosis:   Encounter Diagnoses   Name Primary?    Primary osteoarthritis of left shoulder Yes    S/P cervical spinal fusion     Decreased ROM of left shoulder      Physician: Venkatesh Prieto PA*    Physician Orders: Eval and Treat  Medical Diagnosis: Primary osteoarthritis of left shoulder  S/P cervical spinal fusion    Visit # / Visits Authorized:  11 / 30  Insurance Authorization Period: 2/11/2025 to 7/10/2025  Date of Evaluation: 2/11/2025  Plan of Care Certification: 5/5/2025 to 6/30/2025      PT/PTA:   PT  Number of PTA visits since last PT visit:   Time In:   0500PM  Time Out:  0555PM  Total Time (in minutes):   55 mins  Total Billable Time (in minutes):  55 mins    FOTO:  Intake Score:  %  Survey Score 2:  %  Survey Score 3:  %    Precautions:       Subjective   doing well, neck feels good today..  Pain reported as 1/10.      Objective            Treatment:  Therapeutic Exercise  TE 1: Supine cane flexion 3 x 15  TE 2: SL ER with 3# DB 3 x 15  TE 3: Standing T with G band 3 x 15  TE 4: Thoracic extension on chair 3 x 10  TE 5: Half kneeling open book 3 x 10  TE 6: Standing 90degrees ER 2# at edge of table  3 x 10  TE 7: C1-C2 SNAG 3 x10  Manual Therapy  MT 2: DN to middle lower cervical paraspinal muscles  Balance/Neuromuscular Re-Education  NMR 1: Wall ball 30 sec x 3  NMR 2: SA wall slide with towel 3 x 10  NMR 3: Landmine press with 5# 3 x 12  NMR 4: Standing T with G band 3 x 12  Therapeutic Activity  TA 1: Shoulder scaption 3# 3 x 12  TA 2: Landmine press 3# 3 x 12    Time Entry(in minutes):       Assessment & Plan   Assessment: Pt presented to clinic w/ improved shoulder/neck symptoms from last week. Session today continue focus on RC/periscapular muscle strengthening. Pt jessica progressed session well w/o increased neck/shoulder pain. Will  continue to progress per tolerance  Evaluation/Treatment Tolerance: Patient tolerated treatment well    The patient will continue to benefit from skilled outpatient physical therapy in order to address the deficits listed in the problem list on the initial evaluation, provide patient and family education, and maximize the patients level of independence in the home and community environments.     The patient's spiritual, cultural, and educational needs were considered, and the patient is agreeable to the plan of care and goals.           Plan: Will continue to focus on neck mobility, cuff activation and periscapula muscle strengthening.    Goals:   Active       Functional outcome       Patient will show a significant change in 20% patient-reported outcome tool to demonstrate subjective improvement (Progressing)       Start:  02/18/25    Expected End:  04/15/25            Patient stated goal: improve shoulder pain with daily activties  (Progressing)       Start:  02/18/25    Expected End:  03/18/25            Patient will demonstrate independence in home program for support of progression (Met)       Start:  02/18/25    Expected End:  03/18/25    Resolved:  05/09/25            Strength       Patient will achieve left shoulder flexion strength of 4/5 (Progressing)       Start:  02/18/25    Expected End:  04/29/25            Patient will achieve left shoulder external rotation strength of 4/5 in neutral (Progressing)       Start:  02/18/25    Expected End:  04/29/25              Resolved       Pain       Patient will report pain of 2/10 demonstrating a reduction of overall pain (Met)       Start:  02/18/25    Expected End:  03/18/25    Resolved:  04/03/25            Range of Motion       Patient will achieve left shoulder flexion of 170 degrees (Met)       Start:  02/18/25    Expected End:  03/18/25    Resolved:  05/05/25         Patient will achieve left shoulder external rotation of 80 degrees in 90 degrees abd (Met)        Start:  02/18/25    Expected End:  03/18/25    Resolved:  05/15/25             Wilber Joseph PT

## 2025-06-02 ENCOUNTER — LAB VISIT (OUTPATIENT)
Dept: LAB | Facility: HOSPITAL | Age: 65
End: 2025-06-02
Attending: INTERNAL MEDICINE
Payer: MEDICARE

## 2025-06-02 DIAGNOSIS — E11.9 TYPE 2 DIABETES MELLITUS WITHOUT COMPLICATION, WITHOUT LONG-TERM CURRENT USE OF INSULIN: ICD-10-CM

## 2025-06-02 DIAGNOSIS — E78.2 MIXED HYPERLIPIDEMIA: ICD-10-CM

## 2025-06-02 LAB
ANION GAP (OHS): 11 MMOL/L (ref 8–16)
BUN SERPL-MCNC: 18 MG/DL (ref 8–23)
CALCIUM SERPL-MCNC: 9.1 MG/DL (ref 8.7–10.5)
CHLORIDE SERPL-SCNC: 105 MMOL/L (ref 95–110)
CO2 SERPL-SCNC: 22 MMOL/L (ref 23–29)
CREAT SERPL-MCNC: 1.1 MG/DL (ref 0.5–1.4)
EAG (OHS): 140 MG/DL (ref 68–131)
GFR SERPLBLD CREATININE-BSD FMLA CKD-EPI: >60 ML/MIN/1.73/M2
GLUCOSE SERPL-MCNC: 145 MG/DL (ref 70–110)
HBA1C MFR BLD: 6.5 % (ref 4–5.6)
POTASSIUM SERPL-SCNC: 3.9 MMOL/L (ref 3.5–5.1)
SODIUM SERPL-SCNC: 138 MMOL/L (ref 136–145)
T4 FREE SERPL-MCNC: 0.8 NG/DL (ref 0.71–1.51)
TSH SERPL-ACNC: 4.68 UIU/ML (ref 0.4–4)

## 2025-06-02 PROCEDURE — 84443 ASSAY THYROID STIM HORMONE: CPT

## 2025-06-02 PROCEDURE — 84439 ASSAY OF FREE THYROXINE: CPT

## 2025-06-02 PROCEDURE — 36415 COLL VENOUS BLD VENIPUNCTURE: CPT | Mod: PN

## 2025-06-02 PROCEDURE — 84295 ASSAY OF SERUM SODIUM: CPT

## 2025-06-02 PROCEDURE — 83036 HEMOGLOBIN GLYCOSYLATED A1C: CPT

## 2025-06-03 ENCOUNTER — CLINICAL SUPPORT (OUTPATIENT)
Dept: REHABILITATION | Facility: HOSPITAL | Age: 65
End: 2025-06-03
Payer: MEDICARE

## 2025-06-03 DIAGNOSIS — M25.612 DECREASED ROM OF LEFT SHOULDER: Primary | ICD-10-CM

## 2025-06-03 DIAGNOSIS — M19.012 PRIMARY OSTEOARTHRITIS OF LEFT SHOULDER: ICD-10-CM

## 2025-06-03 DIAGNOSIS — Z98.1 S/P CERVICAL SPINAL FUSION: ICD-10-CM

## 2025-06-03 PROCEDURE — 97110 THERAPEUTIC EXERCISES: CPT | Mod: PO

## 2025-06-03 PROCEDURE — 97530 THERAPEUTIC ACTIVITIES: CPT | Mod: PO

## 2025-06-03 PROCEDURE — 97140 MANUAL THERAPY 1/> REGIONS: CPT | Mod: PO

## 2025-06-03 PROCEDURE — 97112 NEUROMUSCULAR REEDUCATION: CPT | Mod: PO

## 2025-06-06 ENCOUNTER — OFFICE VISIT (OUTPATIENT)
Dept: INTERNAL MEDICINE | Facility: CLINIC | Age: 65
End: 2025-06-06
Payer: MEDICARE

## 2025-06-06 ENCOUNTER — TELEPHONE (OUTPATIENT)
Dept: INTERNAL MEDICINE | Facility: CLINIC | Age: 65
End: 2025-06-06
Payer: MEDICARE

## 2025-06-06 DIAGNOSIS — E03.9 HYPOTHYROIDISM, UNSPECIFIED TYPE: Primary | ICD-10-CM

## 2025-06-06 PROCEDURE — 3288F FALL RISK ASSESSMENT DOCD: CPT | Mod: CPTII,95,, | Performed by: INTERNAL MEDICINE

## 2025-06-06 PROCEDURE — 1159F MED LIST DOCD IN RCRD: CPT | Mod: CPTII,95,, | Performed by: INTERNAL MEDICINE

## 2025-06-06 PROCEDURE — 98004 SYNCH AUDIO-VIDEO EST SF 10: CPT | Mod: 95,,, | Performed by: INTERNAL MEDICINE

## 2025-06-06 PROCEDURE — 1160F RVW MEDS BY RX/DR IN RCRD: CPT | Mod: CPTII,95,, | Performed by: INTERNAL MEDICINE

## 2025-06-06 PROCEDURE — 4010F ACE/ARB THERAPY RXD/TAKEN: CPT | Mod: CPTII,95,, | Performed by: INTERNAL MEDICINE

## 2025-06-06 PROCEDURE — 1101F PT FALLS ASSESS-DOCD LE1/YR: CPT | Mod: CPTII,95,, | Performed by: INTERNAL MEDICINE

## 2025-06-06 PROCEDURE — 3044F HG A1C LEVEL LT 7.0%: CPT | Mod: CPTII,95,, | Performed by: INTERNAL MEDICINE

## 2025-06-06 RX ORDER — LEVOTHYROXINE SODIUM 25 UG/1
25 TABLET ORAL
Qty: 90 TABLET | Refills: 1 | Status: SHIPPED | OUTPATIENT
Start: 2025-06-06

## 2025-06-06 NOTE — PROGRESS NOTES
The patient location is: [place]  The chief complaint leading to consultation is:  Followup of hypothyroidism    Visit type: audiovisual    Face to Face time with patient:  18   minutes of total time spent on the encounter, which includes face to face time and non-face to face time preparing to see the patient (eg, review of tests), Obtaining and/or reviewing separately obtained history, Documenting clinical information in the electronic or other health record, Independently interpreting results (not separately reported) and communicating results to the patient/family/caregiver, or Care coordination (not separately reported).         Each patient to whom he or she provides medical services by telemedicine is:  (1) informed of the relationship between the physician and patient and the respective role of any other health care provider with respect to management of the patient; and (2) notified that he or she may decline to receive medical services by telemedicine and may withdraw from such care at any time.    Notes:   Subjective:       Patient ID: Hi Braxton is a 65 y.o. male.    Chief Complaint: Follow-up    Notes:      History of Present Illness    CHIEF COMPLAINT:  Hi presents today for follow up of thyroid levels    THYROID STATUS:  TSH is slightly elevated at 4.6, previously 4.7.    SYMPTOMS:  He reports constant fatigue throughout the day, feeling ready to sleep by 5 o'clock. He experiences disrupted sleep, waking up 2-3 times per night. He denies chest pain, shortness of breath, headaches, blurred vision, nausea, and vomiting.    BLOOD PRESSURE:  He reports well-controlled blood pressure, typically ranging between 111-120/67 mmHg, with a recent reading of 111/67 mmHg.    DIABETES:  Fasting glucose was 145 with A1C stable at 6.5.    CARDIOVASCULAR:  He has a history of significant vascular disease with greater than 70% stenosis in mesenteric arteries, with prior workup for abdominal pain of unclear  etiology. LDL cholesterol is 82. Electrolytes, including sodium and potassium, are normal.    ALLERGIES:  He has allergies to lisinopril, Lipitor, Crestor, adhesive, Jardiance, and Metformin.      ROS:  Constitutional: +fatigue, +sleep disturbances, +difficulty staying asleep  Head: -headaches  Respiratory: -shortness of breath  Cardiovascular: -chest pain  Gastrointestinal: -nausea, -vomiting         Objective:      Physical Exam  Constitutional:       Appearance: Normal appearance.   Neurological:      Mental Status: He is alert.   Psychiatric:         Mood and Affect: Mood normal.         Thought Content: Thought content normal.         Judgment: Judgment normal.           Assessment:       1. Hypothyroidism, unspecified type  - TSH; Future      Plan:         Hi was seen today for follow-up.    Diagnoses and all orders for this visit:    Hypothyroidism, unspecified type  -     TSH; Future  -     TSH is mildly elevated, and patient is symptomatic  -     start Synthroid    Other orders  -     levothyroxine (SYNTHROID) 25 MCG tablet; Take 1 tablet (25 mcg total) by mouth before breakfast.        IMPRESSION:  - TSH 4.6, slightly elevated but stable compared to previous result of 4.7.  - Electrolytes normal, fasting glucose 145 (slightly elevated), A1C stable at 6.5.  - BP readings appear well-controlled despite cardiologist's concerns.  - LDL levels: current 82, not meeting cardiologist's goal of <60.  - Vascular surgeon's recommendations for secondary prevention goals: patient has achieved these targets.  - Previous abdominal US results: vascular surgeon did not indicate significant stenosis despite reported blockages.    ## HYPOTHYROIDISM:  - Hi's TSH levels are mildly elevated (4.6-4.7), which is likely contributing to fatigue.  - Will initiate Synthroid 25 mcg daily to address thyroid function and potentially improve energy levels and sleep quality.  - Medication to begin when received from [facility]  pharmacy.  - TSH lab test ordered for 3-month follow-up.    ## TYPE 2 DIABETES:  - Fasting glucose is elevated at 145, but A1C remains stable at 6.5, indicating controlled diabetes.    ## HYPERLIPIDEMIA:  - Current LDL is 82, not meeting cardiologist's goal of below 60.  - Hi is unable to tolerate Crestor or Lipitor.  - Adding Repatha to current cholesterol management regimen, though patient has not started it yet.    ## HYPERTENSION:  - Blood pressure readings are generally good (120/111, 116, 111/67).  - Clarified with patient the difference between Nifedipine (for BP) and Pravastatin (for cholesterol).    ## ARTERIAL STRICTURE:  - Hi has over 70% blockages in mesenteric arteries, but vascular surgeon did not express concern about significant stenosis following ultrasound with Dr. Lino.  - Will refer to vascular surgery if patient experiences severe abdominal pain.    ## DRUG ALLERGIES:  - Hi has allergies to lisinopril, Lipitor, Crestor, adhesive, Jardiance, and Metformin.  - Allergy to Lipitor is affecting treatment options for hyperlipidemia.    ## SLEEP DISORDER:  - Hi wakes up 2-3 times per night, contributing to daytime fatigue.  - Will consider referral to sleep medicine for evaluation of insomnia or sleep abnormalities.    ## FATIGUE:  - Hi reports constant fatigue, which appears to be multifactorial - possibly related to thyroid dysfunction, sleep disturbances, and/or medications.  - Addressing with Synthroid initiation and will monitor for improvement.    ## FOLLOW-UP:  - Follow-up appointment scheduled in 3 months to reassess thyroid function and medication efficacy.  - Hi instructed to complete lab work prior to appointment.  - Follow-up can be conducted virtually.               This note was generated with the assistance of ambient listening technology. Verbal consent was obtained by the patient and accompanying visitor(s) for the recording of patient appointment to  facilitate this note. I attest to having reviewed and edited the generated note for accuracy, though some syntax or spelling errors may persist. Please contact the author of this note for any clarification.

## 2025-06-07 DIAGNOSIS — E11.9 TYPE 2 DIABETES MELLITUS WITHOUT COMPLICATION, WITHOUT LONG-TERM CURRENT USE OF INSULIN: ICD-10-CM

## 2025-06-07 NOTE — TELEPHONE ENCOUNTER
No care due was identified.  [facility] Embedded Care Due Messages. Reference number: [id number].   6/07/2025 3:15:10 PM CDT

## 2025-06-07 NOTE — PROGRESS NOTES
Outpatient Rehab    Physical Therapy Visit    Patient Name: Hi Braxton  MRN: [id number]  YOB: 1960  Encounter Date: 5/20/2025    Therapy Diagnosis:   Encounter Diagnoses   Name Primary?    Decreased ROM of left shoulder Yes    Primary osteoarthritis of left shoulder     S/P cervical spinal fusion      Physician: Venkatesh Prieto PA*    Physician Orders: Eval and Treat  Medical Diagnosis: Primary osteoarthritis of left shoulder  S/P cervical spinal fusion  Surgical Diagnosis: Not applicable for this Episode   Surgical Date: Not applicable for this Episode    Visit # / Visits Authorized:  12 / 30  Insurance Authorization Period: 2/11/2025 to 7/10/2025  Date of Evaluation: 2/11/2025  Plan of Care Certification: 5/5/2025 to 6/30/2025      PT/PTA:   PT  Number of PTA visits since last PT visit:   Time In:   0500  Time Out:  0555  Total Time (in minutes):   55 mins  Total Billable Time (in minutes):  40 mins    FOTO:  Intake Score:  %  Survey Score 2:  %  Survey Score 3:  %    Precautions:       Subjective   doing well, no new complaints upon arrival today..  Pain reported as 1/10.      Objective            Treatment:  Therapeutic Exercise  TE 2: SL ER with 3# DB 3 x 15  TE 3: Standing T with G band 3 x 15  TE 4: Thoracic extension on chair 3 x 10  TE 6: Standing 90degrees ER 2# at edge of table  3 x 10  TE 7: C1-C2 SNAG 3 x10  Manual Therapy  MT 1: Posterior/inferior GH glide grade III NP-  MT 2: DN to middle lower cervical paraspinal muscles  Balance/Neuromuscular Re-Education  NMR 1: Wall ball 30 sec x 3  NMR 2: SA wall slide with towel 3 x 10  NMR 3: Landmine press with 5# 3 x 12  NMR 4: Standing T with G band 3 x 12    Time Entry(in minutes):  Manual Therapy Time Entry: 17  Neuromuscular Re-Education Time Entry: 23    Assessment & Plan   Assessment: Pt presented to clinic w/o new complaints today. Session today continue focus on RC/periscapular muscle strengthening. Pt jessica progressed session  well w/o increased neck/shoulder pain. Will continue to progress per tolerance  Evaluation/Treatment Tolerance: Patient tolerated treatment well    The patient will continue to benefit from skilled outpatient physical therapy in order to address the deficits listed in the problem list on the initial evaluation, provide patient and family education, and maximize the patients level of independence in the home and community environments.     The patient's spiritual, cultural, and educational needs were considered, and the patient is agreeable to the plan of care and goals.           Plan: Will continue to focus on neck mobility, cuff activation and periscapula muscle strengthening.    Goals:   Active       Functional outcome       Patient will show a significant change in 20% patient-reported outcome tool to demonstrate subjective improvement (Progressing)       Start:  02/18/25    Expected End:  04/15/25            Patient stated goal: improve shoulder pain with daily activties  (Progressing)       Start:  02/18/25    Expected End:  03/18/25            Patient will demonstrate independence in home program for support of progression (Met)       Start:  02/18/25    Expected End:  03/18/25    Resolved:  05/09/25            Strength       Patient will achieve left shoulder flexion strength of 4/5 (Progressing)       Start:  02/18/25    Expected End:  04/29/25            Patient will achieve left shoulder external rotation strength of 4/5 in neutral (Progressing)       Start:  02/18/25    Expected End:  04/29/25              Resolved       Pain       Patient will report pain of 2/10 demonstrating a reduction of overall pain (Met)       Start:  02/18/25    Expected End:  03/18/25    Resolved:  04/03/25            Range of Motion       Patient will achieve left shoulder flexion of 170 degrees (Met)       Start:  02/18/25    Expected End:  03/18/25    Resolved:  05/05/25         Patient will achieve left shoulder external  rotation of 80 degrees in 90 degrees abd (Met)       Start:  02/18/25    Expected End:  03/18/25    Resolved:  05/15/25             Wilber Joseph, PT

## 2025-06-08 RX ORDER — INSULIN GLARGINE 100 [IU]/ML
25 INJECTION, SOLUTION SUBCUTANEOUS NIGHTLY
Qty: 30 ML | Refills: 1 | Status: SHIPPED | OUTPATIENT
Start: 2025-06-08

## 2025-06-08 NOTE — TELEPHONE ENCOUNTER
Refill Decision Note   Hi Braxton  is requesting a refill authorization.  Brief Assessment and Rationale for Refill:  Approve     Medication Therapy Plan:        Comments:     Note composed:1:56 PM 06/08/2025

## 2025-06-10 ENCOUNTER — RESULTS FOLLOW-UP (OUTPATIENT)
Dept: CARDIOLOGY | Facility: CLINIC | Age: 65
End: 2025-06-10

## 2025-06-10 ENCOUNTER — HOSPITAL ENCOUNTER (OUTPATIENT)
Dept: CARDIOLOGY | Facility: HOSPITAL | Age: 65
Discharge: HOME | End: 2025-06-10
Attending: INTERNAL MEDICINE
Payer: MEDICARE

## 2025-06-10 VITALS
DIASTOLIC BLOOD PRESSURE: 79 MMHG | SYSTOLIC BLOOD PRESSURE: 142 MMHG | WEIGHT: 204 LBS | HEIGHT: 76 IN | HEART RATE: 83 BPM | BODY MASS INDEX: 24.84 KG/M2

## 2025-06-10 DIAGNOSIS — I25.118 CORONARY ARTERY DISEASE OF NATIVE ARTERY OF NATIVE HEART WITH STABLE ANGINA PECTORIS: ICD-10-CM

## 2025-06-10 DIAGNOSIS — R07.2 PRECORDIAL PAIN: ICD-10-CM

## 2025-06-10 LAB
CV PHARM DOSE: 0.4 MG
CV STRESS BASE HR: 70 BPM
DIASTOLIC BLOOD PRESSURE: 79 MMHG
EJECTION FRACTION- HIGH: 65 %
END DIASTOLIC INDEX-HIGH: 153 ML/M2
END DIASTOLIC INDEX-LOW: 93 ML/M2
END SYSTOLIC INDEX-HIGH: 71 ML/M2
END SYSTOLIC INDEX-LOW: 31 ML/M2
NUC REST DIASTOLIC VOLUME INDEX: 129
NUC REST EJECTION FRACTION: 64
NUC REST SYSTOLIC VOLUME INDEX: 47
NUC STRESS DIASTOLIC VOLUME INDEX: 127
NUC STRESS EJECTION FRACTION: 65 %
NUC STRESS SYSTOLIC VOLUME INDEX: 45
OHS CV CPX 1 MINUTE RECOVERY HEART RATE: 93 BPM
OHS CV CPX 85 PERCENT MAX PREDICTED HEART RATE MALE: 130
OHS CV CPX MAX PREDICTED HEART RATE: 153
OHS CV CPX PATIENT IS FEMALE: 0
OHS CV CPX PATIENT IS MALE: 1
OHS CV CPX PEAK DIASTOLIC BLOOD PRESSURE: 81 MMHG
OHS CV CPX PEAK HEAR RATE: 77 BPM
OHS CV CPX PEAK RATE PRESSURE PRODUCT: NORMAL
OHS CV CPX PEAK SYSTOLIC BLOOD PRESSURE: 164 MMHG
OHS CV CPX PERCENT MAX PREDICTED HEART RATE ACHIEVED: 50
OHS CV CPX RATE PRESSURE PRODUCT PRESENTING: NORMAL
OHS CV INITIAL DOSE: 10.4 MCG/KG/MIN
OHS CV PEAK DOSE: 31.5 MCG/KG/MIN
RETIRED EF AND QEF - SEE NOTES: 53 %
SYSTOLIC BLOOD PRESSURE: 150 MMHG

## 2025-06-10 PROCEDURE — 78452 HT MUSCLE IMAGE SPECT MULT: CPT | Mod: 26,,, | Performed by: INTERNAL MEDICINE

## 2025-06-10 PROCEDURE — 93016 CV STRESS TEST SUPVJ ONLY: CPT | Mod: ,,, | Performed by: INTERNAL MEDICINE

## 2025-06-10 PROCEDURE — A9502 TC99M TETROFOSMIN: HCPCS | Performed by: INTERNAL MEDICINE

## 2025-06-10 PROCEDURE — 63600175 PHARM REV CODE 636 W HCPCS: Performed by: INTERNAL MEDICINE

## 2025-06-10 PROCEDURE — 78452 HT MUSCLE IMAGE SPECT MULT: CPT

## 2025-06-10 PROCEDURE — 93018 CV STRESS TEST I&R ONLY: CPT | Mod: ,,, | Performed by: INTERNAL MEDICINE

## 2025-06-10 RX ORDER — REGADENOSON 0.08 MG/ML
0.4 INJECTION, SOLUTION INTRAVENOUS
Status: COMPLETED | OUTPATIENT
Start: 2025-06-10 | End: 2025-06-10

## 2025-06-10 RX ADMIN — TETROFOSMIN 10.4 MILLICURIE: 1.38 INJECTION, POWDER, LYOPHILIZED, FOR SOLUTION INTRAVENOUS at 07:06

## 2025-06-10 RX ADMIN — REGADENOSON 0.4 MG: 0.08 INJECTION, SOLUTION INTRAVENOUS at 08:06

## 2025-06-10 RX ADMIN — TETROFOSMIN 31.5 MILLICURIE: 1.38 INJECTION, POWDER, LYOPHILIZED, FOR SOLUTION INTRAVENOUS at 08:06

## 2025-06-16 NOTE — PROGRESS NOTES
Outpatient Rehab    Physical Therapy Progress Note    Patient Name: Hi Braxton  MRN: [id number]  YOB: 1960  Encounter Date: 6/3/2025    Therapy Diagnosis:   Encounter Diagnoses   Name Primary?    Decreased ROM of left shoulder Yes    Primary osteoarthritis of left shoulder     S/P cervical spinal fusion      Physician: Venkatesh Prieto PA*    Physician Orders: Eval and Treat  Medical Diagnosis: Primary osteoarthritis of left shoulder  S/P cervical spinal fusion  Surgical Diagnosis: Not applicable for this Episode   Surgical Date: Not applicable for this Episode  Days Since Last Surgery: Not applicable for this Episode    Visit # / Visits Authorized:  12 / 30  Insurance Authorization Period: 2/11/2025 to 7/10/2025  Date of Evaluation: 2/11/2025  Plan of Care Certification: 5/5/2025 to 6/30/2025      PT/PTA:   PT  Number of PTA visits since last PT visit:   Time In:   0500  Time Out:  0558  Total Time (in minutes):   58  Total Billable Time (in minutes):    58  FOTO:  Intake Score:  %  Survey Score 2:  %  Survey Score 3:  %    Precautions:       Subjective   doing well, would like continue with DN to manage neck pain..  Pain reported as 1/10.      Objective      Shoulder Range of Motion  Right Shoulder   Active (deg) Passive (deg) Pain   Flexion 160 170     Extension         Scaption         ABduction 135 145     ADduction         Horizontal ABduction         Horizontal ADduction         External Rotation (Shoulder ABducted 0 degrees) 55 60     External Rotation (Shoulder ABducted 45 degrees)         External Rotation (Shoulder ABducted 90 degrees)         Internal Rotation (Shoulder ABducted 0 degrees) 45 50     Internal Rotation (Shoulder ABducted 45 degrees)         Internal Rotation (Shoulder ABducted 90 degrees)           Left Shoulder   Active (deg) Passive (deg) Pain   Flexion 160 165     Extension         Scaption         ABduction 135 140     ADduction         Horizontal ABduction          Horizontal ADduction         External Rotation (Shoulder ABducted 0 degrees) 50 60     External Rotation (Shoulder ABducted 45 degrees)         External Rotation (Shoulder ABducted 90 degrees)         Internal Rotation (Shoulder ABducted 0 degrees) 45 50     Internal Rotation (Shoulder ABducted 45 degrees)         Internal Rotation (Shoulder ABducted 90 degrees)                          Treatment:  Therapeutic Exercise  TE 2: SL ER with 3# DB 3 x 15  TE 3: Standing T with G band 3 x 15  TE 4: Thoracic extension on chair 3 x 10  TE 5: Half kneeling open book 3 x 10  TE 6: Standing 90degrees ER 2# at edge of table  3 x 10  Manual Therapy  MT 1: Posterior/inferior GH glide grade III NP-  MT 2: DN to middle lower cervical paraspinal muscles 8 needles 8 mins  Balance/Neuromuscular Re-Education  NMR 2: SA wall slide with towel 3 x 10  NMR 4: Standing T with G band 3 x 12  Therapeutic Activity  TA 1: Shoulder scaption 3# 3 x 12  TA 2: Landmine press 5# 3 x 12    Time Entry(in minutes):  Manual Therapy Time Entry: 14  Neuromuscular Re-Education Time Entry: 12  Therapeutic Activity Time Entry: 12  Therapeutic Exercise Time Entry: 20    Assessment & Plan   Assessment: Pt presented to clinic w/o new complaints today. Session today continue focus on RC/periscapular muscle strengthening. Pt jessica progressed session well w/o increased neck/shoulder pain. Will continue to progress per tolerance  Evaluation/Treatment Tolerance: Patient tolerated treatment well    The patient will continue to benefit from skilled outpatient physical therapy in order to address the deficits listed in the problem list on the initial evaluation, provide patient and family education, and maximize the patients level of independence in the home and community environments.     The patient's spiritual, cultural, and educational needs were considered, and the patient is agreeable to the plan of care and goals.           Plan: Will continue to focus on neck  mobility, cuff activation and periscapula muscle strengthening.    Goals:   Active       Functional outcome       Patient will show a significant change in 20% patient-reported outcome tool to demonstrate subjective improvement (Progressing)       Start:  02/18/25    Expected End:  04/15/25            Patient stated goal: improve shoulder pain with daily activties  (Progressing)       Start:  02/18/25    Expected End:  03/18/25            Patient will demonstrate independence in home program for support of progression (Met)       Start:  02/18/25    Expected End:  03/18/25    Resolved:  05/09/25            Strength       Patient will achieve left shoulder flexion strength of 4/5 (Progressing)       Start:  02/18/25    Expected End:  04/29/25            Patient will achieve left shoulder external rotation strength of 4/5 in neutral (Met)       Start:  02/18/25    Expected End:  04/29/25    Resolved:  06/16/25           Resolved       Pain       Patient will report pain of 2/10 demonstrating a reduction of overall pain (Met)       Start:  02/18/25    Expected End:  03/18/25    Resolved:  04/03/25            Range of Motion       Patient will achieve left shoulder flexion of 170 degrees (Met)       Start:  02/18/25    Expected End:  03/18/25    Resolved:  05/05/25         Patient will achieve left shoulder external rotation of 80 degrees in 90 degrees abd (Met)       Start:  02/18/25    Expected End:  03/18/25    Resolved:  05/15/25             Wilber Joseph, PT

## 2025-06-18 DIAGNOSIS — G45.8 SUBCLAVIAN STEAL SYNDROME: ICD-10-CM

## 2025-06-18 DIAGNOSIS — I10 ESSENTIAL HYPERTENSION: ICD-10-CM

## 2025-06-18 DIAGNOSIS — I25.118 CORONARY ARTERY DISEASE OF NATIVE ARTERY OF NATIVE HEART WITH STABLE ANGINA PECTORIS: ICD-10-CM

## 2025-06-18 DIAGNOSIS — I73.9 PERIPHERAL VASCULAR DISEASE: Chronic | ICD-10-CM

## 2025-06-18 RX ORDER — TELMISARTAN 80 MG/1
TABLET ORAL
Qty: 90 TABLET | Refills: 0 | OUTPATIENT
Start: 2025-06-18

## 2025-06-18 RX ORDER — EVOLOCUMAB 140 MG/ML
INJECTION, SOLUTION SUBCUTANEOUS
Refills: 0 | OUTPATIENT
Start: 2025-06-18

## 2025-06-18 RX ORDER — GABAPENTIN 600 MG/1
TABLET ORAL
Refills: 0 | OUTPATIENT
Start: 2025-06-18

## 2025-06-18 RX ORDER — TELMISARTAN 80 MG/1
TABLET ORAL
Refills: 0 | OUTPATIENT
Start: 2025-06-18

## 2025-06-19 ENCOUNTER — PATIENT MESSAGE (OUTPATIENT)
Dept: INTERNAL MEDICINE | Facility: CLINIC | Age: 65
End: 2025-06-19
Payer: MEDICARE

## 2025-06-19 ENCOUNTER — PATIENT MESSAGE (OUTPATIENT)
Dept: CARDIOLOGY | Facility: CLINIC | Age: 65
End: 2025-06-19
Payer: MEDICARE

## 2025-06-19 DIAGNOSIS — I10 ESSENTIAL HYPERTENSION: ICD-10-CM

## 2025-06-19 DIAGNOSIS — G45.8 SUBCLAVIAN STEAL SYNDROME: ICD-10-CM

## 2025-06-19 DIAGNOSIS — I25.118 CORONARY ARTERY DISEASE OF NATIVE ARTERY OF NATIVE HEART WITH STABLE ANGINA PECTORIS: ICD-10-CM

## 2025-06-19 DIAGNOSIS — I73.9 PERIPHERAL VASCULAR DISEASE: Chronic | ICD-10-CM

## 2025-06-19 RX ORDER — EVOLOCUMAB 140 MG/ML
140 INJECTION, SOLUTION SUBCUTANEOUS
Qty: 2 ML | Refills: 11 | OUTPATIENT
Start: 2025-06-19 | End: 2026-06-19

## 2025-06-19 RX ORDER — EVOLOCUMAB 140 MG/ML
140 INJECTION, SOLUTION SUBCUTANEOUS
Qty: 2 ML | Refills: 11 | Status: SHIPPED | OUTPATIENT
Start: 2025-06-19 | End: 2025-06-19 | Stop reason: SDUPTHER

## 2025-06-19 RX ORDER — TELMISARTAN 80 MG/1
80 TABLET ORAL
Qty: 90 TABLET | Refills: 3 | Status: SHIPPED | OUTPATIENT
Start: 2025-06-19 | End: 2025-06-20 | Stop reason: SDUPTHER

## 2025-06-19 RX ORDER — EVOLOCUMAB 140 MG/ML
140 INJECTION, SOLUTION SUBCUTANEOUS
Qty: 2 ML | Refills: 11 | Status: SHIPPED | OUTPATIENT
Start: 2025-06-19 | End: 2026-06-19

## 2025-06-19 NOTE — TELEPHONE ENCOUNTER
No care due was identified.  [facility] Embedded Care Due Messages. Reference number: [id number].   6/18/2025 9:02:12 PM CDT

## 2025-06-19 NOTE — TELEPHONE ENCOUNTER
Refill Decision Note   Hi Braxton  is requesting a refill authorization.  Brief Assessment and Rationale for Refill:  Quick Discontinue     Medication Therapy Plan:   Pharmacy is requesting new scripts for the following medications without required information, (sig/ frequency/qty/etc)        Comments:   Pharmacies have been requesting medications for patients without required information, (sig, frequency, qty, etc.). In addition, requests are sent for medication(s) pt. are currently not taking, and medications patients have never taken.    We have spoken to the pharmacies about these request types and advised their teams previously that we are unable to assess these New Script requests and require all details for these requests. This is a known issue and has been reported.   Note composed:10:02 PM 06/18/2025

## 2025-06-19 NOTE — TELEPHONE ENCOUNTER
Refill Decision Note   Hi Braxton  is requesting a refill authorization.  Brief Assessment and Rationale for Refill:  Quick Discontinue     Medication Therapy Plan:   Pharmacy is requesting new scripts for the following medications without required information, (sig/ frequency/qty/etc)        Comments:   Pharmacies have been requesting medications for patients without required information, (sig, frequency, qty, etc.). In addition, requests are sent for medication(s) pt. are currently not taking, and medications patients have never taken.    We have spoken to the pharmacies about these request types and advised their teams previously that we are unable to assess these New Script requests and require all details for these requests. This is a known issue and has been reported.   Note composed:10:19 PM 06/18/2025

## 2025-06-19 NOTE — TELEPHONE ENCOUNTER
Refill Routing Note   Medication(s) are not appropriate for processing by [facility] for the following reason(s):        Required vitals abnormal    [facility] action(s):  Defer             Appointments  past 12m or future 3m with PCP    Date Provider   Last Visit   6/6/2025 Josefina Kendall MD   Next Visit   9/9/2025 Josefina Kendall MD   ED visits in past 90 days: 1        Note composed:10:24 PM 06/18/2025

## 2025-06-19 NOTE — TELEPHONE ENCOUNTER
No care due was identified.  [facility] Embedded Care Due Messages. Reference number: [id number].   6/18/2025 9:43:43 PM CDT

## 2025-06-19 NOTE — TELEPHONE ENCOUNTER
No care due was identified.  [facility] Embedded Care Due Messages. Reference number: [id number].   6/18/2025 9:16:52 PM CDT

## 2025-06-20 RX ORDER — TELMISARTAN 80 MG/1
80 TABLET ORAL DAILY
Qty: 14 TABLET | Refills: 0 | Status: SHIPPED | OUTPATIENT
Start: 2025-06-20

## 2025-06-20 NOTE — TELEPHONE ENCOUNTER
No care due was identified.  [facility] Embedded Care Due Messages. Reference number: [id number].   6/20/2025 11:41:32 AM CDT

## 2025-06-20 NOTE — TELEPHONE ENCOUNTER
Requesting a temp supply of micardis until his shipment arrives from [facility]. He is currently out of meds

## 2025-06-23 DIAGNOSIS — I10 ESSENTIAL HYPERTENSION: ICD-10-CM

## 2025-06-23 RX ORDER — TELMISARTAN 80 MG/1
TABLET ORAL
Qty: 90 TABLET | Refills: 0 | OUTPATIENT
Start: 2025-06-23

## 2025-06-23 NOTE — TELEPHONE ENCOUNTER
No care due was identified.  [facility] Embedded Care Due Messages. Reference number: [id number].   6/23/2025 7:46:54 AM CDT

## 2025-06-23 NOTE — TELEPHONE ENCOUNTER
Richie DC. Inappropriate Request    Refill Authorization Note   Hi Braxton  is requesting a refill authorization.  Brief Assessment and Rationale for Refill:  Quick Discontinue  Medication Therapy Plan:    Pharmacy is requesting new scripts for the following medications without required information, (sig/ frequency/qty/etc)      Medication Reconciliation Completed:  No      Comments:   Pended Medication(s)       Requested Prescriptions     Pending Prescriptions Disp Refills    telmisartan (MICARDIS) 80 MG Tab [Pharmacy Med Name: TELMISARTAN 80MG TAB] 90 tablet 0        Duplicate Pended Encounter(s)/ Last Prescribed Details: (includes pharmacy & prescriber details)   Pharmacies have been requesting medications for patients without required information, (sig, frequency, qty, etc.). In addition, requests are sent for medication(s) pt. are currently not taking, and medications patients have never taken.    We have spoken to the pharmacies about these request types and advised their teams previously that we are unable to assess these New Script requests and require all details for these requests. This is a known issue and has been reported.            Note composed:7:55 AM 06/23/2025

## 2025-06-27 ENCOUNTER — PATIENT MESSAGE (OUTPATIENT)
Dept: INTERNAL MEDICINE | Facility: CLINIC | Age: 65
End: 2025-06-27
Payer: MEDICARE

## 2025-06-27 DIAGNOSIS — I10 ESSENTIAL HYPERTENSION: ICD-10-CM

## 2025-06-27 RX ORDER — TELMISARTAN 80 MG/1
80 TABLET ORAL DAILY
Qty: 90 TABLET | Refills: 3 | Status: SHIPPED | OUTPATIENT
Start: 2025-06-27

## 2025-06-27 NOTE — TELEPHONE ENCOUNTER
I s/w Josefa with [facility] pharmacy regarding pts Telmisartan 80mg. Rx was originally sent to them on 6/19/25 but a temp supply was sent to local pharmacy on 6/20/25    They do not have an active rx for this patient. Pt is running low on meds

## 2025-06-27 NOTE — TELEPHONE ENCOUNTER
No care due was identified.  [facility] Embedded Care Due Messages. Reference number: [id number].   6/27/2025 8:57:21 AM CDT

## 2025-06-27 NOTE — TELEPHONE ENCOUNTER
Refill Routing Note   Medication(s) are not appropriate for processing by [facility] for the following reason(s):        Required vitals abnormal    [facility] action(s):  Defer        Medication Therapy Plan: This is not a duplicate request. Previous request was an emergency supply to bridge until mail order pharmacy could send Telmisartan. Thanks      Appointments  past 12m or future 3m with PCP    Date Provider   Last Visit   6/6/2025 Josefina Kendall MD   Next Visit   9/9/2025 Josefina Kendall MD   ED visits in past 90 days: 0        Note composed:9:21 AM 06/27/2025

## 2025-06-30 ENCOUNTER — PATIENT MESSAGE (OUTPATIENT)
Dept: OTOLARYNGOLOGY | Facility: CLINIC | Age: 65
End: 2025-06-30
Payer: MEDICARE

## 2025-06-30 NOTE — PROGRESS NOTES
Mr. Hi Braxton was seen in the clinic today for an audiological evaluation.  He reported tinnitus, hearing loss, and occupational noise exposure ().  He stated that he has had 2 other audios performed by employees of the [facility] (within the last 60 days) and was told he has normal hearing but was also told that he has hearing loss in both ears (right worse than left).      Audiological testing revealed hearing in the mild to severe sensorineural range for the right ear and revealed hearing in the mild to moderately-severe sensorineural range for the left ear.  A speech reception threshold was obtained at 35 dBHL for the right ear and at 25 dBHL for the left ear.  Speech discrimination scores were 96%% for the right ear and 100%% for the left ear.  Reliability of today's results is considered fair--patient needed re-instruction to respond even when stimuli was soft.     Tympanometry testing revealed a Type C tympanogram for the right ear and revealed a Type A tympanogram for the left ear.      Recommendations:  1. Otologic evaluation  2. Annual audiological evaluation  3. Hearing protection when in noise   4. Hearing aid consultation following medical clearance

## 2025-07-01 ENCOUNTER — OFFICE VISIT (OUTPATIENT)
Dept: OTOLARYNGOLOGY | Facility: CLINIC | Age: 65
End: 2025-07-01
Payer: MEDICARE

## 2025-07-01 ENCOUNTER — LAB VISIT (OUTPATIENT)
Dept: LAB | Facility: HOSPITAL | Age: 65
End: 2025-07-01
Attending: PHYSICIAN ASSISTANT
Payer: MEDICARE

## 2025-07-01 ENCOUNTER — CLINICAL SUPPORT (OUTPATIENT)
Dept: OTOLARYNGOLOGY | Facility: CLINIC | Age: 65
End: 2025-07-01
Payer: MEDICARE

## 2025-07-01 DIAGNOSIS — H90.3 ASYMMETRICAL SENSORINEURAL HEARING LOSS: ICD-10-CM

## 2025-07-01 DIAGNOSIS — H90.3 ASYMMETRIC SNHL (SENSORINEURAL HEARING LOSS): Primary | ICD-10-CM

## 2025-07-01 DIAGNOSIS — H90.3 ASYMMETRIC SNHL (SENSORINEURAL HEARING LOSS): ICD-10-CM

## 2025-07-01 DIAGNOSIS — H90.3 SENSORINEURAL HEARING LOSS (SNHL) OF BOTH EARS: ICD-10-CM

## 2025-07-01 DIAGNOSIS — H61.23 BILATERAL IMPACTED CERUMEN: ICD-10-CM

## 2025-07-01 DIAGNOSIS — H93.13 TINNITUS OF BOTH EARS: Primary | ICD-10-CM

## 2025-07-01 LAB
CREAT SERPL-MCNC: 1 MG/DL (ref 0.5–1.4)
GFR SERPLBLD CREATININE-BSD FMLA CKD-EPI: >60 ML/MIN/1.73/M2

## 2025-07-01 PROCEDURE — 99999 PR PBB SHADOW E&M-EST. PATIENT-LVL III: CPT | Mod: PBBFAC,HCNC,, | Performed by: PHYSICIAN ASSISTANT

## 2025-07-01 PROCEDURE — 36415 COLL VENOUS BLD VENIPUNCTURE: CPT | Mod: HCNC,PN

## 2025-07-01 PROCEDURE — 92567 TYMPANOMETRY: CPT | Mod: HCNC,S$GLB,, | Performed by: AUDIOLOGIST

## 2025-07-01 PROCEDURE — 82565 ASSAY OF CREATININE: CPT | Mod: HCNC

## 2025-07-01 PROCEDURE — 92557 COMPREHENSIVE HEARING TEST: CPT | Mod: HCNC,S$GLB,, | Performed by: AUDIOLOGIST

## 2025-07-01 NOTE — PROGRESS NOTES
Subjective:   History of Present Illness    CHIEF COMPLAINT:  Patient presents today for evaluation of hearing difficulties and tinnitus.    HPI: Hi Braxton is a 66 yo male who  has a past medical history of BPH with obstruction/lower urinary tract symptoms, Carotid artery occlusion, Chronic anterior uveitis, Coronary artery disease, Diabetes mellitus, type 2, Difficult intubation, Distended bladder, Dizziness, DJD (degenerative joint disease), cervical, Dysuria, GERD (gastroesophageal reflux disease), Hearing loss, History of iritis, Hyperlipidemia, Hypertension, Hypokalemia, Lateral epicondylitis (tennis elbow), Lip swelling, Memory loss, Memory loss, Mixed hyperlipidemia, Muscle ache, Neuropathy, New daily persistent headache, Pterygium eye, left, Pyelonephritis, Rectal bleeding, Recurrent UTI, S/P TURP, Suspected sleep apnea, Thyroid nodule, Traumatic iritis - Left Eye, Traumatic iritis - Left Eye, Trigger finger of left hand, and Unspecified iridocyclitis - Left Eye. He reports progressive hearing loss over many years of both ears. He experiences difficulty understanding conversations at work and while watching television, often getting distracted and asking people to repeat themselves. [facility] confirmed right ear hearing loss is worse than left ear.   He reports constant, bilateral tinnitus characterized by high-pitched ringing in both ears present all day, every day. He reports occasional ear fullness and pressure that subsequently resolves on its own. He denies ear pain, ear drainage, and vertigo. He denies prior ear surgeries, ear trauma, or history of chronic ear infections. 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 He is open to potential hearing aid intervention to improve communication and understanding. There is not a family history of hearing loss at a young age.            Past Medical History  He has a past medical history  of BPH with obstruction/lower urinary tract symptoms, Carotid artery occlusion, Chronic anterior uveitis, Coronary artery disease, Diabetes mellitus, type 2, Difficult intubation, Distended bladder, Dizziness, DJD (degenerative joint disease), cervical, Dysuria, GERD (gastroesophageal reflux disease), Hearing loss, History of iritis, Hyperlipidemia, Hypertension, Hypokalemia, Lateral epicondylitis (tennis elbow), Lip swelling, Memory loss, Memory loss, Mixed hyperlipidemia, Muscle ache, Neuropathy, New daily persistent headache, Pterygium eye, left, Pyelonephritis, Rectal bleeding, Recurrent UTI, S/P TURP, Suspected sleep apnea, Thyroid nodule, Traumatic iritis - Left Eye, Traumatic iritis - Left Eye, Trigger finger of left hand, and Unspecified iridocyclitis - Left Eye.    Past Surgical History  He has a past surgical history that includes Hernia repair; Cervical fusion (12/30/2015); Colonoscopy (N/A, 4/7/2017); Prostatectomy; Cerebral angiogram (N/A, 1/6/2020); Coronary angiography (N/A, 10/10/2022); Endoscopic ultrasound of upper gastrointestinal tract (N/A, 8/22/2023); Colonoscopy (N/A, 11/28/2023); Fusion of cervical spine by posterior approach (N/A, 4/24/2024); Fusion of cervical spine by posterior approach (N/A, 7/26/2024); Cataract extraction w/  intraocular lens implant (Right, 10/28/2024); and Cataract extraction w/  intraocular lens implant (Left, 11/18/2024).    Family History  His family history includes Aneurysm in his brother; Arthritis in his brother and mother; Benign prostatic hyperplasia in his brother; Cancer in his brother and paternal aunt; Cataracts in his maternal grandmother; Dementia in his maternal aunt; Diabetes in his paternal uncle; Early death in his brother; Heart attack in his brother and brother; Heart attacks under age 50 in his father; Heart disease in his brother, brother, brother, brother, father, mother, and paternal grandmother; Hepatitis in his brother; Hyperlipidemia in his  brother, brother, brother, mother, and sister; Hypertension in his brother, brother, brother, brother, brother, father, mother, and sister; Migraines in his brother; Multiple sclerosis in his daughter; No Known Problems in his maternal grandfather and paternal grandfather; Stroke in his mother and sister; Throat cancer in his brother.    Social History  He reports that he quit smoking about 13 years ago. His smoking use included cigarettes. He started smoking about 43 years ago. He has a 30 pack-year smoking history. He has never been exposed to tobacco smoke. He has never used smokeless tobacco. He reports current alcohol use of about 3.0 standard drinks of alcohol per week. He reports that he does not use drugs.    Allergies  He is allergic to lisinopril, lipitor [atorvastatin], adhesive, crestor [rosuvastatin], jardiance [empagliflozin], and metformin.    Medications  He has a current medication list which includes the following prescription(s): albuterol, alprazolam, aspirin/acetaminophen/caffeine, contour next test strips, cyclobenzaprine, docusate sodium, bisacodyl, repatha sureclick, ezetimibe, fluticasone-salmeterol 100-50 mcg/dose, lantus solostar u-100 insulin, levothyroxine, metoprolol succinate, microlet lancet, naproxen, nifedipine, nitroglycerin, ondansetron, pantoprazole, pen needle, diabetic, pravastatin, prednisol ace-gatiflox-bromfen, prednisolone, promethazine-dextromethorphan, spironolactone, telmisartan, gabapentin, multivit-min/folic/vit k/lycop, and tadalafil, and the following Facility-Administered Medications: midazolam.    Review of Systems     Constitutional: Negative for chills and fever.      HENT: Positive for hearing loss and ringing in the ears.  Negative for ear discharge, ear infection and ear pain.      Neurological: Negative for dizziness.          Objective:     Constitutional:   He appears well-developed and well-nourished. He appears alert. He is cooperative.  Non-toxic  appearance. He does not have a sickly appearance. He does not appear ill. No distress. Normal speech.      Head:  Normocephalic and atraumatic. Head is without abrasion, without contusion and without TMJ tenderness. Salivary glands normal.      Ears:  Hearing normal to normal and whispered voice; external ear normal without scars, lesions, or masses; ear canal, tympanic membrane, and middle ear normal..   Right Ear: No drainage, swelling or tenderness. No mastoid tenderness. Tympanic membrane is not perforated, not erythematous, not retracted and not bulging. Tympanic membrane mobility is normal. No middle ear effusion.   Left Ear: No drainage, swelling or tenderness. No mastoid tenderness. Tympanic membrane is not perforated, not erythematous, not retracted and not bulging. Tympanic membrane mobility is normal.  No middle ear effusion.   Ceruminous debris obstructing bilateral TMs removed under microscopy      Psychiatric:   He has a normal mood and affect. His speech is normal and behavior is normal.     Neurological:   He is alert.       Audiogram:    I independently reviewed the tracings of the complete audiometric evaluation. I reviewed the audiogram with the patient as well. Pertinent findings include mild to severe sensorineural range for the right ear and revealed hearing in the mild to moderately-severe sensorineural range for the left ear    Procedure:  Cerumen removal performed.  See procedure note.  Procedure Note:  The patient was brought to the minor procedure room and placed under the operating microscope of the left ear canal which was cleaned of ceruminous debris. Using a combination of suction, curettes and/or alligator forceps the patient's cerumen was removed. The patient tolerated the procedure well. There were no complications.  Procedure Note:  The patient was brought to the minor procedure room and placed under the operating microscope of the right ear canal which was cleaned of ceruminous  debris. Using a combination of suction, curettes and/or alligator forceps the patient's cerumen was removed. The patient tolerated the procedure well. There were no complications.    Imaging:  MRI Brain 7/7/2021  FINDINGS:  Intracranial Compartment:  Ventricles are normal in size for age without evidence of hydrocephalus.  Punctate T2/FLAIR hyperintensity in the white matter in the right frontal lobe likely representing chronic ischemic change, previously present on August 2019 MRI.  The brain parenchyma appears otherwise within normal limits.  No mass, hemorrhage, or recent or remote major vascular distribution infarct.No extra-axial blood or fluid collections.  Normal vascular flow voids are preserved.  Skull/Extracranial Contents (limited evaluation):  Bone marrow signal intensity is normal.  Impression:  No evidence acute hemorrhage, infarct or other acute intracranial abnormality.  Supratentorial white matter T2/FLAIR signal changes suggestive for chronic ischemic change.     Assessment:     1. Asymmetric SNHL (sensorineural hearing loss)    2. Bilateral impacted cerumen    3. Sensorineural hearing loss (SNHL) of both ears      Plan:   Asymmetric SNHL (sensorineural hearing loss)  Sensorineural hearing loss (SNHL) of both ears  We discussed the potential causes of asymmetrical hearing loss including: normal aging (presbycusis), noise-induced hearing loss, genetic causes, medications/drugs, microvascular changes, injury to the head or ear and structural problems of the inner ear. An MRI was recommended to rule out a definite cause and help clarify whether there is an underlying structural abnormality. Will call with results. Medically cleared for hearing amplification, and will follow-up with Audiology if interested. Hearing conservation in noisy environments.     Bilateral impacted cerumen  Bilateral cerumen impaction removed under microscopy. Patient tolerated procedure well and noted improvement upon  impaction removal. Education about normal ear hygiene and the avoidance of Q-tips was reviewed.  RTC every 6 months for regular ear cleanings.       RTC 3 months or sooner if symptoms worsen or persist.    This note was generated with the assistance of ambient listening technology. Verbal consent was obtained by the patient and accompanying visitor(s) for the recording of patient appointment to facilitate this note. I attest to having reviewed and edited the generated note for accuracy, though some syntax or spelling errors may persist. Please contact the author of this note for any clarification.

## 2025-07-03 ENCOUNTER — OFFICE VISIT (OUTPATIENT)
Dept: PODIATRY | Facility: CLINIC | Age: 65
End: 2025-07-03
Payer: MEDICARE

## 2025-07-03 ENCOUNTER — APPOINTMENT (OUTPATIENT)
Dept: RADIOLOGY | Facility: OTHER | Age: 65
End: 2025-07-03
Attending: PODIATRIST
Payer: MEDICARE

## 2025-07-03 ENCOUNTER — PATIENT MESSAGE (OUTPATIENT)
Dept: INTERNAL MEDICINE | Facility: CLINIC | Age: 65
End: 2025-07-03
Payer: MEDICARE

## 2025-07-03 VITALS — WEIGHT: 203.94 LBS | BODY MASS INDEX: 24.84 KG/M2 | HEIGHT: 76 IN

## 2025-07-03 DIAGNOSIS — M79.674 TOE PAIN, RIGHT: ICD-10-CM

## 2025-07-03 DIAGNOSIS — E11.42 TYPE 2 DIABETES MELLITUS WITH DIABETIC POLYNEUROPATHY, UNSPECIFIED WHETHER LONG TERM INSULIN USE: Primary | ICD-10-CM

## 2025-07-03 DIAGNOSIS — B35.1 ONYCHOMYCOSIS DUE TO DERMATOPHYTE: ICD-10-CM

## 2025-07-03 DIAGNOSIS — M79.671 FOOT PAIN, RIGHT: ICD-10-CM

## 2025-07-03 PROCEDURE — 73630 X-RAY EXAM OF FOOT: CPT | Mod: 26,HCNC,RT, | Performed by: RADIOLOGY

## 2025-07-03 PROCEDURE — 73630 X-RAY EXAM OF FOOT: CPT | Mod: TC,HCNC,PN,RT

## 2025-07-03 PROCEDURE — 99999 PR PBB SHADOW E&M-EST. PATIENT-LVL III: CPT | Mod: PBBFAC,HCNC,, | Performed by: PODIATRIST

## 2025-07-03 RX ORDER — CICLOPIROX 80 MG/ML
SOLUTION TOPICAL NIGHTLY
Qty: 6.6 ML | Refills: 11 | Status: SHIPPED | OUTPATIENT
Start: 2025-07-03

## 2025-07-03 RX ORDER — LIDOCAINE AND PRILOCAINE 25; 25 MG/G; MG/G
CREAM TOPICAL
Qty: 30 G | Refills: 3 | Status: SHIPPED | OUTPATIENT
Start: 2025-07-03

## 2025-07-03 NOTE — TELEPHONE ENCOUNTER
Called pt and he c/o legs and feet swelling for 2-3 weeks. He elevates his legs at night and it helps. But the more he walks the swell. Scheduled appt for 07/08/25 but he ask if a med needs to be adjusted or sent. This started after the telmisartan and repatha were sent. He ask if this has something to do with these meds as well?

## 2025-07-03 NOTE — PROGRESS NOTES
Subjective:      Patient ID: Hi Braxton is a 65 y.o. male.    Chief Complaint: Diabetic Foot Exam and Foot Pain    Diabetes, increased risk amputation needing evaluation/management/optomization of foot care.    Cc2  foot pain right and in the 1st 2 toes in the bones of the connective.  This has been gradual onset, worsening over the past month or so.  Aggravated with increased weight-bearing prolonged standing some shoes.  No prior medical treatment.  No self-treatment.  Denies trauma and surgery both feet.      Cc3  thick discolored misshapen toenails both big toes.  Chronic condition present for more than a year.  Aggravated by no particular thing.  No prior medical treatment.  No self-treatment.    Chief Complaint   Patient presents with    Diabetic Foot Exam    Foot Pain       Casual shoes both feet    Review of Systems   Constitutional: Negative for chills, diaphoresis, fever, malaise/fatigue and night sweats.   Cardiovascular:  Positive for leg swelling. Negative for claudication, cyanosis and syncope.   Skin:  Positive for nail changes. Negative for color change, dry skin, rash, suspicious lesions and unusual hair distribution.   Musculoskeletal:  Positive for joint pain. Negative for falls, joint swelling, muscle cramps, muscle weakness and stiffness.   Gastrointestinal:  Negative for constipation, diarrhea, nausea and vomiting.   Neurological:  Positive for paresthesias and sensory change. Negative for brief paralysis, disturbances in coordination, focal weakness, numbness and tremors.           Objective:      Physical Exam  Constitutional:       General: He is not in acute distress.     Appearance: He is well-developed. He is not diaphoretic.   Cardiovascular:      Pulses:           Popliteal pulses are 2+ on the right side and 2+ on the left side.        Dorsalis pedis pulses are 2+ on the right side and 2+ on the left side.        Posterior tibial pulses are 2+ on the right side and 2+ on the left  side.      Comments: Capillary refill 3 seconds all toes/distal feet, all toes/both feet warm to touch.      Negative lymphadenopathy bilateral popliteal fossa and tarsal tunnel.      Negavie lower extremity edema bilateral.    Musculoskeletal:      Right ankle: No swelling, deformity, ecchymosis or lacerations. Normal range of motion. Normal pulse.      Right Achilles Tendon: Normal. No defects. Mendes's test negative.      Comments: Tenderness to deep palpation range motion of the plantar DIPJ 2, IPJ 1 and, MTPJ is 1 and 2 right foot without deformity loss of function or signs of acute trauma.      Otherwise, Normal angle, base, station of gait. All ten toes without clubbing, cyanosis, or signs of ischemia.  No pain to palpation bilateral lower extremities.  Range of motion, stability, muscle strength, and muscle tone normal bilateral feet and legs.    Lymphadenopathy:      Lower Body: No right inguinal adenopathy. No left inguinal adenopathy.      Comments: Negative lymphadenopathy bilateral popliteal fossa and tarsal tunnel.    Negative lymphangitic streaking bilateral feet/ankles/legs.   Skin:     General: Skin is warm and dry.      Capillary Refill: Capillary refill takes 2 to 3 seconds.      Coloration: Skin is not pale.      Findings: No abrasion, bruising, burn, ecchymosis, erythema, laceration, lesion or rash.      Nails: There is no clubbing.      Comments: Skin thin, shiny, atrophic, with decreased density and distribution of pedal hair bilateral, but without hyperpigmentation, kee discoloration,  ulcers, masses, nodules or cords palpated bilateral feet and legs.    Toenails 1st,   bilateral are hypertrophic thickened 2-3 mm, dystrophic, discolored tanish brown with tan, gray crumbly subungual debris.  Tender to distal nail plate pressure, without periungual skin abnormality of each.       Neurological:      Mental Status: He is alert and oriented to person, place, and time.      Sensory: No sensory  deficit.      Motor: No tremor, atrophy or abnormal muscle tone.      Gait: Gait normal.      Deep Tendon Reflexes:      Reflex Scores:       Patellar reflexes are 2+ on the right side and 2+ on the left side.       Achilles reflexes are 2+ on the right side and 2+ on the left side.     Comments: Paresthesias, and burning bilateral feet with no clearly identified trigger or source.     Psychiatric:         Behavior: Behavior is cooperative.               Assessment:       Encounter Diagnoses   Name Primary?    Type 2 diabetes mellitus with diabetic polyneuropathy, unspecified whether long term insulin use Yes    Onychomycosis due to dermatophyte     Toe pain, right     Foot pain, right          Plan:       Hi was seen today for diabetic foot exam and foot pain.    Diagnoses and all orders for this visit:    Type 2 diabetes mellitus with diabetic polyneuropathy, unspecified whether long term insulin use    Onychomycosis due to dermatophyte    Toe pain, right  -     X-Ray Foot Complete Right; Future    Foot pain, right      I counseled the patient on his conditions, their implications and medical management.    With the patient's permission, I debrided hallux toenails bilateral with a sterile nipper and curette, removing all offending nail and debris.  Patient tolerated the procedure well and related significant relief.     The patient has received literature on basic diabetic foot care.  Patient will inspect feet daily, wear protective shoe gear when ambulatory, and apply moisturizer to skin as needed to maintain elasticity and help prevent ulceration.  Inspect feet multiple times daily for signs of occurrence/recurrence ulceration.  Discussed conservative treatment with shoes of adequate dimensions, material, and style to alleviate symptoms and delay or prevent surgical intervention.    [place], emla, xrays right    I applied a plantar rest strapping to the patient's foot to offload symptomatic area, support the  arch, and relieve pain.    Patient will stretch the tendo achilles complex three times daily as demonstrated in the office.  Literature was dispensed illustrating proper stretching technique.               No follow-ups on file.

## 2025-07-05 ENCOUNTER — HOSPITAL ENCOUNTER (EMERGENCY)
Facility: HOSPITAL | Age: 65
Discharge: HOME | End: 2025-07-05
Attending: STUDENT IN AN ORGANIZED HEALTH CARE EDUCATION/TRAINING PROGRAM
Payer: MEDICARE

## 2025-07-05 VITALS
DIASTOLIC BLOOD PRESSURE: 66 MMHG | HEIGHT: 76 IN | SYSTOLIC BLOOD PRESSURE: 118 MMHG | BODY MASS INDEX: 24.96 KG/M2 | OXYGEN SATURATION: 95 % | WEIGHT: 205 LBS | RESPIRATION RATE: 19 BRPM | HEART RATE: 72 BPM | TEMPERATURE: 98 F

## 2025-07-05 DIAGNOSIS — R06.02 SOB (SHORTNESS OF BREATH): ICD-10-CM

## 2025-07-05 DIAGNOSIS — J18.9 PNEUMONIA OF RIGHT UPPER LOBE DUE TO INFECTIOUS ORGANISM: Primary | ICD-10-CM

## 2025-07-05 DIAGNOSIS — M79.89 LEG SWELLING: ICD-10-CM

## 2025-07-05 DIAGNOSIS — R07.9 CHEST PAIN: ICD-10-CM

## 2025-07-05 LAB
ABSOLUTE EOSINOPHIL (OHS): 0.26 K/UL
ABSOLUTE MONOCYTE (OHS): 0.76 K/UL (ref 0.3–1)
ABSOLUTE NEUTROPHIL COUNT (OHS): 4.78 K/UL (ref 1.8–7.7)
ALBUMIN SERPL BCP-MCNC: 4.2 G/DL (ref 3.5–5.2)
ALP SERPL-CCNC: 76 UNIT/L (ref 40–150)
ALT SERPL W/O P-5'-P-CCNC: 15 UNIT/L (ref 10–44)
ANION GAP (OHS): 12 MMOL/L (ref 8–16)
AST SERPL-CCNC: 16 UNIT/L (ref 11–45)
BASOPHILS # BLD AUTO: 0.03 K/UL
BASOPHILS NFR BLD AUTO: 0.4 %
BILIRUB SERPL-MCNC: 0.5 MG/DL (ref 0.1–1)
BIPAP: 0
BNP SERPL-MCNC: <10 PG/ML (ref 0–99)
BUN SERPL-MCNC: 18 MG/DL (ref 6–30)
BUN SERPL-MCNC: 19 MG/DL (ref 8–23)
CALCIUM SERPL-MCNC: 9.9 MG/DL (ref 8.7–10.5)
CHLORIDE SERPL-SCNC: 102 MMOL/L (ref 95–110)
CHLORIDE SERPL-SCNC: 105 MMOL/L (ref 95–110)
CO2 SERPL-SCNC: 22 MMOL/L (ref 23–29)
CORRECTED TEMPERATURE (PCO2): 34.9 MMHG
CORRECTED TEMPERATURE (PH): 7.48
CORRECTED TEMPERATURE (PO2): 29.1 MMHG
CREAT SERPL-MCNC: 1.4 MG/DL (ref 0.5–1.4)
CREAT SERPL-MCNC: 1.5 MG/DL (ref 0.5–1.4)
ERYTHROCYTE [DISTWIDTH] IN BLOOD BY AUTOMATED COUNT: 12.6 % (ref 11.5–14.5)
FIO2: 21 %
GFR SERPLBLD CREATININE-BSD FMLA CKD-EPI: 55 ML/MIN/1.73/M2
GLUCOSE SERPL-MCNC: 115 MG/DL (ref 70–110)
GLUCOSE SERPL-MCNC: 116 MG/DL (ref 70–110)
HCT VFR BLD AUTO: 42.9 % (ref 40–54)
HCT VFR BLD CALC: 44 %PCV (ref 36–54)
HCV AB SERPL QL IA: NORMAL
HGB BLD-MCNC: 14.5 GM/DL (ref 14–18)
HIV 1+2 AB+HIV1 P24 AG SERPL QL IA: NORMAL
IMM GRANULOCYTES # BLD AUTO: 0.02 K/UL (ref 0–0.04)
IMM GRANULOCYTES NFR BLD AUTO: 0.3 % (ref 0–0.5)
INFLUENZA A MOLECULAR (OHS): NEGATIVE
INFLUENZA B MOLECULAR (OHS): NEGATIVE
LDH SERPL L TO P-CCNC: 1.3 MMOL/L (ref 0.5–2.2)
LYMPHOCYTES # BLD AUTO: 1.67 K/UL (ref 1–4.8)
MCH RBC QN AUTO: 29.1 PG (ref 27–31)
MCHC RBC AUTO-ENTMCNC: 33.8 G/DL (ref 32–36)
MCV RBC AUTO: 86 FL (ref 82–98)
NUCLEATED RBC (/100WBC) (OHS): 0 /100 WBC
PCO2 BLDA: 34.9 MMHG (ref 35–45)
PH SMN: 7.48 [PH] (ref 7.35–7.45)
PLATELET # BLD AUTO: 285 K/UL (ref 150–450)
PMV BLD AUTO: 9.5 FL (ref 9.2–12.9)
PO2 BLDA: 29.1 MMHG (ref 40–60)
POC IONIZED CALCIUM: 1.2 MMOL/L (ref 1.06–1.42)
POC PERFORMED BY: ABNORMAL
POC TCO2 (MEASURED): 23 MMOL/L (ref 23–29)
POC TEMPERATURE: 37 C
POTASSIUM BLD-SCNC: 3.9 MMOL/L (ref 3.5–5.1)
POTASSIUM SERPL-SCNC: 4.1 MMOL/L (ref 3.5–5.1)
PROT SERPL-MCNC: 7.6 GM/DL (ref 6–8.4)
RBC # BLD AUTO: 4.99 M/UL (ref 4.6–6.2)
RELATIVE EOSINOPHIL (OHS): 3.5 %
RELATIVE LYMPHOCYTE (OHS): 22.2 % (ref 18–48)
RELATIVE MONOCYTE (OHS): 10.1 % (ref 4–15)
RELATIVE NEUTROPHIL (OHS): 63.5 % (ref 38–73)
SAMPLE: ABNORMAL
SARS-COV-2 RDRP RESP QL NAA+PROBE: NEGATIVE
SITE: ABNORMAL
SODIUM BLD-SCNC: 137 MMOL/L (ref 136–145)
SODIUM SERPL-SCNC: 139 MMOL/L (ref 136–145)
SPECIMEN SOURCE: ABNORMAL
TROPONIN I SERPL HS-MCNC: <3 NG/L
TROPONIN I SERPL HS-MCNC: <3 NG/L
WBC # BLD AUTO: 7.52 K/UL (ref 3.9–12.7)

## 2025-07-05 PROCEDURE — 85025 COMPLETE CBC W/AUTO DIFF WBC: CPT

## 2025-07-05 PROCEDURE — 87389 HIV-1 AG W/HIV-1&-2 AB AG IA: CPT | Performed by: PHYSICIAN ASSISTANT

## 2025-07-05 PROCEDURE — 82803 BLOOD GASES ANY COMBINATION: CPT

## 2025-07-05 PROCEDURE — 99285 EMERGENCY DEPT VISIT HI MDM: CPT | Mod: 25

## 2025-07-05 PROCEDURE — U0002 COVID-19 LAB TEST NON-CDC: HCPCS

## 2025-07-05 PROCEDURE — 25000003 PHARM REV CODE 250

## 2025-07-05 PROCEDURE — 96374 THER/PROPH/DIAG INJ IV PUSH: CPT

## 2025-07-05 PROCEDURE — 86803 HEPATITIS C AB TEST: CPT | Performed by: PHYSICIAN ASSISTANT

## 2025-07-05 PROCEDURE — 93005 ELECTROCARDIOGRAM TRACING: CPT

## 2025-07-05 PROCEDURE — 99900035 HC TECH TIME PER 15 MIN (STAT)

## 2025-07-05 PROCEDURE — 63600175 PHARM REV CODE 636 W HCPCS

## 2025-07-05 PROCEDURE — 96375 TX/PRO/DX INJ NEW DRUG ADDON: CPT | Mod: 59

## 2025-07-05 PROCEDURE — 84484 ASSAY OF TROPONIN QUANT: CPT

## 2025-07-05 PROCEDURE — 87502 INFLUENZA DNA AMP PROBE: CPT

## 2025-07-05 PROCEDURE — 83880 ASSAY OF NATRIURETIC PEPTIDE: CPT

## 2025-07-05 PROCEDURE — 25500020 PHARM REV CODE 255: Performed by: STUDENT IN AN ORGANIZED HEALTH CARE EDUCATION/TRAINING PROGRAM

## 2025-07-05 PROCEDURE — 83605 ASSAY OF LACTIC ACID: CPT

## 2025-07-05 PROCEDURE — 80053 COMPREHEN METABOLIC PANEL: CPT

## 2025-07-05 RX ORDER — DOXYCYCLINE 100 MG/1
100 CAPSULE ORAL 2 TIMES DAILY
Qty: 9 CAPSULE | Refills: 0 | Status: SHIPPED | OUTPATIENT
Start: 2025-07-05 | End: 2025-07-10

## 2025-07-05 RX ORDER — DOXYCYCLINE 100 MG/1
100 CAPSULE ORAL 2 TIMES DAILY
Qty: 9 CAPSULE | Refills: 0 | Status: SHIPPED | OUTPATIENT
Start: 2025-07-05 | End: 2025-07-05

## 2025-07-05 RX ORDER — HYDROMORPHONE HYDROCHLORIDE 1 MG/ML
0.5 INJECTION, SOLUTION INTRAMUSCULAR; INTRAVENOUS; SUBCUTANEOUS
Refills: 0 | Status: COMPLETED | OUTPATIENT
Start: 2025-07-05 | End: 2025-07-05

## 2025-07-05 RX ORDER — ONDANSETRON HYDROCHLORIDE 2 MG/ML
4 INJECTION, SOLUTION INTRAVENOUS
Status: DISCONTINUED | OUTPATIENT
Start: 2025-07-05 | End: 2025-07-06 | Stop reason: HOSPADM

## 2025-07-05 RX ORDER — IBUPROFEN 600 MG/1
600 TABLET, FILM COATED ORAL
Status: COMPLETED | OUTPATIENT
Start: 2025-07-05 | End: 2025-07-05

## 2025-07-05 RX ORDER — DOXYCYCLINE HYCLATE 100 MG
100 TABLET ORAL
Status: COMPLETED | OUTPATIENT
Start: 2025-07-05 | End: 2025-07-05

## 2025-07-05 RX ORDER — MORPHINE SULFATE 4 MG/ML
4 INJECTION, SOLUTION INTRAMUSCULAR; INTRAVENOUS
Refills: 0 | Status: COMPLETED | OUTPATIENT
Start: 2025-07-05 | End: 2025-07-05

## 2025-07-05 RX ORDER — ACETAMINOPHEN 500 MG
1000 TABLET ORAL
Status: COMPLETED | OUTPATIENT
Start: 2025-07-05 | End: 2025-07-05

## 2025-07-05 RX ADMIN — ACETAMINOPHEN 1000 MG: 500 TABLET ORAL at 07:07

## 2025-07-05 RX ADMIN — IOHEXOL 100 ML: 350 INJECTION, SOLUTION INTRAVENOUS at 08:07

## 2025-07-05 RX ADMIN — DOXYCYCLINE HYCLATE 100 MG: 100 TABLET, COATED ORAL at 10:07

## 2025-07-05 RX ADMIN — IBUPROFEN 600 MG: 600 TABLET ORAL at 07:07

## 2025-07-05 RX ADMIN — HYDROMORPHONE HYDROCHLORIDE 0.5 MG: 1 INJECTION, SOLUTION INTRAMUSCULAR; INTRAVENOUS; SUBCUTANEOUS at 08:07

## 2025-07-05 RX ADMIN — MORPHINE SULFATE 4 MG: 4 INJECTION INTRAVENOUS at 07:07

## 2025-07-05 NOTE — ED PROVIDER NOTES
Encounter Date: 7/5/2025       History     Chief Complaint   Patient presents with    Shortness of Breath     Hurts to breathe    Chest Pain     + cardiac hx     Patient is a 65-year-old male with a past medical history of CAD, HTN, pulmonary emphysema, DM 2 who arrives for evaluation of shortness of breath and chest pain.  Patient states having chest pain intermittent for the past weeks.  States having episode of increased chest pain this morning at grocery store while carrying various heavy items.  States that chest pain initially started at right side of chest which has since resided and then migrated to bilateral shoulders and is now present at right side of back.  Patient states chest pain is pleuritic, different 2 prior episodes of chest pain.  States having chills last night.  Subjective fever.  Has a nonproductive cough.  Had an episode of nausea yesterday, no vomiting.  Denies any sore throat, rhinorrhea, vomiting.  States having unilateral lower extremity swelling present on the left, usually present on the right.    Patient had a negative CTA chest  abdomen and pelvis with no findings of PE, aneurysm/dissection.  Last echo on the 3/14/2018 with EF of 60-65%.  Recent negative stress echo.      Review of patient's allergies indicates:   Allergen Reactions    Lisinopril Anaphylaxis and Nausea And Vomiting     General bad feeling    Lipitor [atorvastatin] Other (See Comments)     Muscle aches    Adhesive     Crestor [rosuvastatin] Swelling     Lip swelling.     Jardiance [empagliflozin] Other (See Comments)     Possible related to recent UTI's     Metformin      Used XR and experienced flatulance and abdominal pain.      Past Medical History:   Diagnosis Date    BPH with obstruction/lower urinary tract symptoms 09/28/2017    Carotid artery occlusion     Chronic anterior uveitis 03/26/2013    Coronary artery disease     Diabetes mellitus, type 2     diet controlled    Difficult intubation     Distended bladder  06/23/2020    Dizziness     DJD (degenerative joint disease), cervical 07/10/2015    Dysuria 05/11/2018    GERD (gastroesophageal reflux disease)     Hearing loss     History of iritis 2013    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 Dose of amlodipine increased from 5 to 10 mg in the weeks prior to the incidnt.    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     Past Surgical History:   Procedure Laterality Date    CATARACT EXTRACTION W/  INTRAOCULAR LENS IMPLANT Right 10/28/2024    Procedure: EXTRACTION, CATARACT, WITH IOL INSERTION;  Surgeon: Elva Means MD;  Location: [facility] OR;  Service: Ophthalmology;  Laterality: Right;    CATARACT EXTRACTION W/  INTRAOCULAR LENS IMPLANT Left 11/18/2024    Procedure: EXTRACTION, CATARACT, WITH IOL INSERTION;  Surgeon: Elva Means MD;  Location: [facility] OR;  Service: Ophthalmology;  Laterality: Left;    CEREBRAL ANGIOGRAM N/A 1/6/2020    Procedure: ANGIOGRAM-CEREBRAL;  Surgeon: [facility];  Location: [facility] OR 2ND FLR;  Service: Radiology;  Laterality: N/A;  /Nagi    CERVICAL FUSION  12/30/2015    COLONOSCOPY N/A 4/7/2017    Procedure: COLONOSCOPY;  Surgeon: Handy Frederick MD;  Location: [facility] ENDO (4TH FLR);  Service: Endoscopy;  Laterality: N/A;    COLONOSCOPY N/A  11/28/2023    Procedure: COLONOSCOPY;  Surgeon: ARMAAN Hinojosa MD;  Location: [facility] ENDO (4TH FLR);  Service: Endoscopy;  Laterality: N/A;  8/21-referred by Dr. Schulte/ Diagnosis:  Blood in stool, past due on surveillance colonoscopy for advanced colon polyp villous adenoma greater than 10 mm /Prep instructions handed to patient and to portal. AS  11/21/23-Precall complete-DS    CORONARY ANGIOGRAPHY N/A 10/10/2022    Procedure: ANGIOGRAM, CORONARY ARTERY;  Surgeon: Anson Nolan MD;  Location: [facility] CATH LAB;  Service: Cardiology;  Laterality: N/A;    ENDOSCOPIC ULTRASOUND OF UPPER GASTROINTESTINAL TRACT N/A 8/22/2023    Procedure: ULTRASOUND, UPPER GI TRACT, ENDOSCOPIC;  Surgeon: Joe Means MD;  Location: [facility] ENDO (2ND FLR);  Service: Endoscopy;  Laterality: N/A;  instr portal-pt stated difficult intubation with surgery in the past-tb    FUSION OF CERVICAL SPINE BY POSTERIOR APPROACH N/A 4/24/2024    Procedure: FUSION, SPINE, CERVICAL, POSTERIOR APPROACH C3-6 DEPUY  GW TONGS WTIH WEIGHTS, FREDIS 4 POST SNS: MOTORS/SSEP/EMG;  Surgeon: Edd Burnette MD;  Location: [facility] OR;  Service: Orthopedics;  Laterality: N/A;    FUSION OF CERVICAL SPINE BY POSTERIOR APPROACH N/A 7/26/2024    Procedure: FUSION, SPINE, CERVICAL, POSTERIOR APPROACH C3-T1 REVISION;  Surgeon: Edd Burnette MD;  Location: [facility] 2ND FLR;  Service: Orthopedics;  Laterality: N/A;    HERNIA REPAIR      PROSTATECTOMY       Family History   Problem Relation Name Age of Onset    Heart disease Mother Apple Braxton         Heart Disease    Hypertension Mother Apple Braxton     Hyperlipidemia Mother Apple Braxton     Stroke Mother Apple Braxton     Arthritis Mother Apple Braxton     Heart disease Father Tevin Braxton         Heart attact/Heart Disease    Heart attacks under age 50 Father Tevin Braxton     Hypertension Father Tevin Braxton     Stroke Sister Joselin     Hypertension Sister Joselin      Hyperlipidemia Sister Joselin     Hyperlipidemia Brother Antonio Braxton         Aneurism    Hypertension Brother Antonio Braxton     Heart disease Brother Antonio Braxton     Aneurysm Brother Antonio Braxton         Passed away    Migraines Brother Antonio Braxton     Hypertension Brother Kelby     Hyperlipidemia Brother Kelby     Benign prostatic hyperplasia Brother Kelby     Heart disease Brother Kelby         Heart Attack/Heart Disease    Heart attack Brother Kelby     Arthritis Brother Kelby         Heart attack    Hypertension Brother Travis Braxton     Heart disease Brother Travis Braxton         Heart Attack/Heart Disease    Heart attack Brother Travis Braxton     Hypertension Brother Tevin     Hepatitis Brother Tevin     Heart disease Brother Tevin     Throat cancer Brother Steven     Hypertension Brother Steven     Hyperlipidemia Brother Steven     Cancer Brother Steven         Cancer    Cataracts Maternal Grandmother      No Known Problems Maternal Grandfather      Heart disease Paternal Grandmother      No Known Problems Paternal Grandfather      Diabetes Paternal Uncle Arjun Braxton         Diabetes    Multiple sclerosis Daughter Cori     Cancer Paternal Aunt Natali Encarnacion         Brain tumor    Dementia Maternal Aunt Maryan Lazcano     Early death Brother Tony Braxton     Amblyopia Neg Hx      Blindness Neg Hx      Glaucoma Neg Hx      Macular degeneration Neg Hx      Retinal detachment Neg Hx      Strabismus Neg Hx      Thyroid disease Neg Hx      Colon cancer Neg Hx      Colon polyps Neg Hx      Esophageal cancer Neg Hx       Social History[1]  Review of Systems    Physical Exam     Initial Vitals [07/05/25 1729]   BP Pulse Resp Temp SpO2   124/72 95 20 98.1 °F (36.7 °C) 95 %      MAP       --         Physical Exam    Nursing note and vitals reviewed.  Constitutional: He appears well-developed and well-nourished. He is not diaphoretic. No distress.   HENT:   Head: Normocephalic and atraumatic.    Right Ear: External ear normal.   Left Ear: External ear normal.   Nose: Nose normal.   Eyes: Conjunctivae are normal. Right eye exhibits no discharge. Left eye exhibits no discharge. No scleral icterus.   Neck: No stridor present.   Normal range of motion.  Cardiovascular:  Normal rate, regular rhythm and normal heart sounds.           No murmur heard.  Pulses:       Radial pulses are 2+ on the right side and 2+ on the left side.   Distal extremities warm to palpation.   Pulmonary/Chest: No stridor. No respiratory distress. He has no wheezes.   Poor inspiratory effort.  Bilateral upper coarse breath sounds.   Abdominal: He exhibits no distension. There is no abdominal tenderness.   Musculoskeletal:         General: Edema present. No tenderness.      Cervical back: Normal range of motion.      Comments: Unilateral swelling of left lower extremity.     Neurological: He is alert and oriented to person, place, and time. He has normal strength.   Skin: Skin is warm, dry and intact. Capillary refill takes less than 2 seconds. No rash and no abscess noted. No erythema. No pallor.   Distal extremities warmth to palpation.   Psychiatric: He has a normal mood and affect.         ED Course   Procedures  Labs Reviewed   COMPREHENSIVE METABOLIC PANEL - Abnormal       Result Value    Sodium 139      Potassium 4.1      Chloride 105      CO2 22 (*)     Glucose 116 (*)     BUN 19      Creatinine 1.4      Calcium 9.9      Protein Total 7.6      Albumin 4.2      Bilirubin Total 0.5      ALP 76      AST 16      ALT 15      Anion Gap 12      eGFR 55 (*)    ISTAT PROCEDURE - Abnormal    POC Glucose 115 (*)     POC BUN 18      POC Creatinine 1.5 (*)     POC Sodium 137      POC Potassium 3.9      POC Chloride 102      POC TCO2 (MEASURED) 23      POC Ionized Calcium 1.20      POC Hematocrit 44      Sample SARIAH     INFLUENZA A & B BY MOLECULAR - Normal    INFLUENZA A MOLECULAR Negative      INFLUENZA B MOLECULAR  Negative     HEPATITIS C  ANTIBODY - Normal    Hep C Ab Interp Non-Reactive     HIV 1 / 2 ANTIBODY - Normal    HIV 1/2 Ag/Ab Non-Reactive     TROPONIN I HIGH SENSITIVITY - Normal    Troponin High Sensitive <3     TROPONIN I HIGH SENSITIVITY - Normal    Troponin High Sensitive <3     B-TYPE NATRIURETIC PEPTIDE - Normal    BNP <10     SARS-COV-2 RNA AMPLIFICATION, QUAL - Normal    SARS COV-2 Molecular Negative     CBC WITH DIFFERENTIAL - Normal    WBC 7.52      RBC 4.99      HGB 14.5      HCT 42.9      MCV 86      MCH 29.1      MCHC 33.8      RDW 12.6      Platelet Count 285      MPV 9.5      Nucleated RBC 0      Neut % 63.5      Lymph % 22.2      Mono % 10.1      Eos % 3.5      Basophil % 0.4      Imm Grans % 0.3      Neut # 4.78      Lymph # 1.67      Mono # 0.76      Eos # 0.26      Baso # 0.03      Imm Grans # 0.02     CBC W/ AUTO DIFFERENTIAL    Narrative:     The following orders were created for panel order CBC auto differential.  Procedure                               Abnormality         Status                     ---------                               -----------         ------                     CBC with Differential[[id number]]       Normal              Final result                 Please view results for these tests on the individual orders.   HEP C VIRUS HOLD SPECIMEN   ISTAT CHEM8          Imaging Results               CTA Chest Abdomen Pelvis (XPD) (Final result)  Result time 07/05/25 22:15:47      Final result by Jori Brownlee MD (07/05/25 22:15:47)                   Impression:      No evidence of aortic aneurysm or dissection throughout the thoracic or abdominal aorta.    Stable enhancing lesion in the right lobe of the liver.  Continued surveillance may be beneficial.    Stable pancreatic ductal dilatation without focal mass.  No change since 2023 is a exam.    New 1.7  Cm patch of ground-glass and semi-solid opacity in the right lung apex anteriorly.  Differential considerations include pneumonia, pneumonitis or  even bronchoalveolar cell carcinoma.  Follow-up to ensure clearing is urged.    For a part solid nodule 6 mm or greater, Fleischner Society 2017 guidelines recommend follow up with non-contrast chest CT at 3-6 months to confirm persistence. If this nodule is unchanged and the solid component remains <6 mm, annual follow up CT should be performed for 5 years; however, persistence of a part-solid nodule with solid component ?6 mm should be considered highly suspicious and may warrant further workup with PET/CT, biopsy, or resection.  If patient is high risk, pulmonary medicine follow-up and consideration for PET scan.    This report was flagged in Epic as abnormal.      Electronically signed by: Jori Brownlee  Date:    07/05/2025  Time:    22:15               Narrative:    EXAMINATION:  CTA CHEST ABDOMEN PELVIS (XPD)    CLINICAL HISTORY:  Aortic aneurysm, known or suspected;    TECHNIQUE:  Arterial phase axial CT images were obtained through the chest, abdomen and pelvis following IV administration of 100 mL of Omnipaque 350 contrast. Coronal, sagittal and 3D reconstructions were submitted and interpreted.    COMPARISON:  08/15/2023    FINDINGS:  Thoracic aorta: Normal    Abdominal aorta: Normal scattered plaque.    Abdominal arteries: Normal    Base of Neck: No significant abnormality.    Thoracic soft tissues: Unremarkable.    Heart: Normal size. No effusion.    Pulmonary vasculature: Pulmonary arteries distribute normally.  There are four pulmonary veins.    Lilly/Mediastinum: No pathologic godfrey enlargement.    Esophagus: Unremarkable.    Airways: Patent.    Lungs/Pleura: New patch of ground-glass semi solid opacification in the anterior aspect of the right lung apex (5:124).  Otherwise, clear lungs.  Mild generalized emphysematous change mainly in the lung apices.  No pleural effusion or thickening.    Liver: Stable in size and attenuation, with stable flash enhancement measuring 13 mm centrally in the lower  right lobe.  No new liver nodular enhancements..    Gallbladder: No calcified gallstones.    Bile Ducts: No evidence of dilated ducts.    Pancreas: No mass or peripancreatic fat stranding.  Stable 4 mm ductal dilatation.    Spleen: Unremarkable.    Adrenals: Unremarkable.    Kidneys/ Ureters: No radiodense stones or hydronephrosis.  The ureters are normal in course and caliber.  Small cortical right mid parenchymal cysts stable.    Bladder: No evidence of wall thickening.    Reproductive organs: Unremarkable.    GI Tract/Mesentery: No evidence of bowel obstruction or inflammation.    Peritoneal Space: No ascites. No free air.    Retroperitoneum: No significant adenopathy.    Abdominal wall: Unremarkable.    Bones: No acute fracture or aggressive-appearing lytic or blastic lesion.                                       US Lower Extremity Veins Left (Final result)  Result time 07/05/25 20:50:01      Final result by Jori Brownlee MD (07/05/25 20:50:01)                   Impression:      No evidence of deep venous thrombosis in the left lower extremity.      Electronically signed by: Jori Brownlee  Date:    07/05/2025  Time:    20:50               Narrative:    EXAMINATION:  US LOWER EXTREMITY VEINS LEFT    CLINICAL HISTORY:  Other specified soft tissue disorders    TECHNIQUE:  Duplex and color flow Doppler evaluation and graded compression of the left lower extremity veins was performed.    COMPARISON:  None    FINDINGS:  Left thigh veins: The common femoral, femoral, popliteal, upper greater saphenous, and deep femoral veins are patent and free of thrombus. The veins are normally compressible and have normal phasic flow and augmentation response.    Left calf veins: The visualized calf veins are patent.    Contralateral CFV: The contralateral (right) common femoral vein is patent and free of thrombus.    Miscellaneous: None                                       X-Ray Chest AP Portable (In process)                       Medications   ondansetron injection 4 mg (0 mg Intravenous Hold 7/5/25 2030)   ibuprofen tablet 600 mg (600 mg Oral Given 7/5/25 1907)   acetaminophen tablet 1,000 mg (1,000 mg Oral Given 7/5/25 1907)   morphine injection 4 mg (4 mg Intravenous Given 7/5/25 1906)   HYDROmorphone injection 0.5 mg (0.5 mg Intravenous Given 7/5/25 2006)   iohexoL (OMNIPAQUE 350) injection 100 mL (100 mLs Intravenous Given 7/5/25 2041)   doxycycline tablet 100 mg (100 mg Oral Given 7/5/25 2257)     Medical Decision Making  Patient is a 65 year old male  with history as stated above.    Differential diagnosis includes, but is not limited to:    -ACS  -PE  -Aortic dissection/aneurysm  -Pneumonia  -Pericarditis/myocarditis    Management:    Ordered ibuprofen, Tylenol and morphine for symptomatic pain management.  Pocus echo and lung with no pericardial effusion, no B lines bilaterally, no findings suggestive of pneumothorax.  Ordered CTA chest abdomen and pelvis due to her pleuritic in nature pain and migration pain. Ordered US of LLE due to unilateral swelling; no DVT on final result. CTA chest abdomen pelvis shows findings concerning for pneumonia; no PE, no aortic aneurysm/dissection. Ordered one dose of doxycycline in the ED and prescription for home of doxycycline.  Patient has ambulated in ED; lowest ambulatory O2 sats was 93%.  Patient advised to take Tylenol and ibuprofen at home for pain.  Advised to take full dose of antibiotics.  Patient discharged with return precautions explained in face-to-face conversation including return to ED on worsening of symptoms.         Amount and/or Complexity of Data Reviewed  Independent Historian: spouse     Details: Collateral history obtained from spouse who states patient was carrying various heavy items when pain worsened.   External Data Reviewed: radiology.     Details:    Labs: ordered. Decision-making details documented in ED Course.  Radiology: ordered.     Details:  cxr  ECG/medicine tests: ordered.    Risk  OTC drugs.  Prescription drug management.               ED Course as of 25 0045   Sat 2025   1756    1800    1829    1833 POC PH(!): 7.480 [AG]   1833 POC PCO2(!): 34.9 [AG]   2306   Ambulatory sats with lowest measurement of 93%. [AG]      ED Course User Index  [AG] Rita Munoz MD                           Clinical Impression:  Final diagnoses:  [R06.02] SOB (shortness of breath)  [R07.9] Chest pain  [M79.89] Leg swelling  [J18.9] Pneumonia of right upper lobe due to infectious organism (Primary)          ED Disposition Condition    Discharge Stable          ED Prescriptions       Medication Sig Dispense Start Date End Date Auth. Provider    doxycycline (VIBRAMYCIN) 100 MG Cap  (Status: Discontinued) Take 1 capsule (100 mg total) by mouth 2 (two) times daily. for 5 days 9 capsule 2025 Rita Munoz MD    doxycycline (VIBRAMYCIN) 100 MG Cap Take 1 capsule (100 mg total) by mouth 2 (two) times daily. for 5 days 9 capsule 2025 7/10/2025 Rita Munoz MD          Follow-up Information       Follow up With Specialties Details Why Contact Info    Josefina Kendall MD Internal Medicine Schedule an appointment as soon as possible for a visit in 1 week As needed, If symptoms worsen  [facility] [address]  [phone]      [facility] - Emergency Dept Emergency Medicine  As needed, If symptoms worsen [facility] [facility] [address] [phone]                     Rita Munoz MD  Resident  [phone]         [1]   Social History  Tobacco Use    Smoking status: Former     Current packs/day: 0.00     Average packs/day: 1 pack/day for 30.0 years (30.0 ttl pk-yrs)     Types: Cigarettes     Start date: 1981     Quit date: 2011     Years since quittin.5     Passive exposure: Never    Smokeless tobacco: Never   Substance Use Topics    Alcohol use: Yes     Alcohol/week: 3.0  standard drinks of alcohol     Types: 3 Cans of beer per week     Comment: every other day 2 beers    Drug use: Never        Rita Munoz MD  Resident  07/06/25 0046

## 2025-07-05 NOTE — Clinical Note
"Hi Gerard" Fox was seen and treated in our emergency department on 7/5/2025.  He may return to work on 07/11/2025.       If you have any questions or concerns, please don't hesitate to call.      Rita Munoz MD"

## 2025-07-05 NOTE — ED TRIAGE NOTES
"Hi Braxton, an 65 y.o. male presents to the ED with SOB and CP caused by deep breathing located in the posterior upper back, midsternal area, and left shoulder. Pain started a week ago but worsened early today "after picking up a few cases of water at the grocery store". Pt reports fever and chills last night, and mildly productive cough. Pt denies N/V/D.     Triage note:  Chief Complaint   Patient presents with    Shortness of Breath     Hurts to breathe    Chest Pain     + cardiac hx     Review of patient's allergies indicates:   Allergen Reactions    Lisinopril Anaphylaxis and Nausea And Vomiting     General bad feeling    Lipitor [atorvastatin] Other (See Comments)     Muscle aches    Adhesive     Crestor [rosuvastatin] Swelling     Lip swelling.     Jardiance [empagliflozin] Other (See Comments)     Possible related to recent UTI's     Metformin      Used XR and experienced flatulance and abdominal pain.      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 Dose of amlodipine increased from 5 to 10 mg in the weeks prior to the incidnt.    Memory loss     Memory loss 06/21/2013    Mixed hyperlipidemia 01/20/2017    Muscle ache 01/28/2015    Neuropathy     New daily persistent headache 01/20/2020    Pterygium eye, left 03/20/2021    " Pyelonephritis 05/11/2018    Rectal bleeding 04/07/2017    Recurrent UTI 09/28/2017    S/P TURP 09/02/2022    Suspected sleep apnea 02/05/2020    Thyroid nodule 08/22/2019    Traumatic iritis - Left Eye 02/27/2013    Traumatic iritis - Left Eye 02/27/2013    Trigger finger of left hand 09/11/2014    Unspecified iridocyclitis - Left Eye 04/17/2013

## 2025-07-05 NOTE — Clinical Note
"Hi Gerard" Fox was seen and treated in our emergency department on 7/5/2025.  He may return to work on 07/09/2025.       If you have any questions or concerns, please don't hesitate to call.      Rita Munoz MD"

## 2025-07-05 NOTE — Clinical Note
"Hi Gerard" Fox was seen and treated in our emergency department on 7/5/2025.  He may return to work on 07/14/2025.       If you have any questions or concerns, please don't hesitate to call.      Rita Munoz MD"

## 2025-07-06 LAB
OHS QRS DURATION: 82 MS
OHS QRS DURATION: 94 MS
OHS QRS DURATION: 98 MS
OHS QTC CALCULATION: 441 MS
OHS QTC CALCULATION: 470 MS
OHS QTC CALCULATION: 486 MS

## 2025-07-06 NOTE — ED NOTES
I-STAT Chem-8+ Results:   Value Reference Range   Sodium 137 136-145 mmol/L   Potassium  3.9 3.5-5.1 mmol/L   Chloride 102  mmol/L   Ionized Calcium 1.2 1.06-1.42 mmol/L   CO2 (measured) 23 23-29 mmol/L   Glucose 115  mg/dL   BUN 18 6-30 mg/dL   Creatinine 1.5 0.5-1.4 mg/dL   Hematocrit 44 36-54%

## 2025-07-08 ENCOUNTER — OFFICE VISIT (OUTPATIENT)
Dept: INTERNAL MEDICINE | Facility: CLINIC | Age: 65
End: 2025-07-08
Payer: MEDICARE

## 2025-07-08 VITALS
WEIGHT: 191.81 LBS | HEART RATE: 76 BPM | BODY MASS INDEX: 23.36 KG/M2 | HEIGHT: 76 IN | TEMPERATURE: 97 F | DIASTOLIC BLOOD PRESSURE: 64 MMHG | SYSTOLIC BLOOD PRESSURE: 110 MMHG | OXYGEN SATURATION: 93 %

## 2025-07-08 DIAGNOSIS — I10 ESSENTIAL HYPERTENSION: ICD-10-CM

## 2025-07-08 DIAGNOSIS — J43.9 PULMONARY EMPHYSEMA, UNSPECIFIED EMPHYSEMA TYPE: ICD-10-CM

## 2025-07-08 DIAGNOSIS — I10 ESSENTIAL HYPERTENSION: Primary | ICD-10-CM

## 2025-07-08 DIAGNOSIS — R60.9 EDEMA, UNSPECIFIED TYPE: ICD-10-CM

## 2025-07-08 DIAGNOSIS — J18.9 PNEUMONIA DUE TO INFECTIOUS ORGANISM, UNSPECIFIED LATERALITY, UNSPECIFIED PART OF LUNG: ICD-10-CM

## 2025-07-08 DIAGNOSIS — R91.1 PULMONARY NODULE: ICD-10-CM

## 2025-07-08 DIAGNOSIS — I49.3 SYMPTOMATIC PVCS: ICD-10-CM

## 2025-07-08 DIAGNOSIS — E87.6 HYPOKALEMIA: ICD-10-CM

## 2025-07-08 LAB — HOLD SPECIMEN: NORMAL

## 2025-07-08 PROCEDURE — 1101F PT FALLS ASSESS-DOCD LE1/YR: CPT | Mod: CPTII,HCNC,S$GLB, | Performed by: INTERNAL MEDICINE

## 2025-07-08 PROCEDURE — 3288F FALL RISK ASSESSMENT DOCD: CPT | Mod: CPTII,HCNC,S$GLB, | Performed by: INTERNAL MEDICINE

## 2025-07-08 PROCEDURE — 3078F DIAST BP <80 MM HG: CPT | Mod: CPTII,HCNC,S$GLB, | Performed by: INTERNAL MEDICINE

## 2025-07-08 PROCEDURE — 99999 PR PBB SHADOW E&M-EST. PATIENT-LVL V: CPT | Mod: PBBFAC,HCNC,, | Performed by: INTERNAL MEDICINE

## 2025-07-08 PROCEDURE — G2211 COMPLEX E/M VISIT ADD ON: HCPCS | Mod: HCNC,S$GLB,, | Performed by: INTERNAL MEDICINE

## 2025-07-08 PROCEDURE — 3044F HG A1C LEVEL LT 7.0%: CPT | Mod: CPTII,HCNC,S$GLB, | Performed by: INTERNAL MEDICINE

## 2025-07-08 PROCEDURE — 99214 OFFICE O/P EST MOD 30 MIN: CPT | Mod: HCNC,S$GLB,, | Performed by: INTERNAL MEDICINE

## 2025-07-08 PROCEDURE — 3008F BODY MASS INDEX DOCD: CPT | Mod: CPTII,HCNC,S$GLB, | Performed by: INTERNAL MEDICINE

## 2025-07-08 PROCEDURE — 3074F SYST BP LT 130 MM HG: CPT | Mod: CPTII,HCNC,S$GLB, | Performed by: INTERNAL MEDICINE

## 2025-07-08 PROCEDURE — 4010F ACE/ARB THERAPY RXD/TAKEN: CPT | Mod: CPTII,HCNC,S$GLB, | Performed by: INTERNAL MEDICINE

## 2025-07-08 RX ORDER — SPIRONOLACTONE 50 MG/1
50 TABLET, FILM COATED ORAL
Qty: 90 TABLET | Refills: 3 | Status: SHIPPED | OUTPATIENT
Start: 2025-07-08

## 2025-07-08 RX ORDER — PANTOPRAZOLE SODIUM 40 MG/1
40 TABLET, DELAYED RELEASE ORAL 2 TIMES DAILY
Qty: 180 TABLET | Refills: 3 | Status: SHIPPED | OUTPATIENT
Start: 2025-07-08

## 2025-07-08 RX ORDER — METOPROLOL SUCCINATE 50 MG/1
TABLET, EXTENDED RELEASE ORAL
Qty: 135 TABLET | Refills: 3 | Status: SHIPPED | OUTPATIENT
Start: 2025-07-08

## 2025-07-08 NOTE — TELEPHONE ENCOUNTER
Refill Routing Note   Medication(s) are not appropriate for processing by [facility] for the following reason(s):        Required labs abnormal - Cr: 1.4      [facility] action(s):  Approve      Medication Therapy Plan: Pt seen in ED 7/5 for pneumonia and discharged on ABX, no changes in therapy were made. Pt had follow up with PCP 7/8      Appointments  past 12m or future 3m with PCP    Date Provider   Last Visit   6/6/2025 Josefina Kendall MD   Next Visit   7/8/2025 Josefina Kendall MD   ED visits in past 90 days: 1        Note composed:12:48 PM 07/08/2025

## 2025-07-08 NOTE — PROGRESS NOTES
"  History of Present Illness    CHIEF COMPLAINT:  Hi presents today for follow up of pneumonia.    RESPIRATORY SYMPTOMS:  He reports progressive worsening of breathing over time. He experienced significant back pain with inability to breathe when lying down, which prompted hospital visit on Saturday. Currently, symptoms are improving but still present. He continues to have shortness of breath and mild productive cough with mucus. He reports extreme fatigue prior to starting antibiotics but denies fever. Lower back pain persists, which he attributes to pneumonia. He notes feeling occasionally "in a daze" but otherwise tolerating treatment. His symptoms impact his ability to rest comfortably, particularly when lying down.    OXYGEN STATUS:  He reports SpO2 dropping from 94% to 88% with minimal exertion. During recent hospitalization, oxygen levels dropped as low as 82%. He experiences oxygen level variations, particularly at nighttime and with increased movement, with levels fluctuating between 80-92%. His current SpO2 is 93%.    LEG SWELLING:  He reports asymmetric leg swelling that varies with activity. Sometimes legs become significantly swollen after walking, while other times walking does not trigger swelling. He experienced itchy bumps on legs. Leg swelling has improved since discontinuing gabapentin 2-3 days ago. He denies significant pain associated with swelling or other systemic symptoms.    CURRENT MEDICATIONS:  He takes nifedipine 60 mg twice daily and was recently changed from losartan to olmesartan. He discontinued gabapentin.    ALLERGIES:  He has allergies to Lisinopril, Lipitor, adhesive, Crestor, Jardiance, and Metformin.      ROS:  Constitutional: -fevers, +fatigue  Respiratory: +difficulty breathing, +shortness of breath, +cough, +productive cough  Cardiovascular: +lower extremity edema  Musculoskeletal: +back pain  Integumentary: +skin lesion  Neurological: +confusion or disorientation     "   PAST MEDICAL HISTORY:  Past Medical History:   Diagnosis Date    BPH with obstruction/lower urinary tract symptoms 09/28/2017    Carotid artery occlusion     Chronic anterior uveitis 03/26/2013    Coronary artery disease     Diabetes mellitus, type 2     diet controlled    Difficult intubation     Distended bladder 06/23/2020    Dizziness     DJD (degenerative joint disease), cervical 07/10/2015    Dysuria 05/11/2018    GERD (gastroesophageal reflux disease)     Hearing loss     History of iritis 2013    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 Dose of amlodipine increased from 5 to 10 mg in the weeks prior to the incidnt.    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       SURGICAL HISTORY:  Past Surgical History:   Procedure Laterality Date    CATARACT EXTRACTION W/  INTRAOCULAR LENS IMPLANT Right 10/28/2024    Procedure: EXTRACTION, CATARACT, WITH IOL INSERTION;  Surgeon: Elva Means MD;  Location: [facility] OR;  Service: Ophthalmology;  Laterality: Right;    CATARACT EXTRACTION W/  INTRAOCULAR LENS IMPLANT Left 11/18/2024    Procedure: EXTRACTION, CATARACT, WITH IOL INSERTION;  Surgeon: Elva Means MD;  Location: [facility] OR;  Service: Ophthalmology;  Laterality: Left;    CEREBRAL ANGIOGRAM N/A 1/6/2020    Procedure:  ANGIOGRAM-CEREBRAL;  Surgeon: [facility] Diagnostic Provider;  Location: [facility] 2ND FLR;  Service: Radiology;  Laterality: N/A;  /Nagi    CERVICAL FUSION  12/30/2015    COLONOSCOPY N/A 4/7/2017    Procedure: COLONOSCOPY;  Surgeon: Handy Frederick MD;  Location: [facility] ENDO (4TH FLR);  Service: Endoscopy;  Laterality: N/A;    COLONOSCOPY N/A 11/28/2023    Procedure: COLONOSCOPY;  Surgeon: ARMAAN Hinojosa MD;  Location: [facility] ENDO (4TH FLR);  Service: Endoscopy;  Laterality: N/A;  8/21-referred by Dr. Schulte/ Diagnosis:  Blood in stool, past due on surveillance colonoscopy for advanced colon polyp villous adenoma greater than 10 mm /Prep instructions handed to patient and to portal. AS  11/21/23-Precall complete-DS    CORONARY ANGIOGRAPHY N/A 10/10/2022    Procedure: ANGIOGRAM, CORONARY ARTERY;  Surgeon: Anson Nolan MD;  Location: [facility] CATH LAB;  Service: Cardiology;  Laterality: N/A;    ENDOSCOPIC ULTRASOUND OF UPPER GASTROINTESTINAL TRACT N/A 8/22/2023    Procedure: ULTRASOUND, UPPER GI TRACT, ENDOSCOPIC;  Surgeon: Joe Means MD;  Location: [facility] ENDO (2ND FLR);  Service: Endoscopy;  Laterality: N/A;  instr portal-pt stated difficult intubation with surgery in the past-tb    FUSION OF CERVICAL SPINE BY POSTERIOR APPROACH N/A 4/24/2024    Procedure: FUSION, SPINE, CERVICAL, POSTERIOR APPROACH C3-6 DEPUY  [facility] WEIGHTS, FREDIS 4 POST SNS: MOTORS/SSEP/EMG;  Surgeon: Edd Burnette MD;  Location: [facility];  Service: Orthopedics;  Laterality: N/A;    FUSION OF CERVICAL SPINE BY POSTERIOR APPROACH N/A 7/26/2024    Procedure: FUSION, SPINE, CERVICAL, POSTERIOR APPROACH C3-T1 REVISION;  Surgeon: Edd Burnette MD;  Location: [facility] 2ND FLR;  Service: Orthopedics;  Laterality: N/A;    HERNIA REPAIR      PROSTATECTOMY         MEDS:  Medcard reviewed and updated    ALLERGIES: Allergy Card reviewed and updated    SOCIAL HISTORY:   The patient is a nonsmoker.    PE:   APPEARANCE: Well nourished, well  developed, in no acute distress.    CHEST: Lungs clear to auscultation with unlabored respirations.  CARDIOVASCULAR: Normal S1, S2. No murmurs. No carotid bruits. No pedal edema.  ABDOMEN: Bowel sounds normal. Not distended. Soft. No tenderness or masses.   PSYCHIATRIC: The patient is oriented to person, place, and time and has a pleasant affect.        ASSESSMENT/PLAN:  Hi was seen today for follow-up and leg swelling.    Diagnoses and all orders for this visit:    Essential hypertension  -     blood pressure is controlled, continue current therapy    Pulmonary nodule  -     CT Chest Without Contrast; Future  -     Ambulatory referral/consult to Pulmonology; Future    Pneumonia due to infectious organism, unspecified laterality, unspecified part of lung  -     stable, complete antibiotics as prescribed    Pulmonary emphysema, unspecified emphysema type  -     Ambulatory referral/consult to Pulmonology; Future    Edema, unspecified type  -     possibly medication side effects  -      recommend compression stockings      IMPRESSION:  - Assessed new 1.7 cm part-solid nodule in right lung apex, likely related to recent pneumonia.  - Lower extremity swelling likely medication-related (nifedipine, gabapentin, possibly olmesartan), with noted improvement after patient self-discontinued gabapentin.  - SpO2 currently adequate at 93%, but fluctuating and symptomatic.  - Considered need for oxygen therapy based on symptoms and fluctuating saturation levels.  - Noted slightly elevated TSH, awaiting repeat test results.  - Continued current medication regimen without changes.    PNEUMONIA:  - Hi experienced progressive worsening of breathing, culminating in severe difficulty on Saturday, with some improvement since starting antibiotics.  - Currently coughing up mucus without fever.  - Chest XR showed clear lungs, but CT revealed a 1.7 cm patch of ground glass and semi-solid opacity in the right lung apex.  -  Differential diagnoses include pneumonia, pneumonitis, or a mass.  - Will continue antibiotics and schedule a follow-up non-contrast CT chest in 3 months to monitor the lung opacity, with pulmonary referral for further evaluation.    ACUTE RESPIRATORY FAILURE WITH HYPOXIA:  - Hi's oxygen levels have been fluctuating, dropping as low as 82% in the hospital, though today's measurement of 93% is acceptable.  - Explained that oxygen therapy is typically initiated when saturation falls below 88%.  - Hi experiences shortness of breath and oxygen fluctuations, especially at night or with activity.  - Potential causes include decreased lung surface area in emphysema and airway constriction in asthma.  - Instructed patient to continue monitoring oxygen levels with pulse oximeter, particularly when symptomatic.  - Referred to pulmonology to evaluate oxygenation issues and potential need for oxygen therapy, emphasizing the importance of keeping this appointment regardless of CT results.    EMPHYSEMA:  - Discussed decreased lung surface area in emphysema as a potential cause of oxygen level fluctuations, contributing to respiratory symptoms.    EDEMA:  - Hi reports leg swelling, sometimes severe, associated with itching.  - Noted improvement after discontinuing gabapentin 3 days ago.  - Swelling possibly related to nifedipine or gabapentin, both known to cause edema.  - Will monitor swelling with no medication changes recommended at this time.  - Advised patient to wear compression socks to manage lower extremity swelling.    FOLLOW-UP:  - Follow up with previously scheduled appointment, including labs (thyroid function test).     This note was generated with the assistance of ambient listening technology. Verbal consent was obtained by the patient and accompanying visitor(s) for the recording of patient appointment to facilitate this note. I attest to having reviewed and edited the generated note for accuracy,  though some syntax or spelling errors may persist. Please contact the author of this note for any clarification.

## 2025-07-08 NOTE — TELEPHONE ENCOUNTER
No care due was identified.  [facility] Embedded Care Due Messages. Reference number: [id number].   7/08/2025 12:12:14 AM CDT

## 2025-07-09 ENCOUNTER — OFFICE VISIT (OUTPATIENT)
Dept: PULMONOLOGY | Facility: CLINIC | Age: 65
End: 2025-07-09
Payer: MEDICARE

## 2025-07-09 VITALS
DIASTOLIC BLOOD PRESSURE: 62 MMHG | OXYGEN SATURATION: 94 % | HEART RATE: 72 BPM | BODY MASS INDEX: 24.78 KG/M2 | WEIGHT: 203.5 LBS | HEIGHT: 76 IN | SYSTOLIC BLOOD PRESSURE: 110 MMHG

## 2025-07-09 DIAGNOSIS — R91.1 PULMONARY NODULE: ICD-10-CM

## 2025-07-09 DIAGNOSIS — J18.1 LOBAR PNEUMONIA: Primary | ICD-10-CM

## 2025-07-09 DIAGNOSIS — J43.9 PULMONARY EMPHYSEMA, UNSPECIFIED EMPHYSEMA TYPE: ICD-10-CM

## 2025-07-09 DIAGNOSIS — R09.1 PLEURISY: ICD-10-CM

## 2025-07-09 PROCEDURE — 3074F SYST BP LT 130 MM HG: CPT | Mod: CPTII,S$GLB,, | Performed by: INTERNAL MEDICINE

## 2025-07-09 PROCEDURE — 1159F MED LIST DOCD IN RCRD: CPT | Mod: CPTII,S$GLB,, | Performed by: INTERNAL MEDICINE

## 2025-07-09 PROCEDURE — 3044F HG A1C LEVEL LT 7.0%: CPT | Mod: CPTII,S$GLB,, | Performed by: INTERNAL MEDICINE

## 2025-07-09 PROCEDURE — 3078F DIAST BP <80 MM HG: CPT | Mod: CPTII,S$GLB,, | Performed by: INTERNAL MEDICINE

## 2025-07-09 PROCEDURE — 99204 OFFICE O/P NEW MOD 45 MIN: CPT | Mod: S$GLB,,, | Performed by: INTERNAL MEDICINE

## 2025-07-09 PROCEDURE — 1101F PT FALLS ASSESS-DOCD LE1/YR: CPT | Mod: CPTII,S$GLB,, | Performed by: INTERNAL MEDICINE

## 2025-07-09 PROCEDURE — 3288F FALL RISK ASSESSMENT DOCD: CPT | Mod: CPTII,S$GLB,, | Performed by: INTERNAL MEDICINE

## 2025-07-09 PROCEDURE — 3008F BODY MASS INDEX DOCD: CPT | Mod: CPTII,S$GLB,, | Performed by: INTERNAL MEDICINE

## 2025-07-09 PROCEDURE — 99999 PR PBB SHADOW E&M-EST. PATIENT-LVL V: CPT | Mod: PBBFAC,,, | Performed by: INTERNAL MEDICINE

## 2025-07-09 PROCEDURE — 1160F RVW MEDS BY RX/DR IN RCRD: CPT | Mod: CPTII,S$GLB,, | Performed by: INTERNAL MEDICINE

## 2025-07-09 PROCEDURE — 4010F ACE/ARB THERAPY RXD/TAKEN: CPT | Mod: CPTII,S$GLB,, | Performed by: INTERNAL MEDICINE

## 2025-07-09 RX ORDER — PREDNISONE 20 MG/1
40 TABLET ORAL DAILY
Qty: 10 TABLET | Refills: 0 | Status: SHIPPED | OUTPATIENT
Start: 2025-07-09 | End: 2025-07-09

## 2025-07-09 RX ORDER — PREDNISONE 20 MG/1
40 TABLET ORAL DAILY
Qty: 10 TABLET | Refills: 0 | Status: SHIPPED | OUTPATIENT
Start: 2025-07-09 | End: 2025-07-14

## 2025-07-09 RX ORDER — AZITHROMYCIN 250 MG/1
250 TABLET, FILM COATED ORAL DAILY
Qty: 6 TABLET | Refills: 6 | Status: SHIPPED | OUTPATIENT
Start: 2025-07-09

## 2025-07-09 RX ORDER — AZITHROMYCIN 250 MG/1
250 TABLET, FILM COATED ORAL DAILY
Qty: 6 TABLET | Refills: 6 | Status: SHIPPED | OUTPATIENT
Start: 2025-07-09 | End: 2025-07-09

## 2025-07-09 NOTE — PROGRESS NOTES
Subjective:       Patient ID: Hi Braxton is a 65 y.o. male.        CHIEF COMPLAINT:  Patient presents for a pulmonary consultation regarding a new pulmonary nodule discovered on a recent CT, following an emergency room visit for pneumonia.    HPI:  Patient reports a gradual onset of breathing difficulties, culminating in an emergency room visit. He had cough, mild fever, and progressive shortness of breath. The breathing issues worsened to the point where he had difficulty walking in a store, needing to stop frequently due to breathlessness. After returning home, he felt exhausted and had severe breathing difficulties when attempting to lie down, prompting him to seek medical attention.    He describes chest pain localized to the right side and back, which he attributed to his lungs. The pain was particularly severe when taking deep breaths, consistent with pleurisy. He reports coughing up phlegm, though not in large amounts. He also had night sweats and felt unusually cold, requiring multiple blankets, which he attributes to the pneumonia. These symptoms have improved somewhat, but he still feels colder than usual.    He was diagnosed with pneumonia in the emergency room and prescribed doxycycline, which he has been taking as directed. He reports some improvement in his condition but is not back to his baseline, still having shortness of breath when walking short distances.    He has been using his prescribed inhalers, Wixela and albuterol, but reports that the albuterol did not help during this illness. He continues to have pain in his back when taking deep breaths and still feels some discomfort, though it has improved from the initial presentation.    He reports difficulty sleeping for the past 2 weeks due to his illness, waking up nightly around 11 PM or midnight and being unable to fall back asleep or get comfortable for the rest of the night.    He denies wheezing.    MEDICATIONS:  Patient is on Wixela  for COPD/emphysema, Albuterol as needed for breathing difficulties, and Doxycycline for recent pneumonia.    MEDICAL HISTORY:  Patient has a history of emphysema, heart disease, non-obstructive cardiomyopathy, and COPD.    FAMILY HISTORY:  Family history is significant for heart disease running in the family.        SOCIAL HISTORY:  Smoking: Quit 14 years ago      ROS:  General: +fever, +chills  Cardiovascular: +chest pain  Respiratory: +cough, +difficulty breathing, +exertional dyspnea, +pain with respiration, +productive cough  Musculoskeletal: +back pain  Neurological: +difficulty staying asleep  Psychiatric: +sleep difficulty        Review of Systems   All other systems reviewed and are negative.      Objective:      Physical Exam   Constitutional: He is oriented to person, place, and time. He appears well-developed and well-nourished.   HENT:   Head: Normocephalic.   Cardiovascular: Normal rate and regular rhythm.   Pulmonary/Chest: Normal expansion, symmetric chest wall expansion, effort normal and breath sounds normal. He has no wheezes. He has no rales. He exhibits no tenderness.   Musculoskeletal:         General: No edema. Normal range of motion.      Cervical back: Normal range of motion and neck supple.   Neurological: He is alert and oriented to person, place, and time.   Psychiatric: He has a normal mood and affect.     Personal Diagnostic Review  Physical Exam    TEST RESULTS:  A spirometry test was conducted on April 16th, 2025, showing normal results. The FEV1 over FVC was 78, FEV1 was 3.46, 101% of predicted, and total lung capacity was normal at 6.79 L, 81% predicted. The diffusion capacity was borderline decreased at 71 percent predicted. A stress test performed in May (year not specified) yielded normal results.          IMAGING:  A CT of the Chest, Abdomen, and Pelvis in July (year not specified) revealed a new patch of ground glass semi solidification in the apical aspect of the right upper  "lobe. General emphysema was present at the lung apices, with no other abnormalities of the chest noted. Another CT of the same areas on March 25th, 2025, showed no abnormalities.        7/9/2025     3:02 PM 7/8/2025     9:55 AM 7/5/2025    11:00 PM 7/5/2025    10:51 PM 7/5/2025    10:00 PM 7/5/2025     9:00 PM 7/5/2025     8:08 PM   Pulmonary Function Tests   SpO2 94 % 93 % 95 % 97 % 96 % 93 % 95 %   Height 6' 4" (1.93 m) 6' 4" (1.93 m)        Weight 92.3 kg (203 lb 7.8 oz) 87 kg (191 lb 12.8 oz)        BMI (Calculated) 24.8 23.4              Assessment:       1. Lobar pneumonia    2. Pulmonary nodule    3. Pulmonary emphysema, unspecified emphysema type    4. Pleurisy        Encounter Medications[1]  No orders of the defined types were placed in this encounter.    Plan:     Problem List Items Addressed This Visit       Pulmonary emphysema, unspecified emphysema type    Overview   Wixela daily for control  Albuterol for rescue and prevention.   Emphysema seen on CT.  Spirometry is normal.           Other Visit Diagnoses         Lobar pneumonia    -  Primary    Relevant Medications    predniSONE (DELTASONE) 20 MG tablet    azithromycin (Z-LULY) 250 MG tablet      Pulmonary nodule          Pleurisy        Relevant Medications    predniSONE (DELTASONE) 20 MG tablet          Assessment & Plan    J43.9 Pulmonary emphysema, unspecified emphysema type  R91.1 Pulmonary nodule  J18.1 Lobar pneumonia  R09.1 Pleurisy    IMPRESSION:  - Assessed recent pneumonia and pleurisy, noting improvement but persistent symptoms.  - Interpreted CT Right Upper Lobe showing new ground glass opacity, consistent with recent pneumonia.  - Compared current CT with March 2025 scan to confirm new nature of opacity.  - Considered possibility of COPD exacerbation triggered by recent illness.  - Evaluated for potential cardiac involvement, noting history of heart disease but recent normal stress test.    PULMONARY EMPHYSEMA, UNSPECIFIED EMPHYSEMA " TYPE:  - Recommend annual CTs for lung monitoring in October.    PULMONARY NODULE:  - Clarified that new opacity is likely due to pneumonia rather than cancer, given rapid onset and associated symptoms.  - Explained CT findings, showing patient comparison between March and July scans.    LOBAR PNEUMONIA:  - Discussed nature of pneumonia and pleurisy, emphasizing expected gradual improvement.  - Clarified that new opacity is likely due to pneumonia rather than cancer, given rapid onset and associated symptoms.  - Started azithromycin (Z-Arnoldo) for additional antibiotic coverage and anti-inflammatory effects.  - Ordered follow-up CT for October 8th, 2025 to ensure resolution of pneumonia.  - Follow up with message in 2-3 weeks if symptoms do not completely resolve.    PLEURISY:  - Discussed nature of pneumonia and pleurisy, emphasizing expected gradual improvement.  - Started prednisone for 5 days to address pleurisy and inflammation, instructed to take in morning.  - Follow up with message in 2-3 weeks if symptoms do not completely resolve.    PLAN SUMMARY:  - Ordered follow-up CT for October 8th, 2025 to ensure resolution of pneumonia  - Started azithromycin (Z-Arnoldo) for additional antibiotic coverage and anti-inflammatory effects  - Started prednisone for 5 days to address pleurisy and inflammation, to be taken in morning  - Follow up with message in 2-3 weeks if symptoms do not completely resolve        Recommend yearly CT for lung cancer screening.    This note was generated with the assistance of ambient listening technology. Verbal consent was obtained by the patient and accompanying visitor(s) for the recording of patient appointment to facilitate this note. I attest to having reviewed and edited the generated note for accuracy, though some syntax or spelling errors may persist. Please contact the author of this note for any clarification.              [1]   Outpatient Encounter Medications as of 7/9/2025    Medication Sig Dispense Refill    albuterol (PROVENTIL/VENTOLIN HFA) 90 mcg/actuation inhaler Inhale 1-2 puffs into the lungs every 6 (six) hours as needed for Wheezing. 54 g 0    ALPRAZolam (XANAX) 0.5 MG tablet TAKE 1 TABLET THREE TIMES DAILY AS NEEDED FOR ANXIETY 90 tablet 3    aspirin/acetaminophen/caffeine (EXCEDRIN MIGRAINE ORAL) Take 1 tablet by mouth daily as needed (Pain).      ciclopirox (PENLAC) 8 % Soln Apply topically nightly. 6.6 mL 11    CONTOUR NEXT TEST STRIPS Strp USE TO TEST BLOOD SUGAR ONCE DAILY 25 strip 6    cyclobenzaprine (FLEXERIL) 10 MG tablet Take 1 tablet 3 times per day x2 days followed by 1 tablet in the evening as needed for muscle spasm and pain 30 tablet 0    docusate sodium (COLACE) 100 MG capsule Take 1 capsule (100 mg total) by mouth 2 (two) times daily as needed for Constipation. 30 capsule 0    doxycycline (VIBRAMYCIN) 100 MG Cap Take 1 capsule (100 mg total) by mouth 2 (two) times daily. for 5 days 9 capsule 0    DULCOLAX, BISACODYL, ORAL Take 1 tablet by mouth once daily.      evolocumab (REPATHA SURECLICK) 140 mg/mL PnIj Inject 1 mL (140 mg total) into the skin every 14 (fourteen) days. 2 mL 11    ezetimibe (ZETIA) 10 mg tablet Take 10 mg by mouth once daily.      fluticasone-salmeterol diskus inhaler 100-50 mcg Inhale 1 puff into the lungs 2 (two) times daily. Controller 180 each 0    LANTUS SOLOSTAR U-100 INSULIN 100 unit/mL (3 mL) InPn pen INJECT 25 UNITS INTO THE SKIN EVERY EVENING. 30 mL 1    levothyroxine (SYNTHROID) 25 MCG tablet Take 1 tablet (25 mcg total) by mouth before breakfast. 90 tablet 1    LIDOcaine-prilocaine (EMLA) cream Apply topically as needed. 30 g 3    metoprolol succinate (TOPROL-XL) 50 MG 24 hr tablet TAKE 1 AND 1/2 TABLETS ONE TIME DAILY 135 tablet 3    MICROLET LANCET Misc 1 lancet by Misc.(Non-Drug; Combo Route) route once daily. Test blood glucose once daily as instructed. 25 each 11    multivit-min/folic/vit K/lycop (MEN'S MULTIVITAMIN ORAL)  "Take 1 tablet by mouth once daily.      naproxen (NAPROSYN) 500 MG tablet Take 1 tablet (500 mg total) by mouth 2 (two) times daily with meals. 180 tablet 1    NIFEdipine (PROCARDIA-XL) 60 MG (OSM) 24 hr tablet TAKE 1 TABLET TWICE DAILY 180 tablet 3    nitroGLYCERIN (NITROSTAT) 0.3 MG SL tablet Place 1 tablet (0.3 mg total) under the tongue every 5 (five) minutes as needed for Chest pain. 100 tablet 3    ondansetron (ZOFRAN) 4 MG tablet Take 1 tablet (4 mg total) by mouth every 6 (six) hours as needed for Nausea. 30 tablet 0    pantoprazole (PROTONIX) 40 MG tablet TAKE 1 TABLET TWICE DAILY 180 tablet 3    pen needle, diabetic (BD ULTRA-FINE CHET PEN NEEDLE) 32 gauge x 5/32" Ndle USE PEN NEEDLE AS INSTRUCTION WITH LANTUS INSULIN PRE-FILLED PEN. 200 each 1    pravastatin (PRAVACHOL) 80 MG tablet TAKE 1 TABLET EVERY DAY 90 tablet 3    prednisolon/gatiflox/bromfenac (PREDNISOL ACE-GATIFLOX-BROMFEN) 1-0.5-0.075 % DrpS Apply 1 drop to eye 3 (three) times daily. in operative eye for 1 month after surgery 5 mL 3    promethazine-dextromethorphan (PROMETHAZINE-DM) 6.25-15 mg/5 mL Syrp Take 5 mLs by mouth every 4 (four) hours as needed (cough). This medication can make you feel drowsy 118 mL 0    spironolactone (ALDACTONE) 50 MG tablet TAKE 1 TABLET EVERY DAY 90 tablet 3    telmisartan (MICARDIS) 80 MG Tab Take 1 tablet (80 mg total) by mouth once daily. 90 tablet 3    azithromycin (Z-LULY) 250 MG tablet Take 1 tablet (250 mg total) by mouth once daily. 6 tablet 6    gabapentin (NEURONTIN) 300 MG capsule Take 2 capsules (600 mg total) by mouth 3 (three) times daily. 180 capsule 0    predniSONE (DELTASONE) 20 MG tablet Take 2 tablets (40 mg total) by mouth once daily. for 5 days 10 tablet 0    tadalafiL (CIALIS) 20 MG Tab Take 1 tablet (20 mg total) by mouth every 72 hours as needed (ED). 15 tablet 11    [DISCONTINUED] azithromycin (Z-LULY) 250 MG tablet Take 1 tablet (250 mg total) by mouth once daily. 6 tablet 6    " [DISCONTINUED] metoprolol succinate (TOPROL-XL) 50 MG 24 hr tablet TAKE 1 AND 1/2 TABLETS (75 MG TOTAL) ONCE DAILY. 135 tablet 3    [DISCONTINUED] pantoprazole (PROTONIX) 40 MG tablet TAKE 1 TABLET TWICE DAILY 180 tablet 3    [DISCONTINUED] prednisoLONE 5 mg (21 tabs) DsPk Use as directed STARTING 2 DAYS PRIOR TO SURGERY insert 1 drop in surgical eye three times daily AND CONTINUE FOR 30 DAYS AFTER      [DISCONTINUED] predniSONE (DELTASONE) 20 MG tablet Take 2 tablets (40 mg total) by mouth once daily. for 5 days 10 tablet 0    [DISCONTINUED] spironolactone (ALDACTONE) 50 MG tablet TAKE 1 TABLET EVERY DAY 90 tablet 3     Facility-Administered Encounter Medications as of 7/9/2025   Medication Dose Route Frequency Provider Last Rate Last Admin    midazolam injection 1 mg  1 mg Intravenous On Call Procedure Elva Means MD

## 2025-07-16 ENCOUNTER — OFFICE VISIT (OUTPATIENT)
Dept: OTOLARYNGOLOGY | Facility: CLINIC | Age: 65
End: 2025-07-16
Payer: MEDICARE

## 2025-07-16 ENCOUNTER — HOSPITAL ENCOUNTER (OUTPATIENT)
Dept: RADIOLOGY | Facility: HOSPITAL | Age: 65
Discharge: HOME | End: 2025-07-16
Attending: PHYSICIAN ASSISTANT
Payer: MEDICARE

## 2025-07-16 ENCOUNTER — PATIENT MESSAGE (OUTPATIENT)
Dept: SPEECH THERAPY | Facility: HOSPITAL | Age: 65
End: 2025-07-16
Payer: MEDICARE

## 2025-07-16 ENCOUNTER — TELEPHONE (OUTPATIENT)
Dept: SPEECH THERAPY | Facility: HOSPITAL | Age: 65
End: 2025-07-16
Payer: MEDICARE

## 2025-07-16 VITALS — HEART RATE: 83 BPM | SYSTOLIC BLOOD PRESSURE: 124 MMHG | DIASTOLIC BLOOD PRESSURE: 75 MMHG

## 2025-07-16 DIAGNOSIS — R49.0 DYSPHONIA: ICD-10-CM

## 2025-07-16 DIAGNOSIS — J38.3 VOCAL FOLD ATROPHY: ICD-10-CM

## 2025-07-16 DIAGNOSIS — H90.3 ASYMMETRIC SNHL (SENSORINEURAL HEARING LOSS): ICD-10-CM

## 2025-07-16 DIAGNOSIS — R49.0 MUSCLE TENSION DYSPHONIA: Primary | ICD-10-CM

## 2025-07-16 PROCEDURE — A9585 GADOBUTROL INJECTION: HCPCS | Performed by: PHYSICIAN ASSISTANT

## 2025-07-16 PROCEDURE — 99999 PR PBB SHADOW E&M-EST. PATIENT-LVL V: CPT | Mod: PBBFAC,,, | Performed by: OTOLARYNGOLOGY

## 2025-07-16 PROCEDURE — 70553 MRI BRAIN STEM W/O & W/DYE: CPT | Mod: 26,,, | Performed by: RADIOLOGY

## 2025-07-16 PROCEDURE — 70553 MRI BRAIN STEM W/O & W/DYE: CPT | Mod: TC

## 2025-07-16 PROCEDURE — 25500020 PHARM REV CODE 255: Performed by: PHYSICIAN ASSISTANT

## 2025-07-16 RX ORDER — GADOBUTROL 604.72 MG/ML
10 INJECTION INTRAVENOUS
Status: COMPLETED | OUTPATIENT
Start: 2025-07-16 | End: 2025-07-16

## 2025-07-16 RX ADMIN — GADOBUTROL 10 ML: 604.72 INJECTION INTRAVENOUS at 06:07

## 2025-07-16 NOTE — PROGRESS NOTES
Ear, Nose, & Throat  Otolaryngology - Head & Neck Surgery    Summary of Visit:  Hi Braxton was referred to me by Dr. Josefina Kendall in consultation for Hoarse      Subjective:     Chief Complaint:   Chief Complaint   Patient presents with    Hoarse       Hi Braxton is a 65 y.o. male who was referred to me by Dr. Josefina Kendall in consultation for dysphonia.  He has a history of ACDF surgery approximately 10 years ago.  He experienced minimal dysphonia but significant dysphagia follow in his surgery.  Last year, he underwent posterior cervical fusion surgery and then required back surgery shortly thereafter.  Since that time, he has experienced notable dysphonia.  His voice quality is harsh and worsens throughout the day with phonation.  He denies any exacerbation of his dysphagia.  He did recently have pneumonia, but he has no symptoms consistent with aspiration.    Past Medical History  Active Ambulatory Problems     Diagnosis Date Noted    Essential hypertension 07/16/2012    GERD (gastroesophageal reflux disease) 07/24/2013    Family history of premature CAD 07/24/2013    Peripheral vascular disease 10/08/2013    Subclavian steal syndrome 10/08/2013    Rapid palpitations 11/17/2014    Cervical spinal stenosis 07/10/2015    DJD (degenerative joint disease), cervical 07/10/2015    Cervical radiculopathy 07/20/2015    Pain 09/28/2015    Coronary artery disease of native artery of native heart with stable angina pectoris 01/20/2017    Anxiety disorder 01/20/2017    Chest pain 01/21/2017    Unstable angina 01/21/2017    Dysphagia 03/13/2018    Type 2 diabetes mellitus without complication, without long-term current use of insulin 05/11/2018    Thyroid nodule 08/22/2019    Abnormal magnetic resonance angiography (MRA) 01/06/2020    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    Intermittent confusion 01/20/2020    Medication overuse headache 02/05/2020    Renal cyst, right 06/15/2020    Bilateral  leg cramps     Dyslipidemia associated with type 2 diabetes mellitus 07/07/2021    Diabetes mellitus with insulin therapy 07/07/2021    Elevated PSA 09/02/2022    Pulmonary emphysema, unspecified emphysema type 02/20/2024    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 C3-T1 PCF 07/26/2024    Nuclear sclerosis of left eye 10/28/2024    Decreased ROM of left shoulder 02/18/2025     Resolved Ambulatory Problems     Diagnosis Date Noted    Other chest pain 11/01/2012    Traumatic iritis - Left Eye 02/27/2013    Chronic anterior uveitis 03/26/2013    Unspecified iridocyclitis - Left Eye 04/17/2013    Memory loss 06/21/2013    Impaired fasting glucose 10/28/2013    Bilateral inguinal hernia 11/07/2013    Trigger finger of left hand 09/11/2014    Muscle ache 01/28/2015    Lateral epicondylitis (tennis elbow) 07/20/2015    Preoperative clearance 12/21/2015    Cervical stenosis of spine 12/30/2015    Mixed hyperlipidemia 01/20/2017    Chest discomfort 01/20/2017    Rectal bleeding 04/07/2017    Lip swelling 09/05/2017    Recurrent UTI 09/28/2017    BPH with obstruction/lower urinary tract symptoms 09/28/2017    Pyelonephritis 05/11/2018    Dysuria 05/11/2018    Left-sided weakness 08/22/2019    Sensory deficit, left 08/22/2019    New daily persistent headache 01/20/2020    Suspected sleep apnea 02/05/2020    Hypokalemia 05/05/2020    Sepsis 05/05/2020    Distended bladder 06/23/2020    Incomplete bladder emptying 06/23/2020    Pterygium eye, left 03/20/2021    Hypertension associated with diabetes 07/07/2021    Dizziness     S/P TURP 09/02/2022     Past Medical History:   Diagnosis Date    Carotid artery occlusion     Coronary artery disease     Diabetes mellitus, type 2     Difficult intubation     Hearing loss     History of iritis 2013    Hyperlipidemia     Hypertension     Neuropathy        Past Surgical History  He has a past surgical history that includes Hernia repair; Cervical fusion (12/30/2015);  Colonoscopy (N/A, 4/7/2017); Prostatectomy; Cerebral angiogram (N/A, 1/6/2020); Coronary angiography (N/A, 10/10/2022); Endoscopic ultrasound of upper gastrointestinal tract (N/A, 8/22/2023); Colonoscopy (N/A, 11/28/2023); Fusion of cervical spine by posterior approach (N/A, 4/24/2024); Fusion of cervical spine by posterior approach (N/A, 7/26/2024); Cataract extraction w/  intraocular lens implant (Right, 10/28/2024); and Cataract extraction w/  intraocular lens implant (Left, 11/18/2024).    Past Surgical History:   Procedure Laterality Date    CATARACT EXTRACTION W/  INTRAOCULAR LENS IMPLANT Right 10/28/2024    Procedure: EXTRACTION, CATARACT, WITH IOL INSERTION;  Surgeon: Elva Means MD;  Location: [facility] OR;  Service: Ophthalmology;  Laterality: Right;    CATARACT EXTRACTION W/  INTRAOCULAR LENS IMPLANT Left 11/18/2024    Procedure: EXTRACTION, CATARACT, WITH IOL INSERTION;  Surgeon: Elva Means MD;  Location: [facility] OR;  Service: Ophthalmology;  Laterality: Left;    CEREBRAL ANGIOGRAM N/A 1/6/2020    Procedure: ANGIOGRAM-CEREBRAL;  Surgeon: [facility] Diagnostic Provider;  Location: [facility] OR 2ND FLR;  Service: Radiology;  Laterality: N/A;  /Nagi    CERVICAL FUSION  12/30/2015    COLONOSCOPY N/A 4/7/2017    Procedure: COLONOSCOPY;  Surgeon: Handy Frederick MD;  Location: [facility] ENDO (4TH FLR);  Service: Endoscopy;  Laterality: N/A;    COLONOSCOPY N/A 11/28/2023    Procedure: COLONOSCOPY;  Surgeon: ARMAAN Hinojosa MD;  Location: [facility] ENDO (4TH FLR);  Service: Endoscopy;  Laterality: N/A;  8/21-referred by Dr. Schulte/ Diagnosis:  Blood in stool, past due on surveillance colonoscopy for advanced colon polyp villous adenoma greater than 10 mm /Prep instructions handed to patient and to portal. AS  11/21/23-Precall complete-DS    CORONARY ANGIOGRAPHY N/A 10/10/2022    Procedure: ANGIOGRAM, CORONARY ARTERY;  Surgeon: Anson Nolan MD;  Location: [facility] CATH LAB;  Service: Cardiology;  Laterality: N/A;     ENDOSCOPIC ULTRASOUND OF UPPER GASTROINTESTINAL TRACT N/A 8/22/2023    Procedure: ULTRASOUND, UPPER GI TRACT, ENDOSCOPIC;  Surgeon: Joe Means MD;  Location: [facility] ENDO (2ND FLR);  Service: Endoscopy;  Laterality: N/A;  instr portal-pt stated difficult intubation with surgery in the past-tb    FUSION OF CERVICAL SPINE BY POSTERIOR APPROACH N/A 4/24/2024    Procedure: FUSION, SPINE, CERVICAL, POSTERIOR APPROACH C3-6 DEPUY  GW TONGS WTIH WEIGHTS, FREDIS 4 POST SNS: MOTORS/SSEP/EMG;  Surgeon: Edd Burnette MD;  Location: [facility] OR;  Service: Orthopedics;  Laterality: N/A;    FUSION OF CERVICAL SPINE BY POSTERIOR APPROACH N/A 7/26/2024    Procedure: FUSION, SPINE, CERVICAL, POSTERIOR APPROACH C3-T1 REVISION;  Surgeon: Edd Burnette MD;  Location: [facility] OR 2ND FLR;  Service: Orthopedics;  Laterality: N/A;    HERNIA REPAIR      PROSTATECTOMY          Family History  His family history includes Aneurysm in his brother; Arthritis in his brother and mother; Benign prostatic hyperplasia in his brother; Cancer in his brother and paternal aunt; Cataracts in his maternal grandmother; Dementia in his maternal aunt; Diabetes in his paternal uncle; Early death in his brother; Heart attack in his brother and brother; Heart attacks under age 50 in his father; Heart disease in his brother, brother, brother, brother, father, mother, and paternal grandmother; Hepatitis in his brother; Hyperlipidemia in his brother, brother, brother, mother, and sister; Hypertension in his brother, brother, brother, brother, brother, father, mother, and sister; Migraines in his brother; Multiple sclerosis in his daughter; No Known Problems in his maternal grandfather and paternal grandfather; Stroke in his mother and sister; Throat cancer in his brother.    Social History  He reports that he quit smoking about 13 years ago. His smoking use included cigarettes. He started smoking about 43 years ago. He has a 30 pack-year smoking history. He has  never been exposed to tobacco smoke. He has never used smokeless tobacco. He reports current alcohol use of about 3.0 standard drinks of alcohol per week. He reports that he does not use drugs.    Allergies  He is allergic to lisinopril, lipitor [atorvastatin], adhesive, crestor [rosuvastatin], jardiance [empagliflozin], and metformin.    Medications  He has a current medication list which includes the following prescription(s): albuterol, alprazolam, aspirin/acetaminophen/caffeine, azithromycin, ciclopirox, contour next test strips, cyclobenzaprine, docusate sodium, bisacodyl, repatha sureclick, ezetimibe, fluticasone-salmeterol 100-50 mcg/dose, gabapentin, lantus solostar u-100 insulin, levothyroxine, lidocaine-prilocaine, metoprolol succinate, microlet lancet, multivit-min/folic/vit k/lycop, naproxen, nifedipine, nitroglycerin, ondansetron, pantoprazole, pen needle, diabetic, pravastatin, prednisol ace-gatiflox-bromfen, promethazine-dextromethorphan, spironolactone, tadalafil, and telmisartan, and the following Facility-Administered Medications: midazolam.    ROS:  Pertinent positive and negative review of systems as noted in HPI.     Objective:     /75   Pulse 83    General Appearance:   Awake, Alert and Oriented. NAD. Appropriate affect and appearance      Neuro:   Spontaneous eye opening, appropriate verbal responses, follows commands  Pupils equal, round & brisk. EOMI, no proptosis  Face is symmetric, HB I, non-edematous bilaterally  Vision grossly intact, Hearing grossly intact     Head and Face:   skin is intact with no lesions noted.  Parotid and submandibular glands are symmetric and non-tender.      Ears:  Periauricular regions non-erythematous, non-fluctuanct non-tender  Pinna normal bilaterally, no skin lesions  EACs patent and without drainage bilaterally   Tympanic membranes are normal in appearance bilaterally.  No middle ear effusion noted bilaterally.    Nose:   External nose is symmetric,  no skin lesions  Septum midline, No inferior turbinate hypertrophy, No polyps or rhinorrhea     OC/OP:  Tongue midline on extension, non-edematous, soft  No labial, buccal, oral tongue or floor of mouth lesions  Soft palate symmetric, midline and without lesions or masses, tonsils symmetric  No masses or lesions of the visualized oropharynx     Neck:  Neck is symmetric, non-edematous, non-erythematous  Trachea is midline and easily palpable,  No palpable adenopathy or masses in levels I-VI  No thyroid nodules or masses, non-tender      Respiratory:  Normal work of breathing, no accessory muscle use, no stridor     Voice:  gravelly dysphonia but good projection     Data Review:   LABS    IMAGING        AUDIO      Procedures:     Flexible Laryngeal Videostroboscopy (30947): Laryngeal videostroboscopy is indicated to assess laryngeal vibratory biomechanics and vocal fold oscillation, which cannot be assessed with a plain light examination. This was carried out transnasally with a distal chip videoendoscope. After verbal consent was obtained, the patient was positioned and the nose was topically decongested with 1% phenylephrine and topically anesthetized with 4% lidocaine. The endoscope was passed through the most patent nasal cavity and positioned to image the nasopharynx, larynx, and hypopharynx in detail. The following features were examined: nasopharyngeal, laryngeal, hypopharyngeal masses; velopharyngeal strength, closure, and symmetry of motion; vocal fold range and symmetry of motion; laryngeal mucosal edema, erythema, inflammation, and hydration; salivary pooling; and gross laryngeal sensation. During phonation, the vocal folds were assessed for glottal closure; mucosal wave; vocal fold lesions; vibratory periodicity, amplitude, and phase symmetry; and vertical height match. The equipment was removed. The patient tolerated the procedure well without complication. All findings were normal except:  Vocal folds  are mobile bilaterally.  Mild atrophy is noted on the right with moderate atrophy on the right.  Moderate supraglottic compression is noted greater in the lateral dimension than in the AP dimension.      Assessment:     1. Muscle tension dysphonia    2. Dysphonia    3. Vocal fold atrophy        Plan:     I had a long discussion with the patient regarding his condition and the further workup and management options.  Moderate muscle tension dysphonia is noted on stroboscopy.  He also has some volume loss slightly greater on the left than the right.  We will have him meet with voice therapy for several sessions to attempt to unwanted some of his muscle tension dysphonia and then reassess the volume loss of the vocal folds and decide whether or not an augmentation would be worth his while.  Return to clinic in 2 months.    Orders Placed This Encounter   Procedures    Ambulatory Referral/Consult to Speech Therapy          Problem List Items Addressed This Visit    None  Visit Diagnoses         Hoarseness        Relevant Orders    Ambulatory Referral/Consult to Speech Therapy

## 2025-07-18 ENCOUNTER — TELEPHONE (OUTPATIENT)
Dept: OTOLARYNGOLOGY | Facility: CLINIC | Age: 65
End: 2025-07-18
Payer: MEDICARE

## 2025-07-18 DIAGNOSIS — I67.1 ANTERIOR CEREBRAL ARTERY ANEURYSM: Primary | ICD-10-CM

## 2025-07-18 NOTE — TELEPHONE ENCOUNTER
Called Patient regarding MRI IAC completed 7/17/2025.     Discussed with patient that MRI revealed inner ear structures appear within normal limits and no evidence of vestibular schwannoma.  Notified patient of incidental finding of distal left JOANNE (anterior cerebral artery) aneurysm that was flagged on prior imaging. It does not appear to have changed, but recommended referral to neurology or neurosurgery for monitoring and additional imaging if needed. Patient was aware of this finding from previous imaging studies, but has not been followed or monitored for it. Order neurosurgery referral. All patient's questions were answered.

## 2025-07-23 ENCOUNTER — TELEPHONE (OUTPATIENT)
Dept: NEUROSURGERY | Facility: CLINIC | Age: 65
End: 2025-07-23
Payer: MEDICARE

## 2025-07-23 NOTE — TELEPHONE ENCOUNTER
Fausto Lyn MA P Fennell Vernard S Staff  Hey! Pt is being referred for an aneurysm and is requesting a late appt if possible. Please reach out to patient for scheduling. Thanks!          Previous Messages       ----- Message -----  From: Tali Scott  Sent: 7/21/2025   4:54 PM CDT  To: Alexa BHAKTA Staff  Subject: appt assistance                                  Good Afternoon. Patient is asking to speak with someone in the office to schedule. Patient is asking if he can be seen at 4pm on any day with Dr. Liu, if possible. [facility] available [facility]. Please call to assist with scheduling. Than

## 2025-07-24 DIAGNOSIS — J43.9 PULMONARY EMPHYSEMA, UNSPECIFIED EMPHYSEMA TYPE: ICD-10-CM

## 2025-07-24 RX ORDER — ALBUTEROL SULFATE 90 UG/1
INHALANT RESPIRATORY (INHALATION)
Qty: 54 G | Refills: 1 | Status: SHIPPED | OUTPATIENT
Start: 2025-07-24

## 2025-07-24 NOTE — TELEPHONE ENCOUNTER
No care due was identified.  [facility] Embedded Care Due Messages. Reference number: [id number].   7/24/2025 10:30:54 AM CDT

## 2025-07-25 NOTE — TELEPHONE ENCOUNTER
Refill Routing Note   Medication(s) are not appropriate for processing by [facility] for the following reason(s):        New or recently adjusted medication    [facility] action(s):  Defer               Appointments  past 12m or future 3m with PCP    Date Provider   Last Visit   7/8/2025 Josefina Kendall MD   Next Visit   9/9/2025 Josefina Kendall MD   ED visits in past 90 days: 1        Note composed:7:11 PM 07/24/2025

## 2025-07-27 DIAGNOSIS — J43.9 PULMONARY EMPHYSEMA, UNSPECIFIED EMPHYSEMA TYPE: ICD-10-CM

## 2025-07-27 NOTE — TELEPHONE ENCOUNTER
No care due was identified.  [facility] Embedded Care Due Messages. Reference number: [id number].   7/27/2025 10:38:21 AM CDT

## 2025-07-28 RX ORDER — FLUTICASONE PROPIONATE AND SALMETEROL 100; 50 UG/1; UG/1
POWDER RESPIRATORY (INHALATION)
Qty: 180 EACH | Refills: 3 | Status: SHIPPED | OUTPATIENT
Start: 2025-07-28

## 2025-07-28 RX ORDER — LEVOTHYROXINE SODIUM 25 UG/1
25 TABLET ORAL
Qty: 100 TABLET | Refills: 3 | Status: SHIPPED | OUTPATIENT
Start: 2025-07-28

## 2025-07-28 NOTE — TELEPHONE ENCOUNTER
Refill Routing Note   Medication(s) are not appropriate for processing by [facility] for the following reason(s):        New or recently adjusted medication  Drug-disease interaction    [facility] action(s):  Defer        Medication Therapy Plan: fluticasone-salmeterol 100-50 mcg/dose and Unstable angina; Coronary artery disease of native artery of native heart with stable angina pectoris      Appointments  past 12m or future 3m with PCP    Date Provider   Last Visit   7/8/2025 Josefina Kendall MD   Next Visit   9/9/2025 Josefina Kendall MD   ED visits in past 90 days: 1        Note composed:12:02 PM 07/28/2025

## 2025-07-29 ENCOUNTER — CLINICAL SUPPORT (OUTPATIENT)
Dept: SPEECH THERAPY | Facility: HOSPITAL | Age: 65
End: 2025-07-29
Attending: OTOLARYNGOLOGY
Payer: MEDICARE

## 2025-07-29 DIAGNOSIS — R49.0 DYSPHONIA: Primary | ICD-10-CM

## 2025-07-29 DIAGNOSIS — J38.3 VOCAL FOLD ATROPHY: ICD-10-CM

## 2025-07-29 PROCEDURE — 92524 BEHAVRAL QUALIT ANALYS VOICE: CPT | Mod: GN,HCNC

## 2025-07-29 NOTE — PROGRESS NOTES
"Referring provider: Dr. Margarito Hernadez  Reason for visit: Behavioral and qualitative analysis of voice and resonance (CPT 54093)    Subjective / History    Hi Braxton is a 65 y.o. male referred for voice evaluation (CPT 05964) by Dr. Hernadez. He presents with complaints of hoarseness following a posterior cervical neck fusion and a subsequent revision for hardware failure on 24 and 24 respectively. The patient also reported the following complaints: "heavy voice", voice worse in afternoon/evening, fatigue with use, changes in modal pitch, difficulty with singing, and reduced volume. He states that his voice "goes in and out" and becomes more rough and raspy the longer he talks. He also reports that he feels he needs to speak louder in order to be understood, but when he attempts to he feels like he is straining. 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 He also reports a hx of singing gospel for about 20 years and worked as a . He denies any formal voice training, but notes that this cousin is a . Additionally, he reports a hx of emphysema, which he states affects the power of his voice. He reports that he has reduced feeling on the R posterior neck.      Swallowing: reports difficulty swallowing after ACDF 10 years ago c/b choking on meats/solids, but that slowly resolved with use of compensatory strategies    Breathing: no issues other than emphysema w/ episodes of SOB    Smokin packs/day, quit about 15 years ago  Caffeine: 0-1 cups/day   Reflux: yes, takes pantoprazole which alleviates   Water: 70-80 oz/day     Stroboscopy findings (per Dr. Hernadez on 25)  -Vocal folds are mobile bilaterally.    -Mild atrophy is noted on the right with moderate atrophy on the right.    -Moderate supraglottic compression is noted greater in the lateral " dimension than in the AP dimension.      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 Dose of amlodipine increased from 5 to 10 mg in the weeks prior to the incidnt.    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     Medications Ordered Prior to Encounter[1]    Objective    Perceptual/behavioral assessment  -CAPE-V Overall Score: 35  -Quality: rough, strained, and farmer/pulse mode phonation  -Volume: appropriate for age and gender  -Pitch: low F0  -Flexibility: appropriate for age and gender norms  -Habitual respiratory pattern: diaphragmatic    VHI-10 (completed to assess self-perceived handicap associated with dysphonia; >11 considered abnormal): 16     Education / Stimulability Trials  Discussed importance of vocal hygiene including: hydration and  "conservation. Patient was stimulable for improved voice using SOVT exercises. He experienced success achieving a clearer voice after 2 trials of straw phonation, and reported that the clearer voice was less rough, christiano, graspy and less strained; more smooth. Also trialed straw in water with equivalent success. Trained an awareness of forward vibration with a sustained /z/ sound. Then trialed straw phonation and straw in water in the singing context with the tune "Happy Birthday" and he experienced success after 1 trial. He was able to maintain the clearer voice for a 5 min conversation and demonstrated several unprompted self-corrections. He demonstrated excellent awareness of his voicing behavior and was able to discriminate between a clearer voice and a rougher voice with 70% accuracy. Encouraged practicing exercises several times daily in isolation and into short phrases to solidify muscle memory patterns and reduce extralaryngeal tension during speech tasks. He was amenable to all suggestions.     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 Hernadez. Prognosis for continued improvement is good.     Recommendations / POC    -Recommend 3-5 sessions of voice therapy over 6-10 weeks with a speech-language pathologist to optimize glottal postures for improved vocal function, vocal efficiency, and ease of phonation  -Continue exercises as discussed in session  -Contact clinician with any further questions     Functional goals  Length Status Goal   Long term Initiated  Patient will implement and adhere to vocal hygiene protocols on a daily basis, including the elimination of phonotraumatic behaviors.    Long term Initiated  Patient and clinician will facilitate changes in vocal function in order to restore functional use of voice for daily occupational, social, and emotional demands.    Long term Initiated  Patient " will re-establish phonation with adequate balance of airflow and resonance with decreased muscle tension.    Long term Initiated   Patient will improve coordination of respiration and phonation for efficient vocal production at a conversational level.    Long term Initiated Patient will increase the energy used to produce voice, resulting in an increase in volume and clarity of speech.    Short term Initiated  Patient will complete SOVT exercises and/or resonant-focused exercises 3-5x daily to strengthen and balance the intrinsic laryngeal musculature and maximize glottic closure without medial hyperfunction.   Short term Initiated  Patient will improve the quality, efficiency, and ease of phonation through resonant and/or airflow exercises from the syllable to conversation level with 80% accuracy.   Short term Initiated  Patient will discriminate between easy and strained phonation with 80% accuracy.    Short term Initiated  Patient will demonstrate the ability to increase awareness of voicing behavior through self-monitoring to facilitate generalization in functional speaking situations with 80% accuracy.    Short term Initiated   Patient will use an energized voice with clear speech in 80% of spontaneous conversations.      Fausto Segura MA, CCC-SLP       [1]   Current Outpatient Medications on File Prior to Visit   Medication Sig Dispense Refill    albuterol (PROVENTIL/VENTOLIN HFA) 90 mcg/actuation inhaler INHALE 1 TO 2 PUFFS EVERY 6 HOURS AS NEEDED FOR WHEEZING. 54 g 1    ALPRAZolam (XANAX) 0.5 MG tablet TAKE 1 TABLET THREE TIMES DAILY AS NEEDED FOR ANXIETY 90 tablet 3    aspirin/acetaminophen/caffeine (EXCEDRIN MIGRAINE ORAL) Take 1 tablet by mouth daily as needed (Pain).      azithromycin (Z-LULY) 250 MG tablet Take 1 tablet (250 mg total) by mouth once daily. 6 tablet 6    ciclopirox (PENLAC) 8 % Soln Apply topically nightly. 6.6 mL 11    CONTOUR NEXT TEST STRIPS Strp USE TO TEST BLOOD SUGAR ONCE DAILY 25  strip 6    cyclobenzaprine (FLEXERIL) 10 MG tablet Take 1 tablet 3 times per day x2 days followed by 1 tablet in the evening as needed for muscle spasm and pain 30 tablet 0    docusate sodium (COLACE) 100 MG capsule Take 1 capsule (100 mg total) by mouth 2 (two) times daily as needed for Constipation. 30 capsule 0    DULCOLAX, BISACODYL, ORAL Take 1 tablet by mouth once daily.      evolocumab (REPATHA SURECLICK) 140 mg/mL PnIj Inject 1 mL (140 mg total) into the skin every 14 (fourteen) days. 2 mL 11    ezetimibe (ZETIA) 10 mg tablet Take 10 mg by mouth once daily.      fluticasone-salmeterol diskus inhaler 100-50 mcg INHALE 1 PUFF TWICE DAILY (CONTROLLER) 180 each 3    gabapentin (NEURONTIN) 300 MG capsule Take 2 capsules (600 mg total) by mouth 3 (three) times daily. 180 capsule 0    LANTUS SOLOSTAR U-100 INSULIN 100 unit/mL (3 mL) InPn pen INJECT 25 UNITS INTO THE SKIN EVERY EVENING. 30 mL 1    levothyroxine (SYNTHROID) 25 MCG tablet TAKE 1 TABLET BEFORE BREAKFAST 100 tablet 3    LIDOcaine-prilocaine (EMLA) cream Apply topically as needed. 30 g 3    metoprolol succinate (TOPROL-XL) 50 MG 24 hr tablet TAKE 1 AND 1/2 TABLETS ONE TIME DAILY 135 tablet 3    MICROLET LANCET Misc 1 lancet by Misc.(Non-Drug; Combo Route) route once daily. Test blood glucose once daily as instructed. 25 each 11    multivit-min/folic/vit K/lycop (MEN'S MULTIVITAMIN ORAL) Take 1 tablet by mouth once daily.      naproxen (NAPROSYN) 500 MG tablet Take 1 tablet (500 mg total) by mouth 2 (two) times daily with meals. 180 tablet 1    NIFEdipine (PROCARDIA-XL) 60 MG (OSM) 24 hr tablet TAKE 1 TABLET TWICE DAILY 180 tablet 3    nitroGLYCERIN (NITROSTAT) 0.3 MG SL tablet Place 1 tablet (0.3 mg total) under the tongue every 5 (five) minutes as needed for Chest pain. 100 tablet 3    ondansetron (ZOFRAN) 4 MG tablet Take 1 tablet (4 mg total) by mouth every 6 (six) hours as needed for Nausea. 30 tablet 0    pantoprazole (PROTONIX) 40 MG tablet TAKE 1  "TABLET TWICE DAILY 180 tablet 3    pen needle, diabetic (BD ULTRA-FINE CHET PEN NEEDLE) 32 gauge x 5/32" Ndle USE PEN NEEDLE AS INSTRUCTION WITH LANTUS INSULIN PRE-FILLED PEN. 200 each 1    pravastatin (PRAVACHOL) 80 MG tablet TAKE 1 TABLET EVERY DAY 90 tablet 3    prednisolon/gatiflox/bromfenac (PREDNISOL ACE-GATIFLOX-BROMFEN) 1-0.5-0.075 % DrpS Apply 1 drop to eye 3 (three) times daily. in operative eye for 1 month after surgery 5 mL 3    promethazine-dextromethorphan (PROMETHAZINE-DM) 6.25-15 mg/5 mL Syrp Take 5 mLs by mouth every 4 (four) hours as needed (cough). This medication can make you feel drowsy 118 mL 0    spironolactone (ALDACTONE) 50 MG tablet TAKE 1 TABLET EVERY DAY 90 tablet 3    tadalafiL (CIALIS) 20 MG Tab Take 1 tablet (20 mg total) by mouth every 72 hours as needed (ED). 15 tablet 11    telmisartan (MICARDIS) 80 MG Tab Take 1 tablet (80 mg total) by mouth once daily. 90 tablet 3     Current Facility-Administered Medications on File Prior to Visit   Medication Dose Route Frequency Provider Last Rate Last Admin    midazolam injection 1 mg  1 mg Intravenous On Call Procedure Elva Means MD         "

## 2025-08-06 ENCOUNTER — PATIENT MESSAGE (OUTPATIENT)
Dept: NEUROSURGERY | Facility: CLINIC | Age: 65
End: 2025-08-06
Payer: MEDICARE

## 2025-08-06 NOTE — PLAN OF CARE
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 Hernadez. Prognosis for continued improvement is good.     Recommendations / POC    -Recommend 3-5 sessions of voice therapy over 6-10 weeks with a speech-language pathologist to optimize glottal postures for improved vocal function, vocal efficiency, and ease of phonation  -Continue exercises as discussed in session  -Contact clinician with any further questions     Functional goals  Length Status Goal   Long term Initiated  Patient will implement and adhere to vocal hygiene protocols on a daily basis, including the elimination of phonotraumatic behaviors.    Long term Initiated  Patient and clinician will facilitate changes in vocal function in order to restore functional use of voice for daily occupational, social, and emotional demands.    Long term Initiated  Patient will re-establish phonation with adequate balance of airflow and resonance with decreased muscle tension.    Long term Initiated   Patient will improve coordination of respiration and phonation for efficient vocal production at a conversational level.    Long term Initiated Patient will increase the energy used to produce voice, resulting in an increase in volume and clarity of speech.    Short term Initiated  Patient will complete SOVT exercises and/or resonant-focused exercises 3-5x daily to strengthen and balance the intrinsic laryngeal musculature and maximize glottic closure without medial hyperfunction.   Short term Initiated  Patient will improve the quality, efficiency, and ease of phonation through resonant and/or airflow exercises from the syllable to conversation level with 80% accuracy.   Short term Initiated  Patient will discriminate between easy and strained phonation with 80% accuracy.    Short term Initiated  Patient will demonstrate the ability to increase awareness of voicing  behavior through self-monitoring to facilitate generalization in functional speaking situations with 80% accuracy.    Short term Initiated   Patient will use an energized voice with clear speech in 80% of spontaneous conversations.      [FreeTextEntry1] : lost 38 lbs since last year, has been more active and eating healthier Patient is doing well  Sees neuro for migraines No chest pains or difficulty of breathing reported No reactions to previous vaccinations. Denies depression or psychiatric issues. No mental health issues, not in counseling anymore, says he does not need it No recent suicidal ideations No new allergies reported Sexually active, does not wear condoms Denies tobacco or vaping, uses MJ daily and ETOH occasionally No tobacco exposure Sleeping well with good sleeping patterns No recent severe illness or injury No emergency room visits No trauma to the head /concussion. Taking fall semester off, going to [facility] in [facility] PHQ9 and CRAFFT reviewed, discussed results Coordination of Care reviewed, no issues Cardiac questionnaire reviewed, discussed results Has been to the dentist within last 12 months

## 2025-08-13 ENCOUNTER — TELEPHONE (OUTPATIENT)
Dept: SPEECH THERAPY | Facility: HOSPITAL | Age: 65
End: 2025-08-13
Payer: MEDICARE

## 2025-08-22 ENCOUNTER — CLINICAL SUPPORT (OUTPATIENT)
Dept: SPEECH THERAPY | Facility: HOSPITAL | Age: 65
End: 2025-08-22
Payer: MEDICARE

## 2025-08-22 DIAGNOSIS — J38.3 VOCAL FOLD ATROPHY: ICD-10-CM

## 2025-08-22 DIAGNOSIS — R49.0 DYSPHONIA: Primary | ICD-10-CM

## 2025-08-22 PROCEDURE — 92507 TX SP LANG VOICE COMM INDIV: CPT | Mod: GN,HCNC

## (undated) DEVICE — DIFFUSER

## (undated) DEVICE — SEE MEDLINE ITEM 156905

## (undated) DEVICE — GLOVE SENSICARE PI SURG 7.5

## (undated) DEVICE — TRANSDUCER ADULT DISP

## (undated) DEVICE — ELECTRODE REM PLYHSV RETURN 9

## (undated) DEVICE — DRAPE C-ARM ELAS CLIP 42X120IN

## (undated) DEVICE — SPONGE COTTON TRAY 4X4IN

## (undated) DEVICE — CATH JACKY RADIAL 3.5 110CM

## (undated) DEVICE — SOL BETADINE 5%

## (undated) DEVICE — SUT VICRYL 3-0 27 SH

## (undated) DEVICE — BURR RND FLUT SFT TOUCH 2.0MM

## (undated) DEVICE — 4.5 TAP

## (undated) DEVICE — CORD BIPOLAR 12 FOOT

## (undated) DEVICE — DRESSING AQUACEL FOAM 4 X 12

## (undated) DEVICE — BRUSH SCRUB SURGICALW/BETADINE

## (undated) DEVICE — DRESSING AQUACEL AG SILVER 4X4

## (undated) DEVICE — GLOVE GAMMEX 7 PF STERILE

## (undated) DEVICE — CLIP ON REMOTE CONTROL

## (undated) DEVICE — EVACUATOR BLADDER UROVAC ADPT

## (undated) DEVICE — BLADE 4IN EDGE INSULATED

## (undated) DEVICE — SEE MEDLINE ITEM 156894

## (undated) DEVICE — SUT VICRYL PLUS 2-0

## (undated) DEVICE — BUR BONE CUT MICRO TPS 3X3.8MM

## (undated) DEVICE — DRAPE ANGIO BRACH 38X44IN

## (undated) DEVICE — SOL 9P NACL IRR PIC IL

## (undated) DEVICE — HEMOSTAT VASC BAND REG 24CM

## (undated) DEVICE — SUT STRATAFIX 3-0 30CM

## (undated) DEVICE — KIT CUSTOM MANIFOLD

## (undated) DEVICE — DRAPE STERI-DRAPE 1000 17X11IN

## (undated) DEVICE — SET TUR BLADDER IRRIG Y TYPE

## (undated) DEVICE — SOL POVIDONE SCRUB IODINE 4 OZ

## (undated) DEVICE — PROTECTION STATION PLUS

## (undated) DEVICE — SPONGE PATTY SURGICAL .5X3IN

## (undated) DEVICE — DRESSING LEUKOPLAST FLEX 1X3IN

## (undated) DEVICE — KIT PROBE COVER WITH GEL

## (undated) DEVICE — CARTRIDGE OIL

## (undated) DEVICE — CORD FOR BIPOLAR FORCEPS 12

## (undated) DEVICE — SUT STRATAFIX PDS PLUS VIO

## (undated) DEVICE — Device

## (undated) DEVICE — GAUZE SPONGE PEANUT STRL

## (undated) DEVICE — KIT GLIDESHEATH SLEND 6FR 10CM

## (undated) DEVICE — PENCIL ROCKER SWITCH 10FT CORD

## (undated) DEVICE — KIT SURGIFLO HEMOSTATIC MATRIX

## (undated) DEVICE — GLOVE BIOGEL SKINSENSE PI 7.5

## (undated) DEVICE — CHLORAPREP 3ML APPLICATOR TINT

## (undated) DEVICE — OMNIPAQUE 350 200ML

## (undated) DEVICE — APPLIER CLIP LIAGCLIP 9.375IN

## (undated) DEVICE — COVER PROXIMA MAYO STAND

## (undated) DEVICE — GLOVE GAMMEX SURG LF PI SZ 7.5

## (undated) DEVICE — TUBE FRAZIER 5MM 2FT SOFT TIP

## (undated) DEVICE — SPHERE MARKER REFLECTIVE DISP

## (undated) DEVICE — DRAPE T THYROID STERILE

## (undated) DEVICE — SUT MCRYL PLUS 4-0 PS2 27IN

## (undated) DEVICE — SOL IRR NACL .9% 3000ML

## (undated) DEVICE — TRAY NEURO OMC

## (undated) DEVICE — 3.5 TAP

## (undated) DEVICE — DRAPE C-ARMOR EQUIPMENT COVER

## (undated) DEVICE — COVER LIGHT HANDLE 80/CA

## (undated) DEVICE — ADHESIVE DERMABOND ADVANCED

## (undated) DEVICE — DRAIN CHANNEL ROUND 10FR

## (undated) DEVICE — KIT EVACUATOR 3-SPRING 1/8 DRN

## (undated) DEVICE — APPLICATOR CHLORAPREP ORN 26ML

## (undated) DEVICE — SET BASIN 48X48IN 6000ML RING

## (undated) DEVICE — DRESSING TELFA N ADH 3X8

## (undated) DEVICE — SUT VICRYL PLUS 0 CT1 18IN

## (undated) DEVICE — PINS SKULL ADULT MAYFIELD
Type: IMPLANTABLE DEVICE | Site: CRANIAL | Status: NON-FUNCTIONAL
Removed: 2024-07-26

## (undated) DEVICE — BAG URINARY DRN 2000ML

## (undated) DEVICE — DEVICE STAT LOCK CATH SECURE

## (undated) DEVICE — BLADE MILL BONE MEDIUM

## (undated) DEVICE — DRAPE TOP 53X102IN

## (undated) DEVICE — TUBING SUCTION SET FOR ENDOMAT

## (undated) DEVICE — GUIDEWIRE EMERALD 150CM PTFE

## (undated) DEVICE — SPIKE CONTRAST CONTROLLER

## (undated) DEVICE — SUT 3/0 30IN ETHILON BLK M

## (undated) DEVICE — SUT VICRYL 2 0 CT 2

## (undated) DEVICE — ALCOHOL 70% ETHYL 16OZ.

## (undated) DEVICE — ELECTRODE RESECTION BUTTON LRG

## (undated) DEVICE — DRESSING TRANS 4X4 TEGADERM

## (undated) DEVICE — NDL SPINAL 18GX3.5 SPINOCAN

## (undated) DEVICE — RESTRAINT LIMB HOLDER ADJ FOAM

## (undated) DEVICE — CATH BACTI GUARD 2W 20FR 30CC

## (undated) DEVICE — ADHESIVE MASTISOL VIAL 48/BX

## (undated) DEVICE — COVER CAMERA OPERATING ROOM

## (undated) DEVICE — SUT SILK 2-0 BLK BR PS-2 18